# Patient Record
Sex: FEMALE | Race: OTHER | Employment: OTHER | URBAN - METROPOLITAN AREA
[De-identification: names, ages, dates, MRNs, and addresses within clinical notes are randomized per-mention and may not be internally consistent; named-entity substitution may affect disease eponyms.]

---

## 2017-01-05 ENCOUNTER — HOSPITAL ENCOUNTER (OUTPATIENT)
Dept: PHYSICAL THERAPY | Age: 57
Discharge: HOME | End: 2017-01-31
Attending: PHYSICIAN ASSISTANT | Admitting: PHYSICIAN ASSISTANT

## 2017-01-06 ASSESSMENT — PAIN SCALES - GENERAL: PAINLEVEL_OUTOF10: 3

## 2017-01-10 ENCOUNTER — HOSPITAL ENCOUNTER (OUTPATIENT)
Dept: PHYSICAL THERAPY | Age: 57
Discharge: HOME | End: 2017-01-10

## 2017-01-10 ENCOUNTER — OFFICE VISIT (OUTPATIENT)
Dept: ORTHOPEDIC SURGERY | Age: 57
End: 2017-01-10

## 2017-01-10 VITALS — HEIGHT: 57 IN | RESPIRATION RATE: 16 BRPM | BODY MASS INDEX: 31.5 KG/M2 | WEIGHT: 146 LBS

## 2017-01-10 DIAGNOSIS — R52 PAIN: Primary | ICD-10-CM

## 2017-01-10 DIAGNOSIS — S42.211A FRACTURE, HUMERUS, NECK, RIGHT, CLOSED, INITIAL ENCOUNTER: ICD-10-CM

## 2017-01-10 PROCEDURE — 99024 POSTOP FOLLOW-UP VISIT: CPT | Performed by: PHYSICIAN ASSISTANT

## 2017-01-10 PROCEDURE — 73030 X-RAY EXAM OF SHOULDER: CPT | Performed by: PHYSICIAN ASSISTANT

## 2017-01-16 ENCOUNTER — HOSPITAL ENCOUNTER (OUTPATIENT)
Dept: PHYSICAL THERAPY | Age: 57
Discharge: HOME | End: 2017-01-16

## 2017-01-19 ENCOUNTER — HOSPITAL ENCOUNTER (OUTPATIENT)
Dept: PHYSICAL THERAPY | Age: 57
Discharge: HOME | End: 2017-01-19

## 2017-01-23 ENCOUNTER — HOSPITAL ENCOUNTER (OUTPATIENT)
Dept: PHYSICAL THERAPY | Age: 57
Discharge: HOME | End: 2017-01-23

## 2017-02-01 ENCOUNTER — HOSPITAL ENCOUNTER (OUTPATIENT)
Dept: OTHER | Age: 57
Discharge: HOME | End: 2017-02-28
Attending: PHYSICIAN ASSISTANT | Admitting: PHYSICIAN ASSISTANT

## 2017-02-08 ENCOUNTER — HOSPITAL ENCOUNTER (OUTPATIENT)
Dept: PHYSICAL THERAPY | Age: 57
Discharge: HOME | End: 2017-02-08

## 2017-02-13 ENCOUNTER — HOSPITAL ENCOUNTER (OUTPATIENT)
Dept: PHYSICAL THERAPY | Age: 57
Discharge: HOME | End: 2017-02-13

## 2017-02-20 ENCOUNTER — TELEPHONE (OUTPATIENT)
Dept: ORTHOPEDIC SURGERY | Age: 57
End: 2017-02-20

## 2017-02-20 ENCOUNTER — HOSPITAL ENCOUNTER (OUTPATIENT)
Dept: PHYSICAL THERAPY | Age: 57
Discharge: HOME | End: 2017-02-20

## 2017-02-20 DIAGNOSIS — M25.511 ACUTE PAIN OF RIGHT SHOULDER: Primary | ICD-10-CM

## 2017-02-22 ENCOUNTER — HOSPITAL ENCOUNTER (OUTPATIENT)
Dept: PHYSICAL THERAPY | Age: 57
Discharge: HOME | End: 2017-02-22

## 2017-02-23 ENCOUNTER — TELEPHONE (OUTPATIENT)
Dept: FAMILY MEDICINE CLINIC | Age: 57
End: 2017-02-23

## 2017-02-24 ENCOUNTER — OFFICE VISIT (OUTPATIENT)
Dept: FAMILY MEDICINE CLINIC | Age: 57
End: 2017-02-24

## 2017-02-24 DIAGNOSIS — Z79.4 TYPE 2 DIABETES MELLITUS WITHOUT COMPLICATION, WITH LONG-TERM CURRENT USE OF INSULIN (HCC): ICD-10-CM

## 2017-02-24 DIAGNOSIS — E11.9 TYPE 2 DIABETES MELLITUS WITHOUT COMPLICATION, WITH LONG-TERM CURRENT USE OF INSULIN (HCC): ICD-10-CM

## 2017-02-24 DIAGNOSIS — K51.00 PANCOLITIS (HCC): Primary | ICD-10-CM

## 2017-02-24 DIAGNOSIS — K74.60 CIRRHOSIS OF LIVER WITHOUT ASCITES, UNSPECIFIED HEPATIC CIRRHOSIS TYPE (HCC): ICD-10-CM

## 2017-02-24 PROBLEM — K70.30 ALCOHOLIC CIRRHOSIS OF LIVER WITHOUT ASCITES (HCC): Status: ACTIVE | Noted: 2017-02-14

## 2017-02-24 PROBLEM — D69.6 THROMBOCYTOPENIA (HCC): Chronic | Status: ACTIVE | Noted: 2017-02-24

## 2017-02-24 PROBLEM — K92.0 HEMATEMESIS WITHOUT NAUSEA: Status: ACTIVE | Noted: 2017-02-14

## 2017-02-24 LAB — HBA1C MFR BLD: 5.8 %

## 2017-02-24 PROCEDURE — 83036 HEMOGLOBIN GLYCOSYLATED A1C: CPT | Performed by: FAMILY MEDICINE

## 2017-02-24 PROCEDURE — 99214 OFFICE O/P EST MOD 30 MIN: CPT | Performed by: FAMILY MEDICINE

## 2017-02-24 RX ORDER — RANOLAZINE 500 MG/1
500 TABLET, EXTENDED RELEASE ORAL 2 TIMES DAILY
COMMUNITY
End: 2017-05-09 | Stop reason: SDUPTHER

## 2017-02-27 ENCOUNTER — HOSPITAL ENCOUNTER (OUTPATIENT)
Dept: PHYSICAL THERAPY | Age: 57
Discharge: HOME | End: 2017-02-27

## 2017-02-28 ENCOUNTER — TELEPHONE (OUTPATIENT)
Dept: FAMILY MEDICINE CLINIC | Age: 57
End: 2017-02-28

## 2017-03-01 ENCOUNTER — HOSPITAL ENCOUNTER (OUTPATIENT)
Dept: OTHER | Age: 57
Discharge: HOME | End: 2017-03-31
Attending: PHYSICIAN ASSISTANT | Admitting: PHYSICIAN ASSISTANT

## 2017-03-01 ENCOUNTER — HOSPITAL ENCOUNTER (OUTPATIENT)
Dept: PHYSICAL THERAPY | Age: 57
Discharge: HOME | End: 2017-03-01

## 2017-03-09 ENCOUNTER — OFFICE VISIT (OUTPATIENT)
Dept: CARDIOLOGY CLINIC | Age: 57
End: 2017-03-09

## 2017-03-09 ENCOUNTER — HOSPITAL ENCOUNTER (OUTPATIENT)
Dept: PHYSICAL THERAPY | Age: 57
Discharge: HOME | End: 2017-03-09

## 2017-03-09 VITALS
WEIGHT: 166 LBS | HEIGHT: 58 IN | HEART RATE: 76 BPM | DIASTOLIC BLOOD PRESSURE: 80 MMHG | BODY MASS INDEX: 34.85 KG/M2 | SYSTOLIC BLOOD PRESSURE: 118 MMHG

## 2017-03-09 DIAGNOSIS — M79.602 LEFT ARM PAIN: ICD-10-CM

## 2017-03-09 DIAGNOSIS — E78.5 DYSLIPIDEMIA: ICD-10-CM

## 2017-03-09 DIAGNOSIS — R07.1 CHEST PAIN ON BREATHING: ICD-10-CM

## 2017-03-09 DIAGNOSIS — D69.6 THROMBOCYTOPENIA (HCC): Chronic | ICD-10-CM

## 2017-03-09 DIAGNOSIS — E11.9 TYPE 2 DIABETES MELLITUS WITHOUT COMPLICATION, WITH LONG-TERM CURRENT USE OF INSULIN (HCC): ICD-10-CM

## 2017-03-09 DIAGNOSIS — Z72.0 TOBACCO ABUSE: ICD-10-CM

## 2017-03-09 DIAGNOSIS — K70.30 ALCOHOLIC CIRRHOSIS OF LIVER WITHOUT ASCITES (HCC): ICD-10-CM

## 2017-03-09 DIAGNOSIS — R93.1 ABNORMAL NUCLEAR CARDIAC IMAGING TEST: Primary | Chronic | ICD-10-CM

## 2017-03-09 DIAGNOSIS — R91.1 LUNG NODULE: ICD-10-CM

## 2017-03-09 DIAGNOSIS — Z79.4 TYPE 2 DIABETES MELLITUS WITHOUT COMPLICATION, WITH LONG-TERM CURRENT USE OF INSULIN (HCC): ICD-10-CM

## 2017-03-09 PROCEDURE — 99213 OFFICE O/P EST LOW 20 MIN: CPT | Performed by: INTERNAL MEDICINE

## 2017-03-14 ENCOUNTER — OFFICE VISIT (OUTPATIENT)
Dept: GASTROENTEROLOGY | Age: 57
End: 2017-03-14

## 2017-03-14 VITALS
BODY MASS INDEX: 33.8 KG/M2 | DIASTOLIC BLOOD PRESSURE: 82 MMHG | WEIGHT: 161 LBS | HEART RATE: 100 BPM | HEIGHT: 58 IN | SYSTOLIC BLOOD PRESSURE: 130 MMHG | OXYGEN SATURATION: 98 %

## 2017-03-14 DIAGNOSIS — K21.9 GASTROESOPHAGEAL REFLUX DISEASE, ESOPHAGITIS PRESENCE NOT SPECIFIED: ICD-10-CM

## 2017-03-14 DIAGNOSIS — K70.30 ALCOHOLIC CIRRHOSIS OF LIVER WITHOUT ASCITES (HCC): ICD-10-CM

## 2017-03-14 DIAGNOSIS — R11.0 NAUSEA: ICD-10-CM

## 2017-03-14 DIAGNOSIS — Z86.19 HISTORY OF HEPATITIS C: ICD-10-CM

## 2017-03-14 DIAGNOSIS — R10.33 PERIUMBILICAL ABDOMINAL PAIN: Primary | ICD-10-CM

## 2017-03-14 DIAGNOSIS — Z86.19 HISTORY OF HEPATITIS B: ICD-10-CM

## 2017-03-14 PROCEDURE — 99999 PR OFFICE/OUTPT VISIT,PROCEDURE ONLY: CPT | Performed by: NURSE PRACTITIONER

## 2017-03-14 ASSESSMENT — ENCOUNTER SYMPTOMS
PHOTOPHOBIA: 0
ABDOMINAL PAIN: 1
HEMOPTYSIS: 0
BLURRED VISION: 0
COUGH: 1
VOMITING: 0
NAUSEA: 1
ORTHOPNEA: 0
SPUTUM PRODUCTION: 1
SHORTNESS OF BREATH: 1
DIARRHEA: 1
CONSTIPATION: 0
BLOOD IN STOOL: 0
BACK PAIN: 1
EYE PAIN: 0
DOUBLE VISION: 0
HEARTBURN: 1

## 2017-03-16 ENCOUNTER — HOSPITAL ENCOUNTER (OUTPATIENT)
Dept: SURGERY | Age: 57
Discharge: HOME | End: 2017-03-16
Attending: INTERNAL MEDICINE | Admitting: INTERNAL MEDICINE

## 2017-03-16 VITALS
BODY MASS INDEX: 32.79 KG/M2 | HEART RATE: 93 BPM | HEIGHT: 57 IN | TEMPERATURE: 98.5 F | WEIGHT: 152 LBS | SYSTOLIC BLOOD PRESSURE: 155 MMHG | OXYGEN SATURATION: 95 % | DIASTOLIC BLOOD PRESSURE: 91 MMHG | RESPIRATION RATE: 18 BRPM

## 2017-03-16 LAB — GLUCOSE BLD-MCNC: 140 MG/DL (ref 70–99)

## 2017-03-16 PROCEDURE — 43239 EGD BIOPSY SINGLE/MULTIPLE: CPT | Performed by: INTERNAL MEDICINE

## 2017-03-16 PROCEDURE — 45380 COLONOSCOPY AND BIOPSY: CPT | Performed by: INTERNAL MEDICINE

## 2017-03-16 RX ORDER — SODIUM CHLORIDE, SODIUM LACTATE, POTASSIUM CHLORIDE, CALCIUM CHLORIDE 600; 310; 30; 20 MG/100ML; MG/100ML; MG/100ML; MG/100ML
INJECTION, SOLUTION INTRAVENOUS CONTINUOUS
Status: DISCONTINUED | OUTPATIENT
Start: 2017-03-16 | End: 2017-03-17 | Stop reason: HOSPADM

## 2017-03-16 RX ADMIN — SODIUM CHLORIDE, SODIUM LACTATE, POTASSIUM CHLORIDE, CALCIUM CHLORIDE: 600; 310; 30; 20 INJECTION, SOLUTION INTRAVENOUS at 10:36

## 2017-03-16 ASSESSMENT — PAIN SCALES - GENERAL
PAINLEVEL_OUTOF10: 0
PAINLEVEL_OUTOF10: 10

## 2017-03-16 ASSESSMENT — PAIN DESCRIPTION - ONSET: ONSET: AWAKENED FROM SLEEP

## 2017-03-16 ASSESSMENT — PAIN DESCRIPTION - FREQUENCY: FREQUENCY: CONTINUOUS

## 2017-03-16 ASSESSMENT — PAIN DESCRIPTION - DESCRIPTORS: DESCRIPTORS: CRAMPING;SHARP

## 2017-03-16 ASSESSMENT — PAIN DESCRIPTION - LOCATION: LOCATION: ABDOMEN

## 2017-03-16 ASSESSMENT — PAIN - FUNCTIONAL ASSESSMENT: PAIN_FUNCTIONAL_ASSESSMENT: 0-10

## 2017-03-16 ASSESSMENT — PAIN DESCRIPTION - PAIN TYPE: TYPE: SURGICAL PAIN

## 2017-03-22 ENCOUNTER — HOSPITAL ENCOUNTER (OUTPATIENT)
Dept: PHYSICAL THERAPY | Age: 57
Discharge: HOME | End: 2017-03-22

## 2017-03-29 ENCOUNTER — HOSPITAL ENCOUNTER (OUTPATIENT)
Dept: PHYSICAL THERAPY | Age: 57
Discharge: HOME | End: 2017-03-29

## 2017-04-01 ENCOUNTER — HOSPITAL ENCOUNTER (OUTPATIENT)
Dept: OTHER | Age: 57
Discharge: HOME | End: 2017-04-05
Attending: PHYSICIAN ASSISTANT | Admitting: PHYSICIAN ASSISTANT

## 2017-05-08 RX ORDER — ASPIRIN 81 MG/1
81 TABLET ORAL DAILY
Qty: 90 TABLET | Refills: 3 | Status: CANCELLED | OUTPATIENT
Start: 2017-05-08

## 2017-05-08 RX ORDER — RANOLAZINE 500 MG/1
500 TABLET, EXTENDED RELEASE ORAL 2 TIMES DAILY
Qty: 60 TABLET | Refills: 6 | Status: CANCELLED | OUTPATIENT
Start: 2017-05-08

## 2017-05-08 RX ORDER — NITROGLYCERIN 0.4 MG/1
0.4 TABLET SUBLINGUAL EVERY 5 MIN PRN
Qty: 25 TABLET | Refills: 3 | Status: CANCELLED | OUTPATIENT
Start: 2017-05-08

## 2017-05-09 ENCOUNTER — TELEPHONE (OUTPATIENT)
Dept: FAMILY MEDICINE CLINIC | Age: 57
End: 2017-05-09

## 2017-05-09 ENCOUNTER — TELEPHONE (OUTPATIENT)
Dept: CARDIOLOGY CLINIC | Age: 57
End: 2017-05-09

## 2017-05-09 RX ORDER — RANOLAZINE 500 MG/1
500 TABLET, EXTENDED RELEASE ORAL 2 TIMES DAILY
Qty: 180 TABLET | Refills: 3 | Status: SHIPPED | OUTPATIENT
Start: 2017-05-09 | End: 2018-08-03 | Stop reason: SDUPTHER

## 2017-05-09 RX ORDER — NITROGLYCERIN 0.4 MG/1
0.4 TABLET SUBLINGUAL EVERY 5 MIN PRN
Qty: 25 TABLET | Refills: 3 | Status: SHIPPED | OUTPATIENT
Start: 2017-05-09 | End: 2019-03-18 | Stop reason: SDUPTHER

## 2017-06-14 ENCOUNTER — OFFICE VISIT (OUTPATIENT)
Dept: ORTHOPEDIC SURGERY | Age: 57
End: 2017-06-14

## 2017-06-14 VITALS — BODY MASS INDEX: 33.66 KG/M2 | HEIGHT: 57 IN | WEIGHT: 156 LBS | RESPIRATION RATE: 16 BRPM

## 2017-06-14 DIAGNOSIS — R52 PAIN: ICD-10-CM

## 2017-06-14 DIAGNOSIS — G56.02 LEFT CARPAL TUNNEL SYNDROME: ICD-10-CM

## 2017-06-14 DIAGNOSIS — G56.01 RIGHT CARPAL TUNNEL SYNDROME: Primary | ICD-10-CM

## 2017-06-14 PROCEDURE — 99243 OFF/OP CNSLTJ NEW/EST LOW 30: CPT | Performed by: ORTHOPAEDIC SURGERY

## 2017-06-14 RX ORDER — LANOLIN ALCOHOL/MO/W.PET/CERES
50 CREAM (GRAM) TOPICAL DAILY
Qty: 30 TABLET | Refills: 1 | Status: SHIPPED | OUTPATIENT
Start: 2017-06-14 | End: 2018-10-25

## 2017-06-14 ASSESSMENT — ENCOUNTER SYMPTOMS
BACK PAIN: 1
CONSTIPATION: 1
NAUSEA: 1
COUGH: 1
PHOTOPHOBIA: 1
DIARRHEA: 1
HEARTBURN: 1
ABDOMINAL PAIN: 1
SPUTUM PRODUCTION: 1
WHEEZING: 1
BLURRED VISION: 1

## 2017-07-10 ENCOUNTER — OFFICE VISIT (OUTPATIENT)
Dept: ORTHOPEDIC SURGERY | Age: 57
End: 2017-07-10

## 2017-07-10 VITALS — BODY MASS INDEX: 33.66 KG/M2 | HEIGHT: 57 IN | WEIGHT: 156 LBS | RESPIRATION RATE: 16 BRPM

## 2017-07-10 DIAGNOSIS — G56.02 LEFT CARPAL TUNNEL SYNDROME: ICD-10-CM

## 2017-07-10 DIAGNOSIS — G56.01 RIGHT CARPAL TUNNEL SYNDROME: Primary | ICD-10-CM

## 2017-07-10 PROCEDURE — 99214 OFFICE O/P EST MOD 30 MIN: CPT | Performed by: ORTHOPAEDIC SURGERY

## 2017-07-15 ENCOUNTER — OFFICE VISIT (OUTPATIENT)
Dept: FAMILY MEDICINE CLINIC | Age: 57
End: 2017-07-15

## 2017-07-15 ENCOUNTER — HOSPITAL ENCOUNTER (OUTPATIENT)
Dept: LAB | Age: 57
Discharge: HOME | End: 2017-07-15
Attending: NURSE PRACTITIONER | Admitting: NURSE PRACTITIONER

## 2017-07-15 VITALS
RESPIRATION RATE: 16 BRPM | SYSTOLIC BLOOD PRESSURE: 122 MMHG | WEIGHT: 155 LBS | HEART RATE: 88 BPM | DIASTOLIC BLOOD PRESSURE: 70 MMHG | HEIGHT: 57 IN | BODY MASS INDEX: 33.44 KG/M2 | OXYGEN SATURATION: 97 %

## 2017-07-15 DIAGNOSIS — R23.3 PETECHIAE: Primary | ICD-10-CM

## 2017-07-15 DIAGNOSIS — K70.30 ALCOHOLIC CIRRHOSIS OF LIVER WITHOUT ASCITES (HCC): ICD-10-CM

## 2017-07-15 DIAGNOSIS — R10.9 FLANK PAIN, ACUTE: ICD-10-CM

## 2017-07-15 LAB
ALBUMIN SERPL-MCNC: 3.7 GM/DL (ref 3.4–5)
ALP BLD-CCNC: 147 IU/L (ref 40–128)
ALT SERPL-CCNC: 35 U/L (ref 10–40)
ANION GAP SERPL CALCULATED.3IONS-SCNC: 9 MMOL/L (ref 4–16)
AST SERPL-CCNC: 35 IU/L (ref 15–37)
BASOPHILS ABSOLUTE: 0 K/CU MM
BASOPHILS RELATIVE PERCENT: 0.6 % (ref 0–1)
BILIRUB SERPL-MCNC: 0.7 MG/DL (ref 0–1)
BILIRUBIN, POC: NORMAL
BLOOD URINE, POC: NORMAL
BUN BLDV-MCNC: 16 MG/DL (ref 6–23)
CALCIUM SERPL-MCNC: 9 MG/DL (ref 8.3–10.6)
CHLORIDE BLD-SCNC: 100 MMOL/L (ref 99–110)
CLARITY, POC: CLEAR
CO2: 27 MMOL/L (ref 21–32)
COLOR, POC: YELLOW
CREAT SERPL-MCNC: 0.9 MG/DL (ref 0.6–1.1)
DIFFERENTIAL TYPE: ABNORMAL
EOSINOPHILS ABSOLUTE: 0.2 K/CU MM
EOSINOPHILS RELATIVE PERCENT: 2.8 % (ref 0–3)
GFR AFRICAN AMERICAN: >60 ML/MIN/1.73M2
GFR NON-AFRICAN AMERICAN: >60 ML/MIN/1.73M2
GLUCOSE FASTING: 143 MG/DL (ref 70–99)
GLUCOSE URINE, POC: NORMAL
HCT VFR BLD CALC: 47 % (ref 37–47)
HEMOGLOBIN: 16.1 GM/DL (ref 12.5–16)
IMMATURE NEUTROPHIL %: 0.4 % (ref 0–0.43)
KETONES, POC: NORMAL
LEUKOCYTE EST, POC: NORMAL
LYMPHOCYTES ABSOLUTE: 2.2 K/CU MM
LYMPHOCYTES RELATIVE PERCENT: 31.2 % (ref 24–44)
MCH RBC QN AUTO: 33.8 PG (ref 27–31)
MCHC RBC AUTO-ENTMCNC: 34.3 % (ref 32–36)
MCV RBC AUTO: 98.5 FL (ref 78–100)
MONOCYTES ABSOLUTE: 0.5 K/CU MM
MONOCYTES RELATIVE PERCENT: 6.7 % (ref 0–4)
NITRITE, POC: NORMAL
NUCLEATED RBC %: 0 %
PDW BLD-RTO: 13.1 % (ref 11.7–14.9)
PH, POC: 5
PLATELET # BLD: 76 K/CU MM (ref 140–440)
PMV BLD AUTO: 11.1 FL (ref 7.5–11.1)
POTASSIUM SERPL-SCNC: 4.6 MMOL/L (ref 3.5–5.1)
PROTEIN, POC: NORMAL
RBC # BLD: 4.77 M/CU MM (ref 4.2–5.4)
SEGMENTED NEUTROPHILS ABSOLUTE COUNT: 4.2 K/CU MM
SEGMENTED NEUTROPHILS RELATIVE PERCENT: 58.3 % (ref 36–66)
SODIUM BLD-SCNC: 136 MMOL/L (ref 135–145)
SPECIFIC GRAVITY, POC: 1.01
TOTAL IMMATURE NEUTOROPHIL: 0.03 K/CU MM
TOTAL NUCLEATED RBC: 0 K/CU MM
TOTAL PROTEIN: 7.6 GM/DL (ref 6.4–8.2)
UROBILINOGEN, POC: 0.2
WBC # BLD: 7.2 K/CU MM (ref 4–10.5)

## 2017-07-15 PROCEDURE — 99214 OFFICE O/P EST MOD 30 MIN: CPT | Performed by: NURSE PRACTITIONER

## 2017-07-15 PROCEDURE — 81002 URINALYSIS NONAUTO W/O SCOPE: CPT | Performed by: NURSE PRACTITIONER

## 2017-07-15 RX ORDER — PREDNISONE 20 MG/1
TABLET ORAL
Status: ON HOLD | COMMUNITY
Start: 2017-07-06 | End: 2017-07-30 | Stop reason: HOSPADM

## 2017-07-15 RX ORDER — BENZONATATE 200 MG/1
CAPSULE ORAL
COMMUNITY
Start: 2017-07-06 | End: 2017-12-04

## 2017-07-15 RX ORDER — LEVOFLOXACIN 500 MG/1
TABLET, FILM COATED ORAL
Status: ON HOLD | COMMUNITY
Start: 2017-07-06 | End: 2017-07-30 | Stop reason: HOSPADM

## 2017-07-15 ASSESSMENT — ENCOUNTER SYMPTOMS
DIARRHEA: 0
ABDOMINAL PAIN: 1
RECTAL PAIN: 0
VOMITING: 1
ABDOMINAL DISTENTION: 0
CONSTIPATION: 0
RESPIRATORY NEGATIVE: 1
BLOOD IN STOOL: 0
NAUSEA: 0

## 2017-07-18 ENCOUNTER — TELEPHONE (OUTPATIENT)
Dept: FAMILY MEDICINE CLINIC | Age: 57
End: 2017-07-18

## 2017-07-31 ENCOUNTER — OFFICE VISIT (OUTPATIENT)
Dept: FAMILY MEDICINE CLINIC | Age: 57
End: 2017-07-31

## 2017-07-31 VITALS
WEIGHT: 159 LBS | OXYGEN SATURATION: 93 % | SYSTOLIC BLOOD PRESSURE: 124 MMHG | DIASTOLIC BLOOD PRESSURE: 78 MMHG | HEIGHT: 57 IN | HEART RATE: 76 BPM | BODY MASS INDEX: 34.3 KG/M2

## 2017-07-31 DIAGNOSIS — Z72.0 TOBACCO ABUSE: ICD-10-CM

## 2017-07-31 DIAGNOSIS — Z23 NEED FOR PNEUMOCOCCAL VACCINATION: ICD-10-CM

## 2017-07-31 DIAGNOSIS — K70.30 ALCOHOLIC CIRRHOSIS OF LIVER WITHOUT ASCITES (HCC): ICD-10-CM

## 2017-07-31 DIAGNOSIS — K21.9 GASTROESOPHAGEAL REFLUX DISEASE WITHOUT ESOPHAGITIS: ICD-10-CM

## 2017-07-31 DIAGNOSIS — D73.89 SPLENIC LESION: ICD-10-CM

## 2017-07-31 DIAGNOSIS — R23.3 PETECHIAE: ICD-10-CM

## 2017-07-31 DIAGNOSIS — D69.6 THROMBOCYTOPENIA (HCC): Chronic | ICD-10-CM

## 2017-07-31 DIAGNOSIS — E11.8 TYPE 2 DIABETES MELLITUS WITH COMPLICATION, WITH LONG-TERM CURRENT USE OF INSULIN (HCC): Primary | ICD-10-CM

## 2017-07-31 DIAGNOSIS — I10 ESSENTIAL HYPERTENSION: ICD-10-CM

## 2017-07-31 DIAGNOSIS — E78.2 MIXED HYPERLIPIDEMIA: ICD-10-CM

## 2017-07-31 DIAGNOSIS — Z79.4 TYPE 2 DIABETES MELLITUS WITH COMPLICATION, WITH LONG-TERM CURRENT USE OF INSULIN (HCC): Primary | ICD-10-CM

## 2017-07-31 DIAGNOSIS — Z12.31 ENCOUNTER FOR SCREENING MAMMOGRAM FOR BREAST CANCER: ICD-10-CM

## 2017-07-31 DIAGNOSIS — J44.9 CHRONIC OBSTRUCTIVE PULMONARY DISEASE, UNSPECIFIED COPD TYPE (HCC): ICD-10-CM

## 2017-07-31 LAB — HBA1C MFR BLD: 5.8 %

## 2017-07-31 PROCEDURE — 83036 HEMOGLOBIN GLYCOSYLATED A1C: CPT | Performed by: FAMILY MEDICINE

## 2017-07-31 PROCEDURE — 90732 PPSV23 VACC 2 YRS+ SUBQ/IM: CPT | Performed by: FAMILY MEDICINE

## 2017-07-31 PROCEDURE — 90471 IMMUNIZATION ADMIN: CPT | Performed by: FAMILY MEDICINE

## 2017-07-31 PROCEDURE — 99214 OFFICE O/P EST MOD 30 MIN: CPT | Performed by: FAMILY MEDICINE

## 2017-07-31 ASSESSMENT — ENCOUNTER SYMPTOMS
TROUBLE SWALLOWING: 0
BLOOD IN STOOL: 0
CONSTIPATION: 0
NAUSEA: 0
EYE ITCHING: 0
SORE THROAT: 0
RHINORRHEA: 0
EYE REDNESS: 0
SINUS PRESSURE: 0
VOMITING: 0
COUGH: 0
STRIDOR: 0
APNEA: 0
SHORTNESS OF BREATH: 0
DIARRHEA: 0
WHEEZING: 0
ABDOMINAL PAIN: 1

## 2017-07-31 ASSESSMENT — PATIENT HEALTH QUESTIONNAIRE - PHQ9
2. FEELING DOWN, DEPRESSED OR HOPELESS: 0
1. LITTLE INTEREST OR PLEASURE IN DOING THINGS: 0
SUM OF ALL RESPONSES TO PHQ9 QUESTIONS 1 & 2: 0
SUM OF ALL RESPONSES TO PHQ QUESTIONS 1-9: 0

## 2017-08-01 ENCOUNTER — NURSE ONLY (OUTPATIENT)
Dept: FAMILY MEDICINE CLINIC | Age: 57
End: 2017-08-01

## 2017-08-01 DIAGNOSIS — E11.9 TYPE 2 DIABETES MELLITUS WITHOUT COMPLICATION, WITH LONG-TERM CURRENT USE OF INSULIN (HCC): ICD-10-CM

## 2017-08-01 DIAGNOSIS — Z79.4 TYPE 2 DIABETES MELLITUS WITH COMPLICATION, WITH LONG-TERM CURRENT USE OF INSULIN (HCC): ICD-10-CM

## 2017-08-01 DIAGNOSIS — R93.1 ABNORMAL NUCLEAR CARDIAC IMAGING TEST: Chronic | ICD-10-CM

## 2017-08-01 DIAGNOSIS — R91.1 LUNG NODULE: ICD-10-CM

## 2017-08-01 DIAGNOSIS — M79.602 LEFT ARM PAIN: ICD-10-CM

## 2017-08-01 DIAGNOSIS — R07.1 CHEST PAIN ON BREATHING: ICD-10-CM

## 2017-08-01 DIAGNOSIS — E78.5 DYSLIPIDEMIA: ICD-10-CM

## 2017-08-01 DIAGNOSIS — Z79.4 TYPE 2 DIABETES MELLITUS WITHOUT COMPLICATION, WITH LONG-TERM CURRENT USE OF INSULIN (HCC): ICD-10-CM

## 2017-08-01 DIAGNOSIS — D69.6 THROMBOCYTOPENIA (HCC): Chronic | ICD-10-CM

## 2017-08-01 DIAGNOSIS — E78.2 MIXED HYPERLIPIDEMIA: ICD-10-CM

## 2017-08-01 DIAGNOSIS — K70.30 ALCOHOLIC CIRRHOSIS OF LIVER WITHOUT ASCITES (HCC): ICD-10-CM

## 2017-08-01 DIAGNOSIS — E11.8 TYPE 2 DIABETES MELLITUS WITH COMPLICATION, WITH LONG-TERM CURRENT USE OF INSULIN (HCC): ICD-10-CM

## 2017-08-01 DIAGNOSIS — Z72.0 TOBACCO ABUSE: ICD-10-CM

## 2017-08-01 LAB
CHOLESTEROL, TOTAL: 120 MG/DL (ref 0–199)
HDLC SERPL-MCNC: 25 MG/DL (ref 40–60)
LDL CHOLESTEROL CALCULATED: 67 MG/DL
T4 FREE: 1 NG/DL (ref 0.9–1.8)
TRIGL SERPL-MCNC: 138 MG/DL (ref 0–150)
TSH SERPL DL<=0.05 MIU/L-ACNC: 1.73 UIU/ML (ref 0.27–4.2)
VITAMIN D 25-HYDROXY: 22.9 NG/ML
VLDLC SERPL CALC-MCNC: 28 MG/DL

## 2017-08-02 ENCOUNTER — HOSPITAL ENCOUNTER (OUTPATIENT)
Dept: WOMENS IMAGING | Age: 57
Discharge: HOME | End: 2017-08-02
Attending: FAMILY MEDICINE | Admitting: FAMILY MEDICINE

## 2017-08-02 DIAGNOSIS — Z12.31 ENCOUNTER FOR SCREENING MAMMOGRAM FOR BREAST CANCER: ICD-10-CM

## 2017-08-10 ENCOUNTER — OFFICE VISIT (OUTPATIENT)
Dept: PHYSICAL MEDICINE AND REHAB | Age: 57
End: 2017-08-10

## 2017-08-10 DIAGNOSIS — M79.601 PARESTHESIA AND PAIN OF BOTH UPPER EXTREMITIES: ICD-10-CM

## 2017-08-10 DIAGNOSIS — R20.2 PARESTHESIA AND PAIN OF BOTH UPPER EXTREMITIES: ICD-10-CM

## 2017-08-10 DIAGNOSIS — E11.42 DIABETIC SENSORIMOTOR POLYNEUROPATHY (HCC): ICD-10-CM

## 2017-08-10 DIAGNOSIS — M79.602 PARESTHESIA AND PAIN OF BOTH UPPER EXTREMITIES: ICD-10-CM

## 2017-08-10 DIAGNOSIS — G56.03 BILATERAL CARPAL TUNNEL SYNDROME: Primary | ICD-10-CM

## 2017-08-10 PROCEDURE — 95886 MUSC TEST DONE W/N TEST COMP: CPT | Performed by: PHYSICAL MEDICINE & REHABILITATION

## 2017-08-10 PROCEDURE — 95911 NRV CNDJ TEST 9-10 STUDIES: CPT | Performed by: PHYSICAL MEDICINE & REHABILITATION

## 2017-08-22 ENCOUNTER — OFFICE VISIT (OUTPATIENT)
Dept: FAMILY MEDICINE CLINIC | Age: 57
End: 2017-08-22

## 2017-08-22 VITALS
DIASTOLIC BLOOD PRESSURE: 68 MMHG | HEART RATE: 78 BPM | SYSTOLIC BLOOD PRESSURE: 110 MMHG | WEIGHT: 168 LBS | BODY MASS INDEX: 36.35 KG/M2 | OXYGEN SATURATION: 98 %

## 2017-08-22 DIAGNOSIS — Z72.0 TOBACCO ABUSE: ICD-10-CM

## 2017-08-22 DIAGNOSIS — E11.8 TYPE 2 DIABETES MELLITUS WITH COMPLICATION, WITH LONG-TERM CURRENT USE OF INSULIN (HCC): ICD-10-CM

## 2017-08-22 DIAGNOSIS — Z79.4 TYPE 2 DIABETES MELLITUS WITH COMPLICATION, WITH LONG-TERM CURRENT USE OF INSULIN (HCC): ICD-10-CM

## 2017-08-22 DIAGNOSIS — J44.9 CHRONIC OBSTRUCTIVE PULMONARY DISEASE, UNSPECIFIED COPD TYPE (HCC): ICD-10-CM

## 2017-08-22 DIAGNOSIS — E78.5 DYSLIPIDEMIA: ICD-10-CM

## 2017-08-22 DIAGNOSIS — Z23 NEEDS FLU SHOT: ICD-10-CM

## 2017-08-22 DIAGNOSIS — E55.9 VITAMIN D DEFICIENCY: Primary | ICD-10-CM

## 2017-08-22 LAB
CREATININE URINE POCT: 50
MICROALBUMIN/CREAT 24H UR: 10 MG/G{CREAT}
MICROALBUMIN/CREAT UR-RTO: ABNORMAL

## 2017-08-22 PROCEDURE — 99213 OFFICE O/P EST LOW 20 MIN: CPT | Performed by: FAMILY MEDICINE

## 2017-08-22 PROCEDURE — 90688 IIV4 VACCINE SPLT 0.5 ML IM: CPT | Performed by: FAMILY MEDICINE

## 2017-08-22 PROCEDURE — 82044 UR ALBUMIN SEMIQUANTITATIVE: CPT | Performed by: FAMILY MEDICINE

## 2017-08-22 PROCEDURE — 90471 IMMUNIZATION ADMIN: CPT | Performed by: FAMILY MEDICINE

## 2017-08-22 RX ORDER — ERGOCALCIFEROL (VITAMIN D2) 1250 MCG
50000 CAPSULE ORAL WEEKLY
Qty: 4 CAPSULE | Refills: 0 | Status: ON HOLD | OUTPATIENT
Start: 2017-08-22 | End: 2017-12-10 | Stop reason: HOSPADM

## 2017-08-22 RX ORDER — MIRTAZAPINE 15 MG/1
15 TABLET, FILM COATED ORAL NIGHTLY
COMMUNITY
End: 2018-04-04 | Stop reason: ALTCHOICE

## 2017-08-22 ASSESSMENT — ENCOUNTER SYMPTOMS
ABDOMINAL PAIN: 0
COUGH: 0
NAUSEA: 0
CONSTIPATION: 0
DIARRHEA: 0
SHORTNESS OF BREATH: 0
ABDOMINAL DISTENTION: 0

## 2017-08-30 ENCOUNTER — OFFICE VISIT (OUTPATIENT)
Dept: ORTHOPEDIC SURGERY | Age: 57
End: 2017-08-30

## 2017-08-30 VITALS — HEIGHT: 57 IN | BODY MASS INDEX: 36.24 KG/M2 | WEIGHT: 168 LBS | RESPIRATION RATE: 16 BRPM

## 2017-08-30 DIAGNOSIS — G56.02 LEFT CARPAL TUNNEL SYNDROME: Primary | ICD-10-CM

## 2017-08-30 DIAGNOSIS — G56.01 RIGHT CARPAL TUNNEL SYNDROME: ICD-10-CM

## 2017-08-30 PROCEDURE — 99214 OFFICE O/P EST MOD 30 MIN: CPT | Performed by: ORTHOPAEDIC SURGERY

## 2017-08-30 ASSESSMENT — ENCOUNTER SYMPTOMS
GASTROINTESTINAL NEGATIVE: 1
EYES NEGATIVE: 1
RESPIRATORY NEGATIVE: 1

## 2017-09-08 ENCOUNTER — OFFICE VISIT (OUTPATIENT)
Dept: CARDIOLOGY CLINIC | Age: 57
End: 2017-09-08

## 2017-09-08 VITALS
BODY MASS INDEX: 36.68 KG/M2 | DIASTOLIC BLOOD PRESSURE: 78 MMHG | HEART RATE: 70 BPM | SYSTOLIC BLOOD PRESSURE: 112 MMHG | HEIGHT: 57 IN | WEIGHT: 170 LBS

## 2017-09-08 DIAGNOSIS — K21.9 GASTROESOPHAGEAL REFLUX DISEASE WITHOUT ESOPHAGITIS: ICD-10-CM

## 2017-09-08 DIAGNOSIS — E78.5 DYSLIPIDEMIA: Primary | ICD-10-CM

## 2017-09-08 DIAGNOSIS — E11.8 TYPE 2 DIABETES MELLITUS WITH COMPLICATION, WITH LONG-TERM CURRENT USE OF INSULIN (HCC): ICD-10-CM

## 2017-09-08 DIAGNOSIS — Z72.0 TOBACCO ABUSE: ICD-10-CM

## 2017-09-08 DIAGNOSIS — K70.30 ALCOHOLIC CIRRHOSIS OF LIVER WITHOUT ASCITES (HCC): ICD-10-CM

## 2017-09-08 DIAGNOSIS — Z79.4 TYPE 2 DIABETES MELLITUS WITH COMPLICATION, WITH LONG-TERM CURRENT USE OF INSULIN (HCC): ICD-10-CM

## 2017-09-08 PROCEDURE — 99212 OFFICE O/P EST SF 10 MIN: CPT | Performed by: INTERNAL MEDICINE

## 2017-09-13 ENCOUNTER — TELEPHONE (OUTPATIENT)
Dept: ORTHOPEDIC SURGERY | Age: 57
End: 2017-09-13

## 2017-09-13 ENCOUNTER — HOSPITAL ENCOUNTER (OUTPATIENT)
Dept: PREADMISSION TESTING | Age: 57
Discharge: HOME | End: 2017-10-12
Attending: ORTHOPAEDIC SURGERY | Admitting: ORTHOPAEDIC SURGERY

## 2017-09-14 ENCOUNTER — HOSPITAL ENCOUNTER (OUTPATIENT)
Dept: SURGERY | Age: 57
Discharge: HOME | End: 2017-10-13
Attending: ORTHOPAEDIC SURGERY | Admitting: ORTHOPAEDIC SURGERY

## 2017-09-20 ENCOUNTER — OFFICE VISIT (OUTPATIENT)
Dept: FAMILY MEDICINE CLINIC | Age: 57
End: 2017-09-20

## 2017-09-20 VITALS
BODY MASS INDEX: 35.21 KG/M2 | WEIGHT: 165.6 LBS | OXYGEN SATURATION: 98 % | SYSTOLIC BLOOD PRESSURE: 116 MMHG | HEART RATE: 66 BPM | DIASTOLIC BLOOD PRESSURE: 72 MMHG

## 2017-09-20 DIAGNOSIS — K22.4 ESOPHAGEAL HYPERTENSION: ICD-10-CM

## 2017-09-20 DIAGNOSIS — Z72.0 TOBACCO ABUSE: ICD-10-CM

## 2017-09-20 DIAGNOSIS — R10.13 EPIGASTRIC PAIN: ICD-10-CM

## 2017-09-20 DIAGNOSIS — J44.9 CHRONIC OBSTRUCTIVE PULMONARY DISEASE, UNSPECIFIED COPD TYPE (HCC): ICD-10-CM

## 2017-09-20 DIAGNOSIS — E78.2 MIXED HYPERLIPIDEMIA: ICD-10-CM

## 2017-09-20 DIAGNOSIS — Z09 FOLLOW-UP EXAM: Primary | ICD-10-CM

## 2017-09-20 DIAGNOSIS — B37.81 CANDIDA ESOPHAGITIS (HCC): ICD-10-CM

## 2017-09-20 PROCEDURE — 99214 OFFICE O/P EST MOD 30 MIN: CPT | Performed by: FAMILY MEDICINE

## 2017-09-20 RX ORDER — FLUCONAZOLE 200 MG/1
200 TABLET ORAL DAILY
Qty: 14 TABLET | Refills: 0 | Status: SHIPPED | OUTPATIENT
Start: 2017-09-20 | End: 2017-10-04

## 2017-09-23 ASSESSMENT — ENCOUNTER SYMPTOMS
ABDOMINAL PAIN: 1
DIARRHEA: 0
VOMITING: 1
SHORTNESS OF BREATH: 0
CONSTIPATION: 0
NAUSEA: 1
BLOOD IN STOOL: 0
COUGH: 0

## 2017-11-17 ENCOUNTER — HOSPITAL ENCOUNTER (OUTPATIENT)
Dept: ULTRASOUND IMAGING | Age: 57
Discharge: HOME | End: 2017-11-17
Attending: NURSE PRACTITIONER | Admitting: NURSE PRACTITIONER

## 2017-11-17 DIAGNOSIS — K74.69 OTHER CIRRHOSIS OF LIVER (HCC): ICD-10-CM

## 2017-11-17 DIAGNOSIS — K74.60 CIRRHOSIS OF LIVER WITHOUT ASCITES, UNSPECIFIED HEPATIC CIRRHOSIS TYPE (HCC): ICD-10-CM

## 2017-11-29 ENCOUNTER — HOSPITAL ENCOUNTER (OUTPATIENT)
Dept: WOMENS IMAGING | Age: 57
Discharge: HOME | End: 2017-11-29
Attending: NURSE PRACTITIONER | Admitting: NURSE PRACTITIONER

## 2017-11-29 DIAGNOSIS — K21.00 ALKALINE REFLUX ESOPHAGITIS: ICD-10-CM

## 2017-11-29 DIAGNOSIS — B18.1 HEPATITIS B CARRIER (HCC): ICD-10-CM

## 2017-11-29 DIAGNOSIS — B18.1 CHRONIC VIRAL HEPATITIS B WITHOUT DELTA-AGENT (HCC): ICD-10-CM

## 2017-12-04 ENCOUNTER — OFFICE VISIT (OUTPATIENT)
Dept: FAMILY MEDICINE CLINIC | Age: 57
End: 2017-12-04

## 2017-12-04 VITALS
OXYGEN SATURATION: 98 % | HEART RATE: 68 BPM | BODY MASS INDEX: 34.87 KG/M2 | DIASTOLIC BLOOD PRESSURE: 69 MMHG | WEIGHT: 164 LBS | SYSTOLIC BLOOD PRESSURE: 108 MMHG

## 2017-12-04 DIAGNOSIS — J44.9 CHRONIC OBSTRUCTIVE PULMONARY DISEASE, UNSPECIFIED COPD TYPE (HCC): ICD-10-CM

## 2017-12-04 DIAGNOSIS — Z79.4 TYPE 2 DIABETES MELLITUS WITH COMPLICATION, WITH LONG-TERM CURRENT USE OF INSULIN (HCC): Primary | ICD-10-CM

## 2017-12-04 DIAGNOSIS — Z72.0 TOBACCO ABUSE: ICD-10-CM

## 2017-12-04 DIAGNOSIS — I10 ESSENTIAL HYPERTENSION: ICD-10-CM

## 2017-12-04 DIAGNOSIS — E78.2 MIXED HYPERLIPIDEMIA: ICD-10-CM

## 2017-12-04 DIAGNOSIS — E11.8 TYPE 2 DIABETES MELLITUS WITH COMPLICATION, WITH LONG-TERM CURRENT USE OF INSULIN (HCC): Primary | ICD-10-CM

## 2017-12-04 LAB — HBA1C MFR BLD: 5.7 %

## 2017-12-04 PROCEDURE — 3014F SCREEN MAMMO DOC REV: CPT | Performed by: FAMILY MEDICINE

## 2017-12-04 PROCEDURE — G8427 DOCREV CUR MEDS BY ELIG CLIN: HCPCS | Performed by: FAMILY MEDICINE

## 2017-12-04 PROCEDURE — 83036 HEMOGLOBIN GLYCOSYLATED A1C: CPT | Performed by: FAMILY MEDICINE

## 2017-12-04 PROCEDURE — G8484 FLU IMMUNIZE NO ADMIN: HCPCS | Performed by: FAMILY MEDICINE

## 2017-12-04 PROCEDURE — 3023F SPIROM DOC REV: CPT | Performed by: FAMILY MEDICINE

## 2017-12-04 PROCEDURE — G8417 CALC BMI ABV UP PARAM F/U: HCPCS | Performed by: FAMILY MEDICINE

## 2017-12-04 PROCEDURE — G8926 SPIRO NO PERF OR DOC: HCPCS | Performed by: FAMILY MEDICINE

## 2017-12-04 PROCEDURE — 3044F HG A1C LEVEL LT 7.0%: CPT | Performed by: FAMILY MEDICINE

## 2017-12-04 PROCEDURE — 4004F PT TOBACCO SCREEN RCVD TLK: CPT | Performed by: FAMILY MEDICINE

## 2017-12-04 PROCEDURE — 99213 OFFICE O/P EST LOW 20 MIN: CPT | Performed by: FAMILY MEDICINE

## 2017-12-04 PROCEDURE — 3017F COLORECTAL CA SCREEN DOC REV: CPT | Performed by: FAMILY MEDICINE

## 2017-12-04 PROCEDURE — G8598 ASA/ANTIPLAT THER USED: HCPCS | Performed by: FAMILY MEDICINE

## 2017-12-04 RX ORDER — TENOFOVIR DISOPROXIL FUMARATE 300 MG
300 TABLET ORAL DAILY
COMMUNITY
Start: 2017-11-20

## 2017-12-07 PROBLEM — K52.9 COLITIS: Status: ACTIVE | Noted: 2017-12-07

## 2017-12-10 PROBLEM — I10 ESSENTIAL HYPERTENSION: Status: ACTIVE | Noted: 2017-12-10

## 2017-12-10 ASSESSMENT — ENCOUNTER SYMPTOMS
CONSTIPATION: 0
NAUSEA: 0
COUGH: 0
ABDOMINAL DISTENTION: 0
ABDOMINAL PAIN: 0
SHORTNESS OF BREATH: 0
DIARRHEA: 0

## 2017-12-10 NOTE — PROGRESS NOTES
vomiting     Schizophrenia ([facility] [address]     per old chart    Seizures ([facility] [address]     \"last one was 9/2015- saw Dr Rolla Goltz at [facility]- she said not sure if acutal seizures- she thinks they are panic or anxiety attacks\"    Stress bladder incontinence, female      Past Surgical History:   Procedure Laterality Date    BREAST SURGERY  10/2015    left breast bx    CARDIAC CATHETERIZATION      per old chart pt had cath done in 3/2011 and 9/2013    CHOLECYSTECTOMY  per old chart done [facility]  3/11/13    diverticulosis, cecal polyp    COLONOSCOPY  03/16/2017    Internal hemorrhoids    ENDOSCOPY, COLON, DIAGNOSTIC  03/16/2017    EGD: Small esophageal varices, portal hypertensive gastropathy, reflux esophagitis, hiatal hernia    EYE SURGERY Bilateral ? when    cyst removal, cataracts    HYSTERECTOMY      per old chart pt had CHOLO/BSO 1986    TONSILLECTOMY  as a kid     Family History   Problem Relation Age of Onset    Cancer Mother      lung ca    Arthritis Mother     Migraines Mother     Cancer Father      colon ca    Diabetes Father     High Blood Pressure Father     Arthritis Father     High Cholesterol Father     Migraines Father     Migraines Sister     Heart Disease Brother      WPW     Social History     Social History    Marital status:      Spouse name: N/A    Number of children: N/A    Years of education: N/A     Occupational History    Not on file.      Social History Main Topics    Smoking status: Current Every Day Smoker     Packs/day: 0.25     Years: 40.00     Types: Cigarettes    Smokeless tobacco: Never Used      Comment: smokes 1 -2 cigarettes a day    Alcohol use No      Comment: very little/\"less than one time per month- use to drink alot - every day in the past\"    Drug use: Yes     Frequency: 3.0 times per week     Types: Marijuana    Sexual activity: Not Currently     Partners: Male     Other Topics Concern    Not on file     Social History Narrative    No narrative on file       Allergies   Allergen Reactions    Norco [Hydrocodone-Acetaminophen] Itching     Itching, rash, nausea and vomiting.  Tylenol [Acetaminophen] Itching, Nausea And Vomiting and Rash     Current Outpatient Prescriptions   Medication Sig Dispense Refill    VIREAD 300 MG tablet Take 1 tablet by mouth daily      mirtazapine (REMERON) 15 MG tablet Take 15 mg by mouth nightly      Pyridoxine HCl (VITAMIN B-6) 50 MG tablet Take 1 tablet by mouth daily 30 tablet 1    ranolazine (RANEXA) 500 MG extended release tablet Take 1 tablet by mouth 2 times daily 180 tablet 3    metoprolol tartrate (LOPRESSOR) 25 MG tablet Take 1 tablet by mouth 2 times daily 180 tablet 3    nitroGLYCERIN (NITROSTAT) 0.4 MG SL tablet Place 1 tablet under the tongue every 5 minutes as needed for Chest pain 25 tablet 3    dicyclomine (BENTYL) 10 MG capsule Take 1 capsule by mouth 4 times daily (before meals and nightly) 15 capsule 0    mometasone (ASMANEX 30 METERED DOSES) 220 MCG/INH inhaler Inhale 2 puffs into the lungs daily 1 Inhaler 5    phenytoin (DILANTIN) 200 MG ER capsule Take 1 capsule by mouth 2 times daily 60 capsule 3    pantoprazole (PROTONIX) 40 MG tablet Take 1 tablet by mouth daily 30 tablet 3    tiZANidine (ZANAFLEX) 4 MG tablet Take 4 mg by mouth 6 times daily      QUEtiapine (SEROQUEL) 200 MG tablet Take 200 mg by mouth nightly      albuterol sulfate HFA (PROAIR HFA) 108 (90 BASE) MCG/ACT inhaler Inhale 2 puffs into the lungs every 6 hours as needed for Wheezing 1 Inhaler 5    oxyCODONE (ROXICODONE) 5 MG immediate release tablet Take 5 mg by mouth 4 times daily  .       aspirin EC 81 MG EC tablet Take 1 tablet by mouth daily 90 tablet 3    FLUoxetine (PROZAC) 20 MG capsule   Take 20 mg by mouth daily       busPIRone (BUSPAR) 15 MG tablet Take 1 tablet by mouth 2 times daily 60 tablet 3    metroNIDAZOLE (FLAGYL) 500 MG tablet Take 1 tablet by mouth 3 times daily for 5 days 15 tablet 0    ciprofloxacin

## 2017-12-20 ENCOUNTER — HOSPITAL ENCOUNTER (OUTPATIENT)
Dept: NUCLEAR MEDICINE | Age: 57
Discharge: HOME | End: 2017-12-20
Attending: NURSE PRACTITIONER | Admitting: NURSE PRACTITIONER

## 2017-12-20 DIAGNOSIS — B18.1 CHRONIC VIRAL HEPATITIS B WITHOUT DELTA-AGENT (HCC): ICD-10-CM

## 2017-12-20 DIAGNOSIS — K21.00 GERD WITH ESOPHAGITIS: ICD-10-CM

## 2017-12-20 RX ADMIN — Medication 1 MILLICURIE: at 09:05

## 2017-12-21 PROBLEM — K92.2 GI BLEED: Status: ACTIVE | Noted: 2017-12-21

## 2018-01-25 ENCOUNTER — HOSPITAL ENCOUNTER (OUTPATIENT)
Dept: LAB | Age: 58
Discharge: HOME | End: 2018-01-25
Attending: NURSE PRACTITIONER | Admitting: NURSE PRACTITIONER

## 2018-01-25 LAB
ALBUMIN SERPL-MCNC: 3.8 GM/DL (ref 3.4–5)
ALP BLD-CCNC: 100 IU/L (ref 40–128)
ALT SERPL-CCNC: 17 U/L (ref 10–40)
ANION GAP SERPL CALCULATED.3IONS-SCNC: 11 MMOL/L (ref 4–16)
AST SERPL-CCNC: 24 IU/L (ref 15–37)
BASOPHILS ABSOLUTE: 0 K/CU MM
BASOPHILS RELATIVE PERCENT: 1.4 % (ref 0–1)
BILIRUB SERPL-MCNC: 0.7 MG/DL (ref 0–1)
BUN BLDV-MCNC: 17 MG/DL (ref 6–23)
CALCIUM SERPL-MCNC: 8.8 MG/DL (ref 8.3–10.6)
CHLORIDE BLD-SCNC: 100 MMOL/L (ref 99–110)
CO2: 29 MMOL/L (ref 21–32)
CREAT SERPL-MCNC: 1.2 MG/DL (ref 0.6–1.1)
DIFFERENTIAL TYPE: ABNORMAL
EOSINOPHILS ABSOLUTE: 0.1 K/CU MM
EOSINOPHILS RELATIVE PERCENT: 2.5 % (ref 0–3)
GFR AFRICAN AMERICAN: 56 ML/MIN/1.73M2
GFR NON-AFRICAN AMERICAN: 47 ML/MIN/1.73M2
GLUCOSE BLD-MCNC: 99 MG/DL (ref 70–99)
HCT VFR BLD CALC: 41.2 % (ref 37–47)
HEMOGLOBIN: 13.5 GM/DL (ref 12.5–16)
IMMATURE NEUTROPHIL %: 0.4 % (ref 0–0.43)
LYMPHOCYTES ABSOLUTE: 0.7 K/CU MM
LYMPHOCYTES RELATIVE PERCENT: 24.1 % (ref 24–44)
MCH RBC QN AUTO: 32.6 PG (ref 27–31)
MCHC RBC AUTO-ENTMCNC: 32.8 % (ref 32–36)
MCV RBC AUTO: 99.5 FL (ref 78–100)
MONOCYTES ABSOLUTE: 0.3 K/CU MM
MONOCYTES RELATIVE PERCENT: 11.5 % (ref 0–4)
NUCLEATED RBC %: 0 %
PDW BLD-RTO: 12.8 % (ref 11.7–14.9)
PLATELET # BLD: 64 K/CU MM (ref 140–440)
PMV BLD AUTO: 12.8 FL (ref 7.5–11.1)
POTASSIUM SERPL-SCNC: 4.7 MMOL/L (ref 3.5–5.1)
RBC # BLD: 4.14 M/CU MM (ref 4.2–5.4)
SEGMENTED NEUTROPHILS ABSOLUTE COUNT: 1.7 K/CU MM
SEGMENTED NEUTROPHILS RELATIVE PERCENT: 60.1 % (ref 36–66)
SODIUM BLD-SCNC: 140 MMOL/L (ref 135–145)
TOTAL IMMATURE NEUTOROPHIL: 0.01 K/CU MM
TOTAL NUCLEATED RBC: 0 K/CU MM
TOTAL PROTEIN: 7.1 GM/DL (ref 6.4–8.2)
WBC # BLD: 2.8 K/CU MM (ref 4–10.5)

## 2018-01-27 LAB — HEPATITIS BE ANTIBODY: NEGATIVE

## 2018-01-28 LAB
HEPATITIS BE ANTIGEN: POSITIVE
HEPATITIS DELTA ANTIBODY: NEGATIVE

## 2018-01-30 LAB
ALANINE AMINOTRANSFERASE, FIBROMETER: 19
ALPHA-2-MACROGLOBULIN, FIBROMETER: 413
ASPARTATE AMINOTRANSFERASE, FIBROMETER: 30
CIRRHOMETER PATIENT SCORE: 0.7
EER FIBROMETER REPORT: ABNORMAL
FIBROMETER INTERPRETATION: ABNORMAL
FIBROMETER PATIENT SCORE: 0.89
FIBROMETER PLATELET COUNT: 64
FIBROMETER PROTHROMBIN INDEX: 75
FIBROSIS METAVIR CLASSIFICATION: ABNORMAL
GAMMA GLUTAMYL TRANSFERASE, FIBROMETER: 25
INFLAMETER METAVIR CLASSIFICATION: ABNORMAL
INFLAMETER PATIENT SCORE: 0.78
UREA NITROGEN, FIBROMETER: 17

## 2018-03-09 ENCOUNTER — OFFICE VISIT (OUTPATIENT)
Dept: CARDIOLOGY CLINIC | Age: 58
End: 2018-03-09

## 2018-03-09 VITALS
HEART RATE: 60 BPM | WEIGHT: 158.6 LBS | DIASTOLIC BLOOD PRESSURE: 80 MMHG | SYSTOLIC BLOOD PRESSURE: 124 MMHG | HEIGHT: 58 IN | BODY MASS INDEX: 33.29 KG/M2

## 2018-03-09 DIAGNOSIS — K21.9 GASTROESOPHAGEAL REFLUX DISEASE WITHOUT ESOPHAGITIS: ICD-10-CM

## 2018-03-09 DIAGNOSIS — Z72.0 TOBACCO ABUSE: ICD-10-CM

## 2018-03-09 DIAGNOSIS — Q24.5 CORONARY-MYOCARDIAL BRIDGE: ICD-10-CM

## 2018-03-09 DIAGNOSIS — I10 ESSENTIAL HYPERTENSION: Primary | ICD-10-CM

## 2018-03-09 DIAGNOSIS — K70.30 ALCOHOLIC CIRRHOSIS OF LIVER WITHOUT ASCITES (HCC): ICD-10-CM

## 2018-03-09 DIAGNOSIS — E78.2 MIXED HYPERLIPIDEMIA: ICD-10-CM

## 2018-03-09 PROCEDURE — G8417 CALC BMI ABV UP PARAM F/U: HCPCS | Performed by: INTERNAL MEDICINE

## 2018-03-09 PROCEDURE — 4004F PT TOBACCO SCREEN RCVD TLK: CPT | Performed by: INTERNAL MEDICINE

## 2018-03-09 PROCEDURE — 3017F COLORECTAL CA SCREEN DOC REV: CPT | Performed by: INTERNAL MEDICINE

## 2018-03-09 PROCEDURE — G8599 NO ASA/ANTIPLAT THER USE RNG: HCPCS | Performed by: INTERNAL MEDICINE

## 2018-03-09 PROCEDURE — 93000 ELECTROCARDIOGRAM COMPLETE: CPT | Performed by: INTERNAL MEDICINE

## 2018-03-09 PROCEDURE — G8427 DOCREV CUR MEDS BY ELIG CLIN: HCPCS | Performed by: INTERNAL MEDICINE

## 2018-03-09 PROCEDURE — 99213 OFFICE O/P EST LOW 20 MIN: CPT | Performed by: INTERNAL MEDICINE

## 2018-03-09 PROCEDURE — 3014F SCREEN MAMMO DOC REV: CPT | Performed by: INTERNAL MEDICINE

## 2018-03-09 PROCEDURE — G8482 FLU IMMUNIZE ORDER/ADMIN: HCPCS | Performed by: INTERNAL MEDICINE

## 2018-03-09 NOTE — PROGRESS NOTES
Beverly Robbins is a 56 y.o. female who has    CHIEF COMPLAINT AS FOLLOWS:  CHEST PAIN: Patient denies any C/O chest pains at this time. Has back pain, muscular radiating to the front. SOB: No C/O SOB at this time. LEG EDEMA:  B/L Lower extremity edema is present bu no change over previous. PALPITATIONS: C/O brief episodes of palpitations, which are not frequent. DIZZINESS: No C/O Dizziness                          SYNCOPE: None   OTHER:                                     HPI: Patient is here for F/U on her HTN & Dyslipidemia problems. She does not have any new complaints at this time.     Jerrica Antonio has the following history recorded in care path:  Patient Active Problem List    Diagnosis Date Noted    GI bleed 12/21/2017    Essential hypertension 12/10/2017    Colitis 12/07/2017    Esophageal hypertension 09/20/2017    Candida esophagitis ([address] 09/20/2017    Left carpal tunnel syndrome 08/30/2017    Right carpal tunnel syndrome 08/30/2017    Vitamin D deficiency 08/22/2017    Mixed hyperlipidemia 53/06/2923    Periumbilical abdominal pain     History of colonic polyps     Abnormal findings on diagnostic imaging of abdomen     Nausea     Gastroesophageal reflux disease without esophagitis     Thrombocytopenia (HCC) 02/24/2017    Pancolitis ([address] 02/14/2017    Hematemesis without nausea 49/15/7609    Alcoholic cirrhosis of liver without ascites ([address] 02/14/2017    Dyslipidemia 11/23/2016    Chest pain 06/07/2016    Lung nodule 11/18/2013    Angina effort ([address] 09/06/2013    Abnormal nuclear cardiac imaging test 09/06/2013    Left arm pain 04/27/2013    Tobacco abuse 04/27/2013    Acid reflux     COPD (chronic obstructive pulmonary disease) (HCC)      Current Outpatient Prescriptions   Medication Sig Dispense Refill    FLUoxetine (PROZAC) 40 MG capsule Take 40 mg by mouth daily      depression see Dr Megan Preston    Diabetes mellitus [facility])     dx 10+ yrs ago-     Drug abuse     hx use of cocaine, heroin and marijuana- states last used 12/2014    Glaucoma     left eye    H/O Doppler ultrasound 7/22/15    CAROTID: WNL. Normal vertebral flows bilaterally.  Hepatitis C     for liver bx 12/3/2015\"Have Hepatitis B and C and saw Dr Javid Kyle for this 12/1/2015\"    Hiatal hernia     History of alcohol abuse     History of cardiac cath     History of cardiac monitoring 5/28/15    Event - NSR    History of kidney stones     2013\"able to pass on my own\"    HTN (hypertension)     \"for the past two yrs on medication\" follows with Dr Herbert Starr Hx of cardiac cath 9/7/2013    EF 40-50%. ([place]) Mild to Moderate CAD of the LAD in conjunction with a myocardial bridge.  Hx of cardiovascular stress test 7/22/2015    lexiscan-normal,EF70%    Hx of cardiovascular stress test 9/8/2014    EF 42%. ([place]) Moderate to large sized mild severity unspecified completly reversible defect consistent w/ ischemia in the territory typical of the mid and proximal LCX and RCA.  Hx of echocardiogram 06/20/2016    EF55-60%. Normal Echo.     Hyperlipemia     Irregular heart beat     per pt    Liver hematoma     Migraines     Nausea & vomiting     Schizophrenia (HCC)     per old chart    Seizures ([address]     \"last one was 9/2015- saw Dr Ed Lyn at [facility]- she said not sure if acutal seizures- she thinks they are panic or anxiety attacks\"    Stress bladder incontinence, female      Past Surgical History:   Procedure Laterality Date    BREAST SURGERY  10/2015    left breast bx    CARDIAC CATHETERIZATION      per old chart pt had cath done in 3/2011 and 9/2013    CHOLECYSTECTOMY  per old chart done 1985    COLONOSCOPY  3/11/13    diverticulosis, cecal polyp    COLONOSCOPY  03/16/2017    Internal hemorrhoids    ENDOSCOPY, COLON, DIAGNOSTIC  03/16/2017    EGD: Small esophageal varices, portal hypertensive oz (72.2 kg)     Body mass index is 33.73 kg/m². GENERAL - Alert, oriented, pleasant, in no apparent distress. EYES: No jaundice, no conjunctival pallor. SKIN: It is warm & dry. No rashes. No Echhymosis    HEENT  No clinically significant abnormalities seen. Neck - Supple. No jugular venous distention noted. No carotid bruits. Cardiovascular  Normal S1 and S2 without obvious murmur or gallop. Extremities - No cyanosis, clubbing, or significant edema. Pulmonary  No respiratory distress. No wheezes or rales. Abdomen  No masses, tenderness, or organomegaly. Musculoskeletal  No significant edema. No joint deformities. No muscle wasting. Neurologic  Cranial nerves II through XII are grossly intact. There were no gross focal neurologic abnormalities.     Lab Review   Lab Results   Component Value Date    CKTOTAL 26 09/16/2013    TROPONINT <0.010 12/07/2017     BNP:    Lab Results   Component Value Date    BNP 9 04/27/2013     PT/INR:    Lab Results   Component Value Date    INR 1.18 12/21/2017     Lab Results   Component Value Date    LABA1C 6.2 12/21/2017    LABA1C 5.7 12/04/2017     Lab Results   Component Value Date    WBC 2.8 (L) 01/25/2018    HCT 41.2 01/25/2018    MCV 99.5 01/25/2018    PLT 64 (L) 01/25/2018     Lab Results   Component Value Date    CHOL 120 08/01/2017    TRIG 138 08/01/2017    HDL 25 (L) 08/01/2017    LDLCALC 67 08/01/2017    LDLDIRECT 86 04/28/2013     Lab Results   Component Value Date    ALT 17 01/25/2018    AST 24 01/25/2018     BMP:    Lab Results   Component Value Date     01/25/2018    K 4.7 01/25/2018     01/25/2018    CO2 29 01/25/2018    BUN 17 01/25/2018    CREATININE 1.2 01/25/2018     CMP:   Lab Results   Component Value Date     01/25/2018    K 4.7 01/25/2018     01/25/2018    CO2 29 01/25/2018    BUN 17 01/25/2018    PROT 7.1 01/25/2018    PROT 7.4 09/26/2012     TSH:    Lab Results   Component Value Date    TSH 1.73 08/01/2017 [facility] 0.739 01/13/2015       QUALITY MEASURES REVIEWED:  1.CAD:Patient is taking anti platelet agent:Yes  2. DYSLIPIDEMIA: Patient is on cholesterol lowering medication:No  3. Beta-Blocker therapy for CAD, if prior Myocardial Infarction:Yes  4. Counselled regarding smoking cessation. 5. Atrial fibrillation & anticoagulation therapy No  6. Discussed weight management strategies. Impression:    No diagnosis found. Patient Active Problem List   Diagnosis Code    Left arm pain M79.602    Acid reflux K21.9    COPD (chronic obstructive pulmonary disease) (HCC) J44.9    Tobacco abuse Z72.0    Angina effort ([facility] [address] I20.8    Abnormal nuclear cardiac imaging test R93.1    Lung nodule R91.1    Chest pain R07.9    Dyslipidemia E78.5    Pancolitis ([facility] [address] K51.00    Hematemesis without nausea K13.6    Alcoholic cirrhosis of liver without ascites (HCC) K70.30    Thrombocytopenia (HCC) R24.3    Periumbilical abdominal pain R10.33    History of colonic polyps Z86.010    Abnormal findings on diagnostic imaging of abdomen R93.5    Nausea R11.0    Gastroesophageal reflux disease without esophagitis K21.9    Mixed hyperlipidemia E78.2    Vitamin D deficiency E55.9    Left carpal tunnel syndrome G56.02    Right carpal tunnel syndrome G56.01    Esophageal hypertension K22.0    Candida esophagitis (HCC) B37.81    Colitis K52.9    Essential hypertension I10    GI bleed K92.2       Assessment & Plan:    CAD:No, has known Myocardial bridge. Current symptoms may not be Cardiac. HTN:well controlled on current medical regimen, see list above.               - changes in  treatment:   no   CARDIOMYOPATHY: None known CONGESTIVE HEART FAILURE: NO KNOWN HISTORY.      VHD: No significant VHD noted  DYSLIPIDEMIA: Patient's profile is at / near Goal.yes,                                 HDL is low                                Tolerating current medical regimen wellyes,

## 2018-03-16 ENCOUNTER — TELEPHONE (OUTPATIENT)
Dept: CARDIOLOGY CLINIC | Age: 58
End: 2018-03-16

## 2018-03-23 ENCOUNTER — TELEPHONE (OUTPATIENT)
Dept: CARDIOLOGY CLINIC | Age: 58
End: 2018-03-23

## 2018-03-28 ENCOUNTER — TELEPHONE (OUTPATIENT)
Dept: CARDIOLOGY CLINIC | Age: 58
End: 2018-03-28

## 2018-03-28 DIAGNOSIS — I25.10 CORONARY ARTERY DISEASE INVOLVING NATIVE CORONARY ARTERY OF NATIVE HEART WITHOUT ANGINA PECTORIS: Primary | ICD-10-CM

## 2018-03-28 NOTE — TELEPHONE ENCOUNTER
Peer to peer done. NM not approved but Stress Echo was approved. Per Dr. Alis Montgmoery that is OK. Pre-cert specialist notified. Patient to be scheduled.

## 2018-03-29 ENCOUNTER — TELEPHONE (OUTPATIENT)
Dept: CARDIOLOGY CLINIC | Age: 58
End: 2018-03-29

## 2018-04-04 ENCOUNTER — OFFICE VISIT (OUTPATIENT)
Dept: FAMILY MEDICINE CLINIC | Age: 58
End: 2018-04-04

## 2018-04-04 ENCOUNTER — TELEPHONE (OUTPATIENT)
Dept: CARDIOLOGY CLINIC | Age: 58
End: 2018-04-04

## 2018-04-04 VITALS
DIASTOLIC BLOOD PRESSURE: 84 MMHG | SYSTOLIC BLOOD PRESSURE: 120 MMHG | TEMPERATURE: 98.5 F | BODY MASS INDEX: 32.96 KG/M2 | WEIGHT: 155 LBS | HEART RATE: 82 BPM | OXYGEN SATURATION: 99 %

## 2018-04-04 DIAGNOSIS — R68.89 FLU-LIKE SYMPTOMS: ICD-10-CM

## 2018-04-04 DIAGNOSIS — K52.9 ACUTE GASTROENTERITIS: Primary | ICD-10-CM

## 2018-04-04 LAB
A/G RATIO: 1.1 (ref 1.1–2.2)
ALBUMIN SERPL-MCNC: 3.8 G/DL (ref 3.4–5)
ALP BLD-CCNC: 92 U/L (ref 40–129)
ALT SERPL-CCNC: 15 U/L (ref 10–40)
ANION GAP SERPL CALCULATED.3IONS-SCNC: 14 MMOL/L (ref 3–16)
AST SERPL-CCNC: 19 U/L (ref 15–37)
BASOPHILS ABSOLUTE: 0 K/UL (ref 0–0.2)
BASOPHILS RELATIVE PERCENT: 0.5 %
BILIRUB SERPL-MCNC: 0.8 MG/DL (ref 0–1)
BUN BLDV-MCNC: 15 MG/DL (ref 7–20)
CALCIUM SERPL-MCNC: 9 MG/DL (ref 8.3–10.6)
CHLORIDE BLD-SCNC: 101 MMOL/L (ref 99–110)
CO2: 27 MMOL/L (ref 21–32)
CREAT SERPL-MCNC: 0.8 MG/DL (ref 0.6–1.1)
EOSINOPHILS ABSOLUTE: 0.1 K/UL (ref 0–0.6)
EOSINOPHILS RELATIVE PERCENT: 2.8 %
GFR AFRICAN AMERICAN: >60
GFR NON-AFRICAN AMERICAN: >60
GLOBULIN: 3.5 G/DL
GLUCOSE BLD-MCNC: 135 MG/DL (ref 70–99)
HCT VFR BLD CALC: 44.7 % (ref 36–48)
HEMOGLOBIN: 15 G/DL (ref 12–16)
INFLUENZA VIRUS A RNA: NEGATIVE
INFLUENZA VIRUS B RNA: NEGATIVE
LYMPHOCYTES ABSOLUTE: 1.4 K/UL (ref 1–5.1)
LYMPHOCYTES RELATIVE PERCENT: 28.9 %
MCH RBC QN AUTO: 33.4 PG (ref 26–34)
MCHC RBC AUTO-ENTMCNC: 33.6 G/DL (ref 31–36)
MCV RBC AUTO: 99.5 FL (ref 80–100)
MONOCYTES ABSOLUTE: 0.3 K/UL (ref 0–1.3)
MONOCYTES RELATIVE PERCENT: 5.6 %
NEUTROPHILS ABSOLUTE: 2.9 K/UL (ref 1.7–7.7)
NEUTROPHILS RELATIVE PERCENT: 62.2 %
PDW BLD-RTO: 14.3 % (ref 12.4–15.4)
PLATELET # BLD: 59 K/UL (ref 135–450)
PLATELET SLIDE REVIEW: ABNORMAL
PMV BLD AUTO: 11 FL (ref 5–10.5)
POTASSIUM SERPL-SCNC: 4.7 MMOL/L (ref 3.5–5.1)
RBC # BLD: 4.49 M/UL (ref 4–5.2)
SODIUM BLD-SCNC: 142 MMOL/L (ref 136–145)
TOTAL PROTEIN: 7.3 G/DL (ref 6.4–8.2)
WBC # BLD: 4.7 K/UL (ref 4–11)

## 2018-04-04 PROCEDURE — 87502 INFLUENZA DNA AMP PROBE: CPT | Performed by: NURSE PRACTITIONER

## 2018-04-04 PROCEDURE — 36415 COLL VENOUS BLD VENIPUNCTURE: CPT | Performed by: NURSE PRACTITIONER

## 2018-04-04 PROCEDURE — 99213 OFFICE O/P EST LOW 20 MIN: CPT | Performed by: NURSE PRACTITIONER

## 2018-04-04 RX ORDER — ONDANSETRON 4 MG/1
4 TABLET, ORALLY DISINTEGRATING ORAL EVERY 8 HOURS PRN
Qty: 20 TABLET | Refills: 0 | Status: SHIPPED | OUTPATIENT
Start: 2018-04-04 | End: 2019-05-08

## 2018-04-04 ASSESSMENT — ENCOUNTER SYMPTOMS
COUGH: 1
WHEEZING: 0
DIARRHEA: 1
SINUS PRESSURE: 0
SORE THROAT: 0
SHORTNESS OF BREATH: 1
SINUS PAIN: 0
VOMITING: 1
NAUSEA: 1
CHEST TIGHTNESS: 1
RHINORRHEA: 0

## 2018-04-13 ENCOUNTER — TELEPHONE (OUTPATIENT)
Dept: CARDIOLOGY CLINIC | Age: 58
End: 2018-04-13

## 2018-04-16 ENCOUNTER — HOSPITAL ENCOUNTER (OUTPATIENT)
Dept: ULTRASOUND IMAGING | Age: 58
Discharge: HOME | End: 2018-04-16
Attending: NURSE PRACTITIONER | Admitting: NURSE PRACTITIONER

## 2018-04-16 DIAGNOSIS — K74.60 CIRRHOSIS OF LIVER WITHOUT ASCITES, UNSPECIFIED HEPATIC CIRRHOSIS TYPE (HCC): ICD-10-CM

## 2018-04-16 DIAGNOSIS — K74.69 OTHER CIRRHOSIS OF LIVER (HCC): ICD-10-CM

## 2018-05-02 ENCOUNTER — HOSPITAL ENCOUNTER (OUTPATIENT)
Dept: LAB | Age: 58
Discharge: HOME | End: 2018-05-02
Attending: NURSE PRACTITIONER | Admitting: NURSE PRACTITIONER

## 2018-05-02 LAB
ALBUMIN SERPL-MCNC: 3.8 GM/DL (ref 3.4–5)
ALP BLD-CCNC: 127 IU/L (ref 40–129)
ALT SERPL-CCNC: 13 U/L (ref 10–40)
ANION GAP SERPL CALCULATED.3IONS-SCNC: 10 MMOL/L (ref 4–16)
AST SERPL-CCNC: 21 IU/L (ref 15–37)
BASOPHILS ABSOLUTE: 0 K/CU MM
BASOPHILS RELATIVE PERCENT: 0.6 % (ref 0–1)
BILIRUB SERPL-MCNC: 0.5 MG/DL (ref 0–1)
BUN BLDV-MCNC: 20 MG/DL (ref 6–23)
CALCIUM SERPL-MCNC: 9.4 MG/DL (ref 8.3–10.6)
CHLORIDE BLD-SCNC: 107 MMOL/L (ref 99–110)
CO2: 26 MMOL/L (ref 21–32)
CREAT SERPL-MCNC: 1 MG/DL (ref 0.6–1.1)
DIFFERENTIAL TYPE: ABNORMAL
EOSINOPHILS ABSOLUTE: 0.1 K/CU MM
EOSINOPHILS RELATIVE PERCENT: 3.2 % (ref 0–3)
GFR AFRICAN AMERICAN: >60 ML/MIN/1.73M2
GFR NON-AFRICAN AMERICAN: 58 ML/MIN/1.73M2
GLUCOSE BLD-MCNC: 122 MG/DL (ref 70–99)
HCT VFR BLD CALC: 40 % (ref 37–47)
HEMOGLOBIN: 13.2 GM/DL (ref 12.5–16)
IMMATURE NEUTROPHIL %: 0.3 % (ref 0–0.43)
INR BLD: 1.05 INDEX
LYMPHOCYTES ABSOLUTE: 1.1 K/CU MM
LYMPHOCYTES RELATIVE PERCENT: 32.2 % (ref 24–44)
MCH RBC QN AUTO: 33 PG (ref 27–31)
MCHC RBC AUTO-ENTMCNC: 33 % (ref 32–36)
MCV RBC AUTO: 100 FL (ref 78–100)
MONOCYTES ABSOLUTE: 0.2 K/CU MM
MONOCYTES RELATIVE PERCENT: 7 % (ref 0–4)
NUCLEATED RBC %: 0 %
PDW BLD-RTO: 13.3 % (ref 11.7–14.9)
PLATELET # BLD: 59 K/CU MM (ref 140–440)
PMV BLD AUTO: 12 FL (ref 7.5–11.1)
POTASSIUM SERPL-SCNC: 4.2 MMOL/L (ref 3.5–5.1)
PROTHROMBIN TIME: 12 SECONDS (ref 9.12–12.5)
RBC # BLD: 4 M/CU MM (ref 4.2–5.4)
SEGMENTED NEUTROPHILS ABSOLUTE COUNT: 1.9 K/CU MM
SEGMENTED NEUTROPHILS RELATIVE PERCENT: 56.7 % (ref 36–66)
SODIUM BLD-SCNC: 143 MMOL/L (ref 135–145)
TOTAL IMMATURE NEUTOROPHIL: 0.01 K/CU MM
TOTAL NUCLEATED RBC: 0 K/CU MM
TOTAL PROTEIN: 7.1 GM/DL (ref 6.4–8.2)
WBC # BLD: 3.4 K/CU MM (ref 4–10.5)

## 2018-05-03 LAB — MS ALPHA-FETOPROTEIN: 4

## 2018-05-04 LAB
HEPATITIS BE ANTIBODY: NEGATIVE
HEPATITIS BE ANTIGEN: NEGATIVE
HEPATITIS DELTA ANTIBODY: NEGATIVE

## 2018-05-05 LAB
HBV DNA ULTRA QNT INTERP REALT: NOT DETECTED
HBV DNA ULTRA QNT REALTIME PCR: <1.3
HBV DNA ULTRA QNT REALTIME PCR: <20

## 2018-06-04 ENCOUNTER — OFFICE VISIT (OUTPATIENT)
Dept: FAMILY MEDICINE CLINIC | Age: 58
End: 2018-06-04

## 2018-06-04 VITALS
BODY MASS INDEX: 34.05 KG/M2 | DIASTOLIC BLOOD PRESSURE: 82 MMHG | WEIGHT: 157.8 LBS | HEART RATE: 79 BPM | HEIGHT: 57 IN | SYSTOLIC BLOOD PRESSURE: 110 MMHG | OXYGEN SATURATION: 93 %

## 2018-06-04 DIAGNOSIS — Z79.4 TYPE 2 DIABETES MELLITUS WITH COMPLICATION, WITH LONG-TERM CURRENT USE OF INSULIN (HCC): Primary | ICD-10-CM

## 2018-06-04 DIAGNOSIS — K51.00 PANCOLITIS (HCC): ICD-10-CM

## 2018-06-04 DIAGNOSIS — E11.8 TYPE 2 DIABETES MELLITUS WITH COMPLICATION, WITH LONG-TERM CURRENT USE OF INSULIN (HCC): Primary | ICD-10-CM

## 2018-06-04 DIAGNOSIS — R05.9 COUGH: ICD-10-CM

## 2018-06-04 DIAGNOSIS — J44.9 CHRONIC OBSTRUCTIVE PULMONARY DISEASE, UNSPECIFIED COPD TYPE (HCC): ICD-10-CM

## 2018-06-04 DIAGNOSIS — E78.5 DYSLIPIDEMIA: ICD-10-CM

## 2018-06-04 DIAGNOSIS — Z72.0 TOBACCO ABUSE: ICD-10-CM

## 2018-06-04 DIAGNOSIS — I10 ESSENTIAL HYPERTENSION: ICD-10-CM

## 2018-06-04 LAB — HBA1C MFR BLD: 5.8 %

## 2018-06-04 PROCEDURE — 3017F COLORECTAL CA SCREEN DOC REV: CPT | Performed by: FAMILY MEDICINE

## 2018-06-04 PROCEDURE — 3046F HEMOGLOBIN A1C LEVEL >9.0%: CPT | Performed by: FAMILY MEDICINE

## 2018-06-04 PROCEDURE — 83036 HEMOGLOBIN GLYCOSYLATED A1C: CPT | Performed by: FAMILY MEDICINE

## 2018-06-04 PROCEDURE — G8599 NO ASA/ANTIPLAT THER USE RNG: HCPCS | Performed by: FAMILY MEDICINE

## 2018-06-04 PROCEDURE — 2022F DILAT RTA XM EVC RTNOPTHY: CPT | Performed by: FAMILY MEDICINE

## 2018-06-04 PROCEDURE — G8427 DOCREV CUR MEDS BY ELIG CLIN: HCPCS | Performed by: FAMILY MEDICINE

## 2018-06-04 PROCEDURE — G8417 CALC BMI ABV UP PARAM F/U: HCPCS | Performed by: FAMILY MEDICINE

## 2018-06-04 PROCEDURE — G8926 SPIRO NO PERF OR DOC: HCPCS | Performed by: FAMILY MEDICINE

## 2018-06-04 PROCEDURE — 3023F SPIROM DOC REV: CPT | Performed by: FAMILY MEDICINE

## 2018-06-04 PROCEDURE — 99214 OFFICE O/P EST MOD 30 MIN: CPT | Performed by: FAMILY MEDICINE

## 2018-06-04 PROCEDURE — 4004F PT TOBACCO SCREEN RCVD TLK: CPT | Performed by: FAMILY MEDICINE

## 2018-06-10 ASSESSMENT — ENCOUNTER SYMPTOMS
SORE THROAT: 0
DIARRHEA: 0
COUGH: 1
WHEEZING: 0
SHORTNESS OF BREATH: 0
NAUSEA: 0
CONSTIPATION: 0
HEARTBURN: 0
HEMOPTYSIS: 0
ABDOMINAL PAIN: 0
RHINORRHEA: 0
ABDOMINAL DISTENTION: 0

## 2018-08-03 RX ORDER — RANOLAZINE 500 MG/1
500 TABLET, EXTENDED RELEASE ORAL 2 TIMES DAILY
Qty: 180 TABLET | Refills: 3 | Status: SHIPPED | OUTPATIENT
Start: 2018-08-03 | End: 2019-10-07 | Stop reason: SDUPTHER

## 2018-09-26 ENCOUNTER — OFFICE VISIT (OUTPATIENT)
Dept: FAMILY MEDICINE CLINIC | Age: 58
End: 2018-09-26
Payer: COMMERCIAL

## 2018-09-26 VITALS
OXYGEN SATURATION: 96 % | DIASTOLIC BLOOD PRESSURE: 82 MMHG | BODY MASS INDEX: 35.27 KG/M2 | HEART RATE: 66 BPM | WEIGHT: 163 LBS | SYSTOLIC BLOOD PRESSURE: 118 MMHG

## 2018-09-26 DIAGNOSIS — Z72.0 TOBACCO ABUSE: ICD-10-CM

## 2018-09-26 DIAGNOSIS — I10 ESSENTIAL HYPERTENSION: Primary | ICD-10-CM

## 2018-09-26 DIAGNOSIS — J44.9 CHRONIC OBSTRUCTIVE PULMONARY DISEASE, UNSPECIFIED COPD TYPE (HCC): ICD-10-CM

## 2018-09-26 DIAGNOSIS — K21.9 GASTROESOPHAGEAL REFLUX DISEASE WITHOUT ESOPHAGITIS: ICD-10-CM

## 2018-09-26 PROCEDURE — G8599 NO ASA/ANTIPLAT THER USE RNG: HCPCS | Performed by: FAMILY MEDICINE

## 2018-09-26 PROCEDURE — 3023F SPIROM DOC REV: CPT | Performed by: FAMILY MEDICINE

## 2018-09-26 PROCEDURE — 99213 OFFICE O/P EST LOW 20 MIN: CPT | Performed by: FAMILY MEDICINE

## 2018-09-26 PROCEDURE — G8417 CALC BMI ABV UP PARAM F/U: HCPCS | Performed by: FAMILY MEDICINE

## 2018-09-26 PROCEDURE — 3017F COLORECTAL CA SCREEN DOC REV: CPT | Performed by: FAMILY MEDICINE

## 2018-09-26 PROCEDURE — G8427 DOCREV CUR MEDS BY ELIG CLIN: HCPCS | Performed by: FAMILY MEDICINE

## 2018-09-26 PROCEDURE — G8926 SPIRO NO PERF OR DOC: HCPCS | Performed by: FAMILY MEDICINE

## 2018-09-26 PROCEDURE — 4004F PT TOBACCO SCREEN RCVD TLK: CPT | Performed by: FAMILY MEDICINE

## 2018-09-26 ASSESSMENT — PATIENT HEALTH QUESTIONNAIRE - PHQ9
2. FEELING DOWN, DEPRESSED OR HOPELESS: 1
1. LITTLE INTEREST OR PLEASURE IN DOING THINGS: 1
SUM OF ALL RESPONSES TO PHQ QUESTIONS 1-9: 2
SUM OF ALL RESPONSES TO PHQ9 QUESTIONS 1 & 2: 2
SUM OF ALL RESPONSES TO PHQ QUESTIONS 1-9: 2

## 2018-10-03 ENCOUNTER — OFFICE VISIT (OUTPATIENT)
Dept: CARDIOLOGY CLINIC | Age: 58
End: 2018-10-03
Payer: COMMERCIAL

## 2018-10-03 VITALS
BODY MASS INDEX: 34.97 KG/M2 | HEIGHT: 58 IN | SYSTOLIC BLOOD PRESSURE: 118 MMHG | DIASTOLIC BLOOD PRESSURE: 84 MMHG | WEIGHT: 166.6 LBS | HEART RATE: 64 BPM

## 2018-10-03 DIAGNOSIS — Z72.0 TOBACCO ABUSE: ICD-10-CM

## 2018-10-03 DIAGNOSIS — J44.9 CHRONIC OBSTRUCTIVE PULMONARY DISEASE, UNSPECIFIED COPD TYPE (HCC): ICD-10-CM

## 2018-10-03 DIAGNOSIS — E11.8 TYPE 2 DIABETES MELLITUS WITH COMPLICATION, WITH LONG-TERM CURRENT USE OF INSULIN (HCC): ICD-10-CM

## 2018-10-03 DIAGNOSIS — E78.5 DYSLIPIDEMIA: ICD-10-CM

## 2018-10-03 DIAGNOSIS — Q24.5 CORONARY-MYOCARDIAL BRIDGE: ICD-10-CM

## 2018-10-03 DIAGNOSIS — I10 ESSENTIAL HYPERTENSION: Primary | ICD-10-CM

## 2018-10-03 DIAGNOSIS — K21.9 GASTROESOPHAGEAL REFLUX DISEASE WITHOUT ESOPHAGITIS: ICD-10-CM

## 2018-10-03 DIAGNOSIS — E78.2 MIXED HYPERLIPIDEMIA: ICD-10-CM

## 2018-10-03 DIAGNOSIS — K70.30 ALCOHOLIC CIRRHOSIS OF LIVER WITHOUT ASCITES (HCC): ICD-10-CM

## 2018-10-03 DIAGNOSIS — Z79.4 TYPE 2 DIABETES MELLITUS WITH COMPLICATION, WITH LONG-TERM CURRENT USE OF INSULIN (HCC): ICD-10-CM

## 2018-10-03 PROCEDURE — 2022F DILAT RTA XM EVC RTNOPTHY: CPT | Performed by: INTERNAL MEDICINE

## 2018-10-03 PROCEDURE — 3044F HG A1C LEVEL LT 7.0%: CPT | Performed by: INTERNAL MEDICINE

## 2018-10-03 PROCEDURE — 93000 ELECTROCARDIOGRAM COMPLETE: CPT | Performed by: INTERNAL MEDICINE

## 2018-10-03 PROCEDURE — 4004F PT TOBACCO SCREEN RCVD TLK: CPT | Performed by: INTERNAL MEDICINE

## 2018-10-03 PROCEDURE — 3023F SPIROM DOC REV: CPT | Performed by: INTERNAL MEDICINE

## 2018-10-03 PROCEDURE — G8484 FLU IMMUNIZE NO ADMIN: HCPCS | Performed by: INTERNAL MEDICINE

## 2018-10-03 PROCEDURE — 99213 OFFICE O/P EST LOW 20 MIN: CPT | Performed by: INTERNAL MEDICINE

## 2018-10-03 PROCEDURE — G8599 NO ASA/ANTIPLAT THER USE RNG: HCPCS | Performed by: INTERNAL MEDICINE

## 2018-10-03 PROCEDURE — G8417 CALC BMI ABV UP PARAM F/U: HCPCS | Performed by: INTERNAL MEDICINE

## 2018-10-03 PROCEDURE — G8427 DOCREV CUR MEDS BY ELIG CLIN: HCPCS | Performed by: INTERNAL MEDICINE

## 2018-10-03 PROCEDURE — G8926 SPIRO NO PERF OR DOC: HCPCS | Performed by: INTERNAL MEDICINE

## 2018-10-03 PROCEDURE — 3017F COLORECTAL CA SCREEN DOC REV: CPT | Performed by: INTERNAL MEDICINE

## 2018-10-03 NOTE — PATIENT INSTRUCTIONS
Please remember to bring all medication bottles or a medication list with you to your appointment. If you have any questions, please call our office at [phone]. CAD:No, has myocardial bridge. HTN:well controlled on current medical regimen, see list above. - changes in  treatment:   no   CARDIOMYOPATHY: None known   CONGESTIVE HEART FAILURE: NO KNOWN HISTORY.   VHD: No significant VHD noted  DYSLIPIDEMIA: Patient's profile is at / near Goal.no                                HDL is low                                See most recent Lab values in Labs section above. OTHER RELEVANT DIAGNOSIS:as noted in patient's active problem list:  TESTS ORDERED:  Anna Pro. All previously ordered tests reviewed. ARRHYTHMIAS: None known   MEDICATIONS: CPM   Office f/u in six months if test is OK. Primary/secondary prevention is the goal by aggressive risk modification, healthy and therapeutic life style changes for cardiovascular risk reduction. Various goals are discussed and multiple questions answered.

## 2018-10-03 NOTE — PROGRESS NOTES
portal hypertensive gastropathy, reflux esophagitis, hiatal hernia    EYE SURGERY Bilateral ? when    cyst removal, cataracts    HYSTERECTOMY      per old chart pt had CHOLO/BSO [address] 84  as a kid      As reviewed   Family History   Problem Relation Age of Onset    Cancer Mother         lung ca    Arthritis Mother     Migraines Mother     Cancer Father         colon ca    Diabetes Father     High Blood Pressure Father     Arthritis Father     High Cholesterol Father     Migraines Father     Migraines Sister     Heart Disease Brother         WPW     Social History   Substance Use Topics    Smoking status: Current Every Day Smoker     Packs/day: 0.50     Years: 40.00     Types: Cigarettes    Smokeless tobacco: Never Used    Alcohol use No      Comment: very little/\"less than one time per month- use to drink alot - every day in the past\"      Review of Systems:    Constitutional: Negative for diaphoresis and fatigue  Psychological:Negative for anxiety or depression  HENT: Negative for headaches, nasal congestion, sinus pain or vertigo  Eyes: Negative for visual disturbance.    Endocrine: Negative for polydipsia/polyuria  Respiratory: Negative for shortness of breath  Cardiovascular: Negative for chest pain, dyspnea on exertion, claudication, edema, irregular heartbeat, murmur, palpitations or shortness of breath  Gastrointestinal: Negative for abdominal pain or heartburn  Genito-Urinary: Negative for urinary frequency/urgency  Musculoskeletal: Negative for muscle pain, muscular weakness, negative for pain in arm and leg or swelling in foot and leg  Neurological: Negative for dizziness, headaches, memory loss, numbness/tingling, visual changes, syncope  Dermatological: Negative for rash    Objective:  /84   Pulse 64   Ht 4' 9.5\" (1.461 m)   Wt 166 lb 9.6 oz (75.6 kg)   BMI 35.43 kg/m²   Wt Readings from Last 3 Encounters:   10/03/18 166 lb 9.6 oz (75.6 kg)   09/26/18 163 lb (73.9 kg) 06/04/18 157 lb 12.8 oz (71.6 kg)     Body mass index is 35.43 kg/m². GENERAL - Alert, oriented, pleasant, in no apparent distress. EYES: No jaundice, no conjunctival pallor. SKIN: It is warm & dry. No rashes. No Echhymosis    HEENT - No clinically significant abnormalities seen. Neck - Supple. No jugular venous distention noted. No carotid bruits. Cardiovascular - Normal S1 and S2 without obvious murmur or gallop. Extremities - No cyanosis, clubbing, or significant edema. Pulmonary - No respiratory distress. No wheezes or rales. Abdomen - No masses, tenderness, or organomegaly. Musculoskeletal - No significant edema. No joint deformities. No muscle wasting. Neurologic - Cranial nerves II through XII are grossly intact. There were no gross focal neurologic abnormalities.     Lab Review   Lab Results   Component Value Date    CKTOTAL 26 09/16/2013    TROPONINT <0.010 12/07/2017     BNP:    Lab Results   Component Value Date    BNP 9 04/27/2013     PT/INR:    Lab Results   Component Value Date    INR 1.05 05/02/2018     Lab Results   Component Value Date    LABA1C 5.8 06/04/2018    LABA1C 6.2 12/21/2017     Lab Results   Component Value Date    WBC 3.4 (L) 05/02/2018    HCT 40.0 05/02/2018    .0 05/02/2018    PLT 59 (L) 05/02/2018     Lab Results   Component Value Date    CHOL 120 08/01/2017    TRIG 138 08/01/2017    HDL 25 (L) 08/01/2017    LDLCALC 67 08/01/2017    LDLDIRECT 86 04/28/2013     Lab Results   Component Value Date    ALT 13 05/02/2018    AST 21 05/02/2018     BMP:    Lab Results   Component Value Date     05/02/2018    K 4.2 05/02/2018     05/02/2018    CO2 26 05/02/2018    BUN 20 05/02/2018    CREATININE 1.0 05/02/2018     CMP:   Lab Results   Component Value Date     05/02/2018    K 4.2 05/02/2018     05/02/2018    CO2 26 05/02/2018    BUN 20 05/02/2018    PROT 7.1 05/02/2018    PROT 7.4 09/26/2012     TSH:    Lab Results   Component Value Date    TSH

## 2018-10-14 ASSESSMENT — ENCOUNTER SYMPTOMS
CONSTIPATION: 0
WHEEZING: 0
COUGH: 0
DIARRHEA: 0
SHORTNESS OF BREATH: 0
ABDOMINAL PAIN: 0
NAUSEA: 0
ABDOMINAL DISTENTION: 0

## 2018-10-16 ENCOUNTER — TELEPHONE (OUTPATIENT)
Dept: CARDIOLOGY CLINIC | Age: 58
End: 2018-10-16

## 2018-10-16 DIAGNOSIS — I10 ESSENTIAL HYPERTENSION: Primary | ICD-10-CM

## 2018-10-18 ENCOUNTER — PROCEDURE VISIT (OUTPATIENT)
Dept: CARDIOLOGY CLINIC | Age: 58
End: 2018-10-18
Payer: COMMERCIAL

## 2018-10-18 VITALS
DIASTOLIC BLOOD PRESSURE: 60 MMHG | BODY MASS INDEX: 34.85 KG/M2 | HEART RATE: 68 BPM | HEIGHT: 58 IN | SYSTOLIC BLOOD PRESSURE: 112 MMHG | WEIGHT: 166 LBS

## 2018-10-18 DIAGNOSIS — R07.1 CHEST PAIN ON BREATHING: ICD-10-CM

## 2018-10-18 DIAGNOSIS — R06.02 SOB (SHORTNESS OF BREATH): ICD-10-CM

## 2018-10-18 DIAGNOSIS — I10 ESSENTIAL HYPERTENSION: ICD-10-CM

## 2018-10-18 PROCEDURE — 93015 CV STRESS TEST SUPVJ I&R: CPT | Performed by: INTERNAL MEDICINE

## 2018-10-25 ENCOUNTER — HOSPITAL ENCOUNTER (INPATIENT)
Age: 58
LOS: 2 days | Discharge: HOME | DRG: 279 | End: 2018-10-27
Attending: EMERGENCY MEDICINE | Admitting: HOSPITALIST
Payer: COMMERCIAL

## 2018-10-25 ENCOUNTER — APPOINTMENT (OUTPATIENT)
Dept: CT IMAGING | Age: 58
DRG: 279 | End: 2018-10-25
Payer: COMMERCIAL

## 2018-10-25 DIAGNOSIS — I81 PORTAL VEIN THROMBOSIS: ICD-10-CM

## 2018-10-25 DIAGNOSIS — R11.2 INTRACTABLE VOMITING WITH NAUSEA, UNSPECIFIED VOMITING TYPE: Primary | ICD-10-CM

## 2018-10-25 DIAGNOSIS — R79.89 ELEVATED LACTIC ACID LEVEL: ICD-10-CM

## 2018-10-25 DIAGNOSIS — E86.0 DEHYDRATION: ICD-10-CM

## 2018-10-25 LAB
ALBUMIN SERPL-MCNC: 4 GM/DL (ref 3.4–5)
ALP BLD-CCNC: 132 IU/L (ref 40–129)
ALT SERPL-CCNC: 18 U/L (ref 10–40)
AMPHETAMINES: NEGATIVE
ANION GAP SERPL CALCULATED.3IONS-SCNC: 19 MMOL/L (ref 4–16)
APTT: 28.6 SECONDS (ref 21.2–33)
APTT: 36 SECONDS (ref 21.2–33)
AST SERPL-CCNC: 23 IU/L (ref 15–37)
BACTERIA: ABNORMAL /HPF
BARBITURATE SCREEN URINE: NEGATIVE
BASOPHILS ABSOLUTE: 0.1 K/CU MM
BASOPHILS RELATIVE PERCENT: 0.5 % (ref 0–1)
BENZODIAZEPINE SCREEN, URINE: NEGATIVE
BILIRUB SERPL-MCNC: 0.9 MG/DL (ref 0–1)
BILIRUBIN URINE: NEGATIVE MG/DL
BLOOD, URINE: ABNORMAL
BUN BLDV-MCNC: 14 MG/DL (ref 6–23)
CALCIUM SERPL-MCNC: 9.1 MG/DL (ref 8.3–10.6)
CANNABINOID SCREEN URINE: ABNORMAL
CHLORIDE BLD-SCNC: 102 MMOL/L (ref 99–110)
CLARITY: CLEAR
CO2: 21 MMOL/L (ref 21–32)
COCAINE METABOLITE: NEGATIVE
COLOR: YELLOW
CREAT SERPL-MCNC: 1.1 MG/DL (ref 0.6–1.1)
CREATININE URINE: 47.2 MG/DL (ref 28–217)
DIFFERENTIAL TYPE: ABNORMAL
EOSINOPHILS ABSOLUTE: 0 K/CU MM
EOSINOPHILS RELATIVE PERCENT: 0.2 % (ref 0–3)
GFR AFRICAN AMERICAN: >60 ML/MIN/1.73M2
GFR NON-AFRICAN AMERICAN: 52 ML/MIN/1.73M2
GLUCOSE BLD-MCNC: 139 MG/DL (ref 70–99)
GLUCOSE BLD-MCNC: 201 MG/DL (ref 70–99)
GLUCOSE, URINE: 50 MG/DL
HCT VFR BLD CALC: 46.9 % (ref 37–47)
HCT VFR BLD CALC: 49.9 % (ref 37–47)
HEMOGLOBIN: 15.6 GM/DL (ref 12.5–16)
HEMOGLOBIN: 16.8 GM/DL (ref 12.5–16)
IMMATURE NEUTROPHIL %: 0.5 % (ref 0–0.43)
INR BLD: 1.06 INDEX
KETONES, URINE: ABNORMAL MG/DL
LACTATE: 2.2 MMOL/L (ref 0.4–2)
LACTATE: 4.2 MMOL/L (ref 0.4–2)
LACTIC ACID, SEPSIS: 1.5 MMOL/L (ref 0.5–1.9)
LACTIC ACID, SEPSIS: 2 MMOL/L (ref 0.5–1.9)
LEUKOCYTE ESTERASE, URINE: NEGATIVE
LIPASE: 24 IU/L (ref 13–60)
LYMPHOCYTES ABSOLUTE: 1 K/CU MM
LYMPHOCYTES RELATIVE PERCENT: 10.7 % (ref 24–44)
MCH RBC QN AUTO: 32.3 PG (ref 27–31)
MCH RBC QN AUTO: 32.5 PG (ref 27–31)
MCHC RBC AUTO-ENTMCNC: 33.3 % (ref 32–36)
MCHC RBC AUTO-ENTMCNC: 33.7 % (ref 32–36)
MCV RBC AUTO: 96 FL (ref 78–100)
MCV RBC AUTO: 97.7 FL (ref 78–100)
MONOCYTES ABSOLUTE: 0.3 K/CU MM
MONOCYTES RELATIVE PERCENT: 3 % (ref 0–4)
MUCUS: ABNORMAL HPF
NITRITE URINE, QUANTITATIVE: NEGATIVE
NUCLEATED RBC %: 0 %
OPIATES, URINE: NEGATIVE
OXYCODONE: NEGATIVE
PDW BLD-RTO: 12.9 % (ref 11.7–14.9)
PDW BLD-RTO: 13 % (ref 11.7–14.9)
PH, URINE: 9 (ref 5–8)
PHENCYCLIDINE, URINE: NEGATIVE
PLATELET # BLD: 70 K/CU MM (ref 140–440)
PLATELET # BLD: 80 K/CU MM (ref 140–440)
PMV BLD AUTO: 11.7 FL (ref 7.5–11.1)
PMV BLD AUTO: 11.9 FL (ref 7.5–11.1)
POTASSIUM SERPL-SCNC: 3.6 MMOL/L (ref 3.5–5.1)
PROT/CREAT RATIO, UR: 0.5
PROTEIN UA: 30 MG/DL
PROTHROMBIN TIME: 12.1 SECONDS (ref 9.12–12.5)
RBC # BLD: 4.8 M/CU MM (ref 4.2–5.4)
RBC # BLD: 5.2 M/CU MM (ref 4.2–5.4)
RBC URINE: 3 /HPF (ref 0–6)
SEGMENTED NEUTROPHILS ABSOLUTE COUNT: 8.3 K/CU MM
SEGMENTED NEUTROPHILS RELATIVE PERCENT: 85.1 % (ref 36–66)
SODIUM BLD-SCNC: 142 MMOL/L (ref 135–145)
SPECIFIC GRAVITY UA: 1.03 (ref 1–1.03)
SQUAMOUS EPITHELIAL: <1 /HPF
TOTAL IMMATURE NEUTOROPHIL: 0.05 K/CU MM
TOTAL NUCLEATED RBC: 0 K/CU MM
TOTAL PROTEIN: 8.6 GM/DL (ref 6.4–8.2)
TRICHOMONAS: ABNORMAL /HPF
URINE TOTAL PROTEIN: 22.8 MG/DL
UROBILINOGEN, URINE: NORMAL MG/DL (ref 0.2–1)
WBC # BLD: 7.4 K/CU MM (ref 4–10.5)
WBC # BLD: 9.7 K/CU MM (ref 4–10.5)
WBC UA: <1 /HPF (ref 0–5)

## 2018-10-25 PROCEDURE — 6360000002 HC RX W HCPCS: Performed by: PHYSICIAN ASSISTANT

## 2018-10-25 PROCEDURE — 84156 ASSAY OF PROTEIN URINE: CPT

## 2018-10-25 PROCEDURE — 1200000000 HC SEMI PRIVATE

## 2018-10-25 PROCEDURE — 74177 CT ABD & PELVIS W/CONTRAST: CPT

## 2018-10-25 PROCEDURE — 99285 EMERGENCY DEPT VISIT HI MDM: CPT

## 2018-10-25 PROCEDURE — 6360000004 HC RX CONTRAST MEDICATION: Performed by: EMERGENCY MEDICINE

## 2018-10-25 PROCEDURE — 2580000003 HC RX 258: Performed by: PHYSICIAN ASSISTANT

## 2018-10-25 PROCEDURE — 80053 COMPREHEN METABOLIC PANEL: CPT

## 2018-10-25 PROCEDURE — 85610 PROTHROMBIN TIME: CPT

## 2018-10-25 PROCEDURE — 82570 ASSAY OF URINE CREATININE: CPT

## 2018-10-25 PROCEDURE — 6360000002 HC RX W HCPCS: Performed by: EMERGENCY MEDICINE

## 2018-10-25 PROCEDURE — 87040 BLOOD CULTURE FOR BACTERIA: CPT

## 2018-10-25 PROCEDURE — 85730 THROMBOPLASTIN TIME PARTIAL: CPT

## 2018-10-25 PROCEDURE — 80307 DRUG TEST PRSMV CHEM ANLYZR: CPT

## 2018-10-25 PROCEDURE — 81001 URINALYSIS AUTO W/SCOPE: CPT

## 2018-10-25 PROCEDURE — 6370000000 HC RX 637 (ALT 250 FOR IP): Performed by: PHYSICIAN ASSISTANT

## 2018-10-25 PROCEDURE — 87086 URINE CULTURE/COLONY COUNT: CPT

## 2018-10-25 PROCEDURE — 96374 THER/PROPH/DIAG INJ IV PUSH: CPT

## 2018-10-25 PROCEDURE — 85025 COMPLETE CBC W/AUTO DIFF WBC: CPT

## 2018-10-25 PROCEDURE — 82962 GLUCOSE BLOOD TEST: CPT

## 2018-10-25 PROCEDURE — 2580000003 HC RX 258: Performed by: EMERGENCY MEDICINE

## 2018-10-25 PROCEDURE — 85027 COMPLETE CBC AUTOMATED: CPT

## 2018-10-25 PROCEDURE — 83605 ASSAY OF LACTIC ACID: CPT

## 2018-10-25 PROCEDURE — 83690 ASSAY OF LIPASE: CPT

## 2018-10-25 PROCEDURE — 96372 THER/PROPH/DIAG INJ SC/IM: CPT

## 2018-10-25 PROCEDURE — 36415 COLL VENOUS BLD VENIPUNCTURE: CPT

## 2018-10-25 RX ORDER — SODIUM CHLORIDE 9 MG/ML
INJECTION, SOLUTION INTRAVENOUS CONTINUOUS
Status: DISCONTINUED | OUTPATIENT
Start: 2018-10-25 | End: 2018-10-25 | Stop reason: SDUPTHER

## 2018-10-25 RX ORDER — PROMETHAZINE HYDROCHLORIDE 25 MG/ML
6.25 INJECTION, SOLUTION INTRAMUSCULAR; INTRAVENOUS EVERY 6 HOURS PRN
Status: DISCONTINUED | OUTPATIENT
Start: 2018-10-25 | End: 2018-10-27 | Stop reason: HOSPADM

## 2018-10-25 RX ORDER — HEPARIN SODIUM 1000 [USP'U]/ML
6000 INJECTION, SOLUTION INTRAVENOUS; SUBCUTANEOUS PRN
Status: DISCONTINUED | OUTPATIENT
Start: 2018-10-25 | End: 2018-10-27 | Stop reason: HOSPADM

## 2018-10-25 RX ORDER — ONDANSETRON 2 MG/ML
4 INJECTION INTRAMUSCULAR; INTRAVENOUS EVERY 6 HOURS PRN
Status: DISCONTINUED | OUTPATIENT
Start: 2018-10-25 | End: 2018-10-27 | Stop reason: HOSPADM

## 2018-10-25 RX ORDER — HEPARIN SODIUM 1000 [USP'U]/ML
6000 INJECTION, SOLUTION INTRAVENOUS; SUBCUTANEOUS ONCE
Status: COMPLETED | OUTPATIENT
Start: 2018-10-25 | End: 2018-10-25

## 2018-10-25 RX ORDER — PROMETHAZINE HYDROCHLORIDE 25 MG/ML
12.5 INJECTION, SOLUTION INTRAMUSCULAR; INTRAVENOUS ONCE
Status: COMPLETED | OUTPATIENT
Start: 2018-10-25 | End: 2018-10-25

## 2018-10-25 RX ORDER — SODIUM CHLORIDE 0.9 % (FLUSH) 0.9 %
10 SYRINGE (ML) INJECTION PRN
Status: DISCONTINUED | OUTPATIENT
Start: 2018-10-25 | End: 2018-10-27 | Stop reason: HOSPADM

## 2018-10-25 RX ORDER — OXYCODONE HYDROCHLORIDE 5 MG/1
5 TABLET ORAL 4 TIMES DAILY
Status: DISCONTINUED | OUTPATIENT
Start: 2018-10-25 | End: 2018-10-27 | Stop reason: HOSPADM

## 2018-10-25 RX ORDER — HEPARIN SODIUM 1000 [USP'U]/ML
3000 INJECTION, SOLUTION INTRAVENOUS; SUBCUTANEOUS PRN
Status: DISCONTINUED | OUTPATIENT
Start: 2018-10-25 | End: 2018-10-27 | Stop reason: HOSPADM

## 2018-10-25 RX ORDER — HYDROXYZINE HYDROCHLORIDE 50 MG/ML
50 INJECTION, SOLUTION INTRAMUSCULAR EVERY 6 HOURS PRN
Status: DISCONTINUED | OUTPATIENT
Start: 2018-10-25 | End: 2018-10-27 | Stop reason: HOSPADM

## 2018-10-25 RX ORDER — MORPHINE SULFATE 2 MG/ML
2 INJECTION, SOLUTION INTRAMUSCULAR; INTRAVENOUS
Status: DISCONTINUED | OUTPATIENT
Start: 2018-10-25 | End: 2018-10-26

## 2018-10-25 RX ORDER — ONDANSETRON 2 MG/ML
4 INJECTION INTRAMUSCULAR; INTRAVENOUS ONCE
Status: COMPLETED | OUTPATIENT
Start: 2018-10-25 | End: 2018-10-25

## 2018-10-25 RX ORDER — MORPHINE SULFATE 2 MG/ML
1 INJECTION, SOLUTION INTRAMUSCULAR; INTRAVENOUS
Status: DISCONTINUED | OUTPATIENT
Start: 2018-10-25 | End: 2018-10-26

## 2018-10-25 RX ORDER — ONDANSETRON 2 MG/ML
4 INJECTION INTRAMUSCULAR; INTRAVENOUS EVERY 6 HOURS PRN
Status: DISCONTINUED | OUTPATIENT
Start: 2018-10-25 | End: 2018-10-25 | Stop reason: SDUPTHER

## 2018-10-25 RX ORDER — FLUTICASONE PROPIONATE 110 UG/1
2 AEROSOL, METERED RESPIRATORY (INHALATION) 2 TIMES DAILY
Status: DISCONTINUED | OUTPATIENT
Start: 2018-10-25 | End: 2018-10-25 | Stop reason: CLARIF

## 2018-10-25 RX ORDER — HYDROXYZINE 50 MG/1
50 TABLET, FILM COATED ORAL 3 TIMES DAILY PRN
COMMUNITY
End: 2019-05-08

## 2018-10-25 RX ORDER — ALBUTEROL SULFATE 90 UG/1
2 AEROSOL, METERED RESPIRATORY (INHALATION) EVERY 6 HOURS PRN
Status: DISCONTINUED | OUTPATIENT
Start: 2018-10-25 | End: 2018-10-27 | Stop reason: HOSPADM

## 2018-10-25 RX ORDER — DEXTROSE MONOHYDRATE 50 MG/ML
100 INJECTION, SOLUTION INTRAVENOUS PRN
Status: DISCONTINUED | OUTPATIENT
Start: 2018-10-25 | End: 2018-10-27 | Stop reason: HOSPADM

## 2018-10-25 RX ORDER — BUDESONIDE 0.5 MG/2ML
0.5 INHALANT ORAL 2 TIMES DAILY
Status: DISCONTINUED | OUTPATIENT
Start: 2018-10-25 | End: 2018-10-27 | Stop reason: HOSPADM

## 2018-10-25 RX ORDER — LACTULOSE 10 G/15ML
15 SOLUTION ORAL DAILY
Status: DISCONTINUED | OUTPATIENT
Start: 2018-10-26 | End: 2018-10-27 | Stop reason: HOSPADM

## 2018-10-25 RX ORDER — TENOFOVIR DISOPROXIL FUMARATE 300 MG/1
300 TABLET, FILM COATED ORAL NIGHTLY
Status: DISCONTINUED | OUTPATIENT
Start: 2018-10-25 | End: 2018-10-27 | Stop reason: HOSPADM

## 2018-10-25 RX ORDER — BUSPIRONE HYDROCHLORIDE 15 MG/1
15 TABLET ORAL 2 TIMES DAILY
Status: DISCONTINUED | OUTPATIENT
Start: 2018-10-25 | End: 2018-10-27 | Stop reason: HOSPADM

## 2018-10-25 RX ORDER — NICOTINE POLACRILEX 4 MG
15 LOZENGE BUCCAL PRN
Status: DISCONTINUED | OUTPATIENT
Start: 2018-10-25 | End: 2018-10-27 | Stop reason: HOSPADM

## 2018-10-25 RX ORDER — 0.9 % SODIUM CHLORIDE 0.9 %
10 VIAL (ML) INJECTION
Status: COMPLETED | OUTPATIENT
Start: 2018-10-25 | End: 2018-10-25

## 2018-10-25 RX ORDER — SODIUM CHLORIDE 0.9 % (FLUSH) 0.9 %
10 SYRINGE (ML) INJECTION EVERY 12 HOURS SCHEDULED
Status: DISCONTINUED | OUTPATIENT
Start: 2018-10-25 | End: 2018-10-27 | Stop reason: HOSPADM

## 2018-10-25 RX ORDER — RANOLAZINE 500 MG/1
500 TABLET, EXTENDED RELEASE ORAL 2 TIMES DAILY
Status: DISCONTINUED | OUTPATIENT
Start: 2018-10-25 | End: 2018-10-27 | Stop reason: HOSPADM

## 2018-10-25 RX ORDER — FLUOXETINE HYDROCHLORIDE 20 MG/1
40 CAPSULE ORAL DAILY
Status: DISCONTINUED | OUTPATIENT
Start: 2018-10-26 | End: 2018-10-27 | Stop reason: HOSPADM

## 2018-10-25 RX ORDER — MORPHINE SULFATE 4 MG/ML
4 INJECTION, SOLUTION INTRAMUSCULAR; INTRAVENOUS ONCE
Status: COMPLETED | OUTPATIENT
Start: 2018-10-25 | End: 2018-10-25

## 2018-10-25 RX ORDER — NITROGLYCERIN 0.4 MG/1
0.4 TABLET SUBLINGUAL EVERY 5 MIN PRN
Status: DISCONTINUED | OUTPATIENT
Start: 2018-10-25 | End: 2018-10-27 | Stop reason: HOSPADM

## 2018-10-25 RX ORDER — 0.9 % SODIUM CHLORIDE 0.9 %
1000 INTRAVENOUS SOLUTION INTRAVENOUS ONCE
Status: COMPLETED | OUTPATIENT
Start: 2018-10-25 | End: 2018-10-25

## 2018-10-25 RX ORDER — TIZANIDINE 4 MG/1
4 TABLET ORAL 3 TIMES DAILY
Status: DISCONTINUED | OUTPATIENT
Start: 2018-10-25 | End: 2018-10-27 | Stop reason: HOSPADM

## 2018-10-25 RX ORDER — DEXTROSE MONOHYDRATE 25 G/50ML
12.5 INJECTION, SOLUTION INTRAVENOUS PRN
Status: DISCONTINUED | OUTPATIENT
Start: 2018-10-25 | End: 2018-10-27 | Stop reason: HOSPADM

## 2018-10-25 RX ORDER — HYDROXYZINE HYDROCHLORIDE 25 MG/1
50 TABLET, FILM COATED ORAL 3 TIMES DAILY PRN
Status: DISCONTINUED | OUTPATIENT
Start: 2018-10-25 | End: 2018-10-27 | Stop reason: HOSPADM

## 2018-10-25 RX ORDER — SODIUM CHLORIDE 9 MG/ML
INJECTION, SOLUTION INTRAVENOUS CONTINUOUS
Status: DISCONTINUED | OUTPATIENT
Start: 2018-10-25 | End: 2018-10-27 | Stop reason: HOSPADM

## 2018-10-25 RX ORDER — HEPARIN SODIUM 10000 [USP'U]/100ML
18 INJECTION, SOLUTION INTRAVENOUS CONTINUOUS
Status: DISCONTINUED | OUTPATIENT
Start: 2018-10-25 | End: 2018-10-27 | Stop reason: HOSPADM

## 2018-10-25 RX ADMIN — SODIUM CHLORIDE, PRESERVATIVE FREE 10 ML: 5 INJECTION INTRAVENOUS at 15:04

## 2018-10-25 RX ADMIN — HEPARIN SODIUM AND DEXTROSE 18 UNITS/KG/HR: 10000; 5 INJECTION INTRAVENOUS at 21:24

## 2018-10-25 RX ADMIN — BUSPIRONE HYDROCHLORIDE 15 MG: 15 TABLET ORAL at 21:41

## 2018-10-25 RX ADMIN — SODIUM CHLORIDE: 900 INJECTION INTRAVENOUS at 20:38

## 2018-10-25 RX ADMIN — MORPHINE SULFATE 4 MG: 4 INJECTION INTRAVENOUS at 16:01

## 2018-10-25 RX ADMIN — TIZANIDINE 4 MG: 4 TABLET ORAL at 21:41

## 2018-10-25 RX ADMIN — PROMETHAZINE HYDROCHLORIDE 12.5 MG: 25 INJECTION INTRAMUSCULAR; INTRAVENOUS at 15:56

## 2018-10-25 RX ADMIN — QUETIAPINE 300 MG: 100 TABLET ORAL at 21:41

## 2018-10-25 RX ADMIN — METOPROLOL TARTRATE 25 MG: 25 TABLET ORAL at 21:42

## 2018-10-25 RX ADMIN — RANOLAZINE 500 MG: 500 TABLET, FILM COATED, EXTENDED RELEASE ORAL at 21:41

## 2018-10-25 RX ADMIN — OXYCODONE HYDROCHLORIDE 5 MG: 5 TABLET ORAL at 21:41

## 2018-10-25 RX ADMIN — SODIUM CHLORIDE: 900 INJECTION INTRAVENOUS at 16:01

## 2018-10-25 RX ADMIN — IOPAMIDOL 80 ML: 755 INJECTION, SOLUTION INTRAVENOUS at 15:04

## 2018-10-25 RX ADMIN — ONDANSETRON HYDROCHLORIDE 4 MG: 2 INJECTION, SOLUTION INTRAMUSCULAR; INTRAVENOUS at 20:38

## 2018-10-25 RX ADMIN — SODIUM CHLORIDE, PRESERVATIVE FREE 10 ML: 5 INJECTION INTRAVENOUS at 20:38

## 2018-10-25 RX ADMIN — HEPARIN SODIUM 6000 UNITS: 1000 INJECTION, SOLUTION INTRAVENOUS; SUBCUTANEOUS at 21:23

## 2018-10-25 RX ADMIN — SODIUM CHLORIDE 1000 ML: 900 INJECTION INTRAVENOUS at 14:07

## 2018-10-25 RX ADMIN — ONDANSETRON 4 MG: 2 INJECTION INTRAMUSCULAR; INTRAVENOUS at 14:07

## 2018-10-25 RX ADMIN — ENOXAPARIN SODIUM 70 MG: 80 INJECTION SUBCUTANEOUS at 17:57

## 2018-10-25 ASSESSMENT — PAIN DESCRIPTION - LOCATION
LOCATION: ABDOMEN;BACK;NECK
LOCATION: ABDOMEN

## 2018-10-25 ASSESSMENT — PAIN DESCRIPTION - PAIN TYPE
TYPE: ACUTE PAIN
TYPE: ACUTE PAIN

## 2018-10-25 ASSESSMENT — PAIN SCALES - GENERAL
PAINLEVEL_OUTOF10: 7
PAINLEVEL_OUTOF10: 7
PAINLEVEL_OUTOF10: 8

## 2018-10-25 NOTE — ED NOTES
[place],lactic acid and type & screen was drawn on patient      Christa JOHNSON  Lori  10/25/18 [id number]

## 2018-10-26 LAB
ANION GAP SERPL CALCULATED.3IONS-SCNC: 12 MMOL/L (ref 4–16)
APTT: 58.8 SECONDS (ref 21.2–33)
APTT: 68.4 SECONDS (ref 21.2–33)
APTT: 86.2 SECONDS (ref 21.2–33)
BUN BLDV-MCNC: 12 MG/DL (ref 6–23)
CALCIUM SERPL-MCNC: 7.8 MG/DL (ref 8.3–10.6)
CHLORIDE BLD-SCNC: 103 MMOL/L (ref 99–110)
CO2: 24 MMOL/L (ref 21–32)
CREAT SERPL-MCNC: 1 MG/DL (ref 0.6–1.1)
ESTIMATED AVERAGE GLUCOSE: 117 MG/DL
GFR AFRICAN AMERICAN: >60 ML/MIN/1.73M2
GFR NON-AFRICAN AMERICAN: 58 ML/MIN/1.73M2
GLUCOSE BLD-MCNC: 117 MG/DL (ref 70–99)
GLUCOSE BLD-MCNC: 119 MG/DL (ref 70–99)
GLUCOSE BLD-MCNC: 120 MG/DL (ref 70–99)
GLUCOSE BLD-MCNC: 126 MG/DL (ref 70–99)
GLUCOSE BLD-MCNC: 146 MG/DL (ref 70–99)
HBA1C MFR BLD: 5.7 % (ref 4.2–6.3)
HCT VFR BLD CALC: 41.6 % (ref 37–47)
HEMOGLOBIN: 13.9 GM/DL (ref 12.5–16)
MAGNESIUM: 2.3 MG/DL (ref 1.8–2.4)
MCH RBC QN AUTO: 32.5 PG (ref 27–31)
MCHC RBC AUTO-ENTMCNC: 33.4 % (ref 32–36)
MCV RBC AUTO: 97.2 FL (ref 78–100)
PDW BLD-RTO: 12.9 % (ref 11.7–14.9)
PLATELET # BLD: 70 K/CU MM (ref 140–440)
PMV BLD AUTO: 12 FL (ref 7.5–11.1)
POTASSIUM SERPL-SCNC: 3.4 MMOL/L (ref 3.5–5.1)
RBC # BLD: 4.28 M/CU MM (ref 4.2–5.4)
SODIUM BLD-SCNC: 139 MMOL/L (ref 135–145)
WBC # BLD: 7.7 K/CU MM (ref 4–10.5)

## 2018-10-26 PROCEDURE — 80048 BASIC METABOLIC PNL TOTAL CA: CPT

## 2018-10-26 PROCEDURE — 6370000000 HC RX 637 (ALT 250 FOR IP): Performed by: PHYSICIAN ASSISTANT

## 2018-10-26 PROCEDURE — 85730 THROMBOPLASTIN TIME PARTIAL: CPT

## 2018-10-26 PROCEDURE — 6360000002 HC RX W HCPCS: Performed by: PHYSICIAN ASSISTANT

## 2018-10-26 PROCEDURE — 2580000003 HC RX 258: Performed by: PHYSICIAN ASSISTANT

## 2018-10-26 PROCEDURE — 82962 GLUCOSE BLOOD TEST: CPT

## 2018-10-26 PROCEDURE — 36415 COLL VENOUS BLD VENIPUNCTURE: CPT

## 2018-10-26 PROCEDURE — 85027 COMPLETE CBC AUTOMATED: CPT

## 2018-10-26 PROCEDURE — 83036 HEMOGLOBIN GLYCOSYLATED A1C: CPT

## 2018-10-26 PROCEDURE — 1200000000 HC SEMI PRIVATE

## 2018-10-26 PROCEDURE — 83735 ASSAY OF MAGNESIUM: CPT

## 2018-10-26 RX ORDER — MORPHINE SULFATE 4 MG/ML
1 INJECTION, SOLUTION INTRAMUSCULAR; INTRAVENOUS
Status: DISCONTINUED | OUTPATIENT
Start: 2018-10-26 | End: 2018-10-27 | Stop reason: HOSPADM

## 2018-10-26 RX ORDER — MORPHINE SULFATE 4 MG/ML
2 INJECTION, SOLUTION INTRAMUSCULAR; INTRAVENOUS
Status: DISCONTINUED | OUTPATIENT
Start: 2018-10-26 | End: 2018-10-27 | Stop reason: HOSPADM

## 2018-10-26 RX ADMIN — OXYCODONE HYDROCHLORIDE 5 MG: 5 TABLET ORAL at 16:30

## 2018-10-26 RX ADMIN — MORPHINE SULFATE 1 MG: 4 INJECTION INTRAVENOUS at 14:00

## 2018-10-26 RX ADMIN — TIZANIDINE 4 MG: 4 TABLET ORAL at 20:26

## 2018-10-26 RX ADMIN — MORPHINE SULFATE 2 MG: 4 INJECTION INTRAVENOUS at 08:52

## 2018-10-26 RX ADMIN — SODIUM CHLORIDE: 900 INJECTION INTRAVENOUS at 20:26

## 2018-10-26 RX ADMIN — ONDANSETRON HYDROCHLORIDE 4 MG: 2 INJECTION, SOLUTION INTRAMUSCULAR; INTRAVENOUS at 20:26

## 2018-10-26 RX ADMIN — SODIUM CHLORIDE, PRESERVATIVE FREE 10 ML: 5 INJECTION INTRAVENOUS at 08:31

## 2018-10-26 RX ADMIN — ONDANSETRON HYDROCHLORIDE 4 MG: 2 INJECTION, SOLUTION INTRAMUSCULAR; INTRAVENOUS at 08:31

## 2018-10-26 RX ADMIN — SODIUM CHLORIDE, PRESERVATIVE FREE 10 ML: 5 INJECTION INTRAVENOUS at 20:26

## 2018-10-26 RX ADMIN — SODIUM CHLORIDE: 900 INJECTION INTRAVENOUS at 16:30

## 2018-10-26 RX ADMIN — ONDANSETRON HYDROCHLORIDE 4 MG: 2 INJECTION, SOLUTION INTRAMUSCULAR; INTRAVENOUS at 14:00

## 2018-10-26 RX ADMIN — QUETIAPINE 300 MG: 100 TABLET ORAL at 20:25

## 2018-10-26 RX ADMIN — OXYCODONE HYDROCHLORIDE 5 MG: 5 TABLET ORAL at 20:26

## 2018-10-26 RX ADMIN — RANOLAZINE 500 MG: 500 TABLET, FILM COATED, EXTENDED RELEASE ORAL at 20:26

## 2018-10-26 RX ADMIN — METOPROLOL TARTRATE 25 MG: 25 TABLET ORAL at 20:26

## 2018-10-26 RX ADMIN — SODIUM CHLORIDE, PRESERVATIVE FREE 10 ML: 5 INJECTION INTRAVENOUS at 08:52

## 2018-10-26 RX ADMIN — HEPARIN SODIUM AND DEXTROSE 14.01 UNITS/KG/HR: 10000; 5 INJECTION INTRAVENOUS at 16:29

## 2018-10-26 RX ADMIN — SODIUM CHLORIDE, PRESERVATIVE FREE 10 ML: 5 INJECTION INTRAVENOUS at 14:01

## 2018-10-26 RX ADMIN — BUSPIRONE HYDROCHLORIDE 15 MG: 15 TABLET ORAL at 20:26

## 2018-10-26 ASSESSMENT — PAIN DESCRIPTION - LOCATION
LOCATION: ABDOMEN
LOCATION: ABDOMEN

## 2018-10-26 ASSESSMENT — PAIN SCALES - GENERAL
PAINLEVEL_OUTOF10: 5
PAINLEVEL_OUTOF10: 5
PAINLEVEL_OUTOF10: 7
PAINLEVEL_OUTOF10: 5
PAINLEVEL_OUTOF10: 7

## 2018-10-26 ASSESSMENT — PAIN DESCRIPTION - PAIN TYPE
TYPE: ACUTE PAIN
TYPE: ACUTE PAIN

## 2018-10-26 NOTE — CONSULTS
 COLONOSCOPY  3/11/13    diverticulosis, cecal polyp    COLONOSCOPY  03/16/2017    Internal hemorrhoids    ENDOSCOPY, COLON, DIAGNOSTIC  03/16/2017    EGD: Small esophageal varices, portal hypertensive gastropathy, reflux esophagitis, hiatal hernia    EYE SURGERY Bilateral ? when    cyst removal, cataracts    HYSTERECTOMY      per old chart pt had CHOLO/BSO 1986    TONSILLECTOMY  as a kid     Current Medications:   Current Facility-Administered Medications: morphine (PF) injection 1 mg, 1 mg, Intravenous, Q2H PRN **OR** morphine (PF) injection 2 mg, 2 mg, Intravenous, Q2H PRN  heparin (porcine) injection 6,000 Units, 6,000 Units, Intravenous, PRN  heparin (porcine) injection 3,000 Units, 3,000 Units, Intravenous, PRN  heparin 25,000 units in dextrose 5% 250 mL infusion, 18 Units/kg/hr, Intravenous, Continuous  sodium chloride flush 0.9 % injection 10 mL, 10 mL, Intravenous, 2 times per day  sodium chloride flush 0.9 % injection 10 mL, 10 mL, Intravenous, PRN  magnesium hydroxide (MILK OF MAGNESIA) 400 MG/5ML suspension 30 mL, 30 mL, Oral, Daily PRN  ondansetron (ZOFRAN) injection 4 mg, 4 mg, Intravenous, Q6H PRN  glucose (GLUTOSE) 40 % oral gel 15 g, 15 g, Oral, PRN  dextrose 50 % solution 12.5 g, 12.5 g, Intravenous, PRN  glucagon (rDNA) injection 1 mg, 1 mg, Intramuscular, PRN  dextrose 5 % solution, 100 mL/hr, Intravenous, PRN  insulin lispro (HUMALOG) injection vial 0-12 Units, 0-12 Units, Subcutaneous, TID WC  insulin lispro (HUMALOG) injection vial 0-6 Units, 0-6 Units, Subcutaneous, Nightly  promethazine (PHENERGAN) injection 6.25 mg, 6.25 mg, Intramuscular, Q6H PRN  albuterol sulfate  (90 Base) MCG/ACT inhaler 2 puff, 2 puff, Inhalation, Q6H PRN  busPIRone (BUSPAR) tablet 15 mg, 15 mg, Oral, BID  FLUoxetine (PROZAC) capsule 40 mg, 40 mg, Oral, Daily  hydrOXYzine (ATARAX) tablet 50 mg, 50 mg, Oral, TID PRN  lactulose (CHRONULAC) 10 GM/15ML solution 10 g, 15 mL, Oral, Daily  metoprolol tartrate

## 2018-10-26 NOTE — CONSULTS
anxiety attacks\"    SOB (shortness of breath)     Stress bladder incontinence, female        SURGICAL HISTORY    Past Surgical History:   Procedure Laterality Date    BREAST SURGERY  10/2015    left breast bx    CARDIAC CATHETERIZATION      per old chart pt had cath done in 3/2011 and 9/2013    CHOLECYSTECTOMY  per old chart done [facility]  3/11/13    diverticulosis, cecal polyp    COLONOSCOPY  03/16/2017    Internal hemorrhoids    ENDOSCOPY, COLON, DIAGNOSTIC  03/16/2017    EGD: Small esophageal varices, portal hypertensive gastropathy, reflux esophagitis, hiatal hernia    EYE SURGERY Bilateral ? when    cyst removal, cataracts    HYSTERECTOMY      per old chart pt had CHOLO/BSO 1986    TONSILLECTOMY  as a kid       FAMILY HISTORY    Family History   Problem Relation Age of Onset    Cancer Mother         lung ca    Arthritis Mother     Migraines Mother     Cancer Father         colon ca    Diabetes Father     High Blood Pressure Father     Arthritis Father     High Cholesterol Father     Migraines Father     Migraines Sister     Heart Disease Brother         WPW       SOCIAL HISTORY    Social History     Social History    Marital status:      Spouse name: N/A    Number of children: N/A    Years of education: N/A     Social History Main Topics    Smoking status: Current Every Day Smoker     Packs/day: 0.50     Years: 40.00     Types: Cigarettes    Smokeless tobacco: Never Used    Alcohol use No      Comment: very little/\"less than one time per month- use to drink alot - every day in the past\"    Drug use: Yes     Frequency: 3.0 times per week     Types: Marijuana    Sexual activity: Not Currently     Partners: Male     Other Topics Concern    None     Social History Narrative    None       REVIEW OF SYSTEMS    Constitutional:  Denies fever, chills, loss of appetite, weight loss or weakness   HEENT:  Denies swelling of neck glands  Respiratory:  Denies cough, shortness thrombosis of the main portal vein near the   confluence with the SMV and splenic vein as well as in the distal main portal   vein extending into the proximal left portal vein. 3. No other acute findings within the abdomen or pelvis. 4. Previous cholecystectomy with mild stable prominence of the extrahepatic   biliary tree. ASSESSMENT  Acute portal vein thrombosis  Cirrhosis of liver  Hepatitis C  Thrombocytopenia  Portal hypertension and gastric varices    RECOMMENDATION  Since she is symptomatic and there is risk of progression of thrombus, I recommend anticoagulation therapy. She is currently on heparin and one should consider to change it to oral anticogulation agent upon release from hospital.    She has thrombocytopenia secondary to underlying hep C and cirrhosis of the liver. I recommend to monitor her hemoglobin closely since she is high risk for bleeding because of varices and blood thinner. We will follow the patient. Thank you for allowing me to participate in the care of your patient.

## 2018-10-27 VITALS
HEART RATE: 67 BPM | TEMPERATURE: 97.7 F | OXYGEN SATURATION: 95 % | WEIGHT: 172.62 LBS | DIASTOLIC BLOOD PRESSURE: 68 MMHG | RESPIRATION RATE: 12 BRPM | SYSTOLIC BLOOD PRESSURE: 86 MMHG | HEIGHT: 58 IN | BODY MASS INDEX: 36.23 KG/M2

## 2018-10-27 PROBLEM — R10.9 ABDOMINAL PAIN: Status: ACTIVE | Noted: 2018-10-27

## 2018-10-27 LAB
APTT: 78.3 SECONDS (ref 21.2–33)
APTT: 90.4 SECONDS (ref 21.2–33)
CULTURE: NORMAL
GLUCOSE BLD-MCNC: 104 MG/DL (ref 70–99)
GLUCOSE BLD-MCNC: 86 MG/DL (ref 70–99)
Lab: NORMAL
PLATELET # BLD: 54 K/CU MM (ref 140–440)
REPORT STATUS: NORMAL
SPECIMEN: NORMAL

## 2018-10-27 PROCEDURE — 82962 GLUCOSE BLOOD TEST: CPT

## 2018-10-27 PROCEDURE — 85730 THROMBOPLASTIN TIME PARTIAL: CPT

## 2018-10-27 PROCEDURE — 85049 AUTOMATED PLATELET COUNT: CPT

## 2018-10-27 PROCEDURE — 2580000003 HC RX 258: Performed by: PHYSICIAN ASSISTANT

## 2018-10-27 PROCEDURE — 6370000000 HC RX 637 (ALT 250 FOR IP): Performed by: PHYSICIAN ASSISTANT

## 2018-10-27 PROCEDURE — 36415 COLL VENOUS BLD VENIPUNCTURE: CPT

## 2018-10-27 PROCEDURE — 2580000003 HC RX 258: Performed by: INTERNAL MEDICINE

## 2018-10-27 RX ORDER — 0.9 % SODIUM CHLORIDE 0.9 %
500 INTRAVENOUS SOLUTION INTRAVENOUS ONCE
Status: COMPLETED | OUTPATIENT
Start: 2018-10-27 | End: 2018-10-27

## 2018-10-27 RX ADMIN — LACTULOSE 10 G: 20 SOLUTION ORAL at 08:29

## 2018-10-27 RX ADMIN — OXYCODONE HYDROCHLORIDE 5 MG: 5 TABLET ORAL at 11:23

## 2018-10-27 RX ADMIN — RANOLAZINE 500 MG: 500 TABLET, FILM COATED, EXTENDED RELEASE ORAL at 08:28

## 2018-10-27 RX ADMIN — SODIUM CHLORIDE, PRESERVATIVE FREE 10 ML: 5 INJECTION INTRAVENOUS at 08:32

## 2018-10-27 RX ADMIN — TIZANIDINE 4 MG: 4 TABLET ORAL at 08:28

## 2018-10-27 RX ADMIN — FLUOXETINE 40 MG: 20 CAPSULE ORAL at 08:28

## 2018-10-27 RX ADMIN — SODIUM CHLORIDE: 900 INJECTION INTRAVENOUS at 10:29

## 2018-10-27 RX ADMIN — SODIUM CHLORIDE 500 ML: 9 INJECTION, SOLUTION INTRAVENOUS at 03:30

## 2018-10-27 RX ADMIN — BUSPIRONE HYDROCHLORIDE 15 MG: 15 TABLET ORAL at 08:28

## 2018-10-27 RX ADMIN — OXYCODONE HYDROCHLORIDE 5 MG: 5 TABLET ORAL at 08:28

## 2018-10-27 ASSESSMENT — PAIN DESCRIPTION - DIRECTION
RADIATING_TOWARDS: BACK
RADIATING_TOWARDS: BACK

## 2018-10-27 ASSESSMENT — PAIN DESCRIPTION - DESCRIPTORS
DESCRIPTORS: DULL;ACHING
DESCRIPTORS: DULL;ACHING

## 2018-10-27 ASSESSMENT — PAIN DESCRIPTION - PROGRESSION
CLINICAL_PROGRESSION: GRADUALLY IMPROVING
CLINICAL_PROGRESSION: GRADUALLY IMPROVING

## 2018-10-27 ASSESSMENT — PAIN DESCRIPTION - LOCATION
LOCATION: ABDOMEN
LOCATION: ABDOMEN

## 2018-10-27 ASSESSMENT — PAIN DESCRIPTION - PAIN TYPE
TYPE: ACUTE PAIN
TYPE: ACUTE PAIN

## 2018-10-27 ASSESSMENT — PAIN DESCRIPTION - ORIENTATION
ORIENTATION: RIGHT
ORIENTATION: RIGHT

## 2018-10-27 ASSESSMENT — PAIN SCALES - GENERAL
PAINLEVEL_OUTOF10: 5
PAINLEVEL_OUTOF10: 5
PAINLEVEL_OUTOF10: 0
PAINLEVEL_OUTOF10: 4
PAINLEVEL_OUTOF10: 0

## 2018-10-27 ASSESSMENT — PAIN DESCRIPTION - FREQUENCY
FREQUENCY: CONTINUOUS
FREQUENCY: CONTINUOUS

## 2018-10-27 ASSESSMENT — PAIN DESCRIPTION - ONSET
ONSET: ON-GOING
ONSET: ON-GOING

## 2018-10-27 NOTE — DISCHARGE SUMMARY
review. Dose modulation, iterative reconstruction, and/or weight based adjustment of the mA/kV was utilized to reduce the radiation dose to as low as reasonably achievable. COMPARISON: 12/07/2017 HISTORY: ORDERING SYSTEM PROVIDED HISTORY: Abdominal pain TECHNOLOGIST PROVIDED HISTORY: Additional Contrast?->None Ordering Physician Provided Reason for Exam: Abdominal pain Acuity: Acute Type of Exam: Initial Additional signs and symptoms: NAUSEA, VOMITING Relevant Medical/Surgical History: HX HYSTER, GROVER / Jackie Ros [id number] FINDINGS: Lower Chest: No acute infiltrate at the lung bases. Distal esophageal wall thickening, possibly related to varices as noted in the stomach near the GE junction. Organs: The gallbladder is surgically absent. Mild prominence of the extrahepatic biliary tree, likely physiologic. Mild nodularity of the hepatic contour. No focal hepatic abnormality. The spleen is borderline in size. The pancreas and adrenal glands are unremarkable. No renal mass or significant hydronephrosis. GI/Bowel: No significant pericolonic inflammatory changes. Mild prominence of the colonic wall in the ascending and transverse colon, likely accentuated by incomplete distention. Mild distal colonic stool burden. The appendix is not visualized. No small bowel distension. Multiple gastric varices near the GE junction are noted. Pelvis: No pelvic mass or free pelvic fluid. The uterus is absent. Partial distention of the urinary bladder. Peritoneum/Retroperitoneum: The abdominal aorta is normal in caliber. No significant retroperitoneal adenopathy or upper abdominal ascites. There is partial thrombosis within the main portal vein near the confluence with the SMV as well as in the distal main portal vein with some narrowing of the proximal left portal vein. Bones/Soft Tissues: No acute osseous or soft tissue abnormality.      1. Redemonstration of cirrhosis with portal hypertension including gastric varices and

## 2018-10-29 ENCOUNTER — TELEPHONE (OUTPATIENT)
Dept: FAMILY MEDICINE CLINIC | Age: 58
End: 2018-10-29

## 2018-10-30 ENCOUNTER — HOSPITAL ENCOUNTER (OUTPATIENT)
Age: 58
Setting detail: SPECIMEN
Discharge: HOME | End: 2018-10-30
Payer: COMMERCIAL

## 2018-10-30 LAB
ALBUMIN SERPL-MCNC: 3.5 GM/DL (ref 3.4–5)
ALP BLD-CCNC: 87 IU/L (ref 40–129)
ALT SERPL-CCNC: 14 U/L (ref 10–40)
ANION GAP SERPL CALCULATED.3IONS-SCNC: 9 MMOL/L (ref 4–16)
AST SERPL-CCNC: 19 IU/L (ref 15–37)
BILIRUB SERPL-MCNC: 0.5 MG/DL (ref 0–1)
BUN BLDV-MCNC: 9 MG/DL (ref 6–23)
CALCIUM SERPL-MCNC: 8.3 MG/DL (ref 8.3–10.6)
CHLORIDE BLD-SCNC: 105 MMOL/L (ref 99–110)
CO2: 30 MMOL/L (ref 21–32)
CREAT SERPL-MCNC: 1 MG/DL (ref 0.6–1.1)
CULTURE: NORMAL
CULTURE: NORMAL
GFR AFRICAN AMERICAN: >60 ML/MIN/1.73M2
GFR NON-AFRICAN AMERICAN: 58 ML/MIN/1.73M2
GLUCOSE BLD-MCNC: 176 MG/DL (ref 70–99)
Lab: NORMAL
Lab: NORMAL
POTASSIUM SERPL-SCNC: 3.7 MMOL/L (ref 3.5–5.1)
REPORT STATUS: NORMAL
REPORT STATUS: NORMAL
SODIUM BLD-SCNC: 144 MMOL/L (ref 135–145)
SPECIMEN: NORMAL
SPECIMEN: NORMAL
TOTAL PROTEIN: 6.8 GM/DL (ref 6.4–8.2)

## 2018-10-30 PROCEDURE — 82105 ALPHA-FETOPROTEIN SERUM: CPT

## 2018-10-30 PROCEDURE — 80053 COMPREHEN METABOLIC PANEL: CPT

## 2018-10-31 ENCOUNTER — OFFICE VISIT (OUTPATIENT)
Dept: FAMILY MEDICINE CLINIC | Age: 58
End: 2018-10-31
Payer: COMMERCIAL

## 2018-10-31 VITALS — SYSTOLIC BLOOD PRESSURE: 121 MMHG | WEIGHT: 170.2 LBS | DIASTOLIC BLOOD PRESSURE: 80 MMHG | BODY MASS INDEX: 35.57 KG/M2

## 2018-10-31 DIAGNOSIS — Z09 HOSPITAL DISCHARGE FOLLOW-UP: Primary | ICD-10-CM

## 2018-10-31 DIAGNOSIS — E78.5 DYSLIPIDEMIA: ICD-10-CM

## 2018-10-31 DIAGNOSIS — I10 ESSENTIAL HYPERTENSION: ICD-10-CM

## 2018-10-31 DIAGNOSIS — R10.9 ABDOMINAL PAIN, UNSPECIFIED ABDOMINAL LOCATION: ICD-10-CM

## 2018-10-31 DIAGNOSIS — I81 PORTAL VEIN THROMBOSIS: ICD-10-CM

## 2018-10-31 DIAGNOSIS — D69.6 THROMBOCYTOPENIA (HCC): Chronic | ICD-10-CM

## 2018-10-31 PROCEDURE — 99214 OFFICE O/P EST MOD 30 MIN: CPT | Performed by: FAMILY MEDICINE

## 2018-10-31 PROCEDURE — G8598 ASA/ANTIPLAT THER USED: HCPCS | Performed by: FAMILY MEDICINE

## 2018-10-31 PROCEDURE — G8427 DOCREV CUR MEDS BY ELIG CLIN: HCPCS | Performed by: FAMILY MEDICINE

## 2018-10-31 PROCEDURE — 1111F DSCHRG MED/CURRENT MED MERGE: CPT | Performed by: FAMILY MEDICINE

## 2018-10-31 PROCEDURE — G8417 CALC BMI ABV UP PARAM F/U: HCPCS | Performed by: FAMILY MEDICINE

## 2018-10-31 PROCEDURE — 3017F COLORECTAL CA SCREEN DOC REV: CPT | Performed by: FAMILY MEDICINE

## 2018-10-31 PROCEDURE — G8484 FLU IMMUNIZE NO ADMIN: HCPCS | Performed by: FAMILY MEDICINE

## 2018-10-31 PROCEDURE — 4004F PT TOBACCO SCREEN RCVD TLK: CPT | Performed by: FAMILY MEDICINE

## 2018-11-01 LAB — MS ALPHA-FETOPROTEIN: 3

## 2018-11-04 ASSESSMENT — ENCOUNTER SYMPTOMS
NAUSEA: 1
ABDOMINAL PAIN: 1
CONSTIPATION: 0
DIARRHEA: 0
COUGH: 0
VOMITING: 0

## 2018-11-12 ENCOUNTER — APPOINTMENT (OUTPATIENT)
Dept: CT IMAGING | Age: 58
DRG: 253 | End: 2018-11-12
Payer: COMMERCIAL

## 2018-11-12 ENCOUNTER — HOSPITAL ENCOUNTER (INPATIENT)
Age: 58
LOS: 3 days | Discharge: HOME | DRG: 253 | End: 2018-11-15
Attending: EMERGENCY MEDICINE | Admitting: HOSPITALIST
Payer: COMMERCIAL

## 2018-11-12 DIAGNOSIS — R79.89 ELEVATED LACTIC ACID LEVEL: ICD-10-CM

## 2018-11-12 DIAGNOSIS — I86.4 GASTRIC VARICES: ICD-10-CM

## 2018-11-12 DIAGNOSIS — D69.6 THROMBOCYTOPENIA (HCC): ICD-10-CM

## 2018-11-12 DIAGNOSIS — K92.2 UPPER GI BLEED: Primary | ICD-10-CM

## 2018-11-12 LAB
ALBUMIN SERPL-MCNC: 4.2 GM/DL (ref 3.4–5)
ALCOHOL SCREEN SERUM: <0.01 %WT/VOL
ALP BLD-CCNC: 111 IU/L (ref 40–129)
ALT SERPL-CCNC: 18 U/L (ref 10–40)
AMMONIA: 43 UMOL/L (ref 11–51)
ANION GAP SERPL CALCULATED.3IONS-SCNC: 17 MMOL/L (ref 4–16)
AST SERPL-CCNC: 26 IU/L (ref 15–37)
BASOPHILS ABSOLUTE: 0.1 K/CU MM
BASOPHILS RELATIVE PERCENT: 0.8 % (ref 0–1)
BILIRUB SERPL-MCNC: 0.8 MG/DL (ref 0–1)
BUN BLDV-MCNC: 18 MG/DL (ref 6–23)
CALCIUM SERPL-MCNC: 10 MG/DL (ref 8.3–10.6)
CHLORIDE BLD-SCNC: 99 MMOL/L (ref 99–110)
CO2: 25 MMOL/L (ref 21–32)
CREAT SERPL-MCNC: 1.4 MG/DL (ref 0.6–1.1)
DIFFERENTIAL TYPE: ABNORMAL
EOSINOPHILS ABSOLUTE: 0.2 K/CU MM
EOSINOPHILS RELATIVE PERCENT: 2 % (ref 0–3)
FIBRINOGEN LEVEL: 328 MG/DL (ref 196.9–442.1)
GFR AFRICAN AMERICAN: 47 ML/MIN/1.73M2
GFR NON-AFRICAN AMERICAN: 39 ML/MIN/1.73M2
GLUCOSE BLD-MCNC: 195 MG/DL (ref 70–99)
HCT VFR BLD CALC: 48.4 % (ref 37–47)
HCT VFR BLD CALC: 50.1 % (ref 37–47)
HEMOGLOBIN: 15 GM/DL (ref 12.5–16)
HEMOGLOBIN: 16.4 GM/DL (ref 12.5–16)
HEMOGLOBIN: 17 GM/DL (ref 12.5–16)
IMMATURE NEUTROPHIL %: 0.7 % (ref 0–0.43)
INR BLD: 1.16 INDEX
LACTATE: 3 MMOL/L (ref 0.4–2)
LACTATE: 3.5 MMOL/L (ref 0.4–2)
LACTIC ACID, SEPSIS: 1.5 MMOL/L (ref 0.5–1.9)
LACTIC ACID, SEPSIS: 1.9 MMOL/L (ref 0.5–1.9)
LIPASE: 54 IU/L (ref 13–60)
LYMPHOCYTES ABSOLUTE: 2.7 K/CU MM
LYMPHOCYTES RELATIVE PERCENT: 35 % (ref 24–44)
MCH RBC QN AUTO: 32.6 PG (ref 27–31)
MCHC RBC AUTO-ENTMCNC: 33.9 % (ref 32–36)
MCV RBC AUTO: 96 FL (ref 78–100)
MONOCYTES ABSOLUTE: 0.4 K/CU MM
MONOCYTES RELATIVE PERCENT: 5.7 % (ref 0–4)
NUCLEATED RBC %: 0 %
PDW BLD-RTO: 12.8 % (ref 11.7–14.9)
PLATELET # BLD: 90 K/CU MM (ref 140–440)
PMV BLD AUTO: 11.9 FL (ref 7.5–11.1)
POTASSIUM SERPL-SCNC: 4 MMOL/L (ref 3.5–5.1)
PROTHROMBIN TIME: 13.2 SECONDS (ref 9.12–12.5)
RBC # BLD: 5.22 M/CU MM (ref 4.2–5.4)
SEGMENTED NEUTROPHILS ABSOLUTE COUNT: 4.3 K/CU MM
SEGMENTED NEUTROPHILS RELATIVE PERCENT: 55.8 % (ref 36–66)
SODIUM BLD-SCNC: 141 MMOL/L (ref 135–145)
TOTAL IMMATURE NEUTOROPHIL: 0.05 K/CU MM
TOTAL NUCLEATED RBC: 0 K/CU MM
TOTAL PROTEIN: 8.7 GM/DL (ref 6.4–8.2)
TOTAL RETICULOCYTE COUNT: 0.14 K/CU MM
TROPONIN T: <0.01 NG/ML
WBC # BLD: 7.7 K/CU MM (ref 4–10.5)

## 2018-11-12 PROCEDURE — 85014 HEMATOCRIT: CPT

## 2018-11-12 PROCEDURE — 96375 TX/PRO/DX INJ NEW DRUG ADDON: CPT

## 2018-11-12 PROCEDURE — 86901 BLOOD TYPING SEROLOGIC RH(D): CPT

## 2018-11-12 PROCEDURE — 85610 PROTHROMBIN TIME: CPT

## 2018-11-12 PROCEDURE — 2060000000 HC ICU INTERMEDIATE R&B

## 2018-11-12 PROCEDURE — 84484 ASSAY OF TROPONIN QUANT: CPT

## 2018-11-12 PROCEDURE — 83605 ASSAY OF LACTIC ACID: CPT

## 2018-11-12 PROCEDURE — 36415 COLL VENOUS BLD VENIPUNCTURE: CPT

## 2018-11-12 PROCEDURE — 6370000000 HC RX 637 (ALT 250 FOR IP): Performed by: PHYSICIAN ASSISTANT

## 2018-11-12 PROCEDURE — 74176 CT ABD & PELVIS W/O CONTRAST: CPT

## 2018-11-12 PROCEDURE — 2580000003 HC RX 258: Performed by: PHYSICIAN ASSISTANT

## 2018-11-12 PROCEDURE — 96374 THER/PROPH/DIAG INJ IV PUSH: CPT

## 2018-11-12 PROCEDURE — 99285 EMERGENCY DEPT VISIT HI MDM: CPT

## 2018-11-12 PROCEDURE — 93010 ELECTROCARDIOGRAM REPORT: CPT | Performed by: INTERNAL MEDICINE

## 2018-11-12 PROCEDURE — C9113 INJ PANTOPRAZOLE SODIUM, VIA: HCPCS | Performed by: PHYSICIAN ASSISTANT

## 2018-11-12 PROCEDURE — G0480 DRUG TEST DEF 1-7 CLASSES: HCPCS

## 2018-11-12 PROCEDURE — 6360000002 HC RX W HCPCS: Performed by: PHYSICIAN ASSISTANT

## 2018-11-12 PROCEDURE — 83690 ASSAY OF LIPASE: CPT

## 2018-11-12 PROCEDURE — 82140 ASSAY OF AMMONIA: CPT

## 2018-11-12 PROCEDURE — 87040 BLOOD CULTURE FOR BACTERIA: CPT

## 2018-11-12 PROCEDURE — 96372 THER/PROPH/DIAG INJ SC/IM: CPT

## 2018-11-12 PROCEDURE — 86850 RBC ANTIBODY SCREEN: CPT

## 2018-11-12 PROCEDURE — 85025 COMPLETE CBC W/AUTO DIFF WBC: CPT

## 2018-11-12 PROCEDURE — 93005 ELECTROCARDIOGRAM TRACING: CPT | Performed by: PHYSICIAN ASSISTANT

## 2018-11-12 PROCEDURE — 96361 HYDRATE IV INFUSION ADD-ON: CPT

## 2018-11-12 PROCEDURE — 85018 HEMOGLOBIN: CPT

## 2018-11-12 PROCEDURE — 80053 COMPREHEN METABOLIC PANEL: CPT

## 2018-11-12 PROCEDURE — 85384 FIBRINOGEN ACTIVITY: CPT

## 2018-11-12 PROCEDURE — 86900 BLOOD TYPING SEROLOGIC ABO: CPT

## 2018-11-12 PROCEDURE — 94761 N-INVAS EAR/PLS OXIMETRY MLT: CPT

## 2018-11-12 RX ORDER — SODIUM CHLORIDE 0.9 % (FLUSH) 0.9 %
10 SYRINGE (ML) INJECTION EVERY 12 HOURS SCHEDULED
Status: DISCONTINUED | OUTPATIENT
Start: 2018-11-12 | End: 2018-11-15 | Stop reason: HOSPADM

## 2018-11-12 RX ORDER — ALBUTEROL SULFATE 90 UG/1
2 AEROSOL, METERED RESPIRATORY (INHALATION) EVERY 6 HOURS PRN
Status: DISCONTINUED | OUTPATIENT
Start: 2018-11-12 | End: 2018-11-15 | Stop reason: HOSPADM

## 2018-11-12 RX ORDER — NICOTINE 21 MG/24HR
1 PATCH, TRANSDERMAL 24 HOURS TRANSDERMAL DAILY
Status: DISCONTINUED | OUTPATIENT
Start: 2018-11-12 | End: 2018-11-15 | Stop reason: HOSPADM

## 2018-11-12 RX ORDER — HYDROXYZINE HYDROCHLORIDE 50 MG/ML
50 INJECTION, SOLUTION INTRAMUSCULAR EVERY 6 HOURS PRN
Status: DISCONTINUED | OUTPATIENT
Start: 2018-11-12 | End: 2018-11-15 | Stop reason: HOSPADM

## 2018-11-12 RX ORDER — IPRATROPIUM BROMIDE AND ALBUTEROL SULFATE 2.5; .5 MG/3ML; MG/3ML
1 SOLUTION RESPIRATORY (INHALATION) EVERY 4 HOURS PRN
Status: DISCONTINUED | OUTPATIENT
Start: 2018-11-12 | End: 2018-11-15 | Stop reason: HOSPADM

## 2018-11-12 RX ORDER — METOCLOPRAMIDE HYDROCHLORIDE 5 MG/ML
10 INJECTION INTRAMUSCULAR; INTRAVENOUS ONCE
Status: COMPLETED | OUTPATIENT
Start: 2018-11-12 | End: 2018-11-12

## 2018-11-12 RX ORDER — TENOFOVIR DISOPROXIL FUMARATE 300 MG/1
300 TABLET, FILM COATED ORAL NIGHTLY
Status: DISCONTINUED | OUTPATIENT
Start: 2018-11-12 | End: 2018-11-15 | Stop reason: HOSPADM

## 2018-11-12 RX ORDER — 0.9 % SODIUM CHLORIDE 0.9 %
10 VIAL (ML) INJECTION PRN
Status: DISCONTINUED | OUTPATIENT
Start: 2018-11-12 | End: 2018-11-15 | Stop reason: HOSPADM

## 2018-11-12 RX ORDER — 0.9 % SODIUM CHLORIDE 0.9 %
1000 INTRAVENOUS SOLUTION INTRAVENOUS ONCE
Status: COMPLETED | OUTPATIENT
Start: 2018-11-12 | End: 2018-11-12

## 2018-11-12 RX ORDER — SODIUM CHLORIDE 9 MG/ML
INJECTION, SOLUTION INTRAVENOUS CONTINUOUS
Status: DISCONTINUED | OUTPATIENT
Start: 2018-11-12 | End: 2018-11-15 | Stop reason: HOSPADM

## 2018-11-12 RX ORDER — DIPHENHYDRAMINE HCL 25 MG
25 TABLET ORAL ONCE
Status: DISCONTINUED | OUTPATIENT
Start: 2018-11-12 | End: 2018-11-12

## 2018-11-12 RX ORDER — METOCLOPRAMIDE HYDROCHLORIDE 5 MG/ML
10 INJECTION INTRAMUSCULAR; INTRAVENOUS EVERY 6 HOURS PRN
Status: DISCONTINUED | OUTPATIENT
Start: 2018-11-12 | End: 2018-11-15 | Stop reason: HOSPADM

## 2018-11-12 RX ORDER — HYDROMORPHONE HCL 110MG/55ML
0.5 PATIENT CONTROLLED ANALGESIA SYRINGE INTRAVENOUS
Status: DISCONTINUED | OUTPATIENT
Start: 2018-11-12 | End: 2018-11-15 | Stop reason: HOSPADM

## 2018-11-12 RX ORDER — ONDANSETRON 2 MG/ML
4 INJECTION INTRAMUSCULAR; INTRAVENOUS ONCE
Status: COMPLETED | OUTPATIENT
Start: 2018-11-12 | End: 2018-11-12

## 2018-11-12 RX ORDER — HYDROMORPHONE HCL 110MG/55ML
0.25 PATIENT CONTROLLED ANALGESIA SYRINGE INTRAVENOUS
Status: DISCONTINUED | OUTPATIENT
Start: 2018-11-12 | End: 2018-11-15 | Stop reason: HOSPADM

## 2018-11-12 RX ORDER — SODIUM CHLORIDE 0.9 % (FLUSH) 0.9 %
10 SYRINGE (ML) INJECTION PRN
Status: DISCONTINUED | OUTPATIENT
Start: 2018-11-12 | End: 2018-11-15 | Stop reason: HOSPADM

## 2018-11-12 RX ORDER — PANTOPRAZOLE SODIUM 40 MG/10ML
40 INJECTION, POWDER, LYOPHILIZED, FOR SOLUTION INTRAVENOUS ONCE
Status: COMPLETED | OUTPATIENT
Start: 2018-11-12 | End: 2018-11-12

## 2018-11-12 RX ORDER — LACTULOSE 10 G/15ML
10 SOLUTION ORAL DAILY PRN
Status: DISCONTINUED | OUTPATIENT
Start: 2018-11-12 | End: 2018-11-15 | Stop reason: HOSPADM

## 2018-11-12 RX ADMIN — METOCLOPRAMIDE 10 MG: 5 INJECTION, SOLUTION INTRAMUSCULAR; INTRAVENOUS at 11:44

## 2018-11-12 RX ADMIN — SODIUM CHLORIDE, PRESERVATIVE FREE 10 ML: 5 INJECTION INTRAVENOUS at 21:42

## 2018-11-12 RX ADMIN — METOCLOPRAMIDE 10 MG: 5 INJECTION, SOLUTION INTRAMUSCULAR; INTRAVENOUS at 17:41

## 2018-11-12 RX ADMIN — HYDROMORPHONE HYDROCHLORIDE 0.25 MG: 2 INJECTION INTRAMUSCULAR; INTRAVENOUS; SUBCUTANEOUS at 21:41

## 2018-11-12 RX ADMIN — SODIUM CHLORIDE 8 MG/HR: 9 INJECTION, SOLUTION INTRAVENOUS at 17:41

## 2018-11-12 RX ADMIN — SODIUM CHLORIDE: 9 INJECTION, SOLUTION INTRAVENOUS at 17:40

## 2018-11-12 RX ADMIN — PANTOPRAZOLE SODIUM 40 MG: 40 INJECTION, POWDER, FOR SOLUTION INTRAVENOUS at 11:47

## 2018-11-12 RX ADMIN — SODIUM CHLORIDE 1000 ML: 9 INJECTION, SOLUTION INTRAVENOUS at 11:46

## 2018-11-12 RX ADMIN — ONDANSETRON 4 MG: 2 INJECTION, SOLUTION INTRAMUSCULAR; INTRAVENOUS at 09:32

## 2018-11-12 RX ADMIN — SODIUM CHLORIDE 1000 ML: 9 INJECTION, SOLUTION INTRAVENOUS at 09:32

## 2018-11-12 RX ADMIN — HYDROMORPHONE HYDROCHLORIDE 0.5 MG: 2 INJECTION INTRAMUSCULAR; INTRAVENOUS; SUBCUTANEOUS at 17:41

## 2018-11-12 ASSESSMENT — PAIN DESCRIPTION - LOCATION
LOCATION: ABDOMEN;BACK;HEAD
LOCATION: BACK;ABDOMEN;HEAD
LOCATION: ABDOMEN;BACK

## 2018-11-12 ASSESSMENT — PAIN DESCRIPTION - DESCRIPTORS
DESCRIPTORS: ACHING;CRAMPING
DESCRIPTORS: ACHING;CRAMPING
DESCRIPTORS: ACHING;DULL

## 2018-11-12 ASSESSMENT — PAIN SCALES - GENERAL
PAINLEVEL_OUTOF10: 2
PAINLEVEL_OUTOF10: 6
PAINLEVEL_OUTOF10: 8
PAINLEVEL_OUTOF10: 6

## 2018-11-12 ASSESSMENT — PAIN DESCRIPTION - ORIENTATION: ORIENTATION: RIGHT

## 2018-11-12 ASSESSMENT — PAIN DESCRIPTION - PAIN TYPE
TYPE: ACUTE PAIN

## 2018-11-12 ASSESSMENT — PAIN DESCRIPTION - FREQUENCY: FREQUENCY: CONTINUOUS

## 2018-11-12 ASSESSMENT — PAIN DESCRIPTION - ONSET: ONSET: ON-GOING

## 2018-11-12 ASSESSMENT — PAIN DESCRIPTION - PROGRESSION: CLINICAL_PROGRESSION: GRADUALLY IMPROVING

## 2018-11-12 NOTE — ED NOTES
1240 paged Dr Kendal Cueva  11/12/18 1241  1307 repaged Dr Kendal Cueva  11/12/18 1308  1330 Dr Aditi Hernandez returned call      Daphnie Romano  11/12/18 8155

## 2018-11-12 NOTE — CONSULTS
WNL. Normal vertebral flows bilaterally.  Hepatitis C     for liver bx 12/3/2015\"Have Hepatitis B and C and saw Dr Davida Echevarria for this 12/1/2015\"    Hiatal hernia     History of alcohol abuse     HTN (hypertension)     \"for the past two yrs on medication\" follows with Dr Gonzalo Topete Hyperlipemia     Irregular heart beat     per pt    Liver hematoma     Migraines     Nausea & vomiting     Schizophrenia ([address]     per old chart    Seizures ([address]     \"last one was 9/2015- saw Dr Anabela Sanders at [facility]- she said not sure if acutal seizures- she thinks they are panic or anxiety attacks\"    SOB (shortness of breath)     Stress bladder incontinence, female        Past Surgical History:        Procedure Laterality Date    BREAST SURGERY  10/2015    left breast bx    CARDIAC CATHETERIZATION      per old chart pt had cath done in 3/2011 and 9/2013    CHOLECYSTECTOMY  per old chart done [facility]  3/11/13    diverticulosis, cecal polyp    COLONOSCOPY  03/16/2017    Internal hemorrhoids    ENDOSCOPY, COLON, DIAGNOSTIC  03/16/2017    EGD: Small esophageal varices, portal hypertensive gastropathy, reflux esophagitis, hiatal hernia    EYE SURGERY Bilateral ? when    cyst removal, cataracts    HYSTERECTOMY      per old chart pt had CHOLO/BSO 1986    TONSILLECTOMY  as a kid         Current Medications:    Medications    Scheduled Medications:   PRN Medications: iopamidol, sodium chloride (PF)      Allergies:  Norco [hydrocodone-acetaminophen] and Tylenol [acetaminophen]    Social History:   TOBACCO:   reports that she has been smoking Cigarettes. She has a 20.00 pack-year smoking history. She has never used smokeless tobacco.  ETOH:   reports that she does not drink alcohol.     Family History:   Family History   Problem Relation Age of Onset    Cancer Mother         lung ca    Arthritis Mother     Migraines Mother     Cancer Father         colon ca    Diabetes Father     High Blood Pressure Father     S2 normal, no murmur   Abdomen:     Soft, R upper quadrant and epigastric tenderness, bowel sounds active all four quadrants,     no masses, no organomegaly, no ascites    Rectal:    Deferred   Extremities:   Extremities normal, atraumatic, no cyanosis or edema   Pulses:   2+ and symmetric all extremities   Skin:   Skin color, texture, turgor normal, no rashes or lesions   Lymph nodes:   No abnormality   Neurologic:   No focal deficits, moving all four extremities            DATA:    ABGs:   Lab Results   Component Value Date    PO2ART 50 04/27/2012     CBC:   Recent Labs      11/12/18   0908   WBC  7.7   HGB  17.0*   PLT  90*     BMP:    Recent Labs      11/12/18   0908   NA  141   K  4.0   CL  99   CO2  25   BUN  18   CREATININE  1.4*   GLUCOSE  195*     Magnesium:   Lab Results   Component Value Date    MG 2.3 10/26/2018     Hepatic:   Recent Labs      11/12/18   0908   AST  26   ALT  18   BILITOT  0.8   ALKPHOS  111     Recent Labs      11/12/18   0908   LIPASE  54     Recent Labs      11/12/18   0908   PROTIME  13.2*   INR  1.16     No results for input(s): PTT in the last 72 hours. Lipids: No results for input(s): CHOL, HDL in the last 72 hours.     Invalid input(s): LDLCALCU  INR:   Recent Labs      11/12/18   0908   INR  1.16     TSH:   Lab Results   Component Value Date    TSH 1.73 08/01/2017     No intake or output data in the 24 hours ending 11/12/18 1416      Imaging Studies: Reviewed      IMPRESSION:      Patient Active Problem List   Diagnosis Code    Left arm pain M79.602    Type 2 diabetes mellitus with complication, with long-term current use of insulin ([facility]) E11.8, Z79.4    Acid reflux K21.9    COPD (chronic obstructive pulmonary disease) ([facility]) J44.9    Tobacco abuse Z72.0    Angina effort ([facility]) I20.8    Abnormal nuclear cardiac imaging test R93.1    Lung nodule R91.1    Chest pain R07.9    Dyslipidemia E78.5    Pancolitis ([facility]) K51.00    Hematemesis without nausea G34.1    Alcoholic cirrhosis of liver without ascites (HCC) K70.30    Thrombocytopenia (HCC) O60.1    Periumbilical abdominal pain R10.33    History of colonic polyps Z86.010    Abnormal findings on diagnostic imaging of 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    Type 2 diabetes mellitus with complication, with long-term current use of insulin (HCC) E11.8, Z79.4    SOB (shortness of breath) R06.02    Portal vein thrombosis I81    Intractable nausea and vomiting R11.2    Abdominal pain R10.9    Acute GI bleeding K92.2     Assessment:  Hematemesis  hgb 17.0  May be Acute gastroenteritis, Latosha Owen tear in setting of anticoagulation , PUD, esophagitis, or  variceal bleed (less likely)  Likely has nocturnal GERD as precipitant    Chronic Hep B / Cirrhosis  Compensated   Meld score 11  Platelets 90  LFT's normal    Acute PVT  -do not believe this is cause of her chronic recurrent symptoms        RECOMMENDATIONS:  NPO after midnight  Possible EGD tomorrow if schedule allows  H&H q6 hrs  Vistaril as needed for nausea and vomiting  PPI infusion   Continue Lactulose 15 mls daily prn goal 2 BM's daily  Continue Tenofovir daily HS  Hold Eliquis for now    Discussed plan of care with patient and family     Patient clinical, biochemical, and radiological information discussed with Dr. Faraz Whitman. He agrees with the assessment and plan. Satish Partida CNP  11/12/2018  2:16 PM     I have seen and examined the patient myself. I have reviewed the hospital care given to date and reviewed all pertinent labs and imaging.  The plan was developed mutually at the time of visit

## 2018-11-12 NOTE — ED PROVIDER NOTES
heroin and marijuana- states last used 12/2014    Glaucoma     left eye    H/O Doppler ultrasound 7/22/15    CAROTID: WNL. Normal vertebral flows bilaterally.      Hepatitis C     for liver bx 12/3/2015\"Have Hepatitis B and C and saw Dr Linda Lewis for this 12/1/2015\"    Hiatal hernia     History of alcohol abuse     HTN (hypertension)     \"for the past two yrs on medication\" follows with Dr Cordell Overton Irregular heart beat     per pt    Liver hematoma     Migraines     Nausea & vomiting     Schizophrenia ([address]     per old chart    Seizures ([address]     \"last one was 9/2015- saw Dr Michelle Alexandra at [facility]- she said not sure if acutal seizures- she thinks they are panic or anxiety attacks\"    SOB (shortness of breath)     Stress bladder incontinence, female      Past Surgical History:   Procedure Laterality Date    BREAST SURGERY  10/2015    left breast bx    CARDIAC CATHETERIZATION      per old chart pt had cath done in 3/2011 and 9/2013    CHOLECYSTECTOMY  per old chart done 1985    COLONOSCOPY  3/11/13    diverticulosis, cecal polyp    COLONOSCOPY  03/16/2017    Internal hemorrhoids    ENDOSCOPY, COLON, DIAGNOSTIC  03/16/2017    EGD: Small esophageal varices, portal hypertensive gastropathy, reflux esophagitis, hiatal hernia    EYE SURGERY Bilateral ? when    cyst removal, cataracts    HYSTERECTOMY      per old chart pt had CHOLO/BSO 1986    TONSILLECTOMY  as a kid       CURRENT MEDICATIONS    Current Outpatient Rx   Medication Sig Dispense Refill    apixaban (ELIQUIS) 5 MG TABS tablet Take 5 mg by mouth 2 times daily      hydrOXYzine (ATARAX) 50 MG tablet Take 50 mg by mouth 3 times daily as needed for Itching      metoprolol tartrate (LOPRESSOR) 25 MG tablet Take 1 tablet by mouth 2 times daily 60 tablet 6    ranolazine (RANEXA) 500 MG extended release tablet Take 1 tablet by mouth 2 times daily 180 tablet 3    FLUoxetine (PROZAC) 40 MG capsule Take 40 mg by mouth daily      discussed with on call GI physician, Dr. Pili Cleaning. Patient case also discussed with hospitalist, Maddie COSTA who agrees to admit patient for further evaluation of above. Clinical  IMPRESSION    1. Upper GI bleed    2. Elevated lactic acid level    3. Thrombocytopenia ([address]    4. Gastric varices      Admission    Comment: Please note this report has been produced using speech recognition software and may contain errors related to that system including errors in grammar, punctuation, and spelling, as well as words and phrases that may be inappropriate. If there are any questions or concerns please feel free to contact the dictating provider for clarification.         NELY Donis  11/12/18 1013

## 2018-11-13 LAB
ALBUMIN SERPL-MCNC: 3.7 GM/DL (ref 3.4–5)
ALP BLD-CCNC: 78 IU/L (ref 40–129)
ALT SERPL-CCNC: 16 U/L (ref 10–40)
AMPHETAMINES: NEGATIVE
ANION GAP SERPL CALCULATED.3IONS-SCNC: 8 MMOL/L (ref 4–16)
AST SERPL-CCNC: 26 IU/L (ref 15–37)
BACTERIA: ABNORMAL /HPF
BARBITURATE SCREEN URINE: NEGATIVE
BASOPHILS ABSOLUTE: 0.1 K/CU MM
BASOPHILS RELATIVE PERCENT: 0.6 % (ref 0–1)
BENZODIAZEPINE SCREEN, URINE: NEGATIVE
BILIRUB SERPL-MCNC: 0.9 MG/DL (ref 0–1)
BILIRUBIN DIRECT: 0.3 MG/DL (ref 0–0.3)
BILIRUBIN URINE: NEGATIVE MG/DL
BILIRUBIN, INDIRECT: 0.6 MG/DL (ref 0–0.7)
BLOOD, URINE: ABNORMAL
BUN BLDV-MCNC: 13 MG/DL (ref 6–23)
CALCIUM SERPL-MCNC: 8.1 MG/DL (ref 8.3–10.6)
CANNABINOID SCREEN URINE: ABNORMAL
CHLORIDE BLD-SCNC: 108 MMOL/L (ref 99–110)
CLARITY: CLEAR
CO2: 25 MMOL/L (ref 21–32)
COCAINE METABOLITE: NEGATIVE
COLOR: YELLOW
CREAT SERPL-MCNC: 1.1 MG/DL (ref 0.6–1.1)
DIFFERENTIAL TYPE: ABNORMAL
EOSINOPHILS ABSOLUTE: 0.1 K/CU MM
EOSINOPHILS RELATIVE PERCENT: 1.1 % (ref 0–3)
GFR AFRICAN AMERICAN: >60 ML/MIN/1.73M2
GFR NON-AFRICAN AMERICAN: 52 ML/MIN/1.73M2
GLUCOSE BLD-MCNC: 121 MG/DL (ref 70–99)
GLUCOSE, URINE: NEGATIVE MG/DL
HCT VFR BLD CALC: 43.2 % (ref 37–47)
HEMOGLOBIN: 13.8 GM/DL (ref 12.5–16)
HEMOGLOBIN: 14.3 GM/DL (ref 12.5–16)
HYALINE CASTS: 0 /LPF
IMMATURE NEUTROPHIL %: 0.2 % (ref 0–0.43)
KETONES, URINE: ABNORMAL MG/DL
LACTATE: 0.7 MMOL/L (ref 0.4–2)
LEUKOCYTE ESTERASE, URINE: NEGATIVE
LYMPHOCYTES ABSOLUTE: 1.8 K/CU MM
LYMPHOCYTES RELATIVE PERCENT: 19.7 % (ref 24–44)
MCH RBC QN AUTO: 32.4 PG (ref 27–31)
MCHC RBC AUTO-ENTMCNC: 33.1 % (ref 32–36)
MCV RBC AUTO: 97.7 FL (ref 78–100)
MONOCYTES ABSOLUTE: 0.6 K/CU MM
MONOCYTES RELATIVE PERCENT: 6.8 % (ref 0–4)
MUCUS: ABNORMAL HPF
NITRITE URINE, QUANTITATIVE: NEGATIVE
NUCLEATED RBC %: 0 %
OPIATES, URINE: ABNORMAL
OXYCODONE: ABNORMAL
PDW BLD-RTO: 13 % (ref 11.7–14.9)
PH, URINE: 5 (ref 5–8)
PHENCYCLIDINE, URINE: NEGATIVE
PLATELET # BLD: 77 K/CU MM (ref 140–440)
PMV BLD AUTO: 11.9 FL (ref 7.5–11.1)
POTASSIUM SERPL-SCNC: 3.9 MMOL/L (ref 3.5–5.1)
PROTEIN UA: NEGATIVE MG/DL
RBC # BLD: 4.42 M/CU MM (ref 4.2–5.4)
RBC URINE: 1 /HPF (ref 0–6)
SEGMENTED NEUTROPHILS ABSOLUTE COUNT: 6.4 K/CU MM
SEGMENTED NEUTROPHILS RELATIVE PERCENT: 71.6 % (ref 36–66)
SODIUM BLD-SCNC: 141 MMOL/L (ref 135–145)
SPECIFIC GRAVITY UA: 1.02 (ref 1–1.03)
SQUAMOUS EPITHELIAL: <1 /HPF
TOTAL IMMATURE NEUTOROPHIL: 0.02 K/CU MM
TOTAL NUCLEATED RBC: 0 K/CU MM
TOTAL PROTEIN: 7 GM/DL (ref 6.4–8.2)
TRICHOMONAS: ABNORMAL /HPF
UROBILINOGEN, URINE: NORMAL MG/DL (ref 0.2–1)
WBC # BLD: 8.9 K/CU MM (ref 4–10.5)
WBC UA: 3 /HPF (ref 0–5)

## 2018-11-13 PROCEDURE — 2580000003 HC RX 258: Performed by: PHYSICIAN ASSISTANT

## 2018-11-13 PROCEDURE — 6370000000 HC RX 637 (ALT 250 FOR IP): Performed by: HOSPITALIST

## 2018-11-13 PROCEDURE — 6360000002 HC RX W HCPCS: Performed by: PHYSICIAN ASSISTANT

## 2018-11-13 PROCEDURE — C9113 INJ PANTOPRAZOLE SODIUM, VIA: HCPCS | Performed by: PHYSICIAN ASSISTANT

## 2018-11-13 PROCEDURE — 82248 BILIRUBIN DIRECT: CPT

## 2018-11-13 PROCEDURE — 2060000000 HC ICU INTERMEDIATE R&B

## 2018-11-13 PROCEDURE — C9113 INJ PANTOPRAZOLE SODIUM, VIA: HCPCS | Performed by: NURSE PRACTITIONER

## 2018-11-13 PROCEDURE — 83605 ASSAY OF LACTIC ACID: CPT

## 2018-11-13 PROCEDURE — 85018 HEMOGLOBIN: CPT

## 2018-11-13 PROCEDURE — 36415 COLL VENOUS BLD VENIPUNCTURE: CPT

## 2018-11-13 PROCEDURE — 85025 COMPLETE CBC W/AUTO DIFF WBC: CPT

## 2018-11-13 PROCEDURE — 80307 DRUG TEST PRSMV CHEM ANLYZR: CPT

## 2018-11-13 PROCEDURE — 6360000002 HC RX W HCPCS: Performed by: NURSE PRACTITIONER

## 2018-11-13 PROCEDURE — 80053 COMPREHEN METABOLIC PANEL: CPT

## 2018-11-13 PROCEDURE — 81001 URINALYSIS AUTO W/SCOPE: CPT

## 2018-11-13 RX ORDER — LACTULOSE 10 G/15ML
10 SOLUTION ORAL DAILY PRN
Qty: 1 BOTTLE | Refills: 0 | Status: SHIPPED | OUTPATIENT
Start: 2018-11-13 | End: 2018-11-15

## 2018-11-13 RX ORDER — PANTOPRAZOLE SODIUM 40 MG/10ML
40 INJECTION, POWDER, LYOPHILIZED, FOR SOLUTION INTRAVENOUS 2 TIMES DAILY
Status: DISCONTINUED | OUTPATIENT
Start: 2018-11-13 | End: 2018-11-15 | Stop reason: HOSPADM

## 2018-11-13 RX ORDER — BUSPIRONE HYDROCHLORIDE 15 MG/1
15 TABLET ORAL 2 TIMES DAILY
Status: DISCONTINUED | OUTPATIENT
Start: 2018-11-13 | End: 2018-11-15 | Stop reason: HOSPADM

## 2018-11-13 RX ORDER — FLUOXETINE HYDROCHLORIDE 20 MG/1
40 CAPSULE ORAL DAILY
Status: DISCONTINUED | OUTPATIENT
Start: 2018-11-13 | End: 2018-11-15 | Stop reason: HOSPADM

## 2018-11-13 RX ADMIN — HYDROMORPHONE HYDROCHLORIDE 0.5 MG: 2 INJECTION INTRAMUSCULAR; INTRAVENOUS; SUBCUTANEOUS at 19:36

## 2018-11-13 RX ADMIN — PANTOPRAZOLE SODIUM 40 MG: 40 INJECTION, POWDER, FOR SOLUTION INTRAVENOUS at 11:23

## 2018-11-13 RX ADMIN — BUSPIRONE HYDROCHLORIDE 15 MG: 15 TABLET ORAL at 22:11

## 2018-11-13 RX ADMIN — SODIUM CHLORIDE: 9 INJECTION, SOLUTION INTRAVENOUS at 01:19

## 2018-11-13 RX ADMIN — METOCLOPRAMIDE 10 MG: 5 INJECTION, SOLUTION INTRAMUSCULAR; INTRAVENOUS at 11:23

## 2018-11-13 RX ADMIN — HYDROMORPHONE HYDROCHLORIDE 0.5 MG: 2 INJECTION INTRAMUSCULAR; INTRAVENOUS; SUBCUTANEOUS at 11:24

## 2018-11-13 RX ADMIN — METOCLOPRAMIDE 10 MG: 5 INJECTION, SOLUTION INTRAMUSCULAR; INTRAVENOUS at 19:36

## 2018-11-13 RX ADMIN — PANTOPRAZOLE SODIUM 40 MG: 40 INJECTION, POWDER, FOR SOLUTION INTRAVENOUS at 22:11

## 2018-11-13 RX ADMIN — SODIUM CHLORIDE 8 MG/HR: 9 INJECTION, SOLUTION INTRAVENOUS at 01:20

## 2018-11-13 RX ADMIN — QUETIAPINE FUMARATE 300 MG: 200 TABLET ORAL at 22:11

## 2018-11-13 RX ADMIN — HYDROMORPHONE HYDROCHLORIDE 0.25 MG: 2 INJECTION INTRAMUSCULAR; INTRAVENOUS; SUBCUTANEOUS at 03:31

## 2018-11-13 RX ADMIN — SODIUM CHLORIDE: 9 INJECTION, SOLUTION INTRAVENOUS at 08:04

## 2018-11-13 RX ADMIN — SODIUM CHLORIDE, PRESERVATIVE FREE 10 ML: 5 INJECTION INTRAVENOUS at 08:03

## 2018-11-13 RX ADMIN — HYDROMORPHONE HYDROCHLORIDE 0.5 MG: 2 INJECTION INTRAMUSCULAR; INTRAVENOUS; SUBCUTANEOUS at 08:01

## 2018-11-13 ASSESSMENT — PAIN SCALES - GENERAL
PAINLEVEL_OUTOF10: 8
PAINLEVEL_OUTOF10: 8
PAINLEVEL_OUTOF10: 2
PAINLEVEL_OUTOF10: 2
PAINLEVEL_OUTOF10: 8
PAINLEVEL_OUTOF10: 8
PAINLEVEL_OUTOF10: 3
PAINLEVEL_OUTOF10: 6

## 2018-11-13 ASSESSMENT — PAIN DESCRIPTION - PROGRESSION

## 2018-11-13 ASSESSMENT — PAIN DESCRIPTION - LOCATION
LOCATION: HEAD
LOCATION: BACK;CHEST;GENERALIZED
LOCATION: HEAD
LOCATION: ABDOMEN;BACK;HEAD
LOCATION: HEAD

## 2018-11-13 ASSESSMENT — PAIN DESCRIPTION - PAIN TYPE
TYPE: ACUTE PAIN

## 2018-11-13 ASSESSMENT — PAIN DESCRIPTION - DESCRIPTORS
DESCRIPTORS: ACHING;CRAMPING;POUNDING
DESCRIPTORS: ACHING
DESCRIPTORS: ACHING

## 2018-11-13 ASSESSMENT — PAIN DESCRIPTION - FREQUENCY: FREQUENCY: CONTINUOUS

## 2018-11-13 NOTE — PROGRESS NOTES
HOSPITALIST PROGRESS NOTE  Date: 11/13/2018   Name: Duncan Brand   MRN: [id number]   YOB: 1960      Subjective/Interval Hx:   Patient stated she is doing okay at this time. States better than she felt when she came in.   He is awaiting to have EGD done for her blood loss    Objective:   Physical Exam:   /75   Pulse 96   Temp 97.7 °F (36.5 °C) (Oral)   Resp 18   Ht 4' 9.5\" (1.461 m)   Wt 169 lb 5 oz (76.8 kg)   SpO2 95%   BMI 36.00 kg/m²   General: no acute distress, well nourished and well hydrated  HEENT: NCAT  Heart: S1S2 RRR  Lungs: Clear to ascultation bilaterally, respiratory effort normal  Abdomen: soft, NT/ND, positive bowel sounds  Extremities: no pitting edema, nontender   Neuro: patient is awake, alert and orientated times 3, no gross deficits  Skin: no rashes or ecchymosis        Meds:   Meds:    pantoprazole  40 mg Intravenous BID    sodium chloride flush  10 mL Intravenous 2 times per day    nicotine  1 patch Transdermal Daily    tenofovir  300 mg Oral Nightly      Infusions:    sodium chloride 125 mL/hr at 11/13/18 0804     PRN Meds:   iopamidol 80 mL ONCE PRN   sodium chloride (PF) 10 mL PRN   sodium chloride flush 10 mL PRN   albuterol sulfate HFA 2 puff Q6H PRN   ipratropium-albuterol 1 ampule Q4H PRN   HYDROmorphone 0.25 mg Q3H PRN   Or     HYDROmorphone 0.5 mg Q3H PRN   metoclopramide 10 mg Q6H PRN   hydrOXYzine 50 mg Q6H PRN   lactulose 10 g Daily PRN       Data/Labs:     Recent Labs      11/12/18   0908  11/12/18   1622  11/12/18   2056  11/13/18   0520   WBC  7.7   --    --   8.9   HGB  17.0*  16.4*  15.0  14.3   HCT  50.1*  48.4*   --   43.2   PLT  90*   --    --   77*      Recent Labs      11/12/18   0908  11/13/18   0520   NA  141  141   K  4.0  3.9   CL  99  108   CO2  25  25   BUN  18  13   CREATININE  1.4*  1.1     Recent Labs      11/12/18   0908  11/13/18   0520   AST  26  26   ALT  18  16   BILIDIR   --   0.3   BILITOT  0.8  0.9   ALKPHOS  111

## 2018-11-13 NOTE — CONSULTS
Hepatitis C     for liver bx 12/3/2015\"Have Hepatitis B and C and saw Dr Cintia Jett for this 12/1/2015\"    Hiatal hernia     History of alcohol abuse     HTN (hypertension)     \"for the past two yrs on medication\" follows with Dr Leon Humphries Hyperlipemia     Irregular heart beat     per pt    Liver hematoma     Migraines     Nausea & vomiting     Schizophrenia ([address]     per old chart    Seizures ([address]     \"last one was 9/2015- saw Dr Markel Griffith at [facility]- she said not sure if acutal seizures- she thinks they are panic or anxiety attacks\"    SOB (shortness of breath)     Stress bladder incontinence, female        SURGICAL HISTORY    Past Surgical History:   Procedure Laterality Date    BREAST SURGERY  10/2015    left breast bx    CARDIAC CATHETERIZATION      per old chart pt had cath done in 3/2011 and 9/2013    CHOLECYSTECTOMY  per old chart done [facility]  3/11/13    diverticulosis, cecal polyp    COLONOSCOPY  03/16/2017    Internal hemorrhoids    ENDOSCOPY, COLON, DIAGNOSTIC  03/16/2017    EGD: Small esophageal varices, portal hypertensive gastropathy, reflux esophagitis, hiatal hernia    EYE SURGERY Bilateral ? when    cyst removal, cataracts    HYSTERECTOMY      per old chart pt had CHOLO/BSO 1986    TONSILLECTOMY  as a kid       FAMILY HISTORY    Family History   Problem Relation Age of Onset    Cancer Mother         lung ca    Arthritis Mother     Migraines Mother     Cancer Father         colon ca    Diabetes Father     High Blood Pressure Father     Arthritis Father     High Cholesterol Father     Migraines Father     Migraines Sister     Heart Disease Brother         WPW       SOCIAL HISTORY    Social History     Social History    Marital status:      Spouse name: N/A    Number of children: N/A    Years of education: N/A     Social History Main Topics    Smoking status: Current Every Day Smoker     Packs/day: 0.50     Years: 40.00     Types: Cigarettes   

## 2018-11-13 NOTE — PROGRESS NOTES
assessment, diagnosis, and treatment plan with as suggested by Renay Bucio CNP with changes made by me as above.     [address] [facility]

## 2018-11-14 ENCOUNTER — ANESTHESIA EVENT (OUTPATIENT)
Dept: ENDOSCOPY | Age: 58
DRG: 253 | End: 2018-11-14
Payer: COMMERCIAL

## 2018-11-14 ENCOUNTER — ANESTHESIA (OUTPATIENT)
Dept: ENDOSCOPY | Age: 58
DRG: 253 | End: 2018-11-14
Payer: COMMERCIAL

## 2018-11-14 VITALS — DIASTOLIC BLOOD PRESSURE: 84 MMHG | SYSTOLIC BLOOD PRESSURE: 147 MMHG | OXYGEN SATURATION: 95 %

## 2018-11-14 LAB
ALBUMIN SERPL-MCNC: 3.2 GM/DL (ref 3.4–5)
ALP BLD-CCNC: 68 IU/L (ref 40–128)
ALT SERPL-CCNC: 13 U/L (ref 10–40)
ANION GAP SERPL CALCULATED.3IONS-SCNC: 8 MMOL/L (ref 4–16)
APTT: 30.9 SECONDS (ref 21.2–33)
AST SERPL-CCNC: 23 IU/L (ref 15–37)
BILIRUB SERPL-MCNC: 0.8 MG/DL (ref 0–1)
BUN BLDV-MCNC: 9 MG/DL (ref 6–23)
CALCIUM SERPL-MCNC: 7.9 MG/DL (ref 8.3–10.6)
CHLORIDE BLD-SCNC: 109 MMOL/L (ref 99–110)
CO2: 24 MMOL/L (ref 21–32)
CREAT SERPL-MCNC: 0.9 MG/DL (ref 0.6–1.1)
GFR AFRICAN AMERICAN: >60 ML/MIN/1.73M2
GFR NON-AFRICAN AMERICAN: >60 ML/MIN/1.73M2
GLUCOSE BLD-MCNC: 122 MG/DL (ref 70–99)
HCT VFR BLD CALC: 38.1 % (ref 37–47)
HEMOGLOBIN: 12.8 GM/DL (ref 12.5–16)
INR BLD: 1.2 INDEX
MCH RBC QN AUTO: 32.7 PG (ref 27–31)
MCHC RBC AUTO-ENTMCNC: 33.6 % (ref 32–36)
MCV RBC AUTO: 97.4 FL (ref 78–100)
PDW BLD-RTO: 12.8 % (ref 11.7–14.9)
PLATELET # BLD: 66 K/CU MM (ref 140–440)
PMV BLD AUTO: 12 FL (ref 7.5–11.1)
POTASSIUM SERPL-SCNC: 3.7 MMOL/L (ref 3.5–5.1)
PROTHROMBIN TIME: 13.9 SECONDS (ref 9.12–12.5)
RBC # BLD: 3.91 M/CU MM (ref 4.2–5.4)
SODIUM BLD-SCNC: 141 MMOL/L (ref 135–145)
TOTAL PROTEIN: 6.2 GM/DL (ref 6.4–8.2)
WBC # BLD: 5.4 K/CU MM (ref 4–10.5)

## 2018-11-14 PROCEDURE — G8979 MOBILITY GOAL STATUS: HCPCS

## 2018-11-14 PROCEDURE — 2580000003 HC RX 258: Performed by: HOSPITALIST

## 2018-11-14 PROCEDURE — 80053 COMPREHEN METABOLIC PANEL: CPT

## 2018-11-14 PROCEDURE — 6360000002 HC RX W HCPCS: Performed by: HOSPITALIST

## 2018-11-14 PROCEDURE — 85730 THROMBOPLASTIN TIME PARTIAL: CPT

## 2018-11-14 PROCEDURE — 85027 COMPLETE CBC AUTOMATED: CPT

## 2018-11-14 PROCEDURE — G8978 MOBILITY CURRENT STATUS: HCPCS

## 2018-11-14 PROCEDURE — 3700000000 HC ANESTHESIA ATTENDED CARE: Performed by: INTERNAL MEDICINE

## 2018-11-14 PROCEDURE — 2709999900 HC NON-CHARGEABLE SUPPLY: Performed by: INTERNAL MEDICINE

## 2018-11-14 PROCEDURE — G8989 SELF CARE D/C STATUS: HCPCS

## 2018-11-14 PROCEDURE — 6360000002 HC RX W HCPCS: Performed by: PHYSICIAN ASSISTANT

## 2018-11-14 PROCEDURE — 2580000003 HC RX 258: Performed by: PHYSICIAN ASSISTANT

## 2018-11-14 PROCEDURE — 2500000003 HC RX 250 WO HCPCS

## 2018-11-14 PROCEDURE — 2580000003 HC RX 258: Performed by: NURSE ANESTHETIST, CERTIFIED REGISTERED

## 2018-11-14 PROCEDURE — 6370000000 HC RX 637 (ALT 250 FOR IP): Performed by: HOSPITALIST

## 2018-11-14 PROCEDURE — 2500000003 HC RX 250 WO HCPCS: Performed by: NURSE ANESTHETIST, CERTIFIED REGISTERED

## 2018-11-14 PROCEDURE — 6360000002 HC RX W HCPCS: Performed by: NURSE ANESTHETIST, CERTIFIED REGISTERED

## 2018-11-14 PROCEDURE — 97530 THERAPEUTIC ACTIVITIES: CPT

## 2018-11-14 PROCEDURE — C9113 INJ PANTOPRAZOLE SODIUM, VIA: HCPCS | Performed by: NURSE PRACTITIONER

## 2018-11-14 PROCEDURE — G8987 SELF CARE CURRENT STATUS: HCPCS

## 2018-11-14 PROCEDURE — 3700000001 HC ADD 15 MINUTES (ANESTHESIA): Performed by: INTERNAL MEDICINE

## 2018-11-14 PROCEDURE — 85610 PROTHROMBIN TIME: CPT

## 2018-11-14 PROCEDURE — G8980 MOBILITY D/C STATUS: HCPCS

## 2018-11-14 PROCEDURE — 6360000002 HC RX W HCPCS: Performed by: NURSE PRACTITIONER

## 2018-11-14 PROCEDURE — G8988 SELF CARE GOAL STATUS: HCPCS

## 2018-11-14 PROCEDURE — 97161 PT EVAL LOW COMPLEX 20 MIN: CPT

## 2018-11-14 PROCEDURE — 82105 ALPHA-FETOPROTEIN SERUM: CPT

## 2018-11-14 PROCEDURE — 36415 COLL VENOUS BLD VENIPUNCTURE: CPT

## 2018-11-14 PROCEDURE — 2060000000 HC ICU INTERMEDIATE R&B

## 2018-11-14 PROCEDURE — 97165 OT EVAL LOW COMPLEX 30 MIN: CPT

## 2018-11-14 PROCEDURE — 3609012800 HC EGD DIAGNOSTIC ONLY: Performed by: INTERNAL MEDICINE

## 2018-11-14 PROCEDURE — 0DJ08ZZ INSPECTION OF UPPER INTESTINAL TRACT, VIA NATURAL OR ARTIFICIAL OPENING ENDOSCOPIC: ICD-10-PCS | Performed by: INTERNAL MEDICINE

## 2018-11-14 RX ORDER — SODIUM CHLORIDE 9 MG/ML
INJECTION, SOLUTION INTRAVENOUS CONTINUOUS PRN
Status: DISCONTINUED | OUTPATIENT
Start: 2018-11-14 | End: 2018-11-14 | Stop reason: SDUPTHER

## 2018-11-14 RX ORDER — METOPROLOL TARTRATE 5 MG/5ML
INJECTION INTRAVENOUS PRN
Status: DISCONTINUED | OUTPATIENT
Start: 2018-11-14 | End: 2018-11-14 | Stop reason: SDUPTHER

## 2018-11-14 RX ORDER — PROPOFOL 10 MG/ML
INJECTION, EMULSION INTRAVENOUS PRN
Status: DISCONTINUED | OUTPATIENT
Start: 2018-11-14 | End: 2018-11-14 | Stop reason: SDUPTHER

## 2018-11-14 RX ORDER — LIDOCAINE HYDROCHLORIDE 20 MG/ML
INJECTION, SOLUTION EPIDURAL; INFILTRATION; INTRACAUDAL; PERINEURAL PRN
Status: DISCONTINUED | OUTPATIENT
Start: 2018-11-14 | End: 2018-11-14 | Stop reason: SDUPTHER

## 2018-11-14 RX ADMIN — LIDOCAINE HYDROCHLORIDE 100 MG: 20 INJECTION, SOLUTION EPIDURAL; INFILTRATION; INTRACAUDAL; PERINEURAL at 07:47

## 2018-11-14 RX ADMIN — SODIUM CHLORIDE: 9 INJECTION, SOLUTION INTRAVENOUS at 07:46

## 2018-11-14 RX ADMIN — METOCLOPRAMIDE 10 MG: 5 INJECTION, SOLUTION INTRAMUSCULAR; INTRAVENOUS at 12:02

## 2018-11-14 RX ADMIN — VANCOMYCIN HYDROCHLORIDE 1250 MG: 5 INJECTION, POWDER, LYOPHILIZED, FOR SOLUTION INTRAVENOUS at 10:20

## 2018-11-14 RX ADMIN — BUSPIRONE HYDROCHLORIDE 15 MG: 15 TABLET ORAL at 10:19

## 2018-11-14 RX ADMIN — PANTOPRAZOLE SODIUM 40 MG: 40 INJECTION, POWDER, FOR SOLUTION INTRAVENOUS at 10:19

## 2018-11-14 RX ADMIN — SODIUM CHLORIDE: 9 INJECTION, SOLUTION INTRAVENOUS at 20:46

## 2018-11-14 RX ADMIN — PANTOPRAZOLE SODIUM 40 MG: 40 INJECTION, POWDER, FOR SOLUTION INTRAVENOUS at 20:46

## 2018-11-14 RX ADMIN — SODIUM CHLORIDE, PRESERVATIVE FREE 10 ML: 5 INJECTION INTRAVENOUS at 10:20

## 2018-11-14 RX ADMIN — BUSPIRONE HYDROCHLORIDE 15 MG: 15 TABLET ORAL at 20:48

## 2018-11-14 RX ADMIN — APIXABAN 5 MG: 5 TABLET, FILM COATED ORAL at 20:47

## 2018-11-14 RX ADMIN — SODIUM CHLORIDE, PRESERVATIVE FREE 10 ML: 5 INJECTION INTRAVENOUS at 20:47

## 2018-11-14 RX ADMIN — METOPROLOL TARTRATE 3 MG: 1 INJECTION, SOLUTION INTRAVENOUS at 07:56

## 2018-11-14 RX ADMIN — HYDROMORPHONE HYDROCHLORIDE 0.25 MG: 2 INJECTION INTRAMUSCULAR; INTRAVENOUS; SUBCUTANEOUS at 20:47

## 2018-11-14 RX ADMIN — FLUOXETINE HYDROCHLORIDE 40 MG: 20 CAPSULE ORAL at 10:19

## 2018-11-14 RX ADMIN — QUETIAPINE FUMARATE 300 MG: 200 TABLET ORAL at 20:46

## 2018-11-14 RX ADMIN — HYDROMORPHONE HYDROCHLORIDE 0.5 MG: 2 INJECTION INTRAMUSCULAR; INTRAVENOUS; SUBCUTANEOUS at 12:02

## 2018-11-14 RX ADMIN — SODIUM CHLORIDE: 9 INJECTION, SOLUTION INTRAVENOUS at 09:27

## 2018-11-14 RX ADMIN — HYDROMORPHONE HYDROCHLORIDE 0.5 MG: 2 INJECTION INTRAMUSCULAR; INTRAVENOUS; SUBCUTANEOUS at 05:25

## 2018-11-14 RX ADMIN — PROPOFOL 100 MG: 10 INJECTION, EMULSION INTRAVENOUS at 07:47

## 2018-11-14 ASSESSMENT — PAIN DESCRIPTION - DESCRIPTORS
DESCRIPTORS: ACHING
DESCRIPTORS: ACHING

## 2018-11-14 ASSESSMENT — PAIN SCALES - GENERAL
PAINLEVEL_OUTOF10: 4
PAINLEVEL_OUTOF10: 2
PAINLEVEL_OUTOF10: 7
PAINLEVEL_OUTOF10: 7
PAINLEVEL_OUTOF10: 2
PAINLEVEL_OUTOF10: 6

## 2018-11-14 ASSESSMENT — PAIN DESCRIPTION - PROGRESSION
CLINICAL_PROGRESSION: NOT CHANGED
CLINICAL_PROGRESSION: GRADUALLY IMPROVING
CLINICAL_PROGRESSION: GRADUALLY IMPROVING

## 2018-11-14 ASSESSMENT — PAIN DESCRIPTION - FREQUENCY
FREQUENCY: CONTINUOUS
FREQUENCY: CONTINUOUS

## 2018-11-14 ASSESSMENT — PAIN DESCRIPTION - ONSET
ONSET: ON-GOING
ONSET: ON-GOING

## 2018-11-14 ASSESSMENT — PAIN DESCRIPTION - LOCATION
LOCATION: HEAD;BACK
LOCATION: HEAD
LOCATION: BACK;HEAD

## 2018-11-14 ASSESSMENT — PAIN DESCRIPTION - PAIN TYPE
TYPE: ACUTE PAIN

## 2018-11-14 ASSESSMENT — ENCOUNTER SYMPTOMS: SHORTNESS OF BREATH: 1

## 2018-11-14 ASSESSMENT — PAIN DESCRIPTION - ORIENTATION: ORIENTATION: MID

## 2018-11-14 ASSESSMENT — LIFESTYLE VARIABLES: SMOKING_STATUS: 1

## 2018-11-14 NOTE — PROGRESS NOTES
Occupational Therapy   Occupational Therapy Initial Assessment  Date: 2018   Patient Name: Doug Peñaloza  MRN: [id number]     : 1960    Date of Service: 2018    Discharge Recommendations:  Home with assist PRN       Patient Diagnosis(es): The primary encounter diagnosis was Upper GI bleed. Diagnoses of Elevated lactic acid level, Thrombocytopenia ([address] and Gastric varices were also pertinent to this visit. has a past medical history of Acid reflux; Arthritis; CAD (coronary artery disease); COPD (chronic obstructive pulmonary disease) ([address] Depression; Diabetes mellitus ([address] Drug abuse ([address] Glaucoma; H/O Doppler ultrasound; Hepatitis C; Hiatal hernia; History of alcohol abuse; HTN (hypertension); Hyperlipemia; Irregular heart beat; Liver hematoma; Migraines; Nausea & vomiting; Schizophrenia ([address] Seizures ([address] SOB (shortness of breath); and Stress bladder incontinence, female. has a past surgical history that includes Cholecystectomy (per old chart done ); Hysterectomy; Cardiac catheterization; Tonsillectomy (as a kid); Breast surgery (10/2015); eye surgery (Bilateral, ? when); Colonoscopy (3/11/13); Colonoscopy (2017); Endoscopy, colon, diagnostic (2017); and Upper gastrointestinal endoscopy (N/A, 2018).       Treatment Diagnosis: GI bleed, Anemia, Acute renal insufficiency        Restrictions  Restrictions/Precautions  Restrictions/Precautions: General Precautions  Position Activity Restriction  Other position/activity restrictions: Telemetry, Pulse Ox, BP cuff      Subjective   General  Chart Reviewed: Yes  Patient assessed for rehabilitation services?: Yes  Family / Caregiver Present: No  Subjective  Subjective: Pt received in supine upon OT arrival. Pt pleasant and agreeable to therapy eval.  Pain Assessment  Patient Currently in Pain: Denies      Social/Functional History  Social/Functional History  Lives With: Family (brother)  Type of Home: House  Home Layout: One level, Laundry in basement (12 steps with a handrail to access basement; patient does the laundry)  Home Access: Stairs to enter with rails  Entrance Stairs - Number of Steps: 2  Bathroom Shower/Tub: Tub/Shower unit  Bathroom Toilet: Standard  ADL Assistance: Independent  Homemaking Assistance: Independent  Homemaking Responsibilities: Yes  Ambulation Assistance: Independent  Transfer Assistance: Independent  Active : No       Objective   Vision: Impaired (Glaucoma and cataracts)  Hearing: Within functional limits    Orientation  Overall Orientation Status: Within Normal Limits  Observation/Palpation  Posture: Good       Balance  Sitting Balance: Independent  Standing Balance: Independent  Standing Balance  Sit to stand: Independent  Stand to sit: Independent  Functional Mobility  Functional - Mobility Device: No device  Assist Level: Independent  Functional Mobility Comments: 300 ft including 5 stairs; good gait quality, no LOB, normal vitals       ADL  Feeding: Independent  Grooming: Independent  UE Bathing: Independent  LE Bathing: Independent  UE Dressing: Independent  LE Dressing: Independent  Toileting: Independent       Tone RUE  RUE Tone: Normotonic  Tone LUE  LUE Tone: Normotonic  Coordination  Movements Are Fluid And Coordinated: Yes        Bed mobility  Supine to Sit: Independent  Sit to Supine: Independent  Transfers  Sit to stand: Independent  Stand to sit: Independent        Cognition  Overall Cognitive Status: WFL           Sensation  Overall Sensation Status: WFL  LUE AROM (degrees)  LUE AROM : WNL  RUE AROM (degrees)  RUE AROM : WNL  LUE Strength  Gross LUE Strength: WNL  L Hand Grasp: 5/5  LUE Strength Comment: 5/5 MMT all major muscle groups  RUE Strength  Gross RUE Strength:  WNL  R Hand Grasp: 5/5  RUE Strength Comment: 5/5 MMT all major muscle groups          AM-PAC 6 click short form for inpatient daily activity:   How much help from another person does the patient currently need. .. Unable  Dep A Lot  Max A A Lot   Mod A A Little  Min A A Little   CGA  SBA None   Mod I  Indep  Sup   1. Putting on and taking off regular lower body clothing? [] 1    [] 2   [] 2   [] 3   [] 3   [x] 4      2. Bathing (including washing, rinsing, drying)? [] 1   [] 2   [] 2 [] 3 [] 3 [x] 4   3. Toileting, which includes using toilet, bedpan, or urinal? [] 1    [] 2   [] 2   [] 3   [] 3   [x] 4     4. Putting on and taking off regular upper body clothing? [] 1   [] 2   [] 2   [] 3   [] 3    [x] 4      5. Taking care of personal grooming such as brushing teeth? [] 1   [] 2    [] 2 [] 3    [] 3   [x] 4      6. Eating meals? [] 1   [] 2   [] 2   [] 3   [] 3   [x] 4      Raw Score:  24   [24=0% impaired(CH), 23=1-19%(CI), 20-22=20-39%(CJ), 15-19=40-59%(CK), 10-14=60-79%(CL), 7-9=80-99%(CM), 6=100%(CN)]        Assessment   Assessment: Pt is a 56year old female with a past medical history of Acid reflux; Arthritis; CAD (coronary artery disease); COPD (chronic obstructive pulmonary disease) ([facility] Depression; Diabetes mellitus ([address] Drug abuse ([facility] Glaucoma; H/O Doppler ultrasound; Hepatitis C; Hiatal hernia; History of alcohol abuse; HTN (hypertension); Hyperlipemia; Irregular heart beat; Liver hematoma; Migraines; Nausea & vomiting; Schizophrenia ([address] Seizures ([address] SOB (shortness of breath); and Stress bladder incontinence, female. Pt admitted with hematemesis and diagnosed with a GI bleed, anemia, and acute renal insufficiency. Pt lives at home with her brother and at baseline is independent with ADLs, IADLs, and mobility. Pt currently presents with no needs requiring skilled acute care OT services. Recommend d/c to home with assistance PRN. Treatment Diagnosis: GI bleed, Anemia, Acute renal insufficiency  Prognosis: Good  Decision Making: Low Complexity  Barriers to Learning: None noted  No Skilled OT: Independent with functional mobility; Independent with ADL's;At baseline function; Safe

## 2018-11-14 NOTE — CONSULTS
[address] [facility]  consulted by Dr. Johnson Newby for monitoring and adjustment. Indication for treatment: Gram positive bacilli in blood cx  Goal trough: 15-20 mcg/mL     Pertinent Laboratory Values:   Temp Readings from Last 3 Encounters:   11/14/18 98.3 °F (36.8 °C) (Oral)   10/27/18 97.7 °F (36.5 °C) (Oral)   04/04/18 98.5 °F (36.9 °C)     Recent Labs      11/12/18   0908  11/12/18   1111  11/13/18   0520  11/14/18   0138   WBC  7.7   --   8.9  5.4   LACTATE  3.5*  3.0*  0.7   --      Recent Labs      11/12/18   0908  11/13/18   0520  11/14/18   0138   BUN  18  13  9   CREATININE  1.4*  1.1  0.9     CrCl cannot be calculated (Unknown ideal weight.). Intake/Output Summary (Last 24 hours) at 11/14/18 0911  Last data filed at 11/14/18 4122   Gross per 24 hour   Intake             2990 ml   Output              950 ml   Net             2040 ml       Pertinent Cultures:  Date    Source    Results  11/12   Blood    Gram positive bacilli    Vancomycin level:   TROUGH:  No results for input(s): VANCOTROUGH in the last 72 hours. RANDOM:  No results for input(s): VANCORANDOM in the last 72 hours. Assessment:  · WBC and temperature: WNL  · SCr, BUN, and urine output: Trending down  · Day(s) of therapy: 1   · Vancomycin level: N/A    Plan:  · Dosing comments: Will start Ms. Bender on vancomycin 1250mg U76F   · Pharmacy will continue to monitor patient and adjust therapy as indicated    [place] 11/16 @0574    Thank you for the consult.   Ady GlezD, [facility]  11/14/2018 9:34 AM

## 2018-11-15 ENCOUNTER — TELEPHONE (OUTPATIENT)
Dept: FAMILY MEDICINE CLINIC | Age: 58
End: 2018-11-15

## 2018-11-15 VITALS
TEMPERATURE: 98 F | DIASTOLIC BLOOD PRESSURE: 72 MMHG | SYSTOLIC BLOOD PRESSURE: 140 MMHG | HEART RATE: 69 BPM | RESPIRATION RATE: 17 BRPM | HEIGHT: 58 IN | BODY MASS INDEX: 35.26 KG/M2 | OXYGEN SATURATION: 90 % | WEIGHT: 167.99 LBS

## 2018-11-15 PROCEDURE — 2580000003 HC RX 258: Performed by: HOSPITALIST

## 2018-11-15 PROCEDURE — 6370000000 HC RX 637 (ALT 250 FOR IP): Performed by: HOSPITALIST

## 2018-11-15 PROCEDURE — 6360000002 HC RX W HCPCS: Performed by: NURSE PRACTITIONER

## 2018-11-15 PROCEDURE — C9113 INJ PANTOPRAZOLE SODIUM, VIA: HCPCS | Performed by: NURSE PRACTITIONER

## 2018-11-15 PROCEDURE — 6360000002 HC RX W HCPCS: Performed by: HOSPITALIST

## 2018-11-15 PROCEDURE — 6360000002 HC RX W HCPCS: Performed by: PHYSICIAN ASSISTANT

## 2018-11-15 PROCEDURE — 2580000003 HC RX 258: Performed by: PHYSICIAN ASSISTANT

## 2018-11-15 RX ORDER — LACTULOSE 10 G/15ML
10 SOLUTION ORAL DAILY
Qty: 1 BOTTLE | Refills: 0 | Status: ON HOLD | OUTPATIENT
Start: 2018-11-15 | End: 2019-05-02 | Stop reason: HOSPADM

## 2018-11-15 RX ADMIN — HYDROMORPHONE HYDROCHLORIDE 0.5 MG: 2 INJECTION INTRAMUSCULAR; INTRAVENOUS; SUBCUTANEOUS at 08:27

## 2018-11-15 RX ADMIN — BUSPIRONE HYDROCHLORIDE 15 MG: 15 TABLET ORAL at 08:25

## 2018-11-15 RX ADMIN — SODIUM CHLORIDE: 9 INJECTION, SOLUTION INTRAVENOUS at 06:37

## 2018-11-15 RX ADMIN — HYDROMORPHONE HYDROCHLORIDE 0.5 MG: 2 INJECTION INTRAMUSCULAR; INTRAVENOUS; SUBCUTANEOUS at 00:40

## 2018-11-15 RX ADMIN — FLUOXETINE HYDROCHLORIDE 40 MG: 20 CAPSULE ORAL at 08:24

## 2018-11-15 RX ADMIN — HYDROMORPHONE HYDROCHLORIDE 0.5 MG: 2 INJECTION INTRAMUSCULAR; INTRAVENOUS; SUBCUTANEOUS at 04:15

## 2018-11-15 RX ADMIN — VANCOMYCIN HYDROCHLORIDE 1250 MG: 5 INJECTION, POWDER, LYOPHILIZED, FOR SOLUTION INTRAVENOUS at 04:16

## 2018-11-15 RX ADMIN — PANTOPRAZOLE SODIUM 40 MG: 40 INJECTION, POWDER, FOR SOLUTION INTRAVENOUS at 08:26

## 2018-11-15 RX ADMIN — APIXABAN 5 MG: 5 TABLET, FILM COATED ORAL at 08:25

## 2018-11-15 ASSESSMENT — PAIN DESCRIPTION - FREQUENCY
FREQUENCY: CONTINUOUS

## 2018-11-15 ASSESSMENT — PAIN SCALES - GENERAL
PAINLEVEL_OUTOF10: 0
PAINLEVEL_OUTOF10: 5
PAINLEVEL_OUTOF10: 2
PAINLEVEL_OUTOF10: 9
PAINLEVEL_OUTOF10: 7
PAINLEVEL_OUTOF10: 7
PAINLEVEL_OUTOF10: 0

## 2018-11-15 ASSESSMENT — PAIN DESCRIPTION - LOCATION
LOCATION: HEAD
LOCATION: BACK;HEAD
LOCATION: HEAD

## 2018-11-15 ASSESSMENT — PAIN DESCRIPTION - PROGRESSION
CLINICAL_PROGRESSION: GRADUALLY IMPROVING
CLINICAL_PROGRESSION: NOT CHANGED
CLINICAL_PROGRESSION: RESOLVED
CLINICAL_PROGRESSION: NOT CHANGED
CLINICAL_PROGRESSION: NOT CHANGED

## 2018-11-15 ASSESSMENT — PAIN DESCRIPTION - ORIENTATION
ORIENTATION: RIGHT
ORIENTATION: MID
ORIENTATION: RIGHT
ORIENTATION: RIGHT
ORIENTATION: MID

## 2018-11-15 ASSESSMENT — PAIN DESCRIPTION - PAIN TYPE
TYPE: ACUTE PAIN

## 2018-11-15 ASSESSMENT — PAIN DESCRIPTION - ONSET
ONSET: ON-GOING

## 2018-11-15 ASSESSMENT — PAIN DESCRIPTION - DESCRIPTORS
DESCRIPTORS: ACHING

## 2018-11-15 ASSESSMENT — PAIN DESCRIPTION - DIRECTION
RADIATING_TOWARDS: NECK

## 2018-11-15 NOTE — TELEPHONE ENCOUNTER
Pt discharged from [facility] 11/15/18, scheduled for hospital follow up on 11/20/18 at 3:45

## 2018-11-15 NOTE — PLAN OF CARE
Problem: Pain:  Goal: Pain level will decrease  Pain level will decrease   Outcome: Ongoing    Goal: Control of acute pain  Control of acute pain   Outcome: Ongoing    Goal: Control of chronic pain  Control of chronic pain   Outcome: Ongoing      Problem: SAFETY  Goal: Free from accidental physical injury  Outcome: Ongoing    Goal: Free from intentional harm  Outcome: Ongoing      Problem: DAILY CARE  Goal: Daily care needs are met  Outcome: Ongoing      Problem: PAIN  Goal: Patient's pain/discomfort is manageable  Outcome: Ongoing      Problem: SKIN INTEGRITY  Goal: Skin integrity is maintained or improved  Outcome: Ongoing      Problem: KNOWLEDGE DEFICIT  Goal: Patient/S.O. demonstrates understanding of disease process, treatment plan, medications, and discharge instructions.   Outcome: Ongoing      Problem: DISCHARGE BARRIERS  Goal: Patient's continuum of care needs are met  Outcome: Ongoing      Problem: Falls - Risk of:  Goal: Will remain free from falls  Will remain free from falls   Outcome: Ongoing    Goal: Absence of physical injury  Absence of physical injury   Outcome: Ongoing      Comments: Electronically signed by German Sosa RN on 11/15/2018 at 12:01 AM

## 2018-11-15 NOTE — DISCHARGE SUMMARY
able to be placed back on her anticoagulation for her thrombosis. She also had blood cultures that were done which were negative, one bottle did show gram-positive bacilli which was a contamination per laboratory. She is to follow-up with her primary care provider within one week. She is denying any kind of abdominal pain nausea vomiting. She is able to eat and drink without any problems.     Physical Exam:   BP (!) 140/72   Pulse 69   Temp 98 °F (36.7 °C) (Oral)   Resp 17   Ht 4' 9.5\" (1.461 m)   Wt 167 lb 15.9 oz (76.2 kg)   SpO2 90%   BMI 35.72 kg/m²   Neck: no JVD  Lungs: equal BS, clear   CV: RRR, normal S1S2, no significant murmur  Abdomen: soft, nontender, normally active BS, no masses or tenderness  Extremities: no edema or cords  Neurologic: alert, oriented, no focal CN or motor deficit        Medications: see computerized discharge medication list     Medication List      START taking these medications    lactulose 10 GM/15ML solution  Commonly known as:  CHRONULAC  Take 15 mLs by mouth daily        CONTINUE taking these medications    albuterol sulfate  (90 Base) MCG/ACT inhaler  Commonly known as:  PROAIR HFA  Inhale 2 puffs into the lungs every 6 hours as needed for Wheezing     apixaban 5 MG Tabs tablet  Commonly known as:  ELIQUIS  Take 1 tablet by mouth 2 times daily     busPIRone 15 MG tablet  Commonly known as:  BUSPAR  Take 1 tablet by mouth 2 times daily     FLUoxetine 40 MG capsule  Commonly known as:  PROZAC     hydrOXYzine 50 MG tablet  Commonly known as:  ATARAX     metoprolol tartrate 25 MG tablet  Commonly known as:  LOPRESSOR  Take 1 tablet by mouth 2 times daily     nitroGLYCERIN 0.4 MG SL tablet  Commonly known as:  NITROSTAT  Place 1 tablet under the tongue every 5 minutes as needed for Chest pain     ondansetron 4 MG disintegrating tablet  Commonly known as:  ZOFRAN-ODT  Take 1 tablet by mouth every 8 hours as needed for Nausea or Vomiting     oxyCODONE 5 MG immediate release tablet  Commonly known as:  ROXICODONE     QUEtiapine 300 MG tablet  Commonly known as:  SEROQUEL     ranolazine 500 MG extended release tablet  Commonly known as:  RANEXA  Take 1 tablet by mouth 2 times daily     tiZANidine 4 MG tablet  Commonly known as:  Reagan Kluver 300 MG tablet  Generic drug:  tenofovir        STOP taking these medications    lactulose encephalopathy 10 GM/15ML Soln solution     mometasone 220 MCG/INH inhaler  Commonly known as:  ASMANEX 30 METERED DOSES           Where to Get Your Medications      You can get these medications from any pharmacy    Bring a paper prescription for each of these medications  · apixaban 5 MG Tabs tablet  · lactulose 10 GM/15ML solution       Patient Instructions: Resume home medications and any changes while in the hospital      Discharged Condition: stable  Disposition: home  Activity: activity as tolerated  Diet: regular diet  Wound Care: none needed    Follow-up: Dr. Jovanni Wheatley in 1 week        Electronically signed by Jose Manuel Stephenson MD on 11/15/2018 at 11:37 AM    Time spent on discharge 15 minutes

## 2018-11-16 LAB
CULTURE: NORMAL
EKG ATRIAL RATE: 95 BPM
EKG DIAGNOSIS: NORMAL
EKG P AXIS: 62 DEGREES
EKG P-R INTERVAL: 162 MS
EKG Q-T INTERVAL: 418 MS
EKG QRS DURATION: 82 MS
EKG QTC CALCULATION (BAZETT): 525 MS
EKG R AXIS: 25 DEGREES
EKG T AXIS: 60 DEGREES
EKG VENTRICULAR RATE: 95 BPM
Lab: NORMAL
MS ALPHA-FETOPROTEIN: 3
REPORT STATUS: NORMAL
SPECIMEN: NORMAL

## 2018-11-17 LAB
CULTURE: NORMAL
Lab: NORMAL
REPORT STATUS: NORMAL
SPECIMEN: NORMAL

## 2018-11-19 ENCOUNTER — CARE COORDINATION (OUTPATIENT)
Dept: CARE COORDINATION | Age: 58
End: 2018-11-19

## 2018-11-20 ENCOUNTER — OFFICE VISIT (OUTPATIENT)
Dept: FAMILY MEDICINE CLINIC | Age: 58
End: 2018-11-20
Payer: COMMERCIAL

## 2018-11-20 ENCOUNTER — CARE COORDINATION (OUTPATIENT)
Dept: FAMILY MEDICINE CLINIC | Age: 58
End: 2018-11-20

## 2018-11-20 VITALS
HEART RATE: 67 BPM | SYSTOLIC BLOOD PRESSURE: 144 MMHG | DIASTOLIC BLOOD PRESSURE: 90 MMHG | WEIGHT: 169 LBS | BODY MASS INDEX: 35.94 KG/M2 | OXYGEN SATURATION: 93 %

## 2018-11-20 DIAGNOSIS — B18.2 CHRONIC HEPATITIS C WITHOUT HEPATIC COMA (HCC): ICD-10-CM

## 2018-11-20 DIAGNOSIS — M79.602 LEFT ARM PAIN: Primary | ICD-10-CM

## 2018-11-20 DIAGNOSIS — R11.0 NAUSEA: ICD-10-CM

## 2018-11-20 DIAGNOSIS — K70.30 ALCOHOLIC CIRRHOSIS OF LIVER WITHOUT ASCITES (HCC): ICD-10-CM

## 2018-11-20 DIAGNOSIS — K21.9 GASTROESOPHAGEAL REFLUX DISEASE WITHOUT ESOPHAGITIS: ICD-10-CM

## 2018-11-20 DIAGNOSIS — K92.0 HEMATEMESIS, PRESENCE OF NAUSEA NOT SPECIFIED: ICD-10-CM

## 2018-11-20 DIAGNOSIS — Z09 HOSPITAL DISCHARGE FOLLOW-UP: Primary | ICD-10-CM

## 2018-11-20 DIAGNOSIS — I81 PORTAL VEIN THROMBOSIS: ICD-10-CM

## 2018-11-20 PROCEDURE — 99496 TRANSJ CARE MGMT HIGH F2F 7D: CPT | Performed by: FAMILY MEDICINE

## 2018-11-20 PROCEDURE — G8598 ASA/ANTIPLAT THER USED: HCPCS | Performed by: FAMILY MEDICINE

## 2018-11-20 PROCEDURE — G8427 DOCREV CUR MEDS BY ELIG CLIN: HCPCS | Performed by: FAMILY MEDICINE

## 2018-11-20 PROCEDURE — 4004F PT TOBACCO SCREEN RCVD TLK: CPT | Performed by: FAMILY MEDICINE

## 2018-11-20 PROCEDURE — G8417 CALC BMI ABV UP PARAM F/U: HCPCS | Performed by: FAMILY MEDICINE

## 2018-11-20 PROCEDURE — G8484 FLU IMMUNIZE NO ADMIN: HCPCS | Performed by: FAMILY MEDICINE

## 2018-11-20 PROCEDURE — 3017F COLORECTAL CA SCREEN DOC REV: CPT | Performed by: FAMILY MEDICINE

## 2018-11-20 RX ORDER — PROMETHAZINE HYDROCHLORIDE 25 MG/1
25 TABLET ORAL 3 TIMES DAILY PRN
Qty: 60 TABLET | Refills: 1 | Status: SHIPPED | OUTPATIENT
Start: 2018-11-20 | End: 2020-06-16

## 2018-11-20 RX ORDER — PANTOPRAZOLE SODIUM 20 MG/1
20 TABLET, DELAYED RELEASE ORAL
Qty: 60 TABLET | Refills: 3 | Status: SHIPPED | OUTPATIENT
Start: 2018-11-20 | End: 2019-04-12 | Stop reason: SDUPTHER

## 2018-11-20 NOTE — PROGRESS NOTES
doing fine, and no more bleeding they hold the eliquis few days then put it back, no dark stool, the investigation at the hospital are fine, the CT abdomin result:1. No acute finding to account for patient's abdominal pain.  Please note  that in the absence of venous contrast, portal vein is not well assessed. The portal vein thrombus seen on prior exam is not well seen on this exam.  2. Cirrhosis with portal venous hypertension and varices and splenomegaly. 3. Mild circumferential thickening of the ascending colon wall which may be  Patient also discharged on protonix but no Rx send to the pharmacy, so will send this RX.  secondary to elevated portal venous pressure in the setting of cirrhosis.                Inpatient course: Discharge summary reviewed- see chart. Interval history/Current status: stable    Review of Systems   Constitutional: Negative for activity change, appetite change, chills, fatigue and fever. Respiratory: Negative for cough. Cardiovascular: Negative for chest pain and leg swelling. Gastrointestinal: Negative for abdominal pain, blood in stool, constipation, diarrhea, nausea and vomiting. Genitourinary: Negative for dysuria, frequency and hematuria. Neurological: Negative for dizziness and headaches. Psychiatric/Behavioral: Negative for agitation. The patient is not nervous/anxious. Vitals:    11/20/18 1556   BP: (!) 144/90   Site: Left Upper Arm   Position: Sitting   Cuff Size: Medium Adult   Pulse: 67   SpO2: 93%   Weight: 169 lb (76.7 kg)     Body mass index is 35.94 kg/m². Wt Readings from Last 3 Encounters:   11/20/18 169 lb (76.7 kg)   11/15/18 167 lb 15.9 oz (76.2 kg)   10/31/18 170 lb 3.2 oz (77.2 kg)     BP Readings from Last 3 Encounters:   11/20/18 (!) 144/90   11/15/18 (!) 140/72   11/14/18 (!) 147/84       Physical Exam   Constitutional: She is oriented to person, place, and time. She appears well-developed and well-nourished. No distress.    HENT:

## 2018-11-25 ASSESSMENT — ENCOUNTER SYMPTOMS
VOMITING: 0
BLOOD IN STOOL: 0
COUGH: 0
NAUSEA: 0
DIARRHEA: 0
CONSTIPATION: 0
ABDOMINAL PAIN: 0

## 2018-12-07 RX ORDER — PEN NEEDLE, DIABETIC
NEEDLE, DISPOSABLE MISCELLANEOUS
Qty: 100 EACH | Refills: 5 | Status: SHIPPED | OUTPATIENT
Start: 2018-12-07 | End: 2019-06-04

## 2018-12-12 ENCOUNTER — TELEPHONE (OUTPATIENT)
Dept: ORTHOPEDIC SURGERY | Age: 58
End: 2018-12-12

## 2018-12-14 ENCOUNTER — HOSPITAL ENCOUNTER (OUTPATIENT)
Age: 58
Setting detail: SPECIMEN
Discharge: HOME | End: 2018-12-14
Payer: COMMERCIAL

## 2018-12-14 LAB
ALBUMIN SERPL-MCNC: 3.8 GM/DL (ref 3.4–5)
ALP BLD-CCNC: 97 IU/L (ref 40–128)
ALT SERPL-CCNC: 13 U/L (ref 10–40)
ANION GAP SERPL CALCULATED.3IONS-SCNC: 10 MMOL/L (ref 4–16)
AST SERPL-CCNC: 21 IU/L (ref 15–37)
BILIRUB SERPL-MCNC: 0.4 MG/DL (ref 0–1)
BUN BLDV-MCNC: 10 MG/DL (ref 6–23)
CALCIUM SERPL-MCNC: 8.9 MG/DL (ref 8.3–10.6)
CHLORIDE BLD-SCNC: 98 MMOL/L (ref 99–110)
CO2: 29 MMOL/L (ref 21–32)
CREAT SERPL-MCNC: 1.2 MG/DL (ref 0.6–1.1)
GFR AFRICAN AMERICAN: 56 ML/MIN/1.73M2
GFR NON-AFRICAN AMERICAN: 46 ML/MIN/1.73M2
GLUCOSE BLD-MCNC: 162 MG/DL (ref 70–99)
POTASSIUM SERPL-SCNC: 4.3 MMOL/L (ref 3.5–5.1)
SODIUM BLD-SCNC: 137 MMOL/L (ref 135–145)
TOTAL PROTEIN: 7.4 GM/DL (ref 6.4–8.2)

## 2018-12-14 PROCEDURE — 80053 COMPREHEN METABOLIC PANEL: CPT

## 2018-12-22 PROBLEM — K30 DELAYED GASTRIC EMPTYING: Status: ACTIVE | Noted: 2018-12-22

## 2018-12-26 ENCOUNTER — HOSPITAL ENCOUNTER (OUTPATIENT)
Dept: ULTRASOUND IMAGING | Age: 58
Discharge: HOME | End: 2018-12-26
Payer: COMMERCIAL

## 2018-12-26 DIAGNOSIS — D69.6 THROMBOCYTOPENIA, ACQUIRED (HCC): ICD-10-CM

## 2018-12-26 DIAGNOSIS — I81 PORTAL VEIN THROMBOSIS: ICD-10-CM

## 2018-12-26 PROCEDURE — 76700 US EXAM ABDOM COMPLETE: CPT

## 2019-01-03 ENCOUNTER — OFFICE VISIT (OUTPATIENT)
Dept: ORTHOPEDIC SURGERY | Age: 59
End: 2019-01-03
Payer: COMMERCIAL

## 2019-01-03 VITALS — RESPIRATION RATE: 14 BRPM | OXYGEN SATURATION: 95 % | HEART RATE: 70 BPM

## 2019-01-03 DIAGNOSIS — G56.01 CARPAL TUNNEL SYNDROME, RIGHT: Primary | ICD-10-CM

## 2019-01-03 DIAGNOSIS — M65.341 TRIGGER FINGER, RIGHT RING FINGER: ICD-10-CM

## 2019-01-03 PROCEDURE — 99214 OFFICE O/P EST MOD 30 MIN: CPT | Performed by: PHYSICIAN ASSISTANT

## 2019-01-03 PROCEDURE — 3017F COLORECTAL CA SCREEN DOC REV: CPT | Performed by: PHYSICIAN ASSISTANT

## 2019-01-03 PROCEDURE — G8599 NO ASA/ANTIPLAT THER USE RNG: HCPCS | Performed by: PHYSICIAN ASSISTANT

## 2019-01-03 PROCEDURE — G8484 FLU IMMUNIZE NO ADMIN: HCPCS | Performed by: PHYSICIAN ASSISTANT

## 2019-01-03 PROCEDURE — G8427 DOCREV CUR MEDS BY ELIG CLIN: HCPCS | Performed by: PHYSICIAN ASSISTANT

## 2019-01-03 PROCEDURE — G8417 CALC BMI ABV UP PARAM F/U: HCPCS | Performed by: PHYSICIAN ASSISTANT

## 2019-01-03 PROCEDURE — 4004F PT TOBACCO SCREEN RCVD TLK: CPT | Performed by: PHYSICIAN ASSISTANT

## 2019-01-17 ENCOUNTER — CARE COORDINATION (OUTPATIENT)
Dept: CARE COORDINATION | Age: 59
End: 2019-01-17

## 2019-01-17 ASSESSMENT — ENCOUNTER SYMPTOMS: DYSPNEA ASSOCIATED WITH: EXERTION

## 2019-01-21 ENCOUNTER — CARE COORDINATION (OUTPATIENT)
Dept: CARE COORDINATION | Age: 59
End: 2019-01-21

## 2019-01-22 ASSESSMENT — ENCOUNTER SYMPTOMS
EYES NEGATIVE: 1
RESPIRATORY NEGATIVE: 1
GASTROINTESTINAL NEGATIVE: 1

## 2019-02-01 ENCOUNTER — TELEPHONE (OUTPATIENT)
Dept: FAMILY MEDICINE CLINIC | Age: 59
End: 2019-02-01

## 2019-02-05 ENCOUNTER — OFFICE VISIT (OUTPATIENT)
Dept: PHYSICAL MEDICINE AND REHAB | Age: 59
End: 2019-02-05
Payer: COMMERCIAL

## 2019-02-05 DIAGNOSIS — M79.602 PARESTHESIA AND PAIN OF BOTH UPPER EXTREMITIES: ICD-10-CM

## 2019-02-05 DIAGNOSIS — R20.2 PARESTHESIA AND PAIN OF BOTH UPPER EXTREMITIES: ICD-10-CM

## 2019-02-05 DIAGNOSIS — G56.22 ULNAR NEUROPATHY AT WRIST, LEFT: ICD-10-CM

## 2019-02-05 DIAGNOSIS — G56.03 BILATERAL CARPAL TUNNEL SYNDROME: Primary | ICD-10-CM

## 2019-02-05 DIAGNOSIS — M79.601 PARESTHESIA AND PAIN OF BOTH UPPER EXTREMITIES: ICD-10-CM

## 2019-02-05 PROCEDURE — 95911 NRV CNDJ TEST 9-10 STUDIES: CPT | Performed by: PHYSICAL MEDICINE & REHABILITATION

## 2019-02-05 PROCEDURE — 95886 MUSC TEST DONE W/N TEST COMP: CPT | Performed by: PHYSICAL MEDICINE & REHABILITATION

## 2019-02-06 ENCOUNTER — TELEPHONE (OUTPATIENT)
Dept: ORTHOPEDIC SURGERY | Age: 59
End: 2019-02-06

## 2019-02-25 ENCOUNTER — OFFICE VISIT (OUTPATIENT)
Dept: ORTHOPEDIC SURGERY | Age: 59
End: 2019-02-25
Payer: COMMERCIAL

## 2019-02-25 ENCOUNTER — OFFICE VISIT (OUTPATIENT)
Dept: FAMILY MEDICINE CLINIC | Age: 59
End: 2019-02-25
Payer: COMMERCIAL

## 2019-02-25 VITALS — OXYGEN SATURATION: 97 % | HEIGHT: 57 IN | HEART RATE: 67 BPM | BODY MASS INDEX: 32.46 KG/M2

## 2019-02-25 VITALS
BODY MASS INDEX: 35.49 KG/M2 | HEART RATE: 66 BPM | OXYGEN SATURATION: 96 % | SYSTOLIC BLOOD PRESSURE: 112 MMHG | DIASTOLIC BLOOD PRESSURE: 70 MMHG | WEIGHT: 164 LBS

## 2019-02-25 DIAGNOSIS — G56.01 CARPAL TUNNEL SYNDROME, RIGHT: Primary | ICD-10-CM

## 2019-02-25 DIAGNOSIS — B18.2 CHRONIC HEPATITIS C WITHOUT HEPATIC COMA (HCC): ICD-10-CM

## 2019-02-25 DIAGNOSIS — Z79.4 TYPE 2 DIABETES MELLITUS WITH COMPLICATION, WITH LONG-TERM CURRENT USE OF INSULIN (HCC): ICD-10-CM

## 2019-02-25 DIAGNOSIS — J44.9 CHRONIC OBSTRUCTIVE PULMONARY DISEASE, UNSPECIFIED COPD TYPE (HCC): ICD-10-CM

## 2019-02-25 DIAGNOSIS — K70.30 ALCOHOLIC CIRRHOSIS OF LIVER WITHOUT ASCITES (HCC): ICD-10-CM

## 2019-02-25 DIAGNOSIS — M65.341 TRIGGER FINGER, RIGHT RING FINGER: ICD-10-CM

## 2019-02-25 DIAGNOSIS — F17.200 TOBACCO USE DISORDER: ICD-10-CM

## 2019-02-25 DIAGNOSIS — I10 ESSENTIAL HYPERTENSION: Primary | ICD-10-CM

## 2019-02-25 DIAGNOSIS — E11.8 TYPE 2 DIABETES MELLITUS WITH COMPLICATION, WITH LONG-TERM CURRENT USE OF INSULIN (HCC): ICD-10-CM

## 2019-02-25 DIAGNOSIS — K51.00 PANCOLITIS (HCC): ICD-10-CM

## 2019-02-25 DIAGNOSIS — Q24.5 CORONARY-MYOCARDIAL BRIDGE: ICD-10-CM

## 2019-02-25 DIAGNOSIS — K21.9 GASTROESOPHAGEAL REFLUX DISEASE WITHOUT ESOPHAGITIS: ICD-10-CM

## 2019-02-25 PROCEDURE — 99203 OFFICE O/P NEW LOW 30 MIN: CPT | Performed by: ORTHOPAEDIC SURGERY

## 2019-02-25 PROCEDURE — G8428 CUR MEDS NOT DOCUMENT: HCPCS | Performed by: ORTHOPAEDIC SURGERY

## 2019-02-25 PROCEDURE — 3017F COLORECTAL CA SCREEN DOC REV: CPT | Performed by: FAMILY MEDICINE

## 2019-02-25 PROCEDURE — G8417 CALC BMI ABV UP PARAM F/U: HCPCS | Performed by: FAMILY MEDICINE

## 2019-02-25 PROCEDURE — G8427 DOCREV CUR MEDS BY ELIG CLIN: HCPCS | Performed by: FAMILY MEDICINE

## 2019-02-25 PROCEDURE — 99214 OFFICE O/P EST MOD 30 MIN: CPT | Performed by: FAMILY MEDICINE

## 2019-02-25 PROCEDURE — G8599 NO ASA/ANTIPLAT THER USE RNG: HCPCS | Performed by: ORTHOPAEDIC SURGERY

## 2019-02-25 PROCEDURE — G8484 FLU IMMUNIZE NO ADMIN: HCPCS | Performed by: FAMILY MEDICINE

## 2019-02-25 PROCEDURE — 4004F PT TOBACCO SCREEN RCVD TLK: CPT | Performed by: FAMILY MEDICINE

## 2019-02-25 PROCEDURE — 3017F COLORECTAL CA SCREEN DOC REV: CPT | Performed by: ORTHOPAEDIC SURGERY

## 2019-02-25 PROCEDURE — G8484 FLU IMMUNIZE NO ADMIN: HCPCS | Performed by: ORTHOPAEDIC SURGERY

## 2019-02-25 PROCEDURE — 2022F DILAT RTA XM EVC RTNOPTHY: CPT | Performed by: FAMILY MEDICINE

## 2019-02-25 PROCEDURE — G8598 ASA/ANTIPLAT THER USED: HCPCS | Performed by: FAMILY MEDICINE

## 2019-02-25 PROCEDURE — G8926 SPIRO NO PERF OR DOC: HCPCS | Performed by: FAMILY MEDICINE

## 2019-02-25 PROCEDURE — 4004F PT TOBACCO SCREEN RCVD TLK: CPT | Performed by: ORTHOPAEDIC SURGERY

## 2019-02-25 PROCEDURE — 3046F HEMOGLOBIN A1C LEVEL >9.0%: CPT | Performed by: FAMILY MEDICINE

## 2019-02-25 PROCEDURE — 3023F SPIROM DOC REV: CPT | Performed by: FAMILY MEDICINE

## 2019-02-25 PROCEDURE — G8417 CALC BMI ABV UP PARAM F/U: HCPCS | Performed by: ORTHOPAEDIC SURGERY

## 2019-02-25 RX ORDER — NICOTINE 21 MG/24HR
1 PATCH, TRANSDERMAL 24 HOURS TRANSDERMAL EVERY 24 HOURS
Qty: 42 PATCH | Refills: 0 | Status: SHIPPED | OUTPATIENT
Start: 2019-02-25 | End: 2019-06-04

## 2019-02-25 ASSESSMENT — ENCOUNTER SYMPTOMS
COLOR CHANGE: 0
SHORTNESS OF BREATH: 0

## 2019-02-25 ASSESSMENT — PATIENT HEALTH QUESTIONNAIRE - PHQ9
SUM OF ALL RESPONSES TO PHQ9 QUESTIONS 1 & 2: 0
SUM OF ALL RESPONSES TO PHQ QUESTIONS 1-9: 0
1. LITTLE INTEREST OR PLEASURE IN DOING THINGS: 0
SUM OF ALL RESPONSES TO PHQ QUESTIONS 1-9: 0
2. FEELING DOWN, DEPRESSED OR HOPELESS: 0

## 2019-02-28 ENCOUNTER — CARE COORDINATION (OUTPATIENT)
Dept: CARE COORDINATION | Age: 59
End: 2019-02-28

## 2019-03-05 ENCOUNTER — TELEPHONE (OUTPATIENT)
Dept: CARDIOLOGY CLINIC | Age: 59
End: 2019-03-05

## 2019-03-05 ENCOUNTER — TELEPHONE (OUTPATIENT)
Dept: ORTHOPEDIC SURGERY | Age: 59
End: 2019-03-05

## 2019-03-05 ASSESSMENT — ENCOUNTER SYMPTOMS
ABDOMINAL PAIN: 0
ABDOMINAL DISTENTION: 0
WHEEZING: 0
SHORTNESS OF BREATH: 0
CONSTIPATION: 0
NAUSEA: 0
DIARRHEA: 0
COUGH: 0

## 2019-03-18 ENCOUNTER — OFFICE VISIT (OUTPATIENT)
Dept: CARDIOLOGY CLINIC | Age: 59
End: 2019-03-18
Payer: COMMERCIAL

## 2019-03-18 VITALS
HEART RATE: 54 BPM | HEIGHT: 57 IN | WEIGHT: 167.3 LBS | SYSTOLIC BLOOD PRESSURE: 100 MMHG | BODY MASS INDEX: 36.1 KG/M2 | DIASTOLIC BLOOD PRESSURE: 78 MMHG

## 2019-03-18 DIAGNOSIS — R06.02 SOB (SHORTNESS OF BREATH): Primary | ICD-10-CM

## 2019-03-18 DIAGNOSIS — G25.81 RESTLESS LEGS: ICD-10-CM

## 2019-03-18 DIAGNOSIS — M79.89 SWELLING OF BOTH LOWER EXTREMITIES: ICD-10-CM

## 2019-03-18 DIAGNOSIS — I10 ESSENTIAL HYPERTENSION: ICD-10-CM

## 2019-03-18 DIAGNOSIS — Z72.0 TOBACCO ABUSE: ICD-10-CM

## 2019-03-18 DIAGNOSIS — E78.2 MIXED HYPERLIPIDEMIA: ICD-10-CM

## 2019-03-18 DIAGNOSIS — I20.8 ANGINA EFFORT: ICD-10-CM

## 2019-03-18 PROCEDURE — 93000 ELECTROCARDIOGRAM COMPLETE: CPT | Performed by: NURSE PRACTITIONER

## 2019-03-18 PROCEDURE — 99213 OFFICE O/P EST LOW 20 MIN: CPT | Performed by: NURSE PRACTITIONER

## 2019-03-18 RX ORDER — ASPIRIN 81 MG/1
81 TABLET ORAL DAILY
Status: DISCONTINUED | OUTPATIENT
Start: 2019-03-18 | End: 2019-03-18

## 2019-03-18 RX ORDER — NITROGLYCERIN 0.4 MG/1
0.4 TABLET SUBLINGUAL EVERY 5 MIN PRN
Qty: 25 TABLET | Refills: 3 | Status: SHIPPED | OUTPATIENT
Start: 2019-03-18 | End: 2020-05-21 | Stop reason: SDUPTHER

## 2019-03-19 ENCOUNTER — TELEPHONE (OUTPATIENT)
Dept: CARDIOLOGY CLINIC | Age: 59
End: 2019-03-19

## 2019-03-26 ENCOUNTER — TELEPHONE (OUTPATIENT)
Dept: ORTHOPEDIC SURGERY | Age: 59
End: 2019-03-26

## 2019-03-28 ENCOUNTER — HOSPITAL ENCOUNTER (OUTPATIENT)
Dept: GENERAL RADIOLOGY | Age: 59
Discharge: HOME | End: 2019-03-28
Payer: COMMERCIAL

## 2019-03-28 ENCOUNTER — HOSPITAL ENCOUNTER (OUTPATIENT)
Age: 59
Discharge: HOME | End: 2019-03-28
Payer: COMMERCIAL

## 2019-03-28 ENCOUNTER — HOSPITAL ENCOUNTER (OUTPATIENT)
Dept: PULMONOLOGY | Age: 59
Discharge: HOME | End: 2019-03-28
Payer: COMMERCIAL

## 2019-03-28 DIAGNOSIS — J44.9 CHRONIC OBSTRUCTIVE PULMONARY DISEASE, UNSPECIFIED COPD TYPE (HCC): ICD-10-CM

## 2019-03-28 LAB
DLCO %PRED: 69 %
DLCO PRED: NORMAL ML/MIN/MMHG
DLCO/VA %PRED: NORMAL %
DLCO/VA PRED: NORMAL ML/MIN/MMHG
DLCO/VA: NORMAL ML/MIN/MMHG
DLCO: NORMAL ML/MIN/MMHG
EXPIRATORY TIME-POST: NORMAL SEC
EXPIRATORY TIME: NORMAL SEC
FEF 25-75% %CHNG: NORMAL
FEF 25-75% %PRED-POST: NORMAL %
FEF 25-75% %PRED-PRE: NORMAL L/SEC
FEF 25-75% PRED: NORMAL L/SEC
FEF 25-75%-POST: NORMAL L/SEC
FEF 25-75%-PRE: NORMAL L/SEC
FEV1 %PRED-POST: 86 %
FEV1 %PRED-PRE: 83 %
FEV1 PRED: NORMAL L
FEV1-POST: NORMAL L
FEV1-PRE: NORMAL L
FEV1/FVC %PRED-POST: NORMAL %
FEV1/FVC %PRED-PRE: NORMAL %
FEV1/FVC PRED: NORMAL %
FEV1/FVC-POST: 89 %
FEV1/FVC-PRE: 84 %
FVC %PRED-POST: NORMAL L
FVC %PRED-PRE: NORMAL %
FVC PRED: NORMAL L
FVC-POST: NORMAL L
FVC-PRE: NORMAL L
GAW %PRED: NORMAL %
GAW PRED: NORMAL L/S/CMH2O
GAW: NORMAL L/S/CMH2O
IC %PRED: NORMAL %
IC PRED: NORMAL L
IC: NORMAL L
MEP: NORMAL
MIP: NORMAL
MVV %PRED-PRE: NORMAL %
MVV PRED: NORMAL L/MIN
MVV-PRE: NORMAL L/MIN
PEF %PRED-POST: NORMAL %
PEF %PRED-PRE: NORMAL L/SEC
PEF PRED: NORMAL L/SEC
PEF%CHNG: NORMAL
PEF-POST: NORMAL L/SEC
PEF-PRE: NORMAL L/SEC
RAW %PRED: NORMAL %
RAW PRED: NORMAL CMH2O/L/S
RAW: NORMAL CMH2O/L/S
RV %PRED: NORMAL %
RV PRED: NORMAL L
RV: NORMAL L
SVC %PRED: NORMAL %
SVC PRED: NORMAL L
SVC: NORMAL L
TLC %PRED: 130 %
TLC PRED: NORMAL L
TLC: NORMAL L
VA %PRED: NORMAL %
VA PRED: NORMAL L
VA: NORMAL L
VTG %PRED: NORMAL %
VTG PRED: NORMAL L
VTG: NORMAL L

## 2019-03-28 PROCEDURE — 71046 X-RAY EXAM CHEST 2 VIEWS: CPT

## 2019-03-28 PROCEDURE — 94060 EVALUATION OF WHEEZING: CPT

## 2019-03-28 PROCEDURE — 94726 PLETHYSMOGRAPHY LUNG VOLUMES: CPT

## 2019-03-28 PROCEDURE — 94729 DIFFUSING CAPACITY: CPT

## 2019-03-28 ASSESSMENT — PULMONARY FUNCTION TESTS
FEV1_PERCENT_PREDICTED_PRE: 83
FEV1/FVC_PRE: 84
FEV1/FVC_POST: 89
FEV1_PERCENT_PREDICTED_POST: 86

## 2019-04-04 ENCOUNTER — PROCEDURE VISIT (OUTPATIENT)
Dept: CARDIOLOGY CLINIC | Age: 59
End: 2019-04-04
Payer: COMMERCIAL

## 2019-04-04 DIAGNOSIS — G25.81 RESTLESS LEGS: ICD-10-CM

## 2019-04-04 DIAGNOSIS — Z72.0 TOBACCO ABUSE: ICD-10-CM

## 2019-04-04 DIAGNOSIS — M79.89 SWELLING OF BOTH LOWER EXTREMITIES: ICD-10-CM

## 2019-04-04 PROCEDURE — 93970 EXTREMITY STUDY: CPT | Performed by: INTERNAL MEDICINE

## 2019-04-06 ENCOUNTER — APPOINTMENT (OUTPATIENT)
Dept: CT IMAGING | Age: 59
DRG: 720 | End: 2019-04-06
Payer: COMMERCIAL

## 2019-04-06 ENCOUNTER — HOSPITAL ENCOUNTER (INPATIENT)
Age: 59
LOS: 3 days | Discharge: HOME | DRG: 720 | End: 2019-04-09
Attending: EMERGENCY MEDICINE | Admitting: INTERNAL MEDICINE
Payer: COMMERCIAL

## 2019-04-06 DIAGNOSIS — R79.89 ELEVATED LACTIC ACID LEVEL: ICD-10-CM

## 2019-04-06 DIAGNOSIS — R11.2 NON-INTRACTABLE VOMITING WITH NAUSEA, UNSPECIFIED VOMITING TYPE: Primary | ICD-10-CM

## 2019-04-06 DIAGNOSIS — R10.12 LEFT UPPER QUADRANT PAIN: ICD-10-CM

## 2019-04-06 DIAGNOSIS — D72.829 LEUKOCYTOSIS, UNSPECIFIED TYPE: ICD-10-CM

## 2019-04-06 PROBLEM — K52.9 ACUTE COLITIS: Status: ACTIVE | Noted: 2019-04-06

## 2019-04-06 LAB
ALBUMIN SERPL-MCNC: 4.3 GM/DL (ref 3.4–5)
ALCOHOL SCREEN SERUM: NORMAL %WT/VOL
ALP BLD-CCNC: 115 IU/L (ref 40–129)
ALT SERPL-CCNC: 20 U/L (ref 10–40)
AMPHETAMINES: NEGATIVE
ANION GAP SERPL CALCULATED.3IONS-SCNC: 18 MMOL/L (ref 4–16)
AST SERPL-CCNC: 31 IU/L (ref 15–37)
BACTERIA: ABNORMAL /HPF
BARBITURATE SCREEN URINE: NEGATIVE
BASOPHILS ABSOLUTE: 0.1 K/CU MM
BASOPHILS RELATIVE PERCENT: 0.4 % (ref 0–1)
BENZODIAZEPINE SCREEN, URINE: NEGATIVE
BILIRUB SERPL-MCNC: 1 MG/DL (ref 0–1)
BILIRUBIN URINE: NEGATIVE MG/DL
BLOOD, URINE: NEGATIVE
BUN BLDV-MCNC: 15 MG/DL (ref 6–23)
CALCIUM SERPL-MCNC: 9.3 MG/DL (ref 8.3–10.6)
CANNABINOID SCREEN URINE: ABNORMAL
CHLORIDE BLD-SCNC: 100 MMOL/L (ref 99–110)
CLARITY: CLEAR
CO2: 24 MMOL/L (ref 21–32)
COCAINE METABOLITE: NEGATIVE
COLOR: YELLOW
CREAT SERPL-MCNC: 1 MG/DL (ref 0.6–1.1)
DIFFERENTIAL TYPE: ABNORMAL
EOSINOPHILS ABSOLUTE: 0 K/CU MM
EOSINOPHILS RELATIVE PERCENT: 0 % (ref 0–3)
GFR AFRICAN AMERICAN: >60 ML/MIN/1.73M2
GFR NON-AFRICAN AMERICAN: 57 ML/MIN/1.73M2
GLUCOSE BLD-MCNC: 128 MG/DL (ref 70–99)
GLUCOSE BLD-MCNC: 191 MG/DL (ref 70–99)
GLUCOSE, URINE: NEGATIVE MG/DL
HCT VFR BLD CALC: 48.3 % (ref 37–47)
HEMOGLOBIN: 16.2 GM/DL (ref 12.5–16)
HYALINE CASTS: 0 /LPF
IMMATURE NEUTROPHIL %: 0.4 % (ref 0–0.43)
INR BLD: 1.18 INDEX
KETONES, URINE: ABNORMAL MG/DL
LACTATE: 2.3 MMOL/L (ref 0.4–2)
LACTATE: ABNORMAL MMOL/L (ref 0.4–2)
LACTIC ACID, SEPSIS: 1.6 MMOL/L (ref 0.5–1.9)
LEUKOCYTE ESTERASE, URINE: NEGATIVE
LIPASE: 14 IU/L (ref 13–60)
LYMPHOCYTES ABSOLUTE: 1.6 K/CU MM
LYMPHOCYTES RELATIVE PERCENT: 9.7 % (ref 24–44)
MCH RBC QN AUTO: 31.4 PG (ref 27–31)
MCHC RBC AUTO-ENTMCNC: 33.5 % (ref 32–36)
MCV RBC AUTO: 93.6 FL (ref 78–100)
MONOCYTES ABSOLUTE: 0.6 K/CU MM
MONOCYTES RELATIVE PERCENT: 3.6 % (ref 0–4)
MUCUS: ABNORMAL HPF
NITRITE URINE, QUANTITATIVE: NEGATIVE
NUCLEATED RBC %: 0 %
OPIATES, URINE: NEGATIVE
OXYCODONE: ABNORMAL
PDW BLD-RTO: 13.6 % (ref 11.7–14.9)
PH, URINE: 7 (ref 5–8)
PHENCYCLIDINE, URINE: NEGATIVE
PLATELET # BLD: 105 K/CU MM (ref 140–440)
PMV BLD AUTO: 12.3 FL (ref 7.5–11.1)
POTASSIUM SERPL-SCNC: 3.7 MMOL/L (ref 3.5–5.1)
PROTEIN UA: >500 MG/DL
PROTHROMBIN TIME: 13.4 SECONDS (ref 9.12–12.5)
RBC # BLD: 5.16 M/CU MM (ref 4.2–5.4)
RBC URINE: 3 /HPF (ref 0–6)
SEGMENTED NEUTROPHILS ABSOLUTE COUNT: 13.9 K/CU MM
SEGMENTED NEUTROPHILS RELATIVE PERCENT: 85.9 % (ref 36–66)
SODIUM BLD-SCNC: 142 MMOL/L (ref 135–145)
SPECIFIC GRAVITY UA: 1.02 (ref 1–1.03)
SQUAMOUS EPITHELIAL: 2 /HPF
TOTAL IMMATURE NEUTOROPHIL: 0.07 K/CU MM
TOTAL NUCLEATED RBC: 0 K/CU MM
TOTAL PROTEIN: 9 GM/DL (ref 6.4–8.2)
TRICHOMONAS: ABNORMAL /HPF
UROBILINOGEN, URINE: NORMAL MG/DL (ref 0.2–1)
WBC # BLD: 16.1 K/CU MM (ref 4–10.5)
WBC UA: 1 /HPF (ref 0–5)

## 2019-04-06 PROCEDURE — 96361 HYDRATE IV INFUSION ADD-ON: CPT

## 2019-04-06 PROCEDURE — 36415 COLL VENOUS BLD VENIPUNCTURE: CPT

## 2019-04-06 PROCEDURE — 2580000003 HC RX 258: Performed by: EMERGENCY MEDICINE

## 2019-04-06 PROCEDURE — 81001 URINALYSIS AUTO W/SCOPE: CPT

## 2019-04-06 PROCEDURE — 1200000000 HC SEMI PRIVATE

## 2019-04-06 PROCEDURE — G0378 HOSPITAL OBSERVATION PER HR: HCPCS

## 2019-04-06 PROCEDURE — 2580000003 HC RX 258: Performed by: NURSE PRACTITIONER

## 2019-04-06 PROCEDURE — 96376 TX/PRO/DX INJ SAME DRUG ADON: CPT

## 2019-04-06 PROCEDURE — 96372 THER/PROPH/DIAG INJ SC/IM: CPT

## 2019-04-06 PROCEDURE — 82962 GLUCOSE BLOOD TEST: CPT

## 2019-04-06 PROCEDURE — 74177 CT ABD & PELVIS W/CONTRAST: CPT

## 2019-04-06 PROCEDURE — 85025 COMPLETE CBC W/AUTO DIFF WBC: CPT

## 2019-04-06 PROCEDURE — 94761 N-INVAS EAR/PLS OXIMETRY MLT: CPT

## 2019-04-06 PROCEDURE — 87040 BLOOD CULTURE FOR BACTERIA: CPT

## 2019-04-06 PROCEDURE — 99285 EMERGENCY DEPT VISIT HI MDM: CPT

## 2019-04-06 PROCEDURE — 6360000004 HC RX CONTRAST MEDICATION: Performed by: EMERGENCY MEDICINE

## 2019-04-06 PROCEDURE — 80307 DRUG TEST PRSMV CHEM ANLYZR: CPT

## 2019-04-06 PROCEDURE — 96375 TX/PRO/DX INJ NEW DRUG ADDON: CPT

## 2019-04-06 PROCEDURE — 2580000003 HC RX 258: Performed by: PHYSICIAN ASSISTANT

## 2019-04-06 PROCEDURE — 83690 ASSAY OF LIPASE: CPT

## 2019-04-06 PROCEDURE — 6360000002 HC RX W HCPCS: Performed by: NURSE PRACTITIONER

## 2019-04-06 PROCEDURE — 85610 PROTHROMBIN TIME: CPT

## 2019-04-06 PROCEDURE — 80053 COMPREHEN METABOLIC PANEL: CPT

## 2019-04-06 PROCEDURE — 96374 THER/PROPH/DIAG INJ IV PUSH: CPT

## 2019-04-06 PROCEDURE — G0480 DRUG TEST DEF 1-7 CLASSES: HCPCS

## 2019-04-06 PROCEDURE — 6360000002 HC RX W HCPCS: Performed by: PHYSICIAN ASSISTANT

## 2019-04-06 PROCEDURE — 83605 ASSAY OF LACTIC ACID: CPT

## 2019-04-06 PROCEDURE — 6370000000 HC RX 637 (ALT 250 FOR IP): Performed by: NURSE PRACTITIONER

## 2019-04-06 RX ORDER — SODIUM CHLORIDE 0.9 % (FLUSH) 0.9 %
10 SYRINGE (ML) INJECTION EVERY 12 HOURS SCHEDULED
Status: DISCONTINUED | OUTPATIENT
Start: 2019-04-06 | End: 2019-04-09 | Stop reason: HOSPADM

## 2019-04-06 RX ORDER — MORPHINE SULFATE 4 MG/ML
2 INJECTION, SOLUTION INTRAMUSCULAR; INTRAVENOUS EVERY 4 HOURS PRN
Status: DISPENSED | OUTPATIENT
Start: 2019-04-06 | End: 2019-04-07

## 2019-04-06 RX ORDER — PROMETHAZINE HYDROCHLORIDE 25 MG/1
25 TABLET ORAL 3 TIMES DAILY PRN
Status: DISCONTINUED | OUTPATIENT
Start: 2019-04-06 | End: 2019-04-06

## 2019-04-06 RX ORDER — SODIUM CHLORIDE 0.9 % (FLUSH) 0.9 %
10 SYRINGE (ML) INJECTION PRN
Status: DISCONTINUED | OUTPATIENT
Start: 2019-04-06 | End: 2019-04-09 | Stop reason: HOSPADM

## 2019-04-06 RX ORDER — 0.9 % SODIUM CHLORIDE 0.9 %
10 VIAL (ML) INJECTION
Status: COMPLETED | OUTPATIENT
Start: 2019-04-06 | End: 2019-04-06

## 2019-04-06 RX ORDER — LORAZEPAM 1 MG/1
3 TABLET ORAL
Status: DISCONTINUED | OUTPATIENT
Start: 2019-04-06 | End: 2019-04-09 | Stop reason: HOSPADM

## 2019-04-06 RX ORDER — SENNA PLUS 8.6 MG/1
1 TABLET ORAL DAILY PRN
Status: DISCONTINUED | OUTPATIENT
Start: 2019-04-06 | End: 2019-04-09 | Stop reason: HOSPADM

## 2019-04-06 RX ORDER — DEXTROSE MONOHYDRATE 25 G/50ML
12.5 INJECTION, SOLUTION INTRAVENOUS PRN
Status: DISCONTINUED | OUTPATIENT
Start: 2019-04-06 | End: 2019-04-09 | Stop reason: HOSPADM

## 2019-04-06 RX ORDER — ALBUTEROL SULFATE 90 UG/1
2 AEROSOL, METERED RESPIRATORY (INHALATION) EVERY 6 HOURS PRN
Status: DISCONTINUED | OUTPATIENT
Start: 2019-04-06 | End: 2019-04-09 | Stop reason: HOSPADM

## 2019-04-06 RX ORDER — MORPHINE SULFATE 4 MG/ML
1 INJECTION, SOLUTION INTRAMUSCULAR; INTRAVENOUS EVERY 4 HOURS PRN
Status: ACTIVE | OUTPATIENT
Start: 2019-04-06 | End: 2019-04-07

## 2019-04-06 RX ORDER — SODIUM CHLORIDE 9 MG/ML
INJECTION, SOLUTION INTRAVENOUS CONTINUOUS
Status: DISCONTINUED | OUTPATIENT
Start: 2019-04-06 | End: 2019-04-09 | Stop reason: HOSPADM

## 2019-04-06 RX ORDER — 0.9 % SODIUM CHLORIDE 0.9 %
1000 INTRAVENOUS SOLUTION INTRAVENOUS ONCE
Status: COMPLETED | OUTPATIENT
Start: 2019-04-06 | End: 2019-04-06

## 2019-04-06 RX ORDER — THIAMINE MONONITRATE (VIT B1) 100 MG
100 TABLET ORAL DAILY
Status: DISCONTINUED | OUTPATIENT
Start: 2019-04-06 | End: 2019-04-09 | Stop reason: HOSPADM

## 2019-04-06 RX ORDER — BUSPIRONE HYDROCHLORIDE 7.5 MG/1
15 TABLET ORAL 2 TIMES DAILY
Status: DISCONTINUED | OUTPATIENT
Start: 2019-04-06 | End: 2019-04-09 | Stop reason: HOSPADM

## 2019-04-06 RX ORDER — LACTULOSE 10 G/15ML
10 SOLUTION ORAL DAILY
Status: DISCONTINUED | OUTPATIENT
Start: 2019-04-06 | End: 2019-04-09 | Stop reason: HOSPADM

## 2019-04-06 RX ORDER — LORAZEPAM 1 MG/1
4 TABLET ORAL
Status: DISCONTINUED | OUTPATIENT
Start: 2019-04-06 | End: 2019-04-09 | Stop reason: HOSPADM

## 2019-04-06 RX ORDER — MORPHINE SULFATE 4 MG/ML
4 INJECTION, SOLUTION INTRAMUSCULAR; INTRAVENOUS ONCE
Status: COMPLETED | OUTPATIENT
Start: 2019-04-06 | End: 2019-04-06

## 2019-04-06 RX ORDER — LORAZEPAM 2 MG/ML
2 INJECTION INTRAMUSCULAR
Status: DISCONTINUED | OUTPATIENT
Start: 2019-04-06 | End: 2019-04-09 | Stop reason: HOSPADM

## 2019-04-06 RX ORDER — NICOTINE POLACRILEX 4 MG
15 LOZENGE BUCCAL PRN
Status: DISCONTINUED | OUTPATIENT
Start: 2019-04-06 | End: 2019-04-09 | Stop reason: HOSPADM

## 2019-04-06 RX ORDER — ONDANSETRON 4 MG/1
4 TABLET, ORALLY DISINTEGRATING ORAL EVERY 8 HOURS PRN
Status: DISCONTINUED | OUTPATIENT
Start: 2019-04-06 | End: 2019-04-09 | Stop reason: HOSPADM

## 2019-04-06 RX ORDER — FOLIC ACID 1 MG/1
1 TABLET ORAL DAILY
Status: DISCONTINUED | OUTPATIENT
Start: 2019-04-06 | End: 2019-04-09 | Stop reason: HOSPADM

## 2019-04-06 RX ORDER — PANTOPRAZOLE SODIUM 20 MG/1
20 TABLET, DELAYED RELEASE ORAL
Status: DISCONTINUED | OUTPATIENT
Start: 2019-04-07 | End: 2019-04-09 | Stop reason: HOSPADM

## 2019-04-06 RX ORDER — LORAZEPAM 1 MG/1
1 TABLET ORAL
Status: DISCONTINUED | OUTPATIENT
Start: 2019-04-06 | End: 2019-04-09 | Stop reason: HOSPADM

## 2019-04-06 RX ORDER — LORAZEPAM 2 MG/ML
3 INJECTION INTRAMUSCULAR
Status: DISCONTINUED | OUTPATIENT
Start: 2019-04-06 | End: 2019-04-09 | Stop reason: HOSPADM

## 2019-04-06 RX ORDER — DEXTROSE MONOHYDRATE 50 MG/ML
100 INJECTION, SOLUTION INTRAVENOUS PRN
Status: DISCONTINUED | OUTPATIENT
Start: 2019-04-06 | End: 2019-04-09 | Stop reason: HOSPADM

## 2019-04-06 RX ORDER — LORAZEPAM 1 MG/1
2 TABLET ORAL
Status: DISCONTINUED | OUTPATIENT
Start: 2019-04-06 | End: 2019-04-09 | Stop reason: HOSPADM

## 2019-04-06 RX ORDER — HYDROXYZINE HYDROCHLORIDE 25 MG/1
50 TABLET, FILM COATED ORAL 3 TIMES DAILY PRN
Status: DISCONTINUED | OUTPATIENT
Start: 2019-04-06 | End: 2019-04-09 | Stop reason: HOSPADM

## 2019-04-06 RX ORDER — FLUOXETINE HYDROCHLORIDE 20 MG/1
40 CAPSULE ORAL DAILY
Status: DISCONTINUED | OUTPATIENT
Start: 2019-04-06 | End: 2019-04-09 | Stop reason: HOSPADM

## 2019-04-06 RX ORDER — LORAZEPAM 2 MG/ML
1 INJECTION INTRAMUSCULAR
Status: DISCONTINUED | OUTPATIENT
Start: 2019-04-06 | End: 2019-04-09 | Stop reason: HOSPADM

## 2019-04-06 RX ORDER — MULTIVITAMIN WITH FOLIC ACID 400 MCG
1 TABLET ORAL DAILY
Status: DISCONTINUED | OUTPATIENT
Start: 2019-04-06 | End: 2019-04-09 | Stop reason: HOSPADM

## 2019-04-06 RX ORDER — ONDANSETRON 2 MG/ML
4 INJECTION INTRAMUSCULAR; INTRAVENOUS ONCE
Status: COMPLETED | OUTPATIENT
Start: 2019-04-06 | End: 2019-04-06

## 2019-04-06 RX ORDER — NICOTINE 21 MG/24HR
1 PATCH, TRANSDERMAL 24 HOURS TRANSDERMAL EVERY 24 HOURS
Status: DISCONTINUED | OUTPATIENT
Start: 2019-04-06 | End: 2019-04-09 | Stop reason: HOSPADM

## 2019-04-06 RX ORDER — ONDANSETRON 2 MG/ML
4 INJECTION INTRAMUSCULAR; INTRAVENOUS EVERY 6 HOURS PRN
Status: DISCONTINUED | OUTPATIENT
Start: 2019-04-06 | End: 2019-04-09 | Stop reason: HOSPADM

## 2019-04-06 RX ORDER — NITROGLYCERIN 0.4 MG/1
0.4 TABLET SUBLINGUAL EVERY 5 MIN PRN
Status: DISCONTINUED | OUTPATIENT
Start: 2019-04-06 | End: 2019-04-09 | Stop reason: HOSPADM

## 2019-04-06 RX ORDER — PROMETHAZINE HYDROCHLORIDE 25 MG/ML
25 INJECTION, SOLUTION INTRAMUSCULAR; INTRAVENOUS EVERY 4 HOURS PRN
Status: DISCONTINUED | OUTPATIENT
Start: 2019-04-06 | End: 2019-04-09 | Stop reason: HOSPADM

## 2019-04-06 RX ORDER — RANOLAZINE 500 MG/1
500 TABLET, EXTENDED RELEASE ORAL 2 TIMES DAILY
Status: DISCONTINUED | OUTPATIENT
Start: 2019-04-06 | End: 2019-04-09 | Stop reason: HOSPADM

## 2019-04-06 RX ORDER — LORAZEPAM 2 MG/ML
4 INJECTION INTRAMUSCULAR
Status: DISCONTINUED | OUTPATIENT
Start: 2019-04-06 | End: 2019-04-09 | Stop reason: HOSPADM

## 2019-04-06 RX ADMIN — IOPAMIDOL 75 ML: 755 INJECTION, SOLUTION INTRAVENOUS at 16:56

## 2019-04-06 RX ADMIN — SODIUM CHLORIDE 1000 ML: 9 INJECTION, SOLUTION INTRAVENOUS at 16:03

## 2019-04-06 RX ADMIN — ONDANSETRON 4 MG: 2 INJECTION INTRAMUSCULAR; INTRAVENOUS at 16:03

## 2019-04-06 RX ADMIN — PROMETHAZINE HYDROCHLORIDE 25 MG: 25 INJECTION INTRAMUSCULAR; INTRAVENOUS at 22:31

## 2019-04-06 RX ADMIN — SODIUM CHLORIDE 1000 ML: 9 INJECTION, SOLUTION INTRAVENOUS at 18:32

## 2019-04-06 RX ADMIN — MORPHINE SULFATE 4 MG: 4 INJECTION INTRAVENOUS at 18:31

## 2019-04-06 RX ADMIN — SODIUM CHLORIDE: 9 INJECTION, SOLUTION INTRAVENOUS at 22:10

## 2019-04-06 RX ADMIN — SODIUM CHLORIDE, PRESERVATIVE FREE 10 ML: 5 INJECTION INTRAVENOUS at 16:56

## 2019-04-06 RX ADMIN — ONDANSETRON 4 MG: 2 INJECTION INTRAMUSCULAR; INTRAVENOUS at 20:32

## 2019-04-06 RX ADMIN — MORPHINE SULFATE 2 MG: 4 INJECTION INTRAVENOUS at 20:32

## 2019-04-06 ASSESSMENT — PAIN DESCRIPTION - FREQUENCY: FREQUENCY: CONTINUOUS

## 2019-04-06 ASSESSMENT — PAIN SCALES - GENERAL
PAINLEVEL_OUTOF10: 10
PAINLEVEL_OUTOF10: 10
PAINLEVEL_OUTOF10: 8

## 2019-04-06 ASSESSMENT — PAIN DESCRIPTION - DESCRIPTORS: DESCRIPTORS: ACHING;CONSTANT;CRAMPING;DISCOMFORT

## 2019-04-06 ASSESSMENT — PAIN DESCRIPTION - LOCATION
LOCATION: ABDOMEN
LOCATION: ABDOMEN

## 2019-04-06 ASSESSMENT — PAIN DESCRIPTION - PAIN TYPE
TYPE: ACUTE PAIN
TYPE: ACUTE PAIN

## 2019-04-06 ASSESSMENT — PAIN DESCRIPTION - PROGRESSION: CLINICAL_PROGRESSION: NOT CHANGED

## 2019-04-06 ASSESSMENT — PAIN - FUNCTIONAL ASSESSMENT: PAIN_FUNCTIONAL_ASSESSMENT: PREVENTS OR INTERFERES SOME ACTIVE ACTIVITIES AND ADLS

## 2019-04-06 ASSESSMENT — PAIN DESCRIPTION - ORIENTATION: ORIENTATION: MID;LOWER

## 2019-04-06 ASSESSMENT — PAIN DESCRIPTION - ONSET: ONSET: ON-GOING

## 2019-04-06 NOTE — ED PROVIDER NOTES
I independently examined and evaluated Mary Jo Canales. In brief their history revealed a 66 y.o. female who presents with concerns for left-sided upper abdominal pain. Onset was this morning. Has been with nausea vomiting diarrhea. Left upper quadrant. Patient admits that she drink some tequila within the last few days. Denies any headache or visual symptoms no cough or cold symptoms. No lightheadedness or dizziness. Emesis has been nonbilious and nonbloody. Diarrhea has been nonbloody.     Unsure of any sick contacts. Per chart patient has a history of diabetes COPD, chronic hepatitis C, portal vein thrombosis, intractable nausea vomiting episodes, and alcoholic cirrhosis. Their focused exam revealed alert oriented female resting in bed in no distress normocephalic atraumatic sclera clear airway normal lungs clear heart regular rhythm, tachycardic 2+ pulses throughout abdomen soft obese tender palpation diffusely no rebound or guarding or rigidity. No hernia. No pulsatile mass. 5/5 strength throughout skin has no rash or swelling cranial nerves intact. ED course: Patient seen with PA please see her note. .  Patient without pain nausea vomiting recently drained. Workup performed she has colitis elevated white count tachycardic elevate lactic acid likely from vomiting. Likely viral colitis holding off antibiotics at this time we'll admit for hydration pain control colitis. Otherwise patient stable. All diagnostic, treatment, and disposition decisions were made by myself in conjunction with the Advanced Practice Provider. For all further details of the patient's emergency department visit, please see the Advanced Practice Provider's documentation.         Montrell Souza DO  04/06/19 [id number]

## 2019-04-06 NOTE — H&P
History and Physical      Name:  Madhu Raines /Age/Sex: 1960  (66 y.o. female)   MRN & CSN:  [id number] & [id number] Admission Date/Time: 2019  3:24 PM   Location:  ED[facility]17 PCP: Dorothea Soriano MD       Admitting Physicians : Dr. Elidia Lloyd is a 66 y.o.  female  who presents with Emesis and Nausea    Assessment and Plan:   1. Abdominal pain with intractable nausea and vomiting due to Acute colitis vs cannabinoid induced  # Elevated lactate, trending down  UDS positive for cannabinoid  -IVF  -Pain control  -Antiemetic  -Monitor electrolyte and replete  -Consult GI    2. Alcohol cirrhosis with extensive gastric varices  Last drink last night  -Denies bleed  -CIWA: Ativan  -Seizure precaution    3. Chronic  Right portal vein thrombus   -Monitor    4. DMII  -SSI    5. Chronic hepatitis C  -Contact precaution    6. Tobacco abuse  -Educated about cessation     7.  Thrombocytopenia  -INR 1.18  -Monitor for s/s bleed          DVT-PPX: SCD  Diet: NPO      Medications:   Medications:    sodium chloride  1,000 mL Intravenous Once      Infusions:   PRN Meds:      Current Facility-Administered Medications:     0.9 % sodium chloride bolus, 1,000 mL, Intravenous, Once, Sampson Kaur PA-C    Current Outpatient Medications:     nitroGLYCERIN (NITROSTAT) 0.4 MG SL tablet, Place 1 tablet under the tongue every 5 minutes as needed for Chest pain, Disp: 25 tablet, Rfl: 3    nicotine (NICODERM CQ) 21 MG/24HR, Place 1 patch onto the skin every 24 hours, Disp: 42 patch, Rfl: 0    UNIFINE PENTIPS 31G X 6 MM MISC, USE AS DIRECTED, Disp: 100 each, Rfl: 5    pantoprazole (PROTONIX) 20 MG tablet, Take 1 tablet by mouth 2 times daily (before meals), Disp: 60 tablet, Rfl: 3    promethazine (PHENERGAN) 25 MG tablet, Take 1 tablet by mouth 3 times daily as needed for Nausea, Disp: 60 tablet, Rfl: 1    lactulose (CHRONULAC) 10 GM/15ML solution, Take 15 mLs by mouth daily, Disp: 1 Bottle, Rfl: 0   hydrOXYzine (ATARAX) 50 MG tablet, Take 50 mg by mouth 3 times daily as needed for Itching, Disp: , Rfl:     metoprolol tartrate (LOPRESSOR) 25 MG tablet, Take 1 tablet by mouth 2 times daily, Disp: 60 tablet, Rfl: 6    ranolazine (RANEXA) 500 MG extended release tablet, Take 1 tablet by mouth 2 times daily, Disp: 180 tablet, Rfl: 3    ondansetron (ZOFRAN-ODT) 4 MG disintegrating tablet, Take 1 tablet by mouth every 8 hours as needed for Nausea or Vomiting, Disp: 20 tablet, Rfl: 0    FLUoxetine (PROZAC) 40 MG capsule, Take 40 mg by mouth daily, Disp: , Rfl:     VIREAD 300 MG tablet, Take 1 tablet by mouth nightly , Disp: , Rfl:     tiZANidine (ZANAFLEX) 4 MG tablet, Take 4 mg by mouth 3 times daily , Disp: , Rfl:     QUEtiapine (SEROQUEL) 300 MG tablet, Take 300 mg by mouth nightly , Disp: , Rfl:     albuterol sulfate HFA (PROAIR HFA) 108 (90 BASE) MCG/ACT inhaler, Inhale 2 puffs into the lungs every 6 hours as needed for Wheezing, Disp: 1 Inhaler, Rfl: 5    oxyCODONE (ROXICODONE) 5 MG immediate release tablet, Take 5 mg by mouth 4 times daily  . , Disp: , Rfl:     busPIRone (BUSPAR) 15 MG tablet, Take 1 tablet by mouth 2 times daily, Disp: 60 tablet, Rfl: 3    History of present illness     Chief Complaint: Emesis and Nausea      Nuris Mcconnell is a 66 y.o.  female  who presents with 10/10 left side abdominal pain that started this morning. Patient states she has been having nonbilious nonbloody emesis with nonbloody diarrhea. She states she has been drinking tequila for the past couple days. Denies hemoptysis, hematuria, dizziness or lightheaded, sick contact. Hx of alcohol cirrhosis, COPD, Hep C, Seizure, HTN, HLD, Glaucoma, tobacco abuse, illicit drug abuse, and Arthritis. Review of Systems   Ten point ROS reviewed and negative, unless as noted below. GENERAL:  Denies fever, chills, night sweats, or changes in weight. EYES:  Denies recent visual changes.   ENT:  Denies ear pain, hearing loss or (chronic obstructive pulmonary disease) (HCC)     follow with Dr Bry Waddell Depression     \"have manic - depression see Dr Brandan Lewis Diabetes mellitus [facility])     dx 10+ yrs ago-     Drug abuse ([address]     hx use of cocaine, heroin and marijuana- states last used 12/2014    Glaucoma     left eye    H/O Doppler lower venous ultrasound 04/04/2019    H/O Doppler ultrasound 7/22/15    CAROTID: WNL. Normal vertebral flows bilaterally.  Hepatitis C     for liver bx 12/3/2015\"Have Hepatitis B and C and saw Dr Rukhsana Geller for this 12/1/2015\"    Hiatal hernia     History of alcohol abuse     HTN (hypertension)     \"for the past two yrs on medication\" follows with Dr Anthony Tavarez Hyperlipemia     Irregular heart beat     per pt    Liver hematoma     Migraines     Nausea & vomiting     Schizophrenia ([address]     per old chart    Seizures ([facility] [address]     \"last one was 9/2015- [facility] Dr Mo Moss at [facility]- she said not sure if acutal seizures- she thinks they are panic or anxiety attacks\"    SOB (shortness of breath)     Stress bladder incontinence, female      PSHX:  has a past surgical history that includes Cholecystectomy (per old chart done 1985); Hysterectomy; Cardiac catheterization; Tonsillectomy (as a kid); Breast surgery (10/2015); eye surgery (Bilateral, ? when); Colonoscopy (3/11/13); Colonoscopy (03/16/2017); Endoscopy, colon, diagnostic (03/16/2017); and Upper gastrointestinal endoscopy (N/A, 11/14/2018). Allergies: Allergies   Allergen Reactions    Norco [Hydrocodone-Acetaminophen] Itching     Itching, rash, nausea and vomiting.  Tylenol [Acetaminophen] Itching, Nausea And Vomiting and Rash       FAM HX: family history includes Arthritis in her father and mother; Cancer in her father and mother; Diabetes in her father; Heart Disease in her brother; High Blood Pressure in her father; High Cholesterol in her father; Migraines in her father, mother, and sister.   Family history reviewed and is essentially

## 2019-04-06 NOTE — ED NOTES
Received patient to room 17 via EMS with C/O nausea and vomiting and abdominal pain. Patient  on cart and stating her abd is painful at this time and that it stared this morning. Patient states she had a few shots of tequila yesterday and now she is in pain. Patient voices pain level 10/10. Patient  placed in a position of comfort. Call bell within reach. Needs identified and completed. Will monitor.       Jasper , ABDIRIZAK  04/06/19 [id number]

## 2019-04-06 NOTE — ED NOTES
Narrative   EXAMINATION:   CT OF THE ABDOMEN AND PELVIS WITH CONTRAST 4/6/2019 4:45 pm       TECHNIQUE:   CT of the abdomen and pelvis was performed with the administration of   intravenous contrast. Multiplanar reformatted images are provided for review. Dose modulation, iterative reconstruction, and/or weight based adjustment of   the mA/kV was utilized to reduce the radiation dose to as low as reasonably   achievable.       COMPARISON:   11/12/2018, 10/25/2018, 03/17/2017       HISTORY:   ORDERING SYSTEM PROVIDED HISTORY: luq abd pain   TECHNOLOGIST PROVIDED HISTORY:   Ordering Physician Provided Reason for Exam: luq abd pain   Acuity: Acute   Type of Exam: Initial   Additional signs and symptoms: vomiting   Relevant Medical/Surgical History: Hx Cirrhosis, Ericka, Hyster / 75cc   [id number]       FINDINGS:   Lower Chest: Stable 9 mm nodule dating back to March 2017 now with some   internal calcification compatible with granuloma.  Lower lungs are otherwise   clear.       Organs: Somewhat nodular surface of the liver indicating cirrhotic changes.    Significant gastric varices are demonstrated. Genevieve Bares is also evidence of a   splenorenal shunt.  Filling defect within the right portal vein compatible   with portal vein thrombus.  Overall appearance is less extensive compared to   previous study.       Spleen, pancreas, adrenal glands, and kidneys are unremarkable.  Gallbladder   surgically absent.       GI/Bowel: No bowel obstruction.  Nonvisualized appendix.  Circumferential   wall thickening involving much of the colon may be due to incomplete   distention with infectious or inflammatory colitis not excluded.       Pelvis: Urinary bladder is unremarkable.  Uterus surgically absent.       Peritoneum/Retroperitoneum: No free intraperitoneal air, fluid, or   lymphadenopathy by size criteria.       Bones/Soft Tissues: Osseous structures appear unremarkable.           Impression   Circumferential wall thickening involving much of the colon may be due to   incomplete distention with infectious or inflammatory colitis not excluded.       Filling defect within the right portal vein compatible with thrombus. Overall, the appearance of thrombus is less extensive compared to 10/25/2018       Cirrhotic changes of the liver with extensive gastric varices with a   splenorenal shunt demonstrated.           Tommy Armas RN  04/06/19 [id number]

## 2019-04-06 NOTE — ED PROVIDER NOTES
eMERGENCY dEPARTMENT eNCOUnter      PCP: Monie Saucedo MD    CHIEF COMPLAINT    Chief Complaint   Patient presents with    Emesis    Nausea       HPI    Poppy Amato is a 66 y.o. female who presents with concerns for left-sided upper abdominal pain. Onset was this morning. Has been with nausea vomiting diarrhea. Left upper quadrant. Patient admits that she drink some tequila within the last few days. Denies any headache or visual symptoms no cough or cold symptoms. No lightheadedness or dizziness. Emesis has been nonbilious and nonbloody. Diarrhea has been nonbloody. Unsure of any sick contacts. Per chart patient has a history of diabetes COPD, chronic hepatitis C, portal vein thrombosis, intractable nausea vomiting episodes, and alcoholic cirrhosis. REVIEW OF SYSTEMS    Constitutional:  Denies fever, chills, weight loss or weakness   HENT:  Denies sore throat or ear pain   Cardiovascular:  Denies chest pain, palpitations or swelling   Respiratory:  Denies cough or shortness of breath   GI:  See HPI above  : No hematuria or dysuria. No vaginal symptoms. Denies pregnancy. No concern for STD  Musculoskeletal:  Denies back pain or groin pain or masses. No pain or swelling of extremities.   Skin:  Denies rash  Neurologic:  Denies headache, focal weakness or sensory changes   Endocrine:  Denies polyuria or polydypsia   Lymphatic:  Denies swollen glands     All other review of systems are negative  See HPI and nursing notes for additional information     PAST MEDICAL & SURGICAL HISTORY    Past Medical History:   Diagnosis Date    Acid reflux     Arthritis     left knee    CAD (coronary artery disease)     per [facility] 9/6/13    COPD (chronic obstructive pulmonary disease) ([facility] [address]     follow with Dr Sherrell Morataya Depression     \"have manic - depression see Dr Ysabel Miranda Diabetes mellitus [facility])     dx 10+ yrs ago-     Drug abuse ([facility] [address]     hx use of cocaine, heroin and marijuana- states last used 12/2014  Glaucoma     left eye    H/O Doppler lower venous ultrasound 04/04/2019    H/O Doppler ultrasound 7/22/15    CAROTID: WNL. Normal vertebral flows bilaterally.      Hepatitis C     for liver bx 12/3/2015\"Have Hepatitis B and C and saw Dr Matthias Shankar for this 12/1/2015\"    Hiatal hernia     History of alcohol abuse     HTN (hypertension)     \"for the past two yrs on medication\" follows with Dr Sofy Short Irregular heart beat     per pt    Liver hematoma     Migraines     Nausea & vomiting     Schizophrenia ([address]     per old chart    Seizures ([address]     \"last one was 9/2015- saw Dr Cecilio Meeks at [facility]- she said not sure if acutal seizures- she thinks they are panic or anxiety attacks\"    SOB (shortness of breath)     Stress bladder incontinence, female      Past Surgical History:   Procedure Laterality Date    BREAST SURGERY  10/2015    left breast bx    CARDIAC CATHETERIZATION      per old chart pt had cath done in 3/2011 and 9/2013    CHOLECYSTECTOMY  per old chart done [facility]  3/11/13    diverticulosis, cecal polyp    COLONOSCOPY  03/16/2017    Internal hemorrhoids    ENDOSCOPY, COLON, DIAGNOSTIC  03/16/2017    EGD: Small esophageal varices, portal hypertensive gastropathy, reflux esophagitis, hiatal hernia    EYE SURGERY Bilateral ? when    cyst removal, cataracts    HYSTERECTOMY      per old chart pt had CHOLO/BSO 1986    TONSILLECTOMY  as a kid    UPPER GASTROINTESTINAL ENDOSCOPY N/A 11/14/2018    EGD DIAGNOSTIC ONLY performed by Abi Moss MD at [address] [place] [address]    Current Outpatient Rx   Medication Sig Dispense Refill    nitroGLYCERIN (NITROSTAT) 0.4 MG SL tablet Place 1 tablet under the tongue every 5 minutes as needed for Chest pain 25 tablet 3    nicotine (NICODERM CQ) 21 MG/24HR Place 1 patch onto the skin every 24 hours 42 patch 0    UNIFINE PENTIPS 31G X 6 MM MISC USE AS DIRECTED 100 each 5    pantoprazole (PROTONIX) 20 MG tablet Take 1 tablet by mouth 2 times daily (before meals) 60 tablet 3    promethazine (PHENERGAN) 25 MG tablet Take 1 tablet by mouth 3 times daily as needed for Nausea 60 tablet 1    lactulose (CHRONULAC) 10 GM/15ML solution Take 15 mLs by mouth daily 1 Bottle 0    hydrOXYzine (ATARAX) 50 MG tablet Take 50 mg by mouth 3 times daily as needed for Itching      metoprolol tartrate (LOPRESSOR) 25 MG tablet Take 1 tablet by mouth 2 times daily 60 tablet 6    ranolazine (RANEXA) 500 MG extended release tablet Take 1 tablet by mouth 2 times daily 180 tablet 3    ondansetron (ZOFRAN-ODT) 4 MG disintegrating tablet Take 1 tablet by mouth every 8 hours as needed for Nausea or Vomiting 20 tablet 0    FLUoxetine (PROZAC) 40 MG capsule Take 40 mg by mouth daily      VIREAD 300 MG tablet Take 1 tablet by mouth nightly       tiZANidine (ZANAFLEX) 4 MG tablet Take 4 mg by mouth 3 times daily       QUEtiapine (SEROQUEL) 300 MG tablet Take 300 mg by mouth nightly       albuterol sulfate HFA (PROAIR HFA) 108 (90 BASE) MCG/ACT inhaler Inhale 2 puffs into the lungs every 6 hours as needed for Wheezing 1 Inhaler 5    oxyCODONE (ROXICODONE) 5 MG immediate release tablet Take 5 mg by mouth 4 times daily  .  busPIRone (BUSPAR) 15 MG tablet Take 1 tablet by mouth 2 times daily 60 tablet 3       ALLERGIES    Allergies   Allergen Reactions    Norco [Hydrocodone-Acetaminophen] Itching     Itching, rash, nausea and vomiting.      Tylenol [Acetaminophen] Itching, Nausea And Vomiting and Rash       SOCIAL AND FAMILY HISTORY    Social History     Socioeconomic History    Marital status:      Spouse name: None    Number of children: None    Years of education: None    Highest education level: None   Occupational History    None   Social Needs    Financial resource strain: None    Food insecurity:     Worry: None     Inability: None    Transportation needs:     Medical: None     Non-medical: None   Tobacco Use    Smoking status: Current Every Day Smoker     Packs/day: 0.50     Years: 40.00     Pack years: 20.00     Types: Cigarettes    Smokeless tobacco: Never Used   Substance and Sexual Activity    Alcohol use: Yes     Alcohol/week: 1.2 - 1.8 oz     Types: 2 - 3 Shots of liquor per week     Comment: 2-3 shots last night     Drug use: Yes     Frequency: 3.0 times per week     Types: Marijuana    Sexual activity: Not Currently     Partners: Male   Lifestyle    Physical activity:     Days per week: None     Minutes per session: None    Stress: None   Relationships    Social connections:     Talks on phone: None     Gets together: None     Attends Sikhism service: None     Active member of club or organization: None     Attends meetings of clubs or organizations: None     Relationship status: None    Intimate partner violence:     Fear of current or ex partner: None     Emotionally abused: None     Physically abused: None     Forced sexual activity: None   Other Topics Concern    None   Social History Narrative    None     Family History   Problem Relation Age of Onset    Cancer Mother         lung ca    Arthritis Mother     Migraines Mother     Cancer Father         colon ca    Diabetes Father     High Blood Pressure Father     Arthritis Father     High Cholesterol Father     Migraines Father     Migraines Sister     Heart Disease Brother         WPW       PHYSICAL EXAM    VITAL SIGNS: BP (!) 158/99   Pulse 114   Temp 98.1 °F (36.7 °C) (Oral)   Resp 19   Ht 4' 9.5\" (1.461 m)   Wt 157 lb (71.2 kg)   SpO2 95%   BMI 33.39 kg/m²   Constitutional:  Well developed, well nourished. No distress  Eyes:  Sclera nonicteric, conjunctiva moist  HENT:  Atraumatic. PERRL. EOMI.  moist mucus membranes.   Neck/Lymphatics: supple, no JVD, no swollen nodes  Respiratory:  No retractions, no accessory muscle use, normal breath sounds   Cardiovascular:  tachycardic rate, normal rhythm, no murmurs    GI:     No gross discoloration.       -no Scandia's sign (periumbilical ecchymosis)       -no Grey-Becerra's sign (flank ecchymosis)      Bowel sounds present, no audible bruits. Soft,  No distention, no guarding, no rigidity,   + moderate luq abdominal tenderness, no rebound, no palpable pulsatile masses,   No McBurney's point tenderness   Negative Rovsing sign    Negative Caban's sign. Back:   No CVA tenderness to percussion.   Musculoskeletal:  No edema, no deformity  Vascular: DP pulses 2+ equal bilaterally  Integument: No rash, dry skin  Neurologic:  Alert & oriented, normal speech  Psychiatric: Cooperative, pleasant affect       LABS:  Results for orders placed or performed during the hospital encounter of 04/06/19   CBC auto diff   Result Value Ref Range    WBC 16.1 (H) 4.0 - 10.5 K/CU MM    RBC 5.16 4.2 - 5.4 M/CU MM    Hemoglobin 16.2 (H) 12.5 - 16.0 GM/DL    Hematocrit 48.3 (H) 37 - 47 %    MCV 93.6 78 - 100 FL    MCH 31.4 (H) 27 - 31 PG    MCHC 33.5 32.0 - 36.0 %    RDW 13.6 11.7 - 14.9 %    Platelets 526 (L) 731 - 440 K/CU MM    MPV 12.3 (H) 7.5 - 11.1 FL    Differential Type AUTOMATED DIFFERENTIAL     Segs Relative 85.9 (H) 36 - 66 %    Lymphocytes % 9.7 (L) 24 - 44 %    Monocytes % 3.6 0 - 4 %    Eosinophils % 0.0 0 - 3 %    Basophils % 0.4 0 - 1 %    Segs Absolute 13.9 K/CU MM    Lymphocytes # 1.6 K/CU MM    Monocytes # 0.6 K/CU MM    Eosinophils # 0.0 K/CU MM    Basophils # 0.1 K/CU MM    Nucleated RBC % 0.0 %    Total Nucleated RBC 0.0 K/CU MM    Total Immature Neutrophil 0.07 K/CU MM    Immature Neutrophil % 0.4 0 - 0.43 %   Comprehensive Metabolic Panel   Result Value Ref Range    Sodium 142 135 - 145 MMOL/L    Potassium 3.7 3.5 - 5.1 MMOL/L    Chloride 100 99 - 110 mMol/L    CO2 24 21 - 32 MMOL/L    BUN 15 6 - 23 MG/DL    CREATININE 1.0 0.6 - 1.1 MG/DL    Glucose 191 (H) 70 - 99 MG/DL    Calcium 9.3 8.3 - 10.6 MG/DL    Alb 4.3 3.4 - 5.0 GM/DL    Total Protein 9.0 (H) 6.4 - 8.2 GM/DL    Total Bilirubin 1.0 0.0 - 1.0 MG/DL    ALT 20 10 - 40 U/L    AST 31 15 - 37 IU/L    Alkaline Phosphatase 115 40 - 129 IU/L    GFR Non- 57 (L) >60 mL/min/1.73m2    GFR African American >60 >60 mL/min/1.73m2    Anion Gap 18 (H) 4 - 16   Lipase   Result Value Ref Range    Lipase 14 13 - 60 IU/L   Urinalysis   Result Value Ref Range    Color, UA YELLOW YELLOW    Clarity, UA CLEAR CLEAR    Glucose, Urine NEGATIVE NEGATIVE MG/DL    Bilirubin Urine NEGATIVE NEGATIVE MG/DL    Ketones, Urine MODERATE (A) NEGATIVE MG/DL    Specific Gravity, UA 1.020 1.001 - 1.035    Blood, Urine NEGATIVE NEGATIVE    pH, Urine 7.0 5.0 - 8.0    Protein, UA >500 (HH) NEGATIVE MG/DL    Urobilinogen, Urine NORMAL 0.2 - 1.0 MG/DL    Nitrite Urine, Quantitative NEGATIVE NEGATIVE    Leukocyte Esterase, Urine NEGATIVE NEGATIVE    RBC, UA 3 0 - 6 /HPF    WBC, UA 1 0 - 5 /HPF    Bacteria, UA RARE (A) NEGATIVE /HPF    Squam Epithel, UA 2 /HPF    Mucus, UA RARE (A) NEGATIVE HPF    Trichomonas, UA NONE SEEN NONE SEEN /HPF    Hyaline Casts, UA 0 /LPF   Protime-INR   Result Value Ref Range    Protime 13.4 (H) 9.12 - 12.5 SECONDS    INR 1.18 INDEX   Lactic Acid, Plasma   Result Value Ref Range    Lactate (HH) 0.4 - 2.0 mMOL/L     5.0  LACT CALLED TO ED [facility] [place] [id number] BY PTPRATIK MT   RESULTS READ BACK     Urine Drug Screen   Result Value Ref Range    Cannabinoid Scrn, Ur UNCONFIRMED POSITIVE (A) NEGATIVE    Amphetamines NEGATIVE NEGATIVE    Cocaine Metabolite NEGATIVE NEGATIVE    Benzodiazepine Screen, Urine NEGATIVE NEGATIVE    Barbiturate Screen, Ur NEGATIVE NEGATIVE    Opiates, Urine NEGATIVE NEGATIVE    Phencyclidine, Urine NEGATIVE NEGATIVE    Oxycodone  NEGATIVE     NEGATIVE          THRESHOLD CONCENTRATIONS (mg/dL)  AMPHT               1000  SHORTY,OPIA             300  BZO,BAR              200  PCP                   25  THC                   50  OXY                  100          IF POSITIVE, SPECIMEN WILL BE  DISCARDED AFTER 6 MONTHS.   CALL LAB IF CONFIRMATION NEEDED. ALL NEGATIVE SPECIMENS WILL BE  DISCARDED AFTER ONE WEEK. * UNCONFIRMED POSITIVES MAY  NOT MEET FORENSIC REQUIREMENTS. Ethanol   Result Value Ref Range    Alcohol Scrn <0.01  THE VALUE IS BELOW OUR DETECTION LIMIT. <0.01 %WT/VOL   Lactic Acid, Plasma   Result Value Ref Range    Lactate 2.3 (HH) 0.4 - 2.0 mMOL/L           RADIOLOGY/PROCEDURES       CT ABDOMEN PELVIS W IV CONTRAST (Final result)   Result time 04/06/19 17:17:21   Final result by Lukas Martines MD (04/06/19 17:17:21)                Impression:    Circumferential wall thickening involving much of the colon may be due to  incomplete distention with infectious or inflammatory colitis not excluded. Filling defect within the right portal vein compatible with thrombus. Overall, the appearance of thrombus is less extensive compared to 10/25/2018    Cirrhotic changes of the liver with extensive gastric varices with a  splenorenal shunt demonstrated. Narrative:    EXAMINATION:  CT OF THE ABDOMEN AND PELVIS WITH CONTRAST 4/6/2019 4:45 pm    TECHNIQUE:  CT of the abdomen and pelvis was performed with the administration of  intravenous contrast. Multiplanar reformatted images are provided for review. Dose modulation, iterative reconstruction, and/or weight based adjustment of  the mA/kV was utilized to reduce the radiation dose to as low as reasonably  achievable. COMPARISON:  11/12/2018, 10/25/2018, 03/17/2017    HISTORY:  ORDERING SYSTEM PROVIDED HISTORY: luq abd pain  TECHNOLOGIST PROVIDED HISTORY:  Ordering Physician Provided Reason for Exam: luq abd pain  Acuity: Acute  Type of Exam: Initial  Additional signs and symptoms: vomiting  Relevant Medical/Surgical History: Hx Cirrhosis, Ericka, Hyster / Lorrine Due  [id number]    FINDINGS:  Lower Chest: Stable 9 mm nodule dating back to March 2017 now with some  internal calcification compatible with granuloma.  Lower lungs are otherwise  clear.     Organs: Somewhat nodular surface of the liver indicating cirrhotic changes. Significant gastric varices are demonstrated. Wilhelmena Rasher is also evidence of a  splenorenal shunt.  Filling defect within the right portal vein compatible  with portal vein thrombus.  Overall appearance is less extensive compared to  previous study. Spleen, pancreas, adrenal glands, and kidneys are unremarkable.  Gallbladder  surgically absent. GI/Bowel: No bowel obstruction.  Nonvisualized appendix.  Circumferential  wall thickening involving much of the colon may be due to incomplete  distention with infectious or inflammatory colitis not excluded. Pelvis: Urinary bladder is unremarkable.  Uterus surgically absent. Peritoneum/Retroperitoneum: No free intraperitoneal air, fluid, or  lymphadenopathy by size criteria. Bones/Soft Tissues: Osseous structures appear unremarkable. ED COURSE & MEDICAL DECISION MAKING       Vital signs and nursing notes reviewed during ED course. Patient care and presentation staffed with supervising MD.  Dr. Montez Aguero Patient seen by supervising MD today- see his/her note for details of the encounter. All pertinent Lab data and radiographic results reviewed with patient at bedside. The patient and/or the family were informed of the results of any tests/labs/imaging, the treatment plan, and time was allotted to answer questions. Pt presents as above. Vitals today show the pt is afebrile. 95% on room air. Tachycardic. Labs and imaging ordered. Patient given antiemetics and fluids. CBC with leukocytosis 16.1 hemoglobin 16.2 CMP with slightly elevated anion gap 18, glucose at 191. Lipase normal. Urin not appear to be infected the patient does appear dehydrated. Lactic acid 5. Urine drug screen positive for cannabinoids. Serum alcohol negative. CT of the abdomen and pelvis shows colitis with wall thickening. There is repeat evidence of the portal vein thrombosis.  An cirrhotic changes noted. At this time I do think would be best to admit patient for nausea vomiting dehydration in the setting of colitis. Discussed possibility of marijuana caused symptoms. We will admit to the hospitalist for further management. Patient comfortable with this workup and plan.          ----------------------------------------------------------                   Clinical  IMPRESSION    1. Non-intractable vomiting with nausea, unspecified vomiting type    2. Left upper quadrant pain    3. Elevated lactic acid level    4. Leukocytosis, unspecified type          Admission      Comment: Please note this report has been produced using speech recognition software and may contain errors related to that system including errors in grammar, punctuation, and spelling, as well as words and phrases that may be inappropriate. If there are any questions or concerns please feel free to contact the dictating provider for clarification.        Malissa Schultz PA-C  04/06/19 1825

## 2019-04-06 NOTE — ED NOTES
Patient yelling out for pain medications.  Patient was medicated as per order      Justino Bowen RN  04/06/19 0903

## 2019-04-07 LAB
ALBUMIN SERPL-MCNC: 4 GM/DL (ref 3.4–5)
ALP BLD-CCNC: 103 IU/L (ref 40–128)
ALT SERPL-CCNC: 17 U/L (ref 10–40)
ANION GAP SERPL CALCULATED.3IONS-SCNC: 12 MMOL/L (ref 4–16)
AST SERPL-CCNC: 33 IU/L (ref 15–37)
BILIRUB SERPL-MCNC: 0.9 MG/DL (ref 0–1)
BUN BLDV-MCNC: 12 MG/DL (ref 6–23)
CALCIUM SERPL-MCNC: 8.7 MG/DL (ref 8.3–10.6)
CHLORIDE BLD-SCNC: 104 MMOL/L (ref 99–110)
CO2: 26 MMOL/L (ref 21–32)
CREAT SERPL-MCNC: 0.9 MG/DL (ref 0.6–1.1)
ESTIMATED AVERAGE GLUCOSE: 128 MG/DL
GFR AFRICAN AMERICAN: >60 ML/MIN/1.73M2
GFR NON-AFRICAN AMERICAN: >60 ML/MIN/1.73M2
GLUCOSE BLD-MCNC: 112 MG/DL (ref 70–99)
GLUCOSE BLD-MCNC: 124 MG/DL (ref 70–99)
GLUCOSE BLD-MCNC: 141 MG/DL (ref 70–99)
GLUCOSE BLD-MCNC: 160 MG/DL (ref 70–99)
GLUCOSE BLD-MCNC: 170 MG/DL (ref 70–99)
HAV IGM SER IA-ACNC: NON REACTIVE
HBA1C MFR BLD: 6.1 % (ref 4.2–6.3)
HCT VFR BLD CALC: 46.7 % (ref 37–47)
HEMOGLOBIN: 15.4 GM/DL (ref 12.5–16)
HEPATITIS B CORE IGM ANTIBODY: NON REACTIVE
HEPATITIS B SURFACE ANTIGEN: ABNORMAL
HEPATITIS C ANTIBODY: ABNORMAL
LACTATE: 1.4 MMOL/L (ref 0.4–2)
MAGNESIUM: 1.9 MG/DL (ref 1.8–2.4)
MCH RBC QN AUTO: 31.6 PG (ref 27–31)
MCHC RBC AUTO-ENTMCNC: 33 % (ref 32–36)
MCV RBC AUTO: 95.7 FL (ref 78–100)
PDW BLD-RTO: 13.7 % (ref 11.7–14.9)
PLATELET # BLD: 91 K/CU MM (ref 140–440)
PMV BLD AUTO: 12.1 FL (ref 7.5–11.1)
POTASSIUM SERPL-SCNC: 3.4 MMOL/L (ref 3.5–5.1)
RBC # BLD: 4.88 M/CU MM (ref 4.2–5.4)
SODIUM BLD-SCNC: 142 MMOL/L (ref 135–145)
TOTAL PROTEIN: 7.6 GM/DL (ref 6.4–8.2)
WBC # BLD: 11.5 K/CU MM (ref 4–10.5)

## 2019-04-07 PROCEDURE — G0378 HOSPITAL OBSERVATION PER HR: HCPCS

## 2019-04-07 PROCEDURE — 96361 HYDRATE IV INFUSION ADD-ON: CPT

## 2019-04-07 PROCEDURE — 80053 COMPREHEN METABOLIC PANEL: CPT

## 2019-04-07 PROCEDURE — 82962 GLUCOSE BLOOD TEST: CPT

## 2019-04-07 PROCEDURE — 96376 TX/PRO/DX INJ SAME DRUG ADON: CPT

## 2019-04-07 PROCEDURE — 96365 THER/PROPH/DIAG IV INF INIT: CPT

## 2019-04-07 PROCEDURE — 80074 ACUTE HEPATITIS PANEL: CPT

## 2019-04-07 PROCEDURE — 82105 ALPHA-FETOPROTEIN SERUM: CPT

## 2019-04-07 PROCEDURE — 96366 THER/PROPH/DIAG IV INF ADDON: CPT

## 2019-04-07 PROCEDURE — 6360000002 HC RX W HCPCS: Performed by: NURSE PRACTITIONER

## 2019-04-07 PROCEDURE — 1200000000 HC SEMI PRIVATE

## 2019-04-07 PROCEDURE — 96372 THER/PROPH/DIAG INJ SC/IM: CPT

## 2019-04-07 PROCEDURE — 36415 COLL VENOUS BLD VENIPUNCTURE: CPT

## 2019-04-07 PROCEDURE — 83036 HEMOGLOBIN GLYCOSYLATED A1C: CPT

## 2019-04-07 PROCEDURE — 83605 ASSAY OF LACTIC ACID: CPT

## 2019-04-07 PROCEDURE — 85027 COMPLETE CBC AUTOMATED: CPT

## 2019-04-07 PROCEDURE — 2580000003 HC RX 258: Performed by: NURSE PRACTITIONER

## 2019-04-07 PROCEDURE — 87341 HEP B SURFACE AG NEUTRLZJ IA: CPT

## 2019-04-07 PROCEDURE — 2500000003 HC RX 250 WO HCPCS: Performed by: INTERNAL MEDICINE

## 2019-04-07 PROCEDURE — 83735 ASSAY OF MAGNESIUM: CPT

## 2019-04-07 RX ORDER — MORPHINE SULFATE 4 MG/ML
2 INJECTION, SOLUTION INTRAMUSCULAR; INTRAVENOUS ONCE
Status: COMPLETED | OUTPATIENT
Start: 2019-04-07 | End: 2019-04-07

## 2019-04-07 RX ORDER — OXYCODONE HYDROCHLORIDE 5 MG/1
5 TABLET ORAL 4 TIMES DAILY
Status: DISCONTINUED | OUTPATIENT
Start: 2019-04-07 | End: 2019-04-08

## 2019-04-07 RX ORDER — POTASSIUM CHLORIDE 20 MEQ/1
40 TABLET, EXTENDED RELEASE ORAL PRN
Status: DISCONTINUED | OUTPATIENT
Start: 2019-04-07 | End: 2019-04-09 | Stop reason: HOSPADM

## 2019-04-07 RX ORDER — POTASSIUM CHLORIDE 7.45 MG/ML
10 INJECTION INTRAVENOUS PRN
Status: DISCONTINUED | OUTPATIENT
Start: 2019-04-07 | End: 2019-04-09 | Stop reason: HOSPADM

## 2019-04-07 RX ORDER — POTASSIUM CHLORIDE 1.5 G/1.77G
40 POWDER, FOR SOLUTION ORAL PRN
Status: DISCONTINUED | OUTPATIENT
Start: 2019-04-07 | End: 2019-04-09 | Stop reason: HOSPADM

## 2019-04-07 RX ADMIN — ONDANSETRON 4 MG: 2 INJECTION INTRAMUSCULAR; INTRAVENOUS at 01:14

## 2019-04-07 RX ADMIN — TAZOBACTAM SODIUM AND PIPERACILLIN SODIUM 3.38 G: 375; 3 INJECTION, SOLUTION INTRAVENOUS at 16:59

## 2019-04-07 RX ADMIN — PROMETHAZINE HYDROCHLORIDE 25 MG: 25 INJECTION INTRAMUSCULAR; INTRAVENOUS at 21:38

## 2019-04-07 RX ADMIN — SODIUM CHLORIDE: 9 INJECTION, SOLUTION INTRAVENOUS at 16:59

## 2019-04-07 RX ADMIN — MORPHINE SULFATE 2 MG: 4 INJECTION INTRAVENOUS at 10:30

## 2019-04-07 RX ADMIN — MORPHINE SULFATE 2 MG: 4 INJECTION INTRAVENOUS at 01:14

## 2019-04-07 RX ADMIN — MORPHINE SULFATE 2 MG: 4 INJECTION INTRAVENOUS at 06:23

## 2019-04-07 RX ADMIN — MORPHINE SULFATE 2 MG: 4 INJECTION INTRAVENOUS at 21:33

## 2019-04-07 RX ADMIN — SODIUM CHLORIDE: 9 INJECTION, SOLUTION INTRAVENOUS at 08:06

## 2019-04-07 RX ADMIN — TAZOBACTAM SODIUM AND PIPERACILLIN SODIUM 3.38 G: 375; 3 INJECTION, SOLUTION INTRAVENOUS at 10:27

## 2019-04-07 RX ADMIN — MORPHINE SULFATE 2 MG: 4 INJECTION INTRAVENOUS at 16:22

## 2019-04-07 RX ADMIN — ONDANSETRON 4 MG: 2 INJECTION INTRAMUSCULAR; INTRAVENOUS at 17:03

## 2019-04-07 ASSESSMENT — PAIN SCALES - GENERAL
PAINLEVEL_OUTOF10: 7
PAINLEVEL_OUTOF10: 7
PAINLEVEL_OUTOF10: 4
PAINLEVEL_OUTOF10: 7
PAINLEVEL_OUTOF10: 5
PAINLEVEL_OUTOF10: 8
PAINLEVEL_OUTOF10: 8

## 2019-04-07 ASSESSMENT — PAIN - FUNCTIONAL ASSESSMENT
PAIN_FUNCTIONAL_ASSESSMENT: PREVENTS OR INTERFERES SOME ACTIVE ACTIVITIES AND ADLS
PAIN_FUNCTIONAL_ASSESSMENT: PREVENTS OR INTERFERES SOME ACTIVE ACTIVITIES AND ADLS

## 2019-04-07 ASSESSMENT — PAIN DESCRIPTION - ORIENTATION
ORIENTATION: MID;LOWER
ORIENTATION: MID;LOWER

## 2019-04-07 ASSESSMENT — PAIN DESCRIPTION - PROGRESSION
CLINICAL_PROGRESSION: NOT CHANGED
CLINICAL_PROGRESSION: NOT CHANGED

## 2019-04-07 ASSESSMENT — PAIN DESCRIPTION - ONSET
ONSET: ON-GOING
ONSET: ON-GOING

## 2019-04-07 ASSESSMENT — PAIN DESCRIPTION - FREQUENCY
FREQUENCY: CONTINUOUS
FREQUENCY: CONTINUOUS

## 2019-04-07 ASSESSMENT — PAIN DESCRIPTION - DESCRIPTORS
DESCRIPTORS: ACHING;CONSTANT;DISCOMFORT
DESCRIPTORS: ACHING;CONSTANT;DISCOMFORT

## 2019-04-07 ASSESSMENT — PAIN DESCRIPTION - LOCATION
LOCATION: ABDOMEN;BACK
LOCATION: ABDOMEN

## 2019-04-07 ASSESSMENT — PAIN DESCRIPTION - PAIN TYPE
TYPE: ACUTE PAIN
TYPE: ACUTE PAIN

## 2019-04-07 NOTE — PROGRESS NOTES
Hospitalist Progress Note      Name:  Rica Bentley /Age/Sex: 1960  (66 y.o. female)   MRN & CSN:  [id number] & [id number] Admission Date/Time: 2019  3:24 PM   Location:  [address] [place] [address] PCP: Giovanni Cockayne, MD         Hospital Day: 2    Assessment and Plan:   Rica Bentley is a 66 y.o.  female  who presents with nausea, vomiting and abdominal pain          Abdominal pain with intractable nausea and vomiting due to Acute colitis vs cannabinoid induced. Sepsis resolved # Elevated lactate, trending down  UDS positive for cannabinoid  -CT abdomen: Circumferential wall thickening involving much of the colon may be due to incomplete distention with infectious or inflammatory colitis not excluded. -Blood culture pending   -IVF  -Zosyn D1  -Pain control  -Antiemetic  -Monitor electrolyte and replete  -Consult GI     Alcohol cirrhosis with extensive gastric varices  Last drink one day before admission  CT abdomen: Cirrhotic changes of the liver with extensive gastric varices with a splenorenal shunt demonstrated. -Denies bleed  -CIWA: Ativan  -Seizure precaution  -GI consult    Mild hypokalemia  Replace     Chronic  Right portal vein thrombus   -Monitor     Type 2 DM: Last A1C. Lantus, Sliding scale. Hypoglycemia protocol. Accucheck. Hold oral hypoglycemic agents for now       Chronic hepatitis C  Follow up liver clinic      Tobacco abuse  -Educated about cessation      Thrombocytopenia  -INR 1.18  -Monitor for s/s bleed      Chronic treatment continued per home medications unless  contraindicated by above plan and assessment. The above assessment/plan has been explained to the patient, who indicated understanding.       Diet Diet NPO Effective Now Exceptions are: Ice Chips   DVT Prophylaxis SCDs   GI Prophylaxis PPI   Code Status Full Code             History of Present Illness:     Chief Complaint:     Rica Bentley is a 66 y.o.  female  who presents with 10/10 left side abdominal pain for several sodium chloride flush  10 mL Intravenous 2 times per day    multivitamin  1 tablet Oral Daily    folic acid  1 mg Oral Daily    thiamine  100 mg Oral Daily    sodium chloride flush  10 mL Intravenous 2 times per day      Infusions:    sodium chloride 100 mL/hr at 04/07/19 0806    dextrose       PRN Meds:   albuterol sulfate HFA 2 puff Q6H PRN   hydrOXYzine 50 mg TID PRN   nitroGLYCERIN 0.4 mg Q5 Min PRN   ondansetron 4 mg Q8H PRN   sodium chloride flush 10 mL PRN   ondansetron 4 mg Q6H PRN   senna 1 tablet Daily PRN   glucose 15 g PRN   dextrose 12.5 g PRN   glucagon (rDNA) 1 mg PRN   dextrose 100 mL/hr PRN   morphine 1 mg Q4H PRN   Or     morphine 2 mg Q4H PRN   sodium chloride flush 10 mL PRN   LORazepam 1 mg Q1H PRN   Or     LORazepam 1 mg Q1H PRN   Or     LORazepam 2 mg Q1H PRN   Or     LORazepam 2 mg Q1H PRN   Or     LORazepam 3 mg Q1H PRN   Or     LORazepam 3 mg Q1H PRN   Or     LORazepam 4 mg Q1H PRN   Or     LORazepam 4 mg Q1H PRN   sodium chloride flush 10 mL PRN   promethazine 25 mg Q4H PRN         Electronically signed by Janet Vizcaino MD on 4/7/2019 at 8:22 AM

## 2019-04-07 NOTE — PROGRESS NOTES
Pt's nurse assistant was taking pt to restroom when she stated that she was thirsty and wanted something to drink. Pt was informed that her diet was npo and she would not be able to do so. Thus, pt drank from the sink in the bathroom stating that if she wanted a drink, she would have one.

## 2019-04-07 NOTE — PLAN OF CARE
Problem: Falls - Risk of:  Goal: Will remain free from falls  Description  Will remain free from falls  Outcome: Met This Shift  Goal: Absence of physical injury  Description  Absence of physical injury  Outcome: Met This Shift     Problem: Pain:  Goal: Pain level will decrease  Description  Pain level will decrease  Outcome: Ongoing  Goal: Control of acute pain  Description  Control of acute pain  Outcome: Ongoing  Goal: Control of chronic pain  Description  Control of chronic pain  Outcome: Ongoing

## 2019-04-07 NOTE — PROGRESS NOTES
Sepsis criteria met will place under sepsis ellen chol per   Lactate 2.3,down from 5 , today .    Redrawn @19/24, pending results, blood c/s pending,

## 2019-04-07 NOTE — CONSULTS
[facility]  Gastroenterology Consultation    2019  4:36 PM    Patient:    King Rasta  : 1960   66 y.o. MRN: [id number]  Admitted: 2019  3:24 PM ATT: Ozzie Calix MD   [id number]  AdmitDx: Left upper quadrant pain [R10.12]  Acute colitis [K52.9]  Elevated lactic acid level [R79.89]  Leukocytosis, unspecified type [D72.829]  Non-intractable vomiting with nausea, unspecified vomiting type [R11.2]  PCP: Pilo Mark MD    Reason for Consult:  Colitis in setting of alcohol cirrhosis w/ extensive varices    Requesting Physician:  Ozzie Calix MD      History Obtained From:  Patient and review of all records    HISTORY OF PRESENT ILLNESS:                The patient is a 66 y.o. female with significant past medical history as below who presents with above mentioned causes in reason for consult. Presented to [facility] for n/v, abd pain. Hx cirrhosis, varix, and alcohol abuse. Started 2 days ago after drinking 4 mixed drinks. Went home and woke up with n/v. Vomiting green bile. Last vomited this am.  Nausea has improved. Has had diarrhea. Takes Lactulose as needed at home. Lost to fu in office. Sick contact with vomiting and diarrhea. Abdominal pain, diffuse, improved  N/V, GERD, dyspepsia, dysphagia   Last BM this am, brown/loose    History of EGD 18  Esophagitis, PHG, ? Esophageal varices  No gastric varices  Questionable esophageal varices      History of colonoscopy 3/2017 per Dr. Adalid Garcia.  Ascending biopsy - Fragments of colonic mucosa showing focal acute    Smoker daily  +marijuana daily  Alcohol intake 4 mixed drinks 2 days ago,prior 2018    Past Medical History:        Diagnosis Date    Acid reflux     Arthritis     left knee    CAD (coronary artery disease)     per [facility] 13    COPD (chronic obstructive pulmonary disease) (HCC)     follow with Dr Sharmin Schwartz Depression     \"have manic - depression see Dr Joleen Keys Diabetes mellitus ([facility] dx 10+ yrs ago-     Drug abuse ([address]     hx use of cocaine, heroin and marijuana- states last used 12/2014    Glaucoma     left eye    H/O Doppler lower venous ultrasound 04/04/2019    H/O Doppler ultrasound 7/22/15    CAROTID: WNL. Normal vertebral flows bilaterally.      Hepatitis C     for liver bx 12/3/2015\"Have Hepatitis B and C and saw Dr Dickson Samayoa for this 12/1/2015\"    Hiatal hernia     History of alcohol abuse     HTN (hypertension)     \"for the past two yrs on medication\" follows with Dr Ashwini Palomares Hyperlipemia     Irregular heart beat     per pt    Liver hematoma     Migraines     Nausea & vomiting     Schizophrenia ([facility]     per old chart    Seizures ([facility]     \"last one was 9/2015- saw Dr Latesha Wesley at [facility]- she said not sure if acutal seizures- she thinks they are panic or anxiety attacks\"    SOB (shortness of breath)     Stress bladder incontinence, female        Past Surgical History:        Procedure Laterality Date    BREAST SURGERY  10/2015    left breast bx    CARDIAC CATHETERIZATION      per old chart pt had cath done in 3/2011 and 9/2013    CHOLECYSTECTOMY  per old chart done [facility]  3/11/13    diverticulosis, cecal polyp    COLONOSCOPY  03/16/2017    Internal hemorrhoids    ENDOSCOPY, COLON, DIAGNOSTIC  03/16/2017    EGD: Small esophageal varices, portal hypertensive gastropathy, reflux esophagitis, hiatal hernia    EYE SURGERY Bilateral ? when    cyst removal, cataracts    HYSTERECTOMY      per old chart pt had CHOLO/BSO 1986    TONSILLECTOMY  as a kid    UPPER GASTROINTESTINAL ENDOSCOPY N/A 11/14/2018    EGD DIAGNOSTIC ONLY performed by Berry Chambers MD at [facility] ENDOSCOPY         Current Medications:    Medications    Scheduled Medications:    piperacillin-tazobactam  3.375 g Intravenous Q8H    busPIRone  15 mg Oral BID    FLUoxetine  40 mg Oral Daily    lactulose  10 g Oral Daily    metoprolol tartrate  25 mg Oral BID    nicotine  1 patch Transdermal Q24H    pantoprazole  20 mg Oral BID AC    QUEtiapine  300 mg Oral Nightly    ranolazine  500 mg Oral BID    sodium chloride flush  10 mL Intravenous 2 times per day    insulin lispro  0-6 Units Subcutaneous TID WC    insulin lispro  0-3 Units Subcutaneous Nightly    sodium chloride flush  10 mL Intravenous 2 times per day    multivitamin  1 tablet Oral Daily    folic acid  1 mg Oral Daily    thiamine  100 mg Oral Daily    sodium chloride flush  10 mL Intravenous 2 times per day     PRN Medications: potassium chloride **OR** potassium alternative oral replacement **OR** potassium chloride, albuterol sulfate HFA, hydrOXYzine, nitroGLYCERIN, ondansetron, sodium chloride flush, ondansetron, senna, glucose, dextrose, glucagon (rDNA), dextrose, morphine **OR** morphine, sodium chloride flush, LORazepam **OR** LORazepam **OR** LORazepam **OR** LORazepam **OR** LORazepam **OR** LORazepam **OR** LORazepam **OR** LORazepam, sodium chloride flush, promethazine      Allergies:  Norco [hydrocodone-acetaminophen] and Tylenol [acetaminophen]    Social History:   TOBACCO:   reports that she has been smoking cigarettes. She has a 20.00 pack-year smoking history. She has never used smokeless tobacco.  ETOH:   reports that she drinks about 1.2 - 1.8 oz of alcohol per week. Family History:       Problem Relation Age of Onset    Cancer Mother         lung ca    Arthritis Mother     Migraines Mother     Cancer Father         colon ca    Diabetes Father     High Blood Pressure Father     Arthritis Father     High Cholesterol Father     Migraines Father     Migraines Sister     Heart Disease Brother         WPW     No family history of colon cancer, Crohn's disease, or ulcerative colitis.     REVIEW OF SYSTEMS:    The positive ROS will be identified in bold, otherwise ROS are negative     CONSTITUTIONAL:  Neg   Recent weight changes, fatigue, fever, chills or night sweats  RESPIRATORY:   Neg SOB, wheeze, cough, sputum, hemoptysis or bronchitis  CARDIOVASCULAR:  Neg chest pain, palpitations, dyspnea on exertion, orthopnea, paroxysmal nocturnal dyspnea or edema  GASTROINTESTINAL:  SEE HPI  HEMATOLOGIC/LYMPHATIC:  Neg  Anemia, bleeding tendency    PHYSICAL EXAM:      Vitals:    /73   Pulse 100   Temp 98.5 °F (36.9 °C) (Oral)   Resp 16   Ht 4' 9.5\" (1.461 m)   Wt 157 lb (71.2 kg)   SpO2 97%   BMI 33.39 kg/m²     General Appearance:    Alert, cooperative, no distress, appears stated age   [de-identified]:    Normocephalic, atraumatic   Neck:   Supple, symmetrical, trachea midline   Lungs:     Diminshed bilaterally, respirations unlabored   Chest Wall:    No tenderness or deformity    Heart:    Regular rate and rhythm, S1 and S2 normal   Abdomen:     Soft, mild tenderness, bowel sounds active all four quadrants,     no masses, no organomegaly, no ascites    Rectal:    Deferred   Extremities:   Extremities normal, atraumatic, no cyanosis, 1+ pitting edema   Pulses:   2+ and symmetric all extremities   Skin:   Skin color, texture, turgor normal, no rashes or lesions   Lymph nodes:   No abnormality   Neurologic:   No focal deficits, moving all four extremities            DATA:    ABGs:   Lab Results   Component Value Date    PO2ART 50 04/27/2012     CBC:   Recent Labs     04/06/19  1600 04/07/19  0607   WBC 16.1* 11.5*   HGB 16.2* 15.4   * 91*     BMP:    Recent Labs     04/06/19  1600 04/07/19  0607    142   K 3.7 3.4*    104   CO2 24 26   BUN 15 12   CREATININE 1.0 0.9   GLUCOSE 191* 170*     Magnesium:   Lab Results   Component Value Date    MG 1.9 04/07/2019     Hepatic:   Recent Labs     04/06/19  1600 04/07/19  0607   AST 31 33   ALT 20 17   BILITOT 1.0 0.9   ALKPHOS 115 103     Recent Labs     04/06/19  1600   LIPASE 14     Recent Labs     04/06/19  1600   PROTIME 13.4*   INR 1.18     No results for input(s): PTT in the last 72 hours.   Lipids: No results for input(s): CHOL, HDL in the last 72 hours.     Invalid input(s): LDLCALCU  INR:   Recent Labs     04/06/19  1600   INR 1.18     TSH:   Lab Results   Component Value Date    TSH 1.73 08/01/2017       Intake/Output Summary (Last 24 hours) at 4/7/2019 1636  Last data filed at 4/7/2019 1027  Gross per 24 hour   Intake 1766.67 ml   Output 2975 ml   Net -1208.33 ml      sodium chloride 100 mL/hr at 04/07/19 0806    dextrose         Imaging Studies: Reviewed      IMPRESSION:      Patient Active Problem List   Diagnosis Code    Left arm pain M79.602    Type 2 diabetes mellitus with complication, with long-term current use of insulin (HCC) E11.8, Z79.4    Acid reflux K21.9    COPD, mild (HCC) J44.9    Tobacco abuse Z72.0    Angina effort (HCC) I20.8    Abnormal nuclear cardiac imaging test R93.1    Lung nodule R91.1    Chest pain R07.9    Dyslipidemia E78.5    Pancolitis (HCC) K51.00    Hematemesis without nausea B75.4    Alcoholic cirrhosis of liver without ascites (HCC) K70.30    Thrombocytopenia (HCC) G28.4    Periumbilical abdominal pain R10.33    History of colonic polyps Z86.010    Abnormal findings on diagnostic imaging of 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 Infectious vs colits  May be portal gastropathy  Afebrile  Leukocytosis, blood cultures pending  Abd pain may be related to cyclic N/V s/t +marijuana  CT 4/6/9  Circumferential wall thickening involving much of the colon may be due to   incomplete distention with infectious or inflammatory colitis not excluded.       Filling defect within the right portal vein compatible with thrombus. Overall, the appearance of thrombus is less extensive compared to 10/25/2018       Cirrhotic changes of the liver with extensive gastric varices with a   splenorenal shunt demonstrated. Compensated cirrhosis  S/T alcohol, hx Hep B & Hep C  LFT's, INR, albumin normal  Thrombocytopenia  Known PVT- decreased in size    RECOMMENDATIONS:  AFP  Treat symptomatically  Pain medication as needed  Antiemetics as needed  Lactulose 10g daily as needed, goal 2 soft BM's daily  Continue Protonix 40 mg BID  Continue multivitamin and B1   Continue IVF  NPO  Continue Zosyn     Discussed plan of care with patient      Patient clinical, biochemical, and radiological information discussed with Dr. Elizabeth Pratt. He agrees with the assessment and plan. Rossy Moore CNP  4/7/2019  4:36 PM     Pt seen 4/7/19. She is well known to us. This is typical presentation that she has few times a year. She has an episode of nocturnal reflux that leads to n/v. Advised to avoid alcohol and emphasized importance of taking her evening PPI. Has thickened colon likely s/t portal hypertensive colopathy. Will evaluate for vasculitis and less likely HAE or AIP as cause of her symptoms. ADAT, possibly d/c this afternoon if tolerating diet. I have seen and examined the patient myself. I have reviewed the hospital care given to date and reviewed all pertinent labs and imaging. The plan was developed mutually at the time of visit with the patient, Rossy Moore CNP and myself.  I have spoken with the patient, nursing staff and provided written and verbal instructions. The above note has been reviewed and I agree with the assessment, diagnosis, and treatment plan with as suggested by Jl Dorsey CNP with changes made by me as above.     [address] [facility]

## 2019-04-08 LAB
CORTISOL - AM: 28.9 UG/DL (ref 6–18.4)
ERYTHROCYTE SEDIMENTATION RATE: 22 MM/HR (ref 0–30)
GLUCOSE BLD-MCNC: 120 MG/DL (ref 70–99)
GLUCOSE BLD-MCNC: 126 MG/DL (ref 70–99)
GLUCOSE BLD-MCNC: 131 MG/DL (ref 70–99)
GLUCOSE BLD-MCNC: 147 MG/DL (ref 70–99)
HIGH SENSITIVE C-REACTIVE PROTEIN: 12.7 MG/L
REASON FOR REJECTION: NORMAL
REASON FOR REJECTION: NORMAL
REJECTED TEST: NORMAL
SOURCE: NORMAL

## 2019-04-08 PROCEDURE — 82533 TOTAL CORTISOL: CPT

## 2019-04-08 PROCEDURE — 6360000002 HC RX W HCPCS: Performed by: NURSE PRACTITIONER

## 2019-04-08 PROCEDURE — 85652 RBC SED RATE AUTOMATED: CPT

## 2019-04-08 PROCEDURE — 82962 GLUCOSE BLOOD TEST: CPT

## 2019-04-08 PROCEDURE — 86160 COMPLEMENT ANTIGEN: CPT

## 2019-04-08 PROCEDURE — 96376 TX/PRO/DX INJ SAME DRUG ADON: CPT

## 2019-04-08 PROCEDURE — 86038 ANTINUCLEAR ANTIBODIES: CPT

## 2019-04-08 PROCEDURE — 96361 HYDRATE IV INFUSION ADD-ON: CPT

## 2019-04-08 PROCEDURE — 84110 ASSAY OF PORPHOBILINOGEN: CPT

## 2019-04-08 PROCEDURE — G0378 HOSPITAL OBSERVATION PER HR: HCPCS

## 2019-04-08 PROCEDURE — 6360000002 HC RX W HCPCS: Performed by: INTERNAL MEDICINE

## 2019-04-08 PROCEDURE — 2580000003 HC RX 258: Performed by: INTERNAL MEDICINE

## 2019-04-08 PROCEDURE — 96366 THER/PROPH/DIAG IV INF ADDON: CPT

## 2019-04-08 PROCEDURE — 86141 C-REACTIVE PROTEIN HS: CPT

## 2019-04-08 PROCEDURE — 96375 TX/PRO/DX INJ NEW DRUG ADDON: CPT

## 2019-04-08 PROCEDURE — 1200000000 HC SEMI PRIVATE

## 2019-04-08 PROCEDURE — 2500000003 HC RX 250 WO HCPCS: Performed by: INTERNAL MEDICINE

## 2019-04-08 PROCEDURE — 6370000000 HC RX 637 (ALT 250 FOR IP): Performed by: NURSE PRACTITIONER

## 2019-04-08 PROCEDURE — 36415 COLL VENOUS BLD VENIPUNCTURE: CPT

## 2019-04-08 PROCEDURE — 86332 IMMUNE COMPLEX ASSAY: CPT

## 2019-04-08 PROCEDURE — 86255 FLUORESCENT ANTIBODY SCREEN: CPT

## 2019-04-08 PROCEDURE — 2580000003 HC RX 258: Performed by: NURSE PRACTITIONER

## 2019-04-08 PROCEDURE — 6360000002 HC RX W HCPCS: Performed by: HOSPITALIST

## 2019-04-08 RX ORDER — HYDROMORPHONE HCL 110MG/55ML
0.5 PATIENT CONTROLLED ANALGESIA SYRINGE INTRAVENOUS EVERY 6 HOURS PRN
Status: DISCONTINUED | OUTPATIENT
Start: 2019-04-08 | End: 2019-04-09 | Stop reason: HOSPADM

## 2019-04-08 RX ORDER — HYDROMORPHONE HCL 110MG/55ML
0.5 PATIENT CONTROLLED ANALGESIA SYRINGE INTRAVENOUS ONCE
Status: COMPLETED | OUTPATIENT
Start: 2019-04-08 | End: 2019-04-08

## 2019-04-08 RX ADMIN — ONDANSETRON 4 MG: 2 INJECTION INTRAMUSCULAR; INTRAVENOUS at 02:29

## 2019-04-08 RX ADMIN — METOPROLOL TARTRATE 25 MG: 25 TABLET ORAL at 22:08

## 2019-04-08 RX ADMIN — SODIUM CHLORIDE: 9 INJECTION, SOLUTION INTRAVENOUS at 22:07

## 2019-04-08 RX ADMIN — RANOLAZINE 500 MG: 500 TABLET, FILM COATED, EXTENDED RELEASE ORAL at 10:16

## 2019-04-08 RX ADMIN — HYDROMORPHONE HYDROCHLORIDE 0.5 MG: 2 INJECTION, SOLUTION INTRAMUSCULAR; INTRAVENOUS; SUBCUTANEOUS at 06:56

## 2019-04-08 RX ADMIN — BUSPIRONE HYDROCHLORIDE 15 MG: 7.5 TABLET ORAL at 10:09

## 2019-04-08 RX ADMIN — THERA TABS 1 TABLET: TAB at 10:25

## 2019-04-08 RX ADMIN — FOLIC ACID 1 MG: 1 TABLET ORAL at 10:13

## 2019-04-08 RX ADMIN — TAZOBACTAM SODIUM AND PIPERACILLIN SODIUM 3.38 G: 375; 3 INJECTION, SOLUTION INTRAVENOUS at 10:15

## 2019-04-08 RX ADMIN — HYDROMORPHONE HYDROCHLORIDE 0.5 MG: 2 INJECTION, SOLUTION INTRAMUSCULAR; INTRAVENOUS; SUBCUTANEOUS at 13:12

## 2019-04-08 RX ADMIN — Medication 100 MG: at 10:16

## 2019-04-08 RX ADMIN — QUETIAPINE FUMARATE 300 MG: 200 TABLET ORAL at 22:08

## 2019-04-08 RX ADMIN — SODIUM CHLORIDE: 9 INJECTION, SOLUTION INTRAVENOUS at 13:14

## 2019-04-08 RX ADMIN — LACTULOSE 10 G: 10 SOLUTION ORAL at 10:13

## 2019-04-08 RX ADMIN — BUSPIRONE HYDROCHLORIDE 15 MG: 7.5 TABLET ORAL at 22:08

## 2019-04-08 RX ADMIN — RANOLAZINE 500 MG: 500 TABLET, FILM COATED, EXTENDED RELEASE ORAL at 22:08

## 2019-04-08 RX ADMIN — TAZOBACTAM SODIUM AND PIPERACILLIN SODIUM 3.38 G: 375; 3 INJECTION, SOLUTION INTRAVENOUS at 17:24

## 2019-04-08 RX ADMIN — INSULIN LISPRO 1 UNITS: 100 INJECTION, SOLUTION INTRAVENOUS; SUBCUTANEOUS at 12:33

## 2019-04-08 RX ADMIN — TAZOBACTAM SODIUM AND PIPERACILLIN SODIUM 3.38 G: 375; 3 INJECTION, SOLUTION INTRAVENOUS at 02:08

## 2019-04-08 RX ADMIN — FLUOXETINE 40 MG: 20 CAPSULE ORAL at 10:09

## 2019-04-08 RX ADMIN — METOPROLOL TARTRATE 25 MG: 25 TABLET ORAL at 10:14

## 2019-04-08 RX ADMIN — PANTOPRAZOLE SODIUM 20 MG: 20 TABLET, DELAYED RELEASE ORAL at 17:23

## 2019-04-08 RX ADMIN — HYDROMORPHONE HYDROCHLORIDE 0.5 MG: 2 INJECTION, SOLUTION INTRAMUSCULAR; INTRAVENOUS; SUBCUTANEOUS at 19:28

## 2019-04-08 RX ADMIN — SODIUM CHLORIDE: 9 INJECTION, SOLUTION INTRAVENOUS at 02:13

## 2019-04-08 ASSESSMENT — PAIN SCALES - GENERAL
PAINLEVEL_OUTOF10: 8
PAINLEVEL_OUTOF10: 7
PAINLEVEL_OUTOF10: 3
PAINLEVEL_OUTOF10: 8
PAINLEVEL_OUTOF10: 0
PAINLEVEL_OUTOF10: 0

## 2019-04-08 ASSESSMENT — PAIN DESCRIPTION - DESCRIPTORS: DESCRIPTORS: ACHING

## 2019-04-08 ASSESSMENT — PAIN DESCRIPTION - PROGRESSION: CLINICAL_PROGRESSION: GRADUALLY WORSENING

## 2019-04-08 ASSESSMENT — PAIN DESCRIPTION - FREQUENCY: FREQUENCY: CONTINUOUS

## 2019-04-08 ASSESSMENT — PAIN DESCRIPTION - ORIENTATION: ORIENTATION: RIGHT

## 2019-04-08 ASSESSMENT — PAIN DESCRIPTION - PAIN TYPE: TYPE: ACUTE PAIN

## 2019-04-08 ASSESSMENT — PAIN DESCRIPTION - ONSET: ONSET: ON-GOING

## 2019-04-08 ASSESSMENT — PAIN DESCRIPTION - LOCATION: LOCATION: ABDOMEN

## 2019-04-08 ASSESSMENT — PAIN - FUNCTIONAL ASSESSMENT: PAIN_FUNCTIONAL_ASSESSMENT: ACTIVITIES ARE NOT PREVENTED

## 2019-04-08 NOTE — PROGRESS NOTES
[facility]        Progress Note       2019  1:18 PM    Patient:    Angelica Alfaro  : 1960   66 y.o. MRN: [id number]  Admitted: 2019  3:24 PM ATT: Bereket Dyer MD   [id number]/[id number]-A  AdmitDx: Left upper quadrant pain [R10.12]  Acute colitis [K52.9]  Elevated lactic acid level [R79.89]  Leukocytosis, unspecified type [D72.829]  Non-intractable vomiting with nausea, unspecified vomiting type [R11.2]  PCP: Olesya Hamlin MD    SUBJECTIVE:  Chart reviewed, events noted  Patient feeling well. No complaints. Had nausea when morphine given. No vomiting. No Bm, just took Lactulose. No abd pain.      ROS:  The positive ROS will be identified in bold     CONSTITUTIONAL:  Neg  Weight loss, fatigue, feverat  RESPIRATORY:   Neg SOB, wheeze, cough, hemoptysis or bronchitis  CARDIOVASCULAR:  Neg Chest pain, palpitations, dyspnea on exertion, edema  GASTROINTESTINAL:  SEE HPI  HEMATOLOGIC/LYMPHATIC:  Neg  Anemia, bleeding tendency      OBJECTIVE:      BP (!) 148/87   Pulse 87   Temp 98.4 °F (36.9 °C) (Oral)   Resp 18   Ht 4' 9.5\" (1.461 m)   Wt 157 lb (71.2 kg)   SpO2 99%   BMI 33.39 kg/m²     NAD, appears comfortable in bed  Lips and mucous membranes pink and moist  RRR, Nl s1s2  Lungs CTA bilaterally, respirations even and unlabored   Abdomen soft, ND, NT, bowel sounds normal  Skin pink, warm and dry  No edema bilateral lower extremities   AAOx3    CBC:   Recent Labs     19  1600 19  0607   WBC 16.1* 11.5*   HGB 16.2* 15.4   * 91*     BMP:    Recent Labs     19  1600 19  0607    142   K 3.7 3.4*    104   CO2 24 26   BUN 15 12   CREATININE 1.0 0.9   GLUCOSE 191* 170*     Magnesium:   Lab Results   Component Value Date    MG 1.9 2019     Hepatic:   Recent Labs     19  1600 19  0607   AST 31 33   ALT 20 17   BILITOT 1.0 0.9   ALKPHOS 115 103     INR:   Recent Labs     19  1600   INR 1.18 Intake/Output Summary (Last 24 hours) at 4/8/2019 1318  Last data filed at 4/8/2019 1215  Gross per 24 hour   Intake 2675 ml   Output 1750 ml   Net 925 ml         Medications    Scheduled Medications:    piperacillin-tazobactam  3.375 g Intravenous Q8H    busPIRone  15 mg Oral BID    FLUoxetine  40 mg Oral Daily    lactulose  10 g Oral Daily    metoprolol tartrate  25 mg Oral BID    nicotine  1 patch Transdermal Q24H    pantoprazole  20 mg Oral BID AC    QUEtiapine  300 mg Oral Nightly    ranolazine  500 mg Oral BID    sodium chloride flush  10 mL Intravenous 2 times per day    insulin lispro  0-6 Units Subcutaneous TID WC    insulin lispro  0-3 Units Subcutaneous Nightly    sodium chloride flush  10 mL Intravenous 2 times per day    multivitamin  1 tablet Oral Daily    folic acid  1 mg Oral Daily    thiamine  100 mg Oral Daily    sodium chloride flush  10 mL Intravenous 2 times per day     PRN Medications: HYDROmorphone, potassium chloride **OR** potassium alternative oral replacement **OR** potassium chloride, albuterol sulfate HFA, hydrOXYzine, nitroGLYCERIN, ondansetron, sodium chloride flush, ondansetron, senna, glucose, dextrose, glucagon (rDNA), dextrose, sodium chloride flush, LORazepam **OR** LORazepam **OR** LORazepam **OR** LORazepam **OR** LORazepam **OR** LORazepam **OR** LORazepam **OR** LORazepam, sodium chloride flush, promethazine  Infusions:    sodium chloride 75 mL/hr at 04/08/19 1314    dextrose             Patient Active Problem List   Diagnosis Code    Left arm pain M79.602    Type 2 diabetes mellitus with complication, with long-term current use of insulin (HCC) E11.8, Z79.4    Acid reflux K21.9    COPD, mild (HCC) J44.9    Tobacco abuse Z72.0    Angina effort (HCC) I20.8    Abnormal nuclear cardiac imaging test R93.1    Lung nodule R91.1    Chest pain R07.9    Dyslipidemia E78.5    Pancolitis (HCC) K51.00    Hematemesis without nausea L97.0    Alcoholic cirrhosis of liver without ascites (HCC) K70.30    Thrombocytopenia (HCC) T65.6    Periumbilical abdominal pain R10.33    History of colonic polyps Z86.010    Abnormal findings on diagnostic imaging of 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 Infectious vs colits  May be portal HTN colopathy  Evaluated for vasculitis; anti-neotrophilic cytoplasmic, SREEDHAR, C4, C1Q pending  Porphobilinogen needs collected  Esr, crp normal  Cortisol-elevated  Afebrile  Leukocytosis improving, blood cultures pending  Abd pain may be related to cyclic N/V s/t +marijuana  CT 4/6/9  Circumferential wall thickening involving much of the colon may be due to   incomplete distention with infectious or inflammatory colitis not excluded.       Filling defect within the right portal vein compatible with thrombus.    Overall, the appearance of thrombus is less extensive compared to 10/25/2018       Cirrhotic changes of the liver with extensive gastric varices with a   splenorenal shunt demonstrated.         Compensated cirrhosis  S/T alcohol, hx Hep B & Chronic Hep C  LFT's, INR, albumin normal  Thrombocytopenia  Known PVT- decreased in size  AFP pending     RECOMMENDATIONS:  Improving-Treat symptomatically  Pain medication as needed  Antiemetics as needed  Lactulose 10g daily as needed, goal 2 soft BM's daily  Continue Protonix 20 mg BID  Continue multivitamin and B1   Continue IVF  Advance diet as tolerated  Continue Zosyn    Discussed plan of care with patient     Patient clinical, biochemical, and radiological information discussed with Dr. Brea Melendrez  He agrees with the assessment and plan. Milagros Thompson CNP  4/8/2019  1:18 PM     Pt seen 4/8/19. I have seen and examined the patient myself. I have reviewed the hospital care given to date and reviewed all pertinent labs and imaging. The plan was developed mutually at the time of visit with the patient, Milagros Thompson CNP and myself. I have spoken with the patient, nursing staff and provided written and verbal instructions. The above note has been reviewed and I agree with the assessment, diagnosis, and treatment plan with as suggested by Milagros Thompson CNP with changes made by me as above.     [address] [facility]

## 2019-04-08 NOTE — PROGRESS NOTES
Hospitalist Progress Note      Name:  Milagros Villarreal /Age/Sex: 1960  (66 y.o. female)   MRN & CSN:  [id number] & [id number] Admission Date/Time: 2019  3:24 PM   Location:  [address] [place] [address] PCP: Liset Izaguirre MD         Hospital Day: 3    Assessment and Plan:   Milagros Villarreal is a 66 y.o.  female  who presents with nausea, vomiting and abdominal pain      Plan: Advance diet and DC tomorrow     Abdominal pain with intractable nausea and vomiting due to Acute colitis vs cannabinoid induced. Sepsis resolved # Elevated lactate, trending down  UDS positive for cannabinoid  -CT abdomen: Circumferential wall thickening involving much of the colon may be due to incomplete distention with infectious or inflammatory colitis not excluded. -Blood culture pending   -IVF -Zosyn D2  -Pain control -Antiemetic- PPI  -Monitor electrolyte and replete  -Advance diet per GI  - GI on board     Alcohol cirrhosis with extensive gastric varices  Last drink one day before admission  CT abdomen: Cirrhotic changes of the liver with extensive gastric varices with a splenorenal shunt demonstrated. -Denies bleed -CIWA: Ativan  -Seizure precaution  -GI consult    Mild hypokalemia  Replace     Chronic  Right portal vein thrombus   -Monitor     Type 2 DM: Last A1C: 6.1. Lantus, Sliding scale. Hypoglycemia protocol. Accucheck. Hold oral hypoglycemic agents for now       Chronic hepatitis C  Follow up liver clinic      Tobacco abuse  -Educated about cessation      Thrombocytopenia  -INR 1.18  -Monitor for s/s bleed      Chronic treatment continued per home medications unless  contraindicated by above plan and assessment. The above assessment/plan has been explained to the patient, who indicated understanding. Diet Diet NPO Effective Now Exceptions are: Ice Chips   DVT Prophylaxis SCDs   GI Prophylaxis PPI   Code Status Full Code             History of Present Illness:     Chief Complaint:     Milagros Villarreal is a 66 y.o. female  who presents with 10/10 left side abdominal pain for several hours. Patient states she has been having nonbilious nonbloody emesis with nonbloody diarrhea. She states she has been drinking tequila for the past couple days. Denies hemoptysis, hematuria, dizziness or lightheaded, sick contact. Hx of alcohol cirrhosis, COPD, Hep C, Seizure, HTN, HLD, Glaucoma, tobacco abuse, illicit drug abuse, and Arthritis. Patient was seen and examined at bedside today. Patient sitting up comfortably in bed in NAD. Patient denied CP, SOB, cough, Abd pain, diarrhea, constipation or dysuria. Ten point ROS reviewed negative, unless as noted above    Objective: Intake/Output Summary (Last 24 hours) at 4/8/2019 0902  Last data filed at 4/8/2019 0735  Gross per 24 hour   Intake 2675 ml   Output 1850 ml   Net 825 ml      Vitals:   Vitals:    04/08/19 0441   BP: (!) 155/82   Pulse: 105   Resp: 17   Temp: 98.8 °F (37.1 °C)   SpO2: 92%     Physical Exam:      GEN Awake female, sitting upright in bed in no apparent distress. Appears given age. EYES Pupils are equally round. No scleral erythema, discharge, or conjunctivitis. HENT Mucous membranes are moist.   RESP Clear to auscultation, no wheezes, rales or rhonchi. CARDIO/VASC S1/S2 auscultated. No murmurs  GI Abdomen is soft with mild  tenderness, no masses, or guarding. MSK No gross joint deformities. Spontaneous movement of all extremities  SKIN Normal coloration, warm, dry. NEURO Grossly normal  PSYCH Awake, alert, oriented x 4. Affect appropriate.     Medications:   Medications:    piperacillin-tazobactam  3.375 g Intravenous Q8H    oxyCODONE  5 mg Oral 4x daily    busPIRone  15 mg Oral BID    FLUoxetine  40 mg Oral Daily    lactulose  10 g Oral Daily    metoprolol tartrate  25 mg Oral BID    nicotine  1 patch Transdermal Q24H    pantoprazole  20 mg Oral BID AC    QUEtiapine  300 mg Oral Nightly    ranolazine  500 mg Oral BID    sodium chloride

## 2019-04-09 ENCOUNTER — TELEPHONE (OUTPATIENT)
Dept: FAMILY MEDICINE CLINIC | Age: 59
End: 2019-04-09

## 2019-04-09 VITALS
OXYGEN SATURATION: 100 % | HEIGHT: 58 IN | SYSTOLIC BLOOD PRESSURE: 126 MMHG | DIASTOLIC BLOOD PRESSURE: 65 MMHG | TEMPERATURE: 98.4 F | HEART RATE: 83 BPM | BODY MASS INDEX: 35.45 KG/M2 | WEIGHT: 168.9 LBS | RESPIRATION RATE: 16 BRPM

## 2019-04-09 LAB
ANION GAP SERPL CALCULATED.3IONS-SCNC: 8 MMOL/L (ref 4–16)
BUN BLDV-MCNC: 13 MG/DL (ref 6–23)
CALCIUM SERPL-MCNC: 7.8 MG/DL (ref 8.3–10.6)
CHLORIDE BLD-SCNC: 104 MMOL/L (ref 99–110)
CO2: 25 MMOL/L (ref 21–32)
CREAT SERPL-MCNC: 1 MG/DL (ref 0.6–1.1)
GFR AFRICAN AMERICAN: >60 ML/MIN/1.73M2
GFR NON-AFRICAN AMERICAN: 57 ML/MIN/1.73M2
GLUCOSE BLD-MCNC: 128 MG/DL (ref 70–99)
GLUCOSE BLD-MCNC: 180 MG/DL (ref 70–99)
HEPATITIS B SURFACE ANTIGEN CONFIRMATION: ABNORMAL
HEPATITIS B SURFACE ANTIGEN CONFIRMATION: POSITIVE
MAGNESIUM: 1.8 MG/DL (ref 1.8–2.4)
POTASSIUM SERPL-SCNC: 3.1 MMOL/L (ref 3.5–5.1)
SODIUM BLD-SCNC: 137 MMOL/L (ref 135–145)

## 2019-04-09 PROCEDURE — 96376 TX/PRO/DX INJ SAME DRUG ADON: CPT

## 2019-04-09 PROCEDURE — 82962 GLUCOSE BLOOD TEST: CPT

## 2019-04-09 PROCEDURE — 84110 ASSAY OF PORPHOBILINOGEN: CPT

## 2019-04-09 PROCEDURE — 2500000003 HC RX 250 WO HCPCS: Performed by: INTERNAL MEDICINE

## 2019-04-09 PROCEDURE — 96361 HYDRATE IV INFUSION ADD-ON: CPT

## 2019-04-09 PROCEDURE — 96366 THER/PROPH/DIAG IV INF ADDON: CPT

## 2019-04-09 PROCEDURE — 36415 COLL VENOUS BLD VENIPUNCTURE: CPT

## 2019-04-09 PROCEDURE — 6370000000 HC RX 637 (ALT 250 FOR IP): Performed by: NURSE PRACTITIONER

## 2019-04-09 PROCEDURE — 80048 BASIC METABOLIC PNL TOTAL CA: CPT

## 2019-04-09 PROCEDURE — 6360000002 HC RX W HCPCS: Performed by: INTERNAL MEDICINE

## 2019-04-09 PROCEDURE — 94761 N-INVAS EAR/PLS OXIMETRY MLT: CPT

## 2019-04-09 PROCEDURE — G0378 HOSPITAL OBSERVATION PER HR: HCPCS

## 2019-04-09 PROCEDURE — 83735 ASSAY OF MAGNESIUM: CPT

## 2019-04-09 RX ADMIN — HYDROMORPHONE HYDROCHLORIDE 0.5 MG: 2 INJECTION, SOLUTION INTRAMUSCULAR; INTRAVENOUS; SUBCUTANEOUS at 02:56

## 2019-04-09 RX ADMIN — METOPROLOL TARTRATE 25 MG: 25 TABLET ORAL at 09:54

## 2019-04-09 RX ADMIN — Medication 100 MG: at 09:54

## 2019-04-09 RX ADMIN — FOLIC ACID 1 MG: 1 TABLET ORAL at 09:54

## 2019-04-09 RX ADMIN — RANOLAZINE 500 MG: 500 TABLET, FILM COATED, EXTENDED RELEASE ORAL at 09:54

## 2019-04-09 RX ADMIN — FLUOXETINE 40 MG: 20 CAPSULE ORAL at 09:55

## 2019-04-09 RX ADMIN — TAZOBACTAM SODIUM AND PIPERACILLIN SODIUM 3.38 G: 375; 3 INJECTION, SOLUTION INTRAVENOUS at 01:03

## 2019-04-09 RX ADMIN — BUSPIRONE HYDROCHLORIDE 15 MG: 7.5 TABLET ORAL at 09:54

## 2019-04-09 RX ADMIN — PANTOPRAZOLE SODIUM 20 MG: 20 TABLET, DELAYED RELEASE ORAL at 05:47

## 2019-04-09 ASSESSMENT — PAIN SCALES - GENERAL
PAINLEVEL_OUTOF10: 8
PAINLEVEL_OUTOF10: 2

## 2019-04-09 NOTE — DISCHARGE SUMMARY
Discharge Summary    Name:  Jorden Martinez /Age/Sex: 1960  (66 y.o. female)   MRN & CSN:  [id number] & [id number] Admission Date/Time: 2019  3:24 PM   Attending:  Nuris Clarke MD Discharging Physician: Nuris Clarke MD     HPI:       Hospital Course:   Jorden Martinez is a 66 y.o.  female  who presents with nausea, vomiting and abdominal pain        Plan: Advance diet and DC tomorrow      Abdominal pain with intractable nausea and vomiting due to Acute colitis vs cannabinoid induced. Sepsis resolved   UDS positive for cannabinoid  -CT abdomen: Circumferential wall thickening involving much of the colon may be due to incomplete distention with infectious or inflammatory colitis not excluded. Patient greatly improved and wants to go home  Follow up with GI outpatient      Alcohol cirrhosis with extensive gastric varices- Stable  Last drink one day before admission  CT abdomen: Cirrhotic changes of the liver with extensive gastric varices with a splenorenal shunt demonstrated. -Follow up with GI outpatient         Chronic  Right portal vein thrombus   -Follow up with GI outpatient      Type 2 DM: Last A1C: 6. 1.continue home medications     Chronic hepatitis C  Follow up liver clinic      Tobacco abuse  -Educated about cessation      Thrombocytopenia  -INR 1.18  -Follow up with GI clinic             The patient expressed appropriate understanding of and agreement with the discharge recommendations, medications, and plan.      Consults this admission:  IP CONSULT TO HOSPITALIST  IP CONSULT TO GI  IP CONSULT TO Joe    Discharge Instruction:   Follow up appointments:   GI clinic  Primary care physician:  within 2 weeks    Diet:  diabetic diet   Activity: activity as tolerated  Disposition: Discharged to:   Home  Condition on discharge: Stable    Discharge Medications:      Patience Giraldo   Home Medication Instructions [id number]    Printed on:[phone]   Medication

## 2019-04-10 ENCOUNTER — TELEPHONE (OUTPATIENT)
Dept: CARDIOLOGY CLINIC | Age: 59
End: 2019-04-10

## 2019-04-10 ENCOUNTER — CARE COORDINATION (OUTPATIENT)
Dept: CARE COORDINATION | Age: 59
End: 2019-04-10

## 2019-04-10 LAB
ANTI-NUCLEAR ANTIBODY (ANA): NORMAL
ANTI-NUCLEAR ANTIBODY (ANA): NORMAL
COMPLEMENT C4: 14 MG/DL (ref 10–40)
COMPLEMENT C4: NORMAL MG/DL (ref 10–40)
MS ALPHA-FETOPROTEIN: 3 NG/ML (ref 0–9)
MS ALPHA-FETOPROTEIN: NORMAL NG/ML (ref 0–9)

## 2019-04-10 NOTE — CARE COORDINATION
[facility] attempted to reach pt for hospital follow up call. Left message with [facility] contact information & requested a call back. Rosita Diaz RN  Nurse Care Coordinator  [phone] [email or web address]

## 2019-04-10 NOTE — TELEPHONE ENCOUNTER
LM on VM for patient to call the office for doppler results. Patient needs appt scheduled with Dr. Shilpa Blum. Does patient already have stockings or does she need a prescription? Significant reflux noted of the Right GSV at the SFJ (9.2s) and knee (1.8s).      Significant reflux noted in the Left GSV at the knee (5.7s).         No evidence of DVT or SVT in the bilateral common femoral vein, femoral    vein, popliteal vein, greater saphenous vein or small saphenous vein.        Recommendations        Advice thigh high pressure stockings, 20 to 30 mm of Hg.    Keep feet elevated.    Increase walking.        OV in 6 weeks

## 2019-04-11 ENCOUNTER — TELEPHONE (OUTPATIENT)
Dept: CARDIOLOGY CLINIC | Age: 59
End: 2019-04-11

## 2019-04-11 DIAGNOSIS — Z01.810 PRE-OPERATIVE CARDIOVASCULAR EXAMINATION: Primary | ICD-10-CM

## 2019-04-11 LAB
CULTURE: NORMAL
Lab: NORMAL
SPECIMEN: NORMAL

## 2019-04-11 NOTE — TELEPHONE ENCOUNTER
Spoke with patient about coming in Friday afternoon to  prescription for compression stockings since it can't be electronically sent. Patient voiced understanding.

## 2019-04-12 DIAGNOSIS — R11.0 NAUSEA: ICD-10-CM

## 2019-04-12 DIAGNOSIS — K21.9 GASTROESOPHAGEAL REFLUX DISEASE WITHOUT ESOPHAGITIS: ICD-10-CM

## 2019-04-12 DIAGNOSIS — K92.0 HEMATEMESIS, PRESENCE OF NAUSEA NOT SPECIFIED: ICD-10-CM

## 2019-04-12 LAB
CREATININE URINE MG/DAY: NORMAL MG/D (ref 500–1400)
CREATININE URINE MG/DAY: NORMAL MG/D (ref 500–1400)
CREATININE URINE MG/DL: 25 MG/DL
HOURS COLLECTED: NORMAL
Lab: < 3 UMOL/L (ref 0–8.8)
NEUTROPHIL CYTOPLASMIC AB IGG: NORMAL
NEUTROPHIL CYTOPLASMIC AB IGG: NORMAL
PORPHOBILINOGEN 24 HOUR URINE: NORMAL UMOL/D (ref 0–11)
TOTAL VOLUME: NORMAL

## 2019-04-14 LAB
C1Q BINDING: 9.6 UG EQ/ML (ref 0–3.9)
C1Q BINDING: ABNORMAL UG EQ/ML (ref 0–3.9)

## 2019-04-15 ENCOUNTER — TELEPHONE (OUTPATIENT)
Dept: CARDIOLOGY CLINIC | Age: 59
End: 2019-04-15

## 2019-04-15 RX ORDER — PANTOPRAZOLE SODIUM 20 MG/1
TABLET, DELAYED RELEASE ORAL
Qty: 60 TABLET | Refills: 2 | Status: ON HOLD | OUTPATIENT
Start: 2019-04-15 | End: 2019-10-27 | Stop reason: HOSPADM

## 2019-04-15 NOTE — TELEPHONE ENCOUNTER
Called patient to schedule [employer], no answer. Left message for patient to call office to schedule.  [facility] NO AUTH NEEDED OK TO SCHEDULE

## 2019-04-16 ENCOUNTER — APPOINTMENT (OUTPATIENT)
Dept: GENERAL RADIOLOGY | Age: 59
DRG: 279 | End: 2019-04-16
Payer: COMMERCIAL

## 2019-04-16 ENCOUNTER — HOSPITAL ENCOUNTER (INPATIENT)
Age: 59
LOS: 2 days | Discharge: ANOTHER HOSPITAL | DRG: 279 | End: 2019-04-18
Attending: EMERGENCY MEDICINE | Admitting: HOSPITALIST
Payer: COMMERCIAL

## 2019-04-16 ENCOUNTER — APPOINTMENT (OUTPATIENT)
Dept: CT IMAGING | Age: 59
DRG: 279 | End: 2019-04-16
Payer: COMMERCIAL

## 2019-04-16 DIAGNOSIS — R11.2 INTRACTABLE VOMITING WITH NAUSEA, UNSPECIFIED VOMITING TYPE: Primary | ICD-10-CM

## 2019-04-16 DIAGNOSIS — R10.11 ABDOMINAL PAIN, RIGHT UPPER QUADRANT: ICD-10-CM

## 2019-04-16 DIAGNOSIS — I81 PORTAL VEIN THROMBOSIS: ICD-10-CM

## 2019-04-16 LAB
ALBUMIN SERPL-MCNC: 4.1 GM/DL (ref 3.4–5)
ALP BLD-CCNC: 114 IU/L (ref 40–129)
ALT SERPL-CCNC: 17 U/L (ref 10–40)
ANION GAP SERPL CALCULATED.3IONS-SCNC: 16 MMOL/L (ref 4–16)
APTT: 27.8 SECONDS (ref 21.2–33)
APTT: 29.2 SECONDS (ref 21.2–33)
AST SERPL-CCNC: 26 IU/L (ref 15–37)
BACTERIA: NEGATIVE /HPF
BASOPHILS ABSOLUTE: 0.1 K/CU MM
BASOPHILS RELATIVE PERCENT: 0.6 % (ref 0–1)
BILIRUB SERPL-MCNC: 0.8 MG/DL (ref 0–1)
BILIRUBIN URINE: NEGATIVE MG/DL
BLOOD, URINE: NEGATIVE
BUN BLDV-MCNC: 8 MG/DL (ref 6–23)
CALCIUM SERPL-MCNC: 8.8 MG/DL (ref 8.3–10.6)
CHLORIDE BLD-SCNC: 103 MMOL/L (ref 99–110)
CLARITY: CLEAR
CO2: 24 MMOL/L (ref 21–32)
COLOR: ABNORMAL
CREAT SERPL-MCNC: 0.9 MG/DL (ref 0.6–1.1)
DIFFERENTIAL TYPE: ABNORMAL
EOSINOPHILS ABSOLUTE: 0 K/CU MM
EOSINOPHILS RELATIVE PERCENT: 0.1 % (ref 0–3)
GFR AFRICAN AMERICAN: >60 ML/MIN/1.73M2
GFR NON-AFRICAN AMERICAN: >60 ML/MIN/1.73M2
GLUCOSE BLD-MCNC: 213 MG/DL (ref 70–99)
GLUCOSE, URINE: 150 MG/DL
HCT VFR BLD CALC: 46.1 % (ref 37–47)
HEMOGLOBIN: 15.2 GM/DL (ref 12.5–16)
IMMATURE NEUTROPHIL %: 1.2 % (ref 0–0.43)
INR BLD: 1.18 INDEX
KETONES, URINE: ABNORMAL MG/DL
LACTATE: 2.2 MMOL/L (ref 0.4–2)
LACTATE: ABNORMAL MMOL/L (ref 0.4–2)
LEUKOCYTE ESTERASE, URINE: NEGATIVE
LIPASE: 57 IU/L (ref 13–60)
LYMPHOCYTES ABSOLUTE: 1.1 K/CU MM
LYMPHOCYTES RELATIVE PERCENT: 13.4 % (ref 24–44)
MCH RBC QN AUTO: 31.3 PG (ref 27–31)
MCHC RBC AUTO-ENTMCNC: 33 % (ref 32–36)
MCV RBC AUTO: 94.9 FL (ref 78–100)
MONOCYTES ABSOLUTE: 0.4 K/CU MM
MONOCYTES RELATIVE PERCENT: 4.5 % (ref 0–4)
NITRITE URINE, QUANTITATIVE: NEGATIVE
NUCLEATED RBC %: 0 %
PDW BLD-RTO: 13.7 % (ref 11.7–14.9)
PH, URINE: 9 (ref 5–8)
PLATELET # BLD: 95 K/CU MM (ref 140–440)
PMV BLD AUTO: 12.7 FL (ref 7.5–11.1)
POTASSIUM SERPL-SCNC: 3.5 MMOL/L (ref 3.5–5.1)
PROTEIN UA: 30 MG/DL
PROTHROMBIN TIME: 13.4 SECONDS (ref 9.12–12.5)
RBC # BLD: 4.86 M/CU MM (ref 4.2–5.4)
RBC URINE: 1 /HPF (ref 0–6)
SEGMENTED NEUTROPHILS ABSOLUTE COUNT: 6.4 K/CU MM
SEGMENTED NEUTROPHILS RELATIVE PERCENT: 80.2 % (ref 36–66)
SODIUM BLD-SCNC: 143 MMOL/L (ref 135–145)
SPECIFIC GRAVITY UA: 1.01 (ref 1–1.03)
SQUAMOUS EPITHELIAL: 1 /HPF
TOTAL IMMATURE NEUTOROPHIL: 0.1 K/CU MM
TOTAL NUCLEATED RBC: 0 K/CU MM
TOTAL PROTEIN: 8.3 GM/DL (ref 6.4–8.2)
TRICHOMONAS: ABNORMAL /HPF
UROBILINOGEN, URINE: NORMAL MG/DL (ref 0.2–1)
WBC # BLD: 8 K/CU MM (ref 4–10.5)
WBC UA: <1 /HPF (ref 0–5)

## 2019-04-16 PROCEDURE — 85730 THROMBOPLASTIN TIME PARTIAL: CPT

## 2019-04-16 PROCEDURE — 6360000002 HC RX W HCPCS: Performed by: HOSPITALIST

## 2019-04-16 PROCEDURE — C9113 INJ PANTOPRAZOLE SODIUM, VIA: HCPCS | Performed by: HOSPITALIST

## 2019-04-16 PROCEDURE — 6360000002 HC RX W HCPCS: Performed by: PHYSICIAN ASSISTANT

## 2019-04-16 PROCEDURE — 83605 ASSAY OF LACTIC ACID: CPT

## 2019-04-16 PROCEDURE — 99254 IP/OBS CNSLTJ NEW/EST MOD 60: CPT | Performed by: SURGERY

## 2019-04-16 PROCEDURE — 85025 COMPLETE CBC W/AUTO DIFF WBC: CPT

## 2019-04-16 PROCEDURE — 81001 URINALYSIS AUTO W/SCOPE: CPT

## 2019-04-16 PROCEDURE — 6370000000 HC RX 637 (ALT 250 FOR IP): Performed by: HOSPITALIST

## 2019-04-16 PROCEDURE — 83690 ASSAY OF LIPASE: CPT

## 2019-04-16 PROCEDURE — 6360000002 HC RX W HCPCS: Performed by: EMERGENCY MEDICINE

## 2019-04-16 PROCEDURE — 96374 THER/PROPH/DIAG INJ IV PUSH: CPT

## 2019-04-16 PROCEDURE — 80053 COMPREHEN METABOLIC PANEL: CPT

## 2019-04-16 PROCEDURE — 36415 COLL VENOUS BLD VENIPUNCTURE: CPT

## 2019-04-16 PROCEDURE — 96372 THER/PROPH/DIAG INJ SC/IM: CPT

## 2019-04-16 PROCEDURE — 2580000003 HC RX 258: Performed by: HOSPITALIST

## 2019-04-16 PROCEDURE — 2580000003 HC RX 258: Performed by: PHYSICIAN ASSISTANT

## 2019-04-16 PROCEDURE — 85610 PROTHROMBIN TIME: CPT

## 2019-04-16 PROCEDURE — 96375 TX/PRO/DX INJ NEW DRUG ADDON: CPT

## 2019-04-16 PROCEDURE — 2060000000 HC ICU INTERMEDIATE R&B

## 2019-04-16 PROCEDURE — 2500000003 HC RX 250 WO HCPCS: Performed by: HOSPITALIST

## 2019-04-16 PROCEDURE — 74018 RADEX ABDOMEN 1 VIEW: CPT

## 2019-04-16 PROCEDURE — 74177 CT ABD & PELVIS W/CONTRAST: CPT

## 2019-04-16 PROCEDURE — 6360000004 HC RX CONTRAST MEDICATION: Performed by: PHYSICIAN ASSISTANT

## 2019-04-16 PROCEDURE — 99291 CRITICAL CARE FIRST HOUR: CPT

## 2019-04-16 PROCEDURE — 96376 TX/PRO/DX INJ SAME DRUG ADON: CPT

## 2019-04-16 RX ORDER — HEPARIN SODIUM 10000 [USP'U]/100ML
12 INJECTION, SOLUTION INTRAVENOUS CONTINUOUS
Status: DISCONTINUED | OUTPATIENT
Start: 2019-04-16 | End: 2019-04-18 | Stop reason: HOSPADM

## 2019-04-16 RX ORDER — PROMETHAZINE HYDROCHLORIDE 25 MG/ML
25 INJECTION, SOLUTION INTRAMUSCULAR; INTRAVENOUS ONCE
Status: COMPLETED | OUTPATIENT
Start: 2019-04-16 | End: 2019-04-16

## 2019-04-16 RX ORDER — ONDANSETRON 2 MG/ML
4 INJECTION INTRAMUSCULAR; INTRAVENOUS EVERY 6 HOURS PRN
Status: DISCONTINUED | OUTPATIENT
Start: 2019-04-16 | End: 2019-04-18 | Stop reason: HOSPADM

## 2019-04-16 RX ORDER — RANOLAZINE 500 MG/1
500 TABLET, EXTENDED RELEASE ORAL 2 TIMES DAILY
Status: DISCONTINUED | OUTPATIENT
Start: 2019-04-16 | End: 2019-04-18 | Stop reason: HOSPADM

## 2019-04-16 RX ORDER — SENNA PLUS 8.6 MG/1
1 TABLET ORAL DAILY PRN
Status: DISCONTINUED | OUTPATIENT
Start: 2019-04-16 | End: 2019-04-18 | Stop reason: HOSPADM

## 2019-04-16 RX ORDER — LEVOFLOXACIN 5 MG/ML
500 INJECTION, SOLUTION INTRAVENOUS DAILY
Status: DISCONTINUED | OUTPATIENT
Start: 2019-04-16 | End: 2019-04-18 | Stop reason: HOSPADM

## 2019-04-16 RX ORDER — HEPARIN SODIUM 1000 [USP'U]/ML
4000 INJECTION, SOLUTION INTRAVENOUS; SUBCUTANEOUS ONCE
Status: COMPLETED | OUTPATIENT
Start: 2019-04-16 | End: 2019-04-16

## 2019-04-16 RX ORDER — BUSPIRONE HYDROCHLORIDE 7.5 MG/1
15 TABLET ORAL 2 TIMES DAILY
Status: DISCONTINUED | OUTPATIENT
Start: 2019-04-16 | End: 2019-04-18 | Stop reason: HOSPADM

## 2019-04-16 RX ORDER — NICOTINE 21 MG/24HR
1 PATCH, TRANSDERMAL 24 HOURS TRANSDERMAL DAILY
Status: DISCONTINUED | OUTPATIENT
Start: 2019-04-16 | End: 2019-04-18 | Stop reason: HOSPADM

## 2019-04-16 RX ORDER — PANTOPRAZOLE SODIUM 40 MG/10ML
40 INJECTION, POWDER, LYOPHILIZED, FOR SOLUTION INTRAVENOUS 2 TIMES DAILY
Status: DISCONTINUED | OUTPATIENT
Start: 2019-04-16 | End: 2019-04-18 | Stop reason: HOSPADM

## 2019-04-16 RX ORDER — ONDANSETRON 2 MG/ML
4 INJECTION INTRAMUSCULAR; INTRAVENOUS EVERY 30 MIN PRN
Status: DISCONTINUED | OUTPATIENT
Start: 2019-04-16 | End: 2019-04-16 | Stop reason: SDUPTHER

## 2019-04-16 RX ORDER — SODIUM CHLORIDE 0.9 % (FLUSH) 0.9 %
10 SYRINGE (ML) INJECTION PRN
Status: DISCONTINUED | OUTPATIENT
Start: 2019-04-16 | End: 2019-04-18 | Stop reason: HOSPADM

## 2019-04-16 RX ORDER — MORPHINE SULFATE 4 MG/ML
4 INJECTION, SOLUTION INTRAMUSCULAR; INTRAVENOUS EVERY 30 MIN PRN
Status: DISCONTINUED | OUTPATIENT
Start: 2019-04-16 | End: 2019-04-18 | Stop reason: HOSPADM

## 2019-04-16 RX ORDER — HEPARIN SODIUM 1000 [USP'U]/ML
2000 INJECTION, SOLUTION INTRAVENOUS; SUBCUTANEOUS PRN
Status: DISCONTINUED | OUTPATIENT
Start: 2019-04-16 | End: 2019-04-18 | Stop reason: HOSPADM

## 2019-04-16 RX ORDER — LIDOCAINE HYDROCHLORIDE 20 MG/ML
JELLY TOPICAL ONCE
Status: DISCONTINUED | OUTPATIENT
Start: 2019-04-16 | End: 2019-04-18 | Stop reason: HOSPADM

## 2019-04-16 RX ORDER — OXYMETAZOLINE HYDROCHLORIDE 0.05 G/100ML
2 SPRAY NASAL ONCE
Status: DISCONTINUED | OUTPATIENT
Start: 2019-04-16 | End: 2019-04-18 | Stop reason: HOSPADM

## 2019-04-16 RX ORDER — SODIUM CHLORIDE 0.9 % (FLUSH) 0.9 %
10 SYRINGE (ML) INJECTION EVERY 12 HOURS SCHEDULED
Status: DISCONTINUED | OUTPATIENT
Start: 2019-04-16 | End: 2019-04-18 | Stop reason: HOSPADM

## 2019-04-16 RX ORDER — TENOFOVIR DISOPROXIL FUMARATE 300 MG/1
300 TABLET, FILM COATED ORAL NIGHTLY
Status: DISCONTINUED | OUTPATIENT
Start: 2019-04-16 | End: 2019-04-18 | Stop reason: HOSPADM

## 2019-04-16 RX ORDER — ALBUTEROL SULFATE 90 UG/1
2 AEROSOL, METERED RESPIRATORY (INHALATION) EVERY 6 HOURS PRN
Status: DISCONTINUED | OUTPATIENT
Start: 2019-04-16 | End: 2019-04-18 | Stop reason: HOSPADM

## 2019-04-16 RX ORDER — 0.9 % SODIUM CHLORIDE 0.9 %
1000 INTRAVENOUS SOLUTION INTRAVENOUS ONCE
Status: COMPLETED | OUTPATIENT
Start: 2019-04-16 | End: 2019-04-16

## 2019-04-16 RX ORDER — 0.9 % SODIUM CHLORIDE 0.9 %
10 VIAL (ML) INJECTION
Status: COMPLETED | OUTPATIENT
Start: 2019-04-16 | End: 2019-04-16

## 2019-04-16 RX ORDER — MORPHINE SULFATE 4 MG/ML
4 INJECTION, SOLUTION INTRAMUSCULAR; INTRAVENOUS
Status: DISCONTINUED | OUTPATIENT
Start: 2019-04-16 | End: 2019-04-18 | Stop reason: HOSPADM

## 2019-04-16 RX ORDER — TIZANIDINE 4 MG/1
4 TABLET ORAL EVERY 8 HOURS PRN
COMMUNITY
End: 2019-04-28 | Stop reason: SDUPTHER

## 2019-04-16 RX ORDER — LACTULOSE 10 G/15ML
10 SOLUTION ORAL DAILY
Status: DISCONTINUED | OUTPATIENT
Start: 2019-04-17 | End: 2019-04-18 | Stop reason: HOSPADM

## 2019-04-16 RX ORDER — QUETIAPINE 150 MG/1
300 TABLET, FILM COATED, EXTENDED RELEASE ORAL NIGHTLY
Status: DISCONTINUED | OUTPATIENT
Start: 2019-04-16 | End: 2019-04-18 | Stop reason: HOSPADM

## 2019-04-16 RX ORDER — HEPARIN SODIUM 1000 [USP'U]/ML
4000 INJECTION, SOLUTION INTRAVENOUS; SUBCUTANEOUS PRN
Status: DISCONTINUED | OUTPATIENT
Start: 2019-04-16 | End: 2019-04-18 | Stop reason: HOSPADM

## 2019-04-16 RX ORDER — FLUOXETINE HYDROCHLORIDE 20 MG/1
40 CAPSULE ORAL DAILY
Status: DISCONTINUED | OUTPATIENT
Start: 2019-04-16 | End: 2019-04-18 | Stop reason: HOSPADM

## 2019-04-16 RX ADMIN — METRONIDAZOLE 500 MG: 500 INJECTION, SOLUTION INTRAVENOUS at 18:42

## 2019-04-16 RX ADMIN — MORPHINE SULFATE 4 MG: 4 INJECTION INTRAVENOUS at 14:23

## 2019-04-16 RX ADMIN — IOPAMIDOL 75 ML: 755 INJECTION, SOLUTION INTRAVENOUS at 13:21

## 2019-04-16 RX ADMIN — LEVOFLOXACIN 500 MG: 5 INJECTION, SOLUTION INTRAVENOUS at 17:24

## 2019-04-16 RX ADMIN — SODIUM CHLORIDE, PRESERVATIVE FREE 10 ML: 5 INJECTION INTRAVENOUS at 20:32

## 2019-04-16 RX ADMIN — HEPARIN SODIUM 4000 UNITS: 1000 INJECTION, SOLUTION INTRAVENOUS; SUBCUTANEOUS at 20:33

## 2019-04-16 RX ADMIN — PANTOPRAZOLE SODIUM 40 MG: 40 INJECTION, POWDER, FOR SOLUTION INTRAVENOUS at 20:52

## 2019-04-16 RX ADMIN — SODIUM CHLORIDE 1000 ML: 9 INJECTION, SOLUTION INTRAVENOUS at 12:20

## 2019-04-16 RX ADMIN — ONDANSETRON 4 MG: 2 INJECTION INTRAMUSCULAR; INTRAVENOUS at 12:20

## 2019-04-16 RX ADMIN — MORPHINE SULFATE 4 MG: 4 INJECTION, SOLUTION INTRAMUSCULAR; INTRAVENOUS at 20:32

## 2019-04-16 RX ADMIN — SODIUM CHLORIDE 1000 ML: 9 INJECTION, SOLUTION INTRAVENOUS at 14:22

## 2019-04-16 RX ADMIN — HEPARIN SODIUM AND DEXTROSE 12 UNITS/KG/HR: 10000; 5 INJECTION INTRAVENOUS at 20:34

## 2019-04-16 RX ADMIN — ONDANSETRON 4 MG: 2 INJECTION INTRAMUSCULAR; INTRAVENOUS at 17:19

## 2019-04-16 RX ADMIN — SODIUM CHLORIDE, PRESERVATIVE FREE 10 ML: 5 INJECTION INTRAVENOUS at 13:21

## 2019-04-16 RX ADMIN — PROMETHAZINE HYDROCHLORIDE 25 MG: 25 INJECTION INTRAMUSCULAR; INTRAVENOUS at 14:20

## 2019-04-16 RX ADMIN — MORPHINE SULFATE 4 MG: 4 INJECTION, SOLUTION INTRAMUSCULAR; INTRAVENOUS at 17:22

## 2019-04-16 RX ADMIN — ONDANSETRON 4 MG: 2 INJECTION INTRAMUSCULAR; INTRAVENOUS at 14:06

## 2019-04-16 ASSESSMENT — ENCOUNTER SYMPTOMS
VOMITING: 1
EYE DISCHARGE: 0
BLOOD IN STOOL: 0
CHEST TIGHTNESS: 0
ABDOMINAL DISTENTION: 1
ABDOMINAL PAIN: 1
VOICE CHANGE: 0
DIARRHEA: 0
TROUBLE SWALLOWING: 0
SHORTNESS OF BREATH: 0
CONSTIPATION: 0
NAUSEA: 1
EYE REDNESS: 0
COLOR CHANGE: 0

## 2019-04-16 ASSESSMENT — PAIN DESCRIPTION - ONSET: ONSET: ON-GOING

## 2019-04-16 ASSESSMENT — PAIN DESCRIPTION - ORIENTATION: ORIENTATION: MID;LOWER

## 2019-04-16 ASSESSMENT — PAIN DESCRIPTION - LOCATION
LOCATION: ABDOMEN
LOCATION: ABDOMEN

## 2019-04-16 ASSESSMENT — PAIN SCALES - GENERAL
PAINLEVEL_OUTOF10: 10
PAINLEVEL_OUTOF10: 8
PAINLEVEL_OUTOF10: 9
PAINLEVEL_OUTOF10: 2
PAINLEVEL_OUTOF10: 0
PAINLEVEL_OUTOF10: 0
PAINLEVEL_OUTOF10: 9

## 2019-04-16 ASSESSMENT — PAIN DESCRIPTION - PROGRESSION: CLINICAL_PROGRESSION: NOT CHANGED

## 2019-04-16 ASSESSMENT — PAIN DESCRIPTION - PAIN TYPE
TYPE: ACUTE PAIN
TYPE: ACUTE PAIN

## 2019-04-16 ASSESSMENT — PAIN DESCRIPTION - FREQUENCY: FREQUENCY: CONTINUOUS

## 2019-04-16 ASSESSMENT — PAIN DESCRIPTION - DESCRIPTORS: DESCRIPTORS: SHARP;SHOOTING;DISCOMFORT

## 2019-04-16 NOTE — H&P
Department of Internal Medicine  Hospitalist  Attending History and Physical      CHIEF COMPLAINT:  abd pain    Reason for Admission:  As above    History Obtained From:  patient    HISTORY OF PRESENT ILLNESS:      The patient is a 66 y.o. female with significant past medical history of cirrhosis, PV thrombosis, thrombocytopenia and gastric varices discharged on 4/9 for colitis. Not on AC. She presents back with mid epigastric abd pain with vomiting and nausea. No blood in stools. No fevers. Has left leg pain but chroinc.     Past Medical History:        Diagnosis Date    Acid reflux     Arthritis     left knee    CAD (coronary artery disease)     per [address] [place] [address] 9/6/13    COPD (chronic obstructive pulmonary disease) (HCC)     follow with Dr Davis Hunt Depression     \"have manic - depression see Dr Wray Mention Diabetes mellitus [facility])     dx 10+ yrs ago-     Drug abuse ([address]     hx use of cocaine, heroin and marijuana- states last used 12/2014    Glaucoma     left eye    H/O Doppler lower venous ultrasound 04/04/2019    No DVT or SVT, Significant reflux of RGSV and LGSV.    H/O Doppler ultrasound 7/22/15    CAROTID: WNL. Normal vertebral flows bilaterally.      Hepatitis C     for liver bx 12/3/2015\"Have Hepatitis B and C and saw Dr Samantha Chi for this 12/1/2015\"    Hiatal hernia     History of alcohol abuse     HTN (hypertension)     \"for the past two yrs on medication\" follows with Dr Diane Cruz Irregular heart beat     per pt    Liver hematoma     Migraines     Nausea & vomiting     Schizophrenia ([address]     per old chart    Seizures ([address]     \"last one was 9/2015- [facility] Dr Brandan Sin at [facility]- she said not sure if acutal seizures- she thinks they are panic or anxiety attacks\"    SOB (shortness of breath)     Stress bladder incontinence, female      Past Surgical History:        Procedure Laterality Date    BREAST SURGERY  10/2015    left breast bx    CARDIAC CATHETERIZATION      per old chart pt had cath done in 3/2011 and 9/2013    CHOLECYSTECTOMY  per old chart done 1985    COLONOSCOPY  3/11/13    diverticulosis, cecal polyp    COLONOSCOPY  03/16/2017    Internal hemorrhoids    ENDOSCOPY, COLON, DIAGNOSTIC  03/16/2017    EGD: Small esophageal varices, portal hypertensive gastropathy, reflux esophagitis, hiatal hernia    EYE SURGERY Bilateral ? when    cyst removal, cataracts    HYSTERECTOMY      per old chart pt had CHOLO/BSO 1986    TONSILLECTOMY  as a kid    UPPER GASTROINTESTINAL ENDOSCOPY N/A 11/14/2018    EGD DIAGNOSTIC ONLY performed by Lali Smith MD at [facility]  Medications Prior to Admission:    No current facility-administered medications on file prior to encounter.       Current Outpatient Medications on File Prior to Encounter   Medication Sig Dispense Refill    pantoprazole (PROTONIX) 20 MG tablet TAKE 1 TABLET BY MOUTH TWO TIMES DAILY BEFORE MEALS 60 tablet 2    metoprolol tartrate (LOPRESSOR) 25 MG tablet TAKE ONE (1) TABLET BY MOUTH TWO TIMES DAILY 60 tablet 5    Compression Stockings MISC by Does not apply route 20 - 30 mmh wear daily and take off at night  Thigh High 2 each 0    nitroGLYCERIN (NITROSTAT) 0.4 MG SL tablet Place 1 tablet under the tongue every 5 minutes as needed for Chest pain 25 tablet 3    nicotine (NICODERM CQ) 21 MG/24HR Place 1 patch onto the skin every 24 hours 42 patch 0    UNIFINE PENTIPS 31G X 6 MM MISC USE AS DIRECTED 100 each 5    promethazine (PHENERGAN) 25 MG tablet Take 1 tablet by mouth 3 times daily as needed for Nausea 60 tablet 1    lactulose (CHRONULAC) 10 GM/15ML solution Take 15 mLs by mouth daily 1 Bottle 0    hydrOXYzine (ATARAX) 50 MG tablet Take 50 mg by mouth 3 times daily as needed for Itching      metoprolol tartrate (LOPRESSOR) 25 MG tablet Take 1 tablet by mouth 2 times daily 60 tablet 6    ranolazine (RANEXA) 500 MG extended release tablet Take 1 tablet by mouth 2 times daily 180 tablet 3    ondansetron (ZOFRAN-ODT) 4 MG disintegrating tablet Take 1 tablet by mouth every 8 hours as needed for Nausea or Vomiting 20 tablet 0    FLUoxetine (PROZAC) 40 MG capsule Take 40 mg by mouth daily      VIREAD 300 MG tablet Take 1 tablet by mouth nightly       QUEtiapine (SEROQUEL) 300 MG tablet Take 300 mg by mouth nightly       albuterol sulfate HFA (PROAIR HFA) 108 (90 BASE) MCG/ACT inhaler Inhale 2 puffs into the lungs every 6 hours as needed for Wheezing 1 Inhaler 5    oxyCODONE (ROXICODONE) 5 MG immediate release tablet Take 5 mg by mouth 4 times daily  .  busPIRone (BUSPAR) 15 MG tablet Take 1 tablet by mouth 2 times daily 60 tablet 3          Allergies:  Norco [hydrocodone-acetaminophen] and Tylenol [acetaminophen]    Social History:   Social History     Socioeconomic History    Marital status:      Spouse name: Not on file    Number of children: Not on file    Years of education: Not on file    Highest education level: Not on file   Occupational History    Not on file   Social Needs    Financial resource strain: Not on file    Food insecurity:     Worry: Not on file     Inability: Not on file    Transportation needs:     Medical: Not on file     Non-medical: Not on file   Tobacco Use    Smoking status: Current Every Day Smoker     Packs/day: 0.50     Years: 40.00     Pack years: 20.00     Types: Cigarettes    Smokeless tobacco: Never Used   Substance and Sexual Activity    Alcohol use:  Yes     Alcohol/week: 1.2 - 1.8 oz     Types: 2 - 3 Shots of liquor per week     Comment: 2-3 shots last night     Drug use: Yes     Frequency: 3.0 times per week     Types: Marijuana    Sexual activity: Not Currently     Partners: Male   Lifestyle    Physical activity:     Days per week: Not on file     Minutes per session: Not on file    Stress: Not on file   Relationships    Social connections:     Talks on phone: Not on file     Gets together: Not on file     Attends Yarsani service: Not on file     Active member of club or organization: Not on file     Attends meetings of clubs or organizations: Not on file     Relationship status: Not on file    Intimate partner violence:     Fear of current or ex partner: Not on file     Emotionally abused: Not on file     Physically abused: Not on file     Forced sexual activity: Not on file   Other Topics Concern    Not on file   Social History Narrative    Not on file         Family History:   Family History   Problem Relation Age of Onset    Cancer Mother         lung ca    Arthritis Mother     Migraines Mother     Cancer Father         colon ca   [facility] Diabetes Father     High Blood Pressure Father     Arthritis Father     High Cholesterol Father     Migraines Father     Migraines Sister     Heart Disease Brother         WPW       REVIEW OF SYSTEMS:  CONSTITUTIONAL:  negative  EYES:  negative  HEENT:  negative  RESPIRATORY:  negative  CARDIOVASCULAR:  negative  GASTROINTESTINAL:  positive for nausea, vomiting and abdominal pain  ALLERGIC/IMMUNOLOGIC:  negative  ENDOCRINE:  negative  MUSCULOSKELETAL:  negative  NEUROLOGICAL:  negative  BEHAVIOR/PSYCH:  negative  PHYSICAL EXAM:    Vitals:  BP (!) 186/124   Pulse 128   Temp 98 °F (36.7 °C) (Oral)   Resp 25   Ht 4' 9\" (1.448 m)   Wt 167 lb (75.8 kg)   SpO2 97%   BMI 36.14 kg/m²     CONSTITUTIONAL:  awake  EYES:  Lids and lashes normal, pupils equal, round and reactive to light, extra ocular muscles intact, sclera clear, conjunctiva normal  ENT:  Normocephalic, without obvious abnormality, atraumatic, sinuses nontender on palpation, external ears without lesions, oral pharynx with moist mucus membranes, tonsils without erythema or exudates, gums normal and good dentition.   LUNGS:  No increased work of breathing, good air exchange, clear to auscultation bilaterally, no crackles or wheezing  CARDIOVASCULAR:  tachycardia  ABDOMEN:  Soft, tender mid abd  MUSCULOSKELETAL:  full range of motion noted  NEUROLOGIC:  Awake, alert, oriented to name, place and time. Cranial nerves II-XII are grossly intact. Motor is 5 out of 5 bilaterally. Heidi Chars SKIN:  no bruising or bleeding    DATA:  CT abd  Redemonstration of cirrhotic morphology of the liver with splenomegaly and   prominent gastric varices. 2. Portal vein thrombus within the right portal vein redemonstrated similar   to prior exam. Gradie Bran has been interval development of thrombus at the main   portal vein which was not identified on prior exam from April 6, 2019.   3. Wall thickening of the colon predominantly involving the proximal and   transverse colon redemonstrated.  The descending and sigmoid colon appears   grossly spared.  Findings were noted on multiple previous exams and again may   be related to elevated portal venous pressure with colitis also a   consideration.        ASSESSMENT AND PLAN:    Acute abd pain with worsening portal vein thromobosis  -will need AC if GI and surg OK or can have worsening clot  -check doppler LE with left pain  -has thrombocytopenia and gastric varicies so monitor closely  -IR to check if they can do thrombectomy/thrombolysis  -IV levaquin and flagyl and IV morphrine  HTN urgency  -IV labetalol and if still uncontrolled then IV drip   Cirrrhosis  -lactulose, BP control, tenofovir  Colitis  Levaquin, flayl  HTN  -BB  DVT prophylaxis  -heparin drip

## 2019-04-16 NOTE — ED PROVIDER NOTES
I independently examined and evaluated Blaire Ta. In brief, 66 red female brought in for abdominal pain and vomiting. Started last night. No blood in the vomit. Has had hard stools. Pain in the right upper quadrant. She was diagnosed with colitis a few weeks ago, not on antibiotics. No fevers. .    Focused exam revealed patient actively retching, getting up brown emesis, nonbloody. She appears to be in distress, tachypnea and tachycardic. Abdomen soft, nondistended but very tender diffusely but mostly over the right upper quadrant. ED course: Review patient's chart and evaluation from earlier in the month, basic labs started, given nausea medications and pain medications and was improving but then started actively vomiting again, tachycardic and tachypneic. We'll send have portal venous thrombosis that is new and also looks like potentially colitis versus venous congestion. Her lactic acid is significantly elevated. We did start antibiotics, fluids, gave Phenergan in addition to the Zofran and she was improving. Consult to Gen. surgery given the elevation lactic acid and how severe her distresses, pressure if she's maybe having some ischemia with all of this venous congestion, he also asked that we get GI on board. She is seen multiple GI doctors in the past and I clarified with her and she would like to be seen by Dr. Dameon Reyes so I did consult him. He will also evaluate the patient in the hospital.  I spoken with hospitalist team for admission as well. Patient is agreeable to admission. She is starting to improve, we will continue fluids. Total critical care time today provided was 34 minutes. This excludes seperately billable procedure. Critical care time provided for abdominal pain, vomiting, lactic acidosis, SIRS that required close evaluation and/or intervention with concern for patient decompensation.       All diagnostic, treatment, and disposition decisions were made by myself in conjunction with the advanced practice provider. For all further details of the patient's emergency department visit, please see the advanced practice provider's documentation. Comment: Please note this report has been produced using speech recognition software and may contain errors related to that system including errors in grammar, punctuation, and spelling, as well as words and phrases that may be inappropriate. If there are any questions or concerns please feel free to contact the dictating provider for clarification.        Elmer Payan MD  04/16/19 [id number]

## 2019-04-16 NOTE — CONSULTS
12/1/2015\"    Hiatal hernia     History of alcohol abuse     HTN (hypertension)     \"for the past two yrs on medication\" follows with Dr Fred Boeck Irregular heart beat     per pt    Liver hematoma     Migraines     Nausea & vomiting     Schizophrenia ([address]     per old chart    Seizures ([facility] [address]     \"last one was 9/2015- saw Dr Izabella Pascual at [facility]- she said not sure if acutal seizures- she thinks they are panic or anxiety attacks\"    SOB (shortness of breath)     Stress bladder incontinence, female        Past Surgical History:    Past Surgical History:   Procedure Laterality Date    BREAST SURGERY  10/2015    left breast bx    CARDIAC CATHETERIZATION      per old chart pt had cath done in 3/2011 and 9/2013    CHOLECYSTECTOMY  per old chart done [facility]  3/11/13    diverticulosis, cecal polyp    COLONOSCOPY  03/16/2017    Internal hemorrhoids    ENDOSCOPY, COLON, DIAGNOSTIC  03/16/2017    EGD: Small esophageal varices, portal hypertensive gastropathy, reflux esophagitis, hiatal hernia    EYE SURGERY Bilateral ? when    cyst removal, cataracts    HYSTERECTOMY      per old chart pt had CHOLO/BSO 1986    TONSILLECTOMY  as a kid    UPPER GASTROINTESTINAL ENDOSCOPY N/A 11/14/2018    EGD DIAGNOSTIC ONLY performed by Estevan Dutton MD at [facility] ENDOSCOPY       Current Medications:   Current Facility-Administered Medications   Medication Dose Route Frequency Provider Last Rate Last Dose    morphine sulfate (PF) injection 4 mg  4 mg Intravenous Q30 Min PRN Monisha Montanez PA-C   4 mg at 04/16/19 1423    busPIRone (BUSPAR) tablet 15 mg  15 mg Oral BID Taj Simpson MD        FLUoxetine (PROZAC) capsule 40 mg  40 mg Oral Daily Taj Simpson MD        [START ON 4/17/2019] lactulose (CHRONULAC) 10 GM/15ML solution 10 g  10 g Oral Daily Taj Simpson MD        metoprolol tartrate (LOPRESSOR) tablet 25 mg  25 mg Oral BID Taj Simpson MD        nicotine (NICODERM CQ) 21 MG/24HR 1 patch 1 patch Transdermal Daily Kimmy Blair MD        pantoprazole (PROTONIX) injection 40 mg  40 mg Intravenous BID Kimmy Blair MD        ranolazine (RANEXA) extended release tablet 500 mg  500 mg Oral BID Kimmy Blair MD        tenofovir (VIREAD) tablet 300 mg  300 mg Oral Nightly Kimmy Blair MD        QUEtiapine (SEROQUEL XR) 150 MG extended release tablet 300 mg  300 mg Oral Nightly Kimmy Blair MD        albuterol sulfate  (90 Base) MCG/ACT inhaler 2 puff  2 puff Inhalation Q6H PRN Kimmy Blair MD        sodium chloride flush 0.9 % injection 10 mL  10 mL Intravenous 2 times per day Kimmy Blair MD        sodium chloride flush 0.9 % injection 10 mL  10 mL Intravenous PRN Kimmy Blair MD        ondansetron (ZOFRAN) injection 4 mg  4 mg Intravenous Q6H PRN Kimmy Blair MD        metronidazole (FLAGYL) 500 mg in NaCl 100 mL IVPB premix  500 mg Intravenous Q8H Kimmy Blair MD        levofloxacin (LEVAQUIN) 500 MG/100ML infusion 500 mg  500 mg Intravenous Daily Kimmy Blair MD        senna (SENOKOT) tablet 8.6 mg  1 tablet Oral Daily PRN Kimmy Blair MD        labetalol (NORMODYNE;TRANDATE) 10 mg in sodium chloride 0.9 % 50 mL IVPB  10 mg Intravenous Q4H PRN Kimmy Blair MD        morphine sulfate (PF) injection 4 mg  4 mg Intravenous Q3H PRN Kimmy Blair MD           Allergies:  Norco [hydrocodone-acetaminophen] and Tylenol [acetaminophen]    Social History:   Social History     Socioeconomic History    Marital status:      Spouse name: None    Number of children: None    Years of education: None    Highest education level: None   Occupational History    None   Social Needs    Financial resource strain: None    Food insecurity:     Worry: None     Inability: None    Transportation needs:     Medical: None     Non-medical: None   Tobacco Use    Smoking status: Current Every Day Smoker     Packs/day: 0.50     Years: 40.00     Pack years: 20.00     Types: Cigarettes    Smokeless and confusion. I have reviewed the patient's information pertinent to this visit, including medical history, family history, social history and review of systems. PHYSICAL EXAM:    Vitals:    04/16/19 1153 04/16/19 1402 04/16/19 1545   BP: (!) 186/114 (!) 186/124 (!) 181/102   Pulse: 112 128 111   Resp: 18 25 22   Temp: 98 °F (36.7 °C)  98.1 °F (36.7 °C)   TempSrc: Oral  Oral   SpO2: 97% 97%    Weight: 167 lb (75.8 kg)  168 lb 14 oz (76.6 kg)   Height: 4' 9\" (1.448 m)  4' 9\" (1.448 m)       Physical Exam   Constitutional: She is oriented to person, place, and time. She appears well-developed and well-nourished. No distress. Actively vomiting - brown grainy emesis, but no blood. HENT:   Head: Normocephalic and atraumatic. Eyes: Pupils are equal, round, and reactive to light. Right eye exhibits no discharge. Left eye exhibits no discharge. Neck: Neck supple. No tracheal deviation present. Cardiovascular: Regular rhythm. tachycardic   Pulmonary/Chest: Effort normal. No respiratory distress. She has no wheezes. Abdominal: Soft. There is tenderness. There is no rebound and no guarding. Well healed surgical scars   Musculoskeletal: She exhibits no tenderness or deformity. Neurological: She is alert and oriented to person, place, and time. Skin: Skin is warm and dry. No rash noted. She is not diaphoretic. Psychiatric: She has a normal mood and affect.  Her behavior is normal.       DATA:    Lab Results   Component Value Date    WBC 8.0 04/16/2019    HGB 15.2 04/16/2019    HCT 46.1 04/16/2019    PLT 95 (L) 04/16/2019     04/16/2019    K 3.5 04/16/2019     04/16/2019    CO2 24 04/16/2019    BUN 8 04/16/2019    CREATININE 0.9 04/16/2019    GLUCOSE 213 (H) 04/16/2019    CALCIUM 8.8 04/16/2019    PROT 8.3 (H) 04/16/2019    BILITOT 0.8 04/16/2019    AST 26 04/16/2019    ALT 17 04/16/2019    ALKPHOS 114 04/16/2019    AMYLASE 35 04/10/2012    LIPASE 57 04/16/2019    INR 1.18 04/06/2019 within the right portal vein similar to previous exam.  Thrombus also now identified within the main portal vein. This was not identified on previous exam.  The gallbladder is surgically absent. Biliary dilatation of the intrahepatic and extrahepatic bile ducts appears stable and is likely postsurgical.  The spleen is enlarged but otherwise stable. The pancreas and bilateral adrenal glands appear unremarkable. The kidneys enhance symmetrically. No evidence for hydronephrosis. GI/Bowel: There is circumferential wall thickening of the cecum, ascending colon, and transverse colon with apparent sparing of the descending colon and sigmoid colon. There is mild adjacent stranding predominately involving the cecum and ascending colon. Small bowel is normal in caliber. Pelvis: The bladder appears unremarkable. The uterus is surgically absent. Peritoneum/Retroperitoneum: The abdominal aorta is normal in caliber with mild to moderate atherosclerotic plaque throughout. No free fluid or free air identified within the abdomen. Note again made of prominent gastric varices. Bones/Soft Tissues: Soft tissues demonstrate mild anasarca. No acute osseous abnormality is evident. 1. Redemonstration of cirrhotic morphology of the liver with splenomegaly and prominent gastric varices. 2. Portal vein thrombus within the right portal vein redemonstrated similar to prior exam.  There has been interval development of thrombus at the main portal vein which was not identified on prior exam from April 6, 2019. 3. Wall thickening of the colon predominantly involving the proximal and transverse colon redemonstrated. The descending and sigmoid colon appears grossly spared. Findings were noted on multiple previous exams and again may be related to elevated portal venous pressure with colitis also a consideration.      Ct Abdomen Pelvis W Iv Contrast    Result Date: 4/6/2019  EXAMINATION: CT OF THE ABDOMEN AND PELVIS WITH CONTRAST 4/6/2019 4:45 pm TECHNIQUE: CT of the abdomen and pelvis was performed with the administration of intravenous contrast. Multiplanar reformatted images are provided for review. Dose modulation, iterative reconstruction, and/or weight based adjustment of the mA/kV was utilized to reduce the radiation dose to as low as reasonably achievable. COMPARISON: 11/12/2018, 10/25/2018, 03/17/2017 HISTORY: ORDERING SYSTEM PROVIDED HISTORY: luq abd pain TECHNOLOGIST PROVIDED HISTORY: Ordering Physician Provided Reason for Exam: luq abd pain Acuity: Acute Type of Exam: Initial Additional signs and symptoms: vomiting Relevant Medical/Surgical History: Hx Cirrhosis, Ericka, Hyster / Elveria Mutton [id number] FINDINGS: Lower Chest: Stable 9 mm nodule dating back to March 2017 now with some internal calcification compatible with granuloma. Lower lungs are otherwise clear. Organs: Somewhat nodular surface of the liver indicating cirrhotic changes. Significant gastric varices are demonstrated. There is also evidence of a splenorenal shunt. Filling defect within the right portal vein compatible with portal vein thrombus. Overall appearance is less extensive compared to previous study. Spleen, pancreas, adrenal glands, and kidneys are unremarkable. Gallbladder surgically absent. GI/Bowel: No bowel obstruction. Nonvisualized appendix. Circumferential wall thickening involving much of the colon may be due to incomplete distention with infectious or inflammatory colitis not excluded. Pelvis: Urinary bladder is unremarkable. Uterus surgically absent. Peritoneum/Retroperitoneum: No free intraperitoneal air, fluid, or lymphadenopathy by size criteria. Bones/Soft Tissues: Osseous structures appear unremarkable. Circumferential wall thickening involving much of the colon may be due to incomplete distention with infectious or inflammatory colitis not excluded. Filling defect within the right portal vein compatible with thrombus.  Overall, the appearance of thrombus is less extensive compared to 10/25/2018 Cirrhotic changes of the liver with extensive gastric varices with a splenorenal shunt demonstrated. Vl Dup Lower Extremity Venous Bilateral    Result Date: 4/8/2019  Vascular Lower Extremities Venous Insufficiency Procedure Demographics   Patient Name       Agustin Carvalho  Date of study        04/04/2019   Date of Birth      1960       Gender               Female   Age                66               Race                    Patient Number     [id number]         Room Number   Visit Number       [id number]        Height   Corporate ID       [id number]         Weight   Accession Number   [id number]   Ordering Physician                  Bar Hinkle RVT                                       Interpreting         Dave Rod MD                                      Physician  Procedure Type of Study:   Veins:Lower Extremities Venous Insufficiency, VL LOWER EXTREMITY BILATERAL  VENOUS. Indications for Study:Leg swelling. Conclusions   Summary   Significant reflux noted of the Right GSV at the SFJ (9.2s) and knee (1.8s). Significant reflux noted in the Left GSV at the knee (5.7s). No evidence of DVT or SVT in the bilateral common femoral vein, femoral  vein, popliteal vein, greater saphenous vein or small saphenous vein. Recommendations   Advice thigh high pressure stockings, 20 to 30 mm of Hg. Keep feet elevated. Increase walking.    OV in 6 weeks   Signature   ------------------------------------------------------------------  Electronically signed by Dave Rod MD (Interpreting  physician) on 04/08/2019 at 11:39 AM  ------------------------------------------------------------------  Impressions Right Impression No evidence of DVT or SVT in the right common femoral vein, femoral vein, popliteal vein, greater saphenous vein or small saphenous vein. Normal compressibility of veins visualized in the right lower extremity. Respirophasic venous flow of the veins visualized in the right leg. Significant reflux noted of the Right GSV at the SFJ (9.2s) and knee (1.8s). Diameters (cm): GSV at Junction: 0.69 GSV Mid Thigh: 0.33 GSV Knee: 0.38 GSV Mid Calf: 0.26 GSV Distal Calf: 0.31 SSV Prox: 0.13 SSV Mid: 0.16 SSV Distal: 0.17 Left Impression No evidence of DVT or SVT in the left common femoral vein, femoral vein, popliteal vein, greater saphenous vein or small saphenous vein. Respirophasic venous flow of the veins visualized in the left leg. Normal compressibility of veins visualized in the left lower extremity. Significant reflux noted in the Left GSV at the knee (5.7s). Diameters (cm): GSV at Junction: 0.88 GSV Mid Thigh: 0.36 GSV Knee: 0.39 GSV Mid Calf: 0.26 GSV Distal Calf: 0.20 SSV Prox: 0.27 SSV Mid: 0.16 SSV Distal: 0.16 Study Location:[facility]. Technical Quality:Adequate visualization. Velocities are measured in cm/s ; Diameters are measured in cm Right Doppler Measurements +--------------+-----------+------+------------+ ! Location      ! Signal     !Reflux! Reflux (sec)! +--------------+-----------+------+------------+ ! GSV Thigh     ! Phasic     ! Yes   !9.2         ! +--------------+-----------+------+------------+ ! Femoral       !Phasic     ! No    !            ! +--------------+-----------+------+------------+ ! Mid Femoral   !Phasic     ! No    !            ! +--------------+-----------+------+------------+ ! Popliteal     !Spontaneous! No    !            ! +--------------+-----------+------+------------+ ! GSV Below Knee! Spontaneous! No    !            ! +--------------+-----------+------+------------+ ! SSV           ! Spontaneous! No    !            ! +--------------+-----------+------+------------+ Left Doppler Measurements +--------------+-----------+------+------------+ ! Location      ! Signal     !Reflux! Reflux (sec)!  Thrombocytopenia ([facility] [address] 02/24/2017    Pancolitis ([facility] [address] 02/14/2017    Hematemesis without nausea 31/03/3999    Alcoholic cirrhosis of liver without ascites ([facility] [address] 02/14/2017    Dyslipidemia 11/23/2016    Chest pain 06/07/2016    Lung nodule 11/18/2013    Angina effort ([facility] [address] 09/06/2013    Abnormal nuclear cardiac imaging test 09/06/2013    Left arm pain 04/27/2013    Tobacco abuse 04/27/2013    Type 2 diabetes mellitus with complication, with long-term current use of insulin (HCC)     Acid reflux     COPD, mild ([facility] [address]        Visit Diagnoses:  1. Intractable vomiting with nausea, unspecified vomiting type    2. Abdominal pain, right upper quadrant    3. Portal vein thrombosis        PLAN:  · Discussed with the patient and her family that I suspect her colonic thickening is due to chronic colitis associated with her portal venous thrombosis. It appears to affect the right colon and has not extended since the last CT on 46/19, but the amount of thickening may be slightly more. I suspect her pain is related to the worsening of the portal venous thrombosis - agree with anticoagulation and possible measures to treat the clot such as tPA or thrombectomy. She may require transfer to a higher level of care if these measures are necessary. · No operative plans at this time, but will monitor serial abdominal exams. If worsening, she may require exploration to rule out ischemic colon.  Continue NPO  · Actively vomiting - NG tube ordered  · Will follow  · Thank you for the consultation and the opportunity to care for [facility]      Electronically signed by Liliana Cervantes MD, 4/16/2019, 5:15 PM

## 2019-04-16 NOTE — ED NOTES
Bed: H-06  Expected date:   Expected time:   Means of arrival:   Comments:  Olayinka Sloan, RN  04/16/19 7301

## 2019-04-16 NOTE — ED PROVIDER NOTES
Patient Identification  Gabriela Lau is a 66 y.o. female    Chief Complaint  Abdominal Pain and Emesis      HPI  (History provided by patient)  This is a 66 y.o. female who was brought in by EMS for chief complaint of abdominal pain, emesis. Onset was last night. Ate dinner and later that evening began vomiting. Denies any blood in vomit. Has noticed several hard stools and she began vomiting. Also reports pain in the right upper quadrant. States feels similar to when she was diagnosed with colitis a few weeks ago. She is not currently on antibiotics. Pain is 8 out of 10, sharp, constant since onset. REVIEW OF SYSTEMS    Constitutional:  Denies fever, chills  HENT:  Denies sore throat or ear pain   Eyes: Denies vision changes, eye pain  Cardiovascular:  Denies chest pain, syncope  Respiratory:  Denies shortness of breath, cough   GI:  + abdominal pain, nausea, vomiting  :  Denies dysuria, discharge  Musculoskeletal:  Denies back pain, joint pain  Skin:  Denies rash, pruritis  Neurologic:  Denies headache, focal weakness, or sensory changes     See HPI and nursing notes for additional information     I have reviewed the following nursing documentation:  Allergies: Allergies   Allergen Reactions    Norco [Hydrocodone-Acetaminophen] Itching     Itching, rash, nausea and vomiting.      Tylenol [Acetaminophen] Itching, Nausea And Vomiting and Rash       Past medical history:  has a past medical history of Acid reflux, Arthritis, CAD (coronary artery disease), COPD (chronic obstructive pulmonary disease) ([address] Depression, Diabetes mellitus ([address] Drug abuse ([address] Glaucoma, H/O Doppler lower venous ultrasound (04/04/2019), H/O Doppler ultrasound (7/22/15), Hepatitis C, Hiatal hernia, History of alcohol abuse, HTN (hypertension), Hyperlipemia, Irregular heart beat, Liver hematoma, Migraines, Nausea & vomiting, Schizophrenia ([address] Seizures (HCC), SOB (shortness of breath), and Stress bladder incontinence, female. Past surgical history:  has a past surgical history that includes Cholecystectomy (per old chart done 1985); Hysterectomy; Cardiac catheterization; Tonsillectomy (as a kid); Breast surgery (10/2015); eye surgery (Bilateral, ? when); Colonoscopy (3/11/13); Colonoscopy (03/16/2017); Endoscopy, colon, diagnostic (03/16/2017); and Upper gastrointestinal endoscopy (N/A, 11/14/2018). Home medications:   Prior to Admission medications    Medication Sig Start Date End Date Taking? Authorizing Provider   tiZANidine (ZANAFLEX) 4 MG tablet Take 4 mg by mouth every 8 hours as needed   Yes Historical Provider, MD   pantoprazole (PROTONIX) 20 MG tablet TAKE 1 TABLET BY MOUTH TWO TIMES DAILY BEFORE MEALS 4/15/19  Yes Az Duval MD   nitroGLYCERIN (NITROSTAT) 0.4 MG SL tablet Place 1 tablet under the tongue every 5 minutes as needed for Chest pain 3/18/19  Yes ERLINDA Peñaloza CNP   lactulose (CHRONULAC) 10 GM/15ML solution Take 15 mLs by mouth daily 11/15/18  Yes Shavonne Cheung MD   hydrOXYzine (ATARAX) 50 MG tablet Take 50 mg by mouth 3 times daily as needed for Itching   Yes Historical Provider, MD   metoprolol tartrate (LOPRESSOR) 25 MG tablet Take 1 tablet by mouth 2 times daily 8/3/18  Yes Caitlyn Tong MD   ranolazine (RANEXA) 500 MG extended release tablet Take 1 tablet by mouth 2 times daily 8/3/18  Yes Caitlyn Tong MD   FLUoxetine (PROZAC) 40 MG capsule Take 40 mg by mouth daily   Yes Historical Provider, MD   QUEtiapine (SEROQUEL) 300 MG tablet Take 300 mg by mouth nightly  11/15/16  Yes Historical Provider, MD   oxyCODONE (ROXICODONE) 5 MG immediate release tablet Take 5 mg by mouth 4 times daily  .    Yes Historical Provider, MD   busPIRone (BUSPAR) 15 MG tablet Take 1 tablet by mouth 2 times daily 5/28/15  Yes Caitlyn Tong MD   Compression Stockings MISC by Does not apply route 20 - 30 mmh wear daily and take off at night  Thigh High 4/12/19   ERLINDA Peñaloza - CNP nicotine (NICODERM CQ) 21 MG/24HR Place 1 patch onto the skin every 24 hours 2/25/19 2/25/20  Fabio Casey MD   UNIFINE PENTIPS 31G X 6 MM MISC USE AS DIRECTED 12/7/18   Fabio Casey MD   promethazine (PHENERGAN) 25 MG tablet Take 1 tablet by mouth 3 times daily as needed for Nausea 11/20/18   Fabio Casey MD   ondansetron (ZOFRAN-ODT) 4 MG disintegrating tablet Take 1 tablet by mouth every 8 hours as needed for Nausea or Vomiting 4/4/18   Alvaro Arevalo, APRN - CNP   VIREAD 300 MG tablet Take 300 mg by mouth nightly  11/20/17   Historical Provider, MD   albuterol sulfate HFA (PROAIR HFA) 108 (90 BASE) MCG/ACT inhaler Inhale 2 puffs into the lungs every 6 hours as needed for Wheezing 9/7/16   Elvis Hernandez MD       Social history:  reports that she has been smoking cigarettes. She has a 20.00 pack-year smoking history. She has never used smokeless tobacco. She reports that she drinks about 1.2 - 1.8 oz of alcohol per week. She reports that she has current or past drug history. Drug: Marijuana. Frequency: 3.00 times per week. Family history:    Family History   Problem Relation Age of Onset    Cancer Mother         lung ca    Arthritis Mother     Migraines Mother     Cancer Father         colon ca    Diabetes Father     High Blood Pressure Father     Arthritis Father     High Cholesterol Father     Migraines Father     Migraines Sister     Heart Disease Brother         WPW         Exam  /75   Pulse 90   Temp 98.2 °F (36.8 °C) (Oral)   Resp 18   Ht 4' 9\" (1.448 m)   Wt 164 lb 7.4 oz (74.6 kg)   SpO2 97%   BMI 35.59 kg/m²   Nursing note and vitals reviewed. Constitutional: Well developed, well nourished. No acute distress. HENT:      Head: Normocephalic and atraumatic. Ears: External ears normal.      Nose: Nose normal.     Mouth: Membrane mucosa moist and pink. No posterior oropharynx erythema or tonsillar edema  Eyes: Anicteric sclera.  No discharge, PERRL  Neck: Supple. Trachea midline. Cardiovascular: RRR, no murmurs, rubs, or gallops, radial pulses 2+ bilaterally. Pulmonary/Chest: Effort normal. No respiratory distress. CTAB. No stridor. No wheezes. No rales. Abdominal: Soft. TTP in RUQ. No distension. No guarding, rebound tenderness, or evidence of ascites. : No CVA tenderness. Musculoskeletal: Moves all extremities. No gross deformity. Neurological: Alert and oriented to person, place, and time. Normal muscle tone. Skin: Warm and dry. No rash. Psychiatric: Normal mood and affect. Behavior is normal.      Radiographs (if obtained):  [] The following radiograph was interpreted by myself in the absence of a radiologist:   [x] Radiologist's Report Reviewed:  XR ABDOMEN FOR NG/OG/NE TUBE PLACEMENT   Final Result   Enteric tube with the tip within the stomach. CT ABDOMEN PELVIS W IV CONTRAST   Final Result   1. Redemonstration of cirrhotic morphology of the liver with splenomegaly and   prominent gastric varices. 2. Portal vein thrombus within the right portal vein redemonstrated similar   to prior exam.  There has been interval development of thrombus at the main   portal vein which was not identified on prior exam from April 6, 2019.   3. Wall thickening of the colon predominantly involving the proximal and   transverse colon redemonstrated. The descending and sigmoid colon appears   grossly spared. Findings were noted on multiple previous exams and again may   be related to elevated portal venous pressure with colitis also a   consideration.                 Labs  Results for orders placed or performed during the hospital encounter of 04/16/19   CBC Auto Differential   Result Value Ref Range    WBC 8.0 4.0 - 10.5 K/CU MM    RBC 4.86 4.2 - 5.4 M/CU MM    Hemoglobin 15.2 12.5 - 16.0 GM/DL    Hematocrit 46.1 37 - 47 %    MCV 94.9 78 - 100 FL    MCH 31.3 (H) 27 - 31 PG    MCHC 33.0 32.0 - 36.0 %    RDW 13.7 11.7 - 14.9 %    Platelets 95 (L) 140 - 440 K/CU MM    MPV 12.7 (H) 7.5 - 11.1 FL    Differential Type AUTOMATED DIFFERENTIAL     Segs Relative 80.2 (H) 36 - 66 %    Lymphocytes % 13.4 (L) 24 - 44 %    Monocytes % 4.5 (H) 0 - 4 %    Eosinophils % 0.1 0 - 3 %    Basophils % 0.6 0 - 1 %    Segs Absolute 6.4 K/CU MM    Lymphocytes # 1.1 K/CU MM    Monocytes # 0.4 K/CU MM    Eosinophils # 0.0 K/CU MM    Basophils # 0.1 K/CU MM    Nucleated RBC % 0.0 %    Total Nucleated RBC 0.0 K/CU MM    Total Immature Neutrophil 0.10 K/CU MM    Immature Neutrophil % 1.2 (H) 0 - 0.43 %   CMP   Result Value Ref Range    Sodium 143 135 - 145 MMOL/L    Potassium 3.5 3.5 - 5.1 MMOL/L    Chloride 103 99 - 110 mMol/L    CO2 24 21 - 32 MMOL/L    BUN 8 6 - 23 MG/DL    CREATININE 0.9 0.6 - 1.1 MG/DL    Glucose 213 (H) 70 - 99 MG/DL    Calcium 8.8 8.3 - 10.6 MG/DL    Alb 4.1 3.4 - 5.0 GM/DL    Total Protein 8.3 (H) 6.4 - 8.2 GM/DL    Total Bilirubin 0.8 0.0 - 1.0 MG/DL    ALT 17 10 - 40 U/L    AST 26 15 - 37 IU/L    Alkaline Phosphatase 114 40 - 129 IU/L    GFR Non-African American >60 >60 mL/min/1.73m2    GFR African American >60 >60 mL/min/1.73m2    Anion Gap 16 4 - 16   Lipase   Result Value Ref Range    Lipase 57 13 - 60 IU/L   Urinalysis   Result Value Ref Range    Color, UA STRAW (A) YELLOW    Clarity, UA CLEAR CLEAR    Glucose, Urine 150 (A) NEGATIVE MG/DL    Bilirubin Urine NEGATIVE NEGATIVE MG/DL    Ketones, Urine SMALL (A) NEGATIVE MG/DL    Specific Gravity, UA 1.015 1.001 - 1.035    Blood, Urine NEGATIVE NEGATIVE    pH, Urine 9.0 (HH) 5.0 - 8.0    Protein, UA 30 (A) NEGATIVE MG/DL    Urobilinogen, Urine NORMAL 0.2 - 1.0 MG/DL    Nitrite Urine, Quantitative NEGATIVE NEGATIVE    Leukocyte Esterase, Urine NEGATIVE NEGATIVE    RBC, UA 1 0 - 6 /HPF    WBC, UA <1 0 - 5 /HPF    Bacteria, UA NEGATIVE NEGATIVE /HPF    Squam Epithel, UA 1 /HPF    Trichomonas, UA NONE SEEN NONE SEEN /HPF   Lactic Acid, Plasma   Result Value Ref Range    Lactate (HH) 0.4 - 2.0 mMOL/L Conner Moon.    Final Impression  1. Intractable vomiting with nausea, unspecified vomiting type    2. Abdominal pain, right upper quadrant    3. Portal vein thrombosis        Blood pressure 120/75, pulse 90, temperature 98.2 °F (36.8 °C), temperature source Oral, resp. rate 18, height 4' 9\" (1.448 m), weight 164 lb 7.4 oz (74.6 kg), SpO2 97 %, not currently breastfeeding. Disposition:  Admit to med/surg floor in stable condition. Patient was given scripts for the following medications. I counseled patient how to take these medications. Current Discharge Medication List          This chart was generated using the [employer] dictation system. I created this record but it may contain dictation errors given the limitations of this technology.        Sharmila Olsen PA-C  04/17/19 [id number]

## 2019-04-16 NOTE — ED NOTES
Report given to Cesario Babinski, nurse to assume care at this time.      Anita Salmon RN  04/16/19 [id number]

## 2019-04-16 NOTE — PROGRESS NOTES
Medication History  [facility]    Patient Name: Rica Bentley 1960     Medication history has been completed by: Len Lazcano CPhT    Source(s) of information: patient and insurance claims     Primary Care Physician: Giovanni Cockayne, MD     Pharmacy: [address] [place] [address] as of 04/16/2019 - Review Complete 04/16/2019   Allergen Reaction Noted    Norco [hydrocodone-acetaminophen] Itching 02/14/2017    Tylenol [acetaminophen] Itching, Nausea And Vomiting, and Rash 11/29/2011        Prior to Admission medications    Medication Sig Start Date End Date Taking? Authorizing Provider   tiZANidine (ZANAFLEX) 4 MG tablet Take 4 mg by mouth every 8 hours as needed   Yes Historical Provider, MD   pantoprazole (PROTONIX) 20 MG tablet TAKE 1 TABLET BY MOUTH TWO TIMES DAILY BEFORE MEALS 4/15/19  Yes Giovanni Cockayne, MD   nitroGLYCERIN (NITROSTAT) 0.4 MG SL tablet Place 1 tablet under the tongue every 5 minutes as needed for Chest pain 3/18/19  Yes ERLINDA Wilder CNP   lactulose (CHRONULAC) 10 GM/15ML solution Take 15 mLs by mouth daily 11/15/18  Yes Blane Murphy MD   hydrOXYzine (ATARAX) 50 MG tablet Take 50 mg by mouth 3 times daily as needed for Itching   Yes Historical Provider, MD   metoprolol tartrate (LOPRESSOR) 25 MG tablet Take 1 tablet by mouth 2 times daily 8/3/18  Yes Judye Seip, MD   ranolazine (RANEXA) 500 MG extended release tablet Take 1 tablet by mouth 2 times daily 8/3/18  Yes Judye Seip, MD   FLUoxetine (PROZAC) 40 MG capsule Take 40 mg by mouth daily   Yes Historical Provider, MD   QUEtiapine (SEROQUEL) 300 MG tablet Take 300 mg by mouth nightly  11/15/16  Yes Historical Provider, MD   oxyCODONE (ROXICODONE) 5 MG immediate release tablet Take 5 mg by mouth 4 times daily  .    Yes Historical Provider, MD   busPIRone (BUSPAR) 15 MG tablet Take 1 tablet by mouth 2 times daily 5/28/15  Yes Judye Seip, MD   metoprolol tartrate (LOPRESSOR) 25 MG tablet TAKE ONE (1) TABLET BY MOUTH TWO TIMES DAILY 4/15/19 4/16/19  Betty Preston MD   Compression Stockings MISC by Does not apply route 20 - 30 mmh wear daily and take off at night  Thigh High 4/12/19   ERLINDA Meléndez CNP   nicotine (NICODERM CQ) 21 MG/24HR Place 1 patch onto the skin every 24 hours 2/25/19 2/25/20  Jeffrey Verdin MD   UNIFINE PENTIPS 31G X 6 MM MISC USE AS DIRECTED 12/7/18   Jeffrey Verdin MD   promethazine (PHENERGAN) 25 MG tablet Take 1 tablet by mouth 3 times daily as needed for Nausea 11/20/18   Jeffrey Verdin MD   ondansetron (ZOFRAN-ODT) 4 MG disintegrating tablet Take 1 tablet by mouth every 8 hours as needed for Nausea or Vomiting 4/4/18   ERLINDA Aviles CNP   VIREAD 300 MG tablet Take 300 mg by mouth nightly  11/20/17   Historical Provider, MD   albuterol sulfate HFA (PROAIR HFA) 108 (90 BASE) MCG/ACT inhaler Inhale 2 puffs into the lungs every 6 hours as needed for Wheezing 9/7/16   Susana Stockton MD       Medications added or changed (ex. new medication, dosage change, interval change, formulation change):  Tizanidine 4 mg TID prn (added)    Medications requiring reconciliation with provider:    Tizanidine 4 mg TID prn (added)    Other Comments:  Medication list reviewed with patient and insurance claims verified\  Patient report she has taken no medications today. Patient states she takes Tenofovir 300 mg (Viread) at HS however last fill date 01/10/19 for 30 day supply.    Has recent claims for Eliquis but states was d/'cd last admission    To my knowledge the above medication history is accurate as of 4/16/2019 2:58 PM.   Noemi Greenwood CPhT   4/16/2019 2:58 PM

## 2019-04-16 NOTE — ED NOTES
[place],lactic acid and type & screen was drawn on patient     Christa JOHNSON  Lori  04/16/19 [id number]

## 2019-04-17 LAB
ALBUMIN SERPL-MCNC: 3.4 GM/DL (ref 3.4–5)
ALP BLD-CCNC: 86 IU/L (ref 40–129)
ALT SERPL-CCNC: 15 U/L (ref 10–40)
ANION GAP SERPL CALCULATED.3IONS-SCNC: 8 MMOL/L (ref 4–16)
APTT: 40 SECONDS (ref 21.2–33)
APTT: 66 SECONDS (ref 21.2–33)
APTT: 85 SECONDS (ref 21.2–33)
APTT: 95.1 SECONDS (ref 21.2–33)
AST SERPL-CCNC: 28 IU/L (ref 15–37)
BILIRUB SERPL-MCNC: 0.7 MG/DL (ref 0–1)
BUN BLDV-MCNC: 9 MG/DL (ref 6–23)
CALCIUM SERPL-MCNC: 8.2 MG/DL (ref 8.3–10.6)
CHLORIDE BLD-SCNC: 103 MMOL/L (ref 99–110)
CO2: 30 MMOL/L (ref 21–32)
CREAT SERPL-MCNC: 0.9 MG/DL (ref 0.6–1.1)
GFR AFRICAN AMERICAN: >60 ML/MIN/1.73M2
GFR NON-AFRICAN AMERICAN: >60 ML/MIN/1.73M2
GLUCOSE BLD-MCNC: 157 MG/DL (ref 70–99)
HCT VFR BLD CALC: 43.9 % (ref 37–47)
HEMOGLOBIN: 13.8 GM/DL (ref 12.5–16)
MAGNESIUM: 1.9 MG/DL (ref 1.8–2.4)
MCH RBC QN AUTO: 31.2 PG (ref 27–31)
MCHC RBC AUTO-ENTMCNC: 31.4 % (ref 32–36)
MCV RBC AUTO: 99.3 FL (ref 78–100)
PDW BLD-RTO: 14.1 % (ref 11.7–14.9)
PLATELET # BLD: 92 K/CU MM (ref 140–440)
PMV BLD AUTO: 12 FL (ref 7.5–11.1)
POTASSIUM SERPL-SCNC: 3.3 MMOL/L (ref 3.5–5.1)
RBC # BLD: 4.42 M/CU MM (ref 4.2–5.4)
SODIUM BLD-SCNC: 141 MMOL/L (ref 135–145)
TOTAL PROTEIN: 6.6 GM/DL (ref 6.4–8.2)
WBC # BLD: 8.4 K/CU MM (ref 4–10.5)

## 2019-04-17 PROCEDURE — C9113 INJ PANTOPRAZOLE SODIUM, VIA: HCPCS | Performed by: HOSPITALIST

## 2019-04-17 PROCEDURE — 85730 THROMBOPLASTIN TIME PARTIAL: CPT

## 2019-04-17 PROCEDURE — 2060000000 HC ICU INTERMEDIATE R&B

## 2019-04-17 PROCEDURE — 85027 COMPLETE CBC AUTOMATED: CPT

## 2019-04-17 PROCEDURE — 83735 ASSAY OF MAGNESIUM: CPT

## 2019-04-17 PROCEDURE — 80053 COMPREHEN METABOLIC PANEL: CPT

## 2019-04-17 PROCEDURE — 2500000003 HC RX 250 WO HCPCS: Performed by: HOSPITALIST

## 2019-04-17 PROCEDURE — 6360000002 HC RX W HCPCS: Performed by: HOSPITALIST

## 2019-04-17 PROCEDURE — 2580000003 HC RX 258: Performed by: SURGERY

## 2019-04-17 PROCEDURE — 99232 SBSQ HOSP IP/OBS MODERATE 35: CPT | Performed by: SURGERY

## 2019-04-17 PROCEDURE — 2580000003 HC RX 258: Performed by: HOSPITALIST

## 2019-04-17 PROCEDURE — 36415 COLL VENOUS BLD VENIPUNCTURE: CPT

## 2019-04-17 RX ORDER — POTASSIUM CHLORIDE 20 MEQ/1
40 TABLET, EXTENDED RELEASE ORAL PRN
Status: DISCONTINUED | OUTPATIENT
Start: 2019-04-17 | End: 2019-04-18 | Stop reason: HOSPADM

## 2019-04-17 RX ORDER — POTASSIUM CHLORIDE 7.45 MG/ML
10 INJECTION INTRAVENOUS PRN
Status: DISCONTINUED | OUTPATIENT
Start: 2019-04-17 | End: 2019-04-18 | Stop reason: HOSPADM

## 2019-04-17 RX ORDER — POTASSIUM CHLORIDE 1.5 G/1.77G
40 POWDER, FOR SOLUTION ORAL PRN
Status: DISCONTINUED | OUTPATIENT
Start: 2019-04-17 | End: 2019-04-18 | Stop reason: HOSPADM

## 2019-04-17 RX ORDER — SODIUM CHLORIDE 9 MG/ML
INJECTION, SOLUTION INTRAVENOUS
Status: DISPENSED
Start: 2019-04-17 | End: 2019-04-17

## 2019-04-17 RX ORDER — SODIUM CHLORIDE 9 MG/ML
INJECTION, SOLUTION INTRAVENOUS CONTINUOUS
Status: DISCONTINUED | OUTPATIENT
Start: 2019-04-17 | End: 2019-04-18 | Stop reason: HOSPADM

## 2019-04-17 RX ADMIN — SODIUM CHLORIDE, PRESERVATIVE FREE 10 ML: 5 INJECTION INTRAVENOUS at 08:41

## 2019-04-17 RX ADMIN — LEVOFLOXACIN 500 MG: 5 INJECTION, SOLUTION INTRAVENOUS at 08:50

## 2019-04-17 RX ADMIN — MORPHINE SULFATE 4 MG: 4 INJECTION, SOLUTION INTRAMUSCULAR; INTRAVENOUS at 11:35

## 2019-04-17 RX ADMIN — PANTOPRAZOLE SODIUM 40 MG: 40 INJECTION, POWDER, FOR SOLUTION INTRAVENOUS at 22:18

## 2019-04-17 RX ADMIN — MORPHINE SULFATE 4 MG: 4 INJECTION, SOLUTION INTRAMUSCULAR; INTRAVENOUS at 02:51

## 2019-04-17 RX ADMIN — MORPHINE SULFATE 4 MG: 4 INJECTION, SOLUTION INTRAMUSCULAR; INTRAVENOUS at 08:37

## 2019-04-17 RX ADMIN — HEPARIN SODIUM AND DEXTROSE 15 UNITS/KG/HR: 10000; 5 INJECTION INTRAVENOUS at 15:56

## 2019-04-17 RX ADMIN — SODIUM CHLORIDE, PRESERVATIVE FREE 10 ML: 5 INJECTION INTRAVENOUS at 22:18

## 2019-04-17 RX ADMIN — ONDANSETRON 4 MG: 2 INJECTION INTRAMUSCULAR; INTRAVENOUS at 08:37

## 2019-04-17 RX ADMIN — PANTOPRAZOLE SODIUM 40 MG: 40 INJECTION, POWDER, FOR SOLUTION INTRAVENOUS at 08:49

## 2019-04-17 RX ADMIN — METRONIDAZOLE 500 MG: 500 INJECTION, SOLUTION INTRAVENOUS at 08:50

## 2019-04-17 RX ADMIN — METRONIDAZOLE 500 MG: 500 INJECTION, SOLUTION INTRAVENOUS at 18:51

## 2019-04-17 RX ADMIN — MORPHINE SULFATE 4 MG: 4 INJECTION, SOLUTION INTRAMUSCULAR; INTRAVENOUS at 18:49

## 2019-04-17 RX ADMIN — MORPHINE SULFATE 4 MG: 4 INJECTION, SOLUTION INTRAMUSCULAR; INTRAVENOUS at 22:17

## 2019-04-17 RX ADMIN — HEPARIN SODIUM 2220 UNITS: 1000 INJECTION, SOLUTION INTRAVENOUS; SUBCUTANEOUS at 11:16

## 2019-04-17 RX ADMIN — SODIUM CHLORIDE: 9 INJECTION, SOLUTION INTRAVENOUS at 08:53

## 2019-04-17 RX ADMIN — ONDANSETRON 4 MG: 2 INJECTION INTRAMUSCULAR; INTRAVENOUS at 23:55

## 2019-04-17 RX ADMIN — METRONIDAZOLE 500 MG: 500 INJECTION, SOLUTION INTRAVENOUS at 02:09

## 2019-04-17 ASSESSMENT — PAIN DESCRIPTION - LOCATION
LOCATION: ABDOMEN

## 2019-04-17 ASSESSMENT — PAIN SCALES - GENERAL
PAINLEVEL_OUTOF10: 6
PAINLEVEL_OUTOF10: 0
PAINLEVEL_OUTOF10: 8
PAINLEVEL_OUTOF10: 2
PAINLEVEL_OUTOF10: 7

## 2019-04-17 ASSESSMENT — PAIN DESCRIPTION - PROGRESSION
CLINICAL_PROGRESSION: NOT CHANGED

## 2019-04-17 ASSESSMENT — PAIN DESCRIPTION - PAIN TYPE
TYPE: ACUTE PAIN

## 2019-04-17 ASSESSMENT — PAIN DESCRIPTION - ONSET
ONSET: ON-GOING

## 2019-04-17 ASSESSMENT — PAIN DESCRIPTION - ORIENTATION
ORIENTATION: MID;LOWER
ORIENTATION: MID;LOWER

## 2019-04-17 ASSESSMENT — PAIN DESCRIPTION - FREQUENCY
FREQUENCY: CONTINUOUS
FREQUENCY: CONTINUOUS

## 2019-04-17 ASSESSMENT — PAIN DESCRIPTION - DESCRIPTORS
DESCRIPTORS: ACHING;DISCOMFORT
DESCRIPTORS: SHARP;SHOOTING;DISCOMFORT

## 2019-04-17 NOTE — PROGRESS NOTES
GENERAL SURGERY INPATIENT PROGRESS NOTE  [facility]    PATIENT: Ladarius Hurtado, 66 y.o., female, MRN: [id number]    Hospital Day:  LOS: 1 day     Ladarius Hurtado is a 66 y.o. female with history including Hepatitis \"B and C\", cirrhosis, varices, and portal venous thrombosis who was recently admitted for abdominal pain and colonic thickening, now presenting with recurrent abdominal pain, nausea, and vomiting. Subjective:  Chief Complaint: abdominal pain  Pain: 4/10  BM: yesterday   Diet: Diet NPO Effective Now  Activity: as tolerated    She feels some better than yesterday, but her abdomen is still sore. No vomiting since the NG was placed, having dark brown output.      Objective:    Vitals: /75   Pulse 91   Temp 98.2 °F (36.8 °C) (Oral)   Resp 18   Ht 4' 9\" (1.448 m)   Wt 164 lb 7.4 oz (74.6 kg)   SpO2 97%   BMI 35.59 kg/m²   Vital Signs (Last 24 Hours)  Temp  Av.3 °F (36.8 °C)  Min: 98 °F (36.7 °C)  Max: 99 °F (37.2 °C)  Pulse  Av  Min: 90  Max: 128  BP  Min: 120/75  Max: 186/124  Resp  Av.6  Min: 15  Max: 25  SpO2  Av %  Min: 97 %  Max: 97 %  Wt Readings from Last 3 Encounters:   19 164 lb 7.4 oz (74.6 kg)   19 168 lb 14.4 oz (76.6 kg)   19 150 lb (68 kg)       I/O:  0701 -  07  In: -   Out: 300     IV Fluids: sodium chloride Last Rate: 75 mL/hr at 19 0853    sodium chloride    heparin (porcine) Last Rate: 12 Units/kg/hr (19)    Scheduled Meds: busPIRone, 15 mg, Oral, BID    FLUoxetine, 40 mg, Oral, Daily    lactulose, 10 g, Oral, Daily    metoprolol tartrate, 25 mg, Oral, BID    nicotine, 1 patch, Transdermal, Daily    pantoprazole, 40 mg, Intravenous, BID    ranolazine, 500 mg, Oral, BID    tenofovir, 300 mg, Oral, Nightly    QUEtiapine, 300 mg, Oral, Nightly    sodium chloride flush, 10 mL, Intravenous, 2 times per day    metroNIDAZOLE, 500 mg, Intravenous, Q8H    levofloxacin, 500 mg, K/CU MM    MPV 12.0 (H) 7.5 - 11.1 FL     Assessment:  Montrell Lewis is a 66 y.o. female with multiple comorbidities including Hepatitis, cirrhosis, portal venous thrombosis, and thickening of the right colon     Active Problems:    Abdominal pain  Resolved Problems:    * No resolved hospital problems. *    Plan:  · Discussed with the patient and her family that I suspect her colonic thickening is due to chronic colitis associated with her portal venous thrombosis. It appears to affect the right colon and has not extended since the last CT on 4/6/19, but the amount of thickening may be slightly more. I suspect her pain is related to the worsening of the portal venous thrombosis - agree with anticoagulation and possible measures to treat the clot such as tPA or thrombectomy. She may require transfer to a higher level of care if these measures are necessary. · No operative plans at this time, but will monitor serial abdominal exams. If worsening, she may require exploration to rule out ischemic colon.  Continue NPO  · Continue NG tube to low intermittent suction  · Will follow  · Thank you for the consultation and the opportunity to care for Montrell Lewis    Electronically signed: Len Keenan MD 4/17/2019 10:02 AM

## 2019-04-17 NOTE — PROGRESS NOTES
Mahnaz Brothers is a 66 y.o. female patient feels better with NG tube.      Current Facility-Administered Medications   Medication Dose Route Frequency Provider Last Rate Last Dose    morphine sulfate (PF) injection 4 mg  4 mg Intravenous Q30 Min PRN Haroon Montanez PA-C   4 mg at 04/16/19 1423    busPIRone (BUSPAR) tablet 15 mg  15 mg Oral BID Kimmy Blair MD        FLUoxetine (PROZAC) capsule 40 mg  40 mg Oral Daily Kimmy Blair MD        lactulose (CHRONULAC) 10 GM/15ML solution 10 g  10 g Oral Daily Kimmy Blair MD        metoprolol tartrate (LOPRESSOR) tablet 25 mg  25 mg Oral BID Kimmy Blair MD        nicotine (NICODERM CQ) 21 MG/24HR 1 patch  1 patch Transdermal Daily Kimmy Blair MD        pantoprazole (PROTONIX) injection 40 mg  40 mg Intravenous BID Kimmy Blair MD   40 mg at 04/16/19 2052    ranolazine (RANEXA) extended release tablet 500 mg  500 mg Oral BID Kimmy Blair MD       Ifeanyi Flax tenofovir (VIREAD) tablet 300 mg  300 mg Oral Nightly Kimmy Blair MD        QUEtiapine (SEROQUEL XR) 150 MG extended release tablet 300 mg  300 mg Oral Nightly Kimmy Blair MD        albuterol sulfate  (90 Base) MCG/ACT inhaler 2 puff  2 puff Inhalation Q6H PRN Kimmy Blair MD        sodium chloride flush 0.9 % injection 10 mL  10 mL Intravenous 2 times per day Kimmy Blair MD   10 mL at 04/16/19 2032    sodium chloride flush 0.9 % injection 10 mL  10 mL Intravenous PRN Kimmy Blair MD        ondansetron (ZOFRAN) injection 4 mg  4 mg Intravenous Q6H PRN Kimmy Blair MD   4 mg at 04/16/19 1719    metronidazole (FLAGYL) 500 mg in NaCl 100 mL IVPB premix  500 mg Intravenous Mu Macdonald MD   Stopped at 04/17/19 0309    levofloxacin (LEVAQUIN) 500 MG/100ML infusion 500 mg  500 mg Intravenous Daily Kimmy Blair MD   Stopped at 04/16/19 1824    senna (SENOKOT) tablet 8.6 mg  1 tablet Oral Daily PRN Kimmy Blair MD        labetalol (NORMODYNE;TRANDATE) 10 mg in sodium chloride 0.9 % 50 mL IVPB  10 mg Intravenous Q4H PRN Monie Ya MD        morphine sulfate (PF) injection 4 mg  4 mg Intravenous Q3H PRN Monie Ya MD   4 mg at 04/17/19 0251    oxymetazoline (AFRIN) 0.05 % nasal spray 2 spray  2 spray Each Nare Once Sara Serrano MD        lidocaine (XYLOCAINE) 2 % jelly   Topical Once Sara Serrano MD        heparin (porcine) injection 4,000 Units  4,000 Units Intravenous PRN Monie Ya MD        heparin (porcine) injection 2,000 Units  2,000 Units Intravenous PRN Monie Ya MD        heparin 25,000 units in dextrose 5% 250 mL infusion  12 Units/kg/hr Intravenous Continuous Monie Ya MD 9.2 mL/hr at 04/16/19 2034 12 Units/kg/hr at 04/16/19 2034     Allergies   Allergen Reactions    Norco [Hydrocodone-Acetaminophen] Itching     Itching, rash, nausea and vomiting.  Tylenol [Acetaminophen] Itching, Nausea And Vomiting and Rash     Active Problems:    Abdominal pain  Resolved Problems:    * No resolved hospital problems. *    Blood pressure 120/75, pulse 100, temperature 98.2 °F (36.8 °C), temperature source Oral, resp. rate 18, height 4' 9\" (1.448 m), weight 164 lb 7.4 oz (74.6 kg), SpO2 97 %, not currently breastfeeding. Subjective:  Symptoms:  Stable. Diet:  NPO. Pain:  She complains of pain that is mild. Objective:  General Appearance:  Comfortable. Vital signs: (most recent): Blood pressure 126/80, pulse 87, temperature 97.8 °F (36.6 °C), temperature source Axillary, resp. rate 16, height 4' 9\" (1.448 m), weight 164 lb 7.4 oz (74.6 kg), SpO2 97 %, not currently breastfeeding. Vital signs are normal.    HEENT: Normal HEENT exam.  (NG tube)    Heart: Normal rate. Abdomen: Abdomen is soft. Bowel sounds are normal.   There is epigastric tenderness. Extremities: Decreased range of motion. Neurological: Patient is alert. Pupils:  Pupils are equal, round, and reactive to light. Skin:  Warm.       Assessment & Plan  Acute abd pain with worsening portal vein thromobosis  -on heparin drip  -has thrombocytopenia and gastric varicies so monitor closely  -IR unable to do  Thrombectomy/thrombolysis due to high bleeding risk  -IV levaquin and flagyl and IV morphine for thickening of bowel  HTN urgency  -IV labetalol and if still uncontrolled then IV drip   Cirrrhosis  -lactulose, BP control, tenofovir  Colitis  Levaquin, flagyl  HTN  -BB  DVT prophylaxis  -heparin drip    Will transfer to [facility] and called transfer center so they can peform as thrombectomy/thrombolysis with worsening portal vein thrombosis  Kimmy Blair MD  4/17/2019

## 2019-04-17 NOTE — CONSULTS
[facility]  Gastroenterology Consultation    2019  8:59 AM    Patient:    Aj Pierre  : 1960   66 y.o. MRN: [id number]  Admitted: 2019 11:52 AM ATT: Hood Arevalo MD   [id number]  AdmitDx: Portal vein thrombosis [I81]  Abdominal pain, right upper quadrant [R10.11]  Abdominal pain [R10.9]  Intractable vomiting with nausea, unspecified vomiting type [R11.2]  PCP: Elo Dos Santos MD    Reason for Consult: pt in step-down     Requesting Physician:  Hood Arevalo MD      History Obtained From:  Patient and review of all records    HISTORY OF PRESENT ILLNESS:                The patient is a 66 y.o. female with significant past medical history as below who presents with above mentioned causes in reason for consult. Presented with N/V that started last night. Vomiting bile. NG in place 550 bilious output. Last BM yesterday. On Heparin drip. Hx of PVT, hep c & B, and cirrhosis s/t alcohol. Abdominal pain- diffuse  Denies GERD, dyspepsia, dysphagia     History of EGD   esophagitis, Portal HTN, ? Esophageal varices    History of colonoscopy 3/2017 per Dr. Dominique Medico. Ascending biopsy - Fragments of colonic mucosa showing focal acute     Smoker daily  +marijuana daily  Alcohol intake 4 mixed drinks earlier this month,prior to 2018    Past Medical History:        Diagnosis Date    Acid reflux     Arthritis     left knee    CAD (coronary artery disease)     per [facility] 13    COPD (chronic obstructive pulmonary disease) (HCC)     follow with Dr Aida [place] Depression     \"have manic - depression see Dr Godwin West Diabetes mellitus [facility])     dx 10+ yrs ago-     Drug abuse ([facility] [address]     hx use of cocaine, heroin and marijuana- states last used 2014    Glaucoma     left eye    H/O Doppler lower venous ultrasound 2019    No DVT or SVT, Significant reflux of RGSV and LGSV.    H/O Doppler ultrasound 7/22/15    CAROTID: WNL.  Normal vertebral flows bilaterally.      Hepatitis C     for liver bx 12/3/2015\"Have Hepatitis B and C and saw Dr Sy Chavez for this 12/1/2015\"    Hiatal hernia     History of alcohol abuse     HTN (hypertension)     \"for the past two yrs on medication\" follows with Dr Joslyn Hernández Irregular heart beat     per pt    Liver hematoma     Migraines     Nausea & vomiting     Schizophrenia ([address]     per old chart    Seizures ([address]     \"last one was 9/2015- saw Dr Syl Borges at [facility]- she said not sure if acutal seizures- she thinks they are panic or anxiety attacks\"    SOB (shortness of breath)     Stress bladder incontinence, female        Past Surgical History:        Procedure Laterality Date    BREAST SURGERY  10/2015    left breast bx    CARDIAC CATHETERIZATION      per old chart pt had cath done in 3/2011 and 9/2013    CHOLECYSTECTOMY  per old chart done [facility]  3/11/13    diverticulosis, cecal polyp    COLONOSCOPY  03/16/2017    Internal hemorrhoids    ENDOSCOPY, COLON, DIAGNOSTIC  03/16/2017    EGD: Small esophageal varices, portal hypertensive gastropathy, reflux esophagitis, hiatal hernia    EYE SURGERY Bilateral ? when    cyst removal, cataracts    HYSTERECTOMY      per old chart pt had CHOLO/BSO 1986    TONSILLECTOMY  as a kid    UPPER GASTROINTESTINAL ENDOSCOPY N/A 11/14/2018    EGD DIAGNOSTIC ONLY performed by Kassandra Coleman MD at [facility] ENDOSCOPY         Current Medications:    Medications    Scheduled Medications:    busPIRone  15 mg Oral BID    FLUoxetine  40 mg Oral Daily    lactulose  10 g Oral Daily    metoprolol tartrate  25 mg Oral BID    nicotine  1 patch Transdermal Daily    pantoprazole  40 mg Intravenous BID    ranolazine  500 mg Oral BID    tenofovir  300 mg Oral Nightly    QUEtiapine  300 mg Oral Nightly    sodium chloride flush  10 mL Intravenous 2 times per day    metroNIDAZOLE  500 mg Intravenous Q8H    levofloxacin  500 mg Intravenous Daily    oxymetazoline 2 spray Each Nare Once    lidocaine   Topical Once     PRN Medications: potassium chloride **OR** potassium alternative oral replacement **OR** potassium chloride, morphine, albuterol sulfate HFA, sodium chloride flush, ondansetron, senna, labetalol (TRANDATE) IVPB, morphine, heparin (porcine), heparin (porcine)      Allergies:  Norco [hydrocodone-acetaminophen] and Tylenol [acetaminophen]    Social History:   TOBACCO:   reports that she has been smoking cigarettes. She has a 20.00 pack-year smoking history. She has never used smokeless tobacco.  ETOH:   reports that she drinks about 1.2 - 1.8 oz of alcohol per week. Family History:       Problem Relation Age of Onset    Cancer Mother         lung ca    Arthritis Mother     Migraines Mother     Cancer Father         colon ca    Diabetes Father     High Blood Pressure Father     Arthritis Father     High Cholesterol Father     Migraines Father     Migraines Sister     Heart Disease Brother         WPW       No family history of colon cancer, Crohn's disease, or ulcerative colitis. REVIEW OF SYSTEMS:    The positive ROS will be identified in bold, otherwise ROS are negative     CONSTITUTIONAL:  Neg   Recent weight changes, fatigue, fever, chills or night sweats  MOUTH/THROAT:  Neg  bleeding gums, hoarseness or sore throat.   RESPIRATORY:   Neg SOB, wheeze, cough, sputum, hemoptysis or bronchitis  CARDIOVASCULAR:  Neg chest pain, palpitations, dyspnea on exertion, orthopnea, paroxysmal nocturnal dyspnea or edema  GASTROINTESTINAL:  SEE HPI  HEMATOLOGIC/LYMPHATIC:  Neg  Anemia, bleeding tendency    PHYSICAL EXAM:      Vitals:    /75   Pulse 90   Temp 98.2 °F (36.8 °C) (Oral)   Resp 18   Ht 4' 9\" (1.448 m)   Wt 164 lb 7.4 oz (74.6 kg)   SpO2 97%   BMI 35.59 kg/m²     General Appearance:    Alert, cooperative, no distress, appears stated age   HEENT:    Normocephalic, atraumatic, Conjunctiva clear, +NG   Neck:   Supple, symmetrical, trachea midline   Lungs:     Clear to auscultation bilaterally, respirations unlabored   Chest Wall:    No tenderness or deformity    Heart:    Regular rate and rhythm, S1 and S2 normal   Abdomen:     Soft, tender, bowel sounds active all four quadrants,     no masses, no organomegaly, no ascites    Rectal:    Deferred   Extremities:   Extremities normal, atraumatic, no cyanosis or edema   Pulses:   2+ and symmetric all extremities   Skin:   Skin color, texture, turgor normal, no rashes or lesions   Lymph nodes:   No abnormality   Neurologic:   No focal deficits, moving all four extremities            DATA:    ABGs:   Lab Results   Component Value Date    PO2ART 50 04/27/2012     CBC:   Recent Labs     04/16/19  1204   WBC 8.0   HGB 15.2   PLT 95*     BMP:    Recent Labs     04/16/19  1204      K 3.5      CO2 24   BUN 8   CREATININE 0.9   GLUCOSE 213*     Magnesium:   Lab Results   Component Value Date    MG 1.8 04/09/2019     Hepatic:   Recent Labs     04/16/19  1204   AST 26   ALT 17   BILITOT 0.8   ALKPHOS 114     Recent Labs     04/16/19  1204   LIPASE 57     Recent Labs     04/16/19 1925   PROTIME 13.4*   INR 1.18     No results for input(s): PTT in the last 72 hours. Lipids: No results for input(s): CHOL, HDL in the last 72 hours.     Invalid input(s): LDLCALCU  INR:   Recent Labs     04/16/19 1925   INR 1.18     TSH:   Lab Results   Component Value Date    TSH 1.73 08/01/2017       Intake/Output Summary (Last 24 hours) at 4/17/2019 0859  Last data filed at 4/17/2019 0458  Gross per 24 hour   Intake --   Output 300 ml   Net -300 ml      sodium chloride 75 mL/hr at 04/17/19 0832    sodium chloride      heparin (porcine) 12 Units/kg/hr (04/16/19 2034)       Imaging Studies: Reviewed      IMPRESSION:      Patient Active Problem List   Diagnosis Code    Left arm pain M79.602    Type 2 diabetes mellitus with complication, with long-term current use of insulin (HCC) E11.8, Z79.4    Acid reflux K21.9    COPD, mild (HCC) J44.9    Tobacco abuse Z72.0    Angina effort (HCC) I20.8    Abnormal nuclear cardiac imaging test R93.1    Lung nodule R91.1    Chest pain R07.9    Dyslipidemia E78.5    Pancolitis (HCC) K51.00    Hematemesis without nausea J02.1    Alcoholic cirrhosis of liver without ascites (HCC) K70.30    Thrombocytopenia (HCC) D60.3    Periumbilical abdominal pain R10.33    History of colonic polyps Z86.010    Abnormal findings on diagnostic imaging of 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    2. Portal vein thrombus within the right portal vein redemonstrated similar   to prior exam. Gradie Bran has been interval development of thrombus at the main   portal vein which was not identified on prior exam from April 6, 2019.   3. Wall thickening of the colon predominantly involving the proximal and   transverse colon redemonstrated.  The descending and sigmoid colon appears   grossly spared.  Findings were noted on multiple previous exams and again may   be related to elevated portal venous pressure with colitis also a   consideration. IR consulted for thrombectomy, high risk, consider transfer to [facility]  Thrombosis most likely contributing to abd pain    chronic colitis- may be portal HTN colopathy or associated with her portal venous thrombosis  No change in bowels     Compensated cirrhosis  MELD Score 8  S/T alcohol, hx Hep B & Hep C  LFT's, INR, albumin normal  Thrombocytopenia    RECOMMENDATIONS:  Surgery consulted for main portal vein thrombus-new, managing anticoagulation, USE CAUTIOUSLY IN SETTING OF GASTRIC VARICES  Continue Levaquin and Flagyl    Continue NG tube  Continue Antiemetics    NPO   Continue PPI BID  CBC, CMP, INR daily    Agree with transfer to [address] [place] [address] for consideration of thrombectomy. Discussed plan of care with patient and family     Patient clinical, biochemical, and radiological information discussed with Dr. Torsten Galvez. He agrees with the assessment and plan. Ashley Alfaro, [address]  4/17/2019  8:59 AM     New expanding PV thrombosis  Agree with transfer to [address] [place] [address]    I have seen and examined this patient personally, and independently of the nurse practitioner. The plan was developed mutually at the time of the visit with the patient. Ashley Alfaro and myself have spoken with patient, nursing staff and provided written and verbal instructions .     The above note has been reviewed and I agree with the Assessment,  Diagnosis, and Treatment plan as suggested by Ashley Alfaro [address] gastroenterology

## 2019-04-18 VITALS
RESPIRATION RATE: 19 BRPM | OXYGEN SATURATION: 97 % | HEART RATE: 92 BPM | TEMPERATURE: 98 F | DIASTOLIC BLOOD PRESSURE: 91 MMHG | WEIGHT: 163.14 LBS | BODY MASS INDEX: 35.2 KG/M2 | HEIGHT: 57 IN | SYSTOLIC BLOOD PRESSURE: 141 MMHG

## 2019-04-18 LAB
ALBUMIN SERPL-MCNC: 3.5 GM/DL (ref 3.4–5)
ALP BLD-CCNC: 83 IU/L (ref 40–128)
ALT SERPL-CCNC: 14 U/L (ref 10–40)
ANION GAP SERPL CALCULATED.3IONS-SCNC: 11 MMOL/L (ref 4–16)
APTT: 78.6 SECONDS (ref 21.2–33)
AST SERPL-CCNC: 28 IU/L (ref 15–37)
BILIRUB SERPL-MCNC: 0.5 MG/DL (ref 0–1)
BUN BLDV-MCNC: 11 MG/DL (ref 6–23)
CALCIUM SERPL-MCNC: 8.3 MG/DL (ref 8.3–10.6)
CHLORIDE BLD-SCNC: 106 MMOL/L (ref 99–110)
CO2: 27 MMOL/L (ref 21–32)
CREAT SERPL-MCNC: 0.9 MG/DL (ref 0.6–1.1)
GFR AFRICAN AMERICAN: >60 ML/MIN/1.73M2
GFR NON-AFRICAN AMERICAN: >60 ML/MIN/1.73M2
GLUCOSE BLD-MCNC: 157 MG/DL (ref 70–99)
PLATELET # BLD: 86 K/CU MM (ref 140–440)
POTASSIUM SERPL-SCNC: 3.7 MMOL/L (ref 3.5–5.1)
SODIUM BLD-SCNC: 144 MMOL/L (ref 135–145)
TOTAL PROTEIN: 6.4 GM/DL (ref 6.4–8.2)

## 2019-04-18 PROCEDURE — 6360000002 HC RX W HCPCS: Performed by: HOSPITALIST

## 2019-04-18 PROCEDURE — 36415 COLL VENOUS BLD VENIPUNCTURE: CPT

## 2019-04-18 PROCEDURE — 85049 AUTOMATED PLATELET COUNT: CPT

## 2019-04-18 PROCEDURE — 2580000003 HC RX 258: Performed by: HOSPITALIST

## 2019-04-18 PROCEDURE — 2500000003 HC RX 250 WO HCPCS: Performed by: HOSPITALIST

## 2019-04-18 PROCEDURE — 6370000000 HC RX 637 (ALT 250 FOR IP): Performed by: HOSPITALIST

## 2019-04-18 PROCEDURE — C9113 INJ PANTOPRAZOLE SODIUM, VIA: HCPCS | Performed by: HOSPITALIST

## 2019-04-18 PROCEDURE — 2580000003 HC RX 258: Performed by: SURGERY

## 2019-04-18 PROCEDURE — 85730 THROMBOPLASTIN TIME PARTIAL: CPT

## 2019-04-18 PROCEDURE — 80053 COMPREHEN METABOLIC PANEL: CPT

## 2019-04-18 RX ADMIN — PANTOPRAZOLE SODIUM 40 MG: 40 INJECTION, POWDER, FOR SOLUTION INTRAVENOUS at 08:12

## 2019-04-18 RX ADMIN — METRONIDAZOLE 500 MG: 500 INJECTION, SOLUTION INTRAVENOUS at 01:25

## 2019-04-18 RX ADMIN — MORPHINE SULFATE 4 MG: 4 INJECTION, SOLUTION INTRAMUSCULAR; INTRAVENOUS at 08:13

## 2019-04-18 RX ADMIN — RANOLAZINE 500 MG: 500 TABLET, FILM COATED, EXTENDED RELEASE ORAL at 08:13

## 2019-04-18 RX ADMIN — SODIUM CHLORIDE, PRESERVATIVE FREE 10 ML: 5 INJECTION INTRAVENOUS at 08:15

## 2019-04-18 RX ADMIN — LACTULOSE 10 G: 20 SOLUTION ORAL at 08:12

## 2019-04-18 RX ADMIN — BUSPIRONE HYDROCHLORIDE 15 MG: 7.5 TABLET ORAL at 08:13

## 2019-04-18 RX ADMIN — SODIUM CHLORIDE: 9 INJECTION, SOLUTION INTRAVENOUS at 06:04

## 2019-04-18 RX ADMIN — LEVOFLOXACIN 500 MG: 5 INJECTION, SOLUTION INTRAVENOUS at 08:13

## 2019-04-18 RX ADMIN — FLUOXETINE 40 MG: 20 CAPSULE ORAL at 08:12

## 2019-04-18 RX ADMIN — METOPROLOL TARTRATE 25 MG: 25 TABLET ORAL at 08:12

## 2019-04-18 RX ADMIN — HEPARIN SODIUM AND DEXTROSE 12 UNITS/KG/HR: 10000; 5 INJECTION INTRAVENOUS at 08:58

## 2019-04-18 ASSESSMENT — PAIN DESCRIPTION - ONSET: ONSET: ON-GOING

## 2019-04-18 ASSESSMENT — PAIN DESCRIPTION - FREQUENCY: FREQUENCY: CONTINUOUS

## 2019-04-18 ASSESSMENT — PAIN SCALES - GENERAL
PAINLEVEL_OUTOF10: 8
PAINLEVEL_OUTOF10: 9

## 2019-04-18 ASSESSMENT — PAIN DESCRIPTION - PROGRESSION
CLINICAL_PROGRESSION: NOT CHANGED

## 2019-04-18 ASSESSMENT — PAIN DESCRIPTION - DESCRIPTORS: DESCRIPTORS: ACHING;CONSTANT

## 2019-04-18 ASSESSMENT — PAIN DESCRIPTION - LOCATION: LOCATION: HEAD;BACK;NECK

## 2019-04-18 ASSESSMENT — PAIN DESCRIPTION - PAIN TYPE: TYPE: ACUTE PAIN

## 2019-04-18 NOTE — DISCHARGE SUMMARY
Physician Discharge Summary     Patient ID:  Bailee Harrison  [id number]  66 y.o.  1960    Admit date: 4/16/2019    Discharge date and time: 4/18/2019 10:00 AM     Admitting Physician: Kasie Connelly MD     Discharge Physician: Peggy Coppola    Admission Diagnoses: Portal vein thrombosis [I81]  Abdominal pain, right upper quadrant [R10.11]  Abdominal pain [R10.9]  Intractable vomiting with nausea, unspecified vomiting type [R11.2]    Discharge Diagnoses: acute abd pain 2/2 worsening portal vein thrombosis with congestion and colitis                                           HTN urgency                                           Cirrhosis with Hep B and thrombocytopenia and gastric varices                       Admission Condition: fair    Discharged Condition: good    Indication for Admission: abd pain    Hospital Course:   66 y.o. female with significant past medical history of cirrhosis, PV thrombosis, thrombocytopenia and gastric varices discharged on 4/9 for colitis. Not on AC. She presents back with mid epigastric abd pain with vomiting and nausea. No blood in stools. No fevers. Has left leg pain but chroinc.     She was admitted for abd pain and CT abd showed worsening portal vein thrombosis extending to right portal vein to main portal vein. She had congestion and colitis due to worsening colitis. She was placed on heparin drip but was monitor carefully as she was thrombocytopenic and had gastric varices. IR consulted and unable to perform thrombectomy or thrombolysis due to bleeding risk and recommended to transfer to [facility] where more support was needed for complicated case. She was discharged and transferred to [facility]. Consults: GI, general surgery and IR        Outstanding Order Results     Date and Time Order Name Status Description    3/28/2019 0000 Full PFT study with pre and post In process               Discharge Exam:  HEENT: Normal HEENT exam.  (NG tube)    Heart: Normal rate. Abdomen: Abdomen is soft.  VIREAD 300 MG tablet Take 300 mg by mouth nightly       albuterol sulfate HFA (PROAIR HFA) 108 (90 BASE) MCG/ACT inhaler Inhale 2 puffs into the lungs every 6 hours as needed for Wheezing 1 Inhaler 5     Activity: activity as tolerated  Diet: none as NPO  Wound Care: none needed    Follow-up with [facility]  Discharge time 30min  Signed:  Serena Denton  4/18/2019  6:40 PM

## 2019-04-18 NOTE — PROGRESS NOTES
Patient being transferred to [facility] at 0930. Patient denies abd pain. Had nausea with pain medication overnight. No vomiting. +bs. No BM overnight. Took lactulose this am.  Heparin drip running. No signs of bleeding. 1200 cc bilous output overnight. VS stable. Patient denies needs at this time. Discussed follow up OP once discharged from [facility].

## 2019-04-22 ENCOUNTER — TELEPHONE (OUTPATIENT)
Dept: CARDIOLOGY CLINIC | Age: 59
End: 2019-04-22

## 2019-04-22 NOTE — TELEPHONE ENCOUNTER
Patient called stating she is at [facility] and will be having surgery. She did not state what time of surgery she is having. It is unknown if she is having carpal tunnel surgery as was noted in the cardiac clearance requested received from Dr. Beto Aguilar

## 2019-04-28 RX ORDER — TIZANIDINE 4 MG/1
4 TABLET ORAL 2 TIMES DAILY PRN
Qty: 60 TABLET | Refills: 2 | Status: SHIPPED | OUTPATIENT
Start: 2019-04-28 | End: 2019-05-08

## 2019-04-30 ENCOUNTER — HOSPITAL ENCOUNTER (INPATIENT)
Age: 59
LOS: 1 days | Discharge: HOME | DRG: 279 | End: 2019-05-02
Attending: EMERGENCY MEDICINE | Admitting: HOSPITALIST
Payer: COMMERCIAL

## 2019-04-30 ENCOUNTER — APPOINTMENT (OUTPATIENT)
Dept: CT IMAGING | Age: 59
DRG: 279 | End: 2019-04-30
Payer: COMMERCIAL

## 2019-04-30 ENCOUNTER — APPOINTMENT (OUTPATIENT)
Dept: GENERAL RADIOLOGY | Age: 59
DRG: 279 | End: 2019-04-30
Payer: COMMERCIAL

## 2019-04-30 DIAGNOSIS — R42 DIZZINESS: ICD-10-CM

## 2019-04-30 DIAGNOSIS — K76.82 HEPATIC ENCEPHALOPATHY: Primary | ICD-10-CM

## 2019-04-30 LAB
ABO/RH: NORMAL
ALBUMIN SERPL-MCNC: 3.5 GM/DL (ref 3.4–5)
ALP BLD-CCNC: 122 IU/L (ref 40–129)
ALT SERPL-CCNC: 48 U/L (ref 10–40)
AMMONIA: 120 UMOL/L (ref 11–51)
ANION GAP SERPL CALCULATED.3IONS-SCNC: 10 MMOL/L (ref 4–16)
ANTIBODY SCREEN: NEGATIVE
AST SERPL-CCNC: 33 IU/L (ref 15–37)
BASOPHILS ABSOLUTE: 0 K/CU MM
BASOPHILS RELATIVE PERCENT: 0.8 % (ref 0–1)
BILIRUB SERPL-MCNC: 1.3 MG/DL (ref 0–1)
BUN BLDV-MCNC: 9 MG/DL (ref 6–23)
CALCIUM SERPL-MCNC: 9 MG/DL (ref 8.3–10.6)
CHLORIDE BLD-SCNC: 105 MMOL/L (ref 99–110)
CO2: 26 MMOL/L (ref 21–32)
CREAT SERPL-MCNC: 0.8 MG/DL (ref 0.6–1.1)
DIFFERENTIAL TYPE: ABNORMAL
EOSINOPHILS ABSOLUTE: 0.2 K/CU MM
EOSINOPHILS RELATIVE PERCENT: 3.7 % (ref 0–3)
GFR AFRICAN AMERICAN: >60 ML/MIN/1.73M2
GFR NON-AFRICAN AMERICAN: >60 ML/MIN/1.73M2
GLUCOSE BLD-MCNC: 187 MG/DL (ref 70–99)
HCT VFR BLD CALC: 39.3 % (ref 37–47)
HEMOGLOBIN: 13.1 GM/DL (ref 12.5–16)
IMMATURE NEUTROPHIL %: 0.4 % (ref 0–0.43)
INR BLD: 1.25 INDEX
LACTATE: 1.6 MMOL/L (ref 0.4–2)
LIPASE: 30 IU/L (ref 13–60)
LYMPHOCYTES ABSOLUTE: 1.4 K/CU MM
LYMPHOCYTES RELATIVE PERCENT: 28.8 % (ref 24–44)
MCH RBC QN AUTO: 31.6 PG (ref 27–31)
MCHC RBC AUTO-ENTMCNC: 33.3 % (ref 32–36)
MCV RBC AUTO: 94.9 FL (ref 78–100)
MONOCYTES ABSOLUTE: 0.5 K/CU MM
MONOCYTES RELATIVE PERCENT: 10.4 % (ref 0–4)
NUCLEATED RBC %: 0 %
PDW BLD-RTO: 14.3 % (ref 11.7–14.9)
PLATELET # BLD: 94 K/CU MM (ref 140–440)
PMV BLD AUTO: 11.5 FL (ref 7.5–11.1)
POTASSIUM SERPL-SCNC: 3.7 MMOL/L (ref 3.5–5.1)
PROTHROMBIN TIME: 14.5 SECONDS (ref 9.12–12.5)
RBC # BLD: 4.14 M/CU MM (ref 4.2–5.4)
SEGMENTED NEUTROPHILS ABSOLUTE COUNT: 2.7 K/CU MM
SEGMENTED NEUTROPHILS RELATIVE PERCENT: 55.9 % (ref 36–66)
SODIUM BLD-SCNC: 141 MMOL/L (ref 135–145)
TOTAL IMMATURE NEUTOROPHIL: 0.02 K/CU MM
TOTAL NUCLEATED RBC: 0 K/CU MM
TOTAL PROTEIN: 7.2 GM/DL (ref 6.4–8.2)
TROPONIN T: <0.01 NG/ML
WBC # BLD: 4.9 K/CU MM (ref 4–10.5)

## 2019-04-30 PROCEDURE — 36415 COLL VENOUS BLD VENIPUNCTURE: CPT

## 2019-04-30 PROCEDURE — 85025 COMPLETE CBC W/AUTO DIFF WBC: CPT

## 2019-04-30 PROCEDURE — 70450 CT HEAD/BRAIN W/O DYE: CPT

## 2019-04-30 PROCEDURE — 93005 ELECTROCARDIOGRAM TRACING: CPT | Performed by: EMERGENCY MEDICINE

## 2019-04-30 PROCEDURE — 86900 BLOOD TYPING SEROLOGIC ABO: CPT

## 2019-04-30 PROCEDURE — 86850 RBC ANTIBODY SCREEN: CPT

## 2019-04-30 PROCEDURE — 83690 ASSAY OF LIPASE: CPT

## 2019-04-30 PROCEDURE — 82140 ASSAY OF AMMONIA: CPT

## 2019-04-30 PROCEDURE — 71046 X-RAY EXAM CHEST 2 VIEWS: CPT

## 2019-04-30 PROCEDURE — 83605 ASSAY OF LACTIC ACID: CPT

## 2019-04-30 PROCEDURE — 84484 ASSAY OF TROPONIN QUANT: CPT

## 2019-04-30 PROCEDURE — 80053 COMPREHEN METABOLIC PANEL: CPT

## 2019-04-30 PROCEDURE — 85610 PROTHROMBIN TIME: CPT

## 2019-04-30 PROCEDURE — 87040 BLOOD CULTURE FOR BACTERIA: CPT

## 2019-04-30 PROCEDURE — 86901 BLOOD TYPING SEROLOGIC RH(D): CPT

## 2019-04-30 PROCEDURE — 99285 EMERGENCY DEPT VISIT HI MDM: CPT

## 2019-04-30 RX ORDER — SODIUM CHLORIDE 9 MG/ML
INJECTION, SOLUTION INTRAVENOUS CONTINUOUS
Status: DISCONTINUED | OUTPATIENT
Start: 2019-04-30 | End: 2019-05-01

## 2019-04-30 RX ORDER — LACTULOSE 10 G/15ML
30 SOLUTION ORAL ONCE
Status: COMPLETED | OUTPATIENT
Start: 2019-04-30 | End: 2019-05-01

## 2019-04-30 ASSESSMENT — PAIN SCALES - GENERAL: PAINLEVEL_OUTOF10: 0

## 2019-05-01 ENCOUNTER — APPOINTMENT (OUTPATIENT)
Dept: ULTRASOUND IMAGING | Age: 59
DRG: 279 | End: 2019-05-01
Payer: COMMERCIAL

## 2019-05-01 PROBLEM — G93.40 ENCEPHALOPATHY: Status: ACTIVE | Noted: 2019-05-01

## 2019-05-01 LAB
ALBUMIN SERPL-MCNC: 3.3 GM/DL (ref 3.4–5)
ALP BLD-CCNC: 100 IU/L (ref 40–129)
ALT SERPL-CCNC: 45 U/L (ref 10–40)
AMMONIA: 116 UMOL/L (ref 11–51)
AMORPHOUS: ABNORMAL /HPF
ANION GAP SERPL CALCULATED.3IONS-SCNC: 9 MMOL/L (ref 4–16)
AST SERPL-CCNC: 37 IU/L (ref 15–37)
BACTERIA: ABNORMAL /HPF
BILIRUB SERPL-MCNC: 1.2 MG/DL (ref 0–1)
BILIRUBIN URINE: NEGATIVE MG/DL
BLOOD, URINE: NEGATIVE
BUN BLDV-MCNC: 10 MG/DL (ref 6–23)
CALCIUM SERPL-MCNC: 8.9 MG/DL (ref 8.3–10.6)
CHLORIDE BLD-SCNC: 105 MMOL/L (ref 99–110)
CLARITY: ABNORMAL
CO2: 26 MMOL/L (ref 21–32)
COLOR: YELLOW
CREAT SERPL-MCNC: 0.9 MG/DL (ref 0.6–1.1)
ESTIMATED AVERAGE GLUCOSE: 128 MG/DL
GFR AFRICAN AMERICAN: >60 ML/MIN/1.73M2
GFR NON-AFRICAN AMERICAN: >60 ML/MIN/1.73M2
GLUCOSE BLD-MCNC: 158 MG/DL (ref 70–99)
GLUCOSE BLD-MCNC: 167 MG/DL (ref 70–99)
GLUCOSE BLD-MCNC: 198 MG/DL (ref 70–99)
GLUCOSE BLD-MCNC: 208 MG/DL (ref 70–99)
GLUCOSE, URINE: 150 MG/DL
HBA1C MFR BLD: 6.1 % (ref 4.2–6.3)
HCT VFR BLD CALC: 39 % (ref 37–47)
HEMOGLOBIN: 12.8 GM/DL (ref 12.5–16)
KETONES, URINE: NEGATIVE MG/DL
LEUKOCYTE ESTERASE, URINE: NEGATIVE
MCH RBC QN AUTO: 31.6 PG (ref 27–31)
MCHC RBC AUTO-ENTMCNC: 32.8 % (ref 32–36)
MCV RBC AUTO: 96.3 FL (ref 78–100)
MUCUS: ABNORMAL HPF
NITRITE URINE, QUANTITATIVE: NEGATIVE
PDW BLD-RTO: 14.5 % (ref 11.7–14.9)
PH, URINE: 7 (ref 5–8)
PLATELET # BLD: 88 K/CU MM (ref 140–440)
PMV BLD AUTO: 11.8 FL (ref 7.5–11.1)
POTASSIUM SERPL-SCNC: 4.1 MMOL/L (ref 3.5–5.1)
PROTEIN UA: NEGATIVE MG/DL
RBC # BLD: 4.05 M/CU MM (ref 4.2–5.4)
RBC URINE: ABNORMAL /HPF (ref 0–6)
SODIUM BLD-SCNC: 140 MMOL/L (ref 135–145)
SPECIFIC GRAVITY UA: 1.02 (ref 1–1.03)
SQUAMOUS EPITHELIAL: 1 /HPF
TOTAL PROTEIN: 6.6 GM/DL (ref 6.4–8.2)
TRICHOMONAS: ABNORMAL /HPF
UROBILINOGEN, URINE: 2 MG/DL (ref 0.2–1)
WBC # BLD: 5.8 K/CU MM (ref 4–10.5)
WBC UA: ABNORMAL /HPF (ref 0–5)

## 2019-05-01 PROCEDURE — 1200000000 HC SEMI PRIVATE

## 2019-05-01 PROCEDURE — 85027 COMPLETE CBC AUTOMATED: CPT

## 2019-05-01 PROCEDURE — 83036 HEMOGLOBIN GLYCOSYLATED A1C: CPT

## 2019-05-01 PROCEDURE — 2580000003 HC RX 258: Performed by: EMERGENCY MEDICINE

## 2019-05-01 PROCEDURE — 93010 ELECTROCARDIOGRAM REPORT: CPT | Performed by: INTERNAL MEDICINE

## 2019-05-01 PROCEDURE — 82962 GLUCOSE BLOOD TEST: CPT

## 2019-05-01 PROCEDURE — 81001 URINALYSIS AUTO W/SCOPE: CPT

## 2019-05-01 PROCEDURE — 6360000002 HC RX W HCPCS: Performed by: HOSPITALIST

## 2019-05-01 PROCEDURE — 94761 N-INVAS EAR/PLS OXIMETRY MLT: CPT

## 2019-05-01 PROCEDURE — 6370000000 HC RX 637 (ALT 250 FOR IP): Performed by: HOSPITALIST

## 2019-05-01 PROCEDURE — 80053 COMPREHEN METABOLIC PANEL: CPT

## 2019-05-01 PROCEDURE — 87086 URINE CULTURE/COLONY COUNT: CPT

## 2019-05-01 PROCEDURE — C9113 INJ PANTOPRAZOLE SODIUM, VIA: HCPCS | Performed by: HOSPITALIST

## 2019-05-01 PROCEDURE — 6370000000 HC RX 637 (ALT 250 FOR IP): Performed by: EMERGENCY MEDICINE

## 2019-05-01 PROCEDURE — 6370000000 HC RX 637 (ALT 250 FOR IP): Performed by: NURSE PRACTITIONER

## 2019-05-01 PROCEDURE — 94640 AIRWAY INHALATION TREATMENT: CPT

## 2019-05-01 PROCEDURE — 76705 ECHO EXAM OF ABDOMEN: CPT

## 2019-05-01 PROCEDURE — 82140 ASSAY OF AMMONIA: CPT

## 2019-05-01 PROCEDURE — 2580000003 HC RX 258: Performed by: HOSPITALIST

## 2019-05-01 RX ORDER — FLUOXETINE HYDROCHLORIDE 20 MG/1
40 CAPSULE ORAL DAILY
Status: DISCONTINUED | OUTPATIENT
Start: 2019-05-01 | End: 2019-05-02 | Stop reason: HOSPADM

## 2019-05-01 RX ORDER — DEXTROSE MONOHYDRATE 25 G/50ML
12.5 INJECTION, SOLUTION INTRAVENOUS PRN
Status: DISCONTINUED | OUTPATIENT
Start: 2019-05-01 | End: 2019-05-02 | Stop reason: HOSPADM

## 2019-05-01 RX ORDER — HYDROXYZINE PAMOATE 50 MG/1
50 CAPSULE ORAL 3 TIMES DAILY
Status: ON HOLD | COMMUNITY
End: 2020-05-07 | Stop reason: ALTCHOICE

## 2019-05-01 RX ORDER — BUSPIRONE HYDROCHLORIDE 7.5 MG/1
15 TABLET ORAL 2 TIMES DAILY
Status: DISCONTINUED | OUTPATIENT
Start: 2019-05-01 | End: 2019-05-02 | Stop reason: HOSPADM

## 2019-05-01 RX ORDER — DEXTROSE MONOHYDRATE 50 MG/ML
100 INJECTION, SOLUTION INTRAVENOUS PRN
Status: DISCONTINUED | OUTPATIENT
Start: 2019-05-01 | End: 2019-05-02 | Stop reason: HOSPADM

## 2019-05-01 RX ORDER — LACTULOSE 10 G/15ML
20 SOLUTION ORAL 4 TIMES DAILY
Status: DISCONTINUED | OUTPATIENT
Start: 2019-05-01 | End: 2019-05-02

## 2019-05-01 RX ORDER — KETOROLAC TROMETHAMINE 30 MG/ML
15 INJECTION, SOLUTION INTRAMUSCULAR; INTRAVENOUS ONCE
Status: COMPLETED | OUTPATIENT
Start: 2019-05-01 | End: 2019-05-02

## 2019-05-01 RX ORDER — TENOFOVIR DISOPROXIL FUMARATE 300 MG/1
300 TABLET, FILM COATED ORAL NIGHTLY
Status: DISCONTINUED | OUTPATIENT
Start: 2019-05-01 | End: 2019-05-02 | Stop reason: HOSPADM

## 2019-05-01 RX ORDER — SPIRONOLACTONE 50 MG/1
50 TABLET, FILM COATED ORAL
Status: DISCONTINUED | OUTPATIENT
Start: 2019-05-01 | End: 2019-05-02 | Stop reason: HOSPADM

## 2019-05-01 RX ORDER — FUROSEMIDE 20 MG/1
20 TABLET ORAL DAILY
Status: DISCONTINUED | OUTPATIENT
Start: 2019-05-01 | End: 2019-05-02 | Stop reason: HOSPADM

## 2019-05-01 RX ORDER — FUROSEMIDE 20 MG/1
20 TABLET ORAL DAILY
Status: ON HOLD | COMMUNITY
Start: 2019-04-27 | End: 2020-09-15 | Stop reason: HOSPADM

## 2019-05-01 RX ORDER — ALBUTEROL SULFATE 90 UG/1
2 AEROSOL, METERED RESPIRATORY (INHALATION) EVERY 6 HOURS PRN
Status: CANCELLED | OUTPATIENT
Start: 2019-05-01

## 2019-05-01 RX ORDER — LACTULOSE 10 G/15ML
30 SOLUTION ORAL; RECTAL 3 TIMES DAILY
Status: ON HOLD | COMMUNITY
Start: 2019-03-11 | End: 2020-04-02 | Stop reason: HOSPADM

## 2019-05-01 RX ORDER — IPRATROPIUM BROMIDE AND ALBUTEROL SULFATE 2.5; .5 MG/3ML; MG/3ML
1 SOLUTION RESPIRATORY (INHALATION)
Status: DISCONTINUED | OUTPATIENT
Start: 2019-05-01 | End: 2019-05-02 | Stop reason: HOSPADM

## 2019-05-01 RX ORDER — PANTOPRAZOLE SODIUM 40 MG/10ML
40 INJECTION, POWDER, LYOPHILIZED, FOR SOLUTION INTRAVENOUS DAILY
Status: DISCONTINUED | OUTPATIENT
Start: 2019-05-01 | End: 2019-05-02 | Stop reason: HOSPADM

## 2019-05-01 RX ORDER — SPIRONOLACTONE 50 MG/1
50 TABLET, FILM COATED ORAL DAILY
Status: ON HOLD | COMMUNITY
Start: 2019-04-27 | End: 2020-09-15 | Stop reason: HOSPADM

## 2019-05-01 RX ORDER — SODIUM CHLORIDE 9 MG/ML
INJECTION, SOLUTION INTRAVENOUS CONTINUOUS
Status: DISCONTINUED | OUTPATIENT
Start: 2019-05-01 | End: 2019-05-01

## 2019-05-01 RX ORDER — LACTULOSE 10 G/15ML
20 SOLUTION ORAL 3 TIMES DAILY
Status: DISCONTINUED | OUTPATIENT
Start: 2019-05-01 | End: 2019-05-01

## 2019-05-01 RX ORDER — APIXABAN 5 MG/1
TABLET, FILM COATED ORAL
Status: ON HOLD | COMMUNITY
Start: 2019-04-12 | End: 2019-05-02 | Stop reason: HOSPADM

## 2019-05-01 RX ORDER — NICOTINE POLACRILEX 4 MG
15 LOZENGE BUCCAL PRN
Status: DISCONTINUED | OUTPATIENT
Start: 2019-05-01 | End: 2019-05-02 | Stop reason: HOSPADM

## 2019-05-01 RX ORDER — RANOLAZINE 500 MG/1
500 TABLET, EXTENDED RELEASE ORAL 2 TIMES DAILY
Status: DISCONTINUED | OUTPATIENT
Start: 2019-05-01 | End: 2019-05-02 | Stop reason: HOSPADM

## 2019-05-01 RX ADMIN — METOPROLOL TARTRATE 25 MG: 25 TABLET ORAL at 10:09

## 2019-05-01 RX ADMIN — QUETIAPINE FUMARATE 300 MG: 200 TABLET ORAL at 20:05

## 2019-05-01 RX ADMIN — INSULIN LISPRO 1 UNITS: 100 INJECTION, SOLUTION INTRAVENOUS; SUBCUTANEOUS at 10:18

## 2019-05-01 RX ADMIN — PANTOPRAZOLE SODIUM 40 MG: 40 INJECTION, POWDER, FOR SOLUTION INTRAVENOUS at 10:09

## 2019-05-01 RX ADMIN — METOPROLOL TARTRATE 25 MG: 25 TABLET ORAL at 02:54

## 2019-05-01 RX ADMIN — METOPROLOL TARTRATE 25 MG: 25 TABLET ORAL at 20:07

## 2019-05-01 RX ADMIN — LACTULOSE 20 G: 10 SOLUTION ORAL at 14:27

## 2019-05-01 RX ADMIN — LACTULOSE 30 G: 10 SOLUTION ORAL at 00:10

## 2019-05-01 RX ADMIN — LACTULOSE 20 G: 10 SOLUTION ORAL at 10:09

## 2019-05-01 RX ADMIN — FUROSEMIDE 20 MG: 20 TABLET ORAL at 10:08

## 2019-05-01 RX ADMIN — IPRATROPIUM BROMIDE AND ALBUTEROL SULFATE 1 AMPULE: .5; 3 SOLUTION RESPIRATORY (INHALATION) at 11:42

## 2019-05-01 RX ADMIN — INSULIN LISPRO 1 UNITS: 100 INJECTION, SOLUTION INTRAVENOUS; SUBCUTANEOUS at 20:07

## 2019-05-01 RX ADMIN — RANOLAZINE 500 MG: 500 TABLET, FILM COATED, EXTENDED RELEASE ORAL at 02:54

## 2019-05-01 RX ADMIN — BUSPIRONE HYDROCHLORIDE 15 MG: 7.5 TABLET ORAL at 10:12

## 2019-05-01 RX ADMIN — RIFAXIMIN 550 MG: 550 TABLET ORAL at 10:09

## 2019-05-01 RX ADMIN — IPRATROPIUM BROMIDE AND ALBUTEROL SULFATE 1 AMPULE: .5; 3 SOLUTION RESPIRATORY (INHALATION) at 07:30

## 2019-05-01 RX ADMIN — FLUOXETINE 40 MG: 20 CAPSULE ORAL at 10:09

## 2019-05-01 RX ADMIN — SODIUM CHLORIDE: 9 INJECTION, SOLUTION INTRAVENOUS at 01:58

## 2019-05-01 RX ADMIN — INSULIN LISPRO 2 UNITS: 100 INJECTION, SOLUTION INTRAVENOUS; SUBCUTANEOUS at 17:21

## 2019-05-01 RX ADMIN — LACTULOSE 20 G: 10 SOLUTION ORAL at 17:21

## 2019-05-01 RX ADMIN — RIFAXIMIN 550 MG: 550 TABLET ORAL at 02:54

## 2019-05-01 RX ADMIN — LACTULOSE 20 G: 10 SOLUTION ORAL at 20:05

## 2019-05-01 RX ADMIN — RANOLAZINE 500 MG: 500 TABLET, FILM COATED, EXTENDED RELEASE ORAL at 20:05

## 2019-05-01 RX ADMIN — RIFAXIMIN 550 MG: 550 TABLET ORAL at 20:05

## 2019-05-01 RX ADMIN — BUSPIRONE HYDROCHLORIDE 15 MG: 7.5 TABLET ORAL at 20:05

## 2019-05-01 RX ADMIN — RANOLAZINE 500 MG: 500 TABLET, FILM COATED, EXTENDED RELEASE ORAL at 10:08

## 2019-05-01 RX ADMIN — SODIUM CHLORIDE: 9 INJECTION, SOLUTION INTRAVENOUS at 00:10

## 2019-05-01 RX ADMIN — BUSPIRONE HYDROCHLORIDE 15 MG: 7.5 TABLET ORAL at 02:53

## 2019-05-01 RX ADMIN — SPIRONOLACTONE 50 MG: 50 TABLET ORAL at 17:21

## 2019-05-01 ASSESSMENT — PAIN SCALES - GENERAL
PAINLEVEL_OUTOF10: 0

## 2019-05-01 NOTE — CONSULTS
[facility]  Gastroenterology Consultation    2019  8:51 AM    Patient:    Poppy Amato  : 1960   66 y.o. MRN: [id number]  Admitted: 2019 10:07 PM ATT: Luis Miguel Avila MD   [id number]  AdmitDx: Hepatic encephalopathy ([facility] [address] [K72.90]  Dizziness [R42]  Encephalopathy [G93.40]  PCP: Monie Saucedo MD    Reason for Consult: cirrhosis    Requesting Physician:  Luis Miguel Avila MD      History Obtained From:  Patient and review of all records    HISTORY OF PRESENT ILLNESS:                The patient is a 66 y.o. female with significant past medical history as below who presents with above mentioned causes in reason for consult. History limited d/t confusion. Patient presented for AMS and off balance. Discharged Friday from [address] [place] [address] for TIPS procedure, PVT seemed chronic, no intervention. Had nausea and vomiting at home. Had multiple BM's a home. \"Unsure how her ammonia was elevated\". Denies Abdominal pain  Denies GERD, dyspepsia, dysphagia   Last BM this   Denies ASA   Denies NSAIDs  Denies Anti-platelet therapy    History of EGD  2019 esophagitis, a small gastric varix with overlying ulcer, and PHG     History of colonoscopy 3/2017 per Dr. Williams Barrera. Ascending biopsy - Fragments of colonic mucosa showing focal acute     Smoker daily  +marijuana daily  Alcohol intake 4 mixed drinks earlier this month,prior to 2018        Past Medical History:        Diagnosis Date    Acid reflux     Arthritis     left knee    CAD (coronary artery disease)     per [facility] 13    COPD (chronic obstructive pulmonary disease) (HCC)     follow with Dr Sherrell Morataya Depression     \"have manic - depression see Dr Ysabel Miranda Diabetes mellitus [facility])     dx 10+ yrs ago-     Drug abuse ([facility] [address]     hx use of cocaine, heroin and marijuana- states last used 2014    Glaucoma     left eye    H/O Doppler lower venous ultrasound 2019    No DVT or SVT, Significant reflux of RGSV and LGSV.  insulin lispro  0-3 Units Subcutaneous Nightly    lactulose  20 g Oral 4x Daily     PRN Medications: glucose, dextrose, glucagon (rDNA), dextrose      Allergies:  Norco [hydrocodone-acetaminophen] and Tylenol [acetaminophen]    Social History:   TOBACCO:   reports that she has quit smoking. Her smoking use included cigarettes. She has a 20.00 pack-year smoking history. She has never used smokeless tobacco.  ETOH:   reports that she drinks about 1.2 - 1.8 oz of alcohol per week. Family History:       Problem Relation Age of Onset    Cancer Mother         lung ca    Arthritis Mother     Migraines Mother     Cancer Father         colon ca    Diabetes Father     High Blood Pressure Father     Arthritis Father     High Cholesterol Father     Migraines Father     Migraines Sister     Heart Disease Brother         WPW       No family history of colon cancer, Crohn's disease, or ulcerative colitis. REVIEW OF SYSTEMS:    The positive ROS will be identified in bold, otherwise ROS are negative     CONSTITUTIONAL:  Neg   Recent weight changes, fatigue, fever, chills or night sweats  RESPIRATORY:   Neg SOB, wheeze, cough, sputum, hemoptysis or bronchitis  CARDIOVASCULAR:  Neg chest pain, palpitations, dyspnea on exertion, orthopnea, paroxysmal nocturnal dyspnea or edema  GASTROINTESTINAL:  SEE HPI  HEMATOLOGIC/LYMPHATIC:  Neg  Anemia, bleeding tendency  MUSCULOSKELETAL:    New myalgias, bone pain, joint pain, swelling or stiffness and has had change in gait. NEUROLOGICAL:  Neg  Loss of Consciousness, memory loss, forgetfulness, periods of confusion, difficulty concentrating, seizures, decline in intellect, nervousness, insomina, aphasia or dysarthria.       PHYSICAL EXAM:      Vitals:    /82   Pulse (!) 0   Temp 98.3 °F (36.8 °C) (Oral)   Resp 21   Ht 4' 9\" (1.448 m)   Wt 158 lb (71.7 kg)   SpO2 96%   BMI 34.19 kg/m²     General Appearance:    Alert, cooperative, no distress, appears stated age HEENT:    Normocephalic, atraumatic, Conjunctiva clear   Neck:   Supple, symmetrical, trachea midline   Lungs:     Clear to auscultation bilaterally, respirations unlabored   Chest Wall:    No tenderness or deformity    Heart:    Regular rate and rhythm, S1 and S2 normal   Abdomen:     Soft, non-tender, bowel sounds active all four quadrants,     no masses, no organomegaly, no ascites    Rectal:    Deferred   Extremities:   Extremities normal, atraumatic, no cyanosis or edema   Pulses:   2+ and symmetric all extremities   Skin:   Skin color, texture, turgor normal, no rashes or lesions   Lymph nodes:   No abnormality   Neurologic:   No focal deficits, moving all four extremities            DATA:    ABGs:   Lab Results   Component Value Date    PO2ART 50 04/27/2012     CBC:   Recent Labs     04/30/19 2245 05/01/19 0339   WBC 4.9 5.8   HGB 13.1 12.8   PLT 94* 88*     BMP:    Recent Labs     04/30/19 2245 05/01/19 0339    140   K 3.7 4.1    105   CO2 26 26   BUN 9 10   CREATININE 0.8 0.9   GLUCOSE 187* 167*     Magnesium:   Lab Results   Component Value Date    MG 1.9 04/17/2019     Hepatic:   Recent Labs     04/30/19 2245 05/01/19 0339   AST 33 37   ALT 48* 45*   BILITOT 1.3* 1.2*   ALKPHOS 122 100     Recent Labs     04/30/19 2245   LIPASE 30     Recent Labs     04/30/19 2245   PROTIME 14.5*   INR 1.25     No results for input(s): PTT in the last 72 hours. Lipids: No results for input(s): CHOL, HDL in the last 72 hours.     Invalid input(s): LDLCALCU  INR:   Recent Labs     04/30/19 2245   INR 1.25     TSH:   Lab Results   Component Value Date    TSH 1.73 08/01/2017     No intake or output data in the 24 hours ending 05/01/19 0851   sodium chloride 75 mL/hr at 05/01/19 0158    dextrose         Imaging Studies: REVIEWED      IMPRESSION:      Patient Active Problem List   Diagnosis Code    Left arm pain M79.602    Type 2 diabetes mellitus with complication, with long-term current use of insulin (HCC) E11.8, Z79.4    Acid reflux K21.9    COPD, mild (HCC) J44.9    Tobacco abuse Z72.0    Angina effort (HCC) I20.8    Abnormal nuclear cardiac imaging test R93.1    Lung nodule R91.1    Chest pain R07.9    Dyslipidemia E78.5    Pancolitis (HCC) K51.00    Hematemesis without nausea A07.2    Alcoholic cirrhosis of liver without ascites (HCC) K70.30    Thrombocytopenia (HCC) U07.4    Periumbilical abdominal pain R10.33    History of colonic polyps Z86.010    Abnormal findings on diagnostic imaging of 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 A TIPS has been placed and is patent.  Portal vein appears patent. 2. Cirrhosis.  No liver lesion seen.       Meld Score 10  LFT's elevated   Hepatic encephalopathy improving  Ammonia 120 on admission, 116 today  Will r/o infectious etiology as well, blood cultures pending  Thrombocytopenia   4/7/19 AFP normal    RECOMMENDATIONS:  Restart Lasix and Aldactone, that was prescribed at [facility] on discharge  Increase Lactulose to 20 g to 4 times a day  Continue Xifaxan   Urine

## 2019-05-01 NOTE — H&P
Department of Internal Medicine  Hospitalist  Attending History and Physical      CHIEF COMPLAINT:  Forgetting things    Reason for Admission:  Hepatic encephalopathy    History Obtained From:  patient    HISTORY OF PRESENT ILLNESS:      The patient is a 66 y.o. female with significant past medical history of cirrhosis, COPD, Portal vein thrombosis presents as today she has been forgeting things and has been having shakes. No fevers. Has been vomiting greenish yellow vomit but no abd pain and denies any blood. No chest pain or shortness of breath. In the ER her labs showed hyperammonia. She was dishcarged from [facility] 4/26/19 for worsening PVT and her eliquis was stopped and had TIPS procedure. She was not restarted on AC due to thrombocytopenia and gastric varices. Past Medical History:        Diagnosis Date    Acid reflux     Arthritis     left knee    CAD (coronary artery disease)     per [facility] 9/6/13    COPD (chronic obstructive pulmonary disease) (HCC)     follow with Dr Behzad Velásquez Depression     \"have manic - depression see Dr Ml Stover Diabetes mellitus [facility])     dx 10+ yrs ago-     Drug abuse ([facility] [address]     hx use of cocaine, heroin and marijuana- states last used 12/2014    Glaucoma     left eye    H/O Doppler lower venous ultrasound 04/04/2019    No DVT or SVT, Significant reflux of RGSV and LGSV.    H/O Doppler ultrasound 7/22/15    CAROTID: WNL. Normal vertebral flows bilaterally.      Hepatitis C     for liver bx 12/3/2015\"Have Hepatitis B and C and saw Dr Lor Forrest for this 12/1/2015\"    Hiatal hernia     History of alcohol abuse     HTN (hypertension)     \"for the past two yrs on medication\" follows with Dr Palmer Morning Hyperlipemia     Irregular heart beat     per pt    Liver hematoma     Migraines     Nausea & vomiting     Schizophrenia ([address]     per old chart    Seizures ([address]     \"last one was 9/2015- saw Dr Jennifer Mathew at [facility]- she said not sure if acutal seizures- she thinks they are panic or anxiety attacks\"    SOB (shortness of breath)     Stress bladder incontinence, female      Past Surgical History:        Procedure Laterality Date    BREAST SURGERY  10/2015    left breast bx    CARDIAC CATHETERIZATION      per old chart pt had cath done in 3/2011 and 9/2013    CHOLECYSTECTOMY  per old chart done [facility]  3/11/13    diverticulosis, cecal polyp    COLONOSCOPY  03/16/2017    Internal hemorrhoids    ENDOSCOPY, COLON, DIAGNOSTIC  03/16/2017    EGD: Small esophageal varices, portal hypertensive gastropathy, reflux esophagitis, hiatal hernia    EYE SURGERY Bilateral ? when    cyst removal, cataracts    HYSTERECTOMY      per old chart pt had CHOLO/BSO 1986    TIPS PROCEDURE      TONSILLECTOMY  as a kid    UPPER GASTROINTESTINAL ENDOSCOPY N/A 11/14/2018    EGD DIAGNOSTIC ONLY performed by Garett Larry MD at [address] [facility] ENDOSCOPY       Medications Prior to Admission:    No current facility-administered medications on file prior to encounter.       Current Outpatient Medications on File Prior to Encounter   Medication Sig Dispense Refill    tiZANidine (ZANAFLEX) 4 MG tablet Take 1 tablet by mouth 2 times daily as needed (spasm) 60 tablet 2    pantoprazole (PROTONIX) 20 MG tablet TAKE 1 TABLET BY MOUTH TWO TIMES DAILY BEFORE MEALS 60 tablet 2    Compression Stockings MISC by Does not apply route 20 - 30 mmh wear daily and take off at night  Thigh High 2 each 0    nitroGLYCERIN (NITROSTAT) 0.4 MG SL tablet Place 1 tablet under the tongue every 5 minutes as needed for Chest pain 25 tablet 3    nicotine (NICODERM CQ) 21 MG/24HR Place 1 patch onto the skin every 24 hours 42 patch 0    UNIFINE PENTIPS 31G X 6 MM MISC USE AS DIRECTED 100 each 5    promethazine (PHENERGAN) 25 MG tablet Take 1 tablet by mouth 3 times daily as needed for Nausea 60 tablet 1    lactulose (CHRONULAC) 10 GM/15ML solution Take 15 mLs by mouth daily 1 Bottle 0    hydrOXYzine (ATARAX) 50 MG tablet Take 50 mg by mouth 3 times daily as needed for Itching      metoprolol tartrate (LOPRESSOR) 25 MG tablet Take 1 tablet by mouth 2 times daily 60 tablet 6    ranolazine (RANEXA) 500 MG extended release tablet Take 1 tablet by mouth 2 times daily 180 tablet 3    ondansetron (ZOFRAN-ODT) 4 MG disintegrating tablet Take 1 tablet by mouth every 8 hours as needed for Nausea or Vomiting 20 tablet 0    FLUoxetine (PROZAC) 40 MG capsule Take 40 mg by mouth daily      VIREAD 300 MG tablet Take 300 mg by mouth nightly       QUEtiapine (SEROQUEL) 300 MG tablet Take 300 mg by mouth nightly       albuterol sulfate HFA (PROAIR HFA) 108 (90 BASE) MCG/ACT inhaler Inhale 2 puffs into the lungs every 6 hours as needed for Wheezing 1 Inhaler 5    oxyCODONE (ROXICODONE) 5 MG immediate release tablet Take 5 mg by mouth 4 times daily  .  busPIRone (BUSPAR) 15 MG tablet Take 1 tablet by mouth 2 times daily 60 tablet 3         Allergies:  Norco [hydrocodone-acetaminophen] and Tylenol [acetaminophen]    Social History:   Social History     Socioeconomic History    Marital status:      Spouse name: Not on file    Number of children: Not on file    Years of education: Not on file    Highest education level: Not on file   Occupational History    Not on file   Social Needs    Financial resource strain: Not on file    Food insecurity:     Worry: Not on file     Inability: Not on file    Transportation needs:     Medical: Not on file     Non-medical: Not on file   Tobacco Use    Smoking status: Former Smoker     Packs/day: 0.50     Years: 40.00     Pack years: 20.00     Types: Cigarettes    Smokeless tobacco: Never Used   Substance and Sexual Activity    Alcohol use:  Yes     Alcohol/week: 1.2 - 1.8 oz     Types: 2 - 3 Shots of liquor per week     Comment: 2-3 shots last night     Drug use: Yes     Frequency: 3.0 times per week     Types: Marijuana    Sexual activity: Not Currently     Partners: Male   Lifestyle    Physical activity:     Days per week: Not on file     Minutes per session: Not on file    Stress: Not on file   Relationships    Social connections:     Talks on phone: Not on file     Gets together: Not on file     Attends Faith service: Not on file     Active member of club or organization: Not on file     Attends meetings of clubs or organizations: Not on file     Relationship status: Not on file    Intimate partner violence:     Fear of current or ex partner: Not on file     Emotionally abused: Not on file     Physically abused: Not on file     Forced sexual activity: Not on file   Other Topics Concern    Not on file   Social History Narrative    Not on file         Family History:   Family History   Problem Relation Age of Onset    Cancer Mother         lung [facility] Arthritis Mother     Migraines Mother     Cancer Father         colon [facility] Diabetes Father     High Blood Pressure Father     Arthritis Father     High Cholesterol Father     Migraines Father     Migraines Sister     Heart Disease Brother         WPW       REVIEW OF SYSTEMS:  CONSTITUTIONAL:  positive for  fatigue  EYES:  negative  HEENT:  negative  RESPIRATORY:  negative  CARDIOVASCULAR:  negative  GASTROINTESTINAL:  positive for vomiting  ALLERGIC/IMMUNOLOGIC:  negative  ENDOCRINE:  negative  MUSCULOSKELETAL:  negative  NEUROLOGICAL:  positive for memory problems and tremors(asterixis)  BEHAVIOR/PSYCH:  positive for poor concentration  PHYSICAL EXAM:    Vitals:  /76   Pulse 96   Temp 98.5 °F (36.9 °C) (Oral)   Resp 17   Ht 4' 9.5\" (1.461 m)   Wt 160 lb (72.6 kg)   SpO2 94%   BMI 34.02 kg/m²     CONSTITUTIONAL:  fatigued  EYES:  Lids and lashes normal, pupils equal, round and reactive to light, extra ocular muscles intact, sclera clear, conjunctiva normal  ENT:  Normocephalic, without obvious abnormality, atraumatic, sinuses nontender on palpation, external ears without lesions, oral pharynx with moist mucus membranes, tonsils without erythema or exudates, gums normal and good dentition. LUNGS:  No increased work of breathing, good air exchange, clear to auscultation bilaterally, no crackles or wheezing  CARDIOVASCULAR:  Normal apical impulse, regular rate and rhythm, normal S1 and S2, no S3 or S4, and no murmur noted  ABDOMEN:  No scars, normal bowel sounds, soft, non-distended, non-tender, no masses palpated, no hepatosplenomegally  MUSCULOSKELETAL:  full range of motion noted  With liver flap/asterixis  NEUROLOGIC:  Awake, alert, confused. Cranial nerves II-XII are grossly intact.   Motor is 5 out of 5 bilaterally , asterixis present  SKIN:  no bruising or bleeding    DATA:  CXR  NAD  CT head  NAD  EKG SR 89  ASSESSMENT AND PLAN:    Hepatic encephaloapthy  -hold oxycontin  -ammonia daily  -rifaximin and lactulose  Cirrhosis with hep B  -viread  -will need to be on lasix and aldactone if no further vomiting  -repeat LFT in am  COPD  -albuterol  HTN  -BB  Portal vein thrombosis  -s/p TIPS and not a candidate for AC   Thrombocytopenia  -chronic due to cirrhosis  DVT prophyalxis  -SCD

## 2019-05-01 NOTE — CARE COORDINATION
LSW spoke with pt about discharge plans. Pt lives with her brother in a 2 story home but she stays on the first floor. Pt has a cane at home that she uses PRN. Pt denies having any HC. Pt stated she is independent of her ADLs. Pt has a PCP and can afford her meds. Pt plans home and denies any needs.  Pt stated her brother can pick her up at discharge

## 2019-05-01 NOTE — ED NOTES
Report called to [facility] RN for bed [address] [facility], RN  05/01/19 [id number]

## 2019-05-01 NOTE — ED TRIAGE NOTES
Pt presents to ED via EMS for feeling 'not right'. Pt states she had a TIPS procedure done at [facility] last week and has been shaking and feeling weird sense. Pt is alert, oriented, and ambulatory. Rating pain 0/10.

## 2019-05-01 NOTE — ED NOTES
Bed: ED-27  Expected date:   Expected time:   Means of arrival:   Comments:  EMS     Adarsh Farooq RN  04/30/19 [id number]

## 2019-05-01 NOTE — ED NOTES
Narrative   EXAMINATION:   CT OF THE HEAD WITHOUT CONTRAST  4/30/2019 10:55 pm       TECHNIQUE:   CT of the head was performed without the administration of intravenous   contrast. Dose modulation, iterative reconstruction, and/or weight based   adjustment of the mA/kV was utilized to reduce the radiation dose to as low   as reasonably achievable.       COMPARISON:   05/14/2015       HISTORY:   ORDERING SYSTEM PROVIDED HISTORY: NEURO DEFICIT, ACUTE SINGLE, COMPLETE   RESOLUTION   TECHNOLOGIST PROVIDED HISTORY:   Has a \"code stroke\" or \"stroke alert\" been called? ->No   Ordering Physician Provided Reason for Exam: Neuro deficit, acute single,   complete resolution   Acuity: Acute   Type of Exam: Initial   Additional signs and symptoms: no   Relevant Medical/Surgical History: none       FINDINGS:   BRAIN/VENTRICLES: There is no acute intracranial hemorrhage, mass effect or   midline shift.  No abnormal extra-axial fluid collection.  The gray-white   differentiation is maintained without evidence of an acute infarct.  There is   no evidence of hydrocephalus.       ORBITS: The visualized portion of the orbits demonstrate no acute abnormality.       SINUSES: The visualized paranasal sinuses and mastoid air cells demonstrate   no acute abnormality.       SOFT TISSUES/SKULL:  No acute abnormality of the visualized skull or soft   tissues.           Impression   No acute intracranial abnormality.         Tara Odom RN  04/30/19 [id number]

## 2019-05-01 NOTE — PROGRESS NOTES
Nutrition Assessment (Low Risk)    Type and Reason for Visit: Initial, Positive Nutrition Screen     Nutrition Assessment: Pt fell out due to a positive screen for weight loss. Per the pt chart, she has lost 5.4% in 6 months. This is not clinically significant. Pt does not have any wounds but is on a clear liquid diet.  Will follow for diet advancement otherwise pt is at low risk    Malnutrition Assessment:  · Malnutrition Status: No malnutrition    Nutrition Risk Level   Risk Level: Low    Nutrition Diagnosis:   · Problem: No nutrition diagnosis at this time    Nutrition Intervention:  Food and/or Delivery: Continue current diet  Nutrition Education/Counseling/Coordination of Care:  Continued Inpatient Monitoring, Education Not Indicated, Coordination of Care      Electronically signed by Genevieve Haro RD, YASMIN on 1/5/96 at 10:48 AM    Contact Number: [phone]

## 2019-05-01 NOTE — ED PROVIDER NOTES
Emergency Department Encounter  Location: [address] [place] [address]    Patient: Paula Moore  MRN: [id number]  : 1960  Date of evaluation: 2019  ED Provider: Brisa Roman DO    Chief Complaint:    Other (Pt had TIPS procedure last week at [facility], states she has been shaking and feeling 'not right' sense)    [place]:  Paula Moore is a 66 y.o. female that presents to the emergency department With initial complaint of feeling \"not right\". Patient had a TIPS procedure @ [facility] and was discharged . Per records, she had a portal vein thrombosis with extension into the SMV; she was deemed high risk for anticoagulation and underwent a TIPS procedure in order to decrease portal hypertension. States yesterday she was feeling pretty well. Has felt poorly all day today and describes feeling \"dizzy\" and \"lightheaded\". No kalyani syncope or falls. Describes that she keeps dropping things and feels a little weaker on her left side. She has had a mild headache. Does report cough and mild shortness of breath but no complaints of chest, back or abdominal discomfort. No fever, chills or urinary symptoms. She indicates has been taking all of her medications as prescribed. Is noted to have stopped eliquis per [facility]'s instruction at the time of discharge. ROS:  At least 10 systems reviewed and otherwise acutely negative except as in the [address]     Past Medical History:   Diagnosis Date    Acid reflux     Arthritis     left knee    CAD (coronary artery disease)     per [facility] 13    COPD (chronic obstructive pulmonary disease) ([facility])     follow with Dr Bry Waddell Depression     \"have manic - depression see Dr Brandan Lewis Diabetes mellitus [facility])     dx 10+ yrs ago-     Drug abuse ([facility] [address]     hx use of cocaine, heroin and marijuana- states last used 2014    Glaucoma     left eye    H/O Doppler lower venous ultrasound 2019    No DVT or SVT, Significant reflux of RGSV and LGSV.    H/O Doppler ultrasound 7/22/15    CAROTID: WNL. Normal vertebral flows bilaterally.      Hepatitis C     for liver bx 12/3/2015\"Have Hepatitis B and C and saw Dr Sy Chavez for this 12/1/2015\"    Hiatal hernia     History of alcohol abuse     HTN (hypertension)     \"for the past two yrs on medication\" follows with Dr Dickson Niño Hyperlipemia     Irregular heart beat     per pt    Liver hematoma     Migraines     Nausea & vomiting     Schizophrenia ([address]     per old chart    Seizures ([address]     \"last one was 9/2015- saw Dr Syl Borges at [facility]- she said not sure if acutal seizures- she thinks they are panic or anxiety attacks\"    SOB (shortness of breath)     Stress bladder incontinence, female      Past Surgical History:   Procedure Laterality Date    BREAST SURGERY  10/2015    left breast bx    CARDIAC CATHETERIZATION      per old chart pt had cath done in 3/2011 and 9/2013    CHOLECYSTECTOMY  per old chart done [facility]  3/11/13    diverticulosis, cecal polyp    COLONOSCOPY  03/16/2017    Internal hemorrhoids    ENDOSCOPY, COLON, DIAGNOSTIC  03/16/2017    EGD: Small esophageal varices, portal hypertensive gastropathy, reflux esophagitis, hiatal hernia    EYE SURGERY Bilateral ? when    cyst removal, cataracts    HYSTERECTOMY      per old chart pt had CHOLO/BSO 1986    Savita Morrow 1945  as a kid    UPPER GASTROINTESTINAL ENDOSCOPY N/A 11/14/2018    EGD DIAGNOSTIC ONLY performed by Kassandra Coleman MD at [place] [address] History   Problem Relation Age of Onset    Cancer Mother         lung [facility] Arthritis Mother     Migraines Mother     Cancer Father         colon [facility] Diabetes Father     High Blood Pressure Father     Arthritis Father     High Cholesterol Father     Migraines Father     Migraines Sister     Heart Disease Brother         WPW     Social History     Socioeconomic History    Marital status:      Spouse name: Not on file    Number of children: Not on file    Years of 50 mg by mouth      tiZANidine (ZANAFLEX) 4 MG tablet Take 1 tablet by mouth 2 times daily as needed (spasm) 60 tablet 2    pantoprazole (PROTONIX) 20 MG tablet TAKE 1 TABLET BY MOUTH TWO TIMES DAILY BEFORE MEALS 60 tablet 2    Compression Stockings MISC by Does not apply route 20 - 30 mmh wear daily and take off at night  Thigh High 2 each 0    nitroGLYCERIN (NITROSTAT) 0.4 MG SL tablet Place 1 tablet under the tongue every 5 minutes as needed for Chest pain 25 tablet 3    promethazine (PHENERGAN) 25 MG tablet Take 1 tablet by mouth 3 times daily as needed for Nausea 60 tablet 1    hydrOXYzine (ATARAX) 50 MG tablet Take 50 mg by mouth 3 times daily as needed for Itching      metoprolol tartrate (LOPRESSOR) 25 MG tablet Take 1 tablet by mouth 2 times daily 60 tablet 6    ranolazine (RANEXA) 500 MG extended release tablet Take 1 tablet by mouth 2 times daily 180 tablet 3    ondansetron (ZOFRAN-ODT) 4 MG disintegrating tablet Take 1 tablet by mouth every 8 hours as needed for Nausea or Vomiting 20 tablet 0    FLUoxetine (PROZAC) 40 MG capsule Take 40 mg by mouth daily      VIREAD 300 MG tablet Take 300 mg by mouth nightly       QUEtiapine (SEROQUEL) 300 MG tablet Take 300 mg by mouth nightly       albuterol sulfate HFA (PROAIR HFA) 108 (90 BASE) MCG/ACT inhaler Inhale 2 puffs into the lungs every 6 hours as needed for Wheezing 1 Inhaler 5    oxyCODONE (ROXICODONE) 5 MG immediate release tablet Take 5 mg by mouth 4 times daily  .  busPIRone (BUSPAR) 15 MG tablet Take 1 tablet by mouth 2 times daily 60 tablet 3    nicotine (NICODERM CQ) 21 MG/24HR Place 1 patch onto the skin every 24 hours 42 patch 0    UNIFINE PENTIPS 31G X 6 MM MISC USE AS DIRECTED 100 each 5     Allergies   Allergen Reactions    Norco [Hydrocodone-Acetaminophen] Itching     Itching, rash, nausea and vomiting.      Tylenol [Acetaminophen] Itching, Nausea And Vomiting and Rash       Nursing Notes Reviewed    Physical Exam:  ED Calcium 9.0 8.3 - 10.6 MG/DL    Alb 3.5 3.4 - 5.0 GM/DL    Total Protein 7.2 6.4 - 8.2 GM/DL    Total Bilirubin 1.3 (H) 0.0 - 1.0 MG/DL    ALT 48 (H) 10 - 40 U/L    AST 33 15 - 37 IU/L    Alkaline Phosphatase 122 40 - 129 IU/L    GFR Non-African American >60 >60 mL/min/1.73m2    GFR African American >60 >60 mL/min/1.73m2    Anion Gap 10 4 - 16   CBC Auto Differential   Result Value Ref Range    WBC 4.9 4.0 - 10.5 K/CU MM    RBC 4.14 (L) 4.2 - 5.4 M/CU MM    Hemoglobin 13.1 12.5 - 16.0 GM/DL    Hematocrit 39.3 37 - 47 %    MCV 94.9 78 - 100 FL    MCH 31.6 (H) 27 - 31 PG    MCHC 33.3 32.0 - 36.0 %    RDW 14.3 11.7 - 14.9 %    Platelets 94 (L) 983 - 440 K/CU MM    MPV 11.5 (H) 7.5 - 11.1 FL    Differential Type AUTOMATED DIFFERENTIAL     Segs Relative 55.9 36 - 66 %    Lymphocytes % 28.8 24 - 44 %    Monocytes % 10.4 (H) 0 - 4 %    Eosinophils % 3.7 (H) 0 - 3 %    Basophils % 0.8 0 - 1 %    Segs Absolute 2.7 K/CU MM    Lymphocytes # 1.4 K/CU MM    Monocytes # 0.5 K/CU MM    Eosinophils # 0.2 K/CU MM    Basophils # 0.0 K/CU MM    Nucleated RBC % 0.0 %    Total Nucleated RBC 0.0 K/CU MM    Total Immature Neutrophil 0.02 K/CU MM    Immature Neutrophil % 0.4 0 - 0.43 %   Lipase   Result Value Ref Range    Lipase 30 13 - 60 IU/L   Urinalysis   Result Value Ref Range    Color, UA YELLOW YELLOW    Clarity, UA HAZY (A) CLEAR    Glucose, Urine 150 (A) NEGATIVE MG/DL    Bilirubin Urine NEGATIVE NEGATIVE MG/DL    Ketones, Urine NEGATIVE NEGATIVE MG/DL    Specific Gravity, UA 1.018 1.001 - 1.035    Blood, Urine NEGATIVE NEGATIVE    pH, Urine 7.0 5.0 - 8.0    Protein, UA NEGATIVE NEGATIVE MG/DL    Urobilinogen, Urine 2.0 (H) 0.2 - 1.0 MG/DL    Nitrite Urine, Quantitative NEGATIVE NEGATIVE    Leukocyte Esterase, Urine NEGATIVE NEGATIVE    RBC, UA NONE SEEN 0 - 6 /HPF    WBC, UA NONE SEEN 0 - 5 /HPF    Bacteria, UA RARE (A) NEGATIVE /HPF    Squam Epithel, UA 1 /HPF    Mucus, UA RARE (A) NEGATIVE HPF    Trichomonas, UA NONE SEEN NONE SEEN /HPF    Amorphous, UA RARE /HPF   Lactic Acid, Plasma   Result Value Ref Range    Lactate 1.6 0.4 - 2.0 mMOL/L   Ammonia   Result Value Ref Range    Ammonia 120 (H) 11 - 51 UMOL/L   Protime-INR   Result Value Ref Range    Protime 14.5 (H) 9.12 - 12.5 SECONDS    INR 1.25 INDEX   Troponin   Result Value Ref Range    Troponin T <0.010 <0.01 NG/ML   Ammonia   Result Value Ref Range    Ammonia 116 (H) 11 - 51 UMOL/L   Comprehensive Metabolic Panel w/ Reflex to MG   Result Value Ref Range    Sodium 140 135 - 145 MMOL/L    Potassium 4.1 3.5 - 5.1 MMOL/L    Chloride 105 99 - 110 mMol/L    CO2 26 21 - 32 MMOL/L    BUN 10 6 - 23 MG/DL    CREATININE 0.9 0.6 - 1.1 MG/DL    Glucose 167 (H) 70 - 99 MG/DL    Calcium 8.9 8.3 - 10.6 MG/DL    Alb 3.3 (L) 3.4 - 5.0 GM/DL    Total Protein 6.6 6.4 - 8.2 GM/DL    Total Bilirubin 1.2 (H) 0.0 - 1.0 MG/DL    ALT 45 (H) 10 - 40 U/L    AST 37 15 - 37 IU/L    Alkaline Phosphatase 100 40 - 129 IU/L    GFR Non-African American >60 >60 mL/min/1.73m2    GFR African American >60 >60 mL/min/1.73m2    Anion Gap 9 4 - 16   CBC   Result Value Ref Range    WBC 5.8 4.0 - 10.5 K/CU MM    RBC 4.05 (L) 4.2 - 5.4 M/CU MM    Hemoglobin 12.8 12.5 - 16.0 GM/DL    Hematocrit 39.0 37 - 47 %    MCV 96.3 78 - 100 FL    MCH 31.6 (H) 27 - 31 PG    MCHC 32.8 32.0 - 36.0 %    RDW 14.5 11.7 - 14.9 %    Platelets 88 (L) 159 - 440 K/CU MM    MPV 11.8 (H) 7.5 - 11.1 FL   Hemoglobin A1c   Result Value Ref Range    Hemoglobin A1C 6.1 4.2 - 6.3 %    eAG 128 mg/dL   Comprehensive Metabolic Panel w/ Reflex to MG   Result Value Ref Range    Sodium 140 135 - 145 MMOL/L    Potassium 4.4 3.5 - 5.1 MMOL/L    Chloride 108 99 - 110 mMol/L    CO2 23 21 - 32 MMOL/L    BUN 13 6 - 23 MG/DL    CREATININE 1.1 0.6 - 1.1 MG/DL    Glucose 137 (H) 70 - 99 MG/DL    Calcium 8.8 8.3 - 10.6 MG/DL    Alb 3.2 (L) 3.4 - 5.0 GM/DL    Total Protein 6.3 (L) 6.4 - 8.2 GM/DL    Total Bilirubin 1.5 (H) 0.0 - 1.0 MG/DL    ALT 38 10 - 40 U/L    AST 34 15 - 37 IU/L    Alkaline Phosphatase 87 40 - 129 IU/L    GFR Non- 51 (L) >60 mL/min/1.73m2    GFR African American >60 >60 mL/min/1.73m2    Anion Gap 9 4 - 16   CBC   Result Value Ref Range    WBC 4.7 4.0 - 10.5 K/CU MM    RBC 3.95 (L) 4.2 - 5.4 M/CU MM    Hemoglobin 12.3 (L) 12.5 - 16.0 GM/DL    Hematocrit 37.9 37 - 47 %    MCV 95.9 78 - 100 FL    MCH 31.1 (H) 27 - 31 PG    MCHC 32.5 32.0 - 36.0 %    RDW 14.6 11.7 - 14.9 %    Platelets 90 (L) 654 - 440 K/CU MM    MPV 11.6 (H) 7.5 - 11.1 FL   Ammonia   Result Value Ref Range    Ammonia 73 (H) 11 - 51 UMOL/L   Protime-INR   Result Value Ref Range    Protime 15.1 (H) 9.12 - 12.5 SECONDS    INR 1.31 INDEX   CBC   Result Value Ref Range    WBC 6.8 4.0 - 10.5 K/CU MM    RBC 4.31 4.2 - 5.4 M/CU MM    Hemoglobin 13.7 12.5 - 16.0 GM/DL    Hematocrit 41.7 37 - 47 %    MCV 96.8 78 - 100 FL    MCH 31.8 (H) 27 - 31 PG    MCHC 32.9 32.0 - 36.0 %    RDW 14.6 11.7 - 14.9 %    Platelets 126 (L) 403 - 440 K/CU MM    MPV 11.7 (H) 7.5 - 11.1 FL   POCT Glucose   Result Value Ref Range    POC Glucose 158 (H) 70 - 99 MG/DL   POCT Glucose   Result Value Ref Range    POC Glucose 208 (H) 70 - 99 MG/DL   POCT Glucose   Result Value Ref Range    POC Glucose 198 (H) 70 - 99 MG/DL   POCT Glucose   Result Value Ref Range    POC Glucose 129 (H) 70 - 99 MG/DL   POCT Glucose   Result Value Ref Range    POC Glucose 227 (H) 70 - 99 MG/DL   EKG 12 Lead   Result Value Ref Range    Ventricular Rate 89 BPM    Atrial Rate 89 BPM    P-R Interval 132 ms    QRS Duration 76 ms    Q-T Interval 394 ms    QTc Calculation (Bazett) 479 ms    P Axis 65 degrees    R Axis 29 degrees    T Axis 45 degrees    Diagnosis       Normal sinus rhythm  Normal ECG  When compared with ECG of 12-NOV-2018 09:49,  Borderline criteria for Inferior infarct are no longer present  QT has shortened  Confirmed by KERRI Sherwood ([id number]) on 5/1/2019 8:23:34 PM     TYPE AND SCREEN   Result Value Ref Range    ABO/Rh O POSITIVE     Antibody Screen NEGATIVE        EKG (if obtained): (All EKG's are interpreted by myself in the absence of a cardiologist)  Sinus rhythm at 89. Normal axis with good R wave progression. No ST elevation or depression. No ectopy. No acute change from prior tracing. Radiographs (if obtained):  [] The following radiograph was interpreted by myself in the absence of a radiologist:  [x] Radiologist's Report reviewed at time of ED visit:  Xr Chest Standard (2 Vw)    Result Date: 4/30/2019  EXAMINATION: TWO VIEWS OF THE CHEST 4/30/2019 10:58 pm COMPARISON: 03/28/2019 HISTORY: ORDERING SYSTEM PROVIDED HISTORY: Chest pain TECHNOLOGIST PROVIDED HISTORY: Reason for exam:->Chest pain Ordering Physician Provided Reason for Exam: Chest pain Acuity: Acute Type of Exam: Initial FINDINGS: The lungs are without acute focal process. There is no effusion or pneumothorax. The cardiomediastinal silhouette is stable. The osseous structures are stable. No acute process. Ct Head Wo Contrast    Result Date: 4/30/2019  EXAMINATION: CT OF THE HEAD WITHOUT CONTRAST  4/30/2019 10:55 pm TECHNIQUE: CT of the head was performed without the administration of intravenous contrast. Dose modulation, iterative reconstruction, and/or weight based adjustment of the mA/kV was utilized to reduce the radiation dose to as low as reasonably achievable. COMPARISON: 05/14/2015 HISTORY: ORDERING SYSTEM PROVIDED HISTORY: NEURO DEFICIT, ACUTE SINGLE, COMPLETE RESOLUTION TECHNOLOGIST PROVIDED HISTORY: Has a \"code stroke\" or \"stroke alert\" been called? ->No Ordering Physician Provided Reason for Exam: Neuro deficit, acute single, complete resolution Acuity: Acute Type of Exam: Initial Additional signs and symptoms: no Relevant Medical/Surgical History: none FINDINGS: BRAIN/VENTRICLES: There is no acute intracranial hemorrhage, mass effect or midline shift. No abnormal extra-axial fluid collection.   The gray-white differentiation is maintained without evidence of an acute infarct. There is no evidence of hydrocephalus. ORBITS: The visualized portion of the orbits demonstrate no acute abnormality. SINUSES: The visualized paranasal sinuses and mastoid air cells demonstrate no acute abnormality. SOFT TISSUES/SKULL:  No acute abnormality of the visualized skull or soft tissues. No acute intracranial abnormality. Ct Abdomen Pelvis W Iv Contrast    Result Date: 4/16/2019  EXAMINATION: CT OF THE ABDOMEN AND PELVIS WITH CONTRAST 4/16/2019 1:05 pm TECHNIQUE: CT of the abdomen and pelvis was performed with the administration of intravenous contrast. Multiplanar reformatted images are provided for review. Dose modulation, iterative reconstruction, and/or weight based adjustment of the mA/kV was utilized to reduce the radiation dose to as low as reasonably achievable. COMPARISON: CT abdomen pelvis April 6, 2019; CT abdomen pelvis November 12, 2018; CT abdomen pelvis October 25, 2018; CT abdomen pelvis February 14, 2017 HISTORY: ORDERING SYSTEM PROVIDED HISTORY: ABDOMINAL PAIN TECHNOLOGIST PROVIDED HISTORY: IV contrast only. Thank you. Ordering Physician Provided Reason for Exam: Abdominal pain Acuity: Acute Type of Exam: Initial Additional signs and symptoms: vomiting Relevant Medical/Surgical History: Hx [place], Hyster / 75cc [id number] FINDINGS: Lower Chest: Stable nodule within the medial aspect of the right lower lobe which demonstrates at least 2 year stability when compared with prior exam from February 14, 2017 suggesting benign etiology. Remainder of the lung bases are clear. Organs: The liver again demonstrates nodular contour compatible with cirrhosis. Thrombus identified within the right portal vein similar to previous exam.  Thrombus also now identified within the main portal vein. This was not identified on previous exam.  The gallbladder is surgically absent.   Biliary dilatation of the intrahepatic and extrahepatic bile ducts appears stable and is likely postsurgical.  The spleen is enlarged but otherwise stable. The pancreas and bilateral adrenal glands appear unremarkable. The kidneys enhance symmetrically. No evidence for hydronephrosis. GI/Bowel: There is circumferential wall thickening of the cecum, ascending colon, and transverse colon with apparent sparing of the descending colon and sigmoid colon. There is mild adjacent stranding predominately involving the cecum and ascending colon. Small bowel is normal in caliber. Pelvis: The bladder appears unremarkable. The uterus is surgically absent. Peritoneum/Retroperitoneum: The abdominal aorta is normal in caliber with mild to moderate atherosclerotic plaque throughout. No free fluid or free air identified within the abdomen. Note again made of prominent gastric varices. Bones/Soft Tissues: Soft tissues demonstrate mild anasarca. No acute osseous abnormality is evident. 1. Redemonstration of cirrhotic morphology of the liver with splenomegaly and prominent gastric varices. 2. Portal vein thrombus within the right portal vein redemonstrated similar to prior exam.  There has been interval development of thrombus at the main portal vein which was not identified on prior exam from April 6, 2019. 3. Wall thickening of the colon predominantly involving the proximal and transverse colon redemonstrated. The descending and sigmoid colon appears grossly spared. Findings were noted on multiple previous exams and again may be related to elevated portal venous pressure with colitis also a consideration. Ct Abdomen Pelvis W Iv Contrast    Result Date: 4/6/2019  EXAMINATION: CT OF THE ABDOMEN AND PELVIS WITH CONTRAST 4/6/2019 4:45 pm TECHNIQUE: CT of the abdomen and pelvis was performed with the administration of intravenous contrast. Multiplanar reformatted images are provided for review.  Dose modulation, iterative reconstruction, and/or weight based adjustment of the mA/kV was utilized to reduce the radiation dose to as low as reasonably achievable. COMPARISON: 11/12/2018, 10/25/2018, 03/17/2017 HISTORY: ORDERING SYSTEM PROVIDED HISTORY: luq abd pain TECHNOLOGIST PROVIDED HISTORY: Ordering Physician Provided Reason for Exam: luq abd pain Acuity: Acute Type of Exam: Initial Additional signs and symptoms: vomiting Relevant Medical/Surgical History: Hx Cirrhosis, [place], Hyster / [place] [id number] FINDINGS: Lower Chest: Stable 9 mm nodule dating back to March 2017 now with some internal calcification compatible with granuloma. Lower lungs are otherwise clear. Organs: Somewhat nodular surface of the liver indicating cirrhotic changes. Significant gastric varices are demonstrated. There is also evidence of a splenorenal shunt. Filling defect within the right portal vein compatible with portal vein thrombus. Overall appearance is less extensive compared to previous study. Spleen, pancreas, adrenal glands, and kidneys are unremarkable. Gallbladder surgically absent. GI/Bowel: No bowel obstruction. Nonvisualized appendix. Circumferential wall thickening involving much of the colon may be due to incomplete distention with infectious or inflammatory colitis not excluded. Pelvis: Urinary bladder is unremarkable. Uterus surgically absent. Peritoneum/Retroperitoneum: No free intraperitoneal air, fluid, or lymphadenopathy by size criteria. Bones/Soft Tissues: Osseous structures appear unremarkable. Circumferential wall thickening involving much of the colon may be due to incomplete distention with infectious or inflammatory colitis not excluded. Filling defect within the right portal vein compatible with thrombus. Overall, the appearance of thrombus is less extensive compared to 10/25/2018 Cirrhotic changes of the liver with extensive gastric varices with a splenorenal shunt demonstrated.      Vl Dup Lower Extremity Venous Bilateral    Result Date: 4/8/2019  Vascular Lower Extremities Venous Insufficiency Procedure Demographics   Patient Name       Marylee Lease  Date of study        04/04/2019   Date of Birth      1960       Gender               Female   Age                66               Race                    Patient Number     [id number]         Room Number   Visit Number       [id number]        Height   Corporate ID       [id number]         Weight   Accession Number   [id number]   Ordering Physician                  Chela Doe RVT                                       Interpreting         Murray Teran MD                                      Physician  Procedure Type of Study:   Veins:Lower Extremities Venous Insufficiency, VL LOWER EXTREMITY BILATERAL  VENOUS. Indications for Study:Leg swelling. Conclusions   Summary   Significant reflux noted of the Right GSV at the SFJ (9.2s) and knee (1.8s). Significant reflux noted in the Left GSV at the knee (5.7s). No evidence of DVT or SVT in the bilateral common femoral vein, femoral  vein, popliteal vein, greater saphenous vein or small saphenous vein. Recommendations   Advice thigh high pressure stockings, 20 to 30 mm of Hg. Keep feet elevated. Increase walking. OV in 6 weeks   Signature   ------------------------------------------------------------------  Electronically signed by Murray Teran MD (Interpreting  physician) on 04/08/2019 at 11:39 AM  ------------------------------------------------------------------  Impressions Right Impression No evidence of DVT or SVT in the right common femoral vein, femoral vein, popliteal vein, greater saphenous vein or small saphenous vein. Normal compressibility of veins visualized in the right lower extremity. Respirophasic venous flow of the veins visualized in the right leg. Significant reflux noted of the Right GSV at the SFJ (9.2s) and knee (1.8s).  Diameters (cm): GSV at Junction: 0.69 GSV Mid Thigh: 0.33 GSV Knee: 0.38 GSV Mid Calf: 0.26 GSV Distal Calf: 0.31 SSV Prox: 0.13 SSV Mid: 0.16 SSV Distal: 0.17 Left Impression No evidence of DVT or SVT in the left common femoral vein, femoral vein, popliteal vein, greater saphenous vein or small saphenous vein. Respirophasic venous flow of the veins visualized in the left leg. Normal compressibility of veins visualized in the left lower extremity. Significant reflux noted in the Left GSV at the knee (5.7s). Diameters (cm): GSV at Junction: 0.88 GSV Mid Thigh: 0.36 GSV Knee: 0.39 GSV Mid Calf: 0.26 GSV Distal Calf: 0.20 SSV Prox: 0.27 SSV Mid: 0.16 SSV Distal: 0.16 Study Location:[facility]. Technical Quality:Adequate visualization. Velocities are measured in cm/s ; Diameters are measured in cm Right Doppler Measurements +--------------+-----------+------+------------+ ! Location      ! Signal     !Reflux! Reflux (sec)! +--------------+-----------+------+------------+ ! GSV Thigh     ! Phasic     ! Yes   !9.2         ! +--------------+-----------+------+------------+ ! Femoral       !Phasic     ! No    !            ! +--------------+-----------+------+------------+ ! Mid Femoral   !Phasic     ! No    !            ! +--------------+-----------+------+------------+ ! Popliteal     !Spontaneous! No    !            ! +--------------+-----------+------+------------+ ! GSV Below Knee! Spontaneous! No    !            ! +--------------+-----------+------+------------+ ! SSV           ! Spontaneous! No    !            ! +--------------+-----------+------+------------+ Left Doppler Measurements +--------------+-----------+------+------------+ ! Location      ! Signal     !Reflux! Reflux (sec)! +--------------+-----------+------+------------+ ! GSV Thigh     ! Phasic     ! Yes   ! 5.7         ! +--------------+-----------+------+------------+ ! Femoral       !Phasic     ! No    !            ! +--------------+-----------+------+------------+ ! Mid Femoral   !Phasic     ! No    ! ! +--------------+-----------+------+------------+ ! Popliteal     !Spontaneous! No    !            ! +--------------+-----------+------+------------+ ! GSV Below Knee! Spontaneous! No    !            ! +--------------+-----------+------+------------+ ! SSV           ! Spontaneous! No    !            ! +--------------+-----------+------+------------+    Xr Abdomen For Ng/og/ne Tube Placement    Result Date: 4/16/2019  EXAMINATION: SINGLE SUPINE XRAY VIEW(S) OF THE ABDOMEN 4/16/2019 6:57 pm COMPARISON: None. HISTORY: ORDERING SYSTEM PROVIDED HISTORY: NG placement TECHNOLOGIST PROVIDED HISTORY: Reason for exam:->NG placement Portable? ->Yes Ordering Physician Provided Reason for Exam: ng placement Acuity: Acute Type of Exam: Initial Additional signs and symptoms: na Relevant Medical/Surgical History: cad, diabetes, copd FINDINGS: Enteric tube placement with the tip within the stomach. No focal consolidation. No abnormally dilated loops of small bowel within the upper abdomen. Enteric tube with the tip within the stomach. ED Course and MDM:  Patient's comfortable hemodynamically stable. Possible decreased strength on the left? She does seem to have a globally decreased mental status. Labs and imaging reviewed. Ammonia is fairly elevated concerning for hepatic encephalopathy. Renal functions and electrolytes are stable. No focus of infection identified by history or on exam.    Given change from baseline mental status and known severe comorbidities, will admit for further care and monitoring. Patient aware and agreeable to proceed. Final Impression:  1. Hepatic encephalopathy ([address]    2.  Dizziness      DISPOSITION Admitted 05/01/2019 01:12:43 AM      Patient referred to:  Astrid Bojorquez MD  [address] [facility] [phone]    On 5/7/2019  at 11:15am for hospital follow up    MD Savita Matthew 894, [address] [facility] [phone]    On 5/21/2019  at 3:40pm for hospital follow up    Discharge medications:  Discharge Medication List as of 5/2/2019 12:46 PM      START taking these medications    Details   rifaximin (XIFAXAN) 550 MG tablet Take 1 tablet by mouth 2 times daily, Disp-42 tablet, R-0Normal           (Please note that portions of this note may have been completed with a voice recognition program. Efforts were made to edit the dictations but occasionally words are mis-transcribed.)    David Aguirre,   05/08/19 1957

## 2019-05-02 VITALS
HEIGHT: 57 IN | OXYGEN SATURATION: 97 % | TEMPERATURE: 97.6 F | SYSTOLIC BLOOD PRESSURE: 92 MMHG | DIASTOLIC BLOOD PRESSURE: 59 MMHG | WEIGHT: 168 LBS | BODY MASS INDEX: 36.24 KG/M2 | HEART RATE: 73 BPM | RESPIRATION RATE: 12 BRPM

## 2019-05-02 LAB
ALBUMIN SERPL-MCNC: 3.2 GM/DL (ref 3.4–5)
ALP BLD-CCNC: 87 IU/L (ref 40–129)
ALT SERPL-CCNC: 38 U/L (ref 10–40)
AMMONIA: 73 UMOL/L (ref 11–51)
ANION GAP SERPL CALCULATED.3IONS-SCNC: 9 MMOL/L (ref 4–16)
AST SERPL-CCNC: 34 IU/L (ref 15–37)
BILIRUB SERPL-MCNC: 1.5 MG/DL (ref 0–1)
BUN BLDV-MCNC: 13 MG/DL (ref 6–23)
CALCIUM SERPL-MCNC: 8.8 MG/DL (ref 8.3–10.6)
CHLORIDE BLD-SCNC: 108 MMOL/L (ref 99–110)
CO2: 23 MMOL/L (ref 21–32)
CREAT SERPL-MCNC: 1.1 MG/DL (ref 0.6–1.1)
CULTURE: ABNORMAL
GFR AFRICAN AMERICAN: >60 ML/MIN/1.73M2
GFR NON-AFRICAN AMERICAN: 51 ML/MIN/1.73M2
GLUCOSE BLD-MCNC: 129 MG/DL (ref 70–99)
GLUCOSE BLD-MCNC: 137 MG/DL (ref 70–99)
GLUCOSE BLD-MCNC: 227 MG/DL (ref 70–99)
HCT VFR BLD CALC: 37.9 % (ref 37–47)
HCT VFR BLD CALC: 41.7 % (ref 37–47)
HEMOGLOBIN: 12.3 GM/DL (ref 12.5–16)
HEMOGLOBIN: 13.7 GM/DL (ref 12.5–16)
INR BLD: 1.31 INDEX
Lab: ABNORMAL
MCH RBC QN AUTO: 31.1 PG (ref 27–31)
MCH RBC QN AUTO: 31.8 PG (ref 27–31)
MCHC RBC AUTO-ENTMCNC: 32.5 % (ref 32–36)
MCHC RBC AUTO-ENTMCNC: 32.9 % (ref 32–36)
MCV RBC AUTO: 95.9 FL (ref 78–100)
MCV RBC AUTO: 96.8 FL (ref 78–100)
PDW BLD-RTO: 14.6 % (ref 11.7–14.9)
PDW BLD-RTO: 14.6 % (ref 11.7–14.9)
PLATELET # BLD: 103 K/CU MM (ref 140–440)
PLATELET # BLD: 90 K/CU MM (ref 140–440)
PMV BLD AUTO: 11.6 FL (ref 7.5–11.1)
PMV BLD AUTO: 11.7 FL (ref 7.5–11.1)
POTASSIUM SERPL-SCNC: 4.4 MMOL/L (ref 3.5–5.1)
PROTHROMBIN TIME: 15.1 SECONDS (ref 9.12–12.5)
RBC # BLD: 3.95 M/CU MM (ref 4.2–5.4)
RBC # BLD: 4.31 M/CU MM (ref 4.2–5.4)
SODIUM BLD-SCNC: 140 MMOL/L (ref 135–145)
SPECIMEN: ABNORMAL
TOTAL COLONY COUNT: ABNORMAL
TOTAL PROTEIN: 6.3 GM/DL (ref 6.4–8.2)
WBC # BLD: 4.7 K/CU MM (ref 4–10.5)
WBC # BLD: 6.8 K/CU MM (ref 4–10.5)

## 2019-05-02 PROCEDURE — 85027 COMPLETE CBC AUTOMATED: CPT

## 2019-05-02 PROCEDURE — 6360000002 HC RX W HCPCS: Performed by: NURSE PRACTITIONER

## 2019-05-02 PROCEDURE — 6370000000 HC RX 637 (ALT 250 FOR IP): Performed by: HOSPITALIST

## 2019-05-02 PROCEDURE — 6360000002 HC RX W HCPCS: Performed by: HOSPITALIST

## 2019-05-02 PROCEDURE — 36415 COLL VENOUS BLD VENIPUNCTURE: CPT

## 2019-05-02 PROCEDURE — 82962 GLUCOSE BLOOD TEST: CPT

## 2019-05-02 PROCEDURE — C9113 INJ PANTOPRAZOLE SODIUM, VIA: HCPCS | Performed by: HOSPITALIST

## 2019-05-02 PROCEDURE — 80053 COMPREHEN METABOLIC PANEL: CPT

## 2019-05-02 PROCEDURE — 6370000000 HC RX 637 (ALT 250 FOR IP): Performed by: NURSE PRACTITIONER

## 2019-05-02 PROCEDURE — 82140 ASSAY OF AMMONIA: CPT

## 2019-05-02 PROCEDURE — 85610 PROTHROMBIN TIME: CPT

## 2019-05-02 PROCEDURE — 94640 AIRWAY INHALATION TREATMENT: CPT

## 2019-05-02 RX ORDER — LACTULOSE 10 G/15ML
20 SOLUTION ORAL 3 TIMES DAILY
Status: DISCONTINUED | OUTPATIENT
Start: 2019-05-02 | End: 2019-05-02 | Stop reason: HOSPADM

## 2019-05-02 RX ADMIN — BUSPIRONE HYDROCHLORIDE 15 MG: 7.5 TABLET ORAL at 10:20

## 2019-05-02 RX ADMIN — PANTOPRAZOLE SODIUM 40 MG: 40 INJECTION, POWDER, FOR SOLUTION INTRAVENOUS at 10:21

## 2019-05-02 RX ADMIN — RANOLAZINE 500 MG: 500 TABLET, FILM COATED, EXTENDED RELEASE ORAL at 10:21

## 2019-05-02 RX ADMIN — FUROSEMIDE 20 MG: 20 TABLET ORAL at 10:20

## 2019-05-02 RX ADMIN — KETOROLAC TROMETHAMINE 15 MG: 30 INJECTION, SOLUTION INTRAMUSCULAR at 00:07

## 2019-05-02 RX ADMIN — INSULIN LISPRO 2 UNITS: 100 INJECTION, SOLUTION INTRAVENOUS; SUBCUTANEOUS at 13:16

## 2019-05-02 RX ADMIN — RIFAXIMIN 550 MG: 550 TABLET ORAL at 10:21

## 2019-05-02 RX ADMIN — FLUOXETINE 40 MG: 20 CAPSULE ORAL at 10:21

## 2019-05-02 RX ADMIN — IPRATROPIUM BROMIDE AND ALBUTEROL SULFATE 1 AMPULE: .5; 3 SOLUTION RESPIRATORY (INHALATION) at 11:23

## 2019-05-02 ASSESSMENT — PAIN SCALES - GENERAL
PAINLEVEL_OUTOF10: 0
PAINLEVEL_OUTOF10: 6

## 2019-05-02 NOTE — CARE COORDINATION
Prior auth obtained from [employer] for [employer]. Approved for 1 year.  PA # [id number]

## 2019-05-02 NOTE — PROGRESS NOTES
[facility]        Progress Note       2019  8:58 AM    Patient:    Jeuss Delgado  : 1960   66 y.o. MRN: [id number]  Admitted: 2019 10:07 PM ATT: Laureano Diamond MD   [id number]  AdmitDx: Hepatic encephalopathy ([address] [K72.90]  Dizziness [R42]  Encephalopathy [G93.40]  PCP: Jeanne Bee MD    SUBJECTIVE:  Chart reviewed, events noted  Patient feeling well. No complaints. Had multiple loose brown BM's overnight and incontinent episodes. Denies N/V or abd pain.      ROS:  The positive ROS will be identified in bold     CONSTITUTIONAL:  Neg  Weight loss, fatigue, fever  RESPIRATORY:   Neg SOB, wheeze, cough, hemoptysis or bronchitis  CARDIOVASCULAR:  Neg Chest pain, palpitations, dyspnea on exertion, edema  GASTROINTESTINAL:  SEE HPI  HEMATOLOGIC/LYMPHATIC:  Neg  Anemia, bleeding tendency    OBJECTIVE:      BP (!) 83/57   Pulse 65   Temp 98.5 °F (36.9 °C) (Oral)   Resp 12   Ht 4' 9\" (1.448 m)   Wt 168 lb (76.2 kg)   SpO2 97%   BMI 36.35 kg/m²     NAD, appears comfortable in bed  Lips and mucous membranes pink and moist  RRR, Nl s1s2  Lungs CTA bilaterally, respirations even and unlabored   Abdomen soft, ND, NT, bowel sounds normal  Skin pink, warm and dry  No edema bilateral lower extremities   AAOx3, No asterixis      CBC:   Recent Labs     19  035   WBC 4.9 5.8 4.7   HGB 13.1 12.8 12.3*   PLT 94* 88* 90*     BMP:    Recent Labs     19  035    140 140   K 3.7 4.1 4.4    105 108   CO2 26 26 23   BUN 9 10 13   CREATININE 0.8 0.9 1.1   GLUCOSE 187* 167* 137*     Magnesium:   Lab Results   Component Value Date    MG 1.9 2019     Hepatic:   Recent Labs     19  035   AST 33 37 34   ALT 48* 45* 38   BILITOT 1.3* 1.2* 1.5*   ALKPHOS 122 100 87     INR:   Recent Labs     19  2245 19  0352   INR 1.25 1.31 Intake/Output Summary (Last 24 hours) at 5/2/2019 0858  Last data filed at 5/1/2019 1121  Gross per 24 hour   Intake 703.75 ml   Output --   Net 703.75 ml         Medications    Scheduled Medications:    busPIRone  15 mg Oral BID    metoprolol tartrate  25 mg Oral BID    tenofovir  300 mg Oral Nightly    FLUoxetine  40 mg Oral Daily    QUEtiapine  300 mg Oral Nightly    ranolazine  500 mg Oral BID    pantoprazole  40 mg Intravenous Daily    rifaximin  550 mg Oral BID    ipratropium-albuterol  1 ampule Inhalation Q4H WA    insulin lispro  0-6 Units Subcutaneous TID WC    insulin lispro  0-3 Units Subcutaneous Nightly    lactulose  20 g Oral 4x Daily    furosemide  20 mg Oral Daily    spironolactone  50 mg Oral Dinner     PRN Medications: glucose, dextrose, glucagon (rDNA), dextrose  Infusions:    dextrose             Patient Active Problem List   Diagnosis Code    Left arm pain M79.602    Type 2 diabetes mellitus with complication, with long-term current use of insulin (HCC) E11.8, Z79.4    Acid reflux K21.9    COPD, mild (HCC) J44.9    Tobacco abuse Z72.0    Angina effort (HCC) I20.8    Abnormal nuclear cardiac imaging test R93.1    Lung nodule R91.1    Chest pain R07.9    Dyslipidemia E78.5    Pancolitis (HCC) K51.00    Hematemesis without nausea H97.2    Alcoholic cirrhosis of liver without ascites (HCC) K70.30    Thrombocytopenia (HCC) P13.3    Periumbilical abdominal pain R10.33    History of colonic polyps Z86.010    Abnormal findings on diagnostic imaging of 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 A TIPS has been placed and is patent.  Portal vein appears patent. 2. Cirrhosis.  No liver lesion seen. Meld Score 10 on admission   LFT's improving  Hepatic encephalopathy resolved  Ammonia 120 on admission, 73 today  Will r/o infectious etiology as well, blood cultures negative, urine cultures pending  Thrombocytopenia   4/7/19 AFP normal     RECOMMENDATIONS:  Continue Lasix and Aldactone, that was prescribed at [facility] on discharge  Lactulose to 20 g to 3 times a day  Continue Xifaxan   Advance diet as tolerated   CBC, CMP, INR daily   EGD in 3 months per [facility]    Discussed plan of care with patient     Patient clinical, biochemical, and radiological information discussed with Dr. Erica Padgett. He agrees with the assessment and plan. Geena Baxter  5/2/2019  8:58 AM     I have seen and examined this patient personally, and independently of the nurse practitioner. The plan was developed mutually at the time of the visit with the patient. Imer Charles and myself have spoken with patient, nursing staff and provided written and verbal instructions .     The above note has been reviewed and I agree with the Assessment,  Diagnosis, and Treatment plan as suggested by Imer Charles [address] gastroenterology

## 2019-05-02 NOTE — DISCHARGE SUMMARY
Discharge Summary    Name:  Owen Mcdowell /Age/Sex: 1960  (66 y.o. female)   MRN & CSN:  [id number] & [id number] Admission Date/Time: 2019 10:07 PM   Attending:  Benjamin Bahena MD Discharging Physician: Lisa España MD     HPI and Hospital Course:   Owen Mcdowell is a 66 y.o.  female  who presents with Other (Pt had TIPS procedure last week at [facility], states she has been shaking and feeling 'not right' sense)    HPI- as per H and [address]    1-Acute encephalopathy- likely due to hepatic encephalopathy given elevated ammonia level at 120- admitted to sub-dosing or not taking lactulose as prescribed. Ammonia level has trended down, and appropriate in her conversation. Also held oxycodone on this admission, discussed to continue holding for now unless absolutely necessary and has an appointment with pain management coming week. 2-Liver cirrhosis with hep B- with portal vein thrombosis s/p TIPS- no longer on AC- to outpatient diuretics and viread. Hemodynamically stable for discharge, discussed to keep her outpatient appointments. The patient expressed appropriate understanding of and agreement with the discharge recommendations, medications, and plan.      Consults this admission:  IP CONSULT TO HOSPITALIST  IP CONSULT TO GI    Discharge Instruction:   Follow up appointments:   Primary care physician:  within 2 weeks    Diet:  General/cardiac/ADA/as tolerated  Activity: {discharge activity: as tolerated  Disposition: Discharged to:   [x]Home, [][facility], []SNF, []Acute Rehab, []Hospice   Condition on discharge: Stable    Discharge Medications:      Armand Simons   Home Medication Instructions CHILANGO:[id number]    Printed on:19 0939   Medication Information                      albuterol sulfate HFA (PROAIR HFA) 108 (90 BASE) MCG/ACT inhaler  Inhale 2 puffs into the lungs every 6 hours as needed for Wheezing             busPIRone (BUSPAR) 15 MG tablet  Take 1 tablet by mouth 2 times daily             Compression Stockings MISC  by Does not apply route 20 - 30 mmh wear daily and take off at night  Thigh High             FLUoxetine (PROZAC) 40 MG capsule  Take 40 mg by mouth daily             furosemide (LASIX) 20 MG tablet  Take 20 mg by mouth             hydrOXYzine (ATARAX) 50 MG tablet  Take 50 mg by mouth 3 times daily as needed for Itching             hydrOXYzine (VISTARIL) 50 MG capsule  Take 50 mg by mouth three times daily              lactulose encephalopathy 10 GM/15ML SOLN solution  30 g 3 times daily              metoprolol tartrate (LOPRESSOR) 25 MG tablet  Take 1 tablet by mouth 2 times daily             nicotine (NICODERM CQ) 21 MG/24HR  Place 1 patch onto the skin every 24 hours             nitroGLYCERIN (NITROSTAT) 0.4 MG SL tablet  Place 1 tablet under the tongue every 5 minutes as needed for Chest pain             ondansetron (ZOFRAN-ODT) 4 MG disintegrating tablet  Take 1 tablet by mouth every 8 hours as needed for Nausea or Vomiting             oxyCODONE (ROXICODONE) 5 MG immediate release tablet  Take 5 mg by mouth 4 times daily  .              pantoprazole (PROTONIX) 20 MG tablet  TAKE 1 TABLET BY MOUTH TWO TIMES DAILY BEFORE MEALS             promethazine (PHENERGAN) 25 MG tablet  Take 1 tablet by mouth 3 times daily as needed for Nausea             QUEtiapine (SEROQUEL) 300 MG tablet  Take 300 mg by mouth nightly              ranolazine (RANEXA) 500 MG extended release tablet  Take 1 tablet by mouth 2 times daily             rifaximin (XIFAXAN) 550 MG tablet  Take 1 tablet by mouth 2 times daily             spironolactone (ALDACTONE) 50 MG tablet  Take 50 mg by mouth             tiZANidine (ZANAFLEX) 4 MG tablet  Take 1 tablet by mouth 2 times daily as needed (spasm)             UNIFINE PENTIPS 31G X 6 MM MISC  USE AS DIRECTED             VIREAD 300 MG tablet  Take 300 mg by mouth nightly                  Objective Findings at Discharge:   BP (!) 83/57 Pulse 65   Temp 98.5 °F (36.9 °C) (Oral)   Resp 12   Ht 4' 9\" (1.448 m)   Wt 168 lb (76.2 kg)   SpO2 97%   BMI 36.35 kg/m²            PHYSICAL EXAM   GEN Awake female, laying in bed in no apparent distress. Appears given age. EYES Pupils are equally round. No scleral discharge  HENT Atraumatic and symmetric head  NECK No apparent thyromegaly  RESP Symmetric chest movement while on room air. CARDIO/VASC Peripheral pulses equal bilaterally and palpable. GI Abdomen is not distended. Rectal exam deferred.  Alexander catheter is not present. HEME/LYMPH No petechiae or ecchymoses. MSK Spontaneous movement of BL upper extremities  SKIN Normal coloration, warm, dry. NEURO Cranial nerves appear grossly intact  PSYCH Awake, alert.     BMP/CBC  Recent Labs     04/30/19  2245 05/01/19  0339 05/02/19  0352    140 140   K 3.7 4.1 4.4    105 108   CO2 26 26 23   BUN 9 10 13   CREATININE 0.8 0.9 1.1   WBC 4.9 5.8 4.7   HCT 39.3 39.0 37.9   PLT 94* 88* 90*     SIGNIFICANT IMAGING AND LABS:      Discharge Time of 31  minutes    Electronically signed by Sasha Solis MD on 5/2/2019 at 9:39 AM

## 2019-05-03 ENCOUNTER — CARE COORDINATION (OUTPATIENT)
Dept: CASE MANAGEMENT | Age: 59
End: 2019-05-03

## 2019-05-03 LAB
EKG ATRIAL RATE: 89 BPM
EKG DIAGNOSIS: NORMAL
EKG P AXIS: 65 DEGREES
EKG P-R INTERVAL: 132 MS
EKG Q-T INTERVAL: 394 MS
EKG QRS DURATION: 76 MS
EKG QTC CALCULATION (BAZETT): 479 MS
EKG R AXIS: 29 DEGREES
EKG T AXIS: 45 DEGREES
EKG VENTRICULAR RATE: 89 BPM

## 2019-05-03 NOTE — CARE COORDINATION
[address] Transitions Initial Follow Up Call    Call within 2 business days of discharge: Yes    Patient: Ngozi De La Paz Patient : 1960   MRN: [id number]  Reason for Admission: Liver Cirrhosis; Hepatic Encephalopathy  Discharge Date: 19 RARS: Readmission Risk Score: 23      Last Discharge [address]       Complaint Diagnosis Description Type Department Provider    19 Other Hepatic encephalopathy ([facility] . .. ED to Hosp-Admission (Discharged) (ADMITTED) Dick Real MD; JASON Spencer. Facility: [address] [place] [address] Rd Transitions 24 Hour Call    Do you have all of your prescriptions and are they filled?:  Yes  Are you an active caregiver in your home?:  No  Care Transitions Interventions         Follow Up: Does not qualify for CT however attempted courtesy call as being followed by Laurel Gomez RN, [facility] and risk scores of10/23. Attempt unsuccessful, left message requesting callback.      Future Appointments   Date Time Provider Cornell Hightower   2019 11:15 AM Jeffrey Verdin MD [facility]   2019  3:30 PM Jeffrey Verdin MD [facility]   2019  1:30 PM Susana Stockton MD [facility]       Vannessa Caceres RN

## 2019-05-05 LAB
CULTURE: NORMAL
Lab: NORMAL
SPECIMEN: NORMAL

## 2019-05-06 LAB
CULTURE: NORMAL
Lab: NORMAL
SPECIMEN: NORMAL

## 2019-05-07 ENCOUNTER — TELEPHONE (OUTPATIENT)
Dept: FAMILY MEDICINE CLINIC | Age: 59
End: 2019-05-07

## 2019-05-07 ENCOUNTER — CARE COORDINATION (OUTPATIENT)
Dept: CARE COORDINATION | Age: 59
End: 2019-05-07

## 2019-05-07 NOTE — TELEPHONE ENCOUNTER
5-7-2019 Patient was no call no show for todays appt. I called patient at home/cell no answer LM on  requesting patient call the office to reschedule this appt.  (EV) Second no show for 2019 (letter mailed)

## 2019-05-07 NOTE — CARE COORDINATION
[facility] attempted to meet with pt at scheduled PCP OV, but pt did not show up for appt. Will attempt outreach at a later time. Brent Duval RN  Nurse Care Coordinator  [phone] office/cell  [email or web address]

## 2019-05-08 ENCOUNTER — OFFICE VISIT (OUTPATIENT)
Dept: FAMILY MEDICINE CLINIC | Age: 59
End: 2019-05-08
Payer: COMMERCIAL

## 2019-05-08 ENCOUNTER — CARE COORDINATION (OUTPATIENT)
Dept: CARE COORDINATION | Age: 59
End: 2019-05-08

## 2019-05-08 VITALS
DIASTOLIC BLOOD PRESSURE: 70 MMHG | SYSTOLIC BLOOD PRESSURE: 110 MMHG | HEART RATE: 99 BPM | BODY MASS INDEX: 35.79 KG/M2 | WEIGHT: 165.4 LBS | OXYGEN SATURATION: 93 %

## 2019-05-08 DIAGNOSIS — Z95.828 S/P TIPS (TRANSJUGULAR INTRAHEPATIC PORTOSYSTEMIC SHUNT): ICD-10-CM

## 2019-05-08 DIAGNOSIS — G93.40 ACUTE ENCEPHALOPATHY: ICD-10-CM

## 2019-05-08 DIAGNOSIS — Z09 HOSPITAL DISCHARGE FOLLOW-UP: Primary | ICD-10-CM

## 2019-05-08 DIAGNOSIS — K74.69 OTHER CIRRHOSIS OF LIVER (HCC): ICD-10-CM

## 2019-05-08 PROCEDURE — 99215 OFFICE O/P EST HI 40 MIN: CPT | Performed by: FAMILY MEDICINE

## 2019-05-08 PROCEDURE — 1111F DSCHRG MED/CURRENT MED MERGE: CPT | Performed by: FAMILY MEDICINE

## 2019-05-08 ASSESSMENT — ENCOUNTER SYMPTOMS
CONSTIPATION: 0
COUGH: 0
ABDOMINAL PAIN: 0
SHORTNESS OF BREATH: 0
VOMITING: 1
DIARRHEA: 0
NAUSEA: 0
WHEEZING: 0
ABDOMINAL DISTENTION: 0

## 2019-05-08 NOTE — PROGRESS NOTES
Post-Discharge Transitional Care Management Services or Hospital Follow Up      Jorden Martinez   YOB: 1960    Date of Office Visit:  5/8/2019  Date of Hospital Admission: 4/30/19  Date of Hospital Discharge: 5/2/19  Readmission Risk Score(high >=14%.  Medium >=10%):Readmission Risk Score: 23      Care management risk score Rising risk (score 2-5) and Complex Care (Scores >=6): 10     Non face to face  following discharge, date last encounter closed (first attempt may have been earlier): 5/3/2019 10:10 AM 5/3/2019 10:10 AM    Call initiated 2 business days of discharge: Yes     Patient Active Problem List   Diagnosis    Left arm pain    Type 2 diabetes mellitus with complication, with long-term current use of insulin (HCC)    Acid reflux    COPD, mild ([address]    Tobacco abuse    Angina effort ([address]    Abnormal nuclear cardiac imaging test    Lung nodule    Chest pain    Dyslipidemia    Pancolitis ([address]    Hematemesis without nausea    Alcoholic cirrhosis of liver without ascites (HCC)    Thrombocytopenia ([address]    Periumbilical abdominal pain    History of colonic polyps    Abnormal findings on diagnostic imaging of abdomen    Nausea    Gastroesophageal reflux disease without esophagitis    Mixed hyperlipidemia    Vitamin D deficiency    Left carpal tunnel syndrome    Right carpal tunnel syndrome    Esophageal hypertension    Candida esophagitis (HCC)    Colitis    Essential hypertension    GI bleed    Coronary-myocardial bridge    Type 2 diabetes mellitus with complication, with long-term current use of insulin (HCC)    SOB (shortness of breath)    Portal vein thrombosis    Intractable nausea and vomiting    Abdominal pain    Acute GI bleeding    Chronic hepatitis C without hepatic coma (HCC)    Delayed gastric emptying    Restless legs    Acute colitis    Acute encephalopathy    S/P TIPS (transjugular intrahepatic portosystemic shunt)    Other cirrhosis of liver (HCC)       Allergies   Allergen Reactions    Norco [Hydrocodone-Acetaminophen] Itching     Itching, rash, nausea and vomiting.  Tylenol [Acetaminophen] Itching, Nausea And Vomiting and Rash       Medications listed as ordered at the time of discharge from hospital   [place], [address] Medication Instructions CHILANGO:    Printed on:05/08/19 4622   Medication Information                      albuterol sulfate HFA (PROAIR HFA) 108 (90 BASE) MCG/ACT inhaler  Inhale 2 puffs into the lungs every 6 hours as needed for Wheezing             busPIRone (BUSPAR) 15 MG tablet  Take 1 tablet by mouth 2 times daily             Compression Stockings MISC  by Does not apply route 20 - 30 mmh wear daily and take off at night  Thigh High             FLUoxetine (PROZAC) 40 MG capsule  Take 40 mg by mouth daily             furosemide (LASIX) 20 MG tablet  Take 20 mg by mouth             hydrOXYzine (VISTARIL) 50 MG capsule  Take 50 mg by mouth three times daily              lactulose encephalopathy 10 GM/15ML SOLN solution  30 g 3 times daily              metoprolol tartrate (LOPRESSOR) 25 MG tablet  Take 1 tablet by mouth 2 times daily             nicotine (NICODERM CQ) 21 MG/24HR  Place 1 patch onto the skin every 24 hours             nitroGLYCERIN (NITROSTAT) 0.4 MG SL tablet  Place 1 tablet under the tongue every 5 minutes as needed for Chest pain             oxyCODONE (ROXICODONE) 5 MG immediate release tablet  Take 5 mg by mouth 4 times daily  .              pantoprazole (PROTONIX) 20 MG tablet  TAKE 1 TABLET BY MOUTH TWO TIMES DAILY BEFORE MEALS             promethazine (PHENERGAN) 25 MG tablet  Take 1 tablet by mouth 3 times daily as needed for Nausea             QUEtiapine (SEROQUEL) 300 MG tablet  Take 300 mg by mouth nightly              ranolazine (RANEXA) 500 MG extended release tablet  Take 1 tablet by mouth 2 times daily             rifaximin (XIFAXAN) 550 MG tablet  Take 1 tablet by mouth 2 times daily spironolactone (ALDACTONE) 50 MG tablet  Take 50 mg by mouth             UNIFINE PENTIPS 31G X 6 MM MISC  USE AS DIRECTED             VIREAD 300 MG tablet  Take 300 mg by mouth nightly                    Medications marked \"taking\" at this time  Outpatient Medications Marked as Taking for the 5/8/19 encounter (Office Visit) with Giuliana Mcmahan MD   Medication Sig Dispense Refill    rifaximin (XIFAXAN) 550 MG tablet Take 1 tablet by mouth 2 times daily 42 tablet 0    furosemide (LASIX) 20 MG tablet Take 20 mg by mouth      hydrOXYzine (VISTARIL) 50 MG capsule Take 50 mg by mouth three times daily       lactulose encephalopathy 10 GM/15ML SOLN solution 30 g 3 times daily       spironolactone (ALDACTONE) 50 MG tablet Take 50 mg by mouth      pantoprazole (PROTONIX) 20 MG tablet TAKE 1 TABLET BY MOUTH TWO TIMES DAILY BEFORE MEALS 60 tablet 2    Compression Stockings MISC by Does not apply route 20 - 30 mmh wear daily and take off at night  Thigh High 2 each 0    nitroGLYCERIN (NITROSTAT) 0.4 MG SL tablet Place 1 tablet under the tongue every 5 minutes as needed for Chest pain 25 tablet 3    nicotine (NICODERM CQ) 21 MG/24HR Place 1 patch onto the skin every 24 hours 42 patch 0    UNIFINE PENTIPS 31G X 6 MM MISC USE AS DIRECTED 100 each 5    promethazine (PHENERGAN) 25 MG tablet Take 1 tablet by mouth 3 times daily as needed for Nausea 60 tablet 1    metoprolol tartrate (LOPRESSOR) 25 MG tablet Take 1 tablet by mouth 2 times daily 60 tablet 6    ranolazine (RANEXA) 500 MG extended release tablet Take 1 tablet by mouth 2 times daily 180 tablet 3    FLUoxetine (PROZAC) 40 MG capsule Take 40 mg by mouth daily      VIREAD 300 MG tablet Take 300 mg by mouth nightly       QUEtiapine (SEROQUEL) 300 MG tablet Take 300 mg by mouth nightly       albuterol sulfate HFA (PROAIR HFA) 108 (90 BASE) MCG/ACT inhaler Inhale 2 puffs into the lungs every 6 hours as needed for Wheezing 1 Inhaler 5    oxyCODONE (ROXICODONE) 5 MG immediate release tablet Take 5 mg by mouth 4 times daily  .  busPIRone (BUSPAR) 15 MG tablet Take 1 tablet by mouth 2 times daily 60 tablet 3        Medications patient taking as of now reconciled against medications ordered at time of hospital discharge: Yes    Chief Complaint   Patient presents with   [address] [place] [facility]     4/30/19 hepatic encephalopathy (primary dx)       Patient here for f/u from hospitalization, for acute encephalopathy, liver cirrhosis, s/p TIPS, patient discharge on xifaxan and aldactone, patient doing fine and, no more vomiting, no abdominal pain. Inpatient course: Discharge summary reviewed- see chart. Interval history/Current status: stable    Review of Systems   Constitutional: Negative for activity change, chills and fever. Respiratory: Negative for cough, shortness of breath and wheezing. Cardiovascular: Negative for chest pain and leg swelling. Gastrointestinal: Positive for vomiting (getting better). Negative for abdominal distention, abdominal pain, constipation, diarrhea and nausea. Neurological: Negative for dizziness and headaches. Psychiatric/Behavioral: Negative for agitation and sleep disturbance. The patient is not nervous/anxious. Vitals:    05/08/19 1110   BP: 110/70   Site: Left Upper Arm   Position: Sitting   Cuff Size: Medium Adult   Pulse: 99   SpO2: 93%   Weight: 165 lb 6.4 oz (75 kg)     Body mass index is 35.79 kg/m². Wt Readings from Last 3 Encounters:   05/08/19 165 lb 6.4 oz (75 kg)   05/02/19 168 lb (76.2 kg)   04/18/19 163 lb 2.3 oz (74 kg)     BP Readings from Last 3 Encounters:   05/08/19 110/70   05/02/19 (!) 92/59   04/18/19 (!) 141/91       Physical Exam   Constitutional: She is oriented to person, place, and time. She appears well-developed and well-nourished. HENT:   Head: Normocephalic and atraumatic. Eyes: Pupils are equal, round, and reactive to light.  Conjunctivae and EOM are normal. No

## 2019-05-13 ENCOUNTER — APPOINTMENT (OUTPATIENT)
Dept: CT IMAGING | Age: 59
End: 2019-05-13
Payer: COMMERCIAL

## 2019-05-13 ENCOUNTER — APPOINTMENT (OUTPATIENT)
Dept: GENERAL RADIOLOGY | Age: 59
End: 2019-05-13
Payer: COMMERCIAL

## 2019-05-13 ENCOUNTER — HOSPITAL ENCOUNTER (EMERGENCY)
Age: 59
Discharge: HOME | End: 2019-05-13
Attending: EMERGENCY MEDICINE
Payer: COMMERCIAL

## 2019-05-13 VITALS
DIASTOLIC BLOOD PRESSURE: 82 MMHG | TEMPERATURE: 98.4 F | OXYGEN SATURATION: 97 % | RESPIRATION RATE: 14 BRPM | SYSTOLIC BLOOD PRESSURE: 135 MMHG | HEIGHT: 57 IN | HEART RATE: 103 BPM | WEIGHT: 167 LBS | BODY MASS INDEX: 36.03 KG/M2

## 2019-05-13 DIAGNOSIS — R79.89 INCREASED AMMONIA LEVEL: ICD-10-CM

## 2019-05-13 DIAGNOSIS — K74.60 HEPATIC CIRRHOSIS, UNSPECIFIED HEPATIC CIRRHOSIS TYPE, UNSPECIFIED WHETHER ASCITES PRESENT (HCC): ICD-10-CM

## 2019-05-13 DIAGNOSIS — R41.0 CONFUSION: Primary | ICD-10-CM

## 2019-05-13 LAB
ALBUMIN SERPL-MCNC: 3.6 GM/DL (ref 3.4–5)
ALP BLD-CCNC: 137 IU/L (ref 40–129)
ALT SERPL-CCNC: 16 U/L (ref 10–40)
AMMONIA: 81 UMOL/L (ref 11–51)
ANION GAP SERPL CALCULATED.3IONS-SCNC: 11 MMOL/L (ref 4–16)
AST SERPL-CCNC: 20 IU/L (ref 15–37)
BACTERIA: ABNORMAL /HPF
BASOPHILS ABSOLUTE: 0 K/CU MM
BASOPHILS RELATIVE PERCENT: 0.7 % (ref 0–1)
BILIRUB SERPL-MCNC: 1 MG/DL (ref 0–1)
BILIRUBIN URINE: NEGATIVE MG/DL
BLOOD, URINE: NEGATIVE
BUN BLDV-MCNC: 12 MG/DL (ref 6–23)
CALCIUM SERPL-MCNC: 9.2 MG/DL (ref 8.3–10.6)
CHLORIDE BLD-SCNC: 99 MMOL/L (ref 99–110)
CHP ED QC CHECK: YES
CLARITY: CLEAR
CO2: 25 MMOL/L (ref 21–32)
COLOR: ABNORMAL
CREAT SERPL-MCNC: 1 MG/DL (ref 0.6–1.1)
DIFFERENTIAL TYPE: ABNORMAL
EOSINOPHILS ABSOLUTE: 0.1 K/CU MM
EOSINOPHILS RELATIVE PERCENT: 2.2 % (ref 0–3)
GFR AFRICAN AMERICAN: >60 ML/MIN/1.73M2
GFR NON-AFRICAN AMERICAN: 57 ML/MIN/1.73M2
GLUCOSE BLD-MCNC: 300 MG/DL
GLUCOSE BLD-MCNC: 300 MG/DL (ref 70–99)
GLUCOSE BLD-MCNC: 336 MG/DL (ref 70–99)
GLUCOSE, URINE: >500 MG/DL
HCT VFR BLD CALC: 39.3 % (ref 37–47)
HEMOGLOBIN: 13.3 GM/DL (ref 12.5–16)
IMMATURE NEUTROPHIL %: 0.3 % (ref 0–0.43)
INR BLD: 1.17 INDEX
KETONES, URINE: NEGATIVE MG/DL
LEUKOCYTE ESTERASE, URINE: NEGATIVE
LYMPHOCYTES ABSOLUTE: 1.3 K/CU MM
LYMPHOCYTES RELATIVE PERCENT: 22.3 % (ref 24–44)
MCH RBC QN AUTO: 32.1 PG (ref 27–31)
MCHC RBC AUTO-ENTMCNC: 33.8 % (ref 32–36)
MCV RBC AUTO: 94.9 FL (ref 78–100)
MONOCYTES ABSOLUTE: 0.5 K/CU MM
MONOCYTES RELATIVE PERCENT: 8 % (ref 0–4)
NITRITE URINE, QUANTITATIVE: NEGATIVE
NUCLEATED RBC %: 0 %
PDW BLD-RTO: 14.2 % (ref 11.7–14.9)
PH, URINE: 8 (ref 5–8)
PLATELET # BLD: 101 K/CU MM (ref 140–440)
PMV BLD AUTO: 12.1 FL (ref 7.5–11.1)
POTASSIUM SERPL-SCNC: 3.8 MMOL/L (ref 3.5–5.1)
PROTEIN UA: NEGATIVE MG/DL
PROTHROMBIN TIME: 13.6 SECONDS (ref 9.12–12.5)
RBC # BLD: 4.14 M/CU MM (ref 4.2–5.4)
RBC URINE: ABNORMAL /HPF (ref 0–6)
SEGMENTED NEUTROPHILS ABSOLUTE COUNT: 3.9 K/CU MM
SEGMENTED NEUTROPHILS RELATIVE PERCENT: 66.5 % (ref 36–66)
SODIUM BLD-SCNC: 135 MMOL/L (ref 135–145)
SPECIFIC GRAVITY UA: 1 (ref 1–1.03)
SQUAMOUS EPITHELIAL: 1 /HPF
TOTAL IMMATURE NEUTOROPHIL: 0.02 K/CU MM
TOTAL NUCLEATED RBC: 0 K/CU MM
TOTAL PROTEIN: 7.6 GM/DL (ref 6.4–8.2)
TRICHOMONAS: ABNORMAL /HPF
TROPONIN T: <0.01 NG/ML
UROBILINOGEN, URINE: NORMAL MG/DL (ref 0.2–1)
WBC # BLD: 5.9 K/CU MM (ref 4–10.5)
WBC UA: ABNORMAL /HPF (ref 0–5)

## 2019-05-13 PROCEDURE — 85025 COMPLETE CBC W/AUTO DIFF WBC: CPT

## 2019-05-13 PROCEDURE — 71045 X-RAY EXAM CHEST 1 VIEW: CPT

## 2019-05-13 PROCEDURE — 82140 ASSAY OF AMMONIA: CPT

## 2019-05-13 PROCEDURE — 99285 EMERGENCY DEPT VISIT HI MDM: CPT

## 2019-05-13 PROCEDURE — 36415 COLL VENOUS BLD VENIPUNCTURE: CPT

## 2019-05-13 PROCEDURE — 93005 ELECTROCARDIOGRAM TRACING: CPT | Performed by: PHYSICIAN ASSISTANT

## 2019-05-13 PROCEDURE — 84484 ASSAY OF TROPONIN QUANT: CPT

## 2019-05-13 PROCEDURE — 81001 URINALYSIS AUTO W/SCOPE: CPT

## 2019-05-13 PROCEDURE — 6370000000 HC RX 637 (ALT 250 FOR IP): Performed by: PHYSICIAN ASSISTANT

## 2019-05-13 PROCEDURE — 82962 GLUCOSE BLOOD TEST: CPT

## 2019-05-13 PROCEDURE — 70450 CT HEAD/BRAIN W/O DYE: CPT

## 2019-05-13 PROCEDURE — 85610 PROTHROMBIN TIME: CPT

## 2019-05-13 PROCEDURE — 80053 COMPREHEN METABOLIC PANEL: CPT

## 2019-05-13 RX ORDER — LACTULOSE 10 G/15ML
20 SOLUTION ORAL ONCE
Status: COMPLETED | OUTPATIENT
Start: 2019-05-13 | End: 2019-05-13

## 2019-05-13 RX ADMIN — LACTULOSE 20 G: 10 SOLUTION ORAL at 17:39

## 2019-05-13 NOTE — ED NOTES
Patient brought in by brother for concerns for altered mental status. Patient resting in bed, answering questions appropriately. No complaints at this time.       Jose Valdez RN  05/13/19 0586

## 2019-05-13 NOTE — ED NOTES
Patient ambulating back and forth from bathroom. Family at bedside.       Elma Arriaga RN  05/13/19 0882

## 2019-05-13 NOTE — ED PROVIDER NOTES
follow with Dr Julieta Saenz Depression     \"have manic - depression see Dr Ainsley Bridges    Diabetes mellitus [facility])     dx 10+ yrs ago-     Drug abuse ([address]     hx use of cocaine, heroin and marijuana- states last used 12/2014    Glaucoma     left eye    H/O Doppler lower venous ultrasound 04/04/2019    No DVT or SVT, Significant reflux of RGSV and LGSV.    H/O Doppler ultrasound 7/22/15    CAROTID: WNL. Normal vertebral flows bilaterally.      Hepatitis C     for liver bx 12/3/2015\"Have Hepatitis B and C and saw Dr Mirlande Pierson for this 12/1/2015\"    Hiatal hernia     History of alcohol abuse     HTN (hypertension)     \"for the past two yrs on medication\" follows with Dr Mike Downs Hyperlipemia     Irregular heart beat     per pt    Liver hematoma     Migraines     Nausea & vomiting     Schizophrenia ([address]     per old chart    Seizures ([facility]     \"last one was 9/2015- saw Dr Natalia Tuttle at [facility]- she said not sure if acutal seizures- she thinks they are panic or anxiety attacks\"    SOB (shortness of breath)     Stress bladder incontinence, female      Past Surgical History:   Procedure Laterality Date    BREAST SURGERY  10/2015    left breast bx    CARDIAC CATHETERIZATION      per old chart pt had cath done in 3/2011 and 9/2013    CHOLECYSTECTOMY  per old chart done [facility]  3/11/13    diverticulosis, cecal polyp    COLONOSCOPY  03/16/2017    Internal hemorrhoids    ENDOSCOPY, COLON, DIAGNOSTIC  03/16/2017    EGD: Small esophageal varices, portal hypertensive gastropathy, reflux esophagitis, hiatal hernia    EYE SURGERY Bilateral ? when    cyst removal, cataracts    HYSTERECTOMY      per old chart pt had CHOLO/BSO 1986    TIPS PROCEDURE      TONSILLECTOMY  as a kid    UPPER GASTROINTESTINAL ENDOSCOPY N/A 11/14/2018    EGD DIAGNOSTIC ONLY performed by Zana Gonzales MD at [address] [place] [address]    Current Outpatient Rx   Medication Sig Dispense Refill    rifaximin Dorrine Murali) 550 MG tablet Take 1 tablet by mouth 2 times daily 42 tablet 0    furosemide (LASIX) 20 MG tablet Take 20 mg by mouth      hydrOXYzine (VISTARIL) 50 MG capsule Take 50 mg by mouth three times daily       lactulose encephalopathy 10 GM/15ML SOLN solution 30 g 3 times daily       spironolactone (ALDACTONE) 50 MG tablet Take 50 mg by mouth      pantoprazole (PROTONIX) 20 MG tablet TAKE 1 TABLET BY MOUTH TWO TIMES DAILY BEFORE MEALS 60 tablet 2    Compression Stockings MISC by Does not apply route 20 - 30 mmh wear daily and take off at night  Thigh High 2 each 0    nitroGLYCERIN (NITROSTAT) 0.4 MG SL tablet Place 1 tablet under the tongue every 5 minutes as needed for Chest pain 25 tablet 3    nicotine (NICODERM CQ) 21 MG/24HR Place 1 patch onto the skin every 24 hours 42 patch 0    UNIFINE PENTIPS 31G X 6 MM MISC USE AS DIRECTED 100 each 5    promethazine (PHENERGAN) 25 MG tablet Take 1 tablet by mouth 3 times daily as needed for Nausea 60 tablet 1    metoprolol tartrate (LOPRESSOR) 25 MG tablet Take 1 tablet by mouth 2 times daily 60 tablet 6    ranolazine (RANEXA) 500 MG extended release tablet Take 1 tablet by mouth 2 times daily 180 tablet 3    FLUoxetine (PROZAC) 40 MG capsule Take 40 mg by mouth daily      VIREAD 300 MG tablet Take 300 mg by mouth nightly       QUEtiapine (SEROQUEL) 300 MG tablet Take 300 mg by mouth nightly       albuterol sulfate HFA (PROAIR HFA) 108 (90 BASE) MCG/ACT inhaler Inhale 2 puffs into the lungs every 6 hours as needed for Wheezing 1 Inhaler 5    oxyCODONE (ROXICODONE) 5 MG immediate release tablet Take 5 mg by mouth 4 times daily  .  busPIRone (BUSPAR) 15 MG tablet Take 1 tablet by mouth 2 times daily 60 tablet 3       ALLERGIES    Allergies   Allergen Reactions    Norco [Hydrocodone-Acetaminophen] Itching     Itching, rash, nausea and vomiting.      Tylenol [Acetaminophen] Itching, Nausea And Vomiting and Rash       SOCIAL & FAMILY HISTORY    Social History Socioeconomic History    Marital status:      Spouse name: None    Number of children: None    Years of education: None    Highest education level: None   Occupational History    None   Social Needs    Financial resource strain: None    Food insecurity:     Worry: None     Inability: None    Transportation needs:     Medical: None     Non-medical: None   Tobacco Use    Smoking status: Former Smoker     Packs/day: 0.50     Years: 40.00     Pack years: 20.00     Types: Cigarettes    Smokeless tobacco: Never Used   Substance and Sexual Activity    Alcohol use:  Yes     Alcohol/week: 1.2 - 1.8 oz     Types: 2 - 3 Shots of liquor per week     Comment: 2-3 shots last night     Drug use: Yes     Frequency: 3.0 times per week     Types: Marijuana    Sexual activity: Not Currently     Partners: Male   Lifestyle    Physical activity:     Days per week: None     Minutes per session: None    Stress: None   Relationships    Social connections:     Talks on phone: None     Gets together: None     Attends [employer] service: None     Active member of club or organization: None     Attends meetings of clubs or organizations: None     Relationship status: None    Intimate partner violence:     Fear of current or ex partner: None     Emotionally abused: None     Physically abused: None     Forced sexual activity: None   Other Topics Concern    None   Social History Narrative    None     Family History   Problem Relation Age of Onset    Cancer Mother         lung ca    Arthritis Mother     Migraines Mother     Cancer Father         colon ca   Eppie [place] Diabetes Father     High Blood Pressure Father     Arthritis Father     High Cholesterol Father     Migraines Father     Migraines Sister     Heart Disease Brother         WPW       PHYSICAL EXAM    VITAL SIGNS: /82   Pulse 103   Temp 98.4 °F (36.9 °C) (Oral)   Resp 14   Ht 4' 9\" (1.448 m)   Wt 167 lb (75.8 kg)   SpO2 97%   BMI 36.14 kg/m² Constitutional:  Well developed, well nourished, no acute distress   HENT:  Atraumatic. Eyes: Conjunctiva clear. No tearing . Pupils equally round and react to light, extraocular movement are intact. Funduscopic exam reveals red reflex intact without obvious retinal hemorrhages or defects. No obvious papilledema  Neck: supple, no JVD. Cardiovascular:  Reg rate & rhythm, no murmurs/rubs/gallops. Respiratory:  Lungs Clear, no retractions   GI:  Soft, nontender, normal bowel sounds  Musculoskeletal:  No edema, no deformities  Integument:  Well hydrated, no petechiae   Neurologic:    - Alert & oriented person, place, and situation, no speech difficulties or slurring.   - No obvious gross motor deficits  - Cranial nerves 2-12 grossly intact  - Sensation intact to light touch  - Strength 5/5 in upper and lower extremities bilaterally  - Negative Brudzinski's and Kernig's signs.  - Normal finger to nose test bilaterally  - Rapid alternating movements intact  - Normal heel-shin bilaterally  - No pronator drift. - Light touch sensation intact throughout. - Upper and lower extremity DTRs 2+ bilaterally.   - Gait steady and without difficulty      Psych: Pleasant affect, no hallucinations      LABS:   Results for orders placed or performed during the hospital encounter of 05/13/19   CBC auto diff   Result Value Ref Range    WBC 5.9 4.0 - 10.5 K/CU MM    RBC 4.14 (L) 4.2 - 5.4 M/CU MM    Hemoglobin 13.3 12.5 - 16.0 GM/DL    Hematocrit 39.3 37 - 47 %    MCV 94.9 78 - 100 FL    MCH 32.1 (H) 27 - 31 PG    MCHC 33.8 32.0 - 36.0 %    RDW 14.2 11.7 - 14.9 %    Platelets 083 (L) 843 - 440 K/CU MM    MPV 12.1 (H) 7.5 - 11.1 FL    Differential Type AUTOMATED DIFFERENTIAL     Segs Relative 66.5 (H) 36 - 66 %    Lymphocytes % 22.3 (L) 24 - 44 %    Monocytes % 8.0 (H) 0 - 4 %    Eosinophils % 2.2 0 - 3 %    Basophils % 0.7 0 - 1 %    Segs Absolute 3.9 K/CU MM    Lymphocytes # 1.3 K/CU MM    Monocytes # 0.5 K/CU MM Eosinophils # 0.1 K/CU MM    Basophils # 0.0 K/CU MM    Nucleated RBC % 0.0 %    Total Nucleated RBC 0.0 K/CU MM    Total Immature Neutrophil 0.02 K/CU MM    Immature Neutrophil % 0.3 0 - 0.43 %   CMP   Result Value Ref Range    Sodium 135 135 - 145 MMOL/L    Potassium 3.8 3.5 - 5.1 MMOL/L    Chloride 99 99 - 110 mMol/L    CO2 25 21 - 32 MMOL/L    BUN 12 6 - 23 MG/DL    CREATININE 1.0 0.6 - 1.1 MG/DL    Glucose 336 (H) 70 - 99 MG/DL    Calcium 9.2 8.3 - 10.6 MG/DL    Alb 3.6 3.4 - 5.0 GM/DL    Total Protein 7.6 6.4 - 8.2 GM/DL    Total Bilirubin 1.0 0.0 - 1.0 MG/DL    ALT 16 10 - 40 U/L    AST 20 15 - 37 IU/L    Alkaline Phosphatase 137 (H) 40 - 129 IU/L    GFR Non- 57 (L) >60 mL/min/1.73m2    GFR African American >60 >60 mL/min/1.73m2    Anion Gap 11 4 - 16   Ammonia Level   Result Value Ref Range    Ammonia 81 (H) 11 - 51 UMOL/L   Troponin   Result Value Ref Range    Troponin T <0.010 <0.01 NG/ML   PT - INR   Result Value Ref Range    Protime 13.6 (H) 9.12 - 12.5 SECONDS    INR 1.17 INDEX   Urinalysis   Result Value Ref Range    Color, UA STRAW (A) YELLOW    Clarity, UA CLEAR CLEAR    Glucose, Urine >500 (A) NEGATIVE MG/DL    Bilirubin Urine NEGATIVE NEGATIVE MG/DL    Ketones, Urine NEGATIVE NEGATIVE MG/DL    Specific Gravity, UA 1.003 1.001 - 1.035    Blood, Urine NEGATIVE NEGATIVE    pH, Urine 8.0 5.0 - 8.0    Protein, UA NEGATIVE NEGATIVE MG/DL    Urobilinogen, Urine NORMAL 0.2 - 1.0 MG/DL    Nitrite Urine, Quantitative NEGATIVE NEGATIVE    Leukocyte Esterase, Urine NEGATIVE NEGATIVE    RBC, UA NONE SEEN 0 - 6 /HPF    WBC, UA NONE SEEN 0 - 5 /HPF    Bacteria, UA RARE (A) NEGATIVE /HPF    Squam Epithel, UA 1 /HPF    Trichomonas, UA NONE SEEN NONE SEEN /HPF   POCT Glucose   Result Value Ref Range    Glucose 300 mg/dL    QC OK?  yes    POCT Glucose   Result Value Ref Range    POC Glucose 300 (H) 70 - 99 MG/DL   EKG 12 Lead   Result Value Ref Range    Ventricular Rate 112 BPM    Atrial Rate 112 BPM P-R Interval 144 ms    QRS Duration 82 ms    Q-T Interval 362 ms    QTc Calculation (Bazett) 494 ms    P Axis 62 degrees    R Axis 17 degrees    T Axis 40 degrees    Diagnosis       Sinus tachycardia  Otherwise normal ECG  When compared with ECG of 30-APR-2019 23:53,  No significant change was found             IMAGING:   CT head:  XR CHEST PORTABLE   Final Result   No acute findings. CT Head WO Contrast   Final Result   Stable CT brain with no acute intracranial abnormality. EKG Interpretation  Please see ED physician's note for EKG interpretation       ED COURSE & MEDICAL DECISION MAKING       Vital signs and nursing notes reviewed during ED course. Patient care and presentation staffed with supervising MD.  Patient seen by supervising MD today- see his/her note for details of the encounter. Patient presents above with concern for her confusion. On arrival, patient is mildly tachycardic with otherwise normal vital signs, afebrile. She is alert, oriented. She is answering questions appropriately and following commands without difficulty. Her neurological exam is intact. She is overall well-appearing, nontoxic. She ambulates without ataxia. Point-of-care glucose on arrival is 300. Labwork with elevated ammonia of 81. Troponin is negative, INR normal, remaining lab work without acute abnormality. Urinalysis without evidence of infection. Chest x-ray without acute abnormality. Head CT without acute intracranial abnormality. EKG interpreted by supervising physician. Following observation in the ED, patient remains neurologically intact, alert, oriented, answering questions appropriately. This time, no exam findings concerning for acute alteration. Patient is given dose of lactulose in the ED as her ammonia is elevated, however do not believe that this is causing an acute hepatic encephalopathy.  Case management does evaluate patient and arranges for close outpatient follow-up with patient's

## 2019-05-13 NOTE — ED NOTES
Patient medicated per order. Provided with ice water.  Patient resting in bed with brother at bedside,     Bettye Carter, [facility], [facility]  05/13/19 [id number]

## 2019-05-14 PROCEDURE — 93010 ELECTROCARDIOGRAM REPORT: CPT | Performed by: INTERNAL MEDICINE

## 2019-06-03 ENCOUNTER — HOSPITAL ENCOUNTER (OUTPATIENT)
Age: 59
Discharge: HOME | End: 2019-06-03
Payer: COMMERCIAL

## 2019-06-03 LAB
ALBUMIN SERPL-MCNC: 3.5 GM/DL (ref 3.4–5)
ALP BLD-CCNC: 268 IU/L (ref 40–129)
ALT SERPL-CCNC: 14 U/L (ref 10–40)
ANION GAP SERPL CALCULATED.3IONS-SCNC: 9 MMOL/L (ref 4–16)
AST SERPL-CCNC: 17 IU/L (ref 15–37)
BASOPHILS ABSOLUTE: 0.1 K/CU MM
BASOPHILS RELATIVE PERCENT: 1 % (ref 0–1)
BILIRUB SERPL-MCNC: 0.9 MG/DL (ref 0–1)
BUN BLDV-MCNC: 13 MG/DL (ref 6–23)
CALCIUM SERPL-MCNC: 9 MG/DL (ref 8.3–10.6)
CHLORIDE BLD-SCNC: 96 MMOL/L (ref 99–110)
CO2: 26 MMOL/L (ref 21–32)
CREAT SERPL-MCNC: 0.9 MG/DL (ref 0.6–1.1)
DIFFERENTIAL TYPE: ABNORMAL
EOSINOPHILS ABSOLUTE: 0.1 K/CU MM
EOSINOPHILS RELATIVE PERCENT: 2.1 % (ref 0–3)
GFR AFRICAN AMERICAN: >60 ML/MIN/1.73M2
GFR NON-AFRICAN AMERICAN: >60 ML/MIN/1.73M2
GLUCOSE BLD-MCNC: 520 MG/DL (ref 70–99)
HCT VFR BLD CALC: 38 % (ref 37–47)
HEMOGLOBIN: 12.6 GM/DL (ref 12.5–16)
IMMATURE NEUTROPHIL %: 0.2 % (ref 0–0.43)
INR BLD: 1.06 INDEX
LYMPHOCYTES ABSOLUTE: 1.4 K/CU MM
LYMPHOCYTES RELATIVE PERCENT: 24.4 % (ref 24–44)
MCH RBC QN AUTO: 31.5 PG (ref 27–31)
MCHC RBC AUTO-ENTMCNC: 33.2 % (ref 32–36)
MCV RBC AUTO: 95 FL (ref 78–100)
MONOCYTES ABSOLUTE: 0.4 K/CU MM
MONOCYTES RELATIVE PERCENT: 7.5 % (ref 0–4)
NUCLEATED RBC %: 0 %
PDW BLD-RTO: 13 % (ref 11.7–14.9)
PLATELET # BLD: 91 K/CU MM (ref 140–440)
PMV BLD AUTO: 12.8 FL (ref 7.5–11.1)
POTASSIUM SERPL-SCNC: 4.3 MMOL/L (ref 3.5–5.1)
PROTHROMBIN TIME: 12.1 SECONDS (ref 9.12–12.5)
RBC # BLD: 4 M/CU MM (ref 4.2–5.4)
SEGMENTED NEUTROPHILS ABSOLUTE COUNT: 3.8 K/CU MM
SEGMENTED NEUTROPHILS RELATIVE PERCENT: 64.8 % (ref 36–66)
SODIUM BLD-SCNC: 131 MMOL/L (ref 135–145)
TOTAL IMMATURE NEUTOROPHIL: 0.01 K/CU MM
TOTAL NUCLEATED RBC: 0 K/CU MM
TOTAL PROTEIN: 7.2 GM/DL (ref 6.4–8.2)
WBC # BLD: 5.8 K/CU MM (ref 4–10.5)

## 2019-06-03 PROCEDURE — 85025 COMPLETE CBC W/AUTO DIFF WBC: CPT

## 2019-06-03 PROCEDURE — 80053 COMPREHEN METABOLIC PANEL: CPT

## 2019-06-03 PROCEDURE — 36415 COLL VENOUS BLD VENIPUNCTURE: CPT

## 2019-06-03 PROCEDURE — 85610 PROTHROMBIN TIME: CPT

## 2019-06-04 ENCOUNTER — ANESTHESIA EVENT (OUTPATIENT)
Dept: ENDOSCOPY | Age: 59
End: 2019-06-04
Payer: COMMERCIAL

## 2019-06-04 ASSESSMENT — ENCOUNTER SYMPTOMS: SHORTNESS OF BREATH: 1

## 2019-06-04 NOTE — ANESTHESIA PRE PROCEDURE
Department of Anesthesiology  Preprocedure Note       Name:  Ainsley Walker   Age:  66 y.o.  :  1960                                          MRN:  [id number]         Date:  2019      Surgeon: Deniz Diaz):  Mario Patterson MD    Procedure: EGD ESOPHAGOGASTRODUODENOSCOPY (N/A )    Medications prior to admission:   Prior to Admission medications    Medication Sig Start Date End Date Taking?  Authorizing Provider   rifaximin (XIFAXAN) 550 MG tablet Take 1 tablet by mouth 2 times daily 19   Saralee Severe, MD   furosemide (LASIX) 20 MG tablet Take 20 mg by mouth 19   Historical Provider, MD   hydrOXYzine (VISTARIL) 50 MG capsule Take 50 mg by mouth three times daily     Historical Provider, MD   lactulose encephalopathy 10 GM/15ML SOLN solution 30 g 3 times daily  3/11/19   Historical Provider, MD   spironolactone (ALDACTONE) 50 MG tablet Take 50 mg by mouth 19   Historical Provider, MD   pantoprazole (PROTONIX) 20 MG tablet TAKE 1 TABLET BY MOUTH TWO TIMES DAILY BEFORE MEALS 4/15/19   Astrid Bojorquez MD   Compression Stockings MISC by Does not apply route 20 - 30 mmh wear daily and take off at night  Thigh High 19   Pama Phy, APRN - CNP   nitroGLYCERIN (NITROSTAT) 0.4 MG SL tablet Place 1 tablet under the tongue every 5 minutes as needed for Chest pain 3/18/19   Pamcaleb Durandy, APRN - CNP   nicotine (NICODERM CQ) 21 MG/24HR Place 1 patch onto the skin every 24 hours 19  Astrid Bojorquez MD   UNIFINE PENTIPS 31G X 6 MM MISC USE AS DIRECTED 18   Astrid Bojorquez MD   promethazine (PHENERGAN) 25 MG tablet Take 1 tablet by mouth 3 times daily as needed for Nausea 18   Astrid Bojorquez MD   metoprolol tartrate (LOPRESSOR) 25 MG tablet Take 1 tablet by mouth 2 times daily 8/3/18   Franca Heart MD   ranolazine (RANEXA) 500 MG extended release tablet Take 1 tablet by mouth 2 times daily 8/3/18   Franca Heart MD   FLUoxetine (PROZAC) 40 MG capsule Take 40 mg by mouth daily    Historical Provider, MD   VIREAD 300 MG tablet Take 300 mg by mouth nightly  11/20/17   Historical Provider, MD   QUEtiapine (SEROQUEL) 300 MG tablet Take 300 mg by mouth nightly  11/15/16   Historical Provider, MD   albuterol sulfate HFA (PROAIR HFA) 108 (90 BASE) MCG/ACT inhaler Inhale 2 puffs into the lungs every 6 hours as needed for Wheezing 9/7/16   Joseline Early MD   oxyCODONE (ROXICODONE) 5 MG immediate release tablet Take 5 mg by mouth 4 times daily  . Historical Provider, MD   busPIRone (BUSPAR) 15 MG tablet Take 1 tablet by mouth 2 times daily 5/28/15   Reshma Agrawal MD       Current medications:    No current facility-administered medications for this encounter.       Current Outpatient Medications   Medication Sig Dispense Refill    rifaximin (XIFAXAN) 550 MG tablet Take 1 tablet by mouth 2 times daily 42 tablet 0    furosemide (LASIX) 20 MG tablet Take 20 mg by mouth      hydrOXYzine (VISTARIL) 50 MG capsule Take 50 mg by mouth three times daily       lactulose encephalopathy 10 GM/15ML SOLN solution 30 g 3 times daily       spironolactone (ALDACTONE) 50 MG tablet Take 50 mg by mouth      pantoprazole (PROTONIX) 20 MG tablet TAKE 1 TABLET BY MOUTH TWO TIMES DAILY BEFORE MEALS 60 tablet 2    Compression Stockings MISC by Does not apply route 20 - 30 mmh wear daily and take off at night  Thigh High 2 each 0    nitroGLYCERIN (NITROSTAT) 0.4 MG SL tablet Place 1 tablet under the tongue every 5 minutes as needed for Chest pain 25 tablet 3    nicotine (NICODERM CQ) 21 MG/24HR Place 1 patch onto the skin every 24 hours 42 patch 0    UNIFINE PENTIPS 31G X 6 MM MISC USE AS DIRECTED 100 each 5    promethazine (PHENERGAN) 25 MG tablet Take 1 tablet by mouth 3 times daily as needed for Nausea 60 tablet 1    metoprolol tartrate (LOPRESSOR) 25 MG tablet Take 1 tablet by mouth 2 times daily 60 tablet 6    ranolazine (RANEXA) 500 MG extended release tablet Take 1 tablet by mouth 2 times daily 180 tablet 3    FLUoxetine (PROZAC) 40 MG capsule Take 40 mg by mouth daily      VIREAD 300 MG tablet Take 300 mg by mouth nightly       QUEtiapine (SEROQUEL) 300 MG tablet Take 300 mg by mouth nightly       albuterol sulfate HFA (PROAIR HFA) 108 (90 BASE) MCG/ACT inhaler Inhale 2 puffs into the lungs every 6 hours as needed for Wheezing 1 Inhaler 5    oxyCODONE (ROXICODONE) 5 MG immediate release tablet Take 5 mg by mouth 4 times daily  .  busPIRone (BUSPAR) 15 MG tablet Take 1 tablet by mouth 2 times daily 60 tablet 3       Allergies: Allergies   Allergen Reactions    Norco [Hydrocodone-Acetaminophen] Itching     Itching, rash, nausea and vomiting.      Tylenol [Acetaminophen] Itching, Nausea And Vomiting and Rash       Problem List:    Patient Active Problem List   Diagnosis Code    Left arm pain M79.602    Type 2 diabetes mellitus with complication, with long-term current use of insulin (HCC) E11.8, Z79.4    Acid reflux K21.9    COPD, mild ([facility]) J44.9    Tobacco abuse Z72.0    Angina effort ([facility] [address] I20.8    Abnormal nuclear cardiac imaging test R93.1    Lung nodule R91.1    Chest pain R07.9    Dyslipidemia E78.5    Pancolitis ([address] K51.00    Hematemesis without nausea J01.5    Alcoholic cirrhosis of liver without ascites (HCC) K70.30    Thrombocytopenia (HCC) O93.7    Periumbilical abdominal pain R10.33    History of colonic polyps Z86.010    Abnormal findings on diagnostic imaging of 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    Type 2 diabetes mellitus with complication, with long-term current use of insulin ([facility] [address] E11.8, Z79.4    SOB (shortness of breath) R06.02    Portal vein thrombosis I81    Intractable nausea and vomiting R11.2    Abdominal pain R10.9    Acute GI bleeding K92.2    Chronic hepatitis C without hepatic coma (HCC) B18.2    Delayed gastric emptying K30    Restless legs G25.81    Acute colitis K52.9    Acute encephalopathy G93.40    S/P TIPS (transjugular intrahepatic portosystemic shunt) Z95.828    Other cirrhosis of liver ([facility] [address] K74.69       Past Medical History:        Diagnosis Date    Acid reflux     Arthritis     left knee    CAD (coronary artery disease)     per [facility] 9/6/13    COPD (chronic obstructive pulmonary disease) (HCC)     follow with Dr Spencer Santizo Depression     \"have manic - depression see Dr Meridee Mortimer    Diabetes mellitus [facility])     dx 10+ yrs ago-     Drug abuse ([facility] [address]     hx use of cocaine, heroin and marijuana- states last used 12/2014    Glaucoma     left eye    H/O Doppler lower venous ultrasound 04/04/2019    No DVT or SVT, Significant reflux of RGSV and LGSV.    H/O Doppler ultrasound 7/22/15    CAROTID: WNL. Normal vertebral flows bilaterally.      Hepatitis C     for liver bx 12/3/2015\"Have Hepatitis B and C and saw Dr Adriana Bunch for this 12/1/2015\"    Hiatal hernia     History of alcohol abuse     HTN (hypertension)     \"for the past two yrs on medication\" follows with Dr Siri Garcia Irregular heart beat     per pt    Liver hematoma     Migraines     Nausea & vomiting     Schizophrenia ([facility] [address]     per old chart    Seizures ([facility] [address]     \"last one was 9/2015- saw Dr Camila Olmedo at [facility]- she said not sure if acutal seizures- she thinks they are panic or anxiety attacks\"    SOB (shortness of breath)     Stress bladder incontinence, female        Past Surgical History:        Procedure Laterality Date    BREAST SURGERY  10/2015    left breast bx    CARDIAC CATHETERIZATION      per old chart pt had cath done in 3/2011 and 9/2013    CHOLECYSTECTOMY per old chart done [address] [place]  3/11/13    diverticulosis, cecal polyp    COLONOSCOPY  03/16/2017    Internal hemorrhoids    ENDOSCOPY, COLON, DIAGNOSTIC  03/16/2017    EGD: Small esophageal varices, portal hypertensive gastropathy, reflux esophagitis, hiatal hernia    EYE SURGERY Bilateral ? when    cyst removal, cataracts    HYSTERECTOMY      per old chart pt had CHOLO/BSO 1986    TIPS PROCEDURE      TONSILLECTOMY  as a kid    UPPER GASTROINTESTINAL ENDOSCOPY N/A 11/14/2018    EGD DIAGNOSTIC ONLY performed by Lori C[facility]rena MD at [facility] ENDOSCOPY       Social History:    Social History     Tobacco Use    Smoking status: Former Smoker     Packs/day: 0.50     Years: 40.00     Pack years: 20.00     Types: Cigarettes    Smokeless tobacco: Never Used   Substance Use Topics    Alcohol use: Yes     Alcohol/week: 1.2 - 1.8 oz     Types: 2 - 3 Shots of liquor per week     Comment: 2-3 shots last night                                 Counseling given: Not Answered      Vital Signs (Current): There were no vitals filed for this visit. BP Readings from Last 3 Encounters:   05/13/19 135/82   05/08/19 110/70   05/02/19 (!) 92/59       NPO Status:                                       12 hrs.                                           BMI:   Wt Readings from Last 3 Encounters:   05/13/19 167 lb (75.8 kg)   05/08/19 165 lb 6.4 oz (75 kg)   05/02/19 168 lb (76.2 kg)     There is no height or weight on file to calculate BMI.    CBC:   Lab Results   Component Value Date    WBC 5.8 06/03/2019    RBC 4.00 06/03/2019    HGB 12.6 06/03/2019    HCT 38.0 06/03/2019    MCV 95.0 06/03/2019    RDW 13.0 06/03/2019    PLT 91 06/03/2019       CMP:   Lab Results   Component Value Date     06/03/2019    K 4.3 06/03/2019    CL 96 06/03/2019    CO2 26 06/03/2019    BUN 13 06/03/2019    CREATININE 0.9 06/03/2019    GFRAA >60 06/03/2019    AGRATIO 1.1 04/04/2018    LABGLOM >60 06/03/2019 GLUCOSE 520 06/03/2019    PROT 7.2 06/03/2019    PROT 7.4 09/26/2012    CALCIUM 9.0 06/03/2019    BILITOT 0.9 06/03/2019    ALKPHOS 268 06/03/2019    AST 17 06/03/2019    ALT 14 06/03/2019       POC Tests: No results for input(s): POCGLU, POCNA, POCK, POCCL, POCBUN, POCHEMO, POCHCT in the last 72 hours. Coags:   Lab Results   Component Value Date    PROTIME 12.1 06/03/2019    PROTIME 11.6 03/19/2011    INR 1.06 06/03/2019    APTT 78.6 04/18/2019       HCG (If Applicable):   Lab Results   Component Value Date    PREGTESTUR NEGATIVE 10/28/2013        ABGs:   Lab Results   Component Value Date    PO2ART 50 04/27/2012    BEART 4.9 04/27/2012        Type & Screen (If Applicable):  No results found for: LABABO, [address]    Anesthesia Evaluation  Patient summary reviewed and Nursing notes reviewed  Airway: Mallampati: II  TM distance: >3 FB   Neck ROM: full  Mouth opening: > = 3 FB Dental:    (+) edentulous      Pulmonary:normal exam    (+) COPD:  shortness of breath:                            ROS comment: Smoker - 20 pack years   Cardiovascular:  Exercise tolerance: poor (<4 METS),   (+) hypertension:, angina:, CAD:, dysrhythmias:, ALEGRE:, hyperlipidemia      ECG reviewed               Beta Blocker:  Order written      ROS comment: Hr 112  Sinus tachycardia   Otherwise normal ECG   When compared with ECG of 30-APR-2019 23:53,   No significant change was found   Confirmed by [facility] Angelica ALMANZAR ([id number]) on 5/14/2019 12:38:38 PM      Neuro/Psych:   (+) seizures:, headaches: migraine headaches, psychiatric history:depression/anxiety              ROS comment: Schizophrenia GI/Hepatic/Renal:   (+) hiatal hernia, GERD:, PUD, hepatitis: C, liver disease ( cirrhosis, esophagela htn ):, morbid obesity          Endo/Other:    (+) DiabetesType II DM, , .                 Abdominal:           Vascular: negative vascular ROS.                                    Anesthesia Plan      MAC     ASA 3     ( )  Induction: intravenous. Anesthetic plan and risks discussed with patient. ERLINDA Vasques - CRNA   6/4/2019      Pre Anesthesia Evaluation complete. Anesthesia plan, risks, benefits, alternatives, and personnel discussed with patient and/or legal guardian. Patient and/or legal guardian verbalized an understanding and agreed to proceed. Anesthesia plan discussed with care team members and agreed upon.   ERLINDA Valentin - CRNA  6/5/2019

## 2019-06-04 NOTE — PROGRESS NOTES
Surgery:  [email or web address]                        Arrival time: 0945  Nothing to eat or drink after midnight unless instructed to take certain medications by the doctor or the nurse the am of surgery  Arrive at the front information desk -1st floor /[facility] is on [address] [facility]  Please leave money and all other valuables at home. Wear comfortable clothing. If you wear contacts please bring a case. No make up. You may be asked to remove rings or body piercing. Please bring insurance cards and picture ID am of procedure. Please bring any consent or paper work from your doctor. If you become ill,such as a cold, sore throat or fever contact your doctor. Please bathe or shower am of procedure.   Medications to take AM of procedure:     Any questions call [facility]  [phone]

## 2019-06-05 ENCOUNTER — ANESTHESIA (OUTPATIENT)
Dept: ENDOSCOPY | Age: 59
End: 2019-06-05
Payer: COMMERCIAL

## 2019-06-05 ENCOUNTER — HOSPITAL ENCOUNTER (OUTPATIENT)
Age: 59
Setting detail: OUTPATIENT SURGERY
Discharge: HOME | End: 2019-06-05
Attending: INTERNAL MEDICINE | Admitting: INTERNAL MEDICINE
Payer: COMMERCIAL

## 2019-06-05 VITALS
OXYGEN SATURATION: 98 % | HEIGHT: 58 IN | BODY MASS INDEX: 32.95 KG/M2 | TEMPERATURE: 97.3 F | RESPIRATION RATE: 16 BRPM | DIASTOLIC BLOOD PRESSURE: 64 MMHG | HEART RATE: 82 BPM | WEIGHT: 157 LBS | SYSTOLIC BLOOD PRESSURE: 105 MMHG

## 2019-06-05 VITALS — SYSTOLIC BLOOD PRESSURE: 89 MMHG | DIASTOLIC BLOOD PRESSURE: 51 MMHG | OXYGEN SATURATION: 98 %

## 2019-06-05 PROCEDURE — 6360000002 HC RX W HCPCS: Performed by: NURSE ANESTHETIST, CERTIFIED REGISTERED

## 2019-06-05 PROCEDURE — 2580000003 HC RX 258: Performed by: ANESTHESIOLOGY

## 2019-06-05 PROCEDURE — 7100000011 HC PHASE II RECOVERY - ADDTL 15 MIN: Performed by: INTERNAL MEDICINE

## 2019-06-05 PROCEDURE — 3700000001 HC ADD 15 MINUTES (ANESTHESIA): Performed by: INTERNAL MEDICINE

## 2019-06-05 PROCEDURE — 3609012800 HC EGD DIAGNOSTIC ONLY: Performed by: INTERNAL MEDICINE

## 2019-06-05 PROCEDURE — 2500000003 HC RX 250 WO HCPCS: Performed by: NURSE ANESTHETIST, CERTIFIED REGISTERED

## 2019-06-05 PROCEDURE — 2709999900 HC NON-CHARGEABLE SUPPLY: Performed by: INTERNAL MEDICINE

## 2019-06-05 PROCEDURE — 7100000010 HC PHASE II RECOVERY - FIRST 15 MIN: Performed by: INTERNAL MEDICINE

## 2019-06-05 PROCEDURE — 3700000000 HC ANESTHESIA ATTENDED CARE: Performed by: INTERNAL MEDICINE

## 2019-06-05 RX ORDER — PROPOFOL 10 MG/ML
INJECTION, EMULSION INTRAVENOUS PRN
Status: DISCONTINUED | OUTPATIENT
Start: 2019-06-05 | End: 2019-06-05 | Stop reason: SDUPTHER

## 2019-06-05 RX ORDER — LIDOCAINE HYDROCHLORIDE 20 MG/ML
INJECTION, SOLUTION INFILTRATION; PERINEURAL PRN
Status: DISCONTINUED | OUTPATIENT
Start: 2019-06-05 | End: 2019-06-05 | Stop reason: SDUPTHER

## 2019-06-05 RX ORDER — SODIUM CHLORIDE, SODIUM LACTATE, POTASSIUM CHLORIDE, CALCIUM CHLORIDE 600; 310; 30; 20 MG/100ML; MG/100ML; MG/100ML; MG/100ML
INJECTION, SOLUTION INTRAVENOUS CONTINUOUS
Status: DISCONTINUED | OUTPATIENT
Start: 2019-06-05 | End: 2019-06-05 | Stop reason: HOSPADM

## 2019-06-05 RX ADMIN — PROPOFOL 30 MG: 10 INJECTION, EMULSION INTRAVENOUS at 11:11

## 2019-06-05 RX ADMIN — SODIUM CHLORIDE, POTASSIUM CHLORIDE, SODIUM LACTATE AND CALCIUM CHLORIDE: 600; 310; 30; 20 INJECTION, SOLUTION INTRAVENOUS at 10:38

## 2019-06-05 RX ADMIN — PHENYLEPHRINE HYDROCHLORIDE 25 MCG: 10 INJECTION INTRAVENOUS at 11:17

## 2019-06-05 RX ADMIN — PHENYLEPHRINE HYDROCHLORIDE 25 MCG: 10 INJECTION INTRAVENOUS at 11:14

## 2019-06-05 RX ADMIN — PHENYLEPHRINE HYDROCHLORIDE 25 MCG: 10 INJECTION INTRAVENOUS at 11:15

## 2019-06-05 RX ADMIN — PROPOFOL 30 MG: 10 INJECTION, EMULSION INTRAVENOUS at 11:10

## 2019-06-05 RX ADMIN — PROPOFOL 50 MG: 10 INJECTION, EMULSION INTRAVENOUS at 11:08

## 2019-06-05 RX ADMIN — PHENYLEPHRINE HYDROCHLORIDE 25 MCG: 10 INJECTION INTRAVENOUS at 11:13

## 2019-06-05 RX ADMIN — SODIUM CHLORIDE, POTASSIUM CHLORIDE, SODIUM LACTATE AND CALCIUM CHLORIDE: 600; 310; 30; 20 INJECTION, SOLUTION INTRAVENOUS at 11:15

## 2019-06-05 RX ADMIN — PROPOFOL 30 MG: 10 INJECTION, EMULSION INTRAVENOUS at 11:09

## 2019-06-05 RX ADMIN — LIDOCAINE HYDROCHLORIDE 100 MG: 20 INJECTION, SOLUTION INFILTRATION; PERINEURAL at 11:08

## 2019-06-05 ASSESSMENT — PAIN SCALES - GENERAL
PAINLEVEL_OUTOF10: 0
PAINLEVEL_OUTOF10: 0

## 2019-06-05 ASSESSMENT — PAIN - FUNCTIONAL ASSESSMENT: PAIN_FUNCTIONAL_ASSESSMENT: 0-10

## 2019-06-05 NOTE — OP NOTE
[facility]    2019  11:13 AM    Patient:    Jorden Martinez  : 1960   66 y.o. MRN: [id number]  Admitted: 2019  9:48 AM ATT: Genevieve Zhang MD   ENDO/NONE  AdmitDx: PORTAL VEIN THROMBOSIS  PCP: Ana Lutz MD      Procedure: Esophagogastroduodenoscopy     Date:  2019     Surgeon:  Genevieve Zhang MD    Referring Physician:  ATT: Genevieve Zhang MD  PCP: Ana Lutz MD    Preoperative Diagnosis:  Esophageal varices, Reflux esophagitis    Postoperative Diagnosis:  Portal hypertensive gastropathy, retained food in stomach    Anesthesia: MAC Sedation (Deep Anesthesia)    Indications: This is a 66y.o. year old female who presents today with follow-up of esophageal varices and reflux esophagitis. Description of Procedure:  Informed consent was obtained from the patient after explanation of indications, benefits and possible risks and complications of the procedure. The patient was then taken to the endoscopy suite, placed in the left lateral decubitus position and the above IV sedation was administrered. The Olympus videoendoscope was placed in the patient's mouth and under direct visualization passed into the esophagus. Visualization of the esophagus demonstrated normal mucosa, no varices seen. The scope was then advanced into the stomach. Visualization of the gastric body and antrum demonstrated nonbleeding moderate portal hypertensive gastropathy and retained food in stomach. A retroflexed exam of the gastric cardia and fundus demonstrated no gastric varices. The pylorus was patent and the scope was advanced into the duodenum. Visualization of the duodenal bulb and second portion of the duodenum was normal.  The scope was then withdrawn back into the stomach, it was decompressed, and the scope was completely withdrawn.     The patient tolerated the procedure well and was taken to the post anesthesia care unit in good condition.     Complications: None  Estimated Blood Loss: <5cc  Specimens: biopsies were not obtained    Impression:  Moderate nonbleeding PHG, no varices gastric or esophageal--indicating functioning TIPS, healed reflux esophagitis      Recommendations:   RTC 3 months      Aneta Alvarez MD  [facility]  6/5/2019  11:13 AM

## 2019-06-05 NOTE — ANESTHESIA POSTPROCEDURE EVALUATION
Department of Anesthesiology  Postprocedure Note    Patient: Cecilia Stroud  MRN: [id number]  YOB: 1960  Date of evaluation: 6/5/2019  Time:  11:29 AM     Procedure Summary     Date:  06/05/19 Room / Location:  [address] [place] [address] 01 / [facility] ENDOSCOPY    Anesthesia Start:  1101 Anesthesia Stop:  2872    Procedure:  EGD DIAGNOSTIC ONLY (N/A ) Diagnosis:  (PORTAL VEIN THROMBOSIS)    Surgeon:  Shukri Winters MD Responsible Provider:  Tesfaye Barraza MD    Anesthesia Type:  MAC ASA Status:  3          Anesthesia Type: MAC    Melany Phase I:  10    Melany Phase II:  10    Last vitals: Reviewed and per EMR flowsheets.        Anesthesia Post Evaluation    Patient location during evaluation: bedside  Patient participation: complete - patient participated  Level of consciousness: awake and alert  Pain score: 0  Airway patency: patent  Nausea & Vomiting: no nausea and no vomiting  Complications: no  Cardiovascular status: hemodynamically stable  Respiratory status: acceptable, nonlabored ventilation, room air and spontaneous ventilation  Hydration status: euvolemic

## 2019-06-05 NOTE — H&P
[facility] Pre-operative History and Physical    Patient: Ngozi De La Paz  : 1960  Acct#:       HISTORY OF PRESENT ILLNESS:    The patient is a 66 y.o. female with significant past medical history as below who presents for EGD     Indication gastric varices follow-up    History Obtained From:  Patient and review of all records    Past Medical History:        Diagnosis Date    Acid reflux     Arthritis     left knee    CAD (coronary artery disease)     per [facility] 13    COPD (chronic obstructive pulmonary disease) ([facility] [address]     follow with Dr Venus Taylor Depression     \"have manic - depression see Dr Ferris Letters Diabetes mellitus [facility])     dx 10+ yrs ago-     Drug abuse ([facility] [address]     hx use of cocaine, heroin and marijuana- states last used 2014    Glaucoma     left eye    H/O Doppler lower venous ultrasound 2019    No DVT or SVT, Significant reflux of RGSV and LGSV.    H/O Doppler ultrasound 7/22/15    CAROTID: WNL. Normal vertebral flows bilaterally.      Hepatitis C     for liver bx 12/3/2015\"Have Hepatitis B and C and saw Dr Geraldo Olson for this 2015\"    Hiatal hernia     History of alcohol abuse     HTN (hypertension)     \"for the past two yrs on medication\" follows with Dr Tita Levy Hx of blood clots     Hyperlipemia     Irregular heart beat     per pt    Liver hematoma     Migraines     Nausea & vomiting     Schizophrenia ([address]     per old chart    Seizures ([facility] [address]     \"last one was 2015- saw Dr Avani Kiran at [facility]- she said not sure if acutal seizures- she thinks they are panic or anxiety attacks\"    SOB (shortness of breath)     Stress bladder incontinence, female        Past Surgical History:        Procedure Laterality Date    BREAST SURGERY  10/2015    left breast bx    CARDIAC CATHETERIZATION      per old chart pt had cath done in 3/2011 and 2013    CHOLECYSTECTOMY  per old chart done 1985    COLONOSCOPY  3/11/13    diverticulosis, cecal polyp    COLONOSCOPY  03/16/2017    Internal hemorrhoids    ENDOSCOPY, COLON, DIAGNOSTIC  03/16/2017    EGD: Small esophageal varices, portal hypertensive gastropathy, reflux esophagitis, hiatal hernia    EYE SURGERY Bilateral ? when    cyst removal, cataracts    HYSTERECTOMY      per old chart pt had CHOLO/BSO 1986    TIPS PROCEDURE      TONSILLECTOMY  as a kid    UPPER GASTROINTESTINAL ENDOSCOPY N/A 11/14/2018    EGD DIAGNOSTIC ONLY performed by Gonzalez Chapa MD at [facility] ENDOSCOPY         Current Medications:    Medications    Scheduled Medications:   PRN Medications:   No current facility-administered medications on file prior to encounter.       Current Outpatient Medications on File Prior to Encounter   Medication Sig Dispense Refill    lactulose encephalopathy 10 GM/15ML SOLN solution 30 g 3 times daily       promethazine (PHENERGAN) 25 MG tablet Take 1 tablet by mouth 3 times daily as needed for Nausea 60 tablet 1    ranolazine (RANEXA) 500 MG extended release tablet Take 1 tablet by mouth 2 times daily 180 tablet 3    rifaximin (XIFAXAN) 550 MG tablet Take 1 tablet by mouth 2 times daily 42 tablet 0    furosemide (LASIX) 20 MG tablet Take 20 mg by mouth daily       hydrOXYzine (VISTARIL) 50 MG capsule Take 50 mg by mouth three times daily       spironolactone (ALDACTONE) 50 MG tablet Take 50 mg by mouth daily       pantoprazole (PROTONIX) 20 MG tablet TAKE 1 TABLET BY MOUTH TWO TIMES DAILY BEFORE MEALS 60 tablet 2    Compression Stockings MISC by Does not apply route 20 - 30 mmh wear daily and take off at night  Thigh High 2 each 0    nitroGLYCERIN (NITROSTAT) 0.4 MG SL tablet Place 1 tablet under the tongue every 5 minutes as needed for Chest pain 25 tablet 3    metoprolol tartrate (LOPRESSOR) 25 MG tablet Take 1 tablet by mouth 2 times daily 60 tablet 6    FLUoxetine (PROZAC) 40 MG capsule Take 40 mg by mouth daily      VIREAD 300 MG tablet Take 300 mg by mouth nightly       QUEtiapine (SEROQUEL)

## 2019-06-06 NOTE — CARE COORDINATION
Ambulatory Care Coordination Note  5/8/2019  CM Risk Score: 10  Ketty Mortality Risk Score:      ACC: Tae Burks RN    Summary Note: Pt feels like she is doing well at home. She was not able to get compression stockings from [facility] due to insurance. She has f/u appts with all medical providers scheduled. Pt reports she quit smoking as of 4/16/19. Reviewed medications, pt reports she has all meds at home & is taking everything as prescribed. Will continue to follow. Ambulatory Care Coordination Assessment    Care Coordination Protocol  Program Enrollment:  Complex Care  Referral from Primary Care Provider:  No  Week 1 - Initial Assessment     Do you have all of your prescriptions and are they filled?:  Yes  Barriers to medication adherence:  None  Are you able to afford your medications?:  Yes  How often do you have trouble taking your medications the way you have been told to take them?:  I always take them as prescribed. Do you have Home O2 Therapy?:  No      Ability to seek help/take action for Emergent Urgent situations i.e. fire, crime, inclement weather or health crisis. :  Independent  Ability to ambulate to restroom:  Independent  Ability handle personal hygeine needs (bathing/dressing/grooming): Independent  Ability to manage Medications: Independent  Ability to prepare Food Preparation:  Independent  Ability to maintain home (clean home, laundry): Independent  Ability to drive and/or has transportation:  Dependent  Ability to do shopping:  Needs Assistance  Ability to manage finances: Independent  Is patient able to live independently?:  Yes     Current Housing:  Private Residence        Per the Fall Risk Screening, did the patient have 2 or more falls or 1 fall with injury in the past year?:  Yes  How often do you think you are about to fall and you do NOT fall?  For example, you grab something to stabilize yourself or hold onto a wall/furniture?:  Occasionally  Use of a Mobility Aid:  Yes (Comment: pt not using it regularly)  Difficulty walking/impaired gait:  No  Issues with feet or shoes like numbness, edema, shoes not fitting:  Yes (Comment: neuropathy)  Changes in vision, poor vision or poor lighting in environment:  No  Dizziness:  No  Other Fall Risk:  No     Frequent urination at night?:  Yes  Do you use rails/bars?:  No  Do you have a non-slip tub mat?:  No     Are you experiencing loss of meaning?:  No  Are you experiencing loss of hope and peace?:  No     Thinking about your patient's physical health needs, are there any symptoms or problems (risk indicators) you are unsure about that require further investigation?:  Moderate to severe symptoms or problems that impact on daily life   Are the patients physical health problems impacting on their mental well-being?:  No identified areas of concern   Are there any problems with your patients lifestyle behaviors (alcohol, drugs, diet, exercise) that are impacting on physical or mental well-being?:  Some mild concern of potential negative impact on well-being   Do you have any other concerns about your patients mental well-being?  How would you rate their severity and impact on the patient?:  No identified areas of concern   How would you rate their home environment in terms of safety and stability (including domestic violence, insecure housing, neighbor harassment)?:  Safe, stable, but with some inconsistency   How do daily activities impact on the patient's well-being? (include current or anticipated unemployment, work, caregiving, access to transportation or other):  No identified problems or perceived positive benefits   How would you rate their social network (family, work, friends)?:  Adequate participation with social networks   How would you rate their financial resources (including ability to afford all required medical care)?:  [address] insecure, some resource challenges   How wells does the patient now understand their health and well-being (symptoms, signs or risk factors) and what they need to do to manage their health?:  Reasonable to good understanding but do not feel able to engage with advice at this time   How well do you think your patient can engage in healthcare discussions? (Barriers include language, deafness, aphasia, alcohol or drug problems, learning difficulties, concentration): Adequate communication, with or without minor barriers   Do other services need to be involved to help this patient?:  Other care/services in place and adequate   Are current services involved with this patient well-coordinated? (Include coordination with other services you are now recommendation):  Required care/services in place and adequately coordinated   Suggested Interventions and Community Resources  Fall Risk Prevention:  Completed Zone Management Tools:  Completed                  Prior to Admission medications    Medication Sig Start Date End Date Taking?  Authorizing Provider   rifaximin (XIFAXAN) 550 MG tablet Take 1 tablet by mouth 2 times daily 5/2/19   Mino Ortiz MD   furosemide (LASIX) 20 MG tablet Take 20 mg by mouth daily  4/27/19   Historical Provider, MD   hydrOXYzine (VISTARIL) 50 MG capsule Take 50 mg by mouth three times daily     Historical Provider, MD   lactulose encephalopathy 10 GM/15ML SOLN solution 30 g 3 times daily  3/11/19   Historical Provider, MD   spironolactone (ALDACTONE) 50 MG tablet Take 50 mg by mouth daily  4/27/19   Historical Provider, MD   pantoprazole (PROTONIX) 20 MG tablet TAKE 1 TABLET BY MOUTH TWO TIMES DAILY BEFORE MEALS 4/15/19   Toan Vieyra MD   Compression Stockings MISC by Does not apply route 20 - 30 mmh wear daily and take off at night  Thigh High 4/12/19   ERLINDA Cruz CNP   nitroGLYCERIN (NITROSTAT) 0.4 MG SL tablet Place 1 tablet under the tongue every 5 minutes as needed for Chest pain 3/18/19   ERLINDA Cruz CNP   promethazine (PHENERGAN) 25 MG tablet Take 1 tablet by mouth 3 times daily as needed for Nausea 11/20/18   Giovanni Cockayne, MD   metoprolol tartrate (LOPRESSOR) 25 MG tablet Take 1 tablet by mouth 2 times daily 8/3/18   Judye Seip, MD   ranolazine (RANEXA) 500 MG extended release tablet Take 1 tablet by mouth 2 times daily 8/3/18   Judye Seip, MD   FLUoxetine (PROZAC) 40 MG capsule Take 40 mg by mouth daily    Historical Provider, MD   VIREAD 300 MG tablet Take 300 mg by mouth nightly  11/20/17   Historical Provider, MD   QUEtiapine (SEROQUEL) 300 MG tablet Take 300 mg by mouth nightly  11/15/16   Historical Provider, MD   oxyCODONE (ROXICODONE) 5 MG immediate release tablet Take 5 mg by mouth 4 times daily  . Historical Provider, MD   busPIRone (BUSPAR) 15 MG tablet Take 1 tablet by mouth 2 times daily 5/28/15   Judye Seip, MD       Future Appointments   Date Time Provider [facility] Hightower   8/14/2019  1:30 PM Nicho Antonio MD [facility]   8/15/2019  3:15 PM Giovanni Cockayne, MD [facility]     Jerry Bravo RN  Nurse Care Coordinator  [phone] [email or web address]

## 2019-07-11 ENCOUNTER — CARE COORDINATION (OUTPATIENT)
Dept: CARE COORDINATION | Age: 59
End: 2019-07-11

## 2019-08-15 ENCOUNTER — TELEPHONE (OUTPATIENT)
Dept: FAMILY MEDICINE CLINIC | Age: 59
End: 2019-08-15

## 2019-08-15 ENCOUNTER — CARE COORDINATION (OUTPATIENT)
Dept: CARE COORDINATION | Age: 59
End: 2019-08-15

## 2019-09-27 ENCOUNTER — HOSPITAL ENCOUNTER (OUTPATIENT)
Age: 59
Setting detail: SPECIMEN
Discharge: HOME | End: 2019-09-27
Payer: COMMERCIAL

## 2019-09-27 LAB
ALBUMIN SERPL-MCNC: 3.9 GM/DL (ref 3.4–5)
ALP BLD-CCNC: 135 IU/L (ref 40–128)
ALT SERPL-CCNC: 16 U/L (ref 10–40)
ANION GAP SERPL CALCULATED.3IONS-SCNC: 12 MMOL/L (ref 4–16)
AST SERPL-CCNC: 19 IU/L (ref 15–37)
BILIRUB SERPL-MCNC: 1.6 MG/DL (ref 0–1)
BUN BLDV-MCNC: 12 MG/DL (ref 6–23)
CALCIUM SERPL-MCNC: 8.9 MG/DL (ref 8.3–10.6)
CHLORIDE BLD-SCNC: 97 MMOL/L (ref 99–110)
CO2: 23 MMOL/L (ref 21–32)
CREAT SERPL-MCNC: 0.9 MG/DL (ref 0.6–1.1)
GFR AFRICAN AMERICAN: >60 ML/MIN/1.73M2
GFR NON-AFRICAN AMERICAN: >60 ML/MIN/1.73M2
GLUCOSE BLD-MCNC: 469 MG/DL (ref 70–99)
POTASSIUM SERPL-SCNC: 4.5 MMOL/L (ref 3.5–5.1)
SODIUM BLD-SCNC: 132 MMOL/L (ref 135–145)
TOTAL PROTEIN: 7.4 GM/DL (ref 6.4–8.2)

## 2019-09-27 PROCEDURE — 82105 ALPHA-FETOPROTEIN SERUM: CPT

## 2019-09-27 PROCEDURE — 80053 COMPREHEN METABOLIC PANEL: CPT

## 2019-09-30 LAB
MS ALPHA-FETOPROTEIN: 3 NG/ML (ref 0–9)
MS ALPHA-FETOPROTEIN: NORMAL NG/ML (ref 0–9)

## 2019-10-08 RX ORDER — RANOLAZINE 500 MG/1
500 TABLET, EXTENDED RELEASE ORAL 2 TIMES DAILY
Qty: 60 TABLET | Refills: 5 | Status: SHIPPED | OUTPATIENT
Start: 2019-10-08 | End: 2020-05-21 | Stop reason: SDUPTHER

## 2019-10-10 ENCOUNTER — CARE COORDINATION (OUTPATIENT)
Dept: CARE COORDINATION | Age: 59
End: 2019-10-10

## 2019-10-25 ENCOUNTER — APPOINTMENT (OUTPATIENT)
Dept: CT IMAGING | Age: 59
DRG: 241 | End: 2019-10-25
Payer: COMMERCIAL

## 2019-10-25 ENCOUNTER — HOSPITAL ENCOUNTER (INPATIENT)
Age: 59
LOS: 2 days | Discharge: HOME | DRG: 241 | End: 2019-10-27
Attending: EMERGENCY MEDICINE | Admitting: INTERNAL MEDICINE
Payer: COMMERCIAL

## 2019-10-25 ENCOUNTER — APPOINTMENT (OUTPATIENT)
Dept: GENERAL RADIOLOGY | Age: 59
DRG: 241 | End: 2019-10-25
Payer: COMMERCIAL

## 2019-10-25 DIAGNOSIS — K92.0 HEMATEMESIS, PRESENCE OF NAUSEA NOT SPECIFIED: ICD-10-CM

## 2019-10-25 DIAGNOSIS — K21.9 GASTROESOPHAGEAL REFLUX DISEASE WITHOUT ESOPHAGITIS: ICD-10-CM

## 2019-10-25 DIAGNOSIS — R11.2 NAUSEA AND VOMITING, INTRACTABILITY OF VOMITING NOT SPECIFIED, UNSPECIFIED VOMITING TYPE: ICD-10-CM

## 2019-10-25 DIAGNOSIS — K92.0 COFFEE GROUND EMESIS: Primary | ICD-10-CM

## 2019-10-25 DIAGNOSIS — R11.0 NAUSEA: ICD-10-CM

## 2019-10-25 PROBLEM — K92.2 ACUTE UPPER GI BLEED: Status: ACTIVE | Noted: 2019-10-25

## 2019-10-25 LAB
ABO/RH: NORMAL
ALBUMIN SERPL-MCNC: 4 GM/DL (ref 3.4–5)
ALP BLD-CCNC: 143 IU/L (ref 40–129)
ALT SERPL-CCNC: 12 U/L (ref 10–40)
ANION GAP SERPL CALCULATED.3IONS-SCNC: 17 MMOL/L (ref 4–16)
ANTIBODY SCREEN: NEGATIVE
AST SERPL-CCNC: 23 IU/L (ref 15–37)
BASOPHILS ABSOLUTE: 0 K/CU MM
BASOPHILS RELATIVE PERCENT: 0.3 % (ref 0–1)
BILIRUB SERPL-MCNC: 1.6 MG/DL (ref 0–1)
BILIRUBIN DIRECT: 0.6 MG/DL (ref 0–0.3)
BILIRUBIN, INDIRECT: 1 MG/DL (ref 0–0.7)
BUN BLDV-MCNC: 16 MG/DL (ref 6–23)
CALCIUM SERPL-MCNC: 9.5 MG/DL (ref 8.3–10.6)
CHLORIDE BLD-SCNC: 96 MMOL/L (ref 99–110)
CO2: 22 MMOL/L (ref 21–32)
CREAT SERPL-MCNC: 0.8 MG/DL (ref 0.6–1.1)
DIFFERENTIAL TYPE: ABNORMAL
EOSINOPHILS ABSOLUTE: 0 K/CU MM
EOSINOPHILS RELATIVE PERCENT: 0 % (ref 0–3)
GFR AFRICAN AMERICAN: >60 ML/MIN/1.73M2
GFR NON-AFRICAN AMERICAN: >60 ML/MIN/1.73M2
GLUCOSE BLD-MCNC: 357 MG/DL (ref 70–99)
HCT VFR BLD CALC: 43.1 % (ref 37–47)
HEMOGLOBIN: 14.9 GM/DL (ref 12.5–16)
IMMATURE NEUTROPHIL %: 0.4 % (ref 0–0.43)
LACTATE: 2.5 MMOL/L (ref 0.4–2)
LACTATE: ABNORMAL MMOL/L (ref 0.4–2)
LIPASE: 28 IU/L (ref 13–60)
LYMPHOCYTES ABSOLUTE: 0.8 K/CU MM
LYMPHOCYTES RELATIVE PERCENT: 6.4 % (ref 24–44)
MAGNESIUM: 1.8 MG/DL (ref 1.8–2.4)
MCH RBC QN AUTO: 31.2 PG (ref 27–31)
MCHC RBC AUTO-ENTMCNC: 34.6 % (ref 32–36)
MCV RBC AUTO: 90.4 FL (ref 78–100)
MONOCYTES ABSOLUTE: 0.5 K/CU MM
MONOCYTES RELATIVE PERCENT: 3.8 % (ref 0–4)
NUCLEATED RBC %: 0 %
PDW BLD-RTO: 13.5 % (ref 11.7–14.9)
PLATELET # BLD: 111 K/CU MM (ref 140–440)
PMV BLD AUTO: 11.9 FL (ref 7.5–11.1)
POTASSIUM SERPL-SCNC: 3.4 MMOL/L (ref 3.5–5.1)
RBC # BLD: 4.77 M/CU MM (ref 4.2–5.4)
SEGMENTED NEUTROPHILS ABSOLUTE COUNT: 11 K/CU MM
SEGMENTED NEUTROPHILS RELATIVE PERCENT: 89.1 % (ref 36–66)
SODIUM BLD-SCNC: 135 MMOL/L (ref 135–145)
TOTAL IMMATURE NEUTOROPHIL: 0.05 K/CU MM
TOTAL NUCLEATED RBC: 0 K/CU MM
TOTAL PROTEIN: 8.1 GM/DL (ref 6.4–8.2)
TROPONIN T: <0.01 NG/ML
WBC # BLD: 12.3 K/CU MM (ref 4–10.5)

## 2019-10-25 PROCEDURE — 85025 COMPLETE CBC W/AUTO DIFF WBC: CPT

## 2019-10-25 PROCEDURE — 86900 BLOOD TYPING SEROLOGIC ABO: CPT

## 2019-10-25 PROCEDURE — 84484 ASSAY OF TROPONIN QUANT: CPT

## 2019-10-25 PROCEDURE — 74177 CT ABD & PELVIS W/CONTRAST: CPT

## 2019-10-25 PROCEDURE — 99285 EMERGENCY DEPT VISIT HI MDM: CPT

## 2019-10-25 PROCEDURE — 2580000003 HC RX 258: Performed by: EMERGENCY MEDICINE

## 2019-10-25 PROCEDURE — 1200000000 HC SEMI PRIVATE

## 2019-10-25 PROCEDURE — 80053 COMPREHEN METABOLIC PANEL: CPT

## 2019-10-25 PROCEDURE — 82248 BILIRUBIN DIRECT: CPT

## 2019-10-25 PROCEDURE — 36415 COLL VENOUS BLD VENIPUNCTURE: CPT

## 2019-10-25 PROCEDURE — 6360000004 HC RX CONTRAST MEDICATION: Performed by: EMERGENCY MEDICINE

## 2019-10-25 PROCEDURE — 83690 ASSAY OF LIPASE: CPT

## 2019-10-25 PROCEDURE — 86850 RBC ANTIBODY SCREEN: CPT

## 2019-10-25 PROCEDURE — 93005 ELECTROCARDIOGRAM TRACING: CPT | Performed by: EMERGENCY MEDICINE

## 2019-10-25 PROCEDURE — 96375 TX/PRO/DX INJ NEW DRUG ADDON: CPT

## 2019-10-25 PROCEDURE — C9113 INJ PANTOPRAZOLE SODIUM, VIA: HCPCS | Performed by: EMERGENCY MEDICINE

## 2019-10-25 PROCEDURE — 83735 ASSAY OF MAGNESIUM: CPT

## 2019-10-25 PROCEDURE — 86901 BLOOD TYPING SEROLOGIC RH(D): CPT

## 2019-10-25 PROCEDURE — 96365 THER/PROPH/DIAG IV INF INIT: CPT

## 2019-10-25 PROCEDURE — 6360000002 HC RX W HCPCS: Performed by: EMERGENCY MEDICINE

## 2019-10-25 PROCEDURE — 71045 X-RAY EXAM CHEST 1 VIEW: CPT

## 2019-10-25 PROCEDURE — 83605 ASSAY OF LACTIC ACID: CPT

## 2019-10-25 RX ORDER — OCTREOTIDE ACETATE 100 UG/ML
50 INJECTION, SOLUTION INTRAVENOUS; SUBCUTANEOUS ONCE
Status: COMPLETED | OUTPATIENT
Start: 2019-10-25 | End: 2019-10-25

## 2019-10-25 RX ORDER — FENTANYL CITRATE 50 UG/ML
50 INJECTION, SOLUTION INTRAMUSCULAR; INTRAVENOUS ONCE
Status: COMPLETED | OUTPATIENT
Start: 2019-10-25 | End: 2019-10-25

## 2019-10-25 RX ORDER — OXYCODONE HYDROCHLORIDE 5 MG/1
5 TABLET ORAL EVERY 6 HOURS
Status: DISCONTINUED | OUTPATIENT
Start: 2019-10-26 | End: 2019-10-27 | Stop reason: HOSPADM

## 2019-10-25 RX ORDER — TENOFOVIR DISOPROXIL FUMARATE 300 MG/1
300 TABLET, FILM COATED ORAL NIGHTLY
Status: DISCONTINUED | OUTPATIENT
Start: 2019-10-26 | End: 2019-10-27 | Stop reason: HOSPADM

## 2019-10-25 RX ORDER — SODIUM CHLORIDE 0.9 % (FLUSH) 0.9 %
10 SYRINGE (ML) INJECTION
Status: COMPLETED | OUTPATIENT
Start: 2019-10-25 | End: 2019-10-25

## 2019-10-25 RX ORDER — MORPHINE SULFATE 4 MG/ML
2 INJECTION, SOLUTION INTRAMUSCULAR; INTRAVENOUS EVERY 4 HOURS PRN
Status: DISCONTINUED | OUTPATIENT
Start: 2019-10-25 | End: 2019-10-27 | Stop reason: HOSPADM

## 2019-10-25 RX ORDER — DIPHENHYDRAMINE HYDROCHLORIDE 50 MG/ML
25 INJECTION INTRAMUSCULAR; INTRAVENOUS ONCE
Status: DISCONTINUED | OUTPATIENT
Start: 2019-10-25 | End: 2019-10-27 | Stop reason: HOSPADM

## 2019-10-25 RX ORDER — HYDROXYZINE PAMOATE 25 MG/1
50 CAPSULE ORAL 3 TIMES DAILY PRN
Status: DISCONTINUED | OUTPATIENT
Start: 2019-10-25 | End: 2019-10-27 | Stop reason: HOSPADM

## 2019-10-25 RX ORDER — MORPHINE SULFATE 4 MG/ML
4 INJECTION, SOLUTION INTRAMUSCULAR; INTRAVENOUS ONCE
Status: COMPLETED | OUTPATIENT
Start: 2019-10-25 | End: 2019-10-25

## 2019-10-25 RX ORDER — LACTULOSE 10 G/15ML
30 SOLUTION ORAL 3 TIMES DAILY
Status: DISCONTINUED | OUTPATIENT
Start: 2019-10-26 | End: 2019-10-27 | Stop reason: HOSPADM

## 2019-10-25 RX ORDER — FUROSEMIDE 20 MG/1
20 TABLET ORAL DAILY
Status: DISCONTINUED | OUTPATIENT
Start: 2019-10-26 | End: 2019-10-27 | Stop reason: HOSPADM

## 2019-10-25 RX ORDER — SPIRONOLACTONE 50 MG/1
50 TABLET, FILM COATED ORAL DAILY
Status: DISCONTINUED | OUTPATIENT
Start: 2019-10-26 | End: 2019-10-27 | Stop reason: HOSPADM

## 2019-10-25 RX ORDER — SODIUM CHLORIDE 9 MG/ML
INJECTION, SOLUTION INTRAVENOUS CONTINUOUS
Status: DISCONTINUED | OUTPATIENT
Start: 2019-10-25 | End: 2019-10-27 | Stop reason: HOSPADM

## 2019-10-25 RX ORDER — SODIUM CHLORIDE 0.9 % (FLUSH) 0.9 %
10 SYRINGE (ML) INJECTION EVERY 12 HOURS SCHEDULED
Status: DISCONTINUED | OUTPATIENT
Start: 2019-10-25 | End: 2019-10-27 | Stop reason: HOSPADM

## 2019-10-25 RX ORDER — DEXTROSE MONOHYDRATE 50 MG/ML
100 INJECTION, SOLUTION INTRAVENOUS PRN
Status: DISCONTINUED | OUTPATIENT
Start: 2019-10-25 | End: 2019-10-27 | Stop reason: HOSPADM

## 2019-10-25 RX ORDER — 0.9 % SODIUM CHLORIDE 0.9 %
1000 INTRAVENOUS SOLUTION INTRAVENOUS ONCE
Status: COMPLETED | OUTPATIENT
Start: 2019-10-25 | End: 2019-10-25

## 2019-10-25 RX ORDER — ONDANSETRON 2 MG/ML
4 INJECTION INTRAMUSCULAR; INTRAVENOUS EVERY 6 HOURS PRN
Status: DISCONTINUED | OUTPATIENT
Start: 2019-10-25 | End: 2019-10-27 | Stop reason: HOSPADM

## 2019-10-25 RX ORDER — RANOLAZINE 500 MG/1
500 TABLET, EXTENDED RELEASE ORAL 2 TIMES DAILY
Status: DISCONTINUED | OUTPATIENT
Start: 2019-10-26 | End: 2019-10-27 | Stop reason: HOSPADM

## 2019-10-25 RX ORDER — PANTOPRAZOLE SODIUM 40 MG/10ML
40 INJECTION, POWDER, LYOPHILIZED, FOR SOLUTION INTRAVENOUS EVERY 12 HOURS
Status: DISCONTINUED | OUTPATIENT
Start: 2019-10-26 | End: 2019-10-27 | Stop reason: HOSPADM

## 2019-10-25 RX ORDER — BUSPIRONE HYDROCHLORIDE 15 MG/1
15 TABLET ORAL 2 TIMES DAILY
Status: DISCONTINUED | OUTPATIENT
Start: 2019-10-26 | End: 2019-10-27 | Stop reason: HOSPADM

## 2019-10-25 RX ORDER — FLUOXETINE HYDROCHLORIDE 20 MG/1
40 CAPSULE ORAL DAILY
Status: DISCONTINUED | OUTPATIENT
Start: 2019-10-26 | End: 2019-10-27 | Stop reason: HOSPADM

## 2019-10-25 RX ORDER — PANTOPRAZOLE SODIUM 40 MG/10ML
80 INJECTION, POWDER, LYOPHILIZED, FOR SOLUTION INTRAVENOUS ONCE
Status: COMPLETED | OUTPATIENT
Start: 2019-10-25 | End: 2019-10-25

## 2019-10-25 RX ORDER — DEXTROSE MONOHYDRATE 25 G/50ML
12.5 INJECTION, SOLUTION INTRAVENOUS PRN
Status: DISCONTINUED | OUTPATIENT
Start: 2019-10-25 | End: 2019-10-27 | Stop reason: HOSPADM

## 2019-10-25 RX ORDER — SODIUM CHLORIDE 9 MG/ML
10 INJECTION INTRAVENOUS EVERY 12 HOURS
Status: DISCONTINUED | OUTPATIENT
Start: 2019-10-26 | End: 2019-10-27 | Stop reason: HOSPADM

## 2019-10-25 RX ORDER — SODIUM CHLORIDE 0.9 % (FLUSH) 0.9 %
10 SYRINGE (ML) INJECTION PRN
Status: DISCONTINUED | OUTPATIENT
Start: 2019-10-25 | End: 2019-10-27 | Stop reason: HOSPADM

## 2019-10-25 RX ORDER — NICOTINE POLACRILEX 4 MG
15 LOZENGE BUCCAL PRN
Status: DISCONTINUED | OUTPATIENT
Start: 2019-10-25 | End: 2019-10-27 | Stop reason: HOSPADM

## 2019-10-25 RX ORDER — ONDANSETRON 2 MG/ML
4 INJECTION INTRAMUSCULAR; INTRAVENOUS ONCE
Status: COMPLETED | OUTPATIENT
Start: 2019-10-25 | End: 2019-10-25

## 2019-10-25 RX ADMIN — MORPHINE SULFATE 4 MG: 4 INJECTION, SOLUTION INTRAMUSCULAR; INTRAVENOUS at 21:15

## 2019-10-25 RX ADMIN — OCTREOTIDE ACETATE 50 MCG: 100 INJECTION, SOLUTION INTRAVENOUS; SUBCUTANEOUS at 22:50

## 2019-10-25 RX ADMIN — CEFTRIAXONE SODIUM 1 G: 1 INJECTION, POWDER, FOR SOLUTION INTRAMUSCULAR; INTRAVENOUS at 22:18

## 2019-10-25 RX ADMIN — SODIUM CHLORIDE 1000 ML: 9 INJECTION, SOLUTION INTRAVENOUS at 19:16

## 2019-10-25 RX ADMIN — Medication 10 ML: at 21:53

## 2019-10-25 RX ADMIN — SODIUM CHLORIDE 1000 ML: 9 INJECTION, SOLUTION INTRAVENOUS at 20:08

## 2019-10-25 RX ADMIN — IOPAMIDOL 75 ML: 755 INJECTION, SOLUTION INTRAVENOUS at 21:52

## 2019-10-25 RX ADMIN — PANTOPRAZOLE SODIUM 80 MG: 40 INJECTION, POWDER, FOR SOLUTION INTRAVENOUS at 19:16

## 2019-10-25 RX ADMIN — ONDANSETRON 4 MG: 2 INJECTION INTRAMUSCULAR; INTRAVENOUS at 19:16

## 2019-10-25 RX ADMIN — FENTANYL CITRATE 50 MCG: 50 INJECTION INTRAMUSCULAR; INTRAVENOUS at 19:16

## 2019-10-25 ASSESSMENT — PAIN DESCRIPTION - PAIN TYPE
TYPE: ACUTE PAIN
TYPE: ACUTE PAIN

## 2019-10-25 ASSESSMENT — PAIN SCALES - GENERAL
PAINLEVEL_OUTOF10: 9
PAINLEVEL_OUTOF10: 8
PAINLEVEL_OUTOF10: 9
PAINLEVEL_OUTOF10: 9

## 2019-10-25 ASSESSMENT — PAIN DESCRIPTION - LOCATION
LOCATION: ABDOMEN
LOCATION: ABDOMEN;BACK

## 2019-10-25 ASSESSMENT — ENCOUNTER SYMPTOMS
VOMITING: 1
NAUSEA: 1
BACK PAIN: 0
SHORTNESS OF BREATH: 0
ABDOMINAL PAIN: 1

## 2019-10-25 ASSESSMENT — PAIN DESCRIPTION - FREQUENCY: FREQUENCY: CONTINUOUS

## 2019-10-26 ENCOUNTER — APPOINTMENT (OUTPATIENT)
Dept: ULTRASOUND IMAGING | Age: 59
DRG: 241 | End: 2019-10-26
Payer: COMMERCIAL

## 2019-10-26 LAB
ALBUMIN SERPL-MCNC: 3.2 GM/DL (ref 3.4–5)
ALP BLD-CCNC: 98 IU/L (ref 40–129)
ALT SERPL-CCNC: 9 U/L (ref 10–40)
ANION GAP SERPL CALCULATED.3IONS-SCNC: 10 MMOL/L (ref 4–16)
AST SERPL-CCNC: 17 IU/L (ref 15–37)
BASOPHILS ABSOLUTE: 0.1 K/CU MM
BASOPHILS RELATIVE PERCENT: 0.5 % (ref 0–1)
BILIRUB SERPL-MCNC: 1.2 MG/DL (ref 0–1)
BUN BLDV-MCNC: 15 MG/DL (ref 6–23)
CALCIUM SERPL-MCNC: 8.4 MG/DL (ref 8.3–10.6)
CHLORIDE BLD-SCNC: 105 MMOL/L (ref 99–110)
CO2: 24 MMOL/L (ref 21–32)
CREAT SERPL-MCNC: 0.8 MG/DL (ref 0.6–1.1)
DIFFERENTIAL TYPE: ABNORMAL
EOSINOPHILS ABSOLUTE: 0.1 K/CU MM
EOSINOPHILS RELATIVE PERCENT: 0.9 % (ref 0–3)
ESTIMATED AVERAGE GLUCOSE: 263 MG/DL
GFR AFRICAN AMERICAN: >60 ML/MIN/1.73M2
GFR NON-AFRICAN AMERICAN: >60 ML/MIN/1.73M2
GLUCOSE BLD-MCNC: 193 MG/DL (ref 70–99)
GLUCOSE BLD-MCNC: 222 MG/DL (ref 70–99)
GLUCOSE BLD-MCNC: 240 MG/DL (ref 70–99)
GLUCOSE BLD-MCNC: 272 MG/DL (ref 70–99)
GLUCOSE BLD-MCNC: 300 MG/DL (ref 70–99)
HBA1C MFR BLD: 10.8 % (ref 4.2–6.3)
HCT VFR BLD CALC: 37.8 % (ref 37–47)
HCT VFR BLD CALC: 39.3 % (ref 37–47)
HEMOGLOBIN: 12.8 GM/DL (ref 12.5–16)
HEMOGLOBIN: 13.3 GM/DL (ref 12.5–16)
IMMATURE NEUTROPHIL %: 0.5 % (ref 0–0.43)
LACTATE: 2.9 MMOL/L (ref 0.4–2)
LYMPHOCYTES ABSOLUTE: 1.7 K/CU MM
LYMPHOCYTES RELATIVE PERCENT: 14.9 % (ref 24–44)
MCH RBC QN AUTO: 31.1 PG (ref 27–31)
MCHC RBC AUTO-ENTMCNC: 33.9 % (ref 32–36)
MCV RBC AUTO: 91.7 FL (ref 78–100)
MONOCYTES ABSOLUTE: 1 K/CU MM
MONOCYTES RELATIVE PERCENT: 8.4 % (ref 0–4)
NUCLEATED RBC %: 0 %
PDW BLD-RTO: 14.1 % (ref 11.7–14.9)
PLATELET # BLD: 103 K/CU MM (ref 140–440)
PMV BLD AUTO: 11.3 FL (ref 7.5–11.1)
POTASSIUM SERPL-SCNC: 3.8 MMOL/L (ref 3.5–5.1)
RBC # BLD: 4.12 M/CU MM (ref 4.2–5.4)
SEGMENTED NEUTROPHILS ABSOLUTE COUNT: 8.7 K/CU MM
SEGMENTED NEUTROPHILS RELATIVE PERCENT: 74.8 % (ref 36–66)
SODIUM BLD-SCNC: 139 MMOL/L (ref 135–145)
TOTAL IMMATURE NEUTOROPHIL: 0.06 K/CU MM
TOTAL NUCLEATED RBC: 0 K/CU MM
TOTAL PROTEIN: 6.2 GM/DL (ref 6.4–8.2)
WBC # BLD: 11.7 K/CU MM (ref 4–10.5)

## 2019-10-26 PROCEDURE — 6370000000 HC RX 637 (ALT 250 FOR IP): Performed by: INTERNAL MEDICINE

## 2019-10-26 PROCEDURE — 83605 ASSAY OF LACTIC ACID: CPT

## 2019-10-26 PROCEDURE — 6360000002 HC RX W HCPCS: Performed by: INTERNAL MEDICINE

## 2019-10-26 PROCEDURE — 76705 ECHO EXAM OF ABDOMEN: CPT

## 2019-10-26 PROCEDURE — 85025 COMPLETE CBC W/AUTO DIFF WBC: CPT

## 2019-10-26 PROCEDURE — C9113 INJ PANTOPRAZOLE SODIUM, VIA: HCPCS | Performed by: INTERNAL MEDICINE

## 2019-10-26 PROCEDURE — 6370000000 HC RX 637 (ALT 250 FOR IP): Performed by: HOSPITALIST

## 2019-10-26 PROCEDURE — 85014 HEMATOCRIT: CPT

## 2019-10-26 PROCEDURE — 1200000000 HC SEMI PRIVATE

## 2019-10-26 PROCEDURE — 82962 GLUCOSE BLOOD TEST: CPT

## 2019-10-26 PROCEDURE — 94761 N-INVAS EAR/PLS OXIMETRY MLT: CPT

## 2019-10-26 PROCEDURE — 36415 COLL VENOUS BLD VENIPUNCTURE: CPT

## 2019-10-26 PROCEDURE — 93975 VASCULAR STUDY: CPT

## 2019-10-26 PROCEDURE — 93010 ELECTROCARDIOGRAM REPORT: CPT | Performed by: INTERNAL MEDICINE

## 2019-10-26 PROCEDURE — 80053 COMPREHEN METABOLIC PANEL: CPT

## 2019-10-26 PROCEDURE — 6360000002 HC RX W HCPCS: Performed by: NURSE PRACTITIONER

## 2019-10-26 PROCEDURE — 85018 HEMOGLOBIN: CPT

## 2019-10-26 PROCEDURE — 2580000003 HC RX 258: Performed by: INTERNAL MEDICINE

## 2019-10-26 PROCEDURE — 83036 HEMOGLOBIN GLYCOSYLATED A1C: CPT

## 2019-10-26 RX ORDER — SUCRALFATE 1 G/1
1 TABLET ORAL
Status: DISCONTINUED | OUTPATIENT
Start: 2019-10-26 | End: 2019-10-27

## 2019-10-26 RX ORDER — LACTULOSE 10 G/15ML
20 SOLUTION ORAL ONCE
Status: COMPLETED | OUTPATIENT
Start: 2019-10-26 | End: 2019-10-26

## 2019-10-26 RX ORDER — ONDANSETRON 2 MG/ML
4 INJECTION INTRAMUSCULAR; INTRAVENOUS EVERY 6 HOURS
Status: DISCONTINUED | OUTPATIENT
Start: 2019-10-26 | End: 2019-10-27 | Stop reason: HOSPADM

## 2019-10-26 RX ADMIN — PANTOPRAZOLE SODIUM 40 MG: 40 INJECTION, POWDER, FOR SOLUTION INTRAVENOUS at 10:15

## 2019-10-26 RX ADMIN — METOPROLOL TARTRATE 25 MG: 25 TABLET ORAL at 21:37

## 2019-10-26 RX ADMIN — OXYCODONE HYDROCHLORIDE 5 MG: 5 TABLET ORAL at 17:29

## 2019-10-26 RX ADMIN — LACTULOSE 20 G: 10 SOLUTION ORAL at 15:31

## 2019-10-26 RX ADMIN — OXYCODONE HYDROCHLORIDE 5 MG: 5 TABLET ORAL at 11:28

## 2019-10-26 RX ADMIN — FUROSEMIDE 20 MG: 20 TABLET ORAL at 07:58

## 2019-10-26 RX ADMIN — MORPHINE SULFATE 2 MG: 4 INJECTION, SOLUTION INTRAMUSCULAR; INTRAVENOUS at 04:11

## 2019-10-26 RX ADMIN — ONDANSETRON 4 MG: 2 INJECTION INTRAMUSCULAR; INTRAVENOUS at 16:15

## 2019-10-26 RX ADMIN — LACTULOSE 30 G: 10 SOLUTION ORAL at 13:10

## 2019-10-26 RX ADMIN — Medication 10 ML: at 10:15

## 2019-10-26 RX ADMIN — MORPHINE SULFATE 2 MG: 4 INJECTION, SOLUTION INTRAMUSCULAR; INTRAVENOUS at 10:14

## 2019-10-26 RX ADMIN — SPIRONOLACTONE 50 MG: 50 TABLET ORAL at 07:59

## 2019-10-26 RX ADMIN — Medication 10 ML: at 00:03

## 2019-10-26 RX ADMIN — RIFAXIMIN 550 MG: 550 TABLET ORAL at 00:03

## 2019-10-26 RX ADMIN — INSULIN LISPRO 8 UNITS: 100 INJECTION, SOLUTION INTRAVENOUS; SUBCUTANEOUS at 17:53

## 2019-10-26 RX ADMIN — MORPHINE SULFATE 2 MG: 4 INJECTION, SOLUTION INTRAMUSCULAR; INTRAVENOUS at 00:02

## 2019-10-26 RX ADMIN — BUSPIRONE HYDROCHLORIDE 15 MG: 15 TABLET ORAL at 21:37

## 2019-10-26 RX ADMIN — ONDANSETRON 4 MG: 2 INJECTION INTRAMUSCULAR; INTRAVENOUS at 04:11

## 2019-10-26 RX ADMIN — TENOFOVIR DISOPROXIL FUMARATE 300 MG: 300 TABLET, COATED ORAL at 21:35

## 2019-10-26 RX ADMIN — RANOLAZINE 500 MG: 500 TABLET, FILM COATED, EXTENDED RELEASE ORAL at 07:58

## 2019-10-26 RX ADMIN — Medication 10 ML: at 21:39

## 2019-10-26 RX ADMIN — METOPROLOL TARTRATE 25 MG: 25 TABLET ORAL at 07:59

## 2019-10-26 RX ADMIN — RIFAXIMIN 550 MG: 550 TABLET ORAL at 21:36

## 2019-10-26 RX ADMIN — SODIUM CHLORIDE: 9 INJECTION, SOLUTION INTRAVENOUS at 00:03

## 2019-10-26 RX ADMIN — SODIUM CHLORIDE: 9 INJECTION, SOLUTION INTRAVENOUS at 10:52

## 2019-10-26 RX ADMIN — INSULIN LISPRO 2 UNITS: 100 INJECTION, SOLUTION INTRAVENOUS; SUBCUTANEOUS at 21:30

## 2019-10-26 RX ADMIN — LACTULOSE 30 G: 10 SOLUTION ORAL at 07:59

## 2019-10-26 RX ADMIN — QUETIAPINE FUMARATE 150 MG: 100 TABLET ORAL at 21:36

## 2019-10-26 RX ADMIN — RANOLAZINE 500 MG: 500 TABLET, FILM COATED, EXTENDED RELEASE ORAL at 21:36

## 2019-10-26 RX ADMIN — SODIUM CHLORIDE: 9 INJECTION, SOLUTION INTRAVENOUS at 21:25

## 2019-10-26 RX ADMIN — Medication 10 ML: at 08:00

## 2019-10-26 RX ADMIN — INSULIN LISPRO 4 UNITS: 100 INJECTION, SOLUTION INTRAVENOUS; SUBCUTANEOUS at 13:09

## 2019-10-26 RX ADMIN — QUETIAPINE FUMARATE 150 MG: 100 TABLET ORAL at 00:03

## 2019-10-26 RX ADMIN — PANTOPRAZOLE SODIUM 40 MG: 40 INJECTION, POWDER, FOR SOLUTION INTRAVENOUS at 21:38

## 2019-10-26 RX ADMIN — FLUOXETINE 40 MG: 20 CAPSULE ORAL at 07:58

## 2019-10-26 RX ADMIN — OCTREOTIDE ACETATE 25 MCG/HR: 500 INJECTION, SOLUTION INTRAVENOUS; SUBCUTANEOUS at 03:01

## 2019-10-26 RX ADMIN — BUSPIRONE HYDROCHLORIDE 15 MG: 15 TABLET ORAL at 07:59

## 2019-10-26 RX ADMIN — SUCRALFATE 1 G: 1 TABLET ORAL at 17:28

## 2019-10-26 RX ADMIN — CEFTRIAXONE 1 G: 1 INJECTION, POWDER, FOR SOLUTION INTRAMUSCULAR; INTRAVENOUS at 21:25

## 2019-10-26 RX ADMIN — ONDANSETRON 4 MG: 2 INJECTION INTRAMUSCULAR; INTRAVENOUS at 11:31

## 2019-10-26 ASSESSMENT — PAIN SCALES - GENERAL
PAINLEVEL_OUTOF10: 6
PAINLEVEL_OUTOF10: 7
PAINLEVEL_OUTOF10: 6
PAINLEVEL_OUTOF10: 8
PAINLEVEL_OUTOF10: 4
PAINLEVEL_OUTOF10: 5
PAINLEVEL_OUTOF10: 8
PAINLEVEL_OUTOF10: 4
PAINLEVEL_OUTOF10: 8
PAINLEVEL_OUTOF10: 4
PAINLEVEL_OUTOF10: 3

## 2019-10-26 ASSESSMENT — PAIN DESCRIPTION - PAIN TYPE
TYPE: ACUTE PAIN

## 2019-10-26 ASSESSMENT — PAIN DESCRIPTION - LOCATION
LOCATION: BACK
LOCATION: BACK
LOCATION: BACK;NECK
LOCATION: BACK
LOCATION: ABDOMEN
LOCATION: ABDOMEN

## 2019-10-26 ASSESSMENT — PAIN DESCRIPTION - DESCRIPTORS
DESCRIPTORS: ACHING;DISCOMFORT
DESCRIPTORS: ACHING;DISCOMFORT
DESCRIPTORS: CRAMPING;DISCOMFORT
DESCRIPTORS: DISCOMFORT

## 2019-10-26 ASSESSMENT — PAIN DESCRIPTION - FREQUENCY
FREQUENCY: CONTINUOUS

## 2019-10-26 ASSESSMENT — PAIN DESCRIPTION - ONSET
ONSET: ON-GOING
ONSET: ON-GOING

## 2019-10-26 ASSESSMENT — PAIN DESCRIPTION - PROGRESSION
CLINICAL_PROGRESSION: GRADUALLY IMPROVING
CLINICAL_PROGRESSION: NOT CHANGED

## 2019-10-27 VITALS
RESPIRATION RATE: 16 BRPM | WEIGHT: 164 LBS | OXYGEN SATURATION: 92 % | BODY MASS INDEX: 34.43 KG/M2 | HEIGHT: 58 IN | HEART RATE: 82 BPM | DIASTOLIC BLOOD PRESSURE: 63 MMHG | SYSTOLIC BLOOD PRESSURE: 96 MMHG | TEMPERATURE: 98.6 F

## 2019-10-27 LAB
ANION GAP SERPL CALCULATED.3IONS-SCNC: 8 MMOL/L (ref 4–16)
BUN BLDV-MCNC: 13 MG/DL (ref 6–23)
CALCIUM SERPL-MCNC: 8.1 MG/DL (ref 8.3–10.6)
CHLORIDE BLD-SCNC: 108 MMOL/L (ref 99–110)
CO2: 25 MMOL/L (ref 21–32)
CREAT SERPL-MCNC: 0.8 MG/DL (ref 0.6–1.1)
GFR AFRICAN AMERICAN: >60 ML/MIN/1.73M2
GFR NON-AFRICAN AMERICAN: >60 ML/MIN/1.73M2
GLUCOSE BLD-MCNC: 253 MG/DL (ref 70–99)
GLUCOSE BLD-MCNC: 260 MG/DL (ref 70–99)
HCT VFR BLD CALC: 36.3 % (ref 37–47)
HEMOGLOBIN: 12.5 GM/DL (ref 12.5–16)
INR BLD: 1.3 INDEX
MCH RBC QN AUTO: 31.7 PG (ref 27–31)
MCHC RBC AUTO-ENTMCNC: 34.4 % (ref 32–36)
MCV RBC AUTO: 92.1 FL (ref 78–100)
PDW BLD-RTO: 14.1 % (ref 11.7–14.9)
PLATELET # BLD: 90 K/CU MM (ref 140–440)
PMV BLD AUTO: 11.3 FL (ref 7.5–11.1)
POTASSIUM SERPL-SCNC: 3.6 MMOL/L (ref 3.5–5.1)
PROTHROMBIN TIME: 14.9 SECONDS (ref 11.7–14.5)
RBC # BLD: 3.94 M/CU MM (ref 4.2–5.4)
SODIUM BLD-SCNC: 141 MMOL/L (ref 135–145)
WBC # BLD: 7.4 K/CU MM (ref 4–10.5)

## 2019-10-27 PROCEDURE — 85027 COMPLETE CBC AUTOMATED: CPT

## 2019-10-27 PROCEDURE — 6370000000 HC RX 637 (ALT 250 FOR IP): Performed by: INTERNAL MEDICINE

## 2019-10-27 PROCEDURE — 80048 BASIC METABOLIC PNL TOTAL CA: CPT

## 2019-10-27 PROCEDURE — 82962 GLUCOSE BLOOD TEST: CPT

## 2019-10-27 PROCEDURE — 85018 HEMOGLOBIN: CPT

## 2019-10-27 PROCEDURE — 85014 HEMATOCRIT: CPT

## 2019-10-27 PROCEDURE — C9113 INJ PANTOPRAZOLE SODIUM, VIA: HCPCS | Performed by: INTERNAL MEDICINE

## 2019-10-27 PROCEDURE — 6360000002 HC RX W HCPCS: Performed by: INTERNAL MEDICINE

## 2019-10-27 PROCEDURE — 85610 PROTHROMBIN TIME: CPT

## 2019-10-27 PROCEDURE — 36415 COLL VENOUS BLD VENIPUNCTURE: CPT

## 2019-10-27 PROCEDURE — 2580000003 HC RX 258: Performed by: INTERNAL MEDICINE

## 2019-10-27 RX ORDER — LEVOFLOXACIN 500 MG/1
500 TABLET, FILM COATED ORAL DAILY
Qty: 5 TABLET | Refills: 0 | Status: SHIPPED | OUTPATIENT
Start: 2019-10-27 | End: 2019-11-01

## 2019-10-27 RX ORDER — PANTOPRAZOLE SODIUM 40 MG/1
40 TABLET, DELAYED RELEASE ORAL
Qty: 60 TABLET | Refills: 1 | Status: SHIPPED | OUTPATIENT
Start: 2019-10-27

## 2019-10-27 RX ORDER — SUCRALFATE ORAL 1 G/10ML
1 SUSPENSION ORAL
Status: DISCONTINUED | OUTPATIENT
Start: 2019-10-27 | End: 2019-10-27 | Stop reason: CLARIF

## 2019-10-27 RX ORDER — SUCRALFATE 1 G/1
1 TABLET ORAL
Status: DISCONTINUED | OUTPATIENT
Start: 2019-10-27 | End: 2019-10-27 | Stop reason: HOSPADM

## 2019-10-27 RX ORDER — SUCRALFATE 1 G/1
1 TABLET ORAL
Qty: 120 TABLET | Refills: 3 | Status: ON HOLD | OUTPATIENT
Start: 2019-10-27 | End: 2020-04-02 | Stop reason: HOSPADM

## 2019-10-27 RX ADMIN — SUCRALFATE 1 G: 1 TABLET ORAL at 06:03

## 2019-10-27 RX ADMIN — FUROSEMIDE 20 MG: 20 TABLET ORAL at 08:33

## 2019-10-27 RX ADMIN — SPIRONOLACTONE 50 MG: 50 TABLET ORAL at 08:34

## 2019-10-27 RX ADMIN — RIFAXIMIN 550 MG: 550 TABLET ORAL at 08:34

## 2019-10-27 RX ADMIN — Medication 10 ML: at 08:33

## 2019-10-27 RX ADMIN — ONDANSETRON 4 MG: 2 INJECTION INTRAMUSCULAR; INTRAVENOUS at 10:36

## 2019-10-27 RX ADMIN — PANTOPRAZOLE SODIUM 40 MG: 40 INJECTION, POWDER, FOR SOLUTION INTRAVENOUS at 08:33

## 2019-10-27 RX ADMIN — METOPROLOL TARTRATE 25 MG: 25 TABLET ORAL at 08:34

## 2019-10-27 RX ADMIN — INSULIN LISPRO 6 UNITS: 100 INJECTION, SOLUTION INTRAVENOUS; SUBCUTANEOUS at 08:41

## 2019-10-27 RX ADMIN — ONDANSETRON 4 MG: 2 INJECTION INTRAMUSCULAR; INTRAVENOUS at 06:04

## 2019-10-27 RX ADMIN — FLUOXETINE 40 MG: 20 CAPSULE ORAL at 08:33

## 2019-10-27 RX ADMIN — OXYCODONE HYDROCHLORIDE 5 MG: 5 TABLET ORAL at 00:21

## 2019-10-27 RX ADMIN — RANOLAZINE 500 MG: 500 TABLET, FILM COATED, EXTENDED RELEASE ORAL at 08:34

## 2019-10-27 RX ADMIN — BUSPIRONE HYDROCHLORIDE 15 MG: 15 TABLET ORAL at 08:34

## 2019-10-27 RX ADMIN — ONDANSETRON 4 MG: 2 INJECTION INTRAMUSCULAR; INTRAVENOUS at 00:21

## 2019-10-27 RX ADMIN — OXYCODONE HYDROCHLORIDE 5 MG: 5 TABLET ORAL at 06:03

## 2019-10-27 ASSESSMENT — PAIN SCALES - GENERAL
PAINLEVEL_OUTOF10: 7
PAINLEVEL_OUTOF10: 8

## 2019-10-29 ENCOUNTER — CARE COORDINATION (OUTPATIENT)
Dept: CARE COORDINATION | Age: 59
End: 2019-10-29

## 2019-10-29 LAB
EKG ATRIAL RATE: 118 BPM
EKG DIAGNOSIS: NORMAL
EKG P AXIS: 72 DEGREES
EKG P-R INTERVAL: 144 MS
EKG Q-T INTERVAL: 350 MS
EKG QRS DURATION: 76 MS
EKG QTC CALCULATION (BAZETT): 490 MS
EKG R AXIS: 46 DEGREES
EKG T AXIS: 65 DEGREES
EKG VENTRICULAR RATE: 118 BPM

## 2019-10-31 LAB
EKG ATRIAL RATE: 112 BPM
EKG DIAGNOSIS: NORMAL
EKG P AXIS: 62 DEGREES
EKG P-R INTERVAL: 144 MS
EKG Q-T INTERVAL: 362 MS
EKG QRS DURATION: 82 MS
EKG QTC CALCULATION (BAZETT): 494 MS
EKG R AXIS: 17 DEGREES
EKG T AXIS: 40 DEGREES
EKG VENTRICULAR RATE: 112 BPM

## 2019-11-04 ENCOUNTER — OFFICE VISIT (OUTPATIENT)
Dept: ORTHOPEDIC SURGERY | Age: 59
End: 2019-11-04
Payer: COMMERCIAL

## 2019-11-04 ENCOUNTER — TELEPHONE (OUTPATIENT)
Dept: FAMILY MEDICINE CLINIC | Age: 59
End: 2019-11-04

## 2019-11-04 VITALS
OXYGEN SATURATION: 98 % | HEART RATE: 72 BPM | HEIGHT: 58 IN | BODY MASS INDEX: 34.43 KG/M2 | RESPIRATION RATE: 14 BRPM | WEIGHT: 164 LBS

## 2019-11-04 DIAGNOSIS — G56.02 LEFT CARPAL TUNNEL SYNDROME: Primary | ICD-10-CM

## 2019-11-04 PROCEDURE — G9899 SCRN MAM PERF RSLTS DOC: HCPCS | Performed by: ORTHOPAEDIC SURGERY

## 2019-11-04 PROCEDURE — 1111F DSCHRG MED/CURRENT MED MERGE: CPT | Performed by: ORTHOPAEDIC SURGERY

## 2019-11-04 PROCEDURE — 1036F TOBACCO NON-USER: CPT | Performed by: ORTHOPAEDIC SURGERY

## 2019-11-04 PROCEDURE — G8598 ASA/ANTIPLAT THER USED: HCPCS | Performed by: ORTHOPAEDIC SURGERY

## 2019-11-04 PROCEDURE — G8484 FLU IMMUNIZE NO ADMIN: HCPCS | Performed by: ORTHOPAEDIC SURGERY

## 2019-11-04 PROCEDURE — G8427 DOCREV CUR MEDS BY ELIG CLIN: HCPCS | Performed by: ORTHOPAEDIC SURGERY

## 2019-11-04 PROCEDURE — 99213 OFFICE O/P EST LOW 20 MIN: CPT | Performed by: ORTHOPAEDIC SURGERY

## 2019-11-04 PROCEDURE — 3017F COLORECTAL CA SCREEN DOC REV: CPT | Performed by: ORTHOPAEDIC SURGERY

## 2019-11-04 PROCEDURE — G8417 CALC BMI ABV UP PARAM F/U: HCPCS | Performed by: ORTHOPAEDIC SURGERY

## 2019-11-04 RX ORDER — HYDROXYZINE 50 MG/1
TABLET, FILM COATED ORAL
COMMUNITY
Start: 2019-10-28 | End: 2020-01-03

## 2019-11-05 ASSESSMENT — ENCOUNTER SYMPTOMS
COLOR CHANGE: 0
SHORTNESS OF BREATH: 0

## 2019-11-06 DIAGNOSIS — G56.02 LEFT CARPAL TUNNEL SYNDROME: Primary | ICD-10-CM

## 2019-11-07 ENCOUNTER — TELEPHONE (OUTPATIENT)
Dept: ORTHOPEDIC SURGERY | Age: 59
End: 2019-11-07

## 2019-11-07 ENCOUNTER — TELEPHONE (OUTPATIENT)
Dept: CARDIOLOGY CLINIC | Age: 59
End: 2019-11-07

## 2019-11-18 ENCOUNTER — OFFICE VISIT (OUTPATIENT)
Dept: CARDIOLOGY CLINIC | Age: 59
End: 2019-11-18
Payer: COMMERCIAL

## 2019-11-18 VITALS
SYSTOLIC BLOOD PRESSURE: 110 MMHG | DIASTOLIC BLOOD PRESSURE: 64 MMHG | BODY MASS INDEX: 35.14 KG/M2 | WEIGHT: 167.4 LBS | HEIGHT: 58 IN | HEART RATE: 70 BPM

## 2019-11-18 DIAGNOSIS — E11.8 TYPE 2 DIABETES MELLITUS WITH COMPLICATION, WITH LONG-TERM CURRENT USE OF INSULIN (HCC): ICD-10-CM

## 2019-11-18 DIAGNOSIS — Q24.5 CORONARY-MYOCARDIAL BRIDGE: ICD-10-CM

## 2019-11-18 DIAGNOSIS — I10 ESSENTIAL HYPERTENSION: ICD-10-CM

## 2019-11-18 DIAGNOSIS — E78.5 DYSLIPIDEMIA: ICD-10-CM

## 2019-11-18 DIAGNOSIS — Z01.818 PREOPERATIVE CLEARANCE: Primary | ICD-10-CM

## 2019-11-18 DIAGNOSIS — Z79.4 TYPE 2 DIABETES MELLITUS WITH COMPLICATION, WITH LONG-TERM CURRENT USE OF INSULIN (HCC): ICD-10-CM

## 2019-11-18 PROCEDURE — 99214 OFFICE O/P EST MOD 30 MIN: CPT | Performed by: NURSE PRACTITIONER

## 2019-11-18 PROCEDURE — 93000 ELECTROCARDIOGRAM COMPLETE: CPT | Performed by: NURSE PRACTITIONER

## 2019-11-27 ENCOUNTER — HOSPITAL ENCOUNTER (OUTPATIENT)
Age: 59
Discharge: HOME | End: 2019-11-27
Payer: COMMERCIAL

## 2019-11-27 LAB
CHOLESTEROL: 98 MG/DL
HDLC SERPL-MCNC: 38 MG/DL
LDL CHOLESTEROL CALCULATED: 41 MG/DL
TRIGL SERPL-MCNC: 96 MG/DL

## 2019-11-27 PROCEDURE — 36415 COLL VENOUS BLD VENIPUNCTURE: CPT

## 2019-11-27 PROCEDURE — 80061 LIPID PANEL: CPT

## 2019-12-02 ENCOUNTER — CARE COORDINATION (OUTPATIENT)
Dept: CARE COORDINATION | Age: 59
End: 2019-12-02

## 2019-12-09 ENCOUNTER — ANESTHESIA EVENT (OUTPATIENT)
Dept: OPERATING ROOM | Age: 59
End: 2019-12-09
Payer: MEDICARE

## 2019-12-10 ASSESSMENT — ENCOUNTER SYMPTOMS: SHORTNESS OF BREATH: 1

## 2019-12-10 ASSESSMENT — COPD QUESTIONNAIRES: CAT_SEVERITY: MILD

## 2019-12-10 NOTE — ANESTHESIA PRE PROCEDURE
Department of Anesthesiology  Preprocedure Note       Name:  Mir Sharma   Age:  66 y.o.  :  1960                                          MRN:  [id number]         Date:  12/10/2019      Surgeon: Gonzales Kyle):  Abbie Pride DO    Procedure: CARPAL TUNNEL RELEASE LEFT (Left )    Medications prior to admission:   Prior to Admission medications    Medication Sig Start Date End Date Taking? Authorizing Provider   hydrOXYzine (ATARAX) 50 MG tablet  10/28/19   Historical Provider, MD   pantoprazole (PROTONIX) 40 MG tablet Take 1 tablet by mouth 2 times daily (before meals) 10/27/19   Tiffany Wall MD   sucralfate (CARAFATE) 1 GM tablet Take 1 tablet by mouth 4 times daily (before meals and nightly) 10/27/19   Tiffany Wall MD   ranolazine (RANEXA) 500 MG extended release tablet Take 1 tablet by mouth 2 times daily 10/8/19   ERLINDA Cruz - CNP   rifaximin (XIFAXAN) 550 MG tablet Take 1 tablet by mouth 2 times daily 19   Emily Keith MD   furosemide (LASIX) 20 MG tablet Take 20 mg by mouth daily  19   Historical Provider, MD   hydrOXYzine (VISTARIL) 50 MG capsule Take 50 mg by mouth three times daily     Historical Provider, MD   lactulose encephalopathy 10 GM/15ML SOLN solution 30 g 3 times daily  3/11/19   Historical Provider, MD   spironolactone (ALDACTONE) 50 MG tablet Take 50 mg by mouth daily  19   Historical Provider, MD   Compression Stockings MISC by Does not apply route 20 - 30 mmh wear daily and take off at night  Thigh High 19   ERLINDA Villarreal CNP   nitroGLYCERIN (NITROSTAT) 0.4 MG SL tablet Place 1 tablet under the tongue every 5 minutes as needed for Chest pain 3/18/19   ERLINDA Villarreal CNP   promethazine (PHENERGAN) 25 MG tablet Take 1 tablet by mouth 3 times daily as needed for Nausea 18   Humera Willis MD   metoprolol tartrate (LOPRESSOR) 25 MG tablet Take 1 tablet by mouth 2 times daily 8/3/18   Kenya Robert MD FLUoxetine (PROZAC) 40 MG capsule Take 40 mg by mouth daily    Historical Provider, MD   VIREAD 300 MG tablet Take 300 mg by mouth nightly  11/20/17   Historical Provider, MD   QUEtiapine (SEROQUEL) 300 MG tablet Take 300 mg by mouth nightly  11/15/16   Historical Provider, MD   oxyCODONE (ROXICODONE) 5 MG immediate release tablet Take 5 mg by mouth 4 times daily  .     Historical Provider, MD   busPIRone (BUSPAR) 15 MG tablet Take 1 tablet by mouth 2 times daily 5/28/15   Jolene Renteria MD       Current medications:    Current Outpatient Medications   Medication Sig Dispense Refill    hydrOXYzine (ATARAX) 50 MG tablet       pantoprazole (PROTONIX) 40 MG tablet Take 1 tablet by mouth 2 times daily (before meals) 60 tablet 1    sucralfate (CARAFATE) 1 GM tablet Take 1 tablet by mouth 4 times daily (before meals and nightly) 120 tablet 3    ranolazine (RANEXA) 500 MG extended release tablet Take 1 tablet by mouth 2 times daily 60 tablet 5    rifaximin (XIFAXAN) 550 MG tablet Take 1 tablet by mouth 2 times daily 42 tablet 0    furosemide (LASIX) 20 MG tablet Take 20 mg by mouth daily       hydrOXYzine (VISTARIL) 50 MG capsule Take 50 mg by mouth three times daily       lactulose encephalopathy 10 GM/15ML SOLN solution 30 g 3 times daily       spironolactone (ALDACTONE) 50 MG tablet Take 50 mg by mouth daily       Compression Stockings MISC by Does not apply route 20 - 30 mmh wear daily and take off at night  Thigh High 2 each 0    nitroGLYCERIN (NITROSTAT) 0.4 MG SL tablet Place 1 tablet under the tongue every 5 minutes as needed for Chest pain 25 tablet 3    promethazine (PHENERGAN) 25 MG tablet Take 1 tablet by mouth 3 times daily as needed for Nausea 60 tablet 1    metoprolol tartrate (LOPRESSOR) 25 MG tablet Take 1 tablet by mouth 2 times daily 60 tablet 6    FLUoxetine (PROZAC) 40 MG capsule Take 40 mg by mouth daily      VIREAD 300 MG tablet Take 300 mg by mouth nightly       QUEtiapine Small esophageal varices, portal hypertensive gastropathy, reflux esophagitis, hiatal hernia    EYE SURGERY Bilateral ? when    cyst removal, cataracts    HYSTERECTOMY      per old chart pt had CHOLO/BSO 1986    TIPS PROCEDURE      TONSILLECTOMY  as a kid    UPPER GASTROINTESTINAL ENDOSCOPY N/A 2018    EGD DIAGNOSTIC ONLY performed by Domingo Crouch MD at [facility] N/A 2019    EGD DIAGNOSTIC ONLY performed by Domingo Crouch MD at [address] [place] [address] ENDOSCOPY       Social History:    Social History     Tobacco Use    Smoking status: Former Smoker     Packs/day: 3.00     Years: 45.00     Pack years: 135.00     Types: Cigarettes     Start date:      Last attempt to quit: 2019     Years since quittin.0    Smokeless tobacco: Never Used   Substance Use Topics    Alcohol use: Not Currently     Alcohol/week: 2.0 - 3.0 standard drinks     Types: 2 - 3 Shots of liquor per week     Comment: 2-3 shots last 2019                                Counseling given: Not Answered      Vital Signs (Current): There were no vitals filed for this visit. BP Readings from Last 3 Encounters:   19 110/64   10/27/19 96/63   19 (!) 89/51       NPO Status:                                                                                 BMI:   Wt Readings from Last 3 Encounters:   19 167 lb 6.4 oz (75.9 kg)   19 164 lb (74.4 kg)   10/25/19 164 lb (74.4 kg)     There is no height or weight on file to calculate BMI.       CBC  Lab Results   Component Value Date/Time    WBC 5.3 2020 07:45 AM    HGB 12.6 2020 07:45 AM    HCT 37.8 2020 07:45 AM    PLT 93 (L) 2020 07:45 AM     RENAL  Lab Results   Component Value Date/Time     2020 07:45 AM    K 3.9 2020 07:45 AM     2020 07:45 AM    CO2 27 2020 07:45 AM    BUN 17 2020 07:45 AM    CREATININE 1.0 2020 07:45 AM GLUCOSE 236 (H) 2020 07:45 AM     COAGS  Lab Results   Component Value Date/Time    PROTIME 13.5 2020 07:45 AM    PROTIME 11.6 2011 03:25 AM    INR 1.11 2020 07:45 AM    APTT 78.6 (H) 2019 01:21 AM       Anesthesia Evaluation  Patient summary reviewed and Nursing notes reviewed  Airway: Mallampati: I  TM distance: >3 FB   Neck ROM: full  Mouth opening: > = 3 FB Dental:    (+) edentulous      Pulmonary: breath sounds clear to auscultation  (+) COPD (no inhalers, followed by Dr. Migdalia Rich): mild,  shortness of breath:                            ROS comment: Former smoker, hx 2-3 ppd x 45 years, quit 2019  Counseled on smoking cessation. PAT Airway Exam:  Head/Neck movement: full  Thyromental Distance: >3 FB  History of difficult intubation:  None  Mallampati Classification:  Class II  Teeth: all teeth extracted   Able to lie flat     LUNGS:  No increased work of breathing, good air exchange, clear to auscultation bilaterally, no crackles or wheezing   Cardiovascular:    (+) hypertension (on beta blocker):, angina:, CAD:, dysrhythmias (PAF? ):, hyperlipidemia      ECG reviewed  Rhythm: regular    Echocardiogram reviewed  Stress test reviewed  Cleared by cardiology           ROS comment: Cardiac risk assessment per Dr. Sierra Carrel. Considered a LOW risk candidate for any shirlene-operative cardiac complications. GXT stress 2020   Overall Impression:   Normal exercise performance without angina and ischemic EKG changes. Functional Capacity: Fair Exercise Tolerance. No chest pain noted during  testing. EK2019  Sinus  Rhythm   WITHIN NORMAL LIMITS    Echo 2016  1. This 2D echocardiogram is normal.    2. The Left ventricular ejection fraction is normal    3. Significant valvular heart disease is absent       CARDIOVASCULAR:  Normal apical impulse, regular rate and rhythm, normal S1 and S2, no S3 or S4, and no murmur noted    CP or SOB: NO  Exercise: NO     Neuro/Psych:   (+)

## 2019-12-19 ENCOUNTER — CARE COORDINATION (OUTPATIENT)
Dept: CARE COORDINATION | Age: 59
End: 2019-12-19

## 2019-12-19 ENCOUNTER — OFFICE VISIT (OUTPATIENT)
Dept: FAMILY MEDICINE CLINIC | Age: 59
End: 2019-12-19
Payer: COMMERCIAL

## 2019-12-19 VITALS
HEART RATE: 93 BPM | SYSTOLIC BLOOD PRESSURE: 118 MMHG | WEIGHT: 166.2 LBS | OXYGEN SATURATION: 95 % | DIASTOLIC BLOOD PRESSURE: 82 MMHG | BODY MASS INDEX: 35.34 KG/M2

## 2019-12-19 DIAGNOSIS — F41.9 ANXIETY: ICD-10-CM

## 2019-12-19 DIAGNOSIS — J44.9 CHRONIC OBSTRUCTIVE PULMONARY DISEASE, UNSPECIFIED COPD TYPE (HCC): ICD-10-CM

## 2019-12-19 DIAGNOSIS — K21.9 GASTROESOPHAGEAL REFLUX DISEASE WITHOUT ESOPHAGITIS: ICD-10-CM

## 2019-12-19 DIAGNOSIS — Z23 NEED FOR IMMUNIZATION AGAINST INFLUENZA: ICD-10-CM

## 2019-12-19 DIAGNOSIS — F17.200 TOBACCO USE DISORDER: ICD-10-CM

## 2019-12-19 DIAGNOSIS — E11.8 TYPE 2 DIABETES MELLITUS WITH COMPLICATION, WITH LONG-TERM CURRENT USE OF INSULIN (HCC): Primary | ICD-10-CM

## 2019-12-19 DIAGNOSIS — I10 ESSENTIAL HYPERTENSION: ICD-10-CM

## 2019-12-19 DIAGNOSIS — Z79.4 TYPE 2 DIABETES MELLITUS WITH COMPLICATION, WITH LONG-TERM CURRENT USE OF INSULIN (HCC): Primary | ICD-10-CM

## 2019-12-19 LAB — HBA1C MFR BLD: 8.9 %

## 2019-12-19 PROCEDURE — 3023F SPIROM DOC REV: CPT | Performed by: FAMILY MEDICINE

## 2019-12-19 PROCEDURE — 90686 IIV4 VACC NO PRSV 0.5 ML IM: CPT | Performed by: FAMILY MEDICINE

## 2019-12-19 PROCEDURE — 2022F DILAT RTA XM EVC RTNOPTHY: CPT | Performed by: FAMILY MEDICINE

## 2019-12-19 PROCEDURE — G8926 SPIRO NO PERF OR DOC: HCPCS | Performed by: FAMILY MEDICINE

## 2019-12-19 PROCEDURE — G8427 DOCREV CUR MEDS BY ELIG CLIN: HCPCS | Performed by: FAMILY MEDICINE

## 2019-12-19 PROCEDURE — 3045F PR MOST RECENT HEMOGLOBIN A1C LEVEL 7.0-9.0%: CPT | Performed by: FAMILY MEDICINE

## 2019-12-19 PROCEDURE — 1036F TOBACCO NON-USER: CPT | Performed by: FAMILY MEDICINE

## 2019-12-19 PROCEDURE — G8598 ASA/ANTIPLAT THER USED: HCPCS | Performed by: FAMILY MEDICINE

## 2019-12-19 PROCEDURE — G8417 CALC BMI ABV UP PARAM F/U: HCPCS | Performed by: FAMILY MEDICINE

## 2019-12-19 PROCEDURE — 99214 OFFICE O/P EST MOD 30 MIN: CPT | Performed by: FAMILY MEDICINE

## 2019-12-19 PROCEDURE — 3017F COLORECTAL CA SCREEN DOC REV: CPT | Performed by: FAMILY MEDICINE

## 2019-12-19 PROCEDURE — 83036 HEMOGLOBIN GLYCOSYLATED A1C: CPT | Performed by: FAMILY MEDICINE

## 2019-12-19 PROCEDURE — G9899 SCRN MAM PERF RSLTS DOC: HCPCS | Performed by: FAMILY MEDICINE

## 2019-12-19 PROCEDURE — G8482 FLU IMMUNIZE ORDER/ADMIN: HCPCS | Performed by: FAMILY MEDICINE

## 2019-12-19 PROCEDURE — 90471 IMMUNIZATION ADMIN: CPT | Performed by: FAMILY MEDICINE

## 2019-12-19 RX ORDER — METFORMIN HYDROCHLORIDE 500 MG/1
500 TABLET, EXTENDED RELEASE ORAL 2 TIMES DAILY
Qty: 180 TABLET | Refills: 3 | Status: SHIPPED | OUTPATIENT
Start: 2019-12-19 | End: 2020-06-16

## 2019-12-23 ENCOUNTER — TELEPHONE (OUTPATIENT)
Dept: CARDIOLOGY CLINIC | Age: 59
End: 2019-12-23

## 2019-12-26 ENCOUNTER — TELEPHONE (OUTPATIENT)
Dept: ORTHOPEDIC SURGERY | Age: 59
End: 2019-12-26

## 2019-12-30 ASSESSMENT — ENCOUNTER SYMPTOMS
COUGH: 0
DIARRHEA: 0
WHEEZING: 0
ABDOMINAL PAIN: 0
NAUSEA: 0
SHORTNESS OF BREATH: 0
CONSTIPATION: 0
ABDOMINAL DISTENTION: 0

## 2020-01-02 ENCOUNTER — PROCEDURE VISIT (OUTPATIENT)
Dept: CARDIOLOGY CLINIC | Age: 60
End: 2020-01-02
Payer: COMMERCIAL

## 2020-01-02 ENCOUNTER — TELEPHONE (OUTPATIENT)
Dept: CARDIOLOGY CLINIC | Age: 60
End: 2020-01-02

## 2020-01-02 VITALS
WEIGHT: 166 LBS | SYSTOLIC BLOOD PRESSURE: 118 MMHG | HEIGHT: 58 IN | DIASTOLIC BLOOD PRESSURE: 60 MMHG | HEART RATE: 75 BPM | BODY MASS INDEX: 34.85 KG/M2

## 2020-01-02 PROCEDURE — 93015 CV STRESS TEST SUPVJ I&R: CPT | Performed by: INTERNAL MEDICINE

## 2020-01-02 NOTE — TELEPHONE ENCOUNTER
Cardiologist: Dr. Brendan Longo  Surgeon: Dr. Nena Solomon   Surgery: Left Carpal Tunnel Release   Anesthesia: MAC/Local  Date: 1/9/2020  FAX# [phone]    Ph# [phone]    After reviewing GXT, per  this pt is considered a LOW risk candidate for any perioperative cardiac issues. Informed pt of this as well & she voiced understanding. Faxed signed, completed cardiac risk assessment letter to above fax#.  ___________________________________  *Pt had GXT today. *Last OV was on 11/18/19 w/NP Lawrence. *Last Cardiac Cath done in 2013.

## 2020-01-03 ENCOUNTER — HOSPITAL ENCOUNTER (OUTPATIENT)
Dept: PREADMISSION TESTING | Age: 60
Discharge: HOME | End: 2020-01-07
Payer: MEDICARE

## 2020-01-03 VITALS
BODY MASS INDEX: 36.24 KG/M2 | DIASTOLIC BLOOD PRESSURE: 69 MMHG | RESPIRATION RATE: 16 BRPM | HEIGHT: 57 IN | WEIGHT: 168 LBS | HEART RATE: 73 BPM | SYSTOLIC BLOOD PRESSURE: 120 MMHG | TEMPERATURE: 97.7 F | OXYGEN SATURATION: 97 %

## 2020-01-03 LAB
AMPHETAMINES: NEGATIVE
ANION GAP SERPL CALCULATED.3IONS-SCNC: 7 MMOL/L (ref 4–16)
BARBITURATE SCREEN URINE: NEGATIVE
BENZODIAZEPINE SCREEN, URINE: NEGATIVE
BUN BLDV-MCNC: 17 MG/DL (ref 6–23)
CALCIUM SERPL-MCNC: 9.3 MG/DL (ref 8.3–10.6)
CANNABINOID SCREEN URINE: ABNORMAL
CHLORIDE BLD-SCNC: 106 MMOL/L (ref 99–110)
CO2: 27 MMOL/L (ref 21–32)
COCAINE METABOLITE: NEGATIVE
CREAT SERPL-MCNC: 1 MG/DL (ref 0.6–1.1)
GFR AFRICAN AMERICAN: >60 ML/MIN/1.73M2
GFR NON-AFRICAN AMERICAN: 57 ML/MIN/1.73M2
GLUCOSE BLD-MCNC: 236 MG/DL (ref 70–99)
HCT VFR BLD CALC: 37.8 % (ref 37–47)
HEMOGLOBIN: 12.6 GM/DL (ref 12.5–16)
INR BLD: 1.11 INDEX
MCH RBC QN AUTO: 31.4 PG (ref 27–31)
MCHC RBC AUTO-ENTMCNC: 33.3 % (ref 32–36)
MCV RBC AUTO: 94.3 FL (ref 78–100)
OPIATES, URINE: NEGATIVE
OXYCODONE: ABNORMAL
PDW BLD-RTO: 14.2 % (ref 11.7–14.9)
PHENCYCLIDINE, URINE: NEGATIVE
PLATELET # BLD: 93 K/CU MM (ref 140–440)
PMV BLD AUTO: 11.8 FL (ref 7.5–11.1)
POTASSIUM SERPL-SCNC: 3.9 MMOL/L (ref 3.5–5.1)
PROTHROMBIN TIME: 13.5 SECONDS (ref 11.7–14.5)
RBC # BLD: 4.01 M/CU MM (ref 4.2–5.4)
SODIUM BLD-SCNC: 140 MMOL/L (ref 135–145)
WBC # BLD: 5.3 K/CU MM (ref 4–10.5)

## 2020-01-03 PROCEDURE — 80048 BASIC METABOLIC PNL TOTAL CA: CPT

## 2020-01-03 PROCEDURE — 36415 COLL VENOUS BLD VENIPUNCTURE: CPT

## 2020-01-03 PROCEDURE — 85027 COMPLETE CBC AUTOMATED: CPT

## 2020-01-03 PROCEDURE — 85610 PROTHROMBIN TIME: CPT

## 2020-01-03 PROCEDURE — 80307 DRUG TEST PRSMV CHEM ANLYZR: CPT

## 2020-01-06 RX ORDER — OXYCODONE HYDROCHLORIDE AND ACETAMINOPHEN 5; 325 MG/1; MG/1
1 TABLET ORAL EVERY 6 HOURS PRN
Qty: 5 TABLET | Refills: 0 | Status: SHIPPED | OUTPATIENT
Start: 2020-01-06 | End: 2020-01-09

## 2020-01-06 RX ORDER — CEPHALEXIN 250 MG/1
250 CAPSULE ORAL 4 TIMES DAILY
Qty: 4 CAPSULE | Refills: 0 | Status: SHIPPED | OUTPATIENT
Start: 2020-01-06 | End: 2020-01-07

## 2020-01-09 ENCOUNTER — ANESTHESIA (OUTPATIENT)
Dept: OPERATING ROOM | Age: 60
End: 2020-01-09
Payer: MEDICARE

## 2020-01-09 ENCOUNTER — HOSPITAL ENCOUNTER (OUTPATIENT)
Age: 60
Setting detail: OUTPATIENT SURGERY
Discharge: HOME | End: 2020-01-09
Attending: ORTHOPAEDIC SURGERY | Admitting: ORTHOPAEDIC SURGERY
Payer: MEDICARE

## 2020-01-09 VITALS
WEIGHT: 168 LBS | HEART RATE: 63 BPM | BODY MASS INDEX: 36.24 KG/M2 | SYSTOLIC BLOOD PRESSURE: 90 MMHG | HEIGHT: 57 IN | RESPIRATION RATE: 16 BRPM | OXYGEN SATURATION: 96 % | TEMPERATURE: 98.2 F | DIASTOLIC BLOOD PRESSURE: 61 MMHG

## 2020-01-09 VITALS — SYSTOLIC BLOOD PRESSURE: 102 MMHG | OXYGEN SATURATION: 94 % | DIASTOLIC BLOOD PRESSURE: 62 MMHG

## 2020-01-09 LAB — GLUCOSE BLD-MCNC: 184 MG/DL (ref 70–99)

## 2020-01-09 PROCEDURE — 7100000010 HC PHASE II RECOVERY - FIRST 15 MIN: Performed by: ORTHOPAEDIC SURGERY

## 2020-01-09 PROCEDURE — 3700000000 HC ANESTHESIA ATTENDED CARE: Performed by: ORTHOPAEDIC SURGERY

## 2020-01-09 PROCEDURE — 2580000003 HC RX 258: Performed by: ORTHOPAEDIC SURGERY

## 2020-01-09 PROCEDURE — 64721 CARPAL TUNNEL SURGERY: CPT | Performed by: ORTHOPAEDIC SURGERY

## 2020-01-09 PROCEDURE — 6360000002 HC RX W HCPCS: Performed by: NURSE ANESTHETIST, CERTIFIED REGISTERED

## 2020-01-09 PROCEDURE — 3600000002 HC SURGERY LEVEL 2 BASE: Performed by: ORTHOPAEDIC SURGERY

## 2020-01-09 PROCEDURE — 7100000011 HC PHASE II RECOVERY - ADDTL 15 MIN: Performed by: ORTHOPAEDIC SURGERY

## 2020-01-09 PROCEDURE — 2500000003 HC RX 250 WO HCPCS: Performed by: NURSE ANESTHETIST, CERTIFIED REGISTERED

## 2020-01-09 PROCEDURE — 3700000001 HC ADD 15 MINUTES (ANESTHESIA): Performed by: ORTHOPAEDIC SURGERY

## 2020-01-09 PROCEDURE — 82962 GLUCOSE BLOOD TEST: CPT

## 2020-01-09 PROCEDURE — 2709999900 HC NON-CHARGEABLE SUPPLY: Performed by: ORTHOPAEDIC SURGERY

## 2020-01-09 PROCEDURE — 3600000012 HC SURGERY LEVEL 2 ADDTL 15MIN: Performed by: ORTHOPAEDIC SURGERY

## 2020-01-09 PROCEDURE — 2500000003 HC RX 250 WO HCPCS: Performed by: ORTHOPAEDIC SURGERY

## 2020-01-09 PROCEDURE — 6360000002 HC RX W HCPCS: Performed by: ORTHOPAEDIC SURGERY

## 2020-01-09 RX ORDER — SODIUM CHLORIDE, SODIUM LACTATE, POTASSIUM CHLORIDE, CALCIUM CHLORIDE 600; 310; 30; 20 MG/100ML; MG/100ML; MG/100ML; MG/100ML
INJECTION, SOLUTION INTRAVENOUS CONTINUOUS
Status: DISCONTINUED | OUTPATIENT
Start: 2020-01-09 | End: 2020-01-09 | Stop reason: HOSPADM

## 2020-01-09 RX ORDER — LIDOCAINE HYDROCHLORIDE 10 MG/ML
INJECTION, SOLUTION INFILTRATION; PERINEURAL
Status: COMPLETED | OUTPATIENT
Start: 2020-01-09 | End: 2020-01-09

## 2020-01-09 RX ORDER — CEFAZOLIN SODIUM 2 G/100ML
2 INJECTION, SOLUTION INTRAVENOUS
Status: COMPLETED | OUTPATIENT
Start: 2020-01-09 | End: 2020-01-09

## 2020-01-09 RX ORDER — SODIUM CHLORIDE 0.9 % (FLUSH) 0.9 %
10 SYRINGE (ML) INJECTION PRN
Status: DISCONTINUED | OUTPATIENT
Start: 2020-01-09 | End: 2020-01-09 | Stop reason: HOSPADM

## 2020-01-09 RX ORDER — SODIUM CHLORIDE, SODIUM LACTATE, POTASSIUM CHLORIDE, CALCIUM CHLORIDE 600; 310; 30; 20 MG/100ML; MG/100ML; MG/100ML; MG/100ML
INJECTION, SOLUTION INTRAVENOUS
Status: COMPLETED
Start: 2020-01-09 | End: 2020-01-09

## 2020-01-09 RX ORDER — SODIUM CHLORIDE 0.9 % (FLUSH) 0.9 %
10 SYRINGE (ML) INJECTION EVERY 12 HOURS SCHEDULED
Status: DISCONTINUED | OUTPATIENT
Start: 2020-01-09 | End: 2020-01-09 | Stop reason: HOSPADM

## 2020-01-09 RX ORDER — ONDANSETRON 2 MG/ML
4 INJECTION INTRAMUSCULAR; INTRAVENOUS EVERY 6 HOURS PRN
Status: DISCONTINUED | OUTPATIENT
Start: 2020-01-09 | End: 2020-01-09 | Stop reason: HOSPADM

## 2020-01-09 RX ORDER — PROPOFOL 10 MG/ML
INJECTION, EMULSION INTRAVENOUS PRN
Status: DISCONTINUED | OUTPATIENT
Start: 2020-01-09 | End: 2020-01-09 | Stop reason: SDUPTHER

## 2020-01-09 RX ORDER — DOCUSATE SODIUM 100 MG/1
100 CAPSULE, LIQUID FILLED ORAL 2 TIMES DAILY
Status: DISCONTINUED | OUTPATIENT
Start: 2020-01-09 | End: 2020-01-09 | Stop reason: HOSPADM

## 2020-01-09 RX ORDER — LIDOCAINE HYDROCHLORIDE 20 MG/ML
INJECTION, SOLUTION EPIDURAL; INFILTRATION; INTRACAUDAL; PERINEURAL PRN
Status: DISCONTINUED | OUTPATIENT
Start: 2020-01-09 | End: 2020-01-09 | Stop reason: SDUPTHER

## 2020-01-09 RX ADMIN — PROPOFOL 30 MG: 10 INJECTION, EMULSION INTRAVENOUS at 07:34

## 2020-01-09 RX ADMIN — PROPOFOL 30 MG: 10 INJECTION, EMULSION INTRAVENOUS at 07:44

## 2020-01-09 RX ADMIN — LIDOCAINE HYDROCHLORIDE 2 ML: 20 INJECTION, SOLUTION EPIDURAL; INFILTRATION; INTRACAUDAL; PERINEURAL at 07:34

## 2020-01-09 RX ADMIN — PROPOFOL 30 MG: 10 INJECTION, EMULSION INTRAVENOUS at 07:39

## 2020-01-09 RX ADMIN — CEFAZOLIN SODIUM 2 G: 2 INJECTION, SOLUTION INTRAVENOUS at 07:30

## 2020-01-09 RX ADMIN — SODIUM CHLORIDE, POTASSIUM CHLORIDE, SODIUM LACTATE AND CALCIUM CHLORIDE: 600; 310; 30; 20 INJECTION, SOLUTION INTRAVENOUS at 07:30

## 2020-01-09 RX ADMIN — PROPOFOL 30 MG: 10 INJECTION, EMULSION INTRAVENOUS at 07:37

## 2020-01-09 RX ADMIN — PROPOFOL 30 MG: 10 INJECTION, EMULSION INTRAVENOUS at 07:42

## 2020-01-09 RX ADMIN — SODIUM CHLORIDE, POTASSIUM CHLORIDE, SODIUM LACTATE AND CALCIUM CHLORIDE: 600; 310; 30; 20 INJECTION, SOLUTION INTRAVENOUS at 06:40

## 2020-01-09 ASSESSMENT — PULMONARY FUNCTION TESTS
PIF_VALUE: 1
PIF_VALUE: 0
PIF_VALUE: 1
PIF_VALUE: 0
PIF_VALUE: 1

## 2020-01-09 ASSESSMENT — PAIN SCALES - GENERAL
PAINLEVEL_OUTOF10: 0

## 2020-01-09 ASSESSMENT — PAIN - FUNCTIONAL ASSESSMENT
PAIN_FUNCTIONAL_ASSESSMENT: ACTIVITIES ARE NOT PREVENTED
PAIN_FUNCTIONAL_ASSESSMENT: 0-10

## 2020-01-09 ASSESSMENT — PAIN DESCRIPTION - DESCRIPTORS: DESCRIPTORS: ACHING;DISCOMFORT

## 2020-01-09 NOTE — PROGRESS NOTES
Patient discharge instructions and prescriptions reviewed and verified. RN reviewed d/c instructions with patient and family member. All questions answered. Prescriptions given to patients brother , he is taking to outpatient pharmacy downstairs. Discharge paperwork signed by RN and patients brother. Armband removed.

## 2020-01-09 NOTE — H&P
Subjective:      Patient ID: Justino Brown is a 66 y.o. female.     Patient has return today to discuss surgery for her left carpal tunnel syndrome. She was last seen 2/25/19 and surgery was discussed bu was unable to to being sick. Pain level 5/10 but worsens at night and is dropping objects more frequently. Pain is now radiating proximally to shoulder. EMG was completed 8/10/17     He comes in today for a recheck of her bilateral hand numbness and tingling as well as recheck of her locking and catching in her right ring finger as well as increasing weakness and numbness in her right hand. She states that now she is beginning to drop things as of the weakness in her right hand. She states that when her ring finger locks she has to use her other hand in order to straighten out. She denies any recent injury to the right hand and denies any fever or chills.     She states that since I saw her last her left hand numbness and tingling is actually worse than her right hand numbness and tingling in her catching and locking. She would like to begin with surgical treatment for her left hand first.        Review of Systems   Constitutional: Negative for activity change, chills and fever. Respiratory: Negative for shortness of breath. Cardiovascular: Negative for chest pain. Musculoskeletal: Positive for arthralgias and myalgias. Negative for gait problem and joint swelling. Skin: Negative for color change, pallor, rash and wound.    Neurological: Positive for weakness and numbness.      Past Medical History   Past Medical History:   Diagnosis Date    Acid reflux      Arthritis       left knee    CAD (coronary artery disease)       per [facility] 9/6/13    COPD (chronic obstructive pulmonary disease) ([facility]       follow with Dr Hinojosa Prost Depression       \"have manic - depression see Dr Carlos Fuchs    Diabetes mellitus [facility])       dx 10+ yrs ago-     Drug abuse ([facility] [address]       hx use of cocaine, heroin and marijuana- by mouth daily      VIREAD 300 MG tablet Take 300 mg by mouth nightly       QUEtiapine (SEROQUEL) 300 MG tablet Take 150 mg by mouth nightly       busPIRone (BUSPAR) 15 MG tablet Take 1 tablet by mouth 2 times daily 60 tablet 3    Compression Stockings MISC by Does not apply route 20 - 30 mmh wear daily and take off at night  Thigh High 2 each 0    nitroGLYCERIN (NITROSTAT) 0.4 MG SL tablet Place 1 tablet under the tongue every 5 minutes as needed for Chest pain 25 tablet 3    promethazine (PHENERGAN) 25 MG tablet Take 1 tablet by mouth 3 times daily as needed for Nausea 60 tablet 1    oxyCODONE (ROXICODONE) 5 MG immediate release tablet Take 5 mg by mouth 4 times daily  . Allergies   Allergen Reactions    Norco [Hydrocodone-Acetaminophen] Itching     Itching, rash, nausea and vomiting.      Tylenol [Acetaminophen] Itching, Nausea And Vomiting and Rash     Past Surgical History:   Procedure Laterality Date    BREAST SURGERY  10/2015    left breast bx    CARDIAC CATHETERIZATION      per old chart pt had cath done in 3/2011 and 9/2013    CHOLECYSTECTOMY  per old chart done [address] [place] [address]  3/11/13    diverticulosis, cecal polyp    COLONOSCOPY  03/16/2017    Internal hemorrhoids- Dr. Zi Robison, COLON, DIAGNOSTIC  03/16/2017    EGD: Small esophageal varices, portal hypertensive gastropathy, reflux esophagitis, hiatal hernia    EYE SURGERY Bilateral ? when    cyst removal, cataracts w/lens replacement    HYSTERECTOMY      per old chart pt had CHOLO/BSO 1986    TIPS PROCEDURE  04/23/2019    TONSILLECTOMY  as a kid    UPPER GASTROINTESTINAL ENDOSCOPY N/A 11/14/2018    EGD DIAGNOSTIC ONLY performed by Domingo Crouch MD at [address] [place] [address],[address] N/A 6/5/2019    EGD DIAGNOSTIC ONLY performed by Domingo Crouch MD at [facility] ENDOSCOPY     Social History     Tobacco Use    Smoking status: Former Smoker     Packs/day: 3.00     Years: 45.00     Pack years: 135.00 soonest convenience. I explained postoperative rehabilitation protocol and expectations with the patient today. Patient will follow up with their primary care physician prior to surgical treatment for preoperative clearance. Continue weight-bearing as tolerated. Continue range of motion exercises as instructed. Ice and elevate as needed. Tylenol or Motrin for pain.   Follow up in 2 weeks postop and when she is doing better we will proceed with surgical treatment for her right carpal tunnel and right trigger fingers.     Pt seen and examined, No change in H+P.                   KAIT PANCHAL,

## 2020-01-09 NOTE — OP NOTE
DATE OF PROCEDURE:  1/9/2020    PREOPERATIVE DIAGNOSES:  1. Left carpal tunnel syndrome. POSTOPERATIVE DIAGNOSES:  1. Left carpal tunnel syndrome. PROCEDURES:  1. Left carpal tunnel release. ATTENDING SURGEON:  Emmy Phoenix, DO    ANESTHESIA:  MAC with local.    ESTIMATED BLOOD LOSS:  1 mL. TOTAL TOURNIQUET TIME:  5 minutes. FLUIDS:  300 mL of crystalloids. INDICATIONS FOR PROCEDURE:  The patient is a 65-year-old female with a  long-standing history of worsening, painful numbness and tingling in her  left hand. For that, she underwent conservative treatment with no relief of  symptoms. EMG was subsequently obtained, which showed evidence of  carpal tunnel syndrome. Given her persistent symptoms despite conservative treatment and with her EMG findings, I   recommended surgical treatment. I explained the risks, benefits and possible complications of the procedure  to the patient and after answering all of her questions, she consented to  undergo the above procedure. DESCRIPTION OF PROCEDURE:  The patient was seen and evaluated in the  preoperative holding area where the left upper extremity was signed in her  presence. At this point, care of the patient was turned over to anesthesia  team, was transported back to the operative suite. She was placed supine  on the operating table with the left upper extremity on an arm board. MAC  anesthesia was applied and once adequate anesthesia was obtained, the left  upper extremity was prepped and draped in usual sterile fashion. Preoperative antibiotics were administered. At this point, a time-out was  performed and all in attendance were in agreement. I marked out the planned surgical incision overlying the volar aspect of  the left wrist along the radial border of the ring finger proximal to  [facility] cardinal line. I then injected approximately 2 mL of 1% plain lidocaine around the carpal tunnel incision.   I then exsanguinated the

## 2020-01-09 NOTE — PROGRESS NOTES
Patient returned to room from [address] [place] [address], report received from Heidi Pan and Sami Zimmerman CRNA. A+Ox4,  VSS (see doc flow), assessment completed as per doc flow. Patient has no c/o pain. Dressing to right wrist is clean, dry and intact. Beverage offered. Call light in reach, bed in low position. RN to continue to monitor. Will call to waiting room for family.

## 2020-01-09 NOTE — ANESTHESIA POSTPROCEDURE EVALUATION
Department of Anesthesiology  Postprocedure Note    Patient: Cait Salazar  MRN: [id number]  YOB: 1960  Date of evaluation: 1/9/2020  Time:  7:54 AM     Procedure Summary     Date:  01/09/20 Room / Location:  [address] [place] [address]    Anesthesia Start:  0730 Anesthesia Stop:  8490    Procedure:  CARPAL TUNNEL RELEASE LEFT (Left ) Diagnosis:  (left carpal tunnel syndrome)    Surgeon:  Noemi Vora DO Responsible Provider:  Marylen Bustle, MD    Anesthesia Type:  MAC, TIVA ASA Status:  4          Anesthesia Type: MAC, TIVA    Melany Phase I: Melany Score: 10    Melany Phase II:      Last vitals: Reviewed and per EMR flowsheets.        Anesthesia Post Evaluation    Patient location during evaluation: bedside  Patient participation: complete - patient participated  Level of consciousness: awake and alert  Pain score: 0  Airway patency: patent  Nausea & Vomiting: no nausea and no vomiting  Complications: no  Cardiovascular status: blood pressure returned to baseline  Respiratory status: acceptable  Hydration status: euvolemic

## 2020-01-14 ENCOUNTER — APPOINTMENT (OUTPATIENT)
Dept: GENERAL RADIOLOGY | Age: 60
End: 2020-01-14
Payer: MEDICARE

## 2020-01-14 PROCEDURE — 71046 X-RAY EXAM CHEST 2 VIEWS: CPT

## 2020-01-14 PROCEDURE — 93005 ELECTROCARDIOGRAM TRACING: CPT | Performed by: EMERGENCY MEDICINE

## 2020-01-15 ENCOUNTER — HOSPITAL ENCOUNTER (OUTPATIENT)
Age: 60
Setting detail: OBSERVATION
Discharge: HOME | End: 2020-01-17
Attending: EMERGENCY MEDICINE | Admitting: INTERNAL MEDICINE
Payer: MEDICARE

## 2020-01-15 ENCOUNTER — APPOINTMENT (OUTPATIENT)
Dept: CT IMAGING | Age: 60
End: 2020-01-15
Payer: MEDICARE

## 2020-01-15 PROBLEM — K76.82 ACUTE HEPATIC ENCEPHALOPATHY: Status: ACTIVE | Noted: 2020-01-15

## 2020-01-15 LAB
ALBUMIN SERPL-MCNC: 3.6 GM/DL (ref 3.4–5)
ALCOHOL SCREEN SERUM: NORMAL %WT/VOL
ALP BLD-CCNC: 183 IU/L (ref 40–128)
ALT SERPL-CCNC: 15 U/L (ref 10–40)
AMMONIA: 112 UMOL/L (ref 11–51)
ANION GAP SERPL CALCULATED.3IONS-SCNC: 11 MMOL/L (ref 4–16)
AST SERPL-CCNC: 20 IU/L (ref 15–37)
BACTERIA: NEGATIVE /HPF
BASOPHILS ABSOLUTE: 0.1 K/CU MM
BASOPHILS RELATIVE PERCENT: 0.9 % (ref 0–1)
BILIRUB SERPL-MCNC: 1 MG/DL (ref 0–1)
BILIRUBIN URINE: NEGATIVE MG/DL
BLOOD, URINE: NEGATIVE
BUN BLDV-MCNC: 10 MG/DL (ref 6–23)
CALCIUM SERPL-MCNC: 9.4 MG/DL (ref 8.3–10.6)
CHLORIDE BLD-SCNC: 105 MMOL/L (ref 99–110)
CLARITY: CLEAR
CO2: 24 MMOL/L (ref 21–32)
COLOR: YELLOW
CREAT SERPL-MCNC: 0.9 MG/DL (ref 0.6–1.1)
DIFFERENTIAL TYPE: ABNORMAL
EOSINOPHILS ABSOLUTE: 0.2 K/CU MM
EOSINOPHILS RELATIVE PERCENT: 3.4 % (ref 0–3)
GFR AFRICAN AMERICAN: >60 ML/MIN/1.73M2
GFR NON-AFRICAN AMERICAN: >60 ML/MIN/1.73M2
GLUCOSE BLD-MCNC: 213 MG/DL (ref 70–99)
GLUCOSE BLD-MCNC: 218 MG/DL (ref 70–99)
GLUCOSE BLD-MCNC: 238 MG/DL (ref 70–99)
GLUCOSE BLD-MCNC: 389 MG/DL (ref 70–99)
GLUCOSE, URINE: >500 MG/DL
HCT VFR BLD CALC: 40.1 % (ref 37–47)
HEMOGLOBIN: 13.5 GM/DL (ref 12.5–16)
IMMATURE NEUTROPHIL %: 0.2 % (ref 0–0.43)
INR BLD: 1.03 INDEX
KETONES, URINE: NEGATIVE MG/DL
LEUKOCYTE ESTERASE, URINE: NEGATIVE
LYMPHOCYTES ABSOLUTE: 1.8 K/CU MM
LYMPHOCYTES RELATIVE PERCENT: 31 % (ref 24–44)
MCH RBC QN AUTO: 31.7 PG (ref 27–31)
MCHC RBC AUTO-ENTMCNC: 33.7 % (ref 32–36)
MCV RBC AUTO: 94.1 FL (ref 78–100)
MONOCYTES ABSOLUTE: 0.4 K/CU MM
MONOCYTES RELATIVE PERCENT: 7.6 % (ref 0–4)
MUCUS: ABNORMAL HPF
NITRITE URINE, QUANTITATIVE: NEGATIVE
NUCLEATED RBC %: 0 %
PDW BLD-RTO: 14.2 % (ref 11.7–14.9)
PH, URINE: 6 (ref 5–8)
PLATELET # BLD: 109 K/CU MM (ref 140–440)
PMV BLD AUTO: 12 FL (ref 7.5–11.1)
POTASSIUM SERPL-SCNC: 3.8 MMOL/L (ref 3.5–5.1)
PROTEIN UA: NEGATIVE MG/DL
PROTHROMBIN TIME: 12.5 SECONDS (ref 11.7–14.5)
RBC # BLD: 4.26 M/CU MM (ref 4.2–5.4)
RBC URINE: <1 /HPF (ref 0–6)
SEGMENTED NEUTROPHILS ABSOLUTE COUNT: 3.3 K/CU MM
SEGMENTED NEUTROPHILS RELATIVE PERCENT: 56.9 % (ref 36–66)
SODIUM BLD-SCNC: 140 MMOL/L (ref 135–145)
SPECIFIC GRAVITY UA: 1.02 (ref 1–1.03)
SQUAMOUS EPITHELIAL: 3 /HPF
TOTAL IMMATURE NEUTOROPHIL: 0.01 K/CU MM
TOTAL NUCLEATED RBC: 0 K/CU MM
TOTAL PROTEIN: 7.7 GM/DL (ref 6.4–8.2)
TRANSITIONAL EPITHELIAL: <1 /HPF
TRICHOMONAS: ABNORMAL /HPF
TROPONIN T: <0.01 NG/ML
UROBILINOGEN, URINE: 2 MG/DL (ref 0.2–1)
WBC # BLD: 5.8 K/CU MM (ref 4–10.5)
WBC UA: <1 /HPF (ref 0–5)

## 2020-01-15 PROCEDURE — 6360000002 HC RX W HCPCS: Performed by: INTERNAL MEDICINE

## 2020-01-15 PROCEDURE — 70450 CT HEAD/BRAIN W/O DYE: CPT

## 2020-01-15 PROCEDURE — G0378 HOSPITAL OBSERVATION PER HR: HCPCS

## 2020-01-15 PROCEDURE — 80053 COMPREHEN METABOLIC PANEL: CPT

## 2020-01-15 PROCEDURE — 6370000000 HC RX 637 (ALT 250 FOR IP): Performed by: PHYSICIAN ASSISTANT

## 2020-01-15 PROCEDURE — 82140 ASSAY OF AMMONIA: CPT

## 2020-01-15 PROCEDURE — 36415 COLL VENOUS BLD VENIPUNCTURE: CPT

## 2020-01-15 PROCEDURE — 84484 ASSAY OF TROPONIN QUANT: CPT

## 2020-01-15 PROCEDURE — 96372 THER/PROPH/DIAG INJ SC/IM: CPT

## 2020-01-15 PROCEDURE — 85025 COMPLETE CBC W/AUTO DIFF WBC: CPT

## 2020-01-15 PROCEDURE — 6370000000 HC RX 637 (ALT 250 FOR IP): Performed by: INTERNAL MEDICINE

## 2020-01-15 PROCEDURE — 94761 N-INVAS EAR/PLS OXIMETRY MLT: CPT

## 2020-01-15 PROCEDURE — 2580000003 HC RX 258: Performed by: INTERNAL MEDICINE

## 2020-01-15 PROCEDURE — 85610 PROTHROMBIN TIME: CPT

## 2020-01-15 PROCEDURE — G0480 DRUG TEST DEF 1-7 CLASSES: HCPCS

## 2020-01-15 PROCEDURE — 6370000000 HC RX 637 (ALT 250 FOR IP): Performed by: STUDENT IN AN ORGANIZED HEALTH CARE EDUCATION/TRAINING PROGRAM

## 2020-01-15 PROCEDURE — 81001 URINALYSIS AUTO W/SCOPE: CPT

## 2020-01-15 PROCEDURE — 99285 EMERGENCY DEPT VISIT HI MDM: CPT

## 2020-01-15 PROCEDURE — 82962 GLUCOSE BLOOD TEST: CPT

## 2020-01-15 RX ORDER — FUROSEMIDE 20 MG/1
20 TABLET ORAL DAILY
Status: DISCONTINUED | OUTPATIENT
Start: 2020-01-15 | End: 2020-01-17 | Stop reason: HOSPADM

## 2020-01-15 RX ORDER — DEXTROSE MONOHYDRATE 25 G/50ML
12.5 INJECTION, SOLUTION INTRAVENOUS PRN
Status: DISCONTINUED | OUTPATIENT
Start: 2020-01-15 | End: 2020-01-17 | Stop reason: HOSPADM

## 2020-01-15 RX ORDER — RANOLAZINE 500 MG/1
500 TABLET, EXTENDED RELEASE ORAL 2 TIMES DAILY
Status: DISCONTINUED | OUTPATIENT
Start: 2020-01-15 | End: 2020-01-17 | Stop reason: HOSPADM

## 2020-01-15 RX ORDER — LACTULOSE 10 G/15ML
30 SOLUTION ORAL 3 TIMES DAILY
Status: DISCONTINUED | OUTPATIENT
Start: 2020-01-15 | End: 2020-01-17 | Stop reason: HOSPADM

## 2020-01-15 RX ORDER — NICOTINE POLACRILEX 4 MG
15 LOZENGE BUCCAL PRN
Status: DISCONTINUED | OUTPATIENT
Start: 2020-01-15 | End: 2020-01-17 | Stop reason: HOSPADM

## 2020-01-15 RX ORDER — SPIRONOLACTONE 50 MG/1
50 TABLET, FILM COATED ORAL DAILY
Status: DISCONTINUED | OUTPATIENT
Start: 2020-01-15 | End: 2020-01-17 | Stop reason: HOSPADM

## 2020-01-15 RX ORDER — ONDANSETRON 2 MG/ML
4 INJECTION INTRAMUSCULAR; INTRAVENOUS EVERY 6 HOURS PRN
Status: DISCONTINUED | OUTPATIENT
Start: 2020-01-15 | End: 2020-01-17 | Stop reason: HOSPADM

## 2020-01-15 RX ORDER — POLYETHYLENE GLYCOL 3350 17 G/17G
17 POWDER, FOR SOLUTION ORAL DAILY PRN
Status: DISCONTINUED | OUTPATIENT
Start: 2020-01-15 | End: 2020-01-17 | Stop reason: HOSPADM

## 2020-01-15 RX ORDER — DEXTROSE MONOHYDRATE 50 MG/ML
100 INJECTION, SOLUTION INTRAVENOUS PRN
Status: DISCONTINUED | OUTPATIENT
Start: 2020-01-15 | End: 2020-01-17 | Stop reason: HOSPADM

## 2020-01-15 RX ORDER — SODIUM CHLORIDE 0.9 % (FLUSH) 0.9 %
10 SYRINGE (ML) INJECTION PRN
Status: DISCONTINUED | OUTPATIENT
Start: 2020-01-15 | End: 2020-01-17 | Stop reason: HOSPADM

## 2020-01-15 RX ORDER — SODIUM CHLORIDE 0.9 % (FLUSH) 0.9 %
10 SYRINGE (ML) INJECTION EVERY 12 HOURS SCHEDULED
Status: DISCONTINUED | OUTPATIENT
Start: 2020-01-15 | End: 2020-01-17 | Stop reason: HOSPADM

## 2020-01-15 RX ORDER — PANTOPRAZOLE SODIUM 40 MG/1
40 TABLET, DELAYED RELEASE ORAL
Status: DISCONTINUED | OUTPATIENT
Start: 2020-01-15 | End: 2020-01-17 | Stop reason: HOSPADM

## 2020-01-15 RX ORDER — OXYCODONE HYDROCHLORIDE 5 MG/1
5 TABLET ORAL EVERY 6 HOURS PRN
Status: DISCONTINUED | OUTPATIENT
Start: 2020-01-15 | End: 2020-01-17 | Stop reason: HOSPADM

## 2020-01-15 RX ORDER — TENOFOVIR DISOPROXIL FUMARATE 300 MG/1
300 TABLET, FILM COATED ORAL NIGHTLY
Status: DISCONTINUED | OUTPATIENT
Start: 2020-01-15 | End: 2020-01-17 | Stop reason: HOSPADM

## 2020-01-15 RX ORDER — SUCRALFATE 1 G/1
1 TABLET ORAL
Status: DISCONTINUED | OUTPATIENT
Start: 2020-01-15 | End: 2020-01-17 | Stop reason: HOSPADM

## 2020-01-15 RX ORDER — LACTULOSE 10 G/15ML
20 SOLUTION ORAL ONCE
Status: COMPLETED | OUTPATIENT
Start: 2020-01-15 | End: 2020-01-15

## 2020-01-15 RX ORDER — BUSPIRONE HYDROCHLORIDE 7.5 MG/1
15 TABLET ORAL 2 TIMES DAILY
Status: DISCONTINUED | OUTPATIENT
Start: 2020-01-15 | End: 2020-01-17 | Stop reason: HOSPADM

## 2020-01-15 RX ORDER — DOCUSATE SODIUM 100 MG/1
100 CAPSULE, LIQUID FILLED ORAL 2 TIMES DAILY PRN
Status: DISCONTINUED | OUTPATIENT
Start: 2020-01-15 | End: 2020-01-17 | Stop reason: HOSPADM

## 2020-01-15 RX ORDER — FLUOXETINE HYDROCHLORIDE 20 MG/1
40 CAPSULE ORAL DAILY
Status: DISCONTINUED | OUTPATIENT
Start: 2020-01-15 | End: 2020-01-17 | Stop reason: HOSPADM

## 2020-01-15 RX ORDER — HYDROXYZINE PAMOATE 25 MG/1
50 CAPSULE ORAL 3 TIMES DAILY PRN
Status: DISCONTINUED | OUTPATIENT
Start: 2020-01-15 | End: 2020-01-17 | Stop reason: HOSPADM

## 2020-01-15 RX ADMIN — SUCRALFATE 1 G: 1 TABLET ORAL at 21:08

## 2020-01-15 RX ADMIN — LACTULOSE 20 G: 10 SOLUTION ORAL at 05:14

## 2020-01-15 RX ADMIN — SPIRONOLACTONE 50 MG: 50 TABLET ORAL at 09:30

## 2020-01-15 RX ADMIN — RANOLAZINE 500 MG: 500 TABLET, FILM COATED, EXTENDED RELEASE ORAL at 21:08

## 2020-01-15 RX ADMIN — TENOFOVIR DISOPROXIL FUMARATE 300 MG: 300 TABLET, COATED ORAL at 21:32

## 2020-01-15 RX ADMIN — LACTULOSE 30 G: 10 SOLUTION ORAL at 09:30

## 2020-01-15 RX ADMIN — LACTULOSE 30 G: 10 SOLUTION ORAL at 21:08

## 2020-01-15 RX ADMIN — OXYCODONE HYDROCHLORIDE 5 MG: 5 TABLET ORAL at 11:25

## 2020-01-15 RX ADMIN — PANTOPRAZOLE SODIUM 40 MG: 40 TABLET, DELAYED RELEASE ORAL at 06:03

## 2020-01-15 RX ADMIN — SUCRALFATE 1 G: 1 TABLET ORAL at 06:03

## 2020-01-15 RX ADMIN — ENOXAPARIN SODIUM 40 MG: 40 INJECTION SUBCUTANEOUS at 09:30

## 2020-01-15 RX ADMIN — SUCRALFATE 1 G: 1 TABLET ORAL at 17:16

## 2020-01-15 RX ADMIN — SODIUM CHLORIDE, PRESERVATIVE FREE 10 ML: 5 INJECTION INTRAVENOUS at 21:10

## 2020-01-15 RX ADMIN — RIFAXIMIN 550 MG: 550 TABLET ORAL at 21:08

## 2020-01-15 RX ADMIN — BUSPIRONE HYDROCHLORIDE 15 MG: 7.5 TABLET ORAL at 09:30

## 2020-01-15 RX ADMIN — LACTULOSE 30 G: 10 SOLUTION ORAL at 15:36

## 2020-01-15 RX ADMIN — FLUOXETINE 40 MG: 20 CAPSULE ORAL at 09:30

## 2020-01-15 RX ADMIN — SUCRALFATE 1 G: 1 TABLET ORAL at 10:54

## 2020-01-15 RX ADMIN — METOPROLOL TARTRATE 25 MG: 25 TABLET ORAL at 21:08

## 2020-01-15 RX ADMIN — OXYCODONE HYDROCHLORIDE 5 MG: 5 TABLET ORAL at 17:21

## 2020-01-15 RX ADMIN — SODIUM CHLORIDE, PRESERVATIVE FREE 10 ML: 5 INJECTION INTRAVENOUS at 10:46

## 2020-01-15 RX ADMIN — BUSPIRONE HYDROCHLORIDE 15 MG: 7.5 TABLET ORAL at 21:08

## 2020-01-15 RX ADMIN — QUETIAPINE FUMARATE 150 MG: 100 TABLET ORAL at 21:07

## 2020-01-15 RX ADMIN — INSULIN LISPRO 2 UNITS: 100 INJECTION, SOLUTION INTRAVENOUS; SUBCUTANEOUS at 21:09

## 2020-01-15 RX ADMIN — RANOLAZINE 500 MG: 500 TABLET, FILM COATED, EXTENDED RELEASE ORAL at 09:30

## 2020-01-15 RX ADMIN — RIFAXIMIN 550 MG: 550 TABLET ORAL at 09:30

## 2020-01-15 RX ADMIN — METOPROLOL TARTRATE 25 MG: 25 TABLET ORAL at 09:30

## 2020-01-15 RX ADMIN — FUROSEMIDE 20 MG: 20 TABLET ORAL at 09:30

## 2020-01-15 ASSESSMENT — PAIN DESCRIPTION - FREQUENCY
FREQUENCY: INTERMITTENT
FREQUENCY: INTERMITTENT

## 2020-01-15 ASSESSMENT — PAIN SCALES - GENERAL
PAINLEVEL_OUTOF10: 9
PAINLEVEL_OUTOF10: 5
PAINLEVEL_OUTOF10: 0
PAINLEVEL_OUTOF10: 5
PAINLEVEL_OUTOF10: 8
PAINLEVEL_OUTOF10: 0

## 2020-01-15 ASSESSMENT — PAIN - FUNCTIONAL ASSESSMENT: PAIN_FUNCTIONAL_ASSESSMENT: ACTIVITIES ARE NOT PREVENTED

## 2020-01-15 ASSESSMENT — PAIN DESCRIPTION - LOCATION
LOCATION: HAND
LOCATION: HAND

## 2020-01-15 ASSESSMENT — PAIN DESCRIPTION - ORIENTATION
ORIENTATION: LEFT
ORIENTATION: LEFT

## 2020-01-15 ASSESSMENT — PAIN DESCRIPTION - PAIN TYPE
TYPE: ACUTE PAIN
TYPE: ACUTE PAIN

## 2020-01-15 ASSESSMENT — PAIN DESCRIPTION - DESCRIPTORS
DESCRIPTORS: ACHING
DESCRIPTORS: ACHING

## 2020-01-15 NOTE — ED PROVIDER NOTES
I independently evaluated and obtained a history and physical on [address]. All diagnostic, treatment, and disposition assistants were made to myself in conjunction the advanced practice provider. For further details of this patient's emergency department encounter, please see the advanced practice provider's documentation. History: Pt with hx hyponatremia presents with confusion. Physician Exam: No focal neurological deficit. Speaks slowly, no asterixis, no jaundice. MDM: Discussed medical decision making with [facility] and agree with plan. Please see their note for further detail.          Serina Langston MD  01/15/20 2739

## 2020-01-15 NOTE — PLAN OF CARE
Problem: Falls - Risk of:  Goal: Will remain free from falls  Description  Will remain free from falls  Outcome: Ongoing  Goal: Absence of physical injury  Description  Absence of physical injury  Outcome: Ongoing     Problem: Daily Care:  Goal: Daily care needs are met  Description  Daily care needs are met  Outcome: Ongoing     Problem: Discharge Planning:  Goal: Patients continuum of care needs are met  Description  Patients continuum of care needs are met  Outcome: Ongoing

## 2020-01-15 NOTE — ED TRIAGE NOTES
Patient to the ED with complaints of \"feeling confused\". Patient states that she \"thinks her sodium is low\". Patient states that this has happened before and this is how she felt last time. Patient A&O x2, disoriented to day during triage. Patient denies further complaints when asked.

## 2020-01-15 NOTE — ED NOTES
at desk asking where patient is, patient states she never came to desk to be registered. Patient reports low sodium and AMS.       Bella Claire, RN  01/14/20 2036

## 2020-01-15 NOTE — H&P
HISTORY AND PHYSICAL  (Hospitalist, Internal Medicine)  IDENTIFYING INFORMATION   PATIENT:  Maciej Bain  MRN:  [id number]  ADMIT DATE: 1/15/2020      CHIEF COMPLAINT   I am confused    HISTORY OF PRESENT ILLNESS   Maciej Bain is a 64 y.o. 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 She initially thought her sodium was low and hence decided to come to the hospital. She lives with her brother. She reports being compliant with her medications. Denies any fever, chills, cough, chest pain, denied any urinary complaints, denies any constipation or diarrhea. Patient reports being compliant with lactulose. At presentation patient was noted to have /92, pulse 92, RR 18, temperature 97.7, saturating 97% on room air. CMP with normal sodium level, glucose 218, troponin less than 0.010, CBC within normal limits, platelets 746, urine analysis negative for infection. head CT, chest x-ray did not reveal any acute process. Patient received lactulose in the ER. Past medical history:  COPD, chronic hepatitis C, cirrhosis of liver due to alcohol and hepatitis C, portal vein thrombosis, s/p TIPS, CAD, depression, diabetes mellitus, hyperlipidemia, paroxysmal atrial fibrillation, not on anticoagulation, schizophrenia.     Surgical history:  Hysterectomy, cholecystectomy, TIPS, tonsillectomy     Family history:  Positive for hypertension, diabetes mellitus, lung cancer in brother     Social history:  Quit smoking when she got TIPS procedure, denied any alcohol or illicit drug abuse.   Remote history of IV drug abuse and alcohol dependence.     Medications:  Ranolazine 500 mg twice daily, rifaximin 550 mg twice daily, Lasix 20 mg daily, hydroxyzine 50 mg 3 times tablet by mouth 2 times daily 42 tablet 0    furosemide (LASIX) 20 MG tablet Take 20 mg by mouth daily       hydrOXYzine (VISTARIL) 50 MG capsule Take 50 mg by mouth three times daily       lactulose encephalopathy 10 GM/15ML SOLN solution 30 g 3 times daily       spironolactone (ALDACTONE) 50 MG tablet Take 50 mg by mouth daily       Compression Stockings MISC by Does not apply route 20 - 30 mmh wear daily and take off at night  Thigh High 2 each 0    nitroGLYCERIN (NITROSTAT) 0.4 MG SL tablet Place 1 tablet under the tongue every 5 minutes as needed for Chest pain 25 tablet 3    promethazine (PHENERGAN) 25 MG tablet Take 1 tablet by mouth 3 times daily as needed for Nausea 60 tablet 1    metoprolol tartrate (LOPRESSOR) 25 MG tablet Take 1 tablet by mouth 2 times daily 60 tablet 6    FLUoxetine (PROZAC) 40 MG capsule Take 40 mg by mouth daily      VIREAD 300 MG tablet Take 300 mg by mouth nightly       QUEtiapine (SEROQUEL) 300 MG tablet Take 150 mg by mouth nightly       oxyCODONE (ROXICODONE) 5 MG immediate release tablet Take 5 mg by mouth 4 times daily  .  busPIRone (BUSPAR) 15 MG tablet Take 1 tablet by mouth 2 times daily 60 tablet 3         Allergies  Allergies   Allergen Reactions    Norco [Hydrocodone-Acetaminophen] Itching     Itching, rash, nausea and vomiting.  Tylenol [Acetaminophen] Itching, Nausea And Vomiting and Rash       REVIEW OF SYSTEMS   Within above limitations. 14 point review of systems reviewed. Pertinent positive or negative as per HPI or otherwise negative per 14 point systems review. PHYSICAL EXAM     Wt Readings from Last 3 Encounters:   01/14/20 167 lb (75.8 kg)   01/09/20 168 lb (76.2 kg)   01/03/20 168 lb (76.2 kg)       Blood pressure 119/79, pulse 82, temperature 97.7 °F (36.5 °C), temperature source Oral, resp. rate 16, height 4' 9\" (1.448 m), weight 167 lb (75.8 kg), SpO2 96 %, not currently breastfeeding. GEN  -Awake, alert, NAD.   EYES   -PERRL. Latest Ref Range: 78 - 100 FL 94.1    MCH Latest Ref Range: 27 - 31 PG 31.7 (H)    MCHC Latest Ref Range: 32.0 - 36.0 % 33.7    MPV Latest Ref Range: 7.5 - 11.1 FL 12.0 (H)    RDW Latest Ref Range: 11.7 - 14.9 % 14.2    Platelet Count Latest Ref Range: 140 - 440 K/CU  (L)    Lymphocyte % Latest Ref Range: 24 - 44 % 31.0    Monocytes % Latest Ref Range: 0 - 4 % 7.6 (H)    Eosinophils % Latest Ref Range: 0 - 3 % 3.4 (H)    Basophils % Latest Ref Range: 0 - 1 % 0.9    Lymphocytes Absolute Latest Units: K/CU MM 1.8    Monocytes Absolute Latest Units: K/CU MM 0.4    Eosinophils Absolute Latest Units: K/CU MM 0.2    Basophils Absolute Latest Units: K/CU MM 0.1    Differential Type Unknown AUTOMATED DIFFERENTIAL    Segs Relative Latest Ref Range: 36 - 66 % 56.9    Segs Absolute Latest Units: K/CU MM 3.3    Nucleated RBC % Latest Units: % 0.0    Immature Neutrophil % Latest Ref Range: 0 - 0.43 % 0.2    Total Immature Neutrophil Latest Units: K/CU MM 0.01    Total Nucleated RBC Latest Units: K/CU MM 0.0    Prothrombin Time Latest Ref Range: 11.7 - 14.5 SECONDS 12.5    INR Latest Units: INDEX 1.03    Color, UA Latest Ref Range: YELLOW   YELLOW   Clarity, UA Latest Ref Range: CLEAR   CLEAR   Bilirubin, Urine Latest Ref Range: NEGATIVE MG/DL  NEGATIVE   Ketones, Urine Latest Ref Range: NEGATIVE MG/DL  NEGATIVE   Specific Gravity, UA Latest Ref Range: 1.001 - 1.035   1.017   Blood, Urine Latest Ref Range: NEGATIVE   NEGATIVE   Protein, UA Latest Ref Range: NEGATIVE MG/DL  NEGATIVE   Urobilinogen, Urine Latest Ref Range: 0.2 - 1.0 MG/DL  2.0 (H)   Leukocyte Esterase, Urine Latest Ref Range: NEGATIVE   NEGATIVE   Glucose, Urine Latest Ref Range: NEGATIVE MG/DL  >500 (A)   Nitrite Urine, Quantitative Latest Ref Range: NEGATIVE   NEGATIVE   pH, Urine Latest Ref Range: 5.0 - 8.0   6.0   Mucus, UA Latest Ref Range: NEGATIVE HPF  RARE (A)   WBC, UA Latest Ref Range: 0 - 5 /HPF  <1   RBC, UA Latest Ref Range: 0 - 6 /HPF  <1 intracranial abnormality.     XR CHEST STANDARD (2 VW) [[id number]] Collected: 01/14/20 2229     Order Status: Completed Specimen: Chest Updated: 01/14/20 2234     Narrative:       EXAMINATION:  TWO XRAY VIEWS OF THE CHEST    1/14/2020 10:04 pm    COMPARISON:  10/25/2019    HISTORY:  ORDERING SYSTEM PROVIDED HISTORY: Altered mental status  TECHNOLOGIST PROVIDED HISTORY:  Reason for exam:->Altered mental status  Reason for Exam: altered mental status  Acuity: Unknown  Type of Exam: Initial    FINDINGS:  The lungs are clear.  The cardiac and mediastinal contours are normal.  There  is no pleural effusion or pneumothorax. No acute osseous abnormality is  identified. Cholecystectomy clips are present. Arva Blunt are surgical clips in  the anterior chest wall consistent with prior breast biopsies.  Right upper  quadrant vascular stent is consistent with prior TIPS.   Impression:       No acute cardiopulmonary abnormality       Relevant labs and imaging reviewed    ASSESSMENT AND PLAN     1. Acute hepatic encephalopathy:  -Ammonia 112.    -Patient admits to be compliant with lactulose at home. Usually has 3 bowel movements per day. -Continue 30 g of lactulose 3 times a day. -Continue with home medications  -Monitor improvement in mental status. -Repeat ammonia level tomorrow. 2. COPD  3. chronic hepatitis C, cirrhosis of liver due to alcohol and hepatitis C, portal vein thrombosis, s/p TIPS     4. CAD  -Continue ranolazine, metoprolol tartrate. 6. Depression  -Continue fluoxetine     7. diabetes mellitus type II, with hyperglycemia:  -Continue insulin sliding scale with hypoglycemia protocol     8. Hyperlipidemia  9. paroxysmal atrial fibrillation, not on anticoagulation  -Continue metoprolol tartrate twice daily.      10. Schizophrenia-continue Seroquel     11. h/o Hepatic encephalopathy:  -Continue rifaximin, lactulose, Lasix, spironolactone     12.   Hepatitis C: On viread        DVT Prophylaxis:lovenox  GI

## 2020-01-15 NOTE — ED NOTES
Narrative   EXAMINATION:   TWO XRAY VIEWS OF THE CHEST       1/14/2020 10:04 pm       COMPARISON:   10/25/2019       HISTORY:   ORDERING SYSTEM PROVIDED HISTORY: Altered mental status   TECHNOLOGIST PROVIDED HISTORY:   Reason for exam:->Altered mental status   Reason for Exam: altered mental status   Acuity: Unknown   Type of Exam: Initial       FINDINGS:   The lungs are clear.  The cardiac and mediastinal contours are normal.  There   is no pleural effusion or pneumothorax. No acute osseous abnormality is   identified. Cholecystectomy clips are present. Arva Blunt are surgical clips in   the anterior chest wall consistent with prior breast biopsies.  Right upper   quadrant vascular stent is consistent with prior TIPS.         Impression   No acute cardiopulmonary abnormality.         Marylen Boyden, RN  01/15/20 3095

## 2020-01-15 NOTE — ED PROVIDER NOTES
Triage Chief Complaint:   Altered Mental Status    [place]:  Today in the ED I had the pleasure of caring for Shawna Cruz who is a 64 y.o. female that presents today to the ED. She staes she feels like her sodium is low and is feeling  A bit confused. She states she has been having the shakes and feeling genraly confused but cannot provide concrete examples. Pt has hx of alcoholism and cirrhosis. GERD, DM t2, hepatitis  C, encephalopathy, pt endorses missing her lactulose doses at home. No cp or sob. No headache, no palpitations, paresthesias or focal weakness. ROS:  REVIEW OF SYSTEMS    At least 10 systems reviewed      All other review of systems are negative  See HPI and nursing notes for additional information       Past Medical History:   Diagnosis Date    Acid reflux     Arthritis     left knee    CAD (coronary artery disease)     per [facility] 9/6/13 - Dr. Manny Gentile COPD (chronic obstructive pulmonary disease) ([facility]     follow with Dr Praveena Rivera Depression     \"have manic - depression see Dr Zabrina Garcia Diabetes mellitus [facility])     dx 10+ yrs ago- follows with PCP    Drug abuse ([facility]     hx use of cocaine, heroin and marijuana- states last used 12/2014    Glaucoma     bilateral    H/O Doppler lower venous ultrasound 04/04/2019    No DVT or SVT, Significant reflux of RGSV and LGSV.  Hepatic encephalopathy ([facility] 05/2019    Hepatitis C     for liver bx 12/3/2015\"Have Hepatitis B and C and saw Dr Perla Wei for this 12/1/2015\"    Hiatal hernia     History of alcohol abuse     History of exercise stress test 01/02/2020    Treadmill, Normal exercise performance without angina and ischemic EKG changes.     HTN (hypertension)     \"for the past two yrs on medication\" follows with Dr Manny Gentile Hx of blood clots 2019    Portal vein thrombsis - TIPS procedure 4/23/19    Hyperlipemia     Irregular heart beat     per pt    Liver hematoma     Migraines     Last migraine:  2018    Nausea & vomiting     Schizophrenia ([address]     per old chart    Seizures ([address]     \"last one was 9/2015- saw Dr Edyta Bales at [facility]- she said not sure if acutal seizures- she thinks they are panic or anxiety attacks\"    SOB (shortness of breath)     with any exertion     Past Surgical History:   Procedure Laterality Date    BREAST SURGERY  10/2015    left breast bx    CARDIAC CATHETERIZATION      per old chart pt had cath done in 3/2011 and 9/2013    CARPAL TUNNEL RELEASE Left 1/9/2020    CARPAL TUNNEL RELEASE LEFT performed by Calin Jason DO at [facility]  per old chart done [facility]  3/11/13    diverticulosis, cecal polyp    COLONOSCOPY  03/16/2017    Internal hemorrhoids- Dr. Alvin Navarrete, COLON, DIAGNOSTIC  03/16/2017    EGD: Small esophageal varices, portal hypertensive gastropathy, reflux esophagitis, hiatal hernia    EYE SURGERY Bilateral ? when    cyst removal, cataracts w/lens replacement    HYSTERECTOMY      per old chart pt had CHOLO/BSO 1986    TIPS PROCEDURE  04/23/2019    TONSILLECTOMY  as a kid    UPPER GASTROINTESTINAL ENDOSCOPY N/A 11/14/2018    EGD DIAGNOSTIC ONLY performed by Chelo Gandhi MD at [address] [place] [address] N/A 6/5/2019    EGD DIAGNOSTIC ONLY performed by Chelo Gandhi MD at [place] [address] History   Problem Relation Age of Onset    Cancer Mother         lung [facility] Arthritis Mother     Migraines Mother     Cancer Father         colon [facility] Diabetes Father     High Blood Pressure Father     Arthritis Father     High Cholesterol Father     Migraines Father     Migraines Sister     Heart Disease Brother         WPW     Social History     Socioeconomic History    Marital status:      Spouse name: Not on file    Number of children: Not on file    Years of education: Not on file    Highest education level: Not on file   Occupational History    Not on file   Social Needs    Financial resource strain: Not on file    Food insecurity:     Worry: Not on file     Inability: Not on file    Transportation needs:     Medical: Not on file     Non-medical: Not on file   Tobacco Use    Smoking status: Former Smoker     Packs/day: 3.00     Years: 45.00     Pack years: 135.00     Types: Cigarettes     Start date:      Last attempt to quit: 2019     Years since quittin.0    Smokeless tobacco: Never Used   Substance and Sexual Activity    Alcohol use: Not Currently     Alcohol/week: 2.0 - 3.0 standard drinks     Types: 2 - 3 Shots of liquor per week     Comment: 2-3 shots last 2019    Drug use: Not Currently     Frequency: 3.0 times per week     Types: Marijuana     Comment: Last smoked; 22, last used cocaine: 2018 per pt    Sexual activity: Not Currently     Partners: Male   Lifestyle    Physical activity:     Days per week: Not on file     Minutes per session: Not on file    Stress: Not on file   Relationships    Social connections:     Talks on phone: Not on file     Gets together: Not on file     Attends Judaism service: Not on file     Active member of club or organization: Not on file     Attends meetings of clubs or organizations: Not on file     Relationship status: Not on file    Intimate partner violence:     Fear of current or ex partner: Not on file     Emotionally abused: Not on file     Physically abused: Not on file     Forced sexual activity: Not on file   Other Topics Concern    Not on file   Social History Narrative    Not on file     Current Facility-Administered Medications   Medication Dose Route Frequency Provider Last Rate Last Dose    lactulose ([address]) 10 GM/15ML solution 20 g  20 g Oral Once Margarita Holden PA-C         Current Outpatient Medications   Medication Sig Dispense Refill    metFORMIN (GLUCOPHAGE-XR) 500 MG extended release tablet Take 1 tablet by mouth 2 times daily 180 tablet 3    pantoprazole (PROTONIX) 40 MG tablet Take 1 tablet by mouth 2 times daily (before meals) 60 tablet 1    sucralfate (CARAFATE) 1 GM tablet Take 1 tablet by mouth 4 times daily (before meals and nightly) 120 tablet 3    ranolazine (RANEXA) 500 MG extended release tablet Take 1 tablet by mouth 2 times daily 60 tablet 5    rifaximin (XIFAXAN) 550 MG tablet Take 1 tablet by mouth 2 times daily 42 tablet 0    furosemide (LASIX) 20 MG tablet Take 20 mg by mouth daily       hydrOXYzine (VISTARIL) 50 MG capsule Take 50 mg by mouth three times daily       lactulose encephalopathy 10 GM/15ML SOLN solution 30 g 3 times daily       spironolactone (ALDACTONE) 50 MG tablet Take 50 mg by mouth daily       Compression Stockings MISC by Does not apply route 20 - 30 mmh wear daily and take off at night  Thigh High 2 each 0    nitroGLYCERIN (NITROSTAT) 0.4 MG SL tablet Place 1 tablet under the tongue every 5 minutes as needed for Chest pain 25 tablet 3    promethazine (PHENERGAN) 25 MG tablet Take 1 tablet by mouth 3 times daily as needed for Nausea 60 tablet 1    metoprolol tartrate (LOPRESSOR) 25 MG tablet Take 1 tablet by mouth 2 times daily 60 tablet 6    FLUoxetine (PROZAC) 40 MG capsule Take 40 mg by mouth daily      VIREAD 300 MG tablet Take 300 mg by mouth nightly       QUEtiapine (SEROQUEL) 300 MG tablet Take 150 mg by mouth nightly       oxyCODONE (ROXICODONE) 5 MG immediate release tablet Take 5 mg by mouth 4 times daily  .  busPIRone (BUSPAR) 15 MG tablet Take 1 tablet by mouth 2 times daily 60 tablet 3     Allergies   Allergen Reactions    Norco [Hydrocodone-Acetaminophen] Itching     Itching, rash, nausea and vomiting.      Tylenol [Acetaminophen] Itching, Nausea And Vomiting and Rash       Nursing Notes Reviewed    Physical Exam:  ED Triage Vitals [01/14/20 2112]   Enc Vitals Group      BP (!) 143/92      Pulse 92      Resp 18      Temp 97.7 °F (36.5 °C)      Temp Source Oral      SpO2 97 %      Weight 167 lb (75.8 kg)      Height 4' 9\" (1.448 m)      Head Circumference       Peak Flow       Pain Score       Pain Loc       Pain Edu? Excl. in [address]? General :Patient is awake alert oriented person place and time no acute distress nontoxic appearing  HEENT: Pupils are equally round and reactive to light extraocular motors are intact conjunctivae clear sclerae white there is no injection no icterus. Nose without any rhinorrhea or epistaxis. Oral mucosa is moist no exudate buccal mucosa shows no ulcerations. Uvula is midline    Neck: Neck is supple full range of motion trachea midline thyroid nonpalpable  Cardiac: Heart regular rate rhythm no murmurs rubs clicks or gallops  Lungs: Lungs are clear to auscultation there is no wheezing rhonchi or rales. There is no use of accessory muscles no nasal flaring identified. Chest wall: There is no tenderness to palpation over the chest wall or over ribs  Abdomen: Abdomen is soft nontender nondistended. There is no firm or pulsatile masses no rebound rigidity or guarding negative Caban's negative McBurney, no peritoneal signs  Suprapubic:  there is no tenderness to palpation over the external bladder   Musculoskeletal: 5 out of 5 strength in all 4 extremities full flexion extension abduction and adduction supination pronation of all extremities and all digits. No obvious muscle atrophy is noted. No focal muscle deficits are appreciated  Dermatology: Skin is warm and dry there is no obvious abscesses lacerations or lesions noted  Psych: Mentation is grossly normal cognition is grossly normal. Affect is appropriate  Neuro: A&Ox4. GCS 15. Cranial nerves 2-12 grossly intact, PEERLA, EOMs intact, Short and long term memory intact, Pt shows good judgement, follows command well, carries on logical conversation. No ataxia with finger to nose.  strength full bilateral.  UE, LE, Facial sensation full and equal bilateral, no cerebellar dysfunction, negative motor drift.   Bicep, Triceps,  strength full and symmetrical. Altered mental status TECHNOLOGIST PROVIDED HISTORY: Reason for exam:->Altered mental status Reason for Exam: altered mental status Acuity: Unknown Type of Exam: Initial FINDINGS: The lungs are clear. The cardiac and mediastinal contours are normal.  There is no pleural effusion or pneumothorax. No acute osseous abnormality is identified. Cholecystectomy clips are present. There are surgical clips in the anterior chest wall consistent with prior breast biopsies. Right upper quadrant vascular stent is consistent with prior TIPS. No acute cardiopulmonary abnormality. MDM:   Patient presents today to the emergency department feeling like she is confused. There is no focal neurological deficits on examination. And she is neurologically intact. She does states that she is not as sharp as usual.  Physical exam is essentially unremarkable. Her labs do reveal hyperammonemia. And this is consistent with patient not taking her lactulose recently as well as having a history of alcoholic cirrhosis, hepatitis as well as hepatic encephalopathy in the past.  Urine CBC metabolic panel troponin all within normal limits. Serum alcohol is negative here. PT/INR reveals no abnormalities CT scan of the head is also negative. I extremely low clinical suspicion of CVA, TIA, MI, acute infectious delirium, other  On-call physician Was consulted regarding patient. After thorough discussion regarding patient's history, physical exam.  laboratory values, radiographic evidence (if applicable  theymyself as well as my attending physician agreed Given the patient's presenting concerns, medical history and clinical findings, the patient will be admitted at this time to undergo further evaluation and disposition. . During patient's entire stay in the ED patient remained stable and comfortable. Analgesia is well-controlled.   Patient will be admitted for all information regarding ongoing management and care of patient please see note of Admitting physician. I saw this patient ine conjunction with Attending Physician Dr. Camila Marcos    All EKG interpretations are performed by Attending physician            /79   Pulse 82   Temp 97.7 °F (36.5 °C) (Oral)   Resp 16   Ht 4' 9\" (1.448 m)   Wt 167 lb (75.8 kg)   SpO2 96%   BMI 36.14 kg/m²       Clinical Impression:  1. Altered mental status, unspecified altered mental status type    2. Hyperammonemia ([facility]        Disposition referral (if applicable):  MD Keegan Glynn  81.          Gael Chaudhry MD  [address] [place] [address] Drive [address] [place] [address]  [phone]          Disposition medications (if applicable):  New Prescriptions    No medications on file         Comment: Please note this report has been produced using speech recognition software and may contain errors related to that system including errors in grammar, punctuation, and spelling, as well as words and phrases that may be inappropriate. If there are any questions or concerns please feel free to contact the dictating provider for clarification.       IGOR Coates, [place]  01/31/20 8018

## 2020-01-16 LAB
AMMONIA: 110 UMOL/L (ref 11–51)
ANION GAP SERPL CALCULATED.3IONS-SCNC: 11 MMOL/L (ref 4–16)
BUN BLDV-MCNC: 11 MG/DL (ref 6–23)
CALCIUM SERPL-MCNC: 9 MG/DL (ref 8.3–10.6)
CHLORIDE BLD-SCNC: 101 MMOL/L (ref 99–110)
CO2: 23 MMOL/L (ref 21–32)
CREAT SERPL-MCNC: 1 MG/DL (ref 0.6–1.1)
GFR AFRICAN AMERICAN: >60 ML/MIN/1.73M2
GFR NON-AFRICAN AMERICAN: 57 ML/MIN/1.73M2
GLUCOSE BLD-MCNC: 136 MG/DL (ref 70–99)
GLUCOSE BLD-MCNC: 230 MG/DL (ref 70–99)
GLUCOSE BLD-MCNC: 257 MG/DL (ref 70–99)
GLUCOSE BLD-MCNC: 257 MG/DL (ref 70–99)
GLUCOSE BLD-MCNC: 276 MG/DL (ref 70–99)
GLUCOSE BLD-MCNC: 289 MG/DL (ref 70–99)
MAGNESIUM: 1.9 MG/DL (ref 1.8–2.4)
POTASSIUM SERPL-SCNC: 3.8 MMOL/L (ref 3.5–5.1)
REASON FOR REJECTION: NORMAL
REASON FOR REJECTION: NORMAL
REJECTED TEST: NORMAL
SODIUM BLD-SCNC: 135 MMOL/L (ref 135–145)

## 2020-01-16 PROCEDURE — 94761 N-INVAS EAR/PLS OXIMETRY MLT: CPT

## 2020-01-16 PROCEDURE — 82962 GLUCOSE BLOOD TEST: CPT

## 2020-01-16 PROCEDURE — 6370000000 HC RX 637 (ALT 250 FOR IP): Performed by: STUDENT IN AN ORGANIZED HEALTH CARE EDUCATION/TRAINING PROGRAM

## 2020-01-16 PROCEDURE — 2580000003 HC RX 258: Performed by: STUDENT IN AN ORGANIZED HEALTH CARE EDUCATION/TRAINING PROGRAM

## 2020-01-16 PROCEDURE — 80048 BASIC METABOLIC PNL TOTAL CA: CPT

## 2020-01-16 PROCEDURE — 83735 ASSAY OF MAGNESIUM: CPT

## 2020-01-16 PROCEDURE — 82140 ASSAY OF AMMONIA: CPT

## 2020-01-16 PROCEDURE — 6360000002 HC RX W HCPCS: Performed by: INTERNAL MEDICINE

## 2020-01-16 PROCEDURE — G0378 HOSPITAL OBSERVATION PER HR: HCPCS

## 2020-01-16 PROCEDURE — 36415 COLL VENOUS BLD VENIPUNCTURE: CPT

## 2020-01-16 PROCEDURE — 6370000000 HC RX 637 (ALT 250 FOR IP): Performed by: INTERNAL MEDICINE

## 2020-01-16 PROCEDURE — 96372 THER/PROPH/DIAG INJ SC/IM: CPT

## 2020-01-16 PROCEDURE — 2580000003 HC RX 258: Performed by: INTERNAL MEDICINE

## 2020-01-16 RX ORDER — SODIUM CHLORIDE 9 MG/ML
INJECTION, SOLUTION INTRAVENOUS CONTINUOUS
Status: DISCONTINUED | OUTPATIENT
Start: 2020-01-16 | End: 2020-01-17 | Stop reason: HOSPADM

## 2020-01-16 RX ORDER — 0.9 % SODIUM CHLORIDE 0.9 %
1000 INTRAVENOUS SOLUTION INTRAVENOUS ONCE
Status: COMPLETED | OUTPATIENT
Start: 2020-01-16 | End: 2020-01-16

## 2020-01-16 RX ADMIN — SUCRALFATE 1 G: 1 TABLET ORAL at 16:25

## 2020-01-16 RX ADMIN — ENOXAPARIN SODIUM 40 MG: 40 INJECTION SUBCUTANEOUS at 09:58

## 2020-01-16 RX ADMIN — FLUOXETINE 40 MG: 20 CAPSULE ORAL at 09:58

## 2020-01-16 RX ADMIN — SUCRALFATE 1 G: 1 TABLET ORAL at 20:54

## 2020-01-16 RX ADMIN — LACTULOSE 30 G: 10 SOLUTION ORAL at 09:57

## 2020-01-16 RX ADMIN — BUSPIRONE HYDROCHLORIDE 15 MG: 7.5 TABLET ORAL at 09:57

## 2020-01-16 RX ADMIN — PANTOPRAZOLE SODIUM 40 MG: 40 TABLET, DELAYED RELEASE ORAL at 06:49

## 2020-01-16 RX ADMIN — TENOFOVIR DISOPROXIL FUMARATE 300 MG: 300 TABLET, COATED ORAL at 21:09

## 2020-01-16 RX ADMIN — BUSPIRONE HYDROCHLORIDE 15 MG: 7.5 TABLET ORAL at 20:54

## 2020-01-16 RX ADMIN — INSULIN LISPRO 3 UNITS: 100 INJECTION, SOLUTION INTRAVENOUS; SUBCUTANEOUS at 20:55

## 2020-01-16 RX ADMIN — METOPROLOL TARTRATE 25 MG: 25 TABLET ORAL at 20:53

## 2020-01-16 RX ADMIN — OXYCODONE HYDROCHLORIDE 5 MG: 5 TABLET ORAL at 14:01

## 2020-01-16 RX ADMIN — LACTULOSE 30 G: 10 SOLUTION ORAL at 15:06

## 2020-01-16 RX ADMIN — RANOLAZINE 500 MG: 500 TABLET, FILM COATED, EXTENDED RELEASE ORAL at 20:54

## 2020-01-16 RX ADMIN — SUCRALFATE 1 G: 1 TABLET ORAL at 06:49

## 2020-01-16 RX ADMIN — SODIUM CHLORIDE 1000 ML: 9 INJECTION, SOLUTION INTRAVENOUS at 13:11

## 2020-01-16 RX ADMIN — LACTULOSE 30 G: 10 SOLUTION ORAL at 20:53

## 2020-01-16 RX ADMIN — SUCRALFATE 1 G: 1 TABLET ORAL at 09:58

## 2020-01-16 RX ADMIN — SODIUM CHLORIDE, PRESERVATIVE FREE 10 ML: 5 INJECTION INTRAVENOUS at 13:11

## 2020-01-16 RX ADMIN — RIFAXIMIN 550 MG: 550 TABLET ORAL at 09:57

## 2020-01-16 RX ADMIN — SODIUM CHLORIDE: 9 INJECTION, SOLUTION INTRAVENOUS at 18:35

## 2020-01-16 RX ADMIN — OXYCODONE HYDROCHLORIDE 5 MG: 5 TABLET ORAL at 20:53

## 2020-01-16 RX ADMIN — RIFAXIMIN 550 MG: 550 TABLET ORAL at 20:54

## 2020-01-16 RX ADMIN — RANOLAZINE 500 MG: 500 TABLET, FILM COATED, EXTENDED RELEASE ORAL at 09:58

## 2020-01-16 RX ADMIN — QUETIAPINE FUMARATE 150 MG: 100 TABLET ORAL at 20:54

## 2020-01-16 ASSESSMENT — PAIN DESCRIPTION - PAIN TYPE
TYPE: CHRONIC PAIN

## 2020-01-16 ASSESSMENT — PAIN SCALES - GENERAL
PAINLEVEL_OUTOF10: 2
PAINLEVEL_OUTOF10: 4
PAINLEVEL_OUTOF10: 7
PAINLEVEL_OUTOF10: 0
PAINLEVEL_OUTOF10: 6
PAINLEVEL_OUTOF10: 6

## 2020-01-16 ASSESSMENT — PAIN DESCRIPTION - FREQUENCY
FREQUENCY: CONTINUOUS

## 2020-01-16 ASSESSMENT — PAIN DESCRIPTION - ORIENTATION
ORIENTATION: LOWER

## 2020-01-16 ASSESSMENT — PAIN DESCRIPTION - DESCRIPTORS
DESCRIPTORS: ACHING;SORE

## 2020-01-16 ASSESSMENT — PAIN DESCRIPTION - LOCATION
LOCATION: BACK

## 2020-01-16 ASSESSMENT — PAIN DESCRIPTION - ONSET
ONSET: ON-GOING

## 2020-01-16 ASSESSMENT — PAIN DESCRIPTION - PROGRESSION: CLINICAL_PROGRESSION: GRADUALLY IMPROVING

## 2020-01-16 NOTE — PROGRESS NOTES
GEN Awake female, laying in bed in no apparent distress. Appears given age. EYES Pupils are equally round. No scleral discharge  HENT Atraumatic and symmetric head  NECK No apparent thyromegaly  RESP Symmetric chest movement while on room air. CARDIO/VASC Peripheral pulses equal bilaterally and palpable. No peripheral edema. GI Abdomen is not distended. Rectal exam deferred.  Alexander catheter is not present. HEME/LYMPH No petechiae or ecchymoses. MSK Spontaneous movement of BL upper extremities  SKIN Normal coloration, warm, dry. NEURO Cranial nerves appear grossly intact  PSYCH Awake, alert.  oriented x4    Medications:   Medications:    sodium chloride flush  10 mL Intravenous 2 times per day    enoxaparin  40 mg Subcutaneous Daily    busPIRone  15 mg Oral BID    FLUoxetine  40 mg Oral Daily    furosemide  20 mg Oral Daily    lactulose  30 g Oral TID    metoprolol tartrate  25 mg Oral BID    pantoprazole  40 mg Oral QAM AC    QUEtiapine  150 mg Oral Nightly    ranolazine  500 mg Oral BID    rifaximin  550 mg Oral BID    spironolactone  50 mg Oral Daily    sucralfate  1 g Oral 4x Daily AC & HS    tenofovir  300 mg Oral Nightly    insulin lispro  0-12 Units Subcutaneous TID WC    insulin lispro  0-6 Units Subcutaneous Nightly      Infusions:    dextrose       PRN Meds: sodium chloride flush, 10 mL, PRN  ondansetron, 4 mg, Q6H PRN  polyethylene glycol, 17 g, Daily PRN  hydrOXYzine, 50 mg, TID PRN  glucose, 15 g, PRN  dextrose, 12.5 g, PRN  glucagon (rDNA), 1 mg, PRN  dextrose, 100 mL/hr, PRN  oxyCODONE, 5 mg, Q6H PRN  docusate sodium, 100 mg, BID PRN          Electronically signed by Jason Bedolla DO on 1/16/2020 at 5:48 PM

## 2020-01-16 NOTE — CONSULTS
Consult for IV access. #20 1 3/4\" angio inserted into left forearm on first attempt with ultrasound guidance. Current painful IV removed without difficulty. Patient tolerated well.  Ruth Diaz

## 2020-01-17 VITALS
HEIGHT: 58 IN | HEART RATE: 85 BPM | BODY MASS INDEX: 37.97 KG/M2 | SYSTOLIC BLOOD PRESSURE: 97 MMHG | RESPIRATION RATE: 26 BRPM | WEIGHT: 180.9 LBS | DIASTOLIC BLOOD PRESSURE: 55 MMHG | OXYGEN SATURATION: 94 % | TEMPERATURE: 97.8 F

## 2020-01-17 LAB
EKG ATRIAL RATE: 84 BPM
EKG DIAGNOSIS: NORMAL
EKG P AXIS: 30 DEGREES
EKG P-R INTERVAL: 152 MS
EKG Q-T INTERVAL: 398 MS
EKG QRS DURATION: 68 MS
EKG QTC CALCULATION (BAZETT): 470 MS
EKG R AXIS: 27 DEGREES
EKG T AXIS: 44 DEGREES
EKG VENTRICULAR RATE: 84 BPM
GLUCOSE BLD-MCNC: 139 MG/DL (ref 70–99)
GLUCOSE BLD-MCNC: 293 MG/DL (ref 70–99)
GLUCOSE BLD-MCNC: 353 MG/DL (ref 70–99)

## 2020-01-17 PROCEDURE — 6370000000 HC RX 637 (ALT 250 FOR IP): Performed by: STUDENT IN AN ORGANIZED HEALTH CARE EDUCATION/TRAINING PROGRAM

## 2020-01-17 PROCEDURE — 82962 GLUCOSE BLOOD TEST: CPT

## 2020-01-17 PROCEDURE — 6370000000 HC RX 637 (ALT 250 FOR IP): Performed by: INTERNAL MEDICINE

## 2020-01-17 PROCEDURE — G0378 HOSPITAL OBSERVATION PER HR: HCPCS

## 2020-01-17 RX ADMIN — SUCRALFATE 1 G: 1 TABLET ORAL at 11:31

## 2020-01-17 RX ADMIN — PANTOPRAZOLE SODIUM 40 MG: 40 TABLET, DELAYED RELEASE ORAL at 06:10

## 2020-01-17 RX ADMIN — OXYCODONE HYDROCHLORIDE 5 MG: 5 TABLET ORAL at 11:34

## 2020-01-17 RX ADMIN — LACTULOSE 30 G: 10 SOLUTION ORAL at 09:09

## 2020-01-17 RX ADMIN — FLUOXETINE 40 MG: 20 CAPSULE ORAL at 09:09

## 2020-01-17 RX ADMIN — SUCRALFATE 1 G: 1 TABLET ORAL at 06:10

## 2020-01-17 RX ADMIN — BUSPIRONE HYDROCHLORIDE 15 MG: 7.5 TABLET ORAL at 09:09

## 2020-01-17 RX ADMIN — FUROSEMIDE 20 MG: 20 TABLET ORAL at 10:21

## 2020-01-17 RX ADMIN — RIFAXIMIN 550 MG: 550 TABLET ORAL at 09:09

## 2020-01-17 RX ADMIN — RANOLAZINE 500 MG: 500 TABLET, FILM COATED, EXTENDED RELEASE ORAL at 09:09

## 2020-01-17 RX ADMIN — SPIRONOLACTONE 50 MG: 50 TABLET ORAL at 10:21

## 2020-01-17 RX ADMIN — METOPROLOL TARTRATE 25 MG: 25 TABLET ORAL at 10:20

## 2020-01-17 ASSESSMENT — PAIN DESCRIPTION - PROGRESSION
CLINICAL_PROGRESSION: GRADUALLY IMPROVING
CLINICAL_PROGRESSION: GRADUALLY IMPROVING

## 2020-01-17 ASSESSMENT — PAIN SCALES - GENERAL
PAINLEVEL_OUTOF10: 5
PAINLEVEL_OUTOF10: 5

## 2020-01-17 ASSESSMENT — PAIN DESCRIPTION - PAIN TYPE: TYPE: CHRONIC PAIN

## 2020-01-17 ASSESSMENT — PAIN DESCRIPTION - LOCATION: LOCATION: BACK

## 2020-01-17 ASSESSMENT — PAIN DESCRIPTION - ORIENTATION: ORIENTATION: LOWER

## 2020-01-17 NOTE — DISCHARGE SUMMARY
ecchymoses. MSK Spontaneous movement of BL upper extremities  SKIN Normal coloration, warm, dry. NEURO Cranial nerves appear grossly intact  PSYCH Awake, alert. BMP/CBC  Recent Labs     01/15/20  0032 01/16/20  0744    135   K 3.8 3.8    101   CO2 24 23   BUN 10 11   CREATININE 0.9 1.0   WBC 5.8  --    HCT 40.1  --    *  --      SIGNIFICANT IMAGING AND LABS:  Impression:  ct head wo      No acute intracranial abnormality.      Discharge Time of 33 minutes    Electronically signed by Kendra Kelley DO on 1/17/2020 at 12:44 PM

## 2020-01-21 RX ORDER — GLUCOSAMINE HCL/CHONDROITIN SU 500-400 MG
CAPSULE ORAL
Qty: 50 STRIP | Refills: 3 | Status: SHIPPED | OUTPATIENT
Start: 2020-01-21 | End: 2022-01-17 | Stop reason: SDUPTHER

## 2020-01-24 ENCOUNTER — HOSPITAL ENCOUNTER (OUTPATIENT)
Dept: ULTRASOUND IMAGING | Age: 60
Discharge: HOME | End: 2020-01-24
Payer: MEDICARE

## 2020-01-24 ENCOUNTER — HOSPITAL ENCOUNTER (OUTPATIENT)
Age: 60
Discharge: HOME | End: 2020-01-24
Payer: MEDICARE

## 2020-01-24 LAB
ALBUMIN SERPL-MCNC: 3.5 GM/DL (ref 3.4–5)
ALP BLD-CCNC: 140 IU/L (ref 40–129)
ALT SERPL-CCNC: 13 U/L (ref 10–40)
ANION GAP SERPL CALCULATED.3IONS-SCNC: 11 MMOL/L (ref 4–16)
AST SERPL-CCNC: 18 IU/L (ref 15–37)
BANDED NEUTROPHILS ABSOLUTE COUNT: 0.07 K/CU MM
BANDED NEUTROPHILS RELATIVE PERCENT: 1 % (ref 5–11)
BILIRUB SERPL-MCNC: 1 MG/DL (ref 0–1)
BUN BLDV-MCNC: 15 MG/DL (ref 6–23)
CALCIUM SERPL-MCNC: 9.5 MG/DL (ref 8.3–10.6)
CHLORIDE BLD-SCNC: 98 MMOL/L (ref 99–110)
CO2: 26 MMOL/L (ref 21–32)
CREAT SERPL-MCNC: 1.2 MG/DL (ref 0.6–1.1)
DIFFERENTIAL TYPE: ABNORMAL
EOSINOPHILS ABSOLUTE: 0.3 K/CU MM
EOSINOPHILS RELATIVE PERCENT: 5 % (ref 0–3)
GFR AFRICAN AMERICAN: 56 ML/MIN/1.73M2
GFR NON-AFRICAN AMERICAN: 46 ML/MIN/1.73M2
GLUCOSE BLD-MCNC: 291 MG/DL (ref 70–99)
HCT VFR BLD CALC: 41 % (ref 37–47)
HEMOGLOBIN: 13.7 GM/DL (ref 12.5–16)
LACTATE DEHYDROGENASE: 228 IU/L (ref 120–246)
LYMPHOCYTES ABSOLUTE: 1.5 K/CU MM
LYMPHOCYTES RELATIVE PERCENT: 22 % (ref 24–44)
MCH RBC QN AUTO: 31.5 PG (ref 27–31)
MCHC RBC AUTO-ENTMCNC: 33.4 % (ref 32–36)
MCV RBC AUTO: 94.3 FL (ref 78–100)
MONOCYTES ABSOLUTE: 0.5 K/CU MM
MONOCYTES RELATIVE PERCENT: 7 % (ref 0–4)
PDW BLD-RTO: 14 % (ref 11.7–14.9)
PLATELET # BLD: 116 K/CU MM (ref 140–440)
PMV BLD AUTO: 11.8 FL (ref 7.5–11.1)
POTASSIUM SERPL-SCNC: 4.1 MMOL/L (ref 3.5–5.1)
RBC # BLD: 4.35 M/CU MM (ref 4.2–5.4)
RBC # BLD: ABNORMAL 10*6/UL
SEGMENTED NEUTROPHILS ABSOLUTE COUNT: 4.5 K/CU MM
SEGMENTED NEUTROPHILS RELATIVE PERCENT: 65 % (ref 36–66)
SODIUM BLD-SCNC: 135 MMOL/L (ref 135–145)
TOTAL PROTEIN: 7.2 GM/DL (ref 6.4–8.2)
WBC # BLD: 6.9 K/CU MM (ref 4–10.5)

## 2020-01-24 PROCEDURE — 36415 COLL VENOUS BLD VENIPUNCTURE: CPT

## 2020-01-24 PROCEDURE — 85027 COMPLETE CBC AUTOMATED: CPT

## 2020-01-24 PROCEDURE — 80053 COMPREHEN METABOLIC PANEL: CPT

## 2020-01-24 PROCEDURE — 76705 ECHO EXAM OF ABDOMEN: CPT

## 2020-01-24 PROCEDURE — 85007 BL SMEAR W/DIFF WBC COUNT: CPT

## 2020-01-24 PROCEDURE — 83615 LACTATE (LD) (LDH) ENZYME: CPT

## 2020-01-24 PROCEDURE — 82105 ALPHA-FETOPROTEIN SERUM: CPT

## 2020-01-26 LAB
MS ALPHA-FETOPROTEIN: 2 NG/ML (ref 0–9)
MS ALPHA-FETOPROTEIN: NORMAL NG/ML (ref 0–9)

## 2020-01-27 ENCOUNTER — OFFICE VISIT (OUTPATIENT)
Dept: ORTHOPEDIC SURGERY | Age: 60
End: 2020-01-27

## 2020-01-27 VITALS — HEIGHT: 58 IN | HEART RATE: 87 BPM | OXYGEN SATURATION: 98 % | BODY MASS INDEX: 38.47 KG/M2 | RESPIRATION RATE: 18 BRPM

## 2020-01-27 PROCEDURE — 99024 POSTOP FOLLOW-UP VISIT: CPT | Performed by: PHYSICIAN ASSISTANT

## 2020-01-27 NOTE — PATIENT INSTRUCTIONS
Avoid heavy lifting, pushing, or pulling with left hand  Follow up in 4 weeks with Dr. Maria Mi post operatively and to discuss right carpal tunnel release

## 2020-01-29 ENCOUNTER — TELEPHONE (OUTPATIENT)
Dept: FAMILY MEDICINE CLINIC | Age: 60
End: 2020-01-29

## 2020-01-30 ENCOUNTER — OFFICE VISIT (OUTPATIENT)
Dept: FAMILY MEDICINE CLINIC | Age: 60
End: 2020-01-30
Payer: COMMERCIAL

## 2020-01-30 VITALS
OXYGEN SATURATION: 97 % | HEART RATE: 78 BPM | WEIGHT: 172.8 LBS | BODY MASS INDEX: 36.27 KG/M2 | DIASTOLIC BLOOD PRESSURE: 74 MMHG | SYSTOLIC BLOOD PRESSURE: 128 MMHG | HEIGHT: 58 IN

## 2020-01-30 LAB
CREATININE URINE POCT: ABNORMAL
MICROALBUMIN/CREAT 24H UR: ABNORMAL MG/G{CREAT}
MICROALBUMIN/CREAT UR-RTO: ABNORMAL

## 2020-01-30 PROCEDURE — 99214 OFFICE O/P EST MOD 30 MIN: CPT | Performed by: FAMILY MEDICINE

## 2020-01-30 PROCEDURE — 82044 UR ALBUMIN SEMIQUANTITATIVE: CPT | Performed by: FAMILY MEDICINE

## 2020-01-30 PROCEDURE — 1111F DSCHRG MED/CURRENT MED MERGE: CPT | Performed by: FAMILY MEDICINE

## 2020-01-30 RX ORDER — BLOOD-GLUCOSE METER
1 KIT MISCELLANEOUS DAILY
Qty: 1 KIT | Refills: 0 | Status: SHIPPED | OUTPATIENT
Start: 2020-01-30

## 2020-01-30 RX ORDER — GLUCOSAMINE HCL/CHONDROITIN SU 500-400 MG
CAPSULE ORAL
Qty: 100 STRIP | Refills: 5 | Status: SHIPPED | OUTPATIENT
Start: 2020-01-30 | End: 2020-04-13 | Stop reason: SDUPTHER

## 2020-01-30 ASSESSMENT — ENCOUNTER SYMPTOMS
NAUSEA: 0
DIARRHEA: 0
ABDOMINAL DISTENTION: 0
WHEEZING: 0
CONSTIPATION: 0
ABDOMINAL PAIN: 0
COUGH: 0
SHORTNESS OF BREATH: 0

## 2020-01-30 ASSESSMENT — PATIENT HEALTH QUESTIONNAIRE - PHQ9
SUM OF ALL RESPONSES TO PHQ QUESTIONS 1-9: 0
2. FEELING DOWN, DEPRESSED OR HOPELESS: 0
1. LITTLE INTEREST OR PLEASURE IN DOING THINGS: 0
SUM OF ALL RESPONSES TO PHQ QUESTIONS 1-9: 0
SUM OF ALL RESPONSES TO PHQ9 QUESTIONS 1 & 2: 0

## 2020-01-30 NOTE — PROGRESS NOTES
Post-Discharge Transitional Care Management Services or Hospital Follow Up      Lissa Kumar   YOB: 1960    Date of Office Visit:  1/30/2020  Date of Hospital Admission: 1/15/20  Date of Hospital Discharge: 1/17/20  Readmission Risk Score(high >=14%.  Medium >=10%):Readmission Risk Score: 22      Care management risk score Rising risk (score 2-5) and Complex Care (Scores >=6): 6     Non face to face  following discharge, date last encounter closed (first attempt may have been earlier): *No documented post hospital discharge outreach found in the last 14 days *No documented post hospital discharge outreach found in the last 14 days    Call initiated 2 business days of discharge: *No response recorded in the last 14 days     Patient Active Problem List   Diagnosis    Left arm pain    Type 2 diabetes mellitus with complication, with long-term current use of insulin (HCC)    Acid reflux    COPD, mild ([address]    Tobacco abuse    Angina effort    Abnormal nuclear cardiac imaging test    Lung nodule    Chest pain    Dyslipidemia    Pancolitis ([address]    Hematemesis without nausea    Alcoholic cirrhosis of liver without ascites ([address]    Thrombocytopenia ([address]    Periumbilical abdominal pain    History of colonic polyps    Abnormal findings on diagnostic imaging of abdomen    Nausea    Gastroesophageal reflux disease without esophagitis    Mixed hyperlipidemia    Vitamin D deficiency    Left carpal tunnel syndrome    Right carpal tunnel syndrome    Esophageal hypertension    Candida esophagitis ([address]    Colitis    Essential hypertension    GI bleed    Coronary-myocardial bridge    Type 2 diabetes mellitus with complication, with long-term current use of insulin (HCC)    SOB (shortness of breath)    Portal vein thrombosis    Intractable nausea and vomiting    Abdominal pain    Acute GI bleeding    Chronic hepatitis C without hepatic coma ([address]    Delayed gastric emptying  Restless legs    Acute colitis    Acute encephalopathy    S/P TIPS (transjugular intrahepatic portosystemic shunt)    Other cirrhosis of liver (HCC)    Acute upper GI bleed    Acute hepatic encephalopathy       Allergies   Allergen Reactions    Norco [Hydrocodone-Acetaminophen] Itching     Itching, rash, nausea and vomiting.  Tylenol [Acetaminophen] Itching, Nausea And Vomiting and Rash       Medications listed as ordered at the time of discharge from hospital   Melodie Garrido   Home Medication Instructions CHILANGO:    Printed on:01/30/20 2632   Medication Information                      blood glucose monitor strips  Test 3 times a day & as needed for symptoms of irregular blood glucose. Please fill with what is covered my patients insurance. blood glucose monitor strips  Test 3-4 times a day & as needed for symptoms of irregular blood glucose. Blood Glucose Monitoring Suppl (ACURA BLOOD GLUCOSE METER) w/Device KIT  Please check blood sugar 3 times daily.  Please fill with what is covered by the insurance             busPIRone (BUSPAR) 15 MG tablet  Take 1 tablet by mouth 2 times daily             Compression Stockings MISC  by Does not apply route 20 - 30 mmh wear daily and take off at night  Thigh High             FLUoxetine (PROZAC) 40 MG capsule  Take 40 mg by mouth daily             furosemide (LASIX) 20 MG tablet  Take 20 mg by mouth daily              glucose monitoring kit (FREESTYLE) monitoring kit  1 kit by Does not apply route daily             Glucosource Lancets MISC  Use one 3-4 times to check blood sugar             hydrOXYzine (VISTARIL) 50 MG capsule  Take 50 mg by mouth three times daily              lactulose encephalopathy 10 GM/15ML SOLN solution  30 g 3 times daily              metFORMIN (GLUCOPHAGE-XR) 500 MG extended release tablet  Take 1 tablet by mouth 2 times daily             metoprolol tartrate (LOPRESSOR) 25 MG tablet  Take 1 tablet by mouth 2 times daily             nitroGLYCERIN (NITROSTAT) 0.4 MG SL tablet  Place 1 tablet under the tongue every 5 minutes as needed for Chest pain             oxyCODONE (ROXICODONE) 5 MG immediate release tablet  Take 5 mg by mouth 4 times daily  . pantoprazole (PROTONIX) 40 MG tablet  Take 1 tablet by mouth 2 times daily (before meals)             promethazine (PHENERGAN) 25 MG tablet  Take 1 tablet by mouth 3 times daily as needed for Nausea             QUEtiapine (SEROQUEL) 300 MG tablet  Take 150 mg by mouth nightly              ranolazine (RANEXA) 500 MG extended release tablet  Take 1 tablet by mouth 2 times daily             rifaximin (XIFAXAN) 550 MG tablet  Take 1 tablet by mouth 2 times daily             SOFT TOUCH LANCETS MISC  Please check blood sugar 3 times daily. Please fill with what is covered by insurance             spironolactone (ALDACTONE) 50 MG tablet  Take 50 mg by mouth daily              sucralfate (CARAFATE) 1 GM tablet  Take 1 tablet by mouth 4 times daily (before meals and nightly)             VIREAD 300 MG tablet  Take 300 mg by mouth nightly                    Medications marked \"taking\" at this time  Outpatient Medications Marked as Taking for the 1/30/20 encounter (Office Visit) with Rachel Moore MD   Medication Sig Dispense Refill    Blood Glucose Monitoring Suppl (ACURA BLOOD GLUCOSE METER) w/Device KIT Please check blood sugar 3 times daily. Please fill with what is covered by the insurance 1 kit 0    blood glucose monitor strips Test 3 times a day & as needed for symptoms of irregular blood glucose. Please fill with what is covered my patients insurance. 50 strip 3    SOFT TOUCH LANCETS MISC Please check blood sugar 3 times daily.  Please fill with what is covered by insurance 100 each 3    metFORMIN (GLUCOPHAGE-XR) 500 MG extended release tablet Take 1 tablet by mouth 2 times daily 180 tablet 3    pantoprazole (PROTONIX) 40 MG tablet Take 1 tablet by mouth 2 times daily (before meals) 60 tablet 1    sucralfate (CARAFATE) 1 GM tablet Take 1 tablet by mouth 4 times daily (before meals and nightly) 120 tablet 3    ranolazine (RANEXA) 500 MG extended release tablet Take 1 tablet by mouth 2 times daily 60 tablet 5    rifaximin (XIFAXAN) 550 MG tablet Take 1 tablet by mouth 2 times daily 42 tablet 0    furosemide (LASIX) 20 MG tablet Take 20 mg by mouth daily       hydrOXYzine (VISTARIL) 50 MG capsule Take 50 mg by mouth three times daily       lactulose encephalopathy 10 GM/15ML SOLN solution 30 g 3 times daily       spironolactone (ALDACTONE) 50 MG tablet Take 50 mg by mouth daily       Compression Stockings MISC by Does not apply route 20 - 30 mmh wear daily and take off at night  Thigh High 2 each 0    nitroGLYCERIN (NITROSTAT) 0.4 MG SL tablet Place 1 tablet under the tongue every 5 minutes as needed for Chest pain 25 tablet 3    promethazine (PHENERGAN) 25 MG tablet Take 1 tablet by mouth 3 times daily as needed for Nausea 60 tablet 1    metoprolol tartrate (LOPRESSOR) 25 MG tablet Take 1 tablet by mouth 2 times daily 60 tablet 6    FLUoxetine (PROZAC) 40 MG capsule Take 40 mg by mouth daily      VIREAD 300 MG tablet Take 300 mg by mouth nightly       QUEtiapine (SEROQUEL) 300 MG tablet Take 150 mg by mouth nightly       oxyCODONE (ROXICODONE) 5 MG immediate release tablet Take 5 mg by mouth 4 times daily  .       busPIRone (BUSPAR) 15 MG tablet Take 1 tablet by mouth 2 times daily 60 tablet 3        Medications patient taking as of now reconciled against medications ordered at time of hospital discharge: Yes and No    Chief Complaint   Patient presents with   [address] from [facility]       Patient here for f/u hospitalization for acute hepatic encephalopathy, she was c/o confusion, and found her ammonia was high, was admitted for 3 days, patient discharged on no new medications, now doing much better, denies headache or dizziness, no confusion, no chest pain or

## 2020-02-24 ENCOUNTER — OFFICE VISIT (OUTPATIENT)
Dept: ORTHOPEDIC SURGERY | Age: 60
End: 2020-02-24

## 2020-02-24 VITALS — WEIGHT: 167 LBS | BODY MASS INDEX: 35.05 KG/M2 | RESPIRATION RATE: 16 BRPM | HEIGHT: 58 IN

## 2020-02-24 PROCEDURE — 99024 POSTOP FOLLOW-UP VISIT: CPT | Performed by: ORTHOPAEDIC SURGERY

## 2020-02-24 RX ORDER — LACTULOSE 10 G/15ML
SOLUTION ORAL
Status: ON HOLD | COMMUNITY
Start: 2020-01-21 | End: 2020-04-02 | Stop reason: SDUPTHER

## 2020-02-24 ASSESSMENT — ENCOUNTER SYMPTOMS
SHORTNESS OF BREATH: 0
COLOR CHANGE: 0

## 2020-02-24 NOTE — PATIENT INSTRUCTIONS
Right carpal tunnel release and right ring trigger finger release discussed   Will proceed with surgery as above  No meds list provided   Consent to be signed day of surgery   Obtain any clearances as needed, as discussed   Follow up for any pre-op testing if needed/surgery as discussed     Call office with any questions ([facility] surgery scheduler)

## 2020-02-24 NOTE — PROGRESS NOTES
Subjective:      Patient ID: Faviola De Guzman is a 64 y.o. female. Patient here for a 6 week postop on her left carpal tunnel release, 1/9/2020 and is doing very well with no symptoms. She is also here to discus her right carpal tunnel syndrome and right ring trigger finger. She rates her pain a 4/10 today and is wanting to discuss surgery. She states that overall in regards to her left hand she is doing great and is no longer having any numbness or tingling in her left hand. She also is having no more pain or weakness in her left hand. At this point her primary complaint is continued numbness and tingling in her right hand as well is worsening catching and locking in her right ring finger. She would like to discuss proceeding with surgical treatment for her right hand. Review of Systems   Constitutional: Negative for activity change, chills and fever. Respiratory: Negative for shortness of breath. Cardiovascular: Negative for chest pain. Musculoskeletal: Positive for arthralgias and myalgias. Negative for gait problem and joint swelling. Skin: Negative for color change, pallor, rash and wound. Neurological: Positive for weakness and numbness. Past Medical History:   Diagnosis Date    Acid reflux     Arthritis     left knee    CAD (coronary artery disease)     per [facility] 9/6/13 - Dr. Ina Lu COPD (chronic obstructive pulmonary disease) [facility])     follow with Dr Ahsan Dempsey Depression     \"have manic - depression see Dr Malinda Pedroza Diabetes mellitus [facility])     dx 10+ yrs ago- follows with PCP    Drug abuse ([facility]     hx use of cocaine, heroin and marijuana- states last used 12/2014    Glaucoma     bilateral    H/O Doppler lower venous ultrasound 04/04/2019    No DVT or SVT, Significant reflux of RGSV and LGSV.     Hepatic encephalopathy ([address] 05/2019    Hepatitis C     for liver bx 12/3/2015\"Have Hepatitis B and C and saw Dr Anna Peraza for this 12/1/2015\"    Hiatal hernia     History of alcohol abuse     History of exercise stress test 01/02/2020    Treadmill, Normal exercise performance without angina and ischemic EKG changes.  HTN (hypertension)     \"for the past two yrs on medication\" follows with Dr Girish Rose Hx of blood clots 2019    Portal vein thrombsis - TIPS procedure 4/23/19    Hyperlipemia     Irregular heart beat     per pt    Liver hematoma     Migraines     Last migraine:  2018    Nausea & vomiting     Schizophrenia ([address]     per old chart    Seizures ([address]     \"last one was 9/2015- saw Dr Buford Cheadle at [facility]- she said not sure if acutal seizures- she thinks they are panic or anxiety attacks\"    SOB (shortness of breath)     with any exertion         Objective:   Physical Exam  Constitutional:       Appearance: She is well-developed. HENT:      Head: Normocephalic and atraumatic. Eyes:      Pupils: Pupils are equal, round, and reactive to light. Neck:      Musculoskeletal: Normal range of motion. Musculoskeletal:         General: Tenderness present. No deformity. Right wrist: Normal.      Left wrist: Normal.      Right hand: She exhibits decreased range of motion, tenderness, bony tenderness and disruption of two-point discrimination. She exhibits normal capillary refill, no deformity, no laceration and no swelling. Decreased sensation noted. Decreased sensation is present in the medial distribution. Decreased sensation is not present in the ulnar distribution and is not present in the radial distribution. Decreased strength noted. Left hand: She exhibits normal range of motion, no tenderness, no bony tenderness, normal two-point discrimination, normal capillary refill, no deformity, no laceration and no swelling. Normal sensation noted. Decreased sensation is not present in the medial redistribution. Normal strength noted. Skin:     General: Skin is warm and dry. Coloration: Skin is not pale. Findings: No erythema or rash. Neurological:      Mental Status: She is alert and oriented to person, place, and time. Sensory: No sensory deficit. Right-hand-Skin intact with no erythema, ecchymosis or lacerations present. Catching and locking present in the right ring finger with flexion, moderate tenderness to palpation over the A1 pulley    Left hand-Incision clean, dry, intact, with no erythema, no drainage, and no signs of infection.  strength 5/5    XRAY  X-ray 3 view of the bilateral wrists reviewed by me today in the office demonstrates age-appropriate bone density throughout, normal joint spacing and normal overall alignment, no subluxation or dislocation noted, no acute osseous abnormalities, no bony prominences, no loose bodies. EMG report of the bilateral upper extremities from February 5, 2019 reviewed by me today in the office demonstrates moderate bilateral carpal tunnel syndrome . Assessment:      Right ring finger trigger finger  Right carpal tunnel syndrome, moderate  Left carpal tunnel release, 6 weeks      Plan:      I discussed with her today that in regards to her left hand she is progressing extremely well. At this point she can resume all activities with no restrictions. In regards to her right hand given her worsening symptoms I recommend surgical treatment. I discussed with her today performing right carpal tunnel release and right ring finger trigger finger release. I explained risks, benefits, possible complications of the procedure and answered all questions for the patient. I explained postoperative rehabilitation protocol and expectations with the patient today. The patient understands and consents to the procedure. Patient will follow up with their primary care physician prior to surgical treatment for preoperative clearance. We will schedule surgery at soonest convenience. Continue weight-bearing as tolerated. Continue range of motion exercises as instructed.   Ice and elevate as needed. Tylenol or Motrin for pain. Follow up in 2 weeks postop. She would like to have her surgery at [facility] again.             Janak Boucher, DO

## 2020-02-25 ENCOUNTER — TELEPHONE (OUTPATIENT)
Dept: ORTHOPEDIC SURGERY | Age: 60
End: 2020-02-25

## 2020-02-25 NOTE — TELEPHONE ENCOUNTER
Called to see if patient would need another Pulmonary clearance. Her last clearance was November 6 , 2019. Per Dr. Charito Layton that clearance is still good for the up coming surgery.

## 2020-03-02 ENCOUNTER — TELEPHONE (OUTPATIENT)
Dept: ORTHOPEDIC SURGERY | Age: 60
End: 2020-03-02

## 2020-03-02 NOTE — TELEPHONE ENCOUNTER
Left message  Pre testing is March 26, 2020 at 9 am at [facility]  If she can not keep that date call pre testing at [phone]  Surgery date is April 2, 2020 at [facility]

## 2020-03-23 ENCOUNTER — TELEPHONE (OUTPATIENT)
Dept: ORTHOPEDIC SURGERY | Age: 60
End: 2020-03-23

## 2020-03-23 ENCOUNTER — TELEPHONE (OUTPATIENT)
Dept: FAMILY MEDICINE CLINIC | Age: 60
End: 2020-03-23

## 2020-03-23 NOTE — TELEPHONE ENCOUNTER
Left message for patient to call office about appt on 3/24/. Does patient want to be seen? If so can she come in earlier?

## 2020-03-25 PROBLEM — K21.9 ACID REFLUX: Status: RESOLVED | Noted: 2020-03-25 | Resolved: 2020-03-24

## 2020-03-30 ENCOUNTER — APPOINTMENT (OUTPATIENT)
Dept: CT IMAGING | Age: 60
DRG: 442 | End: 2020-03-30
Payer: MEDICARE

## 2020-03-30 ENCOUNTER — APPOINTMENT (OUTPATIENT)
Dept: GENERAL RADIOLOGY | Age: 60
DRG: 442 | End: 2020-03-30
Payer: MEDICARE

## 2020-03-30 ENCOUNTER — HOSPITAL ENCOUNTER (INPATIENT)
Age: 60
LOS: 4 days | Discharge: HOME | DRG: 442 | End: 2020-04-03
Attending: EMERGENCY MEDICINE | Admitting: HOSPITALIST
Payer: MEDICARE

## 2020-03-30 PROBLEM — K76.82 HEPATIC ENCEPHALOPATHY (HCC): Status: ACTIVE | Noted: 2020-03-30

## 2020-03-30 LAB
ALBUMIN SERPL-MCNC: 3.5 GM/DL (ref 3.4–5)
ALCOHOL SCREEN SERUM: NORMAL %WT/VOL
ALP BLD-CCNC: 139 IU/L (ref 40–129)
ALT SERPL-CCNC: 14 U/L (ref 10–40)
AMMONIA: 134 UMOL/L (ref 11–51)
AMPHETAMINES: NEGATIVE
ANION GAP SERPL CALCULATED.3IONS-SCNC: 14 MMOL/L (ref 4–16)
AST SERPL-CCNC: 21 IU/L (ref 15–37)
BACTERIA: NEGATIVE /HPF
BARBITURATE SCREEN URINE: NEGATIVE
BASOPHILS ABSOLUTE: 0 K/CU MM
BASOPHILS RELATIVE PERCENT: 0.9 % (ref 0–1)
BENZODIAZEPINE SCREEN, URINE: NEGATIVE
BILIRUB SERPL-MCNC: 1 MG/DL (ref 0–1)
BILIRUBIN URINE: NEGATIVE MG/DL
BLOOD, URINE: NEGATIVE
BUN BLDV-MCNC: 17 MG/DL (ref 6–23)
CALCIUM SERPL-MCNC: 9.2 MG/DL (ref 8.3–10.6)
CANNABINOID SCREEN URINE: ABNORMAL
CHLORIDE BLD-SCNC: 101 MMOL/L (ref 99–110)
CLARITY: CLEAR
CO2: 21 MMOL/L (ref 21–32)
COCAINE METABOLITE: NEGATIVE
COLOR: YELLOW
CREAT SERPL-MCNC: 1 MG/DL (ref 0.6–1.1)
DIFFERENTIAL TYPE: ABNORMAL
EOSINOPHILS ABSOLUTE: 0.1 K/CU MM
EOSINOPHILS RELATIVE PERCENT: 2.4 % (ref 0–3)
GFR AFRICAN AMERICAN: >60 ML/MIN/1.73M2
GFR NON-AFRICAN AMERICAN: 57 ML/MIN/1.73M2
GLUCOSE BLD-MCNC: 203 MG/DL (ref 70–99)
GLUCOSE BLD-MCNC: 238 MG/DL (ref 70–99)
GLUCOSE BLD-MCNC: 271 MG/DL (ref 70–99)
GLUCOSE, URINE: 150 MG/DL
HCT VFR BLD CALC: 41.1 % (ref 37–47)
HEMOGLOBIN: 14.3 GM/DL (ref 12.5–16)
IMMATURE NEUTROPHIL %: 0.4 % (ref 0–0.43)
KETONES, URINE: ABNORMAL MG/DL
LACTATE: 2.8 MMOL/L (ref 0.4–2)
LACTATE: ABNORMAL MMOL/L (ref 0.4–2)
LEUKOCYTE ESTERASE, URINE: NEGATIVE
LIPASE: 88 IU/L (ref 13–60)
LYMPHOCYTES ABSOLUTE: 1 K/CU MM
LYMPHOCYTES RELATIVE PERCENT: 21.9 % (ref 24–44)
MCH RBC QN AUTO: 31.6 PG (ref 27–31)
MCHC RBC AUTO-ENTMCNC: 34.8 % (ref 32–36)
MCV RBC AUTO: 90.7 FL (ref 78–100)
MONOCYTES ABSOLUTE: 0.4 K/CU MM
MONOCYTES RELATIVE PERCENT: 8.3 % (ref 0–4)
NITRITE URINE, QUANTITATIVE: NEGATIVE
NUCLEATED RBC %: 0 %
OPIATES, URINE: NEGATIVE
OXYCODONE: ABNORMAL
PDW BLD-RTO: 13.3 % (ref 11.7–14.9)
PH, URINE: 6 (ref 5–8)
PHENCYCLIDINE, URINE: NEGATIVE
PLATELET # BLD: 86 K/CU MM (ref 140–440)
PMV BLD AUTO: 11.8 FL (ref 7.5–11.1)
POTASSIUM SERPL-SCNC: 3.7 MMOL/L (ref 3.5–5.1)
PROTEIN UA: NEGATIVE MG/DL
RBC # BLD: 4.53 M/CU MM (ref 4.2–5.4)
RBC URINE: ABNORMAL /HPF (ref 0–6)
SEGMENTED NEUTROPHILS ABSOLUTE COUNT: 3 K/CU MM
SEGMENTED NEUTROPHILS RELATIVE PERCENT: 66.1 % (ref 36–66)
SODIUM BLD-SCNC: 136 MMOL/L (ref 135–145)
SPECIFIC GRAVITY UA: 1.02 (ref 1–1.03)
SQUAMOUS EPITHELIAL: <1 /HPF
TOTAL IMMATURE NEUTOROPHIL: 0.02 K/CU MM
TOTAL NUCLEATED RBC: 0 K/CU MM
TOTAL PROTEIN: 7.6 GM/DL (ref 6.4–8.2)
TRANSITIONAL EPITHELIAL: <1 /HPF
TRICHOMONAS: ABNORMAL /HPF
TROPONIN T: <0.01 NG/ML
UROBILINOGEN, URINE: NORMAL MG/DL (ref 0.2–1)
WBC # BLD: 4.6 K/CU MM (ref 4–10.5)
WBC UA: <1 /HPF (ref 0–5)

## 2020-03-30 PROCEDURE — 6360000002 HC RX W HCPCS: Performed by: HOSPITALIST

## 2020-03-30 PROCEDURE — 87040 BLOOD CULTURE FOR BACTERIA: CPT

## 2020-03-30 PROCEDURE — 6360000002 HC RX W HCPCS: Performed by: EMERGENCY MEDICINE

## 2020-03-30 PROCEDURE — 6360000004 HC RX CONTRAST MEDICATION: Performed by: EMERGENCY MEDICINE

## 2020-03-30 PROCEDURE — 85025 COMPLETE CBC W/AUTO DIFF WBC: CPT

## 2020-03-30 PROCEDURE — 82140 ASSAY OF AMMONIA: CPT

## 2020-03-30 PROCEDURE — 82962 GLUCOSE BLOOD TEST: CPT

## 2020-03-30 PROCEDURE — 84484 ASSAY OF TROPONIN QUANT: CPT

## 2020-03-30 PROCEDURE — 6370000000 HC RX 637 (ALT 250 FOR IP): Performed by: HOSPITALIST

## 2020-03-30 PROCEDURE — 81001 URINALYSIS AUTO W/SCOPE: CPT

## 2020-03-30 PROCEDURE — 83690 ASSAY OF LIPASE: CPT

## 2020-03-30 PROCEDURE — 93010 ELECTROCARDIOGRAM REPORT: CPT | Performed by: INTERNAL MEDICINE

## 2020-03-30 PROCEDURE — 74177 CT ABD & PELVIS W/CONTRAST: CPT

## 2020-03-30 PROCEDURE — 6370000000 HC RX 637 (ALT 250 FOR IP): Performed by: EMERGENCY MEDICINE

## 2020-03-30 PROCEDURE — 71045 X-RAY EXAM CHEST 1 VIEW: CPT

## 2020-03-30 PROCEDURE — 70450 CT HEAD/BRAIN W/O DYE: CPT

## 2020-03-30 PROCEDURE — G0480 DRUG TEST DEF 1-7 CLASSES: HCPCS

## 2020-03-30 PROCEDURE — 94640 AIRWAY INHALATION TREATMENT: CPT

## 2020-03-30 PROCEDURE — 99285 EMERGENCY DEPT VISIT HI MDM: CPT

## 2020-03-30 PROCEDURE — 80053 COMPREHEN METABOLIC PANEL: CPT

## 2020-03-30 PROCEDURE — 1200000000 HC SEMI PRIVATE

## 2020-03-30 PROCEDURE — 80307 DRUG TEST PRSMV CHEM ANLYZR: CPT

## 2020-03-30 PROCEDURE — 93005 ELECTROCARDIOGRAM TRACING: CPT | Performed by: EMERGENCY MEDICINE

## 2020-03-30 PROCEDURE — 96374 THER/PROPH/DIAG INJ IV PUSH: CPT

## 2020-03-30 PROCEDURE — 83605 ASSAY OF LACTIC ACID: CPT

## 2020-03-30 PROCEDURE — 2580000003 HC RX 258: Performed by: EMERGENCY MEDICINE

## 2020-03-30 RX ORDER — ALBUTEROL SULFATE 90 UG/1
2 AEROSOL, METERED RESPIRATORY (INHALATION) 4 TIMES DAILY
Status: DISCONTINUED | OUTPATIENT
Start: 2020-03-30 | End: 2020-03-30 | Stop reason: CLARIF

## 2020-03-30 RX ORDER — SODIUM CHLORIDE 9 MG/ML
INJECTION, SOLUTION INTRAVENOUS CONTINUOUS
Status: DISCONTINUED | OUTPATIENT
Start: 2020-03-30 | End: 2020-04-03 | Stop reason: HOSPADM

## 2020-03-30 RX ORDER — INSULIN GLARGINE 100 [IU]/ML
10 INJECTION, SOLUTION SUBCUTANEOUS NIGHTLY
Status: DISCONTINUED | OUTPATIENT
Start: 2020-03-30 | End: 2020-03-31

## 2020-03-30 RX ORDER — LACTULOSE 10 G/15ML
20 SOLUTION ORAL ONCE
Status: COMPLETED | OUTPATIENT
Start: 2020-03-30 | End: 2020-03-30

## 2020-03-30 RX ORDER — ONDANSETRON 2 MG/ML
4 INJECTION INTRAMUSCULAR; INTRAVENOUS EVERY 6 HOURS PRN
Status: DISCONTINUED | OUTPATIENT
Start: 2020-03-30 | End: 2020-04-03 | Stop reason: HOSPADM

## 2020-03-30 RX ORDER — SPIRONOLACTONE 25 MG/1
50 TABLET ORAL DAILY
Status: DISCONTINUED | OUTPATIENT
Start: 2020-03-30 | End: 2020-04-01

## 2020-03-30 RX ORDER — PREDNISONE 20 MG/1
40 TABLET ORAL DAILY
Status: DISCONTINUED | OUTPATIENT
Start: 2020-03-30 | End: 2020-04-01

## 2020-03-30 RX ORDER — LACTULOSE 10 G/15ML
20 SOLUTION ORAL 3 TIMES DAILY
Status: DISCONTINUED | OUTPATIENT
Start: 2020-03-30 | End: 2020-04-03 | Stop reason: HOSPADM

## 2020-03-30 RX ORDER — HYDROXYZINE PAMOATE 25 MG/1
50 CAPSULE ORAL 3 TIMES DAILY PRN
Status: DISCONTINUED | OUTPATIENT
Start: 2020-03-30 | End: 2020-04-03 | Stop reason: HOSPADM

## 2020-03-30 RX ORDER — TENOFOVIR DISOPROXIL FUMARATE 300 MG/1
300 TABLET, FILM COATED ORAL NIGHTLY
Status: DISCONTINUED | OUTPATIENT
Start: 2020-03-30 | End: 2020-03-30

## 2020-03-30 RX ORDER — BUSPIRONE HYDROCHLORIDE 15 MG/1
15 TABLET ORAL 2 TIMES DAILY
Status: DISCONTINUED | OUTPATIENT
Start: 2020-03-30 | End: 2020-04-03 | Stop reason: HOSPADM

## 2020-03-30 RX ORDER — SODIUM CHLORIDE 0.9 % (FLUSH) 0.9 %
10 SYRINGE (ML) INJECTION PRN
Status: DISCONTINUED | OUTPATIENT
Start: 2020-03-30 | End: 2020-04-03 | Stop reason: HOSPADM

## 2020-03-30 RX ORDER — ALBUTEROL SULFATE 2.5 MG/3ML
2.5 SOLUTION RESPIRATORY (INHALATION) 4 TIMES DAILY
Status: DISCONTINUED | OUTPATIENT
Start: 2020-03-30 | End: 2020-04-03 | Stop reason: HOSPADM

## 2020-03-30 RX ORDER — DEXTROSE MONOHYDRATE 50 MG/ML
100 INJECTION, SOLUTION INTRAVENOUS PRN
Status: DISCONTINUED | OUTPATIENT
Start: 2020-03-30 | End: 2020-04-03 | Stop reason: HOSPADM

## 2020-03-30 RX ORDER — PROMETHAZINE HYDROCHLORIDE 25 MG/1
12.5 TABLET ORAL EVERY 6 HOURS PRN
Status: DISCONTINUED | OUTPATIENT
Start: 2020-03-30 | End: 2020-04-03 | Stop reason: HOSPADM

## 2020-03-30 RX ORDER — OXYCODONE HYDROCHLORIDE 5 MG/1
5 TABLET ORAL 4 TIMES DAILY
Status: DISCONTINUED | OUTPATIENT
Start: 2020-03-30 | End: 2020-04-03 | Stop reason: HOSPADM

## 2020-03-30 RX ORDER — FUROSEMIDE 20 MG/1
20 TABLET ORAL DAILY
Status: DISCONTINUED | OUTPATIENT
Start: 2020-03-30 | End: 2020-04-01

## 2020-03-30 RX ORDER — SODIUM CHLORIDE 9 MG/ML
INJECTION, SOLUTION INTRAVENOUS CONTINUOUS
Status: DISCONTINUED | OUTPATIENT
Start: 2020-03-30 | End: 2020-03-30

## 2020-03-30 RX ORDER — NICOTINE POLACRILEX 4 MG
15 LOZENGE BUCCAL PRN
Status: DISCONTINUED | OUTPATIENT
Start: 2020-03-30 | End: 2020-04-03 | Stop reason: HOSPADM

## 2020-03-30 RX ORDER — AZITHROMYCIN 250 MG/1
500 TABLET, FILM COATED ORAL DAILY
Status: DISCONTINUED | OUTPATIENT
Start: 2020-03-30 | End: 2020-04-03 | Stop reason: HOSPADM

## 2020-03-30 RX ORDER — PANTOPRAZOLE SODIUM 40 MG/1
40 TABLET, DELAYED RELEASE ORAL
Status: DISCONTINUED | OUTPATIENT
Start: 2020-03-30 | End: 2020-04-03 | Stop reason: HOSPADM

## 2020-03-30 RX ORDER — SODIUM CHLORIDE 0.9 % (FLUSH) 0.9 %
10 SYRINGE (ML) INJECTION EVERY 12 HOURS SCHEDULED
Status: DISCONTINUED | OUTPATIENT
Start: 2020-03-30 | End: 2020-04-03 | Stop reason: HOSPADM

## 2020-03-30 RX ORDER — ONDANSETRON 2 MG/ML
4 INJECTION INTRAMUSCULAR; INTRAVENOUS EVERY 30 MIN PRN
Status: DISCONTINUED | OUTPATIENT
Start: 2020-03-30 | End: 2020-03-30

## 2020-03-30 RX ORDER — TENOFOVIR DISOPROXIL FUMARATE 300 MG/1
300 TABLET, FILM COATED ORAL NIGHTLY
Status: DISCONTINUED | OUTPATIENT
Start: 2020-03-30 | End: 2020-04-03 | Stop reason: HOSPADM

## 2020-03-30 RX ORDER — RANOLAZINE 500 MG/1
500 TABLET, EXTENDED RELEASE ORAL 2 TIMES DAILY
Status: DISCONTINUED | OUTPATIENT
Start: 2020-03-30 | End: 2020-04-03 | Stop reason: HOSPADM

## 2020-03-30 RX ORDER — FLUOXETINE HYDROCHLORIDE 20 MG/1
40 CAPSULE ORAL DAILY
Status: DISCONTINUED | OUTPATIENT
Start: 2020-03-30 | End: 2020-04-03 | Stop reason: HOSPADM

## 2020-03-30 RX ORDER — DEXTROSE MONOHYDRATE 25 G/50ML
12.5 INJECTION, SOLUTION INTRAVENOUS PRN
Status: DISCONTINUED | OUTPATIENT
Start: 2020-03-30 | End: 2020-04-03 | Stop reason: HOSPADM

## 2020-03-30 RX ADMIN — INSULIN LISPRO 2 UNITS: 100 INJECTION, SOLUTION INTRAVENOUS; SUBCUTANEOUS at 21:07

## 2020-03-30 RX ADMIN — SODIUM CHLORIDE: 9 INJECTION, SOLUTION INTRAVENOUS at 12:56

## 2020-03-30 RX ADMIN — ENOXAPARIN SODIUM 40 MG: 40 INJECTION SUBCUTANEOUS at 17:23

## 2020-03-30 RX ADMIN — RANOLAZINE 500 MG: 500 TABLET, FILM COATED, EXTENDED RELEASE ORAL at 20:53

## 2020-03-30 RX ADMIN — LACTULOSE 20 G: 10 SOLUTION ORAL at 17:23

## 2020-03-30 RX ADMIN — BUSPIRONE HYDROCHLORIDE 15 MG: 15 TABLET ORAL at 20:53

## 2020-03-30 RX ADMIN — QUETIAPINE FUMARATE 150 MG: 100 TABLET ORAL at 20:52

## 2020-03-30 RX ADMIN — OXYCODONE HYDROCHLORIDE 5 MG: 5 TABLET ORAL at 20:53

## 2020-03-30 RX ADMIN — AZITHROMYCIN 500 MG: 250 TABLET, FILM COATED ORAL at 17:22

## 2020-03-30 RX ADMIN — LACTULOSE 20 G: 10 SOLUTION ORAL at 20:52

## 2020-03-30 RX ADMIN — PANTOPRAZOLE SODIUM 40 MG: 40 TABLET, DELAYED RELEASE ORAL at 17:22

## 2020-03-30 RX ADMIN — LACTULOSE 20 G: 10 SOLUTION ORAL at 14:46

## 2020-03-30 RX ADMIN — PREDNISONE 40 MG: 20 TABLET ORAL at 17:22

## 2020-03-30 RX ADMIN — FLUOXETINE 40 MG: 20 CAPSULE ORAL at 17:21

## 2020-03-30 RX ADMIN — IOPAMIDOL 80 ML: 755 INJECTION, SOLUTION INTRAVENOUS at 13:43

## 2020-03-30 RX ADMIN — ONDANSETRON 4 MG: 2 INJECTION INTRAMUSCULAR; INTRAVENOUS at 12:56

## 2020-03-30 RX ADMIN — FUROSEMIDE 20 MG: 20 TABLET ORAL at 17:22

## 2020-03-30 RX ADMIN — INSULIN GLARGINE 10 UNITS: 100 INJECTION, SOLUTION SUBCUTANEOUS at 21:06

## 2020-03-30 RX ADMIN — RIFAXIMIN 550 MG: 550 TABLET ORAL at 20:53

## 2020-03-30 RX ADMIN — SPIRONOLACTONE 50 MG: 25 TABLET ORAL at 17:22

## 2020-03-30 RX ADMIN — ALBUTEROL SULFATE 2.5 MG: 2.5 SOLUTION RESPIRATORY (INHALATION) at 22:09

## 2020-03-30 ASSESSMENT — PAIN DESCRIPTION - LOCATION
LOCATION: BACK
LOCATION: ABDOMEN

## 2020-03-30 ASSESSMENT — PAIN DESCRIPTION - DESCRIPTORS: DESCRIPTORS: ACHING

## 2020-03-30 ASSESSMENT — PAIN DESCRIPTION - PAIN TYPE
TYPE: CHRONIC PAIN
TYPE: ACUTE PAIN

## 2020-03-30 ASSESSMENT — PAIN DESCRIPTION - ORIENTATION
ORIENTATION: RIGHT;LEFT
ORIENTATION: RIGHT

## 2020-03-30 ASSESSMENT — PAIN SCALES - GENERAL
PAINLEVEL_OUTOF10: 8
PAINLEVEL_OUTOF10: 0
PAINLEVEL_OUTOF10: 9
PAINLEVEL_OUTOF10: 0
PAINLEVEL_OUTOF10: 8

## 2020-03-30 ASSESSMENT — PAIN DESCRIPTION - FREQUENCY: FREQUENCY: CONTINUOUS

## 2020-03-30 NOTE — ED PROVIDER NOTES
Triage Chief Complaint:   Emesis and Altered Mental Status      [place]:  Evelina Marroquin is a 64 y.o. female that presents to the emergency department with nausea, vomiting for the last 2 days. Has a history of elevated ammonia in the past and has been unable to keep her meds down. States she feels slightly confused like she does when her ammonia gets elevated. Denies any fevers, chills, diarrhea, constipation, abdominal pain, chest pain, leg edema. Denies any known sick contacts. Denies any known exposure to coronavirus. Patient does not have any headaches, vision changes. Does have a history of reflux, arthritis, CAD, COPD, history of drug abuse in the past, hepatitis C, hepatic encephalopathy. Past Medical History:   Diagnosis Date    Acid reflux     Arthritis     left knee    CAD (coronary artery disease)     per [facility] 9/6/13 - Dr. Arnoldo Dave COPD (chronic obstructive pulmonary disease) [facility])     follow with Dr Deidra Duff Depression     \"have manic - depression see Dr Karel Ahumada Diabetes mellitus [facility])     dx 10+ yrs ago- follows with PCP    Drug abuse ([facility] [address]     hx use of cocaine, heroin and marijuana- states last used 12/2014    Glaucoma     bilateral    H/O Doppler lower venous ultrasound 04/04/2019    No DVT or SVT, Significant reflux of RGSV and LGSV.  Hepatic encephalopathy ([facility] [address] 05/2019    Hepatitis C     for liver bx 12/3/2015\"Have Hepatitis B and C and saw Dr Fransisco Morales for this 12/1/2015\"    Hiatal hernia     History of alcohol abuse     History of exercise stress test 01/02/2020    Treadmill, Normal exercise performance without angina and ischemic EKG changes.     HTN (hypertension)     \"for the past two yrs on medication\" follows with Dr Arnoldo Dave Hx of blood clots 2019    Portal vein thrombsis - TIPS procedure 4/23/19    Hyperlipemia     Irregular heart beat     per pt    Liver hematoma     Migraines     Last migraine:  2018    Nausea & vomiting     Schizophrenia Range    Alcohol Scrn <0.01  THE VALUE IS BELOW OUR DETECTION LIMIT. <0.01 %WT/VOL   EKG 12 Lead   Result Value Ref Range    Ventricular Rate 104 BPM    Atrial Rate 104 BPM    P-R Interval 176 ms    QRS Duration 78 ms    Q-T Interval 374 ms    QTc Calculation (Bazett) 491 ms    P Axis 55 degrees    R Axis 19 degrees    T Axis 51 degrees    Diagnosis       Sinus tachycardia  Possible Left atrial enlargement  Borderline ECG  When compared with ECG of 14-JAN-2020 21:44,  No significant change was found          Radiographs:  [] Radiologist's Wet Read Report Reviewed:      CT ABDOMEN PELVIS W IV CONTRAST (Final result)   Result time 03/30/20 13:58:51   Final result by Jen Avila MD (03/30/20 13:58:51)                Impression:    1. No acute intra-process identified. 2. Patent portal systemic shunt.  Portal vein is also patent.  Similar  appearance of gastric varices. Narrative:    EXAMINATION:  CT OF THE ABDOMEN AND PELVIS WITH CONTRAST 3/30/2020 1:43 pm    TECHNIQUE:  CT of the abdomen and pelvis was performed with the administration of  intravenous contrast. Multiplanar reformatted images are provided for review. Dose modulation, iterative reconstruction, and/or weight based adjustment of  the mA/kV was utilized to reduce the radiation dose to as low as reasonably  achievable. COMPARISON:  Abdominal ultrasound January 24, 2020; CT abdomen pelvis October 25, 2019    HISTORY:  ORDERING SYSTEM PROVIDED HISTORY: abdominal pain  TECHNOLOGIST PROVIDED HISTORY:  IV contrast only. Thank you. Reason for exam:->abdominal pain  Reason for exam:->IV contrast only. Thank you. Reason for Exam: abdominal pain  Acuity: Acute  Type of Exam: Initial  Relevant Medical/Surgical History: 80ML [id number]    FINDINGS:  Lower Chest: Minimal scarring versus atelectasis at the imaged lung bases  which otherwise appear clear.     Organs: Liver demonstrates no acute abnormality.  Redemonstration of patent  portal systemic 03/30/20 13:23:32   Final result by Martin Wallace MD (03/30/20 13:23:32)                Impression:    No acute intracranial abnormality. Narrative:    EXAMINATION:  CT OF THE HEAD WITHOUT CONTRAST  3/30/2020 1:10 pm    TECHNIQUE:  CT of the head was performed without the administration of intravenous  contrast. Dose modulation, iterative reconstruction, and/or weight based  adjustment of the mA/kV was utilized to reduce the radiation dose to as low  as reasonably achievable. COMPARISON:  01/15/2020, 05/13/2019, 01/28/2007    HISTORY:  ORDERING SYSTEM PROVIDED HISTORY: altered mental status  TECHNOLOGIST PROVIDED HISTORY:  Reason for exam:->altered mental status  Has a \"code stroke\" or \"stroke alert\" been called? ->No  Reason for Exam: altered mental status  Acuity: Acute  Type of Exam: Initial    FINDINGS:  BRAIN/VENTRICLES: There is no acute intracranial hemorrhage, mass effect or  midline shift.  No abnormal extra-axial fluid collection.  The gray-white  differentiation is maintained without evidence of an acute infarct.  There is  no evidence of hydrocephalus. No focus of acute abnormal brain attenuation is  identified. ORBITS: The visualized portion of the orbits demonstrate no acute abnormality. SINUSES: The visualized paranasal sinuses and mastoid air cells demonstrate  no acute abnormality. SOFT TISSUES/SKULL:  No acute abnormality of the visualized skull or soft  tissues. [] Discussed with Radiologist:     [] The following radiograph was interpreted by myself in the absence of a radiologist:     EKG: (All EKG's are interpreted by myself in the absence of a cardiologist)  The Ekg interpreted by me shows  sinus tachycardia, cvwa=204 with a rate of 104  Axis is   Normal  QTc is  normal  Intervals and Durations are unremarkable.       ST Segments: no acute change  No significant change from prior EKG dated 1-      MDM:  Vitals show normotensive, mildly tachycardic at 108, afebrile, sats normal.. Started on zofran IV and IV fluids. Patient CBC shows a normal white count of 4.6. Hemoglobin stable at 14.3. Mild left shift. Electrolytes show an elevated glucose of 238 but a normal sodium, potassium, kidney function. Normal CO2 of 21. Anion gap is normal at 14. Lipase is mildly elevated at 88. Lactic acid is elevated at 3.4. ammonia level elevated at 134. Did order lactulose to be given once she is not having any further vomiting. Troponin normal.  Alcohol level negative. Awaiting urinalysis and urine drug screen. CT abdomen shows no acute findings. T head does not show any acute findings. X-ray shows no acute findings. Patient's vitals are stable. Patient will need to be admitted for hepatic encephalopathy. She does voiced understanding and agree to plan. Clinical Impression:  1. Non-intractable vomiting with nausea, unspecified vomiting type    2. Hyperammonemia (HCC)    3. Elevated lactic acid level    4. Hepatic encephalopathy (HCC)        Disposition Vitals:  [unfilled], [unfilled], [unfilled], [unfilled]    Disposition referral (if applicable):  No follow-up provider specified.     Disposition medications (if applicable):  New Prescriptions    No medications on file         (Please note that portions of this note may have been completed with a voice recognition program. Efforts were made to edit the dictations but occasionally words are mis-transcribed.)    MD Jt Chavira MD  03/30/20 [id number]

## 2020-03-30 NOTE — H&P
History and Physical      Name:  Romain Ash /Age/Sex: 1960  (64 y.o. female)   MRN & CSN:  [id number] & [id number] Admission Date/Time: 3/30/2020 12:20 PM   Location:  ED25/ED-25 PCP: Johanny Ryder MD       Hospital Day: 1    Assessment and Plan:   Romain Ash is a 64 y.o.  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     > Depression  -Continue fluoxetine     > diabetes mellitus type II, with hyperglycemia:  -Continue insulin sliding scale with hypoglycemia protocol     >. Hyperlipidemia  > paroxysmal atrial fibrillation, not on anticoagulation  -Continue metoprolol tartrate twice daily.      > Schizophrenia-continue Seroquel     > h/o Hepatic encephalopathy:  -Continue rifaximin, lactulose, Lasix, spironolactone     >  Hepatitis C: On viread    Diet No diet orders on file   DVT Prophylaxis [] Lovenox,    Code Status Prior     History of Present Illness:     Chief Complaint:  Nausea, vomiting, and altered mental status     Romain Ash is a 64 y.o.  female  who presents with  Nausea, vomiting, and altered mental status     Pt presented with few days h/o worsening nausea, vomiting, could not keep her meds down, then became confused, found to have elevated ammonia, was given lactulose in ED, kept it down, mental status seem to have improved at time of my exam, no diarrhea, no constipation, reports abd pain from vomiting, no F/C, admit stable dyspnea with her h/o of COPD, reports cough, no fever.         10-14 point ROS reviewed negative, unless as noted above     Objective: follows with Dr Hardik Kovacs Hx of blood clots 2019    Portal vein thrombsis - TIPS procedure 4/23/19    Hyperlipemia     Irregular heart beat     per pt    Liver hematoma     Migraines     Last migraine:  2018    Nausea & vomiting     Schizophrenia ([address]     per old chart    Seizures ([facility] [address]     \"last one was 9/2015- saw Dr Chris Toro at [facility]- she said not sure if acutal seizures- she thinks they are panic or anxiety attacks\"    SOB (shortness of breath)     with any exertion     PSHX:  has a past surgical history that includes Cholecystectomy (per old chart done 1985); Tonsillectomy (as a kid); Breast surgery (10/2015); Endoscopy, colon, diagnostic (03/16/2017); Upper gastrointestinal endoscopy (N/A, 11/14/2018); TIPS procedure (04/23/2019); Upper gastrointestinal endoscopy (N/A, 6/5/2019); Colonoscopy (3/11/13); Colonoscopy (03/16/2017); eye surgery (Bilateral, ? when); Cardiac catheterization; Hysterectomy; and Carpal tunnel release (Left, 1/9/2020). Allergies: Allergies   Allergen Reactions    Norco [Hydrocodone-Acetaminophen] Itching     Itching, rash, nausea and vomiting.  Tylenol [Acetaminophen] Itching, Nausea And Vomiting and Rash       FAM HX: family history includes Arthritis in her father and mother; Cancer in her father and mother; Diabetes in her father; Heart Disease in her brother; High Blood Pressure in her father; High Cholesterol in her father; Migraines in her father, mother, and sister.   Soc HX:   Social History     Socioeconomic History    Marital status:      Spouse name: None    Number of children: None    Years of education: None    Highest education level: None   Occupational History    None   Social Needs    Financial resource strain: None    Food insecurity     Worry: None     Inability: None    Transportation needs     Medical: None     Non-medical: None   Tobacco Use    Smoking status: Former Smoker     Packs/day: 3.00     Years: 45.00     Pack years: 135.00     Types: Cigarettes     Start date: 67     Last attempt to quit: 2019     Years since quittin.2    Smokeless tobacco: Never Used   Substance and Sexual Activity    Alcohol use: Not Currently     Alcohol/week: 2.0 - 3.0 standard drinks     Types: 2 - 3 Shots of liquor per week     Comment: 2-3 shots last 2019    Drug use: Not Currently     Frequency: 3.0 times per week     Types: Marijuana     Comment: Last smoked; 20, last used cocaine: 2018 per pt    Sexual activity: Not Currently     Partners: Male   Lifestyle    Physical activity     Days per week: None     Minutes per session: None    Stress: None   Relationships    Social connections     Talks on phone: None     Gets together: None     Attends Confucianism service: None     Active member of club or organization: None     Attends meetings of clubs or organizations: None     Relationship status: None    Intimate partner violence     Fear of current or ex partner: None     Emotionally abused: None     Physically abused: None     Forced sexual activity: None   Other Topics Concern    None   Social History Narrative    None       Medications:   Medications:   Prior to Admission medications    Medication Sig Start Date End Date Taking? Authorizing Provider   metoprolol tartrate (LOPRESSOR) 25 MG tablet Take 1 tablet by mouth 2 times daily 3/25/20   Pohebe Stephens APRN - CNP   lactulose [facility]) 10 GM/15ML solution  20   Historical Provider, MD   blood glucose monitor strips Test 3-4 times a day & as needed for symptoms of irregular blood glucose. 20   Carole Sam MD   Glucosource Lancets MISC Use one 3-4 times to check blood sugar 20   Carole Sam MD   glucose monitoring kit (FREESTYLE) monitoring kit 1 kit by Does not apply route daily 20   Carole Sam MD   Blood Glucose Monitoring Suppl [facility] BLOOD GLUCOSE METER) w/Device KIT Please check blood sugar 3 times daily.  Please fill with what is covered by mg by mouth nightly  11/20/17   Historical Provider, MD   QUEtiapine (SEROQUEL) 300 MG tablet Take 150 mg by mouth nightly  11/15/16   Historical Provider, MD   oxyCODONE (ROXICODONE) 5 MG immediate release tablet Take 5 mg by mouth 4 times daily.  Per pain management dr Isaac Grace office    Historical Provider, MD   busPIRone (BUSPAR) 15 MG tablet Take 1 tablet by mouth 2 times daily 5/28/15   Tomás Asencio MD      Infusions:    sodium chloride 100 mL/hr at 03/30/20 1256     PRN Meds: ondansetron, 4 mg, Q30 Min PRN          Electronically signed by Diogo Valentine MD on 3/30/2020 at 2:29 PM

## 2020-03-30 NOTE — ED NOTES
[phone] paged hospitalist     Juliana Jaeger  03/30/20 5590 [phone] hospitalist returned call      Maynewton Jaeger  03/30/20 [phone]

## 2020-03-31 LAB
AMMONIA: 103 UMOL/L (ref 11–51)
ANION GAP SERPL CALCULATED.3IONS-SCNC: 11 MMOL/L (ref 4–16)
BASOPHILS ABSOLUTE: 0 K/CU MM
BASOPHILS RELATIVE PERCENT: 0.5 % (ref 0–1)
BUN BLDV-MCNC: 13 MG/DL (ref 6–23)
CALCIUM SERPL-MCNC: 9.3 MG/DL (ref 8.3–10.6)
CHLORIDE BLD-SCNC: 106 MMOL/L (ref 99–110)
CO2: 20 MMOL/L (ref 21–32)
CREAT SERPL-MCNC: 1 MG/DL (ref 0.6–1.1)
DIFFERENTIAL TYPE: ABNORMAL
EOSINOPHILS ABSOLUTE: 0 K/CU MM
EOSINOPHILS RELATIVE PERCENT: 0 % (ref 0–3)
ESTIMATED AVERAGE GLUCOSE: 194 MG/DL
GFR AFRICAN AMERICAN: >60 ML/MIN/1.73M2
GFR NON-AFRICAN AMERICAN: 57 ML/MIN/1.73M2
GLUCOSE BLD-MCNC: 265 MG/DL (ref 70–99)
GLUCOSE BLD-MCNC: 285 MG/DL (ref 70–99)
GLUCOSE BLD-MCNC: 328 MG/DL (ref 70–99)
GLUCOSE BLD-MCNC: 334 MG/DL (ref 70–99)
GLUCOSE BLD-MCNC: 345 MG/DL (ref 70–99)
HBA1C MFR BLD: 8.4 % (ref 4.2–6.3)
HCT VFR BLD CALC: 40.4 % (ref 37–47)
HEMOGLOBIN: 13.6 GM/DL (ref 12.5–16)
IMMATURE NEUTROPHIL %: 0.3 % (ref 0–0.43)
LACTATE: 2.5 MMOL/L (ref 0.4–2)
LYMPHOCYTES ABSOLUTE: 0.8 K/CU MM
LYMPHOCYTES RELATIVE PERCENT: 20.6 % (ref 24–44)
MCH RBC QN AUTO: 31.7 PG (ref 27–31)
MCHC RBC AUTO-ENTMCNC: 33.7 % (ref 32–36)
MCV RBC AUTO: 94.2 FL (ref 78–100)
MONOCYTES ABSOLUTE: 0.1 K/CU MM
MONOCYTES RELATIVE PERCENT: 3.1 % (ref 0–4)
NUCLEATED RBC %: 0 %
PDW BLD-RTO: 13.3 % (ref 11.7–14.9)
PLATELET # BLD: 88 K/CU MM (ref 140–440)
PMV BLD AUTO: 11.9 FL (ref 7.5–11.1)
POTASSIUM SERPL-SCNC: 4.5 MMOL/L (ref 3.5–5.1)
RBC # BLD: 4.29 M/CU MM (ref 4.2–5.4)
SEGMENTED NEUTROPHILS ABSOLUTE COUNT: 3 K/CU MM
SEGMENTED NEUTROPHILS RELATIVE PERCENT: 75.5 % (ref 36–66)
SODIUM BLD-SCNC: 137 MMOL/L (ref 135–145)
TOTAL IMMATURE NEUTOROPHIL: 0.01 K/CU MM
TOTAL NUCLEATED RBC: 0 K/CU MM
WBC # BLD: 3.9 K/CU MM (ref 4–10.5)

## 2020-03-31 PROCEDURE — 6370000000 HC RX 637 (ALT 250 FOR IP): Performed by: HOSPITALIST

## 2020-03-31 PROCEDURE — 83036 HEMOGLOBIN GLYCOSYLATED A1C: CPT

## 2020-03-31 PROCEDURE — 94761 N-INVAS EAR/PLS OXIMETRY MLT: CPT

## 2020-03-31 PROCEDURE — 6360000002 HC RX W HCPCS: Performed by: HOSPITALIST

## 2020-03-31 PROCEDURE — 85025 COMPLETE CBC W/AUTO DIFF WBC: CPT

## 2020-03-31 PROCEDURE — 1200000000 HC SEMI PRIVATE

## 2020-03-31 PROCEDURE — 2580000003 HC RX 258: Performed by: HOSPITALIST

## 2020-03-31 PROCEDURE — 94640 AIRWAY INHALATION TREATMENT: CPT

## 2020-03-31 PROCEDURE — 80048 BASIC METABOLIC PNL TOTAL CA: CPT

## 2020-03-31 PROCEDURE — 82140 ASSAY OF AMMONIA: CPT

## 2020-03-31 PROCEDURE — 83605 ASSAY OF LACTIC ACID: CPT

## 2020-03-31 PROCEDURE — 82962 GLUCOSE BLOOD TEST: CPT

## 2020-03-31 RX ORDER — INSULIN GLARGINE 100 [IU]/ML
20 INJECTION, SOLUTION SUBCUTANEOUS NIGHTLY
Status: DISCONTINUED | OUTPATIENT
Start: 2020-03-31 | End: 2020-04-01

## 2020-03-31 RX ADMIN — ALBUTEROL SULFATE 2.5 MG: 2.5 SOLUTION RESPIRATORY (INHALATION) at 19:34

## 2020-03-31 RX ADMIN — AZITHROMYCIN 500 MG: 250 TABLET, FILM COATED ORAL at 10:28

## 2020-03-31 RX ADMIN — INSULIN LISPRO 2 UNITS: 100 INJECTION, SOLUTION INTRAVENOUS; SUBCUTANEOUS at 22:10

## 2020-03-31 RX ADMIN — PANTOPRAZOLE SODIUM 40 MG: 40 TABLET, DELAYED RELEASE ORAL at 17:03

## 2020-03-31 RX ADMIN — ENOXAPARIN SODIUM 40 MG: 40 INJECTION SUBCUTANEOUS at 10:28

## 2020-03-31 RX ADMIN — RANOLAZINE 500 MG: 500 TABLET, FILM COATED, EXTENDED RELEASE ORAL at 10:27

## 2020-03-31 RX ADMIN — PREDNISONE 40 MG: 20 TABLET ORAL at 10:28

## 2020-03-31 RX ADMIN — SPIRONOLACTONE 50 MG: 25 TABLET ORAL at 10:27

## 2020-03-31 RX ADMIN — ALBUTEROL SULFATE 2.5 MG: 2.5 SOLUTION RESPIRATORY (INHALATION) at 11:58

## 2020-03-31 RX ADMIN — RIFAXIMIN 550 MG: 550 TABLET ORAL at 22:05

## 2020-03-31 RX ADMIN — OXYCODONE HYDROCHLORIDE 5 MG: 5 TABLET ORAL at 17:03

## 2020-03-31 RX ADMIN — PANTOPRAZOLE SODIUM 40 MG: 40 TABLET, DELAYED RELEASE ORAL at 06:35

## 2020-03-31 RX ADMIN — LACTULOSE 20 G: 10 SOLUTION ORAL at 22:05

## 2020-03-31 RX ADMIN — ALBUTEROL SULFATE 2.5 MG: 2.5 SOLUTION RESPIRATORY (INHALATION) at 15:34

## 2020-03-31 RX ADMIN — OXYCODONE HYDROCHLORIDE 5 MG: 5 TABLET ORAL at 10:28

## 2020-03-31 RX ADMIN — OXYCODONE HYDROCHLORIDE 5 MG: 5 TABLET ORAL at 22:05

## 2020-03-31 RX ADMIN — INSULIN HUMAN 10 UNITS: 100 INJECTION, SUSPENSION SUBCUTANEOUS at 10:27

## 2020-03-31 RX ADMIN — FLUOXETINE 40 MG: 20 CAPSULE ORAL at 10:28

## 2020-03-31 RX ADMIN — RANOLAZINE 500 MG: 500 TABLET, FILM COATED, EXTENDED RELEASE ORAL at 22:05

## 2020-03-31 RX ADMIN — INSULIN GLARGINE 20 UNITS: 100 INJECTION, SOLUTION SUBCUTANEOUS at 22:06

## 2020-03-31 RX ADMIN — FUROSEMIDE 20 MG: 20 TABLET ORAL at 10:41

## 2020-03-31 RX ADMIN — RIFAXIMIN 550 MG: 550 TABLET ORAL at 10:27

## 2020-03-31 RX ADMIN — OXYCODONE HYDROCHLORIDE 5 MG: 5 TABLET ORAL at 12:56

## 2020-03-31 RX ADMIN — BUSPIRONE HYDROCHLORIDE 15 MG: 15 TABLET ORAL at 22:06

## 2020-03-31 RX ADMIN — LACTULOSE 20 G: 10 SOLUTION ORAL at 10:28

## 2020-03-31 RX ADMIN — QUETIAPINE FUMARATE 150 MG: 100 TABLET ORAL at 22:05

## 2020-03-31 RX ADMIN — LACTULOSE 20 G: 10 SOLUTION ORAL at 12:56

## 2020-03-31 RX ADMIN — ALBUTEROL SULFATE 2.5 MG: 2.5 SOLUTION RESPIRATORY (INHALATION) at 08:11

## 2020-03-31 RX ADMIN — SODIUM CHLORIDE: 9 INJECTION, SOLUTION INTRAVENOUS at 09:13

## 2020-03-31 RX ADMIN — BUSPIRONE HYDROCHLORIDE 15 MG: 15 TABLET ORAL at 10:28

## 2020-03-31 RX ADMIN — SODIUM CHLORIDE, PRESERVATIVE FREE 10 ML: 5 INJECTION INTRAVENOUS at 10:29

## 2020-03-31 ASSESSMENT — PAIN SCALES - GENERAL
PAINLEVEL_OUTOF10: 0
PAINLEVEL_OUTOF10: 0
PAINLEVEL_OUTOF10: 5
PAINLEVEL_OUTOF10: 0
PAINLEVEL_OUTOF10: 2
PAINLEVEL_OUTOF10: 2

## 2020-03-31 ASSESSMENT — PAIN DESCRIPTION - PAIN TYPE: TYPE: CHRONIC PAIN

## 2020-04-01 LAB
AMMONIA: 61 UMOL/L (ref 11–51)
BASOPHILS ABSOLUTE: 0 K/CU MM
BASOPHILS RELATIVE PERCENT: 0.3 % (ref 0–1)
DIFFERENTIAL TYPE: ABNORMAL
EOSINOPHILS ABSOLUTE: 0.1 K/CU MM
EOSINOPHILS RELATIVE PERCENT: 0.8 % (ref 0–3)
GLUCOSE BLD-MCNC: 260 MG/DL (ref 70–99)
GLUCOSE BLD-MCNC: 291 MG/DL (ref 70–99)
GLUCOSE BLD-MCNC: 373 MG/DL (ref 70–99)
GLUCOSE BLD-MCNC: 388 MG/DL (ref 70–99)
HCT VFR BLD CALC: 34.7 % (ref 37–47)
HEMOGLOBIN: 11.7 GM/DL (ref 12.5–16)
IMMATURE NEUTROPHIL %: 0.3 % (ref 0–0.43)
LACTATE: 2.9 MMOL/L (ref 0.4–2)
LACTATE: 2.9 MMOL/L (ref 0.4–2)
LYMPHOCYTES ABSOLUTE: 1.5 K/CU MM
LYMPHOCYTES RELATIVE PERCENT: 20.4 % (ref 24–44)
MCH RBC QN AUTO: 31.4 PG (ref 27–31)
MCHC RBC AUTO-ENTMCNC: 33.7 % (ref 32–36)
MCV RBC AUTO: 93 FL (ref 78–100)
MONOCYTES ABSOLUTE: 0.4 K/CU MM
MONOCYTES RELATIVE PERCENT: 5.9 % (ref 0–4)
NUCLEATED RBC %: 0 %
PDW BLD-RTO: 13.2 % (ref 11.7–14.9)
PLATELET # BLD: ABNORMAL K/CU MM (ref 140–440)
PMV BLD AUTO: 11.9 FL (ref 7.5–11.1)
RBC # BLD: 3.73 M/CU MM (ref 4.2–5.4)
SEGMENTED NEUTROPHILS ABSOLUTE COUNT: 5.4 K/CU MM
SEGMENTED NEUTROPHILS RELATIVE PERCENT: 72.3 % (ref 36–66)
TOTAL IMMATURE NEUTOROPHIL: 0.02 K/CU MM
TOTAL NUCLEATED RBC: 0 K/CU MM
WBC # BLD: 7.4 K/CU MM (ref 4–10.5)

## 2020-04-01 PROCEDURE — 94761 N-INVAS EAR/PLS OXIMETRY MLT: CPT

## 2020-04-01 PROCEDURE — 82140 ASSAY OF AMMONIA: CPT

## 2020-04-01 PROCEDURE — 6360000002 HC RX W HCPCS: Performed by: HOSPITALIST

## 2020-04-01 PROCEDURE — 82962 GLUCOSE BLOOD TEST: CPT

## 2020-04-01 PROCEDURE — 83605 ASSAY OF LACTIC ACID: CPT

## 2020-04-01 PROCEDURE — 85025 COMPLETE CBC W/AUTO DIFF WBC: CPT

## 2020-04-01 PROCEDURE — 2580000003 HC RX 258: Performed by: HOSPITALIST

## 2020-04-01 PROCEDURE — 94640 AIRWAY INHALATION TREATMENT: CPT

## 2020-04-01 PROCEDURE — 6370000000 HC RX 637 (ALT 250 FOR IP): Performed by: HOSPITALIST

## 2020-04-01 PROCEDURE — 1200000000 HC SEMI PRIVATE

## 2020-04-01 RX ORDER — PREDNISONE 20 MG/1
20 TABLET ORAL DAILY
Status: DISCONTINUED | OUTPATIENT
Start: 2020-04-02 | End: 2020-04-03 | Stop reason: HOSPADM

## 2020-04-01 RX ORDER — INSULIN GLARGINE 100 [IU]/ML
30 INJECTION, SOLUTION SUBCUTANEOUS NIGHTLY
Status: DISCONTINUED | OUTPATIENT
Start: 2020-04-01 | End: 2020-04-02

## 2020-04-01 RX ADMIN — INSULIN LISPRO 3 UNITS: 100 INJECTION, SOLUTION INTRAVENOUS; SUBCUTANEOUS at 20:12

## 2020-04-01 RX ADMIN — BUSPIRONE HYDROCHLORIDE 15 MG: 15 TABLET ORAL at 20:13

## 2020-04-01 RX ADMIN — HYDROXYZINE PAMOATE 50 MG: 25 CAPSULE ORAL at 20:13

## 2020-04-01 RX ADMIN — BUSPIRONE HYDROCHLORIDE 15 MG: 15 TABLET ORAL at 08:44

## 2020-04-01 RX ADMIN — ENOXAPARIN SODIUM 40 MG: 40 INJECTION SUBCUTANEOUS at 08:42

## 2020-04-01 RX ADMIN — OXYCODONE HYDROCHLORIDE 5 MG: 5 TABLET ORAL at 12:56

## 2020-04-01 RX ADMIN — OXYCODONE HYDROCHLORIDE 5 MG: 5 TABLET ORAL at 08:43

## 2020-04-01 RX ADMIN — SODIUM CHLORIDE, PRESERVATIVE FREE 10 ML: 5 INJECTION INTRAVENOUS at 20:16

## 2020-04-01 RX ADMIN — INSULIN GLARGINE 30 UNITS: 100 INJECTION, SOLUTION SUBCUTANEOUS at 20:13

## 2020-04-01 RX ADMIN — RIFAXIMIN 550 MG: 550 TABLET ORAL at 08:43

## 2020-04-01 RX ADMIN — LACTULOSE 20 G: 10 SOLUTION ORAL at 20:16

## 2020-04-01 RX ADMIN — RIFAXIMIN 550 MG: 550 TABLET ORAL at 20:13

## 2020-04-01 RX ADMIN — LACTULOSE 20 G: 10 SOLUTION ORAL at 08:42

## 2020-04-01 RX ADMIN — AZITHROMYCIN 500 MG: 250 TABLET, FILM COATED ORAL at 08:44

## 2020-04-01 RX ADMIN — LACTULOSE 20 G: 10 SOLUTION ORAL at 12:55

## 2020-04-01 RX ADMIN — SODIUM CHLORIDE: 9 INJECTION, SOLUTION INTRAVENOUS at 01:53

## 2020-04-01 RX ADMIN — PANTOPRAZOLE SODIUM 40 MG: 40 TABLET, DELAYED RELEASE ORAL at 06:18

## 2020-04-01 RX ADMIN — SODIUM CHLORIDE: 9 INJECTION, SOLUTION INTRAVENOUS at 16:49

## 2020-04-01 RX ADMIN — PROMETHAZINE HYDROCHLORIDE 12.5 MG: 25 TABLET ORAL at 20:17

## 2020-04-01 RX ADMIN — ALBUTEROL SULFATE 2.5 MG: 2.5 SOLUTION RESPIRATORY (INHALATION) at 11:47

## 2020-04-01 RX ADMIN — QUETIAPINE FUMARATE 150 MG: 100 TABLET ORAL at 20:13

## 2020-04-01 RX ADMIN — OXYCODONE HYDROCHLORIDE 5 MG: 5 TABLET ORAL at 16:47

## 2020-04-01 RX ADMIN — ALBUTEROL SULFATE 2.5 MG: 2.5 SOLUTION RESPIRATORY (INHALATION) at 15:46

## 2020-04-01 RX ADMIN — PREDNISONE 40 MG: 20 TABLET ORAL at 08:43

## 2020-04-01 RX ADMIN — SODIUM CHLORIDE, PRESERVATIVE FREE 10 ML: 5 INJECTION INTRAVENOUS at 08:42

## 2020-04-01 RX ADMIN — INSULIN HUMAN 10 UNITS: 100 INJECTION, SUSPENSION SUBCUTANEOUS at 14:09

## 2020-04-01 RX ADMIN — PANTOPRAZOLE SODIUM 40 MG: 40 TABLET, DELAYED RELEASE ORAL at 16:47

## 2020-04-01 RX ADMIN — RANOLAZINE 500 MG: 500 TABLET, FILM COATED, EXTENDED RELEASE ORAL at 08:43

## 2020-04-01 RX ADMIN — RANOLAZINE 500 MG: 500 TABLET, FILM COATED, EXTENDED RELEASE ORAL at 20:13

## 2020-04-01 RX ADMIN — ALBUTEROL SULFATE 2.5 MG: 2.5 SOLUTION RESPIRATORY (INHALATION) at 07:39

## 2020-04-01 RX ADMIN — FLUOXETINE 40 MG: 20 CAPSULE ORAL at 08:43

## 2020-04-01 RX ADMIN — OXYCODONE HYDROCHLORIDE 5 MG: 5 TABLET ORAL at 20:13

## 2020-04-01 ASSESSMENT — PAIN SCALES - GENERAL
PAINLEVEL_OUTOF10: 3
PAINLEVEL_OUTOF10: 7
PAINLEVEL_OUTOF10: 3
PAINLEVEL_OUTOF10: 4
PAINLEVEL_OUTOF10: 3
PAINLEVEL_OUTOF10: 1

## 2020-04-01 ASSESSMENT — PAIN DESCRIPTION - ORIENTATION: ORIENTATION: RIGHT;LEFT

## 2020-04-01 ASSESSMENT — PAIN DESCRIPTION - LOCATION: LOCATION: BACK

## 2020-04-01 ASSESSMENT — PAIN DESCRIPTION - PAIN TYPE: TYPE: CHRONIC PAIN

## 2020-04-02 LAB
AMMONIA: 89 UMOL/L (ref 11–51)
AMMONIA: 96 UMOL/L (ref 11–51)
GLUCOSE BLD-MCNC: 215 MG/DL (ref 70–99)
GLUCOSE BLD-MCNC: 257 MG/DL (ref 70–99)
GLUCOSE BLD-MCNC: 299 MG/DL (ref 70–99)
GLUCOSE BLD-MCNC: 406 MG/DL (ref 70–99)
LACTATE: 2.1 MMOL/L (ref 0.4–2)

## 2020-04-02 PROCEDURE — 83605 ASSAY OF LACTIC ACID: CPT

## 2020-04-02 PROCEDURE — 6370000000 HC RX 637 (ALT 250 FOR IP): Performed by: PHYSICIAN ASSISTANT

## 2020-04-02 PROCEDURE — 1200000000 HC SEMI PRIVATE

## 2020-04-02 PROCEDURE — 6360000002 HC RX W HCPCS: Performed by: HOSPITALIST

## 2020-04-02 PROCEDURE — 82140 ASSAY OF AMMONIA: CPT

## 2020-04-02 PROCEDURE — 82962 GLUCOSE BLOOD TEST: CPT

## 2020-04-02 PROCEDURE — 6370000000 HC RX 637 (ALT 250 FOR IP): Performed by: HOSPITALIST

## 2020-04-02 PROCEDURE — 94761 N-INVAS EAR/PLS OXIMETRY MLT: CPT

## 2020-04-02 PROCEDURE — 2580000003 HC RX 258: Performed by: HOSPITALIST

## 2020-04-02 PROCEDURE — 94640 AIRWAY INHALATION TREATMENT: CPT

## 2020-04-02 PROCEDURE — 36415 COLL VENOUS BLD VENIPUNCTURE: CPT

## 2020-04-02 PROCEDURE — 92610 EVALUATE SWALLOWING FUNCTION: CPT

## 2020-04-02 RX ORDER — PREDNISONE 10 MG/1
10 TABLET ORAL DAILY
Qty: 3 TABLET | Refills: 0 | Status: SHIPPED | OUTPATIENT
Start: 2020-04-03 | End: 2020-04-06 | Stop reason: ALTCHOICE

## 2020-04-02 RX ORDER — INSULIN GLARGINE 100 [IU]/ML
40 INJECTION, SOLUTION SUBCUTANEOUS NIGHTLY
Status: DISCONTINUED | OUTPATIENT
Start: 2020-04-02 | End: 2020-04-03 | Stop reason: HOSPADM

## 2020-04-02 RX ORDER — LACTULOSE 10 G/15ML
20 SOLUTION ORAL 3 TIMES DAILY
Qty: 1892 ML | Refills: 2 | Status: ON HOLD | OUTPATIENT
Start: 2020-04-02 | End: 2020-09-15 | Stop reason: SDUPTHER

## 2020-04-02 RX ORDER — INSULIN ASPART 100 [IU]/ML
INJECTION, SOLUTION INTRAVENOUS; SUBCUTANEOUS
Qty: 5 PEN | Refills: 3 | Status: SHIPPED | OUTPATIENT
Start: 2020-04-02 | End: 2020-04-20 | Stop reason: SDUPTHER

## 2020-04-02 RX ORDER — CYCLOBENZAPRINE HCL 10 MG
10 TABLET ORAL ONCE
Status: COMPLETED | OUTPATIENT
Start: 2020-04-02 | End: 2020-04-02

## 2020-04-02 RX ORDER — INSULIN GLARGINE 100 [IU]/ML
30 INJECTION, SOLUTION SUBCUTANEOUS NIGHTLY
Qty: 5 PEN | Refills: 3 | Status: SHIPPED | OUTPATIENT
Start: 2020-04-02 | End: 2020-04-03 | Stop reason: SDUPTHER

## 2020-04-02 RX ADMIN — CYCLOBENZAPRINE HYDROCHLORIDE 10 MG: 10 TABLET, FILM COATED ORAL at 01:08

## 2020-04-02 RX ADMIN — OXYCODONE HYDROCHLORIDE 5 MG: 5 TABLET ORAL at 20:43

## 2020-04-02 RX ADMIN — OXYCODONE HYDROCHLORIDE 5 MG: 5 TABLET ORAL at 17:19

## 2020-04-02 RX ADMIN — OXYCODONE HYDROCHLORIDE 5 MG: 5 TABLET ORAL at 12:35

## 2020-04-02 RX ADMIN — RIFAXIMIN 550 MG: 550 TABLET ORAL at 08:41

## 2020-04-02 RX ADMIN — AZITHROMYCIN 500 MG: 250 TABLET, FILM COATED ORAL at 08:40

## 2020-04-02 RX ADMIN — ALBUTEROL SULFATE 2.5 MG: 2.5 SOLUTION RESPIRATORY (INHALATION) at 12:08

## 2020-04-02 RX ADMIN — FLUOXETINE 40 MG: 20 CAPSULE ORAL at 08:41

## 2020-04-02 RX ADMIN — INSULIN HUMAN 15 UNITS: 100 INJECTION, SUSPENSION SUBCUTANEOUS at 17:50

## 2020-04-02 RX ADMIN — LACTULOSE 20 G: 10 SOLUTION ORAL at 08:40

## 2020-04-02 RX ADMIN — ALBUTEROL SULFATE 2.5 MG: 2.5 SOLUTION RESPIRATORY (INHALATION) at 20:43

## 2020-04-02 RX ADMIN — LACTULOSE 20 G: 10 SOLUTION ORAL at 14:38

## 2020-04-02 RX ADMIN — ENOXAPARIN SODIUM 40 MG: 40 INJECTION SUBCUTANEOUS at 08:41

## 2020-04-02 RX ADMIN — RANOLAZINE 500 MG: 500 TABLET, FILM COATED, EXTENDED RELEASE ORAL at 20:43

## 2020-04-02 RX ADMIN — INSULIN GLARGINE 40 UNITS: 100 INJECTION, SOLUTION SUBCUTANEOUS at 20:43

## 2020-04-02 RX ADMIN — BUSPIRONE HYDROCHLORIDE 15 MG: 15 TABLET ORAL at 08:40

## 2020-04-02 RX ADMIN — RANOLAZINE 500 MG: 500 TABLET, FILM COATED, EXTENDED RELEASE ORAL at 08:41

## 2020-04-02 RX ADMIN — PANTOPRAZOLE SODIUM 40 MG: 40 TABLET, DELAYED RELEASE ORAL at 06:10

## 2020-04-02 RX ADMIN — INSULIN LISPRO 1 UNITS: 100 INJECTION, SOLUTION INTRAVENOUS; SUBCUTANEOUS at 20:43

## 2020-04-02 RX ADMIN — PANTOPRAZOLE SODIUM 40 MG: 40 TABLET, DELAYED RELEASE ORAL at 17:20

## 2020-04-02 RX ADMIN — OXYCODONE HYDROCHLORIDE 5 MG: 5 TABLET ORAL at 08:40

## 2020-04-02 RX ADMIN — SODIUM CHLORIDE, PRESERVATIVE FREE 10 ML: 5 INJECTION INTRAVENOUS at 08:41

## 2020-04-02 RX ADMIN — BUSPIRONE HYDROCHLORIDE 15 MG: 15 TABLET ORAL at 20:43

## 2020-04-02 RX ADMIN — LACTULOSE 20 G: 10 SOLUTION ORAL at 20:43

## 2020-04-02 RX ADMIN — PREDNISONE 20 MG: 20 TABLET ORAL at 08:41

## 2020-04-02 RX ADMIN — SODIUM CHLORIDE: 9 INJECTION, SOLUTION INTRAVENOUS at 04:51

## 2020-04-02 RX ADMIN — SODIUM CHLORIDE: 9 INJECTION, SOLUTION INTRAVENOUS at 20:44

## 2020-04-02 RX ADMIN — QUETIAPINE FUMARATE 150 MG: 100 TABLET ORAL at 20:43

## 2020-04-02 RX ADMIN — RIFAXIMIN 550 MG: 550 TABLET ORAL at 20:43

## 2020-04-02 ASSESSMENT — PAIN SCALES - GENERAL
PAINLEVEL_OUTOF10: 0
PAINLEVEL_OUTOF10: 7
PAINLEVEL_OUTOF10: 0
PAINLEVEL_OUTOF10: 4

## 2020-04-02 ASSESSMENT — PAIN DESCRIPTION - LOCATION: LOCATION: BACK

## 2020-04-02 ASSESSMENT — PAIN DESCRIPTION - DESCRIPTORS: DESCRIPTORS: ACHING

## 2020-04-02 ASSESSMENT — PAIN DESCRIPTION - PAIN TYPE: TYPE: CHRONIC PAIN

## 2020-04-02 ASSESSMENT — PAIN DESCRIPTION - FREQUENCY: FREQUENCY: CONTINUOUS

## 2020-04-02 NOTE — PLAN OF CARE
Problem: Falls - Risk of:  Goal: Will remain free from falls  Description: Will remain free from falls  Outcome: Ongoing  Goal: Absence of physical injury  Description: Absence of physical injury  Outcome: Ongoing     Problem: Pain:  Description: Pain management should include both nonpharmacologic and pharmacologic interventions.   Goal: Pain level will decrease  Description: Pain level will decrease  Outcome: Ongoing  Goal: Control of acute pain  Description: Control of acute pain  Outcome: Ongoing  Goal: Control of chronic pain  Description: Control of chronic pain  Outcome: Ongoing

## 2020-04-03 VITALS
WEIGHT: 174 LBS | RESPIRATION RATE: 18 BRPM | DIASTOLIC BLOOD PRESSURE: 57 MMHG | HEART RATE: 76 BPM | SYSTOLIC BLOOD PRESSURE: 105 MMHG | OXYGEN SATURATION: 97 % | HEIGHT: 57 IN | TEMPERATURE: 97.5 F | BODY MASS INDEX: 37.54 KG/M2

## 2020-04-03 LAB
AMMONIA: 83 UMOL/L (ref 11–51)
GLUCOSE BLD-MCNC: 104 MG/DL (ref 70–99)
GLUCOSE BLD-MCNC: 110 MG/DL (ref 70–99)

## 2020-04-03 PROCEDURE — 6370000000 HC RX 637 (ALT 250 FOR IP): Performed by: HOSPITALIST

## 2020-04-03 PROCEDURE — 82962 GLUCOSE BLOOD TEST: CPT

## 2020-04-03 PROCEDURE — 94640 AIRWAY INHALATION TREATMENT: CPT

## 2020-04-03 PROCEDURE — 94761 N-INVAS EAR/PLS OXIMETRY MLT: CPT

## 2020-04-03 PROCEDURE — 6360000002 HC RX W HCPCS: Performed by: HOSPITALIST

## 2020-04-03 PROCEDURE — 2580000003 HC RX 258: Performed by: HOSPITALIST

## 2020-04-03 PROCEDURE — 36415 COLL VENOUS BLD VENIPUNCTURE: CPT

## 2020-04-03 PROCEDURE — 82140 ASSAY OF AMMONIA: CPT

## 2020-04-03 RX ORDER — INSULIN GLARGINE 100 [IU]/ML
20 INJECTION, SOLUTION SUBCUTANEOUS NIGHTLY
Qty: 5 PEN | Refills: 3 | Status: SHIPPED | OUTPATIENT
Start: 2020-04-03 | End: 2020-04-20 | Stop reason: SDUPTHER

## 2020-04-03 RX ADMIN — ALBUTEROL SULFATE 2.5 MG: 2.5 SOLUTION RESPIRATORY (INHALATION) at 08:46

## 2020-04-03 RX ADMIN — BUSPIRONE HYDROCHLORIDE 15 MG: 15 TABLET ORAL at 11:27

## 2020-04-03 RX ADMIN — PREDNISONE 20 MG: 20 TABLET ORAL at 11:27

## 2020-04-03 RX ADMIN — AZITHROMYCIN 500 MG: 250 TABLET, FILM COATED ORAL at 11:26

## 2020-04-03 RX ADMIN — ENOXAPARIN SODIUM 40 MG: 40 INJECTION SUBCUTANEOUS at 11:27

## 2020-04-03 RX ADMIN — FLUOXETINE 40 MG: 20 CAPSULE ORAL at 11:26

## 2020-04-03 RX ADMIN — LACTULOSE 20 G: 10 SOLUTION ORAL at 14:49

## 2020-04-03 RX ADMIN — RIFAXIMIN 550 MG: 550 TABLET ORAL at 11:26

## 2020-04-03 RX ADMIN — SODIUM CHLORIDE: 9 INJECTION, SOLUTION INTRAVENOUS at 11:53

## 2020-04-03 RX ADMIN — RANOLAZINE 500 MG: 500 TABLET, FILM COATED, EXTENDED RELEASE ORAL at 11:26

## 2020-04-03 RX ADMIN — OXYCODONE HYDROCHLORIDE 5 MG: 5 TABLET ORAL at 11:27

## 2020-04-03 ASSESSMENT — PAIN SCALES - GENERAL: PAINLEVEL_OUTOF10: 3

## 2020-04-03 ASSESSMENT — PAIN DESCRIPTION - LOCATION: LOCATION: BACK

## 2020-04-03 ASSESSMENT — PAIN DESCRIPTION - ORIENTATION: ORIENTATION: LOWER

## 2020-04-03 ASSESSMENT — PAIN DESCRIPTION - PAIN TYPE: TYPE: CHRONIC PAIN

## 2020-04-03 NOTE — CONSULTS
alcohol abuse     History of exercise stress test 01/02/2020    Treadmill, Normal exercise performance without angina and ischemic EKG changes.  HTN (hypertension)     \"for the past two yrs on medication\" follows with Dr Nat Bose Hx of blood clots 2019    Portal vein thrombsis - TIPS procedure 4/23/19    Hyperlipemia     Irregular heart beat     per pt    Liver hematoma     Migraines     Last migraine:  2018    Nausea & vomiting     Schizophrenia ([address]     per old chart    Seizures ([address]     \"last one was 9/2015- saw Dr Negar Bridges at [facility]- she said not sure if acutal seizures- she thinks they are panic or anxiety attacks\"    SOB (shortness of breath)     with any exertion       Past Surgical History:        Procedure Laterality Date    BREAST SURGERY  10/2015    left breast bx    CARDIAC CATHETERIZATION      per old chart pt had cath done in 3/2011 and 9/2013    CARPAL TUNNEL RELEASE Left 1/9/2020    CARPAL TUNNEL RELEASE LEFT performed by Luana Arizmendi DO at [facility]  per old chart done [facility]  3/11/13    diverticulosis, cecal polyp    COLONOSCOPY  03/16/2017    Internal hemorrhoids- Dr. Rosaura Turpin, COLON, DIAGNOSTIC  03/16/2017    EGD: Small esophageal varices, portal hypertensive gastropathy, reflux esophagitis, hiatal hernia    EYE SURGERY Bilateral ? when    cyst removal, cataracts w/lens replacement    HYSTERECTOMY      per old chart pt had CHOLO/BSO 1986    TIPS PROCEDURE  04/23/2019    TONSILLECTOMY  as a kid    UPPER GASTROINTESTINAL ENDOSCOPY N/A 11/14/2018    EGD DIAGNOSTIC ONLY performed by Noemi Cosby MD at [address] N/A 6/5/2019    EGD DIAGNOSTIC ONLY performed by Noemi Cosby MD at [facility] ENDOSCOPY         Current Medications:    Medications    Prior to Admission medications    Medication Sig Start Date End Date Taking?  Authorizing Provider   lactulose (CHRONULAC) 10 GM/15ML solution Take 30 mLs by mouth 3 times daily 4/2/20  Yes Cecy Munoz MD   insulin glargine (LANTUS SOLOSTAR) 100 UNIT/ML injection pen Inject 30 Units into the skin nightly 4/2/20  Yes Cecy Munoz MD   insulin aspart (NOVOLOG FLEXPEN) 100 UNIT/ML injection pen **Low Dose Correction Algorithm**Insulin lispro (HUMALOG)  3 TIMES DAILY WITH MEALSGlucose: Dose: No [id number] 1 [id number] 2 [id number] 3 [id number] 4 [id number] 5 [id number] and above 6 Units 4/2/20  Yes Cecy Munoz MD   predniSONE (DELTASONE) 10 MG tablet Take 1 tablet by mouth daily for 3 days 4/3/20 4/6/20 Yes Cecy Munoz MD   blood glucose monitor strips Test 3-4 times a day & as needed for symptoms of irregular blood glucose. 1/30/20   Faraz Saba MD   Glucosource Lancets MISC Use one 3-4 times to check blood sugar 1/30/20   Faraz Saba MD   glucose monitoring kit (FREESTYLE) monitoring kit 1 kit by Does not apply route daily 1/30/20   Faraz Saba MD   Blood Glucose Monitoring Suppl [facility] BLOOD GLUCOSE METER) w/Device KIT Please check blood sugar 3 times daily. Please fill with what is covered by the insurance 1/21/20   Faraz Saba MD   blood glucose monitor strips Test 3 times a day & as needed for symptoms of irregular blood glucose. Please fill with what is covered my patients insurance. 1/21/20   Faraz Saba MD   SOFT TOUCH LANCETS MISC Please check blood sugar 3 times daily. Please fill with what is covered by insurance 1/21/20   Faraz Saba MD   metFORMIN (GLUCOPHAGE-XR) 500 MG extended release tablet Take 1 tablet by mouth 2 times daily 12/19/19   Faraz Saba MD   pantoprazole (PROTONIX) 40 MG tablet Take 1 tablet by mouth 2 times daily (before meals) 10/27/19   Alba Lora MD   ranolazine (RANEXA) 500 MG extended release tablet Take 1 tablet by mouth 2 times daily 10/8/19   Lawrence Ferrera, APRN - CNP   rifaximin (XIFAXAN) 550 MG tablet Take 1 tablet by mouth 2 times daily 5/2/19   Doug Turner Cervantes MD   furosemide (LASIX) 20 MG tablet Take 20 mg by mouth daily  4/27/19   Historical Provider, MD   hydrOXYzine (VISTARIL) 50 MG capsule Take 50 mg by mouth three times daily     Historical Provider, MD   spironolactone (ALDACTONE) 50 MG tablet Take 50 mg by mouth daily  4/27/19   Historical Provider, MD   Compression Stockings MISC by Does not apply route 20 - 30 mmh wear daily and take off at night  Thigh High 4/12/19   ERLINDA Ceuvas CNP   nitroGLYCERIN (NITROSTAT) 0.4 MG SL tablet Place 1 tablet under the tongue every 5 minutes as needed for Chest pain 3/18/19   ERLINDA Cuevas CNP   promethazine (PHENERGAN) 25 MG tablet Take 1 tablet by mouth 3 times daily as needed for Nausea 11/20/18   Rowdy Pacheco MD   FLUoxetine (PROZAC) 40 MG capsule Take 40 mg by mouth daily    Historical Provider, MD   VIREAD 300 MG tablet Take 300 mg by mouth nightly  11/20/17   Historical Provider, MD   QUEtiapine (SEROQUEL) 300 MG tablet Take 150 mg by mouth nightly  11/15/16   Historical Provider, MD   oxyCODONE (ROXICODONE) 5 MG immediate release tablet Take 5 mg by mouth 4 times daily.  Per pain management dr Kolton Ramirez office    Historical Provider, MD   busPIRone (BUSPAR) 15 MG tablet Take 1 tablet by mouth 2 times daily 5/28/15   Dee Nails MD      Scheduled Medications:    insulin glargine  40 Units Subcutaneous Nightly    predniSONE  20 mg Oral Daily    sodium chloride flush  10 mL Intravenous 2 times per day    enoxaparin  40 mg Subcutaneous Daily    insulin lispro  0-6 Units Subcutaneous TID WC    insulin lispro  0-3 Units Subcutaneous Nightly    lactulose  20 g Oral TID    busPIRone  15 mg Oral BID    FLUoxetine  40 mg Oral Daily    rifaximin  550 mg Oral BID    ranolazine  500 mg Oral BID    pantoprazole  40 mg Oral BID AC    oxyCODONE  5 mg Oral 4x daily    QUEtiapine  150 mg Oral Nightly    azithromycin  500 mg Oral Daily    albuterol  2.5 mg Nebulization 4x daily    tenofovir 300 mg Oral Nightly     Infusions:    dextrose      sodium chloride 75 mL/hr at 04/02/20 2044     PRN Medications: sodium chloride flush, glucose, dextrose, glucagon (rDNA), dextrose, promethazine **OR** ondansetron, hydrOXYzine  Allergies: Allergies   Allergen Reactions    Norco [Hydrocodone-Acetaminophen] Itching     Itching, rash, nausea and vomiting.  Tylenol [Acetaminophen] Itching, Nausea And Vomiting and Rash         Allergies:  Norco [hydrocodone-acetaminophen] and Tylenol [acetaminophen]    Social History:   TOBACCO:   reports that she quit smoking about 15 months ago. Her smoking use included cigarettes. She started smoking about 46 years ago. She has a 135.00 pack-year smoking history. She has never used smokeless tobacco.  ETOH:   reports previous alcohol use of about 2.0 - 3.0 standard drinks of alcohol per week. Family History:       Problem Relation Age of Onset    Cancer Mother         lung ca    Arthritis Mother     Migraines Mother     Cancer Father         colon ca    Diabetes Father     High Blood Pressure Father     Arthritis Father     High Cholesterol Father     Migraines Father     Migraines Sister     Heart Disease Brother         WPW     No family history of colon cancer, Crohn's disease, or ulcerative colitis.     REVIEW OF SYSTEMS:      Neg apart from review of systemsALL/IMM:    PHYSICAL EXAM:      Vitals:    BP 93/63   Pulse 77   Temp 97.8 °F (36.6 °C) (Oral)   Resp 18   Ht 4' 9\" (1.448 m)   Wt 174 lb (78.9 kg)   SpO2 97%   BMI 37.65 kg/m²     General Appearance:    Alert, cooperative, no distress, appears stated age   HEENT:    Normocephalic, atraumatic, PERRL, Conjunctiva clear, Ears Normal, Normal nares,  gums normal   Neck:   Supple, no JVD, No lymph nodes   Lungs:     Clear to auscultation bilaterally,    Chest Wall:    No tenderness or deformity    Heart:     S1 and S2 normal,   Abdomen:     Soft, non-tender, bowel sounds active all four quadrants,

## 2020-04-03 NOTE — DISCHARGE SUMMARY
Discharge Summary    Name:  Jennifer Bach /Age/Sex: 1960  (64 y.o. female)   MRN & CSN:  [id number] & [id number] Admission Date/Time: 3/30/2020 12:20 PM   Attending:  Shreya Culver MD Discharging Physician: Harlan Soto MD     HPI:     Pt presented with few days h/o worsening nausea, vomiting, could not keep her meds down, then became confused, found to have elevated ammonia, was given lactulose in ED, kept it down, mental status seem to have improved at time of my exam, no diarrhea, no constipation, reports abd pain from vomiting, no F/C, admit stable dyspnea with her h/o of COPD, reports cough, no fever. Hospital Course:   Jennifer Bach is a 64 y.o.  female  who presents with Acute hepatic encephalopathy         > Acute hepatic encephalopathy:  -  Lactulose,xifaxan  -  improved ammonia,   - sx and clinically improved and was discharged home in a stable condition. > Dysphagia , chocking episode  - cleared by SLP, instructions provided to pt to limit chocking,  consulted GI.   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     > Depression  -Continue fluoxetine     > diabetes mellitus type II, with hyperglycemia:  -Continue lantus, insulin sliding scale with hypoglycemia protocol     >.  Hyperlipidemia  > paroxysmal atrial fibrillation, not on anticoagulation  -Continue metoprolol tartrate twice daily.      > Schizophrenia-continue Seroquel     > h/o Hepatic encephalopathy:  -Continue rifaximin, lactulose, Lasix, spironolactone     >  Hepatitis C: On viread    The patient expressed appropriate understanding of and agreement with the discharge recommendations, medications, and plan. Consults this admission:  IP CONSULT TO HOSPITALIST  IP CONSULT TO IV TEAM  IP CONSULT TO GI  IP CONSULT TO GI    Discharge Instruction:   Follow up appointments:    See AVS    Disposition: Discharged to:   ? Home  Condition on discharge: Stable    Discharge Medications:      Jorge Net   Home Medication Instructions IDW:[id number]    Printed on:04/03/20 3764   Medication Information                      blood glucose monitor strips  Test 3 times a day & as needed for symptoms of irregular blood glucose. Please fill with what is covered my patients insurance. blood glucose monitor strips  Test 3-4 times a day & as needed for symptoms of irregular blood glucose. Blood Glucose Monitoring Suppl (ACURA BLOOD GLUCOSE METER) w/Device KIT  Please check blood sugar 3 times daily.  Please fill with what is covered by the insurance             busPIRone (BUSPAR) 15 MG tablet  Take 1 tablet by mouth 2 times daily             Compression Stockings MISC  by Does not apply route 20 - 30 mmh wear daily and take off at night  Thigh High             FLUoxetine (PROZAC) 40 MG capsule  Take 40 mg by mouth daily             furosemide (LASIX) 20 MG tablet  Take 20 mg by mouth daily              glucose monitoring kit (FREESTYLE) monitoring kit  1 kit by Does not apply route daily             Glucosource Lancets MISC  Use one 3-4 times to check blood sugar             hydrOXYzine (VISTARIL) 50 MG capsule  Take 50 mg by mouth three times daily              insulin aspart (NOVOLOG FLEXPEN) 100 UNIT/ML injection pen  **Low Dose Correction Algorithm**Insulin lispro (HUMALOG)  3 TIMES DAILY WITH MEALSGlucose: Dose: No [id number] 1 [id number] 2 [id number] 3 [id number] 4 [id number] 5 [id number] and above 6 Units             insulin glargine (LANTUS SOLOSTAR) 100 UNIT/ML injection pen  Inject 20 Units into the skin dose to as low as reasonably achievable. COMPARISON: Abdominal ultrasound January 24, 2020; CT abdomen pelvis October 25, 2019 HISTORY: ORDERING SYSTEM PROVIDED HISTORY: abdominal pain TECHNOLOGIST PROVIDED HISTORY: IV contrast only. Thank you. Reason for exam:->abdominal pain Reason for exam:->IV contrast only. Thank you. Reason for Exam: abdominal pain Acuity: Acute Type of Exam: Initial Relevant Medical/Surgical History: 80ML [id number] FINDINGS: Lower Chest: Minimal scarring versus atelectasis at the imaged lung bases which otherwise appear clear. Organs: Liver demonstrates no acute abnormality. Redemonstration of patent portal systemic shunt. Portal vein is patent. Gallbladder is surgically absent. Extrahepatic biliary dilatation redemonstrated, likely postoperative. The spleen, pancreas, and bilateral adrenal glands demonstrate no acute abnormality. The kidneys enhance symmetrically. Note is made of bilateral extrarenal pelvis. No hydronephrosis. GI/Bowel: No bowel obstruction identified. No abnormal bowel wall thickening evident. Small bowel is normal in caliber. Pelvis: Bladder appears unremarkable. The uterus is surgically absent. Peritoneum/Retroperitoneum: The abdominal aorta normal in caliber with mild-to-moderate scattered atherosclerotic plaque throughout. No retroperitoneal adenopathy. Redemonstration of gastric varices. No free fluid or free air identified within the abdomen or pelvis. Bones/Soft Tissues: No acute osseous or soft tissue abnormality is identified. 1. No acute intra-process identified. 2. Patent portal systemic shunt. Portal vein is also patent. Similar appearance of gastric varices.      Xr Chest Portable    Result Date: 3/30/2020  EXAMINATION: ONE XRAY VIEW OF THE CHEST 3/30/2020 12:43 pm COMPARISON: 01/14/2020, 10/25/2019, 12/25/2008 HISTORY: ORDERING SYSTEM PROVIDED HISTORY: chest pain TECHNOLOGIST PROVIDED HISTORY: Reason for exam:->chest pain Reason for Exam:

## 2020-04-03 NOTE — ADT AUTH CERT
Utilization Reviews         Liver Disease Complications - Care Day 5 (4/3/2020) by Jesus Carter RN         Review Status Review Entered   Completed 4/3/2020 09:54       Criteria Review      Care Day: 5 Care Date: 4/3/2020 Level of Care:    Guideline Day 3    Clinical Status    (X) * Hemodynamic stability    (X) * Tachypnea absent    (X) * Afebrile    (X) * No bleeding    (X) * No peritonitis, or treatment adequate    (X) * Ascites absent or adequately controlled    (X) * Volume status adequately controlled    (X) * Renal function at baseline or acceptable for next level of care    (X) * Electrolyte levels adequate for outpatient management    (X) * Mental status at baseline    ( ) * Diet tolerated    ( ) * Discharge plans and education understood    Activity    (X) * Ambulatory [I]    Routes    ( ) * Oral hydration, medications, and diet    * Milestone   Additional Notes   Gastroenterology Consultation       4/3/2020  8:22 AM    Scheduled Medications:    Scheduled Medications   · insulin glargine 40 Units Subcutaneous Nightly   · predniSONE 20 mg Oral Daily   · sodium chloride flush 10 mL Intravenous 2 times per day   · enoxaparin 40 mg Subcutaneous Daily   · insulin lispro 0-6 Units Subcutaneous TID WC   · insulin lispro 0-3 Units Subcutaneous Nightly   · lactulose 20 g Oral TID   · busPIRone 15 mg Oral BID   · FLUoxetine 40 mg Oral Daily   · rifaximin 550 mg Oral BID   · ranolazine 500 mg Oral BID   · pantoprazole 40 mg Oral BID AC   · oxyCODONE 5 mg Oral 4x daily   · QUEtiapine 150 mg Oral Nightly   · azithromycin 500 mg Oral Daily   · albuterol 2.5 mg Nebulization 4x daily   · tenofovir 300 mg Oral Nightly          Infusions:    Infusions Meds   · dextrose    · sodium chloride 75 mL/hr at 04/02/20 2044          PRN Medications:    PRN Medications      PHYSICAL EXAM:         Vitals:     BP 93/63   Pulse 77   Temp 97.8 °F (36.6 °C) (Oral)   Resp 18   Ht 4' 9\" (1.448 m)   Wt 174 lb (78.9 kg)   SpO2 97%

## 2020-04-03 NOTE — PROGRESS NOTES
CLINICAL PHARMACY NOTE: MEDS TO [address] Select Patient?: No  Total # of Prescriptions Filled: 5   The following medications were delivered to the patient:  · Novolog flexpen  · Lantus solostar pen  · Pen needles [id number]  · Lactulose 10gm/15ml  · Prednisone 10mg  Total # of Interventions Completed: 1  Time Spent (min): 45    Additional Documentation:  Radha Fernández served Dr Ann Ruelas to get a prescription for the pen needles which he approved.
CLINICAL PHARMACY NOTE: MEDS TO [address] Select Patient?: Yes  Total # of Prescriptions Filled: 2   The following medications were delivered to the patient:  · Lantus 5 pens  · Novolog pens 5  Total # of Interventions Completed: 2  Time Spent (min): 15    Additional Documentation:    These meds were delivered to pt's room. Apparently, RN did not put them away in refrig. Pt did not get discharged. Our staff Santhosh Hare had delivered it and provided instructions for storing in refrig if pt was not going to go home . These 2 meds were found near nurses stn. at room temp. Therefore, these 2 items will be stable for only 28 days at room temp . Remaining syringes will have to be discarded. Michael tang.    Sandrita Gomez
Patient known to Dr Manuela Restrepo   Will ask him to see
Unable to schedule follow up as most offices are doing virtual visits. Patient educated on importance of scheduling follow up.
apparent distress. RESP Decreased air sounds. Symmetric chest movement . CARDIO/VASC S1/S2 auscultated. Regular rate. GI Abdomen is soft without significant tenderness, Bowel sounds are normoactive. MSK No gross joint deformities. Spontaneous movement of all extremities  SKIN Normal coloration, warm, dry. NEURO normal speech, no lateralizing weakness. PSYCH Awake, alert, oriented x 4. Affect appropriate.     Medications:   Medications:    [START ON 4/2/2020] predniSONE  20 mg Oral Daily    insulin glargine  30 Units Subcutaneous Nightly    sodium chloride flush  10 mL Intravenous 2 times per day    enoxaparin  40 mg Subcutaneous Daily    insulin lispro  0-6 Units Subcutaneous TID WC    insulin lispro  0-3 Units Subcutaneous Nightly    lactulose  20 g Oral TID    busPIRone  15 mg Oral BID    FLUoxetine  40 mg Oral Daily    rifaximin  550 mg Oral BID    ranolazine  500 mg Oral BID    pantoprazole  40 mg Oral BID AC    oxyCODONE  5 mg Oral 4x daily    QUEtiapine  150 mg Oral Nightly    azithromycin  500 mg Oral Daily    albuterol  2.5 mg Nebulization 4x daily    tenofovir  300 mg Oral Nightly      Infusions:    dextrose      sodium chloride 75 mL/hr at 04/01/20 0849     PRN Meds: sodium chloride flush, 10 mL, PRN  glucose, 15 g, PRN  dextrose, 12.5 g, PRN  glucagon (rDNA), 1 mg, PRN  dextrose, 100 mL/hr, PRN  promethazine, 12.5 mg, Q6H PRN    Or  ondansetron, 4 mg, Q6H PRN  hydrOXYzine, 50 mg, TID PRN          Electronically signed by Artie Moran MD on 4/1/2020 at 4:27 PM

## 2020-04-04 ENCOUNTER — CARE COORDINATION (OUTPATIENT)
Dept: CASE MANAGEMENT | Age: 60
End: 2020-04-04

## 2020-04-04 LAB
CULTURE: NORMAL
CULTURE: NORMAL
Lab: NORMAL
Lab: NORMAL
SPECIMEN: NORMAL
SPECIMEN: NORMAL

## 2020-04-06 LAB
EKG ATRIAL RATE: 104 BPM
EKG DIAGNOSIS: NORMAL
EKG P AXIS: 55 DEGREES
EKG P-R INTERVAL: 176 MS
EKG Q-T INTERVAL: 374 MS
EKG QRS DURATION: 78 MS
EKG QTC CALCULATION (BAZETT): 491 MS
EKG R AXIS: 19 DEGREES
EKG T AXIS: 51 DEGREES
EKG VENTRICULAR RATE: 104 BPM

## 2020-04-07 ENCOUNTER — TELEPHONE (OUTPATIENT)
Dept: FAMILY MEDICINE CLINIC | Age: 60
End: 2020-04-07

## 2020-04-07 NOTE — TELEPHONE ENCOUNTER
Apollo Mai  called asking for depends order to be faxed [facility] at [phone].

## 2020-04-08 PROBLEM — D69.59 OTHER SECONDARY THROMBOCYTOPENIA: Status: ACTIVE | Noted: 2020-04-08

## 2020-04-08 PROBLEM — D89.1: Status: ACTIVE | Noted: 2020-04-08

## 2020-04-14 RX ORDER — GLUCOSAMINE HCL/CHONDROITIN SU 500-400 MG
CAPSULE ORAL
Qty: 100 STRIP | Refills: 5 | Status: SHIPPED | OUTPATIENT
Start: 2020-04-14 | End: 2022-01-13 | Stop reason: SDUPTHER

## 2020-04-16 ENCOUNTER — CARE COORDINATION (OUTPATIENT)
Dept: CASE MANAGEMENT | Age: 60
End: 2020-04-16

## 2020-04-20 ENCOUNTER — HOSPITAL ENCOUNTER (OUTPATIENT)
Dept: GENERAL RADIOLOGY | Age: 60
Discharge: HOME | End: 2020-04-20
Payer: MEDICARE

## 2020-04-20 ENCOUNTER — HOSPITAL ENCOUNTER (OUTPATIENT)
Age: 60
Discharge: HOME | End: 2020-04-20
Payer: MEDICARE

## 2020-04-20 PROCEDURE — 72100 X-RAY EXAM L-S SPINE 2/3 VWS: CPT

## 2020-04-20 RX ORDER — INSULIN ASPART 100 [IU]/ML
INJECTION, SOLUTION INTRAVENOUS; SUBCUTANEOUS
Qty: 5 PEN | Refills: 3 | Status: SHIPPED | OUTPATIENT
Start: 2020-04-20 | End: 2020-04-28 | Stop reason: SDUPTHER

## 2020-04-20 RX ORDER — INSULIN GLARGINE 100 [IU]/ML
20 INJECTION, SOLUTION SUBCUTANEOUS NIGHTLY
Qty: 5 PEN | Refills: 3 | Status: SHIPPED | OUTPATIENT
Start: 2020-04-20 | End: 2020-04-28 | Stop reason: SDUPTHER

## 2020-04-28 RX ORDER — INSULIN ASPART 100 [IU]/ML
INJECTION, SOLUTION INTRAVENOUS; SUBCUTANEOUS
Qty: 5 PEN | Refills: 3 | Status: SHIPPED | OUTPATIENT
Start: 2020-04-28 | End: 2021-02-10 | Stop reason: SDUPTHER

## 2020-04-28 RX ORDER — INSULIN GLARGINE 100 [IU]/ML
20 INJECTION, SOLUTION SUBCUTANEOUS NIGHTLY
Qty: 5 PEN | Refills: 3 | Status: ON HOLD | OUTPATIENT
Start: 2020-04-28 | End: 2020-08-06 | Stop reason: SDUPTHER

## 2020-05-06 ENCOUNTER — APPOINTMENT (OUTPATIENT)
Dept: GENERAL RADIOLOGY | Age: 60
DRG: 432 | End: 2020-05-06
Payer: MEDICARE

## 2020-05-06 ENCOUNTER — HOSPITAL ENCOUNTER (INPATIENT)
Age: 60
LOS: 2 days | Discharge: HOME | DRG: 432 | End: 2020-05-08
Attending: EMERGENCY MEDICINE | Admitting: INTERNAL MEDICINE
Payer: MEDICARE

## 2020-05-06 LAB
ALBUMIN SERPL-MCNC: 3.4 GM/DL (ref 3.4–5)
ALP BLD-CCNC: 141 IU/L (ref 40–129)
ALT SERPL-CCNC: 17 U/L (ref 10–40)
AMMONIA: 187 UMOL/L (ref 11–51)
ANION GAP SERPL CALCULATED.3IONS-SCNC: 11 MMOL/L (ref 4–16)
AST SERPL-CCNC: 22 IU/L (ref 15–37)
BASOPHILS ABSOLUTE: 0.1 K/CU MM
BASOPHILS RELATIVE PERCENT: 0.9 % (ref 0–1)
BILIRUB SERPL-MCNC: 1.1 MG/DL (ref 0–1)
BUN BLDV-MCNC: 15 MG/DL (ref 6–23)
CALCIUM SERPL-MCNC: 8.8 MG/DL (ref 8.3–10.6)
CHLORIDE BLD-SCNC: 104 MMOL/L (ref 99–110)
CHP ED QC CHECK: YES
CO2: 26 MMOL/L (ref 21–32)
CREAT SERPL-MCNC: 1.2 MG/DL (ref 0.6–1.1)
DIFFERENTIAL TYPE: ABNORMAL
EOSINOPHILS ABSOLUTE: 0.2 K/CU MM
EOSINOPHILS RELATIVE PERCENT: 3.5 % (ref 0–3)
GFR AFRICAN AMERICAN: 56 ML/MIN/1.73M2
GFR NON-AFRICAN AMERICAN: 46 ML/MIN/1.73M2
GLUCOSE BLD-MCNC: 216 MG/DL (ref 70–99)
GLUCOSE BLD-MCNC: 224 MG/DL (ref 70–99)
GLUCOSE BLD-MCNC: 234 MG/DL
HCT VFR BLD CALC: 38.4 % (ref 37–47)
HEMOGLOBIN: 13 GM/DL (ref 12.5–16)
IMMATURE NEUTROPHIL %: 0.2 % (ref 0–0.43)
LIPASE: 54 IU/L (ref 13–60)
LYMPHOCYTES ABSOLUTE: 2.3 K/CU MM
LYMPHOCYTES RELATIVE PERCENT: 40.2 % (ref 24–44)
MCH RBC QN AUTO: 31.7 PG (ref 27–31)
MCHC RBC AUTO-ENTMCNC: 33.9 % (ref 32–36)
MCV RBC AUTO: 93.7 FL (ref 78–100)
MONOCYTES ABSOLUTE: 0.6 K/CU MM
MONOCYTES RELATIVE PERCENT: 9.7 % (ref 0–4)
NUCLEATED RBC %: 0 %
PDW BLD-RTO: 14.2 % (ref 11.7–14.9)
PLATELET # BLD: 112 K/CU MM (ref 140–440)
PMV BLD AUTO: 10.7 FL (ref 7.5–11.1)
POTASSIUM SERPL-SCNC: 3.9 MMOL/L (ref 3.5–5.1)
RBC # BLD: 4.1 M/CU MM (ref 4.2–5.4)
SEGMENTED NEUTROPHILS ABSOLUTE COUNT: 2.6 K/CU MM
SEGMENTED NEUTROPHILS RELATIVE PERCENT: 45.5 % (ref 36–66)
SODIUM BLD-SCNC: 141 MMOL/L (ref 135–145)
TOTAL IMMATURE NEUTOROPHIL: 0.01 K/CU MM
TOTAL NUCLEATED RBC: 0 K/CU MM
TOTAL PROTEIN: 6.8 GM/DL (ref 6.4–8.2)
TROPONIN T: <0.01 NG/ML
WBC # BLD: 5.8 K/CU MM (ref 4–10.5)

## 2020-05-06 PROCEDURE — 99285 EMERGENCY DEPT VISIT HI MDM: CPT

## 2020-05-06 PROCEDURE — 82962 GLUCOSE BLOOD TEST: CPT

## 2020-05-06 PROCEDURE — 82140 ASSAY OF AMMONIA: CPT

## 2020-05-06 PROCEDURE — 96374 THER/PROPH/DIAG INJ IV PUSH: CPT

## 2020-05-06 PROCEDURE — 85025 COMPLETE CBC W/AUTO DIFF WBC: CPT

## 2020-05-06 PROCEDURE — 80053 COMPREHEN METABOLIC PANEL: CPT

## 2020-05-06 PROCEDURE — 1200000000 HC SEMI PRIVATE

## 2020-05-06 PROCEDURE — 6360000002 HC RX W HCPCS: Performed by: EMERGENCY MEDICINE

## 2020-05-06 PROCEDURE — 84484 ASSAY OF TROPONIN QUANT: CPT

## 2020-05-06 PROCEDURE — 93005 ELECTROCARDIOGRAM TRACING: CPT | Performed by: EMERGENCY MEDICINE

## 2020-05-06 PROCEDURE — 6370000000 HC RX 637 (ALT 250 FOR IP): Performed by: EMERGENCY MEDICINE

## 2020-05-06 PROCEDURE — 2580000003 HC RX 258: Performed by: EMERGENCY MEDICINE

## 2020-05-06 PROCEDURE — 71045 X-RAY EXAM CHEST 1 VIEW: CPT

## 2020-05-06 PROCEDURE — 83690 ASSAY OF LIPASE: CPT

## 2020-05-06 RX ORDER — LACTULOSE 10 G/15ML
20 SOLUTION ORAL ONCE
Status: COMPLETED | OUTPATIENT
Start: 2020-05-06 | End: 2020-05-06

## 2020-05-06 RX ORDER — 0.9 % SODIUM CHLORIDE 0.9 %
1000 INTRAVENOUS SOLUTION INTRAVENOUS ONCE
Status: COMPLETED | OUTPATIENT
Start: 2020-05-06 | End: 2020-05-07

## 2020-05-06 RX ORDER — ONDANSETRON 2 MG/ML
4 INJECTION INTRAMUSCULAR; INTRAVENOUS EVERY 30 MIN PRN
Status: DISCONTINUED | OUTPATIENT
Start: 2020-05-06 | End: 2020-05-07 | Stop reason: SDUPTHER

## 2020-05-06 RX ADMIN — LACTULOSE 20 G: 10 SOLUTION ORAL at 23:10

## 2020-05-06 RX ADMIN — ONDANSETRON HYDROCHLORIDE 4 MG: 2 SOLUTION INTRAMUSCULAR; INTRAVENOUS at 22:44

## 2020-05-06 RX ADMIN — SODIUM CHLORIDE 1000 ML: 9 INJECTION, SOLUTION INTRAVENOUS at 22:44

## 2020-05-07 LAB
AMMONIA: 190 UMOL/L (ref 11–51)
ANION GAP SERPL CALCULATED.3IONS-SCNC: 9 MMOL/L (ref 4–16)
BACTERIA: NEGATIVE /HPF
BASOPHILS ABSOLUTE: 0.1 K/CU MM
BASOPHILS RELATIVE PERCENT: 1.3 % (ref 0–1)
BILIRUBIN URINE: NEGATIVE MG/DL
BLOOD, URINE: NEGATIVE
BUN BLDV-MCNC: 14 MG/DL (ref 6–23)
CALCIUM SERPL-MCNC: 8.6 MG/DL (ref 8.3–10.6)
CHLORIDE BLD-SCNC: 107 MMOL/L (ref 99–110)
CLARITY: CLEAR
CO2: 27 MMOL/L (ref 21–32)
COLOR: YELLOW
CREAT SERPL-MCNC: 1.2 MG/DL (ref 0.6–1.1)
DIFFERENTIAL TYPE: ABNORMAL
EOSINOPHILS ABSOLUTE: 0.1 K/CU MM
EOSINOPHILS RELATIVE PERCENT: 3.1 % (ref 0–3)
GFR AFRICAN AMERICAN: 56 ML/MIN/1.73M2
GFR NON-AFRICAN AMERICAN: 46 ML/MIN/1.73M2
GLUCOSE BLD-MCNC: 116 MG/DL (ref 70–99)
GLUCOSE BLD-MCNC: 124 MG/DL (ref 70–99)
GLUCOSE BLD-MCNC: 131 MG/DL (ref 70–99)
GLUCOSE BLD-MCNC: 166 MG/DL (ref 70–99)
GLUCOSE BLD-MCNC: 183 MG/DL (ref 70–99)
GLUCOSE BLD-MCNC: 224 MG/DL (ref 70–99)
GLUCOSE, URINE: 50 MG/DL
HCT VFR BLD CALC: 37.5 % (ref 37–47)
HEMOGLOBIN: 12.4 GM/DL (ref 12.5–16)
IMMATURE NEUTROPHIL %: 0 % (ref 0–0.43)
INR BLD: 1.27 INDEX
KETONES, URINE: NEGATIVE MG/DL
LEUKOCYTE ESTERASE, URINE: NEGATIVE
LYMPHOCYTES ABSOLUTE: 2.1 K/CU MM
LYMPHOCYTES RELATIVE PERCENT: 45.7 % (ref 24–44)
MCH RBC QN AUTO: 31.9 PG (ref 27–31)
MCHC RBC AUTO-ENTMCNC: 33.1 % (ref 32–36)
MCV RBC AUTO: 96.4 FL (ref 78–100)
MONOCYTES ABSOLUTE: 0.5 K/CU MM
MONOCYTES RELATIVE PERCENT: 11.9 % (ref 0–4)
MUCUS: ABNORMAL HPF
NITRITE URINE, QUANTITATIVE: NEGATIVE
NUCLEATED RBC %: 0 %
PDW BLD-RTO: 14.3 % (ref 11.7–14.9)
PH, URINE: 5 (ref 5–8)
PLATELET # BLD: 106 K/CU MM (ref 140–440)
PMV BLD AUTO: 11.4 FL (ref 7.5–11.1)
POTASSIUM SERPL-SCNC: 4.1 MMOL/L (ref 3.5–5.1)
PROTEIN UA: NEGATIVE MG/DL
PROTHROMBIN TIME: 15.4 SECONDS (ref 11.7–14.5)
RBC # BLD: 3.89 M/CU MM (ref 4.2–5.4)
RBC URINE: ABNORMAL /HPF (ref 0–6)
SEGMENTED NEUTROPHILS ABSOLUTE COUNT: 1.7 K/CU MM
SEGMENTED NEUTROPHILS RELATIVE PERCENT: 38 % (ref 36–66)
SODIUM BLD-SCNC: 143 MMOL/L (ref 135–145)
SPECIFIC GRAVITY UA: 1.02 (ref 1–1.03)
SQUAMOUS EPITHELIAL: 1 /HPF
TOTAL IMMATURE NEUTOROPHIL: 0 K/CU MM
TOTAL NUCLEATED RBC: 0 K/CU MM
TRICHOMONAS: ABNORMAL /HPF
UROBILINOGEN, URINE: 2 MG/DL (ref 0.2–1)
WBC # BLD: 4.6 K/CU MM (ref 4–10.5)
WBC UA: 1 /HPF (ref 0–5)

## 2020-05-07 PROCEDURE — 6370000000 HC RX 637 (ALT 250 FOR IP): Performed by: NURSE PRACTITIONER

## 2020-05-07 PROCEDURE — 85025 COMPLETE CBC W/AUTO DIFF WBC: CPT

## 2020-05-07 PROCEDURE — 85610 PROTHROMBIN TIME: CPT

## 2020-05-07 PROCEDURE — 1200000000 HC SEMI PRIVATE

## 2020-05-07 PROCEDURE — 80048 BASIC METABOLIC PNL TOTAL CA: CPT

## 2020-05-07 PROCEDURE — 82140 ASSAY OF AMMONIA: CPT

## 2020-05-07 PROCEDURE — 94761 N-INVAS EAR/PLS OXIMETRY MLT: CPT

## 2020-05-07 PROCEDURE — 81001 URINALYSIS AUTO W/SCOPE: CPT

## 2020-05-07 PROCEDURE — 2580000003 HC RX 258: Performed by: INTERNAL MEDICINE

## 2020-05-07 PROCEDURE — 6370000000 HC RX 637 (ALT 250 FOR IP): Performed by: INTERNAL MEDICINE

## 2020-05-07 PROCEDURE — 36415 COLL VENOUS BLD VENIPUNCTURE: CPT

## 2020-05-07 PROCEDURE — 82962 GLUCOSE BLOOD TEST: CPT

## 2020-05-07 PROCEDURE — 6360000002 HC RX W HCPCS: Performed by: INTERNAL MEDICINE

## 2020-05-07 RX ORDER — SODIUM CHLORIDE 0.9 % (FLUSH) 0.9 %
10 SYRINGE (ML) INJECTION PRN
Status: DISCONTINUED | OUTPATIENT
Start: 2020-05-07 | End: 2020-05-08 | Stop reason: HOSPADM

## 2020-05-07 RX ORDER — SODIUM CHLORIDE, SODIUM LACTATE, POTASSIUM CHLORIDE, CALCIUM CHLORIDE 600; 310; 30; 20 MG/100ML; MG/100ML; MG/100ML; MG/100ML
INJECTION, SOLUTION INTRAVENOUS CONTINUOUS
Status: ACTIVE | OUTPATIENT
Start: 2020-05-07 | End: 2020-05-08

## 2020-05-07 RX ORDER — BUSPIRONE HYDROCHLORIDE 15 MG/1
15 TABLET ORAL 2 TIMES DAILY
Status: DISCONTINUED | OUTPATIENT
Start: 2020-05-07 | End: 2020-05-08 | Stop reason: HOSPADM

## 2020-05-07 RX ORDER — ALBUTEROL SULFATE 2.5 MG/3ML
2.5 SOLUTION RESPIRATORY (INHALATION) EVERY 4 HOURS PRN
Status: DISCONTINUED | OUTPATIENT
Start: 2020-05-07 | End: 2020-05-08 | Stop reason: HOSPADM

## 2020-05-07 RX ORDER — SODIUM CHLORIDE 0.9 % (FLUSH) 0.9 %
10 SYRINGE (ML) INJECTION EVERY 12 HOURS SCHEDULED
Status: DISCONTINUED | OUTPATIENT
Start: 2020-05-07 | End: 2020-05-08 | Stop reason: HOSPADM

## 2020-05-07 RX ORDER — SPIRONOLACTONE 25 MG/1
50 TABLET ORAL DAILY
Status: DISCONTINUED | OUTPATIENT
Start: 2020-05-07 | End: 2020-05-08 | Stop reason: HOSPADM

## 2020-05-07 RX ORDER — INSULIN GLARGINE 100 [IU]/ML
15 INJECTION, SOLUTION SUBCUTANEOUS NIGHTLY
Status: DISCONTINUED | OUTPATIENT
Start: 2020-05-07 | End: 2020-05-08 | Stop reason: HOSPADM

## 2020-05-07 RX ORDER — LACTULOSE 10 G/15ML
30 SOLUTION ORAL 3 TIMES DAILY
Status: DISCONTINUED | OUTPATIENT
Start: 2020-05-07 | End: 2020-05-08 | Stop reason: HOSPADM

## 2020-05-07 RX ORDER — PANTOPRAZOLE SODIUM 40 MG/1
40 TABLET, DELAYED RELEASE ORAL
Status: DISCONTINUED | OUTPATIENT
Start: 2020-05-07 | End: 2020-05-07

## 2020-05-07 RX ORDER — PANTOPRAZOLE SODIUM 40 MG/1
40 TABLET, DELAYED RELEASE ORAL
Status: DISCONTINUED | OUTPATIENT
Start: 2020-05-07 | End: 2020-05-08 | Stop reason: HOSPADM

## 2020-05-07 RX ORDER — OXYCODONE HYDROCHLORIDE 5 MG/1
5 TABLET ORAL 4 TIMES DAILY
Status: DISCONTINUED | OUTPATIENT
Start: 2020-05-07 | End: 2020-05-08 | Stop reason: HOSPADM

## 2020-05-07 RX ORDER — RANOLAZINE 500 MG/1
500 TABLET, EXTENDED RELEASE ORAL 2 TIMES DAILY
Status: DISCONTINUED | OUTPATIENT
Start: 2020-05-07 | End: 2020-05-08 | Stop reason: HOSPADM

## 2020-05-07 RX ORDER — POLYETHYLENE GLYCOL 3350 17 G/17G
17 POWDER, FOR SOLUTION ORAL DAILY PRN
Status: DISCONTINUED | OUTPATIENT
Start: 2020-05-07 | End: 2020-05-08 | Stop reason: HOSPADM

## 2020-05-07 RX ORDER — ONDANSETRON 2 MG/ML
4 INJECTION INTRAMUSCULAR; INTRAVENOUS EVERY 6 HOURS PRN
Status: DISCONTINUED | OUTPATIENT
Start: 2020-05-07 | End: 2020-05-08 | Stop reason: HOSPADM

## 2020-05-07 RX ORDER — LACTULOSE 10 G/15ML
30 SOLUTION ORAL ONCE
Status: COMPLETED | OUTPATIENT
Start: 2020-05-07 | End: 2020-05-07

## 2020-05-07 RX ORDER — FUROSEMIDE 20 MG/1
20 TABLET ORAL DAILY
Status: DISCONTINUED | OUTPATIENT
Start: 2020-05-07 | End: 2020-05-08 | Stop reason: HOSPADM

## 2020-05-07 RX ORDER — PROMETHAZINE HYDROCHLORIDE 25 MG/1
12.5 TABLET ORAL EVERY 6 HOURS PRN
Status: DISCONTINUED | OUTPATIENT
Start: 2020-05-07 | End: 2020-05-08 | Stop reason: HOSPADM

## 2020-05-07 RX ORDER — FLUOXETINE HYDROCHLORIDE 20 MG/1
40 CAPSULE ORAL DAILY
Status: DISCONTINUED | OUTPATIENT
Start: 2020-05-07 | End: 2020-05-08 | Stop reason: HOSPADM

## 2020-05-07 RX ADMIN — SPIRONOLACTONE 50 MG: 25 TABLET ORAL at 10:06

## 2020-05-07 RX ADMIN — SODIUM CHLORIDE, POTASSIUM CHLORIDE, SODIUM LACTATE AND CALCIUM CHLORIDE: 600; 310; 30; 20 INJECTION, SOLUTION INTRAVENOUS at 23:05

## 2020-05-07 RX ADMIN — INSULIN GLARGINE 15 UNITS: 100 INJECTION, SOLUTION SUBCUTANEOUS at 21:37

## 2020-05-07 RX ADMIN — OXYCODONE HYDROCHLORIDE 5 MG: 5 TABLET ORAL at 21:37

## 2020-05-07 RX ADMIN — RANOLAZINE 500 MG: 500 TABLET, FILM COATED, EXTENDED RELEASE ORAL at 21:37

## 2020-05-07 RX ADMIN — PANTOPRAZOLE SODIUM 40 MG: 40 TABLET, DELAYED RELEASE ORAL at 05:33

## 2020-05-07 RX ADMIN — SODIUM CHLORIDE, PRESERVATIVE FREE 10 ML: 5 INJECTION INTRAVENOUS at 12:57

## 2020-05-07 RX ADMIN — FLUOXETINE 40 MG: 20 CAPSULE ORAL at 10:06

## 2020-05-07 RX ADMIN — OXYCODONE HYDROCHLORIDE 5 MG: 5 TABLET ORAL at 18:52

## 2020-05-07 RX ADMIN — LACTULOSE 30 G: 10 SOLUTION ORAL at 21:36

## 2020-05-07 RX ADMIN — FUROSEMIDE 20 MG: 20 TABLET ORAL at 10:06

## 2020-05-07 RX ADMIN — LACTULOSE 30 G: 10 SOLUTION ORAL at 12:54

## 2020-05-07 RX ADMIN — QUETIAPINE FUMARATE 150 MG: 100 TABLET ORAL at 21:37

## 2020-05-07 RX ADMIN — RIFAXIMIN 550 MG: 550 TABLET ORAL at 00:55

## 2020-05-07 RX ADMIN — RIFAXIMIN 550 MG: 550 TABLET ORAL at 10:10

## 2020-05-07 RX ADMIN — RANOLAZINE 500 MG: 500 TABLET, FILM COATED, EXTENDED RELEASE ORAL at 00:55

## 2020-05-07 RX ADMIN — BUSPIRONE HYDROCHLORIDE 15 MG: 15 TABLET ORAL at 10:06

## 2020-05-07 RX ADMIN — RANOLAZINE 500 MG: 500 TABLET, FILM COATED, EXTENDED RELEASE ORAL at 10:11

## 2020-05-07 RX ADMIN — BUSPIRONE HYDROCHLORIDE 15 MG: 15 TABLET ORAL at 21:37

## 2020-05-07 RX ADMIN — OXYCODONE HYDROCHLORIDE 5 MG: 5 TABLET ORAL at 10:06

## 2020-05-07 RX ADMIN — SODIUM CHLORIDE, POTASSIUM CHLORIDE, SODIUM LACTATE AND CALCIUM CHLORIDE: 600; 310; 30; 20 INJECTION, SOLUTION INTRAVENOUS at 11:00

## 2020-05-07 RX ADMIN — INSULIN GLARGINE 15 UNITS: 100 INJECTION, SOLUTION SUBCUTANEOUS at 00:38

## 2020-05-07 RX ADMIN — ENOXAPARIN SODIUM 30 MG: 30 INJECTION SUBCUTANEOUS at 10:07

## 2020-05-07 RX ADMIN — LACTULOSE 30 G: 10 SOLUTION ORAL at 10:07

## 2020-05-07 RX ADMIN — LACTULOSE 30 G: 10 SOLUTION ORAL at 12:50

## 2020-05-07 RX ADMIN — OXYCODONE HYDROCHLORIDE 5 MG: 5 TABLET ORAL at 15:50

## 2020-05-07 RX ADMIN — RIFAXIMIN 550 MG: 550 TABLET ORAL at 21:37

## 2020-05-07 RX ADMIN — PANTOPRAZOLE SODIUM 40 MG: 40 TABLET, DELAYED RELEASE ORAL at 15:50

## 2020-05-07 RX ADMIN — INSULIN LISPRO 2 UNITS: 100 INJECTION, SOLUTION INTRAVENOUS; SUBCUTANEOUS at 00:38

## 2020-05-07 ASSESSMENT — PAIN SCALES - GENERAL
PAINLEVEL_OUTOF10: 7
PAINLEVEL_OUTOF10: 3
PAINLEVEL_OUTOF10: 5
PAINLEVEL_OUTOF10: 6
PAINLEVEL_OUTOF10: 5
PAINLEVEL_OUTOF10: 6
PAINLEVEL_OUTOF10: 6

## 2020-05-07 ASSESSMENT — PAIN DESCRIPTION - FREQUENCY
FREQUENCY: CONTINUOUS
FREQUENCY: CONTINUOUS

## 2020-05-07 ASSESSMENT — PAIN DESCRIPTION - DESCRIPTORS
DESCRIPTORS: ACHING
DESCRIPTORS: ACHING

## 2020-05-07 ASSESSMENT — PAIN DESCRIPTION - PROGRESSION
CLINICAL_PROGRESSION: NOT CHANGED
CLINICAL_PROGRESSION: NOT CHANGED

## 2020-05-07 ASSESSMENT — PAIN DESCRIPTION - PAIN TYPE
TYPE: CHRONIC PAIN

## 2020-05-07 ASSESSMENT — PAIN DESCRIPTION - ONSET
ONSET: ON-GOING
ONSET: ON-GOING

## 2020-05-07 ASSESSMENT — PAIN DESCRIPTION - ORIENTATION
ORIENTATION: LOWER

## 2020-05-07 ASSESSMENT — PAIN DESCRIPTION - LOCATION
LOCATION: BACK

## 2020-05-07 NOTE — ED PROVIDER NOTES
without angina and ischemic EKG changes.     HTN (hypertension)     \"for the past two yrs on medication\" follows with Dr Neelima Chen Hx of blood clots 2019    Portal vein thrombsis - TIPS procedure 4/23/19    Hyperlipemia     Irregular heart beat     per pt    Liver hematoma     Migraines     Last migraine:  2018    Nausea & vomiting     Schizophrenia ([address]     per old chart    Seizures ([address]     \"last one was 9/2015- saw Dr Lou Torres at [facility]- she said not sure if acutal seizures- she thinks they are panic or anxiety attacks\"    SOB (shortness of breath)     with any exertion     Past Surgical History:   Procedure Laterality Date    BREAST SURGERY  10/2015    left breast bx    CARDIAC CATHETERIZATION      per old chart pt had cath done in 3/2011 and 9/2013    CARPAL TUNNEL RELEASE Left 1/9/2020    CARPAL TUNNEL RELEASE LEFT performed by Michael Forbes DO at [address]  per old chart done [facility]  3/11/13    diverticulosis, cecal polyp    COLONOSCOPY  03/16/2017    Internal hemorrhoids- Dr. Marsh, COLON, DIAGNOSTIC  03/16/2017    EGD: Small esophageal varices, portal hypertensive gastropathy, reflux esophagitis, hiatal hernia    EYE SURGERY Bilateral ? when    cyst removal, cataracts w/lens replacement    HYSTERECTOMY      per old chart pt had CHOLO/BSO 1986    TIPS PROCEDURE  04/23/2019    TONSILLECTOMY  as a kid    UPPER GASTROINTESTINAL ENDOSCOPY N/A 11/14/2018    EGD DIAGNOSTIC ONLY performed by John Paul Bobo MD at [facility] N/A 6/5/2019    EGD DIAGNOSTIC ONLY performed by John Paul Bobo MD at [facility] History   Problem Relation Age of Onset    Cancer Mother         lung ca   Betty Olives Arthritis Mother     Migraines Mother     Cancer Father         colon ca   Betty [place]ives Diabetes Father     High Blood Pressure Father     Arthritis Father     High Cholesterol Father     Migraines Father     Migraines and that this is how she has presented in the past with hepatic encephalopathy. Kidney function appears to be stable, electrolytes appear to be stable but ammonia level is 187 which would explain her symptoms. She is feeling better after nausea medications. She is tolerating oral intake and so we will give a dose of lactulose, plan for admission for hepatic encephalopathy, nausea vomiting. She is agreeable. Discussed with hospitalist team for admission. She is stable    Clinical Impression:  1. Hepatic encephalopathy ([address]    2. Non-intractable vomiting with nausea, unspecified vomiting type      Disposition referral (if applicable):  No follow-up provider specified. Disposition medications (if applicable):  New Prescriptions    No medications on file     ED Provider Disposition Time  DISPOSITION Decision To Admit 05/06/2020 10:57:23 PM      Comment: Please note this report has been produced using speech recognition software and may contain errors related to that system including errors in grammar, punctuation, and spelling, as well as words and phrases that may be inappropriate. Efforts were made to edit the dictations.         Mic Bentley MD  05/06/20 7120

## 2020-05-07 NOTE — PROGRESS NOTES
Skin assessment complete with Diamond. No opens areas noted. Slight areas of bruising where blood had been attempted to be drawn.

## 2020-05-07 NOTE — CONSULTS
[facility] Gastroenterology  Gastroenterology Consultation    2020  9:06 AM    Patient:    Janes Salvador  : 1960   64 y.o. MRN: [id number]  Admitted: 2020  9:38 PM ATT: Rajni Dick MD   [id number]  AdmitDx: Hepatic encephalopathy ([facility] [K72.90]  Non-intractable vomiting with nausea, unspecified vomiting type [R11.2]  PCP: Carole Sam MD    Reason for Consult:  Hepatic encephalopathy    Requesting Physician:  Rajni Dick MD      History Obtained From:  Patient and review of all records    HISTORY OF PRESENT ILLNESS:                The patient is a 64 y.o. female with significant past medical history as below who presents with above mentioned causes in reason for consult. Presented to [facility] yesterday for confusion and n/v. Confusion started 2 days ago. Able to anwser all questions this am although ammonia elevated. Had one BM this am. Takign Lactulose at home. Eating clear liquids this am without n/v. Denies abd pain, jaudince. Lower leg edema nonpitting. History of EGD 19 Esophageal varices, Reflux esophagitis  Unsure History of colonoscopy     Denies NSAIDs  Denies Anti-platelet therapy    Past Medical History:        Diagnosis Date    Acid reflux     Arthritis     left knee    CAD (coronary artery disease)     per [facility] - Dr. Boateng Class COPD (chronic obstructive pulmonary disease) ([facility]     follow with Dr Dong Montanez Depression     \"have manic - depression see Dr Ashlyn Barreto Diabetes mellitus [facility])     dx 10+ yrs ago- follows with PCP    Drug abuse ([facility]     hx use of cocaine, heroin and marijuana- states last used 2014    Glaucoma     bilateral    H/O Doppler lower venous ultrasound 2019    No DVT or SVT, Significant reflux of RGSV and LGSV.     Hepatic encephalopathy ([facility] 2019    Hepatitis C     for liver bx 12/3/2015\"Have Hepatitis B and C and saw Dr Gaston Goyal for this 2015\"    Hiatal hernia     History of alcohol abuse     History of exercise stress test 01/02/2020    Treadmill, Normal exercise performance without angina and ischemic EKG changes.     HTN (hypertension)     \"for the past two yrs on medication\" follows with Dr Ladarius Arriaga Hx of blood clots 2019    Portal vein thrombsis - TIPS procedure 4/23/19    Hyperlipemia     Irregular heart beat     per pt    Liver hematoma     Migraines     Last migraine:  2018    Nausea & vomiting     Schizophrenia ([address]     per old chart    Seizures ([address]     \"last one was 9/2015- saw Dr Bobetta Galeazzi at [facility]- she said not sure if acutal seizures- she thinks they are panic or anxiety attacks\"    SOB (shortness of breath)     with any exertion       Past Surgical History:        Procedure Laterality Date    BREAST SURGERY  10/2015    left breast bx    CARDIAC CATHETERIZATION      per old chart pt had cath done in 3/2011 and 9/2013    CARPAL TUNNEL RELEASE Left 1/9/2020    CARPAL TUNNEL RELEASE LEFT performed by Luzmaria Bedolla DO at [id number]  per old chart done [facility]  3/11/13    diverticulosis, cecal polyp    COLONOSCOPY  03/16/2017    Internal hemorrhoids- Dr. Zak Salmon, COLON, DIAGNOSTIC  03/16/2017    EGD: Small esophageal varices, portal hypertensive gastropathy, reflux esophagitis, hiatal hernia    EYE SURGERY Bilateral ? when    cyst removal, cataracts w/lens replacement    HYSTERECTOMY      per old chart pt had CHOLO/BSO 1986    TIPS PROCEDURE  04/23/2019    TONSILLECTOMY  as a kid    UPPER GASTROINTESTINAL ENDOSCOPY N/A 11/14/2018    EGD DIAGNOSTIC ONLY performed by Judith Bustamante MD at [place] [id number] N/A 6/5/2019    EGD DIAGNOSTIC ONLY performed by Judith Bustamante MD at [facility] ENDOSCOPY         Current Medications:    Medications    Scheduled Medications:    sodium chloride flush  10 mL Intravenous 2 times per day    enoxaparin  30 mg Subcutaneous Daily Lab Results   Component Value Date    MG 1.9 01/16/2020     Hepatic:   Recent Labs     05/06/20  2213   AST 22   ALT 17   BILITOT 1.1*   ALKPHOS 141*     Recent Labs     05/06/20  2213   LIPASE 54     No results for input(s): PROTIME, INR in the last 72 hours. No results for input(s): PTT in the last 72 hours. Lipids: No results for input(s): CHOL, HDL in the last 72 hours. Invalid input(s): LDLCALCU  INR: No results for input(s): INR in the last 72 hours.   TSH:   Lab Results   Component Value Date    TSH 1.73 08/01/2017       Intake/Output Summary (Last 24 hours) at 5/7/2020 9594  Last data filed at 5/7/2020 0021  Gross per 24 hour   Intake 240 ml   Output --   Net 240 ml      lactated ringers         Imaging Studies: Reviewed    Patient Active Problem List   Diagnosis Code    Left arm pain M79.602    Type 2 diabetes mellitus with complication, with long-term current use of insulin (HCC) E11.8, Z79.4    COPD, mild (HCC) J44.9    Tobacco abuse Z72.0    Angina effort I20.8    Abnormal nuclear cardiac imaging test R93.1    Lung nodule R91.1    Chest pain R07.9    Dyslipidemia E78.5    Pancolitis (HCC) K51.00    Hematemesis without nausea M38.6    Alcoholic cirrhosis of liver without ascites (HCC) K70.30    Thrombocytopenia (HCC) S27.5    Periumbilical abdominal pain R10.33    History of colonic polyps Z86.010    Abnormal findings on diagnostic imaging of 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    Type 2 diabetes mellitus with complication, with long-term current use of insulin (HCC) E11.8, Z79.4    SOB (shortness of breath) R06.02    Portal vein thrombosis I81    Intractable nausea and vomiting R11.2    Abdominal pain R10.9    Acute GI bleeding K92.2    Chronic hepatitis C without hepatic coma (HCC) B18.2    Delayed gastric emptying K30    Restless legs G25.81    Acute colitis K52.9    Acute encephalopathy G93.40    S/P TIPS (transjugular intrahepatic portosystemic shunt) Z95.828    Other cirrhosis of liver (HCC) K74.69    Acute upper GI bleed K92.2    Acute hepatic encephalopathy K72.00    Hepatic encephalopathy (HCC) K72.90    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    thrombocytopenia D69.59     ASSESSMENT:  Hepatic Encephalopathy  S/t decompensated 1/2 Alcoholic 2/2 hep B Cirrhosis  Ammonia 187 on admission, now 190  2/25/2020 AFP normal, hep B PCR not detected on Viread  MELD Pending    N/V   Improved, tolerating clears    RECOMMENDATIONS:  Continue Lactulose 30 mg TID, one extra dose now  Continue Xifaxan 550 mg BID, Aldactone 50 mg daily, Lasix 20 mg daily, & Viread 300 mg at HS (NEEDS TO BRING FROM HOME, PHARMACY DOES NOT HAVE)  PPI BID, hx esophagitis   Advance diet as tolerated, Low NA diet  LFT's  & INR daily     Discussed plan of care with patient    Patient clinical, biochemical, and radiological information discussed with Dr. Charmayne Arab. He agrees with the assessment and plan. Marilee Grover, [place]  5/7/2020  9:06 AM     Well known patient  H/O of alcoholic cirrhosis  Treat encephalopathy symptomatically  Keep 40 grams protein diet      I have seen and examined this patient personally, and independently of the nurse practitioner. The plan was developed mutually at the time of the visit with the patient. Marilee Grover and myself have spoken with patient, nursing staff and provided written and verbal instructions .     The above note has been reviewed and I agree with the Assessment,  Diagnosis, and Treatment plan as suggested by Suzette Jacob MD  [place]

## 2020-05-07 NOTE — PLAN OF CARE
Problem: Pain:  Goal: Pain level will decrease  Description: Pain level will decrease  Outcome: Ongoing  Goal: Control of acute pain  Description: Control of acute pain  Outcome: Ongoing  Goal: Control of chronic pain  Description: Control of chronic pain  Outcome: Ongoing  Goal: Patient's pain/discomfort is manageable  Description: Patient's pain/discomfort is manageable  Outcome: Ongoing     Problem: Safety:  Goal: Free from accidental physical injury  Description: Free from accidental physical injury  Outcome: Ongoing  Goal: Free from intentional harm  Description: Free from intentional harm  Outcome: Ongoing     Problem: Daily Care:  Goal: Daily care needs are met  Description: Daily care needs are met  Outcome: Ongoing     Problem: Skin Integrity:  Goal: Skin integrity will stabilize  Description: Skin integrity will stabilize  Outcome: Ongoing

## 2020-05-07 NOTE — H&P
HISTORY AND PHYSICAL  (Hospitalist, Internal Medicine)  IDENTIFYING INFORMATION   PATIENT:  Alyssia Kennedy  MRN:  [id number]  ADMIT DATE: 5/6/2020      CHIEF COMPLAINT   Nausea, vomiting, confusion. HISTORY OF PRESENT ILLNESS   Alyssia Kennedy is a 64 y.o. female with COPD, chronic hepatitis C, cirrhosis of liver due to alcohol and hepatitis C, portal vein thrombosis, s/p TIPS, CAD, depression, diabetes mellitus, hyperlipidemia, paroxysmal atrial fibrillation, not on anticoagulation, schizophrenia, presented to the ER with complaints of nausea, vomiting, being confused since the last two days. Patient denied any fever, chills, chest pain, shortness of breath, denied any abdominal pain, denied any constipation. Patient reports being compliant with her lactulose. Patient reports having 2-3 bowel movements per day. At presentation patient was noted to have /71, HR 88, RR 20, temperature 98, saturating 97% on room air. Labs significant for creatinine 1.2, random glucose 24, ammonia 187. Urine analysis not suggestive of infection. Chest x-ray with no acute cardiopulmonary process. Patient received lactulose in the ER. PAST MEDICAL HISTORY PAST SURGICAL HISTORY   COPD, chronic hepatitis C, cirrhosis of liver due to alcohol and hepatitis C, portal vein thrombosis, s/p TIPS, CAD, depression, diabetes mellitus, hyperlipidemia, paroxysmal atrial fibrillation, not on anticoagulation, schizophrenia. Hysterectomy, cholecystectomy, TIPS, tonsillectomy   FAMILY HISTORY SOCIAL HISTORY    Positive for hypertension, diabetes mellitus, lung cancer in brother  Quit smoking when she got TIPS procedure, denied any alcohol or illicit drug abuse. Remote history of IV drug abuse and alcohol dependence.    MEDICATIONS ALLERGIES    Ranolazine 500 mg twice daily, rifaximin 550 mg twice daily, Lasix 20 mg daily, hydroxyzine 50 mg 3 times daily, lactulose 30 g 3 times daily, spironolactone 50 mg daily, Protonix 20 mg daily, Phenergan 3 times daily as needed, fluoxetine 40 mg daily, Viread 300 mg nightly, quetiapine 150 mg nightly, oxycodone 5 mg every 6 hours as needed, buspirone 15 mg twice daily, Lantus 20 units nightly, insulin aspart sliding scale, metformin 5 mg twice daily. Tylenol-causes itching, Vicodin. PAST MEDICAL, SURGICAL, FAMILY, and SOCIAL HISTORY         Past Medical History:   Diagnosis Date    Acid reflux     Arthritis     left knee    CAD (coronary artery disease)     per [facility] 9/6/[facility] - Dr. Hardik Kovacs COPD (chronic obstructive pulmonary disease) ([facility]     follow with Dr Davian Humphries Depression     \"have manic - depression see Dr Lissa Morrison Diabetes mellitus [facility])     dx 10+ yrs ago- follows with PCP    Drug abuse ([facility]     hx use of cocaine, heroin and marijuana- states last used 12/2014    Glaucoma     bilateral    H/O Doppler lower venous ultrasound 04/04/2019    No DVT or SVT, Significant reflux of RGSV and LGSV.  Hepatic encephalopathy ([facility] 05/2019    Hepatitis C     for liver bx 12/3/2015\"Have Hepatitis B and C and saw Dr Lily Hammond for this 12/1/2015\"    Hiatal hernia     History of alcohol abuse     History of exercise stress test 01/02/2020    Treadmill, Normal exercise performance without angina and ischemic EKG changes.     HTN (hypertension)     \"for the past two yrs on medication\" follows with Dr Hardik Kovacs Hx of blood clots 2019    Portal vein thrombsis - TIPS procedure 4/23/19    Hyperlipemia     Irregular heart beat     per pt    Liver hematoma     Migraines     Last migraine:  2018    Nausea & vomiting     Schizophrenia ([facility]     per old chart    Seizures ([facility]     \"last one was 9/2015- saw Dr Chris Toro at [facility]- she said not sure if acutal seizures- she thinks they are panic or anxiety attacks\"    SOB (shortness of breath)     with any exertion     Past Surgical History:   Procedure Laterality Date    BREAST SURGERY  10/2015    left breast bx    CARDIAC CATHETERIZATION for Chest pain 25 tablet 3    promethazine (PHENERGAN) 25 MG tablet Take 1 tablet by mouth 3 times daily as needed for Nausea 60 tablet 1    FLUoxetine (PROZAC) 40 MG capsule Take 40 mg by mouth daily      VIREAD 300 MG tablet Take 300 mg by mouth nightly       QUEtiapine (SEROQUEL) 300 MG tablet Take 150 mg by mouth nightly       oxyCODONE (ROXICODONE) 5 MG immediate release tablet Take 5 mg by mouth 4 times daily. Per pain management dr Fermin Ellis office      busPIRone (BUSPAR) 15 MG tablet Take 1 tablet by mouth 2 times daily 60 tablet 3         Allergies  Allergies   Allergen Reactions    Norco [Hydrocodone-Acetaminophen] Itching     Itching, rash, nausea and vomiting.  Tylenol [Acetaminophen] Itching, Nausea And Vomiting and Rash       REVIEW OF SYSTEMS   Within above limitations. 14 point review of systems reviewed. Pertinent positive or negative as per HPI or otherwise negative per 14 point systems review. PHYSICAL EXAM     Wt Readings from Last 3 Encounters:   05/06/20 169 lb (76.7 kg)   04/06/20 162 lb (73.5 kg)   04/02/20 174 lb (78.9 kg)       GEN  -Awake, alert, NAD.   EYES   -PERRL. HENT  -MM are dry  RESP  -LS CTA equal bilat, no wheezes, rales or rhonchi. Symmetric chest movement. No respiratory distress noted. C/V  -S1/S2 auscultated. RRR without appreciable M/R/G. No JVD or carotid bruits. Peripheral pulses equal bilaterally and palpable. No peripheral edema. No reproducible chest wall tenderness. GI  -Abdomen is soft non-distended, no significant tenderness. No masses or guarding. + BS in all quadrants. Rectal exam deferred.   -No CVA tenderness. Alexander catheter is not present. SKIN  -Normal coloration, warm, dry. NEURO  -CN 2-12 appear grossly intact, normal speech, no lateralizing weakness. PSYC  -Awake, alert, oriented x 4. Appropriate affect. LABS AND IMAGING   Results for Zan Lindo (MRN [id number]) as of 5/7/2020 03:33   Ref.  Range 5/6/2020 22:13 airspace  disease.  Normal pulmonary vasculature.  No pleural effusion or pneumothorax. Normal cardiomediastinal silhouette and great vessels.  Stable regional  skeleton.  Right upper quadrant abdominal surgical clips and stent.     Impression:       No acute cardiopulmonary process. Relevant labs and imaging reviewed    ASSESSMENT AND PLAN     1. Hepatic encephalopathy:  -Ammonia:187.  -Patient is able to answer questions appropriately. Patient notes that she is in the hospital, knows the year 2020, month-May, date -6th, but appears to be intermittently confused.  -Patient received lactulose in the ER.  -We will continue with lactulose 3 times daily. -Repeat ammonia in a.m.  -Continue rifaximin. 2.  Nausea, vomiting:  -Clear liquid diet, advance diet as tolerated. 3. COPD: albuterol PRN. 4. chronic hepatitis C, cirrhosis of liver due to alcohol and hepatitis C, portal vein thrombosis, s/p TIPS     5. CAD  -Continue ranolazine.     6. Depression/anxiety  -Continue fluoxetine, buspirone.     7. diabetes mellitus type II, with hyperglycemia:  -Continue lantus, insulin sliding scale with hypoglycemia protocol     8. Hyperlipidemia  9. paroxysmal atrial fibrillation, not on anticoagulation     10. Schizophrenia-continue Seroquel     11. h/o Hepatic encephalopathy:  -Continue rifaximin, lactulose, Lasix, spironolactone     12.  Hepatitis C: On viread        DVT Prophylaxis:lovenox  GI Prophylaxis: protonix.   Code Status: FULL.      Case d/w ED physician    Jon Davidson MD  Hospitalist, Internal Medicine  5/6/2020 at 11:25 PM

## 2020-05-07 NOTE — PLAN OF CARE
Problem: Pain:  Goal: Pain level will decrease  Description: Pain level will decrease  Outcome: Ongoing  Goal: Control of acute pain  Description: Control of acute pain  Outcome: Ongoing  Goal: Control of chronic pain  Description: Control of chronic pain  Outcome: Ongoing  Goal: Patient's pain/discomfort is manageable  Description: Patient's pain/discomfort is manageable  Outcome: Ongoing

## 2020-05-07 NOTE — PROGRESS NOTES
Hospitalist Progress Note      Name:  Shireen Wu /Age/Sex: 1960  (64 y.o. female)   MRN & CSN:  [id number] & [id number] Admission Date/Time: 2020  9:38 PM   Location:  [address] [place] [address] PCP: Caridad Kruger MD         Hospital Day: 2    Assessment and Plan:   Shireen Wu is a 64 y.o.  female  who presents with encephalopathy. --- Hepatic encephalopathy - improving. No evidence of infection. No concern for GI bleeding. Continue Lactulose and Rifaximin. GI on board. --- Liver cirrhosis due to alcohol and HCV - low MELD score. --- already treated for HCV  --- S/p TIPS for gastric varices and portal vein thrombosis  2019.  --- LE edema - on lasix and aldactone. Chronic diagnoses  --- COPD - PRN albuterol. --- CAD -Continue ranolazine. --- Depression/anxiety -Continue fluoxetine, buspirone. --- diabetes mellitus type II, with hyperglycemia - continue lantus, insulin sliding scale with hypoglycemia protocol. --- Hyperlipidemia - paroxysmal atrial fibrillation, not on anticoagulation. --- Schizophrenia -continue seroquel     Diet DIET CLEAR LIQUID; Low Sodium (2 GM); Cardiac   DVT Prophylaxis [x] Lovenox, []  Heparin, [] SCDs, [] Ambulation   GI Prophylaxis [] PPI,  [] H2 Blocker,  [] Carafate,  [] Diet/Tube Feeds   Code Status Full Code   Disposition Home by tomorrow. MDM [] Low, [x] Moderate,[]  High     History of Present Illness:     Chief Complaint:  Confusion. Interval Hx: No longer confused. Ammonia is still elevated. Ten point ROS reviewed negative, unless as noted above. Objective: Intake/Output Summary (Last 24 hours) at 2020 1227  Last data filed at 2020 0021  Gross per 24 hour   Intake 240 ml   Output --   Net 240 ml      Vitals:   Vitals:    20 1003   BP: (!) 110/55   Pulse: 91   Resp: 16   Temp: 98.1 °F (36.7 °C)   SpO2:      Physical Exam:   GEN  female, sitting upright in bed.   RESP Breathing comfortably on room air.  CARDIO/VASC S1/S2 auscultated. Regular rate without appreciable murmurs. No peripheral edema. GI Abdomen is soft without significant tenderness, masses, or guarding. Bowel sounds are normoactive. MSK No gross joint deformities. SKIN Normal coloration, warm, dry. NEURO normal speech, no lateralizing weakness. No asterixis. PSYCH Awake, alert, oriented x 3. Attentive (able to count backwards from 20 to 1).      Medications:   Medications:    sodium chloride flush  10 mL Intravenous 2 times per day    enoxaparin  30 mg Subcutaneous Daily    busPIRone  15 mg Oral BID    FLUoxetine  40 mg Oral Daily    furosemide  20 mg Oral Daily    lactulose  30 g Oral TID    oxyCODONE  5 mg Oral 4x daily    QUEtiapine  150 mg Oral Nightly    ranolazine  500 mg Oral BID    rifaximin  550 mg Oral BID    spironolactone  50 mg Oral Daily    insulin lispro  0-12 Units Subcutaneous TID WC    insulin lispro  0-6 Units Subcutaneous Nightly    insulin glargine  15 Units Subcutaneous Nightly    pantoprazole  40 mg Oral BID AC    lactulose  30 g Oral Once      Infusions:    lactated ringers       PRN Meds: sodium chloride flush, 10 mL, PRN  polyethylene glycol, 17 g, Daily PRN  promethazine, 12.5 mg, Q6H PRN    Or  ondansetron, 4 mg, Q6H PRN  albuterol, 2.5 mg, Q4H PRN        CBC   Recent Labs     05/06/20  2213 05/07/20  0429   WBC 5.8 4.6   HGB 13.0 12.4*   HCT 38.4 37.5   * 106*      BMP   Recent Labs     05/06/20 2213 05/07/20  0429    143   K 3.9 4.1    107   CO2 26 27   BUN 15 14   CREATININE 1.2* 1.2*       Radiology report reviewed     Electronically signed by Madelene Moritz, MD on 5/7/2020 at 12:27 PM

## 2020-05-07 NOTE — ED NOTES
Patient admitted to room 1121. Report called to Nan Laurent RN.       Delaney Salinas RN  05/06/20 7919

## 2020-05-08 VITALS
OXYGEN SATURATION: 94 % | WEIGHT: 177 LBS | BODY MASS INDEX: 38.19 KG/M2 | HEIGHT: 57 IN | HEART RATE: 85 BPM | SYSTOLIC BLOOD PRESSURE: 106 MMHG | DIASTOLIC BLOOD PRESSURE: 58 MMHG | TEMPERATURE: 97.6 F | RESPIRATION RATE: 14 BRPM

## 2020-05-08 PROBLEM — K76.82 HEPATIC ENCEPHALOPATHY (HCC): Status: RESOLVED | Noted: 2020-03-30 | Resolved: 2020-05-08

## 2020-05-08 LAB
ALBUMIN SERPL-MCNC: 3.1 GM/DL (ref 3.4–5)
ALP BLD-CCNC: 113 IU/L (ref 40–129)
ALT SERPL-CCNC: 16 U/L (ref 10–40)
AMMONIA: 79 UMOL/L (ref 11–51)
AST SERPL-CCNC: 19 IU/L (ref 15–37)
BILIRUB SERPL-MCNC: 1.2 MG/DL (ref 0–1)
BILIRUBIN DIRECT: 0.4 MG/DL (ref 0–0.3)
BILIRUBIN, INDIRECT: 0.8 MG/DL (ref 0–0.7)
GLUCOSE BLD-MCNC: 165 MG/DL (ref 70–99)
GLUCOSE BLD-MCNC: 79 MG/DL (ref 70–99)
INR BLD: 1.28 INDEX
PROTHROMBIN TIME: 15.5 SECONDS (ref 11.7–14.5)
TOTAL PROTEIN: 5.8 GM/DL (ref 6.4–8.2)

## 2020-05-08 PROCEDURE — 82140 ASSAY OF AMMONIA: CPT

## 2020-05-08 PROCEDURE — 94761 N-INVAS EAR/PLS OXIMETRY MLT: CPT

## 2020-05-08 PROCEDURE — 82962 GLUCOSE BLOOD TEST: CPT

## 2020-05-08 PROCEDURE — 6370000000 HC RX 637 (ALT 250 FOR IP): Performed by: NURSE PRACTITIONER

## 2020-05-08 PROCEDURE — 80076 HEPATIC FUNCTION PANEL: CPT

## 2020-05-08 PROCEDURE — 85610 PROTHROMBIN TIME: CPT

## 2020-05-08 PROCEDURE — 6360000002 HC RX W HCPCS: Performed by: INTERNAL MEDICINE

## 2020-05-08 PROCEDURE — 6370000000 HC RX 637 (ALT 250 FOR IP): Performed by: INTERNAL MEDICINE

## 2020-05-08 RX ADMIN — OXYCODONE HYDROCHLORIDE 5 MG: 5 TABLET ORAL at 08:45

## 2020-05-08 RX ADMIN — LACTULOSE 30 G: 10 SOLUTION ORAL at 08:44

## 2020-05-08 RX ADMIN — FUROSEMIDE 20 MG: 20 TABLET ORAL at 08:45

## 2020-05-08 RX ADMIN — BUSPIRONE HYDROCHLORIDE 15 MG: 15 TABLET ORAL at 08:46

## 2020-05-08 RX ADMIN — SPIRONOLACTONE 50 MG: 25 TABLET ORAL at 08:45

## 2020-05-08 RX ADMIN — RANOLAZINE 500 MG: 500 TABLET, FILM COATED, EXTENDED RELEASE ORAL at 08:46

## 2020-05-08 RX ADMIN — PANTOPRAZOLE SODIUM 40 MG: 40 TABLET, DELAYED RELEASE ORAL at 06:27

## 2020-05-08 RX ADMIN — RIFAXIMIN 550 MG: 550 TABLET ORAL at 08:45

## 2020-05-08 RX ADMIN — FLUOXETINE 40 MG: 20 CAPSULE ORAL at 08:45

## 2020-05-08 RX ADMIN — ENOXAPARIN SODIUM 30 MG: 30 INJECTION SUBCUTANEOUS at 08:45

## 2020-05-08 ASSESSMENT — PAIN SCALES - GENERAL
PAINLEVEL_OUTOF10: 5
PAINLEVEL_OUTOF10: 0

## 2020-05-08 ASSESSMENT — PAIN DESCRIPTION - LOCATION: LOCATION: BACK

## 2020-05-08 ASSESSMENT — PAIN DESCRIPTION - PAIN TYPE: TYPE: CHRONIC PAIN

## 2020-05-08 ASSESSMENT — PAIN DESCRIPTION - ONSET: ONSET: ON-GOING

## 2020-05-08 ASSESSMENT — PAIN DESCRIPTION - DESCRIPTORS: DESCRIPTORS: ACHING

## 2020-05-08 ASSESSMENT — PAIN DESCRIPTION - PROGRESSION: CLINICAL_PROGRESSION: NOT CHANGED

## 2020-05-08 ASSESSMENT — PAIN DESCRIPTION - ORIENTATION: ORIENTATION: LOWER

## 2020-05-08 ASSESSMENT — PAIN DESCRIPTION - FREQUENCY: FREQUENCY: CONTINUOUS

## 2020-05-08 NOTE — DISCHARGE SUMMARY
Discharge Summary    Name:  Carly Valerio /Age/Sex: 1960  (64 y.o. female)   MRN & CSN:  [id number] & [id number] Admission Date/Time: 2020  9:38 PM   Attending:  Mikala Henson MD Discharging Physician: Mikala Henson MD     Hospital Course:   Carly Valerio is a 64 y.o.  female  who presents with encephalopathy.      --- Hepatic encephalopathy - resolved. To continue Lactulose and Rifaximin. No evidence of infection and there was no concern for GI bleeding. GI was on board.      --- Liver cirrhosis due to alcohol and HCV - low MELD score. --- already treated for HCV  --- S/p TIPS for gastric varices and portal vein thrombosis  2019.  --- LE edema - on lasix and aldactone.       Chronic diagnoses  --- COPD - PRN albuterol. --- CAD -  ranolazine. --- Depression/anxiety -Continue fluoxetine, buspirone. --- diabetes mellitus type II, with hyperglycemia - continue insulin at home doses. --- Schizophrenia -continue seroquel    The patient expressed appropriate understanding of and agreement with the discharge recommendations, medications, and plan. Consults this admission:  IP CONSULT TO HOSPITALIST  IP CONSULT TO GI    Discharge Instruction:   Follow up appointments:   Primary care physician   GI    Diet:  low salt diet   Activity: activity as tolerated  Disposition: Discharged to:   [x]Home, [][facility], []SNF, []Acute Rehab, []Hospice   Condition on discharge: Stable    Discharge Medications:      Birdie Leyden   Home Medication Instructions [facility]:[id number]    Printed on:20 9870   Medication Information                      albuterol (PROVENTIL) (2.5 MG/3ML) 0.083% nebulizer solution  2.5 mg             albuterol (PROVENTIL) (2.5 MG/3ML) 0.083% nebulizer solution  Take 3 mLs by nebulization every 6 hours             blood glucose monitor strips  Test 3 times a day & as needed for symptoms of irregular blood glucose.  Please fill with what is covered my patients

## 2020-05-08 NOTE — PROGRESS NOTES
pain 3/18/19  Yes ERLINDA Camilo CNP   promethazine (PHENERGAN) 25 MG tablet Take 1 tablet by mouth 3 times daily as needed for Nausea 11/20/18  Yes Carole Sam MD   FLUoxetine (PROZAC) 40 MG capsule Take 40 mg by mouth daily   Yes Historical Provider, MD   VIREAD 300 MG tablet Take 300 mg by mouth nightly  11/20/17  Yes Historical Provider, MD   QUEtiapine (SEROQUEL) 300 MG tablet Take 150 mg by mouth nightly  11/15/16  Yes Historical Provider, MD   oxyCODONE (ROXICODONE) 5 MG immediate release tablet Take 5 mg by mouth 4 times daily. Per pain management dr Kade Lerner office   Yes Historical Provider, MD   busPIRone (BUSPAR) 15 MG tablet Take 1 tablet by mouth 2 times daily 5/28/15  Yes Netta West MD   UNABLE TO FIND Rx for depends   Use 3-4  times daily 4/10/20   Carole Sam MD   Glucosource Lancets MISC Use one 3-4 times to check blood sugar 1/30/20   Carole Sam MD   glucose monitoring kit (FREESTYLE) monitoring kit 1 kit by Does not apply route daily 1/30/20   Carole Sam MD   Blood Glucose Monitoring Suppl (ACURA BLOOD GLUCOSE METER) w/Device KIT Please check blood sugar 3 times daily. Please fill with what is covered by the insurance 1/21/20   Carole Sma MD   SOFT TOUCH LANCETS MISC Please check blood sugar 3 times daily.  Please fill with what is covered by insurance 1/21/20   Carole Sam MD   Compression Stockings MISC by Does not apply route 20 - 30 mmh wear daily and take off at night  Thigh High 4/12/19   ERLINDA Camilo CNP      Scheduled Medications:    sodium chloride flush  10 mL Intravenous 2 times per day    enoxaparin  30 mg Subcutaneous Daily    busPIRone  15 mg Oral BID    FLUoxetine  40 mg Oral Daily    furosemide  20 mg Oral Daily    lactulose  30 g Oral TID    oxyCODONE  5 mg Oral 4x daily    QUEtiapine  150 mg Oral Nightly    ranolazine  500 mg Oral BID    rifaximin  550 mg Oral BID    spironolactone  50 mg Oral Daily    insulin lispro 0-12 Units Subcutaneous TID WC    insulin lispro  0-6 Units Subcutaneous Nightly    insulin glargine  15 Units Subcutaneous Nightly    pantoprazole  40 mg Oral BID AC     Infusions:    lactated ringers 75 mL/hr at 05/07/20 2305     PRN Medications: sodium chloride flush, polyethylene glycol, promethazine **OR** ondansetron, albuterol  Allergies: Allergies   Allergen Reactions    Norco [Hydrocodone-Acetaminophen] Itching     Itching, rash, nausea and vomiting.      Tylenol [Acetaminophen] Itching, Nausea And Vomiting and Rash

## 2020-05-09 ENCOUNTER — CARE COORDINATION (OUTPATIENT)
Dept: CASE MANAGEMENT | Age: 60
End: 2020-05-09

## 2020-05-11 ENCOUNTER — CARE COORDINATION (OUTPATIENT)
Dept: CASE MANAGEMENT | Age: 60
End: 2020-05-11

## 2020-05-11 LAB
EKG ATRIAL RATE: 85 BPM
EKG DIAGNOSIS: NORMAL
EKG P AXIS: 65 DEGREES
EKG P-R INTERVAL: 156 MS
EKG Q-T INTERVAL: 420 MS
EKG QRS DURATION: 64 MS
EKG QTC CALCULATION (BAZETT): 499 MS
EKG R AXIS: 25 DEGREES
EKG T AXIS: 45 DEGREES
EKG VENTRICULAR RATE: 85 BPM

## 2020-05-11 PROCEDURE — 93010 ELECTROCARDIOGRAM REPORT: CPT | Performed by: INTERNAL MEDICINE

## 2020-05-19 ENCOUNTER — CARE COORDINATION (OUTPATIENT)
Dept: CASE MANAGEMENT | Age: 60
End: 2020-05-19

## 2020-05-19 NOTE — CARE COORDINATION
[facility] Transitions Follow Up Call    2020    Patient: Yo Brown  Patient : 1960   MRN: [id number]  Reason for Admission: Liver Cirrhosis d/t Alc and HCV, Hepatic Encephalopathy   Discharge Date: 20 RARS: Readmission Risk Score: 24    Care Transitions Subsequent and Final Call    Schedule Follow Up Appointment with PCP:  Declined  Subsequent and Final Calls  Do you have any ongoing symptoms?:  No  Have your medications changed?:  No  Do you have any questions related to your medications?:  No  Do you currently have any active services?:  No  Do you have any needs or concerns that I can assist you with?:  No  Identified Barriers: Other, Stress, Lack of Support  Care Transitions Interventions   Home Care Waiver:  Declined        DME Assistance:  Declined   Other Interventions:          COVID-19 RISK MONITORING  PATIENT RISK FACTORS: COPD, Hep C, SM    Patient contacted regarding recent discharge and COVID-19 risk. Care Transition Nurse spoke w/ Patient by telephone to perform post discharge assessment. Verified name and  as identifiers. Patient reports doing well; reports emesis, nausea and abd pain resolved. Denies any Covid19 related sx. Denies rx or resource needs including hhc, dme. Education provided regarding infection prevention, and signs and symptoms of COVID-19 and when to seek medical attention with Patient who verbalized understanding. Discussed exposure protocols and quarantine from [address] you at higher risk for severe illness  and given an opportunity for questions and concerns. Patient agrees to contact PCP for questions related to their healthcare. From CDC: Are you at higher risk for severe illness?  Wash your hands often.  Avoid close contact (6 feet, which is about two arm lengths) with people who are sick.  Put distance between yourself and other people if COVID-19 is spreading in your community.    Clean and disinfect

## 2020-05-20 ENCOUNTER — ANESTHESIA EVENT (OUTPATIENT)
Dept: OPERATING ROOM | Age: 60
DRG: 982 | End: 2020-05-20
Payer: MEDICARE

## 2020-05-20 RX ORDER — TRAMADOL HYDROCHLORIDE 50 MG/1
50 TABLET ORAL EVERY 6 HOURS PRN
Qty: 5 TABLET | Refills: 0 | Status: SHIPPED | OUTPATIENT
Start: 2020-05-20 | End: 2020-05-21

## 2020-05-20 RX ORDER — CEPHALEXIN 250 MG/1
250 CAPSULE ORAL 4 TIMES DAILY
Qty: 4 CAPSULE | Refills: 0 | Status: SHIPPED | OUTPATIENT
Start: 2020-05-20 | End: 2020-05-21

## 2020-05-21 ENCOUNTER — OFFICE VISIT (OUTPATIENT)
Dept: CARDIOLOGY CLINIC | Age: 60
End: 2020-05-21
Payer: COMMERCIAL

## 2020-05-21 ENCOUNTER — HOSPITAL ENCOUNTER (OUTPATIENT)
Dept: INFUSION THERAPY | Age: 60
Discharge: HOME | End: 2020-05-21
Payer: MEDICARE

## 2020-05-21 VITALS
SYSTOLIC BLOOD PRESSURE: 120 MMHG | HEIGHT: 58 IN | HEART RATE: 60 BPM | WEIGHT: 174.6 LBS | BODY MASS INDEX: 36.65 KG/M2 | DIASTOLIC BLOOD PRESSURE: 78 MMHG

## 2020-05-21 LAB
ALBUMIN SERPL-MCNC: 3.5 GM/DL (ref 3.4–5)
ALP BLD-CCNC: 125 IU/L (ref 40–129)
ALT SERPL-CCNC: 16 U/L (ref 10–40)
ANION GAP SERPL CALCULATED.3IONS-SCNC: 8 MMOL/L (ref 4–16)
AST SERPL-CCNC: 22 IU/L (ref 15–37)
BASOPHILS ABSOLUTE: 0 K/CU MM
BASOPHILS RELATIVE PERCENT: 0.6 % (ref 0–1)
BILIRUB SERPL-MCNC: 1.2 MG/DL (ref 0–1)
BUN BLDV-MCNC: 15 MG/DL (ref 6–23)
CALCIUM SERPL-MCNC: 9.1 MG/DL (ref 8.3–10.6)
CHLORIDE BLD-SCNC: 99 MMOL/L (ref 99–110)
CO2: 27 MMOL/L (ref 21–32)
CREAT SERPL-MCNC: 1.2 MG/DL (ref 0.6–1.1)
DIFFERENTIAL TYPE: ABNORMAL
EOSINOPHILS ABSOLUTE: 0.2 K/CU MM
EOSINOPHILS RELATIVE PERCENT: 2.9 % (ref 0–3)
GFR AFRICAN AMERICAN: 56 ML/MIN/1.73M2
GFR NON-AFRICAN AMERICAN: 46 ML/MIN/1.73M2
GLUCOSE BLD-MCNC: 152 MG/DL (ref 70–99)
HCT VFR BLD CALC: 37.4 % (ref 37–47)
HEMOGLOBIN: 13 GM/DL (ref 12.5–16)
LACTATE DEHYDROGENASE: 197 IU/L (ref 120–246)
LYMPHOCYTES ABSOLUTE: 2.1 K/CU MM
LYMPHOCYTES RELATIVE PERCENT: 39.9 % (ref 24–44)
MCH RBC QN AUTO: 31.9 PG (ref 27–31)
MCHC RBC AUTO-ENTMCNC: 34.8 % (ref 32–36)
MCV RBC AUTO: 91.9 FL (ref 78–100)
MONOCYTES ABSOLUTE: 0.5 K/CU MM
MONOCYTES RELATIVE PERCENT: 9.4 % (ref 0–4)
PDW BLD-RTO: 13.7 % (ref 11.7–14.9)
PLATELET # BLD: 91 K/CU MM (ref 140–440)
PMV BLD AUTO: 11.1 FL (ref 7.5–11.1)
POTASSIUM SERPL-SCNC: 4.1 MMOL/L (ref 3.5–5.1)
RBC # BLD: 4.07 M/CU MM (ref 4.2–5.4)
SEGMENTED NEUTROPHILS ABSOLUTE COUNT: 2.5 K/CU MM
SEGMENTED NEUTROPHILS RELATIVE PERCENT: 47.2 % (ref 36–66)
SODIUM BLD-SCNC: 134 MMOL/L (ref 135–145)
TOTAL PROTEIN: 6.9 GM/DL (ref 6.4–8.2)
WBC # BLD: 5.2 K/CU MM (ref 4–10.5)

## 2020-05-21 PROCEDURE — G8417 CALC BMI ABV UP PARAM F/U: HCPCS | Performed by: NURSE PRACTITIONER

## 2020-05-21 PROCEDURE — 93000 ELECTROCARDIOGRAM COMPLETE: CPT | Performed by: INTERNAL MEDICINE

## 2020-05-21 PROCEDURE — 99212 OFFICE O/P EST SF 10 MIN: CPT | Performed by: NURSE PRACTITIONER

## 2020-05-21 PROCEDURE — 83615 LACTATE (LD) (LDH) ENZYME: CPT

## 2020-05-21 PROCEDURE — G9899 SCRN MAM PERF RSLTS DOC: HCPCS | Performed by: NURSE PRACTITIONER

## 2020-05-21 PROCEDURE — G8427 DOCREV CUR MEDS BY ELIG CLIN: HCPCS | Performed by: NURSE PRACTITIONER

## 2020-05-21 PROCEDURE — 82105 ALPHA-FETOPROTEIN SERUM: CPT

## 2020-05-21 PROCEDURE — 80053 COMPREHEN METABOLIC PANEL: CPT

## 2020-05-21 PROCEDURE — 1036F TOBACCO NON-USER: CPT | Performed by: NURSE PRACTITIONER

## 2020-05-21 PROCEDURE — 1111F DSCHRG MED/CURRENT MED MERGE: CPT | Performed by: NURSE PRACTITIONER

## 2020-05-21 PROCEDURE — 3017F COLORECTAL CA SCREEN DOC REV: CPT | Performed by: NURSE PRACTITIONER

## 2020-05-21 PROCEDURE — 85025 COMPLETE CBC W/AUTO DIFF WBC: CPT

## 2020-05-21 PROCEDURE — 36415 COLL VENOUS BLD VENIPUNCTURE: CPT

## 2020-05-21 RX ORDER — RANOLAZINE 500 MG/1
500 TABLET, EXTENDED RELEASE ORAL 2 TIMES DAILY
Qty: 60 TABLET | Refills: 5 | Status: SHIPPED | OUTPATIENT
Start: 2020-05-21 | End: 2022-07-20 | Stop reason: SDUPTHER

## 2020-05-21 RX ORDER — NITROGLYCERIN 0.4 MG/1
0.4 TABLET SUBLINGUAL EVERY 5 MIN PRN
Qty: 25 TABLET | Refills: 3 | Status: SHIPPED | OUTPATIENT
Start: 2020-05-21 | End: 2022-05-18 | Stop reason: SDUPTHER

## 2020-05-21 RX ORDER — METOPROLOL SUCCINATE 50 MG/1
50 TABLET, EXTENDED RELEASE ORAL DAILY
Qty: 30 TABLET | Refills: 3 | Status: SHIPPED | OUTPATIENT
Start: 2020-05-21 | End: 2020-09-01

## 2020-05-21 ASSESSMENT — ENCOUNTER SYMPTOMS
NAUSEA: 0
BACK PAIN: 1
COUGH: 0
SHORTNESS OF BREATH: 0
BLOOD IN STOOL: 0
PHOTOPHOBIA: 0
SHORTNESS OF BREATH: 1
SINUS PAIN: 0
ABDOMINAL PAIN: 0
VOMITING: 0
CONSTIPATION: 0
DIARRHEA: 0

## 2020-05-21 ASSESSMENT — COPD QUESTIONNAIRES: CAT_SEVERITY: MILD

## 2020-05-21 NOTE — ANESTHESIA PRE PROCEDURE
05/2019    Hepatitis C     for liver bx 12/3/2015\"Have Hepatitis B and C and saw Dr Rodney Perez for this 12/1/2015\"    Hiatal hernia     History of alcohol abuse     History of exercise stress test 01/02/2020    Treadmill, Normal exercise performance without angina and ischemic EKG changes.     HTN (hypertension)     \"for the past two yrs on medication\" follows with Dr Catalina Villanueva Hx of blood clots 2019    Portal vein thrombsis - TIPS procedure 4/23/19    Hyperlipemia     Irregular heart beat     per pt    Liver hematoma     Migraines     Last migraine:  2018    Nausea & vomiting     Schizophrenia ([address]     per old chart    Seizures ([address]     \"last one was 9/2015- saw Dr Bailey Conrad at [facility]- she said not sure if acutal seizures- she thinks they are panic or anxiety attacks\"    SOB (shortness of breath)     with any exertion       Past Surgical History:        Procedure Laterality Date    BREAST SURGERY  10/2015    left breast bx    CARDIAC CATHETERIZATION      per old chart pt had cath done in 3/2011 and 9/2013    CARPAL TUNNEL RELEASE Left 1/9/2020    CARPAL TUNNEL RELEASE LEFT performed by Priya Faulkner DO at [address] [place] [address]  per old chart done [facility]  3/11/13    diverticulosis, cecal polyp    COLONOSCOPY  03/16/2017    Internal hemorrhoids- Dr. Elif Manzano, COLON, DIAGNOSTIC  03/16/2017    EGD: Small esophageal varices, portal hypertensive gastropathy, reflux esophagitis, hiatal hernia    EYE SURGERY Bilateral ? when    cyst removal, cataracts w/lens replacement    HYSTERECTOMY      per old chart pt had CHOLO/BSO 1986    TIPS PROCEDURE  04/23/2019    TONSILLECTOMY  as a kid    UPPER GASTROINTESTINAL ENDOSCOPY N/A 11/14/2018    EGD DIAGNOSTIC ONLY performed by Lorena Shah MD at [address] [facility] 6/5/2019    EGD DIAGNOSTIC ONLY performed by Lorena Shah MD at [address] History:    Social History Tobacco Use    Smoking status: Former Smoker     Packs/day: 3.00     Years: 45.00     Pack years: 135.00     Types: Cigarettes     Start date:      Last attempt to quit: 2019     Years since quittin.3    Smokeless tobacco: Never Used   Substance Use Topics    Alcohol use: Not Currently     Alcohol/week: 2.0 - 3.0 standard drinks     Types: 2 - 3 Shots of liquor per week     Comment: 2-3 shots last 2019                                Counseling given: Not Answered      Vital Signs (Current): There were no vitals filed for this visit. BP Readings from Last 3 Encounters:   20 120/78   20 (!) 106/58   20 (!) 105/57       NPO Status:                                                                                 BMI:   Wt Readings from Last 3 Encounters:   20 174 lb 9.6 oz (79.2 kg)   20 177 lb (80.3 kg)   20 162 lb (73.5 kg)     There is no height or weight on file to calculate BMI.       CBC  Lab Results   Component Value Date/Time    WBC 5.2 2020 02:05 PM    HGB 13.0 2020 02:05 PM    HCT 37.4 2020 02:05 PM    PLT 91 (L) 2020 02:05 PM     RENAL  Lab Results   Component Value Date/Time     2020 04:29 AM    K 4.1 2020 04:29 AM     2020 04:29 AM    CO2 27 2020 04:29 AM    BUN 14 2020 04:29 AM    CREATININE 1.2 (H) 2020 04:29 AM    GLUCOSE 183 (H) 2020 04:29 AM     COAGS  Lab Results   Component Value Date/Time    PROTIME 15.5 (H) 2020 03:16 AM    PROTIME 11.6 2011 03:25 AM    INR 1.28 2020 03:16 AM    APTT 78.6 (H) 2019 01:21 AM       Anesthesia Evaluation  Patient summary reviewed and Nursing notes reviewed no history of anesthetic complications:   Airway: Mallampati: I  TM distance: >3 FB   Neck ROM: full  Mouth opening: > = 3 FB Dental:    (+) edentulous      Pulmonary: breath sounds clear to auscultation  (+) COPD (no Syeda Sherwood, ERLINDA - VJ   5/21/2020

## 2020-05-21 NOTE — PROGRESS NOTES
colitis 04/06/2019    Restless legs 03/18/2019    Delayed gastric emptying 12/22/2018    Chronic hepatitis C without hepatic coma ([address] 11/20/2018    Acute GI bleeding 11/12/2018    Abdominal pain 10/27/2018    Portal vein thrombosis 10/25/2018    Intractable nausea and vomiting 10/25/2018    SOB (shortness of breath)     Type 2 diabetes mellitus with complication, with long-term current use of insulin ([address] 06/04/2018    Coronary-myocardial bridge 03/09/2018    GI bleed 12/21/2017    Essential hypertension 12/10/2017    Colitis 12/07/2017    Esophageal hypertension 09/20/2017    Candida esophagitis ([address] 09/20/2017    Left carpal tunnel syndrome 08/30/2017    Right carpal tunnel syndrome 08/30/2017    Vitamin D deficiency 08/22/2017    Mixed hyperlipidemia 21/29/8407    Periumbilical abdominal pain     History of colonic polyps     Abnormal findings on diagnostic imaging of abdomen     Nausea     Gastroesophageal reflux disease without esophagitis     Thrombocytopenia (HCC) 02/24/2017    Pancolitis ([address] 02/14/2017    Hematemesis without nausea 06/75/5249    Alcoholic cirrhosis of liver without ascites ([address] 02/14/2017    Dyslipidemia 11/23/2016    Chest pain 06/07/2016    Lung nodule 11/18/2013    Angina effort 09/06/2013    Abnormal nuclear cardiac imaging test 09/06/2013    Left arm pain 04/27/2013    Tobacco abuse 04/27/2013    Type 2 diabetes mellitus with complication, with long-term current use of insulin (HCC)     COPD, mild (HCC)        Current Outpatient Medications   Medication Sig Dispense Refill    nitroGLYCERIN (NITROSTAT) 0.4 MG SL tablet Place 1 tablet under the tongue every 5 minutes as needed for Chest pain 25 tablet 3    ranolazine (RANEXA) 500 MG extended release tablet Take 1 tablet by mouth 2 times daily 60 tablet 5    Compression Stockings MISC by Does not apply route 20 - 30 mmh wear daily and take off at night  Thigh High 2 each 2    cephALEXin (KEFLEX) 250 MG capsule Take 1 capsule by mouth 4 times daily for 1 day 4 capsule 0    insulin glargine (LANTUS SOLOSTAR) 100 UNIT/ML injection pen Inject 20 Units into the skin nightly 5 pen 3    insulin aspart (NOVOLOG FLEXPEN) 100 UNIT/ML injection pen **Low Dose Correction Algorithm**Insulin lispro (HUMALOG)  3 TIMES DAILY WITH MEALSGlucose: Dose: No [id number] 1 [id number] 2 [id number] 3 [id number] 4 [id number] 5 Nyuxo019 and above 6 Units 5 pen 3    blood glucose monitor strips Test  Bid times a day & as needed for symptoms of irregular blood glucose. 100 strip 5    albuterol (PROVENTIL) (2.5 MG/3ML) 0.083% nebulizer solution Take 3 mLs by nebulization every 6 hours 120 vial 5    lactulose (CHRONULAC) 10 GM/15ML solution Take 30 mLs by mouth 3 times daily 1892 mL 2    Glucosource Lancets MISC Use one 3-4 times to check blood sugar 100 each 5    glucose monitoring kit (FREESTYLE) monitoring kit 1 kit by Does not apply route daily 1 kit 0    Blood Glucose Monitoring Suppl (ACURA BLOOD GLUCOSE METER) w/Device KIT Please check blood sugar 3 times daily. Please fill with what is covered by the insurance 1 kit 0    blood glucose monitor strips Test 3 times a day & as needed for symptoms of irregular blood glucose. Please fill with what is covered my patients insurance. 50 strip 3    SOFT TOUCH LANCETS MISC Please check blood sugar 3 times daily.  Please fill with what is covered by insurance 100 each 3    metFORMIN (GLUCOPHAGE-XR) 500 MG extended release tablet Take 1 tablet by mouth 2 times daily 180 tablet 3    pantoprazole (PROTONIX) 40 MG tablet Take 1 tablet by mouth 2 times daily (before meals) 60 tablet 1    rifaximin (XIFAXAN) 550 MG tablet Take 1 tablet by mouth 2 times daily 42 tablet 0    furosemide (LASIX) 20 MG tablet Take 20 mg by mouth daily       spironolactone (ALDACTONE) 50 MG tablet Take 50 mg by mouth daily       promethazine (PHENERGAN) 25 MG tablet Take 1 tablet by mouth 3 times daily as needed for Nausea 60 tablet 1    FLUoxetine (PROZAC) 40 MG capsule Take 40 mg by mouth daily      VIREAD 300 MG tablet Take 300 mg by mouth daily       QUEtiapine (SEROQUEL) 300 MG tablet Take 150 mg by mouth nightly       oxyCODONE (ROXICODONE) 5 MG immediate release tablet Take 5 mg by mouth 4 times daily. Per pain management dr Kade Lerner office      busPIRone (BUSPAR) 15 MG tablet Take 1 tablet by mouth 2 times daily 60 tablet 3    UNABLE TO FIND Rx for depends   Use 3-4  times daily 1 box 5     No current facility-administered medications for this visit. Allergies   Allergen Reactions    Norco [Hydrocodone-Acetaminophen] Itching     Itching, rash, nausea and vomiting.  Tylenol [Acetaminophen] Itching, Nausea And Vomiting and Rash       Past Medical History:   Diagnosis Date    Acid reflux     Arthritis     left knee    CAD (coronary artery disease)     per [facility] 9/6/[facility] - Dr. Boateng Class COPD (chronic obstructive pulmonary disease) ([facility]     follow with Dr Dong Montanez Depression     \"have manic - depression see Dr Ashlyn Barreto Diabetes mellitus [facility])     dx 10+ yrs ago- follows with PCP    Drug abuse ([facility]     hx use of cocaine, heroin and marijuana- states last used 12/2014    Glaucoma     bilateral    H/O Doppler lower venous ultrasound 04/04/2019    No DVT or SVT, Significant reflux of RGSV and LGSV.  Hepatic encephalopathy ([facility] 05/2019    Hepatitis C     for liver bx 12/3/2015\"Have Hepatitis B and C and saw Dr Gaston Goyal for this 12/1/2015\"    Hiatal hernia     History of alcohol abuse     History of exercise stress test 01/02/2020    Treadmill, Normal exercise performance without angina and ischemic EKG changes.     HTN (hypertension)     \"for the past two yrs on medication\" follows with Dr Boateng Class Hx of blood clots 2019    Portal vein thrombsis - TIPS procedure 4/23/19    Hyperlipemia     Irregular heart beat     per pt    Liver hematoma    

## 2020-05-22 ENCOUNTER — HOSPITAL ENCOUNTER (OUTPATIENT)
Age: 60
Setting detail: SPECIMEN
Discharge: HOME | End: 2020-05-22
Payer: MEDICARE

## 2020-05-22 PROCEDURE — U0002 COVID-19 LAB TEST NON-CDC: HCPCS

## 2020-05-23 LAB — MS ALPHA-FETOPROTEIN: 3 NG/ML (ref 0–9)

## 2020-05-24 LAB
SARS-COV-2: NOT DETECTED
SOURCE: NORMAL

## 2020-05-26 ENCOUNTER — ANESTHESIA (OUTPATIENT)
Dept: OPERATING ROOM | Age: 60
DRG: 982 | End: 2020-05-26
Payer: MEDICARE

## 2020-05-26 ENCOUNTER — HOSPITAL ENCOUNTER (OUTPATIENT)
Age: 60
Setting detail: OUTPATIENT SURGERY
Discharge: HOME | DRG: 982 | End: 2020-05-26
Attending: ORTHOPAEDIC SURGERY | Admitting: ORTHOPAEDIC SURGERY
Payer: MEDICARE

## 2020-05-26 VITALS
RESPIRATION RATE: 18 BRPM | OXYGEN SATURATION: 95 % | SYSTOLIC BLOOD PRESSURE: 88 MMHG | DIASTOLIC BLOOD PRESSURE: 57 MMHG

## 2020-05-26 VITALS
RESPIRATION RATE: 16 BRPM | OXYGEN SATURATION: 97 % | SYSTOLIC BLOOD PRESSURE: 93 MMHG | BODY MASS INDEX: 38.19 KG/M2 | HEART RATE: 63 BPM | TEMPERATURE: 97 F | DIASTOLIC BLOOD PRESSURE: 63 MMHG | WEIGHT: 177 LBS | HEIGHT: 57 IN

## 2020-05-26 LAB
AMPHETAMINES: NEGATIVE
BARBITURATE SCREEN URINE: NEGATIVE
BENZODIAZEPINE SCREEN, URINE: NEGATIVE
CANNABINOID SCREEN URINE: ABNORMAL
COCAINE METABOLITE: NEGATIVE
GLUCOSE BLD-MCNC: 118 MG/DL (ref 70–99)
GLUCOSE BLD-MCNC: 71 MG/DL (ref 70–99)
OPIATES, URINE: NEGATIVE
OXYCODONE: ABNORMAL
PHENCYCLIDINE, URINE: NEGATIVE

## 2020-05-26 PROCEDURE — 82962 GLUCOSE BLOOD TEST: CPT

## 2020-05-26 PROCEDURE — 0LN70ZZ RELEASE RIGHT HAND TENDON, OPEN APPROACH: ICD-10-PCS | Performed by: ORTHOPAEDIC SURGERY

## 2020-05-26 PROCEDURE — 3700000001 HC ADD 15 MINUTES (ANESTHESIA): Performed by: ORTHOPAEDIC SURGERY

## 2020-05-26 PROCEDURE — 3700000000 HC ANESTHESIA ATTENDED CARE: Performed by: ORTHOPAEDIC SURGERY

## 2020-05-26 PROCEDURE — 7100000010 HC PHASE II RECOVERY - FIRST 15 MIN: Performed by: ORTHOPAEDIC SURGERY

## 2020-05-26 PROCEDURE — 3600000002 HC SURGERY LEVEL 2 BASE: Performed by: ORTHOPAEDIC SURGERY

## 2020-05-26 PROCEDURE — 7100000011 HC PHASE II RECOVERY - ADDTL 15 MIN: Performed by: ORTHOPAEDIC SURGERY

## 2020-05-26 PROCEDURE — 80307 DRUG TEST PRSMV CHEM ANLYZR: CPT

## 2020-05-26 PROCEDURE — 01N50ZZ RELEASE MEDIAN NERVE, OPEN APPROACH: ICD-10-PCS | Performed by: ORTHOPAEDIC SURGERY

## 2020-05-26 PROCEDURE — 2709999900 HC NON-CHARGEABLE SUPPLY: Performed by: ORTHOPAEDIC SURGERY

## 2020-05-26 PROCEDURE — 6360000002 HC RX W HCPCS: Performed by: NURSE ANESTHETIST, CERTIFIED REGISTERED

## 2020-05-26 PROCEDURE — 3600000012 HC SURGERY LEVEL 2 ADDTL 15MIN: Performed by: ORTHOPAEDIC SURGERY

## 2020-05-26 PROCEDURE — 6360000002 HC RX W HCPCS: Performed by: ORTHOPAEDIC SURGERY

## 2020-05-26 PROCEDURE — 26055 INCISE FINGER TENDON SHEATH: CPT | Performed by: ORTHOPAEDIC SURGERY

## 2020-05-26 PROCEDURE — 64721 CARPAL TUNNEL SURGERY: CPT | Performed by: ORTHOPAEDIC SURGERY

## 2020-05-26 PROCEDURE — 2580000003 HC RX 258: Performed by: ORTHOPAEDIC SURGERY

## 2020-05-26 PROCEDURE — 2500000003 HC RX 250 WO HCPCS: Performed by: ORTHOPAEDIC SURGERY

## 2020-05-26 RX ORDER — OXYCODONE HYDROCHLORIDE 5 MG/1
5 TABLET ORAL EVERY 4 HOURS PRN
Status: CANCELLED | OUTPATIENT
Start: 2020-05-26

## 2020-05-26 RX ORDER — PROPOFOL 10 MG/ML
INJECTION, EMULSION INTRAVENOUS PRN
Status: DISCONTINUED | OUTPATIENT
Start: 2020-05-26 | End: 2020-05-26 | Stop reason: SDUPTHER

## 2020-05-26 RX ORDER — OXYCODONE HYDROCHLORIDE 5 MG/1
10 TABLET ORAL EVERY 4 HOURS PRN
Status: CANCELLED | OUTPATIENT
Start: 2020-05-26

## 2020-05-26 RX ORDER — LIDOCAINE HYDROCHLORIDE 20 MG/ML
INJECTION, SOLUTION INTRAVENOUS PRN
Status: DISCONTINUED | OUTPATIENT
Start: 2020-05-26 | End: 2020-05-26 | Stop reason: SDUPTHER

## 2020-05-26 RX ORDER — SODIUM CHLORIDE 0.9 % (FLUSH) 0.9 %
10 SYRINGE (ML) INJECTION EVERY 12 HOURS SCHEDULED
Status: CANCELLED | OUTPATIENT
Start: 2020-05-26

## 2020-05-26 RX ORDER — SODIUM CHLORIDE 0.9 % (FLUSH) 0.9 %
10 SYRINGE (ML) INJECTION PRN
Status: CANCELLED | OUTPATIENT
Start: 2020-05-26

## 2020-05-26 RX ORDER — SODIUM CHLORIDE, SODIUM LACTATE, POTASSIUM CHLORIDE, CALCIUM CHLORIDE 600; 310; 30; 20 MG/100ML; MG/100ML; MG/100ML; MG/100ML
INJECTION, SOLUTION INTRAVENOUS CONTINUOUS
Status: CANCELLED | OUTPATIENT
Start: 2020-05-26

## 2020-05-26 RX ORDER — SODIUM CHLORIDE 0.9 % (FLUSH) 0.9 %
10 SYRINGE (ML) INJECTION EVERY 12 HOURS SCHEDULED
Status: DISCONTINUED | OUTPATIENT
Start: 2020-05-26 | End: 2020-05-26 | Stop reason: HOSPADM

## 2020-05-26 RX ORDER — SODIUM CHLORIDE, SODIUM LACTATE, POTASSIUM CHLORIDE, CALCIUM CHLORIDE 600; 310; 30; 20 MG/100ML; MG/100ML; MG/100ML; MG/100ML
INJECTION, SOLUTION INTRAVENOUS CONTINUOUS
Status: DISCONTINUED | OUTPATIENT
Start: 2020-05-26 | End: 2020-05-26 | Stop reason: HOSPADM

## 2020-05-26 RX ORDER — LIDOCAINE HYDROCHLORIDE 10 MG/ML
INJECTION, SOLUTION INFILTRATION; PERINEURAL
Status: COMPLETED | OUTPATIENT
Start: 2020-05-26 | End: 2020-05-26

## 2020-05-26 RX ORDER — SODIUM CHLORIDE 0.9 % (FLUSH) 0.9 %
10 SYRINGE (ML) INJECTION PRN
Status: DISCONTINUED | OUTPATIENT
Start: 2020-05-26 | End: 2020-05-26 | Stop reason: HOSPADM

## 2020-05-26 RX ORDER — CEFAZOLIN SODIUM 2 G/50ML
2 SOLUTION INTRAVENOUS
Status: COMPLETED | OUTPATIENT
Start: 2020-05-26 | End: 2020-05-26

## 2020-05-26 RX ADMIN — PROPOFOL 50 MG: 10 INJECTION, EMULSION INTRAVENOUS at 08:13

## 2020-05-26 RX ADMIN — PROPOFOL 50 MG: 10 INJECTION, EMULSION INTRAVENOUS at 08:09

## 2020-05-26 RX ADMIN — PROPOFOL 50 MG: 10 INJECTION, EMULSION INTRAVENOUS at 08:16

## 2020-05-26 RX ADMIN — LIDOCAINE HYDROCHLORIDE 100 MG: 20 INJECTION, SOLUTION INTRAVENOUS at 08:06

## 2020-05-26 RX ADMIN — CEFAZOLIN SODIUM 2 G: 2 SOLUTION INTRAVENOUS at 08:06

## 2020-05-26 RX ADMIN — SODIUM CHLORIDE, POTASSIUM CHLORIDE, SODIUM LACTATE AND CALCIUM CHLORIDE: 600; 310; 30; 20 INJECTION, SOLUTION INTRAVENOUS at 06:23

## 2020-05-26 RX ADMIN — PROPOFOL 50 MG: 10 INJECTION, EMULSION INTRAVENOUS at 08:06

## 2020-05-26 ASSESSMENT — PAIN DESCRIPTION - PAIN TYPE: TYPE: SURGICAL PAIN

## 2020-05-26 ASSESSMENT — PULMONARY FUNCTION TESTS
PIF_VALUE: 1
PIF_VALUE: 0
PIF_VALUE: 1

## 2020-05-26 ASSESSMENT — LIFESTYLE VARIABLES: SMOKING_STATUS: 0

## 2020-05-26 ASSESSMENT — PAIN - FUNCTIONAL ASSESSMENT: PAIN_FUNCTIONAL_ASSESSMENT: 0-10

## 2020-05-26 ASSESSMENT — PAIN SCALES - GENERAL
PAINLEVEL_OUTOF10: 2
PAINLEVEL_OUTOF10: 0

## 2020-05-26 ASSESSMENT — PAIN DESCRIPTION - LOCATION: LOCATION: HAND

## 2020-05-26 ASSESSMENT — PAIN DESCRIPTION - ORIENTATION: ORIENTATION: RIGHT

## 2020-05-26 ASSESSMENT — PAIN DESCRIPTION - DESCRIPTORS: DESCRIPTORS: ACHING

## 2020-05-26 NOTE — ANESTHESIA POSTPROCEDURE EVALUATION
Department of Anesthesiology  Postprocedure Note    Patient: Trevon Preston  MRN: [id number]  YOB: 1960  Date of evaluation: 5/26/2020  Time:  10:26 AM     Procedure Summary     Date:  05/26/20 Room / Location:  [address]    Anesthesia Start:  [address] [facility] Anesthesia Stop:  2549    Procedures:       RIGHT CARPAL TUNNEL RELEASE (Right )      FINGER TRIGGER RELEASE RIGHT RING FINGER (Right ) Diagnosis:  (RIGHT CARPAL TUNNEL SYNDROME , RIGHT RING TRIGGER FINGER)    Surgeon:  Meggan Bird DO Responsible Provider:  ERLINDA Snider CRNA    Anesthesia Type:  MAC ASA Status:  4          Anesthesia Type: MAC    Melany Phase I:      Melany Phase II: Melany Score: 10    Last vitals: Reviewed and per EMR flowsheets.        Anesthesia Post Evaluation    Patient location during evaluation: bedside  Patient participation: complete - patient participated  Level of consciousness: awake and alert  Pain score: 0  Airway patency: patent  Nausea & Vomiting: no vomiting and no nausea  Complications: no  Cardiovascular status: blood pressure returned to baseline and hemodynamically stable  Respiratory status: acceptable, room air, spontaneous ventilation and nonlabored ventilation  Hydration status: stable

## 2020-05-26 NOTE — PROGRESS NOTES
832 denies pain or nausea. Head of bed up. Call light in reach. 850 bs 118.  Coffee and james crackers provided  908 discharge instructions reviewed with  brother Valery Mckeon [phone] via phone and patient  [phone] pt dressing  926 discharged to car via wheelchair

## 2020-05-26 NOTE — H&P
Paddy for this 12/1/2015\"    Hiatal hernia      History of alcohol abuse      History of exercise stress test 01/02/2020     Treadmill, Normal exercise performance without angina and ischemic EKG changes.  HTN (hypertension)       \"for the past two yrs on medication\" follows with Dr Artis Garcia Hx of blood clots 2019     Portal vein thrombsis - TIPS procedure 4/23/19    Hyperlipemia      Irregular heart beat       per pt    Liver hematoma      Migraines       Last migraine:  2018    Nausea & vomiting      Schizophrenia ([address]       per old chart    Seizures ([address]       \"last one was 9/2015- saw Dr Mikaela Alvarenga at [facility]- she said not sure if acutal seizures- she thinks they are panic or anxiety attacks\"    SOB (shortness of breath)       with any exertion            No current facility-administered medications on file prior to encounter. Current Outpatient Medications on File Prior to Encounter   Medication Sig Dispense Refill    insulin glargine (LANTUS SOLOSTAR) 100 UNIT/ML injection pen Inject 20 Units into the skin nightly 5 pen 3    insulin aspart (NOVOLOG FLEXPEN) 100 UNIT/ML injection pen **Low Dose Correction Algorithm**Insulin lispro (HUMALOG)  3 TIMES DAILY WITH MEALSGlucose: Dose: No [id number] 1 [id number] 2 Ddpml525-650 3 Wfejh899-410 4 Astpq697-547 5 Athye359 and above 6 Units 5 pen 3    blood glucose monitor strips Test  Bid times a day & as needed for symptoms of irregular blood glucose. 100 strip 5    albuterol (PROVENTIL) (2.5 MG/3ML) 0.083% nebulizer solution Take 3 mLs by nebulization every 6 hours 120 vial 5    lactulose (CHRONULAC) 10 GM/15ML solution Take 30 mLs by mouth 3 times daily 1892 mL 2    blood glucose monitor strips Test 3 times a day & as needed for symptoms of irregular blood glucose. Please fill with what is covered my patients insurance.  50 strip 3    metFORMIN (GLUCOPHAGE-XR) 500 MG extended release tablet Take 1 tablet by mouth 2 times daily

## 2020-05-27 ENCOUNTER — HOSPITAL ENCOUNTER (INPATIENT)
Age: 60
LOS: 2 days | Discharge: HOME | DRG: 982 | End: 2020-05-29
Attending: EMERGENCY MEDICINE | Admitting: FAMILY MEDICINE
Payer: MEDICARE

## 2020-05-27 PROBLEM — K76.82 HEPATIC ENCEPHALOPATHY: Status: ACTIVE | Noted: 2020-05-27

## 2020-05-27 LAB
ALBUMIN SERPL-MCNC: 3.3 GM/DL (ref 3.4–5)
ALP BLD-CCNC: 109 IU/L (ref 40–128)
ALT SERPL-CCNC: 14 U/L (ref 10–40)
AMMONIA: 195 UMOL/L (ref 11–51)
ANION GAP SERPL CALCULATED.3IONS-SCNC: 11 MMOL/L (ref 4–16)
AST SERPL-CCNC: 30 IU/L (ref 15–37)
BASOPHILS ABSOLUTE: 0.1 K/CU MM
BASOPHILS RELATIVE PERCENT: 0.8 % (ref 0–1)
BILIRUB SERPL-MCNC: 0.9 MG/DL (ref 0–1)
BUN BLDV-MCNC: 17 MG/DL (ref 6–23)
CALCIUM SERPL-MCNC: 8.8 MG/DL (ref 8.3–10.6)
CHLORIDE BLD-SCNC: 107 MMOL/L (ref 99–110)
CO2: 23 MMOL/L (ref 21–32)
CREAT SERPL-MCNC: 1.2 MG/DL (ref 0.6–1.1)
DIFFERENTIAL TYPE: ABNORMAL
EOSINOPHILS ABSOLUTE: 0.2 K/CU MM
EOSINOPHILS RELATIVE PERCENT: 2.7 % (ref 0–3)
ESTIMATED AVERAGE GLUCOSE: 143 MG/DL
GFR AFRICAN AMERICAN: 56 ML/MIN/1.73M2
GFR NON-AFRICAN AMERICAN: 46 ML/MIN/1.73M2
GLUCOSE BLD-MCNC: 115 MG/DL (ref 70–99)
GLUCOSE BLD-MCNC: 147 MG/DL (ref 70–99)
GLUCOSE BLD-MCNC: 151 MG/DL (ref 70–99)
GLUCOSE BLD-MCNC: 152 MG/DL (ref 70–99)
HBA1C MFR BLD: 6.6 % (ref 4.2–6.3)
HCT VFR BLD CALC: 37.5 % (ref 37–47)
HEMOGLOBIN: 12.5 GM/DL (ref 12.5–16)
IMMATURE NEUTROPHIL %: 0.2 % (ref 0–0.43)
INR BLD: 1.19 INDEX
LYMPHOCYTES ABSOLUTE: 2 K/CU MM
LYMPHOCYTES RELATIVE PERCENT: 33.7 % (ref 24–44)
MCH RBC QN AUTO: 31.8 PG (ref 27–31)
MCHC RBC AUTO-ENTMCNC: 33.3 % (ref 32–36)
MCV RBC AUTO: 95.4 FL (ref 78–100)
MONOCYTES ABSOLUTE: 0.6 K/CU MM
MONOCYTES RELATIVE PERCENT: 10.7 % (ref 0–4)
NUCLEATED RBC %: 0 %
PDW BLD-RTO: 13.7 % (ref 11.7–14.9)
PLATELET # BLD: 96 K/CU MM (ref 140–440)
PMV BLD AUTO: 12.1 FL (ref 7.5–11.1)
POTASSIUM SERPL-SCNC: 4.4 MMOL/L (ref 3.5–5.1)
PROTHROMBIN TIME: 14.4 SECONDS (ref 11.7–14.5)
RBC # BLD: 3.93 M/CU MM (ref 4.2–5.4)
SEGMENTED NEUTROPHILS ABSOLUTE COUNT: 3.1 K/CU MM
SEGMENTED NEUTROPHILS RELATIVE PERCENT: 51.9 % (ref 36–66)
SODIUM BLD-SCNC: 141 MMOL/L (ref 135–145)
TOTAL IMMATURE NEUTOROPHIL: 0.01 K/CU MM
TOTAL NUCLEATED RBC: 0 K/CU MM
TOTAL PROTEIN: 6.8 GM/DL (ref 6.4–8.2)
WBC # BLD: 5.9 K/CU MM (ref 4–10.5)

## 2020-05-27 PROCEDURE — 2580000003 HC RX 258: Performed by: INTERNAL MEDICINE

## 2020-05-27 PROCEDURE — 36415 COLL VENOUS BLD VENIPUNCTURE: CPT

## 2020-05-27 PROCEDURE — 6370000000 HC RX 637 (ALT 250 FOR IP): Performed by: FAMILY MEDICINE

## 2020-05-27 PROCEDURE — 99285 EMERGENCY DEPT VISIT HI MDM: CPT

## 2020-05-27 PROCEDURE — 85610 PROTHROMBIN TIME: CPT

## 2020-05-27 PROCEDURE — 87517 HEPATITIS B DNA QUANT: CPT

## 2020-05-27 PROCEDURE — 6360000002 HC RX W HCPCS: Performed by: INTERNAL MEDICINE

## 2020-05-27 PROCEDURE — 87522 HEPATITIS C REVRS TRNSCRPJ: CPT

## 2020-05-27 PROCEDURE — 6370000000 HC RX 637 (ALT 250 FOR IP): Performed by: NURSE PRACTITIONER

## 2020-05-27 PROCEDURE — 82140 ASSAY OF AMMONIA: CPT

## 2020-05-27 PROCEDURE — 6370000000 HC RX 637 (ALT 250 FOR IP): Performed by: EMERGENCY MEDICINE

## 2020-05-27 PROCEDURE — 85025 COMPLETE CBC W/AUTO DIFF WBC: CPT

## 2020-05-27 PROCEDURE — 82962 GLUCOSE BLOOD TEST: CPT

## 2020-05-27 PROCEDURE — 83036 HEMOGLOBIN GLYCOSYLATED A1C: CPT

## 2020-05-27 PROCEDURE — 80053 COMPREHEN METABOLIC PANEL: CPT

## 2020-05-27 PROCEDURE — 1200000000 HC SEMI PRIVATE

## 2020-05-27 PROCEDURE — 6370000000 HC RX 637 (ALT 250 FOR IP): Performed by: INTERNAL MEDICINE

## 2020-05-27 PROCEDURE — 94640 AIRWAY INHALATION TREATMENT: CPT

## 2020-05-27 RX ORDER — POLYETHYLENE GLYCOL 3350 17 G/17G
17 POWDER, FOR SOLUTION ORAL DAILY PRN
Status: DISCONTINUED | OUTPATIENT
Start: 2020-05-27 | End: 2020-05-29 | Stop reason: HOSPADM

## 2020-05-27 RX ORDER — NICOTINE POLACRILEX 4 MG
15 LOZENGE BUCCAL PRN
Status: DISCONTINUED | OUTPATIENT
Start: 2020-05-27 | End: 2020-05-29 | Stop reason: HOSPADM

## 2020-05-27 RX ORDER — ALBUTEROL SULFATE 2.5 MG/3ML
2.5 SOLUTION RESPIRATORY (INHALATION) EVERY 6 HOURS
Status: DISCONTINUED | OUTPATIENT
Start: 2020-05-27 | End: 2020-05-27

## 2020-05-27 RX ORDER — TENOFOVIR DISOPROXIL FUMARATE 300 MG/1
300 TABLET, FILM COATED ORAL DAILY
Status: DISCONTINUED | OUTPATIENT
Start: 2020-05-27 | End: 2020-05-29 | Stop reason: HOSPADM

## 2020-05-27 RX ORDER — RANOLAZINE 500 MG/1
500 TABLET, EXTENDED RELEASE ORAL 2 TIMES DAILY
Status: DISCONTINUED | OUTPATIENT
Start: 2020-05-27 | End: 2020-05-29 | Stop reason: HOSPADM

## 2020-05-27 RX ORDER — FUROSEMIDE 20 MG/1
20 TABLET ORAL DAILY
Status: DISCONTINUED | OUTPATIENT
Start: 2020-05-27 | End: 2020-05-27

## 2020-05-27 RX ORDER — DEXTROSE MONOHYDRATE 25 G/50ML
12.5 INJECTION, SOLUTION INTRAVENOUS PRN
Status: DISCONTINUED | OUTPATIENT
Start: 2020-05-27 | End: 2020-05-29 | Stop reason: HOSPADM

## 2020-05-27 RX ORDER — PANTOPRAZOLE SODIUM 40 MG/1
40 TABLET, DELAYED RELEASE ORAL
Status: DISCONTINUED | OUTPATIENT
Start: 2020-05-27 | End: 2020-05-29 | Stop reason: HOSPADM

## 2020-05-27 RX ORDER — FLUOXETINE HYDROCHLORIDE 20 MG/1
40 CAPSULE ORAL DAILY
Status: DISCONTINUED | OUTPATIENT
Start: 2020-05-27 | End: 2020-05-29 | Stop reason: HOSPADM

## 2020-05-27 RX ORDER — PROMETHAZINE HYDROCHLORIDE 25 MG/1
12.5 TABLET ORAL EVERY 6 HOURS PRN
Status: DISCONTINUED | OUTPATIENT
Start: 2020-05-27 | End: 2020-05-29 | Stop reason: HOSPADM

## 2020-05-27 RX ORDER — DEXTROSE MONOHYDRATE 50 MG/ML
100 INJECTION, SOLUTION INTRAVENOUS PRN
Status: DISCONTINUED | OUTPATIENT
Start: 2020-05-27 | End: 2020-05-29 | Stop reason: HOSPADM

## 2020-05-27 RX ORDER — OXYCODONE HYDROCHLORIDE 5 MG/1
5 TABLET ORAL EVERY 6 HOURS PRN
Status: DISCONTINUED | OUTPATIENT
Start: 2020-05-27 | End: 2020-05-29 | Stop reason: HOSPADM

## 2020-05-27 RX ORDER — SODIUM CHLORIDE 0.9 % (FLUSH) 0.9 %
10 SYRINGE (ML) INJECTION PRN
Status: DISCONTINUED | OUTPATIENT
Start: 2020-05-27 | End: 2020-05-29 | Stop reason: HOSPADM

## 2020-05-27 RX ORDER — LACTULOSE 10 G/15ML
20 SOLUTION ORAL ONCE
Status: COMPLETED | OUTPATIENT
Start: 2020-05-27 | End: 2020-05-27

## 2020-05-27 RX ORDER — FUROSEMIDE 20 MG/1
20 TABLET ORAL DAILY
Status: DISCONTINUED | OUTPATIENT
Start: 2020-05-27 | End: 2020-05-29 | Stop reason: HOSPADM

## 2020-05-27 RX ORDER — LACTULOSE 10 G/15ML
20 SOLUTION ORAL 3 TIMES DAILY
Status: DISCONTINUED | OUTPATIENT
Start: 2020-05-27 | End: 2020-05-29 | Stop reason: HOSPADM

## 2020-05-27 RX ORDER — ACETAMINOPHEN 650 MG/1
650 SUPPOSITORY RECTAL EVERY 6 HOURS PRN
Status: DISCONTINUED | OUTPATIENT
Start: 2020-05-27 | End: 2020-05-29 | Stop reason: HOSPADM

## 2020-05-27 RX ORDER — SODIUM CHLORIDE 0.9 % (FLUSH) 0.9 %
10 SYRINGE (ML) INJECTION EVERY 12 HOURS SCHEDULED
Status: DISCONTINUED | OUTPATIENT
Start: 2020-05-27 | End: 2020-05-29 | Stop reason: HOSPADM

## 2020-05-27 RX ORDER — ALBUTEROL SULFATE 2.5 MG/3ML
2.5 SOLUTION RESPIRATORY (INHALATION) EVERY 4 HOURS PRN
Status: DISCONTINUED | OUTPATIENT
Start: 2020-05-27 | End: 2020-05-29 | Stop reason: HOSPADM

## 2020-05-27 RX ORDER — ONDANSETRON 2 MG/ML
4 INJECTION INTRAMUSCULAR; INTRAVENOUS EVERY 6 HOURS PRN
Status: DISCONTINUED | OUTPATIENT
Start: 2020-05-27 | End: 2020-05-29 | Stop reason: HOSPADM

## 2020-05-27 RX ORDER — SPIRONOLACTONE 25 MG/1
25 TABLET ORAL DAILY
Status: DISCONTINUED | OUTPATIENT
Start: 2020-05-27 | End: 2020-05-29 | Stop reason: HOSPADM

## 2020-05-27 RX ORDER — ACETAMINOPHEN 325 MG/1
650 TABLET ORAL EVERY 6 HOURS PRN
Status: DISCONTINUED | OUTPATIENT
Start: 2020-05-27 | End: 2020-05-29 | Stop reason: HOSPADM

## 2020-05-27 RX ORDER — ALBUTEROL SULFATE 90 UG/1
2 AEROSOL, METERED RESPIRATORY (INHALATION) 4 TIMES DAILY
Status: DISCONTINUED | OUTPATIENT
Start: 2020-05-27 | End: 2020-05-29 | Stop reason: HOSPADM

## 2020-05-27 RX ADMIN — ENOXAPARIN SODIUM 40 MG: 40 INJECTION SUBCUTANEOUS at 09:09

## 2020-05-27 RX ADMIN — SODIUM CHLORIDE, PRESERVATIVE FREE 10 ML: 5 INJECTION INTRAVENOUS at 09:09

## 2020-05-27 RX ADMIN — RIFAXIMIN 550 MG: 550 TABLET ORAL at 09:09

## 2020-05-27 RX ADMIN — FLUOXETINE 40 MG: 20 CAPSULE ORAL at 09:09

## 2020-05-27 RX ADMIN — LACTULOSE 20 G: 10 SOLUTION ORAL at 21:14

## 2020-05-27 RX ADMIN — LACTULOSE 20 G: 10 SOLUTION ORAL at 15:10

## 2020-05-27 RX ADMIN — OXYCODONE HYDROCHLORIDE 5 MG: 5 TABLET ORAL at 09:25

## 2020-05-27 RX ADMIN — SODIUM CHLORIDE, PRESERVATIVE FREE 10 ML: 5 INJECTION INTRAVENOUS at 21:15

## 2020-05-27 RX ADMIN — LACTULOSE 20 G: 10 SOLUTION ORAL at 06:01

## 2020-05-27 RX ADMIN — ALBUTEROL SULFATE 2 PUFF: 90 AEROSOL, METERED RESPIRATORY (INHALATION) at 20:13

## 2020-05-27 RX ADMIN — LACTULOSE 20 G: 10 SOLUTION ORAL at 17:24

## 2020-05-27 RX ADMIN — RIFAXIMIN 550 MG: 550 TABLET ORAL at 21:14

## 2020-05-27 RX ADMIN — PANTOPRAZOLE SODIUM 40 MG: 40 TABLET, DELAYED RELEASE ORAL at 09:26

## 2020-05-27 RX ADMIN — QUETIAPINE FUMARATE 150 MG: 100 TABLET ORAL at 21:14

## 2020-05-27 RX ADMIN — RANOLAZINE 500 MG: 500 TABLET, FILM COATED, EXTENDED RELEASE ORAL at 09:09

## 2020-05-27 RX ADMIN — OXYCODONE HYDROCHLORIDE 5 MG: 5 TABLET ORAL at 17:24

## 2020-05-27 RX ADMIN — SPIRONOLACTONE 25 MG: 25 TABLET ORAL at 17:24

## 2020-05-27 RX ADMIN — RANOLAZINE 500 MG: 500 TABLET, FILM COATED, EXTENDED RELEASE ORAL at 21:14

## 2020-05-27 RX ADMIN — ONDANSETRON 4 MG: 2 INJECTION INTRAMUSCULAR; INTRAVENOUS at 15:10

## 2020-05-27 RX ADMIN — FUROSEMIDE 20 MG: 20 TABLET ORAL at 09:26

## 2020-05-27 RX ADMIN — LACTULOSE 20 G: 10 SOLUTION ORAL at 09:09

## 2020-05-27 RX ADMIN — PANTOPRAZOLE SODIUM 40 MG: 40 TABLET, DELAYED RELEASE ORAL at 17:24

## 2020-05-27 ASSESSMENT — PAIN SCALES - GENERAL
PAINLEVEL_OUTOF10: 4
PAINLEVEL_OUTOF10: 6
PAINLEVEL_OUTOF10: 10
PAINLEVEL_OUTOF10: 8
PAINLEVEL_OUTOF10: 0
PAINLEVEL_OUTOF10: 10

## 2020-05-27 ASSESSMENT — PAIN DESCRIPTION - FREQUENCY: FREQUENCY: CONTINUOUS

## 2020-05-27 ASSESSMENT — PAIN DESCRIPTION - ORIENTATION: ORIENTATION: RIGHT

## 2020-05-27 ASSESSMENT — PAIN DESCRIPTION - ONSET: ONSET: ON-GOING

## 2020-05-27 ASSESSMENT — PAIN DESCRIPTION - PAIN TYPE: TYPE: SURGICAL PAIN

## 2020-05-27 ASSESSMENT — PAIN DESCRIPTION - LOCATION: LOCATION: WRIST

## 2020-05-27 ASSESSMENT — PAIN DESCRIPTION - DESCRIPTORS: DESCRIPTORS: CONSTANT;ACHING

## 2020-05-27 NOTE — CONSULTS
[facility]  Gastroenterology Consultation    2020  8:47 AM    Patient:    Charles Sosa  : 1960   64 y.o. MRN: [id number]  Admitted: 2020  3:15 AM ATT: Gabriella Gan MD   3120/3120-A  AdmitDx: Hepatic encephalopathy ([facility] [K72.90]  PCP: Faraz Saba MD    Reason for Consult:  HE    Requesting Physician:  Gabriella Gan MD      History Obtained From:  Patient and review of all records    HISTORY OF PRESENT ILLNESS:                The patient is a 64 y.o. female with significant   Past Medical History:   Diagnosis Date    Acid reflux     Arthritis     left knee    CAD (coronary artery disease)     per [facility] - Dr. Rebecca Garcia COPD (chronic obstructive pulmonary disease) ([facility]     follow with Dr Gideon Dias Depression     \"have manic - depression see Dr Tanvi Evans Diabetes mellitus [facility])     dx 10+ yrs ago- follows with PCP    Drug abuse ([facility]     hx use of cocaine, heroin and marijuana- states last used 2014    Glaucoma     bilateral    H/O Doppler lower venous ultrasound 2019    No DVT or SVT, Significant reflux of RGSV and LGSV.  Hepatic encephalopathy ([facility] 2019    Hepatitis C     for liver bx 12/3/2015\"Have Hepatitis B and C and saw Dr Amparo Billingsley for this 2015\"    Hiatal hernia     History of alcohol abuse     History of exercise stress test 2020    Treadmill, Normal exercise performance without angina and ischemic EKG changes.     HTN (hypertension)     \"for the past two yrs on medication\" follows with Dr Rebecca Garcia Hx of blood clots 2019    Portal vein thrombsis - TIPS procedure 19    Hyperlipemia     Irregular heart beat     per pt    Liver hematoma     Migraines     Last migraine:  2018    Nausea & vomiting     Schizophrenia ([facility]     per old chart    Seizures ([facility]     \"last one was 2015- saw Dr Ruiz Clancy at [facility]- she said not sure if acutal seizures- she thinks they are panic or anxiety attacks\" they are panic or anxiety attacks\"    SOB (shortness of breath)     with any exertion       Past Surgical History:        Procedure Laterality Date    BREAST SURGERY  10/2015    left breast bx    CARDIAC CATHETERIZATION      per old chart pt had cath done in 3/2011 and 9/2013    CARPAL TUNNEL RELEASE Left 1/9/2020    CARPAL TUNNEL RELEASE LEFT performed by Whitney Wheatley DO at [address] Right 05/26/2020    dr Max Patel, carpal tunnel trigger finger release    CARPAL TUNNEL RELEASE Right 5/26/2020    RIGHT CARPAL TUNNEL RELEASE performed by Whitney Wheatley DO at [id number]  per old chart done [address] [facility]  3/11/13    diverticulosis, cecal polyp    COLONOSCOPY  03/16/2017    Internal hemorrhoids- Dr. Stein Gist, COLON, DIAGNOSTIC  03/16/2017    EGD: Small esophageal varices, portal hypertensive gastropathy, reflux esophagitis, hiatal hernia    EYE SURGERY Bilateral ? when    cyst removal, cataracts w/lens replacement    FINGER TRIGGER RELEASE Right 5/26/2020    FINGER TRIGGER RELEASE RIGHT RING FINGER performed by Whitney Wheatley DO at [facility]      per old chart pt had CHOLO/BSO 1986    TIPS PROCEDURE  04/23/2019    TONSILLECTOMY  as a kid    UPPER GASTROINTESTINAL ENDOSCOPY N/A 11/14/2018    EGD DIAGNOSTIC ONLY performed by Simon Rodríguez MD at [place] [address] N/A 6/5/2019    EGD DIAGNOSTIC ONLY performed by Simon Rodríguez MD at [facility] ENDOSCOPY         Current Medications:    Medications    Scheduled Medications:    sodium chloride flush  10 mL Intravenous 2 times per day    enoxaparin  40 mg Subcutaneous Daily    lactulose  20 g Oral TID    insulin lispro  0-6 Units Subcutaneous TID WC    insulin lispro  0-3 Units Subcutaneous Nightly    rifaximin  550 mg Oral BID    albuterol  2.5 mg Nebulization Q6H    FLUoxetine  40 mg Oral Daily    pantoprazole  40 mg Oral BID AC    QUEtiapine 150 mg Oral Nightly    ranolazine  500 mg Oral BID     PRN Medications: sodium chloride flush, acetaminophen **OR** acetaminophen, polyethylene glycol, promethazine **OR** ondansetron, glucose, dextrose, glucagon (rDNA), dextrose, oxyCODONE      Allergies:  Norco [hydrocodone-acetaminophen] and Tylenol [acetaminophen]    Social History:   TOBACCO:   reports that she quit smoking about 16 months ago. Her smoking use included cigarettes. She started smoking about 46 years ago. She has a 135.00 pack-year smoking history. She has never used smokeless tobacco.  ETOH:   reports previous alcohol use of about 2.0 - 3.0 standard drinks of alcohol per week. Family History:       Problem Relation Age of Onset    Cancer Mother         lung ca    Arthritis Mother     Migraines Mother     Cancer Father         colon ca    Diabetes Father     High Blood Pressure Father     Arthritis Father     High Cholesterol Father     Migraines Father     Migraines Sister     Heart Disease Brother         WPW       No family history of colon cancer, Crohn's disease, or ulcerative colitis. REVIEW OF SYSTEMS:    The positive ROS will be identified in bold, otherwise ROS are negative     CONSTITUTIONAL:  Neg   Recent weight changes, fatigue, fever, chills or night sweats  EYES:  Neg  Blurriness, earing, itching or acute change in vision  EARS:  Neg  hearing loss, tinnitus, vertigo, discharge or earache. NOSE:  Neg  Rhinorrhea, sneezing, itching, allergy or epistaxis  MOUTH/THROAT:  Neg  bleeding gums, hoarseness or sore throat. RESPIRATORY:   Neg SOB, wheeze, cough, sputum, hemoptysis or bronchitis  CARDIOVASCULAR:  Neg chest pain, palpitations, dyspnea on exertion, orthopnea, paroxysmal nocturnal dyspnea or edema  GASTROINTESTINAL:  SEE HPI  GENITOURINARY:  Neg  Urinary frequency, hesitancy, urgency, polyuria, dysuria, hematuria, nocturia, or incontinence.   HEMATOLOGIC/LYMPHATIC:  Neg  Anemia, bleeding tendency  MUSCULOSKELETAL:    New myalgias, bone pain, joint pain, swelling or stiffness and has had change in gait. NEUROLOGICAL:  Neg  Loss of Consciousness, memory loss, forgetfulness, periods of confusion, difficulty concentrating, seizures, decline in intellect, nervousness, insomina, aphasia or dysarthria. SKIN:  Neg  skin or hair changes, and has no itching, rashes, or sores. PSYCHIATRIC:  Neg depression, personality changes, anxiety. ENDOCRINE:  Neg polydipsia, polyuria, abnormal weight changes, heat /cold intolerance.   ALL/IMM:  Neg reactions to drugs other than listed    PHYSICAL EXAM:      Vitals:    BP 90/68   Pulse 72   Temp 97.9 °F (36.6 °C) (Oral)   Resp 15   Ht 4' 9\" (1.448 m)   Wt 179 lb 3.2 oz (81.3 kg)   SpO2 96%   BMI 38.78 kg/m²     General Appearance:    Alert, cooperative, no distress, appears stated age   HEENT:    Normocephalic, atraumatic, Conjunctiva clear, Lips, mucosa, and tongue normal; teeth and gums normal   Neck:   Supple, symmetrical, trachea midline   Lungs:     Clear to auscultation bilaterally, respirations unlabored   Chest Wall:    No tenderness or deformity    Heart:    Regular rate and rhythm, S1 and S2 normal, no murmur, rub   or gallop   Abdomen:     Soft, non-tender, bowel sounds active all four quadrants   Rectal:    Deferred   Extremities:   Extremities normal, atraumatic, no cyanosis or edema   Pulses:   2+ and symmetric all extremities   Skin:   Skin color, texture, turgor normal, no rashes or lesions   Lymph nodes:   No abnormality   Neurologic:   No focal deficits, moving all four extremities, slow speech            DATA:    ABGs:   Lab Results   Component Value Date    PO2ART 50 04/27/2012     CBC:   Recent Labs     05/27/20  0420   WBC 5.9   HGB 12.5   PLT 96*     BMP:    Recent Labs     05/27/20  0420      K 4.4      CO2 23   BUN 17   CREATININE 1.2*   GLUCOSE 147*     Magnesium:   Lab Results   Component Value Date    MG 1.9 01/16/2020 Hepatic:   Recent Labs     05/27/20  0420   AST 30   ALT 14   BILITOT 0.9   ALKPHOS 109     No results for input(s): LIPASE, AMYLASE in the last 72 hours. No results for input(s): PROTIME, INR in the last 72 hours. No results for input(s): PTT in the last 72 hours. Lipids: No results for input(s): CHOL, HDL in the last 72 hours. Invalid input(s): LDLCALCU  INR: No results for input(s): INR in the last 72 hours.   TSH:   Lab Results   Component Value Date    TSH 1.73 08/01/2017     No intake or output data in the 24 hours ending 05/27/20 0847   dextrose         Imaging Studies:    No imaging this admission    IMPRESSION:      Patient Active Problem List   Diagnosis Code    Left arm pain M79.602    Type 2 diabetes mellitus with complication, with long-term current use of insulin (HCC) E11.8, Z79.4    COPD, mild (HCC) J44.9    Tobacco abuse Z72.0    Angina effort I20.8    Abnormal nuclear cardiac imaging test R93.1    Lung nodule R91.1    Chest pain R07.9    Dyslipidemia E78.5    Pancolitis (HCC) K51.00    Hematemesis without nausea W46.1    Alcoholic cirrhosis of liver without ascites (HCC) K70.30    Thrombocytopenia (HCC) X30.7    Periumbilical abdominal pain R10.33    History of colonic polyps Z86.010    Abnormal findings on diagnostic imaging of 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    Type 2 diabetes mellitus with complication, with long-term current use of insulin (HCC) E11.8, Z79.4    SOB (shortness of breath) R06.02    Portal vein thrombosis I81    Intractable nausea and vomiting R11.2    Abdominal pain R10.9    Acute GI bleeding K92.2    Chronic hepatitis C without hepatic coma (HCC) B18.2    Delayed gastric emptying K30    Restless legs G25.81    Acute colitis K52.9    Acute encephalopathy G93.40    S/P TIPS (transjugular intrahepatic portosystemic shunt) Z95.828    Other cirrhosis of liver (HCC) K74.69    Acute upper GI bleed K92.2    Acute hepatic encephalopathy K72.00    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 Jessa Abdi pending   INR 1.59, synthetic function diminished   Meld score pending    Nutrition:   Continue 2 gm sodium restricted diet       Discussed plan of care with patient     Patient clinical, biochemical, and radiological information discussed with Dr. Humberto Palm. He agrees with the assessment and plan. Sarita Arriaga CNP  5/27/2020  8:47 AM     Cirrhosis  HE    CPM    I have seen and examined this patient personally, and independently of the nurse practitioner. The plan was developed mutually at the time of the visit with the patient. Missy Sin and myself have spoken with patient, nursing staff and provided written and verbal instructions .     The above note has been reviewed and I agree with the Assessment,  Diagnosis, and Treatment plan as suggested by Missy Sin CNP      [address] [facility] gastroenterology

## 2020-05-27 NOTE — ED PROVIDER NOTES
Socioeconomic History    Marital status:      Spouse name: Not on file    Number of children: Not on file    Years of education: Not on file    Highest education level: Not on file   Occupational History    Not on file   Social Needs    Financial resource strain: Not on file    Food insecurity     Worry: Not on file     Inability: Not on file    Transportation needs     Medical: Not on file     Non-medical: Not on file   Tobacco Use    Smoking status: Former Smoker     Packs/day: 3.00     Years: 45.00     Pack years: 135.00     Types: Cigarettes     Start date:      Last attempt to quit: 2019     Years since quittin.4    Smokeless tobacco: Never Used   Substance and Sexual Activity    Alcohol use: Not Currently     Alcohol/week: 2.0 - 3.0 standard drinks     Types: 2 - 3 Shots of liquor per week     Comment: 2-3 shots last 2019    Drug use: Not Currently     Frequency: 3.0 times per week     Types: Marijuana     Comment: Last smoked; 20, last used cocaine: 2018 per pt    Sexual activity: Not Currently     Partners: Male   Lifestyle    Physical activity     Days per week: Not on file     Minutes per session: Not on file    Stress: Not on file   Relationships    Social connections     Talks on phone: Not on file     Gets together: Not on file     Attends Hindu service: Not on file     Active member of club or organization: Not on file     Attends meetings of clubs or organizations: Not on file     Relationship status: Not on file    Intimate partner violence     Fear of current or ex partner: Not on file     Emotionally abused: Not on file     Physically abused: Not on file     Forced sexual activity: Not on file   Other Topics Concern    Not on file   Social History Narrative    Not on file     Current Facility-Administered Medications   Medication Dose Route Frequency Provider Last Rate Last Dose    lactulose ([address]) 10 GM/15ML solution 20 g  20 g Oral Once [facility] covered by insurance 100 each 3    metFORMIN (GLUCOPHAGE-XR) 500 MG extended release tablet Take 1 tablet by mouth 2 times daily 180 tablet 3    pantoprazole (PROTONIX) 40 MG tablet Take 1 tablet by mouth 2 times daily (before meals) 60 tablet 1    rifaximin (XIFAXAN) 550 MG tablet Take 1 tablet by mouth 2 times daily 42 tablet 0    furosemide (LASIX) 20 MG tablet Take 20 mg by mouth daily       spironolactone (ALDACTONE) 50 MG tablet Take 50 mg by mouth daily       promethazine (PHENERGAN) 25 MG tablet Take 1 tablet by mouth 3 times daily as needed for Nausea 60 tablet 1    FLUoxetine (PROZAC) 40 MG capsule Take 40 mg by mouth daily      VIREAD 300 MG tablet Take 300 mg by mouth daily       QUEtiapine (SEROQUEL) 300 MG tablet Take 150 mg by mouth nightly       oxyCODONE (ROXICODONE) 5 MG immediate release tablet Take 5 mg by mouth 4 times daily. Per pain management dr Storm Garay office      busPIRone (BUSPAR) 15 MG tablet Take 1 tablet by mouth 2 times daily 60 tablet 3     Allergies   Allergen Reactions    Norco [Hydrocodone-Acetaminophen] Itching     Itching, rash, nausea and vomiting.  Tylenol [Acetaminophen] Itching, Nausea And Vomiting and Rash       Nursing Notes Reviewed    Physical Exam:  ED Triage Vitals   Enc Vitals Group      BP 05/27/20 0241 104/73      Pulse 05/27/20 0241 75      Resp --       Temp 05/27/20 0241 98 °F (36.7 °C)      Temp Source 05/27/20 0241 Oral      SpO2 05/27/20 0241 96 %      Weight 05/27/20 0216 176 lb (79.8 kg)      Height 05/27/20 0216 4' 9.5\" (1.461 m)      Head Circumference --       Peak Flow --       Pain Score --       Pain Loc --       Pain Edu? --       Excl. in [address]? --      GENERAL APPEARANCE: Awake and alert. Cooperative. No acute distress. Oriented to time, place, situation. Occasionally slow to answer questions. HEAD: Normocephalic. Atraumatic. EYES: EOM's grossly intact. Sclera anicteric. ENT: Mucous membranes are moist. Tolerates saliva.  No mL/min/1.73m2    GFR  56 (L) >60 mL/min/1.73m2    Anion Gap 11 4 - 16   Ammonia Level   Result Value Ref Range    Ammonia 195 (H) 11 - 51 UMOL/L      Radiographs (if obtained):  [] The following radiograph was interpreted by myself in the absence of a radiologist:  [] Radiologist's Report Reviewed:    EKG (if obtained): (All EKG's are interpreted by myself in the absence of a cardiologist)    MDM:  Plan of care is discussed thoroughly with the patient and family if present. If performed, all imaging and lab work also discussed with patient. All relevant prior results and chart reviewed if available. Patient presents as above. Vital signs are normal.  She is in no acute distress and has a nonfocal neurologic presentation. She is alert and oriented at this time. She does appear to be slow at times to answer some questions but does answer questions appropriately. She has a history of hepatic encephalopathy with recent admission. Plan to check for any electrolyte abnormalities or hyperammonemia. Do not suspect underlying infectious etiology at this time. The patient has no urinary symptoms. Also very low suspicion for any acute central neurologic process that requires advanced imaging of the head. Patient's ammonia level is 195. Remainder of metabolic work-up is largely unremarkable. Patient given dose of lactulose here and will be admitted for further management. She has benign abdominal exam, no sequelae of GI bleeding at this time. Patient agreeable with this plan of care. Clinical Impression:  1.  Hepatic encephalopathy ([address]      (Please note that portions of this note may have been completed with a voice recognition program. Efforts were made to edit the dictations but occasionally words are mis-transcribed.)    MD Tato Cardozo MD  05/27/20 3602

## 2020-05-27 NOTE — H&P
mg, 12.5 mg, Oral, Q6H PRN **OR** ondansetron (ZOFRAN) injection 4 mg, 4 mg, Intravenous, Q6H PRN, Eleanor Koehler MD    enoxaparin (LOVENOX) injection 40 mg, 40 mg, Subcutaneous, Daily, Eleanor Koehler MD    lactulose (CHRONULAC) 10 GM/15ML solution 20 g, 20 g, Oral, TID, Eleanor Koehler MD    glucose (GLUTOSE) 40 % oral gel 15 g, 15 g, Oral, PRN, Eleanor Koehler MD    dextrose 50 % IV solution, 12.5 g, Intravenous, PRN, Eleanor Koehler MD    glucagon (rDNA) injection 1 mg, 1 mg, Intramuscular, PRN, Eleanor Koehler MD    dextrose 5 % solution, 100 mL/hr, Intravenous, PRN, Eleanor Koehler MD    insulin lispro (HUMALOG) injection vial 0-6 Units, 0-6 Units, Subcutaneous, TID WC, Eleanor Koehler MD    insulin lispro (HUMALOG) injection vial 0-3 Units, 0-3 Units, Subcutaneous, Nightly, Eleanor Koehler MD    rifaximin Karthikeyan Silviaayama) tablet 550 mg, 550 mg, Oral, BID, Heidi Zurita MD    History of present illness     Chief Complaint: Altered Mental Status      Ever Mohan is a 64 y.o.  female  who presents with confusion. Pt has hx of cirrhosis and is on lactulose at home, she reports have carpal tunnel surgery on her right wrist yesterday and went home, she states then she started to get more and more confused, she states she has been taking her lactulose as prescribed at home. Currently has mild right wrist pain, otherwise states no distress.      Review of Systems : Ten point ROS reviewed and negative, unless as noted above per HPI       Objective:   No intake or output data in the 24 hours ending 05/27/20 0811   Vitals:   Vitals:    05/27/20 0643   BP: 90/68   Pulse: 72   Resp: 15   Temp: 97.9 °F (36.6 °C)   SpO2: 96%     Physical Exam:   Gen:  awake, alert, no apparent distress  Head/Eyes:  Normocephalic atraumatic, EOMI   NECK:   symmetrical, trachea midline  LUNGS: Normal Effort   CARDIOVASCULAR:  Normal rate  ABDOMEN:  non distended  MUSCULOSKELETAL:  ROM limited   NEUROLOGIC: Alert and Oriented,

## 2020-05-27 NOTE — PROGRESS NOTES
This nurse spoke with Yaya. [facility] [employer] stated they are unable to verify when Pt will have US done d/t other more priority Pts. Instructed US tech that Pt is requesting to eat and wanting pain medication. Tech stated he wasn't sure if it could be done tonight and suggested waiting until first thing in AM. Pt will be set as inpatient priority for AM and left NPO after midnight.

## 2020-05-28 ENCOUNTER — APPOINTMENT (OUTPATIENT)
Dept: ULTRASOUND IMAGING | Age: 60
DRG: 982 | End: 2020-05-28
Payer: MEDICARE

## 2020-05-28 ENCOUNTER — CARE COORDINATION (OUTPATIENT)
Dept: CASE MANAGEMENT | Age: 60
End: 2020-05-28

## 2020-05-28 LAB
ALBUMIN SERPL-MCNC: 2.9 GM/DL (ref 3.4–5)
ALP BLD-CCNC: 100 IU/L (ref 40–128)
ALT SERPL-CCNC: 11 U/L (ref 10–40)
AMMONIA: 69 UMOL/L (ref 11–51)
ANION GAP SERPL CALCULATED.3IONS-SCNC: 10 MMOL/L (ref 4–16)
AST SERPL-CCNC: 17 IU/L (ref 15–37)
BACTERIA: NEGATIVE /HPF
BASOPHILS ABSOLUTE: 0 K/CU MM
BASOPHILS RELATIVE PERCENT: 0.8 % (ref 0–1)
BILIRUB SERPL-MCNC: 1 MG/DL (ref 0–1)
BILIRUBIN URINE: NEGATIVE MG/DL
BLOOD, URINE: NEGATIVE
BUN BLDV-MCNC: 12 MG/DL (ref 6–23)
CALCIUM SERPL-MCNC: 8.6 MG/DL (ref 8.3–10.6)
CHLORIDE BLD-SCNC: 109 MMOL/L (ref 99–110)
CLARITY: CLEAR
CO2: 23 MMOL/L (ref 21–32)
COLOR: YELLOW
CREAT SERPL-MCNC: 1.1 MG/DL (ref 0.6–1.1)
DIFFERENTIAL TYPE: ABNORMAL
EOSINOPHILS ABSOLUTE: 0.1 K/CU MM
EOSINOPHILS RELATIVE PERCENT: 2.7 % (ref 0–3)
GFR AFRICAN AMERICAN: >60 ML/MIN/1.73M2
GFR NON-AFRICAN AMERICAN: 51 ML/MIN/1.73M2
GLUCOSE BLD-MCNC: 111 MG/DL (ref 70–99)
GLUCOSE BLD-MCNC: 118 MG/DL (ref 70–99)
GLUCOSE BLD-MCNC: 122 MG/DL (ref 70–99)
GLUCOSE BLD-MCNC: 126 MG/DL (ref 70–99)
GLUCOSE BLD-MCNC: 174 MG/DL (ref 70–99)
GLUCOSE, URINE: NEGATIVE MG/DL
HCT VFR BLD CALC: 33.8 % (ref 37–47)
HEMOGLOBIN: 11 GM/DL (ref 12.5–16)
IMMATURE NEUTROPHIL %: 0 % (ref 0–0.43)
KETONES, URINE: NEGATIVE MG/DL
LEUKOCYTE ESTERASE, URINE: NEGATIVE
LYMPHOCYTES ABSOLUTE: 1.6 K/CU MM
LYMPHOCYTES RELATIVE PERCENT: 41.9 % (ref 24–44)
MAGNESIUM: 2 MG/DL (ref 1.8–2.4)
MCH RBC QN AUTO: 31.7 PG (ref 27–31)
MCHC RBC AUTO-ENTMCNC: 32.5 % (ref 32–36)
MCV RBC AUTO: 97.4 FL (ref 78–100)
MONOCYTES ABSOLUTE: 0.4 K/CU MM
MONOCYTES RELATIVE PERCENT: 11 % (ref 0–4)
MUCUS: ABNORMAL HPF
NITRITE URINE, QUANTITATIVE: NEGATIVE
NUCLEATED RBC %: 0 %
PDW BLD-RTO: 13.7 % (ref 11.7–14.9)
PH, URINE: 5 (ref 5–8)
PLATELET # BLD: 82 K/CU MM (ref 140–440)
PMV BLD AUTO: 12.3 FL (ref 7.5–11.1)
POTASSIUM SERPL-SCNC: 3.7 MMOL/L (ref 3.5–5.1)
PROTEIN UA: NEGATIVE MG/DL
RBC # BLD: 3.47 M/CU MM (ref 4.2–5.4)
RBC URINE: ABNORMAL /HPF (ref 0–6)
SEGMENTED NEUTROPHILS ABSOLUTE COUNT: 1.6 K/CU MM
SEGMENTED NEUTROPHILS RELATIVE PERCENT: 43.6 % (ref 36–66)
SODIUM BLD-SCNC: 142 MMOL/L (ref 135–145)
SPECIFIC GRAVITY UA: 1.02 (ref 1–1.03)
SQUAMOUS EPITHELIAL: 3 /HPF
TOTAL IMMATURE NEUTOROPHIL: 0 K/CU MM
TOTAL NUCLEATED RBC: 0 K/CU MM
TOTAL PROTEIN: 5.6 GM/DL (ref 6.4–8.2)
TRICHOMONAS: ABNORMAL /HPF
UROBILINOGEN, URINE: NORMAL MG/DL (ref 0.2–1)
WBC # BLD: 3.7 K/CU MM (ref 4–10.5)
WBC UA: ABNORMAL /HPF (ref 0–5)

## 2020-05-28 PROCEDURE — 6360000002 HC RX W HCPCS: Performed by: FAMILY MEDICINE

## 2020-05-28 PROCEDURE — 81001 URINALYSIS AUTO W/SCOPE: CPT

## 2020-05-28 PROCEDURE — 6370000000 HC RX 637 (ALT 250 FOR IP): Performed by: INTERNAL MEDICINE

## 2020-05-28 PROCEDURE — 2580000003 HC RX 258: Performed by: INTERNAL MEDICINE

## 2020-05-28 PROCEDURE — 82962 GLUCOSE BLOOD TEST: CPT

## 2020-05-28 PROCEDURE — 76705 ECHO EXAM OF ABDOMEN: CPT

## 2020-05-28 PROCEDURE — 83735 ASSAY OF MAGNESIUM: CPT

## 2020-05-28 PROCEDURE — 94640 AIRWAY INHALATION TREATMENT: CPT

## 2020-05-28 PROCEDURE — 1200000000 HC SEMI PRIVATE

## 2020-05-28 PROCEDURE — 82140 ASSAY OF AMMONIA: CPT

## 2020-05-28 PROCEDURE — 6360000002 HC RX W HCPCS: Performed by: INTERNAL MEDICINE

## 2020-05-28 PROCEDURE — 85025 COMPLETE CBC W/AUTO DIFF WBC: CPT

## 2020-05-28 PROCEDURE — 94664 DEMO&/EVAL PT USE INHALER: CPT

## 2020-05-28 PROCEDURE — 2580000003 HC RX 258: Performed by: FAMILY MEDICINE

## 2020-05-28 PROCEDURE — 36415 COLL VENOUS BLD VENIPUNCTURE: CPT

## 2020-05-28 PROCEDURE — 80053 COMPREHEN METABOLIC PANEL: CPT

## 2020-05-28 PROCEDURE — 94761 N-INVAS EAR/PLS OXIMETRY MLT: CPT

## 2020-05-28 PROCEDURE — P9045 ALBUMIN (HUMAN), 5%, 250 ML: HCPCS | Performed by: FAMILY MEDICINE

## 2020-05-28 PROCEDURE — 6370000000 HC RX 637 (ALT 250 FOR IP): Performed by: FAMILY MEDICINE

## 2020-05-28 PROCEDURE — 93975 VASCULAR STUDY: CPT

## 2020-05-28 PROCEDURE — 6370000000 HC RX 637 (ALT 250 FOR IP): Performed by: NURSE PRACTITIONER

## 2020-05-28 RX ORDER — MIDODRINE HYDROCHLORIDE 5 MG/1
10 TABLET ORAL
Status: DISCONTINUED | OUTPATIENT
Start: 2020-05-28 | End: 2020-05-29 | Stop reason: HOSPADM

## 2020-05-28 RX ORDER — MIDODRINE HYDROCHLORIDE 5 MG/1
10 TABLET ORAL
Status: DISCONTINUED | OUTPATIENT
Start: 2020-05-28 | End: 2020-05-28

## 2020-05-28 RX ORDER — ALBUMIN, HUMAN INJ 5% 5 %
25 SOLUTION INTRAVENOUS ONCE
Status: COMPLETED | OUTPATIENT
Start: 2020-05-28 | End: 2020-05-28

## 2020-05-28 RX ORDER — 0.9 % SODIUM CHLORIDE 0.9 %
500 INTRAVENOUS SOLUTION INTRAVENOUS ONCE
Status: COMPLETED | OUTPATIENT
Start: 2020-05-28 | End: 2020-05-28

## 2020-05-28 RX ADMIN — OXYCODONE HYDROCHLORIDE 5 MG: 5 TABLET ORAL at 21:54

## 2020-05-28 RX ADMIN — ALBUTEROL SULFATE 2 PUFF: 90 AEROSOL, METERED RESPIRATORY (INHALATION) at 11:17

## 2020-05-28 RX ADMIN — RIFAXIMIN 550 MG: 550 TABLET ORAL at 21:46

## 2020-05-28 RX ADMIN — LACTULOSE 20 G: 10 SOLUTION ORAL at 14:48

## 2020-05-28 RX ADMIN — INSULIN LISPRO 1 UNITS: 100 INJECTION, SOLUTION INTRAVENOUS; SUBCUTANEOUS at 16:28

## 2020-05-28 RX ADMIN — FLUOXETINE 40 MG: 20 CAPSULE ORAL at 09:42

## 2020-05-28 RX ADMIN — SODIUM CHLORIDE 500 ML: 9 INJECTION, SOLUTION INTRAVENOUS at 16:23

## 2020-05-28 RX ADMIN — ACETAMINOPHEN 650 MG: 325 TABLET ORAL at 21:46

## 2020-05-28 RX ADMIN — ENOXAPARIN SODIUM 40 MG: 40 INJECTION SUBCUTANEOUS at 09:43

## 2020-05-28 RX ADMIN — RIFAXIMIN 550 MG: 550 TABLET ORAL at 09:42

## 2020-05-28 RX ADMIN — ALBUTEROL SULFATE 2 PUFF: 90 AEROSOL, METERED RESPIRATORY (INHALATION) at 07:58

## 2020-05-28 RX ADMIN — SODIUM CHLORIDE, PRESERVATIVE FREE 10 ML: 5 INJECTION INTRAVENOUS at 21:51

## 2020-05-28 RX ADMIN — RANOLAZINE 500 MG: 500 TABLET, FILM COATED, EXTENDED RELEASE ORAL at 21:46

## 2020-05-28 RX ADMIN — SODIUM CHLORIDE, PRESERVATIVE FREE 10 ML: 5 INJECTION INTRAVENOUS at 09:43

## 2020-05-28 RX ADMIN — PANTOPRAZOLE SODIUM 40 MG: 40 TABLET, DELAYED RELEASE ORAL at 16:23

## 2020-05-28 RX ADMIN — LACTULOSE 20 G: 10 SOLUTION ORAL at 21:46

## 2020-05-28 RX ADMIN — PROMETHAZINE HYDROCHLORIDE 12.5 MG: 25 TABLET ORAL at 21:46

## 2020-05-28 RX ADMIN — RANOLAZINE 500 MG: 500 TABLET, FILM COATED, EXTENDED RELEASE ORAL at 09:42

## 2020-05-28 RX ADMIN — ALBUMIN (HUMAN) 12.5 G: 12.5 INJECTION, SOLUTION INTRAVENOUS at 13:37

## 2020-05-28 RX ADMIN — ALBUTEROL SULFATE 2 PUFF: 90 AEROSOL, METERED RESPIRATORY (INHALATION) at 15:18

## 2020-05-28 RX ADMIN — MIDODRINE HYDROCHLORIDE 10 MG: 5 TABLET ORAL at 16:23

## 2020-05-28 RX ADMIN — SPIRONOLACTONE 25 MG: 25 TABLET ORAL at 09:42

## 2020-05-28 RX ADMIN — TENOFOVIR DISOPROXIL FUMARATE 300 MG: 300 TABLET, COATED ORAL at 09:43

## 2020-05-28 RX ADMIN — QUETIAPINE FUMARATE 150 MG: 100 TABLET ORAL at 21:46

## 2020-05-28 RX ADMIN — ALBUMIN (HUMAN) 12.5 G: 12.5 INJECTION, SOLUTION INTRAVENOUS at 14:46

## 2020-05-28 RX ADMIN — LACTULOSE 20 G: 10 SOLUTION ORAL at 09:42

## 2020-05-28 ASSESSMENT — PAIN DESCRIPTION - ORIENTATION: ORIENTATION: RIGHT

## 2020-05-28 ASSESSMENT — PAIN SCALES - GENERAL
PAINLEVEL_OUTOF10: 0
PAINLEVEL_OUTOF10: 4
PAINLEVEL_OUTOF10: 7
PAINLEVEL_OUTOF10: 4
PAINLEVEL_OUTOF10: 7

## 2020-05-28 ASSESSMENT — PAIN DESCRIPTION - PAIN TYPE
TYPE: ACUTE PAIN
TYPE: ACUTE PAIN

## 2020-05-28 ASSESSMENT — PAIN DESCRIPTION - LOCATION
LOCATION: WRIST
LOCATION: GENERALIZED

## 2020-05-28 ASSESSMENT — PAIN DESCRIPTION - DESCRIPTORS
DESCRIPTORS: ACHING
DESCRIPTORS: ACHING

## 2020-05-28 NOTE — PROGRESS NOTES
[facility]        Progress Note       2020  8:15 AM    Patient:    Claudio Chavira  : 1960   64 y.o. MRN: [id number]  Admitted: 2020  3:15 AM ATT: Hannah Perrin MD   [id number]  AdmitDx: Hepatic encephalopathy ([facility] [address] [K72.90]  PCP: Julian Saeed MD    SUBJECTIVE:  Chart reviewed, events noted  Patient feeling better, mentation has improved. Speech improving as well. BM yesterday. Tolerating po diet. Denies N/V. Denies abdominal pain.     ROS:  The positive ROS will be identified in bold     CONSTITUTIONAL:  Neg  Weight loss, fatigue, fever  MOUTH/THROAT:  Neg  Bleeding gums, hoarseness or sore throat  RESPIRATORY:   Neg SOB, wheeze, cough, hemoptysis or bronchitis  CARDIOVASCULAR:  Neg Chest pain, palpitations, dyspnea on exertion, edema  GASTROINTESTINAL:  SEE HPI  HEMATOLOGIC/LYMPHATIC:  Neg  Anemia, bleeding tendency  MUSCULOSKELETAL: Neg  New myalgias, joint pain, swelling or stiffness  NEUROLOGICAL:  Neg  Loss of Consciousness, memory loss, forgetfulness, periods of confusion, difficulty concentrating, seizures, insomnia, aphasia    SKIN:  Neg No itching, rashes, or sores  PSYCHIATRIC:  Neg Depression, personality changes, anxiety    OBJECTIVE:      BP (!) 85/54   Pulse 76   Temp 98.1 °F (36.7 °C) (Oral)   Resp 16   Ht 4' 9\" (1.448 m)   Wt 179 lb (81.2 kg)   SpO2 94%   BMI 38.74 kg/m²     Sleeping upon entering room   NAD, appears comfortable, lying in bed  Lips and mucous membranes pink and moist  RRR, Nl s1s2   Lungs CTA bilaterally, respirations even and unlabored   Abdomen soft, ND, NT, bowel sounds normal  Skin pink, warm, dry and CR brisk   No edema bilateral lower extremities   AAOx3     CBC:   Recent Labs     20  0420 20  0543   WBC 5.9 3.7*   HGB 12.5 11.0*   PLT 96* 82*     BMP:    Recent Labs     20  0420 20  0543    142   K 4.4 3.7    109   CO2 23 23   BUN 17 12   CREATININE 1.2* 1.1   GLUCOSE 147*  Vitamin D deficiency E55.9    Left carpal tunnel syndrome G56.02    Right carpal tunnel syndrome G56.01    Esophageal hypertension K22.0    Candida esophagitis (HCC) B37.81    Colitis K52.9    Essential hypertension I10    GI bleed K92.2    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    Type 2 diabetes mellitus with complication, with long-term current use of insulin (HCC) E11.8, Z79.4    SOB (shortness of breath) R06.02    Portal vein thrombosis I81    Intractable nausea and vomiting R11.2    Abdominal pain R10.9    Acute GI bleeding K92.2    Chronic hepatitis C without hepatic coma (HCC) B18.2    Delayed gastric emptying K30    Restless legs G25.81    Acute colitis K52.9    Acute encephalopathy G93.40    S/P TIPS (transjugular intrahepatic portosystemic shunt) Z95.828    Other cirrhosis of liver (HCC) K74.69    Acute upper GI bleed K92.2    Acute hepatic encephalopathy K72.00    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 Advised to f/u outpatient        Discussed plan of care with patient      Patient clinical, biochemical, and radiological information discussed with Dr. Charmayne Arab. He agrees with the assessment and plan. Naima Arriaga CNP  5/28/2020  8:15 AM     Doing well  Less encephalopathy  Keep lactulose  Keep Xifaxan    I have seen and examined this patient personally, and independently of the nurse practitioner.     The plan was developed mutually at the time of the visit

## 2020-05-28 NOTE — PLAN OF CARE
Problem: Safety:  Goal: Free from accidental physical injury  Description: Free from accidental physical injury  5/28/2020 1158 by Behzad Dobbins RN  Outcome: Met This Shift  5/28/2020 0043 by Raven Velazquez RN  Outcome: Ongoing  Goal: Free from intentional harm  Description: Free from intentional harm  5/28/2020 1158 by Behzad Dobbins RN  Outcome: Met This Shift  5/28/2020 0043 by Raven Velazquez RN  Outcome: Ongoing     Problem: Daily Care:  Goal: Daily care needs are met  Description: Daily care needs are met  5/28/2020 1158 by Behzad Dobbins RN  Outcome: Met This Shift  5/28/2020 0043 by Raven Velazquez RN  Outcome: Ongoing     Problem: Pain:  Goal: Control of chronic pain  Description: Control of chronic pain  5/28/2020 1158 by Behzad Dobbins RN  Outcome: Met This Shift  5/28/2020 0043 by Raven Velazquez RN  Outcome: Ongoing     Problem: Skin Integrity:  Goal: Skin integrity will stabilize  Description: Skin integrity will stabilize  5/28/2020 1158 by Behzad Dobbins RN  Outcome: Met This Shift  5/28/2020 0043 by Raven Velazquez RN  Outcome: Ongoing     Problem: Falls - Risk of:  Goal: Will remain free from falls  Description: Will remain free from falls  5/28/2020 1158 by Behzad Dobbins RN  Outcome: Met This Shift  5/28/2020 0043 by Raven Velazquez RN  Outcome: Ongoing  Goal: Absence of physical injury  Description: Absence of physical injury  5/28/2020 1158 by Behzad Dobbins RN  Outcome: Met This Shift  5/28/2020 0043 by Raven Velazquez RN  Outcome: Ongoing

## 2020-05-28 NOTE — PLAN OF CARE
Problem: Discharge Planning:  Goal: Discharged to appropriate level of care  Description: Discharged to appropriate level of care  5/28/2020 0043 by Maximo Espinal RN  Outcome: Ongoing  5/27/2020 2048 by Maximo Espinal RN  Outcome: Ongoing     Problem: Infection:  Goal: Will remain free from infection  Description: Will remain free from infection  5/28/2020 0043 by Maximo Espinal RN  Outcome: Ongoing  5/27/2020 2048 by Maximo Espinal RN  Outcome: Ongoing     Problem: Safety:  Goal: Free from accidental physical injury  Description: Free from accidental physical injury  5/28/2020 0043 by Maximo Espinal RN  Outcome: Ongoing  5/27/2020 2048 by Maximo Espinal RN  Outcome: Ongoing  Goal: Free from intentional harm  Description: Free from intentional harm  5/28/2020 0043 by Maximo Espinal RN  Outcome: Ongoing  5/27/2020 2048 by Maximo Espinal RN  Outcome: Ongoing     Problem: Daily Care:  Goal: Daily care needs are met  Description: Daily care needs are met  5/28/2020 0043 by Maximo Espinal RN  Outcome: Ongoing  5/27/2020 2048 by Maximo Espinal RN  Outcome: Ongoing     Problem: Pain:  Description: Pain management should include both nonpharmacologic and pharmacologic interventions.   Goal: Patient's pain/discomfort is manageable  Description: Patient's pain/discomfort is manageable  5/28/2020 0043 by Maximo Espinal RN  Outcome: Ongoing  5/27/2020 2048 by Maximo Espinal RN  Outcome: Ongoing  Goal: Pain level will decrease  Description: Pain level will decrease  Outcome: Ongoing  Goal: Control of acute pain  Description: Control of acute pain  Outcome: Ongoing  Goal: Control of chronic pain  Description: Control of chronic pain  Outcome: Ongoing     Problem: Skin Integrity:  Goal: Skin integrity will stabilize  Description: Skin integrity will stabilize  5/28/2020 0043 by Maximo Espinal RN  Outcome: Ongoing  5/27/2020 2048 by Maximo Espinal RN  Outcome: Ongoing     Problem: Discharge Planning:  Goal: Patients continuum of care needs are met  Description: Patients continuum of care needs are met  5/28/2020 0043 by KB Home	Axtell, RN  Outcome: Ongoing  5/27/2020 2048 by [employer]	Axtell, RN  Outcome: Ongoing     Problem: Falls - Risk of:  Goal: Will remain free from falls  Description: Will remain free from falls  5/28/2020 0043 by [employer]	Axtell, RN  Outcome: Ongoing  5/27/2020 2048 by [employer]	Axtell, RN  Outcome: Ongoing  Goal: Absence of physical injury  Description: Absence of physical injury  5/28/2020 0043 by [employer]	Axtell, RN  Outcome: Ongoing  5/27/2020 2048 by [employer]	Axtell, RN  Outcome: Ongoing

## 2020-05-29 VITALS
WEIGHT: 181.66 LBS | HEIGHT: 57 IN | DIASTOLIC BLOOD PRESSURE: 69 MMHG | TEMPERATURE: 98.3 F | HEART RATE: 80 BPM | BODY MASS INDEX: 39.19 KG/M2 | SYSTOLIC BLOOD PRESSURE: 107 MMHG | RESPIRATION RATE: 18 BRPM | OXYGEN SATURATION: 96 %

## 2020-05-29 LAB
AMMONIA: 69 UMOL/L (ref 11–51)
GLUCOSE BLD-MCNC: 115 MG/DL (ref 70–99)
MAGNESIUM: 1.8 MG/DL (ref 1.8–2.4)

## 2020-05-29 PROCEDURE — 36415 COLL VENOUS BLD VENIPUNCTURE: CPT

## 2020-05-29 PROCEDURE — 82140 ASSAY OF AMMONIA: CPT

## 2020-05-29 PROCEDURE — 6370000000 HC RX 637 (ALT 250 FOR IP): Performed by: FAMILY MEDICINE

## 2020-05-29 PROCEDURE — 6370000000 HC RX 637 (ALT 250 FOR IP): Performed by: INTERNAL MEDICINE

## 2020-05-29 PROCEDURE — 6360000002 HC RX W HCPCS: Performed by: INTERNAL MEDICINE

## 2020-05-29 PROCEDURE — 82962 GLUCOSE BLOOD TEST: CPT

## 2020-05-29 PROCEDURE — 83735 ASSAY OF MAGNESIUM: CPT

## 2020-05-29 PROCEDURE — 94640 AIRWAY INHALATION TREATMENT: CPT

## 2020-05-29 RX ORDER — MIDODRINE HYDROCHLORIDE 10 MG/1
10 TABLET ORAL
Qty: 90 TABLET | Refills: 3 | Status: SHIPPED | OUTPATIENT
Start: 2020-05-29 | End: 2020-12-15 | Stop reason: SDUPTHER

## 2020-05-29 RX ADMIN — FUROSEMIDE 20 MG: 20 TABLET ORAL at 08:50

## 2020-05-29 RX ADMIN — OXYCODONE HYDROCHLORIDE 5 MG: 5 TABLET ORAL at 06:02

## 2020-05-29 RX ADMIN — RANOLAZINE 500 MG: 500 TABLET, FILM COATED, EXTENDED RELEASE ORAL at 08:50

## 2020-05-29 RX ADMIN — MIDODRINE HYDROCHLORIDE 10 MG: 5 TABLET ORAL at 08:50

## 2020-05-29 RX ADMIN — SPIRONOLACTONE 25 MG: 25 TABLET ORAL at 08:50

## 2020-05-29 RX ADMIN — FLUOXETINE 40 MG: 20 CAPSULE ORAL at 08:50

## 2020-05-29 RX ADMIN — RIFAXIMIN 550 MG: 550 TABLET ORAL at 08:50

## 2020-05-29 RX ADMIN — ENOXAPARIN SODIUM 40 MG: 40 INJECTION SUBCUTANEOUS at 08:50

## 2020-05-29 RX ADMIN — LACTULOSE 20 G: 10 SOLUTION ORAL at 08:49

## 2020-05-29 RX ADMIN — PANTOPRAZOLE SODIUM 40 MG: 40 TABLET, DELAYED RELEASE ORAL at 06:02

## 2020-05-29 RX ADMIN — ALBUTEROL SULFATE 2 PUFF: 90 AEROSOL, METERED RESPIRATORY (INHALATION) at 08:06

## 2020-05-29 ASSESSMENT — PAIN DESCRIPTION - PAIN TYPE: TYPE: CHRONIC PAIN

## 2020-05-29 ASSESSMENT — PAIN SCALES - GENERAL
PAINLEVEL_OUTOF10: 4
PAINLEVEL_OUTOF10: 0
PAINLEVEL_OUTOF10: 5

## 2020-05-29 ASSESSMENT — PAIN DESCRIPTION - DESCRIPTORS: DESCRIPTORS: ACHING

## 2020-05-29 ASSESSMENT — PAIN DESCRIPTION - LOCATION: LOCATION: GENERALIZED;BACK

## 2020-05-29 NOTE — DISCHARGE SUMMARY
cardiopulmonary process. Us Abdomen Limited    Result Date: 5/28/2020  EXAMINATION: RIGHT UPPER QUADRANT ULTRASOUND 5/28/2020 5:09 am COMPARISON: 01/24/2020 HISTORY: ORDERING SYSTEM PROVIDED HISTORY: cirrrhosis with HE TECHNOLOGIST PROVIDED HISTORY: With doppler. Please evaluate shunt Reason for exam:->cirrrhosis with HE Reason for Exam: cirrrhosis with HE Type of Exam: Initial; ORDERING SYSTEM PROVIDED HISTORY: cirrhosis TECHNOLOGIST PROVIDED HISTORY: With doppler. Please evaluate shunt Reason for exam:->cirrhosis Reason for Exam: cirrrhosis with HE Type of Exam: Initial FINDINGS: LIVER:  Coarsened appearing liver with increased echotexture related to underlying cirrhosis. A TIPS stent is seen in place. Portal venous flow is seen towards the liver. Splenic venous flow is also seen towards the liver in appropriate direction. Hepatic venous flow is also in appropriate direction. Hepatic arterial flow appears normal with a normal waveform. The proximal stent velocity measures 100 centimeters/second, with the mid velocity measuring 75 centimeters/second, and the distal velocity measuring 100 centimeters/second. The inflow at the level of the tips within the main portal vein measures 50 centimeters/second. BILIARY SYSTEM:  Cholecystectomy. Common bile duct is within normal limits measuring 8.7 mm. RIGHT KIDNEY: The right kidney is grossly unremarkable without evidence of hydronephrosis. PANCREAS:  Not well seen secondary to overlying bowel gas. OTHER: No evidence of right upper quadrant ascites. Cirrhotic appearing liver, with an indwelling transjugular intrahepatic portosystemic shunt, which is patent with normal inflow velocity at the main portal vein. No stenosis.      Us Dup Abd Pel Retro Scrot Complete    Result Date: 5/28/2020  EXAMINATION: RIGHT UPPER QUADRANT ULTRASOUND 5/28/2020 5:09 am COMPARISON: 01/24/2020 HISTORY: ORDERING SYSTEM PROVIDED HISTORY: cirrrhosis with HE TECHNOLOGIST PROVIDED HISTORY: With doppler. Please evaluate shunt Reason for exam:->cirrrhosis with HE Reason for Exam: cirrrhosis with HE Type of Exam: Initial; ORDERING SYSTEM PROVIDED HISTORY: cirrhosis TECHNOLOGIST PROVIDED HISTORY: With doppler. Please evaluate shunt Reason for exam:->cirrhosis Reason for Exam: cirrrhosis with HE Type of Exam: Initial FINDINGS: LIVER:  Coarsened appearing liver with increased echotexture related to underlying cirrhosis. A TIPS stent is seen in place. Portal venous flow is seen towards the liver. Splenic venous flow is also seen towards the liver in appropriate direction. Hepatic venous flow is also in appropriate direction. Hepatic arterial flow appears normal with a normal waveform. The proximal stent velocity measures 100 centimeters/second, with the mid velocity measuring 75 centimeters/second, and the distal velocity measuring 100 centimeters/second. The inflow at the level of the tips within the main portal vein measures 50 centimeters/second. BILIARY SYSTEM:  Cholecystectomy. Common bile duct is within normal limits measuring 8.7 mm. RIGHT KIDNEY: The right kidney is grossly unremarkable without evidence of hydronephrosis. PANCREAS:  Not well seen secondary to overlying bowel gas. OTHER: No evidence of right upper quadrant ascites. Cirrhotic appearing liver, with an indwelling transjugular intrahepatic portosystemic shunt, which is patent with normal inflow velocity at the main portal vein. No stenosis.        Significant Diagnostic Studies at discharge:   CBC:   Lab Results   Component Value Date    WBC 3.7 05/28/2020    RBC 3.47 05/28/2020    HGB 11.0 05/28/2020    HCT 33.8 05/28/2020    MCV 97.4 05/28/2020    MCH 31.7 05/28/2020    MCHC 32.5 05/28/2020    RDW 13.7 05/28/2020    PLT 82 05/28/2020    MPV 12.3 05/28/2020       Patient Instructions:     Medication List      START taking these medications    midodrine 10 MG tablet  Commonly known as:  PROAMATINE  Take 1 tablet by

## 2020-06-01 LAB
HCV QUANTITATIVE: NOT DETECTED IU/ML
HEP CRNA PCR QNT INTERP: NOT DETECTED
HEPATITIS C RNA PCR QUANT: NOT DETECTED LOG IU/ML

## 2020-06-02 LAB
HBV QNT LOG, IU/ML: <1 LOG IU/ML
HBV QNT, IU/ML: ABNORMAL IU/ML
INTERPRETATION: DETECTED

## 2020-06-05 ENCOUNTER — HOSPITAL ENCOUNTER (INPATIENT)
Age: 60
LOS: 3 days | Discharge: HOME | DRG: 441 | End: 2020-06-08
Attending: HOSPITALIST | Admitting: HOSPITALIST
Payer: MEDICARE

## 2020-06-05 LAB
ALBUMIN SERPL-MCNC: 3.3 GM/DL (ref 3.4–5)
ALP BLD-CCNC: 98 IU/L (ref 40–129)
ALT SERPL-CCNC: 12 U/L (ref 10–40)
AMMONIA: 153 UMOL/L (ref 11–51)
AMMONIA: 161 UMOL/L (ref 11–51)
ANION GAP SERPL CALCULATED.3IONS-SCNC: 11 MMOL/L (ref 4–16)
ANION GAP SERPL CALCULATED.3IONS-SCNC: 8 MMOL/L (ref 4–16)
AST SERPL-CCNC: 15 IU/L (ref 15–37)
BACTERIA: NEGATIVE /HPF
BASOPHILS ABSOLUTE: 0 K/CU MM
BASOPHILS ABSOLUTE: 0 K/CU MM
BASOPHILS RELATIVE PERCENT: 0.8 % (ref 0–1)
BASOPHILS RELATIVE PERCENT: 0.9 % (ref 0–1)
BILIRUB SERPL-MCNC: 1.1 MG/DL (ref 0–1)
BILIRUBIN URINE: NEGATIVE MG/DL
BLOOD, URINE: NEGATIVE
BUN BLDV-MCNC: 18 MG/DL (ref 6–23)
BUN BLDV-MCNC: 21 MG/DL (ref 6–23)
CALCIUM SERPL-MCNC: 8.3 MG/DL (ref 8.3–10.6)
CALCIUM SERPL-MCNC: 8.5 MG/DL (ref 8.3–10.6)
CHLORIDE BLD-SCNC: 109 MMOL/L (ref 99–110)
CHLORIDE BLD-SCNC: 110 MMOL/L (ref 99–110)
CLARITY: CLEAR
CO2: 22 MMOL/L (ref 21–32)
CO2: 26 MMOL/L (ref 21–32)
COLOR: YELLOW
CREAT SERPL-MCNC: 1.2 MG/DL (ref 0.6–1.1)
CREAT SERPL-MCNC: 1.3 MG/DL (ref 0.6–1.1)
DIFFERENTIAL TYPE: ABNORMAL
DIFFERENTIAL TYPE: ABNORMAL
EOSINOPHILS ABSOLUTE: 0.2 K/CU MM
EOSINOPHILS ABSOLUTE: 0.2 K/CU MM
EOSINOPHILS RELATIVE PERCENT: 3.2 % (ref 0–3)
EOSINOPHILS RELATIVE PERCENT: 3.7 % (ref 0–3)
GFR AFRICAN AMERICAN: 51 ML/MIN/1.73M2
GFR AFRICAN AMERICAN: 56 ML/MIN/1.73M2
GFR NON-AFRICAN AMERICAN: 42 ML/MIN/1.73M2
GFR NON-AFRICAN AMERICAN: 46 ML/MIN/1.73M2
GLUCOSE BLD-MCNC: 113 MG/DL (ref 70–99)
GLUCOSE BLD-MCNC: 138 MG/DL (ref 70–99)
GLUCOSE BLD-MCNC: 176 MG/DL (ref 70–99)
GLUCOSE BLD-MCNC: 201 MG/DL (ref 70–99)
GLUCOSE, URINE: NEGATIVE MG/DL
HCT VFR BLD CALC: 34.8 % (ref 37–47)
HCT VFR BLD CALC: 36.7 % (ref 37–47)
HEMOGLOBIN: 11.7 GM/DL (ref 12.5–16)
HEMOGLOBIN: 11.8 GM/DL (ref 12.5–16)
IMMATURE NEUTROPHIL %: 0 % (ref 0–0.43)
IMMATURE NEUTROPHIL %: 0.2 % (ref 0–0.43)
INR BLD: 1.2 INDEX
KETONES, URINE: NEGATIVE MG/DL
LACTATE: 1.2 MMOL/L (ref 0.4–2)
LEUKOCYTE ESTERASE, URINE: NEGATIVE
LIPASE: 74 IU/L (ref 13–60)
LYMPHOCYTES ABSOLUTE: 1.7 K/CU MM
LYMPHOCYTES ABSOLUTE: 1.8 K/CU MM
LYMPHOCYTES RELATIVE PERCENT: 35.8 % (ref 24–44)
LYMPHOCYTES RELATIVE PERCENT: 40.8 % (ref 24–44)
MCH RBC QN AUTO: 31.8 PG (ref 27–31)
MCH RBC QN AUTO: 32.1 PG (ref 27–31)
MCHC RBC AUTO-ENTMCNC: 32.2 % (ref 32–36)
MCHC RBC AUTO-ENTMCNC: 33.6 % (ref 32–36)
MCV RBC AUTO: 95.3 FL (ref 78–100)
MCV RBC AUTO: 98.9 FL (ref 78–100)
MONOCYTES ABSOLUTE: 0.4 K/CU MM
MONOCYTES ABSOLUTE: 0.4 K/CU MM
MONOCYTES RELATIVE PERCENT: 7.8 % (ref 0–4)
MONOCYTES RELATIVE PERCENT: 8.2 % (ref 0–4)
NITRITE URINE, QUANTITATIVE: NEGATIVE
NUCLEATED RBC %: 0 %
NUCLEATED RBC %: 0 %
PDW BLD-RTO: 13.8 % (ref 11.7–14.9)
PDW BLD-RTO: 13.9 % (ref 11.7–14.9)
PH, URINE: 8 (ref 5–8)
PLATELET # BLD: 81 K/CU MM (ref 140–440)
PLATELET # BLD: 85 K/CU MM (ref 140–440)
PMV BLD AUTO: 12.4 FL (ref 7.5–11.1)
PMV BLD AUTO: 12.5 FL (ref 7.5–11.1)
POTASSIUM SERPL-SCNC: 4.2 MMOL/L (ref 3.5–5.1)
POTASSIUM SERPL-SCNC: 4.3 MMOL/L (ref 3.5–5.1)
PROTEIN UA: NEGATIVE MG/DL
PROTHROMBIN TIME: 14.6 SECONDS (ref 11.7–14.5)
RBC # BLD: 3.65 M/CU MM (ref 4.2–5.4)
RBC # BLD: 3.71 M/CU MM (ref 4.2–5.4)
RBC URINE: NORMAL /HPF (ref 0–6)
SEGMENTED NEUTROPHILS ABSOLUTE COUNT: 2 K/CU MM
SEGMENTED NEUTROPHILS ABSOLUTE COUNT: 2.5 K/CU MM
SEGMENTED NEUTROPHILS RELATIVE PERCENT: 46.4 % (ref 36–66)
SEGMENTED NEUTROPHILS RELATIVE PERCENT: 52.2 % (ref 36–66)
SODIUM BLD-SCNC: 142 MMOL/L (ref 135–145)
SODIUM BLD-SCNC: 144 MMOL/L (ref 135–145)
SPECIFIC GRAVITY UA: 1.01 (ref 1–1.03)
SQUAMOUS EPITHELIAL: <1 /HPF
TOTAL IMMATURE NEUTOROPHIL: 0 K/CU MM
TOTAL IMMATURE NEUTOROPHIL: 0.01 K/CU MM
TOTAL NUCLEATED RBC: 0 K/CU MM
TOTAL NUCLEATED RBC: 0 K/CU MM
TOTAL PROTEIN: 6.4 GM/DL (ref 6.4–8.2)
TRICHOMONAS: NORMAL /HPF
UROBILINOGEN, URINE: 1 MG/DL (ref 0.2–1)
WBC # BLD: 4.3 K/CU MM (ref 4–10.5)
WBC # BLD: 4.8 K/CU MM (ref 4–10.5)
WBC UA: <1 /HPF (ref 0–5)

## 2020-06-05 PROCEDURE — 83690 ASSAY OF LIPASE: CPT

## 2020-06-05 PROCEDURE — 82140 ASSAY OF AMMONIA: CPT

## 2020-06-05 PROCEDURE — 85610 PROTHROMBIN TIME: CPT

## 2020-06-05 PROCEDURE — 94761 N-INVAS EAR/PLS OXIMETRY MLT: CPT

## 2020-06-05 PROCEDURE — 85025 COMPLETE CBC W/AUTO DIFF WBC: CPT

## 2020-06-05 PROCEDURE — G0378 HOSPITAL OBSERVATION PER HR: HCPCS

## 2020-06-05 PROCEDURE — 2580000003 HC RX 258: Performed by: PHYSICIAN ASSISTANT

## 2020-06-05 PROCEDURE — 83605 ASSAY OF LACTIC ACID: CPT

## 2020-06-05 PROCEDURE — 82962 GLUCOSE BLOOD TEST: CPT

## 2020-06-05 PROCEDURE — 2580000003 HC RX 258: Performed by: NURSE PRACTITIONER

## 2020-06-05 PROCEDURE — 80048 BASIC METABOLIC PNL TOTAL CA: CPT

## 2020-06-05 PROCEDURE — 99285 EMERGENCY DEPT VISIT HI MDM: CPT

## 2020-06-05 PROCEDURE — 6370000000 HC RX 637 (ALT 250 FOR IP): Performed by: NURSE PRACTITIONER

## 2020-06-05 PROCEDURE — 1200000000 HC SEMI PRIVATE

## 2020-06-05 PROCEDURE — 81001 URINALYSIS AUTO W/SCOPE: CPT

## 2020-06-05 PROCEDURE — 6370000000 HC RX 637 (ALT 250 FOR IP): Performed by: PHYSICIAN ASSISTANT

## 2020-06-05 PROCEDURE — 80053 COMPREHEN METABOLIC PANEL: CPT

## 2020-06-05 RX ORDER — OXYCODONE HYDROCHLORIDE 5 MG/1
5 TABLET ORAL 4 TIMES DAILY
Status: DISCONTINUED | OUTPATIENT
Start: 2020-06-05 | End: 2020-06-06

## 2020-06-05 RX ORDER — MIDODRINE HYDROCHLORIDE 5 MG/1
10 TABLET ORAL
Status: DISCONTINUED | OUTPATIENT
Start: 2020-06-05 | End: 2020-06-08 | Stop reason: HOSPADM

## 2020-06-05 RX ORDER — DEXTROSE MONOHYDRATE 50 MG/ML
100 INJECTION, SOLUTION INTRAVENOUS PRN
Status: DISCONTINUED | OUTPATIENT
Start: 2020-06-05 | End: 2020-06-08 | Stop reason: HOSPADM

## 2020-06-05 RX ORDER — NICOTINE POLACRILEX 4 MG
15 LOZENGE BUCCAL PRN
Status: DISCONTINUED | OUTPATIENT
Start: 2020-06-05 | End: 2020-06-08 | Stop reason: HOSPADM

## 2020-06-05 RX ORDER — FLUOXETINE HYDROCHLORIDE 20 MG/1
40 CAPSULE ORAL DAILY
Status: DISCONTINUED | OUTPATIENT
Start: 2020-06-05 | End: 2020-06-08 | Stop reason: HOSPADM

## 2020-06-05 RX ORDER — PROMETHAZINE HYDROCHLORIDE 25 MG/1
12.5 TABLET ORAL EVERY 6 HOURS PRN
Status: DISCONTINUED | OUTPATIENT
Start: 2020-06-05 | End: 2020-06-08 | Stop reason: HOSPADM

## 2020-06-05 RX ORDER — INSULIN GLARGINE 100 [IU]/ML
20 INJECTION, SOLUTION SUBCUTANEOUS NIGHTLY
Status: DISCONTINUED | OUTPATIENT
Start: 2020-06-05 | End: 2020-06-08 | Stop reason: HOSPADM

## 2020-06-05 RX ORDER — LACTULOSE 10 G/15ML
20 SOLUTION ORAL 3 TIMES DAILY
Status: DISCONTINUED | OUTPATIENT
Start: 2020-06-05 | End: 2020-06-07

## 2020-06-05 RX ORDER — SODIUM CHLORIDE 0.9 % (FLUSH) 0.9 %
10 SYRINGE (ML) INJECTION PRN
Status: DISCONTINUED | OUTPATIENT
Start: 2020-06-05 | End: 2020-06-08 | Stop reason: HOSPADM

## 2020-06-05 RX ORDER — 0.9 % SODIUM CHLORIDE 0.9 %
1000 INTRAVENOUS SOLUTION INTRAVENOUS ONCE
Status: COMPLETED | OUTPATIENT
Start: 2020-06-05 | End: 2020-06-05

## 2020-06-05 RX ORDER — DEXTROSE MONOHYDRATE 25 G/50ML
12.5 INJECTION, SOLUTION INTRAVENOUS PRN
Status: DISCONTINUED | OUTPATIENT
Start: 2020-06-05 | End: 2020-06-08 | Stop reason: HOSPADM

## 2020-06-05 RX ORDER — RANOLAZINE 500 MG/1
500 TABLET, EXTENDED RELEASE ORAL 2 TIMES DAILY
Status: DISCONTINUED | OUTPATIENT
Start: 2020-06-05 | End: 2020-06-08 | Stop reason: HOSPADM

## 2020-06-05 RX ORDER — PANTOPRAZOLE SODIUM 40 MG/1
40 TABLET, DELAYED RELEASE ORAL
Status: DISCONTINUED | OUTPATIENT
Start: 2020-06-06 | End: 2020-06-08 | Stop reason: HOSPADM

## 2020-06-05 RX ORDER — POLYETHYLENE GLYCOL 3350 17 G/17G
17 POWDER, FOR SOLUTION ORAL DAILY PRN
Status: DISCONTINUED | OUTPATIENT
Start: 2020-06-05 | End: 2020-06-08 | Stop reason: HOSPADM

## 2020-06-05 RX ORDER — METOPROLOL SUCCINATE 50 MG/1
50 TABLET, EXTENDED RELEASE ORAL DAILY
Status: DISCONTINUED | OUTPATIENT
Start: 2020-06-05 | End: 2020-06-08 | Stop reason: HOSPADM

## 2020-06-05 RX ORDER — ONDANSETRON 2 MG/ML
4 INJECTION INTRAMUSCULAR; INTRAVENOUS EVERY 6 HOURS PRN
Status: DISCONTINUED | OUTPATIENT
Start: 2020-06-05 | End: 2020-06-08 | Stop reason: HOSPADM

## 2020-06-05 RX ORDER — SODIUM CHLORIDE 0.9 % (FLUSH) 0.9 %
10 SYRINGE (ML) INJECTION EVERY 12 HOURS SCHEDULED
Status: DISCONTINUED | OUTPATIENT
Start: 2020-06-05 | End: 2020-06-08 | Stop reason: HOSPADM

## 2020-06-05 RX ORDER — LACTULOSE 10 G/15ML
20 SOLUTION ORAL ONCE
Status: COMPLETED | OUTPATIENT
Start: 2020-06-05 | End: 2020-06-05

## 2020-06-05 RX ORDER — TENOFOVIR DISOPROXIL FUMARATE 300 MG/1
300 TABLET, FILM COATED ORAL DAILY
Status: DISCONTINUED | OUTPATIENT
Start: 2020-06-05 | End: 2020-06-08 | Stop reason: HOSPADM

## 2020-06-05 RX ADMIN — RIFAXIMIN 550 MG: 550 TABLET ORAL at 21:34

## 2020-06-05 RX ADMIN — SODIUM CHLORIDE 1000 ML: 9 INJECTION, SOLUTION INTRAVENOUS at 12:25

## 2020-06-05 RX ADMIN — FLUOXETINE 40 MG: 20 CAPSULE ORAL at 16:48

## 2020-06-05 RX ADMIN — RANOLAZINE 500 MG: 500 TABLET, FILM COATED, EXTENDED RELEASE ORAL at 21:34

## 2020-06-05 RX ADMIN — OXYCODONE HYDROCHLORIDE 5 MG: 5 TABLET ORAL at 21:41

## 2020-06-05 RX ADMIN — QUETIAPINE FUMARATE 150 MG: 100 TABLET ORAL at 21:34

## 2020-06-05 RX ADMIN — MIDODRINE HYDROCHLORIDE 10 MG: 5 TABLET ORAL at 16:48

## 2020-06-05 RX ADMIN — LACTULOSE 20 G: 10 SOLUTION ORAL at 21:35

## 2020-06-05 RX ADMIN — INSULIN GLARGINE 20 UNITS: 100 INJECTION, SOLUTION SUBCUTANEOUS at 21:36

## 2020-06-05 RX ADMIN — METOPROLOL SUCCINATE 50 MG: 50 TABLET, EXTENDED RELEASE ORAL at 16:47

## 2020-06-05 RX ADMIN — SODIUM CHLORIDE, PRESERVATIVE FREE 10 ML: 5 INJECTION INTRAVENOUS at 21:35

## 2020-06-05 RX ADMIN — LACTULOSE 20 G: 10 SOLUTION ORAL at 16:49

## 2020-06-05 RX ADMIN — TENOFOVIR DISOPROXIL FUMARATE 300 MG: 300 TABLET, COATED ORAL at 16:47

## 2020-06-05 RX ADMIN — OXYCODONE HYDROCHLORIDE 5 MG: 5 TABLET ORAL at 16:48

## 2020-06-05 RX ADMIN — LACTULOSE 20 G: 10 SOLUTION ORAL at 13:15

## 2020-06-05 ASSESSMENT — PAIN DESCRIPTION - LOCATION: LOCATION: BACK;HAND

## 2020-06-05 ASSESSMENT — PAIN SCALES - GENERAL
PAINLEVEL_OUTOF10: 4
PAINLEVEL_OUTOF10: 4

## 2020-06-05 ASSESSMENT — PAIN DESCRIPTION - PAIN TYPE: TYPE: CHRONIC PAIN

## 2020-06-05 NOTE — ED PROVIDER NOTES
eMERGENCY dEPARTMENT eNCOUnter      PCP: Humberto Gold MD    CHIEF COMPLAINT    Chief Complaint   Patient presents with    Altered Mental Status     ongoing1- days. per , usually due to elevated ammonia levels       HPI    Peter [place] is a 64 y.o. female with past medical history of diabetes mellitus, hepatitis C, hypertension, hyperlipidemia who presents with confusion. Patient states that symptoms have been ongoing for approximately the past day. She denies associated pain, trauma or injury. No focal decrease in sensation or weakness. Patient states that this seems to occur every 1 to 2 weeks due to elevated ammonia. She states that she has been compliant with her lactulose at home and has been having typically 2-3 bowel movements a day. Patient recently discharged from the hospital about 1 week ago after being treated for hepatic encephalopathy with similar presentation. REVIEW OF SYSTEMS   Constitutional:  Denies fever, chills, weight loss or weakness   Neurologic:  See HPI.  +confusion, no memory loss. Denies light-headedness, dizziness, or LOC. Denies stiff neck. Denies weakness or sensory changes   Eyes:   Denies discharge, diplopia, blurred vision, or loss visual field  HENT:  Denies sore throat or ear pain   GI  Denies abdominal pain.  + nausea, no vomiting, or diarrhea. :  Denies Dysuria or Hematuria. Musculoskeletal:  Denies back pain.      Skin:  Denies rash   Endocrine:  Denies polyuria or polydypsia   Lymphatic:  Denies swollen glands   Psychiatric:   Denies depression, suicidal ideation or homicidal ideation     All other review of systems negative at this time  See HPI and nursing notes for additional information      PAST MEDICAL & SURGICAL HISTORY    Past Medical History:   Diagnosis Date    Acid reflux     Arthritis     left knee    CAD (coronary artery disease)     per [facility] - Dr. Catalina Villanueva COPD (chronic obstructive pulmonary disease) ([facility] [address]

## 2020-06-05 NOTE — PROGRESS NOTES
Patient changed mind about dinner already ordered. This nurse approved reorder and was present for return of first tray ordered.

## 2020-06-05 NOTE — ED TRIAGE NOTES
Patient to ER with  for c/o AMS ongoing 1-2 days.  Per , patient comes \"every 2 weeks almost for increased ammonia levels\"

## 2020-06-05 NOTE — H&P
catheterization; Hysterectomy; Carpal tunnel release (Left, 2020); Carpal tunnel release (Right, 2020); Carpal tunnel release (Right, 2020); and Finger trigger release (Right, 2020). Allergies: Allergies   Allergen Reactions    Norco [Hydrocodone-Acetaminophen] Itching     Itching, rash, nausea and vomiting.  Tylenol [Acetaminophen] Itching, Nausea And Vomiting and Rash       FAM HX: family history includes Arthritis in her father and mother; Cancer in her father and mother; Diabetes in her father; Heart Disease in her brother; High Blood Pressure in her father; High Cholesterol in her father; Migraines in her father, mother, and sister.     Soc HX:   Social History     Socioeconomic History    Marital status:      Spouse name: None    Number of children: None    Years of education: None    Highest education level: None   Occupational History    None   Social Needs    Financial resource strain: None    Food insecurity     Worry: None     Inability: None    Transportation needs     Medical: None     Non-medical: None   Tobacco Use    Smoking status: Former Smoker     Packs/day: 3.00     Years: 45.00     Pack years: 135.00     Types: Cigarettes     Start date:      Last attempt to quit: 2019     Years since quittin.4    Smokeless tobacco: Never Used   Substance and Sexual Activity    Alcohol use: Not Currently     Alcohol/week: 2.0 - 3.0 standard drinks     Types: 2 - 3 Shots of liquor per week     Comment: 2-3 shots last 2019    Drug use: Not Currently     Frequency: 3.0 times per week     Types: Marijuana     Comment: Last smoked; 20, last used cocaine: 2018 per pt    Sexual activity: Not Currently     Partners: Male   Lifestyle    Physical activity     Days per week: None     Minutes per session: None    Stress: None   Relationships    Social connections     Talks on phone: None     Gets together: None     Attends Buddhist service: None     Active

## 2020-06-05 NOTE — CARE COORDINATION
CM review of pt chart for readmission risk, last admission 5/6-8/20 Hepatic encephalopathy, liver cirrhosis due to ETOH. 5/26/20 Surgery with Dr Lady Wall for carpal tunnel. 5/27-29/20 Admit for Hepatic encephalopathy, discharged home with Brother. All emergency contacts are brothers and sisters. Pt has [facility], PCP Dr Raven Dial. Pt returns to ER today for same complaint of AMS, hx of of diabetes mellitus, hepatitis C, hypertension, hyperlipidemia who presents with confusion. Hx of elevated ammonia level. Yovani Fernandes PA-C ER provider, No alternative to admission at this time pt ammonia level is 161.  LENA,RN/CM

## 2020-06-06 ENCOUNTER — APPOINTMENT (OUTPATIENT)
Dept: ULTRASOUND IMAGING | Age: 60
DRG: 441 | End: 2020-06-06
Payer: MEDICARE

## 2020-06-06 LAB
ALBUMIN SERPL-MCNC: 3.7 GM/DL (ref 3.4–5)
ALP BLD-CCNC: 105 IU/L (ref 40–129)
ALT SERPL-CCNC: 14 U/L (ref 10–40)
AMMONIA: 134 UMOL/L (ref 11–51)
ANION GAP SERPL CALCULATED.3IONS-SCNC: 10 MMOL/L (ref 4–16)
AST SERPL-CCNC: 21 IU/L (ref 15–37)
BILIRUB SERPL-MCNC: 1.1 MG/DL (ref 0–1)
BUN BLDV-MCNC: 16 MG/DL (ref 6–23)
CALCIUM SERPL-MCNC: 8.8 MG/DL (ref 8.3–10.6)
CHLORIDE BLD-SCNC: 105 MMOL/L (ref 99–110)
CO2: 23 MMOL/L (ref 21–32)
CREAT SERPL-MCNC: 1.3 MG/DL (ref 0.6–1.1)
GFR AFRICAN AMERICAN: 51 ML/MIN/1.73M2
GFR NON-AFRICAN AMERICAN: 42 ML/MIN/1.73M2
GLUCOSE BLD-MCNC: 123 MG/DL (ref 70–99)
GLUCOSE BLD-MCNC: 125 MG/DL (ref 70–99)
GLUCOSE BLD-MCNC: 148 MG/DL (ref 70–99)
GLUCOSE BLD-MCNC: 156 MG/DL (ref 70–99)
GLUCOSE BLD-MCNC: 185 MG/DL (ref 70–99)
INR BLD: 1.14 INDEX
POTASSIUM SERPL-SCNC: 4.4 MMOL/L (ref 3.5–5.1)
PROTHROMBIN TIME: 13.8 SECONDS (ref 11.7–14.5)
REASON FOR REJECTION: NORMAL
REJECTED TEST: NORMAL
SODIUM BLD-SCNC: 138 MMOL/L (ref 135–145)
TOTAL PROTEIN: 6.8 GM/DL (ref 6.4–8.2)

## 2020-06-06 PROCEDURE — 94761 N-INVAS EAR/PLS OXIMETRY MLT: CPT

## 2020-06-06 PROCEDURE — 80053 COMPREHEN METABOLIC PANEL: CPT

## 2020-06-06 PROCEDURE — 93975 VASCULAR STUDY: CPT

## 2020-06-06 PROCEDURE — G0378 HOSPITAL OBSERVATION PER HR: HCPCS

## 2020-06-06 PROCEDURE — 82140 ASSAY OF AMMONIA: CPT

## 2020-06-06 PROCEDURE — 76705 ECHO EXAM OF ABDOMEN: CPT

## 2020-06-06 PROCEDURE — 82962 GLUCOSE BLOOD TEST: CPT

## 2020-06-06 PROCEDURE — 36415 COLL VENOUS BLD VENIPUNCTURE: CPT

## 2020-06-06 PROCEDURE — 85610 PROTHROMBIN TIME: CPT

## 2020-06-06 PROCEDURE — 1200000000 HC SEMI PRIVATE

## 2020-06-06 PROCEDURE — 2580000003 HC RX 258: Performed by: NURSE PRACTITIONER

## 2020-06-06 PROCEDURE — 6370000000 HC RX 637 (ALT 250 FOR IP): Performed by: NURSE PRACTITIONER

## 2020-06-06 RX ORDER — OXYCODONE HYDROCHLORIDE 5 MG/1
5 TABLET ORAL ONCE
Status: COMPLETED | OUTPATIENT
Start: 2020-06-06 | End: 2020-06-06

## 2020-06-06 RX ADMIN — LACTULOSE 20 G: 10 SOLUTION ORAL at 13:56

## 2020-06-06 RX ADMIN — FLUOXETINE 40 MG: 20 CAPSULE ORAL at 09:11

## 2020-06-06 RX ADMIN — INSULIN LISPRO 1 UNITS: 100 INJECTION, SOLUTION INTRAVENOUS; SUBCUTANEOUS at 13:19

## 2020-06-06 RX ADMIN — SODIUM CHLORIDE, PRESERVATIVE FREE 10 ML: 5 INJECTION INTRAVENOUS at 09:12

## 2020-06-06 RX ADMIN — RIFAXIMIN 550 MG: 550 TABLET ORAL at 21:33

## 2020-06-06 RX ADMIN — RANOLAZINE 500 MG: 500 TABLET, FILM COATED, EXTENDED RELEASE ORAL at 09:11

## 2020-06-06 RX ADMIN — QUETIAPINE FUMARATE 150 MG: 100 TABLET ORAL at 21:33

## 2020-06-06 RX ADMIN — RANOLAZINE 500 MG: 500 TABLET, FILM COATED, EXTENDED RELEASE ORAL at 21:33

## 2020-06-06 RX ADMIN — MIDODRINE HYDROCHLORIDE 10 MG: 5 TABLET ORAL at 11:38

## 2020-06-06 RX ADMIN — TENOFOVIR DISOPROXIL FUMARATE 300 MG: 300 TABLET, COATED ORAL at 09:12

## 2020-06-06 RX ADMIN — RIFAXIMIN 550 MG: 550 TABLET ORAL at 09:11

## 2020-06-06 RX ADMIN — LACTULOSE 20 G: 10 SOLUTION ORAL at 09:11

## 2020-06-06 RX ADMIN — MIDODRINE HYDROCHLORIDE 10 MG: 5 TABLET ORAL at 16:40

## 2020-06-06 RX ADMIN — SODIUM CHLORIDE, PRESERVATIVE FREE 10 ML: 5 INJECTION INTRAVENOUS at 21:34

## 2020-06-06 RX ADMIN — OXYCODONE HYDROCHLORIDE 5 MG: 5 TABLET ORAL at 09:11

## 2020-06-06 RX ADMIN — OXYCODONE HYDROCHLORIDE 5 MG: 5 TABLET ORAL at 22:49

## 2020-06-06 RX ADMIN — MIDODRINE HYDROCHLORIDE 10 MG: 5 TABLET ORAL at 09:11

## 2020-06-06 RX ADMIN — LACTULOSE 20 G: 10 SOLUTION ORAL at 21:34

## 2020-06-06 RX ADMIN — INSULIN GLARGINE 20 UNITS: 100 INJECTION, SOLUTION SUBCUTANEOUS at 21:39

## 2020-06-06 ASSESSMENT — PAIN SCALES - GENERAL
PAINLEVEL_OUTOF10: 4

## 2020-06-06 ASSESSMENT — PAIN DESCRIPTION - LOCATION: LOCATION: BACK

## 2020-06-06 ASSESSMENT — PAIN DESCRIPTION - ORIENTATION: ORIENTATION: LOWER

## 2020-06-06 ASSESSMENT — PAIN DESCRIPTION - PAIN TYPE: TYPE: CHRONIC PAIN

## 2020-06-06 NOTE — CONSULTS
without angina and ischemic EKG changes.     HTN (hypertension)     \"for the past two yrs on medication\" follows with Dr Shabana Villatoro Hx of blood clots 2019    Portal vein thrombsis - TIPS procedure 4/23/19    Hyperlipemia     Irregular heart beat     per pt    Liver hematoma     Migraines     Last migraine:  2018    Nausea & vomiting     Schizophrenia ([address]     per old chart    Seizures ([address]     \"last one was 9/2015- saw Dr Matthew Choi at [facility]- she said not sure if acutal seizures- she thinks they are panic or anxiety attacks\"    SOB (shortness of breath)     with any exertion       Past Surgical History:        Procedure Laterality Date    BREAST SURGERY  10/2015    left breast bx    CARDIAC CATHETERIZATION      per old chart pt had cath done in 3/2011 and 9/2013    CARPAL TUNNEL RELEASE Left 1/9/2020    CARPAL TUNNEL RELEASE LEFT performed by Aj Fair DO at [address] Right 05/26/2020    dr Saranya Quigley, carpal tunnel trigger finger release    CARPAL TUNNEL RELEASE Right 5/26/2020    RIGHT CARPAL TUNNEL RELEASE performed by Aj Fair DO at [id number]  per old chart done [facility]  3/11/13    diverticulosis, cecal polyp    COLONOSCOPY  03/16/2017    Internal hemorrhoids- Dr. Everett Do, COLON, DIAGNOSTIC  03/16/2017    EGD: Small esophageal varices, portal hypertensive gastropathy, reflux esophagitis, hiatal hernia    EYE SURGERY Bilateral ? when    cyst removal, cataracts w/lens replacement    FINGER TRIGGER RELEASE Right 5/26/2020    FINGER TRIGGER RELEASE RIGHT RING FINGER performed by Aj Fair DO at [id number]      per old chart pt had CHOLO/BSO 1986    TIPS PROCEDURE  04/23/2019    TONSILLECTOMY  as a kid    UPPER GASTROINTESTINAL ENDOSCOPY N/A 11/14/2018    EGD DIAGNOSTIC ONLY performed by Mara Hanley MD at [address] 6/5/2019    EGD DIAGNOSTIC ONLY Nightly    FLUoxetine  40 mg Oral Daily    insulin glargine  20 Units Subcutaneous Nightly    metoprolol succinate  50 mg Oral Daily    QUEtiapine  150 mg Oral Nightly    ranolazine  500 mg Oral BID    rifaximin  550 mg Oral BID    tenofovir disoproxil fumarate  300 mg Oral Daily    oxyCODONE  5 mg Oral 4x daily    midodrine  10 mg Oral TID WC     Infusions:    dextrose       PRN Medications: sodium chloride flush, polyethylene glycol, promethazine **OR** ondansetron, glucose, dextrose, glucagon (rDNA), dextrose  Allergies: Allergies   Allergen Reactions    Norco [Hydrocodone-Acetaminophen] Itching     Itching, rash, nausea and vomiting.  Tylenol [Acetaminophen] Itching, Nausea And Vomiting and Rash         Allergies:  Norco [hydrocodone-acetaminophen] and Tylenol [acetaminophen]    Social History:   TOBACCO:   reports that she quit smoking about 17 months ago. Her smoking use included cigarettes. She started smoking about 46 years ago. She has a 135.00 pack-year smoking history. She has never used smokeless tobacco.  ETOH:   reports previous alcohol use of about 2.0 - 3.0 standard drinks of alcohol per week. Family History:       Problem Relation Age of Onset    Cancer Mother         lung ca    Arthritis Mother     Migraines Mother     Cancer Father         colon ca    Diabetes Father     High Blood Pressure Father     Arthritis Father     High Cholesterol Father     Migraines Father     Migraines Sister     Heart Disease Brother         WPW     No family history of colon cancer, Crohn's disease, or ulcerative colitis.     REVIEW OF SYSTEMS:      Neg apart from symptoms of HPI    PHYSICAL EXAM:      Vitals:    BP 92/61   Pulse 61   Temp 98 °F (36.7 °C) (Oral)   Resp 14   Ht 4' 9\" (1.448 m)   Wt 194 lb 10.7 oz (88.3 kg)   SpO2 96%   BMI 42.13 kg/m²     General Appearance:    Alert, cooperative, no distress, appears stated age   HEENT:    Normocephalic, atraumatic, PERRL, Conjunctiva clear, Ears Normal, Normal nares,  gums normal   Neck:   Supple, no JVD, No lymph nodes   Lungs:     Clear to auscultation bilaterally,    Chest Wall:    No tenderness or deformity    Heart:     S1 and S2 normal,   Abdomen:     Soft, non-tender, bowel sounds active all four quadrants,     no masses, no organomegaly, no ascitis   Rectal:    Patient refused   Extremities:  , no cyanosis or edema       Skin:   Skin , no major lesions   Lymph nodes:   No abnormality   Neurologic:   No focal deficits, moving all four extremities            DATA:    ABGs:   Lab Results   Component Value Date    PO2ART 50 04/27/2012     CBC:   Recent Labs     06/05/20  1145 06/05/20 1944   WBC 4.8 4.3   HGB 11.7* 11.8*   PLT 81* 85*     BMP:    Recent Labs     06/05/20  1145 06/05/20 1944    142   K 4.2 4.3    109   CO2 26 22   BUN 21 18   CREATININE 1.3* 1.2*   GLUCOSE 138* 201*     Magnesium:   Lab Results   Component Value Date    MG 1.8 05/29/2020     Hepatic:   Recent Labs     06/05/20  1145   AST 15   ALT 12   BILITOT 1.1*   ALKPHOS 98     Recent Labs     06/05/20  1145   LIPASE 74*     Recent Labs     06/05/20 1944   PROTIME 14.6*   INR 1.20     No results for input(s): PTT in the last 72 hours. Lipids: No results for input(s): CHOL, HDL in the last 72 hours.     Invalid input(s): LDLCALCU  INR:   Recent Labs     06/05/20 1944   INR 1.20     TSH:   Lab Results   Component Value Date    TSH 1.73 08/01/2017     No intake or output data in the 24 hours ending 06/06/20 0727   dextrose         Imaging Studies    reviewed        Erik Vail  6/6/2020  7:27 AM

## 2020-06-06 NOTE — PROGRESS NOTES
<Mable Chavez - Last Filed: 03/23/17 15:34>





Subjective





- Date & Time of Evaluation


Date of Evaluation: 03/23/17


Time of Evaluation: 07:00





- Subjective


Subjective: 


SURGERY PROGRESS NOTE FOR DR. KNAPP





Patient is seen and examined at bedside.  Patient states that his abd pain is 

not adequately controlled with the current pain regimen.  Otherwise, no N/V/F/

C. Tolerating diet.    





Objective





- Vital Signs/Intake and Output


Vital Signs (last 24 hours): 


 











Temp Pulse Resp BP Pulse Ox


 


 99.2 F   88   20   115/66   100 


 


 03/22/17 08:33  03/22/17 08:33  03/22/17 08:33  03/22/17 08:33  03/22/17 08:33








Intake and Output: 


 











 03/23/17 03/23/17





 06:59 18:59


 


Intake Total 600 


 


Balance 600 














- Medications


Medications: 


 Current Medications





Famotidine (Pepcid)  20 mg IVP Q12 [facility]


   Last Admin: 03/22/17 21:17 Dose:  20 mg


Hydromorphone HCl (Dilaudid)  0.5 mg IVP Q3H PRN


   PRN Reason: Pain, moderate (4-7)


   Last Admin: 03/23/17 06:03 Dose:  0.5 mg


Ceftriaxone Sodium (Rocephin 1 Gram Ivpb)  100 mls @ 100 mls/hr IVPB DAILY [facility]


   PRN Reason: Protocol


   Last Admin: 03/22/17 09:38 Dose:  100 mls/hr


Metronidazole (Flagyl)  100 mls @ 100 mls/hr IVPB Q8 [facility]


   PRN Reason: Protocol


   Last Admin: 03/23/17 05:28 Dose:  100 mls/hr


Sodium Chloride (Sodium Chloride 0.9%)  1,000 mls @ 100 mls/hr IV .Q10H [facility]


   Last Admin: 03/22/17 23:30 Dose:  100 mls/hr


Multivitamins/Minerals (Therapeutic-M Tab)  1 tab PO DAILY [facility]


   Last Admin: 03/22/17 12:06 Dose:  1 tab


Ondansetron HCl (Zofran Inj)  4 mg IVP Q4H PRN


   PRN Reason: Nausea/Vomiting











- Labs


Labs: 


 





 03/22/17 07:00 





 03/22/17 07:00 





 











PT  12.2 Seconds (9.9-11.8)  H  03/21/17  14:55    


 


INR  1.13  (0.93-1.08)  H  03/21/17  14:55    


 


APTT  33.1 Seconds (23.7-30.8)  H  03/21/17  14:55    














- Constitutional


Appears: Non-toxic, No Acute Distress





- Head Exam


Head Exam: ATRAUMATIC, NORMAL INSPECTION





- Respiratory Exam


Respiratory Exam: absent: Accessory Muscle Use, Prolonged Expiratory Phase





- GI/Abdominal Exam


GI & Abdominal Exam: Soft, Tenderness.  absent: Distended, Firm, Guarding


Additional comments: 


purulent drainage from umbilical and right lower back region 





- Neurological Exam


Neurological Exam: Alert, Awake, Oriented x3





- Psychiatric Exam


Psychiatric exam: Normal Affect, Normal Mood





- Skin


Skin Exam: Dry, Intact, Normal Color, Warm





Assessment and Plan





- Assessment and Plan (Free Text)


Assessment: 


25M w. Crohn's and intra-abdominal abscesses.  CT of abd/pelvis with PO & IV 

contrast was performed yesterday which showed extensive right geremias abd/geremias 

pelvic phlegmonous Clement tarry process with coalescing small abscesses. 

Please see full report for details.    





-IR is consulted. No drainage plan     


-GI is following pt.   


-IVF


-Continue current pain management regimen


-abx


-tolerating liquid diet 


-GI/DVT prophylaxis





Will d/w Dr. Knapp for further recs


Mable Chavez PGY1





<Reji Knapp B - Last Filed: 03/24/17 15:29>





Objective





- Vital Signs/Intake and Output


Vital Signs (last 24 hours): 


 











Temp Pulse Resp BP Pulse Ox


 


 98.7 F   88   20   131/73   98 


 


 03/23/17 09:00  03/23/17 09:00  03/23/17 09:00  03/23/17 09:00  03/23/17 09:00











- Labs


Labs: 


 





 03/22/17 07:00 





 03/22/17 07:00 





 











PT  12.2 Seconds (9.9-11.8)  H  03/21/17  14:55    


 


INR  1.13  (0.93-1.08)  H  03/21/17  14:55    


 


APTT  33.1 Seconds (23.7-30.8)  H  03/21/17  14:55    














Attending/Attestation





- Attestation


I have personally seen and examined this patient.: Yes


I have fully participated in the care of the patient.: Yes


I have reviewed all pertinent clinical information, including history, physical 

exam and plan: Yes


Notes (Text): 





03/24/17 15:28


Pt was seen and examined at bedside on 03/23/17


Agree with above note and assessment


F/U as out pt


f/u with [facility] PROGRESS NOTE  Date: 6/6/2020   Name: Mayiln Bui   MRN: [id number]   YOB: 1960  Chief Complaint   Patient presents with    Altered Mental Status     ongoing1- days. per , usually due to elevated ammonia levels        Subjective/Interval Hx:     -patient states that she is feeling better today, she had some confusion yesterday    ROS: negative unless mentioned above  Objective:   Physical Exam:   /70   Pulse 66   Temp 98.3 °F (36.8 °C) (Oral)   Resp 15   Ht 4' 9\" (1.448 m)   Wt 194 lb 10.7 oz (88.3 kg)   SpO2 95%   BMI 42.13 kg/m²   CONSTITUTIONAL:  No acute distress  HENT:  NCAT  LUNGS:  cta b/l bs  CARDIOVASCULAR:  s1s2 rrr  ABDOMEN:  Soft nt nd,  MUSCULOSKELETAL/Extremities:  nontender  NEUROLOGIC:  No gross deficits, aao to person, place  SKIN:  Has bruising on lower abdomen    Assessment/Plan:     1. Hepatic encephalopathy  Ammonia:161  -Mental status appears improved. On lactulose and rifaximin. Recheck ammonia. Abdominal ultrasound: TIPS is patent; hepatic cirrhosis. Urinalysis unremarkable. GI recommends stopping all oxycodone as it may precipitate encephalopathy. GI recommends acetaminophen up to 2 g a day for pain. 2. CKD3 3  Creat 1.3 (baseline 1.1)  Received IVF in ED  Holding lasix and spironolactone         Chronic  - COPD- albuterol PRN. - chronic HCV-  cirrhosis of liver due to alcohol & HCVhepatitis C, s/p TIPS for PVT  - CAD- Continue ranolazine.  - Depression/anxiety- continue fluoxetine, buspirone.   - DM- Continue lantus, insulin sliding scale with hypoglycemia protocol  - PAF- Cont BB with parameters (not on anticoagulation)  - Schizophrenia- continue Seroquel  - h/o Hepatic encephalopathy- continue rifaximin, lactulose, Lasix, spironolactone  - HCV- Cont On viread  - CHF- not in exacerbation; no recent echo; takes lasix and spironolactone  - hypotension- cont midodrine for SBP < 110       DVT Prophylaxis: Hold Lovenox for possible

## 2020-06-07 LAB
ALBUMIN SERPL-MCNC: 3.3 GM/DL (ref 3.4–5)
ALP BLD-CCNC: 92 IU/L (ref 40–128)
ALT SERPL-CCNC: 12 U/L (ref 10–40)
AMMONIA: 94 UMOL/L (ref 11–51)
ANION GAP SERPL CALCULATED.3IONS-SCNC: 8 MMOL/L (ref 4–16)
AST SERPL-CCNC: 15 IU/L (ref 15–37)
BILIRUB SERPL-MCNC: 1.1 MG/DL (ref 0–1)
BUN BLDV-MCNC: 16 MG/DL (ref 6–23)
CALCIUM SERPL-MCNC: 9 MG/DL (ref 8.3–10.6)
CHLORIDE BLD-SCNC: 108 MMOL/L (ref 99–110)
CO2: 25 MMOL/L (ref 21–32)
CREAT SERPL-MCNC: 1.3 MG/DL (ref 0.6–1.1)
GFR AFRICAN AMERICAN: 51 ML/MIN/1.73M2
GFR NON-AFRICAN AMERICAN: 42 ML/MIN/1.73M2
GLUCOSE BLD-MCNC: 117 MG/DL (ref 70–99)
GLUCOSE BLD-MCNC: 119 MG/DL (ref 70–99)
GLUCOSE BLD-MCNC: 134 MG/DL (ref 70–99)
GLUCOSE BLD-MCNC: 145 MG/DL (ref 70–99)
GLUCOSE BLD-MCNC: 164 MG/DL (ref 70–99)
INR BLD: 1.28 INDEX
POTASSIUM SERPL-SCNC: 4.2 MMOL/L (ref 3.5–5.1)
PROTHROMBIN TIME: 15.5 SECONDS (ref 11.7–14.5)
SODIUM BLD-SCNC: 141 MMOL/L (ref 135–145)
TOTAL PROTEIN: 6.2 GM/DL (ref 6.4–8.2)

## 2020-06-07 PROCEDURE — 6370000000 HC RX 637 (ALT 250 FOR IP): Performed by: NURSE PRACTITIONER

## 2020-06-07 PROCEDURE — G0378 HOSPITAL OBSERVATION PER HR: HCPCS

## 2020-06-07 PROCEDURE — 82140 ASSAY OF AMMONIA: CPT

## 2020-06-07 PROCEDURE — 1200000000 HC SEMI PRIVATE

## 2020-06-07 PROCEDURE — 82962 GLUCOSE BLOOD TEST: CPT

## 2020-06-07 PROCEDURE — 6370000000 HC RX 637 (ALT 250 FOR IP): Performed by: HOSPITALIST

## 2020-06-07 PROCEDURE — 80053 COMPREHEN METABOLIC PANEL: CPT

## 2020-06-07 PROCEDURE — 94761 N-INVAS EAR/PLS OXIMETRY MLT: CPT

## 2020-06-07 PROCEDURE — 36415 COLL VENOUS BLD VENIPUNCTURE: CPT

## 2020-06-07 PROCEDURE — 85610 PROTHROMBIN TIME: CPT

## 2020-06-07 PROCEDURE — 2580000003 HC RX 258: Performed by: NURSE PRACTITIONER

## 2020-06-07 RX ORDER — FUROSEMIDE 20 MG/1
20 TABLET ORAL DAILY
Status: DISCONTINUED | OUTPATIENT
Start: 2020-06-07 | End: 2020-06-08 | Stop reason: HOSPADM

## 2020-06-07 RX ORDER — SPIRONOLACTONE 25 MG/1
25 TABLET ORAL
Status: DISCONTINUED | OUTPATIENT
Start: 2020-06-07 | End: 2020-06-08

## 2020-06-07 RX ORDER — SPIRONOLACTONE 50 MG/1
50 TABLET, FILM COATED ORAL
Status: DISCONTINUED | OUTPATIENT
Start: 2020-06-07 | End: 2020-06-07

## 2020-06-07 RX ORDER — FUROSEMIDE 20 MG/1
20 TABLET ORAL DAILY
Status: DISCONTINUED | OUTPATIENT
Start: 2020-06-07 | End: 2020-06-07

## 2020-06-07 RX ORDER — LACTULOSE 10 G/15ML
30 SOLUTION ORAL
Status: DISCONTINUED | OUTPATIENT
Start: 2020-06-07 | End: 2020-06-08 | Stop reason: HOSPADM

## 2020-06-07 RX ADMIN — MIDODRINE HYDROCHLORIDE 10 MG: 5 TABLET ORAL at 18:49

## 2020-06-07 RX ADMIN — TENOFOVIR DISOPROXIL FUMARATE 300 MG: 300 TABLET, COATED ORAL at 09:18

## 2020-06-07 RX ADMIN — RIFAXIMIN 550 MG: 550 TABLET ORAL at 09:19

## 2020-06-07 RX ADMIN — INSULIN LISPRO 1 UNITS: 100 INJECTION, SOLUTION INTRAVENOUS; SUBCUTANEOUS at 12:21

## 2020-06-07 RX ADMIN — QUETIAPINE FUMARATE 150 MG: 100 TABLET ORAL at 20:47

## 2020-06-07 RX ADMIN — LACTULOSE 30 G: 10 SOLUTION ORAL at 09:18

## 2020-06-07 RX ADMIN — FLUOXETINE 40 MG: 20 CAPSULE ORAL at 09:18

## 2020-06-07 RX ADMIN — SPIRONOLACTONE 25 MG: 25 TABLET ORAL at 18:50

## 2020-06-07 RX ADMIN — PANTOPRAZOLE SODIUM 40 MG: 40 TABLET, DELAYED RELEASE ORAL at 06:29

## 2020-06-07 RX ADMIN — METOPROLOL SUCCINATE 50 MG: 50 TABLET, EXTENDED RELEASE ORAL at 09:19

## 2020-06-07 RX ADMIN — RIFAXIMIN 550 MG: 550 TABLET ORAL at 20:47

## 2020-06-07 RX ADMIN — LACTULOSE 30 G: 10 SOLUTION ORAL at 12:28

## 2020-06-07 RX ADMIN — RANOLAZINE 500 MG: 500 TABLET, FILM COATED, EXTENDED RELEASE ORAL at 09:18

## 2020-06-07 RX ADMIN — LACTULOSE 30 G: 10 SOLUTION ORAL at 20:47

## 2020-06-07 RX ADMIN — INSULIN GLARGINE 20 UNITS: 100 INJECTION, SOLUTION SUBCUTANEOUS at 20:48

## 2020-06-07 RX ADMIN — SODIUM CHLORIDE, PRESERVATIVE FREE 10 ML: 5 INJECTION INTRAVENOUS at 09:20

## 2020-06-07 RX ADMIN — RANOLAZINE 500 MG: 500 TABLET, FILM COATED, EXTENDED RELEASE ORAL at 20:47

## 2020-06-07 RX ADMIN — MIDODRINE HYDROCHLORIDE 10 MG: 5 TABLET ORAL at 09:18

## 2020-06-07 RX ADMIN — SODIUM CHLORIDE, PRESERVATIVE FREE 10 ML: 5 INJECTION INTRAVENOUS at 20:48

## 2020-06-07 RX ADMIN — LACTULOSE 30 G: 10 SOLUTION ORAL at 18:50

## 2020-06-07 RX ADMIN — FUROSEMIDE 20 MG: 20 TABLET ORAL at 14:55

## 2020-06-07 ASSESSMENT — PAIN SCALES - GENERAL
PAINLEVEL_OUTOF10: 5
PAINLEVEL_OUTOF10: 2
PAINLEVEL_OUTOF10: 2

## 2020-06-07 NOTE — PLAN OF CARE
Problem: Pain:  Goal: Pain level will decrease  Description: Pain level will decrease  Outcome: Ongoing  Goal: Control of acute pain  Description: Control of acute pain  Outcome: Ongoing  Goal: Control of chronic pain  Description: Control of chronic pain  Outcome: Ongoing     Problem: Falls - Risk of:  Goal: Will remain free from falls  Description: Will remain free from falls  Outcome: Ongoing  Goal: Absence of physical injury  Description: Absence of physical injury  Outcome: Ongoing     Problem: KNOWLEDGE DEFICIT  Goal: Patient/S.O. demonstrates understanding of disease process, treatment plan, medications, and discharge instructions.   Outcome: Ongoing     Problem: DISCHARGE BARRIERS  Goal: Patient's continuum of care needs are met  Outcome: Ongoing     Problem: Mental Status - Impaired:  Goal: Mental status will improve  Description: Mental status will improve  Outcome: Ongoing

## 2020-06-07 NOTE — PROGRESS NOTES
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    Type 2 diabetes mellitus with complication, with long-term current use of insulin (HCC) E11.8, Z79.4    SOB (shortness of breath) R06.02    Portal vein thrombosis I81    Intractable nausea and vomiting R11.2    Abdominal pain R10.9    Acute GI bleeding K92.2    Chronic hepatitis C without hepatic coma (HCC) B18.2    Delayed gastric emptying K30    Restless legs G25.81    Acute colitis K52.9    Acute encephalopathy G93.40    S/P TIPS (transjugular intrahepatic portosystemic shunt) Z95.828    Other cirrhosis of liver (HCC) K74.69    Acute upper GI bleed K92.2    Acute hepatic encephalopathy K72.00    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 Hepatic cirrhosis     Ammonia 94 today, was 161 on admission    Continue Viread  Meld Score 11  AFP 5/23/2020 Normal    Recommendations:  Low NA diet  Continue Xifaxan  Increase Lactulose goal 3-4 BM's a day  NO narcotic, most likely exacerbating HE   Low NA diet   Can take Tylenol, up to 2000 mg a day for pain  Restart Aldactone 25 mg, usually takes 50 mg daily & Lasix 20 mg for bilateral leg edema. Was held for Cr    Discussed plan of care with patient     Patient clinical, biochemical, and radiological information discussed with Dr. Reyes Payan. He agrees with the assessment and plan.      Levell [facility], [address] [facility]  6/7/2020

## 2020-06-07 NOTE — PROGRESS NOTES
[facility] PROGRESS NOTE  Date: 6/7/2020   Name: Shireen Wu   MRN: [id number]   YOB: 1960  Chief Complaint   Patient presents with    Altered Mental Status     ongoing1- days. per , usually due to elevated ammonia levels        Subjective/Interval Hx:     -She feels she is doing better, but has not had a BM, and she feels her abdomen is getting hicks    ROS: negative unless mentioned above  Objective:   Physical Exam:   /64   Pulse 72   Temp 98.1 °F (36.7 °C) (Oral)   Resp 18   Ht 4' 9\" (1.448 m)   Wt 194 lb 10.7 oz (88.3 kg)   SpO2 92%   BMI 42.13 kg/m²   CONSTITUTIONAL:  No acute distress  HENT:  NCAT  LUNGS:  cta b/l bs  CARDIOVASCULAR:  s1s2 rrr  ABDOMEN:  Soft nontender, slight distension  MUSCULOSKELETAL/Extremities:  nontender  NEUROLOGIC:  No gross deficits, aao to person, place  SKIN:  Has bruising on lower abdomen    Assessment/Plan:     1. Hepatic encephalopathy  Ammonia:161  Mental status appears improved. On lactulose and rifaximin. Recheck ammonia. Abdominal ultrasound: TIPS is patent; hepatic cirrhosis. Urinalysis unremarkable. GI recommends stopping all oxycodone as it may precipitate encephalopathy. GI recommends acetaminophen up to 2 g a day for pain.  -Ammonia better. Still has not had a BM. Restart Aldactone and Lasix. 2. CKD3 3  Creat 1.3 (baseline 1.1)  Received IVF in ED  -restarted Lasix and Aldactone. Aldactone at lower dose. Monitor creatinine.        Chronic  - COPD- albuterol PRN. - chronic HCV-  cirrhosis of liver due to alcohol & HCVhepatitis C, s/p TIPS for PVT  - CAD- Continue ranolazine.  - Depression/anxiety- continue fluoxetine, buspirone.   - DM- Continue lantus, insulin sliding scale with hypoglycemia protocol  - PAF- Cont BB with parameters (not on anticoagulation)  - Schizophrenia- continue Seroquel  - h/o Hepatic encephalopathy- continue rifaximin, lactulose, Lasix, spironolactone  - HCV- Cont On viread  - CHF- not in exacerbation; no recent echo; takes lasix and spironolactone  - hypotension- cont midodrine for SBP < 110       DVT Prophylaxis: Hold Lovenox for possible procedure. Diet: DIET CARB CONTROL; Carb Control: 4 carb choices (60 gms)/meal; Low Sodium (2 GM)  Code Status: Full Code      Dispo:  -Possible DC tomorrow if ammonia continues to improve. Zeina Lui MD  6/7/2020    Meds:   Meds:    lactulose  30 g Oral 6 times per day    spironolactone  25 mg Oral Dinner    sodium chloride flush  10 mL Intravenous 2 times per day    pantoprazole  40 mg Oral QAM AC    insulin lispro  0-6 Units Subcutaneous TID WC    insulin lispro  0-3 Units Subcutaneous Nightly    FLUoxetine  40 mg Oral Daily    insulin glargine  20 Units Subcutaneous Nightly    metoprolol succinate  50 mg Oral Daily    QUEtiapine  150 mg Oral Nightly    ranolazine  500 mg Oral BID    rifaximin  550 mg Oral BID    tenofovir disoproxil fumarate  300 mg Oral Daily    midodrine  10 mg Oral TID WC      Infusions:    dextrose       PRN Meds: sodium chloride flush, 10 mL, PRN  polyethylene glycol, 17 g, Daily PRN  promethazine, 12.5 mg, Q6H PRN    Or  ondansetron, 4 mg, Q6H PRN  glucose, 15 g, PRN  dextrose, 12.5 g, PRN  glucagon (rDNA), 1 mg, PRN  dextrose, 100 mL/hr, PRN        Data/Labs:     Recent Labs     06/05/20 1145 06/05/20 1944   WBC 4.8 4.3   HGB 11.7* 11.8*   HCT 34.8* 36.7*   PLT 81* 85*      Recent Labs     06/05/20 1944 06/06/20 1617 06/07/20  0409    138 141   K 4.3 4.4 4.2    105 108   CO2 22 23 25   BUN 18 16 16   CREATININE 1.2* 1.3* 1.3*     Recent Labs     06/05/20 1145 06/06/20 1617 06/07/20  0409   AST 15 21 15   ALT 12 14 12   BILITOT 1.1* 1.1* 1.1*   ALKPHOS 98 105 92     Recent Labs     06/05/20 1944 06/06/20 1617 06/07/20  0409   INR 1.20 1.14 1.28     No results for input(s): CKTOTAL, CKMB, CKMBINDEX, TROPONINT in the last 72 hours.   HgBA1c:   Lab Results   Component Value Date    LABA1C 6.6 05/27/2020     CALCIUM:  9.0/25 (06/07 0409)    I/O last 3 completed shifts:   In: 1160 [P.O.:1160]  Out: 0     Intake/Output Summary (Last 24 hours) at 6/7/2020 1313  Last data filed at 6/6/2020 1914  Gross per 24 hour   Intake 1160 ml   Output 0 ml   Net 1160 ml

## 2020-06-08 VITALS
DIASTOLIC BLOOD PRESSURE: 73 MMHG | RESPIRATION RATE: 17 BRPM | BODY MASS INDEX: 41.38 KG/M2 | SYSTOLIC BLOOD PRESSURE: 108 MMHG | OXYGEN SATURATION: 96 % | WEIGHT: 191.8 LBS | TEMPERATURE: 98.5 F | HEIGHT: 57 IN | HEART RATE: 72 BPM

## 2020-06-08 LAB
ALBUMIN SERPL-MCNC: 3 GM/DL (ref 3.4–5)
ALP BLD-CCNC: 101 IU/L (ref 40–128)
ALT SERPL-CCNC: 12 U/L (ref 10–40)
AMMONIA: 76 UMOL/L (ref 11–51)
ANION GAP SERPL CALCULATED.3IONS-SCNC: 7 MMOL/L (ref 4–16)
AST SERPL-CCNC: 16 IU/L (ref 15–37)
BILIRUB SERPL-MCNC: 0.9 MG/DL (ref 0–1)
BUN BLDV-MCNC: 13 MG/DL (ref 6–23)
CALCIUM SERPL-MCNC: 9.3 MG/DL (ref 8.3–10.6)
CHLORIDE BLD-SCNC: 106 MMOL/L (ref 99–110)
CO2: 22 MMOL/L (ref 21–32)
CREAT SERPL-MCNC: 1.2 MG/DL (ref 0.6–1.1)
GFR AFRICAN AMERICAN: 56 ML/MIN/1.73M2
GFR NON-AFRICAN AMERICAN: 46 ML/MIN/1.73M2
GLUCOSE BLD-MCNC: 127 MG/DL (ref 70–99)
GLUCOSE BLD-MCNC: 128 MG/DL (ref 70–99)
GLUCOSE BLD-MCNC: 136 MG/DL (ref 70–99)
GLUCOSE BLD-MCNC: 182 MG/DL (ref 70–99)
POTASSIUM SERPL-SCNC: 4.2 MMOL/L (ref 3.5–5.1)
SODIUM BLD-SCNC: 135 MMOL/L (ref 135–145)
TOTAL PROTEIN: 6 GM/DL (ref 6.4–8.2)

## 2020-06-08 PROCEDURE — 6370000000 HC RX 637 (ALT 250 FOR IP): Performed by: NURSE PRACTITIONER

## 2020-06-08 PROCEDURE — 2580000003 HC RX 258: Performed by: NURSE PRACTITIONER

## 2020-06-08 PROCEDURE — 82140 ASSAY OF AMMONIA: CPT

## 2020-06-08 PROCEDURE — 6370000000 HC RX 637 (ALT 250 FOR IP): Performed by: HOSPITALIST

## 2020-06-08 PROCEDURE — 80053 COMPREHEN METABOLIC PANEL: CPT

## 2020-06-08 PROCEDURE — G0378 HOSPITAL OBSERVATION PER HR: HCPCS

## 2020-06-08 PROCEDURE — 82962 GLUCOSE BLOOD TEST: CPT

## 2020-06-08 PROCEDURE — 36415 COLL VENOUS BLD VENIPUNCTURE: CPT

## 2020-06-08 RX ORDER — SPIRONOLACTONE 50 MG/1
50 TABLET, FILM COATED ORAL
Status: DISCONTINUED | OUTPATIENT
Start: 2020-06-08 | End: 2020-06-08 | Stop reason: HOSPADM

## 2020-06-08 RX ADMIN — FUROSEMIDE 20 MG: 20 TABLET ORAL at 08:48

## 2020-06-08 RX ADMIN — FLUOXETINE 40 MG: 20 CAPSULE ORAL at 08:48

## 2020-06-08 RX ADMIN — MIDODRINE HYDROCHLORIDE 10 MG: 5 TABLET ORAL at 11:55

## 2020-06-08 RX ADMIN — LACTULOSE 30 G: 10 SOLUTION ORAL at 08:48

## 2020-06-08 RX ADMIN — LACTULOSE 30 G: 10 SOLUTION ORAL at 11:55

## 2020-06-08 RX ADMIN — SODIUM CHLORIDE, PRESERVATIVE FREE 10 ML: 5 INJECTION INTRAVENOUS at 08:52

## 2020-06-08 RX ADMIN — LACTULOSE 30 G: 10 SOLUTION ORAL at 00:42

## 2020-06-08 RX ADMIN — RIFAXIMIN 550 MG: 550 TABLET ORAL at 08:48

## 2020-06-08 RX ADMIN — TENOFOVIR DISOPROXIL FUMARATE 300 MG: 300 TABLET, COATED ORAL at 08:50

## 2020-06-08 RX ADMIN — RANOLAZINE 500 MG: 500 TABLET, FILM COATED, EXTENDED RELEASE ORAL at 08:47

## 2020-06-08 RX ADMIN — PANTOPRAZOLE SODIUM 40 MG: 40 TABLET, DELAYED RELEASE ORAL at 06:22

## 2020-06-08 RX ADMIN — LACTULOSE 30 G: 10 SOLUTION ORAL at 04:30

## 2020-06-08 RX ADMIN — MIDODRINE HYDROCHLORIDE 10 MG: 5 TABLET ORAL at 08:48

## 2020-06-08 ASSESSMENT — PAIN DESCRIPTION - PROGRESSION: CLINICAL_PROGRESSION: NOT CHANGED

## 2020-06-08 ASSESSMENT — PAIN DESCRIPTION - LOCATION: LOCATION: BACK

## 2020-06-08 ASSESSMENT — PAIN SCALES - GENERAL
PAINLEVEL_OUTOF10: 5
PAINLEVEL_OUTOF10: 0

## 2020-06-08 ASSESSMENT — PAIN DESCRIPTION - FREQUENCY: FREQUENCY: CONTINUOUS

## 2020-06-08 ASSESSMENT — PAIN DESCRIPTION - PAIN TYPE: TYPE: CHRONIC PAIN

## 2020-06-08 ASSESSMENT — PAIN DESCRIPTION - ONSET: ONSET: ON-GOING

## 2020-06-08 ASSESSMENT — PAIN DESCRIPTION - ORIENTATION: ORIENTATION: LOWER

## 2020-06-08 ASSESSMENT — PAIN DESCRIPTION - DESCRIPTORS: DESCRIPTORS: DULL;ACHING

## 2020-06-08 NOTE — PROGRESS NOTES
[facility] Gastroenterology        Progress Note       2020  11:40 AM    Patient:    Lindsey Torres  : 1960   64 y.o. MRN: [id number]  Admitted: 2020 11:14 AM ATT: Flower Cristobal MD   [id number]  AdmitDx: Hepatic encephalopathy ([address] [K72.90]  Confusion [R41.0]  Increased ammonia level [R79.89]  PCP: Boom Fernandes MD    SUBJECTIVE:  Chart reviewed, events noted  Patient feeling well. Patient reports 6 BM's overnight. Denies abdominal pain. Tolerating diet well. Ambulated in hallway yesterday.    ROS:  The positive ROS will be identified in bold     CONSTITUTIONAL:  Neg  Weight loss, fatigue, fever  MOUTH/THROAT:  Neg  Bleeding gums, hoarseness or sore throat  RESPIRATORY:   Neg SOB, wheeze, cough, hemoptysis or bronchitis  CARDIOVASCULAR:  Neg Chest pain, palpitations, dyspnea on exertion, edema  GASTROINTESTINAL:  SEE HPI  HEMATOLOGIC/LYMPHATIC:  Neg  Anemia, bleeding tendency  MUSCULOSKELETAL: Neg  New myalgias, joint pain, swelling or stiffness  NEUROLOGICAL:  Neg  Loss of Consciousness, memory loss, forgetfulness, periods of confusion, difficulty concentrating, seizures, insomnia, aphasia    SKIN:  Neg No itching, rashes, or sores  PSYCHIATRIC:  Neg Depression, personality changes, anxiety    OBJECTIVE:      /73   Pulse 72   Temp 98.5 °F (36.9 °C) (Oral)   Resp 17   Ht 4' 9\" (1.448 m)   Wt 191 lb 12.8 oz (87 kg)   SpO2 96%   BMI 41.51 kg/m²     NAD, appears comfortable  Lips and mucous membranes pink and moist  RRR, Nl s1s2  Lungs CTA bilaterally, respirations even and unlabored   Abdomen soft, ND, NT, bowel sounds normal  Skin pink, warm and dry  +1non-pitting edema bilateral lower extremities   AAOx3     CBC:   Recent Labs     20  1145 20  1944   WBC 4.8 4.3   HGB 11.7* 11.8*   PLT 81* 85*     BMP:    Recent Labs     20  1617 20  0409 20  0928    141 135   K 4.4 4.2 4.2   CL information discussed with Dr. Wendy Rock. He agrees with the assessment and plan. Aurelio Carmona CNP  6/8/2020  11:40 AM     Much better  Less encephalopathic    I have seen and examined this patient personally, and independently of the nurse practitioner. The plan was developed mutually at the time of the visit with the patient. Aurelio Carmona and myself have spoken with patient, nursing staff and provided written and verbal instructions .     The above note has been reviewed and I agree with the Assessment,  Diagnosis, and Treatment plan as suggested by Aurelio Carmona CNP      [address] [place] [address] gastroenterology

## 2020-06-16 ENCOUNTER — OFFICE VISIT (OUTPATIENT)
Dept: FAMILY MEDICINE CLINIC | Age: 60
End: 2020-06-16
Payer: COMMERCIAL

## 2020-06-16 VITALS
OXYGEN SATURATION: 97 % | SYSTOLIC BLOOD PRESSURE: 116 MMHG | BODY MASS INDEX: 39.82 KG/M2 | WEIGHT: 184 LBS | DIASTOLIC BLOOD PRESSURE: 80 MMHG | HEART RATE: 66 BPM

## 2020-06-16 PROCEDURE — 99215 OFFICE O/P EST HI 40 MIN: CPT | Performed by: FAMILY MEDICINE

## 2020-06-16 PROCEDURE — 1111F DSCHRG MED/CURRENT MED MERGE: CPT | Performed by: FAMILY MEDICINE

## 2020-06-16 ASSESSMENT — ENCOUNTER SYMPTOMS
WHEEZING: 0
SHORTNESS OF BREATH: 0
ABDOMINAL PAIN: 0
NAUSEA: 0
DIARRHEA: 0
CONSTIPATION: 0
COUGH: 0
ABDOMINAL DISTENTION: 0

## 2020-06-16 NOTE — PROGRESS NOTES
Post-Discharge Transitional Care Management Services or Hospital Follow Up      Claudio Chavira   YOB: 1960    Date of Office Visit:  6/16/2020  Date of Hospital Admission: 6/5/20  Date of Hospital Discharge: 6/8/20  Readmission Risk Score(high >=14%.  Medium >=10%):Readmission Risk Score: 32      Care management risk score Rising risk (score 2-5) and Complex Care (Scores >=6): 10     Non face to face  following discharge, date last encounter closed (first attempt may have been earlier): *No documented post hospital discharge outreach found in the last 14 days *No documented post hospital discharge outreach found in the last 14 days    Call initiated 2 business days of discharge: *No response recorded in the last 14 days     Patient Active Problem List   Diagnosis    Left arm pain    Type 2 diabetes mellitus with complication, with long-term current use of insulin ([address]    COPD, mild ([address]    Tobacco abuse    Angina effort    Abnormal nuclear cardiac imaging test    Lung nodule    Chest pain    Dyslipidemia    Pancolitis ([address]    Hematemesis without nausea    Alcoholic cirrhosis of liver without ascites ([address]    Thrombocytopenia ([address]    Periumbilical abdominal pain    History of colonic polyps    Abnormal findings on diagnostic imaging of abdomen    Nausea    Gastroesophageal reflux disease without esophagitis    Mixed hyperlipidemia    Vitamin D deficiency    Left carpal tunnel syndrome    Right carpal tunnel syndrome    Esophageal hypertension    Candida esophagitis ([address]    Colitis    Essential hypertension    GI bleed    Coronary-myocardial bridge    Type 2 diabetes mellitus with complication, with long-term current use of insulin (HCC)    SOB (shortness of breath)    Portal vein thrombosis    Intractable nausea and vomiting    Abdominal pain    Acute GI bleeding    Chronic hepatitis C without hepatic coma (HCC)    Delayed gastric emptying    Restless legs  Acute colitis    Acute encephalopathy    S/P TIPS (transjugular intrahepatic portosystemic shunt)    Other cirrhosis of liver (HCC)    Acute upper GI bleed    Acute hepatic encephalopathy    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  Tylenol [Acetaminophen] Itching, Nausea And Vomiting and Rash       Medications listed as ordered at the time of discharge from hospital   Norm Frost Medication Instructions CHILANGO:    Printed on:06/16/20 7417   Medication Information                      albuterol (PROVENTIL) (2.5 MG/3ML) 0.083% nebulizer solution  Take 3 mLs by nebulization every 6 hours             blood glucose monitor strips  Test 3 times a day & as needed for symptoms of irregular blood glucose. Please fill with what is covered my patients insurance. blood glucose monitor strips  Test  Bid times a day & as needed for symptoms of irregular blood glucose. Blood Glucose Monitoring Suppl (ACURA BLOOD GLUCOSE METER) w/Device KIT  Please check blood sugar 3 times daily.  Please fill with what is covered by the insurance             Compression Stockings MISC  by Does not apply route 20 - 30 mmh wear daily and take off at night  Thigh High             FLUoxetine (PROZAC) 40 MG capsule  Take 40 mg by mouth daily             furosemide (LASIX) 20 MG tablet  Take 20 mg by mouth daily              glucose monitoring kit (FREESTYLE) monitoring kit  1 kit by Does not apply route daily             Glucosource Lancets MISC  Use one 3-4 times to check blood sugar             insulin aspart (NOVOLOG FLEXPEN) 100 UNIT/ML injection pen  **Low Dose Correction Algorithm**Insulin lispro (HUMALOG)  3 TIMES DAILY WITH MEALSGlucose: Dose: No [id number] 2 [id number] 3 [id number] 4 [id number] 5 [id number] and above 6 Units             insulin glargine (LANTUS SOLOSTAR) 100 UNIT/ML injection pen  Inject 20 Units into the skin nightly             lactulose (CHRONULAC) 10 GM/15ML solution  Take 30 mLs by mouth 3 times daily             metoprolol succinate (TOPROL XL) 50 MG extended release tablet  Take 1 tablet by mouth daily             midodrine (PROAMATINE) 10 MG tablet  Take 1 tablet by mouth 3 times daily (with meals)             nitroGLYCERIN (NITROSTAT) 0.4 MG SL tablet  Place 1 tablet under the tongue every 5 minutes as needed for Chest pain             pantoprazole (PROTONIX) 40 MG tablet  Take 1 tablet by mouth 2 times daily (before meals)             QUEtiapine (SEROQUEL) 300 MG tablet  Take 150 mg by mouth nightly              ranolazine (RANEXA) 500 MG extended release tablet  Take 1 tablet by mouth 2 times daily             rifaximin (XIFAXAN) 550 MG tablet  Take 1 tablet by mouth 2 times daily             SOFT TOUCH LANCETS MISC  Please check blood sugar 3 times daily.  Please fill with what is covered by insurance             spironolactone (ALDACTONE) 50 MG tablet  Take 50 mg by mouth daily              UNABLE TO FIND  Rx for depends   Use 3-4  times daily             VIREAD 300 MG tablet  Take 300 mg by mouth daily                    Medications marked \"taking\" at this time  Outpatient Medications Marked as Taking for the 6/16/20 encounter (Office Visit) with Aleah Agee MD   Medication Sig Dispense Refill    midodrine (PROAMATINE) 10 MG tablet Take 1 tablet by mouth 3 times daily (with meals) 90 tablet 3    nitroGLYCERIN (NITROSTAT) 0.4 MG SL tablet Place 1 tablet under the tongue every 5 minutes as needed for Chest pain 25 tablet 3    ranolazine (RANEXA) 500 MG extended release tablet Take 1 tablet by mouth 2 times daily 60 tablet 5    Compression Stockings MISC by Does not apply route 20 - 30 mmh wear daily and take off at night  Thigh High 2 each 2    metoprolol

## 2020-06-29 NOTE — PATIENT INSTRUCTIONS
IV/LUCAS INSERTION



IV ACCESS RE-INSERTED ON L FOREARM G20. INTACT AND PATENT AND FLUSHING WELL. LUCAS CATH 
RE-INSERTED. INTACT AND PATENT AND DRAINING WITH CLEAR YELLOW URINE. OLD IV ACCESS ON R 
FOREARM WAS REMOVED. PROVIDED INFILTRATION CARE. prolong abstinence from smoking. But whatever you and your doctor decide on these matters, it will still be you who decides when an how to quit. Here are some techniques:   Switch Brands   Switch to a brand you find distasteful. Change to a brand that is low in tar and nicotine a couple of weeks before your target quit date. This will help change your smoking behavior. However, do not smoke more cigarettes, inhale them more often or more deeply, or place your fingertips over the holes in the filters. All of these actions will increase your nicotine intake, and the idea is to get your body used to functioning without nicotine. Cut Down the Number of Cigarettes You Smoke   Smoke only half of each cigarette. Each day, postpone the lighting of your first cigarette by one hour. Decide you'll only smoke during odd or even hours of the day. Decide beforehand how many cigarettes you'll smoke during the day. For each additional cigarette, give a dollar to your favorite mansoor. Change your eating habits to help you cut down. For example, drink milk, which many people consider incompatible with smoking. End meals or snacks with something that won't lead to a cigarette. Reach for a glass of juice instead of a cigarette for a \"pick-me-up. \"   Remember: Cutting down can help you quit, but it's not a substitute for quitting. If you're down to about seven cigarettes a day, it's time to set your target quit date, and get ready to stick to it. Don't Smoke \"Automatically\"   Smoke only those cigarettes you really want. Catch yourself before you light up a cigarette out of pure habit. Don't empty your ashtrays. This will remind you of how many cigarettes you've smoked each day, and the sight and the smell of stale cigarettes butts will be very unpleasant. Make yourself aware of each cigarette by using the opposite hand or putting cigarettes in an unfamiliar location or a different pocket to break the automatic reach. programs online, like the [employer]'s [place] from Smoking . Immediately After Quitting   Develop a clean, fresh, nonsmoking environment around yourselfat work and at home. Buy yourself flowersyou may be surprised how much you can enjoy their scent now. The first few days after you quit, spend as much free time as possible in places where smoking isn't allowed, such as [address] [place] [address], museums, theaters, department stores, and churches. Drink large quantities of water and fruit juice (but avoid sodas that contain caffeine). Try to avoid alcohol, coffee, and other beverages that you associate with cigarette smoking. Strike up conversation instead of a match for a cigarette. If you miss the sensation of having a cigarette in your hand, play with something elsea pencil, a paper clip, a marble. If you miss having something in your mouth, try toothpicks or a fake cigarette.

## 2020-08-02 ENCOUNTER — HOSPITAL ENCOUNTER (INPATIENT)
Age: 60
LOS: 4 days | Discharge: HOME | DRG: 433 | End: 2020-08-06
Attending: EMERGENCY MEDICINE | Admitting: INTERNAL MEDICINE
Payer: MEDICARE

## 2020-08-02 ENCOUNTER — APPOINTMENT (OUTPATIENT)
Dept: CT IMAGING | Age: 60
DRG: 433 | End: 2020-08-02
Payer: MEDICARE

## 2020-08-02 ENCOUNTER — APPOINTMENT (OUTPATIENT)
Dept: GENERAL RADIOLOGY | Age: 60
DRG: 433 | End: 2020-08-02
Payer: MEDICARE

## 2020-08-02 LAB
ALBUMIN SERPL-MCNC: 3.9 GM/DL (ref 3.4–5)
ALP BLD-CCNC: 108 IU/L (ref 40–129)
ALT SERPL-CCNC: 13 U/L (ref 10–40)
AMMONIA: 155 UMOL/L (ref 11–51)
ANION GAP SERPL CALCULATED.3IONS-SCNC: 11 MMOL/L (ref 4–16)
AST SERPL-CCNC: 19 IU/L (ref 15–37)
BACTERIA: NEGATIVE /HPF
BASOPHILS ABSOLUTE: 0.1 K/CU MM
BASOPHILS RELATIVE PERCENT: 0.9 % (ref 0–1)
BILIRUB SERPL-MCNC: 1.4 MG/DL (ref 0–1)
BILIRUBIN URINE: NEGATIVE MG/DL
BLOOD, URINE: NEGATIVE
BUN BLDV-MCNC: 16 MG/DL (ref 6–23)
CALCIUM SERPL-MCNC: 9.3 MG/DL (ref 8.3–10.6)
CHLORIDE BLD-SCNC: 102 MMOL/L (ref 99–110)
CLARITY: CLEAR
CO2: 22 MMOL/L (ref 21–32)
COLOR: YELLOW
CREAT SERPL-MCNC: 1.2 MG/DL (ref 0.6–1.1)
DIFFERENTIAL TYPE: ABNORMAL
EOSINOPHILS ABSOLUTE: 0.1 K/CU MM
EOSINOPHILS RELATIVE PERCENT: 1.9 % (ref 0–3)
GFR AFRICAN AMERICAN: 56 ML/MIN/1.73M2
GFR NON-AFRICAN AMERICAN: 46 ML/MIN/1.73M2
GLUCOSE BLD-MCNC: 147 MG/DL (ref 70–99)
GLUCOSE BLD-MCNC: 202 MG/DL (ref 70–99)
GLUCOSE, URINE: NEGATIVE MG/DL
HCT VFR BLD CALC: 39.3 % (ref 37–47)
HEMOGLOBIN: 13.6 GM/DL (ref 12.5–16)
IMMATURE NEUTROPHIL %: 0.3 % (ref 0–0.43)
INR BLD: 1.11 INDEX
KETONES, URINE: NEGATIVE MG/DL
LACTATE: 1.4 MMOL/L (ref 0.4–2)
LEUKOCYTE ESTERASE, URINE: NEGATIVE
LYMPHOCYTES ABSOLUTE: 1.8 K/CU MM
LYMPHOCYTES RELATIVE PERCENT: 26.3 % (ref 24–44)
MAGNESIUM: 2.1 MG/DL (ref 1.8–2.4)
MCH RBC QN AUTO: 31.6 PG (ref 27–31)
MCHC RBC AUTO-ENTMCNC: 34.6 % (ref 32–36)
MCV RBC AUTO: 91.2 FL (ref 78–100)
MONOCYTES ABSOLUTE: 0.5 K/CU MM
MONOCYTES RELATIVE PERCENT: 6.9 % (ref 0–4)
NITRITE URINE, QUANTITATIVE: NEGATIVE
NUCLEATED RBC %: 0 %
PDW BLD-RTO: 14.2 % (ref 11.7–14.9)
PH, URINE: 6 (ref 5–8)
PLATELET # BLD: 100 K/CU MM (ref 140–440)
PMV BLD AUTO: 11.9 FL (ref 7.5–11.1)
POTASSIUM SERPL-SCNC: 4.1 MMOL/L (ref 3.5–5.1)
PROTEIN UA: NEGATIVE MG/DL
PROTHROMBIN TIME: 13.4 SECONDS (ref 11.7–14.5)
RBC # BLD: 4.31 M/CU MM (ref 4.2–5.4)
RBC URINE: ABNORMAL /HPF (ref 0–6)
SEGMENTED NEUTROPHILS ABSOLUTE COUNT: 4.3 K/CU MM
SEGMENTED NEUTROPHILS RELATIVE PERCENT: 63.7 % (ref 36–66)
SODIUM BLD-SCNC: 135 MMOL/L (ref 135–145)
SPECIFIC GRAVITY UA: 1.02 (ref 1–1.03)
SQUAMOUS EPITHELIAL: 4 /HPF
TOTAL IMMATURE NEUTOROPHIL: 0.02 K/CU MM
TOTAL NUCLEATED RBC: 0 K/CU MM
TOTAL PROTEIN: 8 GM/DL (ref 6.4–8.2)
TRICHOMONAS: ABNORMAL /HPF
TROPONIN T: <0.01 NG/ML
UROBILINOGEN, URINE: 2 MG/DL (ref 0.2–1)
WBC # BLD: 6.8 K/CU MM (ref 4–10.5)
WBC UA: ABNORMAL /HPF (ref 0–5)

## 2020-08-02 PROCEDURE — 81001 URINALYSIS AUTO W/SCOPE: CPT

## 2020-08-02 PROCEDURE — 82962 GLUCOSE BLOOD TEST: CPT

## 2020-08-02 PROCEDURE — 85610 PROTHROMBIN TIME: CPT

## 2020-08-02 PROCEDURE — 70450 CT HEAD/BRAIN W/O DYE: CPT

## 2020-08-02 PROCEDURE — 80053 COMPREHEN METABOLIC PANEL: CPT

## 2020-08-02 PROCEDURE — 1200000000 HC SEMI PRIVATE

## 2020-08-02 PROCEDURE — 99285 EMERGENCY DEPT VISIT HI MDM: CPT

## 2020-08-02 PROCEDURE — 2580000003 HC RX 258: Performed by: EMERGENCY MEDICINE

## 2020-08-02 PROCEDURE — 82140 ASSAY OF AMMONIA: CPT

## 2020-08-02 PROCEDURE — 6370000000 HC RX 637 (ALT 250 FOR IP): Performed by: EMERGENCY MEDICINE

## 2020-08-02 PROCEDURE — 83735 ASSAY OF MAGNESIUM: CPT

## 2020-08-02 PROCEDURE — 84484 ASSAY OF TROPONIN QUANT: CPT

## 2020-08-02 PROCEDURE — 83605 ASSAY OF LACTIC ACID: CPT

## 2020-08-02 PROCEDURE — 36415 COLL VENOUS BLD VENIPUNCTURE: CPT

## 2020-08-02 PROCEDURE — 6370000000 HC RX 637 (ALT 250 FOR IP): Performed by: INTERNAL MEDICINE

## 2020-08-02 PROCEDURE — 93005 ELECTROCARDIOGRAM TRACING: CPT | Performed by: EMERGENCY MEDICINE

## 2020-08-02 PROCEDURE — 71045 X-RAY EXAM CHEST 1 VIEW: CPT

## 2020-08-02 PROCEDURE — 85025 COMPLETE CBC W/AUTO DIFF WBC: CPT

## 2020-08-02 RX ORDER — MIDODRINE HYDROCHLORIDE 5 MG/1
10 TABLET ORAL
Status: DISCONTINUED | OUTPATIENT
Start: 2020-08-03 | End: 2020-08-06 | Stop reason: HOSPADM

## 2020-08-02 RX ORDER — TENOFOVIR DISOPROXIL FUMARATE 300 MG/1
300 TABLET, FILM COATED ORAL DAILY
Status: DISCONTINUED | OUTPATIENT
Start: 2020-08-03 | End: 2020-08-06 | Stop reason: HOSPADM

## 2020-08-02 RX ORDER — FLUOXETINE HYDROCHLORIDE 20 MG/1
40 CAPSULE ORAL DAILY
Status: CANCELLED | OUTPATIENT
Start: 2020-08-02

## 2020-08-02 RX ORDER — INSULIN GLARGINE 100 [IU]/ML
15 INJECTION, SOLUTION SUBCUTANEOUS NIGHTLY
Status: DISCONTINUED | OUTPATIENT
Start: 2020-08-02 | End: 2020-08-06 | Stop reason: HOSPADM

## 2020-08-02 RX ORDER — RANOLAZINE 500 MG/1
500 TABLET, EXTENDED RELEASE ORAL 2 TIMES DAILY
Status: DISCONTINUED | OUTPATIENT
Start: 2020-08-02 | End: 2020-08-06 | Stop reason: HOSPADM

## 2020-08-02 RX ORDER — PROMETHAZINE HYDROCHLORIDE 25 MG/1
12.5 TABLET ORAL EVERY 6 HOURS PRN
Status: CANCELLED | OUTPATIENT
Start: 2020-08-02

## 2020-08-02 RX ORDER — SODIUM CHLORIDE 0.9 % (FLUSH) 0.9 %
10 SYRINGE (ML) INJECTION EVERY 12 HOURS SCHEDULED
Status: DISCONTINUED | OUTPATIENT
Start: 2020-08-02 | End: 2020-08-06 | Stop reason: HOSPADM

## 2020-08-02 RX ORDER — MAGNESIUM SULFATE IN WATER 40 MG/ML
2 INJECTION, SOLUTION INTRAVENOUS PRN
Status: DISCONTINUED | OUTPATIENT
Start: 2020-08-02 | End: 2020-08-06 | Stop reason: HOSPADM

## 2020-08-02 RX ORDER — PANTOPRAZOLE SODIUM 40 MG/1
40 TABLET, DELAYED RELEASE ORAL
Status: DISCONTINUED | OUTPATIENT
Start: 2020-08-03 | End: 2020-08-06 | Stop reason: HOSPADM

## 2020-08-02 RX ORDER — ALBUTEROL SULFATE 2.5 MG/3ML
2.5 SOLUTION RESPIRATORY (INHALATION) EVERY 6 HOURS
Status: DISCONTINUED | OUTPATIENT
Start: 2020-08-03 | End: 2020-08-06 | Stop reason: HOSPADM

## 2020-08-02 RX ORDER — SPIRONOLACTONE 50 MG/1
50 TABLET, FILM COATED ORAL DAILY
Status: DISCONTINUED | OUTPATIENT
Start: 2020-08-03 | End: 2020-08-06 | Stop reason: HOSPADM

## 2020-08-02 RX ORDER — LACTULOSE 10 G/15ML
20 SOLUTION ORAL 3 TIMES DAILY
Status: DISCONTINUED | OUTPATIENT
Start: 2020-08-03 | End: 2020-08-04

## 2020-08-02 RX ORDER — 0.9 % SODIUM CHLORIDE 0.9 %
1000 INTRAVENOUS SOLUTION INTRAVENOUS ONCE
Status: COMPLETED | OUTPATIENT
Start: 2020-08-02 | End: 2020-08-03

## 2020-08-02 RX ORDER — SODIUM CHLORIDE 0.9 % (FLUSH) 0.9 %
10 SYRINGE (ML) INJECTION PRN
Status: DISCONTINUED | OUTPATIENT
Start: 2020-08-02 | End: 2020-08-06 | Stop reason: HOSPADM

## 2020-08-02 RX ORDER — METOPROLOL SUCCINATE 50 MG/1
50 TABLET, EXTENDED RELEASE ORAL DAILY
Status: DISCONTINUED | OUTPATIENT
Start: 2020-08-03 | End: 2020-08-06 | Stop reason: HOSPADM

## 2020-08-02 RX ORDER — LACTULOSE 10 G/15ML
20 SOLUTION ORAL
Status: DISCONTINUED | OUTPATIENT
Start: 2020-08-02 | End: 2020-08-02

## 2020-08-02 RX ORDER — FUROSEMIDE 20 MG/1
20 TABLET ORAL DAILY
Status: DISCONTINUED | OUTPATIENT
Start: 2020-08-03 | End: 2020-08-06 | Stop reason: HOSPADM

## 2020-08-02 RX ORDER — ONDANSETRON 2 MG/ML
4 INJECTION INTRAMUSCULAR; INTRAVENOUS EVERY 6 HOURS PRN
Status: CANCELLED | OUTPATIENT
Start: 2020-08-02

## 2020-08-02 RX ADMIN — INSULIN GLARGINE 15 UNITS: 100 INJECTION, SOLUTION SUBCUTANEOUS at 23:41

## 2020-08-02 RX ADMIN — RANOLAZINE 500 MG: 500 TABLET, FILM COATED, EXTENDED RELEASE ORAL at 23:41

## 2020-08-02 RX ADMIN — SODIUM CHLORIDE 1000 ML: 9 INJECTION, SOLUTION INTRAVENOUS at 18:00

## 2020-08-02 RX ADMIN — LACTULOSE 20 G: 10 SOLUTION ORAL at 19:17

## 2020-08-02 RX ADMIN — RIFAXIMIN 550 MG: 550 TABLET ORAL at 23:41

## 2020-08-02 ASSESSMENT — PAIN DESCRIPTION - LOCATION: LOCATION: HEAD

## 2020-08-02 ASSESSMENT — PAIN DESCRIPTION - PAIN TYPE: TYPE: ACUTE PAIN

## 2020-08-02 ASSESSMENT — PAIN SCALES - GENERAL: PAINLEVEL_OUTOF10: 6

## 2020-08-02 ASSESSMENT — PAIN - FUNCTIONAL ASSESSMENT: PAIN_FUNCTIONAL_ASSESSMENT: ACTIVITIES ARE NOT PREVENTED

## 2020-08-02 ASSESSMENT — PAIN DESCRIPTION - ORIENTATION: ORIENTATION: OUTER;ANTERIOR

## 2020-08-02 ASSESSMENT — PAIN DESCRIPTION - PROGRESSION: CLINICAL_PROGRESSION: GRADUALLY WORSENING

## 2020-08-02 ASSESSMENT — PAIN DESCRIPTION - DESCRIPTORS: DESCRIPTORS: HEADACHE

## 2020-08-02 ASSESSMENT — PAIN DESCRIPTION - FREQUENCY: FREQUENCY: INTERMITTENT

## 2020-08-02 ASSESSMENT — PAIN DESCRIPTION - ONSET: ONSET: GRADUAL

## 2020-08-02 NOTE — ED PROVIDER NOTES
Emergency Department Encounter    Patient: Maciej Saul  MRN: [id number]  : 1960  Date of Evaluation: 2020  ED Provider:  Emeli Lincolnison      Triage Chief Complaint:   Altered Mental Status and Blood Work (Ammonia level)      [place]:  Maciej Saul is a 64 y.o. female that presents to the emergency department for evaluation of confusion. She is accompanied by family who is at bedside. Patient notes that she has cirrhosis of the liver secondary to chronic alcoholism. She no longer drinks. She has had multiple TIPS procedures done at the [facility]. Over the past couple days, patient has noticed confusion. She states that she is having a hard time remembering things. She states that she knows what she wants to say, however, sometimes it sounds like it does not come out correctly. She has had the similar symptoms in the past when her ammonia is high. She is concerned that her ammonia level may be high once again. She denies facial droop, slurred speech, aphasia, dysphasia. Denies syncope, dizziness, lightheadedness, numbness, tingling, weakness, paresthesias, focal deficits. Denies double vision, blurry vision, change in vision. Denies any tinnitus, buzzing, ringing in her ears. Denies falls, head trauma, neck trauma. Denies any neck pain or stiffness. Denies chest pain, shortness of breath, pleuritic pain. Denies abdominal pain, nausea, vomiting, diarrhea, constipation, hematochezia, melena, dysuria, hematuria. Denies any changes in medications or new medical diagnoses.   Denies additional precipitating, alleviating factors    ROS:  General:  No fevers, no chills, no weakness  Eyes:  No recent vison changes, no discharge  ENT:  No sore throat, no nasal congestion, no hearing changes  Cardiovascular:  No chest pain, no palpitations  Respiratory:  No shortness of breath, no cough, no wheezing  Gastrointestinal:  No pain, no nausea, no vomiting, no diarrhea  Musculoskeletal:  CARPAL TUNNEL RELEASE Left 1/9/2020    CARPAL TUNNEL RELEASE LEFT performed by Jerry Haney DO at [place] Right 05/26/2020    dr Gaston Davies, carpal tunnel trigger finger release    CARPAL TUNNEL RELEASE Right 5/26/2020    RIGHT CARPAL TUNNEL RELEASE performed by Jerry Haney DO at [address]  per old chart done [address]  3/11/13    diverticulosis, cecal polyp    COLONOSCOPY  03/16/2017    Internal hemorrhoids- Dr. Jesús Ojeda, COLON, DIAGNOSTIC  03/16/2017    EGD: Small esophageal varices, portal hypertensive gastropathy, reflux esophagitis, hiatal hernia    EYE SURGERY Bilateral ? when    cyst removal, cataracts w/lens replacement    FINGER TRIGGER RELEASE Right 5/26/2020    FINGER TRIGGER RELEASE RIGHT RING FINGER performed by Jerry Haney DO at [facility]      per old chart pt had CHOLO/BSO 1986    TIPS PROCEDURE  04/23/2019    TONSILLECTOMY  as a kid    UPPER GASTROINTESTINAL ENDOSCOPY N/A 11/14/2018    EGD DIAGNOSTIC ONLY performed by Nehemias Valente MD at [address] N/A 6/5/2019    EGD DIAGNOSTIC ONLY performed by Nehemias Valente MD at [facility] History   Problem Relation Age of Onset    Cancer Mother         lung [facility] Arthritis Mother     Migraines Mother     Cancer Father         colon [facility] Diabetes Father     High Blood Pressure Father     Arthritis Father     High Cholesterol Father     Migraines Father     Migraines Sister     Heart Disease Brother         WPW     Social History     Socioeconomic History    Marital status:      Spouse name: Not on file    Number of children: Not on file    Years of education: Not on file    Highest education level: Not on file   Occupational History    Not on file   Social Needs    Financial resource strain: Not on file    Food insecurity     Worry: Not on file     Inability: Not on file   [facility] Stockings MISC by Does not apply route 20 - 30 mmh wear daily and take off at night  Thigh High 2 each 2    metoprolol succinate (TOPROL XL) 50 MG extended release tablet Take 1 tablet by mouth daily 30 tablet 3    insulin glargine (LANTUS SOLOSTAR) 100 UNIT/ML injection pen Inject 20 Units into the skin nightly 5 pen 3    insulin aspart (NOVOLOG FLEXPEN) 100 UNIT/ML injection pen **Low Dose Correction Algorithm**Insulin lispro (HUMALOG)  3 TIMES DAILY WITH MEALSGlucose: Dose: No [id number] 1 [id number] 2 [id number] 3 [id number] 4 [id number] 5 [id number] and above 6 Units 5 pen 3    blood glucose monitor strips Test  Bid times a day & as needed for symptoms of irregular blood glucose. 100 strip 5    UNABLE TO FIND Rx for depends   Use 3-4  times daily 1 box 5    albuterol (PROVENTIL) (2.5 MG/3ML) 0.083% nebulizer solution Take 3 mLs by nebulization every 6 hours 120 vial 5    lactulose (CHRONULAC) 10 GM/15ML solution Take 30 mLs by mouth 3 times daily 1892 mL 2    Glucosource Lancets MISC Use one 3-4 times to check blood sugar 100 each 5    glucose monitoring kit (FREESTYLE) monitoring kit 1 kit by Does not apply route daily 1 kit 0    Blood Glucose Monitoring Suppl (ACURA BLOOD GLUCOSE METER) w/Device KIT Please check blood sugar 3 times daily. Please fill with what is covered by the insurance 1 kit 0    blood glucose monitor strips Test 3 times a day & as needed for symptoms of irregular blood glucose. Please fill with what is covered my patients insurance. 50 strip 3    SOFT TOUCH LANCETS MISC Please check blood sugar 3 times daily.  Please fill with what is covered by insurance 100 each 3    pantoprazole (PROTONIX) 40 MG tablet Take 1 tablet by mouth 2 times daily (before meals) 60 tablet 1    rifaximin (XIFAXAN) 550 MG tablet Take 1 tablet by mouth 2 times daily 42 tablet 0    furosemide (LASIX) 20 MG tablet Take 20 mg by mouth daily       spironolactone (ALDACTONE) 50 MG tablet Take 50 mg by mouth daily       FLUoxetine (PROZAC) 40 MG capsule Take 40 mg by mouth daily      VIREAD 300 MG tablet Take 300 mg by mouth daily       QUEtiapine (SEROQUEL) 300 MG tablet Take 150 mg by mouth nightly        Allergies   Allergen Reactions    Norco [Hydrocodone-Acetaminophen] Itching     Itching, rash, nausea and vomiting.  Tylenol [Acetaminophen] Itching, Nausea And Vomiting and Rash       Nursing Notes Reviewed    Physical Exam:  Triage VS:    ED Triage Vitals [08/02/20 1512]   Enc Vitals Group      /80      Pulse 89      Resp 18      Temp 98.9 °F (37.2 °C)      Temp Source Oral      SpO2 96 %      Weight 160 lb (72.6 kg)      Height 4' 9.5\" (1.461 m)      Head Circumference       Peak Flow       Pain Score       Pain Loc       Pain Edu? Excl. in [address]? My pulse ox interpretation is - normal    General appearance: Patient is awake and alert. She is following commands and answering questions. GCS is 15. She is nontoxic in appearance. Skin:  Warm. Dry. Intact. No rash. Eye: Pupils are equal, round, reactive. Extraocular movements intact. No nystagmus. No gaze deviation. No scleral icterus. No conjunctival pallor. Head, ears, nose, mouth and throat: Head is normocephalic and atraumatic. No external masses or lesions. No nasal drainage. Pharynx is clear. Oral mucosa moist   Neck: Supple. No nuchal rigidity. No signs of meningismus. Trachea midline. No masses or thyromegaly. No JVD. No carotid thrills or bruits. Extremity:  No swelling. Normal ROM in all 4 extremities. Heart:  Regular rate and rhythm, normal S1 & S2, no extra heart sounds. Perfusion: Symmetric peripheral pulses  Respiratory:  Lungs clear to auscultation bilaterally. Respirations nonlabored. Abdominal:  Normal bowel sounds. Soft. Nontender. Non distended. Neurological: Patient is awake and alert. Sensation is intact to light touch and two-point discrimination.   Cranial nerve exam reveals face is symmetric, tongue is midline, speech is clear. No facial droop, slurred speech, aphasia. No dysmetria. No dysdiadochokinesis. No pronator drift. No cerebellar signs. NIH stroke scale score is 0.     I have reviewed and interpreted all of the currently available lab results from this visit (if applicable):  Results for orders placed or performed during the hospital encounter of 08/02/20   CBC auto diff   Result Value Ref Range    WBC 6.8 4.0 - 10.5 K/CU MM    RBC 4.31 4.2 - 5.4 M/CU MM    Hemoglobin 13.6 12.5 - 16.0 GM/DL    Hematocrit 39.3 37 - 47 %    MCV 91.2 78 - 100 FL    MCH 31.6 (H) 27 - 31 PG    MCHC 34.6 32.0 - 36.0 %    RDW 14.2 11.7 - 14.9 %    Platelets 285 (L) 166 - 440 K/CU MM    MPV 11.9 (H) 7.5 - 11.1 FL    Differential Type AUTOMATED DIFFERENTIAL     Segs Relative 63.7 36 - 66 %    Lymphocytes % 26.3 24 - 44 %    Monocytes % 6.9 (H) 0 - 4 %    Eosinophils % 1.9 0 - 3 %    Basophils % 0.9 0 - 1 %    Segs Absolute 4.3 K/CU MM    Lymphocytes Absolute 1.8 K/CU MM    Monocytes Absolute 0.5 K/CU MM    Eosinophils Absolute 0.1 K/CU MM    Basophils Absolute 0.1 K/CU MM    Nucleated RBC % 0.0 %    Total Nucleated RBC 0.0 K/CU MM    Total Immature Neutrophil 0.02 K/CU MM    Immature Neutrophil % 0.3 0 - 0.43 %   CMP   Result Value Ref Range    Sodium 135 135 - 145 MMOL/L    Potassium 4.1 3.5 - 5.1 MMOL/L    Chloride 102 99 - 110 mMol/L    CO2 22 21 - 32 MMOL/L    BUN 16 6 - 23 MG/DL    CREATININE 1.2 (H) 0.6 - 1.1 MG/DL    Glucose 147 (H) 70 - 99 MG/DL    Calcium 9.3 8.3 - 10.6 MG/DL    Alb 3.9 3.4 - 5.0 GM/DL    Total Protein 8.0 6.4 - 8.2 GM/DL    Total Bilirubin 1.4 (H) 0.0 - 1.0 MG/DL    ALT 13 10 - 40 U/L    AST 19 15 - 37 IU/L    Alkaline Phosphatase 108 40 - 129 IU/L    GFR Non- 46 (L) >60 mL/min/1.73m2    GFR  56 (L) >60 mL/min/1.73m2    Anion Gap 11 4 - 16   PT - INR   Result Value Ref Range    Protime 13.4 11.7 - 14.5 SECONDS    INR 1.11 INDEX   Lactic Acid, Plasma   Result Value Ref Range    Lactate 1.4 0.4 - 2.0 mMOL/L   Ammonia Level   Result Value Ref Range    Ammonia 155 (H) 11 - 51 UMOL/L   Troponin   Result Value Ref Range    Troponin T <0.010 <0.01 NG/ML   Urinalysis   Result Value Ref Range    Color, UA YELLOW YELLOW    Clarity, UA CLEAR CLEAR    Glucose, Urine NEGATIVE NEGATIVE MG/DL    Bilirubin Urine NEGATIVE NEGATIVE MG/DL    Ketones, Urine NEGATIVE NEGATIVE MG/DL    Specific Gravity, UA 1.019 1.001 - 1.035    Blood, Urine NEGATIVE NEGATIVE    pH, Urine 6.0 5.0 - 8.0    Protein, UA NEGATIVE NEGATIVE MG/DL    Urobilinogen, Urine 2.0 (H) 0.2 - 1.0 MG/DL    Nitrite Urine, Quantitative NEGATIVE NEGATIVE    Leukocyte Esterase, Urine NEGATIVE NEGATIVE    RBC, UA NONE SEEN 0 - 6 /HPF    WBC, UA NONE SEEN 0 - 5 /HPF    Bacteria, UA NEGATIVE NEGATIVE /HPF    Squam Epithel, UA 4 /HPF    Trichomonas, UA NONE SEEN NONE SEEN /HPF   EKG 12 Lead   Result Value Ref Range    Ventricular Rate 83 BPM    Atrial Rate 83 BPM    P-R Interval 138 ms    QRS Duration 78 ms    Q-T Interval 448 ms    QTc Calculation (Bazett) 526 ms    P Axis 53 degrees    R Axis 15 degrees    T Axis 36 degrees    Diagnosis       Normal sinus rhythm  Prolonged QT  Abnormal ECG  When compared with ECG of 06-MAY-2020 22:05,  No significant change was found        Radiographs (if obtained):  Radiologist's Report Reviewed:  Ct Head Wo Contrast    Result Date: 8/2/2020  EXAMINATION: CT OF THE HEAD WITHOUT CONTRAST  8/2/2020 6:43 pm TECHNIQUE: CT of the head was performed without the administration of intravenous contrast. Dose modulation, iterative reconstruction, and/or weight based adjustment of the mA/kV was utilized to reduce the radiation dose to as low as reasonably achievable. COMPARISON: CT head March 30, 2020 HISTORY: ORDERING SYSTEM PROVIDED HISTORY: confusion TECHNOLOGIST PROVIDED HISTORY: Reason for exam:->confusion Has a \"code stroke\" or \"stroke alert\" been called? ->No Reason for Exam: confusion FINDINGS: BRAIN/VENTRICLES: There is no acute intracranial hemorrhage, mass effect or midline shift. No abnormal extra-axial fluid collection. The gray-white differentiation is maintained without evidence of an acute infarct. There is no evidence of hydrocephalus. ORBITS: The visualized portion of the orbits demonstrate no acute abnormality. SINUSES: The visualized paranasal sinuses and mastoid air cells demonstrate no acute abnormality. SOFT TISSUES/SKULL:  No acute abnormality of the visualized skull or soft tissues. No acute intracranial abnormality. Xr Chest Portable    Result Date: 8/2/2020  EXAMINATION: ONE XRAY VIEW OF THE CHEST 8/2/2020 4:40 pm COMPARISON: Chest x-ray May 6, 2020; chest x-ray March 30, 2020 HISTORY: ORDERING SYSTEM PROVIDED HISTORY: Chest pain TECHNOLOGIST PROVIDED HISTORY: Reason for exam:->Chest pain FINDINGS: Cardiac silhouette appears stable. Right middle lobe opacities along the medial right heart border favored to reflect atelectasis. Mild chronic interstitial changes of the lungs appears stable. No pleural effusion or pneumothorax. Right upper quadrant surgical clips compatible with cholecystectomy. Osseous structures appear stable. 1. No acute cardiopulmonary process identified. EKG (if obtained): (All EKG's are interpreted by myself in the absence of a cardiologist). EKG demonstrates ventricular rate of 83 bpm sinus rhythm. No ST elevations or depressions. QTC is 526. QRS is within normal limits at 78. No signs of acute ischemia, infarct, high degree heart block. MDM:  Patient was seen and evaluated in the emergency department by myself. A thorough history and physical exam were performed, prior medical records reviewed. Upon arrival to the emergency department patient's vital signs were noted and were stable. Patient was connected to continuous cardiopulmonary monitoring.   Rhythm strip was interpreted by myself demonstrating sinus rhythm. EKG is obtained, interpreted by myself, as detailed above. Differential diagnoses and treatment plan were discussed. IV access was service and the patient was initiated normal saline. Pertinent labs were drawn and radiographic studies were performed. Once results were available, they were reviewed by myself. Labs demonstrate no leukocytosis, anemia. Patient is thrombocytopenic with a platelet count of 131. Electrolytes are all within normals. Creatinine is 1.2. AST and ALT are unremarkable. PT/INR is within normal Inspra lactate is 1.4. Ammonia is 155. Urine analysis is negative for urinary tract infection. Chest x-ray demonstrates no acute cardiopulmonary process. CT brain demonstrates no acute intracranial abnormality. Repeat evaluations, patient remains hemodynamically stable. Repeat neurologic exam is unremarkable. No focal or lateralizing neurologic deficits. Results of laboratory and radiographic data along with differential diagnosis and treatment plan were discussed with the patient. Patient presents with confusion. Symptoms concerning for metabolic encephalopathy likely from hyperammonemia. IV fluid is continued. Lactulose is given. We recommend admission. Patient is agreeable. Case is discussed with admitting hospitalist who is agreeable to treatment plan accepts admission. Patient is updated bedside. All questions were answered to satisfaction. Patient is admitted in stable condition      Clinical Impression:  1. Acute metabolic encephalopathy    2. Hyperammonemia (HCC)    3. QT prolongation        ED Provider Disposition Time  DISPOSITION Admitted 08/02/2020 07:55:00 PM      Comment: Please note this report has been produced using speech recognition software and may contain errors related to that system including errors in grammar, punctuation, and spelling, as well as words and phrases that may be inappropriate.   Efforts were made to edit the

## 2020-08-02 NOTE — ED NOTES
ED doc at the bedside. The sister of the patient is also a the bedside and sts the patient has been steadily getting more confused the past couple of days.       Cris Hurtado RN  08/02/20 5856

## 2020-08-02 NOTE — ED TRIAGE NOTES
Patient states that she has liver disease, Cirrhosis, and has been taking her medication, Lactulose. And she feels like she is becoming more and more confused. Patient is able to have a conversation.

## 2020-08-02 NOTE — ED NOTES
Hospitalist consult   [address] [place] [address] 7547.    Dr Kimber Salazar called back at [address]  08/02/20 [address]

## 2020-08-03 LAB
ALBUMIN SERPL-MCNC: 3.4 GM/DL (ref 3.4–5)
ALP BLD-CCNC: 81 IU/L (ref 40–128)
ALT SERPL-CCNC: 11 U/L (ref 10–40)
ANION GAP SERPL CALCULATED.3IONS-SCNC: 9 MMOL/L (ref 4–16)
AST SERPL-CCNC: 16 IU/L (ref 15–37)
BASOPHILS ABSOLUTE: 0.1 K/CU MM
BASOPHILS RELATIVE PERCENT: 1.4 % (ref 0–1)
BILIRUB SERPL-MCNC: 1.3 MG/DL (ref 0–1)
BUN BLDV-MCNC: 17 MG/DL (ref 6–23)
CALCIUM SERPL-MCNC: 8.9 MG/DL (ref 8.3–10.6)
CHLORIDE BLD-SCNC: 107 MMOL/L (ref 99–110)
CO2: 24 MMOL/L (ref 21–32)
CREAT SERPL-MCNC: 1.3 MG/DL (ref 0.6–1.1)
DIFFERENTIAL TYPE: ABNORMAL
EKG ATRIAL RATE: 83 BPM
EKG DIAGNOSIS: NORMAL
EKG P AXIS: 53 DEGREES
EKG P-R INTERVAL: 138 MS
EKG Q-T INTERVAL: 448 MS
EKG QRS DURATION: 78 MS
EKG QTC CALCULATION (BAZETT): 526 MS
EKG R AXIS: 15 DEGREES
EKG T AXIS: 36 DEGREES
EKG VENTRICULAR RATE: 83 BPM
EOSINOPHILS ABSOLUTE: 0.2 K/CU MM
EOSINOPHILS RELATIVE PERCENT: 4.4 % (ref 0–3)
GFR AFRICAN AMERICAN: 51 ML/MIN/1.73M2
GFR NON-AFRICAN AMERICAN: 42 ML/MIN/1.73M2
GLUCOSE BLD-MCNC: 105 MG/DL (ref 70–99)
GLUCOSE BLD-MCNC: 127 MG/DL (ref 70–99)
GLUCOSE BLD-MCNC: 135 MG/DL (ref 70–99)
GLUCOSE BLD-MCNC: 137 MG/DL (ref 70–99)
GLUCOSE BLD-MCNC: 142 MG/DL (ref 70–99)
HCT VFR BLD CALC: 36.4 % (ref 37–47)
HEMOGLOBIN: 12.4 GM/DL (ref 12.5–16)
IMMATURE NEUTROPHIL %: 0.2 % (ref 0–0.43)
LYMPHOCYTES ABSOLUTE: 1.8 K/CU MM
LYMPHOCYTES RELATIVE PERCENT: 36.8 % (ref 24–44)
MCH RBC QN AUTO: 31.5 PG (ref 27–31)
MCHC RBC AUTO-ENTMCNC: 34.1 % (ref 32–36)
MCV RBC AUTO: 92.4 FL (ref 78–100)
MONOCYTES ABSOLUTE: 0.5 K/CU MM
MONOCYTES RELATIVE PERCENT: 10.8 % (ref 0–4)
NUCLEATED RBC %: 0 %
PDW BLD-RTO: 14.1 % (ref 11.7–14.9)
PLATELET # BLD: 88 K/CU MM (ref 140–440)
PMV BLD AUTO: 11.9 FL (ref 7.5–11.1)
POTASSIUM SERPL-SCNC: 3.8 MMOL/L (ref 3.5–5.1)
RBC # BLD: 3.94 M/CU MM (ref 4.2–5.4)
SEGMENTED NEUTROPHILS ABSOLUTE COUNT: 2.3 K/CU MM
SEGMENTED NEUTROPHILS RELATIVE PERCENT: 46.4 % (ref 36–66)
SODIUM BLD-SCNC: 140 MMOL/L (ref 135–145)
TOTAL IMMATURE NEUTOROPHIL: 0.01 K/CU MM
TOTAL NUCLEATED RBC: 0 K/CU MM
TOTAL PROTEIN: 6.3 GM/DL (ref 6.4–8.2)
WBC # BLD: 5 K/CU MM (ref 4–10.5)

## 2020-08-03 PROCEDURE — 1200000000 HC SEMI PRIVATE

## 2020-08-03 PROCEDURE — 85025 COMPLETE CBC W/AUTO DIFF WBC: CPT

## 2020-08-03 PROCEDURE — 6370000000 HC RX 637 (ALT 250 FOR IP): Performed by: NURSE PRACTITIONER

## 2020-08-03 PROCEDURE — 82962 GLUCOSE BLOOD TEST: CPT

## 2020-08-03 PROCEDURE — 6370000000 HC RX 637 (ALT 250 FOR IP): Performed by: INTERNAL MEDICINE

## 2020-08-03 PROCEDURE — 80053 COMPREHEN METABOLIC PANEL: CPT

## 2020-08-03 PROCEDURE — 94640 AIRWAY INHALATION TREATMENT: CPT

## 2020-08-03 PROCEDURE — 94761 N-INVAS EAR/PLS OXIMETRY MLT: CPT

## 2020-08-03 PROCEDURE — 2580000003 HC RX 258: Performed by: INTERNAL MEDICINE

## 2020-08-03 PROCEDURE — 6360000002 HC RX W HCPCS: Performed by: INTERNAL MEDICINE

## 2020-08-03 PROCEDURE — 93010 ELECTROCARDIOGRAM REPORT: CPT | Performed by: INTERNAL MEDICINE

## 2020-08-03 RX ORDER — TRAMADOL HYDROCHLORIDE 50 MG/1
50 TABLET ORAL EVERY 6 HOURS PRN
Status: DISCONTINUED | OUTPATIENT
Start: 2020-08-03 | End: 2020-08-06 | Stop reason: HOSPADM

## 2020-08-03 RX ADMIN — ALBUTEROL SULFATE 2.5 MG: 2.5 SOLUTION RESPIRATORY (INHALATION) at 08:58

## 2020-08-03 RX ADMIN — PANTOPRAZOLE SODIUM 40 MG: 40 TABLET, DELAYED RELEASE ORAL at 16:27

## 2020-08-03 RX ADMIN — LACTULOSE 20 G: 10 SOLUTION ORAL at 21:33

## 2020-08-03 RX ADMIN — RIFAXIMIN 550 MG: 550 TABLET ORAL at 10:22

## 2020-08-03 RX ADMIN — FUROSEMIDE 20 MG: 20 TABLET ORAL at 10:22

## 2020-08-03 RX ADMIN — LACTULOSE 20 G: 10 SOLUTION ORAL at 06:07

## 2020-08-03 RX ADMIN — SODIUM CHLORIDE, PRESERVATIVE FREE 10 ML: 5 INJECTION INTRAVENOUS at 10:26

## 2020-08-03 RX ADMIN — RANOLAZINE 500 MG: 500 TABLET, FILM COATED, EXTENDED RELEASE ORAL at 21:33

## 2020-08-03 RX ADMIN — MIDODRINE HYDROCHLORIDE 10 MG: 5 TABLET ORAL at 12:18

## 2020-08-03 RX ADMIN — METOPROLOL SUCCINATE 50 MG: 50 TABLET, EXTENDED RELEASE ORAL at 10:22

## 2020-08-03 RX ADMIN — TRAMADOL HYDROCHLORIDE 50 MG: 50 TABLET, FILM COATED ORAL at 00:45

## 2020-08-03 RX ADMIN — RIFAXIMIN 550 MG: 550 TABLET ORAL at 21:33

## 2020-08-03 RX ADMIN — SPIRONOLACTONE 50 MG: 50 TABLET ORAL at 10:22

## 2020-08-03 RX ADMIN — MIDODRINE HYDROCHLORIDE 10 MG: 5 TABLET ORAL at 10:22

## 2020-08-03 RX ADMIN — LACTULOSE 20 G: 10 SOLUTION ORAL at 13:51

## 2020-08-03 RX ADMIN — MIDODRINE HYDROCHLORIDE 10 MG: 5 TABLET ORAL at 16:27

## 2020-08-03 RX ADMIN — ALBUTEROL SULFATE 2.5 MG: 2.5 SOLUTION RESPIRATORY (INHALATION) at 19:53

## 2020-08-03 RX ADMIN — TENOFOVIR DISOPROXIL FUMARATE 300 MG: 300 TABLET, COATED ORAL at 10:25

## 2020-08-03 RX ADMIN — PANTOPRAZOLE SODIUM 40 MG: 40 TABLET, DELAYED RELEASE ORAL at 06:07

## 2020-08-03 RX ADMIN — RANOLAZINE 500 MG: 500 TABLET, FILM COATED, EXTENDED RELEASE ORAL at 10:22

## 2020-08-03 RX ADMIN — SODIUM CHLORIDE, PRESERVATIVE FREE 10 ML: 5 INJECTION INTRAVENOUS at 21:33

## 2020-08-03 ASSESSMENT — PAIN DESCRIPTION - PROGRESSION: CLINICAL_PROGRESSION: GRADUALLY WORSENING

## 2020-08-03 ASSESSMENT — PAIN SCALES - GENERAL
PAINLEVEL_OUTOF10: 6
PAINLEVEL_OUTOF10: 0

## 2020-08-03 NOTE — ED NOTES
Pt changed into a gown, skin warm and dry, denies further needs at this time .      Mirlande Mendoza RN  08/02/20 2030

## 2020-08-03 NOTE — CARE COORDINATION
Reviewed chart and spoke with pt about discharge needs/plans. Pt lives with her Brother Varsha Al, PTA she was independent with ADL's and family provides transportation. Pt uses a cane as needed for mobility. Pt has a PCP and insurance that covers medications. Discussed HC but denies any needs at this time. CM will continue to follow for needs. Patients white board updated and  card provided to pt/family.

## 2020-08-04 ENCOUNTER — APPOINTMENT (OUTPATIENT)
Dept: ULTRASOUND IMAGING | Age: 60
DRG: 433 | End: 2020-08-04
Payer: MEDICARE

## 2020-08-04 LAB
ALBUMIN SERPL-MCNC: 3.4 GM/DL (ref 3.4–5)
ALP BLD-CCNC: 117 IU/L (ref 40–128)
ALT SERPL-CCNC: 12 U/L (ref 10–40)
AMMONIA: 191 UMOL/L (ref 11–51)
ANION GAP SERPL CALCULATED.3IONS-SCNC: 8 MMOL/L (ref 4–16)
AST SERPL-CCNC: 18 IU/L (ref 15–37)
BILIRUB SERPL-MCNC: 1.1 MG/DL (ref 0–1)
BUN BLDV-MCNC: 16 MG/DL (ref 6–23)
CALCIUM SERPL-MCNC: 9.3 MG/DL (ref 8.3–10.6)
CHLORIDE BLD-SCNC: 103 MMOL/L (ref 99–110)
CO2: 26 MMOL/L (ref 21–32)
CREAT SERPL-MCNC: 1.3 MG/DL (ref 0.6–1.1)
GFR AFRICAN AMERICAN: 51 ML/MIN/1.73M2
GFR NON-AFRICAN AMERICAN: 42 ML/MIN/1.73M2
GLUCOSE BLD-MCNC: 118 MG/DL (ref 70–99)
GLUCOSE BLD-MCNC: 121 MG/DL (ref 70–99)
GLUCOSE BLD-MCNC: 145 MG/DL (ref 70–99)
GLUCOSE BLD-MCNC: 158 MG/DL (ref 70–99)
HCT VFR BLD CALC: 37.9 % (ref 37–47)
HEMOGLOBIN: 12.7 GM/DL (ref 12.5–16)
INR BLD: 1.12 INDEX
MCH RBC QN AUTO: 31.1 PG (ref 27–31)
MCHC RBC AUTO-ENTMCNC: 33.5 % (ref 32–36)
MCV RBC AUTO: 92.7 FL (ref 78–100)
PDW BLD-RTO: 14 % (ref 11.7–14.9)
PLATELET # BLD: 99 K/CU MM (ref 140–440)
PMV BLD AUTO: 12.3 FL (ref 7.5–11.1)
POTASSIUM SERPL-SCNC: 4.4 MMOL/L (ref 3.5–5.1)
PROTHROMBIN TIME: 13.6 SECONDS (ref 11.7–14.5)
RBC # BLD: 4.09 M/CU MM (ref 4.2–5.4)
SODIUM BLD-SCNC: 137 MMOL/L (ref 135–145)
TOTAL PROTEIN: 6.6 GM/DL (ref 6.4–8.2)
WBC # BLD: 5.8 K/CU MM (ref 4–10.5)

## 2020-08-04 PROCEDURE — 76705 ECHO EXAM OF ABDOMEN: CPT

## 2020-08-04 PROCEDURE — 82962 GLUCOSE BLOOD TEST: CPT

## 2020-08-04 PROCEDURE — 85610 PROTHROMBIN TIME: CPT

## 2020-08-04 PROCEDURE — 80053 COMPREHEN METABOLIC PANEL: CPT

## 2020-08-04 PROCEDURE — 6370000000 HC RX 637 (ALT 250 FOR IP): Performed by: NURSE PRACTITIONER

## 2020-08-04 PROCEDURE — 6370000000 HC RX 637 (ALT 250 FOR IP): Performed by: INTERNAL MEDICINE

## 2020-08-04 PROCEDURE — 36415 COLL VENOUS BLD VENIPUNCTURE: CPT

## 2020-08-04 PROCEDURE — 85027 COMPLETE CBC AUTOMATED: CPT

## 2020-08-04 PROCEDURE — 1200000000 HC SEMI PRIVATE

## 2020-08-04 PROCEDURE — 82140 ASSAY OF AMMONIA: CPT

## 2020-08-04 PROCEDURE — 2580000003 HC RX 258: Performed by: INTERNAL MEDICINE

## 2020-08-04 RX ORDER — LACTULOSE 10 G/15ML
10 SOLUTION ORAL ONCE
Status: COMPLETED | OUTPATIENT
Start: 2020-08-04 | End: 2020-08-04

## 2020-08-04 RX ORDER — LACTULOSE 10 G/15ML
30 SOLUTION ORAL 3 TIMES DAILY
Status: DISCONTINUED | OUTPATIENT
Start: 2020-08-04 | End: 2020-08-04

## 2020-08-04 RX ORDER — LACTULOSE 10 G/15ML
45 SOLUTION ORAL 3 TIMES DAILY
Status: DISCONTINUED | OUTPATIENT
Start: 2020-08-04 | End: 2020-08-05

## 2020-08-04 RX ADMIN — METOPROLOL SUCCINATE 50 MG: 50 TABLET, EXTENDED RELEASE ORAL at 11:12

## 2020-08-04 RX ADMIN — RANOLAZINE 500 MG: 500 TABLET, FILM COATED, EXTENDED RELEASE ORAL at 11:12

## 2020-08-04 RX ADMIN — MIDODRINE HYDROCHLORIDE 10 MG: 5 TABLET ORAL at 11:10

## 2020-08-04 RX ADMIN — TRAMADOL HYDROCHLORIDE 50 MG: 50 TABLET, FILM COATED ORAL at 23:44

## 2020-08-04 RX ADMIN — FUROSEMIDE 20 MG: 20 TABLET ORAL at 11:13

## 2020-08-04 RX ADMIN — SPIRONOLACTONE 50 MG: 50 TABLET ORAL at 11:12

## 2020-08-04 RX ADMIN — LACTULOSE 45 G: 10 SOLUTION ORAL at 15:06

## 2020-08-04 RX ADMIN — TRAMADOL HYDROCHLORIDE 50 MG: 50 TABLET, FILM COATED ORAL at 11:12

## 2020-08-04 RX ADMIN — RANOLAZINE 500 MG: 500 TABLET, FILM COATED, EXTENDED RELEASE ORAL at 21:01

## 2020-08-04 RX ADMIN — LACTULOSE 10 G: 10 SOLUTION ORAL at 11:08

## 2020-08-04 RX ADMIN — RIFAXIMIN 550 MG: 550 TABLET ORAL at 11:12

## 2020-08-04 RX ADMIN — MIDODRINE HYDROCHLORIDE 10 MG: 5 TABLET ORAL at 14:48

## 2020-08-04 RX ADMIN — TENOFOVIR DISOPROXIL FUMARATE 300 MG: 300 TABLET, COATED ORAL at 11:10

## 2020-08-04 RX ADMIN — SODIUM CHLORIDE, PRESERVATIVE FREE 10 ML: 5 INJECTION INTRAVENOUS at 21:08

## 2020-08-04 RX ADMIN — SODIUM CHLORIDE, PRESERVATIVE FREE 10 ML: 5 INJECTION INTRAVENOUS at 11:13

## 2020-08-04 RX ADMIN — RIFAXIMIN 550 MG: 550 TABLET ORAL at 21:01

## 2020-08-04 RX ADMIN — PANTOPRAZOLE SODIUM 40 MG: 40 TABLET, DELAYED RELEASE ORAL at 05:52

## 2020-08-04 RX ADMIN — LACTULOSE 45 G: 10 SOLUTION ORAL at 21:01

## 2020-08-04 ASSESSMENT — PAIN DESCRIPTION - PROGRESSION
CLINICAL_PROGRESSION: GRADUALLY IMPROVING
CLINICAL_PROGRESSION: NOT CHANGED

## 2020-08-04 ASSESSMENT — PAIN DESCRIPTION - PAIN TYPE
TYPE: ACUTE PAIN
TYPE: CHRONIC PAIN
TYPE: CHRONIC PAIN

## 2020-08-04 ASSESSMENT — PAIN DESCRIPTION - FREQUENCY
FREQUENCY: INTERMITTENT
FREQUENCY: INTERMITTENT

## 2020-08-04 ASSESSMENT — PAIN - FUNCTIONAL ASSESSMENT: PAIN_FUNCTIONAL_ASSESSMENT: ACTIVITIES ARE NOT PREVENTED

## 2020-08-04 ASSESSMENT — PAIN DESCRIPTION - LOCATION
LOCATION: BACK
LOCATION: HEAD
LOCATION: BACK

## 2020-08-04 ASSESSMENT — PAIN SCALES - GENERAL
PAINLEVEL_OUTOF10: 0
PAINLEVEL_OUTOF10: 2
PAINLEVEL_OUTOF10: 2
PAINLEVEL_OUTOF10: 6

## 2020-08-04 ASSESSMENT — PAIN DESCRIPTION - DESCRIPTORS
DESCRIPTORS: ACHING
DESCRIPTORS: ACHING

## 2020-08-04 ASSESSMENT — PAIN DESCRIPTION - ORIENTATION: ORIENTATION: OTHER (COMMENT)

## 2020-08-04 ASSESSMENT — PAIN DESCRIPTION - ONSET: ONSET: ON-GOING

## 2020-08-04 NOTE — PROGRESS NOTES
Hospitalist Progress Note      Name:  Roma Stevens /Age/Sex: 1960  (64 y.o. female)   MRN & CSN:  [id number] & [id number] Admission Date/Time: 2020  4:36 PM   Location:  [address] [place] [address] PCP: Ronaldo Cox MD         Hospital Day: 3    Assessment and Plan:   Roma Stevens is a 64 y.o.  female  who presents with:      History of Present Illness:     Summary for August 3 -patient with cirrhosis, reports that she takes lactulose at home which causes constant diarrhea and makes it difficult to leave the house. She came in with hepatic encephalopathy which is improving. She is not ready for discharge today, however she may be ready for discharge tomorrow if her mental status continues to improve. Hepatic encephalopathy  -Continue lactulose and rifaximin  -Encephalopathy may have been precipitated by missing some lactulose doses    QTc prolongation  -Avoid QT prolonging drugs    Chronic kidney disease  -Creatinine was 1.2 upon arrival  -Received fluids in the ED  -Continue Lasix and Spironolactone    Portal hypertension with history of portal vein thrombosis -status TIPS procedure    Hepatitis B-continue Viread   (tenofovir disoproxil fumarate) per GI     History of alcohol use in the past    History of CHF per chart-not in exacerbation    Hypotension-continue Midodrin, give with SBP less than 110    COPD-not in exacerbation-continue albuterol as needed    Coronary artery disease-continue ranolazine    Paroxysmal atrial fibrillation-continue beta-blocker with parameters.   Not on anticoagulation    Depression/anxiety-held fluoxetine and buspirone    Schizophrenia per chart -quetiapine 150 mg at bedtime held because of the prolonged QT interval on admission    Diabetes mellitus type 2-continue Lantus plus correction scale    DVT prophylaxis- would add Lovenox subcu if she is here much longer as she does have a history of portal vein thrombosis and her platelet count is not back low    Objective:   No intake or output data in the 24 hours ending 08/04/20 0636   Vitals:   Vitals:    08/04/20 0459   BP: (!) 103/57   Pulse: 68   Resp: 16   Temp: 98 °F (36.7 °C)   SpO2: 94%     Physical Exam:      Lungs-respiratory effort nonlabored at rest    Neurologic-speech clear    Skin-no cyanosis    She stated she felt much better today. However, she stated that she is not back to baseline, and that she does not feel ready to go home.     Medications:   Medications:    lactulose  30 g Oral TID    albuterol  2.5 mg Nebulization Q6H    insulin glargine  15 Units Subcutaneous Nightly    metoprolol succinate  50 mg Oral Daily    midodrine  10 mg Oral TID WC    pantoprazole  40 mg Oral BID AC    ranolazine  500 mg Oral BID    rifaximin  550 mg Oral BID    spironolactone  50 mg Oral Daily    tenofovir disoproxil fumarate  300 mg Oral Daily    sodium chloride flush  10 mL Intravenous 2 times per day    furosemide  20 mg Oral Daily      Infusions:   PRN Meds: traMADol, 50 mg, Q6H PRN  sodium chloride flush, 10 mL, PRN  magnesium sulfate, 2 g, PRN          Electronically signed by Irais Jaimes MD on 8/4/2020 at 6:36 AM

## 2020-08-04 NOTE — PLAN OF CARE
Problem: Falls - Risk of:  Goal: Will remain free from falls  Description: Will remain free from falls  8/3/2020 1119 by Hadley Pierson RN  Outcome: Ongoing  Goal: Absence of physical injury  Description: Absence of physical injury  8/3/2020 1119 by Hadley Pierson RN  Outcome: Ongoing

## 2020-08-04 NOTE — CONSULTS
[facility]  Gastroenterology Consultation    2020  12:28 AM    Patient:    Maria Luisa Krause  : 1960   64 y.o. MRN: [id number]  Admitted: 2020  4:36 PM ATT: Adriana Andujar MD   [id number]  AdmitDx: Hepatic encephalopathy ([address] [K72.90]  Hyperammonemia ([address] [E72.20]  QT prolongation [F37.50]  Acute metabolic encephalopathy [A58.41]  PCP: Gregory Nichols MD    Reason for Consult:  Hepatic encephalopathy    Requesting Physician:  Adriana Andujar MD      History Obtained From:  Patient and review of all records    HISTORY OF PRESENT ILLNESS:                The patient is a 64 y.o. female with significant   Past Medical History:   Diagnosis Date    Acid reflux     Arthritis     left knee    CAD (coronary artery disease)     per [facility] 13 - Dr. Radha Lima COPD (chronic obstructive pulmonary disease) ([facility] [address]     follow with Dr Ramos Pulse Depression     \"have manic - depression see Dr Jhonny Rivera Diabetes mellitus [facility])     dx 10+ yrs ago- follows with PCP    Drug abuse ([address]     hx use of cocaine, heroin and marijuana- states last used 2014    Glaucoma     bilateral    H/O Doppler lower venous ultrasound 2019    No DVT or SVT, Significant reflux of RGSV and LGSV.  Hepatic encephalopathy ([address] 2019    Hepatitis C     for liver bx 12/3/2015\"Have Hepatitis B and C and saw Dr Johnathon Newby for this 2015\"    Hiatal hernia     History of alcohol abuse     History of exercise stress test 2020    Treadmill, Normal exercise performance without angina and ischemic EKG changes.     HTN (hypertension)     \"for the past two yrs on medication\" follows with Dr Radha Lima Hx of blood clots 2019    Portal vein thrombsis - TIPS procedure 19    Hyperlipemia     Irregular heart beat     per pt    Liver hematoma     Migraines     Last migraine:  2018    Nausea & vomiting     Schizophrenia ([address]     per old chart    Seizures ([address]     \"last one was 9/2015- [facility] Dr John Lam [facility]- she said not sure if acutal seizures- she thinks they are panic or anxiety attacks\"    SOB (shortness of breath)     with any exertion   who presented to [facility] ED 8/2/2020 with c/o confusion. She said for the past few days, she has been having difficulty remembering things. She states that she was  having a hard time remembering things. She knew what she wanted to say, but words wouldn't  come out correctly. She has had similar symptoms in the past when her ammonia is high. She states that she has not been taking her Lactulose as prescribed, because she was having diarrhea. TIPS at [facility] 4/23/2019    Denies abdominal pain  Denies N/V, GERD, dyspepsia, dysphagia   Last BM this morning non bloody     History of EGD 6/5/2019 revealed moderate nonbleeding PHG, no varices gastric or esophageal--indicating functioning TIPS, healed reflux esophagitis    History of colonoscopy pt unsure of date     No ASA   Denies NSAIDs  No Anti-platelet therapy    Past Medical History:        Diagnosis Date    Acid reflux     Arthritis     left knee    CAD (coronary artery disease)     per [facility] 9/6/[facility] - Dr. Mayuri Harvey COPD (chronic obstructive pulmonary disease) ([address]     follow with Dr Junior Solis Depression     \"have manic - depression see Dr Cassidy Willis Diabetes mellitus [facility])     dx 10+ yrs ago- follows with PCP    Drug abuse ([address]     hx use of cocaine, heroin and marijuana- states last used 12/2014    Glaucoma     bilateral    H/O Doppler lower venous ultrasound 04/04/2019    No DVT or SVT, Significant reflux of RGSV and LGSV.  Hepatic encephalopathy ([address] 05/2019    Hepatitis C     for liver bx 12/3/2015\"Have Hepatitis B and C and saw Dr Evi Wilks for this 12/1/2015\"    Hiatal hernia     History of alcohol abuse     History of exercise stress test 01/02/2020    Treadmill, Normal exercise performance without angina and ischemic EKG changes.     HTN (hypertension)     \"for the pain, palpitations, dyspnea on exertion, orthopnea, paroxysmal nocturnal dyspnea or edema  GASTROINTESTINAL:  SEE HPI  GENITOURINARY:  Neg  Urinary frequency, hesitancy, urgency, polyuria, dysuria, hematuria, nocturia, or incontinence. HEMATOLOGIC/LYMPHATIC:  Neg  bleeding tendency, +Anemia  MUSCULOSKELETAL:    New myalgias, bone pain, joint pain, swelling or stiffness and has had change in gait. NEUROLOGICAL:  Neg  Loss of Consciousness, difficulty concentrating, seizures, decline in intellect, nervousness, insomina, aphasia or dysarthria.memory loss, +forgetfulness, periods of confusion  SKIN:  Neg  skin or hair changes, and has no itching, rashes, or sores. PSYCHIATRIC:  Neg depression, personality changes, anxiety. ENDOCRINE:  Neg polydipsia, polyuria, abnormal weight changes, heat /cold intolerance.   ALL/IMM:  Neg reactions to drugs other than listed    PHYSICAL EXAM:      Vitals:    /69   Pulse 70   Temp 98.4 °F (36.9 °C) (Oral)   Resp 16   Ht 4' 9.5\" (1.461 m)   Wt 160 lb (72.6 kg)   SpO2 97%   BMI 34.02 kg/m²     General Appearance:    Alert, cooperative, no distress, appears stated age   HEENT:    Normocephalic, atraumatic, Conjunctiva clear, Lips, mucosa, and tongue normal; teeth and gums normal   Neck:   Supple, symmetrical, trachea midline   Lungs:     Clear to auscultation bilaterally, respirations unlabored   Chest Wall:    No tenderness or deformity    Heart:    Regular rate and rhythm, S1 and S2 normal, no murmur, rub   or gallop   Abdomen:     Soft, non-tender, bowel sounds active all four quadrants,     no masses, no organomegaly, no ascites    Rectal:    Deferred   Extremities:   Extremities normal, atraumatic, no cyanosis, 2+ pitting  BLE edema   Pulses:   2+ and symmetric all extremities   Skin:   Skin color, texture, turgor normal, no rashes or lesions   Lymph nodes:   No abnormality   Neurologic:   No focal deficits, moving all four extremities      DATA:    ABGs:   Lab  Candida esophagitis (HCC) B37.81    Colitis K52.9    Essential hypertension I10    GI bleed K92.2    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    Type 2 diabetes mellitus with complication, with long-term current use of insulin (HCC) E11.8, Z79.4    SOB (shortness of breath) R06.02    Portal vein thrombosis I81    Intractable nausea and vomiting R11.2    Abdominal pain R10.9    Acute GI bleeding K92.2    Chronic hepatitis C without hepatic coma (HCC) B18.2    Delayed gastric emptying K30    Restless legs G25.81    Acute colitis K52.9    Acute encephalopathy G93.40    S/P TIPS (transjugular intrahepatic portosystemic shunt) Z95.828    Other cirrhosis of liver (HCC) K74.69    Acute upper GI bleed K92.2    Acute hepatic encephalopathy K72.00    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    thrombocytopenia D69.59    Hepatic encephalopathy (HCC) K72.90       ASSESSMENT/RECOMMENDATIONS:    Decompensated Cirrhosis:  s/t hep B and ETOH   Continue Viread   MELD Na score 20  Recent AFP within normal limits 5/21/2020    Hepatic encephalopathy:  Improved    Most likely s/t medication noncompliance   Ammonia level 175 on 8/2/2020  Recommend increasing Lactulose to 30 gm TID  Continue Xifaxan 550 mg BID    Recommend daily ammonia level     Nutrition: Recommend Low Na diet      Discussed plan of care with patient     Patient clinical, biochemical, and radiological information discussed with Dr. Samy Park. He agrees with the assessment and plan. Mary Mcbride CNP  8/4/2020  12:28 AM     HE improving  Optimize lactulose  Optimize Rifaxamin    Advance diet  I have seen and examined this patient personally, and independently of the nurse practitioner. The plan was developed mutually at the time of the visit with the patient. [facility] and myself have spoken with patient, nursing staff and provided written and verbal instructions .     The above note has been reviewed and I agree with the Assessment,  Diagnosis, and Treatment plan as suggested by Chanda Meza CNP      [address] [facility] gastroenterology

## 2020-08-04 NOTE — PLAN OF CARE
Problem: Falls - Risk of:  Goal: Will remain free from falls  Description: Will remain free from falls  8/3/2020 1119 by Huang Esteban RN  Outcome: Ongoing  Goal: Absence of physical injury  Description: Absence of physical injury  8/3/2020 1119 by Huang Esteban RN  Outcome: Ongoing

## 2020-08-04 NOTE — PROGRESS NOTES
[facility]        Progress Note       2020  10:47 AM    Patient:    Apollo Hernandez  : 1960   64 y.o. MRN: [id number]  Admitted: 2020  4:36 PM ATT: Estuardo Reeves MD   [id number]  AdmitDx: Hepatic encephalopathy ([facility] [address] [K72.90]  Hyperammonemia ([address] [E72.20]  QT prolongation [C87.01]  Acute metabolic encephalopathy [F25.25]  PCP: Kilo Wilkinson MD    SUBJECTIVE:  Chart reviewed, events noted  Slow speech. No BMs. Tolerating po diet. Manoj N/V. No abdominal pain. Not taking Lactulose at home as directed. States decreased amount d/t diarrhea. Taking once daily. Having one BM daily, sometimes twice. Reports taking Xifaxan and tenovofir as directed.      ROS:  The positive ROS will be identified in bold     CONSTITUTIONAL:  Neg  Weight loss, fever, + fatigue  MOUTH/THROAT:  Neg  Bleeding gums, hoarseness or sore throat  RESPIRATORY:   Neg SOB, wheeze, cough, hemoptysis or bronchitis  CARDIOVASCULAR:  Neg Chest pain, palpitations, dyspnea on exertion, edema  GASTROINTESTINAL:  SEE HPI  HEMATOLOGIC/LYMPHATIC:  Neg  Anemia, bleeding tendency  MUSCULOSKELETAL: Neg  New myalgias, joint pain, swelling or stiffness  NEUROLOGICAL:  Neg  Loss of Consciousness, memory loss, forgetfulness, periods of confusion, difficulty concentrating, seizures, insomnia, aphasia    SKIN:  Neg No itching, rashes, or sores  PSYCHIATRIC:  Neg Depression, personality changes, anxiety    OBJECTIVE:      /70   Pulse 67   Temp 98.5 °F (36.9 °C) (Oral)   Resp 14   Ht 4' 9.5\" (1.461 m)   Wt 187 lb 14.4 oz (85.2 kg)   SpO2 98%   BMI 39.96 kg/m²     Speech slow   NAD, appears comfortable, sitting up in bed   Lips and mucous membranes pink and moist  RRR, Nl s1s2   Lungs CTA bilaterally, respirations even and unlabored   Abdomen soft, ND, NT, bowel sounds normal  Skin pink, warm, dry and CR brisk   No edema bilateral lower extremities   AAOx3, No asterixis      CBC:   Recent Labs 08/02/20  1626 08/03/20  0642 08/04/20  0758   WBC 6.8 5.0 5.8   HGB 13.6 12.4* 12.7   * 88* 99*     BMP:    Recent Labs     08/02/20  1626 08/03/20  0642 08/04/20  0758    140 137   K 4.1 3.8 4.4    107 103   CO2 22 24 26   BUN 16 17 16   CREATININE 1.2* 1.3* 1.3*   GLUCOSE 147* 127* 158*     Magnesium:   Lab Results   Component Value Date    MG 2.1 08/02/2020     Hepatic:   Recent Labs     08/02/20  1626 08/03/20  0642 08/04/20  0758   AST 19 16 18   ALT 13 11 12   BILITOT 1.4* 1.3* 1.1*   ALKPHOS 108 81 117     INR:   Recent Labs     08/02/20  1626 08/04/20  0758   INR 1.11 1.12       No intake or output data in the 24 hours ending 08/04/20 1047      Medications    Scheduled Medications:    lactulose  45 g Oral TID    lactulose  10 g Oral Once    albuterol  2.5 mg Nebulization Q6H    insulin glargine  15 Units Subcutaneous Nightly    metoprolol succinate  50 mg Oral Daily    midodrine  10 mg Oral TID WC    pantoprazole  40 mg Oral BID AC    ranolazine  500 mg Oral BID    rifaximin  550 mg Oral BID    spironolactone  50 mg Oral Daily    tenofovir disoproxil fumarate  300 mg Oral Daily    sodium chloride flush  10 mL Intravenous 2 times per day    furosemide  20 mg Oral Daily     PRN Medications: traMADol, sodium chloride flush, magnesium sulfate  Infusions:       Patient Active Problem List   Diagnosis Code    Left arm pain M79.602    Type 2 diabetes mellitus with complication, with long-term current use of insulin (HCC) E11.8, Z79.4    COPD, mild (HCC) J44.9    Tobacco abuse Z72.0    Angina effort I20.8    Abnormal nuclear cardiac imaging test R93.1    Lung nodule R91.1    Chest pain R07.9    Dyslipidemia E78.5    Pancolitis (HCC) K51.00    Hematemesis without nausea Z34.4    Alcoholic cirrhosis of liver without ascites (HCC) K70.30    Thrombocytopenia (HCC) H19.5    Periumbilical abdominal pain R10.33    History of colonic polyps Z86.010    Abnormal findings on diagnostic imaging of 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    Type 2 diabetes mellitus with complication, with long-term current use of insulin (HCC) E11.8, Z79.4    SOB (shortness of breath) R06.02    Portal vein thrombosis I81    Intractable nausea and vomiting R11.2    Abdominal pain R10.9    Acute GI bleeding K92.2    Chronic hepatitis C without hepatic coma (HCC) B18.2    Delayed gastric emptying K30    Restless legs G25.81    Acute colitis K52.9    Acute encephalopathy G93.40    S/P TIPS (transjugular intrahepatic portosystemic shunt) Z95.828    Other cirrhosis of liver (HCC) K74.69    Acute upper GI bleed K92.2    Acute hepatic encephalopathy K72.00    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    thrombocytopenia D69.59    Hepatic encephalopathy (HCC) K72.90       ASSESSMENT AND RECOMMENDATIONS:    HE:  Not adherent to Lactulose regimen at home, advised need to have 2 BMs per day  TIPS patient  Mentation improving today, ammonia rising  Recommend Lactulose 45 gm TID today  Continue Xifaxan 550 mg BID     Cirrhosis:  Secondary to HBV  Continue tenovofir   MELD NA score 20  Continue Lasix 20 mg and Aldactone 50 mg daily     Nutrition:  Continue 2 gm NA restricted diet    Discussed plan of care with patient      Patient clinical, biochemical, and radiological information discussed with Dr. Cecil Mills. He agrees with the assessment and plan.      Marisel Arriaga, CNP  8/4/2020  10:47 AM     Much better  Continue lactulose  Continue Xifaxan    I have seen and examined this patient personally, and independently of the

## 2020-08-05 LAB
AMMONIA: 200 UMOL/L (ref 11–51)
EKG ATRIAL RATE: 70 BPM
EKG DIAGNOSIS: NORMAL
EKG P AXIS: 61 DEGREES
EKG P-R INTERVAL: 150 MS
EKG Q-T INTERVAL: 480 MS
EKG QRS DURATION: 86 MS
EKG QTC CALCULATION (BAZETT): 518 MS
EKG R AXIS: 20 DEGREES
EKG T AXIS: 45 DEGREES
EKG VENTRICULAR RATE: 70 BPM
GLUCOSE BLD-MCNC: 126 MG/DL (ref 70–99)
GLUCOSE BLD-MCNC: 174 MG/DL (ref 70–99)
GLUCOSE BLD-MCNC: 196 MG/DL (ref 70–99)
GLUCOSE BLD-MCNC: 241 MG/DL (ref 70–99)
GLUCOSE BLD-MCNC: 357 MG/DL (ref 70–99)

## 2020-08-05 PROCEDURE — 93005 ELECTROCARDIOGRAM TRACING: CPT | Performed by: INTERNAL MEDICINE

## 2020-08-05 PROCEDURE — 36415 COLL VENOUS BLD VENIPUNCTURE: CPT

## 2020-08-05 PROCEDURE — 93010 ELECTROCARDIOGRAM REPORT: CPT | Performed by: INTERNAL MEDICINE

## 2020-08-05 PROCEDURE — 1200000000 HC SEMI PRIVATE

## 2020-08-05 PROCEDURE — 94761 N-INVAS EAR/PLS OXIMETRY MLT: CPT

## 2020-08-05 PROCEDURE — 94640 AIRWAY INHALATION TREATMENT: CPT

## 2020-08-05 PROCEDURE — 82140 ASSAY OF AMMONIA: CPT

## 2020-08-05 PROCEDURE — 6370000000 HC RX 637 (ALT 250 FOR IP): Performed by: NURSE PRACTITIONER

## 2020-08-05 PROCEDURE — 6370000000 HC RX 637 (ALT 250 FOR IP): Performed by: INTERNAL MEDICINE

## 2020-08-05 PROCEDURE — 6360000002 HC RX W HCPCS: Performed by: INTERNAL MEDICINE

## 2020-08-05 PROCEDURE — 2580000003 HC RX 258: Performed by: INTERNAL MEDICINE

## 2020-08-05 RX ORDER — DEXTROSE MONOHYDRATE 50 MG/ML
100 INJECTION, SOLUTION INTRAVENOUS PRN
Status: DISCONTINUED | OUTPATIENT
Start: 2020-08-05 | End: 2020-08-06 | Stop reason: HOSPADM

## 2020-08-05 RX ORDER — LACTULOSE 10 G/15ML
45 SOLUTION ORAL 4 TIMES DAILY
Status: DISCONTINUED | OUTPATIENT
Start: 2020-08-05 | End: 2020-08-06 | Stop reason: HOSPADM

## 2020-08-05 RX ORDER — NICOTINE POLACRILEX 4 MG
15 LOZENGE BUCCAL PRN
Status: DISCONTINUED | OUTPATIENT
Start: 2020-08-05 | End: 2020-08-06 | Stop reason: HOSPADM

## 2020-08-05 RX ORDER — DEXTROSE MONOHYDRATE 25 G/50ML
12.5 INJECTION, SOLUTION INTRAVENOUS PRN
Status: DISCONTINUED | OUTPATIENT
Start: 2020-08-05 | End: 2020-08-06 | Stop reason: HOSPADM

## 2020-08-05 RX ADMIN — LACTULOSE 45 G: 10 SOLUTION ORAL at 20:44

## 2020-08-05 RX ADMIN — MIDODRINE HYDROCHLORIDE 10 MG: 5 TABLET ORAL at 09:47

## 2020-08-05 RX ADMIN — RIFAXIMIN 550 MG: 550 TABLET ORAL at 09:47

## 2020-08-05 RX ADMIN — TENOFOVIR DISOPROXIL FUMARATE 300 MG: 300 TABLET, COATED ORAL at 09:47

## 2020-08-05 RX ADMIN — TRAMADOL HYDROCHLORIDE 50 MG: 50 TABLET, FILM COATED ORAL at 05:45

## 2020-08-05 RX ADMIN — FUROSEMIDE 20 MG: 20 TABLET ORAL at 09:48

## 2020-08-05 RX ADMIN — MIDODRINE HYDROCHLORIDE 10 MG: 5 TABLET ORAL at 11:47

## 2020-08-05 RX ADMIN — SPIRONOLACTONE 50 MG: 50 TABLET ORAL at 09:47

## 2020-08-05 RX ADMIN — ALBUTEROL SULFATE 2.5 MG: 2.5 SOLUTION RESPIRATORY (INHALATION) at 21:53

## 2020-08-05 RX ADMIN — PANTOPRAZOLE SODIUM 40 MG: 40 TABLET, DELAYED RELEASE ORAL at 17:47

## 2020-08-05 RX ADMIN — SODIUM CHLORIDE, PRESERVATIVE FREE 10 ML: 5 INJECTION INTRAVENOUS at 20:44

## 2020-08-05 RX ADMIN — ALBUTEROL SULFATE 2.5 MG: 2.5 SOLUTION RESPIRATORY (INHALATION) at 08:23

## 2020-08-05 RX ADMIN — RIFAXIMIN 550 MG: 550 TABLET ORAL at 20:44

## 2020-08-05 RX ADMIN — SODIUM CHLORIDE, PRESERVATIVE FREE 10 ML: 5 INJECTION INTRAVENOUS at 09:48

## 2020-08-05 RX ADMIN — METOPROLOL SUCCINATE 50 MG: 50 TABLET, EXTENDED RELEASE ORAL at 09:47

## 2020-08-05 RX ADMIN — MIDODRINE HYDROCHLORIDE 10 MG: 5 TABLET ORAL at 17:47

## 2020-08-05 RX ADMIN — INSULIN GLARGINE 15 UNITS: 100 INJECTION, SOLUTION SUBCUTANEOUS at 20:46

## 2020-08-05 RX ADMIN — ALBUTEROL SULFATE 2.5 MG: 2.5 SOLUTION RESPIRATORY (INHALATION) at 15:10

## 2020-08-05 RX ADMIN — RANOLAZINE 500 MG: 500 TABLET, FILM COATED, EXTENDED RELEASE ORAL at 09:47

## 2020-08-05 RX ADMIN — RANOLAZINE 500 MG: 500 TABLET, FILM COATED, EXTENDED RELEASE ORAL at 20:44

## 2020-08-05 RX ADMIN — LACTULOSE 45 G: 10 SOLUTION ORAL at 17:47

## 2020-08-05 RX ADMIN — TRAMADOL HYDROCHLORIDE 50 MG: 50 TABLET, FILM COATED ORAL at 17:55

## 2020-08-05 RX ADMIN — PANTOPRAZOLE SODIUM 40 MG: 40 TABLET, DELAYED RELEASE ORAL at 05:45

## 2020-08-05 ASSESSMENT — PAIN SCALES - GENERAL
PAINLEVEL_OUTOF10: 6
PAINLEVEL_OUTOF10: 2
PAINLEVEL_OUTOF10: 7

## 2020-08-05 ASSESSMENT — PAIN DESCRIPTION - FREQUENCY: FREQUENCY: CONTINUOUS

## 2020-08-05 ASSESSMENT — PAIN DESCRIPTION - DESCRIPTORS: DESCRIPTORS: ACHING

## 2020-08-05 ASSESSMENT — PAIN DESCRIPTION - PROGRESSION: CLINICAL_PROGRESSION: NOT CHANGED

## 2020-08-05 ASSESSMENT — PAIN DESCRIPTION - LOCATION: LOCATION: NECK

## 2020-08-05 ASSESSMENT — PAIN DESCRIPTION - PAIN TYPE: TYPE: ACUTE PAIN

## 2020-08-05 ASSESSMENT — PAIN DESCRIPTION - ONSET: ONSET: ON-GOING

## 2020-08-05 NOTE — PROGRESS NOTES
08/04/20 2101    rifaximin (XIFAXAN) tablet 550 mg  550 mg Oral BID Denise Lay MD   550 mg at 08/04/20 2101    spironolactone (ALDACTONE) tablet 50 mg  50 mg Oral Daily Connor Kwaku Giron MD   50 mg at 08/04/20 1112    tenofovir disoproxil fumarate (VIREAD) tablet 300 mg  300 mg Oral Daily Eric WONG MD   300 mg at 08/04/20 1110    sodium chloride flush 0.9 % injection 10 mL  10 mL Intravenous 2 times per day Denise Lay MD   10 mL at 08/04/20 2108    sodium chloride flush 0.9 % injection 10 mL  10 mL Intravenous PRN Connor Giron MD        magnesium sulfate 2 g in 50 mL IVPB premix  2 g Intravenous PRN Connor Giron MD        furosemide (LASIX) tablet 20 mg  20 mg Oral Daily Connor Kwaku Giron MD   20 mg at 08/04/20 1113       Subjective:     Patient denied any new complaints. No acute overnight events  N.p.o. status. Objective:   No intake or output data in the 24 hours ending 08/05/20 0738   Vitals:   Vitals:    08/05/20 0545   BP: 120/61   Pulse: 67   Resp: 16   Temp: 97.8 °F (36.6 °C)   SpO2: 93%     Physical Exam:  General Appearance:    Alert, cooperative, no distress  Head:      Normocephalic, without obvious abnormality, atraumatic  Eyes:       Conjunctiva/corneas clear, EOM's intact  Lungs:    Clear to auscultation bilaterally, respirations unlabored  Heart:                Regular rate and rhythm, S1 and S2 normal, no murmur,   rub or gallop  Abdomen:     Soft, mildly distended and BS +  Extremities:   Extremities normal, atraumatic, no cyanosis or edema  Neurological:   Grossly Intact.     Significant Diagnostic Studies:   DATA:    CBC   Recent Labs     08/02/20  1626 08/03/20  0642 08/04/20  0758   WBC 6.8 5.0 5.8   HGB 13.6 12.4* 12.7   HCT 39.3 36.4* 37.9   * 88* 99*      BMP   Recent Labs     08/02/20  1626 08/03/20  0642 08/04/20  0758    140 137   K 4.1 3.8 4.4    107 103   CO2 22 24 26   BUN 16 17 16   CREATININE 1.2* 1.3* 1.3*     LFT'S   Recent Labs 08/02/20  1626 08/03/20  0642 08/04/20  0758   AST 19 16 18   ALT 13 11 12   BILITOT 1.4* 1.3* 1.1*   ALKPHOS 108 81 117     COAG   Recent Labs     08/02/20  1626 08/04/20  0758   INR 1.11 1.12     POC:   Lab Results   Component Value Date    POCGLU 241 08/05/2020    POCGLU 121 08/04/2020    POCGLU 145 08/04/2020    POCGLU 118 08/04/2020     [id number]:  Lab Results   Component Value Date    LABA1C 6.6 05/27/2020     CARDIAC ENZYMES  No results for input(s): CKTOTAL, CKMB, CKMBINDEX, TROPONINI in the last 72 hours. Troponin:   Recent Labs     08/02/20  1626   TROPONINT <0.010     BNP: No results for input(s): PROBNP in the last 72 hours. U/A:    Lab Results   Component Value Date    NITRITE neg 07/15/2017    COLORU YELLOW 08/02/2020    WBCUA NONE SEEN 08/02/2020    RBCUA NONE SEEN 08/02/2020    MUCUS RARE 05/28/2020    BACTERIA NEGATIVE 08/02/2020    CLARITYU CLEAR 08/02/2020    SPECGRAV 1.019 08/02/2020    LEUKOCYTESUR NEGATIVE 08/02/2020    BLOODU NEGATIVE 08/02/2020    GLUCOSEU neg 07/15/2017    AMORPHOUS RARE 05/01/2019       Ct Head Wo Contrast    Result Date: 8/2/2020  EXAMINATION: CT OF THE HEAD WITHOUT CONTRAST  8/2/2020 6:43 pm TECHNIQUE: CT of the head was performed without the administration of intravenous contrast. Dose modulation, iterative reconstruction, and/or weight based adjustment of the mA/kV was utilized to reduce the radiation dose to as low as reasonably achievable. COMPARISON: CT head March 30, 2020 HISTORY: ORDERING SYSTEM PROVIDED HISTORY: confusion TECHNOLOGIST PROVIDED HISTORY: Reason for exam:->confusion Has a \"code stroke\" or \"stroke alert\" been called? ->No Reason for Exam: confusion FINDINGS: BRAIN/VENTRICLES: There is no acute intracranial hemorrhage, mass effect or midline shift. No abnormal extra-axial fluid collection. The gray-white differentiation is maintained without evidence of an acute infarct. There is no evidence of hydrocephalus.  ORBITS: The visualized portion of the orbits demonstrate no acute abnormality. SINUSES: The visualized paranasal sinuses and mastoid air cells demonstrate no acute abnormality. SOFT TISSUES/SKULL:  No acute abnormality of the visualized skull or soft tissues. No acute intracranial abnormality. Xr Chest Portable    Result Date: 8/2/2020  EXAMINATION: ONE XRAY VIEW OF THE CHEST 8/2/2020 4:40 pm COMPARISON: Chest x-ray May 6, 2020; chest x-ray March 30, 2020 HISTORY: ORDERING SYSTEM PROVIDED HISTORY: Chest pain TECHNOLOGIST PROVIDED HISTORY: Reason for exam:->Chest pain FINDINGS: Cardiac silhouette appears stable. Right middle lobe opacities along the medial right heart border favored to reflect atelectasis. Mild chronic interstitial changes of the lungs appears stable. No pleural effusion or pneumothorax. Right upper quadrant surgical clips compatible with cholecystectomy. Osseous structures appear stable. 1. No acute cardiopulmonary process identified. Us Abdomen Limited    Result Date: 8/4/2020  EXAMINATION: RIGHT UPPER QUADRANT ULTRASOUND 8/4/2020 8:28 am COMPARISON: June 6, 2020 HISTORY: ORDERING SYSTEM PROVIDED HISTORY: evaluate tips patency, pt has he TECHNOLOGIST PROVIDED HISTORY: Reason for exam:->evaluate tips patency, pt has he Reason for Exam: abnormal labs, hepatic encephalopathy, cirrhosis, TIPS pt Acuity: Unknown Type of Exam: Initial FINDINGS: LIVER:  The liver is coarse in echotexture. Mild nodularity of the liver contour is noted. There is no intrahepatic biliary ductal dilatation. BILIARY SYSTEM:  S/p cholecystectomy. Common bile duct is within normal limits measuring 0.6 cm. RIGHT KIDNEY: The right kidney is grossly unremarkable without evidence of hydronephrosis. PANCREAS:  Visualized portions of the pancreas are unremarkable. OTHER: No evidence of right upper quadrant ascites. The TIPS is patent with appropriate directional flow. 1.  Findings consistent with cirrhosis. 2.  Status post cholecystectomy.  3.

## 2020-08-05 NOTE — PLAN OF CARE
Problem: Falls - Risk of:  Goal: Will remain free from falls  Description: Will remain free from falls  Outcome: Ongoing  Goal: Absence of physical injury  Description: Absence of physical injury  Outcome: Ongoing     Problem: Coping:  Goal: Ability to remain calm will improve  Description: Ability to remain calm will improve  Outcome: Ongoing     Problem: Safety:  Goal: Ability to remain free from injury will improve  Description: Ability to remain free from injury will improve  Outcome: Ongoing     Problem: Self-Care:  Goal: Ability to participate in self-care as condition permits will improve  Description: Ability to participate in self-care as condition permits will improve  Outcome: Ongoing

## 2020-08-05 NOTE — PROGRESS NOTES
[facility]        Progress Note       2020  11:05 AM    Patient:    Roma Stevens  : 1960   64 y.o. MRN: [id number]  Admitted: 2020  4:36 PM ATT: Dandre Gomez MD   [id number]  AdmitDx: Hepatic encephalopathy ([facility] [address] [K72.90]  Hyperammonemia ([facility] [address] [E72.20]  QT prolongation [R37.98]  Acute metabolic encephalopathy [G08.42]  PCP: Ronaldo Cox MD    SUBJECTIVE:  Chart reviewed, events noted  Patient feeling better. Speech is improving. 2 BMs yesterday. Tolerating po diet. No N/V. Denies abdominal pain.     ROS:  The positive ROS will be identified in bold     CONSTITUTIONAL:  Neg  Weight loss, fever, + fatigue   MOUTH/THROAT:  Neg  Bleeding gums, hoarseness or sore throat  RESPIRATORY:   Neg SOB, wheeze, cough, hemoptysis or bronchitis  CARDIOVASCULAR:  Neg Chest pain, palpitations, dyspnea on exertion, edema  GASTROINTESTINAL:  SEE HPI  HEMATOLOGIC/LYMPHATIC:  Neg  Anemia, bleeding tendency  MUSCULOSKELETAL: Neg  New myalgias, joint pain, swelling or stiffness  NEUROLOGICAL:  Neg  Loss of Consciousness, memory loss, forgetfulness, periods of confusion, difficulty concentrating, seizures, insomnia, aphasia    SKIN:  Neg No itching, rashes, or sores  PSYCHIATRIC:  Neg Depression, personality changes, anxiety    OBJECTIVE:      /61   Pulse 67   Temp 97.8 °F (36.6 °C) (Oral)   Resp 16   Ht 4' 9.5\" (1.461 m)   Wt 187 lb 14.4 oz (85.2 kg)   SpO2 93%   BMI 39.96 kg/m²     NAD, appears comfortable, sitting up in bed  Lips and mucous membranes pink and moist  RRR, Nl s1s2   Lungs CTA bilaterally, respirations even and unlabored   Abdomen soft, ND, NT, bowel sounds normal  Skin pink, warm, dry and CR brisk   No edema bilateral lower extremities   AAOx3, No asterixis      CBC:   Recent Labs     20  1626 20  0642 20  0758   WBC 6.8 5.0 5.8   HGB 13.6 12.4* 12.7   * 88* 99*     BMP:    Recent Labs     20  1626 20  0719 08/04/20  0758    140 137   K 4.1 3.8 4.4    107 103   CO2 22 24 26   BUN 16 17 16   CREATININE 1.2* 1.3* 1.3*   GLUCOSE 147* 127* 158*     Magnesium:   Lab Results   Component Value Date    MG 2.1 08/02/2020     Hepatic:   Recent Labs     08/02/20  1626 08/03/20  0642 08/04/20  0758   AST 19 16 18   ALT 13 11 12   BILITOT 1.4* 1.3* 1.1*   ALKPHOS 108 81 117     INR:   Recent Labs     08/02/20  1626 08/04/20  0758   INR 1.11 1.12         Intake/Output Summary (Last 24 hours) at 8/5/2020 1105  Last data filed at 8/5/2020 0947  Gross per 24 hour   Intake 10 ml   Output --   Net 10 ml         Medications    Scheduled Medications:    insulin lispro  0-6 Units Subcutaneous TID WC    insulin lispro  0-3 Units Subcutaneous Nightly    lactulose  45 g Oral TID    albuterol  2.5 mg Nebulization Q6H    insulin glargine  15 Units Subcutaneous Nightly    metoprolol succinate  50 mg Oral Daily    midodrine  10 mg Oral TID WC    pantoprazole  40 mg Oral BID AC    ranolazine  500 mg Oral BID    rifaximin  550 mg Oral BID    spironolactone  50 mg Oral Daily    tenofovir disoproxil fumarate  300 mg Oral Daily    sodium chloride flush  10 mL Intravenous 2 times per day    furosemide  20 mg Oral Daily     PRN Medications: glucose, dextrose, glucagon (rDNA), dextrose, traMADol, sodium chloride flush, magnesium sulfate  Infusions:    dextrose           Patient Active Problem List   Diagnosis Code    Left arm pain M79.602    Type 2 diabetes mellitus with complication, with long-term current use of insulin (HCC) E11.8, Z79.4    COPD, mild (HCC) J44.9    Tobacco abuse Z72.0    Angina effort I20.8    Abnormal nuclear cardiac imaging test R93.1    Lung nodule R91.1    Chest pain R07.9    Dyslipidemia E78.5    Pancolitis (HCC) K51.00    Hematemesis without nausea K36.9    Alcoholic cirrhosis of liver without ascites (HCC) K70.30    Thrombocytopenia (HCC) A76.0    Periumbilical abdominal pain R10.33    History of colonic polyps Z86.010    Abnormal findings on diagnostic imaging of 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    Type 2 diabetes mellitus with complication, with long-term current use of insulin (HCC) E11.8, Z79.4    SOB (shortness of breath) R06.02    Portal vein thrombosis I81    Intractable nausea and vomiting R11.2    Abdominal pain R10.9    Acute GI bleeding K92.2    Chronic hepatitis C without hepatic coma (HCC) B18.2    Delayed gastric emptying K30    Restless legs G25.81    Acute colitis K52.9    Acute encephalopathy G93.40    S/P TIPS (transjugular intrahepatic portosystemic shunt) Z95.828    Other cirrhosis of liver (HCC) K74.69    Acute upper GI bleed K92.2    Acute hepatic encephalopathy K72.00    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    thrombocytopenia D69.59    Hepatic encephalopathy (HCC) K72.90       ASSESSMENT AND RECOMMENDATIONS:    HE:  Not adherent to Lactulose regimen at home, advised need to have 2 BMs per day  TIPS patient  Mentation/speech improving today, ammonia rising today   Recommend Lactulose 45 gm QID today  Continue Xifaxan 550 mg BID      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 Alicia Mccann. He agrees with the assessment and plan.      Gricel Arriaga, CNP  8/5/2020  11:05 AM   No Bm here  Agree with increasing lactulose  Hopefully

## 2020-08-05 NOTE — PROGRESS NOTES
Hospitalist Progress Note         Admit Date: 8/2/2020    PCP: Saranya Mckinnon MD     Chief Complaint   Patient presents with    Altered Mental Status    Blood Work     Ammonia level        Assessment and Plan:     -Acute hepatic encephalopathy continue increased dose of lactulose and rifaximin. Ammonia level 190s. GI evaluating. Continue monitor. -QT prolongation repeat EKG as needed. Avoid QT prolonging meds.  -Cirrhosis and portal hypertension with history of portal vein thrombosis  S/p TIPS procedure in the past.  Continue viread per GI for hep B. On Lasix and Aldactone. Monitor volume status and ultrasound pending  -Paroxysmal A. fib on metoprolol. No AC with thrombocytopenia.  -Schizophrenia held Seroquel due to QT prolongation. -DM 2 continue Lantus and add SSI. Follow Accu-Cheks  -Possible CKD stage III due to underlying cirrhosis.   Monitor BMP    VTE prophylaxis SCDs only due to thrombocytopenia    Current Facility-Administered Medications   Medication Dose Route Frequency Provider Last Rate Last Dose    insulin lispro (HUMALOG) injection vial 0-6 Units  0-6 Units Subcutaneous TID WC Roberta Garcia MD   1 Units at 08/05/20 1752    insulin lispro (HUMALOG) injection vial 0-3 Units  0-3 Units Subcutaneous Nightly Roberta Garcia MD        glucose (GLUTOSE) 40 % oral gel 15 g  15 g Oral PRN Roberta Garcia MD        dextrose 50 % IV solution  12.5 g Intravenous PRN Roberta Garcia MD        glucagon (rDNA) injection 1 mg  1 mg Intramuscular PRN Roberta Garcia MD        dextrose 5 % solution  100 mL/hr Intravenous PRN Roberta Garcia MD        lactulose (CHRONULAC) 10 GM/15ML solution 45 g  45 g Oral 4x Daily June N Holfinger, APRN - CNP   45 g at 08/05/20 1747    traMADol (ULTRAM) tablet 50 mg  50 mg Oral Q6H PRN ERLINDA Miller CNP   50 mg at 08/05/20 1755    albuterol (PROVENTIL) nebulizer solution 2.5 mg  2.5 mg Nebulization Q6H Mayank Wright Sever, MD   2.5 mg at 08/05/20 1510    insulin glargine (LANTUS) injection vial 15 Units  15 Units Subcutaneous Nightly Connor Chowdhury MD   15 Units at 08/02/20 2341    metoprolol succinate (TOPROL XL) extended release tablet 50 mg  50 mg Oral Daily Connor Chowdhury MD   50 mg at 08/05/20 0947    midodrine (PROAMATINE) tablet 10 mg  10 mg Oral TID WC Connro Chowdhury MD   10 mg at 08/05/20 1747    pantoprazole (PROTONIX) tablet 40 mg  40 mg Oral BID AC Connor Chowdhury MD   40 mg at 08/05/20 1747    ranolazine (RANEXA) extended release tablet 500 mg  500 mg Oral BID Hazel Arnold MD   500 mg at 08/05/20 0947    rifaximin (XIFAXAN) tablet 550 mg  550 mg Oral BID Hazel Arnold MD   550 mg at 08/05/20 0947    spironolactone (ALDACTONE) tablet 50 mg  50 mg Oral Daily Stephan WONG MD   50 mg at 08/05/20 0947    tenofovir disoproxil fumarate (VIREAD) tablet 300 mg  300 mg Oral Daily Stephan WONG MD   300 mg at 08/05/20 0947    sodium chloride flush 0.9 % injection 10 mL  10 mL Intravenous 2 times per day Hazel Arnold MD   10 mL at 08/05/20 0948    sodium chloride flush 0.9 % injection 10 mL  10 mL Intravenous PRN Connor Chowdhury MD        magnesium sulfate 2 g in 50 mL IVPB premix  2 g Intravenous PRN Connor Chowdhury MD        furosemide (LASIX) tablet 20 mg  20 mg Oral Daily Connor Chowdhury MD   20 mg at 08/05/20 5633       Subjective:     Patient denied any new complaints. No acute overnight events  Tolerating diet  Had one bowel movement today    Objective:        Intake/Output Summary (Last 24 hours) at 8/5/2020 1921  Last data filed at 8/5/2020 0947  Gross per 24 hour   Intake 10 ml   Output --   Net 10 ml      Vitals:   Vitals:    08/05/20 1538   BP: (!) 91/52   Pulse: 72   Resp: 16   Temp: 98.1 °F (36.7 °C)   SpO2: 95%     Physical Exam:  General Appearance:    Alert, cooperative, no distress  Head:      Normocephalic, without obvious abnormality, atraumatic  Eyes:       Conjunctiva/corneas clear, EOM's intact  Lungs: Clear to auscultation bilaterally, respirations unlabored  Heart:                Regular rate and rhythm, S1 and S2 normal, no murmur,   rub or gallop  Abdomen:     Soft, mildly distended and BS +  Extremities:   Extremities normal, atraumatic, no cyanosis or edema  Neurological:   Grossly Intact. Significant Diagnostic Studies:   DATA:    CBC   Recent Labs     08/03/20  0642 08/04/20  0758   WBC 5.0 5.8   HGB 12.4* 12.7   HCT 36.4* 37.9   PLT 88* 99*      BMP   Recent Labs     08/03/20  0642 08/04/20  0758    137   K 3.8 4.4    103   CO2 24 26   BUN 17 16   CREATININE 1.3* 1.3*     LFT'S   Recent Labs     08/03/20  0642 08/04/20  0758   AST 16 18   ALT 11 12   BILITOT 1.3* 1.1*   ALKPHOS 81 117     COAG   Recent Labs     08/04/20  0758   INR 1.12     POC:   Lab Results   Component Value Date    POCGLU 196 08/05/2020    POCGLU 357 08/05/2020    POCGLU 126 08/05/2020    POCGLU 241 08/05/2020     [id number]:  Lab Results   Component Value Date    LABA1C 6.6 05/27/2020     CARDIAC ENZYMES  No results for input(s): CKTOTAL, CKMB, CKMBINDEX, TROPONINI in the last 72 hours. Troponin:   No results for input(s): TROPONINT in the last 72 hours. BNP: No results for input(s): PROBNP in the last 72 hours.   U/A:    Lab Results   Component Value Date    NITRITE neg 07/15/2017    COLORU YELLOW 08/02/2020    WBCUA NONE SEEN 08/02/2020    RBCUA NONE SEEN 08/02/2020    MUCUS RARE 05/28/2020    BACTERIA NEGATIVE 08/02/2020    CLARITYU CLEAR 08/02/2020    SPECGRAV 1.019 08/02/2020    LEUKOCYTESUR NEGATIVE 08/02/2020    BLOODU NEGATIVE 08/02/2020    GLUCOSEU neg 07/15/2017    AMORPHOUS RARE 05/01/2019       Ct Head Wo Contrast    Result Date: 8/2/2020  EXAMINATION: CT OF THE HEAD WITHOUT CONTRAST  8/2/2020 6:43 pm TECHNIQUE: CT of the head was performed without the administration of intravenous contrast. Dose modulation, iterative reconstruction, and/or weight based adjustment of the mA/kV was utilized to reduce the radiation dose to as low as reasonably achievable. COMPARISON: CT head March 30, 2020 HISTORY: ORDERING SYSTEM PROVIDED HISTORY: confusion TECHNOLOGIST PROVIDED HISTORY: Reason for exam:->confusion Has a \"code stroke\" or \"stroke alert\" been called? ->No Reason for Exam: confusion FINDINGS: BRAIN/VENTRICLES: There is no acute intracranial hemorrhage, mass effect or midline shift. No abnormal extra-axial fluid collection. The gray-white differentiation is maintained without evidence of an acute infarct. There is no evidence of hydrocephalus. ORBITS: The visualized portion of the orbits demonstrate no acute abnormality. SINUSES: The visualized paranasal sinuses and mastoid air cells demonstrate no acute abnormality. SOFT TISSUES/SKULL:  No acute abnormality of the visualized skull or soft tissues. No acute intracranial abnormality. Xr Chest Portable    Result Date: 8/2/2020  EXAMINATION: ONE XRAY VIEW OF THE CHEST 8/2/2020 4:40 pm COMPARISON: Chest x-ray May 6, 2020; chest x-ray March 30, 2020 HISTORY: ORDERING SYSTEM PROVIDED HISTORY: Chest pain TECHNOLOGIST PROVIDED HISTORY: Reason for exam:->Chest pain FINDINGS: Cardiac silhouette appears stable. Right middle lobe opacities along the medial right heart border favored to reflect atelectasis. Mild chronic interstitial changes of the lungs appears stable. No pleural effusion or pneumothorax. Right upper quadrant surgical clips compatible with cholecystectomy. Osseous structures appear stable. 1. No acute cardiopulmonary process identified.      Us Abdomen Limited    Result Date: 8/4/2020  EXAMINATION: RIGHT UPPER QUADRANT ULTRASOUND 8/4/2020 8:28 am COMPARISON: June 6, 2020 HISTORY: ORDERING SYSTEM PROVIDED HISTORY: evaluate tips patency, pt has he TECHNOLOGIST PROVIDED HISTORY: Reason for exam:->evaluate tips patency, pt has he Reason for Exam: abnormal labs, hepatic encephalopathy, cirrhosis, TIPS pt Acuity: Unknown Type of Exam: Initial FINDINGS: LIVER:  The liver is coarse in echotexture. Mild nodularity of the liver contour is noted. There is no intrahepatic biliary ductal dilatation. BILIARY SYSTEM:  S/p cholecystectomy. Common bile duct is within normal limits measuring 0.6 cm. RIGHT KIDNEY: The right kidney is grossly unremarkable without evidence of hydronephrosis. PANCREAS:  Visualized portions of the pancreas are unremarkable. OTHER: No evidence of right upper quadrant ascites. The TIPS is patent with appropriate directional flow. 1.  Findings consistent with cirrhosis. 2.  Status post cholecystectomy. 3.  Tips is patent.            Bayley Seton Hospitalist

## 2020-08-06 VITALS
HEIGHT: 58 IN | OXYGEN SATURATION: 94 % | TEMPERATURE: 98.2 F | HEART RATE: 76 BPM | DIASTOLIC BLOOD PRESSURE: 78 MMHG | WEIGHT: 187.9 LBS | BODY MASS INDEX: 39.44 KG/M2 | SYSTOLIC BLOOD PRESSURE: 112 MMHG | RESPIRATION RATE: 14 BRPM

## 2020-08-06 LAB
AMMONIA: 107 UMOL/L (ref 11–51)
GLUCOSE BLD-MCNC: 144 MG/DL (ref 70–99)
GLUCOSE BLD-MCNC: 205 MG/DL (ref 70–99)
GLUCOSE BLD-MCNC: 249 MG/DL (ref 70–99)

## 2020-08-06 PROCEDURE — 82962 GLUCOSE BLOOD TEST: CPT

## 2020-08-06 PROCEDURE — 82140 ASSAY OF AMMONIA: CPT

## 2020-08-06 PROCEDURE — 2580000003 HC RX 258: Performed by: INTERNAL MEDICINE

## 2020-08-06 PROCEDURE — 6370000000 HC RX 637 (ALT 250 FOR IP): Performed by: INTERNAL MEDICINE

## 2020-08-06 PROCEDURE — 6370000000 HC RX 637 (ALT 250 FOR IP): Performed by: NURSE PRACTITIONER

## 2020-08-06 PROCEDURE — 36415 COLL VENOUS BLD VENIPUNCTURE: CPT

## 2020-08-06 RX ORDER — INSULIN GLARGINE 100 [IU]/ML
15 INJECTION, SOLUTION SUBCUTANEOUS NIGHTLY
Qty: 5 PEN | Refills: 3 | Status: SHIPPED | OUTPATIENT
Start: 2020-08-06 | End: 2021-10-11 | Stop reason: SDUPTHER

## 2020-08-06 RX ADMIN — RIFAXIMIN 550 MG: 550 TABLET ORAL at 09:55

## 2020-08-06 RX ADMIN — MIDODRINE HYDROCHLORIDE 10 MG: 5 TABLET ORAL at 09:55

## 2020-08-06 RX ADMIN — SODIUM CHLORIDE, PRESERVATIVE FREE 10 ML: 5 INJECTION INTRAVENOUS at 09:56

## 2020-08-06 RX ADMIN — METOPROLOL SUCCINATE 50 MG: 50 TABLET, EXTENDED RELEASE ORAL at 09:55

## 2020-08-06 RX ADMIN — LACTULOSE 45 G: 10 SOLUTION ORAL at 09:55

## 2020-08-06 RX ADMIN — SPIRONOLACTONE 50 MG: 50 TABLET ORAL at 09:55

## 2020-08-06 RX ADMIN — FUROSEMIDE 20 MG: 20 TABLET ORAL at 09:55

## 2020-08-06 RX ADMIN — RANOLAZINE 500 MG: 500 TABLET, FILM COATED, EXTENDED RELEASE ORAL at 09:55

## 2020-08-06 RX ADMIN — TENOFOVIR DISOPROXIL FUMARATE 300 MG: 300 TABLET, COATED ORAL at 09:54

## 2020-08-06 RX ADMIN — PANTOPRAZOLE SODIUM 40 MG: 40 TABLET, DELAYED RELEASE ORAL at 05:35

## 2020-08-06 ASSESSMENT — PAIN SCALES - GENERAL: PAINLEVEL_OUTOF10: 0

## 2020-08-06 NOTE — PROGRESS NOTES
[facility]        Progress Note       2020  9:11 AM    Patient:    Mendel Ronde  : 1960   64 y.o. MRN: [id number]  Admitted: 2020  4:36 PM ATT: Juan Duval MD   [id number]  AdmitDx: Hepatic encephalopathy ([facility] [address] [K72.90]  Hyperammonemia ([facility] [address] [E72.20]  QT prolongation [O61.97]  Acute metabolic encephalopathy [D70.33]  PCP: Onur Serna MD    SUBJECTIVE:  Chart reviewed, events noted  Patient feeling great. Ambulating in room. 2 BMs yesterday. Tolerating 2 gm Na diet. No N/V. Denies abdominal pain.     ROS:  The positive ROS will be identified in bold     CONSTITUTIONAL:  Neg  Weight loss, fatigue, fever  MOUTH/THROAT:  Neg  Bleeding gums, hoarseness or sore throat  RESPIRATORY:   Neg SOB, wheeze, cough, hemoptysis or bronchitis  CARDIOVASCULAR:  Neg Chest pain, palpitations, dyspnea on exertion, edema  GASTROINTESTINAL:  SEE HPI  HEMATOLOGIC/LYMPHATIC:  Neg  Anemia, bleeding tendency  MUSCULOSKELETAL: Neg  New myalgias, joint pain, swelling or stiffness  NEUROLOGICAL:  Neg  Loss of Consciousness, memory loss, forgetfulness, periods of confusion, difficulty concentrating, seizures, insomnia, aphasia    SKIN:  Neg No itching, rashes, or sores  PSYCHIATRIC:  Neg Depression, personality changes, anxiety    OBJECTIVE:      /78   Pulse 76   Temp 98.2 °F (36.8 °C) (Oral)   Resp 14   Ht 4' 9.5\" (1.461 m)   Wt 187 lb 14.4 oz (85.2 kg)   SpO2 94%   BMI 39.96 kg/m²     NAD, appears comfortable, standing in room   Lips and mucous membranes pink and moist  RRR, Nl s1s2, no murmurs, no JVD  Lungs CTA bilaterally, respirations even and unlabored   Abdomen soft, mild disteention, NT, no HSM, bowel sounds normal  Skin pink, warm, dry and CR brisk   No edema bilateral lower extremities   AAOx3, No asterixis      CBC:   Recent Labs     20  0758   WBC 5.8   HGB 12.7   PLT 99*     BMP:    Recent Labs     20  0758      K 4.4      CO2 26 Mixed hyperlipidemia E78.2    Vitamin D deficiency E55.9    Left carpal tunnel syndrome G56.02    Right carpal tunnel syndrome G56.01    Esophageal hypertension K22.0    Candida esophagitis (HCC) B37.81    Colitis K52.9    Essential hypertension I10    GI bleed K92.2    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    Type 2 diabetes mellitus with complication, with long-term current use of insulin (HCC) E11.8, Z79.4    SOB (shortness of breath) R06.02    Portal vein thrombosis I81    Intractable nausea and vomiting R11.2    Abdominal pain R10.9    Acute GI bleeding K92.2    Chronic hepatitis C without hepatic coma (HCC) B18.2    Delayed gastric emptying K30    Restless legs G25.81    Acute colitis K52.9    Acute encephalopathy G93.40    S/P TIPS (transjugular intrahepatic portosystemic shunt) Z95.828    Other cirrhosis of liver (HCC) K74.69    Acute upper GI bleed K92.2    Acute hepatic encephalopathy K72.00    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    thrombocytopenia D69.59    Hepatic encephalopathy (HCC) K72.90        ASSESSMENT AND RECOMMENDATIONS:    HE:  Not adherent to Lactulose regimen at home, advised need to have 2 BMs per day  TIPS patient  Mentation/speech improved   Continue Lactulose 45 gm QID- goal 3-4 BM's per day d/t high risk for HE d/t TIPS- advised patient the only time Lactulose dose should be held is if she has greater than 4 BM's per day   Continue Xifaxan 550 mg BID      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  Thank you

## 2020-08-06 NOTE — PLAN OF CARE
Problem: Falls - Risk of:  Goal: Will remain free from falls  Description: Will remain free from falls  8/6/2020 1049 by Dario De Leon RN  Outcome: Completed  8/6/2020 0316 by Lila Kawasaki, RN  Outcome: Ongoing  Goal: Absence of physical injury  Description: Absence of physical injury  8/6/2020 1049 by Dario De Leon RN  Outcome: Completed  8/6/2020 0316 by Lila Kawasaki, RN  Outcome: Ongoing     Problem: Coping:  Goal: Ability to remain calm will improve  Description: Ability to remain calm will improve  8/6/2020 1049 by Dario De Leon RN  Outcome: Completed  8/6/2020 0316 by Lila Kawasaki, RN  Outcome: Ongoing     Problem: Safety:  Goal: Ability to remain free from injury will improve  Description: Ability to remain free from injury will improve  8/6/2020 1049 by Dario De Leon RN  Outcome: Completed  8/6/2020 0316 by Lila Kawasaki, RN  Outcome: Ongoing     Problem: Self-Care:  Goal: Ability to participate in self-care as condition permits will improve  Description: Ability to participate in self-care as condition permits will improve  8/6/2020 1049 by Dario De Leon RN  Outcome: Completed  8/6/2020 0316 by Lila Kawasaki, RN  Outcome: Ongoing

## 2020-08-06 NOTE — DISCHARGE SUMMARY
Lalito Powers 1960 [id number]  PCP:  Shirley Mays MD    Admit date: 8/2/2020  Admitting Physician: Zoe Lorenzo MD    Discharge date: 8/6/2020 Discharge Physician: Albert Patel MD         Discharge Diagnoses. As per below    Hospital Course:  History of present illness at admission: As per H&P  Subsequent Hospital Course:        -Acute hepatic encephalopathy continue increased dose of lactulose and rifaximin. Ammonia level levels improved. GI evaluated. -QT prolongation repeat EKG as needed. No significant QT increase. Continue home meds without any changes.  -Cirrhosis and portal hypertension with history of portal vein thrombosis  S/p TIPS procedure in the past.  Continue viread per GI for hep B. On Lasix and Aldactone. Euvolemic and ultrasound did not show significant changes.  -Paroxysmal A. fib on metoprolol. No AC with thrombocytopenia.  -Schizophrenia held Seroquel due to QT prolongation. Restarted upon DC.  -DM 2 continue Lantus and SSI. Stable Accu-Cheks  -Possible CKD stage III due to underlying cirrhosis. Stable creatinine     VTE prophylaxis SCDs only due to thrombocytopenia    On the day of discharge, pt felt better. No new complaints.     Pertinent Exam Findings on Day of Discharge:  General Appearance:    Alert, cooperative, no distress, appears stated age  Head:    Normocephalic, without obvious abnormality, atraumatic  Eyes:    PERRL, conjunctiva/corneas clear, EOM's intact  Lungs:    Clear to auscultation bilaterally, respirations unlabored   Heart:    Regular rate and rhythm, S1 and S2 normal, no murmur,   rub or gallop  Abdomen:     Soft, non-tender, bowel sounds active, no masses, no organomegaly  Extremities:   Extremities normal, atraumatic, no cyanosis or edema    Consults:  IP CONSULT TO HOSPITALIST  IP CONSULT TO GI    Patient Instructions:   Viridiana Ledezma   Home Medication Instructions CGO:[id number]    Printed on:08/06/20 1004   Medication Information albuterol (PROVENTIL) (2.5 MG/3ML) 0.083% nebulizer solution  Take 3 mLs by nebulization every 6 hours             blood glucose monitor strips  Test 3 times a day & as needed for symptoms of irregular blood glucose. Please fill with what is covered my patients insurance. blood glucose monitor strips  Test  Bid times a day & as needed for symptoms of irregular blood glucose. Blood Glucose Monitoring Suppl (ACURA BLOOD GLUCOSE METER) w/Device KIT  Please check blood sugar 3 times daily.  Please fill with what is covered by the insurance             Compression Stockings MISC  by Does not apply route 20 - 30 mmh wear daily and take off at night  Thigh High             FLUoxetine (PROZAC) 40 MG capsule  Take 40 mg by mouth daily             furosemide (LASIX) 20 MG tablet  Take 20 mg by mouth daily              glucose monitoring kit (FREESTYLE) monitoring kit  1 kit by Does not apply route daily             Glucosource Lancets MISC  Use one 3-4 times to check blood sugar             insulin aspart (NOVOLOG FLEXPEN) 100 UNIT/ML injection pen  **Low Dose Correction Algorithm**Insulin lispro (HUMALOG)  3 TIMES DAILY WITH MEALSGlucose: Dose: No [id number] 1 [id number] 2 [id number] 3 [id number] 4 Ysoma677-774 5 Akqaw802 and above 6 Units             insulin glargine (LANTUS SOLOSTAR) 100 UNIT/ML injection pen  Inject 15 Units into the skin nightly             lactulose (CHRONULAC) 10 GM/15ML solution  Take 30 mLs by mouth 3 times daily             metoprolol succinate (TOPROL XL) 50 MG extended release tablet  Take 1 tablet by mouth daily             midodrine (PROAMATINE) 10 MG tablet  Take 1 tablet by mouth 3 times daily (with meals)             nitroGLYCERIN (NITROSTAT) 0.4 MG SL tablet  Place 1 tablet under the tongue every 5 minutes as needed for Chest pain             pantoprazole (PROTONIX) 40 MG tablet  Take 1 tablet by mouth 2 times daily (before meals) QUEtiapine (SEROQUEL) 300 MG tablet  Take 150 mg by mouth nightly              ranolazine (RANEXA) 500 MG extended release tablet  Take 1 tablet by mouth 2 times daily             rifaximin (XIFAXAN) 550 MG tablet  Take 1 tablet by mouth 2 times daily             SOFT TOUCH LANCETS MISC  Please check blood sugar 3 times daily.  Please fill with what is covered by insurance             spironolactone (ALDACTONE) 50 MG tablet  Take 50 mg by mouth daily              UNABLE TO FIND  Rx for depends   Use 3-4  times daily             VIREAD 300 MG tablet  Take 300 mg by mouth daily                    Diet:  DIET GENERAL; Low Sodium (2 GM)    Activity:   activity as tolerated     Discharge Condition:   good    Disposition:   home    Follow-up      Vasyl Allen MD  Schedule an appointment as soon as possible for a visit  Medical Management  [address]  [address] [facility] [address]   Susie Ledbetter MD  Go to  Medical Management of Cirrhosis  [address] [id number] [phone]        Discharge Physician Signed: Kiel Green

## 2020-09-01 ENCOUNTER — OFFICE VISIT (OUTPATIENT)
Dept: CARDIOLOGY CLINIC | Age: 60
End: 2020-09-01
Payer: MEDICARE

## 2020-09-01 VITALS
WEIGHT: 200 LBS | DIASTOLIC BLOOD PRESSURE: 68 MMHG | HEART RATE: 64 BPM | SYSTOLIC BLOOD PRESSURE: 104 MMHG | BODY MASS INDEX: 41.98 KG/M2 | HEIGHT: 58 IN

## 2020-09-01 PROBLEM — E66.01 MORBIDLY OBESE (HCC): Status: ACTIVE | Noted: 2020-09-01

## 2020-09-01 PROCEDURE — G8427 DOCREV CUR MEDS BY ELIG CLIN: HCPCS | Performed by: NURSE PRACTITIONER

## 2020-09-01 PROCEDURE — 93000 ELECTROCARDIOGRAM COMPLETE: CPT | Performed by: NURSE PRACTITIONER

## 2020-09-01 PROCEDURE — 3017F COLORECTAL CA SCREEN DOC REV: CPT | Performed by: NURSE PRACTITIONER

## 2020-09-01 PROCEDURE — G8417 CALC BMI ABV UP PARAM F/U: HCPCS | Performed by: NURSE PRACTITIONER

## 2020-09-01 PROCEDURE — 1111F DSCHRG MED/CURRENT MED MERGE: CPT | Performed by: NURSE PRACTITIONER

## 2020-09-01 PROCEDURE — 4004F PT TOBACCO SCREEN RCVD TLK: CPT | Performed by: NURSE PRACTITIONER

## 2020-09-01 PROCEDURE — G9899 SCRN MAM PERF RSLTS DOC: HCPCS | Performed by: NURSE PRACTITIONER

## 2020-09-01 PROCEDURE — 99214 OFFICE O/P EST MOD 30 MIN: CPT | Performed by: NURSE PRACTITIONER

## 2020-09-01 RX ORDER — PALIPERIDONE 3 MG/1
TABLET, EXTENDED RELEASE ORAL
COMMUNITY
Start: 2020-08-13

## 2020-09-01 RX ORDER — METOPROLOL SUCCINATE 50 MG/1
75 TABLET, EXTENDED RELEASE ORAL DAILY
Qty: 30 TABLET | Refills: 3 | Status: ON HOLD
Start: 2020-09-01 | End: 2020-09-15 | Stop reason: HOSPADM

## 2020-09-01 RX ORDER — BUSPIRONE HYDROCHLORIDE 15 MG/1
TABLET ORAL
COMMUNITY
Start: 2020-07-15 | End: 2020-09-01 | Stop reason: ALTCHOICE

## 2020-09-01 RX ORDER — FLUOXETINE HYDROCHLORIDE 20 MG/1
CAPSULE ORAL
COMMUNITY
Start: 2020-07-15 | End: 2020-09-01 | Stop reason: CLARIF

## 2020-09-01 RX ORDER — TRAMADOL HYDROCHLORIDE 50 MG/1
TABLET ORAL
COMMUNITY
Start: 2020-05-26 | End: 2022-02-16

## 2020-09-01 RX ORDER — CEPHALEXIN 250 MG/1
CAPSULE ORAL
COMMUNITY
Start: 2020-05-26 | End: 2020-09-13

## 2020-09-01 RX ORDER — METFORMIN HYDROCHLORIDE 500 MG/1
TABLET, EXTENDED RELEASE ORAL
COMMUNITY
Start: 2020-07-15 | End: 2020-09-01 | Stop reason: ALTCHOICE

## 2020-09-01 ASSESSMENT — ENCOUNTER SYMPTOMS
PHOTOPHOBIA: 0
ABDOMINAL PAIN: 0
CONSTIPATION: 0
COLOR CHANGE: 0
NAUSEA: 0
COUGH: 1
VOMITING: 0
SHORTNESS OF BREATH: 1
DIARRHEA: 0
SINUS PAIN: 0
ABDOMINAL DISTENTION: 1
BLOOD IN STOOL: 0

## 2020-09-01 NOTE — PROGRESS NOTES
[facility] ([facility] PHYSICAL REHABILITATION [place]  [address] [facility],Third [address]  Phone: [phone]    Fax [phone]                  Samuel Healy MD, George Whitman MD, Raeann Lee MD, MD Laury Viera MD Juel Maduro, MD [place], APRN Quintin Sever, ERLINDA Vazquez, ERLINDA Barron, ERLINDA    CARDIOLOGY  NOTE      9/1/2020    RE: Roma Stevens  (1960)                               TO:  Dr. Ronaldo Cox MD  The primary cardiologist is Dr. Julissa Rivera    CC:   1. Chest pain on breathing    2. SOB (shortness of breath)    3. Tobacco abuse    4. Mixed hyperlipidemia    5. Essential hypertension    6. Coronary-myocardial bridge      Patient denies all of the following:  Palpitations  Edema  Dizziness  Syncope      HPI: Thank you for involving me in taking care of your patient Roma Stevens, who is a  64y.o. year old female with a history as listed above. Patient is  active and  does not exercises regularly. Patient is not compliant with her medications. SHe states that she has difficulty taking all of her medications and leaving the house because of the frequency of voiding and bowel movements from the medications. Patient states that she has restarted smoking and has started having more chest pain again. She states that the pain is intermittent and only lasts less than minute. She states that her shortness of breath has worsened since restarting smoking again additionally.      Vitals:    09/01/20 1523   BP: 104/68   Pulse:        Current Outpatient Medications   Medication Sig Dispense Refill    traMADol (ULTRAM) 50 MG tablet       paliperidone (INVEGA) 3 MG extended release tablet       cephALEXin (KEFLEX) 250 MG capsule       metoprolol succinate (TOPROL XL) 50 MG extended release tablet Take 1.5 tablets by mouth daily 30 tablet 3    insulin glargine (LANTUS SOLOSTAR) 100 UNIT/ML injection pen Inject 15 Units into the skin nightly 5 pen 3    midodrine (PROAMATINE) 10 MG tablet Take 1 tablet by mouth 3 times daily (with meals) 90 tablet 3    nitroGLYCERIN (NITROSTAT) 0.4 MG SL tablet Place 1 tablet under the tongue every 5 minutes as needed for Chest pain 25 tablet 3    ranolazine (RANEXA) 500 MG extended release tablet Take 1 tablet by mouth 2 times daily 60 tablet 5    Compression Stockings MISC by Does not apply route 20 - 30 mmh wear daily and take off at night  Thigh High 2 each 2    insulin aspart (NOVOLOG FLEXPEN) 100 UNIT/ML injection pen **Low Dose Correction Algorithm**Insulin lispro (HUMALOG)  3 TIMES DAILY WITH MEALSGlucose: Dose: No [id number] 1 [id number] 2 [id number] 3 Mvxrs636-492 4 Ijldr595-643 5 Jtqbh687 and above 6 Units 5 pen 3    blood glucose monitor strips Test  Bid times a day & as needed for symptoms of irregular blood glucose. 100 strip 5    UNABLE TO FIND Rx for depends   Use 3-4  times daily 1 box 5    albuterol (PROVENTIL) (2.5 MG/3ML) 0.083% nebulizer solution Take 3 mLs by nebulization every 6 hours 120 vial 5    lactulose (CHRONULAC) 10 GM/15ML solution Take 30 mLs by mouth 3 times daily 1892 mL 2    Glucosource Lancets MISC Use one 3-4 times to check blood sugar 100 each 5    glucose monitoring kit (FREESTYLE) monitoring kit 1 kit by Does not apply route daily 1 kit 0    Blood Glucose Monitoring Suppl (ACURA BLOOD GLUCOSE METER) w/Device KIT Please check blood sugar 3 times daily. Please fill with what is covered by the insurance 1 kit 0    blood glucose monitor strips Test 3 times a day & as needed for symptoms of irregular blood glucose. Please fill with what is covered my patients insurance. 50 strip 3    SOFT TOUCH LANCETS MISC Please check blood sugar 3 times daily.  Please fill with what is covered by insurance 100 each 3    pantoprazole (PROTONIX) 40 MG tablet Take 1 tablet by mouth 2 times daily (before meals) 60 tablet 1    rifaximin (XIFAXAN) 550 MG tablet Take 1 tablet by mouth 2 times daily 42 tablet 0    furosemide (LASIX) 20 MG tablet Take 20 mg by mouth daily       spironolactone (ALDACTONE) 50 MG tablet Take 50 mg by mouth daily       FLUoxetine (PROZAC) 40 MG capsule Take 40 mg by mouth daily      VIREAD 300 MG tablet Take 300 mg by mouth daily       QUEtiapine (SEROQUEL) 300 MG tablet Take 150 mg by mouth nightly        No current facility-administered medications for this visit. Allergies: Norco [hydrocodone-acetaminophen] and Tylenol [acetaminophen]  Past Medical History:   Diagnosis Date    Acid reflux     Arthritis     left knee    CAD (coronary artery disease)     per [facility] 9/6/13 - Dr. Gabriela Mantilla COPD (chronic obstructive pulmonary disease) ([facility] [address]     follow with Dr Idania Waldron Depression     \"have manic - depression see Dr Oscar Khan Diabetes mellitus [facility])     dx 10+ yrs ago- follows with PCP    Drug abuse ([facility] [address]     hx use of cocaine, heroin and marijuana- states last used 12/2014    Glaucoma     bilateral    H/O Doppler lower venous ultrasound 04/04/2019    No DVT or SVT, Significant reflux of RGSV and LGSV.  Hepatic encephalopathy ([facility] [address] 05/2019    Hepatitis C     for liver bx 12/3/2015\"Have Hepatitis B and C and saw Dr Opal Garcia for this 12/1/2015\"    Hiatal hernia     History of alcohol abuse     History of exercise stress test 01/02/2020    Treadmill, Normal exercise performance without angina and ischemic EKG changes.     HTN (hypertension)     \"for the past two yrs on medication\" follows with Dr Gabriela Mantilla Hx of blood clots 2019    Portal vein thrombsis - TIPS procedure 4/23/19    Hyperlipemia     Irregular heart beat     per pt    Liver hematoma     Migraines     Last migraine:  2018    Nausea & vomiting     Schizophrenia ([facility] [address]     per old chart    Seizures ([facility] [address]     \"last one was 9/2015- [facility] Dr Fam Johnson [facility]- she said not sure if acutal seizures- she thinks they are panic or anxiety attacks\"    SOB (shortness of breath)     with any exertion     Past Surgical History:   Procedure Laterality Date    BREAST SURGERY  10/2015    left breast bx    CARDIAC CATHETERIZATION      per old chart pt had cath done in 3/2011 and 9/2013    CARPAL TUNNEL RELEASE Left 1/9/2020    CARPAL TUNNEL RELEASE LEFT performed by Connor Abdullahi DO at [address] Right 05/26/2020    dr Becca Miles, carpal tunnel trigger finger release    CARPAL TUNNEL RELEASE Right 5/26/2020    RIGHT CARPAL TUNNEL RELEASE performed by Connor Abdullahi DO at [id number] [facility]  per old chart done [facility]  3/11/13    diverticulosis, cecal polyp    COLONOSCOPY  03/16/2017    Internal hemorrhoids- Dr. Niki Avalos, COLON, DIAGNOSTIC  03/16/2017    EGD: Small esophageal varices, portal hypertensive gastropathy, reflux esophagitis, hiatal hernia    EYE SURGERY Bilateral ? when    cyst removal, cataracts w/lens replacement    FINGER TRIGGER RELEASE Right 5/26/2020    FINGER TRIGGER RELEASE RIGHT RING FINGER performed by Connor Abdullahi DO at [facility]      per old chart pt had CHOLO/BSO 1986    TIPS PROCEDURE  04/23/2019    TONSILLECTOMY  as a kid    UPPER GASTROINTESTINAL ENDOSCOPY N/A 11/14/2018    EGD DIAGNOSTIC ONLY performed by Nathalie Suh MD at [address] [place] [address] N/A 6/5/2019    EGD DIAGNOSTIC ONLY performed by Nathalie Suh MD at [place] [address] History   Problem Relation Age of Onset    Cancer Mother         lung ca   Vannie Pulling Arthritis Mother     Migraines Mother     Cancer Father         colon ca   Vannie Pulling Diabetes Father     High Blood Pressure Father     Arthritis Father     High Cholesterol Father     Migraines Father     Migraines Sister     Heart Disease Brother         WPW     Social History     Tobacco Use    Smoking status: Current Every Day Smoker     Packs/day: 0.50     Years: 45.00     Pack years: 22.50     Types: Cigarettes     Start date: 67    Smokeless tobacco: Never Used   Substance Use Topics    Alcohol use: Not Currently     Alcohol/week: 2.0 - 3.0 standard drinks     Types: 2 - 3 Shots of liquor per week     Comment: 2-3 shots last 4/2019        Review of Systems   Constitutional: Negative for fatigue and fever. HENT: Negative for ear pain and sinus pain. Eyes: Negative for photophobia and visual disturbance. Respiratory: Positive for cough and shortness of breath. Cardiovascular: Positive for chest pain and leg swelling. Negative for palpitations. Gastrointestinal: Positive for abdominal distention. Negative for abdominal pain, blood in stool, constipation, diarrhea, nausea and vomiting. Endocrine: Negative for polyphagia and polyuria. Genitourinary: Negative for decreased urine volume and difficulty urinating. Musculoskeletal: Negative for arthralgias and gait problem. Skin: Negative for color change and wound. Neurological: Negative for dizziness, syncope, weakness, light-headedness and headaches. Psychiatric/Behavioral: Negative for agitation. The patient is not hyperactive. Objective:      Physical Exam:  /68 (Site: Left Upper Arm, Position: Sitting, Cuff Size: Medium Adult)   Pulse 64   Ht 4' 9.5\" (1.461 m)   Wt 200 lb (90.7 kg)   BMI 42.53 kg/m²   Wt Readings from Last 3 Encounters:   09/01/20 200 lb (90.7 kg)   08/04/20 187 lb 14.4 oz (85.2 kg)   06/16/20 184 lb (83.5 kg)     Body mass index is 42.53 kg/m². Physical Exam  Vitals signs reviewed. Constitutional:       General: She is not in acute distress. Appearance: Normal appearance. She is obese. She is not ill-appearing. HENT:      Head: Normocephalic and atraumatic. Eyes:      Conjunctiva/sclera: Conjunctivae normal.      Pupils: Pupils are equal, round, and reactive to light. Neck:      Musculoskeletal: Neck supple. No muscular tenderness. Vascular: No carotid bruit.    Cardiovascular:      Rate and Rhythm: Normal rate and regular rhythm. Pulses: Normal pulses. Heart sounds: Normal heart sounds. No murmur. Pulmonary:      Effort: Pulmonary effort is normal. No respiratory distress. Breath sounds: Decreased breath sounds present. Musculoskeletal:         General: Swelling (1+ non pitting generalized edema) present. No deformity. Skin:     General: Skin is warm and dry. Capillary Refill: Capillary refill takes less than 2 seconds. Neurological:      Mental Status: She is alert and oriented to person, place, and time. Psychiatric:         Mood and Affect: Mood normal.         Behavior: Behavior normal.         Thought Content: Thought content normal.         Judgment: Judgment normal.         DATA:  Lab Results   Component Value Date    CKTOTAL 26 09/16/2013    TROPONINI <0.006 10/28/2013    TROPONINI <0.006 03/18/2011     BNP:    Lab Results   Component Value Date    BNP 9 04/27/2013     PT/INR:  No results found for: [employer]  Lab Results   Component Value Date    LABA1C 6.6 (H) 05/27/2020    LABA1C 8.4 (H) 03/31/2020     Lab Results   Component Value Date    CHOL 98 11/27/2019    TRIG 96 11/27/2019    HDL 38 (L) 11/27/2019    LDLCALC 41 11/27/2019    LDLDIRECT 86 04/28/2013     Lab Results   Component Value Date    ALT 12 08/04/2020    AST 18 08/04/2020     TSH:    Lab Results   Component Value Date    TSH 1.73 08/01/2017         Assessment/ Plan:     Chest pain  EKG today sinus rhythm without acute changes from previous EKG      Coronary-myocardial bridge  Will increase BB to 75mg daily. ? Chest pain due to vasoconstriction from nicotine  Will have patient return for 2 week BP check       Essential hypertension   Controlled   To continue Beta blocker, Calcium channel blocker, ACE, ARB, Vasodilator, Diuretic   advised low salt diet   Last echo 2016 IMPRESSION:     1. This 2D echocardiogram is normal.      2. The Left ventricular ejection fraction is normal      3.  Significant valvular heart disease is absent    Mixed hyperlipidemia   Controlled   To continue Beta blocker, Diuretic   advised low salt diet     Morbidly obese (HCC)  Encouraged patient to exercise as she is able and try to lose weight. SOB (shortness of breath)  Likely the shortness of breath is related to restarting smoking tobacco again. Encouraged patient to discuss with pulmonologist.    Tobacco abuse   Patient is using tobacco at this time   Encouraged to stop  [employer], medicinal, and social support   Patient is not ready to quit at this time. She is going through stressful period and feels like they help. Encouraged patient to discuss with PCP stress and tobacco abuse. Patient seen, interviewed and examined. Testing was reviewed. Patient is encouraged to exercise even a brisk walk for 30 minutes at least 3 to 4 times a week. Lifestyle and risk factor modificatons discussed. Various goals are discussed and questions answered. Continue current medications. Appropriate prescriptions are addressed. Questions answered and patient verbalizes understanding. Call for any problems, questions, or concerns.     Pt is to follow up in 2 weeks for Cardiac management    I have spent 25 minutes of face to face time with the patient with more than 50% spent counseling and coordinating care for HTN, Chest pain, SOB, Tobacco usse, and myocardial bridging    Electronically signed by ERLINDA Ren CNP on 9/1/2020 at 3:49 PM

## 2020-09-02 NOTE — ASSESSMENT & PLAN NOTE
 Patient is using tobacco at this time   Encouraged to stop  [employer], medicinal, and social support   Patient is not ready to quit at this time. She is going through stressful period and feels like they help. Encouraged patient to discuss with PCP stress and tobacco abuse.

## 2020-09-02 NOTE — ASSESSMENT & PLAN NOTE
Will increase BB to 75mg daily. ?  Chest pain due to vasoconstriction from nicotine  Will have patient return for 2 week BP check

## 2020-09-02 NOTE — ASSESSMENT & PLAN NOTE
Likely the shortness of breath is related to restarting smoking tobacco again.    Encouraged patient to discuss with pulmonologist.

## 2020-09-13 ENCOUNTER — HOSPITAL ENCOUNTER (INPATIENT)
Age: 60
LOS: 2 days | Discharge: HOME HEALTH | DRG: 442 | End: 2020-09-15
Attending: EMERGENCY MEDICINE | Admitting: INTERNAL MEDICINE
Payer: MEDICARE

## 2020-09-13 ENCOUNTER — APPOINTMENT (OUTPATIENT)
Dept: GENERAL RADIOLOGY | Age: 60
DRG: 442 | End: 2020-09-13
Payer: MEDICARE

## 2020-09-13 PROBLEM — E72.20 HYPERAMMONEMIA (HCC): Status: ACTIVE | Noted: 2020-09-13

## 2020-09-13 PROBLEM — R11.2 NON-INTRACTABLE VOMITING WITH NAUSEA: Status: ACTIVE | Noted: 2020-09-13

## 2020-09-13 LAB
ALBUMIN SERPL-MCNC: 3.3 GM/DL (ref 3.4–5)
ALP BLD-CCNC: 138 IU/L (ref 40–129)
ALT SERPL-CCNC: 12 U/L (ref 10–40)
AMMONIA: 120 UMOL/L (ref 11–51)
ANION GAP SERPL CALCULATED.3IONS-SCNC: 8 MMOL/L (ref 4–16)
AST SERPL-CCNC: 17 IU/L (ref 15–37)
BACTERIA: NEGATIVE /HPF
BASOPHILS ABSOLUTE: 0.1 K/CU MM
BASOPHILS RELATIVE PERCENT: 1.2 % (ref 0–1)
BILIRUB SERPL-MCNC: 0.8 MG/DL (ref 0–1)
BILIRUB SERPL-MCNC: 0.8 MG/DL (ref 0–1)
BILIRUBIN DIRECT: 0.3 MG/DL (ref 0–0.3)
BILIRUBIN URINE: NEGATIVE MG/DL
BILIRUBIN, INDIRECT: 0.5 MG/DL (ref 0–0.7)
BLOOD, URINE: NEGATIVE
BUN BLDV-MCNC: 16 MG/DL (ref 6–23)
CALCIUM SERPL-MCNC: 9.4 MG/DL (ref 8.3–10.6)
CHLORIDE BLD-SCNC: 104 MMOL/L (ref 99–110)
CLARITY: CLEAR
CO2: 26 MMOL/L (ref 21–32)
COLOR: YELLOW
CREAT SERPL-MCNC: 1.1 MG/DL (ref 0.6–1.1)
DIFFERENTIAL TYPE: ABNORMAL
EOSINOPHILS ABSOLUTE: 0.2 K/CU MM
EOSINOPHILS RELATIVE PERCENT: 3.9 % (ref 0–3)
ESTIMATED AVERAGE GLUCOSE: 146 MG/DL
GFR AFRICAN AMERICAN: >60 ML/MIN/1.73M2
GFR NON-AFRICAN AMERICAN: 51 ML/MIN/1.73M2
GLUCOSE BLD-MCNC: 179 MG/DL (ref 70–99)
GLUCOSE, URINE: NEGATIVE MG/DL
HBA1C MFR BLD: 6.7 % (ref 4.2–6.3)
HCT VFR BLD CALC: 35.1 % (ref 37–47)
HEMOGLOBIN: 11.8 GM/DL (ref 12.5–16)
IMMATURE NEUTROPHIL %: 0.2 % (ref 0–0.43)
INR BLD: 1.16 INDEX
KETONES, URINE: NEGATIVE MG/DL
LACTATE DEHYDROGENASE: 228 IU/L (ref 120–246)
LEUKOCYTE ESTERASE, URINE: NEGATIVE
LIPASE: 73 IU/L (ref 13–60)
LYMPHOCYTES ABSOLUTE: 1.3 K/CU MM
LYMPHOCYTES RELATIVE PERCENT: 26.7 % (ref 24–44)
MCH RBC QN AUTO: 31.6 PG (ref 27–31)
MCHC RBC AUTO-ENTMCNC: 33.6 % (ref 32–36)
MCV RBC AUTO: 94.1 FL (ref 78–100)
MONOCYTES ABSOLUTE: 0.5 K/CU MM
MONOCYTES RELATIVE PERCENT: 9.3 % (ref 0–4)
MUCUS: ABNORMAL HPF
NITRITE URINE, QUANTITATIVE: NEGATIVE
NUCLEATED RBC %: 0 %
PDW BLD-RTO: 14.1 % (ref 11.7–14.9)
PH, URINE: 6 (ref 5–8)
PLATELET # BLD: 85 K/CU MM (ref 140–440)
PMV BLD AUTO: 12.9 FL (ref 7.5–11.1)
POTASSIUM SERPL-SCNC: 4.3 MMOL/L (ref 3.5–5.1)
PRO-BNP: 132.6 PG/ML
PROTEIN UA: NEGATIVE MG/DL
PROTHROMBIN TIME: 14.1 SECONDS (ref 11.7–14.5)
RBC # BLD: 3.73 M/CU MM (ref 4.2–5.4)
RBC URINE: <1 /HPF (ref 0–6)
SEGMENTED NEUTROPHILS ABSOLUTE COUNT: 2.8 K/CU MM
SEGMENTED NEUTROPHILS RELATIVE PERCENT: 58.7 % (ref 36–66)
SODIUM BLD-SCNC: 138 MMOL/L (ref 135–145)
SPECIFIC GRAVITY UA: 1.01 (ref 1–1.03)
SQUAMOUS EPITHELIAL: <1 /HPF
TOTAL IMMATURE NEUTOROPHIL: 0.01 K/CU MM
TOTAL NUCLEATED RBC: 0 K/CU MM
TOTAL PROTEIN: 6.5 GM/DL (ref 6.4–8.2)
TOTAL RETICULOCYTE COUNT: 0.08 K/CU MM
TRICHOMONAS: ABNORMAL /HPF
TROPONIN T: <0.01 NG/ML
UROBILINOGEN, URINE: NORMAL MG/DL (ref 0.2–1)
WBC # BLD: 4.8 K/CU MM (ref 4–10.5)
WBC UA: <1 /HPF (ref 0–5)

## 2020-09-13 PROCEDURE — 80053 COMPREHEN METABOLIC PANEL: CPT

## 2020-09-13 PROCEDURE — 6370000000 HC RX 637 (ALT 250 FOR IP): Performed by: EMERGENCY MEDICINE

## 2020-09-13 PROCEDURE — 83880 ASSAY OF NATRIURETIC PEPTIDE: CPT

## 2020-09-13 PROCEDURE — 82248 BILIRUBIN DIRECT: CPT

## 2020-09-13 PROCEDURE — 81001 URINALYSIS AUTO W/SCOPE: CPT

## 2020-09-13 PROCEDURE — 1200000000 HC SEMI PRIVATE

## 2020-09-13 PROCEDURE — 6360000002 HC RX W HCPCS: Performed by: EMERGENCY MEDICINE

## 2020-09-13 PROCEDURE — 82140 ASSAY OF AMMONIA: CPT

## 2020-09-13 PROCEDURE — 85025 COMPLETE CBC W/AUTO DIFF WBC: CPT

## 2020-09-13 PROCEDURE — 84484 ASSAY OF TROPONIN QUANT: CPT

## 2020-09-13 PROCEDURE — 83036 HEMOGLOBIN GLYCOSYLATED A1C: CPT

## 2020-09-13 PROCEDURE — 36415 COLL VENOUS BLD VENIPUNCTURE: CPT

## 2020-09-13 PROCEDURE — 93005 ELECTROCARDIOGRAM TRACING: CPT | Performed by: PHYSICIAN ASSISTANT

## 2020-09-13 PROCEDURE — 96374 THER/PROPH/DIAG INJ IV PUSH: CPT

## 2020-09-13 PROCEDURE — 2580000003 HC RX 258: Performed by: EMERGENCY MEDICINE

## 2020-09-13 PROCEDURE — 85610 PROTHROMBIN TIME: CPT

## 2020-09-13 PROCEDURE — 83615 LACTATE (LD) (LDH) ENZYME: CPT

## 2020-09-13 PROCEDURE — 71045 X-RAY EXAM CHEST 1 VIEW: CPT

## 2020-09-13 PROCEDURE — 99285 EMERGENCY DEPT VISIT HI MDM: CPT

## 2020-09-13 PROCEDURE — 83690 ASSAY OF LIPASE: CPT

## 2020-09-13 RX ORDER — FLUOXETINE HYDROCHLORIDE 20 MG/1
20 CAPSULE ORAL DAILY
COMMUNITY
Start: 2020-09-11

## 2020-09-13 RX ORDER — 0.9 % SODIUM CHLORIDE 0.9 %
1000 INTRAVENOUS SOLUTION INTRAVENOUS ONCE
Status: COMPLETED | OUTPATIENT
Start: 2020-09-13 | End: 2020-09-13

## 2020-09-13 RX ORDER — NEOMYCIN SULFATE 500 MG/1
500 TABLET ORAL EVERY 6 HOURS SCHEDULED
Status: DISCONTINUED | OUTPATIENT
Start: 2020-09-14 | End: 2020-09-15 | Stop reason: HOSPADM

## 2020-09-13 RX ORDER — FLUOXETINE HYDROCHLORIDE 20 MG/1
20 CAPSULE ORAL DAILY
Status: DISCONTINUED | OUTPATIENT
Start: 2020-09-14 | End: 2020-09-15 | Stop reason: HOSPADM

## 2020-09-13 RX ORDER — NICOTINE POLACRILEX 4 MG
15 LOZENGE BUCCAL PRN
Status: DISCONTINUED | OUTPATIENT
Start: 2020-09-13 | End: 2020-09-15 | Stop reason: HOSPADM

## 2020-09-13 RX ORDER — PROMETHAZINE HYDROCHLORIDE 25 MG/ML
25 INJECTION, SOLUTION INTRAMUSCULAR; INTRAVENOUS ONCE
Status: COMPLETED | OUTPATIENT
Start: 2020-09-13 | End: 2020-09-13

## 2020-09-13 RX ORDER — LACTULOSE 10 G/15ML
30 SOLUTION ORAL 3 TIMES DAILY
Status: DISCONTINUED | OUTPATIENT
Start: 2020-09-13 | End: 2020-09-13

## 2020-09-13 RX ORDER — SODIUM CHLORIDE 9 MG/ML
INJECTION, SOLUTION INTRAVENOUS CONTINUOUS
Status: ACTIVE | OUTPATIENT
Start: 2020-09-13 | End: 2020-09-14

## 2020-09-13 RX ORDER — POLYETHYLENE GLYCOL 3350 17 G/17G
17 POWDER, FOR SOLUTION ORAL DAILY PRN
Status: DISCONTINUED | OUTPATIENT
Start: 2020-09-13 | End: 2020-09-15 | Stop reason: HOSPADM

## 2020-09-13 RX ORDER — PANTOPRAZOLE SODIUM 40 MG/10ML
40 INJECTION, POWDER, LYOPHILIZED, FOR SOLUTION INTRAVENOUS DAILY
Status: CANCELLED | OUTPATIENT
Start: 2020-09-13

## 2020-09-13 RX ORDER — NITROGLYCERIN 0.4 MG/1
0.4 TABLET SUBLINGUAL EVERY 5 MIN PRN
Status: DISCONTINUED | OUTPATIENT
Start: 2020-09-13 | End: 2020-09-15 | Stop reason: HOSPADM

## 2020-09-13 RX ORDER — DEXTROSE MONOHYDRATE 25 G/50ML
12.5 INJECTION, SOLUTION INTRAVENOUS PRN
Status: DISCONTINUED | OUTPATIENT
Start: 2020-09-13 | End: 2020-09-15 | Stop reason: HOSPADM

## 2020-09-13 RX ORDER — LACTULOSE 10 G/15ML
20 SOLUTION ORAL 3 TIMES DAILY
Status: DISCONTINUED | OUTPATIENT
Start: 2020-09-13 | End: 2020-09-15 | Stop reason: HOSPADM

## 2020-09-13 RX ORDER — NICOTINE 21 MG/24HR
1 PATCH, TRANSDERMAL 24 HOURS TRANSDERMAL DAILY
Status: DISCONTINUED | OUTPATIENT
Start: 2020-09-14 | End: 2020-09-15 | Stop reason: HOSPADM

## 2020-09-13 RX ORDER — ALBUTEROL SULFATE 2.5 MG/3ML
2.5 SOLUTION RESPIRATORY (INHALATION) EVERY 6 HOURS
Status: DISCONTINUED | OUTPATIENT
Start: 2020-09-14 | End: 2020-09-14

## 2020-09-13 RX ORDER — SODIUM CHLORIDE 0.9 % (FLUSH) 0.9 %
10 SYRINGE (ML) INJECTION EVERY 12 HOURS SCHEDULED
Status: DISCONTINUED | OUTPATIENT
Start: 2020-09-13 | End: 2020-09-15 | Stop reason: HOSPADM

## 2020-09-13 RX ORDER — INSULIN GLARGINE 100 [IU]/ML
15 INJECTION, SOLUTION SUBCUTANEOUS NIGHTLY
Status: DISCONTINUED | OUTPATIENT
Start: 2020-09-13 | End: 2020-09-15 | Stop reason: HOSPADM

## 2020-09-13 RX ORDER — PALIPERIDONE 1.5 MG/1
3 TABLET, EXTENDED RELEASE ORAL DAILY
Status: DISCONTINUED | OUTPATIENT
Start: 2020-09-14 | End: 2020-09-15 | Stop reason: HOSPADM

## 2020-09-13 RX ORDER — NEOMYCIN SULFATE 500 MG/1
TABLET ORAL
COMMUNITY
Start: 2020-09-11 | End: 2022-01-13

## 2020-09-13 RX ORDER — PANTOPRAZOLE SODIUM 40 MG/1
40 TABLET, DELAYED RELEASE ORAL
Status: DISCONTINUED | OUTPATIENT
Start: 2020-09-14 | End: 2020-09-15 | Stop reason: HOSPADM

## 2020-09-13 RX ORDER — RANOLAZINE 500 MG/1
500 TABLET, EXTENDED RELEASE ORAL 2 TIMES DAILY
Status: DISCONTINUED | OUTPATIENT
Start: 2020-09-13 | End: 2020-09-15 | Stop reason: HOSPADM

## 2020-09-13 RX ORDER — PROMETHAZINE HYDROCHLORIDE 25 MG/1
12.5 TABLET ORAL EVERY 6 HOURS PRN
Status: DISCONTINUED | OUTPATIENT
Start: 2020-09-13 | End: 2020-09-15 | Stop reason: HOSPADM

## 2020-09-13 RX ORDER — SODIUM CHLORIDE 0.9 % (FLUSH) 0.9 %
10 SYRINGE (ML) INJECTION PRN
Status: DISCONTINUED | OUTPATIENT
Start: 2020-09-13 | End: 2020-09-15 | Stop reason: HOSPADM

## 2020-09-13 RX ORDER — MIDODRINE HYDROCHLORIDE 5 MG/1
10 TABLET ORAL
Status: DISCONTINUED | OUTPATIENT
Start: 2020-09-14 | End: 2020-09-15 | Stop reason: HOSPADM

## 2020-09-13 RX ORDER — ONDANSETRON 2 MG/ML
4 INJECTION INTRAMUSCULAR; INTRAVENOUS EVERY 6 HOURS PRN
Status: DISCONTINUED | OUTPATIENT
Start: 2020-09-13 | End: 2020-09-15 | Stop reason: HOSPADM

## 2020-09-13 RX ORDER — TENOFOVIR DISOPROXIL FUMARATE 300 MG/1
300 TABLET, FILM COATED ORAL DAILY
Status: DISCONTINUED | OUTPATIENT
Start: 2020-09-14 | End: 2020-09-15 | Stop reason: HOSPADM

## 2020-09-13 RX ORDER — DEXTROSE MONOHYDRATE 50 MG/ML
100 INJECTION, SOLUTION INTRAVENOUS PRN
Status: DISCONTINUED | OUTPATIENT
Start: 2020-09-13 | End: 2020-09-15 | Stop reason: HOSPADM

## 2020-09-13 RX ORDER — FUROSEMIDE 20 MG/1
20 TABLET ORAL DAILY
Status: CANCELLED | OUTPATIENT
Start: 2020-09-13

## 2020-09-13 RX ADMIN — SODIUM CHLORIDE 1000 ML: 9 INJECTION, SOLUTION INTRAVENOUS at 16:58

## 2020-09-13 RX ADMIN — PROMETHAZINE HYDROCHLORIDE 25 MG: 25 INJECTION INTRAMUSCULAR; INTRAVENOUS at 18:34

## 2020-09-13 RX ADMIN — LACTULOSE 30 G: 10 SOLUTION ORAL at 20:04

## 2020-09-13 ASSESSMENT — PAIN DESCRIPTION - LOCATION: LOCATION: ABDOMEN

## 2020-09-13 ASSESSMENT — PAIN DESCRIPTION - PAIN TYPE: TYPE: ACUTE PAIN;CHRONIC PAIN

## 2020-09-13 ASSESSMENT — PAIN SCALES - GENERAL: PAINLEVEL_OUTOF10: 3

## 2020-09-13 NOTE — ED PROVIDER NOTES
I independently examined and evaluated Emily Buckner. HPI:  In brief, patient is a 82-year-old female who presents to the emergency department for evaluation. She presents with her sister who is at bedside and provides additional history. Patient notes that over the past 24 hours she has become increasingly confused. Patient does have a history of liver cirrhosis and feels that her ammonia is high. She does have similar symptoms anytime she develops hyperammonemia. Patient does admit to history of TIPS procedure and recently started E. E.S treatment. Patient does admit to abdominal distention as well. She does have nausea and a couple episodes of nonbloody nonbilious emesis over the past 24 hours. Denies diarrhea, constipation, hematochezia, melena. Denies fevers, chills, diaphoresis. Denies any recent weight loss or changes in diet. Denies syncope, dizziness, lightheadedness, numbness, tingling, weakness, paresthesias. Denies chest pain, shortness of breath, pleuritic pain. Denies additional precipitating, modifying, alleviating factors. No new medications or medical diagnoses. Physical Exam:  Triage VS:    ED Triage Vitals [09/13/20 1615]   Enc Vitals Group      BP (!) 109/56      Pulse 77      Resp 16      Temp 98.5 °F (36.9 °C)      Temp Source Oral      SpO2 95 %      Weight 195 lb (88.5 kg)      Height 4' 9.5\" (1.461 m)     My pulse ox interpretation is - normal    General appearance:  Patient is awake, alert. Following commands and answering questions. GCS is 15. Skin:  Warm. Dry. Intact. No rashes, petechiae, purpura. No jaundice. Eye:  Pupils are equal, round, reactive. Extraocular movements intact. No nystagmus. No gaze deviation. No scleral icterus. Head, ears, nose, mouth and throat:  Head is normocephalic, atraumatic. No external masses or lesions. No nasal drainage. Pharynx is clear, non-erythematous. Airway is patent. Mucous membranes are moist.   Neck:  Supple.  No nuchal rigidity. Trachea midline. No masses, thyromegaly or lymphadenopathy. No JVD. No carotid thrills or bruits. Extremity:  No clubbing, cyanosis, or edema. No joint swelling. Normal muscle tone. Full range of motion in extremities. Heart:  Regular rate and regular rhythm. Audible S1 & S2. No audible murmurs, rubs, or gallops. Perfusion:  Symmetric peripheral pulses. Brisk capillary refill. Respiratory:  Lungs clear to auscultation bilaterally. No rales, rhonchi or wheezes. Respirations nonlabored. Abdominal:  Soft. Distention throughout her abdomen. No focal tenderness. No guarding, rigidity, rebound tenderness. No signs of peritonitis. Normal bowel sounds. No midline pulsatile abdominal masses, thrills or bruits. No periumbilical or flank ecchymosis. Back:  No CVA tenderness to palpation. No midline tenderness to palpation. Neurological:  Alert and oriented. No focal or lateralizing neurological deficits. No dysmetria. No dysdiadochokinesis. No pronator drift. No asterixis. NIH stroke scale score is 0. Psychiatric:  Appropriate      MDM:  Patient was seen and evaluated in the emergency department by myself and SYED Mary Rowe. A thorough history and physical exam were performed and prior medical records were reviewed. Upon arrival, patient's vital signs were noted. No tachycardia, tachypnea, hypoxia. Blood pressure is 109/56. Patient is afebrile. Patient was connected to continuous cardiopulmonary monitoring. Rhythm strip interpreted by myself demonstrating sinus rhythm. EKG was interpreted by myself, demonstrating ventricular rate of 74 bpm in sinus rhythm. No signs of acute ischemia, infarct, high degree heart block. QT interval is prolonged at 495 ms. Differential diagnoses and treatment plan were discussed. IV access was established and patient was initiated on 500 cc bolus 0.9% normal for pertinent labs were drawn and radiographic studies were performed.  Once results were available, they were reviewed by myself. Labs demonstrate no leukocytosis. Patient does have normocytic anemia with a hemoglobin of 11.8. Patient is thrombocytopenic with a platelet count of 85. Electrolytes are all within normal limits. Glucose elevated at 179. Creatinine is 1.1. Albumin is 3.3. AST and ALT are unremarkable. Alk phos elevated at 138. Troponin is less than 0.010. proBNP is 132.6. Ammonia is 120. LDH is 228. INR is 1. 16. Total bilirubin 0.8. Direct bilirubin 0.3. Indirect bilirubin 0.5. Chest x-ray radiology report reads bilateral pulmonary vascular congestion. Ultrasound of the abdomen is pending at this time. On repeat evaluations, patient remains hemodynamically stable. Results of laboratory and radiographic data along with differential diagnoses and treatment plan were discussed with the patient. Patient presents with complaints of confusion. She has no focal or lateralizing neurologic deficits. And show scale score is 0. She has had similar symptoms multiple times in the past with hyperammonemia. Symptoms consistent with metabolic encephalopathy likely secondary to hyperammonemia and advanced liver cirrhosis. Case is discussed with Dr. Jeffrey Bermeo is on-call for patient's gastroenterologist Dr. Gretel Henderson. Dr. Jeffrey Bermeo recommends no acute intervention. Recommends admission to our hospital for additional monitoring and treatment as necessary. Is agreeable with lactulose. Patient is agreeable with admission. Case is discussed with hospitalist was agreeable with treatment plan accepts admission. Patient is updated bedside. All questions were answered. Lactulose is ordered. IV fluids are continued. Patient is maintained on telemetry monitoring. Patient is a currently in the emergency room, in stable condition, awaiting bed placement. Clinical Impressions:  1. Acute metabolic encephalopathy    2. Hyperammonemia ([address]    3. Alcoholic cirrhosis of liver with ascites ([address]    4.  S/P TIPS

## 2020-09-13 NOTE — ED NOTES
Pt to ED with c/o AMS/ feeling confused. Pt states she has cirrhosis of the liver and feels like her ammonia is high. Pt also c/o nausea without emesis. Denies pain. Pt a&ox4 speaking clear complete sentences in triage.      Kamilah Johnson RN  09/13/20 [id number]

## 2020-09-13 NOTE — ED PROVIDER NOTES
EMERGENCY DEPARTMENT ENCOUNTER      PCP: Gregory Nichols MD    CHIEF COMPLAINT    Chief Complaint   Patient presents with    Altered Mental Status     pt states she is having nausea and confusion. pt states she has cirrhosis and feels like her ammonia is high       Of note, this patient was also evaluated by the attending physician, Dr. Autumn Mcbride    HPI    Maria Luisa Krause is a 64 y.o. female who presents to the emergency department today stating that she has had some intermittent confusion, abdominal swelling. Patient states that she has history of cirrhosis of the liver and has had elevated ammonia which has required hospitalization. She is been on lactulose and rifaximin, she follows with Dr. Park Mcgrath. She is on several diuretics as well. Has noticed she has been intermittently confused, has had some generalized swelling. Has felt mildly short of breath. Denies any active chest pain. States that she is having urination, bowel movements. REVIEW OF SYSTEMS    Constitutional:  Denies fever, chills, weight loss or weakness   HENT:  Denies sore throat or ear pain   Cardiovascular:  Denies chest pain, palpitations   Respiratory:  SEE HPI. GI: See HPI. :  Denies any urinary symptoms or vaginal symptoms.    Musculoskeletal:  Denies back pain  Skin:  Denies rash  Neurologic:  Denies headache, focal weakness or sensory changes   Endocrine:  Denies polyuria or polydypsia   Lymphatic:  Denies swollen glands   All other review of systems are negative  See HPI and nursing notes for additional information     PAST MEDICAL AND SURGICAL HISTORY    Past Medical History:   Diagnosis Date    Acid reflux     Arthritis     left knee    CAD (coronary artery disease)     per [facility] 9/6/13 - Dr. Radha Lima COPD (chronic obstructive pulmonary disease) [facility]     follow with Dr Ramos Pulse Depression     \"have manic - depression see Dr Barry Neighbours Diabetes mellitus [facility])     dx 10+ yrs ago- follows with PCP    Drug abuse ([facility] hx use of cocaine, heroin and marijuana- states last used 12/2014    Glaucoma     bilateral    H/O Doppler lower venous ultrasound 04/04/2019    No DVT or SVT, Significant reflux of RGSV and LGSV.  Hepatic encephalopathy ([address] 05/2019    Hepatitis C     for liver bx 12/3/2015\"Have Hepatitis B and C and saw Dr Joseph Running for this 12/1/2015\"    Hiatal hernia     History of alcohol abuse     History of exercise stress test 01/02/2020    Treadmill, Normal exercise performance without angina and ischemic EKG changes.     HTN (hypertension)     \"for the past two yrs on medication\" follows with Dr Gabriela Mantilla Hx of blood clots 2019    Portal vein thrombsis - TIPS procedure 4/23/19    Hyperlipemia     Irregular heart beat     per pt    Liver hematoma     Migraines     Last migraine:  2018    Nausea & vomiting     Schizophrenia ([address]     per old chart    Seizures ([facility] [address]     \"last one was 9/2015- [facility] Dr Fam Johnson at [facility]- she said not sure if acutal seizures- she thinks they are panic or anxiety attacks\"    SOB (shortness of breath)     with any exertion     Past Surgical History:   Procedure Laterality Date    BREAST SURGERY  10/2015    left breast bx    CARDIAC CATHETERIZATION      per old chart pt had cath done in 3/2011 and 9/2013    CARPAL TUNNEL RELEASE Left 1/9/2020    CARPAL TUNNEL RELEASE LEFT performed by Renae Vasquez, DO at [address] Right 05/26/2020    dr Delgado Esteban, carpal tunnel trigger finger release    CARPAL TUNNEL RELEASE Right 5/26/2020    RIGHT CARPAL TUNNEL RELEASE performed by Renae Vasquez, DO at [address]  per old chart done [facility]  3/11/13    diverticulosis, cecal polyp    COLONOSCOPY  03/16/2017    Internal hemorrhoids- Dr. Cyndie Decker, COLON, DIAGNOSTIC  03/16/2017    EGD: Small esophageal varices, portal hypertensive gastropathy, reflux esophagitis, hiatal hernia    EYE SURGERY Bilateral ? when    cyst removal, cataracts w/lens replacement    FINGER TRIGGER RELEASE Right 5/26/2020    FINGER TRIGGER RELEASE RIGHT RING FINGER performed by Magdalena Peoples DO at [facility]      per old chart pt had CHOLO/BSO 1986    TIPS PROCEDURE  04/23/2019    TONSILLECTOMY  as a kid    UPPER GASTROINTESTINAL ENDOSCOPY N/A 11/14/2018    EGD DIAGNOSTIC ONLY performed by Tariq Foss MD at [place] [address] 6/5/2019    EGD DIAGNOSTIC ONLY performed by Tariq Foss MD at [address] [place] [address]    Current Outpatient Rx   Medication Sig Dispense Refill    traMADol (ULTRAM) 50 MG tablet       paliperidone (INVEGA) 3 MG extended release tablet       cephALEXin (KEFLEX) 250 MG capsule       metoprolol succinate (TOPROL XL) 50 MG extended release tablet Take 1.5 tablets by mouth daily 30 tablet 3    insulin glargine (LANTUS SOLOSTAR) 100 UNIT/ML injection pen Inject 15 Units into the skin nightly 5 pen 3    midodrine (PROAMATINE) 10 MG tablet Take 1 tablet by mouth 3 times daily (with meals) 90 tablet 3    nitroGLYCERIN (NITROSTAT) 0.4 MG SL tablet Place 1 tablet under the tongue every 5 minutes as needed for Chest pain 25 tablet 3    ranolazine (RANEXA) 500 MG extended release tablet Take 1 tablet by mouth 2 times daily 60 tablet 5    Compression Stockings MISC by Does not apply route 20 - 30 mmh wear daily and take off at night  Thigh High 2 each 2    insulin aspart (NOVOLOG FLEXPEN) 100 UNIT/ML injection pen **Low Dose Correction Algorithm**Insulin lispro (HUMALOG)  3 TIMES DAILY WITH MEALSGlucose: Dose: No [id number] 1 [id number] 2 [id number] 3 [id number] 4 [id number] 5 [id number] and above 6 Units 5 pen 3    blood glucose monitor strips Test  Bid times a day & as needed for symptoms of irregular blood glucose.  100 strip 5    UNABLE TO FIND Rx for depends   Use 3-4  times daily 1 box 5    albuterol (PROVENTIL) (2.5 MG/3ML) 0.083% nebulizer solution Take 3 mLs by nebulization every 6 hours 120 vial 5    lactulose (CHRONULAC) 10 GM/15ML solution Take 30 mLs by mouth 3 times daily 1892 mL 2    Glucosource Lancets MISC Use one 3-4 times to check blood sugar 100 each 5    glucose monitoring kit (FREESTYLE) monitoring kit 1 kit by Does not apply route daily 1 kit 0    Blood Glucose Monitoring Suppl (ACURA BLOOD GLUCOSE METER) w/Device KIT Please check blood sugar 3 times daily. Please fill with what is covered by the insurance 1 kit 0    blood glucose monitor strips Test 3 times a day & as needed for symptoms of irregular blood glucose. Please fill with what is covered my patients insurance. 50 strip 3    SOFT TOUCH LANCETS MISC Please check blood sugar 3 times daily. Please fill with what is covered by insurance 100 each 3    pantoprazole (PROTONIX) 40 MG tablet Take 1 tablet by mouth 2 times daily (before meals) 60 tablet 1    rifaximin (XIFAXAN) 550 MG tablet Take 1 tablet by mouth 2 times daily 42 tablet 0    furosemide (LASIX) 20 MG tablet Take 20 mg by mouth daily       spironolactone (ALDACTONE) 50 MG tablet Take 50 mg by mouth daily       FLUoxetine (PROZAC) 40 MG capsule Take 40 mg by mouth daily      VIREAD 300 MG tablet Take 300 mg by mouth daily       QUEtiapine (SEROQUEL) 300 MG tablet Take 150 mg by mouth nightly          ALLERGIES    Allergies   Allergen Reactions    Norco [Hydrocodone-Acetaminophen] Itching     Itching, rash, nausea and vomiting.      Tylenol [Acetaminophen] Itching, Nausea And Vomiting and Rash       SOCIAL AND FAMILY HISTORY    Social History     Socioeconomic History    Marital status:      Spouse name: None    Number of children: None    Years of education: None    Highest education level: None   Occupational History    None   Social Needs    Financial resource strain: None    Food insecurity     Worry: None     Inability: None    Transportation needs     Medical: None Non-medical: None   Tobacco Use    Smoking status: Current Every Day Smoker     Packs/day: 0.50     Years: 45.00     Pack years: 22.50     Types: Cigarettes     Start date: 1974    Smokeless tobacco: Never Used   Substance and Sexual Activity    Alcohol use: Not Currently     Alcohol/week: 2.0 - 3.0 standard drinks     Types: 2 - 3 Shots of liquor per week     Comment: 2-3 shots last 4/2019    Drug use: Yes     Frequency: 3.0 times per week     Types: Marijuana     Comment: daily    Sexual activity: Not Currently     Partners: Male   Lifestyle    Physical activity     Days per week: None     Minutes per session: None    Stress: None   Relationships    Social connections     Talks on phone: None     Gets together: None     Attends Anabaptist service: None     Active member of club or organization: None     Attends meetings of clubs or organizations: None     Relationship status: None    Intimate partner violence     Fear of current or ex partner: None     Emotionally abused: None     Physically abused: None     Forced sexual activity: None   Other Topics Concern    None   Social History Narrative    None     Family History   Problem Relation Age of Onset    Cancer Mother         lung ca    Arthritis Mother     Migraines Mother     Cancer Father         colon ca    Diabetes Father     High Blood Pressure Father     Arthritis Father     High Cholesterol Father     Migraines Father     Migraines Sister     Heart Disease Brother         WPW         PHYSICAL EXAM    VITAL SIGNS: BP (!) 109/56   Pulse 72   Temp 98.5 °F (36.9 °C) (Oral)   Resp 16   Ht 4' 9.5\" (1.461 m)   Wt 195 lb (88.5 kg)   SpO2 95%   BMI 41.47 kg/m²    Constitutional:  Well developed, Well nourished. No distress  HENT:  Normocephalic, Atraumatic, PERRL. EOMI. Sclera clear. Conjunctiva normal, No discharge. Neck/Lymphatics: supple, no JVD, no swollen nodes  Cardiovascular:   RRR,  no murmurs/rubs/gallops.   No JVD  No carotid 4.2 - 6.3 %    eAG 146 mg/dL   Basic Metabolic Panel w/ Reflex to MG   Result Value Ref Range    Sodium 141 135 - 145 MMOL/L    Potassium 3.5 3.5 - 5.1 MMOL/L    Chloride 108 99 - 110 mMol/L    CO2 26 21 - 32 MMOL/L    Anion Gap 7 4 - 16    BUN 14 6 - 23 MG/DL    CREATININE 1.1 0.6 - 1.1 MG/DL    Glucose 149 (H) 70 - 99 MG/DL    Calcium 9.0 8.3 - 10.6 MG/DL    GFR Non- 51 (L) >60 mL/min/1.73m2    GFR African American >60 >60 mL/min/1.73m2   CBC auto differential   Result Value Ref Range    WBC 4.4 4.0 - 10.5 K/CU MM    RBC 3.50 (L) 4.2 - 5.4 M/CU MM    Hemoglobin 11.0 (L) 12.5 - 16.0 GM/DL    Hematocrit 33.3 (L) 37 - 47 %    MCV 95.1 78 - 100 FL    MCH 31.4 (H) 27 - 31 PG    MCHC 33.0 32.0 - 36.0 %    RDW 14.1 11.7 - 14.9 %    Platelets 78 (L) 908 - 440 K/CU MM    MPV 12.1 (H) 7.5 - 11.1 FL    Differential Type AUTOMATED DIFFERENTIAL     Segs Relative 50.0 36 - 66 %    Lymphocytes % 34.5 24 - 44 %    Monocytes % 10.0 (H) 0 - 4 %    Eosinophils % 3.9 (H) 0 - 3 %    Basophils % 1.4 (H) 0 - 1 %    Segs Absolute 2.2 K/CU MM    Lymphocytes Absolute 1.5 K/CU MM    Monocytes Absolute 0.4 K/CU MM    Eosinophils Absolute 0.2 K/CU MM    Basophils Absolute 0.1 K/CU MM    Nucleated RBC % 0.0 %    Total Nucleated RBC 0.0 K/CU MM    TRC 0.0760 K/CU MM    Total Immature Neutrophil 0.01 K/CU MM    Immature Neutrophil % 0.2 0 - 0.43 %   Magnesium   Result Value Ref Range    Magnesium 1.8 1.8 - 2.4 mg/dl   Hepatitis B e antibody   Result Value Ref Range    Hep B E Ab POSITIVE (A) Negative   Hepatitis B e antigen   Result Value Ref Range    Hep B E Ag Negative Negative   Hepatitis B surface antigen   Result Value Ref Range    Hepatitis B Surface Ag REPEATEDLY REACTIVE (A) NON REACTIVE   Hepatitis B surface antibody   Result Value Ref Range    Hep B S Ab 8.34    Hepatitis B core antibody, total   Result Value Ref Range    Hep B Core Total Ab POSITIVE (A) Negative   Ammonia   Result Value Ref Range    Ammonia 94 (H) 11 - 51 UMOL/L   Ammonia   Result Value Ref Range    Ammonia 104 (H) 11 - 51 UMOL/L   Ammonia   Result Value Ref Range    Ammonia 103 (H) 11 - 51 UMOL/L   CBC   Result Value Ref Range    WBC 4.7 4.0 - 10.5 K/CU MM    RBC 3.77 (L) 4.2 - 5.4 M/CU MM    Hemoglobin 11.6 (L) 12.5 - 16.0 GM/DL    Hematocrit 35.7 (L) 37 - 47 %    MCV 94.7 78 - 100 FL    MCH 30.8 27 - 31 PG    MCHC 32.5 32.0 - 36.0 %    RDW 14.0 11.7 - 14.9 %    Platelets 81 (L) 321 - 440 K/CU MM    MPV 12.1 (H) 7.5 - 11.1 FL   Renal function panel   Result Value Ref Range    Sodium 138 135 - 145 MMOL/L    Potassium 3.8 3.5 - 5.1 MMOL/L    Chloride 105 99 - 110 mMol/L    CO2 27 21 - 32 MMOL/L    Anion Gap 6 4 - 16    BUN 10 6 - 23 MG/DL    CREATININE 1.1 0.6 - 1.1 MG/DL    Glucose 134 (H) 70 - 99 MG/DL    Calcium 9.0 8.3 - 10.6 MG/DL    GFR Non- 51 (L) >60 mL/min/1.73m2    GFR African American >60 >60 mL/min/1.73m2    Alb 3.2 (L) 3.4 - 5.0 GM/DL    Phosphorus 2.6 2.5 - 4.9 MG/DL   POCT Glucose   Result Value Ref Range    POC Glucose 181 (H) 70 - 99 MG/DL   POCT Glucose   Result Value Ref Range    POC Glucose 143 (H) 70 - 99 MG/DL   POCT Glucose   Result Value Ref Range    POC Glucose 119 (H) 70 - 99 MG/DL   POCT Glucose   Result Value Ref Range    POC Glucose 129 (H) 70 - 99 MG/DL   POCT Glucose   Result Value Ref Range    POC Glucose 68 (L) 70 - 99 MG/DL   POCT Glucose   Result Value Ref Range    POC Glucose 74 70 - 99 MG/DL   EKG 12 Lead   Result Value Ref Range    Ventricular Rate 74 BPM    Atrial Rate 74 BPM    P-R Interval 148 ms    QRS Duration 80 ms    Q-T Interval 446 ms    QTc Calculation (Bazett) 495 ms    P Axis 46 degrees    R Axis 21 degrees    T Axis 50 degrees    Diagnosis       Normal sinus rhythm  Prolonged QT  Abnormal ECG  When compared with ECG of 05-AUG-2020 08:43,  No significant change was found     EKG 12 Lead   Result Value Ref Range    Ventricular Rate 74 BPM    Atrial Rate 74 BPM    P-R Interval 148 ms    QRS Duration 80 ms    Q-T Interval 446 ms    QTc Calculation (Bazett) 495 ms    P Axis 46 degrees    R Axis 21 degrees    T Axis 50 degrees    Diagnosis       Normal sinus rhythm  Prolonged QT  Abnormal ECG  When compared with ECG of 05-AUG-2020 08:43,  No significant change was found  Confirmed by KERRI Murillo ([id number]) on 9/14/2020 5:18:36 PM       EKG    EKG 12 Lead   Result Value Ref Range    Ventricular Rate 74 BPM    Atrial Rate 74 BPM    P-R Interval 148 ms    QRS Duration 80 ms    Q-T Interval 446 ms    QTc Calculation (Bazett) 495 ms    P Axis 46 degrees    R Axis 21 degrees    T Axis 50 degrees    Diagnosis       Normal sinus rhythm  Prolonged QT  Abnormal ECG  When compared with ECG of 05-AUG-2020 08:43,  No significant change was found  Confirmed by KERRI Murillo ([id number]) on 9/14/2020 5:18:36 PM       RADIOLOGY    Xr Chest Portable    Result Date: 9/13/2020  EXAMINATION: ONE XRAY VIEW OF THE CHEST 9/13/2020 4:46 pm COMPARISON: 08/02/2020. HISTORY: ORDERING SYSTEM PROVIDED HISTORY: SOB TECHNOLOGIST PROVIDED HISTORY: Reason for exam:->SOB Reason for Exam: AMS FINDINGS: Bilateral pulmonary vascular congestion. There is no effusion or pneumothorax. The cardiomediastinal silhouette is without acute process. The osseous structures are without acute process. Bilateral pulmonary vascular congestion. ED COURSE & MEDICAL DECISION MAKING     Patient presents as above. Emergent etiologies considered. In brief, patient is having generalized swelling, shortness of breath, intermittent confusion. Patient states feels like her ammonia level is elevated secondary to cirrhosis of the liver. Has noticed more swelling, been confused short of breath, has been on lactulose rifaximin. Is on Aldactone, Lasix. No history of significant ascites or needing paracentesis. Denies drinking alcohol. Patient's lab work did come back, showing an elevated ammonia level.   This does coincide with patient's history, she is on several medications, she is followed closely by Dr. Portia Soto  ____________________________________________________________________     I have signed out [facility] Emergency Department care to Dr. Roc Lee. Bedside hand-off performed. We discussed the history, physical exam, completed/pending test results (if obtained) and current treatment plan. At time of patient signout consultationt with GI pending.   - If  negative for abnormality or obvious etiology, patient will be discharged with close outpatient follow-up and return to emergency department warning signs.   - If   reveals any abnormalities, these will be addressed by supervising M.D. In this case, see his/her note for further details, remaining Emergency Department course, final disposition and diagnosis. ________________________________________________________________________    Comment: Please note this report has been produced using speech recognition software and may contain errors related to that system including errors in grammar, punctuation, and spelling, as well as words and phrases that may be inappropriate. If there are any questions or concerns please feel free to contact the dictating provider for clarification.         Savita Rodriguez 411, PA  09/17/20 [id number]

## 2020-09-14 LAB
AMMONIA: 104 UMOL/L (ref 11–51)
AMMONIA: 94 UMOL/L (ref 11–51)
ANION GAP SERPL CALCULATED.3IONS-SCNC: 7 MMOL/L (ref 4–16)
BASOPHILS ABSOLUTE: 0.1 K/CU MM
BASOPHILS RELATIVE PERCENT: 1.4 % (ref 0–1)
BUN BLDV-MCNC: 14 MG/DL (ref 6–23)
CALCIUM SERPL-MCNC: 9 MG/DL (ref 8.3–10.6)
CHLORIDE BLD-SCNC: 108 MMOL/L (ref 99–110)
CO2: 26 MMOL/L (ref 21–32)
CREAT SERPL-MCNC: 1.1 MG/DL (ref 0.6–1.1)
DIFFERENTIAL TYPE: ABNORMAL
EKG ATRIAL RATE: 74 BPM
EKG ATRIAL RATE: 74 BPM
EKG DIAGNOSIS: NORMAL
EKG DIAGNOSIS: NORMAL
EKG P AXIS: 46 DEGREES
EKG P AXIS: 46 DEGREES
EKG P-R INTERVAL: 148 MS
EKG P-R INTERVAL: 148 MS
EKG Q-T INTERVAL: 446 MS
EKG Q-T INTERVAL: 446 MS
EKG QRS DURATION: 80 MS
EKG QRS DURATION: 80 MS
EKG QTC CALCULATION (BAZETT): 495 MS
EKG QTC CALCULATION (BAZETT): 495 MS
EKG R AXIS: 21 DEGREES
EKG R AXIS: 21 DEGREES
EKG T AXIS: 50 DEGREES
EKG T AXIS: 50 DEGREES
EKG VENTRICULAR RATE: 74 BPM
EKG VENTRICULAR RATE: 74 BPM
EOSINOPHILS ABSOLUTE: 0.2 K/CU MM
EOSINOPHILS RELATIVE PERCENT: 3.9 % (ref 0–3)
GFR AFRICAN AMERICAN: >60 ML/MIN/1.73M2
GFR NON-AFRICAN AMERICAN: 51 ML/MIN/1.73M2
GLUCOSE BLD-MCNC: 119 MG/DL (ref 70–99)
GLUCOSE BLD-MCNC: 129 MG/DL (ref 70–99)
GLUCOSE BLD-MCNC: 143 MG/DL (ref 70–99)
GLUCOSE BLD-MCNC: 149 MG/DL (ref 70–99)
GLUCOSE BLD-MCNC: 181 MG/DL (ref 70–99)
HBV SURFACE AB TITR SER: 8.34 {TITER}
HCT VFR BLD CALC: 33.3 % (ref 37–47)
HEMOGLOBIN: 11 GM/DL (ref 12.5–16)
HEPATITIS B SURFACE ANTIGEN: ABNORMAL
IMMATURE NEUTROPHIL %: 0.2 % (ref 0–0.43)
LYMPHOCYTES ABSOLUTE: 1.5 K/CU MM
LYMPHOCYTES RELATIVE PERCENT: 34.5 % (ref 24–44)
MAGNESIUM: 1.8 MG/DL (ref 1.8–2.4)
MCH RBC QN AUTO: 31.4 PG (ref 27–31)
MCHC RBC AUTO-ENTMCNC: 33 % (ref 32–36)
MCV RBC AUTO: 95.1 FL (ref 78–100)
MONOCYTES ABSOLUTE: 0.4 K/CU MM
MONOCYTES RELATIVE PERCENT: 10 % (ref 0–4)
NUCLEATED RBC %: 0 %
PDW BLD-RTO: 14.1 % (ref 11.7–14.9)
PLATELET # BLD: 78 K/CU MM (ref 140–440)
PMV BLD AUTO: 12.1 FL (ref 7.5–11.1)
POTASSIUM SERPL-SCNC: 3.5 MMOL/L (ref 3.5–5.1)
RBC # BLD: 3.5 M/CU MM (ref 4.2–5.4)
SEGMENTED NEUTROPHILS ABSOLUTE COUNT: 2.2 K/CU MM
SEGMENTED NEUTROPHILS RELATIVE PERCENT: 50 % (ref 36–66)
SODIUM BLD-SCNC: 141 MMOL/L (ref 135–145)
TOTAL IMMATURE NEUTOROPHIL: 0.01 K/CU MM
TOTAL NUCLEATED RBC: 0 K/CU MM
TOTAL RETICULOCYTE COUNT: 0.08 K/CU MM
WBC # BLD: 4.4 K/CU MM (ref 4–10.5)

## 2020-09-14 PROCEDURE — 82140 ASSAY OF AMMONIA: CPT

## 2020-09-14 PROCEDURE — 36415 COLL VENOUS BLD VENIPUNCTURE: CPT

## 2020-09-14 PROCEDURE — 6370000000 HC RX 637 (ALT 250 FOR IP): Performed by: NURSE PRACTITIONER

## 2020-09-14 PROCEDURE — 87341 HEP B SURFACE AG NEUTRLZJ IA: CPT

## 2020-09-14 PROCEDURE — 86707 HEPATITIS BE ANTIBODY: CPT

## 2020-09-14 PROCEDURE — 86704 HEP B CORE ANTIBODY TOTAL: CPT

## 2020-09-14 PROCEDURE — 86706 HEP B SURFACE ANTIBODY: CPT

## 2020-09-14 PROCEDURE — 87517 HEPATITIS B DNA QUANT: CPT

## 2020-09-14 PROCEDURE — 87350 HEPATITIS BE AG IA: CPT

## 2020-09-14 PROCEDURE — 87340 HEPATITIS B SURFACE AG IA: CPT

## 2020-09-14 PROCEDURE — 83735 ASSAY OF MAGNESIUM: CPT

## 2020-09-14 PROCEDURE — 93010 ELECTROCARDIOGRAM REPORT: CPT | Performed by: INTERNAL MEDICINE

## 2020-09-14 PROCEDURE — 2580000003 HC RX 258: Performed by: NURSE PRACTITIONER

## 2020-09-14 PROCEDURE — 85025 COMPLETE CBC W/AUTO DIFF WBC: CPT

## 2020-09-14 PROCEDURE — 1200000000 HC SEMI PRIVATE

## 2020-09-14 PROCEDURE — 82962 GLUCOSE BLOOD TEST: CPT

## 2020-09-14 PROCEDURE — 80048 BASIC METABOLIC PNL TOTAL CA: CPT

## 2020-09-14 PROCEDURE — 6360000002 HC RX W HCPCS: Performed by: NURSE PRACTITIONER

## 2020-09-14 RX ORDER — ALBUTEROL SULFATE 2.5 MG/3ML
2.5 SOLUTION RESPIRATORY (INHALATION) EVERY 6 HOURS PRN
Status: DISCONTINUED | OUTPATIENT
Start: 2020-09-14 | End: 2020-09-15 | Stop reason: HOSPADM

## 2020-09-14 RX ADMIN — NEOMYCIN SULFATE 500 MG: 500 TABLET ORAL at 17:13

## 2020-09-14 RX ADMIN — QUETIAPINE FUMARATE 150 MG: 100 TABLET ORAL at 00:59

## 2020-09-14 RX ADMIN — RIFAXIMIN 550 MG: 550 TABLET ORAL at 08:40

## 2020-09-14 RX ADMIN — PALIPERIDONE 3 MG: 1.5 TABLET, EXTENDED RELEASE ORAL at 08:40

## 2020-09-14 RX ADMIN — FLUOXETINE 20 MG: 20 CAPSULE ORAL at 08:40

## 2020-09-14 RX ADMIN — SODIUM CHLORIDE: 9 INJECTION, SOLUTION INTRAVENOUS at 01:00

## 2020-09-14 RX ADMIN — LACTULOSE 20 G: 10 SOLUTION ORAL at 00:58

## 2020-09-14 RX ADMIN — RIFAXIMIN 550 MG: 550 TABLET ORAL at 22:26

## 2020-09-14 RX ADMIN — NEOMYCIN SULFATE 500 MG: 500 TABLET ORAL at 06:59

## 2020-09-14 RX ADMIN — QUETIAPINE FUMARATE 150 MG: 100 TABLET ORAL at 22:26

## 2020-09-14 RX ADMIN — RANOLAZINE 500 MG: 500 TABLET, FILM COATED, EXTENDED RELEASE ORAL at 22:26

## 2020-09-14 RX ADMIN — MIDODRINE HYDROCHLORIDE 10 MG: 5 TABLET ORAL at 13:42

## 2020-09-14 RX ADMIN — LACTULOSE 20 G: 10 SOLUTION ORAL at 22:26

## 2020-09-14 RX ADMIN — NEOMYCIN SULFATE 500 MG: 500 TABLET ORAL at 22:27

## 2020-09-14 RX ADMIN — SODIUM CHLORIDE, PRESERVATIVE FREE 10 ML: 5 INJECTION INTRAVENOUS at 00:59

## 2020-09-14 RX ADMIN — SODIUM CHLORIDE, PRESERVATIVE FREE 10 ML: 5 INJECTION INTRAVENOUS at 08:41

## 2020-09-14 RX ADMIN — PANTOPRAZOLE SODIUM 40 MG: 40 TABLET, DELAYED RELEASE ORAL at 06:59

## 2020-09-14 RX ADMIN — LACTULOSE 20 G: 10 SOLUTION ORAL at 13:42

## 2020-09-14 RX ADMIN — RANOLAZINE 500 MG: 500 TABLET, FILM COATED, EXTENDED RELEASE ORAL at 01:09

## 2020-09-14 RX ADMIN — PANTOPRAZOLE SODIUM 40 MG: 40 TABLET, DELAYED RELEASE ORAL at 17:13

## 2020-09-14 RX ADMIN — RANOLAZINE 500 MG: 500 TABLET, FILM COATED, EXTENDED RELEASE ORAL at 08:40

## 2020-09-14 RX ADMIN — NEOMYCIN SULFATE 500 MG: 500 TABLET ORAL at 13:42

## 2020-09-14 RX ADMIN — SODIUM CHLORIDE: 9 INJECTION, SOLUTION INTRAVENOUS at 13:42

## 2020-09-14 RX ADMIN — LACTULOSE 20 G: 10 SOLUTION ORAL at 08:40

## 2020-09-14 RX ADMIN — RIFAXIMIN 550 MG: 550 TABLET ORAL at 00:59

## 2020-09-14 RX ADMIN — MIDODRINE HYDROCHLORIDE 10 MG: 5 TABLET ORAL at 08:40

## 2020-09-14 RX ADMIN — TENOFOVIR DISOPROXIL FUMARATE 300 MG: 300 TABLET, COATED ORAL at 08:41

## 2020-09-14 RX ADMIN — INSULIN GLARGINE 15 UNITS: 100 INJECTION, SOLUTION SUBCUTANEOUS at 22:26

## 2020-09-14 RX ADMIN — INSULIN GLARGINE 15 UNITS: 100 INJECTION, SOLUTION SUBCUTANEOUS at 01:03

## 2020-09-14 RX ADMIN — MIDODRINE HYDROCHLORIDE 10 MG: 5 TABLET ORAL at 17:13

## 2020-09-14 RX ADMIN — SODIUM CHLORIDE, PRESERVATIVE FREE 10 ML: 5 INJECTION INTRAVENOUS at 22:29

## 2020-09-14 RX ADMIN — NEOMYCIN SULFATE 500 MG: 500 TABLET ORAL at 01:03

## 2020-09-14 RX ADMIN — ENOXAPARIN SODIUM 40 MG: 40 INJECTION SUBCUTANEOUS at 08:40

## 2020-09-14 ASSESSMENT — PAIN SCALES - GENERAL
PAINLEVEL_OUTOF10: 0
PAINLEVEL_OUTOF10: 3
PAINLEVEL_OUTOF10: 0

## 2020-09-14 ASSESSMENT — PAIN DESCRIPTION - LOCATION: LOCATION: GENERALIZED;BACK

## 2020-09-14 ASSESSMENT — PAIN DESCRIPTION - PAIN TYPE: TYPE: ACUTE PAIN;CHRONIC PAIN

## 2020-09-14 NOTE — H&P
- Schizophrenia - cont Seroquel       Current diagnosis and plan of management discussed with the patient at the time of admission in lay language who agree to the above plan and disposition of admission for further care. All concerns and questions addressed. Patient assessment and plan in conjunction with supervising physician - Dr. Luis Escobedo liquid, Crab conttrol    DVT Prophylaxis [x] Lovenox, []  Heparin, [] SCDs, [] Ambulation  [] Long term AC   GI Prophylaxis [x] PPI,  [] H2 Blocker,  [] Carafate,  [] Diet,  [] No GI prophylaxis, N/A: patient is not under significant medical stress, non-ICU or is receiving a diet/tube feeds   Code Status  Full CODE   Disposition Patient requires continued admission due to Hepatic encephalopathy ([facility] Discharge Plan: Patient plans to return home upon discharge. MDM [] Low, [x] Moderate,[]  High  Patient's risk as above due to:      [x] One or more chronic illnesses with mild exacerbation progression      [] Two or more stable chronic illnesses      [] Undiagnosed new problem with uncertain prognosis      [] Elective major surgery      []Prescription drug management     History of Present Illness:     Principal Problem: Hepatic encephalopathy (HCC)  Kassandra Carranza is a 64 y.o. female with past medical history of cirrhosis and is on lactulose at home, hep C, CAD, COPD, DM type 2, GERD, hyperlipidemia, hypertension, and depression presented to the ED with complaints of increased confusion and nausea with vomiting. On examination, confusion appears resolved in ED. Patient is presently awake, alert, oriented to person, place, time, and situation. Patient able to answer all questions appropriately and provide adequate history of present illness. Patient stated that as she has similar symptoms every time her ammonia level is high, and feels like her increased confusion today with nausea and nonbilious, nonbloody emesis is due to high ammonia level. Stated that she has been taking her lactulose as prescribed at home. Patient denies chest pain, palpitation, fever, chills or diaphoresis, cough, and SOB or difficulty breathing. Denies any other symptoms including headache, abdominal pain, changes in bowels, dysuria, hematuria, frequency or urgency, and B/L weakness. Upon interview, the patient provided the history as above. ED Course: Discussed case with ED physician prior to admission. ROS: Ten point ROS reviewed and negative, unless as noted above per HPI. Objective:   No intake or output data in the 24 hours ending 09/13/20 2040     Vitals: Temp/Oral 98.5  Vitals:    09/13/20 1838 09/13/20 1902 09/13/20 1919 09/13/20 1955   BP: 105/64 108/66  105/68   Pulse: 71 74 72 73   Resp: 20 16 15 25   Temp:       TempSrc:       SpO2: 95% 96% 95% 96%   Weight:       Height:         Physical Exam: 09/13/20    GEN  -Awake, alert, appearing female, sitting upright in bed, cooperative, able to give adequate history. Appears given age. EYES -Pupils are equally round. No vision changes. No scleral erythema, discharge, or conjunctivitis. HENT -MM are moist. Oral pharynx without exudates, no evidence of thrush. NECK -Supple, no apparent thyromegaly or masses. RESP -LS CTA equal bilat, no wheezes, rales or rhonchi. Symmetric chest movement. No respiratory distress noted. C/V  -S1/S2 auscultated. RRR without appreciable M/R/G. No JVD or carotid bruits. Peripheral pulses equal bilaterally and palpable. Peripheral pulses equal bilaterally and palpable. No peripheral edema. GI  -Abdomen is soft, round, non-distended, no significant tenderness. No masses or guarding. + BS in all quadrants. Rectal exam deferred.   -Alexander catheter is not present. LYMPH  -No palpable cervical lymphadenopathy and no hepatosplenomegaly. No petechiae or ecchymoses. MS  -B/L extremities strong muscles strength. Full movements. No gross joint deformities.  No swelling, intact sensation symmetrical.   SKIN  -Normal coloration, warm, dry. No open wounds or ulcers. NEURO  -CN 2-12 appear grossly intact, normal speech, no lateralizing weakness. PSYC  -Awake, alert, oriented x 4. Appropriate affect. Past Medical History:      Past Medical History:   Diagnosis Date    Acid reflux     Arthritis     left knee    CAD (coronary artery disease)     per [facility] 9/6/[facility] - Dr. Marina Vega COPD (chronic obstructive pulmonary disease) ([facility] [address]     follow with Dr Patel Contreras Depression     \"have manic - depression see Dr Jason Horn Diabetes mellitus [facility])     dx 10+ yrs ago- follows with PCP    Drug abuse ([facility] [address]     hx use of cocaine, heroin and marijuana- states last used 12/2014    Glaucoma     bilateral    H/O Doppler lower venous ultrasound 04/04/2019    No DVT or SVT, Significant reflux of RGSV and LGSV.  Hepatic encephalopathy ([facility] [address] 05/2019    Hepatitis C     for liver bx 12/3/2015\"Have Hepatitis B and C and saw Dr Aga Wilkins for this 12/1/2015\"    Hiatal hernia     History of alcohol abuse     History of exercise stress test 01/02/2020    Treadmill, Normal exercise performance without angina and ischemic EKG changes.  HTN (hypertension)     \"for the past two yrs on medication\" follows with Dr Marina Vega Hx of blood clots 2019    Portal vein thrombsis - TIPS procedure 4/23/19    Hyperlipemia     Irregular heart beat     per pt    Liver hematoma     Migraines     Last migraine:  2018    Nausea & vomiting     Schizophrenia ([facility] [address]     per old chart    Seizures ([facility] [address]     \"last one was 9/2015- saw Dr Paulino Vo at [facility]- she said not sure if acutal seizures- she thinks they are panic or anxiety attacks\"    SOB (shortness of breath)     with any exertion     Past Surgery History:  Patient  has a past surgical history that includes Cholecystectomy (per old chart done 1985); Tonsillectomy (as a kid); Breast surgery (10/2015); Endoscopy, colon, diagnostic (03/16/2017);  Upper organizations: None     Relationship status: None    Intimate partner violence     Fear of current or ex partner: None     Emotionally abused: None     Physically abused: None     Forced sexual activity: None   Other Topics Concern    None   Social History Narrative    None     TOBACCO:   reports that she has been smoking cigarettes. She started smoking about 46 years ago. She has a 22.50 pack-year smoking history. She has never used smokeless tobacco.  ETOH:   reports previous alcohol use of about 2.0 - 3.0 standard drinks of alcohol per week. Drugs:  reports current drug use. Frequency: 3.00 times per week. Drug: Marijuana. Allergies: Allergies   Allergen Reactions    Norco [Hydrocodone-Acetaminophen] Itching     Itching, rash, nausea and vomiting.  Tylenol [Acetaminophen] Itching, Nausea And Vomiting and Rash     Medications:   Medications:    lactulose  30 g Oral TID      Infusions:   PRN Meds:    Prior to Admission Meds:  Prior to Admission medications    Medication Sig Start Date End Date Taking?  Authorizing Provider   FLUoxetine (PROZAC) 20 MG capsule Take 20 mg by mouth daily 9/11/20  Yes Historical Provider, MD   paliperidone (INVEGA) 3 MG extended release tablet  8/13/20  Yes Historical Provider, MD   metoprolol succinate (TOPROL XL) 50 MG extended release tablet Take 1.5 tablets by mouth daily 9/1/20  Yes  Beards, APRN - CNP   insulin glargine (LANTUS SOLOSTAR) 100 UNIT/ML injection pen Inject 15 Units into the skin nightly 8/6/20  Yes Melodie Reyes MD   midodrine (PROAMATINE) 10 MG tablet Take 1 tablet by mouth 3 times daily (with meals) 5/29/20  Yes Dorina Fuchs MD   ranolazine (RANEXA) 500 MG extended release tablet Take 1 tablet by mouth 2 times daily 5/21/20  Yes  Beards, APRN - CNP   Compression Stockings MISC by Does not apply route 20 - 30 mmh wear daily and take off at night  Thigh High 5/21/20  Yes  Beards, APRN - CNP   insulin aspart (Elsa Ee FLEXPEN) 100 UNIT/ML injection pen **Low Dose Correction Algorithm**Insulin lispro (HUMALOG)  3 TIMES DAILY WITH MEALSGlucose: Dose: No [id number] 1 [id number] 2 [id number] 3 [id number] 4 [id number] 5 Hwqtm551 and above 6 Units 4/28/20  Yes Ronaldo Cox MD   albuterol (PROVENTIL) (2.5 MG/3ML) 0.083% nebulizer solution Take 3 mLs by nebulization every 6 hours 4/6/20  Yes Eddy Barron MD   lactulose [facility]) 10 GM/15ML solution Take 30 mLs by mouth 3 times daily 4/2/20  Yes Sherita Salgado MD   pantoprazole (PROTONIX) 40 MG tablet Take 1 tablet by mouth 2 times daily (before meals) 10/27/19  Yes Nahomy Cruz MD   rifaximin (XIFAXAN) 550 MG tablet Take 1 tablet by mouth 2 times daily 5/2/19  Yes Zuleima Walker MD   furosemide (LASIX) 20 MG tablet Take 20 mg by mouth daily  4/27/19  Yes Historical Provider, MD   spironolactone (ALDACTONE) 50 MG tablet Take 50 mg by mouth daily  4/27/19  Yes Historical Provider, MD   FLUoxetine (PROZAC) 40 MG capsule Take 40 mg by mouth daily   Yes Historical Provider, MD   VIREAD 300 MG tablet Take 300 mg by mouth daily  11/20/17  Yes Historical Provider, MD   QUEtiapine (SEROQUEL) 300 MG tablet Take 150 mg by mouth nightly  11/15/16  Yes Historical Provider, MD   neomycin (MYCIFRADIN) 500 MG tablet  9/11/20   Historical Provider, MD   traMADol Gilbert Frances) 50 MG tablet  5/26/20   Historical Provider, MD   nitroGLYCERIN (NITROSTAT) 0.4 MG SL tablet Place 1 tablet under the tongue every 5 minutes as needed for Chest pain 5/21/20   ERLINDA Delgado CNP   blood glucose monitor strips Test  Bid times a day & as needed for symptoms of irregular blood glucose.  4/14/20   Ronaldo Cox MD   UNABLE TO FIND Rx for depends   Use 3-4  times daily 4/10/20   Ronaldo Cox MD   Glucosource Lancets MISC Use one 3-4 times to check blood sugar 1/30/20   Ronaldo Cox MD   glucose monitoring kit (FREESTYLE) monitoring kit 1 kit by Does not apply route daily 1/30/20 Physicians  9/13/2020 8:40 PM      Electronically signed by ERLINDA Rodrigues CNP on 9/13/2020 at 8:40 PM

## 2020-09-14 NOTE — PLAN OF CARE
Problem: Discharge Planning:  Goal: Discharged to appropriate level of care  Description: Discharged to appropriate level of care  4/76/1320 [id number] by Sachi Coburn RN  Outcome: Ongoing  1/84/8915 [id number] by Sachi Coburn, RN  Outcome: Ongoing

## 2020-09-14 NOTE — CARE COORDINATION
Patient screened for discharge needs with no needs identified at this time. Pt is from home with her brother/Anshu, has PCP, insurance with RX coverage and was independent prior to admission. However, CM available if needs arise.

## 2020-09-14 NOTE — CONSULTS
[facility]  Gastroenterology Consultation    2020  6:08 AM    Patient:    Gama Escamilla  : 1960   64 y.o. MRN: [id number]  Admitted: 2020  4:12 PM ATT: Serge Snider MD   [id number]  AdmitDx: Hepatic encephalopathy ([facility] [K72.90]  Hyperammonemia (HCC) [E72.20]  Hypoalbuminemia [E88.09]  S/P TIPS (transjugular intrahepatic portosystemic shunt) [G52.736]  Alcoholic cirrhosis of liver with ascites ([facility] [address] [M10.71]  Acute metabolic encephalopathy [Y18.82]  PCP: Blanca Waters MD    Reason for Consult:  Hepatic encephalopathy     Requesting Physician:  Serge Snider MD      History Obtained From:  Patient and review of all records    HISTORY OF PRESENT ILLNESS:                The patient is a 64 y.o. female with significant   Past Medical History:   Diagnosis Date    Acid reflux     Arthritis     left knee    CAD (coronary artery disease)     per [facility] - Dr. Marina Vega COPD (chronic obstructive pulmonary disease) ([facility]     follow with Dr Patel Contreras Depression     \"have manic - depression see Dr Jason Horn Diabetes mellitus [facility])     dx 10+ yrs ago- follows with PCP    Drug abuse ([facility]     hx use of cocaine, heroin and marijuana- states last used 2014    Glaucoma     bilateral    H/O Doppler lower venous ultrasound 2019    No DVT or SVT, Significant reflux of RGSV and LGSV.  Hepatic encephalopathy ([facility] 2019    Hepatitis C     for liver bx 12/3/2015\"Have Hepatitis B and C and saw Dr Aga Wilkins for this 2015\"    Hiatal hernia     History of alcohol abuse     History of exercise stress test 2020    Treadmill, Normal exercise performance without angina and ischemic EKG changes.     HTN (hypertension)     \"for the past two yrs on medication\" follows with Dr Marina Vega Hx of blood clots 2019    Portal vein thrombsis - TIPS procedure 19    Hyperlipemia     Irregular heart beat     per pt    Liver hematoma     Migraines Last migraine:  2018    Nausea & vomiting     Schizophrenia ([address]     per old chart    Seizures ([facility] [address]     \"last one was 9/2015- saw Dr Francesca Marcos at [facility]- she said not sure if acutal seizures- she thinks they are panic or anxiety attacks\"    SOB (shortness of breath)     with any exertion    who presented to [facility] ED [facility]/13 due to increasing confusion over the past 24 hours. Reports nausea with a few episodes of non-bloody emesis. Last emesis prior to admission. Reports taking Xifaxan and Lactulose as directed. Starting on Neomycin Friday outpatient as well. History of cannabis use daily  TIPS at [facility] 4/23/2019, recent [address] revealed patency     Denies abdominal pain  No N/V since admitted. Reports smoking marijuana daily    Multiple BMs yesterday, incontinent of stool today     EGD 6/5/2019 revealed moderate nonbleeding PHG, no varices gastric or esophageal--indicating functioning TIPS, healed reflux esophagitis  History of colonoscopy pt unsure of date     Smoker  Denies alcohol intake    Past Medical History:        Diagnosis Date    Acid reflux     Arthritis     left knee    CAD (coronary artery disease)     per [facility] 9/6/13 - Dr. Maria L Levine COPD (chronic obstructive pulmonary disease) ([facility] [address]     follow with Dr Ana Lilia Robison Depression     \"have manic - depression see Dr Lachelle Claire Diabetes mellitus [facility])     dx 10+ yrs ago- follows with PCP    Drug abuse ([facility] [address]     hx use of cocaine, heroin and marijuana- states last used 12/2014    Glaucoma     bilateral    H/O Doppler lower venous ultrasound 04/04/2019    No DVT or SVT, Significant reflux of RGSV and LGSV.  Hepatic encephalopathy ([address] 05/2019    Hepatitis C     for liver bx 12/3/2015\"Have Hepatitis B and C and saw Dr Cyndi Franco for this 12/1/2015\"    Hiatal hernia     History of alcohol abuse     History of exercise stress test 01/02/2020    Treadmill, Normal exercise performance without angina and ischemic EKG changes.     HTN (hypertension)     \"for Medications    Scheduled Medications:    albuterol  2.5 mg Nebulization Q6H    insulin glargine  15 Units Subcutaneous Nightly    lactulose  20 g Oral TID    midodrine  10 mg Oral TID WC    pantoprazole  40 mg Oral BID AC    QUEtiapine  150 mg Oral Nightly    ranolazine  500 mg Oral BID    rifaximin  550 mg Oral BID    insulin lispro  0-12 Units Subcutaneous TID WC    insulin lispro  0-6 Units Subcutaneous Nightly    FLUoxetine  20 mg Oral Daily    neomycin  500 mg Oral 4 times per day    paliperidone  3 mg Oral Daily    tenofovir disoproxil fumarate  300 mg Oral Daily    sodium chloride flush  10 mL Intravenous 2 times per day    enoxaparin  40 mg Subcutaneous Daily    nicotine  1 patch Transdermal Daily     PRN Medications: nitroGLYCERIN, glucose, dextrose, glucagon (rDNA), dextrose, sodium chloride flush, polyethylene glycol, promethazine **OR** ondansetron      Allergies:  Norco [hydrocodone-acetaminophen] and Tylenol [acetaminophen]    Social History:   TOBACCO:   reports that she has been smoking cigarettes. She started smoking about 46 years ago. She has a 22.50 pack-year smoking history. She has never used smokeless tobacco.  ETOH:   reports previous alcohol use of about 2.0 - 3.0 standard drinks of alcohol per week. Family History:       Problem Relation Age of Onset    Cancer Mother         lung ca    Arthritis Mother     Migraines Mother     Cancer Father         colon ca    Diabetes Father     High Blood Pressure Father     Arthritis Father     High Cholesterol Father     Migraines Father     Migraines Sister     Heart Disease Brother         WPW       REVIEW OF SYSTEMS:    The positive ROS will be identified in bold, otherwise ROS are negative     CONSTITUTIONAL:  Neg   Recent weight changes, fatigue, fever, chills or night sweats  EYES:  Neg  Blurriness, earing, itching or acute change in vision  EARS:  Neg  hearing loss, tinnitus, vertigo, discharge or earache.   NOSE: Neg  Rhinorrhea, sneezing, itching, allergy or epistaxis  MOUTH/THROAT:  Neg  bleeding gums, hoarseness or sore throat. RESPIRATORY:   Neg SOB, wheeze, cough, sputum, hemoptysis or bronchitis  CARDIOVASCULAR:  Neg chest pain, palpitations, dyspnea on exertion, orthopnea, paroxysmal nocturnal dyspnea or edema  GASTROINTESTINAL:  SEE HPI  GENITOURINARY:  Neg  Urinary frequency, hesitancy, urgency, polyuria, dysuria, hematuria, nocturia, or incontinence. HEMATOLOGIC/LYMPHATIC:  Neg  Anemia, bleeding tendency  MUSCULOSKELETAL:    New myalgias, bone pain, joint pain, swelling or stiffness and has had change in gait. NEUROLOGICAL:  Neg  Loss of Consciousness, memory loss, forgetfulness, periods of confusion, difficulty concentrating, seizures, decline in intellect, nervousness, insomina, aphasia or dysarthria. SKIN:  Neg  skin or hair changes, and has no itching, rashes, or sores. PSYCHIATRIC:  Neg depression, personality changes, anxiety. ENDOCRINE:  Neg polydipsia, polyuria, abnormal weight changes, heat /cold intolerance.   ALL/IMM:  Neg reactions to drugs other than listed    PHYSICAL EXAM:      Vitals:    BP (!) 104/51   Pulse 69   Temp 97.5 °F (36.4 °C) (Oral)   Resp 24   Ht 4' 9.5\" (1.461 m)   Wt 208 lb 12.8 oz (94.7 kg)   SpO2 95%   BMI 44.40 kg/m²     General Appearance:    Alert, cooperative, no distress, appears stated age   HEENT:    Normocephalic, atraumatic, Conjunctiva clear, Lips, mucosa, and tongue normal; teeth and gums normal, speech slow   Neck:   Supple, symmetrical, trachea midline   Lungs:     Clear to auscultation bilaterally, respirations unlabored   Chest Wall:    No tenderness or deformity    Heart:    Regular rate and rhythm, S1 and S2 normal, no murmur   Abdomen:     Soft, non-tender, bowel sounds active all four quadrants   Rectal:    Deferred   Extremities:   Extremities normal, atraumatic, no cyanosis or edema   Pulses:   2+ and symmetric all extremities   Skin:   Skin color, texture, turgor normal, no rashes or lesions   Lymph nodes:   No abnormality   Neurologic:   No focal deficits, moving all four extremities            DATA:    ABGs:   Lab Results   Component Value Date    PO2ART 50 04/27/2012     CBC:   Recent Labs     09/13/20  1650 09/14/20  0226   WBC 4.8 4.4   HGB 11.8* 11.0*   PLT 85* 78*     BMP:    Recent Labs     09/13/20  1650 09/14/20  0226    141   K 4.3 3.5    108   CO2 26 26   BUN 16 14   CREATININE 1.1 1.1   GLUCOSE 179* 149*     Magnesium:   Lab Results   Component Value Date    MG 1.8 09/14/2020     Hepatic:   Recent Labs     09/13/20  1650   AST 17   ALT 12   BILITOT 0.8  0.8   ALKPHOS 138*     Recent Labs     09/13/20  1650   LIPASE 73*     Recent Labs     09/13/20 1650   PROTIME 14.1   INR 1.16     No results for input(s): PTT in the last 72 hours. Lipids: No results for input(s): CHOL, HDL in the last 72 hours.     Invalid input(s): LDLCALCU  INR:   Recent Labs     09/13/20 1650   INR 1.16     TSH:   Lab Results   Component Value Date    TSH 1.73 08/01/2017     No intake or output data in the 24 hours ending 09/14/20 0608   dextrose      sodium chloride 75 mL/hr at 09/14/20 0100       Imaging Studies:    Reviewed    IMPRESSION:      Patient Active Problem List   Diagnosis Code    Left arm pain M79.602    Type 2 diabetes mellitus with complication, with long-term current use of insulin (HCC) E11.8, Z79.4    COPD, mild (HCC) J44.9    Tobacco abuse Z72.0    Angina effort I20.8    Abnormal nuclear cardiac imaging test R93.1    Lung nodule R91.1    Chest pain R07.9    Dyslipidemia E78.5    Pancolitis (HCC) K51.00    Hematemesis without nausea D09.8    Alcoholic cirrhosis of liver without ascites (HCC) K70.30    Thrombocytopenia (HCC) K97.7    Periumbilical abdominal pain R10.33    History of colonic polyps Z86.010    Abnormal findings on diagnostic imaging of 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    Type 2 diabetes mellitus with complication, with long-term current use of insulin (HCC) E11.8, Z79.4    SOB (shortness of breath) R06.02    Portal vein thrombosis I81    Intractable nausea and vomiting R11.2    Abdominal pain R10.9    Acute GI bleeding K92.2    Chronic hepatitis C without hepatic coma (HCC) B18.2    Delayed gastric emptying K30    Restless legs G25.81    Acute colitis K52.9    Acute encephalopathy G93.40    S/P TIPS (transjugular intrahepatic portosystemic shunt) Z95.828    Other cirrhosis of liver (HCC) K74.69    Acute upper GI bleed K92.2    Acute hepatic encephalopathy K72.00    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 May be refractory as patient states she is taking medications. Recent urine and CXR negative. May need to reduce TIPS lumen in future? Continue Xifaxan 550 mg BID  Receiving Lactulose 20 gm TID  Recommend ammonia level  Spoke with RN regarding potential home health care to assure medication taken as directed d/t multiple admissions for HE. CHERI will discuss with      Nausea:   Improving   Few episodes of emesis  May be secondary to chronic cannabis use, advised to reduce use   Receiving PPI daily  Zofran and Phenergan prn    Cirrhosis:  Secondary to Hepatitis B  AVINA may contribute too   MELD NA score 9  Recommend good glycemic control and weight loss  Continue tenofovir 300 mg daily  Recommend HVB serology     Nutrition:  Recommend low sodium diet     Discussed plan of care with patient      Patient clinical, biochemical, and radiological information discussed with Dr. Farshad Farrar. He agrees with the assessment and plan. Megan Arriaga CNP  9/14/2020  6:08 AM     HE sec to cirrhosis with TIPS  Need to treat HE aggressively  Limit to 40 gram protein diet  Low salt diet  Avoid sedatives and narcotics    I have seen and examined this patient personally, and independently of the nurse practitioner. The plan was developed mutually at the time of the visit with the patient. Chepe Dudley and myself have spoken with patient, nursing staff and provided written and verbal instructions .     The above note has been reviewed and I agree with the Assessment,  Diagnosis, and Treatment plan as suggested by Chepe Dudley CNP      [facility] gastroenterology

## 2020-09-14 NOTE — PROGRESS NOTES
exacerbation.       - continue home breathing treatment  6. DM type 2         - cont home dose Lantus + Sliding scale         - monitor accucheck         - diabetic diet  -A1c 6.7.  7. Tobacco abuse  on Nicotine patch, tobacco cessation education.      Chronic Illnesses:        - CAD - cont Ranolazine       - HLD       - Depression/Anxiety - on fluoxetine       - GERD       - Hepatitis C - on viread       - PAF - not on anticoagulation? ?       - Schizophrenia - cont Seroquel    DVT Prophylaxis: lovenox  Diet: DIET CLEAR LIQUID; Carb Control: 4 carb choices (60 gms)/meal; Low Sodium (2 GM)  Code Status: Full Code      Dispo:  -Need confusion to continue to improve and patient to tolerate diet.     Lucinda Olivera MD  9/14/2020    Meds:   Meds:    insulin glargine  15 Units Subcutaneous Nightly    lactulose  20 g Oral TID    midodrine  10 mg Oral TID WC    pantoprazole  40 mg Oral BID AC    QUEtiapine  150 mg Oral Nightly    ranolazine  500 mg Oral BID    rifaximin  550 mg Oral BID    insulin lispro  0-12 Units Subcutaneous TID WC    insulin lispro  0-6 Units Subcutaneous Nightly    FLUoxetine  20 mg Oral Daily    neomycin  500 mg Oral 4 times per day    paliperidone  3 mg Oral Daily    tenofovir disoproxil fumarate  300 mg Oral Daily    sodium chloride flush  10 mL Intravenous 2 times per day    enoxaparin  40 mg Subcutaneous Daily    nicotine  1 patch Transdermal Daily      Infusions:    dextrose      sodium chloride 75 mL/hr at 09/14/20 0100     PRN Meds: albuterol, 2.5 mg, Q6H PRN  nitroGLYCERIN, 0.4 mg, Q5 Min PRN  glucose, 15 g, PRN  dextrose, 12.5 g, PRN  glucagon (rDNA), 1 mg, PRN  dextrose, 100 mL/hr, PRN  sodium chloride flush, 10 mL, PRN  polyethylene glycol, 17 g, Daily PRN  promethazine, 12.5 mg, Q6H PRN    Or  ondansetron, 4 mg, Q6H PRN        Data/Labs:     Recent Labs     09/13/20  1650 09/14/20  0226   WBC 4.8 4.4   HGB 11.8* 11.0*   HCT 35.1* 33.3*   PLT 85* 78*      Recent Labs 09/13/20  1650 09/14/20 0226    141   K 4.3 3.5    108   CO2 26 26   BUN 16 14   CREATININE 1.1 1.1     Recent Labs     09/13/20 1650   AST 17   ALT 12   BILIDIR 0.3   BILITOT 0.8  0.8   ALKPHOS 138*     Recent Labs     09/13/20 1650   INR 1.16     Recent Labs     09/13/20 1650   TROPONINT <0.010     HgBA1c:   Lab Results   Component Value Date    LABA1C 6.7 09/13/2020     CALCIUM:  9.0/26 (09/14 0226)    No intake/output data recorded.     Intake/Output Summary (Last 24 hours) at 9/14/2020 1045  Last data filed at 9/14/2020 0840  Gross per 24 hour   Intake 10 ml   Output --   Net 10 ml

## 2020-09-14 NOTE — PROGRESS NOTES
Ms Alessandro Martin requested her breathing treatment be changed to PRN. She only takes treatments at home as needed.

## 2020-09-15 ENCOUNTER — TELEPHONE (OUTPATIENT)
Dept: FAMILY MEDICINE CLINIC | Age: 60
End: 2020-09-15

## 2020-09-15 VITALS
OXYGEN SATURATION: 93 % | DIASTOLIC BLOOD PRESSURE: 46 MMHG | BODY MASS INDEX: 41.54 KG/M2 | HEIGHT: 58 IN | RESPIRATION RATE: 18 BRPM | SYSTOLIC BLOOD PRESSURE: 95 MMHG | TEMPERATURE: 97.9 F | WEIGHT: 197.9 LBS | HEART RATE: 75 BPM

## 2020-09-15 LAB
ALBUMIN SERPL-MCNC: 3.2 GM/DL (ref 3.4–5)
AMMONIA: 103 UMOL/L (ref 11–51)
ANION GAP SERPL CALCULATED.3IONS-SCNC: 6 MMOL/L (ref 4–16)
BUN BLDV-MCNC: 10 MG/DL (ref 6–23)
CALCIUM SERPL-MCNC: 9 MG/DL (ref 8.3–10.6)
CHLORIDE BLD-SCNC: 105 MMOL/L (ref 99–110)
CO2: 27 MMOL/L (ref 21–32)
CREAT SERPL-MCNC: 1.1 MG/DL (ref 0.6–1.1)
GFR AFRICAN AMERICAN: >60 ML/MIN/1.73M2
GFR NON-AFRICAN AMERICAN: 51 ML/MIN/1.73M2
GLUCOSE BLD-MCNC: 134 MG/DL (ref 70–99)
GLUCOSE BLD-MCNC: 68 MG/DL (ref 70–99)
GLUCOSE BLD-MCNC: 74 MG/DL (ref 70–99)
HCT VFR BLD CALC: 35.7 % (ref 37–47)
HEMOGLOBIN: 11.6 GM/DL (ref 12.5–16)
MCH RBC QN AUTO: 30.8 PG (ref 27–31)
MCHC RBC AUTO-ENTMCNC: 32.5 % (ref 32–36)
MCV RBC AUTO: 94.7 FL (ref 78–100)
PDW BLD-RTO: 14 % (ref 11.7–14.9)
PHOSPHORUS: 2.6 MG/DL (ref 2.5–4.9)
PLATELET # BLD: 81 K/CU MM (ref 140–440)
PMV BLD AUTO: 12.1 FL (ref 7.5–11.1)
POTASSIUM SERPL-SCNC: 3.8 MMOL/L (ref 3.5–5.1)
RBC # BLD: 3.77 M/CU MM (ref 4.2–5.4)
SODIUM BLD-SCNC: 138 MMOL/L (ref 135–145)
WBC # BLD: 4.7 K/CU MM (ref 4–10.5)

## 2020-09-15 PROCEDURE — 82962 GLUCOSE BLOOD TEST: CPT

## 2020-09-15 PROCEDURE — 2580000003 HC RX 258: Performed by: NURSE PRACTITIONER

## 2020-09-15 PROCEDURE — 82140 ASSAY OF AMMONIA: CPT

## 2020-09-15 PROCEDURE — 6360000002 HC RX W HCPCS: Performed by: NURSE PRACTITIONER

## 2020-09-15 PROCEDURE — 80069 RENAL FUNCTION PANEL: CPT

## 2020-09-15 PROCEDURE — 85027 COMPLETE CBC AUTOMATED: CPT

## 2020-09-15 PROCEDURE — 36415 COLL VENOUS BLD VENIPUNCTURE: CPT

## 2020-09-15 PROCEDURE — 94761 N-INVAS EAR/PLS OXIMETRY MLT: CPT

## 2020-09-15 PROCEDURE — 6370000000 HC RX 637 (ALT 250 FOR IP): Performed by: NURSE PRACTITIONER

## 2020-09-15 RX ORDER — LACTULOSE 10 G/15ML
20 SOLUTION ORAL 3 TIMES DAILY
Qty: 1892 ML | Refills: 2 | Status: SHIPPED | OUTPATIENT
Start: 2020-09-15

## 2020-09-15 RX ORDER — QUETIAPINE FUMARATE 50 MG/1
150 TABLET, FILM COATED ORAL NIGHTLY
Qty: 60 TABLET | Refills: 3 | Status: SHIPPED | OUTPATIENT
Start: 2020-09-15

## 2020-09-15 RX ADMIN — LACTULOSE 20 G: 10 SOLUTION ORAL at 12:30

## 2020-09-15 RX ADMIN — ENOXAPARIN SODIUM 40 MG: 40 INJECTION SUBCUTANEOUS at 09:28

## 2020-09-15 RX ADMIN — DEXTROSE 15 G: 15 GEL ORAL at 06:30

## 2020-09-15 RX ADMIN — PALIPERIDONE 3 MG: 1.5 TABLET, EXTENDED RELEASE ORAL at 09:24

## 2020-09-15 RX ADMIN — RANOLAZINE 500 MG: 500 TABLET, FILM COATED, EXTENDED RELEASE ORAL at 09:23

## 2020-09-15 RX ADMIN — NEOMYCIN SULFATE 500 MG: 500 TABLET ORAL at 06:30

## 2020-09-15 RX ADMIN — NEOMYCIN SULFATE 500 MG: 500 TABLET ORAL at 12:30

## 2020-09-15 RX ADMIN — RIFAXIMIN 550 MG: 550 TABLET ORAL at 09:24

## 2020-09-15 RX ADMIN — TENOFOVIR DISOPROXIL FUMARATE 300 MG: 300 TABLET, COATED ORAL at 09:24

## 2020-09-15 RX ADMIN — MIDODRINE HYDROCHLORIDE 10 MG: 5 TABLET ORAL at 12:30

## 2020-09-15 RX ADMIN — PANTOPRAZOLE SODIUM 40 MG: 40 TABLET, DELAYED RELEASE ORAL at 06:30

## 2020-09-15 RX ADMIN — SODIUM CHLORIDE, PRESERVATIVE FREE 10 ML: 5 INJECTION INTRAVENOUS at 09:24

## 2020-09-15 RX ADMIN — MIDODRINE HYDROCHLORIDE 10 MG: 5 TABLET ORAL at 09:23

## 2020-09-15 RX ADMIN — LACTULOSE 20 G: 10 SOLUTION ORAL at 09:23

## 2020-09-15 RX ADMIN — FLUOXETINE 20 MG: 20 CAPSULE ORAL at 09:23

## 2020-09-15 ASSESSMENT — PAIN SCALES - GENERAL: PAINLEVEL_OUTOF10: 0

## 2020-09-15 NOTE — CARE COORDINATION
Chart reviewed, CM called and spoke with patient on the hospital phone for dc planning. Introduced self and reason for call. Pt was admitted for AMS. States she lives at home with her brother and is independent with all needs. Explained she follows with PCP, has insurance with affordable RX co-pays and reliable transportation per her family. Discussed HHC and pt agreed to [facility]. CM remains available if needs arise.         2:04 PM  PS to [facility] with [facility] with new referral.

## 2020-09-15 NOTE — TELEPHONE ENCOUNTER
Sheri from [facility] called for verbal to start Laisha 78.  Verbal given For [facility]

## 2020-09-15 NOTE — DISCHARGE SUMMARY
Discharge Summary    Name:  Stevie Monterroso /Age/Sex: 1960  (64 y.o. female)   MRN & CSN:  [id number] & [id number] Admission Date/Time: 2020  4:12 PM   Attending:  Bong Saez MD Discharging Physician: Thedora Galeazzi, MD     HPI and Hospital Course:   Stevie Monterroso is a 64 y.o.  female  who presents with Altered Mental Status (pt states she is having nausea and confusion. pt states she has cirrhosis and feels like her ammonia is high)    HPI- as per H and [address]  1-Acute hepatic encephalopathy- with underlying liver cirrhosis related hepatitis B- despite highly elevated ammonia levels- improved on lactulose and rifaximin. No signs of confusion and ambulated on the hallway with no issues. High concerns of non-compliance, lives with her brother, counseled, requested [address] ordered at discharge.  -to follow up with GI to continue treatment of hepatitis B.  2-Had nausea/vomiting which has resolved. 3-Hypotension- to continue with midodrine. Other chronic issues  -COPD  -DM  -schizophrenia  -CAD  -depression  -?PAF    The patient expressed appropriate understanding of and agreement with the discharge recommendations, medications, and plan.      Consults this admission:  IP CONSULT TO GI  IP CONSULT TO HOSPITALIST  IP CONSULT TO GI    Discharge Instruction:   Follow up appointments:   Primary care physician:  within 2 weeks    Diet:  General/cardiac/ADA/as tolerated  Activity: {discharge activity: as tolerated  Disposition: Discharged to:   [x]Home, [x]HHC, []SNF, []Acute Rehab, []Hospice   Condition on discharge: Stable    Discharge Medications:      Lundy Romberg   Home Medication Instructions [facility]    Printed on:09/15/20 2901   Medication Information                      albuterol (PROVENTIL) (2.5 MG/3ML) 0.083% nebulizer solution  Take 3 mLs by nebulization every 6 hours             blood glucose monitor strips  Test 3 times a day & as needed for symptoms of irregular blood glucose. Please fill with what is covered my patients insurance. blood glucose monitor strips  Test  Bid times a day & as needed for symptoms of irregular blood glucose. Blood Glucose Monitoring Suppl (ACURA BLOOD GLUCOSE METER) w/Device KIT  Please check blood sugar 3 times daily.  Please fill with what is covered by the insurance             Compression Stockings MISC  by Does not apply route 20 - 30 mmh wear daily and take off at night  Thigh High             FLUoxetine (PROZAC) 20 MG capsule  Take 20 mg by mouth daily             glucose monitoring kit (FREESTYLE) monitoring kit  1 kit by Does not apply route daily             Glucosource Lancets MISC  Use one 3-4 times to check blood sugar             insulin aspart (NOVOLOG FLEXPEN) 100 UNIT/ML injection pen  **Low Dose Correction Algorithm**Insulin lispro (HUMALOG)  3 TIMES DAILY WITH MEALSGlucose: Dose: No [id number] 1 Lzwz202-156 2 Tlire434-324 3 Vhtcl000-533 4 Iqvxd954-867 5 Sgviu486 and above 6 Units             insulin glargine (LANTUS SOLOSTAR) 100 UNIT/ML injection pen  Inject 15 Units into the skin nightly             lactulose (CHRONULAC) 10 GM/15ML solution  Take 30 mLs by mouth 3 times daily             midodrine (PROAMATINE) 10 MG tablet  Take 1 tablet by mouth 3 times daily (with meals)             neomycin (MYCIFRADIN) 500 MG tablet               nitroGLYCERIN (NITROSTAT) 0.4 MG SL tablet  Place 1 tablet under the tongue every 5 minutes as needed for Chest pain             paliperidone (INVEGA) 3 MG extended release tablet               pantoprazole (PROTONIX) 40 MG tablet  Take 1 tablet by mouth 2 times daily (before meals)             QUEtiapine (SEROQUEL) 50 MG tablet  Take 3 tablets by mouth nightly             ranolazine (RANEXA) 500 MG extended release tablet  Take 1 tablet by mouth 2 times daily             rifaximin (XIFAXAN) 550 MG tablet  Take 1 tablet by mouth 2 times daily SOFT TOUCH LANCETS MISC  Please check blood sugar 3 times daily. Please fill with what is covered by insurance             traMADol (ULTRAM) 50 MG tablet               UNABLE TO FIND  Rx for depends   Use 3-4  times daily             VIREAD 300 MG tablet  Take 300 mg by mouth daily                  Objective Findings at Discharge:   BP (!) 95/46   Pulse 75   Temp 97.9 °F (36.6 °C) (Oral)   Resp 18   Ht 4' 9.5\" (1.461 m)   Wt 197 lb 14.4 oz (89.8 kg)   SpO2 93%   BMI 42.08 kg/m²            PHYSICAL EXAM   GEN Awake female, laying in bed in no apparent distress. EYES Pupils are equally round. No scleral discharge  HENT Atraumatic and symmetric head  NECK No apparent thyromegaly  RESP Symmetric chest movement while on room air. CARDIO/VASC Peripheral pulses equal bilaterally and palpable  GI Abdomen is not distended. Rectal exam deferred.  Alexander catheter is not present. HEME/LYMPH No petechiae or ecchymoses. MSK Spontaneous movement of BL upper extremities  SKIN Normal coloration, warm, dry. NEURO Cranial nerves appear grossly intact  PSYCH Awake, alert.     BMP/CBC  Recent Labs     09/13/20  1650 09/14/20  0226 09/15/20  1013    141 138   K 4.3 3.5 3.8    108 105   CO2 26 26 27   BUN 16 14 10   CREATININE 1.1 1.1 1.1   WBC 4.8 4.4 4.7   HCT 35.1* 33.3* 35.7*   PLT 85* 78* 81*     SIGNIFICANT IMAGING AND LABS:      Discharge Time of 32 minutes    Electronically signed by Jay Cummings MD on 9/15/2020 at 1:39 PM

## 2020-09-15 NOTE — PROGRESS NOTES
[facility]        Progress Note       9/15/2020  5:41 AM    Patient:    Maciej Saul  : 1960   64 y.o. MRN: [id number]  Admitted: 2020  4:12 PM ATT: Martinez Basurto MD   [id number]/[id number]-A  AdmitDx: Hepatic encephalopathy ([address] [K72.90]  Hyperammonemia ([address] [E72.20]  Hypoalbuminemia [E88.09]  S/P TIPS (transjugular intrahepatic portosystemic shunt) [N46.903]  Alcoholic cirrhosis of liver with ascites ([address] [U01.25]  Acute metabolic encephalopathy [K52.88]  PCP: Nita Martinez MD    SUBJECTIVE:  Chart reviewed, events noted  Patient feeling better. Speech has improved. 3 BMs yesterday. Tolerating po diet. No N/V. Denies abdominal pain.     ROS:  The positive ROS will be identified in bold     CONSTITUTIONAL:  Neg  Weight loss, fatigue, fever  MOUTH/THROAT:  Neg  Bleeding gums, hoarseness or sore throat  RESPIRATORY:   Neg SOB, wheeze, cough, hemoptysis or bronchitis  CARDIOVASCULAR:  Neg Chest pain, palpitations, dyspnea on exertion, edema  GASTROINTESTINAL:  SEE HPI  HEMATOLOGIC/LYMPHATIC:  Neg bleeding tendency, + anemia  MUSCULOSKELETAL: Neg  New myalgias, joint pain, swelling or stiffness  NEUROLOGICAL:  Neg  Loss of Consciousness, memory loss, forgetfulness, periods of confusion, difficulty concentrating, seizures, insomnia, aphasia    SKIN:  Neg No itching, rashes, or sores  PSYCHIATRIC:  Neg Depression, personality changes, anxiety    OBJECTIVE:      /66   Pulse 71   Temp 98 °F (36.7 °C) (Oral)   Resp 15   Ht 4' 9.5\" (1.461 m)   Wt 197 lb 14.4 oz (89.8 kg)   SpO2 96%   BMI 42.08 kg/m²     NAD, appears comfortable, sitting up in bed, speech has improved  Lips and mucous membranes pink and moist  RRR, Nl s1s2   Lungs CTA bilaterally, respirations even and unlabored   Abdomen soft, ND, NT, bowel sounds normal  Skin pink, warm, dry and CR brisk   No edema bilateral lower extremities   AAOx3, No asterixis      CBC:   Recent Labs     20  1650 09/14/20  0226   WBC 4.8 4.4   HGB 11.8* 11.0*   PLT 85* 78*     BMP:    Recent Labs     09/13/20  1650 09/14/20 0226    141   K 4.3 3.5    108   CO2 26 26   BUN 16 14   CREATININE 1.1 1.1   GLUCOSE 179* 149*     Magnesium:   Lab Results   Component Value Date    MG 1.8 09/14/2020     Hepatic:   Recent Labs     09/13/20 1650   AST 17   ALT 12   BILITOT 0.8  0.8   ALKPHOS 138*     INR:   Recent Labs     09/13/20 1650   INR 1.16         Intake/Output Summary (Last 24 hours) at 9/15/2020 0541  Last data filed at 9/14/2020 2229  Gross per 24 hour   Intake 20 ml   Output --   Net 20 ml         Medications    Scheduled Medications:    insulin glargine  15 Units Subcutaneous Nightly    lactulose  20 g Oral TID    midodrine  10 mg Oral TID WC    pantoprazole  40 mg Oral BID AC    QUEtiapine  150 mg Oral Nightly    ranolazine  500 mg Oral BID    rifaximin  550 mg Oral BID    insulin lispro  0-12 Units Subcutaneous TID WC    insulin lispro  0-6 Units Subcutaneous Nightly    FLUoxetine  20 mg Oral Daily    neomycin  500 mg Oral 4 times per day    paliperidone  3 mg Oral Daily    tenofovir disoproxil fumarate  300 mg Oral Daily    sodium chloride flush  10 mL Intravenous 2 times per day    enoxaparin  40 mg Subcutaneous Daily    nicotine  1 patch Transdermal Daily     PRN Medications: albuterol, nitroGLYCERIN, glucose, dextrose, glucagon (rDNA), dextrose, sodium chloride flush, polyethylene glycol, promethazine **OR** ondansetron  Infusions:    dextrose           Patient Active Problem List   Diagnosis Code    Left arm pain M79.602    Type 2 diabetes mellitus with complication, with long-term current use of insulin (HCC) E11.8, Z79.4    COPD, mild (HCC) J44.9    Tobacco abuse Z72.0    Angina effort I20.8    Abnormal nuclear cardiac imaging test R93.1    Lung nodule R91.1    Chest pain R07.9    Dyslipidemia E78.5    Pancolitis (HCC) K51.00    Hematemesis without nausea K92.0    Alcoholic cirrhosis of liver without ascites (HCC) K70.30    Thrombocytopenia (HCC) O85.7    Periumbilical abdominal pain R10.33    History of colonic polyps Z86.010    Abnormal findings on diagnostic imaging of 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    Type 2 diabetes mellitus with complication, with long-term current use of insulin (HCC) E11.8, Z79.4    SOB (shortness of breath) R06.02    Portal vein thrombosis I81    Intractable nausea and vomiting R11.2    Abdominal pain R10.9    Acute GI bleeding K92.2    Chronic hepatitis C without hepatic coma (HCC) B18.2    Delayed gastric emptying K30    Restless legs G25.81    Acute colitis K52.9    Acute encephalopathy G93.40    S/P TIPS (transjugular intrahepatic portosystemic shunt) Z95.828    Other cirrhosis of liver (HCC) K74.69    Acute upper GI bleed K92.2    Acute hepatic encephalopathy K72.00    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    Continue Xifaxan 550 mg BID  Continue Lactulose 20 gm TID  Agree with Neomycin, short term  Recommend home health care to ensure proper medication administration upon discharge     Nausea:  Resolved   Few episodes of emesis  May be secondary to chronic cannabis use, advised to reduce use

## 2020-09-16 ENCOUNTER — CARE COORDINATION (OUTPATIENT)
Dept: CASE MANAGEMENT | Age: 60
End: 2020-09-16

## 2020-09-16 LAB
HEPATITIS B CORE TOTAL ANTIBODY: POSITIVE
HEPATITIS BE ANTIBODY: POSITIVE
HEPATITIS BE ANTIGEN: NEGATIVE

## 2020-09-16 PROCEDURE — 1111F DSCHRG MED/CURRENT MED MERGE: CPT | Performed by: FAMILY MEDICINE

## 2020-09-16 NOTE — CARE COORDINATION
[facility] Transitions Initial Follow Up Call    Call within 2 business days of discharge: Yes    Patient: Avani Lyle Patient : 1960   MRN: [id number]  Reason for Admission:   Acute Metabolic Encephalopathy; underlying liver cirrhosis r/t Hep. B  Discharge Date: 9/15/20 RARS: Readmission Risk Score: 40      Last Discharge [facility]       Complaint Diagnosis Description Type Department Provider       Facility:   [facility]        Follow Up:  COVID-19 Risk Monitoring:    COVID-19:  Not tested    Attempted patient contact. No answer to phone. Message left with CTN contact information and request for return call. Patient has no HIPPA approved contacts. Per chart; patient has follow up with Cardiology w/in 7 days;  Transitional visit has been scheduled. Spoke with [facility]. Confirmed services and plan for [place] [address] follow up.    10:37:  Re-attempted patient contact. No answer to phone. Message left with CTN contact information and request for call back. Spoke with patient's brother with patient in the background on return call. Patient reports that her phone is not charged at times . Confirms that her brother lives with her and is an approved contact. Challenges to be reviewed by the provider   Additional needs identified to be addressed with provider :  Yes  Requested refill on Ultram/ Updated in r/t patient not picking up Xifaxan d/t cost.  Informed of CTN plan to assess for availability of support. Method of communication with provider : Phone. Left message with Medical Assist with CTN contact information. Advance Care Planning:   Does patient have an Advance Directive:  Education provided. Confirmed patient's brother:  Idris Posey as Healthcare decision maker    Was this a readmission?  No  Patient stated reason for admission:   Nausea/ confusion  Patients top risk factors for readmission: COPD/ Cirrhosis/ DM    Care Transition Nurse (CTN) contacted the patient by telephone to perform post hospital discharge assessment. Verified name and  with patient as identifiers. Provided introduction to self, and explanation of the CTN role. Patient's brother reports that patient is feeling much better. Reports that patient is weak and very tired. Patient reports improved mental clarity. Denies nausea, increased fatigue, s/s bleeding, increased bruising, weight loss or swelling to feet or ankles. Encouraged low sodium diet. Instructed on worsening s/s cirrhosis to report to [facility]. Patient denies fever, chills, body aches, vomiting, diarrhea, HA , increased SOB, cough or COVID related symptoms. Discussed COVID risk with patient and her brother. Reviewed prevention measures as below. Instructed on s/s to report to [facility]. Patient confirms that she has been contacted by [address] [place] [address] Rd with plan for follow up visit today. Reminded patient of the  availability of a [place] [address] nurse on call for any change in patient condition or worsening symptoms. Patient denies additional support needs. CTN reviewed discharge instructions, medical action plan and red flags with patient/ patient's brother who verbalized understanding. Patient was given an opportunity to ask questions and does not have any further questions or concerns at this time. Were discharge instructions available to patient? Yes. Reviewed appropriate site of care based on symptoms and resources available to patient including:   Reviewed COPD zone management tool and s/s of exacerbation to report to [facility]. The patient agrees to contact the PCP office for questions related to their healthcare. Medication reconciliation was performed with patient / patient's brother who verbalizes understanding of administration of home medications. Advised obtaining a 90-day supply of all daily and as-needed medications. Patient reports that she does not have any Ultram left and that she did not fill her Xifaxan Rx.  As she was not able to afford the co-pays. Instructed on the Med Assist program and offered to make referral.  Patient verbalized consent to CTN follow up. Covid Risk Education    Patient has following risk factors of: COPD. Education provided regarding infection prevention, and signs and symptoms of COVID-19 and when to seek medical attention with patient who verbalized understanding. Discussed exposure protocols and quarantine From [facility]: Are you at higher risk for severe illness?   and given an opportunity for questions and concerns. The patient agrees to contact the COVID-19 hotline [phone] or PCP office for questions related to COVID-19. Reviewed with patient:    From [facility]: Are you at higher risk for severe illness?            Wash your hands often.            Avoid close contact (6 feet, which is about two arm lengths) with people who are sick.            Put distance between yourself and other people if COVID-19 is spreading in your community.            Clean and disinfect frequently touched surfaces.            Avoid all cruise travel and non-essential air travel.            Call your healthcare professional if you have concerns about COVID-19 and your underlying condition or if you are sick. Wear mask covering nose and mouth when unable to social distance as per current [place] unless otherwise contraindicated. For more information on steps you can take to protect yourself, see CDC's How to Protect Yourself     Patient/family/caregiver given information for Brook Villatoro and agrees to enroll :  Patient's preferred e-mail:  [email or web address]  Patient's preferred phone number: [phone]  LOOP scheduled per CTN. Discussed follow-up appointments. If no appointment was previously scheduled, appointment scheduling offered: Yes. Is follow up appointment scheduled within 7 days of discharge? Yes Cardiology in 2 days. Agreeable to CTN contact with PCP.   [facility] appointment(s):     Plan for monitoring per COVID LOOP team as needed based on severity of symptoms and risk factors. CTN provided contact information for future needs. Referral made to [address] with request for follow up. Message left with MA/ PCP office to update in r/t patient recent discharge/ need for refill Ultram/ inability to afford Xifaxan/ need for follow up appointment w/in 7 days. CTN contact information given with request for return call. Updated referral to MA. Spoke with Outpatient Pharmacy. Confirmed that no coupons were available for this medication. Spoke with [facility]? Estef[place]. Patient insurance information was updated. Current out of pocket expense is $3.    Spoke with patient. Updated in r/t above. Patient verbalized her plan to fill this prescription. Patient confirms that she is feeling well. Confirms [facility] follow up for initial visit today. Reminded patient of the 24/7 availability of a Laisha Salvador RN on call if needs arise. Patient denies further needs. Agreeable to continued Care Transition. CTN contact information provided should needs arise. Note routed to Provider for awareness.             Future Appointments   Date Time Provider Cornell Hightower   9/17/2020  1:30 PM Rayleen Gaucher, APRN - CNP [facility]   9/24/2020 11:15 AM Rafael Campa MD [facility]   10/5/2020  1:00 PM [facility]udy Lomas [facility]   11/25/2020  1:45 PM Ami Bland MD [facility]       Jojo Ruggiero RN

## 2020-09-17 ENCOUNTER — NURSE ONLY (OUTPATIENT)
Dept: CARDIOLOGY CLINIC | Age: 60
End: 2020-09-17

## 2020-09-17 VITALS — HEART RATE: 72 BPM | SYSTOLIC BLOOD PRESSURE: 117 MMHG | DIASTOLIC BLOOD PRESSURE: 70 MMHG

## 2020-09-17 LAB
HBV QNT LOG, IU/ML: <1 LOG IU/ML
HBV QNT, IU/ML: ABNORMAL IU/ML
INTERPRETATION: DETECTED

## 2020-09-17 PROCEDURE — 99999 PR OFFICE/OUTPT VISIT,PROCEDURE ONLY: CPT | Performed by: NURSE PRACTITIONER

## 2020-09-17 RX ORDER — METOPROLOL SUCCINATE 50 MG/1
50 TABLET, EXTENDED RELEASE ORAL DAILY
COMMUNITY
End: 2020-12-15 | Stop reason: SDUPTHER

## 2020-09-17 NOTE — PROGRESS NOTES
[facility]  Jamie Henry  Phone: [phone]    Fax [phone]                  Brenda Bhatia MD, Mary Cooper MD, [address], MD, MD Julissa Rios MD Vonda Fellers, MD Almond Pai, APRN Fernande Duck, APRN Pearlene Piles, APRN Raeann Saber, APRN    BP Check Visit    Pt is here for a 2 week BP check. At the last office visit patient was complaining of having increased occurrences of chest pain. At that time the patient was instructed to increase metoprolol to 75 mg daily, due to myocardial bridging. Patient was then in the emergency room on 9/13/2020 and admitted to the hospital for overnight visit due to high ammonia levels. Patient blood pressure was 105/64 on admission and the metoprolol was discontinued by hospitalist at discharge due to borderline hypotension. Patient reports not having any chest pain since being in the hospital.  Patient states that she is unsure of her medications. She did not know that there was a change in her medications after being discharged from the hospital and she is continuing to take the metoprolol 50 mg daily. I am concerned that patient memory and ammonia level could cause her additional issues with medication adherence and potential overdose. The notes within epic patient was to be going home with home health care assistance. Vitals:    09/17/20 1343   BP: 117/70   Pulse: 72       Plan for pt is to continue current medications and follow-up with Dr. Park Reed in November as previous scheduled.      Pt is to report any dizziness or syncope to the office    Electronically signed by ERLINDA Chatman CNP on 9/17/2020 at 1:50 PM

## 2020-09-21 LAB — HEPATITIS B SURFACE ANTIGEN CONFIRMATION: POSITIVE

## 2020-09-21 NOTE — PROGRESS NOTES
Physician Progress Note      PATIENT:               Lizett Padilla  CSN #:                  [id number]  :                       1960  ADMIT DATE:       2020 4:12 PM  [address] DATE:        9/15/2020 3:10 PM  RESPONDING  PROVIDER #:        Demetri ORELLANA MD          QUERY TEXT:    Dear Kelsey Torres,    Pt admitted with hepatic encephalopathy d/t cirrhosis. Noted documentation of   ETOH abuse, hepatitis B & AVINA cirrhosis. If possible, please document in   progress notes and discharge summary the relationship, if any, between the   hepatic encephalopathy, hepatitis B and ETOH abuse. 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     Query created by: Lelan Fothergill on 2020 8:54 AM      Electronically signed by:  Maria A Regalado MD 2020 10:51 AM

## 2020-09-22 ENCOUNTER — TELEPHONE (OUTPATIENT)
Dept: CARDIOLOGY CLINIC | Age: 60
End: 2020-09-22

## 2020-09-22 ENCOUNTER — TELEPHONE (OUTPATIENT)
Dept: FAMILY MEDICINE CLINIC | Age: 60
End: 2020-09-22

## 2020-09-22 RX ORDER — FUROSEMIDE 20 MG/1
20 TABLET ORAL DAILY
Qty: 30 TABLET | Refills: 1 | Status: SHIPPED | OUTPATIENT
Start: 2020-09-22 | End: 2022-05-18 | Stop reason: SDUPTHER

## 2020-09-22 NOTE — TELEPHONE ENCOUNTER
Spoke with Maynor Bradshaw, she has verified meds with mental health, pulm and pcp. Wanted to confirm that Lasix and Spironolactone discontinued. Patient has ankle swelling at this time. Per Yana Henderson NP, may restart Lasix 20mg daily if sys >100. Ask nurse to strongly enforce pt taking lactulose.

## 2020-09-22 NOTE — TELEPHONE ENCOUNTER
Elizabeth Tavarez with [facility]   Called to discuss her current medications

## 2020-09-22 NOTE — TELEPHONE ENCOUNTER
[facility] called and stated that pt had a fall on Sunday with no complaints,.

## 2020-11-22 ENCOUNTER — HOSPITAL ENCOUNTER (EMERGENCY)
Age: 60
Discharge: HOME | End: 2020-11-23
Attending: EMERGENCY MEDICINE
Payer: MEDICARE

## 2020-11-22 LAB — GLUCOSE BLD-MCNC: 265 MG/DL (ref 70–99)

## 2020-11-22 PROCEDURE — 82962 GLUCOSE BLOOD TEST: CPT

## 2020-11-22 PROCEDURE — 96365 THER/PROPH/DIAG IV INF INIT: CPT

## 2020-11-22 PROCEDURE — 99284 EMERGENCY DEPT VISIT MOD MDM: CPT

## 2020-11-22 PROCEDURE — 96375 TX/PRO/DX INJ NEW DRUG ADDON: CPT

## 2020-11-23 ENCOUNTER — APPOINTMENT (OUTPATIENT)
Dept: CT IMAGING | Age: 60
End: 2020-11-23
Payer: MEDICARE

## 2020-11-23 ENCOUNTER — APPOINTMENT (OUTPATIENT)
Dept: GENERAL RADIOLOGY | Age: 60
End: 2020-11-23
Payer: MEDICARE

## 2020-11-23 VITALS
RESPIRATION RATE: 20 BRPM | SYSTOLIC BLOOD PRESSURE: 141 MMHG | HEART RATE: 92 BPM | WEIGHT: 200 LBS | HEIGHT: 58 IN | TEMPERATURE: 98.4 F | OXYGEN SATURATION: 97 % | DIASTOLIC BLOOD PRESSURE: 87 MMHG | BODY MASS INDEX: 41.98 KG/M2

## 2020-11-23 LAB
ACETAMINOPHEN LEVEL: <5 UG/ML (ref 15–30)
ALBUMIN SERPL-MCNC: 3.5 GM/DL (ref 3.4–5)
ALCOHOL SCREEN SERUM: <0.01 %WT/VOL
ALP BLD-CCNC: 184 IU/L (ref 40–129)
ALT SERPL-CCNC: 13 U/L (ref 10–40)
AMMONIA: 191 UMOL/L (ref 11–51)
AMPHETAMINES: NEGATIVE
ANION GAP SERPL CALCULATED.3IONS-SCNC: 7 MMOL/L (ref 4–16)
AST SERPL-CCNC: 23 IU/L (ref 15–37)
BACTERIA: NEGATIVE /HPF
BARBITURATE SCREEN URINE: NEGATIVE
BASE EXCESS MIXED: 1.2 (ref 0–2.3)
BASOPHILS ABSOLUTE: 0.1 K/CU MM
BASOPHILS RELATIVE PERCENT: 1.4 % (ref 0–1)
BENZODIAZEPINE SCREEN, URINE: NEGATIVE
BILIRUB SERPL-MCNC: 0.9 MG/DL (ref 0–1)
BILIRUBIN URINE: NEGATIVE MG/DL
BLOOD, URINE: NEGATIVE
BUN BLDV-MCNC: 11 MG/DL (ref 6–23)
CALCIUM OXALATE CRYSTALS: ABNORMAL /HPF
CALCIUM SERPL-MCNC: 8.7 MG/DL (ref 8.3–10.6)
CANNABINOID SCREEN URINE: ABNORMAL
CHLORIDE BLD-SCNC: 102 MMOL/L (ref 99–110)
CLARITY: ABNORMAL
CO2: 27 MMOL/L (ref 21–32)
COCAINE METABOLITE: NEGATIVE
COLOR: ABNORMAL
COMMENT: ABNORMAL
CREAT SERPL-MCNC: 0.9 MG/DL (ref 0.6–1.1)
DIFFERENTIAL TYPE: ABNORMAL
DOSE AMOUNT: ABNORMAL
DOSE AMOUNT: ABNORMAL
DOSE TIME: ABNORMAL
DOSE TIME: ABNORMAL
EOSINOPHILS ABSOLUTE: 0.2 K/CU MM
EOSINOPHILS RELATIVE PERCENT: 3.3 % (ref 0–3)
GFR AFRICAN AMERICAN: >60 ML/MIN/1.73M2
GFR NON-AFRICAN AMERICAN: >60 ML/MIN/1.73M2
GLUCOSE BLD-MCNC: 283 MG/DL (ref 70–99)
GLUCOSE, URINE: 150 MG/DL
HCO3 VENOUS: 26 MMOL/L (ref 19–25)
HCT VFR BLD CALC: 40.3 % (ref 37–47)
HEMOGLOBIN: 14.1 GM/DL (ref 12.5–16)
IMMATURE NEUTROPHIL %: 0.2 % (ref 0–0.43)
KETONES, URINE: NEGATIVE MG/DL
LACTATE: 1.7 MMOL/L (ref 0.4–2)
LEUKOCYTE ESTERASE, URINE: NEGATIVE
LIPASE: 56 IU/L (ref 13–60)
LYMPHOCYTES ABSOLUTE: 1.5 K/CU MM
LYMPHOCYTES RELATIVE PERCENT: 29 % (ref 24–44)
MCH RBC QN AUTO: 32.2 PG (ref 27–31)
MCHC RBC AUTO-ENTMCNC: 35 % (ref 32–36)
MCV RBC AUTO: 92 FL (ref 78–100)
MONOCYTES ABSOLUTE: 0.5 K/CU MM
MONOCYTES RELATIVE PERCENT: 9.6 % (ref 0–4)
MUCUS: ABNORMAL HPF
NITRITE URINE, QUANTITATIVE: NEGATIVE
NUCLEATED RBC %: 0 %
O2 SAT, VEN: 95.1 % (ref 50–70)
OPIATES, URINE: NEGATIVE
OXYCODONE: NEGATIVE
PCO2, VEN: 41 MMHG (ref 38–52)
PDW BLD-RTO: 13.3 % (ref 11.7–14.9)
PH VENOUS: 7.41 (ref 7.32–7.42)
PH, URINE: 5 (ref 5–8)
PHENCYCLIDINE, URINE: NEGATIVE
PLATELET # BLD: 118 K/CU MM (ref 140–440)
PMV BLD AUTO: 12.9 FL (ref 7.5–11.1)
PO2, VEN: 231 MMHG (ref 28–48)
POTASSIUM SERPL-SCNC: 3.7 MMOL/L (ref 3.5–5.1)
PRO-BNP: 36.09 PG/ML
PROTEIN UA: NEGATIVE MG/DL
RBC # BLD: 4.38 M/CU MM (ref 4.2–5.4)
RBC URINE: ABNORMAL /HPF (ref 0–6)
SALICYLATE LEVEL: <0.3 MG/DL (ref 15–30)
SARS-COV-2, NAAT: NOT DETECTED
SEGMENTED NEUTROPHILS ABSOLUTE COUNT: 2.9 K/CU MM
SEGMENTED NEUTROPHILS RELATIVE PERCENT: 56.5 % (ref 36–66)
SODIUM BLD-SCNC: 136 MMOL/L (ref 135–145)
SOURCE: NORMAL
SPECIFIC GRAVITY UA: 1.03 (ref 1–1.03)
SQUAMOUS EPITHELIAL: 6 /HPF
TOTAL IMMATURE NEUTOROPHIL: 0.01 K/CU MM
TOTAL NUCLEATED RBC: 0 K/CU MM
TOTAL PROTEIN: 7.1 GM/DL (ref 6.4–8.2)
TRANSITIONAL EPITHELIAL: <1 /HPF
TRICHOMONAS: ABNORMAL /HPF
TROPONIN T: <0.01 NG/ML
TSH HIGH SENSITIVITY: 2.54 UIU/ML (ref 0.27–4.2)
UROBILINOGEN, URINE: 2 MG/DL (ref 0.2–1)
WBC # BLD: 5.1 K/CU MM (ref 4–10.5)
WBC UA: <1 /HPF (ref 0–5)

## 2020-11-23 PROCEDURE — 81001 URINALYSIS AUTO W/SCOPE: CPT

## 2020-11-23 PROCEDURE — 80053 COMPREHEN METABOLIC PANEL: CPT

## 2020-11-23 PROCEDURE — 94640 AIRWAY INHALATION TREATMENT: CPT

## 2020-11-23 PROCEDURE — 83690 ASSAY OF LIPASE: CPT

## 2020-11-23 PROCEDURE — 6360000002 HC RX W HCPCS: Performed by: EMERGENCY MEDICINE

## 2020-11-23 PROCEDURE — 85025 COMPLETE CBC W/AUTO DIFF WBC: CPT

## 2020-11-23 PROCEDURE — 71045 X-RAY EXAM CHEST 1 VIEW: CPT

## 2020-11-23 PROCEDURE — 82140 ASSAY OF AMMONIA: CPT

## 2020-11-23 PROCEDURE — 70450 CT HEAD/BRAIN W/O DYE: CPT

## 2020-11-23 PROCEDURE — G0480 DRUG TEST DEF 1-7 CLASSES: HCPCS

## 2020-11-23 PROCEDURE — 83880 ASSAY OF NATRIURETIC PEPTIDE: CPT

## 2020-11-23 PROCEDURE — 83605 ASSAY OF LACTIC ACID: CPT

## 2020-11-23 PROCEDURE — 6370000000 HC RX 637 (ALT 250 FOR IP): Performed by: EMERGENCY MEDICINE

## 2020-11-23 PROCEDURE — 93005 ELECTROCARDIOGRAM TRACING: CPT | Performed by: EMERGENCY MEDICINE

## 2020-11-23 PROCEDURE — 2580000003 HC RX 258: Performed by: EMERGENCY MEDICINE

## 2020-11-23 PROCEDURE — 80307 DRUG TEST PRSMV CHEM ANLYZR: CPT

## 2020-11-23 PROCEDURE — U0002 COVID-19 LAB TEST NON-CDC: HCPCS

## 2020-11-23 PROCEDURE — 84443 ASSAY THYROID STIM HORMONE: CPT

## 2020-11-23 PROCEDURE — 82805 BLOOD GASES W/O2 SATURATION: CPT

## 2020-11-23 PROCEDURE — 87086 URINE CULTURE/COLONY COUNT: CPT

## 2020-11-23 PROCEDURE — 93010 ELECTROCARDIOGRAM REPORT: CPT | Performed by: INTERNAL MEDICINE

## 2020-11-23 PROCEDURE — 84484 ASSAY OF TROPONIN QUANT: CPT

## 2020-11-23 RX ORDER — ALBUTEROL SULFATE 90 UG/1
2 AEROSOL, METERED RESPIRATORY (INHALATION) ONCE
Status: DISCONTINUED | OUTPATIENT
Start: 2020-11-23 | End: 2020-11-23 | Stop reason: HOSPADM

## 2020-11-23 RX ORDER — IPRATROPIUM BROMIDE AND ALBUTEROL SULFATE 2.5; .5 MG/3ML; MG/3ML
1 SOLUTION RESPIRATORY (INHALATION)
Status: DISCONTINUED | OUTPATIENT
Start: 2020-11-23 | End: 2020-11-23

## 2020-11-23 RX ORDER — METHYLPREDNISOLONE SODIUM SUCCINATE 125 MG/2ML
125 INJECTION, POWDER, LYOPHILIZED, FOR SOLUTION INTRAMUSCULAR; INTRAVENOUS ONCE
Status: COMPLETED | OUTPATIENT
Start: 2020-11-23 | End: 2020-11-23

## 2020-11-23 RX ORDER — LEVOFLOXACIN 5 MG/ML
500 INJECTION, SOLUTION INTRAVENOUS EVERY 24 HOURS
Status: DISCONTINUED | OUTPATIENT
Start: 2020-11-23 | End: 2020-11-23 | Stop reason: HOSPADM

## 2020-11-23 RX ORDER — 0.9 % SODIUM CHLORIDE 0.9 %
1000 INTRAVENOUS SOLUTION INTRAVENOUS ONCE
Status: COMPLETED | OUTPATIENT
Start: 2020-11-23 | End: 2020-11-23

## 2020-11-23 RX ORDER — LEVOFLOXACIN 500 MG/1
500 TABLET, FILM COATED ORAL DAILY
Qty: 10 TABLET | Refills: 0 | Status: SHIPPED | OUTPATIENT
Start: 2020-11-23 | End: 2020-12-03

## 2020-11-23 RX ORDER — IPRATROPIUM BROMIDE AND ALBUTEROL SULFATE 2.5; .5 MG/3ML; MG/3ML
1 SOLUTION RESPIRATORY (INHALATION) EVERY 4 HOURS
Qty: 360 ML | Refills: 0 | Status: SHIPPED | OUTPATIENT
Start: 2020-11-23

## 2020-11-23 RX ORDER — GUAIFENESIN/DEXTROMETHORPHAN 100-10MG/5
5 SYRUP ORAL 4 TIMES DAILY PRN
Qty: 120 ML | Refills: 0 | Status: SHIPPED | OUTPATIENT
Start: 2020-11-23 | End: 2020-12-03

## 2020-11-23 RX ORDER — IPRATROPIUM BROMIDE AND ALBUTEROL SULFATE 2.5; .5 MG/3ML; MG/3ML
1 SOLUTION RESPIRATORY (INHALATION)
Status: DISCONTINUED | OUTPATIENT
Start: 2020-11-23 | End: 2020-11-23 | Stop reason: HOSPADM

## 2020-11-23 RX ADMIN — SODIUM CHLORIDE 1000 ML: 9 INJECTION, SOLUTION INTRAVENOUS at 00:22

## 2020-11-23 RX ADMIN — METHYLPREDNISOLONE SODIUM SUCCINATE 125 MG: 125 INJECTION, POWDER, FOR SOLUTION INTRAMUSCULAR; INTRAVENOUS at 01:34

## 2020-11-23 RX ADMIN — IPRATROPIUM BROMIDE AND ALBUTEROL SULFATE 1 AMPULE: .5; 3 SOLUTION RESPIRATORY (INHALATION) at 04:04

## 2020-11-23 RX ADMIN — LEVOFLOXACIN 500 MG: 5 INJECTION, SOLUTION INTRAVENOUS at 03:32

## 2020-11-23 RX ADMIN — IPRATROPIUM BROMIDE AND ALBUTEROL SULFATE 1 AMPULE: .5; 3 SOLUTION RESPIRATORY (INHALATION) at 03:45

## 2020-11-23 NOTE — ED NOTES
Brought in with family. Pt states has been confused for the last 3 hours and stuttering when speaking. Stuttering not noted. Pt able to tell this nurse social for registration and answer questions appropriately. When asking family examples of confusion states she was confused while making tea. Reports her being here recently with same complaint and a lab was low.       Zaira Carney RN  11/23/20 [id number]

## 2020-11-24 LAB
CULTURE: NORMAL
Lab: NORMAL
SPECIMEN: NORMAL

## 2020-11-24 ASSESSMENT — ENCOUNTER SYMPTOMS
EYES NEGATIVE: 1
NAUSEA: 0
SINUS PAIN: 0
SORE THROAT: 0
ABDOMINAL PAIN: 0
DIFFICULTY BREATHING: 0
RESPIRATORY NEGATIVE: 1
VOMITING: 0
SHORTNESS OF BREATH: 0
EYE PAIN: 0
EYE REDNESS: 0
DIARRHEA: 0
SINUS PRESSURE: 0
BACK PAIN: 0
WHEEZING: 0
EYE DISCHARGE: 0
CHEST TIGHTNESS: 0
FACIAL SWELLING: 0
COUGH: 0
CONSTIPATION: 0
RHINORRHEA: 0
GASTROINTESTINAL NEGATIVE: 1

## 2020-11-24 NOTE — ED PROVIDER NOTES
[address] ENCOUNTER      Pt Name: Anish Gage  MRN: [id number]  [place] 1960  Date of evaluation: 11/22/2020  Provider: Toshia Garrison DO    CHIEF COMPLAINT       Chief Complaint   Patient presents with    Altered Mental Status     states being confused and change in speech          HISTORY OF PRESENT ILLNESS      Anish Gage is a 64 y.o. female who presents to the emergency department  for   Chief Complaint   Patient presents with    Altered Mental Status     states being confused and change in speech        73-year-old female presents emergency department for reported confusion that was brief.  also reports mild slurring of speech. In the emergency department, patient is somnolent but has no focal neurologic deficits. Altered mental status work-up was initiated. Patient does have mild elevation in ammonia level. Patient is on lactulose 4 times a day.  reports that patient has exhibited similar symptoms when the patient was hyponatremic or with urinary tract infections.  also reports symptoms due to previous medications. The history is provided by the patient, the spouse and medical records. No  was used. Altered Mental Status   Presenting symptoms: confusion and disorientation    Severity:  Mild  Most recent episode:   Today  Episode history:  Single  Duration:  1 hour  Timing:  Intermittent  Progression:  Resolved  Chronicity:  Recurrent  Context: not alcohol use, not dementia, not drug use, not head injury, taking medications as prescribed, not nursing home resident, not recent change in medication, not recent illness and not recent infection    Associated symptoms: light-headedness    Associated symptoms: no abdominal pain, normal movement, no agitation, no decreased appetite, no difficulty breathing, no fever, no hallucinations, no headaches, no nausea, no heroin and marijuana- states last used 12/2014    Glaucoma     bilateral    H/O Doppler lower venous ultrasound 04/04/2019    No DVT or SVT, Significant reflux of RGSV and LGSV.  Hepatic encephalopathy ([facility] 05/2019    Hepatitis C     for liver bx 12/3/2015\"Have Hepatitis B and C and saw Dr Alcon Allen for this 12/1/2015\"    Hiatal hernia     History of alcohol abuse     History of exercise stress test 01/02/2020    Treadmill, Normal exercise performance without angina and ischemic EKG changes.  HTN (hypertension)     \"for the past two yrs on medication\" follows with Dr Dakotah Lomas Hx of blood clots 2019    Portal vein thrombsis - TIPS procedure 4/23/19    Hyperlipemia     Irregular heart beat     per pt    Liver hematoma     Migraines     Last migraine:  2018    Nausea & vomiting     Schizophrenia ([facility] [address]     per old chart    Seizures ([facility] [address]     \"last one was 9/2015- saw Dr Cezar Mackay at [facility]- she said not sure if acutal seizures- she thinks they are panic or anxiety attacks\"    SOB (shortness of breath)     with any exertion       Prior to Admission medications    Medication Sig Start Date End Date Taking?  Authorizing Provider   levoFLOXacin (LEVAQUIN) 500 MG tablet Take 1 tablet by mouth daily for 10 days 11/23/20 12/3/20 Yes Vito Salinas, DO   guaiFENesin-dextromethorphan [facility] DM) 100-10 MG/5ML syrup Take 5 mLs by mouth 4 times daily as needed for Cough 11/23/20 12/3/20 Yes Vito Salinas DO   ipratropium-albuterol (DUONEB) 0.5-2.5 (3) MG/3ML SOLN nebulizer solution Inhale 3 mLs into the lungs every 4 hours 11/23/20  Yes Vito Salinas DO   furosemide (LASIX) 20 MG tablet Take 1 tablet by mouth daily Hold if systolic <676 7/77/06   ERLINDA Azul - CNP   predniSONE (DELTASONE) 10 MG tablet Take 1 tablet by mouth daily 40mg daily for 2 days, 20 mg daily for 2 days, 10 mg daily for 2 days, 5 mg daily for 2 days 9/22/20   Gato Kumar MD   metoprolol succinate (TOPROL XL) 50 MG extended release tablet Take 50 mg by mouth daily    Historical Provider, MD   lactulose (CHRONULAC) 10 GM/15ML solution Take 30 mLs by mouth 3 times daily 9/15/20   Brandan Tapia MD   QUEtiapine (SEROQUEL) 50 MG tablet Take 3 tablets by mouth nightly 9/15/20   Brandan Tapia MD   rifaximin (XIFAXAN) 550 MG tablet Take 1 tablet by mouth 2 times daily 9/15/20   Brandan Tapia MD   FLUoxetine (PROZAC) 20 MG capsule Take 20 mg by mouth daily 9/11/20   Historical Provider, MD   neomycin (MYCIFRADIN) 500 MG tablet  9/11/20   Historical Provider, MD   traMADol Belen Reynolds) 50 MG tablet  5/26/20   Historical Provider, MD   paliperidone (INVEGA) 3 MG extended release tablet  8/13/20   Historical Provider, MD   insulin glargine (LANTUS SOLOSTAR) 100 UNIT/ML injection pen Inject 15 Units into the skin nightly 8/6/20   Sundeep Maradiaga MD   midodrine (PROAMATINE) 10 MG tablet Take 1 tablet by mouth 3 times daily (with meals) 5/29/20   Emili Hernandez MD   nitroGLYCERIN (NITROSTAT) 0.4 MG SL tablet Place 1 tablet under the tongue every 5 minutes as needed for Chest pain 5/21/20   ERLINDA Mathis CNP   ranolazine (RANEXA) 500 MG extended release tablet Take 1 tablet by mouth 2 times daily 5/21/20   ERLINDA Mathis CNP   Compression Stockings MISC by Does not apply route 20 - 30 mmh wear daily and take off at night  Thigh High 5/21/20   ERLINDA Mathis CNP   insulin aspart (NOVOLOG FLEXPEN) 100 UNIT/ML injection pen **Low Dose Correction Algorithm**Insulin lispro (HUMALOG)  3 TIMES DAILY WITH MEALSGlucose: Dose: No [id number] 1 [id number] 2 [id number] 3 [id number] 4 Lliwv406-259 5 [id number] and above 6 Units 4/28/20   Tenisha Nicholas MD   blood glucose monitor strips Test  Bid times a day & as needed for symptoms of irregular blood glucose.  4/14/20   Tenisha Nicholas MD   UNABLE TO FIND Rx for depends   Use 3-4  times daily 4/10/20   Tenisha Nicholas MD   albuterol (PROVENTIL) (2.5 MG/3ML) 0.083% nebulizer solution Take 3 mLs by nebulization every 6 hours 4/6/20   Aracely Lynn MD   Glucosource Lancets MISC Use one 3-4 times to check blood sugar 1/30/20   Arianna Henriquez MD   glucose monitoring kit (FREESTYLE) monitoring kit 1 kit by Does not apply route daily 1/30/20   Arianna Henriquez MD   Blood Glucose Monitoring Suppl [facility] BLOOD GLUCOSE METER) w/Device KIT Please check blood sugar 3 times daily. Please fill with what is covered by the insurance 1/21/20   Arianna Henriquez MD   blood glucose monitor strips Test 3 times a day & as needed for symptoms of irregular blood glucose. Please fill with what is covered my patients insurance. 1/21/20   Arianna Henriquez MD   SOFT TOUCH LANCETS MISC Please check blood sugar 3 times daily.  Please fill with what is covered by insurance 1/21/20   Arianna Henriquez MD   pantoprazole (PROTONIX) 40 MG tablet Take 1 tablet by mouth 2 times daily (before meals) 10/27/19   Leo Slaughter MD   VIREAD 300 MG tablet Take 300 mg by mouth daily  11/20/17   Historical Provider, MD        Patient Active Problem List   Diagnosis    Left arm pain    Type 2 diabetes mellitus with complication, with long-term current use of insulin ([address]    COPD, mild (HCC)    Tobacco abuse    Angina effort    Abnormal nuclear cardiac imaging test    Lung nodule    Chest pain    Dyslipidemia    Pancolitis ([address]    Hematemesis without nausea    Alcoholic cirrhosis of liver without ascites ([address]    Thrombocytopenia ([address]    Periumbilical abdominal pain    History of colonic polyps    Abnormal findings on diagnostic imaging of abdomen    Nausea    Gastroesophageal reflux disease without esophagitis    Mixed hyperlipidemia    Vitamin D deficiency    Left carpal tunnel syndrome    Right carpal tunnel syndrome    Esophageal hypertension    Candida esophagitis (HCC)    Colitis    Essential hypertension    GI bleed    Coronary-myocardial bridge    Type 2 diabetes mellitus with complication, with long-term current use of insulin (HCC)    SOB (shortness of breath)    Portal vein thrombosis    Intractable nausea and vomiting    Abdominal pain    Acute GI bleeding    Chronic hepatitis C without hepatic coma ([address]    Delayed gastric emptying    Restless legs    Acute colitis    Acute encephalopathy    S/P TIPS (transjugular intrahepatic portosystemic shunt)    Other cirrhosis of liver (HCC)    Acute upper GI bleed    Acute hepatic encephalopathy    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    thrombocytopenia    Hepatic encephalopathy ([address]    Morbidly obese ([address]    Non-intractable vomiting with nausea    Hyperammonemia ([address]         SURGICAL HISTORY       Past Surgical History:   Procedure Laterality Date    BREAST SURGERY  10/2015    left breast bx    CARDIAC CATHETERIZATION      per old chart pt had cath done in 3/2011 and 9/2013    CARPAL TUNNEL RELEASE Left 1/9/2020    CARPAL TUNNEL RELEASE LEFT performed by Jeffrey Hernandez DO at [place] [address] Right 05/26/2020    dr Cami Calixto, carpal tunnel trigger finger release    CARPAL TUNNEL RELEASE Right 5/26/2020    RIGHT CARPAL TUNNEL RELEASE performed by Jeffrey Hernandez DO at [id number]  per old chart done [facility]  3/11/13    diverticulosis, cecal polyp    COLONOSCOPY  03/16/2017    Internal hemorrhoids- Dr. Moise Caban, COLON, DIAGNOSTIC  03/16/2017    EGD: Small esophageal varices, portal hypertensive gastropathy, reflux esophagitis, hiatal hernia    EYE SURGERY Bilateral ? when    cyst removal, cataracts w/lens replacement    FINGER TRIGGER RELEASE Right 5/26/2020    FINGER TRIGGER RELEASE RIGHT RING FINGER performed by Jeffrey Hernandez DO at [facility]      per old chart pt had CHOLO/BSO 1986    TIPS PROCEDURE  04/23/2019    TONSILLECTOMY  as a kid    UPPER GASTROINTESTINAL ENDOSCOPY N/A 11/14/2018    EGD DIAGNOSTIC ONLY performed by Tiffany Coker MD at [address] N/A 6/5/2019    EGD DIAGNOSTIC ONLY performed by Tiffany Coker MD at [facility]       Discharge Medication List as of 11/23/2020  4:57 AM      CONTINUE these medications which have NOT CHANGED    Details   furosemide (LASIX) 20 MG tablet Take 1 tablet by mouth daily Hold if systolic <047, [id number]      predniSONE (DELTASONE) 10 MG tablet Take 1 tablet by mouth daily 40mg daily for 2 days, 20 mg daily for 2 days, 10 mg daily for 2 days, 5 mg daily for 2 days, Disp-15 tablet,R-0Normal      metoprolol succinate (TOPROL XL) 50 MG extended release tablet Take 50 mg by mouth dailyHistorical Med      lactulose (CHRONULAC) 10 GM/15ML solution Take 30 mLs by mouth 3 times daily, Disp-1892 mL,R-2Normal      QUEtiapine (SEROQUEL) 50 MG tablet Take 3 tablets by mouth nightly, Disp-60 tablet,R-3Normal      rifaximin (XIFAXAN) 550 MG tablet Take 1 tablet by mouth 2 times daily, Disp-42 tablet,R-0Normal      FLUoxetine (PROZAC) 20 MG capsule Take 20 mg by mouth dailyHistorical Med      neomycin (MYCIFRADIN) 500 MG tablet Historical Med      traMADol (ULTRAM) 50 MG tablet Historical Med      paliperidone (INVEGA) 3 MG extended release tablet Historical Med      insulin glargine (LANTUS SOLOSTAR) 100 UNIT/ML injection pen Inject 15 Units into the skin nightly, Disp-5 pen,R-3Normal      midodrine (PROAMATINE) 10 MG tablet Take 1 tablet by mouth 3 times daily (with meals), Disp-90 tablet, R-3Normal      nitroGLYCERIN (NITROSTAT) 0.4 MG SL tablet Place 1 tablet under the tongue every 5 minutes as needed for Chest pain, Disp-25 tablet, R-3Normal      ranolazine (RANEXA) 500 MG extended release tablet Take 1 tablet by mouth 2 times daily, Disp-60 tablet, R-5Normal      Compression Stockings MISC Starting Thu 5/21/2020, Disp-2 each, R-2, [id number] - 30 mmh wear daily and take off at night Thigh High insulin aspart (NOVOLOG FLEXPEN) 100 UNIT/ML injection pen **Low Dose Correction Algorithm**Insulin lispro (HUMALOG)  3 TIMES DAILY WITH MEALSGlucose: Dose: No [id number] 1 [id number] 2 [id number] 3 [id number] 4 Bwxjk091-778 5 Qcbxe552 and above 6 Units, Disp-5 pen, R-3Normal      !! blood glucose monitor strips Test  Bid times a day & as needed for symptoms of irregular blood glucose., Disp-100 strip, R-5, Normal      UNABLE TO FIND Rx for depends   Use 3-4  times daily, Disp-1 box, R-5Normal      albuterol (PROVENTIL) (2.5 MG/3ML) 0.083% nebulizer solution Take 3 mLs by nebulization every 6 hours, Disp-120 vial, R-5Normal      !! Glucosource Lancets MISC Disp-100 each, R-5, NormalUse one 3-4 times to check blood sugar      glucose monitoring kit (FREESTYLE) monitoring kit DAILY Starting Thu 1/30/2020, Disp-1 kit, R-0, Normal      Blood Glucose Monitoring Suppl (ACURA BLOOD GLUCOSE METER) w/Device KIT Disp-1 kit, R-0, NormalPlease check blood sugar 3 times daily. Please fill with what is covered by the insurance      !! blood glucose monitor strips Test 3 times a day & as needed for symptoms of irregular blood glucose. Please fill with what is covered my patients insurance., Disp-50 strip, R-3, Normal      !! SOFT TOUCH LANCETS MISC Disp-100 each, R-3, NormalPlease check blood sugar 3 times daily. Please fill with what is covered by insurance      pantoprazole (PROTONIX) 40 MG tablet Take 1 tablet by mouth 2 times daily (before meals), Disp-60 tablet, R-1Normal      VIREAD 300 MG tablet Take 300 mg by mouth daily , DAWHistorical Med       !! - Potential duplicate medications found. Please discuss with provider.           ALLERGIES     Norco [hydrocodone-acetaminophen] and Tylenol [acetaminophen]    FAMILY HISTORY       Family History   Problem Relation Age of Onset    Cancer Mother         lung ca    Arthritis Mother     Migraines Mother     Cancer Father         colon ca    Diabetes Father Vitals   BP Temp Temp Source Pulse Resp SpO2 Height Weight   11/22/20 2358 11/23/20 0011 11/23/20 0011 11/22/20 2358 11/22/20 2358 11/22/20 2358 11/22/20 2350 11/22/20 2350   (!) 138/94 98.4 °F (36.9 °C) Oral 94 24 98 % 4' 9.5\" (1.461 m) 200 lb (90.7 kg)       Physical Exam  Vitals signs and nursing note reviewed. Constitutional:       General: She is not in acute distress. Appearance: She is well-developed. She is not diaphoretic. HENT:      Head: Normocephalic and atraumatic. Right Ear: External ear normal.      Left Ear: External ear normal.      Nose: Nose normal.      Mouth/Throat:      Pharynx: No oropharyngeal exudate. Eyes:      General: No scleral icterus. Right eye: No discharge. Left eye: No discharge. Pupils: Pupils are equal, round, and reactive to light. Neck:      Musculoskeletal: Normal range of motion. Thyroid: No thyromegaly. Vascular: No JVD. Trachea: No tracheal deviation. Cardiovascular:      Rate and Rhythm: Normal rate and regular rhythm. Heart sounds: Normal heart sounds. No murmur. No friction rub. No gallop. Pulmonary:      Effort: Pulmonary effort is normal. No respiratory distress. Breath sounds: Normal breath sounds. No stridor. No wheezing or rales. Chest:      Chest wall: No tenderness. Abdominal:      General: Bowel sounds are normal. There is no distension. Palpations: Abdomen is soft. Tenderness: There is no abdominal tenderness. There is no guarding or rebound. Musculoskeletal: Normal range of motion. General: No tenderness or deformity. Lymphadenopathy:      Cervical: No cervical adenopathy. Skin:     General: Skin is warm. Capillary Refill: Capillary refill takes less than 2 seconds. Coloration: Skin is not pale. Findings: No erythema or rash. Neurological:      General: No focal deficit present. Mental Status: She is alert and oriented to person, place, and time. Mental status is at baseline. Cranial Nerves: No cranial nerve deficit. Sensory: No sensory deficit. Motor: No weakness or abnormal muscle tone. Coordination: Coordination normal.      Gait: Gait normal.      Deep Tendon Reflexes: Reflexes normal.   Psychiatric:         Mood and Affect: Mood normal.         Behavior: Behavior normal.         Thought Content:  Thought content normal.         Judgment: Judgment normal.         DIAGNOSTIC RESULTS     Labs Reviewed   CBC WITH AUTO DIFFERENTIAL - Abnormal; Notable for the following components:       Result Value    MCH 32.2 (*)     Platelets 934 (*)     MPV 12.9 (*)     Monocytes % 9.6 (*)     Eosinophils % 3.3 (*)     Basophils % 1.4 (*)     All other components within normal limits   COMPREHENSIVE METABOLIC PANEL W/ REFLEX TO MG FOR LOW K - Abnormal; Notable for the following components:    Glucose 283 (*)     Alkaline Phosphatase 184 (*)     All other components within normal limits   URINALYSIS - Abnormal; Notable for the following components:    Color, UA RUIZ (*)     Clarity, UA HAZY (*)     Glucose, Urine 150 (*)     Urobilinogen, Urine 2.0 (*)     Mucus, UA RARE (*)     All other components within normal limits   URINE DRUG SCREEN - Abnormal; Notable for the following components:    Cannabinoid Scrn, Ur UNCONFIRMED POSITIVE (*)     All other components within normal limits   ACETAMINOPHEN LEVEL - Abnormal; Notable for the following components:    Acetaminophen Level <5.0 (*)     All other components within normal limits   SALICYLATE LEVEL - Abnormal; Notable for the following components:    Salicylate Lvl <4.6 (*)     All other components within normal limits   AMMONIA - Abnormal; Notable for the following components:    Ammonia 191 (*)     All other components within normal limits   BLOOD GAS, VENOUS - Abnormal; Notable for the following components:    pO2, [place] 231 (*)     HCO3, Venous 26.0 (*)     O2 Sat, [place] 95.1 (*)     All other Glucose, Urine 150 (*)     Urobilinogen, Urine 2.0 (*)     Mucus, UA RARE (*)     All other components within normal limits   URINE DRUG SCREEN - Abnormal; Notable for the following components:    Cannabinoid Scrn, Ur UNCONFIRMED POSITIVE (*)     All other components within normal limits   ACETAMINOPHEN LEVEL - Abnormal; Notable for the following components:    Acetaminophen Level <5.0 (*)     All other components within normal limits   SALICYLATE LEVEL - Abnormal; Notable for the following components:    Salicylate Lvl <8.5 (*)     All other components within normal limits   AMMONIA - Abnormal; Notable for the following components:    Ammonia 191 (*)     All other components within normal limits   BLOOD GAS, VENOUS - Abnormal; Notable for the following components:    pO2, M[place] 231 (*)     HCO3, Venous 26.0 (*)     O2 Sat, M[place] 95.1 (*)     All other components within normal limits   POCT GLUCOSE - Abnormal; Notable for the following components:    POC Glucose 265 (*)     All other components within normal limits   CULTURE, URINE   LIPASE   TROPONIN   BRAIN NATRIURETIC PEPTIDE   TSH WITHOUT REFLEX   LACTIC ACID, PLASMA   ETHANOL   COVID-19   BLOOD GAS, VENOUS       All other labs were within normal range or not returned as of this dictation. EMERGENCY DEPARTMENT COURSE and DIFFERENTIAL DIAGNOSIS/MDM:   Vitals:    Vitals:    11/23/20 0301 11/23/20 0338 11/23/20 0420 11/23/20 0421   BP: 121/86  (!) 141/87    Pulse: 78 85 92 92   Resp:   24 20   Temp:       TempSrc:       SpO2: 95%  97% 97%   Weight:       Height:               MDM  Number of Diagnoses or Management Options  Chronic obstructive pulmonary disease with acute exacerbation ([facility]):   Pneumonia due to organism:   Transient alteration of awareness:   Diagnosis management comments: 46-year-old female presents emergency department with chief complaint of altered mental status that has fully resolved at this time.   Patient was mildly somnolent when she presented. Electrolytes, urinalysis are negative. Ammonia level is mildly elevated but patient is on chronic lactulose. Patient has no focal neurologic deficits. CT of the head is negative. Chest x-ray shows signs of pneumonia. Patient has not been admitted to the hospital recently. COVID-19 was obtained and was negative. Patient did have wheezing on examination. Patient received duo nebs and Solu-Medrol with significant improvement of symptoms. Patient did not show any TIA or strokelike symptoms. Patient has had similar symptoms in the past.  Discussed with the patient the current clinical situation, the unlikelihood of CVA or TIA. The otherwise negative work-up. Also discussed the risks of admission due to COVID-19. Patient is agreeable to discharge with close outpatient follow-up, return precautions. Amount and/or Complexity of Data Reviewed  Clinical lab tests: ordered and reviewed  Tests in the radiology section of CPT®: ordered and reviewed  Tests in the medicine section of CPT®: ordered and reviewed            -  Patient seen and evaluated in the emergency department. -  Triage and nursing notes reviewed and incorporated. -  Old chart records reviewed and incorporated. -  Work-up included:  See above  -  Results discussed with patient. REASSESSMENT          CRITICAL CARE TIME     This excludes seperately billable procedures and family discussion time. Critical care time provided for obtaining history, conducting a physical exam, performing and monitoring interventions, ordering, collecting and interpreting tests, and establishing medical decision-making. There was a potential for life/limb threatening pathology requiring close evaluation and intervention with concern for patient decompensation. CONSULTS:  None    PROCEDURES:  None performed unless otherwise noted below     Procedures        FINAL IMPRESSION      1. Transient alteration of awareness    2.  Pneumonia due to organism    3. Chronic obstructive pulmonary disease with acute exacerbation [facility])          DISPOSITION/PLAN   DISPOSITION Decision To Discharge 11/23/2020 03:26:03 AM      PATIENT REFERRED TO:  Vaishnavi Chen MD  [address]   [phone]    In 2 days        DISCHARGE MEDICATIONS:  Discharge Medication List as of 11/23/2020  4:57 AM      START taking these medications    Details   levoFLOXacin (LEVAQUIN) 500 MG tablet Take 1 tablet by mouth daily for 10 days, Disp-10 tablet,R-0Print      guaiFENesin-dextromethorphan (ROBITUSSIN DM) 100-10 MG/5ML syrup Take 5 mLs by mouth 4 times daily as needed for Cough, Disp-120 mL,R-0Print      ipratropium-albuterol (DUONEB) 0.5-2.5 (3) MG/3ML SOLN nebulizer solution Inhale 3 mLs into the lungs every 4 hours, Disp-360 mL,R-0Print             ED Provider Disposition Time  DISPOSITION Decision To Discharge 11/23/2020 03:26:03 AM      Appropriate personal protective equipment was worn during the patient's evaluation. These included surgical, eye protection, surgical mask or in 95 respirator and gloves. The patient was also placed in a surgical mask for the prevention of possible spread of respiratory viral illnesses. The Patient was instructed to read the package inserts with any medication that was prescribed. Major potential reactions and medication interactions were discussed. The Patient understands that there are numerous possible adverse reactions not covered. The patient was also instructed to arrange follow-up with his or her primary care provider for review of any pending labwork or incidental findings on any radiology results that were obtained. All efforts were made to discuss any incidental findings that require further monitoring. Controlled Substances Monitoring:     No flowsheet data found.     (Please note that portions of this note were completed with a voice recognition program.  Efforts were made to edit the dictations but occasionally words are mis-transcribed.)    Alma Denis DO (electronically signed)  Attending Emergency Physician            Alma Denis DO  11/24/20 0013

## 2020-11-25 LAB
EKG ATRIAL RATE: 86 BPM
EKG DIAGNOSIS: NORMAL
EKG P AXIS: 66 DEGREES
EKG P-R INTERVAL: 146 MS
EKG Q-T INTERVAL: 416 MS
EKG QRS DURATION: 76 MS
EKG QTC CALCULATION (BAZETT): 497 MS
EKG R AXIS: 28 DEGREES
EKG T AXIS: 44 DEGREES
EKG VENTRICULAR RATE: 86 BPM

## 2020-12-02 ENCOUNTER — TELEPHONE (OUTPATIENT)
Dept: CARDIOLOGY CLINIC | Age: 60
End: 2020-12-02

## 2020-12-02 NOTE — TELEPHONE ENCOUNTER
What Is Lumbar Epidural Injection?  A lumbar epidural injection, which contains a numbing medicine and a steroid, won’t stop all low back and leg pain. But it can reduce pain and break the pain cycle. This cycle may begin when back pain makes it hard to move. Lack of movement can then slow down the healing process. By getting you back on your feet, the injection can help speed your recovery. Some people may feel more relief from an injection than others. And some people may need more than one injection to get relief. Usually, no more than 3 injections are done in a 12 month period. If the first injection did not help, it is less likely that another one will help.  A tool for diagnosis  An injection can help locate the source of pain. Also called a selective nerve block or a selective epidural, it numbs the roots of specific nerves. The effect lasts only briefly, but if you feel relief, it may indicate the source of the pain. If you feel no relief, it may mean that the pain’s source is at another level in your spine. Or it may mean that something other than inflammation is causing the pain. Injection results also may be used to help plan back surgery, if needed.  Possible risks and complications  Here are some risks of lumbar epidural:  · Spinal headache  · Bleeding (rare)  · Infection (rare)   © 4548-0897 [employer]. [address] [place] [address], [place] [address]. All rights reserved. This information is not intended as a substitute for professional medical care. Always follow your healthcare professional's instructions.         I tried to call the patient to have them come in sooner and left a voicemail

## 2020-12-08 ENCOUNTER — APPOINTMENT (OUTPATIENT)
Dept: GENERAL RADIOLOGY | Age: 60
End: 2020-12-08
Payer: MEDICARE

## 2020-12-08 ENCOUNTER — APPOINTMENT (OUTPATIENT)
Dept: CT IMAGING | Age: 60
End: 2020-12-08
Payer: MEDICARE

## 2020-12-08 ENCOUNTER — HOSPITAL ENCOUNTER (EMERGENCY)
Age: 60
Discharge: HOME | End: 2020-12-08
Attending: EMERGENCY MEDICINE
Payer: MEDICARE

## 2020-12-08 VITALS
SYSTOLIC BLOOD PRESSURE: 158 MMHG | OXYGEN SATURATION: 96 % | RESPIRATION RATE: 18 BRPM | DIASTOLIC BLOOD PRESSURE: 79 MMHG | HEART RATE: 94 BPM | WEIGHT: 190 LBS | HEIGHT: 58 IN | BODY MASS INDEX: 39.88 KG/M2 | TEMPERATURE: 97.8 F

## 2020-12-08 LAB
ALBUMIN SERPL-MCNC: 3.4 GM/DL (ref 3.4–5)
ALP BLD-CCNC: 103 IU/L (ref 40–129)
ALT SERPL-CCNC: 7 U/L (ref 10–40)
ANION GAP SERPL CALCULATED.3IONS-SCNC: 16 MMOL/L (ref 4–16)
AST SERPL-CCNC: 25 IU/L (ref 15–37)
BACTERIA: NEGATIVE /HPF
BASOPHILS ABSOLUTE: 0.1 K/CU MM
BASOPHILS RELATIVE PERCENT: 0.7 % (ref 0–1)
BILIRUB SERPL-MCNC: 1.8 MG/DL (ref 0–1)
BILIRUBIN URINE: NEGATIVE MG/DL
BLOOD, URINE: NEGATIVE
BUN BLDV-MCNC: 9 MG/DL (ref 6–23)
CALCIUM SERPL-MCNC: 9.1 MG/DL (ref 8.3–10.6)
CHLORIDE BLD-SCNC: 96 MMOL/L (ref 99–110)
CLARITY: CLEAR
CO2: 24 MMOL/L (ref 21–32)
COLOR: YELLOW
CREAT SERPL-MCNC: 1 MG/DL (ref 0.6–1.1)
DIFFERENTIAL TYPE: ABNORMAL
EOSINOPHILS ABSOLUTE: 0 K/CU MM
EOSINOPHILS RELATIVE PERCENT: 0.1 % (ref 0–3)
GFR AFRICAN AMERICAN: >60 ML/MIN/1.73M2
GFR NON-AFRICAN AMERICAN: 57 ML/MIN/1.73M2
GLUCOSE BLD-MCNC: 344 MG/DL (ref 70–99)
GLUCOSE, URINE: >500 MG/DL
HCT VFR BLD CALC: 45.7 % (ref 37–47)
HEMOGLOBIN: 15 GM/DL (ref 12.5–16)
IMMATURE NEUTROPHIL %: 0.3 % (ref 0–0.43)
KETONES, URINE: ABNORMAL MG/DL
LACTATE: 2.7 MMOL/L (ref 0.4–2)
LACTATE: 4.1 MMOL/L (ref 0.4–2)
LEUKOCYTE ESTERASE, URINE: NEGATIVE
LIPASE: 66 IU/L (ref 13–60)
LYMPHOCYTES ABSOLUTE: 0.8 K/CU MM
LYMPHOCYTES RELATIVE PERCENT: 7.9 % (ref 24–44)
MAGNESIUM: 1.8 MG/DL (ref 1.8–2.4)
MCH RBC QN AUTO: 30.3 PG (ref 27–31)
MCHC RBC AUTO-ENTMCNC: 32.8 % (ref 32–36)
MCV RBC AUTO: 92.3 FL (ref 78–100)
MONOCYTES ABSOLUTE: 0.3 K/CU MM
MONOCYTES RELATIVE PERCENT: 3.5 % (ref 0–4)
NITRITE URINE, QUANTITATIVE: NEGATIVE
NUCLEATED RBC %: 0 %
PDW BLD-RTO: 13.5 % (ref 11.7–14.9)
PH, URINE: 8 (ref 5–8)
PLATELET # BLD: 98 K/CU MM (ref 140–440)
PMV BLD AUTO: 12.5 FL (ref 7.5–11.1)
POTASSIUM SERPL-SCNC: 3.5 MMOL/L (ref 3.5–5.1)
PROTEIN UA: 30 MG/DL
RBC # BLD: 4.95 M/CU MM (ref 4.2–5.4)
RBC URINE: 1 /HPF (ref 0–6)
SEGMENTED NEUTROPHILS ABSOLUTE COUNT: 8.6 K/CU MM
SEGMENTED NEUTROPHILS RELATIVE PERCENT: 87.5 % (ref 36–66)
SODIUM BLD-SCNC: 136 MMOL/L (ref 135–145)
SPECIFIC GRAVITY UA: 1.02 (ref 1–1.03)
SQUAMOUS EPITHELIAL: 2 /HPF
TOTAL IMMATURE NEUTOROPHIL: 0.03 K/CU MM
TOTAL NUCLEATED RBC: 0 K/CU MM
TOTAL PROTEIN: 7.9 GM/DL (ref 6.4–8.2)
TRICHOMONAS: ABNORMAL /HPF
TROPONIN T: <0.01 NG/ML
UROBILINOGEN, URINE: NORMAL MG/DL (ref 0.2–1)
WBC # BLD: 9.8 K/CU MM (ref 4–10.5)
WBC UA: <1 /HPF (ref 0–5)

## 2020-12-08 PROCEDURE — 83605 ASSAY OF LACTIC ACID: CPT

## 2020-12-08 PROCEDURE — 81001 URINALYSIS AUTO W/SCOPE: CPT

## 2020-12-08 PROCEDURE — 80053 COMPREHEN METABOLIC PANEL: CPT

## 2020-12-08 PROCEDURE — 96376 TX/PRO/DX INJ SAME DRUG ADON: CPT

## 2020-12-08 PROCEDURE — 85025 COMPLETE CBC W/AUTO DIFF WBC: CPT

## 2020-12-08 PROCEDURE — 2580000003 HC RX 258: Performed by: EMERGENCY MEDICINE

## 2020-12-08 PROCEDURE — 6360000002 HC RX W HCPCS: Performed by: EMERGENCY MEDICINE

## 2020-12-08 PROCEDURE — 74176 CT ABD & PELVIS W/O CONTRAST: CPT

## 2020-12-08 PROCEDURE — 87040 BLOOD CULTURE FOR BACTERIA: CPT

## 2020-12-08 PROCEDURE — 99285 EMERGENCY DEPT VISIT HI MDM: CPT

## 2020-12-08 PROCEDURE — 93005 ELECTROCARDIOGRAM TRACING: CPT | Performed by: PHYSICIAN ASSISTANT

## 2020-12-08 PROCEDURE — 83735 ASSAY OF MAGNESIUM: CPT

## 2020-12-08 PROCEDURE — 93010 ELECTROCARDIOGRAM REPORT: CPT | Performed by: INTERNAL MEDICINE

## 2020-12-08 PROCEDURE — 36415 COLL VENOUS BLD VENIPUNCTURE: CPT

## 2020-12-08 PROCEDURE — 94761 N-INVAS EAR/PLS OXIMETRY MLT: CPT

## 2020-12-08 PROCEDURE — 83690 ASSAY OF LIPASE: CPT

## 2020-12-08 PROCEDURE — 70450 CT HEAD/BRAIN W/O DYE: CPT

## 2020-12-08 PROCEDURE — 96374 THER/PROPH/DIAG INJ IV PUSH: CPT

## 2020-12-08 PROCEDURE — 96375 TX/PRO/DX INJ NEW DRUG ADDON: CPT

## 2020-12-08 PROCEDURE — 96361 HYDRATE IV INFUSION ADD-ON: CPT

## 2020-12-08 PROCEDURE — 6370000000 HC RX 637 (ALT 250 FOR IP): Performed by: EMERGENCY MEDICINE

## 2020-12-08 PROCEDURE — 84484 ASSAY OF TROPONIN QUANT: CPT

## 2020-12-08 PROCEDURE — 71045 X-RAY EXAM CHEST 1 VIEW: CPT

## 2020-12-08 RX ORDER — SODIUM CHLORIDE 9 MG/ML
1000 INJECTION, SOLUTION INTRAVENOUS CONTINUOUS
Status: DISCONTINUED | OUTPATIENT
Start: 2020-12-08 | End: 2020-12-08 | Stop reason: HOSPADM

## 2020-12-08 RX ORDER — PROMETHAZINE HYDROCHLORIDE 25 MG/1
25 TABLET ORAL EVERY 6 HOURS PRN
Qty: 28 TABLET | Refills: 0 | Status: SHIPPED | OUTPATIENT
Start: 2020-12-08 | End: 2021-10-11 | Stop reason: SDUPTHER

## 2020-12-08 RX ORDER — FENTANYL CITRATE 50 UG/ML
50 INJECTION, SOLUTION INTRAMUSCULAR; INTRAVENOUS ONCE
Status: COMPLETED | OUTPATIENT
Start: 2020-12-08 | End: 2020-12-08

## 2020-12-08 RX ORDER — ONDANSETRON 2 MG/ML
4 INJECTION INTRAMUSCULAR; INTRAVENOUS EVERY 6 HOURS PRN
Status: DISCONTINUED | OUTPATIENT
Start: 2020-12-08 | End: 2020-12-08 | Stop reason: HOSPADM

## 2020-12-08 RX ORDER — PROMETHAZINE HYDROCHLORIDE 25 MG/1
25 TABLET ORAL ONCE
Status: COMPLETED | OUTPATIENT
Start: 2020-12-08 | End: 2020-12-08

## 2020-12-08 RX ADMIN — SODIUM CHLORIDE 1000 ML: 9 INJECTION, SOLUTION INTRAVENOUS at 07:46

## 2020-12-08 RX ADMIN — FENTANYL CITRATE 50 MCG: 50 INJECTION, SOLUTION INTRAMUSCULAR; INTRAVENOUS at 11:10

## 2020-12-08 RX ADMIN — ONDANSETRON 4 MG: 2 INJECTION INTRAMUSCULAR; INTRAVENOUS at 07:53

## 2020-12-08 RX ADMIN — PROMETHAZINE HYDROCHLORIDE 25 MG: 25 TABLET ORAL at 11:09

## 2020-12-08 RX ADMIN — FENTANYL CITRATE 50 MCG: 50 INJECTION, SOLUTION INTRAMUSCULAR; INTRAVENOUS at 07:53

## 2020-12-08 ASSESSMENT — PAIN SCALES - GENERAL
PAINLEVEL_OUTOF10: 8
PAINLEVEL_OUTOF10: 8
PAINLEVEL_OUTOF10: 10
PAINLEVEL_OUTOF10: 3

## 2020-12-08 ASSESSMENT — ENCOUNTER SYMPTOMS
EYE PAIN: 0
ABDOMINAL PAIN: 1
NAUSEA: 1
RHINORRHEA: 0
SORE THROAT: 0
EYE DISCHARGE: 0
BACK PAIN: 0
SHORTNESS OF BREATH: 0
COUGH: 0
DIARRHEA: 0
VOMITING: 1

## 2020-12-08 ASSESSMENT — PAIN DESCRIPTION - PAIN TYPE: TYPE: ACUTE PAIN

## 2020-12-08 ASSESSMENT — PAIN DESCRIPTION - LOCATION: LOCATION: ABDOMEN

## 2020-12-08 NOTE — ED PROVIDER NOTES
[address] ENCOUNTER      Pt Name: Arianna Hernandez  MRN: [id number]  [place] 1960  Date of evaluation: 12/8/2020  Provider: Dariana Larkin MD    CHIEF COMPLAINT       Chief Complaint   Patient presents with    Emesis    Hypertension     medics report 190/120         HISTORY OF PRESENT ILLNESS      Arianna Hernandez is a 64 y.o. female who presents to the emergency department  for   Chief Complaint   Patient presents with    Emesis    Hypertension     medics report 190/120       73-year-old female presents for evaluation of upper abdominal pain as well as nausea and vomiting. She is had several days of symptoms. She does have a history of cirrhosis. She reports that she had similar symptoms previously when she had pneumonia. Denies any respiratory symptoms. No significant cough or shortness of breath. Denies any fever or chills. No injury to her abdomen. She is having bowel movements. Denies any diarrhea. No dysuria. She is not tried any measures at home for her nausea. Emergency department, she did have episodes of vomiting. Emesis was nonbloody nonbilious. She denies any chest pain. She is alert and oriented in the emergency department. GCS of 15. She is moving all extremities spontaneously. She does not identify any exacerbating or alleviating factors. Nursing Notes, Triage Notes & Vital Signs were reviewed. REVIEW OF SYSTEMS    (2-9 systems for level 4, 10 or more for level 5)     Review of Systems   Constitutional: Negative for chills and fever. HENT: Negative for congestion, rhinorrhea and sore throat. Eyes: Negative for pain and discharge. Respiratory: Negative for cough and shortness of breath. Cardiovascular: Negative for chest pain and palpitations. Gastrointestinal: Positive for abdominal pain, nausea and vomiting. Negative for diarrhea.    Endocrine: Negative for polydipsia and polyuria. Genitourinary: Negative for dysuria and flank pain. Musculoskeletal: Negative for back pain and neck pain. Skin: Negative for pallor and wound. Neurological: Negative for dizziness, facial asymmetry, light-headedness, numbness and headaches. Psychiatric/Behavioral: Negative for confusion. Except as noted above the remainder of the review of systems was reviewed and negative. PAST MEDICAL HISTORY     Past Medical History:   Diagnosis Date    Acid reflux     Arthritis     left knee    CAD (coronary artery disease)     per [facility] 9/6/[facility] - Dr. Sandor Zapata COPD (chronic obstructive pulmonary disease) ([facility]     follow with Dr Sergey Cobb Depression     \"have manic - depression see Dr Reggie Weldon Diabetes mellitus [facility])     dx 10+ yrs ago- follows with PCP    Drug abuse ([facility]     hx use of cocaine, heroin and marijuana- states last used 12/2014    Glaucoma     bilateral    H/O Doppler lower venous ultrasound 04/04/2019    No DVT or SVT, Significant reflux of RGSV and LGSV.  Hepatic encephalopathy ([facility] 05/2019    Hepatitis C     for liver bx 12/3/2015\"Have Hepatitis B and C and saw Dr Mariluz Ryan for this 12/1/2015\"    Hiatal hernia     History of alcohol abuse     History of exercise stress test 01/02/2020    Treadmill, Normal exercise performance without angina and ischemic EKG changes.     HTN (hypertension)     \"for the past two yrs on medication\" follows with Dr Sandor Zapata Hx of blood clots 2019    Portal vein thrombsis - TIPS procedure 4/23/19    Hyperlipemia     Irregular heart beat     per pt    Liver hematoma     Migraines     Last migraine:  2018    Nausea & vomiting     Schizophrenia ([facility]     per old chart    Seizures ([facility]     \"last one was 9/2015- [facility] Dr Jennifer Marcos at [facility]- she said not sure if acutal seizures- she thinks they are panic or anxiety attacks\"    SOB (shortness of breath)     with any exertion       Prior to Admission medications Medication Sig Start Date End Date Taking?  Authorizing Provider   promethazine (PHENERGAN) 25 MG tablet Take 1 tablet by mouth every 6 hours as needed for Nausea 12/8/20 12/15/20 Yes Paras Cobb MD   ipratropium-albuterol (DUONEB) 0.5-2.5 (3) MG/3ML SOLN nebulizer solution Inhale 3 mLs into the lungs every 4 hours 11/23/20   Ventura Sarkar DO   furosemide (LASIX) 20 MG tablet Take 1 tablet by mouth daily Hold if systolic <963 8/23/16   Kishan Florence, APRN - CNP   predniSONE (DELTASONE) 10 MG tablet Take 1 tablet by mouth daily 40mg daily for 2 days, 20 mg daily for 2 days, 10 mg daily for 2 days, 5 mg daily for 2 days 9/22/20   Lenny Mueller MD   metoprolol succinate (TOPROL XL) 50 MG extended release tablet Take 50 mg by mouth daily    Historical Provider, MD   lactulose (CHRONULAC) 10 GM/15ML solution Take 30 mLs by mouth 3 times daily 9/15/20   Nerissa Blue MD   QUEtiapine (SEROQUEL) 50 MG tablet Take 3 tablets by mouth nightly 9/15/20   Nerissa Blue MD   rifaximin (XIFAXAN) 550 MG tablet Take 1 tablet by mouth 2 times daily 9/15/20   Nerissa Blue MD   FLUoxetine (PROZAC) 20 MG capsule Take 20 mg by mouth daily 9/11/20   Historical Provider, MD   neomycin (MYCIFRADIN) 500 MG tablet  9/11/20   Historical Provider, MD   traMADol Birder Mo) 50 MG tablet  5/26/20   Historical Provider, MD   paliperidone (INVEGA) 3 MG extended release tablet  8/13/20   Historical Provider, MD   insulin glargine (LANTUS SOLOSTAR) 100 UNIT/ML injection pen Inject 15 Units into the skin nightly 8/6/20   Jaylen Peralta MD   midodrine (PROAMATINE) 10 MG tablet Take 1 tablet by mouth 3 times daily (with meals) 5/29/20   Javier Wilson MD   nitroGLYCERIN (NITROSTAT) 0.4 MG SL tablet Place 1 tablet under the tongue every 5 minutes as needed for Chest pain 5/21/20   ERLINDA Crook - CNP   ranolazine (RANEXA) 500 MG extended release tablet Take 1 tablet by mouth 2 times daily 5/21/20   ERLINDA Crook - CNP   Compression Stockings MISC by Does not apply route 20 - 30 mmh wear daily and take off at night  Thigh High 5/21/20   ERLINDA Hoffman - CNP   insulin aspart (NOVOLOG FLEXPEN) 100 UNIT/ML injection pen **Low Dose Correction Algorithm**Insulin lispro (HUMALOG)  3 TIMES DAILY WITH MEALSGlucose: Dose: No [id number] 1 [id number] 2 [id number] 3 [id number] 4 [id number] 5 Lgkht360 and above 6 Units 4/28/20   Amarilis Alejandro MD   blood glucose monitor strips Test  Bid times a day & as needed for symptoms of irregular blood glucose. 4/14/20   Amarilis Alejandro MD   UNABLE TO FIND Rx for depends   Use 3-4  times daily 4/10/20   Amarilis Alejandro MD   albuterol (PROVENTIL) (2.5 MG/3ML) 0.083% nebulizer solution Take 3 mLs by nebulization every 6 hours 4/6/20   Meghana Rivera MD   Glucosource Lancets MISC Use one 3-4 times to check blood sugar 1/30/20   Amarilis Alejandro MD   glucose monitoring kit (FREESTYLE) monitoring kit 1 kit by Does not apply route daily 1/30/20   Amarilis Alejandro MD   Blood Glucose Monitoring Suppl (ACURA BLOOD GLUCOSE METER) w/Device KIT Please check blood sugar 3 times daily. Please fill with what is covered by the insurance 1/21/20   Amarilis Alejandro MD   blood glucose monitor strips Test 3 times a day & as needed for symptoms of irregular blood glucose. Please fill with what is covered my patients insurance. 1/21/20   Amarilis Alejandro MD   SOFT TOUCH LANCETS MISC Please check blood sugar 3 times daily.  Please fill with what is covered by insurance 1/21/20   Amarilis Alejandro MD   pantoprazole (PROTONIX) 40 MG tablet Take 1 tablet by mouth 2 times daily (before meals) 10/27/19   Manuela Anthony MD   VIREAD 300 MG tablet Take 300 mg by mouth daily  11/20/17   Historical Provider, MD        Patient Active Problem List   Diagnosis    Left arm pain    Type 2 diabetes mellitus with complication, with long-term current use of insulin ([address]    COPD, mild ([address]    Tobacco abuse    Angina effort    Abnormal nuclear cardiac imaging test    Lung nodule    Chest pain    Dyslipidemia    Pancolitis (HCC)    Hematemesis without nausea    Alcoholic cirrhosis of liver without ascites (HCC)    Thrombocytopenia (HCC)    Periumbilical abdominal pain    History of colonic polyps    Abnormal findings on diagnostic imaging of abdomen    Nausea    Gastroesophageal reflux disease without esophagitis    Mixed hyperlipidemia    Vitamin D deficiency    Left carpal tunnel syndrome    Right carpal tunnel syndrome    Esophageal hypertension    Candida esophagitis (HCC)    Colitis    Essential hypertension    GI bleed    Coronary-myocardial bridge    Type 2 diabetes mellitus with complication, with long-term current use of insulin (HCC)    SOB (shortness of breath)    Portal vein thrombosis    Intractable nausea and vomiting    Abdominal pain    Acute GI bleeding    Chronic hepatitis C without hepatic coma (HCC)    Delayed gastric emptying    Restless legs    Acute colitis    Acute encephalopathy    S/P TIPS (transjugular intrahepatic portosystemic shunt)    Other cirrhosis of liver (HCC)    Acute upper GI bleed    Acute hepatic encephalopathy    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    thrombocytopenia    Hepatic encephalopathy ([address]    Morbidly obese (HCC)    Non-intractable vomiting with nausea    Hyperammonemia ([address]         SURGICAL HISTORY       Past Surgical History:   Procedure Laterality Date    BREAST SURGERY  10/2015    left breast bx    CARDIAC CATHETERIZATION      per old chart pt had cath done in 3/2011 and 9/2013    CARPAL TUNNEL RELEASE Left 1/9/2020    CARPAL TUNNEL RELEASE LEFT performed by Ludwin Agee DO at  [address] Right 05/26/2020    dr Sloane Barillas, carpal tunnel trigger finger release    CARPAL TUNNEL RELEASE Right 5/26/2020    RIGHT CARPAL TUNNEL RELEASE performed by Ludwin Agee DO at [place] [address] CHOLECYSTECTOMY  per old chart done [facility]  3/11/13    diverticulosis, cecal polyp    COLONOSCOPY  03/16/2017    Internal hemorrhoids- Dr. Jeremiah Hager, COLON, DIAGNOSTIC  03/16/2017    EGD: Small esophageal varices, portal hypertensive gastropathy, reflux esophagitis, hiatal hernia    EYE SURGERY Bilateral ? when    cyst removal, cataracts w/lens replacement    FINGER TRIGGER RELEASE Right 5/26/2020    FINGER TRIGGER RELEASE RIGHT RING FINGER performed by Camden Vickers DO at [facility]      per old chart pt had CHOLO/BSO 1986    TIPS PROCEDURE  04/23/2019    TONSILLECTOMY  as a kid    UPPER GASTROINTESTINAL ENDOSCOPY N/A 11/14/2018    EGD DIAGNOSTIC ONLY performed by Alison Alberto MD at [facility] 6/5/2019    EGD DIAGNOSTIC ONLY performed by Alison Alberto MD at [facility]       Previous Medications    ALBUTEROL (PROVENTIL) (2.5 MG/3ML) 0.083% NEBULIZER SOLUTION    Take 3 mLs by nebulization every 6 hours    BLOOD GLUCOSE MONITOR STRIPS    Test 3 times a day & as needed for symptoms of irregular blood glucose. Please fill with what is covered my patients insurance. BLOOD GLUCOSE MONITOR STRIPS    Test  Bid times a day & as needed for symptoms of irregular blood glucose. BLOOD GLUCOSE MONITORING SUPPL (ACURA BLOOD GLUCOSE METER) W/DEVICE KIT    Please check blood sugar 3 times daily.  Please fill with what is covered by the insurance    COMPRESSION STOCKINGS MISC    by Does not apply route 20 - 30 mmh wear daily and take off at night  Thigh High    FLUOXETINE (PROZAC) 20 MG CAPSULE    Take 20 mg by mouth daily    FUROSEMIDE (LASIX) 20 MG TABLET    Take 1 tablet by mouth daily Hold if systolic <270    GLUCOSE MONITORING KIT (FREESTYLE) MONITORING KIT    1 kit by Does not apply route daily    GLUCOSOURCE LANCETS MISC    Use one 3-4 times to check blood sugar    INSULIN ASPART (Brittany Kanika FLEXPEN) 100 UNIT/ML INJECTION PEN    **Low Dose Correction Algorithm**Insulin lispro (HUMALOG)  3 TIMES DAILY WITH MEALSGlucose: Dose: No [id number] 1 [id number] 2 [id number] 3 [id number] 4 Ekdxm963-704 5 Eodmv988 and above 6 Units    INSULIN GLARGINE (LANTUS SOLOSTAR) 100 UNIT/ML INJECTION PEN    Inject 15 Units into the skin nightly    IPRATROPIUM-ALBUTEROL (DUONEB) 0.5-2.5 (3) MG/3ML SOLN NEBULIZER SOLUTION    Inhale 3 mLs into the lungs every 4 hours    LACTULOSE (CHRONULAC) 10 GM/15ML SOLUTION    Take 30 mLs by mouth 3 times daily    METOPROLOL SUCCINATE (TOPROL XL) 50 MG EXTENDED RELEASE TABLET    Take 50 mg by mouth daily    MIDODRINE (PROAMATINE) 10 MG TABLET    Take 1 tablet by mouth 3 times daily (with meals)    NEOMYCIN (MYCIFRADIN) 500 MG TABLET        NITROGLYCERIN (NITROSTAT) 0.4 MG SL TABLET    Place 1 tablet under the tongue every 5 minutes as needed for Chest pain    PALIPERIDONE (INVEGA) 3 MG EXTENDED RELEASE TABLET        PANTOPRAZOLE (PROTONIX) 40 MG TABLET    Take 1 tablet by mouth 2 times daily (before meals)    PREDNISONE (DELTASONE) 10 MG TABLET    Take 1 tablet by mouth daily 40mg daily for 2 days, 20 mg daily for 2 days, 10 mg daily for 2 days, 5 mg daily for 2 days    QUETIAPINE (SEROQUEL) 50 MG TABLET    Take 3 tablets by mouth nightly    RANOLAZINE (RANEXA) 500 MG EXTENDED RELEASE TABLET    Take 1 tablet by mouth 2 times daily    RIFAXIMIN (XIFAXAN) 550 MG TABLET    Take 1 tablet by mouth 2 times daily    SOFT TOUCH LANCETS MISC    Please check blood sugar 3 times daily.  Please fill with what is covered by insurance    TRAMADOL (ULTRAM) 50 MG TABLET        UNABLE TO FIND    Rx for depends   Use 3-4  times daily    VIREAD 300 MG TABLET    Take 300 mg by mouth daily        ALLERGIES     Norco [hydrocodone-acetaminophen] and Tylenol [acetaminophen]    FAMILY HISTORY       Family History   Problem Relation Age of Onset    Cancer Mother         lung ca    Arthritis Mother  Migraines Mother     Cancer Father         colon ca    Diabetes Father     High Blood Pressure Father     Arthritis Father     High Cholesterol Father     Migraines Father     Migraines Sister     Heart Disease Brother         WPW          SOCIAL HISTORY       Social History     Socioeconomic History    Marital status:      Spouse name: None    Number of children: None    Years of education: None    Highest education level: None   Occupational History    None   Social Needs    Financial resource strain: None    Food insecurity     Worry: None     Inability: None    Transportation needs     Medical: None     Non-medical: None   Tobacco Use    Smoking status: Current Some Day Smoker     Packs/day: 0.50     Years: 45.00     Pack years: 22.50     Types: Cigarettes     Start date: 1974    Smokeless tobacco: Never Used   Substance and Sexual Activity    Alcohol use: Not Currently     Alcohol/week: 2.0 - 3.0 standard drinks     Types: 2 - 3 Shots of liquor per week     Comment: 2-3 shots last 4/2019    Drug use: Yes     Frequency: 3.0 times per week     Types: Marijuana     Comment: daily    Sexual activity: Not Currently     Partners: Male   Lifestyle    Physical activity     Days per week: None     Minutes per session: None    Stress: None   Relationships    Social connections     Talks on phone: None     Gets together: None     Attends [employer] service: None     Active member of club or organization: None     Attends meetings of clubs or organizations: None     Relationship status: None    Intimate partner violence     Fear of current or ex partner: None     Emotionally abused: None     Physically abused: None     Forced sexual activity: None   Other Topics Concern    None   Social History Narrative    None       SCREENINGS    Beatriz Coma Scale  Eye Opening: Spontaneous  Best Verbal Response: Oriented  Best Motor Response: Obeys commands  Beatriz Coma Scale Score: 15 PHYSICAL EXAM    (up to 7 for level 4, 8 or more for level 5)     ED Triage Vitals [12/08/20 0532]   BP Temp Temp src Pulse Resp SpO2 Height Weight   (!) 189/112 97.8 °F (36.6 °C) -- 129 22 94 % -- --       Physical Exam  Vitals signs reviewed. Constitutional:       Appearance: She is not ill-appearing or toxic-appearing. HENT:      Head: Normocephalic and atraumatic. Nose: No congestion or rhinorrhea. Mouth/Throat:      Mouth: Mucous membranes are dry. Pharynx: No posterior oropharyngeal erythema. Eyes:      Extraocular Movements: Extraocular movements intact. Pupils: Pupils are equal, round, and reactive to light. Neck:      Musculoskeletal: Normal range of motion and neck supple. No muscular tenderness. Cardiovascular:      Rate and Rhythm: Tachycardia present. Heart sounds: No friction rub. No gallop. Pulmonary:      Effort: Pulmonary effort is normal.      Breath sounds: No stridor. Comments: Respirations unlabored  No retractions, no increased work of breathing  Chest:      Chest wall: No tenderness. Abdominal:      Palpations: Abdomen is soft. Tenderness: There is abdominal tenderness. There is no guarding. Comments: Abdomen overall soft, non-peritoneal  Some upper abdominal tenderness to palpatio   Musculoskeletal: Normal range of motion. General: No tenderness or deformity. Comments: Extremities atraumatic   Skin:     General: Skin is warm. Capillary Refill: Capillary refill takes less than 2 seconds. Findings: No rash. Neurological:      General: No focal deficit present. Mental Status: She is alert and oriented to person, place, and time.       Comments: AOx3; GCS 15         DIAGNOSTIC RESULTS     Labs Reviewed   CBC WITH AUTO DIFFERENTIAL - Abnormal; Notable for the following components:       Result Value    Platelets 98 (*)     MPV 12.5 (*)     Segs Relative 87.5 (*)     Lymphocytes % 7.9 (*)     All other components within normal limits   COMPREHENSIVE METABOLIC PANEL W/ REFLEX TO MG FOR LOW K - Abnormal; Notable for the following components:    Chloride 96 (*)     Glucose 344 (*)     Total Bilirubin 1.8 (*)     ALT 7 (*)     GFR Non- 57 (*)     All other components within normal limits   LIPASE - Abnormal; Notable for the following components:    Lipase 66 (*)     All other components within normal limits   URINALYSIS - Abnormal; Notable for the following components:    Glucose, Urine >500 (*)     Ketones, Urine MODERATE (*)     Protein, UA 30 (*)     All other components within normal limits   LACTIC ACID, PLASMA - Abnormal; Notable for the following components:    Lactate 4.1 (*)     All other components within normal limits   LACTIC ACID, PLASMA - Abnormal; Notable for the following components:    Lactate 2.7 (*)     All other components within normal limits   CULTURE, BLOOD 1   CULTURE, BLOOD 2   TROPONIN   MAGNESIUM          EKG: All EKG's are interpreted by the Emergency Department Physician who either signs or Co-signs this chart in the absence of a cardiologist.       EKG Interpretation    Interpreted by emergency department physician    EKG is interpreted by me. EKG shows a rhythm at 120 bpm, normal axis, no remarkable ST segment elevations, there do appear to be some mildly depressed ST segments in precordial leads, no reciprocal changes, T waves are unremarkable, CA interval 136, QRS duration of 72, QTc of 4/2/1997. Final impression, sinus tachycardia    Doug Chamberlain     RADIOLOGY:     Non-plain film images such as CT, Ultrasound and MRI are read by the radiologist. Plain radiographic images are visualized and preliminarily interpreted by the emergency physician.        Interpretation per the Radiologist below, if available at the time of this note:    CT ABDOMEN PELVIS WO CONTRAST Additional Contrast? None   Final Result   No acute findings in the abdomen or pelvis         XR CHEST 1 VIEW   Final Result   Left basilar atelectasis versus airspace disease. CT Head WO Contrast   Final Result   No acute intracranial abnormality. ED BEDSIDE ULTRASOUND:   Performed by ED Physician Donis Butler MD       LABS:  Labs Reviewed   CBC WITH AUTO DIFFERENTIAL - Abnormal; Notable for the following components:       Result Value    Platelets 98 (*)     MPV 12.5 (*)     Segs Relative 87.5 (*)     Lymphocytes % 7.9 (*)     All other components within normal limits   COMPREHENSIVE METABOLIC PANEL W/ REFLEX TO MG FOR LOW K - Abnormal; Notable for the following components:    Chloride 96 (*)     Glucose 344 (*)     Total Bilirubin 1.8 (*)     ALT 7 (*)     GFR Non- 57 (*)     All other components within normal limits   LIPASE - Abnormal; Notable for the following components:    Lipase 66 (*)     All other components within normal limits   URINALYSIS - Abnormal; Notable for the following components:    Glucose, Urine >500 (*)     Ketones, Urine MODERATE (*)     Protein, UA 30 (*)     All other components within normal limits   LACTIC ACID, PLASMA - Abnormal; Notable for the following components:    Lactate 4.1 (*)     All other components within normal limits   LACTIC ACID, PLASMA - Abnormal; Notable for the following components:    Lactate 2.7 (*)     All other components within normal limits   CULTURE, BLOOD 1   CULTURE, BLOOD 2   TROPONIN   MAGNESIUM       All other labs were within normal range or not returned as of this dictation.     EMERGENCY DEPARTMENT COURSE and DIFFERENTIAL DIAGNOSIS/MDM:   Vitals:    Vitals:    12/08/20 0532 12/08/20 0700 12/08/20 0753 12/08/20 1111   BP: (!) 189/112  (!) 181/110 (!) 171/83   Pulse: 129  114 122   Resp: 22  20 20   Temp: 97.8 °F (36.6 °C)      SpO2: 94% 92% 93% 96%   Weight:    190 lb (86.2 kg)   Height:    4' 10\" (1.473 m)           MDM  Number of Diagnoses or Management Options  Nausea and vomiting, intractability of vomiting not specified, unspecified vomiting type:   Diagnosis management comments: 73-year-old female with a history of cirrhosis presents complaining of nausea and vomiting. She has had several days of symptoms. She reports poor oral intake due to the nausea and vomiting. She does endorse some mild upper abdominal pain. Denies any shortness of breath. No fevers or chills. Denies any typical exposures or recent travel. In the emergency department, blood pressure was elevated. Heart rate was elevated. Vitals otherwise unremarkable. On exam she is some mild epigastric abdominal tenderness palpation. Labs and imaging are obtained. Imaging is overall nonacute. Abdominal CT scan is nonacute. She has no leukocytosis. Troponin undetectable. Lipase is mildly elevated but it is within its baseline range. LFTs are unremarkable. Urinalysis does show ketones which seems to be consistent with possible dehydration. Her glucose is elevated. CO2 on her BMP is unremarkable. Labs not consistent with DKA. Initial lactate was elevated around 4. After fluid hydration, her lactate is clearing. The repeat value was 2.7. She is treated with fluids, pain medications and antiemetics. On reassessment her symptoms had improved. She did tolerate an oral challenge in the emergency department. We did discuss disposition options. She was offered inpatient mission for further hydration. At this time she prefers outpatient management. Given her symptomatic improvement, this does seem to be reasonable. She will follow-up outpatient. Return precautions for worsening concerning symptoms discussed. She is discharged home in stable condition.        Amount and/or Complexity of Data Reviewed  Clinical lab tests: reviewed  Tests in the radiology section of CPT®: reviewed  Tests in the medicine section of CPT®: reviewed  Decide to obtain previous medical records or to obtain history from someone other than the patient: yes            -  Patient seen and evaluated in the emergency department. -  Triage and nursing notes reviewed and incorporated. -  Old chart records reviewed and incorporated. -  Work-up included:  See above  -  Results discussed with patient. CONSULTS:  None    PROCEDURES:  None performed unless otherwise noted below     Procedures        FINAL IMPRESSION      1. Nausea and vomiting, intractability of vomiting not specified, unspecified vomiting type          DISPOSITION/PLAN   DISPOSITION Discharge - Pending Orders Complete 12/08/2020 12:26:00 PM      PATIENT REFERRED TO:  Radha Lovett MD  [address]    Schedule an appointment as soon as possible for a visit         DISCHARGE MEDICATIONS:  New Prescriptions    PROMETHAZINE (PHENERGAN) 25 MG TABLET    Take 1 tablet by mouth every 6 hours as needed for Nausea       ED Provider Disposition Time  DISPOSITION Discharge - Pending Orders Complete 12/08/2020 12:26:00 PM      Appropriate personal protective equipment was worn during the patient's evaluation. These included surgical, eye protection, surgical mask or in 95 respirator and gloves. The patient was also placed in a surgical mask for the prevention of possible spread of respiratory viral illnesses. The Patient was instructed to read the package inserts with any medication that was prescribed. Major potential reactions and medication interactions were discussed. The Patient understands that there are numerous possible adverse reactions not covered. The patient was also instructed to arrange follow-up with his or her primary care provider for review of any pending labwork or incidental findings on any radiology results that were obtained. All efforts were made to discuss any incidental findings that require further monitoring. Controlled Substances Monitoring:     No flowsheet data found.     (Please note that portions of this note were completed with a voice recognition program.  Efforts were made to edit the dictations but occasionally words are mis-transcribed.)    Juan Jsoe Carrera MD (electronically signed)  Attending Emergency Physician           Juan Jose Carrera MD  12/08/20 1230       Juan Jose Carrera MD  12/08/20 1233

## 2020-12-08 NOTE — ED NOTES
Pt denies wanting to eat at this time. Pt states her belly is on fire.      Tran Claire RN  12/08/20 1345

## 2020-12-08 NOTE — ED NOTES
.Discharge instructions / prescriptions given and reviewed. Signature obtained. Patient instructed to return if symptoms get worse or any new problems or discomforts arise. No further questions at this time.      Tra Gomez RN  12/08/20 [id number]

## 2020-12-13 LAB
CULTURE: NORMAL
CULTURE: NORMAL
Lab: NORMAL
Lab: NORMAL
SPECIMEN: NORMAL
SPECIMEN: NORMAL

## 2020-12-15 ENCOUNTER — OFFICE VISIT (OUTPATIENT)
Dept: CARDIOLOGY CLINIC | Age: 60
End: 2020-12-15
Payer: MEDICARE

## 2020-12-15 VITALS
DIASTOLIC BLOOD PRESSURE: 82 MMHG | HEIGHT: 57 IN | WEIGHT: 190.8 LBS | HEART RATE: 94 BPM | BODY MASS INDEX: 41.17 KG/M2 | SYSTOLIC BLOOD PRESSURE: 128 MMHG

## 2020-12-15 LAB
EKG ATRIAL RATE: 120 BPM
EKG DIAGNOSIS: NORMAL
EKG P AXIS: 67 DEGREES
EKG P-R INTERVAL: 136 MS
EKG Q-T INTERVAL: 352 MS
EKG QRS DURATION: 72 MS
EKG QTC CALCULATION (BAZETT): 497 MS
EKG R AXIS: 33 DEGREES
EKG T AXIS: 44 DEGREES
EKG VENTRICULAR RATE: 120 BPM

## 2020-12-15 PROCEDURE — 3044F HG A1C LEVEL LT 7.0%: CPT | Performed by: INTERNAL MEDICINE

## 2020-12-15 PROCEDURE — 3017F COLORECTAL CA SCREEN DOC REV: CPT | Performed by: INTERNAL MEDICINE

## 2020-12-15 PROCEDURE — G8926 SPIRO NO PERF OR DOC: HCPCS | Performed by: INTERNAL MEDICINE

## 2020-12-15 PROCEDURE — G8427 DOCREV CUR MEDS BY ELIG CLIN: HCPCS | Performed by: INTERNAL MEDICINE

## 2020-12-15 PROCEDURE — 99214 OFFICE O/P EST MOD 30 MIN: CPT | Performed by: INTERNAL MEDICINE

## 2020-12-15 PROCEDURE — 4004F PT TOBACCO SCREEN RCVD TLK: CPT | Performed by: INTERNAL MEDICINE

## 2020-12-15 PROCEDURE — 3023F SPIROM DOC REV: CPT | Performed by: INTERNAL MEDICINE

## 2020-12-15 PROCEDURE — 2022F DILAT RTA XM EVC RTNOPTHY: CPT | Performed by: INTERNAL MEDICINE

## 2020-12-15 PROCEDURE — G8484 FLU IMMUNIZE NO ADMIN: HCPCS | Performed by: INTERNAL MEDICINE

## 2020-12-15 PROCEDURE — G8417 CALC BMI ABV UP PARAM F/U: HCPCS | Performed by: INTERNAL MEDICINE

## 2020-12-15 PROCEDURE — G9899 SCRN MAM PERF RSLTS DOC: HCPCS | Performed by: INTERNAL MEDICINE

## 2020-12-15 RX ORDER — MIDODRINE HYDROCHLORIDE 10 MG/1
10 TABLET ORAL
Qty: 90 TABLET | Refills: 3 | Status: SHIPPED | OUTPATIENT
Start: 2020-12-15 | End: 2022-05-18 | Stop reason: SDUPTHER

## 2020-12-15 RX ORDER — METOPROLOL SUCCINATE 50 MG/1
50 TABLET, EXTENDED RELEASE ORAL DAILY
Qty: 30 TABLET | Refills: 2 | Status: SHIPPED | OUTPATIENT
Start: 2020-12-15 | End: 2022-05-18 | Stop reason: SDUPTHER

## 2020-12-15 NOTE — LETTER
[address] [facility]  [address] [address]  Phone: [phone]  Fax: [phone]    Jeri Decker MD        December 15, 2020     Frankey Canon, MD  [facility]    Patient: Hermelindo Santos  MR Number: [id number]  YOB: 1960  Date of Visit: 12/15/2020    Dear Dr. Frankey Canon: Thank you for the request for consultation for Elliot Uribe to me for the evaluation of HTn. Below are the relevant portions of my assessment and plan of care. If you have questions, please do not hesitate to call me. I look forward to following Yuli Mojica along with you.     Sincerely,        Jeri Decker MD

## 2020-12-15 NOTE — PROGRESS NOTES
Yolanda Alba is a 64 y.o. female who has    CHIEF COMPLAINT AS FOLLOWS:  CHEST PAIN: Patient denies any C/O chest pains at this time. SOB: No C/O SOB at this time. LEG EDEMA: C/O ankle edema   PALPITATIONS: Denies any C/O Palpitations                                   DIZZINESS: No C/O Dizziness                           SYNCOPE: None   OTHER:                                     HPI: Patient is here for F/U on her  HTN & Dyslipidemia problems. She does not have any complaints at this time.     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    thrombocytopenia 04/08/2020    Acute hepatic encephalopathy 01/15/2020    Acute upper GI bleed 10/25/2019    S/P TIPS (transjugular intrahepatic portosystemic shunt) 05/08/2019    Other cirrhosis of liver ([address] 05/08/2019    Acute encephalopathy 05/01/2019    Acute colitis 04/06/2019    Restless legs 03/18/2019    Delayed gastric emptying 12/22/2018    Chronic hepatitis C without hepatic coma ([address] 11/20/2018    Acute GI bleeding 11/12/2018    Abdominal pain 10/27/2018    Portal vein thrombosis 10/25/2018    Intractable nausea and vomiting 10/25/2018    SOB (shortness of breath)     Type 2 diabetes mellitus with complication, with long-term current use of insulin ([address] 06/04/2018    Coronary-myocardial bridge 03/09/2018    GI bleed 12/21/2017    Essential hypertension 12/10/2017    Colitis 12/07/2017    Esophageal hypertension 09/20/2017    Candida esophagitis ([address] 09/20/2017    Left carpal tunnel syndrome 08/30/2017    Right carpal tunnel syndrome 08/30/2017    Vitamin D deficiency 08/22/2017    Mixed hyperlipidemia 14/33/9101    Periumbilical abdominal pain     History of colonic polyps     Abnormal findings on diagnostic imaging of abdomen     Nausea     Gastroesophageal reflux disease without esophagitis     Thrombocytopenia (HCC) 02/24/2017    Pancolitis ([facility] 02/14/2017    Hematemesis without nausea 42/50/9432    Alcoholic cirrhosis [facility] liver without ascites [facility] 02/14/2017    Dyslipidemia 11/23/2016    Chest pain 06/07/2016    Lung nodule 11/18/2013    Angina effort 09/06/2013    Abnormal nuclear cardiac imaging test 09/06/2013    Left arm pain 04/27/2013    Tobacco abuse 04/27/2013    Type 2 diabetes mellitus with complication, with long-term current use of insulin (HCC)     COPD, mild (HCC)      Current Outpatient Medications   Medication Sig Dispense Refill    promethazine (PHENERGAN) 25 MG tablet Take 1 tablet by mouth every 6 hours as needed for Nausea 28 tablet 0    ipratropium-albuterol (DUONEB) 0.5-2.5 (3) MG/3ML SOLN nebulizer solution Inhale 3 mLs into the lungs every 4 hours 360 mL 0    furosemide (LASIX) 20 MG tablet Take 1 tablet by mouth daily Hold if systolic <938 30 tablet 1    predniSONE (DELTASONE) 10 MG tablet Take 1 tablet by mouth daily 40mg daily for 2 days, 20 mg daily for 2 days, 10 mg daily for 2 days, 5 mg daily for 2 days 15 tablet 0    metoprolol succinate (TOPROL XL) 50 MG extended release tablet Take 50 mg by mouth daily      lactulose (CHRONULAC) 10 GM/15ML solution Take 30 mLs by mouth 3 times daily 1892 mL 2    QUEtiapine (SEROQUEL) 50 MG tablet Take 3 tablets by mouth nightly 60 tablet 3    rifaximin (XIFAXAN) 550 MG tablet Take 1 tablet by mouth 2 times daily 42 tablet 0    FLUoxetine (PROZAC) 20 MG capsule Take 20 mg by mouth daily      neomycin (MYCIFRADIN) 500 MG tablet       paliperidone (INVEGA) 3 MG extended release tablet       insulin glargine (LANTUS SOLOSTAR) 100 UNIT/ML injection pen Inject 15 Units into the skin nightly 5 pen 3    midodrine (PROAMATINE) 10 MG tablet Take 1 tablet by mouth 3 times daily (with meals) 90 tablet 3    nitroGLYCERIN (NITROSTAT) 0.4 MG SL tablet Place 1 tablet under the tongue every 5 minutes as needed for Chest pain 25 tablet 3    ranolazine (RANEXA) 500 MG extended release tablet Take 1 tablet by mouth 2 times daily 60 tablet 5    Compression Stockings MISC by Does not apply route 20 - 30 mmh wear daily and take off at night  Thigh High 2 each 2    insulin aspart (NOVOLOG FLEXPEN) 100 UNIT/ML injection pen **Low Dose Correction Algorithm**Insulin lispro (HUMALOG)  3 TIMES DAILY WITH MEALSGlucose: Dose: No [id number] 1 [id number] 2 [id number] 3 [id number] 4 [id number] 5 [id number] and above 6 Units 5 pen 3    blood glucose monitor strips Test  Bid times a day & as needed for symptoms of irregular blood glucose. 100 strip 5    UNABLE TO FIND Rx for depends   Use 3-4  times daily 1 box 5    albuterol (PROVENTIL) (2.5 MG/3ML) 0.083% nebulizer solution Take 3 mLs by nebulization every 6 hours 120 vial 5    Glucosource Lancets MISC Use one 3-4 times to check blood sugar 100 each 5    glucose monitoring kit (FREESTYLE) monitoring kit 1 kit by Does not apply route daily 1 kit 0    Blood Glucose Monitoring Suppl (ACURA BLOOD GLUCOSE METER) w/Device KIT Please check blood sugar 3 times daily. Please fill with what is covered by the insurance 1 kit 0    blood glucose monitor strips Test 3 times a day & as needed for symptoms of irregular blood glucose. Please fill with what is covered my patients insurance. 50 strip 3    SOFT TOUCH LANCETS MISC Please check blood sugar 3 times daily.  Please fill with what is covered by insurance 100 each 3    pantoprazole (PROTONIX) 40 MG tablet Take 1 tablet by mouth 2 times daily (before meals) 60 tablet 1    VIREAD 300 MG tablet Take 300 mg by mouth daily       traMADol (ULTRAM) 50 MG tablet No current facility-administered medications for this visit. Allergies: Norco [hydrocodone-acetaminophen] and Tylenol [acetaminophen]  Past Medical History:   Diagnosis Date    Acid reflux     Arthritis     left knee    CAD (coronary artery disease)     per [facility] 9/6/13 - Dr. Arthur Chiang COPD (chronic obstructive pulmonary disease) ([facility]     follow with Dr Micheal Cerna Depression     \"have manic - depression see Dr Tiera Joseph Diabetes mellitus [facility])     dx 10+ yrs ago- follows with PCP    Drug abuse ([facility]     hx use of cocaine, heroin and marijuana- states last used 12/2014    Glaucoma     bilateral    H/O Doppler lower venous ultrasound 04/04/2019    No DVT or SVT, Significant reflux of RGSV and LGSV.  Hepatic encephalopathy ([facility] 05/2019    Hepatitis C     for liver bx 12/3/2015\"Have Hepatitis B and C and saw Dr Mayra Pierce for this 12/1/2015\"    Hiatal hernia     History of alcohol abuse     History of exercise stress test 01/02/2020    Treadmill, Normal exercise performance without angina and ischemic EKG changes.     HTN (hypertension)     \"for the past two yrs on medication\" follows with Dr Arthur Chiang Hx of blood clots 2019    Portal vein thrombsis - TIPS procedure 4/23/19    Hyperlipemia     Irregular heart beat     per pt    Liver hematoma     Migraines     Last migraine:  2018    Nausea & vomiting     Schizophrenia ([facility]     per old chart    [facility]     \"last one was 9/2015- [facility] Dr Germaine Crocker [facility]- she said not sure if acutal seizures- she thinks they are panic or anxiety attacks\"    SOB (shortness of breath)     with any exertion     Past Surgical History:   Procedure Laterality Date    BREAST SURGERY  10/2015    left breast bx    CARDIAC CATHETERIZATION      per old chart pt had cath done in 3/2011 and 9/2013    CARPAL TUNNEL RELEASE Left 1/9/2020    CARPAL TUNNEL RELEASE LEFT performed by Taniya Yepez DO at [place] [address] Right 05/26/2020     CKTOTAL 23 04/27/2013    TROPONINT <0.010 12/08/2020    TROPONINT <0.010 11/23/2020     BNP:    Lab Results   Component Value Date    BNP 9 04/27/2013    BNP 7 03/17/2011    PROBNP 36.09 11/23/2020    PROBNP 132.6 09/13/2020     PT/INR:    Lab Results   Component Value Date    INR 1.16 09/13/2020    INR 1.12 08/04/2020     Lab Results   Component Value Date    LABA1C 6.7 (H) 09/13/2020    LABA1C 6.6 (H) 05/27/2020     Lab Results   Component Value Date    WBC 9.8 12/08/2020    WBC 5.1 11/23/2020    HCT 45.7 12/08/2020    HCT 40.3 11/23/2020    MCV 92.3 12/08/2020    MCV 92.0 11/23/2020    PLT 98 (L) 12/08/2020     (L) 11/23/2020     Lab Results   Component Value Date    CHOL 98 11/27/2019    CHOL 120 08/01/2017    TRIG 96 11/27/2019    TRIG 138 08/01/2017    HDL 38 (L) 11/27/2019    HDL 25 (L) 08/01/2017    LDLCALC 41 11/27/2019    LDLCALC 67 08/01/2017    LDLDIRECT 86 04/28/2013     Lab Results   Component Value Date    ALT 7 (L) 12/08/2020    ALT 13 11/23/2020    AST 25 12/08/2020    AST 23 11/23/2020     BMP:    Lab Results   Component Value Date     12/08/2020     11/23/2020    K 3.5 12/08/2020    K 3.7 11/23/2020    CL 96 12/08/2020     11/23/2020    CO2 24 12/08/2020    CO2 27 11/23/2020    BUN 9 12/08/2020    BUN 11 11/23/2020    CREATININE 1.0 12/08/2020    CREATININE 0.9 11/23/2020     CMP:   Lab Results   Component Value Date     12/08/2020     11/23/2020    K 3.5 12/08/2020    K 3.7 11/23/2020    CL 96 12/08/2020     11/23/2020    CO2 24 12/08/2020    CO2 27 11/23/2020    BUN 9 12/08/2020    BUN 11 11/23/2020    CREATININE 1.0 12/08/2020    CREATININE 0.9 11/23/2020    PROT 7.9 12/08/2020    PROT 7.1 11/23/2020    PROT 7.4 09/26/2012    PROT 6.6 07/27/2012     TSH:    Lab Results   Component Value Date    TSH 1.73 08/01/2017    TSHHS 2.540 11/23/2020    TSHHS 0.739 01/13/2015       QUALITY MEASURES REVIEWED:  1.CAD:Patient is taking anti platelet agent:No  2. DYSLIPIDEMIA: Patient is on cholesterol lowering medication:No  3. Beta-Blocker therapy for CAD, if prior Myocardial Infarction:Yes  4. Atrial fibrillation & anticoagulation therapy No  5. Discussed weight management strategies. Impression:    No diagnosis found.    Patient Active Problem List   Diagnosis Code    Left arm pain M79.602    Type 2 diabetes mellitus with complication, with long-term current use of insulin (HCC) E11.8, Z79.4    COPD, mild (HCC) J44.9    Tobacco abuse Z72.0    Angina effort I20.8    Abnormal nuclear cardiac imaging test R93.1    Lung nodule R91.1    Chest pain R07.9    Dyslipidemia E78.5    Pancolitis (HCC) K51.00    Hematemesis without nausea R21.0    Alcoholic cirrhosis of liver without ascites (HCC) K70.30    Thrombocytopenia (HCC) X21.9    Periumbilical abdominal pain R10.33    History of colonic polyps Z86.010    Abnormal findings on diagnostic imaging of 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    Type 2 diabetes mellitus with complication, with long-term current use of insulin (HCC) E11.8, Z79.4    SOB (shortness of breath) R06.02    Portal vein thrombosis I81    Intractable nausea and vomiting R11.2    Abdominal pain R10.9    Acute GI bleeding K92.2    Chronic hepatitis C without hepatic coma (HCC) B18.2    Delayed gastric emptying K30    Restless legs G25.81    Acute colitis K52.9    Acute encephalopathy G93.40    S/P TIPS (transjugular intrahepatic portosystemic shunt) Z95.828    Other cirrhosis of liver (HCC) K74.69    Acute upper GI bleed K92.2    Acute hepatic encephalopathy K72.00    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 HTN:well controlled on current medical regimen, see list above. - changes in  treatment:   no   CARDIOMYOPATHY: None known   CONGESTIVE HEART FAILURE: NO KNOWN HISTORY.   VHD: No significant VHD noted  DYSLIPIDEMIA: Patient's profile is at / near [email or web address]                                HDL is low                                See most recent Lab values in Labs section above. OTHER RELEVANT DIAGNOSIS:as noted in patient's active problem list:  TESTS ORDERED:  Echo ( Leg edema after Pneumonia )                                      All previously ordered tests reviewed. ARRHYTHMIAS: None known   MEDICATIONS: CPM   Office f/u in six months if test is OK. Primary/secondary prevention is the goal by aggressive risk modification, healthy and therapeutic life style changes for cardiovascular risk reduction. Various goals are discussed and multiple questions answered.

## 2020-12-15 NOTE — PATIENT INSTRUCTIONS
CAD:No, has myocardial bridge. HTN:well controlled on current medical regimen, see list above. - changes in  treatment:   no   CARDIOMYOPATHY: None known   CONGESTIVE HEART FAILURE: NO KNOWN HISTORY.   VHD: No significant VHD noted  DYSLIPIDEMIA: Patient's profile is at / near [email or web address]                                HDL is low                                See most recent Lab values in Labs section above. OTHER RELEVANT DIAGNOSIS:as noted in patient's active problem list:  TESTS ORDERED:  Echo ( Leg edema after Pneumonia )                                      All previously ordered tests reviewed. ARRHYTHMIAS: None known   MEDICATIONS: CPM   Office f/u in six months if test is OK. Primary/secondary prevention is the goal by aggressive risk modification, healthy and therapeutic life style changes for cardiovascular risk reduction. Various goals are discussed and multiple questions answered.

## 2020-12-18 ENCOUNTER — PROCEDURE VISIT (OUTPATIENT)
Dept: CARDIOLOGY CLINIC | Age: 60
End: 2020-12-18
Payer: MEDICARE

## 2020-12-18 LAB
LV EF: 58 %
LVEF MODALITY: NORMAL

## 2020-12-18 PROCEDURE — 93306 TTE W/DOPPLER COMPLETE: CPT | Performed by: INTERNAL MEDICINE

## 2020-12-22 ENCOUNTER — TELEPHONE (OUTPATIENT)
Dept: CARDIOLOGY CLINIC | Age: 60
End: 2020-12-22

## 2020-12-22 NOTE — TELEPHONE ENCOUNTER
Notified pt of results. Summary   Technically limited study due to COPD. Left ventricular function is normal, EF is estimated at 55-60%. Moderate left ventricular hypertrophy. No evidence of diastolic dysfunction. No regional wall motion abnormalities were detected. RVSP is 22 mmHg. No significant valvular abnormalities. No evidence of pericardial effusion.

## 2021-02-10 RX ORDER — INSULIN ASPART 100 [IU]/ML
INJECTION, SOLUTION INTRAVENOUS; SUBCUTANEOUS
Qty: 5 PEN | Refills: 3 | Status: SHIPPED | OUTPATIENT
Start: 2021-02-10 | End: 2021-10-11 | Stop reason: SDUPTHER

## 2021-06-10 ENCOUNTER — TELEPHONE (OUTPATIENT)
Dept: FAMILY MEDICINE CLINIC | Age: 61
End: 2021-06-10

## 2021-06-15 ENCOUNTER — OFFICE VISIT (OUTPATIENT)
Dept: CARDIOLOGY CLINIC | Age: 61
End: 2021-06-15
Payer: COMMERCIAL

## 2021-06-15 VITALS
DIASTOLIC BLOOD PRESSURE: 78 MMHG | BODY MASS INDEX: 42.11 KG/M2 | HEIGHT: 57 IN | HEART RATE: 76 BPM | WEIGHT: 195.2 LBS | SYSTOLIC BLOOD PRESSURE: 122 MMHG

## 2021-06-15 DIAGNOSIS — E78.5 DYSLIPIDEMIA: ICD-10-CM

## 2021-06-15 DIAGNOSIS — E66.01 MORBIDLY OBESE (HCC): ICD-10-CM

## 2021-06-15 DIAGNOSIS — E11.8 TYPE 2 DIABETES MELLITUS WITH COMPLICATION, WITH LONG-TERM CURRENT USE OF INSULIN (HCC): ICD-10-CM

## 2021-06-15 DIAGNOSIS — Z72.0 TOBACCO ABUSE: ICD-10-CM

## 2021-06-15 DIAGNOSIS — Z79.4 TYPE 2 DIABETES MELLITUS WITH COMPLICATION, WITH LONG-TERM CURRENT USE OF INSULIN (HCC): ICD-10-CM

## 2021-06-15 DIAGNOSIS — I10 ESSENTIAL HYPERTENSION: Primary | ICD-10-CM

## 2021-06-15 DIAGNOSIS — K21.9 GASTROESOPHAGEAL REFLUX DISEASE WITHOUT ESOPHAGITIS: ICD-10-CM

## 2021-06-15 PROCEDURE — G8417 CALC BMI ABV UP PARAM F/U: HCPCS | Performed by: INTERNAL MEDICINE

## 2021-06-15 PROCEDURE — 3017F COLORECTAL CA SCREEN DOC REV: CPT | Performed by: INTERNAL MEDICINE

## 2021-06-15 PROCEDURE — 4004F PT TOBACCO SCREEN RCVD TLK: CPT | Performed by: INTERNAL MEDICINE

## 2021-06-15 PROCEDURE — G8427 DOCREV CUR MEDS BY ELIG CLIN: HCPCS | Performed by: INTERNAL MEDICINE

## 2021-06-15 PROCEDURE — 99213 OFFICE O/P EST LOW 20 MIN: CPT | Performed by: INTERNAL MEDICINE

## 2021-06-15 PROCEDURE — 2022F DILAT RTA XM EVC RTNOPTHY: CPT | Performed by: INTERNAL MEDICINE

## 2021-06-15 PROCEDURE — 3046F HEMOGLOBIN A1C LEVEL >9.0%: CPT | Performed by: INTERNAL MEDICINE

## 2021-06-15 NOTE — PROGRESS NOTES
Daniel Aceves is a 64 y.o. female who has    CHIEF COMPLAINT AS FOLLOWS:  CHEST PAIN: Patient denies any C/O chest pains at this time. CHEST PAIN: Patient denies any C/O chest pains at this time.      SOB: No C/O SOB at this time.                 LEG EDEMA: C/O ankle edema   PALPITATIONS: Denies any C/O Palpitations                                   DIZZINESS: No C/O Dizziness                           SYNCOPE: None   OTHER: C/O VARICOSE VEINS with burning sensation & itching, specially of Left. HPI: Patient is here for F/U on her CAD, VHD, CMP, CHF,  Arrhythmia, HTN & Dyslipidemia problems. HTN: Patient has known Hx of essential HTN. Has been treated with guideline recommended medical / physical/ diet therapy as stated below. Dyslipidemia: Patient has known Hx of mixed dyslipidemia. Has been treated with guideline recommended medical / physical/ diet therapy as stated below. She does not have any new complaints at this time.     Current Outpatient Medications   Medication Sig Dispense Refill    insulin aspart (NOVOLOG FLEXPEN) 100 UNIT/ML injection pen **Low Dose Correction Algorithm**Insulin lispro (HUMALOG)  3 TIMES DAILY WITH MEALSGlucose: Dose: No [id number] 1 Ghnv059-352 2 Oyvwj199-410 3 Jykmv335-467 4 Qjkby926-604 5 Umokd090 and above 6 Units 5 pen 3    Glucosource Lancets MISC Use one 3-4 times to check blood sugar 100 each 5    midodrine (PROAMATINE) 10 MG tablet Take 1 tablet by mouth 3 times daily (with meals) 90 tablet 3    metoprolol succinate (TOPROL XL) 50 MG extended release tablet Take 1 tablet by mouth daily 30 tablet 2    ipratropium-albuterol (DUONEB) 0.5-2.5 (3) MG/3ML SOLN nebulizer solution Inhale 3 mLs into the lungs every 4 hours 360 mL 0    furosemide (LASIX) 20 MG tablet Take 1 tablet by mouth daily Hold if systolic <889 30 tablet 1    lactulose (CHRONULAC) 10 GM/15ML solution Take 30 mLs by mouth 3 times daily 1892 mL 2    QUEtiapine (SEROQUEL) 50 MG tablet Take 3 tablets by mouth nightly 60 tablet 3    rifaximin (XIFAXAN) 550 MG tablet Take 1 tablet by mouth 2 times daily 42 tablet 0    FLUoxetine (PROZAC) 20 MG capsule Take 20 mg by mouth daily      neomycin (MYCIFRADIN) 500 MG tablet       traMADol (ULTRAM) 50 MG tablet       paliperidone (INVEGA) 3 MG extended release tablet       insulin glargine (LANTUS SOLOSTAR) 100 UNIT/ML injection pen Inject 15 Units into the skin nightly 5 pen 3    nitroGLYCERIN (NITROSTAT) 0.4 MG SL tablet Place 1 tablet under the tongue every 5 minutes as needed for Chest pain 25 tablet 3    ranolazine (RANEXA) 500 MG extended release tablet Take 1 tablet by mouth 2 times daily 60 tablet 5    Compression Stockings MISC by Does not apply route 20 - 30 mmh wear daily and take off at night  Thigh High 2 each 2    blood glucose monitor strips Test  Bid times a day & as needed for symptoms of irregular blood glucose. 100 strip 5    UNABLE TO FIND Rx for depends   Use 3-4  times daily 1 box 5    albuterol (PROVENTIL) (2.5 MG/3ML) 0.083% nebulizer solution Take 3 mLs by nebulization every 6 hours 120 vial 5    glucose monitoring kit (FREESTYLE) monitoring kit 1 kit by Does not apply route daily 1 kit 0    Blood Glucose Monitoring Suppl (ACURA BLOOD GLUCOSE METER) w/Device KIT Please check blood sugar 3 times daily. Please fill with what is covered by the insurance 1 kit 0    blood glucose monitor strips Test 3 times a day & as needed for symptoms of irregular blood glucose. Please fill with what is covered my patients insurance. 50 strip 3    SOFT TOUCH LANCETS MISC Please check blood sugar 3 times daily.  Please fill with what is covered by insurance 100 each 3    pantoprazole (PROTONIX) 40 MG tablet Take 1 tablet by mouth 2 times daily (before meals) 60 tablet 1    VIREAD 300 MG tablet Take 300 mg by mouth daily       predniSONE (DELTASONE) 10 MG tablet Take 1 tablet by mouth daily 40mg daily for 2 days, 20 mg daily for 2 days, 10 mg daily for 2 days, 5 mg daily for 2 days 15 tablet 0     No current facility-administered medications for this visit. Allergies: Norco [hydrocodone-acetaminophen] and Tylenol [acetaminophen]  Review of Systems:    Constitutional: Negative for diaphoresis and fatigue  Respiratory: Negative for shortness of breath  Cardiovascular: Negative for chest pain, dyspnea on exertion, claudication, edema, irregular heartbeat, murmur, palpitations or shortness of breath  Musculoskeletal: Negative for muscle pain, muscular weakness, negative for pain in arm and leg or swelling in foot and leg    Objective:  /78   Pulse 76   Ht 4' 9\" (1.448 m)   Wt 195 lb 3.2 oz (88.5 kg)   BMI 42.24 kg/m²   Wt Readings from Last 3 Encounters:   06/15/21 195 lb 3.2 oz (88.5 kg)   12/15/20 190 lb 12.8 oz (86.5 kg)   12/08/20 190 lb (86.2 kg)     Body mass index is 42.24 kg/m². GENERAL - Alert, oriented, pleasant, in no apparent distress. EYES: No jaundice, no conjunctival pallor. Neck - Supple. No jugular venous distention noted. No carotid bruits. Cardiovascular - Normal S1 and S2 without obvious murmur or gallop. Extremities - No cyanosis, clubbing, or significant edema. Pulmonary - No respiratory distress. No wheezes or rales.       Lab Review   Lab Results   Component Value Date    TROPONINT <0.010 12/08/2020    TROPONINT <0.010 11/23/2020     Lab Results   Component Value Date    BNP 9 04/27/2013    BNP 7 03/17/2011    PROBNP 36.09 11/23/2020    PROBNP 132.6 09/13/2020     Lab Results   Component Value Date    INR 1.16 09/13/2020    INR 1.12 08/04/2020     Lab Results   Component Value Date    LABA1C 6.7 (H) 09/13/2020    LABA1C 6.6 (H) 05/27/2020     Lab Results   Component Value Date    WBC 9.8 12/08/2020    WBC 5.1 11/23/2020    HCT 45.7 12/08/2020    HCT 40.3 11/23/2020    MCV 92.3 12/08/2020    MCV 92.0 11/23/2020    PLT 98 (L) lowering medication:No   Patient does not tolerate, secondary to side-effects. 3.Beta-Blocker therapy for CAD, if prior Myocardial Infarction:Yes   Due to side-effect(s) / Bradycardia  4. Counselled regarding smoking cessation. Yes   5. Anticoagulation therapy (for A.Fib) No   Does Not have A.Fib.  6.Discussed weight management strategies. Assessment & Plan:    Primary / Secondary prevention is the goal by aggressive risk modification, healthy and therapeutic life style changes for cardiovascular risk reduction. Various goals are discussed and multiple questions answered. CAD:No, has myocardial bridge. HTN:well controlled on current medical regimen, see list above.             - changes in  treatment:   no   CARDIOMYOPATHY: None known   CONGESTIVE HEART FAILURE: NO KNOWN HISTORY.   VHD: No significant VHD noted  DYSLIPIDEMIA: Patient's profile is at / near [facility] is low                                See most recent Lab values in Labs section above. OTHER RELEVANT DIAGNOSIS:as noted in patient's active problem list:  Morbid obesity: Diet & Exercise. ? CVI: check US. Patient has been using compression stockings. TESTS ORDERED:  Venous US                                      All previously ordered tests reviewed.   ARRHYTHMIAS: None known   MEDICATIONS: CPM   Office f/u in six months if testing is OK.

## 2021-06-15 NOTE — LETTER
Patient Name: Neo Monroy  : 1960  MRN# [id number]    REASON FOR VISIT: 6 month    Hyperlipemia  CAD (coronary artery disease)  HTN (hypertension)       Current Outpatient Medications   Medication Sig Dispense Refill    insulin aspart (NOVOLOG FLEXPEN) 100 UNIT/ML injection pen **Low Dose Correction Algorithm**Insulin lispro (HUMALOG)  3 TIMES DAILY WITH MEALSGlucose: Dose: No [id number] 1 Wror420-128 2 Okjuc603-243 3 Umkoj141-716 4 Vgmcw059-021 5 Urjhx478 and above 6 Units 5 pen 3    Glucosource Lancets MISC Use one 3-4 times to check blood sugar 100 each 5    midodrine (PROAMATINE) 10 MG tablet Take 1 tablet by mouth 3 times daily (with meals) 90 tablet 3    metoprolol succinate (TOPROL XL) 50 MG extended release tablet Take 1 tablet by mouth daily 30 tablet 2    ipratropium-albuterol (DUONEB) 0.5-2.5 (3) MG/3ML SOLN nebulizer solution Inhale 3 mLs into the lungs every 4 hours 360 mL 0    furosemide (LASIX) 20 MG tablet Take 1 tablet by mouth daily Hold if systolic <260 30 tablet 1    predniSONE (DELTASONE) 10 MG tablet Take 1 tablet by mouth daily 40mg daily for 2 days, 20 mg daily for 2 days, 10 mg daily for 2 days, 5 mg daily for 2 days 15 tablet 0    lactulose (CHRONULAC) 10 GM/15ML solution Take 30 mLs by mouth 3 times daily 1892 mL 2    QUEtiapine (SEROQUEL) 50 MG tablet Take 3 tablets by mouth nightly 60 tablet 3    rifaximin (XIFAXAN) 550 MG tablet Take 1 tablet by mouth 2 times daily 42 tablet 0    FLUoxetine (PROZAC) 20 MG capsule Take 20 mg by mouth daily      neomycin (MYCIFRADIN) 500 MG tablet       traMADol (ULTRAM) 50 MG tablet       paliperidone (INVEGA) 3 MG extended release tablet       insulin glargine (LANTUS SOLOSTAR) 100 UNIT/ML injection pen Inject 15 Units into the skin nightly 5 pen 3    nitroGLYCERIN (NITROSTAT) 0.4 MG SL tablet Place 1 tablet under the tongue every 5 minutes as needed for Chest pain 25 tablet 3    ranolazine (RANEXA) 500 MG extended release tablet Take 1 tablet by mouth 2 times daily 60 tablet 5    Compression Stockings MISC by Does not apply route 20 - 30 mmh wear daily and take off at night  Thigh High 2 each 2    blood glucose monitor strips Test  Bid times a day & as needed for symptoms of irregular blood glucose. 100 strip 5    UNABLE TO FIND Rx for depends   Use 3-4  times daily 1 box 5    albuterol (PROVENTIL) (2.5 MG/3ML) 0.083% nebulizer solution Take 3 mLs by nebulization every 6 hours 120 vial 5    glucose monitoring kit (FREESTYLE) monitoring kit 1 kit by Does not apply route daily 1 kit 0    Blood Glucose Monitoring Suppl (ACURA BLOOD GLUCOSE METER) w/Device KIT Please check blood sugar 3 times daily. Please fill with what is covered by the insurance 1 kit 0    blood glucose monitor strips Test 3 times a day & as needed for symptoms of irregular blood glucose. Please fill with what is covered my patients insurance. 50 strip 3    SOFT TOUCH LANCETS MISC Please check blood sugar 3 times daily. Please fill with what is covered by insurance 100 each 3    pantoprazole (PROTONIX) 40 MG tablet Take 1 tablet by mouth 2 times daily (before meals) 60 tablet 1    VIREAD 300 MG tablet Take 300 mg by mouth daily        No current facility-administered medications for this visit. Smoke: What:                           How much:         Alcohol: How Much:          Caffeine: Pop:         Tea:            Coffee:                Chocolate:         Exercise:         Labs: Lipids:             CBC:       BMP/CMP:          TSH:              A1C:         Last Visit:12/15/2020  Complaints:C/O ankle edema  Changes:TESTS ORDERED:  Echo ( Leg edema after Pneumonia )  Office f/u in six months if test is OK. Last EK2020      STRESS TEST:  Ordered never done    ECHO: 2020  Technically limited study due to COPD. Left ventricular function is normal, EF is estimated at 55-60%.    Moderate left ventricular hypertrophy. No evidence of diastolic dysfunction. No regional wall motion abnormalities were detected. RVSP is 22 mmHg. No significant valvular abnormalities. No evidence of pericardial effusion.     CAROTID: 7/22/2015    MUGA: NONE    LAST PACER CHECK: NONE    CARDIAC CATH: 9/10/2013    Amio Protocol:    CHADS:

## 2021-06-15 NOTE — PATIENT INSTRUCTIONS
Please be informed that if you contact our office outside of normal business hours the physician on call cannot help with any scheduling or rescheduling issues, procedure instruction questions or any type of medication issue. We advise you for any urgent/emergency that you go to the nearest emergency room! PLEASE CALL OUR OFFICE DURING NORMAL BUSINESS HOURS    Monday - Friday   8 am to 5 pm    [facility]: [phone]    [place]:  [phone]    CAD:No, has myocardial bridge. HTN:well controlled on current medical regimen, see list above.             - changes in  treatment:   no   CARDIOMYOPATHY: None known   CONGESTIVE HEART FAILURE: NO KNOWN HISTORY.   VHD: No significant VHD noted  DYSLIPIDEMIA: Patient's profile is at / near [facility] is low                                See most recent Lab values in Labs section above. OTHER RELEVANT DIAGNOSIS:as noted in patient's active problem list:  Morbid obesity: Diet & Exercise. ? CVI: check US. Patient has been using compression stockings. TESTS ORDERED:  Venous US                                      All previously ordered tests reviewed.   ARRHYTHMIAS: None known   MEDICATIONS: CPM   Office f/u in six months if testing is OK.

## 2021-07-26 ENCOUNTER — PROCEDURE VISIT (OUTPATIENT)
Dept: CARDIOLOGY CLINIC | Age: 61
End: 2021-07-26
Payer: COMMERCIAL

## 2021-07-26 DIAGNOSIS — E11.8 TYPE 2 DIABETES MELLITUS WITH COMPLICATION, WITH LONG-TERM CURRENT USE OF INSULIN (HCC): ICD-10-CM

## 2021-07-26 DIAGNOSIS — Z79.4 TYPE 2 DIABETES MELLITUS WITH COMPLICATION, WITH LONG-TERM CURRENT USE OF INSULIN (HCC): ICD-10-CM

## 2021-07-26 DIAGNOSIS — K21.9 GASTROESOPHAGEAL REFLUX DISEASE WITHOUT ESOPHAGITIS: ICD-10-CM

## 2021-07-26 DIAGNOSIS — I10 ESSENTIAL HYPERTENSION: ICD-10-CM

## 2021-07-26 DIAGNOSIS — Z72.0 TOBACCO ABUSE: ICD-10-CM

## 2021-07-26 DIAGNOSIS — I83.892 VARICOSE VEINS OF LEFT LOWER EXTREMITY WITH OTHER COMPLICATIONS: Primary | ICD-10-CM

## 2021-07-26 DIAGNOSIS — E66.01 MORBIDLY OBESE (HCC): ICD-10-CM

## 2021-07-26 DIAGNOSIS — E78.5 DYSLIPIDEMIA: ICD-10-CM

## 2021-07-26 PROCEDURE — 93970 EXTREMITY STUDY: CPT | Performed by: INTERNAL MEDICINE

## 2021-07-27 ENCOUNTER — TELEPHONE (OUTPATIENT)
Dept: CARDIOLOGY CLINIC | Age: 61
End: 2021-07-27

## 2021-07-27 NOTE — TELEPHONE ENCOUNTER
No evidence of DVT or SVT in the bilateral common femoral vein, femoral vein, popliteal vein, greater saphenous vein or small saphenous vein. Significant reflux noted of the Right GSV at mid thigh (2.9s). Significant reflux noted in the Left CFV. Recommendations   Advice thigh high pressure stockings, 20 to 30 mm of Hg. Keep feet elevated. Increase walking. Significant changes or pathology noted. Schedule for OV for follow up.

## 2021-07-30 ENCOUNTER — TELEPHONE (OUTPATIENT)
Dept: CARDIOLOGY CLINIC | Age: 61
End: 2021-07-30

## 2021-08-04 ENCOUNTER — OFFICE VISIT (OUTPATIENT)
Dept: CARDIOLOGY CLINIC | Age: 61
End: 2021-08-04
Payer: COMMERCIAL

## 2021-08-04 VITALS
DIASTOLIC BLOOD PRESSURE: 80 MMHG | HEART RATE: 80 BPM | WEIGHT: 196.2 LBS | BODY MASS INDEX: 41.18 KG/M2 | HEIGHT: 58 IN | SYSTOLIC BLOOD PRESSURE: 128 MMHG

## 2021-08-04 DIAGNOSIS — I83.893 VARICOSE VEINS OF BOTH LEGS WITH EDEMA: Primary | ICD-10-CM

## 2021-08-04 DIAGNOSIS — Z72.0 TOBACCO ABUSE: ICD-10-CM

## 2021-08-04 DIAGNOSIS — Z79.4 TYPE 2 DIABETES MELLITUS WITH COMPLICATION, WITH LONG-TERM CURRENT USE OF INSULIN (HCC): ICD-10-CM

## 2021-08-04 DIAGNOSIS — E11.8 TYPE 2 DIABETES MELLITUS WITH COMPLICATION, WITH LONG-TERM CURRENT USE OF INSULIN (HCC): ICD-10-CM

## 2021-08-04 DIAGNOSIS — E78.2 MIXED HYPERLIPIDEMIA: ICD-10-CM

## 2021-08-04 DIAGNOSIS — E66.01 MORBIDLY OBESE (HCC): ICD-10-CM

## 2021-08-04 DIAGNOSIS — Q24.5 CORONARY-MYOCARDIAL BRIDGE: ICD-10-CM

## 2021-08-04 DIAGNOSIS — E78.5 DYSLIPIDEMIA: ICD-10-CM

## 2021-08-04 DIAGNOSIS — G25.81 RESTLESS LEGS: ICD-10-CM

## 2021-08-04 PROCEDURE — 3046F HEMOGLOBIN A1C LEVEL >9.0%: CPT | Performed by: INTERNAL MEDICINE

## 2021-08-04 PROCEDURE — G8417 CALC BMI ABV UP PARAM F/U: HCPCS | Performed by: INTERNAL MEDICINE

## 2021-08-04 PROCEDURE — 2022F DILAT RTA XM EVC RTNOPTHY: CPT | Performed by: INTERNAL MEDICINE

## 2021-08-04 PROCEDURE — G8427 DOCREV CUR MEDS BY ELIG CLIN: HCPCS | Performed by: INTERNAL MEDICINE

## 2021-08-04 PROCEDURE — 3017F COLORECTAL CA SCREEN DOC REV: CPT | Performed by: INTERNAL MEDICINE

## 2021-08-04 PROCEDURE — 99213 OFFICE O/P EST LOW 20 MIN: CPT | Performed by: INTERNAL MEDICINE

## 2021-08-04 PROCEDURE — 4004F PT TOBACCO SCREEN RCVD TLK: CPT | Performed by: INTERNAL MEDICINE

## 2021-08-04 NOTE — LETTER
[facility]  [address] [address]  Phone: [phone]  Fax: [phone]    Angela Tom MD    August 4, 2021     Clara Donald MD  [address] [place] [address]    Patient: Orville Lynn   MR Number: [id number]   YOB: 1960   Date of Visit: 8/4/2021       Dear Clara Donald: Thank you for referring Emmanuelle Aguiar to me for evaluation/treatment. Below are the relevant portions of my assessment and plan of care. If you have questions, please do not hesitate to call me. I look forward to following Shanna Asif along with you.     Sincerely,        Angela Tom MD

## 2021-08-04 NOTE — LETTER
Patient Name: Tonya Varner  : 1960  MRN# [id number]    REASON FOR VISIT:   Cardiology Problems  Angina effort  Chest pain  Mixed hyperlipidemia  Essential hypertension  Coronary-myocardial bridge  Portal vein thrombosis     Other  Abnormal nuclear cardiac imaging test  Thrombocytopenia (HCC)  Left arm pain  Type 2 diabetes mellitus with complication, with long-term current use of insulin (HCC)  COPD, mild (HCC)  Tobacco abuse  Lung nodule  Dyslipidemia  Pancolitis (HCC)  Hematemesis without nausea  Alcoholic cirrhosis of liver without ascites (HCC)  Periumbilical abdominal pain  History of colonic polyps  Abnormal findings on diagnostic imaging of abdomen    Current Outpatient Medications   Medication Sig Dispense Refill    insulin aspart (NOVOLOG FLEXPEN) 100 UNIT/ML injection pen **Low Dose Correction Algorithm**Insulin lispro (HUMALOG)  3 TIMES DAILY WITH MEALSGlucose: Dose: No Tqysoie239-177 1 Nvdc969-663 2 Excgm315-873 3 Buyoc143-059 4 Wtzoa000-513 5 Pyjir630 and above 6 Units 5 pen 3    Glucosource Lancets MISC Use one 3-4 times to check blood sugar 100 each 5    midodrine (PROAMATINE) 10 MG tablet Take 1 tablet by mouth 3 times daily (with meals) 90 tablet 3    metoprolol succinate (TOPROL XL) 50 MG extended release tablet Take 1 tablet by mouth daily 30 tablet 2    ipratropium-albuterol (DUONEB) 0.5-2.5 (3) MG/3ML SOLN nebulizer solution Inhale 3 mLs into the lungs every 4 hours 360 mL 0    furosemide (LASIX) 20 MG tablet Take 1 tablet by mouth daily Hold if systolic <760 30 tablet 1    predniSONE (DELTASONE) 10 MG tablet Take 1 tablet by mouth daily 40mg daily for 2 days, 20 mg daily for 2 days, 10 mg daily for 2 days, 5 mg daily for 2 days 15 tablet 0    lactulose (CHRONULAC) 10 GM/15ML solution Take 30 mLs by mouth 3 times daily 1892 mL 2    QUEtiapine (SEROQUEL) 50 MG tablet Take 3 tablets by mouth nightly 60 tablet 3    rifaximin (XIFAXAN) 550 MG tablet Take 1 tablet by mouth 2 times daily 42 tablet 0    FLUoxetine (PROZAC) 20 MG capsule Take 20 mg by mouth daily      neomycin (MYCIFRADIN) 500 MG tablet       traMADol (ULTRAM) 50 MG tablet       paliperidone (INVEGA) 3 MG extended release tablet       insulin glargine (LANTUS SOLOSTAR) 100 UNIT/ML injection pen Inject 15 Units into the skin nightly 5 pen 3    nitroGLYCERIN (NITROSTAT) 0.4 MG SL tablet Place 1 tablet under the tongue every 5 minutes as needed for Chest pain 25 tablet 3    ranolazine (RANEXA) 500 MG extended release tablet Take 1 tablet by mouth 2 times daily 60 tablet 5    Compression Stockings MISC by Does not apply route 20 - 30 mmh wear daily and take off at night  Thigh High 2 each 2    blood glucose monitor strips Test  Bid times a day & as needed for symptoms of irregular blood glucose. 100 strip 5    UNABLE TO FIND Rx for depends   Use 3-4  times daily 1 box 5    albuterol (PROVENTIL) (2.5 MG/3ML) 0.083% nebulizer solution Take 3 mLs by nebulization every 6 hours 120 vial 5    glucose monitoring kit (FREESTYLE) monitoring kit 1 kit by Does not apply route daily 1 kit 0    Blood Glucose Monitoring Suppl (ACURA BLOOD GLUCOSE METER) w/Device KIT Please check blood sugar 3 times daily. Please fill with what is covered by the insurance 1 kit 0    blood glucose monitor strips Test 3 times a day & as needed for symptoms of irregular blood glucose. Please fill with what is covered my patients insurance. 50 strip 3    SOFT TOUCH LANCETS MISC Please check blood sugar 3 times daily. Please fill with what is covered by insurance 100 each 3    pantoprazole (PROTONIX) 40 MG tablet Take 1 tablet by mouth 2 times daily (before meals) 60 tablet 1    VIREAD 300 MG tablet Take 300 mg by mouth daily        No current facility-administered medications for this visit. Smoke: What:                           How much:    Alcohol:                                      How Much:     Caffeine: Pop:         Tea: Coffee:                Chocolate:    Exercise:    Labs: Lipids:             CBC:       BMP/CMP:          TSH:              A1C:    Last Visit:  Complaints:  Changes:    Last EKG:      VL DUP LOWER EXTREMITY VENOUS BILATERAL: 7/2021  No evidence of DVT or SVT in the bilateral common femoral vein, femoral    vein, popliteal vein, greater saphenous vein or small saphenous vein.    Significant reflux noted of the Right GSV at mid thigh (2.9s).  Significant reflux noted in the Left CFV. STRESS TEST:  1/2020  Overall Impression:   Normal exercise performance without angina and ischemic EKG changes. ECHO: 12/2020  Technically limited study due to COPD. Left ventricular function is normal, EF is estimated at 55-60%. Moderate left ventricular hypertrophy. No evidence of diastolic dysfunction. No regional wall motion abnormalities were detected. RVSP is 22 mmHg. No significant valvular abnormalities. No evidence of pericardial effusion. CAROTID: 2015    MUGA: NONE    LAST PACER CHECK: NONE    CARDIAC CATH: NONE       Amio Protocol:    CHADS: [id number] Score for Atrial Fibrillation Stroke Risk   Risk   Factors  Component Value   C CHF No 0   H HTN Yes 1   A2 Age >= 76 No,  (61 y.o.) 0   D DM Yes 1   S2 Prior Stroke/TIA No 0   V Vascular Disease No 0   A Age 74-69 No,  (61 y.o.) 0   Sc Sex female 1    [id number]  Score  3   Score last updated 8/4/21 3:87 AM EDT    Click here for a link to the UpToDate guideline \"Atrial Fibrillation: Anticoagulation therapy to prevent embolization    Disclaimer: Risk Score calculation is dependent on accuracy of patient problem list and past encounter diagnosis.

## 2021-08-04 NOTE — LETTER
Diamond Alvarez  1960  [id number]    Have you had any Chest Pain that is not new? - No    Have you had any Shortness of Breath - No    Have you had any dizziness - No    Have you had any palpitations that are not new? - No    Is the patient on any of the following medications -   If Yes DO EKG - Needs done every 6 months    Do you have any edema - swelling in ankles and feet      When did you have your last labs drawn   Where did you have them done   What doctor ordered     If we do not have these labs you are retrieve these labs for these providers!     Do you have a surgery or procedure scheduled in the near future - No     Ask patient if they want to sign up for [employer] if they are not already signed up     Check to see if we have an E-MAIL on file for the patient     Check medication list thoroughly!!! AND RECONCILE OUTSIDE MEDICATIONS  If dose has changed change the entire order not just the MG  BE SURE TO ASK PATIENT IF THEY NEED MEDICATION REFILLS     At check out add to every patient's \"wrap up\" the following dot phrase AFTERHOURSEDUCATION and ensure we explain this to our patients

## 2021-08-04 NOTE — PROGRESS NOTES
Tim Cavanaugh is a 64 y.o. female who has    CHIEF COMPLAINT AS FOLLOWS:  CHEST PAIN: Patient denies any C/O chest pains at this time. CHEST PAIN: Patient denies any C/O chest pains at this time.      SOB: No C/O SOB at this time.                 LEG EDEMA: C/O ankle edema C/O VARICOSE VEINS with burning sensation & itching, specially of Left.    PALPITATIONS: Denies any C/O Palpitations                                   DIZZINESS: No C/O Dizziness                           SYNCOPE: None   OTHER:                                    HPI: Patient is here for F/U on her CVI, HTN & Dyslipidemia problems. CVI: Has abnormal venous study. HTN: Patient has known Hx of essential HTN. Has been treated with guideline recommended medical / physical/ diet therapy as stated below. Dyslipidemia: Patient has known Hx of mixed dyslipidemia. Has been treated with guideline recommended medical / physical/ diet therapy as stated below. She does not have any new complaints at this time.     Current Outpatient Medications   Medication Sig Dispense Refill    insulin aspart (NOVOLOG FLEXPEN) 100 UNIT/ML injection pen **Low Dose Correction Algorithm**Insulin lispro (HUMALOG)  3 TIMES DAILY WITH MEALSGlucose: Dose: No [id number] 1 [id number] 2 [id number] 3 [id number] 4 [id number] 5 Nbdgk838 and above 6 Units 5 pen 3    Glucosource Lancets MISC Use one 3-4 times to check blood sugar 100 each 5    midodrine (PROAMATINE) 10 MG tablet Take 1 tablet by mouth 3 times daily (with meals) 90 tablet 3    metoprolol succinate (TOPROL XL) 50 MG extended release tablet Take 1 tablet by mouth daily 30 tablet 2    ipratropium-albuterol (DUONEB) 0.5-2.5 (3) MG/3ML SOLN nebulizer solution Inhale 3 mLs into the lungs every 4 hours 360 mL 0    furosemide (LASIX) 20 MG tablet Take 1 tablet by mouth daily Hold if systolic <467 30 tablet 1    predniSONE (DELTASONE) 10 MG tablet Take 1 tablet by 40 MG tablet Take 1 tablet by mouth 2 times daily (before meals) 60 tablet 1    VIREAD 300 MG tablet Take 300 mg by mouth daily        No current facility-administered medications for this visit. Allergies: Norco [hydrocodone-acetaminophen] and Tylenol [acetaminophen]  Review of Systems:    Constitutional: Negative for diaphoresis and fatigue  Respiratory: Negative for shortness of breath  Cardiovascular: Negative for chest pain, dyspnea on exertion, claudication, edema, irregular heartbeat, murmur, palpitations or shortness of breath  Musculoskeletal: Negative for muscle pain, muscular weakness, negative for pain in arm and leg or swelling in foot and leg    Objective:  /80   Pulse 80   Ht 4' 9.5\" (1.461 m)   Wt 196 lb 3.2 oz (89 kg)   BMI 41.72 kg/m²   Wt Readings from Last 3 Encounters:   08/04/21 196 lb 3.2 oz (89 kg)   06/15/21 195 lb 3.2 oz (88.5 kg)   12/15/20 190 lb 12.8 oz (86.5 kg)     Body mass index is 41.72 kg/m². GENERAL - Alert, oriented, pleasant, in no apparent distress. EYES: No jaundice, no conjunctival pallor. Neck - Supple. No jugular venous distention noted. No carotid bruits. Cardiovascular - Normal S1 and S2 without obvious murmur or gallop. Extremities - No cyanosis, clubbing, or significant edema. Pulmonary - No respiratory distress. No wheezes or rales.       Lab Review   Lab Results   Component Value Date    TROPONINT <0.010 12/08/2020    TROPONINT <0.010 11/23/2020     Lab Results   Component Value Date    BNP 9 04/27/2013    BNP 7 03/17/2011    PROBNP 36.09 11/23/2020    PROBNP 132.6 09/13/2020     Lab Results   Component Value Date    INR 1.16 09/13/2020    INR 1.12 08/04/2020     Lab Results   Component Value Date    LABA1C 6.7 (H) 09/13/2020    LABA1C 6.6 (H) 05/27/2020     Lab Results   Component Value Date    WBC 9.8 12/08/2020    WBC 5.1 11/23/2020    HCT 45.7 12/08/2020    HCT 40.3 11/23/2020    MCV 92.3 12/08/2020    MCV 92.0 11/23/2020    PLT 98 (L) 12/08/2020     (L) 11/23/2020     Lab Results   Component Value Date    CHOL 98 11/27/2019    CHOL 120 08/01/2017    TRIG 96 11/27/2019    TRIG 138 08/01/2017    HDL 38 (L) 11/27/2019    HDL 25 (L) 08/01/2017    LDLCALC 41 11/27/2019    LDLCALC 67 08/01/2017    LDLDIRECT 86 04/28/2013     Lab Results   Component Value Date    ALT 7 (L) 12/08/2020    ALT 13 11/23/2020    AST 25 12/08/2020    AST 23 11/23/2020     BMP:    Lab Results   Component Value Date     12/08/2020     11/23/2020    K 3.5 12/08/2020    K 3.7 11/23/2020    CL 96 12/08/2020     11/23/2020    CO2 24 12/08/2020    CO2 27 11/23/2020    BUN 9 12/08/2020    BUN 11 11/23/2020    CREATININE 1.0 12/08/2020    CREATININE 0.9 11/23/2020     CMP:   Lab Results   Component Value Date     12/08/2020     11/23/2020    K 3.5 12/08/2020    K 3.7 11/23/2020    CL 96 12/08/2020     11/23/2020    CO2 24 12/08/2020    CO2 27 11/23/2020    BUN 9 12/08/2020    BUN 11 11/23/2020    CREATININE 1.0 12/08/2020    CREATININE 0.9 11/23/2020    PROT 7.9 12/08/2020    PROT 7.1 11/23/2020    PROT 7.4 09/26/2012    PROT 6.6 07/27/2012     Lab Results   Component Value Date    TSH 1.73 08/01/2017    TSHHS 2.540 11/23/2020    TSHHS 0.739 01/13/2015     VENOUS  US 7/2021    No evidence of DVT or SVT in the bilateral common femoral vein, femoral    vein, popliteal vein, greater saphenous vein or small saphenous vein.    Significant reflux noted of the Right GSV at mid thigh (2.9s).  Significant reflux noted in the Left CFV.    ECHO 12/2020   Technically limited study due to COPD.   Left ventricular function is normal, EF is estimated at 55-60%.   Moderate left ventricular hypertrophy.   No evidence of diastolic dysfunction.   No regional wall motion abnormalities were detected.    RVSP is 22 mmHg.   No significant valvular abnormalities.   No evidence of pericardial effusion.     ETT 1/2020   Normal exercise performance without angina and ischemic EKG changes.    Functional Capacity: Fair Exercise Tolerance. No chest pain noted during   testing. QUALITY MEASURES REVIEWED:  1.CAD:Patient is taking anti platelet agent:No  Patient does not have Hx of documented CAD  2. DYSLIPIDEMIA: Patient is on cholesterol lowering medication:No   3. Beta-Blocker therapy for CAD, if prior Myocardial Infarction:Yes   4. Counselled regarding smoking cessation. Yes   Patient does not Smoke. 5.Anticoagulation therapy (for A.Fib) No   Does Not have A.Fib.  6.Discussed weight management strategies. Assessment & Plan:    Primary / Secondary prevention is the goal by aggressive risk modification, healthy and therapeutic life style changes for cardiovascular risk reduction. Various goals are discussed and multiple questions answered. CAD:No, has myocardial bridge. HTN:well controlled on current medical regimen, see list above.             - changes in  treatment:   no   CARDIOMYOPATHY: None known   CONGESTIVE HEART FAILURE: NO KNOWN HISTORY.   VHD: No significant VHD noted  DYSLIPIDEMIA: Patient's profile is at / near [facility] is low                                See most recent Lab values in Labs section above. OTHER RELEVANT DIAGNOSIS:as noted in patient's active problem list:  Morbid obesity: Diet & Exercise. ? CVI: check US. Patient has been using compression stockings. Has C4 venous disease with abnormal vein study. TESTS ORDERED:  Venous Ablation.                                      All previously ordered tests reviewed.   ARRHYTHMIAS: None known   MEDICATIONS: CPM   Office f/u in a month after ablations.

## 2021-08-21 ENCOUNTER — HOSPITAL ENCOUNTER (EMERGENCY)
Age: 61
Discharge: HOME | End: 2021-08-22
Attending: EMERGENCY MEDICINE
Payer: COMMERCIAL

## 2021-08-21 ENCOUNTER — APPOINTMENT (OUTPATIENT)
Dept: CT IMAGING | Age: 61
End: 2021-08-21
Payer: COMMERCIAL

## 2021-08-21 DIAGNOSIS — Z95.828 S/P TIPS (TRANSJUGULAR INTRAHEPATIC PORTOSYSTEMIC SHUNT): ICD-10-CM

## 2021-08-21 DIAGNOSIS — R10.11 ABDOMINAL PAIN, RIGHT UPPER QUADRANT: Primary | ICD-10-CM

## 2021-08-21 LAB
ALBUMIN SERPL-MCNC: 3.6 GM/DL (ref 3.4–5)
ALP BLD-CCNC: 195 IU/L (ref 40–129)
ALT SERPL-CCNC: 13 U/L (ref 10–40)
ANION GAP SERPL CALCULATED.3IONS-SCNC: 10 MMOL/L (ref 4–16)
AST SERPL-CCNC: 18 IU/L (ref 15–37)
BACTERIA: ABNORMAL /HPF
BASOPHILS ABSOLUTE: 0.1 K/CU MM
BASOPHILS RELATIVE PERCENT: 0.9 % (ref 0–1)
BILIRUB SERPL-MCNC: 1.3 MG/DL (ref 0–1)
BILIRUBIN DIRECT: 0.4 MG/DL (ref 0–0.3)
BILIRUBIN URINE: NEGATIVE MG/DL
BILIRUBIN, INDIRECT: 0.9 MG/DL (ref 0–0.7)
BLOOD, URINE: NEGATIVE
BUN BLDV-MCNC: 11 MG/DL (ref 6–23)
CALCIUM SERPL-MCNC: 9.4 MG/DL (ref 8.3–10.6)
CHLORIDE BLD-SCNC: 102 MMOL/L (ref 99–110)
CLARITY: CLEAR
CO2: 24 MMOL/L (ref 21–32)
COLOR: YELLOW
CREAT SERPL-MCNC: 0.7 MG/DL (ref 0.6–1.1)
DIFFERENTIAL TYPE: ABNORMAL
EOSINOPHILS ABSOLUTE: 0.1 K/CU MM
EOSINOPHILS RELATIVE PERCENT: 0.7 % (ref 0–3)
GFR AFRICAN AMERICAN: >60 ML/MIN/1.73M2
GFR NON-AFRICAN AMERICAN: >60 ML/MIN/1.73M2
GLUCOSE BLD-MCNC: 282 MG/DL (ref 70–99)
GLUCOSE BLD-MCNC: 300 MG/DL (ref 70–99)
GLUCOSE, URINE: >500 MG/DL
HCT VFR BLD CALC: 45.7 % (ref 37–47)
HEMOGLOBIN: 15.6 GM/DL (ref 12.5–16)
IMMATURE NEUTROPHIL %: 0.3 % (ref 0–0.43)
INR BLD: 1.06 INDEX
KETONES, URINE: NEGATIVE MG/DL
LEUKOCYTE ESTERASE, URINE: NEGATIVE
LIPASE: 46 IU/L (ref 13–60)
LYMPHOCYTES ABSOLUTE: 1.4 K/CU MM
LYMPHOCYTES RELATIVE PERCENT: 14.5 % (ref 24–44)
MCH RBC QN AUTO: 32 PG (ref 27–31)
MCHC RBC AUTO-ENTMCNC: 34.1 % (ref 32–36)
MCV RBC AUTO: 93.6 FL (ref 78–100)
MONOCYTES ABSOLUTE: 0.6 K/CU MM
MONOCYTES RELATIVE PERCENT: 6.1 % (ref 0–4)
MUCUS: ABNORMAL HPF
NITRITE URINE, QUANTITATIVE: NEGATIVE
NUCLEATED RBC %: 0 %
PDW BLD-RTO: 13.6 % (ref 11.7–14.9)
PH, URINE: 5 (ref 5–8)
PLATELET # BLD: 97 K/CU MM (ref 140–440)
PMV BLD AUTO: 12 FL (ref 7.5–11.1)
POTASSIUM SERPL-SCNC: 4.7 MMOL/L (ref 3.5–5.1)
PROTEIN UA: 30 MG/DL
PROTHROMBIN TIME: 13.7 SECONDS (ref 11.7–14.5)
RBC # BLD: 4.88 M/CU MM (ref 4.2–5.4)
RBC URINE: 2 /HPF (ref 0–6)
SEGMENTED NEUTROPHILS ABSOLUTE COUNT: 7.2 K/CU MM
SEGMENTED NEUTROPHILS RELATIVE PERCENT: 77.5 % (ref 36–66)
SODIUM BLD-SCNC: 136 MMOL/L (ref 135–145)
SPECIFIC GRAVITY UA: 1.02 (ref 1–1.03)
SQUAMOUS EPITHELIAL: 2 /HPF
TOTAL IMMATURE NEUTOROPHIL: 0.03 K/CU MM
TOTAL NUCLEATED RBC: 0 K/CU MM
TOTAL PROTEIN: 7.7 GM/DL (ref 6.4–8.2)
TRICHOMONAS: ABNORMAL /HPF
UROBILINOGEN, URINE: 2 MG/DL (ref 0.2–1)
WBC # BLD: 9.4 K/CU MM (ref 4–10.5)
WBC UA: 1 /HPF (ref 0–5)
YEAST: ABNORMAL /HPF

## 2021-08-21 PROCEDURE — 82962 GLUCOSE BLOOD TEST: CPT

## 2021-08-21 PROCEDURE — 99285 EMERGENCY DEPT VISIT HI MDM: CPT

## 2021-08-21 PROCEDURE — 85610 PROTHROMBIN TIME: CPT

## 2021-08-21 PROCEDURE — 96375 TX/PRO/DX INJ NEW DRUG ADDON: CPT

## 2021-08-21 PROCEDURE — 82248 BILIRUBIN DIRECT: CPT

## 2021-08-21 PROCEDURE — 85025 COMPLETE CBC W/AUTO DIFF WBC: CPT

## 2021-08-21 PROCEDURE — 83690 ASSAY OF LIPASE: CPT

## 2021-08-21 PROCEDURE — 80053 COMPREHEN METABOLIC PANEL: CPT

## 2021-08-21 PROCEDURE — 96374 THER/PROPH/DIAG INJ IV PUSH: CPT

## 2021-08-21 PROCEDURE — 74177 CT ABD & PELVIS W/CONTRAST: CPT

## 2021-08-21 PROCEDURE — 2580000003 HC RX 258: Performed by: EMERGENCY MEDICINE

## 2021-08-21 PROCEDURE — 6360000002 HC RX W HCPCS: Performed by: EMERGENCY MEDICINE

## 2021-08-21 PROCEDURE — 6360000004 HC RX CONTRAST MEDICATION: Performed by: EMERGENCY MEDICINE

## 2021-08-21 PROCEDURE — 81001 URINALYSIS AUTO W/SCOPE: CPT

## 2021-08-21 RX ORDER — MORPHINE SULFATE 4 MG/ML
4 INJECTION, SOLUTION INTRAMUSCULAR; INTRAVENOUS ONCE
Status: COMPLETED | OUTPATIENT
Start: 2021-08-21 | End: 2021-08-21

## 2021-08-21 RX ORDER — ONDANSETRON 2 MG/ML
4 INJECTION INTRAMUSCULAR; INTRAVENOUS ONCE
Status: COMPLETED | OUTPATIENT
Start: 2021-08-21 | End: 2021-08-21

## 2021-08-21 RX ORDER — 0.9 % SODIUM CHLORIDE 0.9 %
500 INTRAVENOUS SOLUTION INTRAVENOUS ONCE
Status: COMPLETED | OUTPATIENT
Start: 2021-08-21 | End: 2021-08-22

## 2021-08-21 RX ADMIN — ONDANSETRON 4 MG: 2 INJECTION INTRAMUSCULAR; INTRAVENOUS at 22:54

## 2021-08-21 RX ADMIN — IOPAMIDOL 70 ML: 755 INJECTION, SOLUTION INTRAVENOUS at 23:36

## 2021-08-21 RX ADMIN — SODIUM CHLORIDE 500 ML: 9 INJECTION, SOLUTION INTRAVENOUS at 22:54

## 2021-08-21 RX ADMIN — MORPHINE SULFATE 4 MG: 4 INJECTION INTRAVENOUS at 22:54

## 2021-08-21 ASSESSMENT — PAIN DESCRIPTION - DESCRIPTORS: DESCRIPTORS: CONSTANT

## 2021-08-21 ASSESSMENT — PAIN DESCRIPTION - ONSET: ONSET: ON-GOING

## 2021-08-21 ASSESSMENT — PAIN DESCRIPTION - FREQUENCY: FREQUENCY: CONTINUOUS

## 2021-08-21 ASSESSMENT — PAIN DESCRIPTION - LOCATION: LOCATION: ABDOMEN

## 2021-08-21 ASSESSMENT — PAIN SCALES - GENERAL
PAINLEVEL_OUTOF10: 9
PAINLEVEL_OUTOF10: 9

## 2021-08-21 ASSESSMENT — PAIN DESCRIPTION - ORIENTATION: ORIENTATION: RIGHT

## 2021-08-21 ASSESSMENT — PAIN DESCRIPTION - PROGRESSION: CLINICAL_PROGRESSION: NOT CHANGED

## 2021-08-22 VITALS
WEIGHT: 197 LBS | DIASTOLIC BLOOD PRESSURE: 88 MMHG | RESPIRATION RATE: 18 BRPM | BODY MASS INDEX: 41.35 KG/M2 | HEIGHT: 58 IN | SYSTOLIC BLOOD PRESSURE: 150 MMHG | HEART RATE: 90 BPM | OXYGEN SATURATION: 95 % | TEMPERATURE: 98.1 F

## 2021-08-22 PROCEDURE — 6370000000 HC RX 637 (ALT 250 FOR IP): Performed by: EMERGENCY MEDICINE

## 2021-08-22 RX ORDER — OXYCODONE HYDROCHLORIDE 5 MG/1
5 TABLET ORAL ONCE
Status: COMPLETED | OUTPATIENT
Start: 2021-08-22 | End: 2021-08-22

## 2021-08-22 RX ORDER — ONDANSETRON 4 MG/1
4 TABLET, ORALLY DISINTEGRATING ORAL EVERY 8 HOURS PRN
Qty: 15 TABLET | Refills: 0 | Status: SHIPPED | OUTPATIENT
Start: 2021-08-22 | End: 2021-09-13 | Stop reason: DRUGHIGH

## 2021-08-22 RX ORDER — OXYCODONE HYDROCHLORIDE 5 MG/1
5 TABLET ORAL EVERY 6 HOURS PRN
Qty: 12 TABLET | Refills: 0 | Status: SHIPPED | OUTPATIENT
Start: 2021-08-22 | End: 2021-10-11 | Stop reason: SDUPTHER

## 2021-08-22 RX ADMIN — OXYCODONE HYDROCHLORIDE 5 MG: 5 TABLET ORAL at 00:39

## 2021-08-22 NOTE — ED PROVIDER NOTES
CHIEF COMPLAINT  Chief Complaint   Patient presents with    Abdominal Pain     right sided, reports having shunt in liver, abdomen distended        HPI  Wayne Iniguez is a 64 y.o. female with history of TIPS procedure with [facility] approximately 5 years ago who presents pain in her abdomen over the past 2 weeks that is worsened over the past 3 to 4 days and is greatest in the right upper quadrant but is also generalized. She also has had some associated nausea and feels like she is having some swelling of her legs and abdomen. No fevers. No chest pain or shortness of breath. No Covid exposure. Still urinating. Follows with Dr. Andre Akins of GI, has been attempting to rest with minimal improvement. Pain is aching, throbbing and poorly localized.       REVIEW OF SYSTEMS  Review of Systems   History obtained from chart review and the patient  General ROS: positive for  - fatigue  Ophthalmic ROS: negative for - decreased vision or double vision  ENT ROS: negative for - headaches  Hematological and Lymphatic ROS: negative for - weight loss  Endocrine ROS: negative for - unexpected weight changes  Respiratory ROS: no cough, shortness of breath, or wheezing  Cardiovascular ROS: no chest pain or dyspnea on exertion  Gastrointestinal ROS: positive for -abdominal pain, nausea  Genito-Urinary ROS: no dysuria, trouble voiding, or hematuria  Musculoskeletal ROS: positive for -bilateral leg swelling  Neurological ROS: no TIA or stroke symptoms      PAST MEDICAL HISTORY  Past Medical History:   Diagnosis Date    Acid reflux     Arthritis     left knee    CAD (coronary artery disease)     per [facility] - Dr. Narciso Barrett [facility]     follow with Dr Boone  Depression     \"have manic - depression see Dr Henry Chua Diabetes mellitus [facility])     dx 10+ yrs ago- follows with PCP    Drug abuse ([facility]     hx use of cocaine, heroin and marijuana- states last used 12/2014    Glaucoma bilateral    H/O Doppler lower venous ultrasound 04/04/2019    No DVT or SVT, Significant reflux of RGSV and LGSV.    H/O echocardiogram 12/18/2020    EF 55-60%, Mod LVH.  Hepatic encephalopathy ([facility] 05/2019    Hepatitis C     for liver bx 12/3/2015\"Have Hepatitis B and C and saw Dr Jessica Anderson for this 12/1/2015\"    Hiatal hernia     History of alcohol abuse     History of exercise stress test 01/02/2020    Treadmill, Normal exercise performance without angina and ischemic EKG changes.    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    Hyperlipemia     Irregular heart beat     per pt    Liver hematoma     Migraines     Last migraine:  2018    Nausea & vomiting     Schizophrenia ([facility]     per old chart    Seizures ([facility]     \"last one was 9/2015- [facility] Dr Kristy Alves at [facility]- she said not sure if acutal seizures- she thinks they are panic or anxiety attacks\"    SOB (shortness of breath)     with any exertion       FAMILY HISTORY  Family History   Problem Relation Age of Onset    Cancer Mother         lung ca    Arthritis Mother     Migraines Mother     Cancer Father         colon ca    Diabetes Father     High Blood Pressure Father     Arthritis Father     High Cholesterol Father     Migraines Father     Migraines Sister     Heart Disease Brother         WPW       SOCIAL HISTORY  Social History     Socioeconomic History    Marital status:      Spouse name: None    Number of children: None    Years of education: None    Highest education level: None   Occupational History    None   Tobacco Use    Smoking status: Current Some Day Smoker     Packs/day: 0.50     Years: 45.00     Pack years: 22.50     Types: Cigarettes     Start date: 1974    Smokeless tobacco: Never Used   Vaping Use    Vaping Use: Never used   Substance and Sexual Activity    Alcohol use: Not Currently     Alcohol/week: 2.0 - 3.0 standard drinks     Types: 2 - 3 Shots of liquor per week     Comment: 2-3 shots last 4/2019    Drug use: Yes     Frequency: 3.0 times per week     Types: Marijuana     Comment: daily    Sexual activity: Not Currently     Partners: Male   Other Topics Concern    None   Social History Narrative    None     Social Determinants of Health     Financial Resource Strain:     Difficulty of Paying Living Expenses:    Food Insecurity:     Worried About Running Out of Food in the Last Year:     Ran Out of Food in the Last Year:    Transportation Needs:     Lack of Transportation (Medical):      Lack of Transportation (Non-Medical):    Physical Activity:     Days of Exercise per Week:     Minutes of Exercise per Session:    Stress:     Feeling of Stress :    Social Connections:     Frequency of Communication with Friends and Family:     Frequency of Social Gatherings with Friends and Family:     Attends [employer] Services:     Active Member of Clubs or Organizations:     Attends Club or Organization Meetings:     Marital Status:    Intimate Partner Violence:     Fear of Current or Ex-Partner:     Emotionally Abused:     Physically Abused:     Sexually Abused:        SURGICAL HISTORY  Past Surgical History:   Procedure Laterality Date    BREAST SURGERY  10/2015    left breast bx    CARDIAC CATHETERIZATION      per old chart pt had cath done in 3/2011 and 9/2013    CARPAL TUNNEL RELEASE Left 1/9/2020    CARPAL TUNNEL RELEASE LEFT performed by Tres Horner DO at [facility] Right 05/26/2020    dr Monroe Bauman, carpal tunnel trigger finger release    CARPAL TUNNEL RELEASE Right 5/26/2020    RIGHT CARPAL TUNNEL RELEASE performed by Tres Horner DO at [address] [place] [address]  per old chart done [facility]  3/11/13    diverticulosis, cecal polyp    COLONOSCOPY  03/16/2017    Internal hemorrhoids- Dr. Laura Rabago, DIAGNOSTIC  03/16/2017    EGD: Small esophageal varices, portal hypertensive gastropathy, reflux esophagitis, hiatal hernia    EYE SURGERY Bilateral ? when    cyst removal, cataracts w/lens replacement    FINGER TRIGGER RELEASE Right 5/26/2020    FINGER TRIGGER RELEASE RIGHT RING FINGER performed by Marlena Sterling DO at [facility]      per old chart pt had CHOLO/BSO 1986    TIPS PROCEDURE  04/23/2019    TONSILLECTOMY  as a kid    UPPER GASTROINTESTINAL ENDOSCOPY N/A 11/14/2018    EGD DIAGNOSTIC ONLY performed by Isabel Gilmore MD at [facility] N/A 6/5/2019    EGD DIAGNOSTIC ONLY performed by Isabel Gilmore MD at [address] [place] [address]  No current facility-administered medications on file prior to encounter.      Current Outpatient Medications on File Prior to Encounter   Medication Sig Dispense Refill    insulin aspart (NOVOLOG FLEXPEN) 100 UNIT/ML injection pen **Low Dose Correction Algorithm**Insulin lispro (HUMALOG)  3 TIMES DAILY WITH MEALSGlucose: Dose: No [id number] 1 [id number] 2 [id number] 3 [id number] 4 Hufvq184-300 5 Qvgdu507 and above 6 Units 5 pen 3    Glucosource Lancets MISC Use one 3-4 times to check blood sugar 100 each 5    midodrine (PROAMATINE) 10 MG tablet Take 1 tablet by mouth 3 times daily (with meals) 90 tablet 3    metoprolol succinate (TOPROL XL) 50 MG extended release tablet Take 1 tablet by mouth daily 30 tablet 2    ipratropium-albuterol (DUONEB) 0.5-2.5 (3) MG/3ML SOLN nebulizer solution Inhale 3 mLs into the lungs every 4 hours 360 mL 0    furosemide (LASIX) 20 MG tablet Take 1 tablet by mouth daily Hold if systolic <415 30 tablet 1    predniSONE (DELTASONE) 10 MG tablet Take 1 tablet by mouth daily 40mg daily for 2 days, 20 mg daily for 2 days, 10 mg daily for 2 days, 5 mg daily for 2 days 15 tablet 0    lactulose (CHRONULAC) 10 GM/15ML solution Take 30 mLs by mouth 3 times daily 1892 mL 2    QUEtiapine (SEROQUEL) 50 MG tablet Take 3 tablets by mouth nightly 60 tablet 3    rifaximin (XIFAXAN) 550 MG tablet Take 1 tablet by mouth 2 times daily 42 tablet 0    FLUoxetine (PROZAC) 20 MG capsule Take 20 mg by mouth daily      neomycin (MYCIFRADIN) 500 MG tablet       traMADol (ULTRAM) 50 MG tablet       paliperidone (INVEGA) 3 MG extended release tablet       insulin glargine (LANTUS SOLOSTAR) 100 UNIT/ML injection pen Inject 15 Units into the skin nightly 5 pen 3    nitroGLYCERIN (NITROSTAT) 0.4 MG SL tablet Place 1 tablet under the tongue every 5 minutes as needed for Chest pain 25 tablet 3    ranolazine (RANEXA) 500 MG extended release tablet Take 1 tablet by mouth 2 times daily 60 tablet 5    Compression Stockings MISC by Does not apply route 20 - 30 mmh wear daily and take off at night  Thigh High 2 each 2    blood glucose monitor strips Test  Bid times a day & as needed for symptoms of irregular blood glucose. 100 strip 5    UNABLE TO FIND Rx for depends   Use 3-4  times daily 1 box 5    albuterol (PROVENTIL) (2.5 MG/3ML) 0.083% nebulizer solution Take 3 mLs by nebulization every 6 hours 120 vial 5    glucose monitoring kit (FREESTYLE) monitoring kit 1 kit by Does not apply route daily 1 kit 0    Blood Glucose Monitoring Suppl (ACURA BLOOD GLUCOSE METER) w/Device KIT Please check blood sugar 3 times daily. Please fill with what is covered by the insurance 1 kit 0    blood glucose monitor strips Test 3 times a day & as needed for symptoms of irregular blood glucose. Please fill with what is covered my patients insurance. 50 strip 3    SOFT TOUCH LANCETS MISC Please check blood sugar 3 times daily.  Please fill with what is covered by insurance 100 each 3    pantoprazole (PROTONIX) 40 MG tablet Take 1 tablet by mouth 2 times daily (before meals) 60 tablet 1    VIREAD 300 MG tablet Take 300 mg by mouth daily            ALLERGIES  Allergies   Allergen Reactions    Norco [Hydrocodone-Acetaminophen] Itching     Itching, rash, nausea and vomiting.  Tylenol [Acetaminophen] Itching, Nausea And Vomiting and Rash       PHYSICAL EXAM  VITAL SIGNS: BP (!) 150/88   Pulse 90   Temp 98.1 °F (36.7 °C) (Oral)   Resp 18   Ht 4' 9.5\" (1.461 m)   Wt 197 lb (89.4 kg)   SpO2 95%   BMI 41.89 kg/m²   Constitutional: Well developed, Well nourished, chronically ill appearance, resting in bed  HENT: Normocephalic, Atraumatic, Bilateral external ears normal, Oropharynx moist, No oral exudates, Nose normal.   Eyes: PERRL, EOMI, Conjunctiva normal, No discharge. Neck: Normal range of motion, Supple, No stridor. Cardiovascular: Normal heart rate, Normal rhythm, No murmurs, No rubs, No gallops. Thorax & Lungs: Normal breath sounds, No respiratory distress, No wheezing, No chest tenderness. Abdomen: Bowel sounds normal, Soft, mild diffuse tenderness, no guarding, no rebound, No masses, No pulsatile masses. No CVA tenderness. No fluid wave or pain out of proportion  Skin: Warm, Dry, No erythema, No rash. Extremities: Intact distal pulses, BLE symmetric nonpitting edema, No tenderness, No cyanosis, No clubbing. Musculoskeletal: Good gross range of motion in all major joints. No major deformities noted. Neurologic: Alert & oriented x 3, Normal gross motor function, Normal gross sensory function, No focal deficits noted. Psychiatric: Affect flat        RADIOLOGY/PROCEDURES/LABS  Last Imaging results   CT ABDOMEN PELVIS W IV CONTRAST Additional Contrast? None   Final Result      No acute findings in the abdomen or pelvis. Patent portosystemic shunt. Portal vein is patent. Similar appearance of   gastric varices.              Imaging reviewed by myself    Labs Reviewed   HEPATIC FUNCTION PANEL - Abnormal; Notable for the following components:       Result Value    Total Bilirubin 1.3 (*)     Bilirubin, Direct 0.4 (*)     Bilirubin, Indirect 0.9 (*)     Alkaline Phosphatase 195 (*) All other components within normal limits   CBC WITH AUTO DIFFERENTIAL - Abnormal; Notable for the following components:    MCH 32.0 (*)     Platelets 97 (*)     MPV 12.0 (*)     Segs Relative 77.5 (*)     Lymphocytes % 14.5 (*)     Monocytes % 6.1 (*)     All other components within normal limits   BASIC METABOLIC PANEL - Abnormal; Notable for the following components:    Glucose 300 (*)     All other components within normal limits   URINALYSIS - Abnormal; Notable for the following components:    Glucose, Urine >500 (*)     Protein, UA 30 (*)     Urobilinogen, Urine 2.0 (*)     Bacteria, UA RARE (*)     Mucus, UA RARE (*)     All other components within normal limits   POCT GLUCOSE - Abnormal; Notable for the following components:    POC Glucose 282 (*)     All other components within normal limits   LIPASE   PROTIME-INR         Medications   morphine sulfate (PF) injection 4 mg (4 mg Intravenous Given 8/21/21 2254)   ondansetron (ZOFRAN) injection 4 mg (4 mg Intravenous Given 8/21/21 2254)   0.9 % sodium chloride bolus (0 mLs Intravenous Stopped 8/22/21 0029)   iopamidol (ISOVUE-370) 76 % injection 100 mL (70 mLs Intravenous Given 8/21/21 2336)   oxyCODONE (ROXICODONE) immediate release tablet 5 mg (5 mg Oral Given 8/22/21 0039)       COURSE & MEDICAL DECISION MAKING  Pertinent Labs & Imaging studies reviewed. (See chart for details)    62-year-old female presents with abdominal pain. Her exam here is stable, her CT scan does not show evidence of obstruction or acute abnormality or intrahepatic cyst or laceration. Her total bilirubin is actually improved from comparison values in our charting and she is not having elevation of LFTs suggestive of hepatitis or obstruction. Her lipase is not suggestive of pancreatitis. Her pain was controlled in the department with morphine and Zofran and IV fluids, she was also trialed oxycodone for additional pain control as we are attempting to limit Tylenol.   She tolerated p.o. without difficulty. The remainder of her work-up is reassuring without evidence of anemia or suggestion of DKA or other acute abnormality found. She will be discharged to follow with her GI, strict return precautions put into place. FINAL IMPRESSION  Problem List Items Addressed This Visit     S/P TIPS (transjugular intrahepatic portosystemic shunt)      Other Visit Diagnoses     Abdominal pain, right upper quadrant    -  Primary    Relevant Medications    oxyCODONE (ROXICODONE) 5 MG immediate release tablet      1.    2.   3.    Patient gave me permission to discuss medical history, care, and plan with those present in the room.   Electronically signed by: Rima Hurtado MD, 8/22/2021  MD Rima Trimble MD  08/22/21 2612

## 2021-09-10 ENCOUNTER — TELEPHONE (OUTPATIENT)
Dept: FAMILY MEDICINE CLINIC | Age: 61
End: 2021-09-10

## 2021-09-10 NOTE — TELEPHONE ENCOUNTER
Patient called stating that she is feeling achy, lower back pain, nausea, and runny nose, would like to know if can receive a medication for nausea

## 2021-09-12 NOTE — TELEPHONE ENCOUNTER
We would have to recommend that she be seen to rule out Covid infection as some of her symptoms are consistent with that type of illness. After she has testing if she needs nausea medicine still she can let us know.

## 2021-09-13 ENCOUNTER — HOSPITAL ENCOUNTER (OUTPATIENT)
Age: 61
Setting detail: SPECIMEN
Discharge: HOME | End: 2021-09-13
Payer: COMMERCIAL

## 2021-09-13 ENCOUNTER — TELEPHONE (OUTPATIENT)
Dept: FAMILY MEDICINE CLINIC | Age: 61
End: 2021-09-13

## 2021-09-13 ENCOUNTER — OFFICE VISIT (OUTPATIENT)
Dept: FAMILY MEDICINE CLINIC | Age: 61
End: 2021-09-13
Payer: COMMERCIAL

## 2021-09-13 VITALS — OXYGEN SATURATION: 98 % | TEMPERATURE: 97 F | HEART RATE: 74 BPM

## 2021-09-13 DIAGNOSIS — R53.83 FATIGUE, UNSPECIFIED TYPE: ICD-10-CM

## 2021-09-13 DIAGNOSIS — R11.0 NAUSEA: ICD-10-CM

## 2021-09-13 DIAGNOSIS — R19.7 DIARRHEA, UNSPECIFIED TYPE: ICD-10-CM

## 2021-09-13 DIAGNOSIS — M79.10 MUSCLE ACHE: ICD-10-CM

## 2021-09-13 DIAGNOSIS — R09.89 CHEST CONGESTION: ICD-10-CM

## 2021-09-13 DIAGNOSIS — R09.81 NASAL CONGESTION: ICD-10-CM

## 2021-09-13 DIAGNOSIS — R05.9 COUGH: Primary | ICD-10-CM

## 2021-09-13 DIAGNOSIS — R06.2 WHEEZING: ICD-10-CM

## 2021-09-13 PROCEDURE — 3017F COLORECTAL CA SCREEN DOC REV: CPT | Performed by: NURSE PRACTITIONER

## 2021-09-13 PROCEDURE — U0003 INFECTIOUS AGENT DETECTION BY NUCLEIC ACID (DNA OR RNA); SEVERE ACUTE RESPIRATORY SYNDROME CORONAVIRUS 2 (SARS-COV-2) (CORONAVIRUS DISEASE [COVID-19]), AMPLIFIED PROBE TECHNIQUE, MAKING USE OF HIGH THROUGHPUT TECHNOLOGIES AS DESCRIBED BY CMS-2020-01-R: HCPCS

## 2021-09-13 PROCEDURE — G8417 CALC BMI ABV UP PARAM F/U: HCPCS | Performed by: NURSE PRACTITIONER

## 2021-09-13 PROCEDURE — G8427 DOCREV CUR MEDS BY ELIG CLIN: HCPCS | Performed by: NURSE PRACTITIONER

## 2021-09-13 PROCEDURE — U0005 INFEC AGEN DETEC AMPLI PROBE: HCPCS

## 2021-09-13 PROCEDURE — 4004F PT TOBACCO SCREEN RCVD TLK: CPT | Performed by: NURSE PRACTITIONER

## 2021-09-13 PROCEDURE — 99213 OFFICE O/P EST LOW 20 MIN: CPT | Performed by: NURSE PRACTITIONER

## 2021-09-13 RX ORDER — ONDANSETRON 8 MG/1
8 TABLET, ORALLY DISINTEGRATING ORAL EVERY 12 HOURS PRN
Qty: 15 TABLET | Refills: 0 | Status: SHIPPED | OUTPATIENT
Start: 2021-09-13 | End: 2021-10-11 | Stop reason: SDUPTHER

## 2021-09-13 RX ORDER — METHYLPREDNISOLONE 4 MG/1
TABLET ORAL
Qty: 1 KIT | Refills: 0 | Status: SHIPPED | OUTPATIENT
Start: 2021-09-13 | End: 2021-09-19

## 2021-09-13 NOTE — PROGRESS NOTES
9/13/21  Loan Walton Napoleon  1960    FLU/COVID-19 CLINIC EVALUATION    HPI SYMPTOMS:    Employer:    [] Fevers  [] Chills  [x] Cough  [] Coughing up blood  [x] Chest Congestion  [x] Nasal Congestion  [x] Feeling short of breath  [x] Sometimes  [] Frequently  [] All the time  [] Chest pain  [x] Headaches  [x]Tolerable  [] Severe  [] Sore throat  [x] Muscle aches  [x] Nausea  [x] Vomiting  []Unable to keep fluids down  [x] Diarrhea  []Severe    [x] OTHER SYMPTOMS:      Symptom Duration:   [] 1  [] 2   [] 3   [x] 4    [] 5   [] 6   [] 7   [] 8   [] 9   [] 10   [] 11   [] 12   [] 13   [] 14   [] Longer than 14 days    Symptom course:   [] Worsening     [x] Stable     [] Improving    RISK FACTORS:    [] Pregnant or possibly pregnant  [x] Age over 61  [x] Diabetes  [x] Heart disease  [] Asthma  [x] COPD/Other chronic lung diseases  [] Active Cancer  [] On Chemotherapy  [] Taking oral steroids  [] History Lymphoma/Leukemia  [] Close contact with a lab confirmed COVID-19 patient within 14 days of symptom onset  [] History of travel from affected geographical areas within 14 days of symptom onset       VITALS:  There were no vitals filed for this visit. TESTS:    POCT FLU:  [] Positive     []Negative    ASSESSMENT:    [] Flu  [] Possible COVID-19  [] Strep    PLAN:    [] Discharge home with written instructions for:  [] Flu management  [] Possible COVID-19 infection with self-quarantine and management of symptoms  [] Follow-up with primary care physician or emergency department if worsens  [] Evaluation per physician/NP/PA in clinic  [] Sent to ER       An  electronic signature was used to authenticate this note.      --Parul  on 9/13/2021 at 5:57 PM

## 2021-09-13 NOTE — PATIENT INSTRUCTIONS
Your COVID 19 test can take 1-5 days for the results to come back. We ask that you make a Mychart page and view your test results this way. You will need to Self quarantine until you know your results. Increase fluids and rest  Saline nasal spray as needed for nasal congestion  Warm salt gargles as needed for throat discomfort  Monitor temperature twice a day  Tylenol as needed for fevers and/or discomfort. Big deep breaths periodically throughout the day  Regular Mucinex over the counter as needed for chest congestion  If symptoms worsen -Go to the ER. Follow up with your primary care provider      To Whom it May Concern:    Lola Irizarry was tested for COVID-19 9/13/2021. He/she must stay home until test results are back. If test is positive, he/she must quarantine for a total of 10 days starting from day one of symptom onset. He/she must also be fever-free for 24 hours at that time, and also have improvement in symptoms. We do not recommend retesting as patients may continue to test positive for months even though no longer contagious.   It is suggested you call [address] or [address] [place] [address] with any questions regarding quarantine timeframe/return to work/school details.  '

## 2021-09-13 NOTE — TELEPHONE ENCOUNTER
----- Message from [address] [place] [address] sent at 9/10/2021  4:30 PM EDT -----  Subject: Message to Provider    QUESTIONS  Information for Provider? Patient called in said she has been vomiting and   has diarrhea, she is wanting to know if there can be a prescription sent   over to help . Best number if any questions is [phone] messages okay   ---------------------------------------------------------------------------  --------------  CALL BACK INFO  What is the best way for the office to contact you? OK to leave message on   voicemail  Preferred Call Back Phone Number? [phone]  ---------------------------------------------------------------------------  --------------  SCRIPT ANSWERS  Relationship to Patient?  Self

## 2021-09-13 NOTE — PROGRESS NOTES
9/13/2021    HPI:  Chief complaint and history of present illness as per medical assistant/nurse documented today in the Flu/COVID-19 clinic. Patient is here with complaints of cough, chest/nasal congestion, SOB at times, headache, muscle aches, nausea, vomiting - resolved, diarrhea, and fatigue x 4 days. Patient states she did get her covid vaccine. MEDICATIONS:  Prior to Visit Medications    Medication Sig Taking? Authorizing Provider   methylPREDNISolone (MEDROL, SERGE,) 4 MG tablet Take by mouth.  Yes ERLINDA Peña CNP   ondansetron (ZOFRAN-ODT) 8 MG TBDP disintegrating tablet Take 1 tablet by mouth every 12 hours as needed for Nausea or Vomiting Yes ERLINDA Peña CNP   insulin aspart (NOVOLOG FLEXPEN) 100 UNIT/ML injection pen **Low Dose Correction Algorithm**Insulin lispro (HUMALOG)  3 TIMES DAILY WITH MEALSGlucose: Dose: No [id number] 1 [id number] 2 [id number] 3 [id number] 4 [id number] 5 Qbvmb069 and above 6 Units  Javier Brian MD   Glucosource Lancets MISC Use one 3-4 times to check blood sugar  Javier Brian MD   midodrine (PROAMATINE) 10 MG tablet Take 1 tablet by mouth 3 times daily (with meals)  Melissa Espinoza MD   metoprolol succinate (TOPROL XL) 50 MG extended release tablet Take 1 tablet by mouth daily  Melissa Espinoza MD   ipratropium-albuterol (DUONEB) 0.5-2.5 (3) MG/3ML SOLN nebulizer solution Inhale 3 mLs into the lungs every 4 hours  Reta Zhang DO   furosemide (LASIX) 20 MG tablet Take 1 tablet by mouth daily Hold if systolic <472  ERLINDA Renae CNP   predniSONE (DELTASONE) 10 MG tablet Take 1 tablet by mouth daily 40mg daily for 2 days, 20 mg daily for 2 days, 10 mg daily for 2 days, 5 mg daily for 2 days  Willie Becker MD   lactulose (CHRONULAC) 10 GM/15ML solution Take 30 mLs by mouth 3 times daily  Fidela Canavan, MD   QUEtiapine (SEROQUEL) 50 MG tablet Take 3 tablets by mouth nightly  Fidela Canavan, MD   rifaximin (XIFAXAN) 550 MG 300 mg by mouth daily   Historical Provider, MD       Allergies   Allergen Reactions    Norco [Hydrocodone-Acetaminophen] Itching     Itching, rash, nausea and vomiting.  Tylenol [Acetaminophen] Itching, Nausea And Vomiting and Rash   ,   Past Medical History:   Diagnosis Date    Acid reflux     Arthritis     left knee    CAD (coronary artery disease)     per [facility] 9/6/13 - Dr. Henny Rhodes COPD (chronic obstructive pulmonary disease) ([facility]     follow with Dr Wilfredo Gramajo Depression     \"have manic - depression see Dr Renee Nichols Diabetes mellitus [facility])     dx 10+ yrs ago- follows with PCP    Drug abuse ([facility]     hx use of cocaine, heroin and marijuana- states last used 12/2014    Glaucoma     bilateral    H/O Doppler lower venous ultrasound 04/04/2019    No DVT or SVT, Significant reflux of RGSV and LGSV.    H/O echocardiogram 12/18/2020    EF 55-60%, Mod LVH.  Hepatic encephalopathy ([facility] 05/2019    Hepatitis C     for liver bx 12/3/2015\"Have Hepatitis B and C and saw Dr Ronda Crowder for this 12/1/2015\"    Hiatal hernia     History of alcohol abuse     History of exercise stress test 01/02/2020    Treadmill, Normal exercise performance without angina and ischemic EKG changes.     HTN (hypertension)     \"for the past two yrs on medication\" follows with Dr Henny Rhodes Hx of blood clots 2019    Portal vein thrombsis - TIPS procedure 4/23/19    Hyperlipemia     Irregular heart beat     per pt    Liver hematoma     Migraines     Last migraine:  2018    Nausea & vomiting     Schizophrenia ([facility]     per old chart    Seizures ([facility]     \"last one was 9/2015- saw Dr Blayne Sandoval at [facility]- she said not sure if acutal seizures- she thinks they are panic or anxiety attacks\"    SOB (shortness of breath)     with any exertion   ,   Past Surgical History:   Procedure Laterality Date    BREAST SURGERY  10/2015    left breast bx    CARDIAC CATHETERIZATION      per old chart pt had cath done in 3/2011 and 9/2013   Becky Barnard CARPAL TUNNEL RELEASE Left 1/9/2020    CARPAL TUNNEL RELEASE LEFT performed by Luis Enrique Little DO at [address] Right 05/26/2020    dr Harland Dakin, carpal tunnel trigger finger release    CARPAL TUNNEL RELEASE Right 5/26/2020    RIGHT CARPAL TUNNEL RELEASE performed by Luis Enrique iLttle DO at [id number] [place]  per old chart done [facility]  3/11/13    diverticulosis, cecal polyp    COLONOSCOPY  03/16/2017    Internal hemorrhoids- Dr. Javier Rivera, COLON, DIAGNOSTIC  03/16/2017    EGD: Small esophageal varices, portal hypertensive gastropathy, reflux esophagitis, hiatal hernia    EYE SURGERY Bilateral ? when    cyst removal, cataracts w/lens replacement    FINGER TRIGGER RELEASE Right 5/26/2020    FINGER TRIGGER RELEASE RIGHT RING FINGER performed by Luis Enrique Little DO at [facility]      per old chart pt had CHOLO/BSO 1986    TIPS PROCEDURE  04/23/2019    TONSILLECTOMY  as a kid    UPPER GASTROINTESTINAL ENDOSCOPY N/A 11/14/2018    EGD DIAGNOSTIC ONLY performed by Sarah Barreto MD at [facility] N/A 6/5/2019    EGD DIAGNOSTIC ONLY performed by Sarah Barreto MD at [facility] ENDOSCOPY   ,   Social History     Tobacco Use    Smoking status: Current Some Day Smoker     Packs/day: 0.50     Years: 45.00     Pack years: 22.50     Types: Cigarettes     Start date: 1974    Smokeless tobacco: Never Used   Vaping Use    Vaping Use: Never used   Substance Use Topics    Alcohol use: Not Currently     Alcohol/week: 2.0 - 3.0 standard drinks     Types: 2 - 3 Shots of liquor per week     Comment: 2-3 shots last 4/2019    Drug use: Yes     Frequency: 3.0 times per week     Types: Marijuana     Comment: daily   ,   Family History   Problem Relation Age of Onset    Cancer Mother         lung ca    Arthritis Mother     Migraines Mother     Cancer Father         colon ca    Diabetes Father     High Blood Pressure Father  Arthritis Father     High Cholesterol Father     Migraines Father     Migraines Sister     Heart Disease Brother         WPW   ,   Immunization History   Administered Date(s) Administered    COVID-19, Steve SilvaMARY ANNE, 30mcg/0.3mL 03/23/2021, 04/14/2021    Influenza Virus Vaccine 09/10/2018, 10/29/2020    Influenza, Quadv, IM, (6 mo and older Fluzone, Flulaval, Fluarix and 3 yrs and older Afluria) 08/22/2017    Influenza, Quadv, IM, PF (6 mo and older Fluzone, Flulaval, Fluarix, and 3 yrs and older Afluria) 12/19/2019    Pneumococcal Polysaccharide ([id number]) 07/31/2017   ,   Health Maintenance   Topic Date Due    Hepatitis A vaccine (1 of 2 - Risk 2-dose series) Never done    Diabetic retinal exam  Never done    Hepatitis B vaccine (1 of 3 - Risk 3-dose series) Never done    DTaP/Tdap/Td vaccine (1 - Tdap) Never done    Shingles Vaccine (1 of 2) Never done    Low dose CT lung screening  11/07/2015    Diabetic foot exam  06/04/2019    Annual Wellness Visit (AWV)  Never done    Lipid screen  11/27/2020    Diabetic microalbuminuria test  01/30/2021    Breast cancer screen  06/14/2021    Flu vaccine (1) 09/01/2021    A1C test (Diabetic or Prediabetic)  09/13/2021    Potassium monitoring  08/21/2022    Creatinine monitoring  08/21/2022    Colon cancer screen colonoscopy  03/16/2027    Pneumococcal 0-64 years Vaccine (2 of 2 - PPSV23) 12/11/2027    COVID-19 Vaccine  Completed    HIV screen  Completed    Hib vaccine  Aged Out    Meningococcal (ACWY) vaccine  Aged Out       PHYSICAL EXAM:  Physical Exam  Constitutional:       Appearance: Normal appearance. HENT:      Head: Normocephalic. Right Ear: Tympanic membrane, ear canal and external ear normal.      Left Ear: Tympanic membrane, ear canal and external ear normal.      Nose: Congestion present. Mouth/Throat:      Lips: Pink. Mouth: Mucous membranes are moist.      Pharynx: Oropharynx is clear.    Cardiovascular:      Rate and Rhythm: Normal rate and regular rhythm. Heart sounds: Normal heart sounds. Pulmonary:      Effort: Pulmonary effort is normal.      Breath sounds: Wheezing (scattered) present. Musculoskeletal:      Cervical back: Neck supple. Skin:     General: Skin is warm and dry. Neurological:      Mental Status: She is alert and oriented to person, place, and time. Psychiatric:         Mood and Affect: Mood normal.         Behavior: Behavior normal.         ASSESSMENT/PLAN:  1. Cough  Explained with patient if her covid test is negative and symptoms are not improving would consider an antibiotic at that time. Your COVID 19 test can take 1-5 days for the results to come back. We ask that you make a [employer] page and view your test results this way. You will need to Self quarantine until you know your results. Increase fluids and rest  Saline nasal spray as needed for nasal congestion  Warm salt gargles as needed for throat discomfort  Monitor temperature twice a day  Tylenol as needed for fevers and/or discomfort. Big deep breaths periodically throughout the day  Regular Mucinex over the counter as needed for chest congestion  If symptoms worsen -Go to the ER. Follow up with your primary care provider  - Covid-19 Ambulatory    2. Chest congestion  - Covid-19 Ambulatory    3. Nasal congestion  - Covid-19 Ambulatory    4. Muscle ache  - Covid-19 Ambulatory    5. Nausea  - Covid-19 Ambulatory  - ondansetron (ZOFRAN-ODT) 8 MG TBDP disintegrating tablet; Take 1 tablet by mouth every 12 hours as needed for Nausea or Vomiting  Dispense: 15 tablet; Refill: 0    6. Diarrhea, unspecified type  - Covid-19 Ambulatory    7. Fatigue, unspecified type  - Covid-19 Ambulatory    8. Wheezing  - Covid-19 Ambulatory  - methylPREDNISolone (MEDROL, SERGE,) 4 MG tablet; Take by mouth. Dispense: 1 kit; Refill: 0      FOLLOW-UP:  Return if symptoms worsen or fail to improve.     In addition to other information, the printed after visit summary provided to the patient includes:  [x] COVID-19 Self care instructions  [x] COVID-19 General patient information

## 2021-09-15 LAB
SARS-COV-2: NOT DETECTED
SOURCE: NORMAL

## 2021-10-04 ENCOUNTER — TELEPHONE (OUTPATIENT)
Dept: CARDIOLOGY CLINIC | Age: 61
End: 2021-10-04

## 2021-10-04 NOTE — TELEPHONE ENCOUNTER
Left message. Rosie Walden Approved.  9/8/2021-10-8/2021  [id number]      I left message asking if this Friday would be okay for them  At 10am

## 2021-10-05 ENCOUNTER — TELEPHONE (OUTPATIENT)
Dept: CARDIOLOGY CLINIC | Age: 61
End: 2021-10-05

## 2021-10-08 ENCOUNTER — TELEPHONE (OUTPATIENT)
Dept: FAMILY MEDICINE CLINIC | Age: 61
End: 2021-10-08

## 2021-10-08 ENCOUNTER — PROCEDURE VISIT (OUTPATIENT)
Dept: CARDIOLOGY CLINIC | Age: 61
End: 2021-10-08
Payer: COMMERCIAL

## 2021-10-08 DIAGNOSIS — E11.8 TYPE 2 DIABETES MELLITUS WITH COMPLICATION, WITH LONG-TERM CURRENT USE OF INSULIN (HCC): ICD-10-CM

## 2021-10-08 DIAGNOSIS — R10.11 ABDOMINAL PAIN, RIGHT UPPER QUADRANT: ICD-10-CM

## 2021-10-08 DIAGNOSIS — I83.893 VARICOSE VEINS OF BOTH LEGS WITH EDEMA: Primary | ICD-10-CM

## 2021-10-08 DIAGNOSIS — E66.01 MORBIDLY OBESE (HCC): ICD-10-CM

## 2021-10-08 DIAGNOSIS — Z79.4 TYPE 2 DIABETES MELLITUS WITH COMPLICATION, WITH LONG-TERM CURRENT USE OF INSULIN (HCC): ICD-10-CM

## 2021-10-08 DIAGNOSIS — Z72.0 TOBACCO ABUSE: ICD-10-CM

## 2021-10-08 DIAGNOSIS — E78.5 DYSLIPIDEMIA: ICD-10-CM

## 2021-10-08 PROCEDURE — 36482 ENDOVEN THER CHEM ADHES 1ST: CPT | Performed by: INTERNAL MEDICINE

## 2021-10-08 NOTE — TELEPHONE ENCOUNTER
----- Message from Lyn Wagner sent at 10/8/2021 10:51 AM EDT -----  Subject: Message to Provider    QUESTIONS  Information for Provider? Pt had Endovenous Ablation done on right leg   this morning. Pt is in a great deal of pain and was not given any pain   meds during procedure. She was told to talk to PCP about pain meds and pt   requested a refill on a pain medication. Please advise  --  CALL BACK INFO  What is the best way for the office to contact you? OK to leave message on   voicemail  Preferred Call Back Phone Number?  [phone]

## 2021-10-08 NOTE — TELEPHONE ENCOUNTER
----- Message from Johana Salmeron sent at 10/8/2021 10:44 AM EDT -----  Subject: Refill Request    QUESTIONS  Name of Medication? promethazine (PHENERGAN) 25 MG tablet  Patient-reported dosage and instructions? 25 mg twice daily  How many days do you have left? 0  Preferred Pharmacy? [facility] phone number (if available)? [phone]  ---------------------------------------------------------------------------  --------------,  Name of Medication? insulin aspart (NOVOLOG FLEXPEN) 100 UNIT/ML injection   pen  Patient-reported dosage and instructions? daily  How many days do you have left? 0  Preferred Pharmacy? [facility] phone number (if available)? [phone]  ---------------------------------------------------------------------------  --------------,  Name of Medication? insulin glargine (LANTUS SOLOSTAR) 100 UNIT/ML   injection pen  Patient-reported dosage and instructions? daily  How many days do you have left? 0  Preferred Pharmacy? [facility] phone number (if available)? [phone]  ---------------------------------------------------------------------------  --------------,  Name of Medication? oxyCODONE (ROXICODONE) 5 MG immediate release tablet  Patient-reported dosage and instructions? 5 mg three times daily  How many days do you have left? 0  Preferred Pharmacy? [facility] phone number (if available)? [phone]  ---------------------------------------------------------------------------  --------------  CALL BACK INFO  What is the best way for the office to contact you? OK to leave message on   voicemail  Preferred Call Back Phone Number?  [phone]

## 2021-10-08 NOTE — PROGRESS NOTES
VENOUS PRE-PROCEDURE H & P  10/8/2021    Subjective:  Christian Keyes is a 64 y.o. female    GENERAL - A-Ox3- In no respiratory distress  Heart - Regular rhythm, normal S1, S2, no gallops, no murmurs, no friction rubs  Chest - CTA & percussion    Vein Exam:   Right ext - varicosities, spider and reticular veins Yes, skin changes Yes, ulcers No, edema Yes  Left ext  - varicosities, spider and reticular veins Yes, skin changes Yes, ulcers No, edema Yes    Reflex Study:    No evidence of DVT or SVT in the bilateral common femoral vein, femoral    vein, popliteal vein, greater saphenous vein or small saphenous vein.    Significant reflux noted of the Right GSV at mid thigh (2.9s).  Significant reflux noted in the Left CFV. Assessment:   Patient has symptomatic C4 venous disease. Plan:   Ablation of right GSV  Informed consent obtained.     Yolanda Sloan MD, [facility]

## 2021-10-08 NOTE — PROGRESS NOTES
Endovenous Ablation with VenaSeal Operative Report    10/8/2021    Subjective:  Maryse Tsang is a 64 y.o. female    Procedure Performed:    Endovenous Ablation of the Great Saphenous Vein with VenaSeal Closure System of the  Right side. Indication  Duplex ultrasound was used to map out the insufficient saphenous vein, and access was determined and marked on the overlying skin. The depth and diameter of the vein(s) to be treated was documented. The patient was placed supine on the procedure table and the leg was prepped and draped using sterile technique. Ultrasound guidance was again used to localize the access site. 1% lidocaine was injected as a local anesthetic in the subcutaneous tissues at the target location in the GSV in the lower leg. Using ultrasound guidance, access was gained at this location with the 19 gauge thin walled access needle and followed by introduction of a short guidewire, location confirmed with ultrasound. A small, 3 mm incision was made at the access site to allow for introduction and placement of the 7 Fr x7cm introducer/dilator. The dilator and guidewire were removed. The 0.035 guidewire from the DR SHANKAR [facility] kit was then introduced and positioned at the saphenofemoral junction using ultrasound guidance. The 80 cm 7 Fr introducer sheath/dilator was positioned 5cm from the saphenofemoral junction. The guidewire and dilator were removed, and the remaining sheath was flushed with sterile saline, with the syringe remaining in place prior to the next steps. The cyanoacrylate adhesive was precisely primed into the 5 F delivery catheter and this catheter/syringe combination was attached within the dispenser gun. This \"assembly\" was introduced through the 7 F sheath and positioned 5 cm caudal of the saphenofemoral junction under ultrasound guidance.  The steps from the IFU were followed for dispensing amounts, locations and compression times, e.g 2 aliquots proximally with 3 minutes of compression, and 1 aliquot every 3cm distally with 30 sec of compression along the course of the vessel. Following the last injection and compression sequence, the catheter and introducer sheath were pulled out from the access site. Hemostasis was achieved with manual compression and an adhesive bandage was applied to the incision. Ultrasound confirmed complete coaptation and closure of the treated segments of the GSV, and the absence of any DVT at the saphenofemoral junction. Treatment dose was approximately 1.4 ml and the vein length treated was 36 cm. The drapes were removed and the patient cleaned and prepared for discharge. Post op ultrasound check is scheduled for   48- 72 hours and the patient was given written post-op instructions. Complications: none iommediate    Blood Loss: minimal    Conclusion:   Successful Endovenous Ablation of the Great Saphenous Vein with VenaSeal Closure System of the  Right side.       Jose Cuenca MD, [facility]

## 2021-10-11 ENCOUNTER — PROCEDURE VISIT (OUTPATIENT)
Dept: CARDIOLOGY CLINIC | Age: 61
End: 2021-10-11

## 2021-10-11 DIAGNOSIS — I87.301 CHRONIC VENOUS HYPERTENSION INVOLVING RIGHT SIDE: Primary | ICD-10-CM

## 2021-10-11 DIAGNOSIS — R11.0 NAUSEA: ICD-10-CM

## 2021-10-11 DIAGNOSIS — R10.11 ABDOMINAL PAIN, RIGHT UPPER QUADRANT: ICD-10-CM

## 2021-10-11 RX ORDER — INSULIN GLARGINE 100 [IU]/ML
15 INJECTION, SOLUTION SUBCUTANEOUS NIGHTLY
Qty: 5 PEN | Refills: 3 | Status: SHIPPED | OUTPATIENT
Start: 2021-10-11 | End: 2022-01-13 | Stop reason: SDUPTHER

## 2021-10-11 RX ORDER — PROMETHAZINE HYDROCHLORIDE 25 MG/1
25 TABLET ORAL EVERY 6 HOURS PRN
Qty: 28 TABLET | Refills: 0 | Status: SHIPPED | OUTPATIENT
Start: 2021-10-11 | End: 2021-10-18

## 2021-10-11 RX ORDER — INSULIN GLARGINE 100 [IU]/ML
15 INJECTION, SOLUTION SUBCUTANEOUS NIGHTLY
Qty: 5 PEN | Refills: 3 | OUTPATIENT
Start: 2021-10-11

## 2021-10-11 RX ORDER — INSULIN ASPART 100 [IU]/ML
INJECTION, SOLUTION INTRAVENOUS; SUBCUTANEOUS
Qty: 5 PEN | Refills: 3 | Status: SHIPPED | OUTPATIENT
Start: 2021-10-11 | End: 2022-01-13 | Stop reason: SDUPTHER

## 2021-10-11 RX ORDER — OXYCODONE HYDROCHLORIDE 5 MG/1
5 TABLET ORAL EVERY 6 HOURS PRN
Qty: 12 TABLET | Refills: 0 | Status: SHIPPED | OUTPATIENT
Start: 2021-10-11 | End: 2021-10-14

## 2021-10-11 RX ORDER — OXYCODONE HYDROCHLORIDE 5 MG/1
5 TABLET ORAL EVERY 6 HOURS PRN
Qty: 12 TABLET | Refills: 0 | OUTPATIENT
Start: 2021-10-11 | End: 2021-10-14

## 2021-10-11 RX ORDER — ONDANSETRON 8 MG/1
8 TABLET, ORALLY DISINTEGRATING ORAL EVERY 12 HOURS PRN
Qty: 15 TABLET | Refills: 0 | Status: SHIPPED | OUTPATIENT
Start: 2021-10-11 | End: 2022-01-03

## 2021-10-11 RX ORDER — INSULIN ASPART 100 [IU]/ML
INJECTION, SOLUTION INTRAVENOUS; SUBCUTANEOUS
Qty: 5 PEN | Refills: 3 | OUTPATIENT
Start: 2021-10-11

## 2021-10-13 ENCOUNTER — TELEPHONE (OUTPATIENT)
Dept: FAMILY MEDICINE CLINIC | Age: 61
End: 2021-10-13

## 2021-10-13 NOTE — TELEPHONE ENCOUNTER
Received a fax from [employer] for incontinence supply. Patient has never used incontinence supply per our records not is there any Dx of incontinence. Form shredded.

## 2021-10-18 ENCOUNTER — TELEPHONE (OUTPATIENT)
Dept: CARDIOLOGY CLINIC | Age: 61
End: 2021-10-18

## 2021-10-18 NOTE — TELEPHONE ENCOUNTER
Leg doppler 10/11/2021  The Right GSV is non-compressible with no evidence of flow just past the    saphenofemoral junction to the knee.    Right CFV is patent with good compressibility and respirophasic signal with    good augmentation

## 2021-10-27 ENCOUNTER — TELEPHONE (OUTPATIENT)
Dept: FAMILY MEDICINE CLINIC | Age: 61
End: 2021-10-27

## 2021-10-27 NOTE — TELEPHONE ENCOUNTER
Called and LM for pt to call the office. She is due to appt with PCP but needs A1c Checked. I was going to try to get her in with Mary on 11/8/21 or get her an appt with pcp when she is available.

## 2021-11-08 ENCOUNTER — FOLLOWUP TELEPHONE ENCOUNTER (OUTPATIENT)
Dept: FAMILY MEDICINE CLINIC | Age: 61
End: 2021-11-08

## 2021-11-08 ENCOUNTER — OFFICE VISIT (OUTPATIENT)
Dept: FAMILY MEDICINE CLINIC | Age: 61
End: 2021-11-08
Payer: COMMERCIAL

## 2021-11-08 DIAGNOSIS — E11.8 TYPE 2 DIABETES MELLITUS WITH COMPLICATION, WITH LONG-TERM CURRENT USE OF INSULIN (HCC): Primary | ICD-10-CM

## 2021-11-08 DIAGNOSIS — Z79.4 TYPE 2 DIABETES MELLITUS WITH COMPLICATION, WITH LONG-TERM CURRENT USE OF INSULIN (HCC): Primary | ICD-10-CM

## 2021-11-08 LAB — HBA1C MFR BLD: 8.1 %

## 2021-11-08 PROCEDURE — 83036 HEMOGLOBIN GLYCOSYLATED A1C: CPT | Performed by: FAMILY MEDICINE

## 2021-11-08 PROCEDURE — 99211 OFF/OP EST MAY X REQ PHY/QHP: CPT | Performed by: FAMILY MEDICINE

## 2021-11-08 PROCEDURE — 3052F HG A1C>EQUAL 8.0%<EQUAL 9.0%: CPT | Performed by: FAMILY MEDICINE

## 2021-11-08 NOTE — TELEPHONE ENCOUNTER
Contact lens Rx given - no change. Patient states that she is trying to get incontinence pads from [address] [place] [address] that there is paperwork that has been sent to PCP office for review/signature. Please call patient to follow up. TENNILLE Pool RN  Diabetes Educator  [address] [place] [address]  [phone] office  [phone] cell  [phone] fax  [email or web address]

## 2021-11-09 NOTE — TELEPHONE ENCOUNTER
Have not seen anything for this patient. I was out of the office yesterday will check the faxes that have come in and see if orders are there. Will speak with patient then.

## 2021-11-11 ENCOUNTER — OFFICE VISIT (OUTPATIENT)
Dept: CARDIOLOGY CLINIC | Age: 61
End: 2021-11-11
Payer: COMMERCIAL

## 2021-11-11 ENCOUNTER — TELEPHONE (OUTPATIENT)
Dept: FAMILY MEDICINE CLINIC | Age: 61
End: 2021-11-11

## 2021-11-11 ENCOUNTER — PROCEDURE VISIT (OUTPATIENT)
Dept: CARDIOLOGY CLINIC | Age: 61
End: 2021-11-11
Payer: COMMERCIAL

## 2021-11-11 VITALS
WEIGHT: 192.6 LBS | HEIGHT: 57 IN | HEART RATE: 70 BPM | DIASTOLIC BLOOD PRESSURE: 70 MMHG | BODY MASS INDEX: 41.55 KG/M2 | SYSTOLIC BLOOD PRESSURE: 122 MMHG

## 2021-11-11 DIAGNOSIS — Z79.4 TYPE 2 DIABETES MELLITUS WITH COMPLICATION, WITH LONG-TERM CURRENT USE OF INSULIN (HCC): ICD-10-CM

## 2021-11-11 DIAGNOSIS — E78.2 MIXED HYPERLIPIDEMIA: ICD-10-CM

## 2021-11-11 DIAGNOSIS — E78.5 DYSLIPIDEMIA: ICD-10-CM

## 2021-11-11 DIAGNOSIS — I87.301 IDIOPATHIC CHRONIC VENOUS HYPERTENSION OF RIGHT LOWER EXTREMITY WITHOUT COMPLICATIONS: Primary | ICD-10-CM

## 2021-11-11 DIAGNOSIS — E11.8 TYPE 2 DIABETES MELLITUS WITH COMPLICATION, WITH LONG-TERM CURRENT USE OF INSULIN (HCC): ICD-10-CM

## 2021-11-11 DIAGNOSIS — E66.01 MORBIDLY OBESE (HCC): ICD-10-CM

## 2021-11-11 DIAGNOSIS — I83.893 VARICOSE VEINS OF BOTH LEGS WITH EDEMA: Primary | ICD-10-CM

## 2021-11-11 DIAGNOSIS — I10 ESSENTIAL HYPERTENSION: ICD-10-CM

## 2021-11-11 DIAGNOSIS — Z72.0 TOBACCO ABUSE: ICD-10-CM

## 2021-11-11 DIAGNOSIS — G25.81 RESTLESS LEGS: ICD-10-CM

## 2021-11-11 PROCEDURE — 99212 OFFICE O/P EST SF 10 MIN: CPT | Performed by: INTERNAL MEDICINE

## 2021-11-11 PROCEDURE — 3017F COLORECTAL CA SCREEN DOC REV: CPT | Performed by: INTERNAL MEDICINE

## 2021-11-11 PROCEDURE — 3052F HG A1C>EQUAL 8.0%<EQUAL 9.0%: CPT | Performed by: INTERNAL MEDICINE

## 2021-11-11 PROCEDURE — G8417 CALC BMI ABV UP PARAM F/U: HCPCS | Performed by: INTERNAL MEDICINE

## 2021-11-11 PROCEDURE — G8484 FLU IMMUNIZE NO ADMIN: HCPCS | Performed by: INTERNAL MEDICINE

## 2021-11-11 PROCEDURE — G8427 DOCREV CUR MEDS BY ELIG CLIN: HCPCS | Performed by: INTERNAL MEDICINE

## 2021-11-11 PROCEDURE — 2022F DILAT RTA XM EVC RTNOPTHY: CPT | Performed by: INTERNAL MEDICINE

## 2021-11-11 PROCEDURE — 93971 EXTREMITY STUDY: CPT | Performed by: INTERNAL MEDICINE

## 2021-11-11 PROCEDURE — 4004F PT TOBACCO SCREEN RCVD TLK: CPT | Performed by: INTERNAL MEDICINE

## 2021-11-11 NOTE — LETTER
[facility]  [address] [place] [address] [address]  Phone: [phone]  Fax: [phone]    Chanel St MD    November 11, 2021     Richard Franco MD  [address] [facility]    Patient: Gabriela Edwards   MR Number: [id number]   YOB: 1960   Date of Visit: 11/11/2021       Dear Richard Franco: Thank you for referring Deyanira Porras to me for evaluation/treatment. Below are the relevant portions of my assessment and plan of care. If you have questions, please do not hesitate to call me. I look forward to following Delmar Bucio along with you.     Sincerely,      Chanel St MD

## 2021-11-11 NOTE — PATIENT INSTRUCTIONS
CORONARY ARTERY DISEASE:No, has myocardial bridge. HTN:well controlled on current medical regimen, see list above.             - changes in  treatment:   no   CARDIOMYOPATHY: None known   CONGESTIVE HEART FAILURE: NO KNOWN HISTORY.   VHD: No significant VHD noted  DYSLIPIDEMIA: Patient's profile is at / near [facility] is low                                See most recent Lab values in Labs section above. OTHER RELEVANT DIAGNOSIS:as noted in patient's active problem list:  Morbid obesity: Diet & Exercise. ARRHYTHMIAS:no  CHRONIC VENOUS INSUFFICIENCY:yes, Patient has been using compression stockings. Has C4 venous disease with abnormal vein study. S/P right GSV ablation with good results. Explained to the patient. Need to continue to wear stockings due to deep vein Reflux. TESTS ORDERED: None this visit     PREVIOUSLY ORDERED TESTS REVIEWED & DISCUSSED WITH THE PATIENT:     I personally reviewed & interpreted, all previously ordered tests as copied above. Latest Labs are pulled in to the note with dates. Labs, specially in Reference to Lipid profile, Cardiac testing in the form of Echo, stress tests & other relevant cardiac testing reviewed with patient & recommendations made based on assessment of the results. MEDICATIONS: List of medications patient is currently taking is reviewed in detail with the patient . Discussed any side effects or problems taking the medication. Recommend Continue present management & medications as listed. AFFIRMATION: I spent at least 20 minutes of time reviewing patient's history, previous & current medical problems & all Labs + testing. This includes chart prep even prior to the vosit. Various goals are discussed and multiple questions answered. Relevant concelling performed. Office follow up in six months.

## 2021-11-11 NOTE — PROGRESS NOTES
Madina Christian is a 64 y.o. female who has    CHIEF COMPLAINT AS FOLLOWS:  CHEST PAIN: Patient denies any C/O chest pains at this time. SOB: No C/O SOB at this time. LEG EDEMA:   B/L Lower extremity edema is present but better than before. PALPITATIONS: Denies any C/O Palpitations   DIZZINESS: No C/O Dizziness   SYNCOPE: None   OTHER:                                     HPI: Patient is here for F/U on his CVI, HTN & Dyslipidemia problems. CVI: S/P Ablation  HTN: Patient has known essential HTN. Has been treated with guideline recommended medical / physical/ diet therapy as stated below. Dyslipidemia: Patient has known mixed dyslipidemia. Has been treated with guideline recommended medical / physical/ diet therapy as stated below.                 Current Outpatient Medications   Medication Sig Dispense Refill    insulin aspart (NOVOLOG FLEXPEN) 100 UNIT/ML injection pen **Low Dose Correction Algorithm**Insulin lispro (HUMALOG)  3 TIMES DAILY WITH MEALSGlucose: Dose: No [id number] 1 [id number] 2 [id number] 3 [id number] 4 [id number] 5 Etvbc109 and above 6 Units 5 pen 3    insulin glargine (LANTUS SOLOSTAR) 100 UNIT/ML injection pen Inject 15 Units into the skin nightly 5 pen 3    ondansetron (ZOFRAN-ODT) 8 MG TBDP disintegrating tablet Take 1 tablet by mouth every 12 hours as needed for Nausea or Vomiting 15 tablet 0    Glucosource Lancets MISC Use one 3-4 times to check blood sugar 100 each 5    midodrine (PROAMATINE) 10 MG tablet Take 1 tablet by mouth 3 times daily (with meals) 90 tablet 3    metoprolol succinate (TOPROL XL) 50 MG extended release tablet Take 1 tablet by mouth daily 30 tablet 2    ipratropium-albuterol (DUONEB) 0.5-2.5 (3) MG/3ML SOLN nebulizer solution Inhale 3 mLs into the lungs every 4 hours 360 mL 0    furosemide (LASIX) 20 MG tablet Take 1 tablet by mouth daily Hold if systolic <866 30 tablet 1    predniSONE (DELTASONE) 10 MG tablet Take 1 tablet by mouth daily 40mg daily for 2 days, 20 mg daily for 2 days, 10 mg daily for 2 days, 5 mg daily for 2 days 15 tablet 0    lactulose (CHRONULAC) 10 GM/15ML solution Take 30 mLs by mouth 3 times daily 1892 mL 2    QUEtiapine (SEROQUEL) 50 MG tablet Take 3 tablets by mouth nightly 60 tablet 3    rifaximin (XIFAXAN) 550 MG tablet Take 1 tablet by mouth 2 times daily 42 tablet 0    FLUoxetine (PROZAC) 20 MG capsule Take 20 mg by mouth daily      neomycin (MYCIFRADIN) 500 MG tablet       traMADol (ULTRAM) 50 MG tablet       paliperidone (INVEGA) 3 MG extended release tablet       nitroGLYCERIN (NITROSTAT) 0.4 MG SL tablet Place 1 tablet under the tongue every 5 minutes as needed for Chest pain 25 tablet 3    ranolazine (RANEXA) 500 MG extended release tablet Take 1 tablet by mouth 2 times daily 60 tablet 5    Compression Stockings MISC by Does not apply route 20 - 30 mmh wear daily and take off at night  Thigh High 2 each 2    blood glucose monitor strips Test  Bid times a day & as needed for symptoms of irregular blood glucose. 100 strip 5    UNABLE TO FIND Rx for depends   Use 3-4  times daily 1 box 5    albuterol (PROVENTIL) (2.5 MG/3ML) 0.083% nebulizer solution Take 3 mLs by nebulization every 6 hours 120 vial 5    glucose monitoring kit (FREESTYLE) monitoring kit 1 kit by Does not apply route daily 1 kit 0    Blood Glucose Monitoring Suppl (ACURA BLOOD GLUCOSE METER) w/Device KIT Please check blood sugar 3 times daily. Please fill with what is covered by the insurance 1 kit 0    blood glucose monitor strips Test 3 times a day & as needed for symptoms of irregular blood glucose. Please fill with what is covered my patients insurance. 50 strip 3    SOFT TOUCH LANCETS MISC Please check blood sugar 3 times daily.  Please fill with what is covered by insurance 100 each 3    pantoprazole (PROTONIX) 40 MG tablet Take 1 tablet by mouth 2 times daily (before meals) 60 tablet 1    Component Value Date    CHOL 98 11/27/2019    CHOL 120 08/01/2017    TRIG 96 11/27/2019    TRIG 138 08/01/2017    HDL 38 (L) 11/27/2019    HDL 25 (L) 08/01/2017    LDLCALC 41 11/27/2019    LDLCALC 67 08/01/2017    LDLDIRECT 86 04/28/2013     Lab Results   Component Value Date    ALT 13 08/21/2021    ALT 7 (L) 12/08/2020    AST 18 08/21/2021    AST 25 12/08/2020     BMP:    Lab Results   Component Value Date     08/21/2021     12/08/2020    K 4.7 08/21/2021    K 3.5 12/08/2020     08/21/2021    CL 96 12/08/2020    CO2 24 08/21/2021    CO2 24 12/08/2020    BUN 11 08/21/2021    BUN 9 12/08/2020    CREATININE 0.7 08/21/2021    CREATININE 1.0 12/08/2020     CMP:   Lab Results   Component Value Date     08/21/2021     12/08/2020    K 4.7 08/21/2021    K 3.5 12/08/2020     08/21/2021    CL 96 12/08/2020    CO2 24 08/21/2021    CO2 24 12/08/2020    BUN 11 08/21/2021    BUN 9 12/08/2020    CREATININE 0.7 08/21/2021    CREATININE 1.0 12/08/2020    PROT 7.7 08/21/2021    PROT 7.9 12/08/2020    PROT 7.4 09/26/2012    PROT 6.6 07/27/2012     Lab Results   Component Value Date    TSH 1.73 08/01/2017    TSHHS 2.540 11/23/2020    TSHHS 0.739 01/13/2015     VENOUS US today     Right CFV is patent with good compressibility and respirophasic signal with    good augmentation.    The Right GSV is non-compressible with no evidence of flow just past the    saphenofemoral junction to the knee. ECHO 12/2020   Technically limited study due to COPD.   Left ventricular function is normal, EF is estimated at 55-60%.   Moderate left ventricular hypertrophy.   No evidence of diastolic dysfunction.   No regional wall motion abnormalities were detected.  RVSP is 22 mmHg.   No significant valvular abnormalities.   No evidence of pericardial effusion.     ETT 1/2020   Normal exercise performance without angina and ischemic EKG changes.    Functional Capacity: Fair Exercise Tolerance.  No chest pain noted during   [address] QUALITY MEASURES REVIEWED:  1.CAD:Patient is taking anti platelet agent:No  Patient does not have Hx of documented CAD  2. DYSLIPIDEMIA: Patient is on cholesterol lowering medication:No .  3.Beta-Blocker therapy for CAD, if prior Myocardial Infarction:Yes   4. Counselled regarding smoking cessation. Yes   Patient does Smoke. 5.Anticoagulation therapy (for A.Fib) No   Does Not have A.Fib.  6.Discussed weight management strategies. Assessment & Plan:  Primary / Secondary prevention is the goal by aggressive risk modification, healthy and therapeutic life style changes for cardiovascular risk reduction. CORONARY ARTERY DISEASE:No, has myocardial bridge. HTN:well controlled on current medical regimen, see list above.             - changes in  treatment:   no   CARDIOMYOPATHY: None known   CONGESTIVE HEART FAILURE: NO KNOWN HISTORY.   VHD: No significant VHD noted  DYSLIPIDEMIA: Patient's profile is at / near [facility] is low                                See most recent Lab values in Labs section above. OTHER RELEVANT DIAGNOSIS:as noted in patient's active problem list:  Morbid obesity: Diet & Exercise. ARRHYTHMIAS:no  CHRONIC VENOUS INSUFFICIENCY:yes, Patient has been using compression stockings. Has C4 venous disease with abnormal vein study. S/P right GSV ablation with good results. Explained to the patient. Need to continue to wear stockings due to deep vein Reflux. TESTS ORDERED: None this visit     PREVIOUSLY ORDERED TESTS REVIEWED & DISCUSSED WITH THE PATIENT:     I personally reviewed & interpreted, all previously ordered tests as copied above. Latest Labs are pulled in to the note with dates. Labs, specially in Reference to Lipid profile, Cardiac testing in the form of Echo, stress tests & other relevant cardiac testing reviewed with patient & recommendations made based on assessment of the results.      MEDICATIONS: List of medications patient is currently taking is reviewed in detail with the patient . Discussed any side effects or problems taking the medication. Recommend Continue present management & medications as listed. AFFIRMATION: I spent at least 20 minutes of time reviewing patient's history, previous & current medical problems & all Labs + testing. This includes chart prep even prior to the vosit. Various goals are discussed and multiple questions answered. Relevant concelling performed. Office follow up in six months.

## 2021-11-11 NOTE — TELEPHONE ENCOUNTER
Patient asking if [employer] has sent incontinence forms yet, spoke with her earlier in the week. No form yet.  She will contact them

## 2021-11-16 ENCOUNTER — TELEPHONE (OUTPATIENT)
Dept: FAMILY MEDICINE CLINIC | Age: 61
End: 2021-11-16

## 2022-01-02 ENCOUNTER — APPOINTMENT (OUTPATIENT)
Dept: GENERAL RADIOLOGY | Age: 62
End: 2022-01-02
Payer: COMMERCIAL

## 2022-01-02 ENCOUNTER — HOSPITAL ENCOUNTER (EMERGENCY)
Age: 62
Discharge: HOME | End: 2022-01-03
Payer: COMMERCIAL

## 2022-01-02 DIAGNOSIS — R11.2 NAUSEA AND VOMITING, INTRACTABILITY OF VOMITING NOT SPECIFIED, UNSPECIFIED VOMITING TYPE: Primary | ICD-10-CM

## 2022-01-02 LAB
SARS-COV-2, NAAT: NOT DETECTED
SOURCE: NORMAL

## 2022-01-02 PROCEDURE — 2580000003 HC RX 258: Performed by: PHYSICIAN ASSISTANT

## 2022-01-02 PROCEDURE — 6360000002 HC RX W HCPCS: Performed by: PHYSICIAN ASSISTANT

## 2022-01-02 PROCEDURE — 85025 COMPLETE CBC W/AUTO DIFF WBC: CPT

## 2022-01-02 PROCEDURE — 84484 ASSAY OF TROPONIN QUANT: CPT

## 2022-01-02 PROCEDURE — 96375 TX/PRO/DX INJ NEW DRUG ADDON: CPT

## 2022-01-02 PROCEDURE — 80053 COMPREHEN METABOLIC PANEL: CPT

## 2022-01-02 PROCEDURE — 81001 URINALYSIS AUTO W/SCOPE: CPT

## 2022-01-02 PROCEDURE — 99285 EMERGENCY DEPT VISIT HI MDM: CPT

## 2022-01-02 PROCEDURE — 71045 X-RAY EXAM CHEST 1 VIEW: CPT

## 2022-01-02 PROCEDURE — 83690 ASSAY OF LIPASE: CPT

## 2022-01-02 PROCEDURE — 96374 THER/PROPH/DIAG INJ IV PUSH: CPT

## 2022-01-02 PROCEDURE — 83880 ASSAY OF NATRIURETIC PEPTIDE: CPT

## 2022-01-02 PROCEDURE — 96361 HYDRATE IV INFUSION ADD-ON: CPT

## 2022-01-02 PROCEDURE — 87635 SARS-COV-2 COVID-19 AMP PRB: CPT

## 2022-01-02 PROCEDURE — 82140 ASSAY OF AMMONIA: CPT

## 2022-01-02 PROCEDURE — 83605 ASSAY OF LACTIC ACID: CPT

## 2022-01-02 PROCEDURE — 93005 ELECTROCARDIOGRAM TRACING: CPT | Performed by: PHYSICIAN ASSISTANT

## 2022-01-02 RX ORDER — 0.9 % SODIUM CHLORIDE 0.9 %
1000 INTRAVENOUS SOLUTION INTRAVENOUS ONCE
Status: COMPLETED | OUTPATIENT
Start: 2022-01-02 | End: 2022-01-02

## 2022-01-02 RX ORDER — MORPHINE SULFATE 2 MG/ML
4 INJECTION, SOLUTION INTRAMUSCULAR; INTRAVENOUS ONCE
Status: COMPLETED | OUTPATIENT
Start: 2022-01-02 | End: 2022-01-02

## 2022-01-02 RX ORDER — ONDANSETRON 2 MG/ML
4 INJECTION INTRAMUSCULAR; INTRAVENOUS ONCE
Status: COMPLETED | OUTPATIENT
Start: 2022-01-02 | End: 2022-01-02

## 2022-01-02 RX ADMIN — MORPHINE SULFATE 4 MG: 2 INJECTION, SOLUTION INTRAMUSCULAR; INTRAVENOUS at 23:41

## 2022-01-02 RX ADMIN — ONDANSETRON 4 MG: 2 INJECTION INTRAMUSCULAR; INTRAVENOUS at 21:51

## 2022-01-02 RX ADMIN — SODIUM CHLORIDE 1000 ML: 9 INJECTION, SOLUTION INTRAVENOUS at 21:51

## 2022-01-02 ASSESSMENT — PAIN SCALES - GENERAL: PAINLEVEL_OUTOF10: 8

## 2022-01-03 VITALS
TEMPERATURE: 97.7 F | OXYGEN SATURATION: 94 % | SYSTOLIC BLOOD PRESSURE: 122 MMHG | HEART RATE: 104 BPM | RESPIRATION RATE: 18 BRPM | DIASTOLIC BLOOD PRESSURE: 81 MMHG

## 2022-01-03 LAB
ALBUMIN SERPL-MCNC: 3.3 GM/DL (ref 3.4–5)
ALP BLD-CCNC: 157 IU/L (ref 40–129)
ALT SERPL-CCNC: 13 U/L (ref 10–40)
AMMONIA: 67 UMOL/L (ref 11–51)
ANION GAP SERPL CALCULATED.3IONS-SCNC: 15 MMOL/L (ref 4–16)
AST SERPL-CCNC: 19 IU/L (ref 15–37)
BACTERIA: ABNORMAL /HPF
BASOPHILS ABSOLUTE: 0.1 K/CU MM
BASOPHILS RELATIVE PERCENT: 0.5 % (ref 0–1)
BILIRUB SERPL-MCNC: 1.5 MG/DL (ref 0–1)
BILIRUBIN URINE: NEGATIVE MG/DL
BLOOD, URINE: ABNORMAL
BUN BLDV-MCNC: 11 MG/DL (ref 6–23)
CALCIUM SERPL-MCNC: 9 MG/DL (ref 8.3–10.6)
CHLORIDE BLD-SCNC: 101 MMOL/L (ref 99–110)
CLARITY: CLEAR
CO2: 21 MMOL/L (ref 21–32)
COLOR: YELLOW
CREAT SERPL-MCNC: 0.7 MG/DL (ref 0.6–1.1)
DIFFERENTIAL TYPE: ABNORMAL
EKG ATRIAL RATE: 132 BPM
EKG DIAGNOSIS: NORMAL
EKG P AXIS: 58 DEGREES
EKG P-R INTERVAL: 136 MS
EKG Q-T INTERVAL: 328 MS
EKG QRS DURATION: 76 MS
EKG QTC CALCULATION (BAZETT): 485 MS
EKG R AXIS: 47 DEGREES
EKG T AXIS: 67 DEGREES
EKG VENTRICULAR RATE: 132 BPM
EOSINOPHILS ABSOLUTE: 0 K/CU MM
EOSINOPHILS RELATIVE PERCENT: 0 % (ref 0–3)
GFR AFRICAN AMERICAN: >60 ML/MIN/1.73M2
GFR NON-AFRICAN AMERICAN: >60 ML/MIN/1.73M2
GLUCOSE BLD-MCNC: 354 MG/DL (ref 70–99)
GLUCOSE, URINE: >500 MG/DL
HCT VFR BLD CALC: 45.6 % (ref 37–47)
HEMOGLOBIN: 16 GM/DL (ref 12.5–16)
IMMATURE NEUTROPHIL %: 1 % (ref 0–0.43)
KETONES, URINE: ABNORMAL MG/DL
LACTATE: 1.7 MMOL/L (ref 0.4–2)
LACTATE: 3.4 MMOL/L (ref 0.4–2)
LEUKOCYTE ESTERASE, URINE: NEGATIVE
LIPASE: 31 IU/L (ref 13–60)
LYMPHOCYTES ABSOLUTE: 0.8 K/CU MM
LYMPHOCYTES RELATIVE PERCENT: 6.9 % (ref 24–44)
MCH RBC QN AUTO: 33 PG (ref 27–31)
MCHC RBC AUTO-ENTMCNC: 35.1 % (ref 32–36)
MCV RBC AUTO: 94 FL (ref 78–100)
MONOCYTES ABSOLUTE: 0.4 K/CU MM
MONOCYTES RELATIVE PERCENT: 4.1 % (ref 0–4)
NITRITE URINE, QUANTITATIVE: NEGATIVE
NUCLEATED RBC %: 0 %
PDW BLD-RTO: 13.9 % (ref 11.7–14.9)
PH, URINE: 7 (ref 5–8)
PLATELET # BLD: 94 K/CU MM (ref 140–440)
PMV BLD AUTO: 11.6 FL (ref 7.5–11.1)
POTASSIUM SERPL-SCNC: 4 MMOL/L (ref 3.5–5.1)
PRO-BNP: 109.7 PG/ML
PROTEIN UA: 30 MG/DL
RBC # BLD: 4.85 M/CU MM (ref 4.2–5.4)
RBC URINE: 1 /HPF (ref 0–6)
SEGMENTED NEUTROPHILS ABSOLUTE COUNT: 9.5 K/CU MM
SEGMENTED NEUTROPHILS RELATIVE PERCENT: 87.5 % (ref 36–66)
SODIUM BLD-SCNC: 137 MMOL/L (ref 135–145)
SPECIFIC GRAVITY UA: 1.02 (ref 1–1.03)
SQUAMOUS EPITHELIAL: 1 /HPF
TOTAL IMMATURE NEUTOROPHIL: 0.11 K/CU MM
TOTAL NUCLEATED RBC: 0 K/CU MM
TOTAL PROTEIN: 7.6 GM/DL (ref 6.4–8.2)
TRICHOMONAS: ABNORMAL /HPF
TROPONIN T: <0.01 NG/ML
UROBILINOGEN, URINE: NEGATIVE MG/DL (ref 0.2–1)
WBC # BLD: 10.8 K/CU MM (ref 4–10.5)
WBC UA: <1 /HPF (ref 0–5)

## 2022-01-03 PROCEDURE — 93010 ELECTROCARDIOGRAM REPORT: CPT | Performed by: INTERNAL MEDICINE

## 2022-01-03 PROCEDURE — 83605 ASSAY OF LACTIC ACID: CPT

## 2022-01-03 PROCEDURE — 96376 TX/PRO/DX INJ SAME DRUG ADON: CPT

## 2022-01-03 PROCEDURE — 96361 HYDRATE IV INFUSION ADD-ON: CPT

## 2022-01-03 PROCEDURE — 96372 THER/PROPH/DIAG INJ SC/IM: CPT

## 2022-01-03 PROCEDURE — 6360000002 HC RX W HCPCS: Performed by: PHYSICIAN ASSISTANT

## 2022-01-03 PROCEDURE — 2580000003 HC RX 258: Performed by: PHYSICIAN ASSISTANT

## 2022-01-03 RX ORDER — PROMETHAZINE HYDROCHLORIDE 25 MG/ML
25 INJECTION, SOLUTION INTRAMUSCULAR; INTRAVENOUS ONCE
Status: COMPLETED | OUTPATIENT
Start: 2022-01-03 | End: 2022-01-03

## 2022-01-03 RX ORDER — ONDANSETRON 4 MG/1
4 TABLET, ORALLY DISINTEGRATING ORAL EVERY 6 HOURS
Qty: 20 TABLET | Refills: 0 | Status: SHIPPED | OUTPATIENT
Start: 2022-01-03 | End: 2022-01-31

## 2022-01-03 RX ORDER — ONDANSETRON 2 MG/ML
4 INJECTION INTRAMUSCULAR; INTRAVENOUS ONCE
Status: COMPLETED | OUTPATIENT
Start: 2022-01-03 | End: 2022-01-03

## 2022-01-03 RX ORDER — 0.9 % SODIUM CHLORIDE 0.9 %
1000 INTRAVENOUS SOLUTION INTRAVENOUS ONCE
Status: COMPLETED | OUTPATIENT
Start: 2022-01-03 | End: 2022-01-03

## 2022-01-03 RX ORDER — PROMETHAZINE HYDROCHLORIDE 25 MG/1
25 SUPPOSITORY RECTAL EVERY 6 HOURS PRN
Qty: 12 SUPPOSITORY | Refills: 0 | Status: SHIPPED | OUTPATIENT
Start: 2022-01-03 | End: 2022-01-10

## 2022-01-03 RX ADMIN — SODIUM CHLORIDE 1000 ML: 9 INJECTION, SOLUTION INTRAVENOUS at 01:09

## 2022-01-03 RX ADMIN — PROMETHAZINE HYDROCHLORIDE 25 MG: 25 INJECTION INTRAMUSCULAR; INTRAVENOUS at 01:11

## 2022-01-03 RX ADMIN — ONDANSETRON 4 MG: 2 INJECTION INTRAMUSCULAR; INTRAVENOUS at 03:46

## 2022-01-03 ASSESSMENT — PAIN DESCRIPTION - LOCATION: LOCATION: ABDOMEN

## 2022-01-03 ASSESSMENT — PAIN DESCRIPTION - PAIN TYPE: TYPE: ACUTE PAIN

## 2022-01-03 ASSESSMENT — PAIN SCALES - GENERAL: PAINLEVEL_OUTOF10: 4

## 2022-01-03 ASSESSMENT — PAIN DESCRIPTION - FREQUENCY: FREQUENCY: CONTINUOUS

## 2022-01-03 ASSESSMENT — PAIN DESCRIPTION - DESCRIPTORS: DESCRIPTORS: ACHING

## 2022-01-03 NOTE — ED NOTES
Discharge instructions reviewed with patient; pt voiced understanding at this time.       Leonides Fox RN  01/03/22 [id number]

## 2022-01-03 NOTE — ED PROVIDER NOTES
This EKG was interpreted by me. Rate is 132, rhythm is sinus. SC and QT intervals are within normal limits. There is no ST segment or T wave changes. This EKG was compared to previous EKG from date 12/8/2020.       Dorys Jamil DO  01/02/22 [id number]

## 2022-01-03 NOTE — ED NOTES
Bed: H-01  Expected date:   Expected time:   Means of arrival:   Comments:  ems     Nancy Keys RN  01/02/22 [id number]

## 2022-01-03 NOTE — ED NOTES
Bed: ED-15  Expected date:   Expected time:   Means of arrival:   Comments:  Yadira Seals 6      Nadia Jack RN  01/02/22 [id number]

## 2022-01-03 NOTE — ED NOTES
Pt sitting up on side of bed wretching into emesis bag. PA made aware and orders to be placed.       Daniel Juárez  01/03/22 [id number]

## 2022-01-03 NOTE — ED PROVIDER NOTES
Emergency [address] EMERGENCY DEPARTMENT    Patient: Bryan Brooks  MRN: [id number]  : 1960  Date of Evaluation: 2022  ED Provider: Pilo Armando PA-C    Chief Complaint       Chief Complaint   Patient presents with    Fatigue     generalized weakness    Nausea    Emesis    Joint Pain     knees, neck, and elbow joint pain       Rayray Oreilly is a 63 y.o. female who presents to the emergency department for body aches, nausea/vomiting, generalized fatigue/weakness. Onset was 2 days ago. Constant, worsening. No known fever. Generalized abdominal cramping. No diarrhea. She was exposed to someone with COVID 1 week ago for a prolonged period of time before they told her they were positive. She has been vaccinated and boosted. ROS     CONSTITUTIONAL:  Denies fever.  + body aches, fatigue, weakness. EYES:  Denies visual changes. HEAD:  Denies headache. ENT:  Denies earache, nasal congestion, sore throat. NECK:  Denies neck pain. RESPIRATORY:  Denies any shortness of breath. CARDIOVASCULAR:  Denies chest pain. GI:  + n/v.      :  Denies urinary symptoms. MUSCULOSKELETAL:  Denies extremity pain or swelling. BACK:  Denies back pain. INTEGUMENT:  Denies skin changes. LYMPHATIC:  Denies lymphadenopathy. NEUROLOGIC:  Denies any numbness/tingling.     Past History     Past Medical History:   Diagnosis Date    Acid reflux     Arthritis     left knee    CAD (coronary artery disease)     per [facility] 13 - Dr. Alexander Carvajal COPD (chronic obstructive pulmonary disease) [facility])     follow with Dr Mayra Kinney Depression     \"have manic - depression see Dr Dede Licea Diabetes mellitus [facility])     dx 10+ yrs ago- follows with PCP    Drug abuse ([facility]     hx use of cocaine, heroin and marijuana- states last used 2014    Glaucoma     bilateral    H/O Doppler lower venous ultrasound 2019    No DVT or SVT, Significant reflux of RGSV and LGSV.    H/O echocardiogram 12/18/2020    EF 55-60%, Mod LVH.  Hepatic encephalopathy ([address] 05/2019    Hepatitis C     for liver bx 12/3/2015\"Have Hepatitis B and C and saw Dr Cherry Rosenbaum for this 12/1/2015\"    Hiatal hernia     History of alcohol abuse     History of exercise stress test 01/02/2020    Treadmill, Normal exercise performance without angina and ischemic EKG changes.     HTN (hypertension)     \"for the past two yrs on medication\" follows with Dr Kovacs Blind Hx of blood clots 2019    Portal vein thrombsis - TIPS procedure 4/23/19    Hyperlipemia     Irregular heart beat     per pt    Liver hematoma     Migraines     Last migraine:  2018    Nausea & vomiting     Schizophrenia ([address]     per old chart    Seizures ([address]     \"last one was 9/2015- saw Dr Claudean Ferrari at [facility]- she said not sure if acutal seizures- she thinks they are panic or anxiety attacks\"    SOB (shortness of breath)     with any exertion     Past Surgical History:   Procedure Laterality Date    BREAST SURGERY  10/2015    left breast bx    CARDIAC CATHETERIZATION      per old chart pt had cath done in 3/2011 and 9/2013    CARPAL TUNNEL RELEASE Left 1/9/2020    CARPAL TUNNEL RELEASE LEFT performed by Francisco Watkins DO at [place] [address] Right 05/26/2020    dr Sarika Recinos, carpal tunnel trigger finger release    CARPAL TUNNEL RELEASE Right 5/26/2020    RIGHT CARPAL TUNNEL RELEASE performed by Francisco Watkins DO at [address] [place] [address]  per old chart done [facility]  3/11/13    diverticulosis, cecal polyp    COLONOSCOPY  03/16/2017    Internal hemorrhoids- Dr. Sancho Bowden, COLON, DIAGNOSTIC  03/16/2017    EGD: Small esophageal varices, portal hypertensive gastropathy, reflux esophagitis, hiatal hernia    EYE SURGERY Bilateral ? when    cyst removal, cataracts w/lens replacement    FINGER TRIGGER RELEASE Right 5/26/2020    FINGER TRIGGER RELEASE RIGHT RING FINGER performed by Chester Chicas DO at [address]      per old chart pt had CHOLO/BSO 1986    TIPS PROCEDURE  04/23/2019    TONSILLECTOMY  as a kid    UPPER GASTROINTESTINAL ENDOSCOPY N/A 11/14/2018    EGD DIAGNOSTIC ONLY performed by Mabel Witt MD at [place] [address] N/A 6/5/2019    EGD DIAGNOSTIC ONLY performed by Mabel Witt MD at [address] [place] [address] Road History     Socioeconomic History    Marital status:      Spouse name: None    Number of children: None    Years of education: None    Highest education level: None   Occupational History    None   Tobacco Use    Smoking status: Current Some Day Smoker     Packs/day: 1.00     Years: 45.00     Pack years: 45.00     Types: Cigarettes     Start date: 1974    Smokeless tobacco: Never Used   Vaping Use    Vaping Use: Never used   Substance and Sexual Activity    Alcohol use: Not Currently     Alcohol/week: 2.0 - 3.0 standard drinks     Types: 2 - 3 Shots of liquor per week     Comment: 2-3 shots last 4/2019    Drug use: Yes     Frequency: 3.0 times per week     Types: Marijuana [place])     Comment: daily    Sexual activity: Not Currently     Partners: Male   Other Topics Concern    None   Social History Narrative    None     Social Determinants of Health     Financial Resource Strain:     Difficulty of Paying Living Expenses: Not on file   Food Insecurity:     Worried About Running Out of Food in the Last Year: Not on file    Elías of Food in the Last Year: Not on file   Transportation Needs:     Lack of Transportation (Medical): Not on file    Lack of Transportation (Non-Medical):  Not on file   Physical Activity:     Days of Exercise per Week: Not on file    Minutes of Exercise per Session: Not on file   Stress:     Feeling of Stress : Not on file   Social Connections:     Frequency of Communication with Friends and Family: Not on file    Frequency of Social Gatherings with Friends and Family: Not on file    Attends Church Services: Not on file    Active Member of Clubs or Organizations: Not on file    Attends Club or Organization Meetings: Not on file    Marital Status: Not on file   Intimate Partner Violence:     Fear of Current or Ex-Partner: Not on file    Emotionally Abused: Not on file    Physically Abused: Not on file    Sexually Abused: Not on file   Housing Stability:     Unable to Pay for Housing in the Last Year: Not on file    Number of [place] in the Last Year: Not on file    Unstable Housing in the Last Year: Not on file       Medications/Allergies     Previous Medications    ALBUTEROL (PROVENTIL) (2.5 MG/3ML) 0.083% NEBULIZER SOLUTION    Take 3 mLs by nebulization every 6 hours    ALBUTEROL (PROVENTIL) (2.5 MG/3ML) 0.083% NEBULIZER SOLUTION    Take 3 mLs by nebulization every 6 hours    BLOOD GLUCOSE MONITOR STRIPS    Test 3 times a day & as needed for symptoms of irregular blood glucose. Please fill with what is covered my patients insurance. BLOOD GLUCOSE MONITOR STRIPS    Test  Bid times a day & as needed for symptoms of irregular blood glucose. BLOOD GLUCOSE MONITORING SUPPL (ACURA BLOOD GLUCOSE METER) W/DEVICE KIT    Please check blood sugar 3 times daily.  Please fill with what is covered by the insurance    COMPRESSION STOCKINGS MISC    by Does not apply route 20 - 30 mmh wear daily and take off at night  Thigh High    FLUOXETINE (PROZAC) 20 MG CAPSULE    Take 20 mg by mouth daily    FUROSEMIDE (LASIX) 20 MG TABLET    Take 1 tablet by mouth daily Hold if systolic <048    GLUCOSE MONITORING KIT (FREESTYLE) MONITORING KIT    1 kit by Does not apply route daily    GLUCOSOURCE LANCETS MISC    Use one 3-4 times to check blood sugar    INSULIN ASPART (NOVOLOG FLEXPEN) 100 UNIT/ML INJECTION PEN    **Low Dose Correction Algorithm**Insulin lispro (HUMALOG)  3 TIMES DAILY WITH MEALSGlucose: Dose: No [id number] 1 [id number] 2 [id number] 3 [id number] 4 [id number] 5 [id number] and above 6 Units    INSULIN GLARGINE (LANTUS SOLOSTAR) 100 UNIT/ML INJECTION PEN    Inject 15 Units into the skin nightly    IPRATROPIUM-ALBUTEROL (DUONEB) 0.5-2.5 (3) MG/3ML SOLN NEBULIZER SOLUTION    Inhale 3 mLs into the lungs every 4 hours    LACTULOSE (CHRONULAC) 10 GM/15ML SOLUTION    Take 30 mLs by mouth 3 times daily    METOPROLOL SUCCINATE (TOPROL XL) 50 MG EXTENDED RELEASE TABLET    Take 1 tablet by mouth daily    MIDODRINE (PROAMATINE) 10 MG TABLET    Take 1 tablet by mouth 3 times daily (with meals)    NEOMYCIN (MYCIFRADIN) 500 MG TABLET        NITROGLYCERIN (NITROSTAT) 0.4 MG SL TABLET    Place 1 tablet under the tongue every 5 minutes as needed for Chest pain    ONDANSETRON (ZOFRAN-ODT) 8 MG TBDP DISINTEGRATING TABLET    Take 1 tablet by mouth every 12 hours as needed for Nausea or Vomiting    PALIPERIDONE (INVEGA) 3 MG EXTENDED RELEASE TABLET        PANTOPRAZOLE (PROTONIX) 40 MG TABLET    Take 1 tablet by mouth 2 times daily (before meals)    PREDNISONE (DELTASONE) 10 MG TABLET    Take 1 tablet by mouth daily 40mg daily for 2 days, 20 mg daily for 2 days, 10 mg daily for 2 days, 5 mg daily for 2 days    QUETIAPINE (SEROQUEL) 50 MG TABLET    Take 3 tablets by mouth nightly    RANOLAZINE (RANEXA) 500 MG EXTENDED RELEASE TABLET    Take 1 tablet by mouth 2 times daily    RIFAXIMIN (XIFAXAN) 550 MG TABLET    Take 1 tablet by mouth 2 times daily    SOFT TOUCH LANCETS MISC    Please check blood sugar 3 times daily. Please fill with what is covered by insurance    TRAMADOL (ULTRAM) 50 MG TABLET        UNABLE TO FIND    Rx for depends   Use 3-4  times daily    VIREAD 300 MG TABLET    Take 300 mg by mouth daily      Allergies   Allergen Reactions    Norco [Hydrocodone-Acetaminophen] Itching     Itching, rash, nausea and vomiting.      Tylenol [Acetaminophen] Itching, Nausea And Vomiting and Rash        Physical Exam       ED Triage Vitals [01/02/22 2111]   BP Temp Temp Source Pulse Resp SpO2 Height Weight   (!) 192/117 97.7 °F (36.5 °C) Oral 135 17 94 % -- --     GENERAL APPEARANCE:  Well-developed, well-nourished, no acute distress. HEAD:  NC/AT. EYES:  Sclera anicteric. ENT:  Ears, nose, mouth normal.     NECK:  Supple. CARDIO:  Tachycardic. LUNGS:   CTAB. Respirations unlabored. ABDOMEN:  Soft, non-distended, mild diffuse tenderness. BS active. Actively retching. EXTREMITIES:  No acute deformities. SKIN:  Warm and dry. NEUROLOGICAL:  Alert and oriented. PSYCHIATRIC:  Normal mood.      Diagnostics     Labs:  Labs Reviewed   CBC WITH AUTO DIFFERENTIAL - Abnormal; Notable for the following components:       Result Value    WBC 10.8 (*)     MCH 33.0 (*)     Platelets 94 (*)     MPV 11.6 (*)     Segs Relative 87.5 (*)     Lymphocytes % 6.9 (*)     Monocytes % 4.1 (*)     Immature Neutrophil % 1.0 (*)     All other components within normal limits   COMPREHENSIVE METABOLIC PANEL W/ REFLEX TO MG FOR LOW K - Abnormal; Notable for the following components:    Glucose 354 (*)     Albumin 3.3 (*)     Total Bilirubin 1.5 (*)     Alkaline Phosphatase 157 (*)     All other components within normal limits   AMMONIA - Abnormal; Notable for the following components:    Ammonia 67 (*)     All other components within normal limits   URINE RT REFLEX TO CULTURE - Abnormal; Notable for the following components:    Glucose, Urine >500 (*)     Ketones, Urine MODERATE (*)     Blood, Urine SMALL (*)     Protein, UA 30 (*)     Bacteria, UA RARE (*)     All other components within normal limits   LACTIC ACID, PLASMA - Abnormal; Notable for the following components:    Lactate 3.4 (*)     All other components within normal limits   COVID-19, RAPID   LIPASE   TROPONIN   BRAIN NATRIURETIC PEPTIDE   LACTIC ACID, PLASMA     Radiographs:  XR CHEST PORTABLE    Result Date: 1/2/2022  EXAMINATION: ONE XRAY VIEW OF THE CHEST 1/2/2022 9:24 pm COMPARISON: Chest radiograph dated December 8, 2020 HISTORY: ORDERING SYSTEM PROVIDED HISTORY: vomiting TECHNOLOGIST PROVIDED HISTORY: Reason for exam:->vomiting Reason for Exam: vomiting Additional signs and symptoms: vomiting Relevant Medical/Surgical History: vomiting FINDINGS: The cardiomediastinal silhouette is stable. Pulmonary vascular congestion is noted. Increased opacities are seen at the lung bases. Pulmonary vascular congestion with bibasilar atelectasis. Procedures/EKG:   Please see supervising physicians' note for interpretation. ED Course and MDM   -  Patient seen and evaluated in the emergency department. -  Triage and nursing notes reviewed and incorporated. -  Old chart records reviewed and incorporated. -  Supervising physician was Dr. Gorge Herrera. Patient was seen independently. -  Work-up included:  See above  -  ED medications:  NS, Zofran, Phenergan, morphine  -  Results discussed with patient. White count 10,800. Initial lactic 3.4--improved to 1.7 after fluids. She is hyperglycemic at 354. Normal electrolytes, anion gap. Ammonia bumped to 67. UA clear. CXR shows pulmonary vascular congestion with atelectasis. BNP WNL. Negative COVID. She is doing better on re-examination after fluids and anti-emetics. Able to tolerate PO intake. Will dc home with prescriptions for Zofran and Phenergan. Fluids > advance diet as tolerated. FU with PCP, return as needed. She is agreeable with plan of care and disposition.  -  Disposition:  Home    In light of current events, I did utilize appropriate PPE (including N95 and surgical face mask, safety glasses, and gloves, as recommended by the health facility/national standard best practice, during my bedside interactions with the patient. Final Impression      1.  Nausea and vomiting, intractability of vomiting not specified, unspecified vomiting type            DISPOSITION        Steph Cuenca, [address] [place] [address]  01/03/22 8248

## 2022-01-03 NOTE — ED TRIAGE NOTES
Pt states she is experiencing nausea, vomiting, muscle weakness, and joint pain. She states she was around friends who tested positive but she did not know until after they had been around them for 3 hours.

## 2022-01-12 ENCOUNTER — NURSE TRIAGE (OUTPATIENT)
Dept: OTHER | Facility: CLINIC | Age: 62
End: 2022-01-12

## 2022-01-12 NOTE — TELEPHONE ENCOUNTER
Received call from Pili Dill at [facility], caller not on line. Complaint: Weakness, nausea, confusion    Market: [address] [place] [address] Name: [id number]    Caller's telephone number verified as [phone]    Connected with caller via phone, please see below triage       Subjective: Caller states \"I went to the hospital a few weeks ago due to nausea and was tested for Covid and it was negative, and I keep having weakness, nausea and feel bad when I go from room to room. I also have constipation, and taking Lactulose, and now I go without taking anything and I wake up with it in my chest\"     Current Symptoms: Confusion at times, slurred, burning in stomach    Onset: several weeks ago; worsening    Associated Symptoms: reduced activity, decreased food and fluid intake, headache, neck pain at back of neck. Blood sugars 280    Pain Severity: 7/10; burning; constant    Temperature: None None    What has been tried: Phenergan, Compazine    LMP: NA Pregnant: NA    Recommended disposition: See PCP within 24 Hours, advised if no available appointments, to seek care in ER    Care advice provided, patient verbalizes understanding; denies any other questions or concerns; instructed to call back for any new or worsening symptoms. Writer provided warm transfer to Caden Venegas at [facility] for appointment scheduling     Attention Provider: Thank you for allowing me to participate in the care of your patient. The patient was connected to triage in response to information provided to the [facility]/PSC. Please do not respond through this encounter as the response is not directed to a shared pool.     Reason for Disposition   [1] MODERATE dizziness (e.g., interferes with normal activities) AND [2] has NOT been evaluated by physician for this  (Exception: dizziness caused by heat exposure, sudden standing, or poor fluid intake)    Protocols used: [facility]

## 2022-01-13 ENCOUNTER — TELEMEDICINE (OUTPATIENT)
Dept: FAMILY MEDICINE CLINIC | Age: 62
End: 2022-01-13
Payer: COMMERCIAL

## 2022-01-13 DIAGNOSIS — E11.8 TYPE 2 DIABETES MELLITUS WITH COMPLICATION, WITH LONG-TERM CURRENT USE OF INSULIN (HCC): ICD-10-CM

## 2022-01-13 DIAGNOSIS — J40 BRONCHITIS: Primary | ICD-10-CM

## 2022-01-13 DIAGNOSIS — Z79.4 TYPE 2 DIABETES MELLITUS WITH COMPLICATION, WITH LONG-TERM CURRENT USE OF INSULIN (HCC): ICD-10-CM

## 2022-01-13 PROCEDURE — 99213 OFFICE O/P EST LOW 20 MIN: CPT | Performed by: FAMILY MEDICINE

## 2022-01-13 PROCEDURE — G8427 DOCREV CUR MEDS BY ELIG CLIN: HCPCS | Performed by: FAMILY MEDICINE

## 2022-01-13 PROCEDURE — 2022F DILAT RTA XM EVC RTNOPTHY: CPT | Performed by: FAMILY MEDICINE

## 2022-01-13 PROCEDURE — 3017F COLORECTAL CA SCREEN DOC REV: CPT | Performed by: FAMILY MEDICINE

## 2022-01-13 PROCEDURE — 3046F HEMOGLOBIN A1C LEVEL >9.0%: CPT | Performed by: FAMILY MEDICINE

## 2022-01-13 RX ORDER — DOXYCYCLINE HYCLATE 100 MG
100 TABLET ORAL 2 TIMES DAILY
Qty: 20 TABLET | Refills: 0 | Status: SHIPPED | OUTPATIENT
Start: 2022-01-13 | End: 2022-01-23

## 2022-01-13 RX ORDER — INSULIN ASPART 100 [IU]/ML
INJECTION, SOLUTION INTRAVENOUS; SUBCUTANEOUS
Qty: 5 PEN | Refills: 3 | Status: SHIPPED | OUTPATIENT
Start: 2022-01-13 | End: 2022-11-01 | Stop reason: SDUPTHER

## 2022-01-13 RX ORDER — GLUCOSAMINE HCL/CHONDROITIN SU 500-400 MG
CAPSULE ORAL
Qty: 100 STRIP | Refills: 5 | Status: SHIPPED | OUTPATIENT
Start: 2022-01-13

## 2022-01-13 RX ORDER — INSULIN GLARGINE 100 [IU]/ML
15 INJECTION, SOLUTION SUBCUTANEOUS NIGHTLY
Qty: 5 PEN | Refills: 3 | Status: SHIPPED | OUTPATIENT
Start: 2022-01-13 | End: 2022-11-01 | Stop reason: SDUPTHER

## 2022-01-13 RX ORDER — METHYLPREDNISOLONE 4 MG/1
TABLET ORAL
Qty: 1 KIT | Refills: 0 | Status: SHIPPED | OUTPATIENT
Start: 2022-01-13 | End: 2022-02-16

## 2022-01-13 RX ORDER — GUAIFENESIN 600 MG/1
1200 TABLET, EXTENDED RELEASE ORAL 2 TIMES DAILY
Qty: 40 TABLET | Refills: 0 | Status: SHIPPED | OUTPATIENT
Start: 2022-01-13 | End: 2022-01-23

## 2022-01-13 ASSESSMENT — ENCOUNTER SYMPTOMS
SINUS PAIN: 0
SORE THROAT: 0
SINUS PRESSURE: 0
COUGH: 1
WHEEZING: 1
SHORTNESS OF BREATH: 1

## 2022-01-13 NOTE — PROGRESS NOTES
2022    TELEHEALTH EVALUATION -- Audio/Visual (During NLOKX-16 public health emergency)    Chief Complaint   Patient presents with    Cough     intermittent productive cough with white mucus, wheezing    URI     X since 2022    Nausea & Vomiting     not as bad but still has it    Other     she gets a sharp intermittent pain between her breast more near the left breast. Hurts more after she has a coughing fit or vomiting. X since 2022    Medication Refill         HPI:    Sudhir Braden (:  1960) has requested an audio/video evaluation for the following concern(s):    Patient seen today c/o:    Cough  This is a new problem. The problem has been unchanged. The cough is productive of sputum. Associated symptoms include chest pain (from the coughing), headaches, myalgias, nasal congestion, shortness of breath and wheezing. Pertinent negatives include no chills, ear congestion, fever, postnasal drip or sore throat. Associated symptoms comments: Vomiting with coughing. Nothing aggravates the symptoms. Risk factors for lung disease include smoking/tobacco exposure. She has tried a beta-agonist inhaler (tested for covid and was negatvie) for the symptoms. The treatment provided no relief. Her past medical history is significant for COPD. Review of Systems   Constitutional: Positive for activity change. Negative for appetite change, chills, fatigue and fever. HENT: Positive for congestion. Negative for postnasal drip, sinus pressure, sinus pain and sore throat. Respiratory: Positive for cough, shortness of breath and wheezing. Cardiovascular: Positive for chest pain (from the coughing). Negative for leg swelling. Genitourinary: Negative for dysuria. Musculoskeletal: Positive for myalgias. Neurological: Positive for headaches. Prior to Visit Medications    Medication Sig Taking?  Authorizing Provider   doxycycline hyclate (VIBRA-TABS) 100 MG tablet Take 1 tablet by mouth 2 times daily for 10 days Yes Estuardo Gann MD   methylPREDNISolone (MEDROL, SERGE,) 4 MG tablet Take by mouth. As prescribed Yes Estuardo Gann MD   guaiFENesin (MUCINEX) 600 MG extended release tablet Take 2 tablets by mouth 2 times daily for 10 days Yes Estuardo Gann MD   insulin glargine (LANTUS SOLOSTAR) 100 UNIT/ML injection pen Inject 15 Units into the skin nightly Yes Estuardo Gann MD   insulin aspart (NOVOLOG FLEXPEN) 100 UNIT/ML injection pen **Low Dose Correction Algorithm**Insulin lispro (HUMALOG)  3 TIMES DAILY WITH MEALSGlucose: Dose: No [id number] 1 [id number] 2 [id number] 3 Wcwhe050-888 4 [id number] 5 Lnven212 and above 6 Units Yes Estuardo Gann MD   blood glucose monitor strips Test  Bid times a day & as needed for symptoms of irregular blood glucose.  Yes Estuardo Gann MD   midodrine (PROAMATINE) 10 MG tablet Take 1 tablet by mouth 3 times daily (with meals) Yes Tori Remy MD   metoprolol succinate (TOPROL XL) 50 MG extended release tablet Take 1 tablet by mouth daily Yes Tori Remy MD   ipratropium-albuterol (DUONEB) 0.5-2.5 (3) MG/3ML SOLN nebulizer solution Inhale 3 mLs into the lungs every 4 hours Yes Nicole Amador DO   furosemide (LASIX) 20 MG tablet Take 1 tablet by mouth daily Hold if systolic <223 Yes Eduar , APRN - CNP   lactulose (CHRONULAC) 10 GM/15ML solution Take 30 mLs by mouth 3 times daily Yes Mariana Fuentes MD   QUEtiapine (SEROQUEL) 50 MG tablet Take 3 tablets by mouth nightly Yes Mariana Fuentes MD   rifaximin (XIFAXAN) 550 MG tablet Take 1 tablet by mouth 2 times daily Yes Mariana Fuentes MD   FLUoxetine (PROZAC) 20 MG capsule Take 20 mg by mouth daily Yes Historical Provider, MD   paliperidone (INVEGA) 3 MG extended release tablet  Yes Historical Provider, MD   nitroGLYCERIN (NITROSTAT) 0.4 MG SL tablet Place 1 tablet under the tongue every 5 minutes as needed for Chest pain Yes Eduar , ERLINDA - CNP   ranolazine (RANEXA) 500 MG extended release tablet Take 1 tablet by mouth 2 times daily Yes ERLINDA Sanon CNP   Compression Stockings MISC by Does not apply route 20 - 30 mmh wear daily and take off at night  Thigh High Yes ERLINDA Sanon CNP   albuterol (PROVENTIL) (2.5 MG/3ML) 0.083% nebulizer solution Take 3 mLs by nebulization every 6 hours Yes Lovely Joyner MD   Blood Glucose Monitoring Suppl [facility] BLOOD GLUCOSE METER) w/Device KIT Please check blood sugar 3 times daily. Please fill with what is covered by the insurance Yes Estuardo Gann MD   blood glucose monitor strips Test 3 times a day & as needed for symptoms of irregular blood glucose. Please fill with what is covered my patients insurance. Yes Estuardo Gann MD   ondansetron (ZOFRAN ODT) 4 MG disintegrating tablet Take 1 tablet by mouth every 6 hours  Beena M IGOR Claire   albuterol (PROVENTIL) (2.5 MG/3ML) 0.083% nebulizer solution Take 3 mLs by nebulization every 6 hours  Lovely Joyner MD   Glucosource Lancets MISC Use one 3-4 times to check blood sugar  Estuardo Gann MD   traMADol (ULTRAM) 50 MG tablet   Historical Provider, MD   UNABLE TO FIND Rx for depends   Use 3-4  times daily  Estuardo Gann MD   glucose monitoring kit (FREESTYLE) monitoring kit 1 kit by Does not apply route daily  Etsuardo Gann MD   SOFT TOUCH LANCETS MISC Please check blood sugar 3 times daily.  Please fill with what is covered by insurance  Estuardo Gann MD   pantoprazole (PROTONIX) 40 MG tablet Take 1 tablet by mouth 2 times daily (before meals)  Patient not taking: Reported on 1/13/2022  Ciara Gomez MD   VIREAD 300 MG tablet Take 300 mg by mouth daily   Patient not taking: Reported on 1/13/2022  Historical Provider, MD       Social History     Tobacco Use    Smoking status: Current Some Day Smoker     Packs/day: 1.00     Years: 45.00     Pack years: 45.00     Types: Cigarettes     Start date: 1974    Smokeless tobacco: Never Used   Vaping Use    Vaping Use: Never used   Substance Use Topics    Alcohol use: Not Currently     Alcohol/week: 2.0 - 3.0 standard drinks     Types: 2 - 3 Shots of liquor per week     Comment: 2-3 shots last 4/2019    Drug use: Yes     Frequency: 3.0 times per week     Types: Marijuana [place])     Comment: daily        Allergies   Allergen Reactions    Norco [Hydrocodone-Acetaminophen] Itching     Itching, rash, nausea and vomiting.  Tylenol [Acetaminophen] Itching, Nausea And Vomiting and Rash   ,   Past Medical History:   Diagnosis Date    Acid reflux     Arthritis     left knee    CAD (coronary artery disease)     [facility] Dr. Holley Necessary COPD (chronic obstructive pulmonary disease) ([facility] [address]     follow with Dr Adrian Nieto Depression     \"have manic - depression see Dr Merlyn Guido Diabetes mellitus [facility])     dx 10+ yrs ago- follows with PCP    Drug abuse ([facility] [address]     hx use of cocaine, heroin and marijuana- states last used 12/2014    Glaucoma     bilateral    H/O Doppler lower venous ultrasound 04/04/2019    No DVT or SVT, Significant reflux of RGSV and LGSV.    H/O echocardiogram 12/18/2020    EF 55-60%, Mod LVH.  Hepatic encephalopathy ([facility] [address] 05/2019    Hepatitis C     for liver bx 12/3/2015\"Have Hepatitis B and C and saw Dr Olivia Potter for this 12/1/2015\"    Hiatal hernia     History of alcohol abuse     History of exercise stress test 01/02/2020    Treadmill, Normal exercise performance without angina and ischemic EKG changes.     HTN (hypertension)     \"for the past two yrs on medication\" follows with Dr Holley Necessary Hx of blood clots 2019    Portal vein thrombsis - TIPS procedure 4/23/19    Hyperlipemia     Irregular heart beat     per pt    Liver hematoma     Migraines     Last migraine:  2018    Nausea & vomiting     Schizophrenia ([facility] [address]     per old chart    Seizures ([facility] [address]     \"last one was 9/2015- [facility] Dr Hiral Anderson at [facility]- she said not sure if acutal seizures- she thinks they are panic or anxiety attacks\"  SOB (shortness of breath)     with any exertion   ,   Past Surgical History:   Procedure Laterality Date    BREAST SURGERY  10/2015    left breast bx    CARDIAC CATHETERIZATION      per old chart pt had cath done in 3/2011 and 9/2013    CARPAL TUNNEL RELEASE Left 1/9/2020    CARPAL TUNNEL RELEASE LEFT performed by Myrtha Paget, DO at [address] Right 05/26/2020    dr Krystyna Bell, carpal tunnel trigger finger release    CARPAL TUNNEL RELEASE Right 5/26/2020    RIGHT CARPAL TUNNEL RELEASE performed by Myrtha Paget, DO at [id number]  per old chart done [facility]  3/11/13    diverticulosis, cecal polyp    COLONOSCOPY  03/16/2017    Internal hemorrhoids- Dr. Danny Price, COLON, DIAGNOSTIC  03/16/2017    EGD: Small esophageal varices, portal hypertensive gastropathy, reflux esophagitis, hiatal hernia    EYE SURGERY Bilateral ? when    cyst removal, cataracts w/lens replacement    FINGER TRIGGER RELEASE Right 5/26/2020    FINGER TRIGGER RELEASE RIGHT RING FINGER performed by Myrtha Paget, DO at [address]      per old chart pt had CHOLO/BSO 1986    TIPS PROCEDURE  04/23/2019    TONSILLECTOMY  as a kid    UPPER GASTROINTESTINAL ENDOSCOPY N/A 11/14/2018    EGD DIAGNOSTIC ONLY performed by Sanchez Daigle MD at [address] [place] [address],[address] Floor N/A 6/5/2019    EGD DIAGNOSTIC ONLY performed by Sanchez Daigle MD at [facility] ENDOSCOPY   ,   Social History     Tobacco Use    Smoking status: Current Some Day Smoker     Packs/day: 1.00     Years: 45.00     Pack years: 45.00     Types: Cigarettes     Start date: 1974    Smokeless tobacco: Never Used   Vaping Use    Vaping Use: Never used   Substance Use Topics    Alcohol use: Not Currently     Alcohol/week: 2.0 - 3.0 standard drinks     Types: 2 - 3 Shots of liquor per week     Comment: 2-3 shots last 4/2019    Drug use: Yes     Frequency: 3.0 times per week     Types: Marijuana (Weed)     Comment: daily   ,   Family History   Problem Relation Age of Onset    Cancer Mother         lung ca    Arthritis Mother     Migraines Mother     Cancer Father         colon ca    Diabetes Father     High Blood Pressure Father     Arthritis Father     High Cholesterol Father     Migraines Father     Migraines Sister     Heart Disease Brother         WPW       PHYSICAL EXAMINATION:  [ INSTRUCTIONS:  \"[x]\" Indicates a positive item  \"[]\" Indicates a negative item  -- DELETE ALL ITEMS NOT EXAMINED]  Vital Signs: (As obtained by patient/caregiver or practitioner observation)    Blood pressure-  Heart rate-    Respiratory rate-    Temperature-  Pulse oximetry-     Constitutional: [x] Appears well-developed and well-nourished [x] No apparent distress      [] Abnormal-   Mental status  [x] Alert and awake  [x] Oriented to person/place/time [x]Able to follow commands      Eyes:  EOM    []  Normal  [] Abnormal-  Sclera  []  Normal  [] Abnormal -         Discharge []  None visible  [] Abnormal -    HENT:   [x] Normocephalic, atraumatic. [] Abnormal   [] Mouth/Throat: Mucous membranes are moist.     External Ears [] Normal  [] Abnormal-     Neck: [] No visualized mass     Pulmonary/Chest: [x] Respiratory effort normal.  [x] No visualized signs of difficulty breathing or respiratory distress        [] Abnormal-      Musculoskeletal:   [] Normal gait with no signs of ataxia         [] Normal range of motion of neck        [] Abnormal-       Neurological:        [] No Facial Asymmetry (Cranial nerve 7 motor function) (limited exam to video visit)          [] No gaze palsy        [] Abnormal-         Skin:        [] No significant exanthematous lesions or discoloration noted on facial skin         [] Abnormal-            Psychiatric:       [] Normal Affect [] No Hallucinations        [] Abnormal-     Other pertinent observable physical exam findings-     ASSESSMENT/PLAN:  1.  Bronchitis  - covid test was negative, CXR normal, so will give:  - doxycycline hyclate (VIBRA-TABS) 100 MG tablet; Take 1 tablet by mouth 2 times daily for 10 days  Dispense: 20 tablet; Refill: 0  - methylPREDNISolone (MEDROL, SERGE,) 4 MG tablet; Take by mouth. As prescribed  Dispense: 1 kit; Refill: 0  - guaiFENesin (MUCINEX) 600 MG extended release tablet; Take 2 tablets by mouth 2 times daily for 10 days  Dispense: 40 tablet; Refill: 0    2. Type 2 diabetes mellitus with complication, with long-term current use of insulin (HCC)  - needs medications refill.  - insulin glargine (LANTUS SOLOSTAR) 100 UNIT/ML injection pen; Inject 15 Units into the skin nightly  Dispense: 5 pen; Refill: 3  - insulin aspart (NOVOLOG FLEXPEN) 100 UNIT/ML injection pen; **Low Dose Correction Algorithm**Insulin lispro (HUMALOG)  3 TIMES DAILY WITH MEALSGlucose: Dose: No [id number] 1 [id number] 2 [id number] 3 [id number] 4 [id number] 5 Innzw694 and above 6 Units  Dispense: 5 pen; Refill: 3  - blood glucose monitor strips; Test  Bid times a day & as needed for symptoms of irregular blood glucose. Dispense: 100 strip; Refill: 5      Return in about 1 month (around 2/13/2022) for ov. Mandeep Maldonado, was evaluated through a synchronous (real-time) audio-video encounter. The patient (or guardian if applicable) is aware that this is a billable service. Verbal consent to proceed has been obtained within the past 12 months. The visit was conducted pursuant to the emergency declaration under the [facility], [address] [place] [address] authority and the [employer] General Act. Patient identification was verified, and a caregiver was present when appropriate. The patient was located in a state where the provider was credentialed to provide care.     Total time spent on this encounter: 20 minutes    --Jair Abdullahi MD on 1/13/2022 at 9:23 AM    An electronic signature was used to authenticate this note.

## 2022-01-17 ENCOUNTER — TELEPHONE (OUTPATIENT)
Dept: FAMILY MEDICINE CLINIC | Age: 62
End: 2022-01-17

## 2022-01-17 DIAGNOSIS — E11.8 TYPE 2 DIABETES MELLITUS WITH COMPLICATION, WITH LONG-TERM CURRENT USE OF INSULIN (HCC): ICD-10-CM

## 2022-01-17 DIAGNOSIS — Z79.4 TYPE 2 DIABETES MELLITUS WITH COMPLICATION, WITH LONG-TERM CURRENT USE OF INSULIN (HCC): ICD-10-CM

## 2022-01-17 RX ORDER — GLUCOSAMINE HCL/CHONDROITIN SU 500-400 MG
CAPSULE ORAL
Qty: 50 STRIP | Refills: 3 | Status: SHIPPED | OUTPATIENT
Start: 2022-01-17

## 2022-01-17 NOTE — TELEPHONE ENCOUNTER
----- Message from [id number] Granados sent at 1/15/2022 10:00 AM EST -----  Subject: Medication Problem    QUESTIONS  Name of Medication? Blood Glucose Monitoring Suppl (ACURA BLOOD GLUCOSE   METER) w/Device KIT  Patient-reported dosage and instructions? Daily  What question or problem do you have with the medication? Patient has been   advised that her insurance company will no longer pay for this particular   device. Patient was told to have her PCP order a new type for future use. Preferred Pharmacy? [address] [place] [address], [address] [place] [address] [phone] [facility] [phone]  Pharmacy phone number (if available)? [phone]  Additional Information for Provider?   ---------------------------------------------------------------------------  --------------  CALL BACK INFO  What is the best way for the office to contact you? OK to leave message on   voicemail  Preferred Call Back Phone Number? [phone]  ---------------------------------------------------------------------------  --------------  SCRIPT ANSWERS  Relationship to Patient?  Self

## 2022-01-17 NOTE — TELEPHONE ENCOUNTER
Blood Glucose Monitoring Suppl (ACURA BLOOD GLUCOSE   METER) w/Device KIT   Patient-reported dosage and instructions? Daily   What question or problem do you have with the medication? Patient has been   advised that her insurance company will no longer pay for this particular   device. Patient was told to have her PCP order a new type for future use. Preferred Pharmacy? [address] [facility], [address] [place] [address] [phone] Osmar Wesley [phone]   Pharmacy phone number (if available)? [phone]   Additional Information for Provider

## 2022-01-17 NOTE — TELEPHONE ENCOUNTER
----- Message from [id number] Kaylynn Granados sent at 1/15/2022 10:00 AM EST -----  Subject: Medication Problem    QUESTIONS  Name of Medication? Blood Glucose Monitoring Suppl (ACURA BLOOD GLUCOSE   METER) w/Device KIT  Patient-reported dosage and instructions? Daily  What question or problem do you have with the medication? Patient has been   advised that her insurance company will no longer pay for this particular   device. Patient was told to have her PCP order a new type for future use. Preferred Pharmacy? [address] [place] [address], [address] [place] [address] [phone] Anayeli Simpson [phone]  Pharmacy phone number (if available)? [phone]  Additional Information for Provider?   ---------------------------------------------------------------------------  --------------  CALL BACK INFO  What is the best way for the office to contact you? OK to leave message on   voicemail  Preferred Call Back Phone Number? [phone]  ---------------------------------------------------------------------------  --------------  SCRIPT ANSWERS  Relationship to Patient?  Self

## 2022-01-30 ENCOUNTER — APPOINTMENT (OUTPATIENT)
Dept: GENERAL RADIOLOGY | Age: 62
End: 2022-01-30
Payer: COMMERCIAL

## 2022-01-30 PROCEDURE — 96361 HYDRATE IV INFUSION ADD-ON: CPT

## 2022-01-30 PROCEDURE — 99284 EMERGENCY DEPT VISIT MOD MDM: CPT

## 2022-01-30 PROCEDURE — 71045 X-RAY EXAM CHEST 1 VIEW: CPT

## 2022-01-30 PROCEDURE — 96374 THER/PROPH/DIAG INJ IV PUSH: CPT

## 2022-01-30 PROCEDURE — 96372 THER/PROPH/DIAG INJ SC/IM: CPT

## 2022-01-30 PROCEDURE — 96375 TX/PRO/DX INJ NEW DRUG ADDON: CPT

## 2022-01-30 ASSESSMENT — PAIN DESCRIPTION - FREQUENCY: FREQUENCY: CONTINUOUS

## 2022-01-30 ASSESSMENT — PAIN DESCRIPTION - ONSET: ONSET: SUDDEN

## 2022-01-30 ASSESSMENT — PAIN DESCRIPTION - PAIN TYPE: TYPE: ACUTE PAIN

## 2022-01-30 ASSESSMENT — PAIN SCALES - GENERAL: PAINLEVEL_OUTOF10: 8

## 2022-01-30 ASSESSMENT — PAIN DESCRIPTION - ORIENTATION: ORIENTATION: MID

## 2022-01-31 ENCOUNTER — HOSPITAL ENCOUNTER (EMERGENCY)
Age: 62
Discharge: HOME | End: 2022-01-31
Attending: EMERGENCY MEDICINE
Payer: COMMERCIAL

## 2022-01-31 ENCOUNTER — APPOINTMENT (OUTPATIENT)
Dept: CT IMAGING | Age: 62
End: 2022-01-31
Payer: COMMERCIAL

## 2022-01-31 VITALS
RESPIRATION RATE: 17 BRPM | DIASTOLIC BLOOD PRESSURE: 97 MMHG | TEMPERATURE: 97.8 F | HEIGHT: 57 IN | SYSTOLIC BLOOD PRESSURE: 179 MMHG | HEART RATE: 98 BPM | WEIGHT: 187 LBS | BODY MASS INDEX: 40.34 KG/M2 | OXYGEN SATURATION: 94 %

## 2022-01-31 DIAGNOSIS — R19.7 NAUSEA VOMITING AND DIARRHEA: Primary | ICD-10-CM

## 2022-01-31 DIAGNOSIS — R11.2 NAUSEA VOMITING AND DIARRHEA: Primary | ICD-10-CM

## 2022-01-31 DIAGNOSIS — R03.0 ELEVATED BLOOD PRESSURE READING: ICD-10-CM

## 2022-01-31 DIAGNOSIS — R10.9 ABDOMINAL CRAMPING: ICD-10-CM

## 2022-01-31 LAB
ALBUMIN SERPL-MCNC: 3.9 GM/DL (ref 3.4–5)
ALP BLD-CCNC: 189 IU/L (ref 40–129)
ALT SERPL-CCNC: 14 U/L (ref 10–40)
AMMONIA: 52 UMOL/L (ref 11–51)
ANION GAP SERPL CALCULATED.3IONS-SCNC: 13 MMOL/L (ref 4–16)
AST SERPL-CCNC: 18 IU/L (ref 15–37)
BACTERIA: ABNORMAL /HPF
BASOPHILS ABSOLUTE: 0.1 K/CU MM
BASOPHILS RELATIVE PERCENT: 0.5 % (ref 0–1)
BILIRUB SERPL-MCNC: 1.7 MG/DL (ref 0–1)
BILIRUBIN DIRECT: 0.6 MG/DL (ref 0–0.3)
BILIRUBIN URINE: NEGATIVE MG/DL
BILIRUBIN, INDIRECT: 1.1 MG/DL (ref 0–0.7)
BLOOD, URINE: ABNORMAL
BUN BLDV-MCNC: 14 MG/DL (ref 6–23)
CALCIUM SERPL-MCNC: 9.8 MG/DL (ref 8.3–10.6)
CHLORIDE BLD-SCNC: 98 MMOL/L (ref 99–110)
CLARITY: CLEAR
CO2: 27 MMOL/L (ref 21–32)
COLOR: YELLOW
CREAT SERPL-MCNC: 0.9 MG/DL (ref 0.6–1.1)
DIFFERENTIAL TYPE: ABNORMAL
EOSINOPHILS ABSOLUTE: 0 K/CU MM
EOSINOPHILS RELATIVE PERCENT: 0.4 % (ref 0–3)
GFR AFRICAN AMERICAN: >60 ML/MIN/1.73M2
GFR NON-AFRICAN AMERICAN: >60 ML/MIN/1.73M2
GLUCOSE BLD-MCNC: 408 MG/DL (ref 70–99)
GLUCOSE, URINE: ABNORMAL MG/DL
HCT VFR BLD CALC: 46.7 % (ref 37–47)
HEMOGLOBIN: 16.5 GM/DL (ref 12.5–16)
IMMATURE NEUTROPHIL %: 0.4 % (ref 0–0.43)
KETONES, URINE: ABNORMAL MG/DL
LEUKOCYTE ESTERASE, URINE: NEGATIVE
LIPASE: 44 IU/L (ref 13–60)
LYMPHOCYTES ABSOLUTE: 0.9 K/CU MM
LYMPHOCYTES RELATIVE PERCENT: 9.9 % (ref 24–44)
MCH RBC QN AUTO: 33.1 PG (ref 27–31)
MCHC RBC AUTO-ENTMCNC: 35.3 % (ref 32–36)
MCV RBC AUTO: 93.6 FL (ref 78–100)
MONOCYTES ABSOLUTE: 0.5 K/CU MM
MONOCYTES RELATIVE PERCENT: 5.6 % (ref 0–4)
NITRITE URINE, QUANTITATIVE: NEGATIVE
NUCLEATED RBC %: 0 %
PDW BLD-RTO: 13 % (ref 11.7–14.9)
PH, URINE: 7.5 (ref 5–8)
PLATELET # BLD: 99 K/CU MM (ref 140–440)
PMV BLD AUTO: 12 FL (ref 7.5–11.1)
POTASSIUM SERPL-SCNC: 3.7 MMOL/L (ref 3.5–5.1)
PROTEIN UA: ABNORMAL MG/DL
RBC # BLD: 4.99 M/CU MM (ref 4.2–5.4)
RBC URINE: 5 /HPF (ref 0–6)
SARS-COV-2, NAAT: NOT DETECTED
SEGMENTED NEUTROPHILS ABSOLUTE COUNT: 7.9 K/CU MM
SEGMENTED NEUTROPHILS RELATIVE PERCENT: 83.2 % (ref 36–66)
SODIUM BLD-SCNC: 138 MMOL/L (ref 135–145)
SOURCE: NORMAL
SPECIFIC GRAVITY UA: 1.02 (ref 1–1.03)
SQUAMOUS EPITHELIAL: <1 /HPF
TOTAL IMMATURE NEUTOROPHIL: 0.04 K/CU MM
TOTAL NUCLEATED RBC: 0 K/CU MM
TOTAL PROTEIN: 8.1 GM/DL (ref 6.4–8.2)
TRICHOMONAS: ABNORMAL /HPF
UROBILINOGEN, URINE: 0.2 MG/DL (ref 0.2–1)
WBC # BLD: 9.5 K/CU MM (ref 4–10.5)
WBC UA: <1 /HPF (ref 0–5)

## 2022-01-31 PROCEDURE — 80053 COMPREHEN METABOLIC PANEL: CPT

## 2022-01-31 PROCEDURE — 96361 HYDRATE IV INFUSION ADD-ON: CPT

## 2022-01-31 PROCEDURE — 96372 THER/PROPH/DIAG INJ SC/IM: CPT

## 2022-01-31 PROCEDURE — 82140 ASSAY OF AMMONIA: CPT

## 2022-01-31 PROCEDURE — 6370000000 HC RX 637 (ALT 250 FOR IP): Performed by: EMERGENCY MEDICINE

## 2022-01-31 PROCEDURE — 96375 TX/PRO/DX INJ NEW DRUG ADDON: CPT

## 2022-01-31 PROCEDURE — 6360000002 HC RX W HCPCS: Performed by: EMERGENCY MEDICINE

## 2022-01-31 PROCEDURE — 2500000003 HC RX 250 WO HCPCS: Performed by: EMERGENCY MEDICINE

## 2022-01-31 PROCEDURE — 2580000003 HC RX 258: Performed by: EMERGENCY MEDICINE

## 2022-01-31 PROCEDURE — 85025 COMPLETE CBC W/AUTO DIFF WBC: CPT

## 2022-01-31 PROCEDURE — 81001 URINALYSIS AUTO W/SCOPE: CPT

## 2022-01-31 PROCEDURE — 82248 BILIRUBIN DIRECT: CPT

## 2022-01-31 PROCEDURE — 96374 THER/PROPH/DIAG INJ IV PUSH: CPT

## 2022-01-31 PROCEDURE — 87635 SARS-COV-2 COVID-19 AMP PRB: CPT

## 2022-01-31 PROCEDURE — 74176 CT ABD & PELVIS W/O CONTRAST: CPT

## 2022-01-31 PROCEDURE — 83690 ASSAY OF LIPASE: CPT

## 2022-01-31 RX ORDER — HYDROMORPHONE HCL 110MG/55ML
0.5 PATIENT CONTROLLED ANALGESIA SYRINGE INTRAVENOUS ONCE
Status: COMPLETED | OUTPATIENT
Start: 2022-01-31 | End: 2022-01-31

## 2022-01-31 RX ORDER — DICYCLOMINE HYDROCHLORIDE 10 MG/ML
20 INJECTION INTRAMUSCULAR ONCE
Status: COMPLETED | OUTPATIENT
Start: 2022-01-31 | End: 2022-01-31

## 2022-01-31 RX ORDER — SUCRALFATE 1 G/1
1 TABLET ORAL 4 TIMES DAILY
Qty: 120 TABLET | Refills: 3 | Status: SHIPPED | OUTPATIENT
Start: 2022-01-31

## 2022-01-31 RX ORDER — PROMETHAZINE HYDROCHLORIDE 25 MG/ML
25 INJECTION, SOLUTION INTRAMUSCULAR; INTRAVENOUS ONCE
Status: COMPLETED | OUTPATIENT
Start: 2022-01-31 | End: 2022-01-31

## 2022-01-31 RX ORDER — ONDANSETRON 2 MG/ML
4 INJECTION INTRAMUSCULAR; INTRAVENOUS ONCE
Status: COMPLETED | OUTPATIENT
Start: 2022-01-31 | End: 2022-01-31

## 2022-01-31 RX ORDER — PROMETHAZINE HYDROCHLORIDE 25 MG/1
25 TABLET ORAL EVERY 6 HOURS PRN
Qty: 15 TABLET | Refills: 0 | Status: SHIPPED | OUTPATIENT
Start: 2022-01-31 | End: 2022-02-07

## 2022-01-31 RX ORDER — ONDANSETRON 4 MG/1
4 TABLET, ORALLY DISINTEGRATING ORAL EVERY 8 HOURS PRN
Qty: 15 TABLET | Refills: 0 | Status: SHIPPED | OUTPATIENT
Start: 2022-01-31

## 2022-01-31 RX ORDER — ONDANSETRON 4 MG/1
4 TABLET, ORALLY DISINTEGRATING ORAL ONCE
Status: COMPLETED | OUTPATIENT
Start: 2022-01-31 | End: 2022-01-31

## 2022-01-31 RX ORDER — FAMOTIDINE 40 MG/1
40 TABLET, FILM COATED ORAL DAILY
Qty: 30 TABLET | Refills: 0 | Status: SHIPPED | OUTPATIENT
Start: 2022-01-31 | End: 2022-08-16

## 2022-01-31 RX ORDER — 0.9 % SODIUM CHLORIDE 0.9 %
500 INTRAVENOUS SOLUTION INTRAVENOUS ONCE
Status: COMPLETED | OUTPATIENT
Start: 2022-01-31 | End: 2022-01-31

## 2022-01-31 RX ADMIN — ONDANSETRON 4 MG: 2 INJECTION INTRAMUSCULAR; INTRAVENOUS at 06:14

## 2022-01-31 RX ADMIN — DICYCLOMINE HYDROCHLORIDE 20 MG: 20 INJECTION, SOLUTION INTRAMUSCULAR at 01:40

## 2022-01-31 RX ADMIN — PROMETHAZINE HYDROCHLORIDE 25 MG: 25 INJECTION INTRAMUSCULAR; INTRAVENOUS at 06:13

## 2022-01-31 RX ADMIN — FAMOTIDINE 20 MG: 10 INJECTION, SOLUTION INTRAVENOUS at 06:14

## 2022-01-31 RX ADMIN — SODIUM CHLORIDE 500 ML: 9 INJECTION, SOLUTION INTRAVENOUS at 06:15

## 2022-01-31 RX ADMIN — ONDANSETRON 4 MG: 4 TABLET, ORALLY DISINTEGRATING ORAL at 01:39

## 2022-01-31 RX ADMIN — HYDROMORPHONE HYDROCHLORIDE 0.5 MG: 2 INJECTION, SOLUTION INTRAMUSCULAR; INTRAVENOUS; SUBCUTANEOUS at 06:13

## 2022-01-31 ASSESSMENT — PAIN SCALES - GENERAL: PAINLEVEL_OUTOF10: 8

## 2022-01-31 NOTE — Clinical Note
Hallie Shoulder was seen and treated in our emergency department on 1/30/2022. She may return to work on 02/02/2022. If you have any questions or concerns, please don't hesitate to call.       Lise Alonso MD

## 2022-01-31 NOTE — ED TRIAGE NOTES
Patient is a 63year old female that presents to the emergency department via ambulance. Patient has had nausea, vomiting, diarrhea since around 0500 this morning. Patient denies fevers and is afebrile upon assessment. Patient has vomited approximately 7 times and diarrhea consistent with the vomiting. Patient states there is no blood in emesis and it is brown in color; no blood is present in loose stools. Patient is hypertensive as documented in initial vitals. Patient also states that she has generalized body aches and her pain in her abdomen is around her belly button and radiates epigastrically. Patient is weak and lethargic.     Pan Onofre, RIVERP

## 2022-01-31 NOTE — Clinical Note
John Douglass was seen and treated in our emergency department on 1/30/2022. She may return to work on 02/02/2022. If you have any questions or concerns, please don't hesitate to call.       Emily Olivera MD

## 2022-01-31 NOTE — ED PROVIDER NOTES
and LGSV.    H/O echocardiogram 12/18/2020    EF 55-60%, Mod LVH.  Hepatic encephalopathy ([facility] [address] 05/2019    Hepatitis C     for liver bx 12/3/2015\"Have Hepatitis B and C and saw Dr John Mueller for this 12/1/2015\"    Hiatal hernia     History of alcohol abuse     History of exercise stress test 01/02/2020    Treadmill, Normal exercise performance without angina and ischemic EKG changes.     HTN (hypertension)     \"for the past two yrs on medication\" follows with Dr Onel Flores Hx of blood clots 2019    Portal vein thrombsis - TIPS procedure 4/23/19    Hyperlipemia     Irregular heart beat     per pt    Liver hematoma     Migraines     Last migraine:  2018    Nausea & vomiting     Schizophrenia ([facility] [address]     per old chart    Seizures ([facility] [address]     \"last one was 9/2015- saw Dr Ian Hermosillo at [facility]- she said not sure if acutal seizures- she thinks they are panic or anxiety attacks\"    SOB (shortness of breath)     with any exertion       FAMILY HISTORY  Family History   Problem Relation Age of Onset    Cancer Mother         lung ca    Arthritis Mother     Migraines Mother     Cancer Father         colon ca    Diabetes Father     High Blood Pressure Father     Arthritis Father     High Cholesterol Father     Migraines Father     Migraines Sister     Heart Disease Brother         WPW       SOCIAL HISTORY  Social History     Socioeconomic History    Marital status:      Spouse name: None    Number of children: None    Years of education: None    Highest education level: None   Occupational History    None   Tobacco Use    Smoking status: Former Smoker     Packs/day: 1.00     Years: 45.00     Pack years: 45.00     Types: Cigarettes     Start date: 1974    Smokeless tobacco: Never Used   Vaping Use    Vaping Use: Never used   Substance and Sexual Activity    Alcohol use: Not Currently     Alcohol/week: 2.0 - 3.0 standard drinks     Types: 2 - 3 Shots of liquor per week     Comment: 2-3 shots last 4/2019    Drug use: Yes     Frequency: 3.0 times per week     Types: Marijuana Daina RayDionne     Comment: daily    Sexual activity: Not Currently     Partners: Male   Other Topics Concern    None   Social History Narrative    None     Social Determinants of Health     Financial Resource Strain:     Difficulty of Paying Living Expenses: Not on file   Food Insecurity:     Worried About Running Out of Food in the Last Year: Not on file    Elías of Food in the Last Year: Not on file   Transportation Needs:     Lack of Transportation (Medical): Not on file    Lack of Transportation (Non-Medical):  Not on file   Physical Activity:     Days of Exercise per Week: Not on file    Minutes of Exercise per Session: Not on file   Stress:     Feeling of Stress : Not on file   Social Connections:     Frequency of Communication with Friends and Family: Not on file    Frequency of Social Gatherings with Friends and Family: Not on file    Attends Mandaen Services: Not on file    Active Member of [address] [place] [address] or Organizations: Not on file    Attends Club or Organization Meetings: Not on file    Marital Status: Not on file   Intimate Partner Violence:     Fear of Current or Ex-Partner: Not on file    Emotionally Abused: Not on file    Physically Abused: Not on file    Sexually Abused: Not on file   Housing Stability:     Unable to Pay for Housing in the Last Year: Not on file    Number of [place] in the Last Year: Not on file    Unstable Housing in the Last Year: Not on file       SURGICAL HISTORY  Past Surgical History:   Procedure Laterality Date    BREAST SURGERY  10/2015    left breast bx   [address]      per old chart pt had cath done in 3/2011 and 9/2013    CARPAL TUNNEL RELEASE Left 1/9/2020    CARPAL TUNNEL RELEASE LEFT performed by Edwin Arnold DO at [place] [address] Right 05/26/2020    dr Meeta Amado, carpal tunnel trigger finger release    CARPAL TUNNEL RELEASE Right 5/26/2020    RIGHT CARPAL TUNNEL RELEASE performed by Hugo Anand DO at [id number] [place]  per old chart done [facility]  3/11/13    diverticulosis, cecal polyp    COLONOSCOPY  03/16/2017    Internal hemorrhoids- Dr. Williams Glass, COLON, DIAGNOSTIC  03/16/2017    EGD: Small esophageal varices, portal hypertensive gastropathy, reflux esophagitis, hiatal hernia    EYE SURGERY Bilateral ? when    cyst removal, cataracts w/lens replacement    FINGER TRIGGER RELEASE Right 5/26/2020    FINGER TRIGGER RELEASE RIGHT RING FINGER performed by Hugo Anand DO at [facility]      per old chart pt had CHOLO/BSO 1986    TIPS PROCEDURE  04/23/2019    TONSILLECTOMY  as a kid    UPPER GASTROINTESTINAL ENDOSCOPY N/A 11/14/2018    EGD DIAGNOSTIC ONLY performed by Gricelda Lane MD at [address] [facility] 6/5/2019    EGD DIAGNOSTIC ONLY performed by Gricelda Lane MD at [facility]  No current facility-administered medications on file prior to encounter. Current Outpatient Medications on File Prior to Encounter   Medication Sig Dispense Refill    blood glucose monitor strips Test 3 times a day & as needed for symptoms of irregular blood glucose. Please fill with what is covered my patients insurance. 50 strip 3    methylPREDNISolone (MEDROL, SERGE,) 4 MG tablet Take by mouth. As prescribed 1 kit 0    insulin glargine (LANTUS SOLOSTAR) 100 UNIT/ML injection pen Inject 15 Units into the skin nightly 5 pen 3    insulin aspart (NOVOLOG FLEXPEN) 100 UNIT/ML injection pen **Low Dose Correction Algorithm**Insulin lispro (HUMALOG)  3 TIMES DAILY WITH MEALSGlucose: Dose: No [id number] 1 [id number] 2 [id number] 3 [id number] 4 [id number] 5 [id number] and above 6 Units 5 pen 3    blood glucose monitor strips Test  Bid times a day & as needed for symptoms of irregular blood glucose.  100 strip 5    albuterol (PROVENTIL) (2.5 MG/3ML) 0.083% nebulizer solution Take 3 mLs by nebulization every 6 hours 120 each 5    Glucosource Lancets MISC Use one 3-4 times to check blood sugar 100 each 5    midodrine (PROAMATINE) 10 MG tablet Take 1 tablet by mouth 3 times daily (with meals) 90 tablet 3    metoprolol succinate (TOPROL XL) 50 MG extended release tablet Take 1 tablet by mouth daily 30 tablet 2    ipratropium-albuterol (DUONEB) 0.5-2.5 (3) MG/3ML SOLN nebulizer solution Inhale 3 mLs into the lungs every 4 hours 360 mL 0    furosemide (LASIX) 20 MG tablet Take 1 tablet by mouth daily Hold if systolic <309 30 tablet 1    lactulose (CHRONULAC) 10 GM/15ML solution Take 30 mLs by mouth 3 times daily 1892 mL 2    QUEtiapine (SEROQUEL) 50 MG tablet Take 3 tablets by mouth nightly 60 tablet 3    rifaximin (XIFAXAN) 550 MG tablet Take 1 tablet by mouth 2 times daily 42 tablet 0    FLUoxetine (PROZAC) 20 MG capsule Take 20 mg by mouth daily      traMADol (ULTRAM) 50 MG tablet  (Patient not taking: Reported on 1/13/2022)      paliperidone (INVEGA) 3 MG extended release tablet       nitroGLYCERIN (NITROSTAT) 0.4 MG SL tablet Place 1 tablet under the tongue every 5 minutes as needed for Chest pain 25 tablet 3    ranolazine (RANEXA) 500 MG extended release tablet Take 1 tablet by mouth 2 times daily 60 tablet 5    Compression Stockings MISC by Does not apply route 20 - 30 mmh wear daily and take off at night  Thigh High 2 each 2    UNABLE TO FIND Rx for depends   Use 3-4  times daily 1 box 5    albuterol (PROVENTIL) (2.5 MG/3ML) 0.083% nebulizer solution Take 3 mLs by nebulization every 6 hours 120 vial 5    glucose monitoring kit (FREESTYLE) monitoring kit 1 kit by Does not apply route daily 1 kit 0    Blood Glucose Monitoring Suppl (ACURA BLOOD GLUCOSE METER) w/Device KIT Please check blood sugar 3 times daily.  Please fill with what is covered by the insurance 1 kit 0    SOFT TOUCH LANCETS MISC Please check blood sugar 3 times daily. Please fill with what is covered by insurance 100 each 3    pantoprazole (PROTONIX) 40 MG tablet Take 1 tablet by mouth 2 times daily (before meals) (Patient not taking: Reported on 1/13/2022) 60 tablet 1    VIREAD 300 MG tablet Take 300 mg by mouth daily  (Patient not taking: Reported on 1/13/2022)      famotidine (PEPCID) 20 MG tablet Take 1 tablet by mouth 2 times daily for 30 doses 30 tablet 0         ALLERGIES  Allergies   Allergen Reactions    Norco [Hydrocodone-Acetaminophen] Itching     Itching, rash, nausea and vomiting.  Tylenol [Acetaminophen] Itching, Nausea And Vomiting and Rash       PHYSICAL EXAM  VITAL SIGNS: BP (!) 179/97   Pulse 110   Temp 97.8 °F (36.6 °C) (Oral)   Resp 20   Ht 4' 9\" (1.448 m)   Wt 187 lb (84.8 kg)   SpO2 92%   BMI 40.47 kg/m²   Constitutional: Well developed, Well nourished, resting in chair, pleasant  HENT: Normocephalic, Atraumatic, Bilateral external ears normal, Oropharynx moist, No oral exudates, Nose normal.   Eyes: PERRL, EOMI, Conjunctiva normal, No discharge. Neck: Normal range of motion, Supple, No stridor. Cardiovascular: Mildly tachycardic heart rate, Normal rhythm, No murmurs, No rubs, No gallops. Thorax & Lungs: Normal breath sounds, No respiratory distress, No wheezing, No chest tenderness. Abdomen: Bowel sounds normal, Soft, minimal diffuse tenderness, no guarding, no rebound, No masses, No pulsatile masses. Skin: Warm, Dry, No erythema, No rash. Extremities: Intact distal pulses, No edema, No tenderness, No cyanosis, No clubbing. Musculoskeletal: Good gross range of motion in all major joints. No major deformities noted. Neurologic: Alert & oriented x 3, Normal gross motor function, Normal gross sensory function, No focal deficits noted.    Psychiatric: Affect flat        RADIOLOGY/PROCEDURES/LABS  Last Imaging results   CT ABDOMEN PELVIS WO CONTRAST Additional Contrast? None   Final Result   No acute abdominal or pelvic abnormality. Nodular hepatic border likely related to a degree of cirrhosis. Scattered uncomplicated diverticulosis. XR CHEST PORTABLE   Final Result   Atelectasis or scarring are suggested at the lung bases but without signs of   pneumonia or CHF. Tiny metallic foci over the right left lower chest are likely outside the   patient. Imaging reviewed by myself    Labs Reviewed   CBC WITH AUTO DIFFERENTIAL - Abnormal; Notable for the following components:       Result Value    Hemoglobin 16.5 (*)     MCH 33.1 (*)     Platelets 99 (*)     MPV 12.0 (*)     Segs Relative 83.2 (*)     Lymphocytes % 9.9 (*)     Monocytes % 5.6 (*)     All other components within normal limits   BASIC METABOLIC PANEL W/ REFLEX TO MG FOR LOW K - Abnormal; Notable for the following components:    Chloride 98 (*)     Glucose 408 (*)     All other components within normal limits   HEPATIC FUNCTION PANEL - Abnormal; Notable for the following components:     Total Bilirubin 1.7 (*)     Bilirubin, Direct 0.6 (*)     Bilirubin, Indirect 1.1 (*)     Alkaline Phosphatase 189 (*)     All other components within normal limits   URINALYSIS - Abnormal; Notable for the following components:    Color, UA YELLOW (*)     Bacteria, UA RARE (*)     All other components within normal limits   AMMONIA - Abnormal; Notable for the following components:    Ammonia 52 (*)     All other components within normal limits   COVID-19, RAPID   LIPASE         Medications   0.9 % sodium chloride bolus (has no administration in time range)   promethazine (PHENERGAN) injection 25 mg (has no administration in time range)   famotidine (PEPCID) injection 20 mg (has no administration in time range)   ondansetron (ZOFRAN) injection 4 mg (has no administration in time range)   HYDROmorphone (DILAUDID) injection 0.5 mg (has no administration in time range)   ondansetron (ZOFRAN-ODT) disintegrating tablet 4 mg (4 mg Oral Given 1/31/22 3029) dicyclomine (BENTYL) injection 20 mg (20 mg IntraMUSCular Given 1/31/22 0140)       COURSE & MEDICAL DECISION MAKING  Pertinent Labs & Imaging studies reviewed. (See chart for details)    53-year-old female presents with nausea, vomiting and diarrhea. Secondary to her chronic medical concerns I did believe she benefit from a CT scan. The CT scan does not demonstrate colitis, diverticulitis, SBO. Her abdominal exam is poorly localized, there is not appear to be significant free fluid. She was treated symptomatically with Bentyl and Zofran but had some recurrent nausea with dry heaving. At time of recheck that has resolved but she will be treated with some IV fluids, Phenergan, Pepcid and Zofran. Her total bilirubin is mostly indirect, likely chronic to her cirrhosis and is similar to comparison values. She does not have leukocytosis or significant anemia or electrolyte disturbance outside of mild hyperglycemia. Secondary to hyperglycemia she will be treated with IV fluids, additional round of nausea medication. Will prescribe additional nausea medication for home and directed her back to primary care as there is no acute illness suggesting reason for hospitalization and she is feeling somewhat improved at time of recheck. She is agreeable this plan of care, discharged pending completion of treatment. FINAL IMPRESSION  Problem List Items Addressed This Visit     None      Visit Diagnoses     Nausea vomiting and diarrhea    -  Primary    Elevated blood pressure reading        Abdominal cramping          1.    2.   3.    Patient gave me permission to discuss medical history, care, and plan with those present in the room.   Electronically signed by: Hermelindo Mills MD, 1/31/2022  MD Hermelindo Sharp MD  01/31/22 1791

## 2022-02-09 RX ORDER — FUROSEMIDE 20 MG/1
20 TABLET ORAL DAILY
Qty: 30 TABLET | Refills: 1 | Status: CANCELLED | OUTPATIENT
Start: 2022-02-09

## 2022-02-16 ENCOUNTER — OFFICE VISIT (OUTPATIENT)
Dept: FAMILY MEDICINE CLINIC | Age: 62
End: 2022-02-16
Payer: COMMERCIAL

## 2022-02-16 VITALS
OXYGEN SATURATION: 95 % | DIASTOLIC BLOOD PRESSURE: 78 MMHG | SYSTOLIC BLOOD PRESSURE: 124 MMHG | HEIGHT: 57 IN | BODY MASS INDEX: 40.82 KG/M2 | HEART RATE: 68 BPM | WEIGHT: 189.2 LBS

## 2022-02-16 DIAGNOSIS — Z79.4 TYPE 2 DIABETES MELLITUS WITH COMPLICATION, WITH LONG-TERM CURRENT USE OF INSULIN (HCC): Primary | ICD-10-CM

## 2022-02-16 DIAGNOSIS — K21.9 GASTROESOPHAGEAL REFLUX DISEASE WITHOUT ESOPHAGITIS: ICD-10-CM

## 2022-02-16 DIAGNOSIS — I25.10 CORONARY ARTERY DISEASE INVOLVING NATIVE HEART WITHOUT ANGINA PECTORIS, UNSPECIFIED VESSEL OR LESION TYPE: ICD-10-CM

## 2022-02-16 DIAGNOSIS — Z12.31 ENCOUNTER FOR SCREENING MAMMOGRAM FOR BREAST CANCER: ICD-10-CM

## 2022-02-16 DIAGNOSIS — K74.60 CIRRHOSIS OF LIVER WITHOUT ASCITES, UNSPECIFIED HEPATIC CIRRHOSIS TYPE (HCC): ICD-10-CM

## 2022-02-16 DIAGNOSIS — I10 PRIMARY HYPERTENSION: ICD-10-CM

## 2022-02-16 DIAGNOSIS — F41.9 ANXIETY: ICD-10-CM

## 2022-02-16 DIAGNOSIS — E11.8 TYPE 2 DIABETES MELLITUS WITH COMPLICATION, WITH LONG-TERM CURRENT USE OF INSULIN (HCC): Primary | ICD-10-CM

## 2022-02-16 DIAGNOSIS — J44.9 CHRONIC OBSTRUCTIVE PULMONARY DISEASE, UNSPECIFIED COPD TYPE (HCC): ICD-10-CM

## 2022-02-16 PROCEDURE — 3017F COLORECTAL CA SCREEN DOC REV: CPT | Performed by: FAMILY MEDICINE

## 2022-02-16 PROCEDURE — G8484 FLU IMMUNIZE NO ADMIN: HCPCS | Performed by: FAMILY MEDICINE

## 2022-02-16 PROCEDURE — 1036F TOBACCO NON-USER: CPT | Performed by: FAMILY MEDICINE

## 2022-02-16 PROCEDURE — G8417 CALC BMI ABV UP PARAM F/U: HCPCS | Performed by: FAMILY MEDICINE

## 2022-02-16 PROCEDURE — 2022F DILAT RTA XM EVC RTNOPTHY: CPT | Performed by: FAMILY MEDICINE

## 2022-02-16 PROCEDURE — 99214 OFFICE O/P EST MOD 30 MIN: CPT | Performed by: FAMILY MEDICINE

## 2022-02-16 PROCEDURE — G8427 DOCREV CUR MEDS BY ELIG CLIN: HCPCS | Performed by: FAMILY MEDICINE

## 2022-02-16 PROCEDURE — 3046F HEMOGLOBIN A1C LEVEL >9.0%: CPT | Performed by: FAMILY MEDICINE

## 2022-02-16 PROCEDURE — 3023F SPIROM DOC REV: CPT | Performed by: FAMILY MEDICINE

## 2022-02-16 NOTE — PROGRESS NOTES
Moon Urbano  1960 02/17/22    Chief Complaint   Patient presents with    1 Year Follow Up    Diabetes    Hypertension    Gastroesophageal Reflux    COPD    Anxiety    Coronary Artery Disease    Cirrhosis           Patient here for 1 year f/u, we did not see the patient as a follow-up for a long time, so she is here for a follow-up regarding HTN, DM, COPD, anxiety, and GERD, CAD, Liver cirrhosis, patient doing fine and has no complaints, The patient is taking hypertensive medications compliantly without side effects. Denies chest pain, dyspnea, edema, or TIA's. Her BS under control. Her COPD under control, and GERD under control too. Past Medical History:   Diagnosis Date    Acid reflux     Arthritis     left knee    CAD (coronary artery disease)     per [facility] 9/6/13 - Dr. Javier Mccoy COPD (chronic obstructive pulmonary disease) [facility])     follow with Dr Virginia Silva Depression     \"have manic - depression see Dr Jarad Agudelo Diabetes mellitus [facility])     dx 10+ yrs ago- follows with PCP    Drug abuse ([facility] [address]     hx use of cocaine, heroin and marijuana- states last used 12/2014    Glaucoma     bilateral    H/O Doppler lower venous ultrasound 04/04/2019    No DVT or SVT, Significant reflux of RGSV and LGSV.    H/O echocardiogram 12/18/2020    EF 55-60%, Mod LVH.  Hepatic encephalopathy ([facility] [address] 05/2019    Hepatitis C     for liver bx 12/3/2015\"Have Hepatitis B and C and saw Dr Grossman Head for this 12/1/2015\"    Hiatal hernia     History of alcohol abuse     History of exercise stress test 01/02/2020    Treadmill, Normal exercise performance without angina and ischemic EKG changes.     HTN (hypertension)     \"for the past two yrs on medication\" follows with Dr Javier Mccoy Hx of blood clots 2019    Portal vein thrombsis - TIPS procedure 4/23/19    Hyperlipemia     Irregular heart beat     per pt    Liver hematoma     Migraines     Last migraine:  2018    Nausea & vomiting     Schizophrenia ([facility] [address]     per old chart    Seizures ([facility] [address]     \"last one was 9/2015- saw Dr Isabella Mcpherson at [facility]- she said not sure if acutal seizures- she thinks they are panic or anxiety attacks\"    SOB (shortness of breath)     with any exertion     Past Surgical History:   Procedure Laterality Date    BREAST SURGERY  10/2015    left breast bx    CARDIAC CATHETERIZATION      per old chart pt had cath done in 3/2011 and 9/2013    CARPAL TUNNEL RELEASE Left 1/9/2020    CARPAL TUNNEL RELEASE LEFT performed by [place] , DO at [place] [id number] Right 05/26/2020    dr Rivera Mensah, carpal tunnel trigger finger release    CARPAL TUNNEL RELEASE Right 5/26/2020    RIGHT CARPAL TUNNEL RELEASE performed by Aurymarlin Hopson, DO at [id number] [facility]  per old chart done [facility]  3/11/13    diverticulosis, cecal polyp    COLONOSCOPY  03/16/2017    Internal hemorrhoids- Dr. Gabriella Hansen, COLON, DIAGNOSTIC  03/16/2017    EGD: Small esophageal varices, portal hypertensive gastropathy, reflux esophagitis, hiatal hernia    EYE SURGERY Bilateral ? when    cyst removal, cataracts w/lens replacement    FINGER TRIGGER RELEASE Right 5/26/2020    FINGER TRIGGER RELEASE RIGHT RING FINGER performed by [place] , DO at [facility]      per old chart pt had CHOLO/BSO 1986    TIPS PROCEDURE  04/23/2019    TONSILLECTOMY  as a kid    UPPER GASTROINTESTINAL ENDOSCOPY N/A 11/14/2018    EGD DIAGNOSTIC ONLY performed by Arminda Aguilar MD at [facility] N/A 6/5/2019    EGD DIAGNOSTIC ONLY performed by Arminda Aguilar MD at [facility] History   Problem Relation Age of Onset    Cancer Mother         lung ca   Mccall Saliva Arthritis Mother     Migraines Mother     Cancer Father         colon ca   Mccall Saliva Diabetes Father     High Blood Pressure Father     Arthritis Father     High Cholesterol Father     Migraines Father     Migraines Sister  Heart Disease Brother         WPW     Social History     Socioeconomic History    Marital status:      Spouse name: Not on file    Number of children: Not on file    Years of education: Not on file    Highest education level: Not on file   Occupational History    Not on file   Tobacco Use    Smoking status: Former Smoker     Packs/day: 1.00     Years: 45.00     Pack years: 45.00     Types: Cigarettes     Start date: 1974    Smokeless tobacco: Never Used   Vaping Use    Vaping Use: Never used   Substance and Sexual Activity    Alcohol use: Not Currently     Alcohol/week: 2.0 - 3.0 standard drinks     Types: 2 - 3 Shots of liquor per week     Comment: 2-3 shots last 4/2019    Drug use: Yes     Frequency: 3.0 times per week     Types: Marijuana Caleb Brim)     Comment: daily    Sexual activity: Not Currently     Partners: Male   Other Topics Concern    Not on file   Social History Narrative    Not on file     Social Determinants of Health     Financial Resource Strain:     Difficulty of Paying Living Expenses: Not on file   Food Insecurity:     Worried About Running Out of Food in the Last Year: Not on file    Elías of Food in the Last Year: Not on file   Transportation Needs:     Lack of Transportation (Medical): Not on file    Lack of Transportation (Non-Medical):  Not on file   Physical Activity:     Days of Exercise per Week: Not on file    Minutes of Exercise per Session: Not on file   Stress:     Feeling of Stress : Not on file   Social Connections:     Frequency of Communication with Friends and Family: Not on file    Frequency of Social Gatherings with Friends and Family: Not on file    Attends Evangelical Services: Not on file    Active Member of Clubs or Organizations: Not on file    Attends Club or Organization Meetings: Not on file    Marital Status: Not on file   Intimate Partner Violence:     Fear of Current or Ex-Partner: Not on file    Emotionally Abused: Not on file  Physically Abused: Not on file    Sexually Abused: Not on file   Housing Stability:     Unable to Pay for Housing in the Last Year: Not on file    Number of Places Lived in the Last Year: Not on file    Unstable Housing in the Last Year: Not on file       Allergies   Allergen Reactions    Norco [Hydrocodone-Acetaminophen] Itching     Itching, rash, nausea and vomiting.  Tylenol [Acetaminophen] Itching, Nausea And Vomiting and Rash     Current Outpatient Medications   Medication Sig Dispense Refill    sucralfate (CARAFATE) 1 GM tablet Take 1 tablet by mouth 4 times daily 120 tablet 3    ondansetron (ZOFRAN ODT) 4 MG disintegrating tablet Take 1 tablet by mouth every 8 hours as needed for Nausea 15 tablet 0    famotidine (PEPCID) 40 MG tablet Take 1 tablet by mouth daily 30 tablet 0    blood glucose monitor strips Test 3 times a day & as needed for symptoms of irregular blood glucose. Please fill with what is covered my patients insurance. 50 strip 3    insulin glargine (LANTUS SOLOSTAR) 100 UNIT/ML injection pen Inject 15 Units into the skin nightly 5 pen 3    insulin aspart (NOVOLOG FLEXPEN) 100 UNIT/ML injection pen **Low Dose Correction Algorithm**Insulin lispro (HUMALOG)  3 TIMES DAILY WITH MEALSGlucose: Dose: No [id number] 1 [id number] 2 [id number] 3 [id number] 4 [id number] 5 Umjkd419 and above 6 Units 5 pen 3    blood glucose monitor strips Test  Bid times a day & as needed for symptoms of irregular blood glucose.  100 strip 5    albuterol (PROVENTIL) (2.5 MG/3ML) 0.083% nebulizer solution Take 3 mLs by nebulization every 6 hours 120 each 5    Glucosource Lancets MISC Use one 3-4 times to check blood sugar 100 each 5    midodrine (PROAMATINE) 10 MG tablet Take 1 tablet by mouth 3 times daily (with meals) 90 tablet 3    metoprolol succinate (TOPROL XL) 50 MG extended release tablet Take 1 tablet by mouth daily 30 tablet 2    ipratropium-albuterol (DUONEB) 0.5-2.5 (3) MG/3ML SOLN nebulizer solution Inhale 3 mLs into the lungs every 4 hours 360 mL 0    furosemide (LASIX) 20 MG tablet Take 1 tablet by mouth daily Hold if systolic <445 30 tablet 1    lactulose (CHRONULAC) 10 GM/15ML solution Take 30 mLs by mouth 3 times daily 1892 mL 2    QUEtiapine (SEROQUEL) 50 MG tablet Take 3 tablets by mouth nightly 60 tablet 3    rifaximin (XIFAXAN) 550 MG tablet Take 1 tablet by mouth 2 times daily 42 tablet 0    FLUoxetine (PROZAC) 20 MG capsule Take 20 mg by mouth daily      paliperidone (INVEGA) 3 MG extended release tablet       nitroGLYCERIN (NITROSTAT) 0.4 MG SL tablet Place 1 tablet under the tongue every 5 minutes as needed for Chest pain 25 tablet 3    ranolazine (RANEXA) 500 MG extended release tablet Take 1 tablet by mouth 2 times daily 60 tablet 5    Compression Stockings MISC by Does not apply route 20 - 30 mmh wear daily and take off at night  Thigh High 2 each 2    UNABLE TO FIND Rx for depends   Use 3-4  times daily 1 box 5    albuterol (PROVENTIL) (2.5 MG/3ML) 0.083% nebulizer solution Take 3 mLs by nebulization every 6 hours 120 vial 5    glucose monitoring kit (FREESTYLE) monitoring kit 1 kit by Does not apply route daily 1 kit 0    Blood Glucose Monitoring Suppl (ACURA BLOOD GLUCOSE METER) w/Device KIT Please check blood sugar 3 times daily. Please fill with what is covered by the insurance 1 kit 0    SOFT TOUCH LANCETS MISC Please check blood sugar 3 times daily. Please fill with what is covered by insurance 100 each 3    pantoprazole (PROTONIX) 40 MG tablet Take 1 tablet by mouth 2 times daily (before meals) 60 tablet 1    VIREAD 300 MG tablet Take 300 mg by mouth daily        No current facility-administered medications for this visit. Review of Systems   Constitutional: Negative for activity change, chills, fatigue and fever. HENT: Negative for congestion. Respiratory: Negative for cough, shortness of breath and wheezing.     Cardiovascular: Negative for chest pain and leg swelling. Gastrointestinal: Negative for abdominal distention, abdominal pain, nausea and vomiting. Genitourinary: Negative for dysuria. Neurological: Negative for dizziness and headaches. Psychiatric/Behavioral: Negative for agitation and sleep disturbance. The patient is not nervous/anxious.         Lab Results   Component Value Date    WBC 9.5 01/31/2022    HGB 16.5 (H) 01/31/2022    HCT 46.7 01/31/2022    MCV 93.6 01/31/2022    PLT 99 (L) 01/31/2022     Lab Results   Component Value Date     01/31/2022    K 3.7 01/31/2022    CL 98 (L) 01/31/2022    CO2 27 01/31/2022    BUN 14 01/31/2022    CREATININE 0.9 01/31/2022    GLUCOSE 408 (HH) 01/31/2022    CALCIUM 9.8 01/31/2022    PROT 8.1 01/31/2022    LABALBU 3.9 01/31/2022    BILITOT 1.7 (H) 01/31/2022    ALKPHOS 189 (H) 01/31/2022    AST 18 01/31/2022    ALT 14 01/31/2022    LABGLOM >60 01/31/2022    GFRAA >60 01/31/2022    AGRATIO 1.1 04/04/2018    GLOB 3.5 04/04/2018     Lab Results   Component Value Date    CHOL 98 11/27/2019    CHOL 120 08/01/2017    CHOL 127 04/28/2013     Lab Results   Component Value Date    TRIG 96 11/27/2019    TRIG 138 08/01/2017    TRIG 122 04/28/2013     Lab Results   Component Value Date    HDL 38 (L) 11/27/2019    HDL 25 (L) 08/01/2017    HDL 34 (L) 04/28/2013     Lab Results   Component Value Date    LDLCALC 41 11/27/2019    LDLCALC 67 08/01/2017     Lab Results   Component Value Date    LABA1C 8.1 11/08/2021     Lab Results   Component Value Date    TSH 1.73 08/01/2017    TSHHS 2.540 11/23/2020         /78 (Site: Left Upper Arm, Position: Sitting, Cuff Size: Large Adult)   Pulse 68   Ht 4' 9\" (1.448 m)   Wt 189 lb 3.2 oz (85.8 kg)   SpO2 95%   BMI 40.94 kg/m²     BP Readings from Last 3 Encounters:   02/16/22 124/78   01/31/22 (!) 179/97   01/03/22 122/81       Wt Readings from Last 3 Encounters:   02/16/22 189 lb 3.2 oz (85.8 kg)   01/30/22 187 lb (84.8 kg)   12/13/21 197 lb (89.4 kg)         Physical Exam  Constitutional:       Appearance: Normal appearance. She is well-developed. She is not ill-appearing or diaphoretic. Comments: Looks much better than before   HENT:      Head: Normocephalic and atraumatic. Eyes:      General: No scleral icterus. Pupils: Pupils are equal, round, and reactive to light. Neck:      Thyroid: No thyromegaly. Cardiovascular:      Rate and Rhythm: Normal rate and regular rhythm. Heart sounds: Normal heart sounds. No murmur heard. Pulmonary:      Effort: Pulmonary effort is normal.      Breath sounds: Normal breath sounds. No wheezing or rales. Musculoskeletal:         General: Normal range of motion. Cervical back: Normal range of motion and neck supple. Right lower leg: No edema. Left lower leg: No edema. Lymphadenopathy:      Cervical: No cervical adenopathy. Neurological:      General: No focal deficit present. Mental Status: She is alert and oriented to person, place, and time. Cranial Nerves: No cranial nerve deficit. Motor: No abnormal muscle tone. Psychiatric:         Mood and Affect: Mood normal.         Behavior: Behavior normal.         ASSESSMENT/ PLAN:    1. Type 2 diabetes mellitus with complication, with long-term current use of insulin (HCC)  - patient doing fine, last A1C 8.1 last novemeber    2. Primary hypertension  - stable, continue same medication. -on metoprolol and lasix    3. Chronic obstructive pulmonary disease, unspecified COPD type (HCC)  - stable    4. Gastroesophageal reflux disease without esophagitis  - stable, on protonix    5. Anxiety  - stable, on prozac    6. Cirrhosis of liver without ascites, unspecified hepatic cirrhosis type (HCC)  - stable, f/u with the GI    7. Coronary artery disease involving native heart without angina pectoris, unspecified vessel or lesion type  - stable, f/u with the cardiology    8.  Encounter for screening mammogram for breast cancer  - ROB DIGITAL SCREEN W CAD BILATERAL; Future              - All old blood work reviewed with the patient  - Appropriate prescription are addressed. - After visit summery provided. - Questions answered and patient verbalizes understanding.  - Call for any problem, questions, or concerns.  - RTC if symptoms worse. Return in about 6 months (around 8/16/2022) for medicare wellness.

## 2022-02-17 PROBLEM — F41.9 ANXIETY: Status: ACTIVE | Noted: 2022-02-17

## 2022-02-17 PROBLEM — K74.60 CIRRHOSIS OF LIVER WITHOUT ASCITES (HCC): Status: ACTIVE | Noted: 2019-05-08

## 2022-02-17 PROBLEM — Z12.31 ENCOUNTER FOR SCREENING MAMMOGRAM FOR BREAST CANCER: Status: ACTIVE | Noted: 2022-02-17

## 2022-02-17 PROBLEM — I25.10 CORONARY ARTERY DISEASE INVOLVING NATIVE HEART WITHOUT ANGINA PECTORIS: Status: ACTIVE | Noted: 2022-02-17

## 2022-02-17 ASSESSMENT — ENCOUNTER SYMPTOMS
VOMITING: 0
COUGH: 0
SHORTNESS OF BREATH: 0
NAUSEA: 0
WHEEZING: 0
ABDOMINAL DISTENTION: 0
ABDOMINAL PAIN: 0

## 2022-03-18 ENCOUNTER — TELEPHONE (OUTPATIENT)
Dept: FAMILY MEDICINE CLINIC | Age: 62
End: 2022-03-18

## 2022-03-18 DIAGNOSIS — R92.8 ABNORMAL MAMMOGRAM: Primary | ICD-10-CM

## 2022-03-19 PROBLEM — Z12.31 ENCOUNTER FOR SCREENING MAMMOGRAM FOR BREAST CANCER: Status: RESOLVED | Noted: 2022-02-17 | Resolved: 2022-03-19

## 2022-04-07 ENCOUNTER — PROCEDURE VISIT (OUTPATIENT)
Dept: CARDIOLOGY CLINIC | Age: 62
End: 2022-04-07
Payer: MEDICARE

## 2022-04-07 DIAGNOSIS — I87.301 IDIOPATHIC CHRONIC VENOUS HYPERTENSION OF RIGHT LOWER EXTREMITY WITHOUT COMPLICATIONS: Primary | ICD-10-CM

## 2022-04-07 PROCEDURE — 93971 EXTREMITY STUDY: CPT | Performed by: INTERNAL MEDICINE

## 2022-04-08 ENCOUNTER — TELEPHONE (OUTPATIENT)
Dept: CARDIOLOGY CLINIC | Age: 62
End: 2022-04-08

## 2022-04-08 NOTE — TELEPHONE ENCOUNTER
Venous doppler 6 months post  Right CFV is patent with good compressibility and respirophasic signal with    good augmentation.    The Right GSV is non-compressible with no evidence of flow at the    saphenofemoral junction, distal thigh, and knee.    The right Mid Thigh GSV is partially compressible with evidence of flow,    without evidence of reflux. Right Mid Thigh tributary noted coming off the    right GSV Mid thigh. This is a new finding as compared to the last s/p    ablation exam 11/11/2021.      Patient verbally understood

## 2022-05-06 NOTE — PROGRESS NOTES
Patient will arrive at [place] [address] at [facility] on 5/17/2022 for her procedure at [address]                1. Do not eat or drink anything after midnight - unless instructed by your doctor prior to surgery. This includes                   no water, chewing gum or mints. 2. Follow your directions as prescribed by the doctor for your procedure and medications. 3. Check with your Doctor regarding stopping vitamins, supplements, blood thinners (Plavix, Coumadin, Lovenox, Effient, Pradaxa, Xarelto, Fragmin or                   other blood thinners) and follow their instructions. 4. Do not smoke, and do not drink any alcoholic beverages 24 hours prior to surgery. This includes NA Beer. 5. You may brush your teeth and gargle the morning of surgery. DO NOT SWALLOW WATER   6. You MUST make arrangements for a responsible adult to take you home after your surgery and be able to check on you every couple                   hours for the day. You will not be allowed to leave alone or drive yourself home. It is strongly suggested someone stay with you the first 24                   hrs. Your surgery will be cancelled if you do not have a ride home. 7. Please wear simple, loose fitting clothing to the hospital.  Louvella Castleman not bring valuables (money, credit cards, checkbooks, etc.) Do not wear any                   makeup (including no eye makeup) or nail polish on your fingers or toes. 8. DO NOT wear any jewelry or piercings on day of surgery. All body piercing jewelry must be removed. 9. If you have dentures, they will be removed before going to the OR; we will provide you a container. If you wear contact lenses or glasses,                  they will be removed; please bring a case for them. 10. If you  have a Living Will and Durable Power of  for Healthcare, please bring in a copy.            11. Please bring picture ID,  insurance card, paperwork from the doctors office    (H & P, Consent, & card for implantable devices). 12. Take a shower the morning of your procedure with Hibiclens or an anti-bacterial soap. Do not apply any deodorant, lotion, oil or powder. 13.  Enter thru the main entrance wearing a mask on the day of surgery. Patient will take her metoprolol, ranexa and protonix the morning of her procedure. She will take a breathing treatment that morning if she feels like she needs one. Ask patient not to smoke marijuana 24-48 hours before  her procedure.

## 2022-05-13 ENCOUNTER — ANESTHESIA EVENT (OUTPATIENT)
Dept: ENDOSCOPY | Age: 62
End: 2022-05-13
Payer: MEDICARE

## 2022-05-13 ASSESSMENT — LIFESTYLE VARIABLES: SMOKING_STATUS: 1

## 2022-05-13 NOTE — PROGRESS NOTES
Left message with arrival time change of 0715 at [facility] on 5/17/2022 for her procedure at 0915. Ask patient to call me back and let me know she got the message.

## 2022-05-13 NOTE — ANESTHESIA PRE PROCEDURE
Department of Anesthesiology  Preprocedure Note       Name:  Nasir Tesfaye   Age:  63 y.o.  :  1960                                          MRN:  [id number]         Date:  2022      Surgeon: Audrey Zuniga):  Melvina Hancock MD    Procedure: Procedure(s):  COLONOSCOPY DIAGNOSTIC    Medications prior to admission:   Prior to Admission medications    Medication Sig Start Date End Date Taking? Authorizing Provider   sucralfate (CARAFATE) 1 GM tablet Take 1 tablet by mouth 4 times daily 22   Marva Buerger, MD   ondansetron (ZOFRAN ODT) 4 MG disintegrating tablet Take 1 tablet by mouth every 8 hours as needed for Nausea 22   Marva Buerger, MD   famotidine (PEPCID) 40 MG tablet Take 1 tablet by mouth daily 22   Marva Buerger, MD   blood glucose monitor strips Test 3 times a day & as needed for symptoms of irregular blood glucose. Please fill with what is covered my patients insurance. 22   Murray Hale MD   insulin glargine (LANTUS SOLOSTAR) 100 UNIT/ML injection pen Inject 15 Units into the skin nightly 22   Murray Hale MD   insulin aspart (NOVOLOG FLEXPEN) 100 UNIT/ML injection pen **Low Dose Correction Algorithm**Insulin lispro (HUMALOG)  3 TIMES DAILY WITH MEALSGlucose: Dose: No [id number] 1 [id number] 2 [id number] 3 [id number] 4 [id number] 5 [id number] and above 6 Units 22   Murray Hale MD   blood glucose monitor strips Test  Bid times a day & as needed for symptoms of irregular blood glucose.  22   Murray Hale MD   albuterol (PROVENTIL) (2.5 MG/3ML) 0.083% nebulizer solution Take 3 mLs by nebulization every 6 hours 21   Cedric Roque MD   Glucosource Lancets MISC Use one 3-4 times to check blood sugar 2/10/21   Murray Hale MD   midodrine (PROAMATINE) 10 MG tablet Take 1 tablet by mouth 3 times daily (with meals) 12/15/20   Jaja Rosenbaum MD   metoprolol succinate (TOPROL XL) 50 MG extended release tablet Take 1 tablet by mouth daily 12/15/20   Lindsey Munoz MD   ipratropium-albuterol (DUONEB) 0.5-2.5 (3) MG/3ML SOLN nebulizer solution Inhale 3 mLs into the lungs every 4 hours 11/23/20   Tomeka Cardona DO   furosemide (LASIX) 20 MG tablet Take 1 tablet by mouth daily Hold if systolic <776 0/48/02   Candance Isles, APRN - CNP   lactulose (CHRONULAC) 10 GM/15ML solution Take 30 mLs by mouth 3 times daily 9/15/20   Bill Yanez MD   QUEtiapine (SEROQUEL) 50 MG tablet Take 3 tablets by mouth nightly 9/15/20   Bill Yanez MD   rifaximin (XIFAXAN) 550 MG tablet Take 1 tablet by mouth 2 times daily 9/15/20   Bill Yanez MD   FLUoxetine (PROZAC) 20 MG capsule Take 20 mg by mouth daily 9/11/20   Historical Provider, MD   paliperidone (INVEGA) 3 MG extended release tablet  8/13/20   Historical Provider, MD   nitroGLYCERIN (NITROSTAT) 0.4 MG SL tablet Place 1 tablet under the tongue every 5 minutes as needed for Chest pain 5/21/20   Candance Isles, APRN - CNP   ranolazine (RANEXA) 500 MG extended release tablet Take 1 tablet by mouth 2 times daily 5/21/20   Candance Isles, APRN - CNP   Compression Stockings MISC by Does not apply route 20 - 30 mmh wear daily and take off at night  Thigh High 5/21/20   Candance Isles, APRN - CNP   UNABLE TO FIND Rx for depends   Use 3-4  times daily 4/10/20   Sanya Seals MD   albuterol (PROVENTIL) (2.5 MG/3ML) 0.083% nebulizer solution Take 3 mLs by nebulization every 6 hours 4/6/20   Patsy Riley MD   glucose monitoring kit (FREESTYLE) monitoring kit 1 kit by Does not apply route daily 1/30/20   Sanya Seals MD   Blood Glucose Monitoring Suppl (ACURA BLOOD GLUCOSE METER) w/Device KIT Please check blood sugar 3 times daily. Please fill with what is covered by the insurance 1/21/20   Sanya Seals MD   SOFT TOUCH LANCETS MISC Please check blood sugar 3 times daily.  Please fill with what is covered by insurance 1/21/20   Sanya Seals MD   pantoprazole (PROTONIX) 40 MG tablet Take 1 tablet by mouth 2 times daily (before meals) 10/27/19   Luis Acuna MD   VIREAD 300 MG tablet Take 300 mg by mouth daily  11/20/17   Historical Provider, MD       Current medications:    No current facility-administered medications for this encounter. Current Outpatient Medications   Medication Sig Dispense Refill    sucralfate (CARAFATE) 1 GM tablet Take 1 tablet by mouth 4 times daily 120 tablet 3    ondansetron (ZOFRAN ODT) 4 MG disintegrating tablet Take 1 tablet by mouth every 8 hours as needed for Nausea 15 tablet 0    famotidine (PEPCID) 40 MG tablet Take 1 tablet by mouth daily 30 tablet 0    blood glucose monitor strips Test 3 times a day & as needed for symptoms of irregular blood glucose. Please fill with what is covered my patients insurance. 50 strip 3    insulin glargine (LANTUS SOLOSTAR) 100 UNIT/ML injection pen Inject 15 Units into the skin nightly 5 pen 3    insulin aspart (NOVOLOG FLEXPEN) 100 UNIT/ML injection pen **Low Dose Correction Algorithm**Insulin lispro (HUMALOG)  3 TIMES DAILY WITH MEALSGlucose: Dose: No [id number] 1 [id number] 2 [id number] 3 [id number] 4 [id number] 5 Gjoou036 and above 6 Units 5 pen 3    blood glucose monitor strips Test  Bid times a day & as needed for symptoms of irregular blood glucose.  100 strip 5    albuterol (PROVENTIL) (2.5 MG/3ML) 0.083% nebulizer solution Take 3 mLs by nebulization every 6 hours 120 each 5    Glucosource Lancets MISC Use one 3-4 times to check blood sugar 100 each 5    midodrine (PROAMATINE) 10 MG tablet Take 1 tablet by mouth 3 times daily (with meals) 90 tablet 3    metoprolol succinate (TOPROL XL) 50 MG extended release tablet Take 1 tablet by mouth daily 30 tablet 2    ipratropium-albuterol (DUONEB) 0.5-2.5 (3) MG/3ML SOLN nebulizer solution Inhale 3 mLs into the lungs every 4 hours 360 mL 0    furosemide (LASIX) 20 MG tablet Take 1 tablet by mouth daily Hold if systolic <711 30 tablet 1    lactulose (CHRONULAC) 10 GM/15ML solution Take 30 mLs by mouth 3 times daily 1892 mL 2    QUEtiapine (SEROQUEL) 50 MG tablet Take 3 tablets by mouth nightly 60 tablet 3    rifaximin (XIFAXAN) 550 MG tablet Take 1 tablet by mouth 2 times daily 42 tablet 0    FLUoxetine (PROZAC) 20 MG capsule Take 20 mg by mouth daily      paliperidone (INVEGA) 3 MG extended release tablet       nitroGLYCERIN (NITROSTAT) 0.4 MG SL tablet Place 1 tablet under the tongue every 5 minutes as needed for Chest pain 25 tablet 3    ranolazine (RANEXA) 500 MG extended release tablet Take 1 tablet by mouth 2 times daily 60 tablet 5    Compression Stockings MISC by Does not apply route 20 - 30 mmh wear daily and take off at night  Thigh High 2 each 2    UNABLE TO FIND Rx for depends   Use 3-4  times daily 1 box 5    albuterol (PROVENTIL) (2.5 MG/3ML) 0.083% nebulizer solution Take 3 mLs by nebulization every 6 hours 120 vial 5    glucose monitoring kit (FREESTYLE) monitoring kit 1 kit by Does not apply route daily 1 kit 0    Blood Glucose Monitoring Suppl (ACURA BLOOD GLUCOSE METER) w/Device KIT Please check blood sugar 3 times daily. Please fill with what is covered by the insurance 1 kit 0    SOFT TOUCH LANCETS MISC Please check blood sugar 3 times daily. Please fill with what is covered by insurance 100 each 3    pantoprazole (PROTONIX) 40 MG tablet Take 1 tablet by mouth 2 times daily (before meals) 60 tablet 1    VIREAD 300 MG tablet Take 300 mg by mouth daily          Allergies: Allergies   Allergen Reactions    Norco [Hydrocodone-Acetaminophen] Itching     Itching, rash, nausea and vomiting.      Tylenol [Acetaminophen] Itching, Nausea And Vomiting and Rash       Problem List:    Patient Active Problem List   Diagnosis Code    Left arm pain M79.602    Type 2 diabetes mellitus with complication, with long-term current use of insulin (HCC) E11.8, Z79.4    Chronic obstructive pulmonary disease ([facility] J44.9    Tobacco abuse Z72.0  Angina effort I20.8    Abnormal nuclear cardiac imaging test R93.1    Lung nodule R91.1    Chest pain R07.9    Dyslipidemia E78.5    Pancolitis (HCC) K51.00    Hematemesis without nausea K22.0    Alcoholic cirrhosis of liver without ascites (HCC) K70.30    Thrombocytopenia (HCC) H66.8    Periumbilical abdominal pain R10.33    History of colonic polyps Z86.010    Abnormal findings on diagnostic imaging of abdomen R93.5    Nausea R11.0    Gastroesophageal reflux disease without esophagitis K21.9    Mixed hyperlipidemia E78.2    Vitamin D deficiency E55.9    Left carpal tunnel syndrome G56.02    Right carpal tunnel syndrome G56.01    Esophageal hypertension K22.0    Candida esophagitis (HCC) B37.81    Colitis K52.9    Primary hypertension I10    GI bleed K92.2    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    Type 2 diabetes mellitus with complication, with long-term current use of insulin (HCC) E11.8, Z79.4    SOB (shortness of breath) R06.02    Portal vein thrombosis I81    Intractable nausea and vomiting R11.2    Abdominal pain R10.9    Acute GI bleeding K92.2    Chronic hepatitis C without hepatic coma (HCC) B18.2    Delayed gastric emptying K30    Restless legs G25.81    Acute colitis K52.9    Acute encephalopathy G93.40    S/P TIPS (transjugular intrahepatic portosystemic shunt) Z95.828    Cirrhosis of liver without ascites (HCC) K74.60    Acute upper GI bleed K92.2    Acute hepatic encephalopathy K72.00    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 Garrett Aguilar COPD (chronic obstructive pulmonary disease) [facility])     follow with Dr Alpesh Ibarra Depression     \"have manic - depression see Dr Williams Prakash Diabetes mellitus [facility])     dx 10+ yrs ago- follows with PCP    Drug abuse ([facility]     hx use of cocaine, heroin and marijuana- states last used 12/2014    Glaucoma     bilateral    H/O Doppler lower venous ultrasound 04/04/2019    No DVT or SVT, Significant reflux of RGSV and LGSV.    H/O echocardiogram 12/18/2020    EF 55-60%, Mod LVH.  Hepatic encephalopathy ([address] 05/2019    Hepatitis C     for liver bx 12/3/2015\"Have Hepatitis B and C and saw Dr Chrissie Pickering for this 12/1/2015\"    Hiatal hernia     History of alcohol abuse     History of exercise stress test 01/02/2020    Treadmill, Normal exercise performance without angina and ischemic EKG changes.     HTN (hypertension)     \"for the past two yrs on medication\" follows with Dr Garrett Aguilar Hx of blood clots 2019    Portal vein thrombsis - TIPS procedure 4/23/19    Hyperlipemia     Irregular heart beat     per pt    Liver hematoma     Migraines     Last migraine:  2018    Nausea & vomiting     Schizophrenia ([address]     per old chart    Seizures ([facility]     \"last one was 9/2015- [facility] Dr Nael Eastman at [facility]- she said not sure if acutal seizures- she thinks they are panic or anxiety attacks\"    SOB (shortness of breath)     with any exertion       Past Surgical History:        Procedure Laterality Date    BREAST SURGERY  10/2015    left breast bx    CARDIAC CATHETERIZATION      per old chart pt had cath done in 3/2011 and 9/2013    CARPAL TUNNEL RELEASE Left 1/9/2020    CARPAL TUNNEL RELEASE LEFT performed by Nicho Noble DO at [place] [id number] Right 05/26/2020    dr Marta Horton, carpal tunnel trigger finger release    CARPAL TUNNEL RELEASE Right 5/26/2020    RIGHT CARPAL TUNNEL RELEASE performed by Rubbie Klinefelter Amilcar Cuevas DO at [id number]  per old chart done [facility]  3/11/13    diverticulosis, cecal polyp    COLONOSCOPY  03/16/2017    Internal hemorrhoids- Dr. Henry Chavez, COLON, DIAGNOSTIC  03/16/2017    EGD: Small esophageal varices, portal hypertensive gastropathy, reflux esophagitis, hiatal hernia    EYE SURGERY Bilateral ? when    cyst removal, cataracts w/lens replacement    FINGER TRIGGER RELEASE Right 5/26/2020    FINGER TRIGGER RELEASE RIGHT RING FINGER performed by Lolly Cedillo DO at [address]      per old chart pt had CHOLO/BSO 1986    TIPS PROCEDURE  04/23/2019    TONSILLECTOMY  as a kid    UPPER GASTROINTESTINAL ENDOSCOPY N/A 11/14/2018    EGD DIAGNOSTIC ONLY performed by Eladio Lizarraga MD at [place] [id number] N/A 6/5/2019    EGD DIAGNOSTIC ONLY performed by Eladio Lizarraga MD at [address] [place] [address] History:    Social History     Tobacco Use    Smoking status: Former Smoker     Packs/day: 1.00     Years: 45.00     Pack years: 45.00     Types: Cigarettes     Start date: 1974    Smokeless tobacco: Never Used   Substance Use Topics    Alcohol use: Not Currently     Alcohol/week: 2.0 - 3.0 standard drinks     Types: 2 - 3 Shots of liquor per week     Comment: 2-3 shots last 4/2019                                Counseling given: Not Answered      Vital Signs (Current):   Vitals:    05/06/22 1242   Weight: 186 lb (84.4 kg)   Height: 4' 9.5\" (1.461 m)                                              BP Readings from Last 3 Encounters:   02/16/22 124/78   01/31/22 (!) 179/97   01/03/22 122/81       NPO Status:                                                                                 BMI:   Wt Readings from Last 3 Encounters:   02/16/22 189 lb 3.2 oz (85.8 kg)   01/30/22 187 lb (84.8 kg)   12/13/21 197 lb (89.4 kg)     Body mass index is 39.55 kg/m².     CBC:   Lab Results   Component Value Date    WBC 9.5 01/31/2022    RBC 4.99 01/31/2022    HGB 16.5 01/31/2022    HCT 46.7 01/31/2022    MCV 93.6 01/31/2022    RDW 13.0 01/31/2022    PLT 99 01/31/2022       CMP:   Lab Results   Component Value Date     01/31/2022    K 3.7 01/31/2022    CL 98 01/31/2022    CO2 27 01/31/2022    BUN 14 01/31/2022    CREATININE 0.9 01/31/2022    GFRAA >60 01/31/2022    AGRATIO 1.1 04/04/2018    LABGLOM >60 01/31/2022    GLUCOSE 408 01/31/2022    PROT 8.1 01/31/2022    PROT 7.4 09/26/2012    CALCIUM 9.8 01/31/2022    BILITOT 1.7 01/31/2022    ALKPHOS 189 01/31/2022    AST 18 01/31/2022    ALT 14 01/31/2022       POC Tests: No results for input(s): POCGLU, POCNA, POCK, POCCL, POCBUN, POCHEMO, POCHCT in the last 72 hours. Coags:   Lab Results   Component Value Date    PROTIME 13.7 08/21/2021    PROTIME 11.6 03/19/2011    INR 1.06 08/21/2021    APTT 78.6 04/18/2019       HCG (If Applicable):   Lab Results   Component Value Date    PREGTESTUR NEGATIVE 10/28/2013        ABGs:   Lab Results   Component Value Date    PO2ART 50 04/27/2012    BEART 4.9 04/27/2012        Type & Screen (If Applicable):  No results found for: LABABO, LABRH    Drug/Infectious Status (If Applicable):  Lab Results   Component Value Date    HEPCAB REPEATEDLY REACTIVE 04/07/2019       COVID-19 Screening (If Applicable):   Lab Results   Component Value Date    COVID19 NOT DETECTED 01/31/2022    COVID19 NOT DETECTED 09/13/2021           Anesthesia Evaluation  Patient summary reviewed  Airway: Mallampati: II  TM distance: >3 FB   Neck ROM: full  Mouth opening: > = 3 FB Dental:    (+) edentulous      Pulmonary: breath sounds clear to auscultation  (+) COPD:  shortness of breath:  current smoker                           Cardiovascular:    (+) hypertension:, angina:, CAD:, dysrhythmias:, hyperlipidemia      ECG reviewed  Rhythm: regular             Beta Blocker:  Dose within 24 Hrs      ROS comment: Summary   Technically limited study due to COPD.    Left ventricular function is normal, EF is estimated at 55-60%. Moderate left ventricular hypertrophy. No evidence of diastolic dysfunction. No regional wall motion abnormalities were detected. RVSP is 22 mmHg. No significant valvular abnormalities. No evidence of pericardial effusion. Signature      ------------------------------------------------------------------   Electronically signed by Yrn Lockwood MD (Interpreting   physician) on 12/18/2020 at 05:53 PM     Neuro/Psych:   (+) seizures:, neuromuscular disease:, headaches: migraine headaches, psychiatric history:depression/anxiety              ROS comment: Glaucoma  Schizophrenia  GI/Hepatic/Renal:   (+) hiatal hernia, GERD:, PUD, hepatitis: B and C, liver disease:, bowel prep, morbid obesity         ROS comment: S/P TIPS. Endo/Other:    (+) DiabetesType II DM, poorly controlled, , blood dyscrasia: thrombocytopenia, arthritis:., .                  ROS comment: Drug abuse (HCC) hx use of cocaine, heroin and marijuana, ETOH abuse Abdominal:   (+) obese,           Vascular:   + DVT, . Other Findings:           Anesthesia Plan      MAC     ASA 4       Induction: intravenous. Anesthetic plan and risks discussed with patient. Plan discussed with CRNA.     Attending anesthesiologist reviewed and agrees with Preprocedure content            ERLINDA Powell - VJ   5/13/2022

## 2022-05-17 ENCOUNTER — HOSPITAL ENCOUNTER (OUTPATIENT)
Age: 62
Setting detail: OUTPATIENT SURGERY
Discharge: HOME | End: 2022-05-17
Attending: INTERNAL MEDICINE | Admitting: INTERNAL MEDICINE
Payer: MEDICARE

## 2022-05-17 ENCOUNTER — ANESTHESIA (OUTPATIENT)
Dept: ENDOSCOPY | Age: 62
End: 2022-05-17
Payer: MEDICARE

## 2022-05-17 VITALS
HEART RATE: 75 BPM | BODY MASS INDEX: 39.04 KG/M2 | OXYGEN SATURATION: 97 % | TEMPERATURE: 97.3 F | RESPIRATION RATE: 16 BRPM | SYSTOLIC BLOOD PRESSURE: 125 MMHG | WEIGHT: 186 LBS | DIASTOLIC BLOOD PRESSURE: 60 MMHG | HEIGHT: 58 IN

## 2022-05-17 DIAGNOSIS — Z12.11 COLON CANCER SCREENING: ICD-10-CM

## 2022-05-17 DIAGNOSIS — B18.1 VIRAL HEPATITIS B CHRONIC (HCC): ICD-10-CM

## 2022-05-17 DIAGNOSIS — K70.30 ALCOHOLIC CIRRHOSIS, UNSPECIFIED WHETHER ASCITES PRESENT (HCC): ICD-10-CM

## 2022-05-17 LAB — GLUCOSE BLD-MCNC: 134 MG/DL (ref 70–99)

## 2022-05-17 PROCEDURE — 3609010600 HC COLONOSCOPY POLYPECTOMY SNARE/COLD BIOPSY: Performed by: INTERNAL MEDICINE

## 2022-05-17 PROCEDURE — 2580000003 HC RX 258: Performed by: ANESTHESIOLOGY

## 2022-05-17 PROCEDURE — 2709999900 HC NON-CHARGEABLE SUPPLY: Performed by: INTERNAL MEDICINE

## 2022-05-17 PROCEDURE — 2500000003 HC RX 250 WO HCPCS

## 2022-05-17 PROCEDURE — 6360000002 HC RX W HCPCS

## 2022-05-17 PROCEDURE — 88305 TISSUE EXAM BY PATHOLOGIST: CPT

## 2022-05-17 PROCEDURE — 3700000001 HC ADD 15 MINUTES (ANESTHESIA): Performed by: INTERNAL MEDICINE

## 2022-05-17 PROCEDURE — 3700000000 HC ANESTHESIA ATTENDED CARE: Performed by: INTERNAL MEDICINE

## 2022-05-17 PROCEDURE — 82962 GLUCOSE BLOOD TEST: CPT

## 2022-05-17 PROCEDURE — 7100000010 HC PHASE II RECOVERY - FIRST 15 MIN: Performed by: INTERNAL MEDICINE

## 2022-05-17 PROCEDURE — 7100000011 HC PHASE II RECOVERY - ADDTL 15 MIN: Performed by: INTERNAL MEDICINE

## 2022-05-17 RX ORDER — LIDOCAINE HYDROCHLORIDE 20 MG/ML
INJECTION, SOLUTION EPIDURAL; INFILTRATION; INTRACAUDAL; PERINEURAL PRN
Status: DISCONTINUED | OUTPATIENT
Start: 2022-05-17 | End: 2022-05-17 | Stop reason: SDUPTHER

## 2022-05-17 RX ORDER — PROPOFOL 10 MG/ML
INJECTION, EMULSION INTRAVENOUS PRN
Status: DISCONTINUED | OUTPATIENT
Start: 2022-05-17 | End: 2022-05-17 | Stop reason: SDUPTHER

## 2022-05-17 RX ORDER — SODIUM CHLORIDE, SODIUM LACTATE, POTASSIUM CHLORIDE, CALCIUM CHLORIDE 600; 310; 30; 20 MG/100ML; MG/100ML; MG/100ML; MG/100ML
INJECTION, SOLUTION INTRAVENOUS CONTINUOUS
Status: DISCONTINUED | OUTPATIENT
Start: 2022-05-17 | End: 2022-05-17 | Stop reason: HOSPADM

## 2022-05-17 RX ADMIN — PROPOFOL 160 MG: 10 INJECTION, EMULSION INTRAVENOUS at 09:45

## 2022-05-17 RX ADMIN — SODIUM CHLORIDE, POTASSIUM CHLORIDE, SODIUM LACTATE AND CALCIUM CHLORIDE: 600; 310; 30; 20 INJECTION, SOLUTION INTRAVENOUS at 09:36

## 2022-05-17 RX ADMIN — LIDOCAINE HYDROCHLORIDE 100 MG: 20 INJECTION, SOLUTION EPIDURAL; INFILTRATION; INTRACAUDAL; PERINEURAL at 09:45

## 2022-05-17 ASSESSMENT — PAIN - FUNCTIONAL ASSESSMENT: PAIN_FUNCTIONAL_ASSESSMENT: 0-10

## 2022-05-17 ASSESSMENT — ENCOUNTER SYMPTOMS: SHORTNESS OF BREATH: 1

## 2022-05-17 NOTE — OP NOTE
Operative Note      Patient: Shayan Culver  YOB: 1960  MRN: [id number]    Date of Procedure: 5/17/2022    Pre-Op Diagnosis: Alcoholic cirrhosis, unspecified whether ascites present ([facility] [K70.30] Viral hepatitis B chronic ([facility] [B18.1] Colon cancer screening [Z12.11]    [facility]      BRIEF OP REPORT:    Impression:    1) small diminutive nodules in cecum biopsied off at the cecum normal   2) normal mucosa whole colon, biopsy of the descending colon for colitis done   3) 4 mm polyp in descending colon cold snared retrieved, sigmoid diverticulosis mild, grade 2 hemorrhoids in the rectum, otherwise colonoscopy nondiagnostic        Suggest:   1) high-fiber diet, probiotics every day, 64 ounces water every day   2) follow-up 2 weeks   3) recall 5 years     Full EGD/COLONOSCOPY report available by going to \"chart review\" then \"procedures\" then  \"EGD/Colonoscopy\"  then \"View Endoscopy Report\"   Electronically signed by Davin Kong MD on 5/17/2022 at 10:04 AM

## 2022-05-17 NOTE — ANESTHESIA POSTPROCEDURE EVALUATION
Department of Anesthesiology  Postprocedure Note    Patient: Orville Lynn  MRN: [id number]  YOB: 1960  Date of evaluation: 5/17/2022  Time:  10:04 AM     Procedure Summary     Date: 05/17/22 Room / Location: [address]    Anesthesia Start: 8718 Anesthesia Stop: 1003    Procedure: COLONOSCOPY POLYPECTOMY SNARE/COLD BIOPSY (N/A ) Diagnosis:       Alcoholic cirrhosis, unspecified whether ascites present (HCC)      Viral hepatitis B chronic (HCC)      Colon cancer screening      (Alcoholic cirrhosis, unspecified whether ascites present ([facility] [K70.30] Viral hepatitis B chronic ([facility] [B18.1] Colon cancer screening [Z12.11])    Surgeons: Mel Chavez MD Responsible Provider: Lisa Araujo MD    Anesthesia Type: MAC ASA Status: 4          Anesthesia Type: No value filed. Melany Phase I: Melany Score: 10    Melany Phase II:      Last vitals: Reviewed and per EMR flowsheets.        Anesthesia Post Evaluation    Patient location during evaluation: bedside  Patient participation: complete - patient participated  Level of consciousness: awake  Pain score: 0  Airway patency: patent  Nausea & Vomiting: no nausea and no vomiting  Complications: no  Cardiovascular status: blood pressure returned to baseline  Respiratory status: acceptable and room air  Hydration status: euvolemic

## 2022-05-17 NOTE — PROGRESS NOTES
1014 Pt. Brought back to unit, bedside report received from ABDIRIZAK De La Cruz. Pt. Is A&O, VSS, pt eating / drinking appropriately. Education provided on use of call light in reach. 1030 DC instructions reviewed with pt and brother, all parties express understanding. Pt. To DC home in private vehicle.

## 2022-05-17 NOTE — PROGRESS NOTES
REPORT RECEIVED FROM  ROSSANA REVELES IN SDS. PREOP QUESTIONS, NPO STATUS AND ALLERGIES VERIFIED WITH PT. H/P AND CONSENT COMPLETED. DENIES TAKING BLOOD THINNERS. [address] ON 5/16/22 AT 1000 LAST. BROTHER AT BEDSIDE.

## 2022-05-17 NOTE — H&P
[facility] [place] [facility]   Pre-operative History and Physical    Patient: Cary Ryder  : 1960      History Obtained From:  patient        HISTORY OF PRESENT ILLNESS:                The patient is a 63 y.o. female with significant past medical history as below who presents for C scope    Indication screening    Past Medical History:        Diagnosis Date    Acid reflux     Arthritis     left knee    CAD (coronary artery disease)     per [facility] 13 - Dr. Chaya Burden COPD (chronic obstructive pulmonary disease) ([facility] [address]     follow with Dr Stephanie Gil Depression     \"have manic - depression see Dr Krishna Perez Diabetes mellitus [facility])     dx 10+ yrs ago- follows with PCP    Drug abuse ([facility] [address]     hx use of cocaine, heroin and marijuana- states last used 2014    Glaucoma     bilateral    H/O Doppler lower venous ultrasound 2019    No DVT or SVT, Significant reflux of RGSV and LGSV.    H/O echocardiogram 2020    EF 55-60%, Mod LVH.  Hepatic encephalopathy ([facility] [address] 2019    Hepatitis C     for liver bx 12/3/2015\"Have Hepatitis B and C and saw Dr Jodi Ashby for this 2015\"    Hiatal hernia     History of alcohol abuse     History of exercise stress test 2020    Treadmill, Normal exercise performance without angina and ischemic EKG changes.     HTN (hypertension)     \"for the past two yrs on medication\" follows with Dr Chaya Burden Hx of blood clots 2019    Portal vein thrombsis - TIPS procedure 19    Hyperlipemia     Irregular heart beat     per pt    Liver hematoma     Migraines     Last migraine:  2018    Nausea & vomiting     Schizophrenia ([facility] [address]     per old chart    Seizures ([facility] [address]     \"last one was 2015- saw Dr Christos Schofield at [facility]- she said not sure if acutal seizures- she thinks they are panic or anxiety attacks\"    SOB (shortness of breath)     with any exertion       Past Surgical History:        Procedure Laterality Date  BREAST SURGERY  10/2015    left breast bx    CARDIAC CATHETERIZATION      per old chart pt had cath done in 3/2011 and 9/2013    CARPAL TUNNEL RELEASE Left 1/9/2020    CARPAL TUNNEL RELEASE LEFT [facility] Right 05/26/2020    dr Hu Macario, carpal tunnel trigger finger release    CARPAL TUNNEL RELEASE Right 5/26/2020    RIGHT CARPAL TUNNEL RELEASE performed by Marlena Sterling DO at [id number] [place]  per old chart done [facility]  3/11/13    diverticulosis, cecal polyp    COLONOSCOPY  03/16/2017    Internal hemorrhoids- Dr. Joshua Wagoner, COLON, DIAGNOSTIC  03/16/2017    EGD: Small esophageal varices, portal hypertensive gastropathy, reflux esophagitis, hiatal hernia    EYE SURGERY Bilateral ? when    cyst removal, cataracts w/lens replacement    FINGER TRIGGER RELEASE Right 5/26/2020    FINGER TRIGGER RELEASE RIGHT RING FINGER performed by Marlena Sterling DO at [facility]      per old chart pt had CHOLO/BSO 1986    TIPS PROCEDURE  04/23/2019    TONSILLECTOMY  as a kid    UPPER GASTROINTESTINAL ENDOSCOPY N/A 11/14/2018    EGD DIAGNOSTIC ONLY performed by Isabel Gilmore MD at [facility] N/A 6/5/2019    EGD DIAGNOSTIC ONLY performed by Isabel Gilmore MD at [facility] ENDOSCOPY         Current Medications:    Medications    Prior to Admission medications    Medication Sig Start Date End Date Taking? Authorizing Provider   sucralfate (CARAFATE) 1 GM tablet Take 1 tablet by mouth 4 times daily 1/31/22   Rima Hurtado MD   ondansetron (ZOFRAN ODT) 4 MG disintegrating tablet Take 1 tablet by mouth every 8 hours as needed for Nausea 1/31/22   Rima Hurtado MD   famotidine (PEPCID) 40 MG tablet Take 1 tablet by mouth daily 1/31/22   Rima Hurtado MD   blood glucose monitor strips Test 3 times a day & as needed for symptoms of irregular blood glucose.  Please fill with what is covered my patients insurance. 1/17/22   Roverto Mejia MD   insulin glargine (LANTUS SOLOSTAR) 100 UNIT/ML injection pen Inject 15 Units into the skin nightly 1/13/22   Roverto Mejia MD   insulin aspart (NOVOLOG FLEXPEN) 100 UNIT/ML injection pen **Low Dose Correction Algorithm**Insulin lispro (HUMALOG)  3 TIMES DAILY WITH MEALSGlucose: Dose: No [id number] 1 [id number] 2 [id number] 3 [id number] 4 [id number] 5 [id number] and above 6 Units 1/13/22   Roverto Mejia MD   blood glucose monitor strips Test  Bid times a day & as needed for symptoms of irregular blood glucose.  1/13/22   Roverto Mejia MD   Glucosource Lancets MISC Use one 3-4 times to check blood sugar 2/10/21   Roverto Mejia MD   midodrine (PROAMATINE) 10 MG tablet Take 1 tablet by mouth 3 times daily (with meals) 12/15/20   Eliezer Pardo MD   metoprolol succinate (TOPROL XL) 50 MG extended release tablet Take 1 tablet by mouth daily 12/15/20   Eliezer Pardo MD   ipratropium-albuterol (DUONEB) 0.5-2.5 (3) MG/3ML SOLN nebulizer solution Inhale 3 mLs into the lungs every 4 hours 11/23/20   Ne JesusDO joaquim   furosemide (LASIX) 20 MG tablet Take 1 tablet by mouth daily Hold if systolic <514 7/73/27   ERLINDA Duncan - CNP   lactulose (CHRONULAC) 10 GM/15ML solution Take 30 mLs by mouth 3 times daily 9/15/20   Colette De La Torre MD   QUEtiapine (SEROQUEL) 50 MG tablet Take 3 tablets by mouth nightly 9/15/20   Colette De La Torre MD   rifaximin (XIFAXAN) 550 MG tablet Take 1 tablet by mouth 2 times daily 9/15/20   Colette De La Torre MD   FLUoxetine (PROZAC) 20 MG capsule Take 20 mg by mouth daily 9/11/20   Historical Provider, MD   paliperidone (INVEGA) 3 MG extended release tablet  8/13/20   Historical Provider, MD   nitroGLYCERIN (NITROSTAT) 0.4 MG SL tablet Place 1 tablet under the tongue every 5 minutes as needed for Chest pain 5/21/20   ELRINDA Duncan CNP   ranolazine (RANEXA) 500 MG extended release tablet Take 1 tablet by mouth 2 times daily 5/21/20   Read ERLINDA Mccormick CNP   Compression Stockings MISC by Does not apply route 20 - 30 mmh wear daily and take off at night  Thigh High 5/21/20   Read ERLINDA Mccormick CNP   UNABLE TO FIND Rx for depends   Use 3-4  times daily 4/10/20   Eulogio Reddy MD   glucose monitoring kit (FREESTYLE) monitoring kit 1 kit by Does not apply route daily 1/30/20   Eulogio Reddy MD   Blood Glucose Monitoring Suppl (ACURA BLOOD GLUCOSE METER) w/Device KIT Please check blood sugar 3 times daily. Please fill with what is covered by the insurance 1/21/20   Eulogio Reddy MD   pantoprazole (PROTONIX) 40 MG tablet Take 1 tablet by mouth 2 times daily (before meals) 10/27/19   Aurelia Hauser MD   VIREAD 300 MG tablet Take 300 mg by mouth daily  11/20/17   Historical Provider, MD      Scheduled Medications:   Infusions:    lactated ringers       PRN Medications: Allergies: Allergies   Allergen Reactions    Norco [Hydrocodone-Acetaminophen] Itching     Itching, rash, nausea and vomiting.  Tylenol [Acetaminophen] Itching, Nausea And Vomiting and Rash         Allergies:  Norco [hydrocodone-acetaminophen] and Tylenol [acetaminophen]    Social History:   TOBACCO:   reports that she has quit smoking. Her smoking use included cigarettes. She started smoking about 48 years ago. She has a 45.00 pack-year smoking history. She has never used smokeless tobacco.  ETOH:   reports previous alcohol use of about 2.0 - 3.0 standard drinks of alcohol per week.     Family History:       Problem Relation Age of Onset    Cancer Mother         lung ca    Arthritis Mother     Migraines Mother     Cancer Father         colon ca    Diabetes Father     High Blood Pressure Father     Arthritis Father     High Cholesterol Father     Migraines Father     Migraines Sister     Heart Disease Brother         WPW       REVIEW OF SYSTEMS:    Negative except for HPI        PHYSICAL EXAM:      Vitals: /62   Pulse 72   Temp 97.3 °F (36.3 °C) (Temporal)   Resp 16   Ht 4' 9.5\" (1.461 m)   Wt 186 lb (84.4 kg)   SpO2 97%   BMI 39.55 kg/m²     General Appearance:    Alert, cooperative, no distress, appears stated age   HEENT:    NO anemia, No jaundice , no Cyanosis, Supple neck   Neck:   Supple, symmetrical, trachea midline, no adenopathy;     thyroid:    Lungs:     Clear to auscultation bilaterally, respirations unlabored   Chest Wall:    No tenderness or deformity    Heart:    Regular rate and rhythm, S1 and S2 normal, no murmur, rub   or gallop   Abdomen:     Soft, non-tender, bowel sounds active all four quadrants,     no masses, no organomegaly, no ascitis   Rectal:    Planned at time of C scope   Extremities:   Extremities normal, atraumatic, no cyanosis or edema   Pulses:   2+ and symmetric all extremities   Skin:   Skin color, texture, turgor normal, no rashes or lesions   Lymph nodes:   No abnormality   Neurologic:   No focal deficits, moving all four extremities      ASA Grade:  ASA 3 - Patient with moderate systemic disease with functional limitations  [address]:    Prior Anesthesia complication: NILL    ASSESSMENT AND PLAN:    1. Patient is a 63 y.o. female here for colonoscopy with deep sedation  2. Procedure options, risks and benefits reviewed with patient. Patient expresses understanding.     Tawanna Charles MD  5/17/2022  8:44 AM

## 2022-05-18 ENCOUNTER — OFFICE VISIT (OUTPATIENT)
Dept: CARDIOLOGY CLINIC | Age: 62
End: 2022-05-18
Payer: MEDICARE

## 2022-05-18 VITALS
DIASTOLIC BLOOD PRESSURE: 88 MMHG | HEIGHT: 57 IN | SYSTOLIC BLOOD PRESSURE: 128 MMHG | WEIGHT: 180.2 LBS | BODY MASS INDEX: 38.88 KG/M2 | HEART RATE: 82 BPM

## 2022-05-18 DIAGNOSIS — E78.5 DYSLIPIDEMIA: ICD-10-CM

## 2022-05-18 DIAGNOSIS — I10 PRIMARY HYPERTENSION: ICD-10-CM

## 2022-05-18 DIAGNOSIS — G25.81 RESTLESS LEGS: ICD-10-CM

## 2022-05-18 DIAGNOSIS — I83.893 VARICOSE VEINS OF BOTH LEGS WITH EDEMA: ICD-10-CM

## 2022-05-18 DIAGNOSIS — Z79.4 TYPE 2 DIABETES MELLITUS WITH COMPLICATION, WITH LONG-TERM CURRENT USE OF INSULIN (HCC): ICD-10-CM

## 2022-05-18 DIAGNOSIS — Q24.5 CORONARY-MYOCARDIAL BRIDGE: ICD-10-CM

## 2022-05-18 DIAGNOSIS — E11.8 TYPE 2 DIABETES MELLITUS WITH COMPLICATION, WITH LONG-TERM CURRENT USE OF INSULIN (HCC): ICD-10-CM

## 2022-05-18 DIAGNOSIS — E66.01 MORBIDLY OBESE (HCC): ICD-10-CM

## 2022-05-18 DIAGNOSIS — E78.2 MIXED HYPERLIPIDEMIA: ICD-10-CM

## 2022-05-18 DIAGNOSIS — I20.8 OTHER FORMS OF ANGINA PECTORIS (HCC): ICD-10-CM

## 2022-05-18 DIAGNOSIS — I25.10 CORONARY ARTERY DISEASE INVOLVING NATIVE HEART WITHOUT ANGINA PECTORIS, UNSPECIFIED VESSEL OR LESION TYPE: Primary | ICD-10-CM

## 2022-05-18 PROCEDURE — 99214 OFFICE O/P EST MOD 30 MIN: CPT | Performed by: INTERNAL MEDICINE

## 2022-05-18 RX ORDER — MIDODRINE HYDROCHLORIDE 5 MG/1
5 TABLET ORAL
Qty: 90 TABLET | Refills: 5 | Status: SHIPPED | OUTPATIENT
Start: 2022-05-18 | End: 2022-07-20 | Stop reason: SDUPTHER

## 2022-05-18 RX ORDER — NITROGLYCERIN 0.4 MG/1
0.4 TABLET SUBLINGUAL EVERY 5 MIN PRN
Qty: 25 TABLET | Refills: 3 | Status: SHIPPED | OUTPATIENT
Start: 2022-05-18

## 2022-05-18 RX ORDER — METOPROLOL SUCCINATE 50 MG/1
50 TABLET, EXTENDED RELEASE ORAL DAILY
Qty: 30 TABLET | Refills: 2 | Status: SHIPPED | OUTPATIENT
Start: 2022-05-18 | End: 2022-07-20 | Stop reason: SDUPTHER

## 2022-05-18 RX ORDER — MIDODRINE HYDROCHLORIDE 10 MG/1
10 TABLET ORAL
Qty: 90 TABLET | Refills: 3 | Status: SHIPPED | OUTPATIENT
Start: 2022-05-18 | End: 2022-05-18

## 2022-05-18 RX ORDER — FUROSEMIDE 20 MG/1
20 TABLET ORAL DAILY
Qty: 30 TABLET | Refills: 1 | Status: SHIPPED | OUTPATIENT
Start: 2022-05-18 | End: 2022-07-20 | Stop reason: SDUPTHER

## 2022-05-18 NOTE — PROGRESS NOTES
Dave Lynn is a 63 y.o. female who has    CHIEF COMPLAINT AS FOLLOWS:  CHEST PAIN: Patient denies any C/O chest pains at this time. SOB: No C/O SOB at this time. LEG EDEMA:   B/L Lower extremity edema is present but better than before. PALPITATIONS: Denies any C/O Palpitations   DIZZINESS: No C/O Dizziness   SYNCOPE: None   OTHER/ ADDITIONAL COMPLAINTS:                                     HPI: Patient is here for F/U on her CVI, HTN & Dyslipidemia problems. CVI: S/P Ablation  HTN: Patient has known essential HTN. Has been treated with guideline recommended medical / physical/ diet therapy as stated below. Dyslipidemia: Patient has known mixed dyslipidemia. Has been treated with guideline recommended medical / physical/ diet therapy as stated below. Current Outpatient Medications   Medication Sig Dispense Refill    furosemide (LASIX) 20 MG tablet Take 1 tablet by mouth daily Hold if systolic <725 30 tablet 1    metoprolol succinate (TOPROL XL) 50 MG extended release tablet Take 1 tablet by mouth daily 30 tablet 2    midodrine (PROAMATINE) 10 MG tablet Take 1 tablet by mouth 3 times daily (with meals) 90 tablet 3    nitroGLYCERIN (NITROSTAT) 0.4 MG SL tablet Place 1 tablet under the tongue every 5 minutes as needed for Chest pain 25 tablet 3    rifAXIMin (XIFAXAN) 550 MG tablet Take 1 tablet by mouth 2 times daily 42 tablet 0    sucralfate (CARAFATE) 1 GM tablet Take 1 tablet by mouth 4 times daily 120 tablet 3    ondansetron (ZOFRAN ODT) 4 MG disintegrating tablet Take 1 tablet by mouth every 8 hours as needed for Nausea 15 tablet 0    famotidine (PEPCID) 40 MG tablet Take 1 tablet by mouth daily 30 tablet 0    blood glucose monitor strips Test 3 times a day & as needed for symptoms of irregular blood glucose. Please fill with what is covered my patients insurance.  50 strip 3    insulin glargine (LANTUS SOLOSTAR) 100 UNIT/ML injection pen Inject 15 Units into the skin nightly 5 pen 3    insulin aspart (NOVOLOG FLEXPEN) 100 UNIT/ML injection pen **Low Dose Correction Algorithm**Insulin lispro (HUMALOG)  3 TIMES DAILY WITH MEALSGlucose: Dose: No [id number] 1 [id number] 2 [id number] 3 [id number] 4 [id number] 5 [id number] and above 6 Units 5 pen 3    blood glucose monitor strips Test  Bid times a day & as needed for symptoms of irregular blood glucose. 100 strip 5    Glucosource Lancets MISC Use one 3-4 times to check blood sugar 100 each 5    ipratropium-albuterol (DUONEB) 0.5-2.5 (3) MG/3ML SOLN nebulizer solution Inhale 3 mLs into the lungs every 4 hours 360 mL 0    lactulose (CHRONULAC) 10 GM/15ML solution Take 30 mLs by mouth 3 times daily 1892 mL 2    QUEtiapine (SEROQUEL) 50 MG tablet Take 3 tablets by mouth nightly 60 tablet 3    FLUoxetine (PROZAC) 20 MG capsule Take 20 mg by mouth daily      paliperidone (INVEGA) 3 MG extended release tablet       ranolazine (RANEXA) 500 MG extended release tablet Take 1 tablet by mouth 2 times daily 60 tablet 5    Compression Stockings MISC by Does not apply route 20 - 30 mmh wear daily and take off at night  Thigh High 2 each 2    UNABLE TO FIND Rx for depends   Use 3-4  times daily 1 box 5    glucose monitoring kit (FREESTYLE) monitoring kit 1 kit by Does not apply route daily 1 kit 0    Blood Glucose Monitoring Suppl (ACURA BLOOD GLUCOSE METER) w/Device KIT Please check blood sugar 3 times daily. Please fill with what is covered by the insurance 1 kit 0    pantoprazole (PROTONIX) 40 MG tablet Take 1 tablet by mouth 2 times daily (before meals) 60 tablet 1    VIREAD 300 MG tablet Take 300 mg by mouth daily        No current facility-administered medications for this visit.      Allergies: Norco [hydrocodone-acetaminophen] and Tylenol [acetaminophen]  Review of Systems:    Constitutional: Negative for diaphoresis and fatigue  Respiratory: Negative for shortness of breath  Cardiovascular: Negative for chest pain, dyspnea on exertion, claudication, edema, irregular heartbeat, murmur, palpitations or shortness of breath  Musculoskeletal: Negative for muscle pain, muscular weakness, negative for pain in arm and leg or swelling in foot and leg    Objective:  /88   Pulse 82   Ht 4' 9\" (1.448 m)   Wt 180 lb 3.2 oz (81.7 kg)   BMI 38.99 kg/m²   Wt Readings from Last 3 Encounters:   05/18/22 180 lb 3.2 oz (81.7 kg)   05/17/22 186 lb (84.4 kg)   02/16/22 189 lb 3.2 oz (85.8 kg)     Body mass index is 38.99 kg/m². GENERAL - Alert, oriented, pleasant, in no apparent distress. EYES: No jaundice, no conjunctival pallor. Neck - Supple. No jugular venous distention noted. No carotid bruits. Cardiovascular - Normal S1 and S2 without obvious murmur or gallop. Extremities - No cyanosis, clubbing, or significant edema. Pulmonary - No respiratory distress. No wheezes or rales.       MEDICAL DECISION MAKING & DATA REVIEW:    Lab Review   Lab Results   Component Value Date    TROPONINT <0.010 01/02/2022    TROPONINT <0.010 12/08/2020     Lab Results   Component Value Date    BNP 9 04/27/2013    BNP 7 03/17/2011    PROBNP 109.7 01/02/2022    PROBNP 36.09 11/23/2020     Lab Results   Component Value Date    INR 1.06 08/21/2021    INR 1.16 09/13/2020     Lab Results   Component Value Date    LABA1C 8.1 11/08/2021    LABA1C 6.7 (H) 09/13/2020     Lab Results   Component Value Date    WBC 9.5 01/31/2022    WBC 10.8 (H) 01/02/2022    HCT 46.7 01/31/2022    HCT 45.6 01/02/2022    MCV 93.6 01/31/2022    MCV 94.0 01/02/2022    PLT 99 (L) 01/31/2022    PLT 94 (L) 01/02/2022     Lab Results   Component Value Date    CHOL 98 11/27/2019    CHOL 120 08/01/2017    TRIG 96 11/27/2019    TRIG 138 08/01/2017    HDL 38 (L) 11/27/2019    HDL 25 (L) 08/01/2017    LDLCALC 41 11/27/2019    LDLCALC 67 08/01/2017    LDLDIRECT 86 04/28/2013     Lab Results   Component Value Date    ALT 14 01/31/2022 ALT 13 01/02/2022    AST 18 01/31/2022    AST 19 01/02/2022     BMP:    Lab Results   Component Value Date     01/31/2022     01/02/2022    K 3.7 01/31/2022    K 4.0 01/02/2022    CL 98 01/31/2022     01/02/2022    CO2 27 01/31/2022    CO2 21 01/02/2022    BUN 14 01/31/2022    BUN 11 01/02/2022    CREATININE 0.9 01/31/2022    CREATININE 0.7 01/02/2022     CMP:   Lab Results   Component Value Date     01/31/2022     01/02/2022    K 3.7 01/31/2022    K 4.0 01/02/2022    CL 98 01/31/2022     01/02/2022    CO2 27 01/31/2022    CO2 21 01/02/2022    BUN 14 01/31/2022    BUN 11 01/02/2022    CREATININE 0.9 01/31/2022    CREATININE 0.7 01/02/2022    PROT 8.1 01/31/2022    PROT 7.6 01/02/2022    PROT 7.4 09/26/2012    PROT 6.6 07/27/2012     Lab Results   Component Value Date    TSH 1.73 08/01/2017    TSHHS 2.540 11/23/2020    TSHHS 0.739 01/13/2015        QUALITY MEASURES REVIEWED:  1.CAD:Patient is taking anti platelet agent:No  Patient does not have Hx of documented CAD  2. DYSLIPIDEMIA: Patient is on cholesterol lowering medication:No   3. Beta-Blocker therapy for CAD, if prior Myocardial Infarction:Yes   4. Counselled regarding smoking cessation. No   Patient does not Smoke. 5.Anticoagulation therapy (for A.Fib) No   Does Not have A.Fib.  6.Discussed weight management strategies. Assessment & Plan:  Primary / Secondary prevention is the goal by aggressive risk modification, healthy and therapeutic life style changes for cardiovascular risk reduction. CORONARY ARTERY DISEASE:No, has myocardial bridge. ETT 1/2020   Normal exercise performance without angina and ischemic EKG changes.    Functional Capacity: Fair Exercise Tolerance. No chest pain noted during   testing.   HTN: Borderline elevated on current medical regimen, see list above.             - changes in  treatment:  Yes, on Toprol XL  Reduce Midodrine dose.     ECHO 12/2020   Technically limited study due to COPD.   Left ventricular function is normal, EF is estimated at 55-60%.   Moderate left ventricular hypertrophy.   No evidence of diastolic dysfunction.   No regional wall motion abnormalities were detected.  RVSP is 22 mmHg.   No significant valvular abnormalities.   No evidence of pericardial effusion.  CARDIOMYOPATHY: None known   CONGESTIVE HEART FAILURE: NO KNOWN HISTORY.   VHD: No significant VHD noted  DYSLIPIDEMIA: Patient's profile is at / near [facility] is low                                See most recent Lab values in Labs section above. OTHER RELEVANT DIAGNOSIS:as noted in patient's active problem list:  Morbid obesity: Diet & Exercise. ARRHYTHMIAS:no  CHRONIC VENOUS INSUFFICIENCY:yes, Patient has been using compression stockings. Has C4 venous disease with abnormal vein study. S/P right GSV ablation with good results. Explained to the patient. Need to continue to wear stockings due to deep vein Reflux. 4/7/2022    Right CFV is patent with good compressibility and respirophasic signal with    good augmentation.    The Right GSV is non-compressible with no evidence of flow at the    saphenofemoral junction, distal thigh, and knee.    The right Mid Thigh GSV is partially compressible with evidence of flow,    without evidence of reflux. Right Mid Thigh tributary noted coming off the    right GSV Mid thigh. This is a new finding as compared to the last s/p    ablation exam 11/11/2021. TESTS ORDERED: none this visit     PREVIOUSLY ORDERED TESTS REVIEWED & DISCUSSED WITH THE PATIENT:     I personally reviewed & interpreted, all previously ordered tests as copied above. Latest Labs are pulled in to the note with dates.    Labs, specially in Reference to Lipid profile, Cardiac testing in the form of Echo ( dated: ), stress tests ( dated: ) & other relevant cardiac testing reviewed with patient & recommendations made based on assessment of the results. Discussed role of Cardiac risk factors & effects + treatment of co morbidities with patient & advised accordingly. MEDICATIONS: List of medications patient is currently taking is reviewed in detail with the patient. Discussed any side effects or problems taking the medication. Recommend Reduce Midodrine dose due to elevated BP. AFFIRMATION: I  reviewed patient's history, previous & current medical problems & all Labs + testing. This includes chart prep even prior to the vosit. Various goals are discussed and multiple questions answered. Relevant concelling performed. Office follow up in six months.

## 2022-05-18 NOTE — PATIENT INSTRUCTIONS
Please be informed that if you contact our office outside of normal business hours the physician on call cannot help with any scheduling or rescheduling issues, procedure instruction questions or any type of medication issue. We advise you for any urgent/emergency that you go to the nearest emergency room! PLEASE CALL OUR OFFICE DURING NORMAL BUSINESS HOURS    Monday - Friday   8 am to 5 pm    [place]: [phone]    [place]: [phone]    [place]:  [phone]    CORONARY ARTERY DISEASE:No, has myocardial bridge. ETT 1/2020   Normal exercise performance without angina and ischemic EKG changes.    Functional Capacity: Fair Exercise Tolerance. No chest pain noted during   testing. HTN: Borderline elevated on current medical regimen, see list above.             - changes in  treatment:  Yes, on Toprol XL  Reduce Midodrine dose.     ECHO 12/2020   Technically limited study due to COPD.   Left ventricular function is normal, EF is estimated at 55-60%.   Moderate left ventricular hypertrophy.   No evidence of diastolic dysfunction.   No regional wall motion abnormalities were detected.  RVSP is 22 mmHg.   No significant valvular abnormalities.   No evidence of pericardial effusion.  CARDIOMYOPATHY: None known   CONGESTIVE HEART FAILURE: NO KNOWN HISTORY.   VHD: No significant VHD noted  DYSLIPIDEMIA: Patient's profile is at / near [facility] is low                                See most recent Lab values in Labs section above. OTHER RELEVANT DIAGNOSIS:as noted in patient's active problem list:  Morbid obesity: Diet & Exercise. ARRHYTHMIAS:no  CHRONIC VENOUS INSUFFICIENCY:yes, Patient has been using compression stockings. Has C4 venous disease with abnormal vein study. S/P right GSV ablation with good results. Explained to the patient. Need to continue to wear stockings due to deep vein Reflux.   4/7/2022    Right CFV is patent with good compressibility and respirophasic signal with    good augmentation.    The Right GSV is non-compressible with no evidence of flow at the    saphenofemoral junction, distal thigh, and knee.    The right Mid Thigh GSV is partially compressible with evidence of flow,    without evidence of reflux. Right Mid Thigh tributary noted coming off the    right GSV Mid thigh. This is a new finding as compared to the last s/p    ablation exam 11/11/2021. TESTS ORDERED: none this visit     PREVIOUSLY ORDERED TESTS REVIEWED & DISCUSSED WITH THE PATIENT:     I personally reviewed & interpreted, all previously ordered tests as copied above. Latest Labs are pulled in to the note with dates. Labs, specially in Reference to Lipid profile, Cardiac testing in the form of Echo ( dated: ), stress tests ( dated: ) & other relevant cardiac testing reviewed with patient & recommendations made based on assessment of the results. Discussed role of Cardiac risk factors & effects + treatment of co morbidities with patient & advised accordingly. MEDICATIONS: List of medications patient is currently taking is reviewed in detail with the patient. Discussed any side effects or problems taking the medication. Recommend Reduce Midodrine dose due to elevated BP. AFFIRMATION: I  reviewed patient's history, previous & current medical problems & all Labs + testing. This includes chart prep even prior to the vosit. Various goals are discussed and multiple questions answered. Relevant concelling performed. Office follow up in six months.

## 2022-05-18 NOTE — LETTER
[facility]  [address] [address]  Phone: [phone]  Fax: [phone]    Jacques Andres MD    May 18, 2022     Emre Ross MD  [address] [place] [address],[address] Floor  [address] [place] [id number]    Patient: Nikunj Hutchins   MR Number: [id number]   YOB: 1960   Date of Visit: 5/18/2022       Dear Emre Ross: Thank you for referring Lis Carty to me for evaluation/treatment. Below are the relevant portions of my assessment and plan of care. If you have questions, please do not hesitate to call me. I look forward to following Iglesia Patel along with you.     Sincerely,      Jacques Andres MD

## 2022-07-21 RX ORDER — FUROSEMIDE 20 MG/1
20 TABLET ORAL DAILY
Qty: 90 TABLET | Refills: 1 | Status: SHIPPED | OUTPATIENT
Start: 2022-07-21 | End: 2022-10-24 | Stop reason: SDUPTHER

## 2022-07-21 RX ORDER — MIDODRINE HYDROCHLORIDE 5 MG/1
5 TABLET ORAL
Qty: 270 TABLET | Refills: 1 | Status: SHIPPED | OUTPATIENT
Start: 2022-07-21

## 2022-07-21 RX ORDER — RANOLAZINE 500 MG/1
500 TABLET, EXTENDED RELEASE ORAL 2 TIMES DAILY
Qty: 180 TABLET | Refills: 1 | Status: SHIPPED | OUTPATIENT
Start: 2022-07-21 | End: 2022-08-05 | Stop reason: SDUPTHER

## 2022-07-21 RX ORDER — METOPROLOL SUCCINATE 50 MG/1
50 TABLET, EXTENDED RELEASE ORAL DAILY
Qty: 90 TABLET | Refills: 1 | Status: SHIPPED | OUTPATIENT
Start: 2022-07-21

## 2022-08-05 RX ORDER — RANOLAZINE 500 MG/1
500 TABLET, EXTENDED RELEASE ORAL 2 TIMES DAILY
Qty: 180 TABLET | Refills: 1 | Status: SHIPPED | OUTPATIENT
Start: 2022-08-05

## 2022-08-16 ENCOUNTER — OFFICE VISIT (OUTPATIENT)
Dept: FAMILY MEDICINE CLINIC | Age: 62
End: 2022-08-16
Payer: MEDICARE

## 2022-08-16 VITALS
OXYGEN SATURATION: 94 % | BODY MASS INDEX: 39.87 KG/M2 | SYSTOLIC BLOOD PRESSURE: 122 MMHG | HEIGHT: 57 IN | WEIGHT: 184.8 LBS | HEART RATE: 78 BPM | DIASTOLIC BLOOD PRESSURE: 76 MMHG

## 2022-08-16 DIAGNOSIS — Z00.00 INITIAL MEDICARE ANNUAL WELLNESS VISIT: Primary | ICD-10-CM

## 2022-08-16 PROCEDURE — G0438 PPPS, INITIAL VISIT: HCPCS | Performed by: FAMILY MEDICINE

## 2022-08-16 RX ORDER — LORATADINE 10 MG/1
10 TABLET ORAL DAILY
Qty: 30 TABLET | Refills: 3 | Status: SHIPPED | OUTPATIENT
Start: 2022-08-16 | End: 2022-09-15

## 2022-08-16 ASSESSMENT — PATIENT HEALTH QUESTIONNAIRE - PHQ9
2. FEELING DOWN, DEPRESSED OR HOPELESS: 2
SUM OF ALL RESPONSES TO PHQ QUESTIONS 1-9: 20
SUM OF ALL RESPONSES TO PHQ QUESTIONS 1-9: 20
SUM OF ALL RESPONSES TO PHQ9 QUESTIONS 1 & 2: 4
SUM OF ALL RESPONSES TO PHQ QUESTIONS 1-9: 20
3. TROUBLE FALLING OR STAYING ASLEEP: 3
10. IF YOU CHECKED OFF ANY PROBLEMS, HOW DIFFICULT HAVE THESE PROBLEMS MADE IT FOR YOU TO DO YOUR WORK, TAKE CARE OF THINGS AT HOME, OR GET ALONG WITH OTHER PEOPLE: 2
5. POOR APPETITE OR OVEREATING: 3
SUM OF ALL RESPONSES TO PHQ QUESTIONS 1-9: 20
6. FEELING BAD ABOUT YOURSELF - OR THAT YOU ARE A FAILURE OR HAVE LET YOURSELF OR YOUR FAMILY DOWN: 3
7. TROUBLE CONCENTRATING ON THINGS, SUCH AS READING THE NEWSPAPER OR WATCHING TELEVISION: 2
4. FEELING TIRED OR HAVING LITTLE ENERGY: 3
9. THOUGHTS THAT YOU WOULD BE BETTER OFF DEAD, OR OF HURTING YOURSELF: 0
8. MOVING OR SPEAKING SO SLOWLY THAT OTHER PEOPLE COULD HAVE NOTICED. OR THE OPPOSITE, BEING SO FIGETY OR RESTLESS THAT YOU HAVE BEEN MOVING AROUND A LOT MORE THAN USUAL: 2
1. LITTLE INTEREST OR PLEASURE IN DOING THINGS: 2

## 2022-08-16 ASSESSMENT — COLUMBIA-SUICIDE SEVERITY RATING SCALE - C-SSRS
2. HAVE YOU ACTUALLY HAD ANY THOUGHTS OF KILLING YOURSELF?: NO
1. WITHIN THE PAST MONTH, HAVE YOU WISHED YOU WERE DEAD OR WISHED YOU COULD GO TO SLEEP AND NOT WAKE UP?: NO
6. HAVE YOU EVER DONE ANYTHING, STARTED TO DO ANYTHING, OR PREPARED TO DO ANYTHING TO END YOUR LIFE?: NO

## 2022-08-16 ASSESSMENT — LIFESTYLE VARIABLES: HOW OFTEN DO YOU HAVE A DRINK CONTAINING ALCOHOL: NEVER

## 2022-08-16 NOTE — PROGRESS NOTES
Medicare Annual Wellness Visit    Montrell Perez is here for Medicare AWV and 6 Month Follow-Up    Assessment & Plan   Initial Medicare annual wellness visit    Recommendations for Preventive Services Due: see orders and patient instructions/AVS.  Recommended screening schedule for the next 5-10 years is provided to the patient in written form: see Patient Instructions/AVS.     Return for Medicare Annual Wellness Visit in 1 year. Subjective   The following acute and/or chronic problems were also addressed today:  Has no complaints    Patient's complete Health Risk Assessment and screening values have been reviewed and are found in Flowsheets. The following problems were reviewed today and where indicated follow up appointments were made and/or referrals ordered.     Positive Risk Factor Screenings with Interventions:      Depression:  PHQ-2 Score: 4  PHQ-9 Total Score: 20    Severity:1-4 = minimal depression, 5-9 = mild depression, 10-14 = moderate depression, 15-19 = moderately severe depression, 20-27 = severe depression  Depression Interventions:  Lives with her brother      Drug Use Screening:    DAST-10 Score Interpretation:  1-2: Low level - Monitor, re-assess at a later date; 3-5: Moderate level - Further Investigation; 6-8: Substantial level - Intensive Assessment; 9-10: Severe level - Intensive Assessment  Substance Use - Drug Use Interventions:  Not ready to quit smoking       General Health and ACP:  General  In general, how would you say your health is?: Fair  In the past 7 days, have you experienced any of the following: New or Increased Pain, New or Increased Fatigue, Loneliness, Social Isolation, Stress or Anger?: No  Do you get the social and emotional support that you need?: Yes  Do you have a Living Will?: (!) No    Advance Directives       Power of  Living Will ACP-Advance Directive ACP-Power of     Not on File Not on File Not on File Not on [address] [facility] Interventions:  Needs to work on the living will    Health Habits/Nutrition:  Physical Activity: Insufficiently Active    Days of Exercise per Week: 7 days    Minutes of Exercise per Session: 10 min     Have you lost any weight without trying in the past 3 months?: No  Body mass index: (!) 39.99  Have you seen the dentist within the past year?: (!) No  Health Habits/Nutrition Interventions:  Doing fine     Safety:  Do you have working smoke detectors?: Yes  Do you have any tripping hazards - loose or unsecured carpets or rugs?: No  Do you have any tripping hazards - clutter in doorways, halls, or stairs?: No  Do you have either shower bars, grab bars, non-slip mats or non-slip surfaces in your shower or bathtub?: (!) No  Do all of your stairways have a railing or banister?: Yes  Do you always fasten your seatbelt when you are in a car?: Yes  Safety Interventions:  Doing fine           Objective   Vitals:    08/16/22 1601   BP: 122/76   Site: Left Upper Arm   Position: Sitting   Cuff Size: Large Adult   Pulse: 78   SpO2: 94%   Weight: 184 lb 12.8 oz (83.8 kg)   Height: 4' 9\" (1.448 m)      Body mass index is 39.99 kg/m². Allergies   Allergen Reactions    Norco [Hydrocodone-Acetaminophen] Itching     Itching, rash, nausea and vomiting. Tylenol [Acetaminophen] Itching, Nausea And Vomiting and Rash     Prior to Visit Medications    Medication Sig Taking? Authorizing Provider   loratadine (CLARITIN) 10 MG tablet Take 1 tablet by mouth in the morning. Yes Tushar Kennedy MD   ranolazine (RANEXA) 500 MG extended release tablet Take 1 tablet by mouth in the morning and 1 tablet before bedtime. Yes Kely Carballo MD   furosemide (LASIX) 20 MG tablet Take 1 tablet by mouth in the morning. Hold if systolic <343. Yes Kely Carballo MD   metoprolol succinate (TOPROL XL) 50 MG extended release tablet Take 1 tablet by mouth in the morning.  Yes Kely Carballo MD   midodrine (PROAMATINE) 5 MG tablet Take 1 tablet by mouth in the morning and 1 tablet at noon and 1 tablet in the evening. Take with meals. Yes Iglesia Gardner MD   ipratropium-albuterol (DUONEB) 0.5-2.5 (3) MG/3ML SOLN nebulizer solution Inhale 3 mLs into the lungs every 4 hours Yes Minna Stephen MD   nitroGLYCERIN (NITROSTAT) 0.4 MG SL tablet Place 1 tablet under the tongue every 5 minutes as needed for Chest pain Yes Iglesia Gardner MD   rifAXIMin (XIFAXAN) 550 MG tablet Take 1 tablet by mouth 2 times daily Yes Iglesia Gardner MD   sucralfate (CARAFATE) 1 GM tablet Take 1 tablet by mouth 4 times daily Yes Mani Stinson MD   ondansetron (ZOFRAN ODT) 4 MG disintegrating tablet Take 1 tablet by mouth every 8 hours as needed for Nausea Yes Mani Stinson MD   blood glucose monitor strips Test 3 times a day & as needed for symptoms of irregular blood glucose. Please fill with what is covered my patients insurance. Yes Evangelista Zapata MD   insulin glargine (LANTUS SOLOSTAR) 100 UNIT/ML injection pen Inject 15 Units into the skin nightly Yes Evangelista Zapata MD   insulin aspart (NOVOLOG FLEXPEN) 100 UNIT/ML injection pen **Low Dose Correction Algorithm**Insulin lispro (HUMALOG)  3 TIMES DAILY WITH MEALSGlucose: Dose: No [id number] 1 [id number] 2 [id number] 3 [id number] 4 [id number] 5 Aijtf203 and above 6 Units Yes Evangelista Zapata MD   blood glucose monitor strips Test  Bid times a day & as needed for symptoms of irregular blood glucose.  Yes Evangelista Zapata MD   Glucosource Lancets MISC Use one 3-4 times to check blood sugar Yes Evangelista Zapata MD   ipratropium-albuterol (DUONEB) 0.5-2.5 (3) MG/3ML SOLN nebulizer solution Inhale 3 mLs into the lungs every 4 hours Yes Janna Guerrero DO   lactulose (CHRONULAC) 10 GM/15ML solution Take 30 mLs by mouth 3 times daily Yes Shaheed Key MD   QUEtiapine (SEROQUEL) 50 MG tablet Take 3 tablets by mouth nightly Yes Shaheed Key MD   FLUoxetine (PROZAC) 20 MG capsule Take 20 mg by mouth daily Yes Historical Provider, MD   paliperidone (INVEGA) 3 MG extended release tablet  Yes Historical Provider, MD   Compression Stockings MISC by Does not apply route 20 - 30 mmh wear daily and take off at night  Thigh High Yes ERLINDA Omalley - CNP   UNABLE TO FIND Rx for depends   Use 3-4  times daily Yes Morena Noova MD   glucose monitoring kit (FREESTYLE) monitoring kit 1 kit by Does not apply route daily Yes Morena Novoa MD   Blood Glucose Monitoring Suppl (ACURA BLOOD GLUCOSE METER) w/Device KIT Please check blood sugar 3 times daily.  Please fill with what is covered by the insurance Yes Morena Novoa MD   pantoprazole (PROTONIX) 40 MG tablet Take 1 tablet by mouth 2 times daily (before meals) Yes Ryan Grenee MD   VIREAD 300 MG tablet Take 300 mg by mouth daily  Yes Historical Provider, MD Herman (Including outside providers/suppliers regularly involved in providing care):   Patient Care Team:  Morena Novoa MD as PCP - Maryanne Lamb MD as PCP - [facility]  Sally Mauricio MD as Consulting Physician (Cardiology)  Roel Garza MD as Consulting Physician (Anesthesiology)  Marlyn Cabrera MD as Consulting Physician (Hematology and Oncology)  Analy Jeff MD as Consulting Physician (Hematology and Oncology)     Reviewed and updated this visit:  Tobacco  Allergies  Meds  Med Hx  Surg Hx  Soc Hx  Fam Hx

## 2022-09-20 ENCOUNTER — OFFICE VISIT (OUTPATIENT)
Dept: OBGYN | Age: 62
End: 2022-09-20
Payer: MEDICARE

## 2022-09-20 ENCOUNTER — TELEPHONE (OUTPATIENT)
Dept: OBGYN | Age: 62
End: 2022-09-20

## 2022-09-20 VITALS
BODY MASS INDEX: 39.04 KG/M2 | WEIGHT: 186 LBS | SYSTOLIC BLOOD PRESSURE: 139 MMHG | DIASTOLIC BLOOD PRESSURE: 84 MMHG | HEIGHT: 58 IN

## 2022-09-20 DIAGNOSIS — N90.89 VULVAR MASS: ICD-10-CM

## 2022-09-20 DIAGNOSIS — N74 FEMALE PELVIC INFLAMMATORY DISORDERS IN DISEASES CLASSIFIED ELSEWHERE: ICD-10-CM

## 2022-09-20 DIAGNOSIS — N95.2 ATROPHIC VAGINITIS: ICD-10-CM

## 2022-09-20 DIAGNOSIS — N32.81 OVERACTIVE BLADDER: ICD-10-CM

## 2022-09-20 DIAGNOSIS — Z13.820 ENCOUNTER FOR OSTEOPOROSIS SCREENING IN ASYMPTOMATIC POSTMENOPAUSAL PATIENT: ICD-10-CM

## 2022-09-20 DIAGNOSIS — Z78.0 ENCOUNTER FOR OSTEOPOROSIS SCREENING IN ASYMPTOMATIC POSTMENOPAUSAL PATIENT: ICD-10-CM

## 2022-09-20 DIAGNOSIS — Z01.419 ENCOUNTER FOR ANNUAL ROUTINE GYNECOLOGICAL EXAMINATION: Primary | ICD-10-CM

## 2022-09-20 DIAGNOSIS — N89.8 VAGINAL DISCHARGE: ICD-10-CM

## 2022-09-20 DIAGNOSIS — R30.0 DYSURIA: ICD-10-CM

## 2022-09-20 DIAGNOSIS — Z12.31 SCREENING MAMMOGRAM FOR BREAST CANCER: ICD-10-CM

## 2022-09-20 LAB
BACTERIA: ABNORMAL /HPF
BILIRUBIN URINE: NEGATIVE
BLOOD, URINE: ABNORMAL
CLARITY: ABNORMAL
COLOR: YELLOW
EPITHELIAL CELLS, UA: 5 /HPF (ref 0–5)
GLUCOSE URINE: 500 MG/DL
HYALINE CASTS: 0 /LPF (ref 0–8)
KETONES, URINE: NEGATIVE MG/DL
LEUKOCYTE ESTERASE, URINE: ABNORMAL
MICROSCOPIC EXAMINATION: YES
NITRITE, URINE: NEGATIVE
PH UA: 5.5 (ref 5–8)
PROTEIN UA: NEGATIVE MG/DL
RBC UA: 32 /HPF (ref 0–4)
SPECIFIC GRAVITY UA: 1.02 (ref 1–1.03)
URINE TYPE: ABNORMAL
UROBILINOGEN, URINE: 0.2 E.U./DL
WBC UA: 69 /HPF (ref 0–5)

## 2022-09-20 PROCEDURE — 99203 OFFICE O/P NEW LOW 30 MIN: CPT | Performed by: OBSTETRICS & GYNECOLOGY

## 2022-09-20 RX ORDER — SOLIFENACIN SUCCINATE 5 MG/1
5 TABLET, FILM COATED ORAL DAILY
Qty: 30 TABLET | Refills: 11 | Status: SHIPPED | OUTPATIENT
Start: 2022-09-20

## 2022-09-20 RX ORDER — ESTRADIOL 0.1 MG/G
1 CREAM VAGINAL
Qty: 3 EACH | Refills: 3 | Status: SHIPPED | OUTPATIENT
Start: 2022-09-21

## 2022-09-20 SDOH — ECONOMIC STABILITY: INCOME INSECURITY: HOW HARD IS IT FOR YOU TO PAY FOR THE VERY BASICS LIKE FOOD, HOUSING, MEDICAL CARE, AND HEATING?: NOT HARD AT ALL

## 2022-09-20 SDOH — ECONOMIC STABILITY: TRANSPORTATION INSECURITY
IN THE PAST 12 MONTHS, HAS THE LACK OF TRANSPORTATION KEPT YOU FROM MEDICAL APPOINTMENTS OR FROM GETTING MEDICATIONS?: NO

## 2022-09-20 SDOH — ECONOMIC STABILITY: FOOD INSECURITY: WITHIN THE PAST 12 MONTHS, YOU WORRIED THAT YOUR FOOD WOULD RUN OUT BEFORE YOU GOT MONEY TO BUY MORE.: NEVER TRUE

## 2022-09-20 SDOH — ECONOMIC STABILITY: FOOD INSECURITY: WITHIN THE PAST 12 MONTHS, THE FOOD YOU BOUGHT JUST DIDN'T LAST AND YOU DIDN'T HAVE MONEY TO GET MORE.: NEVER TRUE

## 2022-09-20 SDOH — ECONOMIC STABILITY: HOUSING INSECURITY
IN THE LAST 12 MONTHS, WAS THERE A TIME WHEN YOU DID NOT HAVE A STEADY PLACE TO SLEEP OR SLEPT IN A SHELTER (INCLUDING NOW)?: NO

## 2022-09-20 SDOH — ECONOMIC STABILITY: INCOME INSECURITY: IN THE LAST 12 MONTHS, WAS THERE A TIME WHEN YOU WERE NOT ABLE TO PAY THE MORTGAGE OR RENT ON TIME?: NO

## 2022-09-20 SDOH — ECONOMIC STABILITY: TRANSPORTATION INSECURITY
IN THE PAST 12 MONTHS, HAS LACK OF TRANSPORTATION KEPT YOU FROM MEETINGS, WORK, OR FROM GETTING THINGS NEEDED FOR DAILY LIVING?: NO

## 2022-09-20 ASSESSMENT — ENCOUNTER SYMPTOMS
ALLERGIC/IMMUNOLOGIC NEGATIVE: 1
RESPIRATORY NEGATIVE: 1
GASTROINTESTINAL NEGATIVE: 1
EYES NEGATIVE: 1

## 2022-09-20 NOTE — PROGRESS NOTES
9/20/22    Jeovanny Bender  1960    Chief Complaint   Patient presents with    New Patient     Pt here for annual, had hyster/bso, is sexually active, hrt-none, [id number], mammo and u/s -[id number] 2, dexa-never, colonoscopy-2022. Vaginal Discharge     Pt c/o yellow discharge x 1 wk, denies having any itching or odor. Other     Pt c/o dysuria pressure x 1 wk. C/o perineal bumps x couple yrs. The patient is a 63 y.o. female, No obstetric history on file. who presents for her annual exam. She is menopausal.  She is not taking HRT. Suraj Gomez She reports additional symptoms of dyspareunia and vaginal discharge/irritation. She has had a hysterectomy with removal of ovaries. She is  sexually active. Pap smear history: Her last PAP smear was in 2010. Her results were normal.    Breast history: her most recent mammogram was in 2016. The results were: Normal    Osteoporosis Status: she has not had a bone density scan    Colonoscopy Status: she had a colonoscopy in 2022. The results were normal.    Past Medical History:   Diagnosis Date    Abnormal Pap smear of cervix     Acid reflux     Arthritis     left knee    Breast cyst     CAD (coronary artery disease)     per [facility] 9/6/13 - Dr. Manoj Vicente    COPD (chronic obstructive pulmonary disease) ([facility] [address]     follow with Dr Osbaldo Castellanos     [facility] manic - depression see Dr Adrian Parada    Diabetes mellitus [facility])     dx 10+ yrs ago- follows with PCP    Drug abuse ([facility] [address]     hx use of cocaine, heroin and marijuana- states last used 12/2014    Glaucoma     bilateral    H/O Doppler lower venous ultrasound 04/04/2019    No DVT or SVT, Significant reflux of RGSV and LGSV. H/O echocardiogram 12/18/2020    EF 55-60%, Mod LVH.     Hepatic encephalopathy ([facility] [address] 05/2019    Hepatitis C     for liver bx 12/3/2015\"Have Hepatitis B and C and saw Dr Kary Hdez for this 12/1/2015\"    Hiatal hernia     History of alcohol abuse     History of exercise stress test 01/02/2020    Treadmill, Normal exercise performance without angina and ischemic EKG changes.     HTN (hypertension)     \"for the past two yrs on medication\" follows with Dr Martinez Nurse    Hx of blood clots 2019    Portal vein thrombsis - TIPS procedure 4/23/19    Hyperlipemia     Irregular heart beat     per pt    Liver hematoma     Migraine     Migraines     Last migraine:  2018    Nausea & vomiting     Neurologic disorder     Schizophrenia ([address]     per old chart    Seizures ([address]     \"last one was 9/2015- saw Dr Luci Maldonado at [facility]- she said not sure if acutal seizures- she thinks they are panic or anxiety attacks\"    SOB (shortness of breath)     with any exertion       Past Surgical History:   Procedure Laterality Date    BREAST SURGERY  10/2015    left breast bx    CARDIAC CATHETERIZATION      per old chart pt had cath done in 3/2011 and 9/2013    CARPAL TUNNEL RELEASE Left 01/09/2020    CARPAL TUNNEL RELEASE LEFT performed by Cristiano Mora DO at . [address] Right 05/26/2020    dr Lul Briscoe, carpal tunnel trigger finger release    CARPAL TUNNEL RELEASE Right 05/26/2020    RIGHT CARPAL TUNNEL RELEASE performed by Cristiano Mora DO at [facility]  per old chart done 1985    COLONOSCOPY  03/11/2013    diverticulosis, cecal polyp    COLONOSCOPY  03/16/2017    Internal hemorrhoids- Dr. Alana Etienne    COLONOSCOPY N/A 05/17/2022    COLONOSCOPY POLYPECTOMY SNARE/COLD BIOPSY performed by Jewel Carranza MD at [facility], [facility], DIAGNOSTIC  03/16/2017    EGD: Small esophageal varices, portal hypertensive gastropathy, reflux esophagitis, hiatal hernia    EYE SURGERY Bilateral ? when    cyst removal, cataracts w/lens replacement    FINGER TRIGGER RELEASE Right 05/26/2020    FINGER TRIGGER RELEASE RIGHT RING FINGER performed by Cristiano Mora DO at [facility] ([facility])      per old chart pt had CHOLO/BSO 1986    SALPINGO-OOPHORECTOMY      TIPS PROCEDURE 04/23/2019    TONSILLECTOMY  as a kid    UPPER GASTROINTESTINAL ENDOSCOPY N/A 11/14/2018    EGD DIAGNOSTIC ONLY performed by Kelly Johnson MD at [address] N/A 06/05/2019    EGD DIAGNOSTIC ONLY performed by Kelly Johnson MD at [facility] ENDOSCOPY       Family History   Problem Relation Age of Onset    Cancer Mother         lung ca    Arthritis Mother     Migraines Mother     Cancer Father         colon ca    Diabetes Father     High Blood Pressure Father     Arthritis Father     High Cholesterol Father     Migraines Father     Migraines Sister     Heart Disease Brother         WPW       Social History     Tobacco Use    Smoking status: Former     Packs/day: 1.00     Years: 45.00     Pack years: 45.00     Types: Cigarettes     Start date: 1974    Smokeless tobacco: Never   Vaping Use    Vaping Use: Never used   Substance Use Topics    Alcohol use: Not Currently     Alcohol/week: 2.0 - 3.0 standard drinks     Types: 2 - 3 Shots of liquor per week     Comment: 2-3 shots last 4/2019    Drug use: Yes     Frequency: 3.0 times per week     Types: Marijuana (Weed)     Comment: daily       Current Outpatient Medications   Medication Sig Dispense Refill    solifenacin (VESICARE) 5 MG tablet Take 1 tablet by mouth daily 30 tablet 11    [START ON 9/21/2022] estradiol (ESTRACE VAGINAL) 0.1 MG/GM vaginal cream Place 1 g vaginally three times a week Use 1 g vaginally nightly for 2 weeks, then 1 g nightly 2-3 times per week. 3 each 3    ranolazine (RANEXA) 500 MG extended release tablet Take 1 tablet by mouth in the morning and 1 tablet before bedtime. 180 tablet 1    furosemide (LASIX) 20 MG tablet Take 1 tablet by mouth in the morning. Hold if systolic <818. 90 tablet 1    metoprolol succinate (TOPROL XL) 50 MG extended release tablet Take 1 tablet by mouth in the morning.  90 tablet 1    midodrine (PROAMATINE) 5 MG tablet Take 1 tablet by mouth in the morning and 1 tablet at noon and 1 tablet in the evening. Take with meals. 270 tablet 1    ipratropium-albuterol (DUONEB) 0.5-2.5 (3) MG/3ML SOLN nebulizer solution Inhale 3 mLs into the lungs every 4 hours 120 each 5    nitroGLYCERIN (NITROSTAT) 0.4 MG SL tablet Place 1 tablet under the tongue every 5 minutes as needed for Chest pain 25 tablet 3    rifAXIMin (XIFAXAN) 550 MG tablet Take 1 tablet by mouth 2 times daily 42 tablet 0    sucralfate (CARAFATE) 1 GM tablet Take 1 tablet by mouth 4 times daily 120 tablet 3    ondansetron (ZOFRAN ODT) 4 MG disintegrating tablet Take 1 tablet by mouth every 8 hours as needed for Nausea 15 tablet 0    blood glucose monitor strips Test 3 times a day & as needed for symptoms of irregular blood glucose. Please fill with what is covered my patients insurance. 50 strip 3    insulin glargine (LANTUS SOLOSTAR) 100 UNIT/ML injection pen Inject 15 Units into the skin nightly 5 pen 3    insulin aspart (NOVOLOG FLEXPEN) 100 UNIT/ML injection pen **Low Dose Correction Algorithm**Insulin lispro (HUMALOG)  3 TIMES DAILY WITH MEALSGlucose: Dose: No [id number] 1 [id number] 2 [id number] 3 [id number] 4 [id number] 5 [id number] and above 6 Units 5 pen 3    blood glucose monitor strips Test  Bid times a day & as needed for symptoms of irregular blood glucose.  100 strip 5    Glucosource Lancets MISC Use one 3-4 times to check blood sugar 100 each 5    ipratropium-albuterol (DUONEB) 0.5-2.5 (3) MG/3ML SOLN nebulizer solution Inhale 3 mLs into the lungs every 4 hours 360 mL 0    lactulose (CHRONULAC) 10 GM/15ML solution Take 30 mLs by mouth 3 times daily 1892 mL 2    QUEtiapine (SEROQUEL) 50 MG tablet Take 3 tablets by mouth nightly 60 tablet 3    FLUoxetine (PROZAC) 20 MG capsule Take 20 mg by mouth daily      paliperidone (INVEGA) 3 MG extended release tablet       Compression Stockings MISC by Does not apply route 20 - 30 mmh wear daily and take off at night  Thigh High 2 each 2    UNABLE TO FIND Rx for depends   Use 3-4 times daily 1 box 5    glucose monitoring kit (FREESTYLE) monitoring kit 1 kit by Does not apply route daily 1 kit 0    Blood Glucose Monitoring Suppl (ACURA BLOOD GLUCOSE METER) w/Device KIT Please check blood sugar 3 times daily. Please fill with what is covered by the insurance 1 kit 0    pantoprazole (PROTONIX) 40 MG tablet Take 1 tablet by mouth 2 times daily (before meals) 60 tablet 1    VIREAD 300 MG tablet Take 300 mg by mouth daily        No current facility-administered medications for this visit. Allergies   Allergen Reactions    Norco [Hydrocodone-Acetaminophen] Itching     Itching, rash, nausea and vomiting. Tylenol [Acetaminophen] Itching, Nausea And Vomiting and Rash       No obstetric history on file. Immunization History   Administered Date(s) Administered    COVID-19, PFIZER PURPLE top, DILUTE for use, (age 15 y+), 30mcg/0.3mL 03/23/2021, 04/14/2021, 12/16/2021    Influenza Virus Vaccine 09/10/2018, 10/29/2020    Influenza, AFLURIA (age 1 yrs+), FLUZONE, (age 10 mo+), MDV, 0.5mL 08/22/2017    Influenza, FLUARIX, FLULAVAL, FLUZONE (age 10 mo+) AND AFLURIA, (age 1 y+), PF, 0.5mL 12/19/2019    Pneumococcal Polysaccharide ([id number]) 07/31/2017       Review of Systems   Constitutional: Negative. Eyes: Negative. Respiratory: Negative. Cardiovascular: Negative. Gastrointestinal: Negative. Endocrine: Negative. Genitourinary:  Positive for dyspareunia and vaginal discharge. Musculoskeletal: Negative. Skin: Negative. Allergic/Immunologic: Negative. Neurological: Negative. Hematological: Negative. Psychiatric/Behavioral: Negative. /84   Ht 4' 9.5\" (1.461 m)   Wt 186 lb (84.4 kg)   BMI 39.55 kg/m²     Physical Exam  Exam conducted with a chaperone present. Constitutional:       Appearance: Normal appearance. HENT:      Head: Normocephalic and atraumatic. Nose: Nose normal.   Cardiovascular:      Rate and Rhythm: Normal rate.       Pulses: Normal pulses. Pulmonary:      Effort: Pulmonary effort is normal.   Chest:      Chest wall: No mass, lacerations, deformity, swelling or tenderness. Breasts:     Right: Normal. No swelling, bleeding, inverted nipple, mass, nipple discharge, skin change, tenderness, axillary adenopathy or supraclavicular adenopathy. Left: Normal. No swelling, bleeding, inverted nipple, mass, nipple discharge, skin change, tenderness, axillary adenopathy or supraclavicular adenopathy. Abdominal:      General: Abdomen is flat. There is no distension. Palpations: Abdomen is soft. There is no mass. Tenderness: There is no abdominal tenderness. Hernia: No hernia is present. There is no hernia in the left inguinal area or right inguinal area. Genitourinary:     Exam position: Lithotomy position. Pubic Area: No rash. Labia:         Right: No rash, tenderness or lesion. Left: No rash, tenderness or lesion. Urethra: No prolapse, urethral pain, urethral swelling or urethral lesion. Vagina: Normal. No vaginal discharge, erythema, tenderness, bleeding or lesions. Uterus: Absent. Adnexa: Right adnexa normal and left adnexa normal.        Right: No mass, tenderness or fullness. Left: No mass, tenderness or fullness. Rectum: No mass or anal fissure. Normal anal tone. Comments: Several masses of the perineum  Musculoskeletal:      Cervical back: Normal range of motion and neck supple. Lymphadenopathy:      Upper Body:      Right upper body: No supraclavicular, axillary or pectoral adenopathy. Left upper body: No supraclavicular, axillary or pectoral adenopathy. Lower Body: No right inguinal adenopathy. No left inguinal adenopathy. Skin:     General: Skin is warm and dry. Neurological:      General: No focal deficit present. Mental Status: She is alert and oriented to person, place, and time.    Psychiatric:         Mood and Affect: Mood normal.         Behavior: Behavior normal.         Thought Content: Thought content normal.         Judgment: Judgment normal.       No results found for this visit on 09/20/22. Assessment and Plan   Diagnosis Orders   1. Encounter for annual routine gynecological examination        2. Screening mammogram for breast cancer  ROB DIGITAL SCREEN W OR WO CAD BILATERAL      3. Vaginal discharge  Vaginal Pathogens Probes *A      4. Female pelvic inflammatory disorders in diseases classified elsewhere  Vaginal Pathogens Probes *A      5. Dysuria  Culture, Urine    Urinalysis with Microscopic      6. Overactive bladder  Culture, Urine    Urinalysis with Microscopic    solifenacin (VESICARE) 5 MG tablet      7. Encounter for osteoporosis screening in asymptomatic postmenopausal patient  DEXA BONE DENSITY AXIAL SKELETON      8. Atrophic vaginitis  estradiol (ESTRACE VAGINAL) 0.1 MG/GM vaginal cream      9. Vulvar mass          Discussed vulvar masses may be warts versus achrocordon, desires removal, will schedule    Return in about 1 year (around 9/20/2023).     Sonal Rosas MD

## 2022-09-20 NOTE — TELEPHONE ENCOUNTER
Pt states her pharmacy called and told her that her insurance does not cover the Vesicare. She has Collarity, what do you recommend ?

## 2022-09-21 LAB
CANDIDA SPECIES, DNA PROBE: NORMAL
GARDNERELLA VAGINALIS, DNA PROBE: NORMAL
TRICHOMONAS VAGINALIS DNA: NORMAL

## 2022-09-21 NOTE — TELEPHONE ENCOUNTER
Could you call the pharmacy and check and see if they will cover Enablex 7.5 mg tablets or Myrbetriq 25 mg tablets.   Please let me know

## 2022-09-22 LAB
ORGANISM: ABNORMAL
URINE CULTURE, ROUTINE: ABNORMAL

## 2022-09-22 RX ORDER — NITROFURANTOIN 25; 75 MG/1; MG/1
100 CAPSULE ORAL 2 TIMES DAILY
Qty: 14 CAPSULE | Refills: 0 | Status: SHIPPED | OUTPATIENT
Start: 2022-09-22 | End: 2022-09-29

## 2022-09-26 ENCOUNTER — PROCEDURE VISIT (OUTPATIENT)
Dept: OBGYN | Age: 62
End: 2022-09-26

## 2022-09-26 VITALS
DIASTOLIC BLOOD PRESSURE: 87 MMHG | HEIGHT: 58 IN | BODY MASS INDEX: 38.2 KG/M2 | WEIGHT: 182 LBS | SYSTOLIC BLOOD PRESSURE: 137 MMHG

## 2022-09-26 DIAGNOSIS — N90.89 VULVAR MASS: Primary | ICD-10-CM

## 2022-09-26 PROCEDURE — 99212 OFFICE O/P EST SF 10 MIN: CPT | Performed by: OBSTETRICS & GYNECOLOGY

## 2022-09-26 NOTE — PROGRESS NOTES
9/26/22    Dietra Cardinal  1960    Chief Complaint   Patient presents with    Procedure     Pt here for vulvar mass removal. Consent signed. Amaya Woody is a 63 y.o. female who presents today for evaluation of discuss vulvar mass. Schedule for removal but has questions and anxieyt. Past Medical History:   Diagnosis Date    Abnormal Pap smear of cervix     Acid reflux     Arthritis     left knee    Breast cyst     CAD (coronary artery disease)     per [facility] 9/6/13 - Dr. Karlee Barber    COPD (chronic obstructive pulmonary disease) ([facility] [address]     follow with Dr Michelle Stone    Depression     Cyndie Alanis manic - depression see Dr Dax Mason    Diabetes mellitus [facility])     dx 10+ yrs ago- follows with PCP    Drug abuse ([facility] [address]     hx use of cocaine, heroin and marijuana- states last used 12/2014    Glaucoma     bilateral    H/O Doppler lower venous ultrasound 04/04/2019    No DVT or SVT, Significant reflux of RGSV and LGSV. H/O echocardiogram 12/18/2020    EF 55-60%, Mod LVH. Hepatic encephalopathy ([facility] [address] 05/2019    Hepatitis C     for liver bx 12/3/2015\"Have Hepatitis B and C and saw Dr Guilherme Tabor for this 12/1/2015\"    Hiatal hernia     History of alcohol abuse     History of exercise stress test 01/02/2020    Treadmill, Normal exercise performance without angina and ischemic EKG changes.     HTN (hypertension)     \"for the past two yrs on medication\" follows with Dr Karlee Barber    Hx of blood clots 2019    Portal vein thrombsis - TIPS procedure 4/23/19    Hyperlipemia     Irregular heart beat     per pt    Liver hematoma     Migraine     Migraines     Last migraine:  2018    Nausea & vomiting     Neurologic disorder     Schizophrenia [facility])     per old chart    Seizures ([facility] [address]     \"last one was 9/2015- saw Dr Lorenzo Guzmán at [facility]- she said not sure if acutal seizures- she thinks they are panic or anxiety attacks\"    SOB (shortness of breath)     with any exertion    Vulvar mass        Past Surgical History:   Procedure Laterality Date    BREAST SURGERY  10/2015    left breast bx    CARDIAC CATHETERIZATION      per old chart pt had cath done in 3/2011 and 9/2013    CARPAL TUNNEL RELEASE Left 01/09/2020    CARPAL TUNNEL RELEASE LEFT performed by Wilver Borrero DO at [address] Right 05/26/2020    dr Elana Turpin, carpal tunnel trigger finger release    CARPAL TUNNEL RELEASE Right 05/26/2020    RIGHT CARPAL TUNNEL RELEASE performed by Wilver Borrero DO at [address]  per old chart done 1985    COLONOSCOPY  03/11/2013    diverticulosis, cecal polyp    COLONOSCOPY  03/16/2017    Internal hemorrhoids- Dr. Tonie Ricci    COLONOSCOPY N/A 05/17/2022    COLONOSCOPY POLYPECTOMY SNARE/COLD BIOPSY performed by Dorota Wright MD at [address] [place] [address], [address] [place] [address], DIAGNOSTIC  03/16/2017    EGD: Small esophageal varices, portal hypertensive gastropathy, reflux esophagitis, hiatal hernia    EYE SURGERY Bilateral ? when    cyst removal, cataracts w/lens replacement    FINGER TRIGGER RELEASE Right 05/26/2020    FINGER TRIGGER RELEASE RIGHT RING FINGER performed by Wilver Borrero DO at [address] [place] [address] ([facility])      per old chart pt had CHOLO/BSO 1986    SALPINGO-OOPHORECTOMY      TIPS PROCEDURE  04/23/2019    TONSILLECTOMY  as a kid    UPPER GASTROINTESTINAL ENDOSCOPY N/A 11/14/2018    EGD DIAGNOSTIC ONLY performed by Autumn Raymond MD at [address] [place] [address] N/A 06/05/2019    EGD DIAGNOSTIC ONLY performed by Autumn Raymodn MD at [facility] ENDOSCOPY       Social History     Tobacco Use    Smoking status: Former     Packs/day: 1.00     Years: 45.00     Pack years: 45.00     Types: Cigarettes     Start date: 1974    Smokeless tobacco: Never   Vaping Use    Vaping Use: Never used   Substance Use Topics    Alcohol use: Not Currently     Alcohol/week: 2.0 - 3.0 standard drinks     Types: 2 - 3 Shots of liquor per week     Comment: 2-3 shots last 4/2019    Drug use: Yes     Frequency: 3.0 times per week     Types: Marijuana Valdene Andrade)     Comment: daily       Family History   Problem Relation Age of Onset    Cancer Mother         lung ca    Arthritis Mother     Migraines Mother     Cancer Father         colon ca    Diabetes Father     High Blood Pressure Father     Arthritis Father     High Cholesterol Father     Migraines Father     Migraines Sister     Heart Disease Brother         WPW       Current Outpatient Medications   Medication Sig Dispense Refill    nitrofurantoin, macrocrystal-monohydrate, (MACROBID) 100 MG capsule Take 1 capsule by mouth 2 times daily for 7 days 14 capsule 0    mirabegron (MYRBETRIQ) 25 MG TB24 Take 1 tablet by mouth daily 30 tablet 5    solifenacin (VESICARE) 5 MG tablet Take 1 tablet by mouth daily 30 tablet 11    estradiol (ESTRACE VAGINAL) 0.1 MG/GM vaginal cream Place 1 g vaginally three times a week Use 1 g vaginally nightly for 2 weeks, then 1 g nightly 2-3 times per week. 3 each 3    ranolazine (RANEXA) 500 MG extended release tablet Take 1 tablet by mouth in the morning and 1 tablet before bedtime. 180 tablet 1    furosemide (LASIX) 20 MG tablet Take 1 tablet by mouth in the morning. Hold if systolic <957. 90 tablet 1    metoprolol succinate (TOPROL XL) 50 MG extended release tablet Take 1 tablet by mouth in the morning. 90 tablet 1    midodrine (PROAMATINE) 5 MG tablet Take 1 tablet by mouth in the morning and 1 tablet at noon and 1 tablet in the evening. Take with meals.  270 tablet 1    ipratropium-albuterol (DUONEB) 0.5-2.5 (3) MG/3ML SOLN nebulizer solution Inhale 3 mLs into the lungs every 4 hours 120 each 5    nitroGLYCERIN (NITROSTAT) 0.4 MG SL tablet Place 1 tablet under the tongue every 5 minutes as needed for Chest pain 25 tablet 3    rifAXIMin (XIFAXAN) 550 MG tablet Take 1 tablet by mouth 2 times daily 42 tablet 0    sucralfate (CARAFATE) 1 GM tablet Take 1 tablet by mouth 4 times daily 120 tablet 3    ondansetron (ZOFRAN ODT) 4 MG disintegrating tablet Take 1 tablet by mouth every 8 hours as needed for Nausea 15 tablet 0    blood glucose monitor strips Test 3 times a day & as needed for symptoms of irregular blood glucose. Please fill with what is covered my patients insurance. 50 strip 3    insulin glargine (LANTUS SOLOSTAR) 100 UNIT/ML injection pen Inject 15 Units into the skin nightly 5 pen 3    insulin aspart (NOVOLOG FLEXPEN) 100 UNIT/ML injection pen **Low Dose Correction Algorithm**Insulin lispro (HUMALOG)  3 TIMES DAILY WITH MEALSGlucose: Dose: No [id number] 1 [id number] 2 [id number] 3 [id number] 4 [id number] 5 [id number] and above 6 Units 5 pen 3    blood glucose monitor strips Test  Bid times a day & as needed for symptoms of irregular blood glucose. 100 strip 5    Glucosource Lancets MISC Use one 3-4 times to check blood sugar 100 each 5    ipratropium-albuterol (DUONEB) 0.5-2.5 (3) MG/3ML SOLN nebulizer solution Inhale 3 mLs into the lungs every 4 hours 360 mL 0    lactulose (CHRONULAC) 10 GM/15ML solution Take 30 mLs by mouth 3 times daily 1892 mL 2    QUEtiapine (SEROQUEL) 50 MG tablet Take 3 tablets by mouth nightly 60 tablet 3    FLUoxetine (PROZAC) 20 MG capsule Take 20 mg by mouth daily      paliperidone (INVEGA) 3 MG extended release tablet       Compression Stockings MISC by Does not apply route 20 - 30 mmh wear daily and take off at night  Thigh High 2 each 2    UNABLE TO FIND Rx for depends   Use 3-4  times daily 1 box 5    glucose monitoring kit (FREESTYLE) monitoring kit 1 kit by Does not apply route daily 1 kit 0    Blood Glucose Monitoring Suppl (ACURA BLOOD GLUCOSE METER) w/Device KIT Please check blood sugar 3 times daily. Please fill with what is covered by the insurance 1 kit 0    pantoprazole (PROTONIX) 40 MG tablet Take 1 tablet by mouth 2 times daily (before meals) 60 tablet 1    VIREAD 300 MG tablet Take 300 mg by mouth daily        No current facility-administered medications for this visit.        Allergies Allergen Reactions    Norco [Hydrocodone-Acetaminophen] Itching     Itching, rash, nausea and vomiting. Tylenol [Acetaminophen] Itching, Nausea And Vomiting and Rash       No obstetric history on file. Immunization History   Administered Date(s) Administered    COVID-19, PFIZER PURPLE top, DILUTE for use, (age 15 y+), 30mcg/0.3mL 03/23/2021, 04/14/2021, 12/16/2021    Influenza Virus Vaccine 09/10/2018, 10/29/2020    Influenza, AFLURIA (age 1 yrs+), FLUZONE, (age 10 mo+), MDV, 0.5mL 08/22/2017    Influenza, FLUARIX, FLULAVAL, FLUZONE (age 10 mo+) AND AFLURIA, (age 1 y+), PF, 0.5mL 12/19/2019    Pneumococcal Polysaccharide ([id number]) 07/31/2017       Review of Systems    /87   Ht 4' 9.5\" (1.461 m)   Wt 182 lb (82.6 kg)   BMI 38.70 kg/m²     Physical Exam    No results found for this visit on 09/26/22. ASSESSMENT AND PLAN   Diagnosis Orders   1. Vulvar mass          Schedule excision under anesthesia  Return in about 4 weeks (around 10/24/2022).     Anuradha Lopez MD

## 2022-09-26 NOTE — Clinical Note
Vulvar mass, schedule partial vulvectomy at [facility] (preop clearance with Dr. Michael Nichole cardiology and Татьяна Bermeo pulmonology)

## 2022-09-27 NOTE — PROGRESS NOTES
Per Shivani King at [employer], no prior authorization required for [id number]  Will need cardiac and pulmonary clearance, but is ready to schedule

## 2022-09-28 ENCOUNTER — TELEPHONE (OUTPATIENT)
Dept: FAMILY MEDICINE CLINIC | Age: 62
End: 2022-09-28

## 2022-09-28 NOTE — TELEPHONE ENCOUNTER
----- Message from Juan Fairfield sent at 9/28/2022  8:54 AM EDT -----  Subject: Message to Provider    QUESTIONS  Information for Provider? pt. Jinny Barrow called in checking the status   of paperwork completion to get her 's license back  ---------------------------------------------------------------------------  --------------  [address]  [phone]; OK to leave message on voicemail, OK to respond with   electronic message via [employer] portal (only for patients who have   registered [employer] account)  ---------------------------------------------------------------------------  --------------  SCRIPT ANSWERS  Relationship to Patient?  Self

## 2022-09-29 ENCOUNTER — TELEPHONE (OUTPATIENT)
Dept: CARDIOLOGY CLINIC | Age: 62
End: 2022-09-29

## 2022-10-04 ENCOUNTER — HOSPITAL ENCOUNTER (OUTPATIENT)
Dept: ULTRASOUND IMAGING | Age: 62
Discharge: HOME | End: 2022-10-04
Payer: MEDICARE

## 2022-10-04 DIAGNOSIS — K70.30 ALCOHOLIC CIRRHOSIS, UNSPECIFIED WHETHER ASCITES PRESENT (HCC): ICD-10-CM

## 2022-10-04 DIAGNOSIS — K76.82 HEPATIC ENCEPHALOPATHY: ICD-10-CM

## 2022-10-04 DIAGNOSIS — K22.10 ULCER OF ESOPHAGUS WITHOUT BLEEDING: ICD-10-CM

## 2022-10-04 DIAGNOSIS — R18.8 OTHER ASCITES: ICD-10-CM

## 2022-10-04 DIAGNOSIS — R10.13 ABDOMINAL PAIN, EPIGASTRIC: ICD-10-CM

## 2022-10-04 DIAGNOSIS — K21.9 GASTROESOPHAGEAL REFLUX DISEASE, UNSPECIFIED WHETHER ESOPHAGITIS PRESENT: ICD-10-CM

## 2022-10-04 PROCEDURE — 76705 ECHO EXAM OF ABDOMEN: CPT

## 2022-10-12 ENCOUNTER — HOSPITAL ENCOUNTER (OUTPATIENT)
Dept: WOMENS IMAGING | Age: 62
Discharge: HOME | End: 2022-10-12
Payer: MEDICARE

## 2022-10-12 DIAGNOSIS — Z13.820 ENCOUNTER FOR OSTEOPOROSIS SCREENING IN ASYMPTOMATIC POSTMENOPAUSAL PATIENT: ICD-10-CM

## 2022-10-12 DIAGNOSIS — Z78.0 ENCOUNTER FOR OSTEOPOROSIS SCREENING IN ASYMPTOMATIC POSTMENOPAUSAL PATIENT: ICD-10-CM

## 2022-10-12 DIAGNOSIS — Z12.31 SCREENING MAMMOGRAM FOR BREAST CANCER: ICD-10-CM

## 2022-10-12 PROCEDURE — 77063 BREAST TOMOSYNTHESIS BI: CPT

## 2022-10-12 PROCEDURE — 77080 DXA BONE DENSITY AXIAL: CPT

## 2022-10-16 DIAGNOSIS — M85.80 OSTEOPENIA AFTER MENOPAUSE: Primary | ICD-10-CM

## 2022-10-16 DIAGNOSIS — Z78.0 OSTEOPENIA AFTER MENOPAUSE: Primary | ICD-10-CM

## 2022-10-18 NOTE — PROGRESS NOTES
Pt informed and will stop in for labs and discuss osteopenia and lab results at preop appt sched for 10/25/22

## 2022-10-18 NOTE — PROGRESS NOTES
Patient notifed and scheduled for pre-op 10/25/22 and surgery 10/26/22 at 11:00am at [facility] cardiac and pulmonary clearance recieved

## 2022-10-20 ENCOUNTER — NURSE ONLY (OUTPATIENT)
Dept: OBGYN | Age: 62
End: 2022-10-20
Payer: MEDICARE

## 2022-10-20 DIAGNOSIS — Z78.0 OSTEOPENIA AFTER MENOPAUSE: ICD-10-CM

## 2022-10-20 DIAGNOSIS — M85.80 OSTEOPENIA AFTER MENOPAUSE: ICD-10-CM

## 2022-10-20 LAB — VITAMIN D 25-HYDROXY: 25.2 NG/ML

## 2022-10-20 PROCEDURE — 36415 COLL VENOUS BLD VENIPUNCTURE: CPT | Performed by: OBSTETRICS & GYNECOLOGY

## 2022-10-21 LAB
A/G RATIO: 1.3 (ref 1.1–2.2)
ALBUMIN SERPL-MCNC: 3.4 G/DL (ref 3.4–5)
ALP BLD-CCNC: 135 U/L (ref 40–129)
ALT SERPL-CCNC: 14 U/L (ref 10–40)
ANION GAP SERPL CALCULATED.3IONS-SCNC: 10 MMOL/L (ref 3–16)
AST SERPL-CCNC: 18 U/L (ref 15–37)
BILIRUB SERPL-MCNC: 1.3 MG/DL (ref 0–1)
BUN BLDV-MCNC: 13 MG/DL (ref 7–20)
CALCIUM SERPL-MCNC: 8.9 MG/DL (ref 8.3–10.6)
CHLORIDE BLD-SCNC: 103 MMOL/L (ref 99–110)
CO2: 25 MMOL/L (ref 21–32)
CREAT SERPL-MCNC: 1 MG/DL (ref 0.6–1.1)
GFR SERPL CREATININE-BSD FRML MDRD: >60 ML/MIN/{1.73_M2}
GLUCOSE BLD-MCNC: 320 MG/DL (ref 70–99)
POTASSIUM SERPL-SCNC: 4.3 MMOL/L (ref 3.5–5.1)
SODIUM BLD-SCNC: 138 MMOL/L (ref 136–145)
TOTAL PROTEIN: 6.1 G/DL (ref 6.4–8.2)

## 2022-10-24 ENCOUNTER — OFFICE VISIT (OUTPATIENT)
Dept: FAMILY MEDICINE CLINIC | Age: 62
End: 2022-10-24
Payer: MEDICARE

## 2022-10-24 ENCOUNTER — HOSPITAL ENCOUNTER (OUTPATIENT)
Age: 62
Setting detail: SPECIMEN
Discharge: HOME | End: 2022-10-24
Payer: MEDICARE

## 2022-10-24 VITALS
HEART RATE: 75 BPM | WEIGHT: 181 LBS | TEMPERATURE: 98 F | OXYGEN SATURATION: 93 % | HEIGHT: 58 IN | BODY MASS INDEX: 37.99 KG/M2

## 2022-10-24 DIAGNOSIS — J40 BRONCHITIS: Primary | ICD-10-CM

## 2022-10-24 PROCEDURE — U0003 INFECTIOUS AGENT DETECTION BY NUCLEIC ACID (DNA OR RNA); SEVERE ACUTE RESPIRATORY SYNDROME CORONAVIRUS 2 (SARS-COV-2) (CORONAVIRUS DISEASE [COVID-19]), AMPLIFIED PROBE TECHNIQUE, MAKING USE OF HIGH THROUGHPUT TECHNOLOGIES AS DESCRIBED BY CMS-2020-01-R: HCPCS

## 2022-10-24 PROCEDURE — U0005 INFEC AGEN DETEC AMPLI PROBE: HCPCS

## 2022-10-24 PROCEDURE — 99213 OFFICE O/P EST LOW 20 MIN: CPT | Performed by: NURSE PRACTITIONER

## 2022-10-24 RX ORDER — DOXYCYCLINE HYCLATE 100 MG
100 TABLET ORAL 2 TIMES DAILY
Qty: 20 TABLET | Refills: 0 | Status: SHIPPED | OUTPATIENT
Start: 2022-10-24 | End: 2022-11-03

## 2022-10-24 RX ORDER — FUROSEMIDE 20 MG/1
20 TABLET ORAL DAILY
Qty: 90 TABLET | Refills: 1 | Status: SHIPPED | OUTPATIENT
Start: 2022-10-24

## 2022-10-24 RX ORDER — BENZONATATE 100 MG/1
100 CAPSULE ORAL 3 TIMES DAILY PRN
Qty: 20 CAPSULE | Refills: 0 | Status: SHIPPED | OUTPATIENT
Start: 2022-10-24 | End: 2022-11-30

## 2022-10-24 RX ORDER — METHYLPREDNISOLONE 4 MG/1
TABLET ORAL
Qty: 1 KIT | Refills: 0 | Status: SHIPPED | OUTPATIENT
Start: 2022-10-24 | End: 2022-10-30

## 2022-10-24 NOTE — PROGRESS NOTES
10/24/22  Raleigh Bender  1960    FLU/COVID-19 CLINIC EVALUATION    HPI SYMPTOMS:    Employer:    [x] Fevers  [x] Chills  [x] Cough  [] Coughing up blood  [x] Chest Congestion  [x] Nasal Congestion  [x] Feeling short of breath  [] Sometimes  [] Frequently  [x] All the time  [x] Chest pain  [x] Headaches  [x]Tolerable  [] Severe  [] Sore throat  [x] Muscle aches  [] Nausea  [] Vomiting  []Unable to keep fluids down  [] Diarrhea  []Severe    [x] OTHER SYMPTOMS:  Fatigue    Symptom Duration:   [] 1  [] 2   [] 3   [x] 4    [] 5   [] 6   [] 7   [] 8   [] 9   [] 10   [] 11   [] 12   [] 13   [] 14   [] Longer than 14 days    Symptom course:   [x] Worsening     [] Stable     [] Improving    RISK FACTORS:    [] Pregnant or possibly pregnant  [] Age over 61  [x] Diabetes  [x] Heart disease  [x] Asthma  [x] COPD/Other chronic lung diseases  [] Active Cancer  [] On Chemotherapy  [] Taking oral steroids  [] History Lymphoma/Leukemia  [] Close contact with a lab confirmed COVID-19 patient within 14 days of symptom onset  [] History of travel from affected geographical areas within 14 days of symptom onset       VITALS:  There were no vitals filed for this visit. TESTS:    POCT FLU:  [] Positive     []Negative    ASSESSMENT:    [] Flu  [] Possible COVID-19  [] Strep    PLAN:    [] Discharge home with written instructions for:  [] Flu management  [] Possible COVID-19 infection with self-quarantine and management of symptoms  [] Follow-up with primary care physician or emergency department if worsens  [] Evaluation per physician/NP/PA in clinic  [] Sent to ER       An  electronic signature was used to authenticate this note. --Xiomara Hope MA on 10/24/2022 at 10:23 AM    Your COVID 19 test can take 1-5 days for the results to come back. We ask that you make a Mychart page and view your test results this way. You are encouraged to Self quarantine until you know your results.   If positive, please work on contact tracing. Increase fluids and rest  Saline nasal spray as needed for nasal congestion  Warm salt water gargles as needed for throat discomfort  Monitor temperature twice a day  Tylenol as needed for fevers and/or discomfort. Big deep breaths periodically throughout the day  Regular Mucinex over the counter as needed for chest congestion  If symptoms worsen -Go to the ER. Follow up with your primary care provider      To Whom it May Concern:    Woody Martínez was tested for COVID-19 10/24/2022. He/she must stay home until test results are back. If test is negative, ok to return to work/school. If test is positive, isolate for a total of 5 days, starting from day 1 of symptom onset. After 5 days, if fever-free for 24 hours and there has been a gradual improvement in symptoms, may come out of isolation, but must consistently wear a mask when around other people for 5 additional days. (5 days isolation, 5 days mask-wearing). We do not recommend retesting as patients may continue to test positive for months even though no longer contagious. It is suggested you call [address] [facility] or [address] [place] [address] with any questions regarding isolation timeframe/return to work/school details.

## 2022-10-24 NOTE — PROGRESS NOTES
10/24/2022    HPI:  Chief complaint and history of present illness as per medical assistant/nurse documented today in the Flu/COVID-19 clinic. Patient is here with complaints of fever/chills, - resolved cough, chest/nasal congestion, SOB, chest tightness with cough, headache, muscle aches, and fatigue x Thursday. Patient states she has had her covid vaccine. MEDICATIONS:  Prior to Visit Medications    Medication Sig Taking? Authorizing Provider   methylPREDNISolone (MEDROL, SERGE,) 4 MG tablet Take by mouth. Yes ERLINDA Foster CNP   benzonatate (TESSALON PERLES) 100 MG capsule Take 1 capsule by mouth 3 times daily as needed for Cough Yes ERLINDA Foster CNP   doxycycline hyclate (VIBRA-TABS) 100 MG tablet Take 1 tablet by mouth 2 times daily for 10 days Yes ERLINDA Foster CNP   mirabegron (MYRBETRIQ) 25 MG TB24 Take 1 tablet by mouth daily Yes Treva Barrett MD   solifenacin (VESICARE) 5 MG tablet Take 1 tablet by mouth daily Yes Treva Barrett MD   estradiol (ESTRACE VAGINAL) 0.1 MG/GM vaginal cream Place 1 g vaginally three times a week Use 1 g vaginally nightly for 2 weeks, then 1 g nightly 2-3 times per week. Yes Treva Barrett MD   ranolazine (RANEXA) 500 MG extended release tablet Take 1 tablet by mouth in the morning and 1 tablet before bedtime. Yes Jeanne Wiseman MD   furosemide (LASIX) 20 MG tablet Take 1 tablet by mouth in the morning. Hold if systolic <502. Yes Jeanne Wiseman MD   metoprolol succinate (TOPROL XL) 50 MG extended release tablet Take 1 tablet by mouth in the morning. Yes Jeanne Wiseman MD   midodrine (PROAMATINE) 5 MG tablet Take 1 tablet by mouth in the morning and 1 tablet at noon and 1 tablet in the evening. Take with meals.  Yes Jeanne Wiseman MD   ipratropium-albuterol (DUONEB) 0.5-2.5 (3) MG/3ML SOLN nebulizer solution Inhale 3 mLs into the lungs every 4 hours Yes Alexa Johnson MD   nitroGLYCERIN (NITROSTAT) 0.4 MG SL tablet Place 1 tablet under the tongue every 5 minutes as needed for Chest pain Yes Vanessa Peacock MD   rifAXIMin (XIFAXAN) 550 MG tablet Take 1 tablet by mouth 2 times daily Yes Vanessa Peacock MD   sucralfate (CARAFATE) 1 GM tablet Take 1 tablet by mouth 4 times daily Yes Suzette Jacobsen MD   ondansetron (ZOFRAN ODT) 4 MG disintegrating tablet Take 1 tablet by mouth every 8 hours as needed for Nausea Yes Suzette Jacobsen MD   blood glucose monitor strips Test 3 times a day & as needed for symptoms of irregular blood glucose. Please fill with what is covered my patients insurance. Yes Mariza Mitchell MD   insulin glargine (LANTUS SOLOSTAR) 100 UNIT/ML injection pen Inject 15 Units into the skin nightly Yes Mariza Mitchell MD   insulin aspart (NOVOLOG FLEXPEN) 100 UNIT/ML injection pen **Low Dose Correction Algorithm**Insulin lispro (HUMALOG)  3 TIMES DAILY WITH MEALSGlucose: Dose: No [id number] 1 [id number] 2 [id number] 3 [id number] 4 [id number] 5 [id number] and above 6 Units Yes Mariza Mitchell MD   blood glucose monitor strips Test  Bid times a day & as needed for symptoms of irregular blood glucose.  Yes Mariza Mitchell MD   Glucosource Lancets MISC Use one 3-4 times to check blood sugar Yes Mariza Mitchell MD   ipratropium-albuterol (DUONEB) 0.5-2.5 (3) MG/3ML SOLN nebulizer solution Inhale 3 mLs into the lungs every 4 hours Yes Kemi Heritage, DO   lactulose (CHRONULAC) 10 GM/15ML solution Take 30 mLs by mouth 3 times daily Yes Tyree Strickland MD   QUEtiapine (SEROQUEL) 50 MG tablet Take 3 tablets by mouth nightly Yes Tyree Strickland MD   FLUoxetine (PROZAC) 20 MG capsule Take 20 mg by mouth daily Yes Historical Provider, MD   paliperidone (INVEGA) 3 MG extended release tablet  Yes Historical Provider, MD   Compression Stockings MISC by Does not apply route 20 - 30 mmh wear daily and take off at night  Thigh High Yes Kiera Henriquez APRN - CNP   UNABLE TO FIND Rx for depends   Use 3-4  times daily Yes Katt RICARDO MD Juan   glucose monitoring kit (FREESTYLE) monitoring kit 1 kit by Does not apply route daily Yes Cayden Lizarraga MD   Blood Glucose Monitoring Suppl (ACURA BLOOD GLUCOSE METER) w/Device KIT Please check blood sugar 3 times daily. Please fill with what is covered by the insurance Yes Cayden Lizarraga MD   pantoprazole (PROTONIX) 40 MG tablet Take 1 tablet by mouth 2 times daily (before meals) Yes Marie Novak MD   VIREAD 300 MG tablet Take 300 mg by mouth daily  Yes Historical Provider, MD       Allergies   Allergen Reactions    Norco [Hydrocodone-Acetaminophen] Itching     Itching, rash, nausea and vomiting. Tylenol [Acetaminophen] Itching, Nausea And Vomiting and Rash   ,   Past Medical History:   Diagnosis Date    Abnormal Pap smear of cervix     Acid reflux     Arthritis     left knee    Breast cyst     CAD (coronary artery disease)     per [facility] 9/6[facility] - Dr. Robert Clement    COPD (chronic obstructive pulmonary disease) ([facility] [address]     follow with Dr Cole Avalos    Depression     Stephen Kumar manic - depression see Dr Cal Cartwright    Diabetes mellitus [facility])     dx 10+ yrs ago- follows with PCP    Drug abuse ([facility] [address]     hx use of cocaine, heroin and marijuana- states last used 12/2014    Glaucoma     bilateral    H/O Doppler lower venous ultrasound 04/04/2019    No DVT or SVT, Significant reflux of RGSV and LGSV. H/O echocardiogram 12/18/2020    EF 55-60%, Mod LVH. Hepatic encephalopathy 05/2019    Hepatitis C     for liver bx 12/3/2015\"Have Hepatitis B and C and saw Dr Tawana Rinne for this 12/1/2015\"    Hiatal hernia     History of alcohol abuse     History of exercise stress test 01/02/2020    Treadmill, Normal exercise performance without angina and ischemic EKG changes.     HTN (hypertension)     \"for the past two yrs on medication\" follows with Dr Robert Clement    Hx of blood clots 2019    Portal vein thrombsis - TIPS procedure 4/23/19    Hyperlipemia     Irregular heart beat     per pt    Liver hematoma     Migraine Migraines     Last migraine:  2018    Nausea & vomiting     Neurologic disorder     Schizophrenia [facility])     per old chart    Seizures ([address]     \"last one was 9/2015- saw Dr Orin Palacio at [facility]- she said not sure if acutal seizures- she thinks they are panic or anxiety attacks\"    SOB (shortness of breath)     with any exertion    Vulvar mass    ,   Past Surgical History:   Procedure Laterality Date    BREAST SURGERY  10/2015    left breast bx    CARDIAC CATHETERIZATION      per old chart pt had cath done in 3/2011 and 9/2013    CARPAL TUNNEL RELEASE Left 01/09/2020    CARPAL TUNNEL RELEASE LEFT performed by Odette Lemus DO at . [address] Right 05/26/2020     [facility], carpal tunnel trigger finger release    CARPAL TUNNEL RELEASE Right 05/26/2020    RIGHT CARPAL TUNNEL RELEASE performed by Odette Lemus DO at  [address]  per old chart done 1985    COLONOSCOPY  03/11/2013    diverticulosis, cecal polyp    COLONOSCOPY  03/16/2017    Internal hemorrhoids- Dr. Shruthi Green    COLONOSCOPY N/A 05/17/2022    COLONOSCOPY POLYPECTOMY SNARE/COLD BIOPSY performed by Lisa Mcdaniels MD at [address] [place] [address], [address] [place] [address], DIAGNOSTIC  03/16/2017    EGD: Small esophageal varices, portal hypertensive gastropathy, reflux esophagitis, hiatal hernia    EYE SURGERY Bilateral ? when    cyst removal, cataracts w/lens replacement    FINGER TRIGGER RELEASE Right 05/26/2020    FINGER TRIGGER RELEASE RIGHT RING FINGER performed by Odette Lemus DO at [facility] ([address] [place] [address])      per old chart pt had CHOLO/BSO 1986    SALPINGO-OOPHORECTOMY      TIPS PROCEDURE  04/23/2019    TONSILLECTOMY  as a kid    UPPER GASTROINTESTINAL ENDOSCOPY N/A 11/14/2018    EGD DIAGNOSTIC ONLY performed by Ivonne Mccall MD at [address] [facility] 06/05/2019    EGD DIAGNOSTIC ONLY performed by Ivonne Mccall MD at [address] [facility] ENDOSCOPY   ,   Social History     Tobacco Use Smoking status: Former     Packs/day: 0.50     Years: 45.00     Pack years: 22.50     Types: Cigarettes     Start date: 1974     Passive exposure: Past    Smokeless tobacco: Never   Vaping Use    Vaping Use: Never used   Substance Use Topics    Alcohol use: Not Currently     Alcohol/week: 2.0 - 3.0 standard drinks     Types: 2 - 3 Shots of liquor per week     Comment: 2-3 shots last 4/2019    Drug use: Yes     Frequency: 3.0 times per week     Types: Marijuana Lobo Luis     Comment: daily   ,   Family History   Problem Relation Age of Onset    Ovarian Cancer Mother     Cancer Mother         lung ca    Arthritis Mother     Migraines Mother     Cancer Father         colon ca    Diabetes Father     High Blood Pressure Father     Arthritis Father     High Cholesterol Father     Migraines Father     Migraines Sister     Heart Disease Brother         WPW    Breast Cancer Maternal Aunt     Breast Cancer Maternal Aunt     Breast Cancer Maternal Aunt     Breast Cancer Maternal Aunt    ,   Immunization History   Administered Date(s) Administered    COVID-19, PFIZER PURPLE top, DILUTE for use, (age 15 y+), 30mcg/0.3mL 03/23/2021, 04/14/2021, 12/16/2021    Influenza Virus Vaccine 09/10/2018, 10/29/2020    Influenza, AFLURIA (age 1 yrs+), FLUZONE, (age 10 mo+), MDV, 0.5mL 08/22/2017    Influenza, FLUARIX, FLULAVAL, FLUZONE (age 10 mo+) AND AFLURIA, (age 1 y+), PF, 0.5mL 12/19/2019    Pneumococcal Polysaccharide ([id number]) 07/31/2017   ,   Health Maintenance   Topic Date Due    Hepatitis A vaccine (1 of 2 - Risk 2-dose series) Never done    Diabetic retinal exam  Never done    Hepatitis B vaccine (1 of 3 - Risk 3-dose series) Never done    DTaP/Tdap/Td vaccine (1 - Tdap) Never done    Shingles vaccine (1 of 2) Never done    Pneumococcal 0-64 years Vaccine (2 - PCV) 07/31/2018    Diabetic foot exam  06/04/2019    Lipids  11/27/2020    Diabetic microalbuminuria test  01/30/2021    COVID-19 Vaccine (4 - Booster for Wilson Peter series) 02/10/2022    Flu vaccine (1) 08/01/2022    A1C test (Diabetic or Prediabetic)  11/08/2022    Depression Monitoring  08/16/2023    Annual Wellness Visit (AWV)  08/17/2023    Breast cancer screen  10/12/2024    Colorectal Cancer Screen  05/17/2032    HIV screen  Completed    Hib vaccine  Aged Out    Meningococcal (ACWY) vaccine  Aged Out       PHYSICAL EXAM:  Physical Exam  Constitutional:       Appearance: Normal appearance. HENT:      Head: Normocephalic. Right Ear: Tympanic membrane, ear canal and external ear normal.      Left Ear: Tympanic membrane, ear canal and external ear normal.      Nose: Nose normal.      Mouth/Throat:      Lips: Pink. Mouth: Mucous membranes are moist.      Pharynx: Oropharynx is clear. Cardiovascular:      Rate and Rhythm: Normal rate and regular rhythm. Heart sounds: Normal heart sounds. Pulmonary:      Effort: Pulmonary effort is normal.      Breath sounds: Rhonchi (scattered) present. Musculoskeletal:      Cervical back: Neck supple. Skin:     General: Skin is warm and dry. Neurological:      Mental Status: She is alert and oriented to person, place, and time. Psychiatric:         Mood and Affect: Mood normal.         Behavior: Behavior normal.       ASSESSMENT/PLAN:  1. Bronchitis  Discussed with patient the medrol dose pack can increase her blood sugars. Advised patient if her blood sugars increase to call PCP. Your COVID 19 test can take 1-5 days for the results to come back. We ask that you make a Mychart page and view your test results this way. You are encouraged to Self quarantine until you know your results. If positive, please work on contact tracing. Increase fluids and rest  Saline nasal spray as needed for nasal congestion  Warm salt water gargles as needed for throat discomfort  Monitor temperature twice a day  Tylenol as needed for fevers and/or discomfort.    Big deep breaths periodically throughout the day  Regular Mucinex over the counter as needed for chest congestion  If symptoms worsen -Go to the ER. Follow up with your primary care provider   methylPREDNISolone (MEDROL, SERGE,) 4 MG tablet; Take by mouth. Dispense: 1 kit; Refill: 0  - benzonatate (TESSALON PERLES) 100 MG capsule; Take 1 capsule by mouth 3 times daily as needed for Cough  Dispense: 20 capsule; Refill: 0  - COVID-19  - doxycycline hyclate (VIBRA-TABS) 100 MG tablet; Take 1 tablet by mouth 2 times daily for 10 days  Dispense: 20 tablet; Refill: 0      FOLLOW-UP:  Return if symptoms worsen or fail to improve.     In addition to other information, the printed after visit summary provided to the patient includes:  [x] COVID-19 Self care instructions  [x] COVID-19 General patient information

## 2022-10-24 NOTE — PATIENT INSTRUCTIONS
Your COVID 19 test can take 1-5 days for the results to come back. We ask that you make a Mychart page and view your test results this way. You are encouraged to Self quarantine until you know your results. If positive, please work on contact tracing. Increase fluids and rest  Saline nasal spray as needed for nasal congestion  Warm salt water gargles as needed for throat discomfort  Monitor temperature twice a day  Tylenol as needed for fevers and/or discomfort. Big deep breaths periodically throughout the day  Regular Mucinex over the counter as needed for chest congestion  If symptoms worsen -Go to the ER. Follow up with your primary care provider      To Whom it May Concern:    Excell Gilford was tested for COVID-19 10/24/2022. He/she must stay home until test results are back. If test is negative, ok to return to work/school. If test is positive, isolate for a total of 5 days, starting from day 1 of symptom onset. After 5 days, if fever-free for 24 hours and there has been a gradual improvement in symptoms, may come out of isolation, but must consistently wear a mask when around other people for 5 additional days. (5 days isolation, 5 days mask-wearing). We do not recommend retesting as patients may continue to test positive for months even though no longer contagious. It is suggested you call [email or web address] or [address] with any questions regarding isolation timeframe/return to work/school details.

## 2022-10-25 LAB
SARS-COV-2: NOT DETECTED
SOURCE: NORMAL

## 2022-10-31 ENCOUNTER — TELEPHONE (OUTPATIENT)
Dept: FAMILY MEDICINE CLINIC | Age: 62
End: 2022-10-31

## 2022-10-31 RX ORDER — AMOXICILLIN AND CLAVULANATE POTASSIUM 875; 125 MG/1; MG/1
1 TABLET, FILM COATED ORAL 2 TIMES DAILY
Qty: 20 TABLET | Refills: 0 | Status: ON HOLD | OUTPATIENT
Start: 2022-10-31 | End: 2022-11-09 | Stop reason: ALTCHOICE

## 2022-10-31 NOTE — TELEPHONE ENCOUNTER
[facility]  given a Aj Marleny, stated that she is still not feeling well, stated that she still has a cough, with chest congestion. Along with a fever, and sinus congestion. Would like to have something called in .

## 2022-11-01 DIAGNOSIS — E11.8 TYPE 2 DIABETES MELLITUS WITH COMPLICATION, WITH LONG-TERM CURRENT USE OF INSULIN (HCC): ICD-10-CM

## 2022-11-01 DIAGNOSIS — Z79.4 TYPE 2 DIABETES MELLITUS WITH COMPLICATION, WITH LONG-TERM CURRENT USE OF INSULIN (HCC): ICD-10-CM

## 2022-11-01 RX ORDER — INSULIN ASPART 100 [IU]/ML
INJECTION, SOLUTION INTRAVENOUS; SUBCUTANEOUS
Qty: 15 ADJUSTABLE DOSE PRE-FILLED PEN SYRINGE | Refills: 5 | Status: SHIPPED | OUTPATIENT
Start: 2022-11-01

## 2022-11-01 RX ORDER — INSULIN GLARGINE 100 [IU]/ML
15 INJECTION, SOLUTION SUBCUTANEOUS NIGHTLY
Qty: 15 ADJUSTABLE DOSE PRE-FILLED PEN SYRINGE | Refills: 5 | Status: SHIPPED | OUTPATIENT
Start: 2022-11-01 | End: 2022-11-11 | Stop reason: SDUPTHER

## 2022-11-02 NOTE — PROGRESS NOTES
.Surgery @ [facility] on 11/9/22 you will be called 11/8/22 with times               1. Do not eat or drink anything after midnight - unless instructed by your doctor prior to surgery. This includes                   no water, chewing gum or mints. 2. Follow your directions as prescribed by the doctor for your procedure and medications. Take Metoprolol, Midodrine, Pantoprazole and                  Carafate morning of surgery with sips of water. Take a breathing treatment morning of surgery. 3. Check with your Doctor regarding stopping vitamins, supplements, blood thinners and follow their instructions. Stop vitamins, supplements and NSAIDS: 11/2/22   4. Do not smoke, vape or use chewing tobacco morning of surgery. Do not drink any alcoholic beverages 24 hours prior to surgery. This includes NA Beer. No street drugs 7 days prior to surgery. 5. You may brush your teeth and gargle the morning of surgery. DO NOT SWALLOW WATER   6. You MUST make arrangements for a responsible adult to take you home after your surgery and be able to check on you every couple                   hours for the day. You will not be allowed to leave alone or drive yourself home. It is strongly suggested someone stay with you the first 24                   hrs. Your surgery will be cancelled if you do not have a ride home. 7. Please wear simple, loose fitting clothing to the hospital.  Dequan Mai not bring valuables (money, credit cards, checkbooks, etc.) Do not wear any                   makeup (including no eye makeup) or nail polish on your fingers or toes. 8. DO NOT wear any jewelry or piercings on day of surgery. All body piercing jewelry must be removed. 9. If you have dentures, they will be removed before going to the OR; we will provide you a container. If you wear contact lenses or glasses,                  they will be removed; please bring a case for them.            10. If you  have a Living Will and Durable Power of  for Healthcare, please bring in a copy. 11. Please bring picture ID,  insurance card, paperwork from the doctors office    (H & P, Consent, & card for implantable devices). 12. Take a shower the morning of your procedure with Hibiclens or an anti-bacterial soap. Do not apply any make-up, deodorant, lotion, oil or powder. 15.  Enter thru the main entrance on the day of surgery.

## 2022-11-03 ENCOUNTER — TELEPHONE (OUTPATIENT)
Dept: FAMILY MEDICINE CLINIC | Age: 62
End: 2022-11-03

## 2022-11-03 NOTE — TELEPHONE ENCOUNTER
Patient called and stated that her insurance would not pay for the Lantus medication. She also stated that she needed a new Blood Glucose machine. Pen needles that screw on to the pen and strips. I was not sure if these would be included with the new machine. She has a One Touch machine and uses [facility] on [address]. Please advise.   Thank you,

## 2022-11-07 ENCOUNTER — ANESTHESIA EVENT (OUTPATIENT)
Dept: OPERATING ROOM | Age: 62
End: 2022-11-07
Payer: MEDICARE

## 2022-11-07 ENCOUNTER — OFFICE VISIT (OUTPATIENT)
Dept: OBGYN | Age: 62
End: 2022-11-07
Payer: MEDICARE

## 2022-11-07 VITALS — WEIGHT: 177 LBS | DIASTOLIC BLOOD PRESSURE: 71 MMHG | SYSTOLIC BLOOD PRESSURE: 105 MMHG | BODY MASS INDEX: 38.3 KG/M2

## 2022-11-07 DIAGNOSIS — Z78.0 OSTEOPENIA AFTER MENOPAUSE: Primary | ICD-10-CM

## 2022-11-07 DIAGNOSIS — N90.89 VULVAR MASS: ICD-10-CM

## 2022-11-07 DIAGNOSIS — M85.80 OSTEOPENIA AFTER MENOPAUSE: Primary | ICD-10-CM

## 2022-11-07 PROCEDURE — 3078F DIAST BP <80 MM HG: CPT | Performed by: OBSTETRICS & GYNECOLOGY

## 2022-11-07 PROCEDURE — 99214 OFFICE O/P EST MOD 30 MIN: CPT | Performed by: OBSTETRICS & GYNECOLOGY

## 2022-11-07 PROCEDURE — 3074F SYST BP LT 130 MM HG: CPT | Performed by: OBSTETRICS & GYNECOLOGY

## 2022-11-07 RX ORDER — RALOXIFENE HYDROCHLORIDE 60 MG/1
60 TABLET, FILM COATED ORAL DAILY
Qty: 90 TABLET | Refills: 3 | Status: SHIPPED | OUTPATIENT
Start: 2022-11-07

## 2022-11-07 ASSESSMENT — LIFESTYLE VARIABLES: SMOKING_STATUS: 1

## 2022-11-07 ASSESSMENT — ENCOUNTER SYMPTOMS: SHORTNESS OF BREATH: 1

## 2022-11-07 NOTE — ANESTHESIA PRE PROCEDURE
Department of Anesthesiology  Preprocedure Note       Name:  Elida Potter   Age:  63 y.o.  :  1960                                          MRN:  [id number]         Date:  2022      Surgeon: Carmen Fuchs):  Ana Horan MD    Procedure: Procedure(s):  PARTIAL VULVECTOMY    Medications prior to admission:   Prior to Admission medications    Medication Sig Start Date End Date Taking? Authorizing Provider   insulin aspart (NOVOLOG FLEXPEN) 100 UNIT/ML injection pen **Low Dose Correction Algorithm**Insulin lispro (HUMALOG)  3 TIMES DAILY WITH MEALSGlucose: Dose: No [id number] 1 [id number] 2 [id number] 3 [id number] 4 Vhnxk764-359 5 Zrzvp195 and above 6 Units 22   Mario Hurley MD   insulin glargine (LANTUS SOLOSTAR) 100 UNIT/ML injection pen Inject 15 Units into the skin nightly 22   Mario Hurley MD   amoxicillin-clavulanate (AUGMENTIN) 875-125 MG per tablet Take 1 tablet by mouth 2 times daily for 10 days 10/31/22 11/10/22  Mario Hurley MD   furosemide (LASIX) 20 MG tablet Take 1 tablet by mouth daily Hold if systolic <112 63/10/69   Sharad Romo MD   benzonatate (TESSALON PERLES) 100 MG capsule Take 1 capsule by mouth 3 times daily as needed for Cough 10/24/22   Thomas Bryant APRN - CNP   mirabegron (MYRBETRIQ) 25 MG TB24 Take 1 tablet by mouth daily 22   Ana Horan MD   solifenacin (VESICARE) 5 MG tablet Take 1 tablet by mouth daily 22   Ana Horan MD   estradiol (ESTRACE VAGINAL) 0.1 MG/GM vaginal cream Place 1 g vaginally three times a week Use 1 g vaginally nightly for 2 weeks, then 1 g nightly 2-3 times per week. 22   Rj Somers MD   ranolazine (RANEXA) 500 MG extended release tablet Take 1 tablet by mouth in the morning and 1 tablet before bedtime.  22   Sharad Romo MD   metoprolol succinate (TOPROL XL) 50 MG extended release tablet Take 1 tablet by mouth in the morning. 22   Mark Cartwright MD Sachi   midodrine (PROAMATINE) 5 MG tablet Take 1 tablet by mouth in the morning and 1 tablet at noon and 1 tablet in the evening. Take with meals. 7/21/22   Leticia Mitchell MD   ipratropium-albuterol (DUONEB) 0.5-2.5 (3) MG/3ML SOLN nebulizer solution Inhale 3 mLs into the lungs every 4 hours 6/13/22   Cris Torres MD   nitroGLYCERIN (NITROSTAT) 0.4 MG SL tablet Place 1 tablet under the tongue every 5 minutes as needed for Chest pain 5/18/22   Leticia Mitchell MD   rifAXIMin Mindi Severin) 550 MG tablet Take 1 tablet by mouth 2 times daily 5/18/22   Leticia Mitchell MD   sucralfate (CARAFATE) 1 GM tablet Take 1 tablet by mouth 4 times daily 1/31/22   Tara Adame MD   ondansetron (ZOFRAN ODT) 4 MG disintegrating tablet Take 1 tablet by mouth every 8 hours as needed for Nausea 1/31/22   Tara Adame MD   blood glucose monitor strips Test 3 times a day & as needed for symptoms of irregular blood glucose. Please fill with what is covered my patients insurance. 1/17/22   Altagracia Serrano MD   blood glucose monitor strips Test  Bid times a day & as needed for symptoms of irregular blood glucose.  1/13/22   Altagracia Serrano MD   Glucosource Lancets MISC Use one 3-4 times to check blood sugar 2/10/21   Altagracia Serrano MD   ipratropium-albuterol (DUONEB) 0.5-2.5 (3) MG/3ML SOLN nebulizer solution Inhale 3 mLs into the lungs every 4 hours 11/23/20   Gold Menjivar,    lactulose [facility]) 10 GM/15ML solution Take 30 mLs by mouth 3 times daily 9/15/20   Delma Canavan, MD   QUEtiapine (SEROQUEL) 50 MG tablet Take 3 tablets by mouth nightly 9/15/20   Delma Canavan, MD   FLUoxetine (PROZAC) 20 MG capsule Take 20 mg by mouth daily 9/11/20   Historical Provider, MD   paliperidone (INVEGA) 3 MG extended release tablet  8/13/20   Historical Provider, MD   Compression Stockings MISC by Does not apply route 20 - 30 mmh wear daily and take off at night  Thigh High 5/21/20   ERLINDA Yepez - CNP   UNABLE TO FIND Rx for depends   Use 3-4  times daily 4/10/20   Mario Hurley MD   glucose monitoring kit (FREESTYLE) monitoring kit 1 kit by Does not apply route daily 1/30/20   Mario Hurley MD   Blood Glucose Monitoring [facility] BLOOD GLUCOSE METER) w/Device KIT Please check blood sugar 3 times daily. Please fill with what is covered by the insurance 1/21/20   Mario Hurley MD   pantoprazole (PROTONIX) 40 MG tablet Take 1 tablet by mouth 2 times daily (before meals) 10/27/19   Em Neri MD   VIREAD 300 MG tablet Take 300 mg by mouth daily  11/20/17   Historical Provider, MD       Current medications:    Current Outpatient Medications   Medication Sig Dispense Refill    insulin aspart (NOVOLOG FLEXPEN) 100 UNIT/ML injection pen **Low Dose Correction Algorithm**Insulin lispro (HUMALOG)  3 TIMES DAILY WITH MEALSGlucose: Dose: No [id number] 1 [id number] 2 [id number] 3 Bpjqt957-179 4 Gfxey115-580 5 Xkuoc656 and above 6 Units 15 Adjustable Dose Pre-filled Pen Syringe 5    insulin glargine (LANTUS SOLOSTAR) 100 UNIT/ML injection pen Inject 15 Units into the skin nightly 15 Adjustable Dose Pre-filled Pen Syringe 5    amoxicillin-clavulanate (AUGMENTIN) 875-125 MG per tablet Take 1 tablet by mouth 2 times daily for 10 days 20 tablet 0    furosemide (LASIX) 20 MG tablet Take 1 tablet by mouth daily Hold if systolic <941 90 tablet 1    benzonatate (TESSALON PERLES) 100 MG capsule Take 1 capsule by mouth 3 times daily as needed for Cough 20 capsule 0    mirabegron (MYRBETRIQ) 25 MG TB24 Take 1 tablet by mouth daily 30 tablet 5    solifenacin (VESICARE) 5 MG tablet Take 1 tablet by mouth daily 30 tablet 11    estradiol (ESTRACE VAGINAL) 0.1 MG/GM vaginal cream Place 1 g vaginally three times a week Use 1 g vaginally nightly for 2 weeks, then 1 g nightly 2-3 times per week. 3 each 3    ranolazine (RANEXA) 500 MG extended release tablet Take 1 tablet by mouth in the morning and 1 tablet before bedtime.  [address] [address] tablet 1    metoprolol succinate (TOPROL XL) 50 MG extended release tablet Take 1 tablet by mouth in the morning. 90 tablet 1    midodrine (PROAMATINE) 5 MG tablet Take 1 tablet by mouth in the morning and 1 tablet at noon and 1 tablet in the evening. Take with meals. 270 tablet 1    ipratropium-albuterol (DUONEB) 0.5-2.5 (3) MG/3ML SOLN nebulizer solution Inhale 3 mLs into the lungs every 4 hours 120 each 5    nitroGLYCERIN (NITROSTAT) 0.4 MG SL tablet Place 1 tablet under the tongue every 5 minutes as needed for Chest pain 25 tablet 3    rifAXIMin (XIFAXAN) 550 MG tablet Take 1 tablet by mouth 2 times daily 42 tablet 0    sucralfate (CARAFATE) 1 GM tablet Take 1 tablet by mouth 4 times daily 120 tablet 3    ondansetron (ZOFRAN ODT) 4 MG disintegrating tablet Take 1 tablet by mouth every 8 hours as needed for Nausea 15 tablet 0    blood glucose monitor strips Test 3 times a day & as needed for symptoms of irregular blood glucose. Please fill with what is covered my patients insurance. 50 strip 3    blood glucose monitor strips Test  Bid times a day & as needed for symptoms of irregular blood glucose.  100 strip 5    Glucosource Lancets MISC Use one 3-4 times to check blood sugar 100 each 5    ipratropium-albuterol (DUONEB) 0.5-2.5 (3) MG/3ML SOLN nebulizer solution Inhale 3 mLs into the lungs every 4 hours 360 mL 0    lactulose (CHRONULAC) 10 GM/15ML solution Take 30 mLs by mouth 3 times daily 1892 mL 2    QUEtiapine (SEROQUEL) 50 MG tablet Take 3 tablets by mouth nightly 60 tablet 3    FLUoxetine (PROZAC) 20 MG capsule Take 20 mg by mouth daily      paliperidone (INVEGA) 3 MG extended release tablet       Compression Stockings MISC by Does not apply route 20 - 30 mmh wear daily and take off at night  Thigh High 2 each 2    UNABLE TO FIND Rx for depends   Use 3-4  times daily 1 box 5    glucose monitoring kit (FREESTYLE) monitoring kit 1 kit by Does not apply route daily 1 kit 0    Blood Glucose Monitoring Suppl [facility] BLOOD GLUCOSE METER) w/Device KIT Please check blood sugar 3 times daily. Please fill with what is covered by the insurance 1 kit 0    pantoprazole (PROTONIX) 40 MG tablet Take 1 tablet by mouth 2 times daily (before meals) 60 tablet 1    VIREAD 300 MG tablet Take 300 mg by mouth daily        No current facility-administered medications for this visit. Allergies: Allergies   Allergen Reactions    Norco [Hydrocodone-Acetaminophen] Itching     Itching, rash, nausea and vomiting.      Tylenol [Acetaminophen] Itching, Nausea And Vomiting and Rash       Problem List:    Patient Active Problem List   Diagnosis Code    Left arm pain M79.602    Type 2 diabetes mellitus with complication, with long-term current use of insulin (HCC) E11.8, Z79.4    Chronic obstructive pulmonary disease (HCC) J44.9    Tobacco abuse Z72.0    Angina effort I20.8    Abnormal nuclear cardiac imaging test R93.1    Lung nodule R91.1    Chest pain R07.9    Dyslipidemia E78.5    Pancolitis ([address] K51.00    Hematemesis without nausea J62.2    Alcoholic cirrhosis of liver without ascites (HCC) K70.30    Thrombocytopenia (HCC) K05.4    Periumbilical abdominal pain R10.33    History of colonic polyps Z86.010    Abnormal findings on diagnostic imaging of abdomen R93.5    Nausea R11.0    Gastroesophageal reflux disease without esophagitis K21.9    Mixed hyperlipidemia E78.2    Vitamin D deficiency E55.9    Left carpal tunnel syndrome G56.02    Right carpal tunnel syndrome G56.01    Esophageal hypertension K22.4    Candida esophagitis (HCC) B37.81    Colitis K52.9    Primary hypertension I10    GI bleed K92.2    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    Type 2 diabetes mellitus with complication, with long-term current use of insulin (HCC) E11.8, Z79.4    SOB (shortness of breath) R06.02    Portal vein thrombosis I81    Intractable nausea and vomiting R11.2    Abdominal pain R10.9    Acute GI bleeding K92.2    Chronic hepatitis C without hepatic coma (HCC) B18.2    Delayed gastric emptying K30    Restless legs G25.81    Acute colitis K52.9    Acute encephalopathy G93.40    S/P TIPS (transjugular intrahepatic portosystemic shunt) Z95.828    Cirrhosis of liver without ascites (HCC) K74.60    Acute upper GI bleed K92.2    Acute hepatic encephalopathy K76.82    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    thrombocytopenia D69.59    Hepatic encephalopathy K76.82    Morbidly obese (HCC) E66.01    Non-intractable vomiting with nausea R11.2    Hyperammonemia (HCC) E72.20    Varicose veins of both legs with edema I83.893    Bronchitis J40    Anxiety F41.9    Coronary artery disease involving native heart without angina pectoris I25.10       Past Medical History:        Diagnosis Date    Abnormal Pap smear of cervix     Acid reflux     Arthritis     left knee    Breast cyst     CAD (coronary artery disease)     per [facility] 9/6/13 - Dr. Apurva Perez COPD (chronic obstructive pulmonary disease) ([facility]     follow with Dr Pham Del Valle Depression     \"have manic - depression see Dr Char Paget    Diabetes mellitus [facility])     dx 10+ yrs ago- follows with PCP    Drug abuse ([facility] [address]     hx use of cocaine, heroin and marijuana- states last used 12/2014    Glaucoma     bilateral    H/O Doppler lower venous ultrasound 04/04/2019    No DVT or SVT, Significant reflux of RGSV and LGSV.    H/O echocardiogram 12/18/2020    EF 55-60%, Mod LVH.  Hepatic encephalopathy 05/2019    Hepatitis C     for liver bx 12/3/2015\"Have Hepatitis B and C and saw Dr Georgia Mao for this 12/1/2015\"    Hiatal hernia     History of alcohol abuse     History of exercise stress test 01/02/2020    Treadmill, Normal exercise performance without angina and ischemic EKG changes.     HTN (hypertension)     \"for the past two yrs on medication\" follows with Dr Apurva Perez Hx of blood clots 2019    Portal vein thrombsis - TIPS procedure 4/23/19    Hyperlipemia     Irregular heart beat     per pt    Liver hematoma     Migraine     Migraines     Last migraine:  2018    Nausea & vomiting     Neurologic disorder     Schizophrenia [facility])     per old chart    Seizures ([address]     \"last one was 9/2015- saw Dr Manav Buckner at [facility]- she said not sure if acutal seizures- she thinks they are panic or anxiety attacks\"    SOB (shortness of breath)     with any exertion    Vulvar mass        Past Surgical History:        Procedure Laterality Date    BREAST SURGERY  10/2015    left breast bx    CARDIAC CATHETERIZATION      per old chart pt had cath done in 3/2011 and 9/2013    CARPAL TUNNEL RELEASE Left 01/09/2020    CARPAL TUNNEL RELEASE LEFT performed by Claudia Hager DO at [place] [address] Right 05/26/2020    dr Lyn Grady, carpal tunnel trigger finger release    CARPAL TUNNEL RELEASE Right 05/26/2020    RIGHT CARPAL TUNNEL RELEASE performed by Claudia Hager DO at [id number]  per old chart done [facility]  03/11/2013    diverticulosis, cecal polyp    COLONOSCOPY  03/16/2017    Internal hemorrhoids- Dr. Leandro Chaney COLONOSCOPY N/A 05/17/2022    COLONOSCOPY POLYPECTOMY SNARE/COLD BIOPSY performed by Landry Sauceda MD at [facility], DIAGNOSTIC  03/16/2017    EGD: Small esophageal varices, portal hypertensive gastropathy, reflux esophagitis, hiatal hernia    EYE SURGERY Bilateral ? when    cyst removal, cataracts w/lens replacement    FINGER TRIGGER RELEASE Right 05/26/2020    FINGER TRIGGER RELEASE RIGHT RING FINGER performed by Claudia Hager DO at [address] ([facility])      per old chart pt had CHOLO/BSO 1986    SALPINGO-OOPHORECTOMY      TIPS PROCEDURE  04/23/2019    TONSILLECTOMY  as a kid    UPPER GASTROINTESTINAL ENDOSCOPY N/A 11/14/2018    EGD DIAGNOSTIC ONLY performed by Cyn Talavera MD at [address] [place] [address] ENDOSCOPY N/A 06/05/2019    EGD DIAGNOSTIC ONLY performed by Dylon Hector MD at [facility] ENDOSCOPY       Social History:    Social History     Tobacco Use    Smoking status: Former     Packs/day: 0.50     Years: 45.00     Pack years: 22.50     Types: Cigarettes     Start date: 1974     Passive exposure: Past    Smokeless tobacco: Never   Substance Use Topics    Alcohol use: Not Currently     Alcohol/week: 2.0 - 3.0 standard drinks     Types: 2 - 3 Shots of liquor per week     Comment: 2-3 shots last 4/2019                                Counseling given: Not Answered      Vital Signs (Current): There were no vitals filed for this visit.                                            BP Readings from Last 3 Encounters:   09/26/22 137/87   09/20/22 139/84   08/16/22 122/76       NPO Status:                                                                                 BMI:   Wt Readings from Last 3 Encounters:   10/24/22 181 lb (82.1 kg)   10/17/22 182 lb (82.6 kg)   09/26/22 182 lb (82.6 kg)     There is no height or weight on file to calculate BMI.    CBC:   Lab Results   Component Value Date/Time    WBC 9.5 01/31/2022 12:37 AM    RBC 4.99 01/31/2022 12:37 AM    HGB 16.5 01/31/2022 12:37 AM    HCT 46.7 01/31/2022 12:37 AM    MCV 93.6 01/31/2022 12:37 AM    RDW 13.0 01/31/2022 12:37 AM    PLT 99 01/31/2022 12:37 AM       CMP:   Lab Results   Component Value Date/Time     10/20/2022 01:55 PM    K 4.3 10/20/2022 01:55 PM     10/20/2022 01:55 PM    CO2 25 10/20/2022 01:55 PM    BUN 13 10/20/2022 01:55 PM    CREATININE 1.0 10/20/2022 01:55 PM    GFRAA >60 01/31/2022 12:37 AM    AGRATIO 1.3 10/20/2022 01:55 PM    LABGLOM >60 10/20/2022 01:55 PM    GLUCOSE 320 10/20/2022 01:55 PM    PROT 6.1 10/20/2022 01:55 PM    PROT 7.4 09/26/2012 07:55 AM    CALCIUM 8.9 10/20/2022 01:55 PM    BILITOT 1.3 10/20/2022 01:55 PM    ALKPHOS 135 10/20/2022 01:55 PM    AST 18 10/20/2022 01:55 PM    ALT 14 10/20/2022 01:55 PM motion abnormalities were detected. RVSP is 22 mmHg. No significant valvular abnormalities. No evidence of pericardial effusion. Signature      ------------------------------------------------------------------   Electronically signed by Marychuy Serrano MD (Interpreting   physician) on 12/18/2020 at 05:53 PM    Naren Blas was evaluated from a cardiac standpoint for her surgery or procedure and based on her history, diagnosis, recent cardiac testing, she is considered a low risk candidate for any shirlene-operative cardiac complications.     Patients with known CAD with moderate or high risk should have surgical procedures done where they have access to invasive cardiology services if emergently needed.     Antiplatelet/anticoagulant therapy can be held prior to the surgery or procedure at the discretion of the surgeon to be resumed as soon as possible if held.     Please call with any further questions.     Respectfully,      Rubin Locke  Am/bl     This cardiac clearance is good for 6 months from 9/29/2022 unless new cardiac symptoms arise.        Neuro/Psych:   (+) seizures:, neuromuscular disease:, headaches: migraine headaches, psychiatric history:depression/anxiety              ROS comment: Glaucoma  Schizophrenia  GI/Hepatic/Renal:   (+) hiatal hernia, GERD:, PUD, hepatitis: B and C, liver disease (cirrhosis):, morbid obesity         ROS comment: S/P TIPS. Endo/Other:    (+) DiabetesType II DM, poorly controlled, using insulin, blood dyscrasia: thrombocytopenia, arthritis: OA., .                  ROS comment: Drug abuse (HCC) hx use of cocaine, heroin and marijuana, ETOH abuse Abdominal:   (+) obese,           Vascular:   + DVT, . Other Findings:             Anesthesia Plan      general     ASA 4       Induction: intravenous. MIPS: Postoperative opioids intended and Prophylactic antiemetics administered. Anesthetic plan and risks discussed with patient.       Plan discussed with CRNA.              Pre Anesthesia Evaluation complete. Anesthesia plan, risks, benefits, alternatives, and personal involved discussed with patient. Patients and/or legal guardian verbalized an understanding  and agreed to proceed.   Mony Blas DO  11/9/2022

## 2022-11-07 NOTE — PROGRESS NOTES
11/7/22    Ernesto Laboy  1960    Chief Complaint   Patient presents with    Pre-op Exam     Pt here for pre-op. 11/9/2022 Partial Vulvectomy d/t vulvar masses. Other     Pt wishes to discuss vitamin d deficiency and treatment. Ernesto Laboy is a 63 y.o. female who presents today for evaluation of vuvlar mass and worsening bone density    Past Medical History:   Diagnosis Date    Abnormal Pap smear of cervix     Acid reflux     Arthritis     left knee    Breast cyst     CAD (coronary artery disease)     per [facility] 9/6/13 - Dr. Yoli Kohler    COPD (chronic obstructive pulmonary disease) ([facility] [address]     follow with Dr Kimo Tillman    Depression     Court Pippa manic - depression see Dr Warden Maya    Diabetes mellitus [facility])     dx 10+ yrs ago- follows with PCP    Drug abuse ([facility] [address]     hx use of cocaine, heroin and marijuana- states last used 12/2014    Glaucoma     bilateral    H/O Doppler lower venous ultrasound 04/04/2019    No DVT or SVT, Significant reflux of RGSV and LGSV. H/O echocardiogram 12/18/2020    EF 55-60%, Mod LVH. Hepatic encephalopathy 05/2019    Hepatitis C     for liver bx 12/3/2015\"Have Hepatitis B and C and saw Dr Silvia Scherer for this 12/1/2015\"    Hiatal hernia     History of alcohol abuse     History of exercise stress test 01/02/2020    Treadmill, Normal exercise performance without angina and ischemic EKG changes.     HTN (hypertension)     \"for the past two yrs on medication\" follows with Dr Yoli Kohler    Hx of blood clots 2019    Portal vein thrombsis - TIPS procedure 4/23/19    Hyperlipemia     Irregular heart beat     per pt    Liver hematoma     Migraine     Migraines     Last migraine:  2018    Nausea & vomiting     Neurologic disorder     Schizophrenia [facility])     per old chart    Seizures ([facility] [address]     \"last one was 9/2015- saw Dr Luisa Oviedo at [facility]- she said not sure if acutal seizures- she thinks they are panic or anxiety attacks\"    SOB (shortness of breath)     with any exertion    Vulvar mass Past Surgical History:   Procedure Laterality Date    BREAST SURGERY  10/2015    left breast bx    CARDIAC CATHETERIZATION      per old chart pt had cath done in 3/2011 and 9/2013    CARPAL TUNNEL RELEASE Left 01/09/2020    CARPAL TUNNEL RELEASE LEFT performed by Mario Raymond DO at [address] Right 05/26/2020    dr Pawan Cuenca, carpal tunnel trigger finger release    CARPAL TUNNEL RELEASE Right 05/26/2020    RIGHT CARPAL TUNNEL RELEASE performed by Mario Raymond DO at [address]  per old chart done 1985    COLONOSCOPY  03/11/2013    diverticulosis, cecal polyp    COLONOSCOPY  03/16/2017    Internal hemorrhoids- Dr. Gaston Young    COLONOSCOPY N/A 05/17/2022    COLONOSCOPY POLYPECTOMY SNARE/COLD BIOPSY performed by Sherrie Lemons MD at [address] [place] [address], COLON, DIAGNOSTIC  03/16/2017    EGD: Small esophageal varices, portal hypertensive gastropathy, reflux esophagitis, hiatal hernia    EYE SURGERY Bilateral ? when    cyst removal, cataracts w/lens replacement    FINGER TRIGGER RELEASE Right 05/26/2020    FINGER TRIGGER RELEASE RIGHT RING FINGER performed by Mario Raymond DO at [facility] ([address])      per old chart pt had CHOLO/BSO 1986    SALPINGO-OOPHORECTOMY      TIPS PROCEDURE  04/23/2019    TONSILLECTOMY  as a kid    UPPER GASTROINTESTINAL ENDOSCOPY N/A 11/14/2018    EGD DIAGNOSTIC ONLY performed by Fior Krishnamurthy MD at [address] [place] [address] N/A 06/05/2019    EGD DIAGNOSTIC ONLY performed by Fior Krishnamurthy MD at [facility] ENDOSCOPY       Social History     Tobacco Use    Smoking status: Former     Packs/day: 0.50     Years: 45.00     Pack years: 22.50     Types: Cigarettes     Start date: 1974     Passive exposure: Past    Smokeless tobacco: Never   Vaping Use    Vaping Use: Never used   Substance Use Topics    Alcohol use: Not Currently     Alcohol/week: 2.0 - 3.0 standard drinks     Types: 2 - 3 Shots of liquor per week     Comment: 2-3 shots last 4/2019    Drug use: Yes     Frequency: 3.0 times per week     Types: Marijuana Deadra Sukhdev)     Comment: daily       Family History   Problem Relation Age of Onset    Ovarian Cancer Mother     Cancer Mother         lung ca    Arthritis Mother     Migraines Mother     Cancer Father         colon ca    Diabetes Father     High Blood Pressure Father     Arthritis Father     High Cholesterol Father     Migraines Father     Migraines Sister     Heart Disease Brother         WPW    Breast Cancer Maternal Aunt     Breast Cancer Maternal Aunt     Breast Cancer Maternal Aunt     Breast Cancer Maternal Aunt        Current Outpatient Medications   Medication Sig Dispense Refill    raloxifene (EVISTA) 60 MG tablet Take 1 tablet by mouth daily 90 tablet 3    insulin aspart (NOVOLOG FLEXPEN) 100 UNIT/ML injection pen **Low Dose Correction Algorithm**Insulin lispro (HUMALOG)  3 TIMES DAILY WITH MEALSGlucose: Dose: No [id number] 1 [id number] 2 [id number] 3 [id number] 4 [id number] 5 Qelkx639 and above 6 Units 15 Adjustable Dose Pre-filled Pen Syringe 5    insulin glargine (LANTUS SOLOSTAR) 100 UNIT/ML injection pen Inject 15 Units into the skin nightly 15 Adjustable Dose Pre-filled Pen Syringe 5    amoxicillin-clavulanate (AUGMENTIN) 875-125 MG per tablet Take 1 tablet by mouth 2 times daily for 10 days 20 tablet 0    furosemide (LASIX) 20 MG tablet Take 1 tablet by mouth daily Hold if systolic <424 90 tablet 1    benzonatate (TESSALON PERLES) 100 MG capsule Take 1 capsule by mouth 3 times daily as needed for Cough 20 capsule 0    mirabegron (MYRBETRIQ) 25 MG TB24 Take 1 tablet by mouth daily 30 tablet 5    solifenacin (VESICARE) 5 MG tablet Take 1 tablet by mouth daily 30 tablet 11    estradiol (ESTRACE VAGINAL) 0.1 MG/GM vaginal cream Place 1 g vaginally three times a week Use 1 g vaginally nightly for 2 weeks, then 1 g nightly 2-3 times per week.  3 each 3    ranolazine kit (FREESTYLE) monitoring kit 1 kit by Does not apply route daily 1 kit 0    Blood Glucose Monitoring Suppl (ACURA BLOOD GLUCOSE METER) w/Device KIT Please check blood sugar 3 times daily. Please fill with what is covered by the insurance 1 kit 0    pantoprazole (PROTONIX) 40 MG tablet Take 1 tablet by mouth 2 times daily (before meals) 60 tablet 1    VIREAD 300 MG tablet Take 300 mg by mouth daily        No current facility-administered medications for this visit. Allergies   Allergen Reactions    Norco [Hydrocodone-Acetaminophen] Itching     Itching, rash, nausea and vomiting. Tylenol [Acetaminophen] Itching, Nausea And Vomiting and Rash           Immunization History   Administered Date(s) Administered    COVID-19, PFIZER PURPLE top, DILUTE for use, (age 15 y+), 30mcg/0.3mL 2021, 2021, 2021    Influenza Virus Vaccine 09/10/2018, 10/29/2020    Influenza, AFLURIA (age 1 yrs+), FLUZONE, (age 10 mo+), MDV, 0.5mL 2017    Influenza, FLUARIX, FLULAVAL, FLUZONE (age 10 mo+) AND AFLURIA, (age 1 y+), PF, 0.5mL 2019    Pneumococcal Polysaccharide ([id number]) 2017       Review of Systems    /71   Wt 177 lb (80.3 kg)   BMI 38.30 kg/m²     Physical Exam  See dexa scan   No results found for this visit on 22. ASSESSMENT AND PLAN   Diagnosis Orders   1. Osteopenia after menopause  raloxifene (EVISTA) 60 MG tablet      2. Vulvar mass            Return in about 2 weeks (around 2022). Declines office biopsy due to concern for pain.      Dietary calcium 1200mg daily  Vit d3 2000 IU daily  Weight bearing exercise    Discussed procedure and risks  Ova MD Samantha

## 2022-11-08 ENCOUNTER — TELEPHONE (OUTPATIENT)
Dept: FAMILY MEDICINE CLINIC | Age: 62
End: 2022-11-08

## 2022-11-08 DIAGNOSIS — Z79.4 TYPE 2 DIABETES MELLITUS WITH COMPLICATION, WITH LONG-TERM CURRENT USE OF INSULIN (HCC): Primary | ICD-10-CM

## 2022-11-08 DIAGNOSIS — E11.8 TYPE 2 DIABETES MELLITUS WITH COMPLICATION, WITH LONG-TERM CURRENT USE OF INSULIN (HCC): Primary | ICD-10-CM

## 2022-11-08 RX ORDER — GLUCOSAMINE HCL/CHONDROITIN SU 500-400 MG
CAPSULE ORAL
Qty: 100 STRIP | Refills: 5 | Status: SHIPPED | OUTPATIENT
Start: 2022-11-08

## 2022-11-08 RX ORDER — LANCETS 30 GAUGE
EACH MISCELLANEOUS
Qty: 100 EACH | Refills: 5 | Status: SHIPPED | OUTPATIENT
Start: 2022-11-08

## 2022-11-08 NOTE — TELEPHONE ENCOUNTER
Patient called and stated that she needed a refill on her night time insulin. She stated that her insurance will not pay for Lantus. Please advise. Thank you. Started first trimester

## 2022-11-08 NOTE — PROGRESS NOTES
11/8/22 - Notified patient surgery @ [facility] on 11/9/22 @ [phone], arrival 0915. Understanding verbalized.

## 2022-11-08 NOTE — TELEPHONE ENCOUNTER
Patient was referred to med assist and she is waitng on the application. Patient is going to call her insurance company to see what insulin they will cover.

## 2022-11-09 ENCOUNTER — ANESTHESIA (OUTPATIENT)
Dept: OPERATING ROOM | Age: 62
End: 2022-11-09
Payer: MEDICARE

## 2022-11-09 ENCOUNTER — HOSPITAL ENCOUNTER (OUTPATIENT)
Age: 62
Setting detail: OUTPATIENT SURGERY
Discharge: HOME | End: 2022-11-09
Attending: OBSTETRICS & GYNECOLOGY | Admitting: OBSTETRICS & GYNECOLOGY
Payer: MEDICARE

## 2022-11-09 VITALS
HEART RATE: 77 BPM | HEIGHT: 57 IN | TEMPERATURE: 97.5 F | OXYGEN SATURATION: 94 % | WEIGHT: 176 LBS | RESPIRATION RATE: 16 BRPM | BODY MASS INDEX: 37.97 KG/M2 | SYSTOLIC BLOOD PRESSURE: 106 MMHG | DIASTOLIC BLOOD PRESSURE: 50 MMHG

## 2022-11-09 DIAGNOSIS — G89.18 POST-OP PAIN: Primary | ICD-10-CM

## 2022-11-09 DIAGNOSIS — N90.89 VULVAR MASS: ICD-10-CM

## 2022-11-09 LAB
ANION GAP SERPL CALCULATED.3IONS-SCNC: 8 MMOL/L (ref 4–16)
BASOPHILS ABSOLUTE: 0.1 K/CU MM
BASOPHILS RELATIVE PERCENT: 0.9 % (ref 0–1)
BUN BLDV-MCNC: 16 MG/DL (ref 6–23)
CALCIUM SERPL-MCNC: 8.5 MG/DL (ref 8.3–10.6)
CHLORIDE BLD-SCNC: 104 MMOL/L (ref 99–110)
CO2: 27 MMOL/L (ref 21–32)
CREAT SERPL-MCNC: 1 MG/DL (ref 0.6–1.1)
DIFFERENTIAL TYPE: ABNORMAL
EOSINOPHILS ABSOLUTE: 0.2 K/CU MM
EOSINOPHILS RELATIVE PERCENT: 3.6 % (ref 0–3)
GFR SERPL CREATININE-BSD FRML MDRD: >60 ML/MIN/1.73M2
GLUCOSE BLD-MCNC: 126 MG/DL (ref 70–99)
GLUCOSE BLD-MCNC: 137 MG/DL (ref 70–99)
GLUCOSE BLD-MCNC: 238 MG/DL (ref 70–99)
GLUCOSE BLD-MCNC: 248 MG/DL (ref 70–99)
HCT VFR BLD CALC: 47.6 % (ref 37–47)
HEMOGLOBIN: 16.3 GM/DL (ref 12.5–16)
IMMATURE NEUTROPHIL %: 0.3 % (ref 0–0.43)
LYMPHOCYTES ABSOLUTE: 1.4 K/CU MM
LYMPHOCYTES RELATIVE PERCENT: 22.2 % (ref 24–44)
MCH RBC QN AUTO: 33.4 PG (ref 27–31)
MCHC RBC AUTO-ENTMCNC: 34.2 % (ref 32–36)
MCV RBC AUTO: 97.5 FL (ref 78–100)
MONOCYTES ABSOLUTE: 0.5 K/CU MM
MONOCYTES RELATIVE PERCENT: 7.3 % (ref 0–4)
NUCLEATED RBC %: 0 %
PDW BLD-RTO: 13.7 % (ref 11.7–14.9)
PLATELET # BLD: 94 K/CU MM (ref 140–440)
PMV BLD AUTO: 12.4 FL (ref 7.5–11.1)
POTASSIUM SERPL-SCNC: 4.3 MMOL/L (ref 3.5–5.1)
RBC # BLD: 4.88 M/CU MM (ref 4.2–5.4)
SEGMENTED NEUTROPHILS ABSOLUTE COUNT: 4.2 K/CU MM
SEGMENTED NEUTROPHILS RELATIVE PERCENT: 65.7 % (ref 36–66)
SODIUM BLD-SCNC: 139 MMOL/L (ref 135–145)
TOTAL IMMATURE NEUTOROPHIL: 0.02 K/CU MM
TOTAL NUCLEATED RBC: 0 K/CU MM
WBC # BLD: 6.4 K/CU MM (ref 4–10.5)

## 2022-11-09 PROCEDURE — 7100000000 HC PACU RECOVERY - FIRST 15 MIN: Performed by: OBSTETRICS & GYNECOLOGY

## 2022-11-09 PROCEDURE — 6360000002 HC RX W HCPCS: Performed by: ANESTHESIOLOGY

## 2022-11-09 PROCEDURE — 2580000003 HC RX 258: Performed by: ANESTHESIOLOGY

## 2022-11-09 PROCEDURE — 85025 COMPLETE CBC W/AUTO DIFF WBC: CPT

## 2022-11-09 PROCEDURE — 6360000002 HC RX W HCPCS: Performed by: NURSE ANESTHETIST, CERTIFIED REGISTERED

## 2022-11-09 PROCEDURE — 7100000010 HC PHASE II RECOVERY - FIRST 15 MIN: Performed by: OBSTETRICS & GYNECOLOGY

## 2022-11-09 PROCEDURE — 2709999900 HC NON-CHARGEABLE SUPPLY: Performed by: OBSTETRICS & GYNECOLOGY

## 2022-11-09 PROCEDURE — 6370000000 HC RX 637 (ALT 250 FOR IP): Performed by: ANESTHESIOLOGY

## 2022-11-09 PROCEDURE — 2580000003 HC RX 258: Performed by: OBSTETRICS & GYNECOLOGY

## 2022-11-09 PROCEDURE — 3600000012 HC SURGERY LEVEL 2 ADDTL 15MIN: Performed by: OBSTETRICS & GYNECOLOGY

## 2022-11-09 PROCEDURE — 3700000001 HC ADD 15 MINUTES (ANESTHESIA): Performed by: OBSTETRICS & GYNECOLOGY

## 2022-11-09 PROCEDURE — 6370000000 HC RX 637 (ALT 250 FOR IP): Performed by: OBSTETRICS & GYNECOLOGY

## 2022-11-09 PROCEDURE — 6360000002 HC RX W HCPCS: Performed by: OBSTETRICS & GYNECOLOGY

## 2022-11-09 PROCEDURE — 3600000002 HC SURGERY LEVEL 2 BASE: Performed by: OBSTETRICS & GYNECOLOGY

## 2022-11-09 PROCEDURE — 7100000001 HC PACU RECOVERY - ADDTL 15 MIN: Performed by: OBSTETRICS & GYNECOLOGY

## 2022-11-09 PROCEDURE — 7100000011 HC PHASE II RECOVERY - ADDTL 15 MIN: Performed by: OBSTETRICS & GYNECOLOGY

## 2022-11-09 PROCEDURE — 56605 BIOPSY OF VULVA/PERINEUM: CPT | Performed by: OBSTETRICS & GYNECOLOGY

## 2022-11-09 PROCEDURE — 2500000003 HC RX 250 WO HCPCS: Performed by: OBSTETRICS & GYNECOLOGY

## 2022-11-09 PROCEDURE — 80048 BASIC METABOLIC PNL TOTAL CA: CPT

## 2022-11-09 PROCEDURE — 82962 GLUCOSE BLOOD TEST: CPT

## 2022-11-09 PROCEDURE — 3700000000 HC ANESTHESIA ATTENDED CARE: Performed by: OBSTETRICS & GYNECOLOGY

## 2022-11-09 RX ORDER — MIDAZOLAM HYDROCHLORIDE 2 MG/2ML
2 INJECTION, SOLUTION INTRAMUSCULAR; INTRAVENOUS
Status: DISCONTINUED | OUTPATIENT
Start: 2022-11-09 | End: 2022-11-09 | Stop reason: HOSPADM

## 2022-11-09 RX ORDER — PROCHLORPERAZINE EDISYLATE 5 MG/ML
5 INJECTION INTRAMUSCULAR; INTRAVENOUS
Status: COMPLETED | OUTPATIENT
Start: 2022-11-09 | End: 2022-11-09

## 2022-11-09 RX ORDER — DIPHENHYDRAMINE HYDROCHLORIDE 50 MG/ML
12.5 INJECTION INTRAMUSCULAR; INTRAVENOUS
Status: DISCONTINUED | OUTPATIENT
Start: 2022-11-09 | End: 2022-11-09 | Stop reason: HOSPADM

## 2022-11-09 RX ORDER — ONDANSETRON 2 MG/ML
INJECTION INTRAMUSCULAR; INTRAVENOUS PRN
Status: DISCONTINUED | OUTPATIENT
Start: 2022-11-09 | End: 2022-11-09 | Stop reason: SDUPTHER

## 2022-11-09 RX ORDER — DEXAMETHASONE SODIUM PHOSPHATE 4 MG/ML
INJECTION, SOLUTION INTRA-ARTICULAR; INTRALESIONAL; INTRAMUSCULAR; INTRAVENOUS; SOFT TISSUE PRN
Status: DISCONTINUED | OUTPATIENT
Start: 2022-11-09 | End: 2022-11-09 | Stop reason: SDUPTHER

## 2022-11-09 RX ORDER — SODIUM CHLORIDE, SODIUM LACTATE, POTASSIUM CHLORIDE, CALCIUM CHLORIDE 600; 310; 30; 20 MG/100ML; MG/100ML; MG/100ML; MG/100ML
INJECTION, SOLUTION INTRAVENOUS CONTINUOUS
Status: DISCONTINUED | OUTPATIENT
Start: 2022-11-09 | End: 2022-11-09 | Stop reason: HOSPADM

## 2022-11-09 RX ORDER — FENTANYL CITRATE 50 UG/ML
INJECTION, SOLUTION INTRAMUSCULAR; INTRAVENOUS PRN
Status: DISCONTINUED | OUTPATIENT
Start: 2022-11-09 | End: 2022-11-09 | Stop reason: SDUPTHER

## 2022-11-09 RX ORDER — LABETALOL HYDROCHLORIDE 5 MG/ML
10 INJECTION, SOLUTION INTRAVENOUS
Status: DISCONTINUED | OUTPATIENT
Start: 2022-11-09 | End: 2022-11-09 | Stop reason: HOSPADM

## 2022-11-09 RX ORDER — LIDOCAINE HYDROCHLORIDE 20 MG/ML
INJECTION, SOLUTION INTRAVENOUS PRN
Status: DISCONTINUED | OUTPATIENT
Start: 2022-11-09 | End: 2022-11-09 | Stop reason: SDUPTHER

## 2022-11-09 RX ORDER — BUPIVACAINE HYDROCHLORIDE AND EPINEPHRINE 5; 5 MG/ML; UG/ML
INJECTION, SOLUTION EPIDURAL; INTRACAUDAL; PERINEURAL
Status: COMPLETED | OUTPATIENT
Start: 2022-11-09 | End: 2022-11-09

## 2022-11-09 RX ORDER — TRAMADOL HYDROCHLORIDE 50 MG/1
50 TABLET ORAL EVERY 6 HOURS PRN
Qty: 20 TABLET | Refills: 0 | Status: SHIPPED | OUTPATIENT
Start: 2022-11-09 | End: 2022-11-14

## 2022-11-09 RX ORDER — AMOXICILLIN AND CLAVULANATE POTASSIUM 875; 125 MG/1; MG/1
1 TABLET, FILM COATED ORAL 2 TIMES DAILY WITH MEALS
Qty: 10 TABLET | Refills: 0 | Status: SHIPPED | OUTPATIENT
Start: 2022-11-09 | End: 2022-11-14

## 2022-11-09 RX ORDER — IPRATROPIUM BROMIDE AND ALBUTEROL SULFATE 2.5; .5 MG/3ML; MG/3ML
1 SOLUTION RESPIRATORY (INHALATION)
Status: DISCONTINUED | OUTPATIENT
Start: 2022-11-09 | End: 2022-11-09 | Stop reason: HOSPADM

## 2022-11-09 RX ORDER — OXYCODONE HYDROCHLORIDE 5 MG/1
5 TABLET ORAL
Status: DISCONTINUED | OUTPATIENT
Start: 2022-11-09 | End: 2022-11-09 | Stop reason: HOSPADM

## 2022-11-09 RX ORDER — FENTANYL CITRATE 50 UG/ML
50 INJECTION, SOLUTION INTRAMUSCULAR; INTRAVENOUS EVERY 5 MIN PRN
Status: DISCONTINUED | OUTPATIENT
Start: 2022-11-09 | End: 2022-11-09 | Stop reason: HOSPADM

## 2022-11-09 RX ORDER — HALOPERIDOL 5 MG/ML
1 INJECTION INTRAMUSCULAR
Status: DISCONTINUED | OUTPATIENT
Start: 2022-11-09 | End: 2022-11-09 | Stop reason: HOSPADM

## 2022-11-09 RX ORDER — PROPOFOL 10 MG/ML
INJECTION, EMULSION INTRAVENOUS PRN
Status: DISCONTINUED | OUTPATIENT
Start: 2022-11-09 | End: 2022-11-09 | Stop reason: SDUPTHER

## 2022-11-09 RX ORDER — HYDRALAZINE HYDROCHLORIDE 20 MG/ML
10 INJECTION INTRAMUSCULAR; INTRAVENOUS
Status: DISCONTINUED | OUTPATIENT
Start: 2022-11-09 | End: 2022-11-09 | Stop reason: HOSPADM

## 2022-11-09 RX ORDER — CELECOXIB 200 MG/1
200 CAPSULE ORAL ONCE
Status: COMPLETED | OUTPATIENT
Start: 2022-11-09 | End: 2022-11-09

## 2022-11-09 RX ADMIN — PROCHLORPERAZINE EDISYLATE 5 MG: 5 INJECTION INTRAMUSCULAR; INTRAVENOUS at 13:03

## 2022-11-09 RX ADMIN — DEXAMETHASONE SODIUM PHOSPHATE 4 MG: 4 INJECTION, SOLUTION INTRAMUSCULAR; INTRAVENOUS at 11:51

## 2022-11-09 RX ADMIN — PROPOFOL 150 MG: 10 INJECTION, EMULSION INTRAVENOUS at 11:52

## 2022-11-09 RX ADMIN — INSULIN HUMAN 6 UNITS: 100 INJECTION, SOLUTION PARENTERAL at 10:33

## 2022-11-09 RX ADMIN — SODIUM CHLORIDE, POTASSIUM CHLORIDE, SODIUM LACTATE AND CALCIUM CHLORIDE: 600; 310; 30; 20 INJECTION, SOLUTION INTRAVENOUS at 09:45

## 2022-11-09 RX ADMIN — FENTANYL CITRATE 50 MCG: 50 INJECTION, SOLUTION INTRAMUSCULAR; INTRAVENOUS at 12:03

## 2022-11-09 RX ADMIN — CELECOXIB 200 MG: 200 CAPSULE ORAL at 09:44

## 2022-11-09 RX ADMIN — LIDOCAINE HYDROCHLORIDE 100 MG: 20 INJECTION, SOLUTION INTRAVENOUS at 11:52

## 2022-11-09 RX ADMIN — CEFAZOLIN 2000 MG: 2 INJECTION, POWDER, FOR SOLUTION INTRAMUSCULAR; INTRAVENOUS at 11:41

## 2022-11-09 RX ADMIN — ONDANSETRON 4 MG: 2 INJECTION INTRAMUSCULAR; INTRAVENOUS at 11:51

## 2022-11-09 RX ADMIN — FENTANYL CITRATE 50 MCG: 50 INJECTION, SOLUTION INTRAMUSCULAR; INTRAVENOUS at 11:52

## 2022-11-09 RX ADMIN — PHENYLEPHRINE HYDROCHLORIDE 50 MCG: 10 INJECTION INTRAVENOUS at 11:59

## 2022-11-09 ASSESSMENT — PAIN - FUNCTIONAL ASSESSMENT: PAIN_FUNCTIONAL_ASSESSMENT: 0-10

## 2022-11-09 ASSESSMENT — PAIN SCALES - GENERAL
PAINLEVEL_OUTOF10: 2
PAINLEVEL_OUTOF10: 0
PAINLEVEL_OUTOF10: 0

## 2022-11-09 NOTE — H&P
Mirella Hutchins  1960  Primary Care Physician: Bhavna Sorenson MD    [address]: [address] [facility]    HPI: Mirella Hutchins is a 63 y.o. female  is vulvar pain and found to have  several vulvar masses on examination. She was originally scheduled for office excision on 2023 at which time she declined excision in the office. In addition she declined a punch biopsy to establish a diagnosis for these midline perineal masses. No diagnosis found.    Patient Active Problem List   Diagnosis    Left arm pain    Type 2 diabetes mellitus with complication, with long-term current use of insulin (HCC)    Chronic obstructive pulmonary disease (HCC)    Tobacco abuse    Angina effort    Abnormal nuclear cardiac imaging test    Lung nodule    Chest pain    Dyslipidemia    Pancolitis (HCC)    Hematemesis without nausea    Alcoholic cirrhosis of liver without ascites (HCC)    Thrombocytopenia (HCC)    Periumbilical abdominal pain    History of colonic polyps    Abnormal findings on diagnostic imaging of abdomen    Nausea    Gastroesophageal reflux disease without esophagitis    Mixed hyperlipidemia    Vitamin D deficiency    Left carpal tunnel syndrome    Right carpal tunnel syndrome    Esophageal hypertension    Candida esophagitis (HCC)    Colitis    Primary hypertension    GI bleed    Coronary-myocardial bridge    Type 2 diabetes mellitus with complication, with long-term current use of insulin (HCC)    SOB (shortness of breath)    Portal vein thrombosis    Intractable nausea and vomiting    Abdominal pain    Acute GI bleeding    Chronic hepatitis C without hepatic coma (HCC)    Delayed gastric emptying    Restless legs    Acute colitis    Acute encephalopathy    S/P TIPS (transjugular intrahepatic portosystemic shunt)    Cirrhosis of liver without ascites ([facility] [address]    Acute upper GI bleed    Acute hepatic encephalopathy    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    thrombocytopenia    Hepatic encephalopathy    Morbidly obese (HCC)    Non-intractable vomiting with nausea    Hyperammonemia (HCC)    Varicose veins of both legs with edema    Bronchitis    Anxiety    Coronary artery disease involving native heart without angina pectoris       OB History    Para Term  AB Living   0 0 0 0 0 0   SAB IAB Ectopic Molar Multiple Live Births   0 0 0 0 0 0     Past Medical History:   Diagnosis Date    Abnormal Pap smear of cervix     Acid reflux     Arthritis     left knee    Breast cyst     CAD (coronary artery disease)     per [facility] - Dr. Luisa Chavez    COPD (chronic obstructive pulmonary disease) ([facility] [address]     follow with Dr Jonny George    Depression     Joan Bryant manic - depression see Dr Ramandeep Vivas    Diabetes mellitus [facility])     dx 10+ yrs ago- follows with PCP    Drug abuse ([facility] [address]     hx use of cocaine, heroin and marijuana- states last used 2014    Glaucoma     bilateral    H/O Doppler lower venous ultrasound 2019    No DVT or SVT, Significant reflux of RGSV and LGSV. H/O echocardiogram 2020    EF 55-60%, Mod LVH. Hepatic encephalopathy 2019    Hepatitis C     for liver bx 12/3/2015\"Have Hepatitis B and C and saw Dr Clements August for this 2015\"    Hiatal hernia     History of alcohol abuse     History of exercise stress test 2020    Treadmill, Normal exercise performance without angina and ischemic EKG changes.     HTN (hypertension)     \"for the past two yrs on medication\" follows with Dr Luisa Chavez    Hx of blood clots 2019    Portal vein thrombsis - TIPS procedure 19    Hyperlipemia     Irregular heart beat     per pt    Liver hematoma     Migraine     Migraines     Last migraine:  2018    Nausea & vomiting     Neurologic disorder     Schizophrenia [facility])     per old chart    Seizures ([facility] [address]     \"last one was 2015- saw Dr Bijan Santiago at [facility]- she said not sure if acutal seizures- she thinks they are panic or anxiety attacks\" Years: 45.00     Pack years: 22.50     Types: Cigarettes     Start date: 1974     Passive exposure: Past    Smokeless tobacco: Never   Vaping Use    Vaping Use: Never used   Substance and Sexual Activity    Alcohol use: Not Currently     Alcohol/week: 2.0 - 3.0 standard drinks     Types: 2 - 3 Shots of liquor per week     Comment: 2-3 shots last 4/2019    Drug use: Yes     Frequency: 3.0 times per week     Types: Marijuana Evelia Forte)     Comment: daily    Sexual activity: Not Currently     Partners: Male   Other Topics Concern    Not on file   Social History Narrative    Not on file     Social Determinants of Health     Financial Resource Strain: Low Risk     Difficulty of Paying Living Expenses: Not hard at all   Food Insecurity: No Food Insecurity    Worried About [address] [employer] in the Last Year: Never true    [employer] in the Last Year: Never true   Transportation Needs: No Transportation Needs    Lack of Transportation (Medical): No    Lack of Transportation (Non-Medical): No   Physical Activity: Insufficiently Active    Days of Exercise per Week: 7 days    Minutes of Exercise per Session: 10 min   Stress: Not on file   Social Connections: Not on file   Intimate Partner Violence: Not on file   Housing Stability: Unknown    Unable to Pay for Housing in the Last Year: No    Number of Places Lived in the Last Year: Not on file    Unstable Housing in the Last Year: No         MEDICATIONS:  No current facility-administered medications for this encounter. Current Outpatient Medications   Medication Sig Dispense Refill    blood glucose monitor strips Test 3-4 times a day & as needed for symptoms of irregular blood glucose.  100 strip 5    blood glucose monitor kit and supplies Use 3-4 times daily 1 kit 0    Lancets MISC Use one lancet 4 times daily 100 each 5    raloxifene (EVISTA) 60 MG tablet Take 1 tablet by mouth daily 90 tablet 3    insulin aspart (NOVOLOG FLEXPEN) 100 UNIT/ML injection pen **Low Dose Correction Algorithm**Insulin lispro (HUMALOG)  3 TIMES DAILY WITH MEALSGlucose: Dose: No [id number] 1 [id number] 2 [id number] 3 [id number] 4 [id number] 5 Vfghl844 and above 6 Units 15 Adjustable Dose Pre-filled Pen Syringe 5    insulin glargine (LANTUS SOLOSTAR) 100 UNIT/ML injection pen Inject 15 Units into the skin nightly 15 Adjustable Dose Pre-filled Pen Syringe 5    amoxicillin-clavulanate (AUGMENTIN) 875-125 MG per tablet Take 1 tablet by mouth 2 times daily for 10 days 20 tablet 0    furosemide (LASIX) 20 MG tablet Take 1 tablet by mouth daily Hold if systolic <109 90 tablet 1    benzonatate (TESSALON PERLES) 100 MG capsule Take 1 capsule by mouth 3 times daily as needed for Cough 20 capsule 0    mirabegron (MYRBETRIQ) 25 MG TB24 Take 1 tablet by mouth daily 30 tablet 5    solifenacin (VESICARE) 5 MG tablet Take 1 tablet by mouth daily 30 tablet 11    estradiol (ESTRACE VAGINAL) 0.1 MG/GM vaginal cream Place 1 g vaginally three times a week Use 1 g vaginally nightly for 2 weeks, then 1 g nightly 2-3 times per week. 3 each 3    ranolazine (RANEXA) 500 MG extended release tablet Take 1 tablet by mouth in the morning and 1 tablet before bedtime. 180 tablet 1    metoprolol succinate (TOPROL XL) 50 MG extended release tablet Take 1 tablet by mouth in the morning. 90 tablet 1    midodrine (PROAMATINE) 5 MG tablet Take 1 tablet by mouth in the morning and 1 tablet at noon and 1 tablet in the evening. Take with meals.  270 tablet 1    ipratropium-albuterol (DUONEB) 0.5-2.5 (3) MG/3ML SOLN nebulizer solution Inhale 3 mLs into the lungs every 4 hours 120 each 5    nitroGLYCERIN (NITROSTAT) 0.4 MG SL tablet Place 1 tablet under the tongue every 5 minutes as needed for Chest pain 25 tablet 3    rifAXIMin (XIFAXAN) 550 MG tablet Take 1 tablet by mouth 2 times daily 42 tablet 0    sucralfate (CARAFATE) 1 GM tablet Take 1 tablet by mouth 4 times daily 120 tablet 3    ondansetron (ZOFRAN ODT) 4 MG disintegrating tablet Take 1 tablet by mouth every 8 hours as needed for Nausea 15 tablet 0    blood glucose monitor strips Test 3 times a day & as needed for symptoms of irregular blood glucose. Please fill with what is covered my patients insurance. 50 strip 3    blood glucose monitor strips Test  Bid times a day & as needed for symptoms of irregular blood glucose. 100 strip 5    Glucosource Lancets MISC Use one 3-4 times to check blood sugar 100 each 5    ipratropium-albuterol (DUONEB) 0.5-2.5 (3) MG/3ML SOLN nebulizer solution Inhale 3 mLs into the lungs every 4 hours 360 mL 0    lactulose (CHRONULAC) 10 GM/15ML solution Take 30 mLs by mouth 3 times daily 1892 mL 2    QUEtiapine (SEROQUEL) 50 MG tablet Take 3 tablets by mouth nightly 60 tablet 3    FLUoxetine (PROZAC) 20 MG capsule Take 20 mg by mouth daily      paliperidone (INVEGA) 3 MG extended release tablet       Compression Stockings MISC by Does not apply route 20 - 30 mmh wear daily and take off at night  Thigh High 2 each 2    UNABLE TO FIND Rx for depends   Use 3-4  times daily 1 box 5    glucose monitoring kit (FREESTYLE) monitoring kit 1 kit by Does not apply route daily 1 kit 0    Blood Glucose Monitoring Suppl (ACURA BLOOD GLUCOSE METER) w/Device KIT Please check blood sugar 3 times daily. Please fill with what is covered by the insurance 1 kit 0    pantoprazole (PROTONIX) 40 MG tablet Take 1 tablet by mouth 2 times daily (before meals) 60 tablet 1    VIREAD 300 MG tablet Take 300 mg by mouth daily              ALLERGIES:  Allergies as of 11/01/2022 - Fully Reviewed 10/24/2022   Allergen Reaction Noted    Norco [hydrocodone-acetaminophen] Itching 02/14/2017    Tylenol [acetaminophen] Itching, Nausea And Vomiting, and Rash 11/29/2011         REVIEW OF SYSTEMS:  General appearance:Appears healthy. Alert; in no acute distress. Pleasant.   HEENT: Negative  CARDIOVASCULAR: Denies: chest pain, dyspnea on exertion, palpitations  RESPIRATORY: Denies: cough, shortness of breath, wheezing  GI: Denies: abdominal pain, flank pain, nausea, vomiting, diarrhea  : Denies: dysuria, frequency/urgency, hematuria, pelvic pain  MUSCULOSKELETAL: Denies: back pain, joint swelling, muscle pain  SKIN: Denies: rash, hives. HEMATOLOGIC: Denies Bleeding Disorder, Coagulopathy or Transfusion  NEUROLOGIC: Denies Migraines, Headaches, CVA, TIA  ENDOCRINE: Denies any Endocrinologic disorders      Height 4' 9\" (1.448 m), weight 187 lb (84.8 kg), not currently breastfeeding. 11/09/22 0924 98.6 (37) 18 58 113/59 -- -- -- -- 96 None (Room air)       General Appearance:  awake, alert, oriented, in no acute distress  Skin:  Skin color, texture, turgor normal. No rashes or lesions. Mouth/Throat:  Mucosa moist.  No lesions. Pharynx without erythema, edema or exudate. Neck:  neck- supple, no mass, non-tender  Lungs:  No chest wall tenderness. Heart:  Heart regular rate and rhythm  Abdomen:  Soft, non-tender, normal bowel sounds. No bruits, organomegaly or masses. Extremities: Extremities warm to touch, pink, with no edema. Musculoskeletal:  negative  Peripheral Pulses:  Normal  Neurologic:  negative  Female Urogen: Several vulvar/perineal masses    Diagnostics:  Labs:  Results for orders placed or performed during the hospital encounter of 10/24/22   COVID-19    Specimen: Nasopharyngeal   Result Value Ref Range    Source THROAT     SARS-CoV-2 NOT DETECTED NOT DETECTED           ASSESSMENT:  Vulvar pain with vulvar mass who declined office excision and office biopsy. Rule out vulvar intraepithelial neoplasia and vulvar carcinoma.     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 04/08/2020    thrombocytopenia 04/08/2020    Acute hepatic encephalopathy 01/15/2020    Acute upper GI bleed 10/25/2019    S/P TIPS (transjugular intrahepatic portosystemic shunt) 05/08/2019    Cirrhosis of liver without ascites ([address] 05/08/2019    Acute encephalopathy 05/01/2019    Acute colitis 04/06/2019    Restless legs 03/18/2019    Delayed gastric emptying 12/22/2018     Delayed gastric emptying seen on gastric emptying scan in December 2017      Chronic hepatitis C without hepatic coma ([address] 11/20/2018    Acute GI bleeding 11/12/2018    Abdominal pain 10/27/2018    Portal vein thrombosis 10/25/2018    Intractable nausea and vomiting 10/25/2018    SOB (shortness of breath)     Type 2 diabetes mellitus with complication, with long-term current use of insulin ([address] 06/04/2018    Coronary-myocardial bridge 03/09/2018    GI bleed 12/21/2017    Primary hypertension 12/10/2017    Colitis 12/07/2017    Esophageal hypertension 09/20/2017    Candida esophagitis ([address] 09/20/2017    Left carpal tunnel syndrome 08/30/2017    Right carpal tunnel syndrome 08/30/2017    Vitamin D deficiency 08/22/2017    Mixed hyperlipidemia 13/92/0976    Periumbilical abdominal pain     History of colonic polyps     Abnormal findings on diagnostic imaging of abdomen     Nausea     Gastroesophageal reflux disease without esophagitis     Thrombocytopenia ([address] 02/24/2017    Pancolitis ([address] 02/14/2017    Hematemesis without nausea 86/39/4567    Alcoholic cirrhosis of liver without ascites ([address] 02/14/2017    Dyslipidemia 11/23/2016    Chest pain 06/07/2016    Lung nodule 11/18/2013    Angina effort 09/06/2013     Replacing deprecated diagnoses      Abnormal nuclear cardiac imaging test 09/06/2013    Left arm pain 04/27/2013    Tobacco abuse 04/27/2013    Type 2 diabetes mellitus with complication, with long-term current use of insulin ([address] Chronic obstructive pulmonary disease (HCC)           Plan:  Partial vulvectomy      Counseling: The patient was counseled on all options both medical and surgical, conservative as well as definitive. She has elected to proceed with the procedure as stated above. The patient was counseled on the procedure. Risks and complications were reviewed in detail including infection, hemorrhage, blood transfusion, injury to organs such as bowel, bladder and neurovascular structures with the possibility of a prolonged postoperative course. I discussed the increased risk of surgery including complications postsurgical complications as well as cardiac complications as well as medical complications due to her multiple medical comorbidities. Again, she declines office excision or office biopsy of these masses. The patients orders, labs, consents have been completed. The patient is aware that this procedure may not alleviate her symptoms. That there may be a necessity for a second surgery and that there may be an incomplete removal of abnormal tissue. Hematuria

## 2022-11-09 NOTE — PROGRESS NOTES
940 notified dr Samuels Overall that pts bs was 248. Informed dr that patient took novolog 8 units this am for blood sugar level of 328 and that patient has not been taking her nightly coverage of insulin d/t insurance will not approve.   Orders recd

## 2022-11-09 NOTE — PROGRESS NOTES
1410- Resting in bed. States is still sleepy and would like to rest for a while. 1440- Requests to go home. 1453- Out to car per wheelchair. Home with sister.

## 2022-11-09 NOTE — OP NOTE
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 This area was excised with an elliptical incision measuring 1.5 x 1 cm. In the midline of the perineum there were multiple exophytic masses of uncertain diagnosis. An elliptical perineal body skin incision was made measuring 2.5 x 3 cm excising all these perineal masses. Estimated blood loss: 2 cc    Blood Transfusion?:  No     Drains: None    Specimens: Left vulvar lesion, perineal masses    Complications: None    Condition: Stable    Narrative:    After informed consent was obtained, patient was brought the operating suite where general anesthesia was obtained by the anesthesia service. Patient was placed in the lithotomy position and prepped and draped in the normal sterile fashion. The left vulvar lesion was infiltrated 0.5% Marcaine with epinephrine. An elliptical incision was made with a scalpel. The incision was closed with 4-0 Monocryl in a continuous running fashion. The skin inferior to the perineal lesions was infiltrated with 0.5% Marcaine with epinephrine. An elliptical incision was made with a scalpel. To take tension off the skin the deep tissue was closed with 2-0 Vicryl and 3 interrupted sutures. Skin was then closed with 4-0 Monocryl in a continuous running fashion. Hemostasis was assured. Bacitracin was applied. Patient was placed in lithotomy position and was awoken from anesthesia and brought recovery in stable condition there were no complications.        Ana Horan MD 11/9/2022 12:31 PM

## 2022-11-09 NOTE — ANESTHESIA POSTPROCEDURE EVALUATION
Department of Anesthesiology  Postprocedure Note    Patient: Dameon López  MRN: [id number]  YOB: 1960  Date of evaluation: 11/9/2022      Procedure Summary     Date: 11/09/22 Room / Location: [address]    Anesthesia Start: 4175 Anesthesia Stop: 8931    Procedure: PARTIAL VULVECTOMY (Vulva) Diagnosis:       Vulvar mass      (Vulvar mass [N90.89])    Surgeons: Martin Yen MD Responsible Provider: Bob Gomes DO    Anesthesia Type: general ASA Status: 4          Anesthesia Type: No value filed.     Melany Phase I: Melany Score: 7    Melany Phase II:        Anesthesia Post Evaluation    Patient location during evaluation: PACU  Patient participation: complete - patient participated  Level of consciousness: sleepy but conscious  Airway patency: patent  Nausea & Vomiting: no nausea  Complications: no  Cardiovascular status: hemodynamically stable  Respiratory status: acceptable  Hydration status: euvolemic

## 2022-11-09 NOTE — DISCHARGE INSTRUCTIONS
[facility]  [phone]    Do not drive, work around [address] [place], [place] [address]th  or use equipment. Do not drink any alcoholic beverages. Do not smoke while alone. Avoid making important decisions. Plan to spend a quiet, relaxed evening @ home. Resume normal activities as you begin to feel better. Eat lightly for your first meal, then gradually increase your diet to what is normal for you. In case of nausea, avoid food and drink only clear liquids. Resume food as nausea ceases. Notify your surgeon if you experience fever, chills, large amount of bleeding, difficulty breathing, persistent nausea and vomiting or any other disturbing problem. Call for a follow-up appointment with your surgeon. Navi Ramesh MD/Yves Ruiz MD   [address], [address] [place] [address]   Phone [phone]   Fax [phone]     Hysterectomy, , and Exploratory Laparotomy    POST-OPERATIVE INSTRUCTIONS        DANGER SIGNS: Call OB/GYN if they occur:     A fever of more than 100.4   Report excessive bleeding (saturating a pad every ½ to 1 hour). Severe pain, unrelieved by normal medication. Shortness of breath, difficulty breathing or chest pain. Painful or burning urination. Severe back pain. Painful swelling or redness in legs. Drainage or pus from the wound. Foul smelling vaginal discharge. Eating and drinking: You may continue your regular diet, however, we recommend that you:    Eat multiple small meals. Eat foods high in protein such as meat, fish, eggs, and dairy products. Avoid hot, spicy, and fatty foods. Eat fruits with vitamin C (i.e. citrus fruits), vegetables, and high fiber foods. Drink at least 3 quarts of liquid a day (try to drink more earlier in the day). Constipation:     Narcotic pain medications such as Percocet, Vicodin, and codeine can cause constipation.    Try the following to avoid constipation:   Eat high fiber foods such as prunes or adda fiber supplement such as Metamucil or Citrucel. Milk of Magnesia. Stool softener - look for Docusate sodium on the label. Activity:     Avoid strenuous physical activity. Do not lift anything greater than 10lbs until approved by your surgeon. This includes heavy housework (i.e. vacuum cleaning). Try to limit going up or down stairs to twice a day for the first week or two. Avoid tub baths for two weeks. Do not drive while taking narcotic pain medications such as Percocet, Vicodin, or Tylenol with codeine. They may make you drowsy. Avoid putting anything into your vagina (tampons, douching, and sexual intercourse) until your doctor says these activities are OK; usually 6-8 weeks. If you exercise regularly, you should not restart until the doctor tells you that it is OK to do so. It is OK to walk as your body tolerates. Medication:     You may resume all your usual medications after surgery, unless told otherwise. While you will be sent home with strong pain medications containing small amounts of narcotics, you may also take milder medications such as Naprosyn (Aleve), Acetaminophen (Tylenol), or Ibuprofen (Motrin) for your pain. At first, take the narcotic pain medicines during the day, and use the milder medications to take the edge off in between. Take your pain medication when you first begin to feel discomfort. Do not wait until your pain is intense to take your medications. The narcotic pain medications may cause nausea or constipation. As you heal, you may find that you feel better without those medications. If Acetaminophen (Tylenol) or Ibuprofen (Motrin) relieves the pain, use these medications instead. Incision:     Abdominal incision:   You may get the incision wet in the shower but avoid tub baths for two weeks. If the incision appears dirty or caked, you may clean it using hydrogen peroxide or a cotton swab.    If you have small plastic bandages called steri-strips on the incision, they may gradually fall off in the shower. You may trim the curled edges with scissors. They should fall off within one to two weeks; if not, you may gently pull them off. Vaginal Bleeding: It is normal to experience vaginal spotting and light discharge for up to a month after surgery, whether incision was abdominal or vaginal.     Bladder Catheter: If you are discharged from the hospital with a catheter, you should return to the physicians office as instructed by your surgeon for catheter removal.     Return Visit: You will need to return to see your surgeon two weeks after the date of your surgery. At that time, your incision will be checked and any questions you may have will be answered (i.e. what more you can do physically; such as driving a car, exercise). Returning to work: This depends on the type of surgery you had and how quickly you recover. Your doctor will determine when its appropriate for you to return to work. Leave surgical dressing/bandages on for 24 hours. You may shower or bathe in the morning. You may experience some shoulder pain (especially on the right) and chest pain. This is normal and caused by the gas injected into the abdomen. The gas is inserted to create a working and viewing space inside the abdomen during surgery. No intercourse, douching or tampons for 7 days. Expect some vaginal bleeding   Take pain medication as directed. You may resume your normal daily activities as tolerated. Resume your normal diet. Try to avoid constipation by eating high fiber foods or adding a fiber supplement such as Metamucil, Fibercon, or Benefiber; especially while taking a narcotic pain medication.

## 2022-11-10 DIAGNOSIS — E11.8 TYPE 2 DIABETES MELLITUS WITH COMPLICATION, WITH LONG-TERM CURRENT USE OF INSULIN (HCC): ICD-10-CM

## 2022-11-10 DIAGNOSIS — Z79.4 TYPE 2 DIABETES MELLITUS WITH COMPLICATION, WITH LONG-TERM CURRENT USE OF INSULIN (HCC): ICD-10-CM

## 2022-11-11 RX ORDER — INSULIN GLARGINE 100 [IU]/ML
15 INJECTION, SOLUTION SUBCUTANEOUS NIGHTLY
Qty: 15 ADJUSTABLE DOSE PRE-FILLED PEN SYRINGE | Refills: 5 | Status: SHIPPED | OUTPATIENT
Start: 2022-11-11 | End: 2022-11-15

## 2022-11-14 ENCOUNTER — TELEPHONE (OUTPATIENT)
Dept: FAMILY MEDICINE CLINIC | Age: 62
End: 2022-11-14

## 2022-11-14 NOTE — TELEPHONE ENCOUNTER
We refer her to med assist, but seems to be useless, So we need to call the pharmacy and see what other insulin covered by her insurance

## 2022-11-14 NOTE — TELEPHONE ENCOUNTER
Patients insurance does not cover her current   nightly medication for insulin. Patient is wanting to know if that   medication can or has been switched so her insurance will cover it.

## 2022-11-14 NOTE — TELEPHONE ENCOUNTER
----- Message from Jg Alexander sent at 11/14/2022  1:26 PM EST -----  Subject: Message to Provider    QUESTIONS  Information for Provider? Patients insurance does not cover her current   nightly medication for insulin. Patient is wanting to know if that   medication can or has been switched so her insurance will cover it. Please   call patient back to discuss,   ---------------------------------------------------------------------------  --------------  [id number]  [phone]; OK to leave message on voicemail, OK to respond with   electronic message via [employer] portal (only for patients who have   registered [employer] account)  ---------------------------------------------------------------------------  --------------  SCRIPT ANSWERS  Relationship to Patient?  Self

## 2022-11-15 RX ORDER — INSULIN GLARGINE 100 [IU]/ML
15 INJECTION, SOLUTION SUBCUTANEOUS NIGHTLY
Qty: 5 ADJUSTABLE DOSE PRE-FILLED PEN SYRINGE | Refills: 0 | Status: SHIPPED | OUTPATIENT
Start: 2022-11-15

## 2022-11-15 NOTE — TELEPHONE ENCOUNTER
Suggested alternatives:  Basaglar 100 unit/ML Kwikpen, levemir flextouch 100 unit/ML, Tresiba flextouch 100 unit/ML, tresiba 100 unit/ML vial

## 2022-11-22 ENCOUNTER — OFFICE VISIT (OUTPATIENT)
Dept: OBGYN | Age: 62
End: 2022-11-22

## 2022-11-22 VITALS — DIASTOLIC BLOOD PRESSURE: 76 MMHG | SYSTOLIC BLOOD PRESSURE: 117 MMHG | WEIGHT: 180 LBS | BODY MASS INDEX: 38.95 KG/M2

## 2022-11-22 DIAGNOSIS — Z09 POSTOPERATIVE EXAMINATION: ICD-10-CM

## 2022-11-22 DIAGNOSIS — N90.1 VULVAR INTRAEPITHELIAL NEOPLASIA (VIN) GRADE 2: Primary | ICD-10-CM

## 2022-11-22 PROCEDURE — 99024 POSTOP FOLLOW-UP VISIT: CPT | Performed by: OBSTETRICS & GYNECOLOGY

## 2022-11-22 NOTE — PROGRESS NOTES
Postop Progress Note    Subjective    Woody Martínez presents to the office for postop follow up. Objective    Vitals:    11/22/22 1550   BP: 117/76     General: alert, cooperative and no distress  Incision: healing well    Assessment  Doing well postoperatively. Plan  1. Continue any current medications  2. Wound care discussed  3. Pt is to increase activities as tolerated  4. Usual diet  5.  Follow up: as needed or 6m    Electronically signed by Pam Sultana MD on 11/22/2022 at 4:26 PM

## 2022-11-30 ENCOUNTER — APPOINTMENT (OUTPATIENT)
Dept: CT IMAGING | Age: 62
End: 2022-11-30
Payer: MEDICARE

## 2022-11-30 ENCOUNTER — HOSPITAL ENCOUNTER (EMERGENCY)
Age: 62
Discharge: HOME | End: 2022-11-30
Attending: EMERGENCY MEDICINE
Payer: MEDICARE

## 2022-11-30 ENCOUNTER — APPOINTMENT (OUTPATIENT)
Dept: GENERAL RADIOLOGY | Age: 62
End: 2022-11-30
Payer: MEDICARE

## 2022-11-30 VITALS
SYSTOLIC BLOOD PRESSURE: 119 MMHG | HEART RATE: 83 BPM | HEIGHT: 58 IN | DIASTOLIC BLOOD PRESSURE: 80 MMHG | WEIGHT: 176 LBS | OXYGEN SATURATION: 92 % | RESPIRATION RATE: 27 BRPM | TEMPERATURE: 98.3 F | BODY MASS INDEX: 36.94 KG/M2

## 2022-11-30 DIAGNOSIS — J18.9 COMMUNITY ACQUIRED PNEUMONIA, UNSPECIFIED LATERALITY: Primary | ICD-10-CM

## 2022-11-30 LAB
ANION GAP SERPL CALCULATED.3IONS-SCNC: 8 MMOL/L (ref 4–16)
BASOPHILS ABSOLUTE: 0 K/CU MM
BASOPHILS RELATIVE PERCENT: 0.4 % (ref 0–1)
BUN BLDV-MCNC: 12 MG/DL (ref 6–23)
CALCIUM SERPL-MCNC: 8.2 MG/DL (ref 8.3–10.6)
CHLORIDE BLD-SCNC: 102 MMOL/L (ref 99–110)
CO2: 25 MMOL/L (ref 21–32)
CREAT SERPL-MCNC: 0.8 MG/DL (ref 0.6–1.1)
D DIMER: 764 NG/ML(DDU)
DIFFERENTIAL TYPE: ABNORMAL
EOSINOPHILS ABSOLUTE: 0.1 K/CU MM
EOSINOPHILS RELATIVE PERCENT: 1.2 % (ref 0–3)
GFR SERPL CREATININE-BSD FRML MDRD: >60 ML/MIN/1.73M2
GLUCOSE BLD-MCNC: 184 MG/DL (ref 70–99)
HCT VFR BLD CALC: 42.4 % (ref 37–47)
HEMOGLOBIN: 14.5 GM/DL (ref 12.5–16)
IMMATURE NEUTROPHIL %: 0.4 % (ref 0–0.43)
LACTATE: 1.2 MMOL/L (ref 0.4–2)
LYMPHOCYTES ABSOLUTE: 1.3 K/CU MM
LYMPHOCYTES RELATIVE PERCENT: 17.4 % (ref 24–44)
MAGNESIUM: 2 MG/DL (ref 1.8–2.4)
MCH RBC QN AUTO: 33.2 PG (ref 27–31)
MCHC RBC AUTO-ENTMCNC: 34.2 % (ref 32–36)
MCV RBC AUTO: 97 FL (ref 78–100)
MONOCYTES ABSOLUTE: 0.7 K/CU MM
MONOCYTES RELATIVE PERCENT: 9.2 % (ref 0–4)
NUCLEATED RBC %: 0 %
PDW BLD-RTO: 13.1 % (ref 11.7–14.9)
PLATELET # BLD: 75 K/CU MM (ref 140–440)
PMV BLD AUTO: 12.2 FL (ref 7.5–11.1)
POTASSIUM SERPL-SCNC: 4 MMOL/L (ref 3.5–5.1)
PRO-BNP: 288.7 PG/ML
RAPID INFLUENZA  B AGN: NEGATIVE
RAPID INFLUENZA A AGN: NEGATIVE
RBC # BLD: 4.37 M/CU MM (ref 4.2–5.4)
SARS-COV-2, NAAT: NOT DETECTED
SEGMENTED NEUTROPHILS ABSOLUTE COUNT: 5.5 K/CU MM
SEGMENTED NEUTROPHILS RELATIVE PERCENT: 71.4 % (ref 36–66)
SODIUM BLD-SCNC: 135 MMOL/L (ref 135–145)
SOURCE: NORMAL
TOTAL IMMATURE NEUTOROPHIL: 0.03 K/CU MM
TOTAL NUCLEATED RBC: 0 K/CU MM
TROPONIN T: <0.01 NG/ML
WBC # BLD: 7.7 K/CU MM (ref 4–10.5)

## 2022-11-30 PROCEDURE — 6370000000 HC RX 637 (ALT 250 FOR IP): Performed by: NURSE PRACTITIONER

## 2022-11-30 PROCEDURE — 83735 ASSAY OF MAGNESIUM: CPT

## 2022-11-30 PROCEDURE — 71275 CT ANGIOGRAPHY CHEST: CPT

## 2022-11-30 PROCEDURE — 80048 BASIC METABOLIC PNL TOTAL CA: CPT

## 2022-11-30 PROCEDURE — 94640 AIRWAY INHALATION TREATMENT: CPT

## 2022-11-30 PROCEDURE — 6360000002 HC RX W HCPCS: Performed by: EMERGENCY MEDICINE

## 2022-11-30 PROCEDURE — 85379 FIBRIN DEGRADATION QUANT: CPT

## 2022-11-30 PROCEDURE — 6360000002 HC RX W HCPCS: Performed by: NURSE PRACTITIONER

## 2022-11-30 PROCEDURE — 93005 ELECTROCARDIOGRAM TRACING: CPT | Performed by: EMERGENCY MEDICINE

## 2022-11-30 PROCEDURE — 83605 ASSAY OF LACTIC ACID: CPT

## 2022-11-30 PROCEDURE — 87635 SARS-COV-2 COVID-19 AMP PRB: CPT

## 2022-11-30 PROCEDURE — 99285 EMERGENCY DEPT VISIT HI MDM: CPT

## 2022-11-30 PROCEDURE — 84484 ASSAY OF TROPONIN QUANT: CPT

## 2022-11-30 PROCEDURE — 6370000000 HC RX 637 (ALT 250 FOR IP): Performed by: EMERGENCY MEDICINE

## 2022-11-30 PROCEDURE — 6360000004 HC RX CONTRAST MEDICATION: Performed by: EMERGENCY MEDICINE

## 2022-11-30 PROCEDURE — 87804 INFLUENZA ASSAY W/OPTIC: CPT

## 2022-11-30 PROCEDURE — 71045 X-RAY EXAM CHEST 1 VIEW: CPT

## 2022-11-30 PROCEDURE — 96374 THER/PROPH/DIAG INJ IV PUSH: CPT

## 2022-11-30 PROCEDURE — 85025 COMPLETE CBC W/AUTO DIFF WBC: CPT

## 2022-11-30 PROCEDURE — 83880 ASSAY OF NATRIURETIC PEPTIDE: CPT

## 2022-11-30 PROCEDURE — 87040 BLOOD CULTURE FOR BACTERIA: CPT

## 2022-11-30 RX ORDER — NAPROXEN 250 MG/1
500 TABLET ORAL ONCE
Status: COMPLETED | OUTPATIENT
Start: 2022-11-30 | End: 2022-11-30

## 2022-11-30 RX ORDER — BENZONATATE 100 MG/1
100 CAPSULE ORAL ONCE
Status: COMPLETED | OUTPATIENT
Start: 2022-11-30 | End: 2022-11-30

## 2022-11-30 RX ORDER — BENZONATATE 100 MG/1
100 CAPSULE ORAL 3 TIMES DAILY PRN
Qty: 10 CAPSULE | Refills: 0 | Status: SHIPPED | OUTPATIENT
Start: 2022-11-30 | End: 2022-12-07

## 2022-11-30 RX ORDER — AZITHROMYCIN 250 MG/1
500 TABLET, FILM COATED ORAL ONCE
Status: COMPLETED | OUTPATIENT
Start: 2022-11-30 | End: 2022-11-30

## 2022-11-30 RX ORDER — AZITHROMYCIN 250 MG/1
250 TABLET, FILM COATED ORAL SEE ADMIN INSTRUCTIONS
Qty: 6 TABLET | Refills: 0 | Status: SHIPPED | OUTPATIENT
Start: 2022-11-30 | End: 2022-12-05

## 2022-11-30 RX ORDER — IPRATROPIUM BROMIDE AND ALBUTEROL SULFATE 2.5; .5 MG/3ML; MG/3ML
1 SOLUTION RESPIRATORY (INHALATION) ONCE
Status: COMPLETED | OUTPATIENT
Start: 2022-11-30 | End: 2022-11-30

## 2022-11-30 RX ORDER — ALBUTEROL SULFATE 2.5 MG/3ML
2.5 SOLUTION RESPIRATORY (INHALATION) ONCE
Status: COMPLETED | OUTPATIENT
Start: 2022-11-30 | End: 2022-11-30

## 2022-11-30 RX ORDER — PREDNISONE 20 MG/1
40 TABLET ORAL DAILY
Qty: 10 TABLET | Refills: 0 | Status: SHIPPED | OUTPATIENT
Start: 2022-11-30 | End: 2022-12-05

## 2022-11-30 RX ORDER — METHYLPREDNISOLONE SODIUM SUCCINATE 125 MG/2ML
80 INJECTION, POWDER, LYOPHILIZED, FOR SOLUTION INTRAMUSCULAR; INTRAVENOUS ONCE
Status: COMPLETED | OUTPATIENT
Start: 2022-11-30 | End: 2022-11-30

## 2022-11-30 RX ORDER — GUAIFENESIN/DEXTROMETHORPHAN 100-10MG/5
5 SYRUP ORAL ONCE
Status: COMPLETED | OUTPATIENT
Start: 2022-11-30 | End: 2022-11-30

## 2022-11-30 RX ORDER — ALBUTEROL SULFATE 90 UG/1
2 AEROSOL, METERED RESPIRATORY (INHALATION) ONCE
Status: DISCONTINUED | OUTPATIENT
Start: 2022-11-30 | End: 2022-11-30

## 2022-11-30 RX ORDER — ALBUTEROL SULFATE 90 UG/1
2 AEROSOL, METERED RESPIRATORY (INHALATION) EVERY 6 HOURS PRN
Status: DISCONTINUED | OUTPATIENT
Start: 2022-11-30 | End: 2022-11-30 | Stop reason: HOSPADM

## 2022-11-30 RX ORDER — ALBUTEROL SULFATE 90 UG/1
2 AEROSOL, METERED RESPIRATORY (INHALATION) EVERY 4 HOURS PRN
Qty: 18 G | Refills: 1 | Status: SHIPPED | OUTPATIENT
Start: 2022-11-30 | End: 2022-12-30

## 2022-11-30 RX ADMIN — AZITHROMYCIN MONOHYDRATE 500 MG: 250 TABLET ORAL at 18:51

## 2022-11-30 RX ADMIN — GUAIFENESIN AND DEXTROMETHORPHAN 5 ML: 100; 10 SYRUP ORAL at 18:50

## 2022-11-30 RX ADMIN — BENZONATATE 100 MG: 100 CAPSULE ORAL at 18:51

## 2022-11-30 RX ADMIN — IPRATROPIUM BROMIDE AND ALBUTEROL SULFATE 1 AMPULE: .5; 2.5 SOLUTION RESPIRATORY (INHALATION) at 18:17

## 2022-11-30 RX ADMIN — METHYLPREDNISOLONE SODIUM SUCCINATE 80 MG: 125 INJECTION, POWDER, FOR SOLUTION INTRAMUSCULAR; INTRAVENOUS at 18:50

## 2022-11-30 RX ADMIN — IOPAMIDOL 75 ML: 755 INJECTION, SOLUTION INTRAVENOUS at 18:04

## 2022-11-30 RX ADMIN — ALBUTEROL SULFATE 2.5 MG: 2.5 SOLUTION RESPIRATORY (INHALATION) at 18:17

## 2022-11-30 RX ADMIN — IPRATROPIUM BROMIDE AND ALBUTEROL SULFATE 1 AMPULE: .5; 2.5 SOLUTION RESPIRATORY (INHALATION) at 16:57

## 2022-11-30 RX ADMIN — NAPROXEN 500 MG: 250 TABLET ORAL at 18:51

## 2022-11-30 ASSESSMENT — ENCOUNTER SYMPTOMS
COUGH: 1
SORE THROAT: 1
ABDOMINAL PAIN: 0
TROUBLE SWALLOWING: 0
SHORTNESS OF BREATH: 1
WHEEZING: 1

## 2022-11-30 NOTE — ED PROVIDER NOTES
[address] ENCOUNTER        Pt Name: Sada Armando  MRN: [id number]  [place] 1960  Date of evaluation: 11/30/2022  Provider: ERLINDA Vegas - CNP  PCP: Jayashree Carter MD  Note Started: 3:49 PM EST     I have seen and evaluated this patient with my supervising physician No att. providers found. Triage CHIEF COMPLAINT       Chief Complaint   Patient presents with    Generalized Body Aches    Shortness of Breath     Wheezing       HISTORY OF PRESENT ILLNESS   (Location/Symptom, Timing/Onset, Context/Setting, Quality, Duration, Modifying Factors, Severity)  Note limiting factors. Chief Complaint:     Sada Armando is a 63 y.o. female with a history of COPD, hypertension, hyperlipidemia, A. fib, diabetes mellitus, cirrhosis presents to the emergency department for a headache, nonproductive cough, body aches, sore throat, fever. That has been ongoing for the past 2 days. Patient has been taking ibuprofen which helps for a little bit then returns. Relieving factors include nothing, exacerbating factors include nothing   denies head trauma or loss of consciousness, denies vision loss or blurred vision, denies any parenthesis, denies general or unilateral weakness. Patient positive for sick contacts. Patient denies recent travel, denies recent surgery, denies recent exogenous hormone use. Patient was noted to ambulate without difficulty into the emergency department. Nursing Notes were all reviewed and agreed with or any disagreements were addressed in the HPI. REVIEW OF SYSTEMS    (2-9 systems for level 4, 10 or more for level 5)     Review of Systems   Constitutional:  Positive for fatigue and fever. Negative for appetite change. HENT:  Positive for sore throat. Negative for congestion and trouble swallowing. Respiratory:  Positive for cough, shortness of breath and wheezing. Cardiovascular:  Positive for chest pain. Gastrointestinal:  Negative for abdominal pain. Genitourinary:  Negative for dysuria. Musculoskeletal:  Positive for myalgias. Skin:  Negative for rash. Neurological:  Positive for headaches. Negative for weakness. PAST MEDICAL HISTORY     Past Medical History:   Diagnosis Date    Abnormal Pap smear of cervix     Acid reflux     Arthritis     left knee    Breast cyst     CAD (coronary artery disease)     per [facility] 9/6/13 - Dr. Dipika Mi    COPD (chronic obstructive pulmonary disease) ([facility] [address]     follow with Dr Bud Vilchis    Depression     [facility] manic - depression see Dr Pam Santillan    Diabetes mellitus [facility])     dx 10+ yrs ago- follows with PCP    Drug abuse ([facility] [address]     hx use of cocaine, heroin and marijuana- states last used 12/2014    Glaucoma     bilateral    H/O Doppler lower venous ultrasound 04/04/2019    No DVT or SVT, Significant reflux of RGSV and LGSV. H/O echocardiogram 12/18/2020    EF 55-60%, Mod LVH. Hepatic encephalopathy 05/2019    Hepatitis C     for liver bx 12/3/2015\"Have Hepatitis B and C and saw Dr Sabrina Pleitez for this 12/1/2015\"    Hiatal hernia     History of alcohol abuse     History of exercise stress test 01/02/2020    Treadmill, Normal exercise performance without angina and ischemic EKG changes.     HTN (hypertension)     \"for the past two yrs on medication\" follows with Dr Dipika Mi    Hx of blood clots 2019    Portal vein thrombsis - TIPS procedure 4/23/19    Hyperlipemia     Irregular heart beat     per pt    Liver hematoma     Migraine     Migraines     Last migraine:  2018    Nausea & vomiting     Neurologic disorder     Schizophrenia [facility])     per old chart    Seizures ([facility] [address]     \"last one was 9/2015- saw Dr Gilda Nieto at [facility]- she said not sure if acutal seizures- she thinks they are panic or anxiety attacks\"    SOB (shortness of breath)     with any exertion    Vulvar mass        SURGICAL HISTORY     Past Surgical History:   Procedure Laterality Date    BREAST SURGERY  10/2015    left breast bx    CARDIAC CATHETERIZATION      per old chart pt had cath done in 3/2011 and 9/2013    CARPAL TUNNEL RELEASE Left 01/09/2020    CARPAL TUNNEL RELEASE LEFT performed by Odette Lemus DO at [address] Right 05/26/2020    dr Pablo [place], carpal tunnel trigger finger release    CARPAL TUNNEL RELEASE Right 05/26/2020    RIGHT CARPAL TUNNEL RELEASE performed by Odette Lemus DO at [address]  per old chart done 1985    COLONOSCOPY  03/11/2013    diverticulosis, cecal polyp    COLONOSCOPY  03/16/2017    Internal hemorrhoids- Dr. Shruthi Green    COLONOSCOPY N/A 05/17/2022    COLONOSCOPY POLYPECTOMY SNARE/COLD BIOPSY performed by Lisa Mcdaniels MD at [facility], [address] [place] [address], DIAGNOSTIC  03/16/2017    EGD: Small esophageal varices, portal hypertensive gastropathy, reflux esophagitis, hiatal hernia    EYE SURGERY Bilateral ? when    cyst removal, cataracts w/lens replacement    FINGER TRIGGER RELEASE Right 05/26/2020    FINGER TRIGGER RELEASE RIGHT RING FINGER performed by Odette Lemus DO at [facility] ([facility])      per old chart pt had CHOLO/BSO 1986    SALPINGO-OOPHORECTOMY      TIPS PROCEDURE  04/23/2019    TONSILLECTOMY  as a kid    UPPER GASTROINTESTINAL ENDOSCOPY N/A 11/14/2018    EGD DIAGNOSTIC ONLY performed by Ivonne Mccall MD at [facility] 67 N/A 06/05/2019    EGD DIAGNOSTIC ONLY performed by Ivonne Mccall MD at [facility] N/A 11/9/2022    [address] [place] [address] performed by Tiana Crigler, MD at [facility]       Previous Medications    BLOOD GLUCOSE MONITOR KIT AND SUPPLIES    Use 3-4 times daily    BLOOD GLUCOSE MONITOR STRIPS    Test  Bid times a day & as needed for symptoms of irregular blood glucose.     BLOOD GLUCOSE MONITOR STRIPS    Test 3 times a day & as needed for symptoms of irregular blood glucose. Please fill with what is covered my patients insurance. BLOOD GLUCOSE MONITOR STRIPS    Test 3-4 times a day & as needed for symptoms of irregular blood glucose. BLOOD GLUCOSE MONITORING SUPPL (ACURA BLOOD GLUCOSE METER) W/DEVICE KIT    Please check blood sugar 3 times daily. Please fill with what is covered by the insurance    COMPRESSION STOCKINGS MISC    by Does not apply route 20 - 30 mmh wear daily and take off at night  Thigh High    ESTRADIOL (ESTRACE VAGINAL) 0.1 MG/GM VAGINAL CREAM    Place 1 g vaginally three times a week Use 1 g vaginally nightly for 2 weeks, then 1 g nightly 2-3 times per week. FLUOXETINE (PROZAC) 20 MG CAPSULE    Take 20 mg by mouth daily    FUROSEMIDE (LASIX) 20 MG TABLET    Take 1 tablet by mouth daily Hold if systolic <753    GLUCOSE MONITORING KIT (FREESTYLE) MONITORING KIT    1 kit by Does not apply route daily    GLUCOSOURCE LANCETS MISC    Use one 3-4 times to check blood sugar    INSULIN ASPART (NOVOLOG FLEXPEN) 100 UNIT/ML INJECTION PEN    **Low Dose Correction Algorithm**Insulin lispro (HUMALOG)  3 TIMES DAILY WITH MEALSGlucose: Dose: No [id number] 1 Ykew315-511 2 Refkw154-924 3 Ofzdj224-267 4 Jrado900-629 5 Wmyis561 and above 6 Units    INSULIN GLARGINE (BASAGLAR KWIKPEN) 100 UNIT/ML INJECTION PEN    Inject 15 Units into the skin nightly    IPRATROPIUM-ALBUTEROL (DUONEB) 0.5-2.5 (3) MG/3ML SOLN NEBULIZER SOLUTION    Inhale 3 mLs into the lungs every 4 hours    IPRATROPIUM-ALBUTEROL (DUONEB) 0.5-2.5 (3) MG/3ML SOLN NEBULIZER SOLUTION    Inhale 3 mLs into the lungs every 4 hours    LACTULOSE (CHRONULAC) 10 GM/15ML SOLUTION    Take 30 mLs by mouth 3 times daily    LANCETS MISC    Use one lancet 4 times daily    METOPROLOL SUCCINATE (TOPROL XL) 50 MG EXTENDED RELEASE TABLET    Take 1 tablet by mouth in the morning. MIDODRINE (PROAMATINE) 5 MG TABLET    Take 1 tablet by mouth in the morning and 1 tablet at noon and 1 tablet in the evening.  Take with meals.    MIRABEGRON (MYRBETRIQ) 25 MG TB24    Take 1 tablet by mouth daily    MUPIROCIN (BACTROBAN) 2 % OINTMENT    Apply topically 3 times daily. NITROGLYCERIN (NITROSTAT) 0.4 MG SL TABLET    Place 1 tablet under the tongue every 5 minutes as needed for Chest pain    ONDANSETRON (ZOFRAN ODT) 4 MG DISINTEGRATING TABLET    Take 1 tablet by mouth every 8 hours as needed for Nausea    PALIPERIDONE (INVEGA) 3 MG EXTENDED RELEASE TABLET        PANTOPRAZOLE (PROTONIX) 40 MG TABLET    Take 1 tablet by mouth 2 times daily (before meals)    QUETIAPINE (SEROQUEL) 50 MG TABLET    Take 3 tablets by mouth nightly    RALOXIFENE (EVISTA) 60 MG TABLET    Take 1 tablet by mouth daily    RANOLAZINE (RANEXA) 500 MG EXTENDED RELEASE TABLET    Take 1 tablet by mouth in the morning and 1 tablet before bedtime.     RIFAXIMIN (XIFAXAN) 550 MG TABLET    Take 1 tablet by mouth 2 times daily    SOLIFENACIN (VESICARE) 5 MG TABLET    Take 1 tablet by mouth daily    SUCRALFATE (CARAFATE) 1 GM TABLET    Take 1 tablet by mouth 4 times daily    UNABLE TO FIND    Rx for depends   Use 3-4  times daily    VIREAD 300 MG TABLET    Take 300 mg by mouth daily        ALLERGIES     Norco [hydrocodone-acetaminophen] and Tylenol [acetaminophen]    FAMILYHISTORY       Family History   Problem Relation Age of Onset    Ovarian Cancer Mother     Cancer Mother         lung ca    Arthritis Mother     Migraines Mother     Cancer Father         colon ca    Diabetes Father     High Blood Pressure Father     Arthritis Father     High Cholesterol Father     Migraines Father     Migraines Sister     Heart Disease Brother         WPW    Breast Cancer Maternal Aunt     Breast Cancer Maternal Aunt     Breast Cancer Maternal Aunt     Breast Cancer Maternal Aunt         SOCIAL HISTORY       Social History     Socioeconomic History    Marital status:      Spouse name: None    Number of children: None    Years of education: None    Highest education level: None Tobacco Use    Smoking status: Former     Packs/day: 0.50     Years: 45.00     Pack years: 22.50     Types: Cigarettes     Start date: 1974     Passive exposure: Past    Smokeless tobacco: Never   Vaping Use    Vaping Use: Never used   Substance and Sexual Activity    Alcohol use: Not Currently     Alcohol/week: 2.0 - 3.0 standard drinks     Types: 2 - 3 Shots of liquor per week     Comment: 2-3 shots last 4/2019    Drug use: Yes     Frequency: 3.0 times per week     Types: Marijuana (Weed)     Comment: daily last smoked 3 mos ago as of 11-9-22    Sexual activity: Not Currently     Partners: Male     Social Determinants of Health     Financial Resource Strain: Low Risk     Difficulty of Paying Living Expenses: Not hard at all   Food Insecurity: No Food Insecurity    Worried About Running Out of Food in the Last Year: Never true    Ran Out of Food in the Last Year: Never true   Transportation Needs: No Transportation Needs    Lack of Transportation (Medical): No    Lack of Transportation (Non-Medical): No   Physical Activity: Insufficiently Active    Days of Exercise per Week: 7 days    Minutes of Exercise per Session: 10 min   Housing Stability: Unknown    Unable to Pay for Housing in the Last Year: No    Unstable Housing in the Last Year: No       SCREENINGS    Beatriz Coma Scale  Eye Opening: Spontaneous  Best Verbal Response: Oriented  Best Motor Response: Obeys commands  [place] Coma Scale Score: 15        PHYSICAL EXAM    (up to 7 for level 4, 8 or more for level 5)     ED Triage Vitals [11/30/22 1309]   BP Temp Temp Source Heart Rate Resp SpO2 Height Weight   118/64 98.3 °F (36.8 °C) Oral 82 19 93 % 4' 9.5\" (1.461 m) 176 lb (79.8 kg)       Physical Exam  Vitals and nursing note reviewed. Constitutional:       General: She is not in acute distress. Appearance: She is not ill-appearing or toxic-appearing. HENT:      Head: Normocephalic and atraumatic.       Right Ear: External ear normal.      Left Ear: External ear normal.      Mouth/Throat:      Mouth: Mucous membranes are moist.      Pharynx: No oropharyngeal exudate or posterior oropharyngeal erythema. Eyes:      General: No scleral icterus. Cardiovascular:      Rate and Rhythm: Normal rate and regular rhythm. Pulses: Normal pulses. Heart sounds: Normal heart sounds. No murmur heard. No gallop. Pulmonary:      Effort: Pulmonary effort is normal. No respiratory distress. Breath sounds: Examination of the right-upper field reveals wheezing. Examination of the left-upper field reveals wheezing. Examination of the right-lower field reveals decreased breath sounds and wheezing. Examination of the left-lower field reveals decreased breath sounds and wheezing. Decreased breath sounds and wheezing present. Chest:      Chest wall: No tenderness. Abdominal:      General: There is no distension. Palpations: Abdomen is soft. Tenderness: There is no abdominal tenderness. Musculoskeletal:         General: Normal range of motion. Cervical back: Normal range of motion and neck supple. No rigidity or tenderness. Skin:     General: Skin is warm and dry. Capillary Refill: Capillary refill takes less than 2 seconds. Neurological:      General: No focal deficit present. Mental Status: She is alert and oriented to person, place, and time. Psychiatric:         Mood and Affect: Mood normal.       DIAGNOSTIC RESULTS   LABS:  I have reviewed and interpreted all of the currently available lab results from this visit (if applicable) Only abnormal lab results are displayed. All other labs were within normal range or not returned as of this dictation.     Labs Reviewed   CBC WITH AUTO DIFFERENTIAL - Abnormal; Notable for the following components:       Result Value    MCH 33.2 (*)     Platelets 75 (*)     MPV 12.2 (*)     Segs Relative 71.4 (*)     Lymphocytes % 17.4 (*)     Monocytes % 9.2 (*)     All other components within normal limits   BASIC METABOLIC PANEL - Abnormal; Notable for the following components:    Glucose 184 (*)     Calcium 8.2 (*)     All other components within normal limits   D-DIMER, QUANTITATIVE - Abnormal; Notable for the following components:    D-Dimer, Quant 764 (*)     All other components within normal limits   RAPID INFLUENZA A/B ANTIGENS   COVID-19, RAPID   CULTURE, BLOOD 2   CULTURE, BLOOD 1   LACTIC ACID   MAGNESIUM   BRAIN NATRIURETIC PEPTIDE   TROPONIN   URINALYSIS WITH REFLEX TO CULTURE   BLOOD GAS, VENOUS   SPECIMEN REJECTION       Radiographs (if obtained):  [] The following radiograph was interpreted by myself in the absence of a radiologist:   [x] Radiologist's Report Reviewed:  CTA PULMONARY W CONTRAST   Final Result   No evidence of pulmonary embolism or acute pulmonary abnormality. Probable bilateral pneumonia. Follow-up to resolution is recommended. XR CHEST PORTABLE   Preliminary Result   Increased interstitial prominence may reflect mild interstitial pulmonary   edema. An interstitial infectious process is felt less likely. Borderline cardiac silhouette enlargement. Chart review shows recent radiograph(s):  No results found. EKG: When ordered, EKG's are interpreted by the Emergency Department Physician in the absence of a cardiologist.  Please see their note for interpretation of EKG. PROCEDURES   Unless otherwise noted below, none     Procedures    CRITICAL CARE TIME     na    CONSULTS:  IP CONSULT TO CARDIOLOGY    EMERGENCY DEPARTMENT COURSE and DIFFERENTIAL DIAGNOSIS/MDM:   Vitals:    Vitals:    11/30/22 1632 11/30/22 1659 11/30/22 1817 11/30/22 1818   BP:       Pulse: 74 71 70 70   Resp: 25 30 28 23   Temp:       TempSrc:       SpO2: 97% 97% 97% 97%   Weight:       Height:           Is this patient to be included in the SEP-1 Core Measure due to severe sepsis or septic shock?    No   Exclusion criteria - the patient is NOT to be included for SEP-1 Core Measure due to:  2+ SIRS criteria are not met     This is an alert and oriented x4, 63 y.o. yo female who presents emergency department with a headache. Patient has rapid      labored respirations. Vital signs within acceptable parameters. She is not tachycardic. Patient is afebrile. She has rapid respirations with audible wheezing. She is not hypoxic as evidenced by a 97 % pulse ox on room air. Based on the history, exam and diagnostic testing the most likely etiology of this patient's shortness of breath is likely pneumonia. Other etiologies considered. Labs notable for a normal white count. No anemia, electrolytes within normal parameters. BNP is encouraging at 288. Patient is negative for COVID-19 and influenza. Normal lactic acid. Chest x-ray is without infiltrates to suggest pneumonia. There is no chest pain. Wells score moderate therefore a screening D-dimer was done and was elevated. Testing for PE indicated. CTA - Chest is negative for PE but confirms bilateral pneumonia. Test results reviewed with the patient. She voices some respiratory improvement with neb treatments. In lieu of patient's work-up being with a negative white count and normal vitals, I discussed with the patient the option of hospitalization versus outpatient management. Through shared decision making the patient will be discharged home with a course of outpatient antibiotics and other medications to help with symptomatic management. Strict ED return guidelines have been reviewed. The patient agrees to return to the emergency department for any worsening signs or symptoms. FINAL IMPRESSION      1.  Community acquired pneumonia, unspecified laterality          DISPOSITION/PLAN   DISPOSITION Decision To Discharge 11/30/2022 07:07:27 PM      PATIENT REFERREDTO:  John Johnson MD  [address] [facility]  [phone]    Schedule an appointment as soon as possible for a visit in 3 days  follow up    DISCHARGE MEDICATIONS:  New Prescriptions    ALBUTEROL SULFATE HFA (PROVENTIL HFA) 108 (90 BASE) MCG/ACT INHALER    Inhale 2 puffs into the lungs every 4 hours as needed for Wheezing or Shortness of Breath With spacer (and mask if indicated). Thanks. AZITHROMYCIN (ZITHROMAX) 250 MG TABLET    Take 1 tablet by mouth See Admin Instructions for 5 days 500mg on day 1 followed by 250mg on days 2 - 5    BENZONATATE (TESSALON PERLES) 100 MG CAPSULE    Take 1 capsule by mouth 3 times daily as needed for Cough    PREDNISONE (DELTASONE) 20 MG TABLET    Take 2 tablets by mouth daily for 5 days       DISCONTINUED MEDICATIONS:  Discontinued Medications    BENZONATATE (TESSALON PERLES) 100 MG CAPSULE    Take 1 capsule by mouth 3 times daily as needed for Cough       Comment: Please note this report has been produced using speech recognition software and may contain errors related to that system including errors in grammar, punctuation, and spelling, as well as words and phrases that may be inappropriate. If there are any questions or concerns please feel free to contact the dictating provider for clarification.     ERLINDA Cullen CNP (electronically signed)      ERLINDA Cullen CNP  11/30/22 1934

## 2022-12-01 ENCOUNTER — OFFICE VISIT (OUTPATIENT)
Dept: FAMILY MEDICINE CLINIC | Age: 62
End: 2022-12-01
Payer: MEDICARE

## 2022-12-01 VITALS
HEART RATE: 77 BPM | BODY MASS INDEX: 38.28 KG/M2 | DIASTOLIC BLOOD PRESSURE: 84 MMHG | OXYGEN SATURATION: 95 % | WEIGHT: 180 LBS | SYSTOLIC BLOOD PRESSURE: 124 MMHG

## 2022-12-01 DIAGNOSIS — I25.10 CORONARY ARTERY DISEASE INVOLVING NATIVE HEART WITHOUT ANGINA PECTORIS, UNSPECIFIED VESSEL OR LESION TYPE: ICD-10-CM

## 2022-12-01 DIAGNOSIS — F41.9 ANXIETY: ICD-10-CM

## 2022-12-01 DIAGNOSIS — K74.60 CIRRHOSIS OF LIVER WITHOUT ASCITES, UNSPECIFIED HEPATIC CIRRHOSIS TYPE (HCC): ICD-10-CM

## 2022-12-01 DIAGNOSIS — Z79.4 TYPE 2 DIABETES MELLITUS WITH COMPLICATION, WITH LONG-TERM CURRENT USE OF INSULIN (HCC): Primary | ICD-10-CM

## 2022-12-01 DIAGNOSIS — R07.81 RIB PAIN: ICD-10-CM

## 2022-12-01 DIAGNOSIS — J18.9 PNEUMONIA OF BOTH LOWER LOBES DUE TO INFECTIOUS ORGANISM: ICD-10-CM

## 2022-12-01 DIAGNOSIS — K21.9 GASTROESOPHAGEAL REFLUX DISEASE WITHOUT ESOPHAGITIS: ICD-10-CM

## 2022-12-01 DIAGNOSIS — K51.00 PANCOLITIS (HCC): ICD-10-CM

## 2022-12-01 DIAGNOSIS — E72.20 HYPERAMMONEMIA (HCC): ICD-10-CM

## 2022-12-01 DIAGNOSIS — E11.8 TYPE 2 DIABETES MELLITUS WITH COMPLICATION, WITH LONG-TERM CURRENT USE OF INSULIN (HCC): Primary | ICD-10-CM

## 2022-12-01 DIAGNOSIS — I10 PRIMARY HYPERTENSION: ICD-10-CM

## 2022-12-01 DIAGNOSIS — D69.6 THROMBOCYTOPENIA (HCC): ICD-10-CM

## 2022-12-01 LAB
EKG ATRIAL RATE: 72 BPM
EKG DIAGNOSIS: NORMAL
EKG P AXIS: 67 DEGREES
EKG P-R INTERVAL: 142 MS
EKG Q-T INTERVAL: 410 MS
EKG QRS DURATION: 62 MS
EKG QTC CALCULATION (BAZETT): 448 MS
EKG R AXIS: 64 DEGREES
EKG T AXIS: 70 DEGREES
EKG VENTRICULAR RATE: 72 BPM
HBA1C MFR BLD: 8.5 %

## 2022-12-01 PROCEDURE — 83036 HEMOGLOBIN GLYCOSYLATED A1C: CPT | Performed by: FAMILY MEDICINE

## 2022-12-01 PROCEDURE — 99214 OFFICE O/P EST MOD 30 MIN: CPT | Performed by: FAMILY MEDICINE

## 2022-12-01 PROCEDURE — 3078F DIAST BP <80 MM HG: CPT | Performed by: FAMILY MEDICINE

## 2022-12-01 PROCEDURE — 3052F HG A1C>EQUAL 8.0%<EQUAL 9.0%: CPT | Performed by: FAMILY MEDICINE

## 2022-12-01 PROCEDURE — 93010 ELECTROCARDIOGRAM REPORT: CPT | Performed by: INTERNAL MEDICINE

## 2022-12-01 PROCEDURE — 3074F SYST BP LT 130 MM HG: CPT | Performed by: FAMILY MEDICINE

## 2022-12-01 RX ORDER — PHENYTOIN SODIUM 100 MG/1
CAPSULE, EXTENDED RELEASE ORAL
COMMUNITY
Start: 2015-01-18

## 2022-12-01 RX ORDER — OXYCODONE HYDROCHLORIDE 5 MG/1
TABLET ORAL
COMMUNITY
End: 2022-12-01

## 2022-12-01 RX ORDER — TRAMADOL HYDROCHLORIDE 50 MG/1
50 TABLET ORAL EVERY 8 HOURS PRN
Qty: 21 TABLET | Refills: 0 | Status: SHIPPED | OUTPATIENT
Start: 2022-12-01 | End: 2022-12-08

## 2022-12-01 RX ORDER — TIZANIDINE HYDROCHLORIDE 4 MG/1
CAPSULE, GELATIN COATED ORAL
COMMUNITY
End: 2022-12-01

## 2022-12-01 RX ORDER — ASPIRIN 81 MG/1
TABLET ORAL
COMMUNITY
End: 2022-12-01

## 2022-12-01 ASSESSMENT — ENCOUNTER SYMPTOMS
ABDOMINAL DISTENTION: 0
COUGH: 1
SHORTNESS OF BREATH: 1
WHEEZING: 1

## 2022-12-01 NOTE — PROGRESS NOTES
Georgette Howard  1960 12/01/22    Chief Complaint   Patient presents with    Follow-up    Diabetes    Follow-Up from Hospital     Ed follow up on bilateral pneumonia            Here for a follow-up regarding her diabetes mellitus, hypertension , coronary artery disease , GERD, patient did not check her A1c for more than a year, patient was seen last time in August for Medicare wellness visit, patient taking the Basaglar 15 units at night and she is taking NovoLog on a sliding scale, patient also was seen by the emergency room yesterday for cough shortness of breath, and had a CT done showed bilateral pneumonia, white blood cell within normal range, but refused to be admitted and preferred to get the antibiotic, backup prednisone and cough medicine was prescribed, patient did not to start taking the medication yet, that is she is coughing a lot and she has a low-grade fever, wheezing or chest, shortness of breath, is asking for pain medicine for her ribs pain from the coughing. Past Medical History:   Diagnosis Date    Abnormal Pap smear of cervix     Acid reflux     Arthritis     left knee    Breast cyst     CAD (coronary artery disease)     per [facility] 9/6/13 - Dr. Camryn Liu    COPD (chronic obstructive pulmonary disease) ([address]     follow with Dr Daniel Aguero    Depression     Bebo Males manic - depression see Dr Radha Nunes    Diabetes mellitus [facility])     dx 10+ yrs ago- follows with PCP    Drug abuse ([address]     hx use of cocaine, heroin and marijuana- states last used 12/2014    Glaucoma     bilateral    H/O Doppler lower venous ultrasound 04/04/2019    No DVT or SVT, Significant reflux of RGSV and LGSV. H/O echocardiogram 12/18/2020    EF 55-60%, Mod LVH.     Hepatic encephalopathy 05/2019    Hepatitis C     for liver bx 12/3/2015\"Have Hepatitis B and C and saw Dr Christoph Luciano for this 12/1/2015\"    Hiatal hernia     History of alcohol abuse     History of exercise stress test 01/02/2020    Treadmill, Normal exercise performance without angina and ischemic EKG changes.     HTN (hypertension)     \"for the past two yrs on medication\" follows with Dr Gwendolyn Pineda    Hx of blood clots 2019    Portal vein thrombsis - TIPS procedure 4/23/19    Hyperlipemia     Irregular heart beat     per pt    Liver hematoma     Migraine     Migraines     Last migraine:  2018    Nausea & vomiting     Neurologic disorder     Schizophrenia ([address]     per old chart    Seizures ([address]     \"last one was 9/2015- saw Dr Stanford Rees at [facility]- she said not sure if acutal seizures- she thinks they are panic or anxiety attacks\"    SOB (shortness of breath)     with any exertion    Vulvar mass      Past Surgical History:   Procedure Laterality Date    BREAST SURGERY  10/2015    left breast bx    CARDIAC CATHETERIZATION      per old chart pt had cath done in 3/2011 and 9/2013    CARPAL TUNNEL RELEASE Left 01/09/2020    CARPAL TUNNEL RELEASE LEFT performed by Libertad Nuñez DO at [address] Right 05/26/2020    dr Pamula Collet, carpal tunnel trigger finger release    CARPAL TUNNEL RELEASE Right 05/26/2020    RIGHT CARPAL TUNNEL RELEASE performed by Libertad Nuñez DO at [address]  per old chart done 1985    COLONOSCOPY  03/11/2013    diverticulosis, cecal polyp    COLONOSCOPY  03/16/2017    Internal hemorrhoids- Dr. Jaye Chu    COLONOSCOPY N/A 05/17/2022    COLONOSCOPY POLYPECTOMY SNARE/COLD BIOPSY performed by Jennifer Lee MD at [address] [facility], [address] [place] [address], DIAGNOSTIC  03/16/2017    EGD: Small esophageal varices, portal hypertensive gastropathy, reflux esophagitis, hiatal hernia    EYE SURGERY Bilateral ? when    cyst removal, cataracts w/lens replacement    FINGER TRIGGER RELEASE Right 05/26/2020    FINGER TRIGGER RELEASE RIGHT RING FINGER performed by Libertad Nuñez DO at [address] ([facility])      per old chart pt had CHOLO/BSO 1986    SALPINGO-OOPHORECTOMY      TIPS PROCEDURE  04/23/2019 TONSILLECTOMY  as a kid    UPPER GASTROINTESTINAL ENDOSCOPY N/A 11/14/2018    EGD DIAGNOSTIC ONLY performed by Shelia Brar MD at [facility] N/A 06/05/2019    EGD DIAGNOSTIC ONLY performed by Shelia Brar MD at [place] [id number] N/A 11/9/2022    PARTIAL VULVECTOMY performed by Solo Carrion MD at [facility] OR     Family History   Problem Relation Age of Onset    Ovarian Cancer Mother     Cancer Mother         lung ca    Arthritis Mother     Migraines Mother     Cancer Father         colon ca    Diabetes Father     High Blood Pressure Father     Arthritis Father     High Cholesterol Father     Migraines Father     Migraines Sister     Heart Disease Brother         WPW    Breast Cancer Maternal Aunt     Breast Cancer Maternal Aunt     Breast Cancer Maternal Aunt     Breast Cancer Maternal Aunt      Social History     Socioeconomic History    Marital status:      Spouse name: Not on file    Number of children: Not on file    Years of education: Not on file    Highest education level: Not on file   Occupational History    Not on file   Tobacco Use    Smoking status: Former     Packs/day: 0.50     Years: 45.00     Pack years: 22.50     Types: Cigarettes     Start date: 1974     Passive exposure: Past    Smokeless tobacco: Never   Vaping Use    Vaping Use: Never used   Substance and Sexual Activity    Alcohol use: Not Currently     Alcohol/week: 2.0 - 3.0 standard drinks     Types: 2 - 3 Shots of liquor per week     Comment: 2-3 shots last 4/2019    Drug use: Yes     Frequency: 3.0 times per week     Types: Marijuana (Weed)     Comment: daily last smoked 3 mos ago as of 11-9-22    Sexual activity: Not Currently     Partners: Male   Other Topics Concern    Not on file   Social History Narrative    Not on file     Social Determinants of Health     Financial Resource Strain: Low Risk     Difficulty of Paying Living Expenses: Not hard at all   Food Insecurity: No Food Insecurity    Worried About Running Out of Food in the Last Year: Never true    Ran Out of Food in the Last Year: Never true   Transportation Needs: No Transportation Needs    Lack of Transportation (Medical): No    Lack of Transportation (Non-Medical): No   Physical Activity: Insufficiently Active    Days of Exercise per Week: 7 days    Minutes of Exercise per Session: 10 min   Stress: Not on file   Social Connections: Not on file   Intimate Partner Violence: Not on file   Housing Stability: Unknown    Unable to Pay for Housing in the Last Year: No    Number of Places Lived in the Last Year: Not on file    Unstable Housing in the Last Year: No       Allergies   Allergen Reactions    Hydrocodone-Acetaminophen Anaphylaxis    Norco [Hydrocodone-Acetaminophen] Itching     Itching, rash, nausea and vomiting. Tylenol [Acetaminophen] Itching, Nausea And Vomiting and Rash     Current Outpatient Medications   Medication Sig Dispense Refill    phenytoin (DILANTIN) 100 MG ER capsule Phenytoin Sodium Extended Oral        active      traMADol (ULTRAM) 50 MG tablet Take 1 tablet by mouth every 8 hours as needed for Pain for up to 7 days. Intended supply: 5 days. Take lowest dose possible to manage pain 21 tablet 0    albuterol sulfate HFA (PROVENTIL HFA) 108 (90 Base) MCG/ACT inhaler Inhale 2 puffs into the lungs every 4 hours as needed for Wheezing or Shortness of Breath With spacer (and mask if indicated). Thanks.  18 g 1    azithromycin (ZITHROMAX) 250 MG tablet Take 1 tablet by mouth See Admin Instructions for 5 days 500mg on day 1 followed by 250mg on days 2 - 5 6 tablet 0    predniSONE (DELTASONE) 20 MG tablet Take 2 tablets by mouth daily for 5 days 10 tablet 0    benzonatate (TESSALON PERLES) 100 MG capsule Take 1 capsule by mouth 3 times daily as needed for Cough 10 capsule 0    insulin glargine (BASAGLAR KWIKPEN) 100 UNIT/ML injection pen Inject 15 Units into the skin nightly 5 Adjustable Dose Pre-filled Pen Syringe 0    mupirocin (BACTROBAN) 2 % ointment Apply topically 3 times daily. 30 g 0    blood glucose monitor strips Test 3-4 times a day & as needed for symptoms of irregular blood glucose. 100 strip 5    blood glucose monitor kit and supplies Use 3-4 times daily 1 kit 0    Lancets MISC Use one lancet 4 times daily 100 each 5    raloxifene (EVISTA) 60 MG tablet Take 1 tablet by mouth daily 90 tablet 3    insulin aspart (NOVOLOG FLEXPEN) 100 UNIT/ML injection pen **Low Dose Correction Algorithm**Insulin lispro (HUMALOG)  3 TIMES DAILY WITH MEALSGlucose: Dose: No [id number] 1 [id number] 2 [id number] 3 [id number] 4 [id number] 5 [id number] and above 6 Units 15 Adjustable Dose Pre-filled Pen Syringe 5    furosemide (LASIX) 20 MG tablet Take 1 tablet by mouth daily Hold if systolic <734 90 tablet 1    mirabegron (MYRBETRIQ) 25 MG TB24 Take 1 tablet by mouth daily 30 tablet 5    solifenacin (VESICARE) 5 MG tablet Take 1 tablet by mouth daily 30 tablet 11    estradiol (ESTRACE VAGINAL) 0.1 MG/GM vaginal cream Place 1 g vaginally three times a week Use 1 g vaginally nightly for 2 weeks, then 1 g nightly 2-3 times per week. 3 each 3    ranolazine (RANEXA) 500 MG extended release tablet Take 1 tablet by mouth in the morning and 1 tablet before bedtime. 180 tablet 1    metoprolol succinate (TOPROL XL) 50 MG extended release tablet Take 1 tablet by mouth in the morning. 90 tablet 1    midodrine (PROAMATINE) 5 MG tablet Take 1 tablet by mouth in the morning and 1 tablet at noon and 1 tablet in the evening. Take with meals.  270 tablet 1    ipratropium-albuterol (DUONEB) 0.5-2.5 (3) MG/3ML SOLN nebulizer solution Inhale 3 mLs into the lungs every 4 hours 120 each 5    nitroGLYCERIN (NITROSTAT) 0.4 MG SL tablet Place 1 tablet under the tongue every 5 minutes as needed for Chest pain 25 tablet 3    rifAXIMin (XIFAXAN) 550 MG tablet Take 1 tablet by mouth 2 times daily 42 tablet 0    sucralfate (CARAFATE) 1 GM tablet Take 1 tablet by mouth 4 times daily 120 tablet 3    ondansetron (ZOFRAN ODT) 4 MG disintegrating tablet Take 1 tablet by mouth every 8 hours as needed for Nausea 15 tablet 0    blood glucose monitor strips Test 3 times a day & as needed for symptoms of irregular blood glucose. Please fill with what is covered my patients insurance. 50 strip 3    blood glucose monitor strips Test  Bid times a day & as needed for symptoms of irregular blood glucose. 100 strip 5    Glucosource Lancets MISC Use one 3-4 times to check blood sugar 100 each 5    ipratropium-albuterol (DUONEB) 0.5-2.5 (3) MG/3ML SOLN nebulizer solution Inhale 3 mLs into the lungs every 4 hours 360 mL 0    lactulose (CHRONULAC) 10 GM/15ML solution Take 30 mLs by mouth 3 times daily 1892 mL 2    QUEtiapine (SEROQUEL) 50 MG tablet Take 3 tablets by mouth nightly 60 tablet 3    FLUoxetine (PROZAC) 20 MG capsule Take 20 mg by mouth daily      paliperidone (INVEGA) 3 MG extended release tablet       Compression Stockings MISC by Does not apply route 20 - 30 mmh wear daily and take off at night  Thigh High 2 each 2    UNABLE TO FIND Rx for depends   Use 3-4  times daily 1 box 5    glucose monitoring kit (FREESTYLE) monitoring kit 1 kit by Does not apply route daily 1 kit 0    Blood Glucose Monitoring Suppl (ACURA BLOOD GLUCOSE METER) w/Device KIT Please check blood sugar 3 times daily. Please fill with what is covered by the insurance 1 kit 0    pantoprazole (PROTONIX) 40 MG tablet Take 1 tablet by mouth 2 times daily (before meals) 60 tablet 1    VIREAD 300 MG tablet Take 300 mg by mouth daily        No current facility-administered medications for this visit. Review of Systems   Constitutional:  Positive for chills and fever. Negative for activity change and fatigue. HENT:  Negative for congestion. Respiratory:  Positive for cough, shortness of breath and wheezing. Cardiovascular:  Negative for chest pain and leg swelling. Gastrointestinal:  Negative for abdominal distention. Genitourinary:  Negative for dysuria. Neurological:  Negative for dizziness and headaches. Psychiatric/Behavioral:  Negative for agitation and sleep disturbance. The patient is not nervous/anxious. Lab Results   Component Value Date    WBC 7.7 11/30/2022    HGB 14.5 11/30/2022    HCT 42.4 11/30/2022    MCV 97.0 11/30/2022    PLT 75 (L) 11/30/2022     Lab Results   Component Value Date     11/30/2022    K 4.0 11/30/2022     11/30/2022    CO2 25 11/30/2022    BUN 12 11/30/2022    CREATININE 0.8 11/30/2022    GLUCOSE 184 (H) 11/30/2022    CALCIUM 8.2 (L) 11/30/2022    PROT 6.1 (L) 10/20/2022    LABALBU 3.4 10/20/2022    BILITOT 1.3 (H) 10/20/2022    ALKPHOS 135 (H) 10/20/2022    AST 18 10/20/2022    ALT 14 10/20/2022    LABGLOM >60 11/30/2022    GFRAA >60 01/31/2022    AGRATIO 1.3 10/20/2022    GLOB 3.5 04/04/2018     Lab Results   Component Value Date    CHOL 98 11/27/2019    CHOL 120 08/01/2017    CHOL 127 04/28/2013     Lab Results   Component Value Date    TRIG 96 11/27/2019    TRIG 138 08/01/2017    TRIG 122 04/28/2013     Lab Results   Component Value Date    HDL 38 (L) 11/27/2019    HDL 25 (L) 08/01/2017    HDL 34 (L) 04/28/2013     Lab Results   Component Value Date    LDLCALC 41 11/27/2019    LDLCALC 67 08/01/2017     Lab Results   Component Value Date    LABA1C 8.5 12/01/2022     Lab Results   Component Value Date    TSH 1.73 08/01/2017    TSHHS 2.540 11/23/2020         /84 (Site: Left Upper Arm, Position: Sitting, Cuff Size: Medium Adult)   Pulse 77   Wt 180 lb (81.6 kg)   SpO2 95%   BMI 38.28 kg/m²     BP Readings from Last 3 Encounters:   12/01/22 124/84   11/30/22 119/80   11/22/22 117/76       Wt Readings from Last 3 Encounters:   12/01/22 180 lb (81.6 kg)   11/30/22 176 lb (79.8 kg)   11/22/22 180 lb (81.6 kg)         Physical Exam  Constitutional:       General: She is not in acute distress.      Appearance: Normal appearance. She is well-developed. She is ill-appearing. She is not diaphoretic. HENT:      Head: Normocephalic and atraumatic. Eyes:      General: No scleral icterus. Pupils: Pupils are equal, round, and reactive to light. Neck:      Thyroid: No thyromegaly. Cardiovascular:      Rate and Rhythm: Normal rate and regular rhythm. Heart sounds: Normal heart sounds. No murmur heard. Pulmonary:      Effort: Pulmonary effort is normal.      Breath sounds: Decreased air movement present. Wheezing present. No rales. Musculoskeletal:         General: Normal range of motion. Cervical back: Normal range of motion and neck supple. Right lower leg: No edema. Left lower leg: No edema. Lymphadenopathy:      Cervical: No cervical adenopathy. Neurological:      General: No focal deficit present. Mental Status: She is alert and oriented to person, place, and time. Cranial Nerves: No cranial nerve deficit. Motor: No abnormal muscle tone. Psychiatric:         Mood and Affect: Mood normal.         Behavior: Behavior normal.       ASSESSMENT/ PLAN:    1. Type 2 diabetes mellitus with complication, with long-term current use of insulin (HCC)  -Last A1c was done in November 2021 was 8.1 today is 8.5 patient does take Basaglar 15 units and NovoLog sliding scale so we asked her to increase the Basaglar 5 units every week until has a fasting blood sugar 150 or less  - POCT glycosylated hemoglobin (Hb A1C)    2. Primary hypertension  -Stable she is taking metoprolol and Ranexa    3. Coronary artery disease involving native heart without angina pectoris, unspecified vessel or lesion type  -Stable and follow-up with the cardiologist    4. Gastroesophageal reflux disease without esophagitis  -Stable    5. Cirrhosis of liver without ascites, unspecified hepatic cirrhosis type ([facility]  -On multi medication follow-up with Dr. No Morales    6.  Anxiety  -She does take Prozac and other medications does follow-up with the mental health    7. Pneumonia of both lower lobes due to infectious organism  -Already seen yesterday by the emergency room patient refused to be admitted her white blood cell within normal range and the CT did show dependent lobar pneumonia antibiotic was sent with the prednisone patient did not take her medication yet she is going to start today'  Told her if you are getting worse need to go to the emergency room    8. Rib pain  -From the cough we will give her just a 21 tablets  - traMADol (ULTRAM) 50 MG tablet; Take 1 tablet by mouth every 8 hours as needed for Pain for up to 7 days. Intended supply: 5 days. Take lowest dose possible to manage pain  Dispense: 21 tablet; Refill: 0    9. Pancolitis ([address]  -Improved    10. Hyperammonemia (HCC)  -Improved    11. Thrombocytopenia (HCC)  -Stable and does follow-up with Dr. Silvia Abbott for her liver cirrhosis              - All old blood work reviewed with the patient  - Appropriate prescription are addressed. - After visit summery provided. - Questions answered and patient verbalizes understanding.  - Call for any problem, questions, or concerns. Return in about 6 months (around 6/1/2023).

## 2022-12-04 LAB
CULTURE: NORMAL
CULTURE: NORMAL
Lab: NORMAL
Lab: NORMAL
SPECIMEN: NORMAL
SPECIMEN: NORMAL

## 2022-12-08 ENCOUNTER — CARE COORDINATION (OUTPATIENT)
Dept: CARE COORDINATION | Age: 62
End: 2022-12-08

## 2022-12-08 SDOH — SOCIAL STABILITY: SOCIAL NETWORK: HOW OFTEN DO YOU GET TOGETHER WITH FRIENDS OR RELATIVES?: TWICE A WEEK

## 2022-12-08 SDOH — HEALTH STABILITY: PHYSICAL HEALTH: ON AVERAGE, HOW MANY MINUTES DO YOU ENGAGE IN EXERCISE AT THIS LEVEL?: 0 MIN

## 2022-12-08 SDOH — ECONOMIC STABILITY: FOOD INSECURITY: WITHIN THE PAST 12 MONTHS, YOU WORRIED THAT YOUR FOOD WOULD RUN OUT BEFORE YOU GOT MONEY TO BUY MORE.: SOMETIMES TRUE

## 2022-12-08 SDOH — HEALTH STABILITY: PHYSICAL HEALTH: ON AVERAGE, HOW MANY DAYS PER WEEK DO YOU ENGAGE IN MODERATE TO STRENUOUS EXERCISE (LIKE A BRISK WALK)?: 0 DAYS

## 2022-12-08 SDOH — HEALTH STABILITY: MENTAL HEALTH: HOW MANY STANDARD DRINKS CONTAINING ALCOHOL DO YOU HAVE ON A TYPICAL DAY?: PATIENT DOES NOT DRINK

## 2022-12-08 SDOH — SOCIAL STABILITY: SOCIAL NETWORK: HOW OFTEN DO YOU ATTENT MEETINGS OF THE CLUB OR ORGANIZATION YOU BELONG TO?: MORE THAN 4 TIMES PER YEAR

## 2022-12-08 SDOH — ECONOMIC STABILITY: INCOME INSECURITY: HOW HARD IS IT FOR YOU TO PAY FOR THE VERY BASICS LIKE FOOD, HOUSING, MEDICAL CARE, AND HEATING?: SOMEWHAT HARD

## 2022-12-08 SDOH — SOCIAL STABILITY: SOCIAL NETWORK: ARE YOU MARRIED, WIDOWED, DIVORCED, SEPARATED, NEVER MARRIED, OR LIVING WITH A PARTNER?: DIVORCED

## 2022-12-08 SDOH — HEALTH STABILITY: MENTAL HEALTH: HOW OFTEN DO YOU HAVE A DRINK CONTAINING ALCOHOL?: NEVER

## 2022-12-08 SDOH — ECONOMIC STABILITY: TRANSPORTATION INSECURITY
IN THE PAST 12 MONTHS, HAS LACK OF TRANSPORTATION KEPT YOU FROM MEETINGS, WORK, OR FROM GETTING THINGS NEEDED FOR DAILY LIVING?: YES

## 2022-12-08 SDOH — ECONOMIC STABILITY: FOOD INSECURITY: WITHIN THE PAST 12 MONTHS, THE FOOD YOU BOUGHT JUST DIDN'T LAST AND YOU DIDN'T HAVE MONEY TO GET MORE.: SOMETIMES TRUE

## 2022-12-08 SDOH — ECONOMIC STABILITY: TRANSPORTATION INSECURITY
IN THE PAST 12 MONTHS, HAS THE LACK OF TRANSPORTATION KEPT YOU FROM MEDICAL APPOINTMENTS OR FROM GETTING MEDICATIONS?: YES

## 2022-12-08 SDOH — ECONOMIC STABILITY: HOUSING INSECURITY: IN THE LAST 12 MONTHS, HOW MANY PLACES HAVE YOU LIVED?: 1

## 2022-12-08 SDOH — SOCIAL STABILITY: SOCIAL NETWORK: HOW OFTEN DO YOU ATTEND CHURCH OR RELIGIOUS SERVICES?: MORE THAN 4 TIMES PER YEAR

## 2022-12-08 SDOH — ECONOMIC STABILITY: INCOME INSECURITY: IN THE LAST 12 MONTHS, WAS THERE A TIME WHEN YOU WERE NOT ABLE TO PAY THE MORTGAGE OR RENT ON TIME?: NO

## 2022-12-08 SDOH — SOCIAL STABILITY: SOCIAL NETWORK
DO YOU BELONG TO ANY CLUBS OR ORGANIZATIONS SUCH AS CHURCH GROUPS UNIONS, FRATERNAL OR ATHLETIC GROUPS, OR SCHOOL GROUPS?: YES

## 2022-12-08 SDOH — SOCIAL STABILITY: SOCIAL NETWORK: IN A TYPICAL WEEK, HOW MANY TIMES DO YOU TALK ON THE PHONE WITH FAMILY, FRIENDS, OR NEIGHBORS?: THREE TIMES A WEEK

## 2022-12-08 SDOH — HEALTH STABILITY: MENTAL HEALTH
STRESS IS WHEN SOMEONE FEELS TENSE, NERVOUS, ANXIOUS, OR CAN'T SLEEP AT NIGHT BECAUSE THEIR MIND IS TROUBLED. HOW STRESSED ARE YOU?: ONLY A LITTLE

## 2022-12-08 ASSESSMENT — ENCOUNTER SYMPTOMS: DYSPNEA ASSOCIATED WITH: EXERTION

## 2022-12-08 NOTE — CARE COORDINATION
Contacted Sada Armando and left voicemail regarding Dietitian referral. Left call back number and will follow up as appropriate.          [address] [facility], [address] [facility]

## 2022-12-08 NOTE — CARE COORDINATION
Ambulatory Care Coordination Note  12/8/2022    ACC: Dean Marion, RN         Spoke with patient for ACM follow up. Oriented to the role of ACM. Patient consents to ongoing follow up. PNA:  Patient reports that she is feeling better. SOB on exertion. Loose; less frequent cough. Instructed on compliance with medications as directed; fluids to thin mucous. Encouraged deep breathing exercises. Patient completed antibiotic course. Confirms that she is using her inhaler as directed. Patient confirms PCP follow up and has upcoming appointment with Pulmonology. COPD:  Patient has med nebs, inhaler; confirms that she is using as directed; denies increased frequency. Reviewed COPD zone tool and s/s to report to MD.  Copy of zone tool sent via My Chart for reference. DM:  Patient reports most recent BS was 275. A1C 8.5 12/1/22. Reports that BS was running in the 400's. Instructed on compliance with diet, medications. Encouraged routine BS  monitoring; log for Provider review. Copy of zone tool sent via My Chart for reference. Patient reports that she is checking her BS twice daily. Patient reports that her food stamps were recently cut and she does not always have enough food for the week. Reports that she struggles with dietary compliance d/t financial limitations. Discussed referrals to RD/ LSW to assist with education/ resource needs. Patient is agreeable. Offered patient enrollment in the Remote Patient Monitoring (RPM) program for in-home monitoring: NA.    Patient reports that she lives with her sister and is independent at home; but transportation is a barrier. Instructed on USS support and patient consents to referral.    No questions noted. ACM contact information provided should questions arise. Plan for next outreach:  Confirm support, zone review    Spoke with Aga/ MUSTAPHA. Referral made for transp. Support. Referral made for RD support.     Referral made for LSW support. Ambulatory Care Coordination Assessment    Care Coordination Protocol  Referral from Primary Care Provider: No  Week 1 - Initial Assessment     Do you have all of your prescriptions and are they filled?: Yes (Comment: Being delivered today)  Barriers to medication adherence: None  Are you able to afford your medications?: Yes  How often do you have trouble taking your medications the way you have been told to take them?: I always take them as prescribed. Do you have Home O2 Therapy?: No      Ability to seek help/take action for Emergent Urgent situations i.e. fire, crime, inclement weather or health crisis. : Independent  Ability to ambulate to restroom: Independent  Ability handle personal hygeine needs (bathing/dressing/grooming): Independent  Ability to manage Medications: Independent  Ability to prepare Food Preparation: Independent  Ability to maintain home (clean home, laundry): Independent  Ability to drive and/or has transportation: Dependent  Ability to do shopping: Needs Assistance  Ability to manage finances:  Independent  Is patient able to live independently?: Yes     Current Housing: Private Residence           Frequent urination at night?: Yes  Do you use rails/bars?: No  Do you have a non-slip tub mat?: No     Are you experiencing loss of meaning?: No  Are you experiencing loss of hope and peace?: No     Thinking about your patient's physical health needs, are there any symptoms or problems (risk indicators) you are unsure about that require further investigation?: No identified areas of uncertainly or problems already being investigated   Are the patients physical health problems impacting on their mental well-being?: No identified areas of concern   Are there any problems with your patients lifestyle behaviors (alcohol, drugs, diet, exercise) that are impacting on physical or mental well-being?: No identified areas of concern   Do you have any other concerns about your patients mental well-being? How would you rate their severity and impact on the patient?: No identified areas of concern   How would you rate their home environment in terms of safety and stability (including domestic violence, insecure housing, neighbor harassment)?: Consistently safe, supportive, stable, no identified problems   How do daily activities impact on the patient's well-being? (include current or anticipated unemployment, work, caregiving, access to transportation or other): No identified problems or perceived positive benefits   How would you rate their social network (family, work, friends)?: Good participation with social networks   How would you rate their financial resources (including ability to afford all required medical care)?: Financially secure, some resource challenges   How wells does the patient now understand their health and well-being (symptoms, signs or risk factors) and what they need to do to manage their health?: Reasonable to good understanding and already engages in managing health or is willing to undertake better management   How well do you think your patient can engage in healthcare discussions? (Barriers include language, deafness, aphasia, alcohol or drug problems, learning difficulties, concentration): Clear and open communication, no identified barriers   Do other services need to be involved to help this patient?: Other care/services in place but not sufficient   Are current services involved with this patient well-coordinated? (Include coordination with other services you are now recommendation): Required care/services in place with some coordination barriers   Suggested Interventions and Community Resources  Fall Risk Prevention: In Process Medication Assistance Program: In Process   Registered Dietician: In Process   Senior Services: In Process   Social Work: In Process   Zone Management Tools:  In Process         Set up/Review an Education Plan, Set up/Review Goals Prior to Admission medications    Medication Sig Start Date End Date Taking? Authorizing Provider   traMADol (ULTRAM) 50 MG tablet Take 1 tablet by mouth every 8 hours as needed for Pain for up to 7 days. Intended supply: 5 days. Take lowest dose possible to manage pain 12/1/22 12/8/22 Yes Matt Colvin MD   albuterol sulfate HFA (PROVENTIL HFA) 108 (90 Base) MCG/ACT inhaler Inhale 2 puffs into the lungs every 4 hours as needed for Wheezing or Shortness of Breath With spacer (and mask if indicated). Thanks. 11/30/22 12/30/22 Yes ERLINDA Ferreira - CNP   insulin glargine (BASAGLAR KWIKPEN) 100 UNIT/ML injection pen Inject 15 Units into the skin nightly 11/15/22  Yes Matt Colvin MD   mupirocin (BACTROBAN) 2 % ointment Apply topically 3 times daily. 11/9/22  Yes Martin Yen MD   blood glucose monitor strips Test 3-4 times a day & as needed for symptoms of irregular blood glucose.  11/8/22  Yes Matt Colvin MD   blood glucose monitor kit and supplies Use 3-4 times daily 11/8/22  Yes Matt Colvin MD   Lancets MISC Use one lancet 4 times daily 11/8/22  Yes Matt Colvin MD   raloxifene (EVISTA) 60 MG tablet Take 1 tablet by mouth daily 11/7/22  Yes Martin Yen MD   insulin aspart (NOVOLOG FLEXPEN) 100 UNIT/ML injection pen **Low Dose Correction Algorithm**Insulin lispro (HUMALOG)  3 TIMES DAILY WITH MEALSGlucose: Dose: No [id number] 1 [id number] 2 [id number] 3 [id number] 4 [id number] 5 [id number] and above 6 Units 11/1/22  Yes Matt Colvin MD   furosemide (LASIX) 20 MG tablet Take 1 tablet by mouth daily Hold if systolic <259 59/22/24  Yes Lanny Reaves MD   mirabegron [facility]) 25 MG TB24 Take 1 tablet by mouth daily 9/22/22  Yes Martin Yen MD   solifenacin (VESICARE) 5 MG tablet Take 1 tablet by mouth daily 9/20/22  Yes Martin Yen MD   estradiol (ESTRACE VAGINAL) 0.1 MG/GM vaginal cream Place 1 g vaginally three times a week Use 1 g vaginally nightly for 2 weeks, then 1 g nightly 2-3 times per week. 9/21/22  Yes Camden Bhagat MD   ranolazine (RANEXA) 500 MG extended release tablet Take 1 tablet by mouth in the morning and 1 tablet before bedtime. 8/5/22  Yes Nga Soto MD   metoprolol succinate (TOPROL XL) 50 MG extended release tablet Take 1 tablet by mouth in the morning. 7/21/22  Yes Nga Soto MD   midodrine (PROAMATINE) 5 MG tablet Take 1 tablet by mouth in the morning and 1 tablet at noon and 1 tablet in the evening. Take with meals. 7/21/22  Yes Nga Soto MD   ipratropium-albuterol (DUONEB) 0.5-2.5 (3) MG/3ML SOLN nebulizer solution Inhale 3 mLs into the lungs every 4 hours 6/13/22  Yes Dolan Paget, MD   nitroGLYCERIN (NITROSTAT) 0.4 MG SL tablet Place 1 tablet under the tongue every 5 minutes as needed for Chest pain 5/18/22  Yes Nga Soto MD   rifAXIMin (XIFAXAN) 550 MG tablet Take 1 tablet by mouth 2 times daily 5/18/22  Yes Nga Soto MD   sucralfate (CARAFATE) 1 GM tablet Take 1 tablet by mouth 4 times daily 1/31/22  Yes Nicole Skinner MD   ondansetron (ZOFRAN ODT) 4 MG disintegrating tablet Take 1 tablet by mouth every 8 hours as needed for Nausea 1/31/22  Yes Nicole Skinner MD   blood glucose monitor strips Test 3 times a day & as needed for symptoms of irregular blood glucose. Please fill with what is covered my patients insurance. 1/17/22  Yes Madhu Crum MD   blood glucose monitor strips Test  Bid times a day & as needed for symptoms of irregular blood glucose.  1/13/22  Yes Madhu Crum MD   Glucosource Lancets MISC Use one 3-4 times to check blood sugar 2/10/21  Yes Madhu Crum MD   ipratropium-albuterol (DUONEB) 0.5-2.5 (3) MG/3ML SOLN nebulizer solution Inhale 3 mLs into the lungs every 4 hours 11/23/20  Yes Duard Merlin,    lactulose (CHRONULAC) 10 GM/15ML solution Take 30 mLs by mouth 3 times daily 9/15/20  Yes Brandon Austin MD   QUEtiapine (SEROQUEL) 50 MG tablet Take 3 tablets by mouth nightly 9/15/20  Yes Chivo Humphries MD   FLUoxetine (PROZAC) 20 MG capsule Take 20 mg by mouth daily 9/11/20  Yes Historical Provider, MD   paliperidone (INVEGA) 3 MG extended release tablet  8/13/20  Yes Historical Provider, MD   Compression Stockings MISC by Does not apply route 20 - 30 mmh wear daily and take off at night  Thigh High 5/21/20  Yes Lucero Flair, APRN - CNP   glucose monitoring kit (FREESTYLE) monitoring kit 1 kit by Does not apply route daily 1/30/20  Yes Cayden Lizarraga MD   Blood Glucose Monitoring Suppl (ACURA BLOOD GLUCOSE METER) w/Device KIT Please check blood sugar 3 times daily. Please fill with what is covered by the insurance 1/21/20  Yes Cayden Lizarraga MD   pantoprazole (PROTONIX) 40 MG tablet Take 1 tablet by mouth 2 times daily (before meals) 10/27/19  Yes Marie Novak MD   VIREAD 300 MG tablet Take 300 mg by mouth daily  11/20/17  Yes Historical Provider, MD   phenytoin (DILANTIN) 100 MG ER capsule Phenytoin Sodium Extended Oral        active  Patient not taking: Reported on 12/8/2022 1/18/15   Historical Provider, MD Gwen Manrique TO FIND Rx for depends   Use 3-4  times daily 4/10/20   Cayden Lizarraga MD       Future Appointments   Date Time Provider Cornell Hightower   12/12/2022 10:00 MD Caron [facility]   1/27/2023 10:15 AM Jonathan Robison MD [facility]   5/25/2023  3:00 PM Kentrell Dunne MD [facility]   6/1/2023 11:00 AM MD Catie Braxton [facility]   8/22/2023  2:30 PM MD Catie Braxton     ,   Diabetes Assessment      Meal Planning: None   How often do you test your blood sugar?: Daily, Other (Comment: twice daily)   Do you have barriers with adherence to non-pharmacologic self-management interventions?  (Nutrition/Exercise/Self-Monitoring): Yes   Have you ever had to go to the ED for symptoms of low blood sugar?: No       No patient-reported symptoms   Do you have hyperglycemia symptoms?: No   Do you have hypoglycemia symptoms?: No   Last Blood Sugar Value: 275   Blood Sugar Monitoring Regimen: 2 Hours Post Meal   Blood Sugar Trends: Steady Decrease        , and   General Assessment    Do you have any symptoms that are causing concern?: No

## 2022-12-12 ENCOUNTER — CARE COORDINATION (OUTPATIENT)
Dept: CARE COORDINATION | Age: 62
End: 2022-12-12

## 2022-12-12 NOTE — CARE COORDINATION
[facility] regarding Dietitian referral. Pt answered, RD explained reason for call and role in care. Pt requested RD call back on a different day- patient prefers a call on Friday 12/16/22. Will contact pt as requested and follow up as appropriate.        [address] [facility], [address] [place] [address]

## 2022-12-12 NOTE — CARE COORDINATION
Initial Contact Social Work Note - Ambulatory  12/12/2022      Date of referral: 12/8/2022  Referral received from: [address] [place] [address]  Reason for referral: food insecurity    Previous SW referral: No  If yes, brief summary of outcome: n/a    Two Identifiers Verified: Yes    Insurance Provider: [employer] PPO    Support System:   my sister, Buzz Pagan and sister Meme Martin. Pt resides with [facility] Status:  n/a    Community Providers: SNAP/Food [place], J[place],     ADL Assistance Needed: N/A    Housing/Living Concerns or Home Modification Needs: looking for an apartment, not able to find anything. Transportation Concern: Pt reported she does need assistance with transportation, Leslie Mesa made referral to S and Pt has their contact information    Medication Cost Concern: none reported     Financial Concern(s): Pt reports her food stamps were decreased from $116 to $70/month, believes it is because they are ending the extra money given monthly due to covid protocols. Income (only if applicable): 9,681 from [employer] monthly    Ability to Read/Write: Yes    Advance Care Plan:   Would like a copy, interested in completing via [employer]. Other: Phone call to Pt to complete initial SW assessment. Pt answered and was in agreement with SW assessment and with ongoing SW follow up calls. Pt identified her 2 sisters as her support system, stating she is currently residing with her sister, Buzz Pagan. Pt did report she has been looking for low income housing, stating she has contacted several apartments and has been denied due to age and previous evictions. SW offered to send list of low income housing to Pt. Pt was in agreement. Pt did identify that her food stamps were decreased to $70 / month from $120 and she needs assistance with cost of food. SW offered to send information regarding food pantries. Pt was in agreement to send via postal mail. SW did discuss completing ACP documents.   Pt stated her sister and herself were interested in completing ACP forms. SW discussed the option of completing via [employer] if she or her sister have email. Pt stated she was in agreement. SW discussed plan to have forms mailed to the home and this SW will follow up in around 10 days to confirm she received the documents / community resource information and to schedule to complete documents via [employer]. SW emailed coworker, Laddonia Leticia requesting to [employer], ACP documents and Low income housing information    Identified Needs:  Food assistance   Low income housing  ACP      Social Work Plan:  SW mail out information on food pantries, low income housing and ACP documents    Next Steps: SW discussed plans to follow up with Pt on 12/21 regarding food assistance, low income housing and ACP. Method of Communication With Provider (if appropriate): Chart Routing       Goals Addressed                      This Visit's Progress      Patient Stated (pt-stated)         Pt identified need for food assistance and low income housing. Barriers: financial, overwhelmed by complexity of regimen, stress, and time constraints  Plan for overcoming my barriers: Pt will be provided with support by her family, ACM and this SW in achieving goals.    Confidence: 8/10  Anticipated Goal Completion Date: 3/12/2023        Patient Stated (pt-stated)         Pt will complete ACP documents    Barriers: stress  Plan for overcoming my barriers: SW will assist in answering questions regarding ACP documents and assist with the process of completing the forms  Confidence: 9/10  Anticipated Goal Completion Date: 2/12/2023

## 2022-12-13 ENCOUNTER — CARE COORDINATION (OUTPATIENT)
Dept: CARE COORDINATION | Age: 62
End: 2022-12-13

## 2022-12-14 RX ORDER — INSULIN GLARGINE 100 [IU]/ML
15 INJECTION, SOLUTION SUBCUTANEOUS NIGHTLY
Qty: 5 ADJUSTABLE DOSE PRE-FILLED PEN SYRINGE | Refills: 5 | Status: SHIPPED | OUTPATIENT
Start: 2022-12-14

## 2022-12-15 ENCOUNTER — CARE COORDINATION (OUTPATIENT)
Dept: CARE COORDINATION | Age: 62
End: 2022-12-15

## 2022-12-16 ENCOUNTER — CARE COORDINATION (OUTPATIENT)
Dept: CARE COORDINATION | Age: 62
End: 2022-12-16

## 2022-12-16 NOTE — CARE COORDINATION
[facility] regarding Dietitian referral. Pt answered, RD explained reason for call and role in care. Patient declined nutrition assessment at this time. RD offered to mail educational handouts with a follow up call, pt accepted and RD verified address. [facility]. Per chart review, patient's current A1C is 8.5% as of 12/1/22. RD will mail Meal Planner Diabetes, Knowing Your #s, Sample Menu, Hyperglycemia and Hypoglycemia. RD will outreach in 3 weeks to follow up, ensure handouts were received and answer any nutrition related questions at this time.      [address] [facility], [address] [place] [address]

## 2022-12-20 ENCOUNTER — CARE COORDINATION (OUTPATIENT)
Dept: CARE COORDINATION | Age: 62
End: 2022-12-20

## 2022-12-20 ENCOUNTER — APPOINTMENT (OUTPATIENT)
Dept: GENERAL RADIOLOGY | Age: 62
End: 2022-12-20
Payer: MEDICARE

## 2022-12-20 ENCOUNTER — HOSPITAL ENCOUNTER (OUTPATIENT)
Age: 62
Setting detail: OBSERVATION
Discharge: HOME | End: 2022-12-23
Attending: EMERGENCY MEDICINE | Admitting: STUDENT IN AN ORGANIZED HEALTH CARE EDUCATION/TRAINING PROGRAM
Payer: MEDICARE

## 2022-12-20 DIAGNOSIS — J18.9 COMMUNITY ACQUIRED PNEUMONIA, UNSPECIFIED LATERALITY: ICD-10-CM

## 2022-12-20 DIAGNOSIS — Z79.4 TYPE 2 DIABETES MELLITUS WITH HYPERGLYCEMIA, WITH LONG-TERM CURRENT USE OF INSULIN (HCC): ICD-10-CM

## 2022-12-20 DIAGNOSIS — J18.9 PNEUMONIA OF BOTH LOWER LOBES DUE TO INFECTIOUS ORGANISM: ICD-10-CM

## 2022-12-20 DIAGNOSIS — E11.65 TYPE 2 DIABETES MELLITUS WITH HYPERGLYCEMIA, WITH LONG-TERM CURRENT USE OF INSULIN (HCC): ICD-10-CM

## 2022-12-20 DIAGNOSIS — S20.212A RIB CONTUSION, LEFT, INITIAL ENCOUNTER: Primary | ICD-10-CM

## 2022-12-20 PROBLEM — J16.0 CAP (COMMUNITY ACQUIRED PNEUMONIA) DUE TO CHLAMYDIA SPECIES: Status: ACTIVE | Noted: 2022-12-20

## 2022-12-20 LAB
ADENOVIRUS DETECTION BY PCR: NOT DETECTED
ALBUMIN SERPL-MCNC: 3.1 GM/DL (ref 3.4–5)
ALP BLD-CCNC: 173 IU/L (ref 40–129)
ALT SERPL-CCNC: 15 U/L (ref 10–40)
ANION GAP SERPL CALCULATED.3IONS-SCNC: 8 MMOL/L (ref 4–16)
AST SERPL-CCNC: 15 IU/L (ref 15–37)
BASOPHILS ABSOLUTE: 0.1 K/CU MM
BASOPHILS RELATIVE PERCENT: 0.8 % (ref 0–1)
BILIRUB SERPL-MCNC: 1.3 MG/DL (ref 0–1)
BORDETELLA PARAPERTUSSIS BY PCR: NOT DETECTED
BORDETELLA PERTUSSIS PCR: NOT DETECTED
BUN BLDV-MCNC: 16 MG/DL (ref 6–23)
CALCIUM SERPL-MCNC: 9 MG/DL (ref 8.3–10.6)
CHLAMYDOPHILA PNEUMONIA PCR: NOT DETECTED
CHLORIDE BLD-SCNC: 104 MMOL/L (ref 99–110)
CO2: 26 MMOL/L (ref 21–32)
CORONAVIRUS 229E PCR: NOT DETECTED
CORONAVIRUS HKU1 PCR: NOT DETECTED
CORONAVIRUS NL63 PCR: NOT DETECTED
CORONAVIRUS OC43 PCR: NOT DETECTED
CREAT SERPL-MCNC: 1 MG/DL (ref 0.6–1.1)
DIFFERENTIAL TYPE: ABNORMAL
EKG ATRIAL RATE: 76 BPM
EKG DIAGNOSIS: NORMAL
EKG P AXIS: 46 DEGREES
EKG P-R INTERVAL: 138 MS
EKG Q-T INTERVAL: 414 MS
EKG QRS DURATION: 78 MS
EKG QTC CALCULATION (BAZETT): 465 MS
EKG R AXIS: 15 DEGREES
EKG T AXIS: 36 DEGREES
EKG VENTRICULAR RATE: 76 BPM
EOSINOPHILS ABSOLUTE: 0.2 K/CU MM
EOSINOPHILS RELATIVE PERCENT: 2.8 % (ref 0–3)
GFR SERPL CREATININE-BSD FRML MDRD: >60 ML/MIN/1.73M2
GLUCOSE BLD-MCNC: 315 MG/DL
GLUCOSE BLD-MCNC: 315 MG/DL (ref 70–99)
GLUCOSE BLD-MCNC: 340 MG/DL (ref 70–99)
GLUCOSE BLD-MCNC: 372 MG/DL (ref 70–99)
GLUCOSE BLD-MCNC: 401 MG/DL (ref 70–99)
HCT VFR BLD CALC: 43.1 % (ref 37–47)
HEMOGLOBIN: 14.9 GM/DL (ref 12.5–16)
HUMAN METAPNEUMOVIRUS PCR: NOT DETECTED
IMMATURE NEUTROPHIL %: 0.3 % (ref 0–0.43)
INFLUENZA A BY PCR: NOT DETECTED
INFLUENZA A H1 (2009) PCR: NOT DETECTED
INFLUENZA A H1 PANDEMIC PCR: NOT DETECTED
INFLUENZA A H3 PCR: NOT DETECTED
INFLUENZA B BY PCR: NOT DETECTED
LACTATE: 2.3 MMOL/L (ref 0.5–1.9)
LIPASE: 192 IU/L (ref 13–60)
LYMPHOCYTES ABSOLUTE: 1.7 K/CU MM
LYMPHOCYTES RELATIVE PERCENT: 23.1 % (ref 24–44)
MAGNESIUM: 1.8 MG/DL (ref 1.8–2.4)
MCH RBC QN AUTO: 32.8 PG (ref 27–31)
MCHC RBC AUTO-ENTMCNC: 34.6 % (ref 32–36)
MCV RBC AUTO: 94.9 FL (ref 78–100)
MONOCYTES ABSOLUTE: 0.5 K/CU MM
MONOCYTES RELATIVE PERCENT: 7 % (ref 0–4)
MYCOPLASMA PNEUMONIAE PCR: NOT DETECTED
NUCLEATED RBC %: 0 %
PARAINFLUENZA 1 PCR: NOT DETECTED
PARAINFLUENZA 2 PCR: NOT DETECTED
PARAINFLUENZA 3 PCR: NOT DETECTED
PARAINFLUENZA 4 PCR: NOT DETECTED
PDW BLD-RTO: 13.2 % (ref 11.7–14.9)
PLATELET # BLD: 94 K/CU MM (ref 140–440)
PMV BLD AUTO: 12.5 FL (ref 7.5–11.1)
POTASSIUM SERPL-SCNC: 4.4 MMOL/L (ref 3.5–5.1)
PROCALCITONIN: 0.05
RBC # BLD: 4.54 M/CU MM (ref 4.2–5.4)
RHINOVIRUS ENTEROVIRUS PCR: NOT DETECTED
RSV PCR: NOT DETECTED
SARS-COV-2, NAAT: NOT DETECTED
SARS-COV-2: NOT DETECTED
SEGMENTED NEUTROPHILS ABSOLUTE COUNT: 4.8 K/CU MM
SEGMENTED NEUTROPHILS RELATIVE PERCENT: 66 % (ref 36–66)
SODIUM BLD-SCNC: 138 MMOL/L (ref 135–145)
SOURCE: NORMAL
TOTAL IMMATURE NEUTOROPHIL: 0.02 K/CU MM
TOTAL NUCLEATED RBC: 0 K/CU MM
TOTAL PROTEIN: 6.4 GM/DL (ref 6.4–8.2)
TROPONIN T: <0.01 NG/ML
WBC # BLD: 7.2 K/CU MM (ref 4–10.5)

## 2022-12-20 PROCEDURE — 85025 COMPLETE CBC W/AUTO DIFF WBC: CPT

## 2022-12-20 PROCEDURE — 96367 TX/PROPH/DG ADDL SEQ IV INF: CPT

## 2022-12-20 PROCEDURE — 2580000003 HC RX 258

## 2022-12-20 PROCEDURE — 96372 THER/PROPH/DIAG INJ SC/IM: CPT

## 2022-12-20 PROCEDURE — 80053 COMPREHEN METABOLIC PANEL: CPT

## 2022-12-20 PROCEDURE — 99285 EMERGENCY DEPT VISIT HI MDM: CPT

## 2022-12-20 PROCEDURE — G0378 HOSPITAL OBSERVATION PER HR: HCPCS

## 2022-12-20 PROCEDURE — 82962 GLUCOSE BLOOD TEST: CPT

## 2022-12-20 PROCEDURE — 6370000000 HC RX 637 (ALT 250 FOR IP)

## 2022-12-20 PROCEDURE — 96365 THER/PROPH/DIAG IV INF INIT: CPT

## 2022-12-20 PROCEDURE — 84145 PROCALCITONIN (PCT): CPT

## 2022-12-20 PROCEDURE — 93010 ELECTROCARDIOGRAM REPORT: CPT | Performed by: INTERNAL MEDICINE

## 2022-12-20 PROCEDURE — 83605 ASSAY OF LACTIC ACID: CPT

## 2022-12-20 PROCEDURE — 83690 ASSAY OF LIPASE: CPT

## 2022-12-20 PROCEDURE — 6370000000 HC RX 637 (ALT 250 FOR IP): Performed by: NURSE PRACTITIONER

## 2022-12-20 PROCEDURE — 6360000002 HC RX W HCPCS

## 2022-12-20 PROCEDURE — 2580000003 HC RX 258: Performed by: EMERGENCY MEDICINE

## 2022-12-20 PROCEDURE — 0202U NFCT DS 22 TRGT SARS-COV-2: CPT

## 2022-12-20 PROCEDURE — 93005 ELECTROCARDIOGRAM TRACING: CPT | Performed by: NURSE PRACTITIONER

## 2022-12-20 PROCEDURE — 6360000002 HC RX W HCPCS: Performed by: EMERGENCY MEDICINE

## 2022-12-20 PROCEDURE — 6370000000 HC RX 637 (ALT 250 FOR IP): Performed by: EMERGENCY MEDICINE

## 2022-12-20 PROCEDURE — 84484 ASSAY OF TROPONIN QUANT: CPT

## 2022-12-20 PROCEDURE — 83735 ASSAY OF MAGNESIUM: CPT

## 2022-12-20 PROCEDURE — 36415 COLL VENOUS BLD VENIPUNCTURE: CPT

## 2022-12-20 PROCEDURE — 87635 SARS-COV-2 COVID-19 AMP PRB: CPT

## 2022-12-20 PROCEDURE — 87040 BLOOD CULTURE FOR BACTERIA: CPT

## 2022-12-20 PROCEDURE — 93005 ELECTROCARDIOGRAM TRACING: CPT | Performed by: EMERGENCY MEDICINE

## 2022-12-20 PROCEDURE — 71101 X-RAY EXAM UNILAT RIBS/CHEST: CPT

## 2022-12-20 RX ORDER — SODIUM CHLORIDE 9 MG/ML
INJECTION, SOLUTION INTRAVENOUS PRN
Status: DISCONTINUED | OUTPATIENT
Start: 2022-12-20 | End: 2022-12-23 | Stop reason: HOSPADM

## 2022-12-20 RX ORDER — ACETAMINOPHEN 325 MG/1
650 TABLET ORAL EVERY 6 HOURS PRN
Status: CANCELLED | OUTPATIENT
Start: 2022-12-20

## 2022-12-20 RX ORDER — PREDNISONE 20 MG/1
40 TABLET ORAL DAILY
Status: DISCONTINUED | OUTPATIENT
Start: 2022-12-20 | End: 2022-12-23 | Stop reason: HOSPADM

## 2022-12-20 RX ORDER — DEXTROSE MONOHYDRATE 100 MG/ML
INJECTION, SOLUTION INTRAVENOUS CONTINUOUS PRN
Status: DISCONTINUED | OUTPATIENT
Start: 2022-12-20 | End: 2022-12-23 | Stop reason: HOSPADM

## 2022-12-20 RX ORDER — INSULIN GLARGINE 100 [IU]/ML
15 INJECTION, SOLUTION SUBCUTANEOUS NIGHTLY
Status: DISCONTINUED | OUTPATIENT
Start: 2022-12-20 | End: 2022-12-21

## 2022-12-20 RX ORDER — QUETIAPINE FUMARATE 100 MG/1
100 TABLET, FILM COATED ORAL NIGHTLY
Status: DISCONTINUED | OUTPATIENT
Start: 2022-12-20 | End: 2022-12-23 | Stop reason: HOSPADM

## 2022-12-20 RX ORDER — ACETAMINOPHEN 650 MG/1
650 SUPPOSITORY RECTAL EVERY 6 HOURS PRN
Status: CANCELLED | OUTPATIENT
Start: 2022-12-20

## 2022-12-20 RX ORDER — FUROSEMIDE 20 MG/1
20 TABLET ORAL DAILY
Status: DISCONTINUED | OUTPATIENT
Start: 2022-12-21 | End: 2022-12-23 | Stop reason: HOSPADM

## 2022-12-20 RX ORDER — SODIUM CHLORIDE 0.9 % (FLUSH) 0.9 %
5-40 SYRINGE (ML) INJECTION PRN
Status: DISCONTINUED | OUTPATIENT
Start: 2022-12-20 | End: 2022-12-23 | Stop reason: HOSPADM

## 2022-12-20 RX ORDER — LACTULOSE 10 G/15ML
30 SOLUTION ORAL 2 TIMES DAILY
Status: DISCONTINUED | OUTPATIENT
Start: 2022-12-20 | End: 2022-12-23 | Stop reason: HOSPADM

## 2022-12-20 RX ORDER — LIDOCAINE 4 G/G
1 PATCH TOPICAL DAILY
Status: DISCONTINUED | OUTPATIENT
Start: 2022-12-20 | End: 2022-12-23 | Stop reason: HOSPADM

## 2022-12-20 RX ORDER — RANOLAZINE 500 MG/1
500 TABLET, EXTENDED RELEASE ORAL 2 TIMES DAILY
Status: DISCONTINUED | OUTPATIENT
Start: 2022-12-20 | End: 2022-12-23 | Stop reason: HOSPADM

## 2022-12-20 RX ORDER — KETOROLAC TROMETHAMINE 30 MG/ML
15 INJECTION, SOLUTION INTRAMUSCULAR; INTRAVENOUS ONCE
Status: DISCONTINUED | OUTPATIENT
Start: 2022-12-20 | End: 2022-12-20

## 2022-12-20 RX ORDER — AZITHROMYCIN 250 MG/1
500 TABLET, FILM COATED ORAL ONCE
Status: COMPLETED | OUTPATIENT
Start: 2022-12-20 | End: 2022-12-20

## 2022-12-20 RX ORDER — IBUPROFEN 600 MG/1
600 TABLET ORAL ONCE
Status: COMPLETED | OUTPATIENT
Start: 2022-12-20 | End: 2022-12-20

## 2022-12-20 RX ORDER — PANTOPRAZOLE SODIUM 40 MG/1
40 TABLET, DELAYED RELEASE ORAL
Status: DISCONTINUED | OUTPATIENT
Start: 2022-12-21 | End: 2022-12-23 | Stop reason: HOSPADM

## 2022-12-20 RX ORDER — FLUOXETINE HYDROCHLORIDE 20 MG/1
20 CAPSULE ORAL DAILY
Status: DISCONTINUED | OUTPATIENT
Start: 2022-12-21 | End: 2022-12-23 | Stop reason: HOSPADM

## 2022-12-20 RX ORDER — TRAMADOL HYDROCHLORIDE 50 MG/1
50 TABLET ORAL ONCE
Status: COMPLETED | OUTPATIENT
Start: 2022-12-20 | End: 2022-12-20

## 2022-12-20 RX ORDER — SODIUM CHLORIDE 0.9 % (FLUSH) 0.9 %
5-40 SYRINGE (ML) INJECTION EVERY 12 HOURS SCHEDULED
Status: DISCONTINUED | OUTPATIENT
Start: 2022-12-20 | End: 2022-12-23 | Stop reason: HOSPADM

## 2022-12-20 RX ORDER — INSULIN LISPRO 100 [IU]/ML
0-4 INJECTION, SOLUTION INTRAVENOUS; SUBCUTANEOUS
Status: DISCONTINUED | OUTPATIENT
Start: 2022-12-21 | End: 2022-12-21

## 2022-12-20 RX ORDER — METOPROLOL SUCCINATE 50 MG/1
50 TABLET, EXTENDED RELEASE ORAL DAILY
Status: DISCONTINUED | OUTPATIENT
Start: 2022-12-21 | End: 2022-12-23 | Stop reason: HOSPADM

## 2022-12-20 RX ORDER — RALOXIFENE HYDROCHLORIDE 60 MG/1
60 TABLET, FILM COATED ORAL DAILY
Status: DISCONTINUED | OUTPATIENT
Start: 2022-12-21 | End: 2022-12-23 | Stop reason: HOSPADM

## 2022-12-20 RX ORDER — BENZONATATE 100 MG/1
100 CAPSULE ORAL 3 TIMES DAILY PRN
Status: DISCONTINUED | OUTPATIENT
Start: 2022-12-20 | End: 2022-12-23 | Stop reason: HOSPADM

## 2022-12-20 RX ORDER — ONDANSETRON 2 MG/ML
4 INJECTION INTRAMUSCULAR; INTRAVENOUS EVERY 6 HOURS PRN
Status: DISCONTINUED | OUTPATIENT
Start: 2022-12-20 | End: 2022-12-23 | Stop reason: HOSPADM

## 2022-12-20 RX ORDER — TROSPIUM CHLORIDE 20 MG/1
20 TABLET, FILM COATED ORAL
Status: DISCONTINUED | OUTPATIENT
Start: 2022-12-21 | End: 2022-12-23 | Stop reason: HOSPADM

## 2022-12-20 RX ORDER — IBUPROFEN 200 MG
1 CAPSULE ORAL DAILY
COMMUNITY

## 2022-12-20 RX ORDER — AZITHROMYCIN 250 MG/1
250 TABLET, FILM COATED ORAL DAILY
Status: DISCONTINUED | OUTPATIENT
Start: 2022-12-21 | End: 2022-12-23

## 2022-12-20 RX ORDER — POLYETHYLENE GLYCOL 3350 17 G/17G
17 POWDER, FOR SOLUTION ORAL DAILY PRN
Status: DISCONTINUED | OUTPATIENT
Start: 2022-12-20 | End: 2022-12-23 | Stop reason: HOSPADM

## 2022-12-20 RX ORDER — ONDANSETRON 4 MG/1
4 TABLET, ORALLY DISINTEGRATING ORAL EVERY 8 HOURS PRN
Status: DISCONTINUED | OUTPATIENT
Start: 2022-12-20 | End: 2022-12-23 | Stop reason: HOSPADM

## 2022-12-20 RX ORDER — ALBUTEROL SULFATE 90 UG/1
2 AEROSOL, METERED RESPIRATORY (INHALATION) EVERY 4 HOURS
Status: DISCONTINUED | OUTPATIENT
Start: 2022-12-20 | End: 2022-12-21

## 2022-12-20 RX ORDER — ENOXAPARIN SODIUM 100 MG/ML
40 INJECTION SUBCUTANEOUS EVERY EVENING
Status: DISCONTINUED | OUTPATIENT
Start: 2022-12-20 | End: 2022-12-23 | Stop reason: HOSPADM

## 2022-12-20 RX ORDER — TENOFOVIR DISOPROXIL FUMARATE 300 MG/1
300 TABLET, FILM COATED ORAL DAILY
Status: DISCONTINUED | OUTPATIENT
Start: 2022-12-21 | End: 2022-12-23 | Stop reason: HOSPADM

## 2022-12-20 RX ORDER — INSULIN LISPRO 100 [IU]/ML
0-4 INJECTION, SOLUTION INTRAVENOUS; SUBCUTANEOUS NIGHTLY
Status: DISCONTINUED | OUTPATIENT
Start: 2022-12-20 | End: 2022-12-21

## 2022-12-20 RX ADMIN — SODIUM CHLORIDE: 9 INJECTION, SOLUTION INTRAVENOUS at 22:06

## 2022-12-20 RX ADMIN — VANCOMYCIN HYDROCHLORIDE 1500 MG: 10 INJECTION, POWDER, LYOPHILIZED, FOR SOLUTION INTRAVENOUS at 23:08

## 2022-12-20 RX ADMIN — BENZONATATE 100 MG: 100 CAPSULE ORAL at 23:01

## 2022-12-20 RX ADMIN — QUETIAPINE FUMARATE 100 MG: 100 TABLET ORAL at 23:01

## 2022-12-20 RX ADMIN — CEFEPIME HYDROCHLORIDE 2000 MG: 2 INJECTION, POWDER, FOR SOLUTION INTRAVENOUS at 22:00

## 2022-12-20 RX ADMIN — ENOXAPARIN SODIUM 40 MG: 100 INJECTION SUBCUTANEOUS at 23:01

## 2022-12-20 RX ADMIN — RANOLAZINE 500 MG: 500 TABLET, FILM COATED, EXTENDED RELEASE ORAL at 23:10

## 2022-12-20 RX ADMIN — ALUMINUM HYDROXIDE, MAGNESIUM HYDROXIDE, AND SIMETHICONE: 200; 200; 20 SUSPENSION ORAL at 21:56

## 2022-12-20 RX ADMIN — AZITHROMYCIN MONOHYDRATE 500 MG: 250 TABLET ORAL at 13:43

## 2022-12-20 RX ADMIN — INSULIN GLARGINE 15 UNITS: 100 INJECTION, SOLUTION SUBCUTANEOUS at 22:20

## 2022-12-20 RX ADMIN — TRAMADOL HYDROCHLORIDE 50 MG: 50 TABLET, COATED ORAL at 12:26

## 2022-12-20 RX ADMIN — SODIUM CHLORIDE, PRESERVATIVE FREE 10 ML: 5 INJECTION INTRAVENOUS at 22:09

## 2022-12-20 RX ADMIN — PREDNISONE 40 MG: 20 TABLET ORAL at 22:20

## 2022-12-20 RX ADMIN — IBUPROFEN 600 MG: 600 TABLET, FILM COATED ORAL at 12:26

## 2022-12-20 RX ADMIN — INSULIN LISPRO 4 UNITS: 100 INJECTION, SOLUTION INTRAVENOUS; SUBCUTANEOUS at 22:21

## 2022-12-20 RX ADMIN — LACTULOSE 20 G: 10 SOLUTION ORAL at 23:02

## 2022-12-20 RX ADMIN — CEFTRIAXONE SODIUM 1000 MG: 1 INJECTION, POWDER, FOR SOLUTION INTRAMUSCULAR; INTRAVENOUS at 13:43

## 2022-12-20 ASSESSMENT — PAIN SCALES - GENERAL
PAINLEVEL_OUTOF10: 4
PAINLEVEL_OUTOF10: 0

## 2022-12-20 ASSESSMENT — PAIN DESCRIPTION - PAIN TYPE: TYPE: ACUTE PAIN

## 2022-12-20 ASSESSMENT — PAIN DESCRIPTION - LOCATION: LOCATION: CHEST

## 2022-12-20 ASSESSMENT — ENCOUNTER SYMPTOMS
CHEST TIGHTNESS: 1
DIARRHEA: 0
VOMITING: 0
NAUSEA: 0
ABDOMINAL PAIN: 0
COUGH: 1
WHEEZING: 1

## 2022-12-20 ASSESSMENT — PAIN DESCRIPTION - DESCRIPTORS: DESCRIPTORS: ACHING

## 2022-12-20 ASSESSMENT — PAIN - FUNCTIONAL ASSESSMENT: PAIN_FUNCTIONAL_ASSESSMENT: ACTIVITIES ARE NOT PREVENTED

## 2022-12-20 ASSESSMENT — PAIN DESCRIPTION - ORIENTATION: ORIENTATION: RIGHT;LEFT;ANTERIOR;LOWER

## 2022-12-20 ASSESSMENT — PAIN DESCRIPTION - FREQUENCY: FREQUENCY: INTERMITTENT

## 2022-12-20 NOTE — ED NOTES
Medication History  [facility]    Patient Name: Susana Mendoza 1960     Medication history has been completed by: Gold Myers CPhT    Source(s) of information: patient and insurance claims     Primary Care Physician: Michelle Handy MD     Pharmacy: [facility]    Allergies as of 12/20/2022 - Fully Reviewed 12/20/2022   Allergen Reaction Noted    Hydrocodone-acetaminophen Anaphylaxis 05/27/2022    Norco [hydrocodone-acetaminophen] Itching 02/14/2017    Tylenol [acetaminophen] Itching, Nausea And Vomiting, and Rash 11/29/2011        Prior to Admission medications    Medication Sig Start Date End Date Taking? Authorizing Provider   Multiple Vitamin (MULTIVITAMIN PO) Take 1 tablet by mouth daily   Yes Historical Provider, MD   VITAMIN D PO Take 1 capsule by mouth 2 times daily   Yes Historical Provider, MD   calcium carbonate (OYSTER SHELL CALCIUM 500 MG) 1250 (500 Ca) MG tablet Take 1 tablet by mouth daily   Yes Historical Provider, MD JOSELUIS CHAND 100 UNIT/ML injection pen INJECT 15 UNITS INTO THE SKIN NIGHTLY  Patient taking differently: Inject 25 Units into the skin nightly 12/14/22   Michelle Handy MD   albuterol sulfate HFA (PROVENTIL HFA) 108 (90 Base) MCG/ACT inhaler Inhale 2 puffs into the lungs every 4 hours as needed for Wheezing or Shortness of Breath With spacer (and mask if indicated). Thanks. 11/30/22 12/30/22  ERLINDA Serrano - CNP   mupirocin (BACTROBAN) 2 % ointment Apply topically 3 times daily. 11/9/22   Rj Tipton MD   blood glucose monitor strips Test 3-4 times a day & as needed for symptoms of irregular blood glucose.  11/8/22   Michelle Handy MD   blood glucose monitor kit and supplies Use 3-4 times daily 11/8/22   Michelle Handy MD   Lancets MISC Use one lancet 4 times daily 11/8/22   Michelle Handy MD   raloxifene (EVISTA) 60 MG tablet Take 1 tablet by mouth daily 11/7/22   Kenny Fernández MD   insulin aspart (Martha Bess) 100 UNIT/ML injection pen **Low Dose Correction Algorithm**Insulin lispro (HUMALOG)  3 TIMES DAILY WITH MEALSGlucose: Dose: No [id number] 1 [id number] 2 [id number] 3 [id number] 4 [id number] 5 Rxevg350 and above 6 Units 11/1/22   Jos Brown MD   furosemide (LASIX) 20 MG tablet Take 1 tablet by mouth daily Hold if systolic <857 15/67/78   Litzy Emmanuel MD   mirabegron [facility]) 25 MG TB24 Take 1 tablet by mouth daily 9/22/22   Flo Tavarez MD   estradiol (ESTRACE VAGINAL) 0.1 MG/GM vaginal cream Place 1 g vaginally three times a week Use 1 g vaginally nightly for 2 weeks, then 1 g nightly 2-3 times per week. 9/21/22   Rj Maya MD   ranolazine (RANEXA) 500 MG extended release tablet Take 1 tablet by mouth in the morning and 1 tablet before bedtime. 8/5/22   Litzy Emmanuel MD   metoprolol succinate (TOPROL XL) 50 MG extended release tablet Take 1 tablet by mouth in the morning. 7/21/22   Litzy Emmanuel MD   midodrine (PROAMATINE) 5 MG tablet Take 1 tablet by mouth in the morning and 1 tablet at noon and 1 tablet in the evening. Take with meals. 7/21/22   Litzy Emmanuel MD   ipratropium-albuterol (DUONEB) 0.5-2.5 (3) MG/3ML SOLN nebulizer solution Inhale 3 mLs into the lungs every 4 hours 6/13/22   Yimi Card MD   nitroGLYCERIN (NITROSTAT) 0.4 MG SL tablet Place 1 tablet under the tongue every 5 minutes as needed for Chest pain 5/18/22   Litzy Emmanuel MD   rifAXIMin [place]) 550 MG tablet Take 1 tablet by mouth 2 times daily 5/18/22   Litzy Emmanuel MD   sucralfate (CARAFATE) 1 GM tablet Take 1 tablet by mouth 4 times daily 1/31/22   Nicki Vega MD   ondansetron (ZOFRAN ODT) 4 MG disintegrating tablet Take 1 tablet by mouth every 8 hours as needed for Nausea 1/31/22   Nicki Vega MD   blood glucose monitor strips Test 3 times a day & as needed for symptoms of irregular blood glucose. Please fill with what is covered my patients insurance.  1/17/22   Katt Jodi Young MD   blood glucose monitor strips Test  Bid times a day & as needed for symptoms of irregular blood glucose. 1/13/22   Dossie Sensor, MD   Glucosource Lancets MISC Use one 3-4 times to check blood sugar 2/10/21   Cliffsie Sensor, MD   lactulose (CHRONULAC) 10 GM/15ML solution Take 30 mLs by mouth 3 times daily  Patient taking differently: Take 30 mLs by mouth 2 times daily 9/15/20   Mel Hernandez MD   QUEtiapine (SEROQUEL) 50 MG tablet Take 3 tablets by mouth nightly  Patient taking differently: Take 100 mg by mouth nightly 12/20/22 Patient states she only takes 100 mg at [facility] 9/15/20   Mel Hernandez MD   FLUoxetine (PROZAC) 20 MG capsule Take 20 mg by mouth daily 9/11/20   Historical Provider, MD   paliperidone (INVEGA) 6 MG extended release tablet Take 6 mg by mouth every morning 8/13/20   Historical Provider, MD   Compression Stockings MISC by Does not apply route 20 - 30 mmh wear daily and take off at night  Thigh High 5/21/20   ERLINDA De La Rosa - CNP   glucose monitoring kit (FREESTYLE) monitoring kit 1 kit by Does not apply route daily 1/30/20   Pauline Campuzano MD   Blood Glucose Monitoring Suppl (ACURA BLOOD GLUCOSE METER) w/Device KIT Please check blood sugar 3 times daily. Please fill with what is covered by the insurance 1/21/20   Pauline Campuzano MD   VIREAD 300 MG tablet Take 300 mg by mouth daily  11/20/17   Historical Provider, MD     Medications added or changed (ex.  new medication, dosage change, interval change, formulation change):  Multivitamin (added)  Vitamin D (added)  Calcium carbonate (added)  Invega dosage change from 3 mg to 6 mg  Quetiapine dosage change from 150 mg to 100 mg    Medications removed from list (include reason, ex. noncompliance, medication cost, therapy complete etc.):   Doxycycline therapy complete per patient  Prednisone therapy complete per patient  Phenytoin no longer taking  Pantoprazole not taking  Solifenacin not taking  Depends clean up list  Metoprolol succinate flagged for review patient unsure if she is taking this last fill date 07/19/22 for 30 day supply    Comments:  Medication list reviewed with patient and insurance claims verified. Insulin dosage updated per information received from patient  Xifaxan therapy patient states she is taking this, last fill date 04/11/22 for  30 day supply  Sucralfate therapy patient states she is taking this, last fill date 01/31/22 for  30 day supply  Midodrine therapy patient states she is taking this, last fill date 07/19/22 for  30 day supply  Patient states she has only taken 10 units of insulin today.     To my knowledge the above medication history is accurate as of 12/20/2022 2:41 PM.   Rosa Aparicio CPhT   12/20/2022 2:41 PM

## 2022-12-20 NOTE — DISCHARGE INSTRUCTIONS
- please schedule an appointment to see your PCP  - please schedule an appointment to see your pulmonologist   - please go to lab in one week for blood work  - please take medications as prescribed  - please monitor your glucose closely by checking it few times a day; hold Insulin glargine (Lantus) if your glucose is <110 at night    Follow-up with primary care physician for reevaluation. Call for an appointment  Take tramadol as needed for pain. No drinking or driving while taking  Apply Lidoderm patch to affected area. Take ibuprofen as needed for pain and inflammation  Return to the emergency department AMI increased pain fever chills nausea vomiting dizzy lightheadedness or worsening symptoms.

## 2022-12-20 NOTE — ED TRIAGE NOTES
C/o left side rib pain after leaning over washer to pull out laundry this morning. Pt felt a \"pop\" and has had pain since.  No respiratory distress noted

## 2022-12-20 NOTE — ED PROVIDER NOTES
Emergency Department Encounter    Patient: Annamaria Hammond  MRN: [id number]  : 1960  Date of Evaluation: 2022  ED Provider:  Keila Martinez DO    Triage Chief Complaint:   Rib Injury (Pt states she was leaning over washer pulling clothes out and felt a \"pop\" and has had left side rib pain since.)    [place]:  Annamaria Hammond is a 63 y.o. female that presents to the emergency department complaining of left anterior rib pain under her left breast.  Patient states she was leaning over the washing machine to get clothing out this morning. Patient states she heard a pop in her ribs. Patient states since then she had severe pain 8 out of 10 on the pain scale on the wrist.  Patient states painful to move or take a deep breath. She states she is unsure if the ribs are cracked. Patient states she has not taken anything for pain. Patient states she is never had this before. Patient states she does have brittle bones. Patient states chronically short of breath she does have COPD and she smokes. She states she has not had to use any inhalers. Patient states she actually currently on antibiotics for pneumonia. Denies any nausea vomiting diarrhea fevers and chills. States she does have a chronic cough for the last couple weeks. Patient states currently on antibiotic and prednisone. Patient states here for evaluation.     ROS - see HPI, below listed is current ROS at time of my eval:  General:  No fevers, no chills, no weakness  Eyes:  No recent vison changes, no discharge  ENT:  No sore throat, no nasal congestion, no hearing changes  Cardiovascular:  No chest pain, no palpitations  Respiratory:  No shortness of breath, no cough, no wheezing  Gastrointestinal:  No pain, no nausea, no vomiting, no diarrhea  Musculoskeletal: Positive for left lower anterior rib pain, no muscle pain, no joint pain  Skin:  No rash, no pruritis, no easy bruising  Neurologic:  No speech problems, no headache, no extremity numbness, no extremity tingling, no extremity weakness  Psychiatric:  No anxiety  Genitourinary:  No dysuria, no hematuria  Endocrine:  No unexpected weight gain, no unexpected weight loss  Extremities:  no edema, no pain    Past Medical History:   Diagnosis Date    Abnormal Pap smear of cervix     Acid reflux     Arthritis     left knee    Breast cyst     CAD (coronary artery disease)     per [facility] 9/6/13 - Dr. Feliciano Current    COPD (chronic obstructive pulmonary disease) ([facility]     follow with Dr Danielle Lomas    Depression     Shade Hodges manic - depression see Dr Manav Philippe    Diabetes mellitus [facility])     dx 10+ yrs ago- follows with PCP    Drug abuse ([facility] [address]     hx use of cocaine, heroin and marijuana- states last used 12/2014    Glaucoma     bilateral    H/O Doppler lower venous ultrasound 04/04/2019    No DVT or SVT, Significant reflux of RGSV and LGSV. H/O echocardiogram 12/18/2020    EF 55-60%, Mod LVH. Hepatic encephalopathy 05/2019    Hepatitis C     for liver bx 12/3/2015\"Have Hepatitis B and C and saw Dr Zenaida Richardson for this 12/1/2015\"    Hiatal hernia     History of alcohol abuse     History of exercise stress test 01/02/2020    Treadmill, Normal exercise performance without angina and ischemic EKG changes.     HTN (hypertension)     \"for the past two yrs on medication\" follows with Dr Felicaino Current    Hx of blood clots 2019    Portal vein thrombsis - TIPS procedure 4/23/19    Hyperlipemia     Irregular heart beat     per pt    Liver hematoma     Migraine     Migraines     Last migraine:  2018    Nausea & vomiting     Neurologic disorder     Schizophrenia [facility])     per old chart    Seizures ([facility] [address]     \"last one was 9/2015- saw Dr Sima Orr at [facility]- she said not sure if acutal seizures- she thinks they are panic or anxiety attacks\"    SOB (shortness of breath)     with any exertion    Vulvar mass      Past Surgical History:   Procedure Laterality Date    BREAST SURGERY  10/2015    left breast bx    CARDIAC CATHETERIZATION      per old chart pt had cath done in 3/2011 and 9/2013    CARPAL TUNNEL RELEASE Left 01/09/2020    CARPAL TUNNEL RELEASE LEFT performed by Harmony Vega DO at [address] Right 05/26/2020    dr Kierra Duncan, carpal tunnel trigger finger release    CARPAL TUNNEL RELEASE Right 05/26/2020    RIGHT CARPAL TUNNEL RELEASE performed by Harmony Vega DO at [place]  per old chart done 1985    COLONOSCOPY  03/11/2013    diverticulosis, cecal polyp    COLONOSCOPY  03/16/2017    Internal hemorrhoids- Dr. Kelsy Cantu    COLONOSCOPY N/A 05/17/2022    COLONOSCOPY POLYPECTOMY SNARE/COLD BIOPSY [facility], [facility]AGNOSTIC  03/16/2017    EGD: Small esophageal varices, portal hypertensive gastropathy, reflux esophagitis, hiatal hernia    EYE SURGERY Bilateral ? when    cyst removal, cataracts w/lens replacement    FINGER TRIGGER RELEASE Right 05/26/2020    FINGER TRIGGER RELEASE RIGHT RING FINGER performed by Harmony Vega DO at [address] ([facility])      per old chart pt had CHOLO/BSO 1986    SALPINGO-OOPHORECTOMY      TIPS PROCEDURE  04/23/2019    TONSILLECTOMY  as a kid    UPPER GASTROINTESTINAL ENDOSCOPY N/A 11/14/2018    EGD DIAGNOSTIC ONLY performed by Surjit Buitrago MD at [facility] 145 N/A 06/05/2019    EGD DIAGNOSTIC ONLY performed by Surjit Buitrago MD at [facility] 150 N/A 11/9/2022    [address] performed by Mary Jo Parish MD at [facility] OR     Family History   Problem Relation Age of Onset    Ovarian Cancer Mother     Cancer Mother         lung ca    Arthritis Mother     Migraines Mother     Cancer Father         colon ca    Diabetes Father     High Blood Pressure Father     Arthritis Father     High Cholesterol Father     Migraines Father     Migraines Sister     Heart Disease Brother         WPW    Breast Cancer Maternal Aunt     Breast Cancer Maternal Aunt     Breast Cancer Maternal Aunt     Breast Cancer Maternal Aunt      Social History     Socioeconomic History    Marital status:      Spouse name: Not on file    Number of children: Not on file    Years of education: Not on file    Highest education level: Not on file   Occupational History    Not on file   Tobacco Use    Smoking status: Former     Packs/day: 0.50     Years: 45.00     Pack years: 22.50     Types: Cigarettes     Start date: 1974     Passive exposure: Past    Smokeless tobacco: Never   Vaping Use    Vaping Use: Never used   Substance and Sexual Activity    Alcohol use: Not Currently     Alcohol/week: 2.0 - 3.0 standard drinks     Types: 2 - 3 Shots of liquor per week     Comment: 2-3 shots last 4/2019    Drug use: Yes     Frequency: 3.0 times per week     Types: Marijuana (Weed)     Comment: daily last smoked 3 mos ago as of 11-9-22    Sexual activity: Not Currently     Partners: Male   Other Topics Concern    Not on file   Social History Narrative    Not on file     Social Determinants of Health     Financial Resource Strain: Medium Risk    Difficulty of Paying Living Expenses: Somewhat hard   Food Insecurity: Food Insecurity Present    Worried About Running Out of Food in the Last Year: Sometimes true    Ran Out of Food in the Last Year: Sometimes true   Transportation Needs: Unmet Transportation Needs    Lack of Transportation (Medical): Yes    Lack of Transportation (Non-Medical): Yes   Physical Activity: Inactive    Days of Exercise per Week: 0 days    Minutes of Exercise per Session: 0 min   Stress: No Stress Concern Present    Feeling of Stress : Only a little   Social Connections: Moderately Integrated    Frequency of Communication with Friends and Family:  Three times a week    Frequency of Social Gatherings with Friends and Family: Twice a week    Attends [employer] Services: More than 4 times per year    Active Member of [employer] or Organizations: Yes    Attends Club or Organization Meetings: More than 4 times per year    Marital Status:    Intimate Partner Violence: Not on file   Housing Stability: Low Risk     Unable to Pay for Housing in the Last Year: No    Number of Places Lived in the Last Year: 1    Unstable Housing in the Last Year: No     Current Facility-Administered Medications   Medication Dose Route Frequency Provider Last Rate Last Admin    lidocaine 4 % external patch 1 patch  1 patch TransDERmal Daily Catie Coronado Klever, DO   1 patch at 12/20/22 1226     Current Outpatient Medications   Medication Sig Dispense Refill    BASAGLAR KWIKPEN 100 UNIT/ML injection pen INJECT 15 UNITS INTO THE SKIN NIGHTLY 5 Adjustable Dose Pre-filled Pen Syringe 5    predniSONE (DELTASONE) 10 MG tablet Take 1 tablet by mouth daily 40mg daily for 2 days, 20 mg daily for 2 days, 10 mg daily for 2 days, 5 mg daily for 2 days 15 tablet 0    doxycycline hyclate (VIBRAMYCIN) 100 MG capsule Take 1 capsule by mouth daily 10 capsule 0    phenytoin (DILANTIN) 100 MG ER capsule       albuterol sulfate HFA (PROVENTIL HFA) 108 (90 Base) MCG/ACT inhaler Inhale 2 puffs into the lungs every 4 hours as needed for Wheezing or Shortness of Breath With spacer (and mask if indicated). Thanks. 18 g 1    mupirocin (BACTROBAN) 2 % ointment Apply topically 3 times daily. 30 g 0    blood glucose monitor strips Test 3-4 times a day & as needed for symptoms of irregular blood glucose.  100 strip 5    blood glucose monitor kit and supplies Use 3-4 times daily 1 kit 0    Lancets MISC Use one lancet 4 times daily 100 each 5    raloxifene (EVISTA) 60 MG tablet Take 1 tablet by mouth daily 90 tablet 3    insulin aspart (NOVOLOG FLEXPEN) 100 UNIT/ML injection pen **Low Dose Correction Algorithm**Insulin lispro (HUMALOG)  3 TIMES DAILY WITH MEALSGlucose: Dose: No [id number] 1 [id number] 2 [id number] 3 [id number] 4 [id number] 5 [id number] and above 6 Units 15 Adjustable Dose Pre-filled Pen Syringe 5    furosemide (LASIX) 20 MG tablet Take 1 tablet by mouth daily Hold if systolic <008 90 tablet 1    mirabegron (MYRBETRIQ) 25 MG TB24 Take 1 tablet by mouth daily 30 tablet 5    solifenacin (VESICARE) 5 MG tablet Take 1 tablet by mouth daily 30 tablet 11    estradiol (ESTRACE VAGINAL) 0.1 MG/GM vaginal cream Place 1 g vaginally three times a week Use 1 g vaginally nightly for 2 weeks, then 1 g nightly 2-3 times per week. 3 each 3    ranolazine (RANEXA) 500 MG extended release tablet Take 1 tablet by mouth in the morning and 1 tablet before bedtime. 180 tablet 1    metoprolol succinate (TOPROL XL) 50 MG extended release tablet Take 1 tablet by mouth in the morning. 90 tablet 1    midodrine (PROAMATINE) 5 MG tablet Take 1 tablet by mouth in the morning and 1 tablet at noon and 1 tablet in the evening. Take with meals. 270 tablet 1    ipratropium-albuterol (DUONEB) 0.5-2.5 (3) MG/3ML SOLN nebulizer solution Inhale 3 mLs into the lungs every 4 hours 120 each 5    nitroGLYCERIN (NITROSTAT) 0.4 MG SL tablet Place 1 tablet under the tongue every 5 minutes as needed for Chest pain 25 tablet 3    rifAXIMin (XIFAXAN) 550 MG tablet Take 1 tablet by mouth 2 times daily 42 tablet 0    sucralfate (CARAFATE) 1 GM tablet Take 1 tablet by mouth 4 times daily 120 tablet 3    ondansetron (ZOFRAN ODT) 4 MG disintegrating tablet Take 1 tablet by mouth every 8 hours as needed for Nausea 15 tablet 0    blood glucose monitor strips Test 3 times a day & as needed for symptoms of irregular blood glucose. Please fill with what is covered my patients insurance. 50 strip 3    blood glucose monitor strips Test  Bid times a day & as needed for symptoms of irregular blood glucose.  100 strip 5    Glucosource Lancets MISC Use one 3-4 times to check blood sugar 100 each 5    ipratropium-albuterol (DUONEB) 0.5-2.5 (3) MG/3ML SOLN nebulizer solution Inhale 3 mLs into the lungs every 4 hours 360 mL 0    lactulose (CHRONULAC) 10 GM/15ML solution Take 30 mLs by mouth 3 times daily 1892 mL 2    QUEtiapine (SEROQUEL) 50 MG tablet Take 3 tablets by mouth nightly 60 tablet 3    FLUoxetine (PROZAC) 20 MG capsule Take 20 mg by mouth daily      paliperidone (INVEGA) 3 MG extended release tablet       Compression Stockings MISC by Does not apply route 20 - 30 mmh wear daily and take off at night  Thigh High 2 each 2    UNABLE TO FIND Rx for depends   Use 3-4  times daily 1 box 5    glucose monitoring kit (FREESTYLE) monitoring kit 1 kit by Does not apply route daily 1 kit 0    Blood Glucose Monitoring Suppl (ACURA BLOOD GLUCOSE METER) w/Device KIT Please check blood sugar 3 times daily. Please fill with what is covered by the insurance 1 kit 0    pantoprazole (PROTONIX) 40 MG tablet Take 1 tablet by mouth 2 times daily (before meals) 60 tablet 1    VIREAD 300 MG tablet Take 300 mg by mouth daily        Allergies   Allergen Reactions    Hydrocodone-Acetaminophen Anaphylaxis    Norco [Hydrocodone-Acetaminophen] Itching     Itching, rash, nausea and vomiting. Tylenol [Acetaminophen] Itching, Nausea And Vomiting and Rash       Nursing Notes Reviewed    Physical Exam:  Triage VS:    ED Triage Vitals [12/20/22 1122]   Enc Vitals Group      /72      Heart Rate 82      Resp 16      Temp 98 °F (36.7 °C)      Temp src       SpO2 97 %      Weight 186 lb (84.4 kg)      Height 4' 9.5\" (1.461 m)      Head Circumference       Peak Flow       Pain Score       Pain Loc       Pain Edu? Excl. in [address]? /75   Pulse 91   Temp 97.9 °F (36.6 °C) (Oral)   Resp 16   Ht 4' 9.5\" (1.461 m)   Wt 190 lb 4.8 oz (86.3 kg)   SpO2 96%   BMI 40.47 kg/m²       My pulse ox interpretation is - normal    General appearance:  No acute distress. Skin:  Warm. Dry. Eye:  Extraocular movements intact. Ears, nose, mouth and throat:  Oral mucosa moist   Neck:  Trachea midline. Extremity:  No swelling. Normal ROM     Heart:  Regular rate and rhythm, normal S1 & S2, no extra heart sounds. Perfusion:  intact  Respiratory: Coarse productive cough, coarse breath sounds bilaterally diminished right greater than left lung. Respirations nonlabored. Abdominal:  Normal bowel sounds. Soft. Nontender. Non distended. Back:  No CVA tenderness to palpation     Neurological:  Alert and oriented times 3. No focal neuro deficits.              Psychiatric:  Appropriate    I have reviewed and interpreted all of the currently available lab results from this visit (if applicable):  Results for orders placed or performed during the hospital encounter of 12/20/22   Respiratory Panel, Molecular, with COVID-19 (Restricted: peds pts or suitable admitted adults)    Specimen: Nasopharyngeal   Result Value Ref Range    Adenovirus Detection by PCR NOT DETECTED NOT DETECTED    Coronavirus 229E PCR NOT DETECTED NOT DETECTED    Coronavirus HKU1 PCR NOT DETECTED NOT DETECTED    Coronavirus NL63 PCR NOT DETECTED NOT DETECTED    Coronavirus OC43 PCR NOT DETECTED NOT DETECTED    SARS-CoV-2 NOT DETECTED NOT DETECTED    Human Metapneumovirus PCR NOT DETECTED NOT DETECTED    Rhinovirus Enterovirus PCR NOT DETECTED NOT DETECTED    Influenza A by PCR NOT DETECTED NOT DETECTED    Influenza A H1 Pandemic PCR NOT DETECTED NOT DETECTED    Influenza A H1 (2009) PCR NOT DETECTED NOT DETECTED    Influenza A H3 PCR NOT DETECTED NOT DETECTED    Influenza B by PCR NOT DETECTED NOT DETECTED    Parainfluenza 1 PCR NOT DETECTED NOT DETECTED    Parainfluenza 2 PCR NOT DETECTED NOT DETECTED    Parainfluenza 3 PCR NOT DETECTED NOT DETECTED    Parainfluenza 4 PCR NOT DETECTED NOT DETECTED    RSV PCR NOT DETECTED NOT DETECTED    Bordetella parapertussis by PCR NOT DETECTED NOT DETECTED    B Pertussis by PCR NOT DETECTED NOT DETECTED    Chlamydophila Pneumonia PCR NOT DETECTED NOT DETECTED    Mycoplasma pneumo by PCR NOT DETECTED NOT DETECTED   COVID-19, Rapid    Specimen: Nasopharyngeal   Result Value Ref Range    Source UNKNOWN     SARS-CoV-2, NAAT NOT DETECTED NOT DETECTED   Culture, Blood 1    Specimen: Blood   Result Value Ref Range    Specimen BLOOD     Special Requests NONE     Culture NO GROWTH AT 48 HOURS    Culture, Blood 2    Specimen: Blood   Result Value Ref Range    Specimen BLOOD     Special Requests NONE     Culture NO GROWTH AT 48 HOURS    Strep Pneumoniae Antigen    Specimen: CSF   Result Value Ref Range    Strep pneumo Ag URINE NEGATIVE    Legionella antigen, urine    Specimen: Urine   Result Value Ref Range    Legionella Urinary Ag NEGATIVE NEGATIVE   Comprehensive Metabolic Panel   Result Value Ref Range    Sodium 138 135 - 145 MMOL/L    Potassium 4.4 3.5 - 5.1 MMOL/L    Chloride 104 99 - 110 mMol/L    CO2 26 21 - 32 MMOL/L    BUN 16 6 - 23 MG/DL    Creatinine 1.0 0.6 - 1.1 MG/DL    Est, Glom Filt Rate >60 >60 mL/min/1.73m2    Glucose 340 (H) 70 - 99 MG/DL    Calcium 9.0 8.3 - 10.6 MG/DL    Albumin 3.1 (L) 3.4 - 5.0 GM/DL    Total Protein 6.4 6.4 - 8.2 GM/DL    Total Bilirubin 1.3 (H) 0.0 - 1.0 MG/DL    ALT 15 10 - 40 U/L    AST 15 15 - 37 IU/L    Alkaline Phosphatase 173 (H) 40 - 129 IU/L    Anion Gap 8 4 - 16   Troponin   Result Value Ref Range    Troponin T <0.010 <0.01 NG/ML   Troponin   Result Value Ref Range    Troponin T <0.010 <0.01 NG/ML   Magnesium   Result Value Ref Range    Magnesium 1.8 1.8 - 2.4 mg/dl   Lipase   Result Value Ref Range    Lipase 192 (H) 13 - 60 IU/L   CBC with Auto Differential   Result Value Ref Range    WBC 7.2 4.0 - 10.5 K/CU MM    RBC 4.54 4.2 - 5.4 M/CU MM    Hemoglobin 14.9 12.5 - 16.0 GM/DL    Hematocrit 43.1 37 - 47 %    MCV 94.9 78 - 100 FL    MCH 32.8 (H) 27 - 31 PG    MCHC 34.6 32.0 - 36.0 %    RDW 13.2 11.7 - 14.9 %    Platelets 94 (L) 758 - 440 K/CU MM    MPV 12.5 (H) 7.5 - 11.1 FL    Differential Type AUTOMATED DIFFERENTIAL     Segs Relative 66.0 36 - 66 %    Lymphocytes % 23.1 (L) 24 - 44 %    Monocytes % 7.0 (H) 0 - 4 %    Eosinophils % 2.8 0 - 3 %    Basophils % 0.8 0 - 1 %    Segs Absolute 4.8 K/CU MM    Lymphocytes Absolute 1.7 K/CU MM    Monocytes Absolute 0.5 K/CU MM    Eosinophils Absolute 0.2 K/CU MM    Basophils Absolute 0.1 K/CU MM    Nucleated RBC % 0.0 %    Total Nucleated RBC 0.0 K/CU MM    Total Immature Neutrophil 0.02 K/CU MM    Immature Neutrophil % 0.3 0 - 0.43 %   Comprehensive Metabolic Panel w/ Reflex to MG   Result Value Ref Range    Sodium 134 (L) 135 - 145 MMOL/L    Potassium 5.4 (H) 3.5 - 5.1 MMOL/L    Chloride 102 99 - 110 mMol/L    CO2 25 21 - 32 MMOL/L    BUN 20 6 - 23 MG/DL    Creatinine 0.9 0.6 - 1.1 MG/DL    Est, Glom Filt Rate >60 >60 mL/min/1.73m2    Glucose 415 (HH) 70 - 99 MG/DL    Calcium 8.9 8.3 - 10.6 MG/DL    Albumin 2.8 (L) 3.4 - 5.0 GM/DL    Total Protein 5.8 (L) 6.4 - 8.2 GM/DL    Total Bilirubin 1.2 (H) 0.0 - 1.0 MG/DL    ALT 13 10 - 40 U/L    AST 13 (L) 15 - 37 IU/L    Alkaline Phosphatase 154 (H) 40 - 128 IU/L    Anion Gap 7 4 - 16   CBC with Auto Differential   Result Value Ref Range    WBC 4.7 4.0 - 10.5 K/CU MM    RBC 4.38 4.2 - 5.4 M/CU MM    Hemoglobin 14.5 12.5 - 16.0 GM/DL    Hematocrit 41.5 37 - 47 %    MCV 94.7 78 - 100 FL    MCH 33.1 (H) 27 - 31 PG    MCHC 34.9 32.0 - 36.0 %    RDW 13.1 11.7 - 14.9 %    Platelets 77 (L) 868 - 440 K/CU MM    MPV 12.3 (H) 7.5 - 11.1 FL    Differential Type AUTOMATED DIFFERENTIAL     Segs Relative 85.8 (H) 36 - 66 %    Lymphocytes % 11.9 (L) 24 - 44 %    Monocytes % 1.5 0 - 4 %    Eosinophils % 0.4 0 - 3 %    Basophils % 0.4 0 - 1 %    Segs Absolute 4.1 K/CU MM    Lymphocytes Absolute 0.6 K/CU MM    Monocytes Absolute 0.1 K/CU MM    Eosinophils Absolute 0.0 K/CU MM    Basophils Absolute 0.0 K/CU MM    Nucleated RBC % 0.0 %    Total Nucleated RBC 0.0 K/CU MM    Total Immature Neutrophil 0.00 K/CU MM    Immature Neutrophil % 0.0 0 - 0.43 %   Procalcitonin   Result Value Ref Range    Procalcitonin 0.047    Lactic Acid   Result Value Ref Range    Lactate 2.3 (HH) 0.5 - 1.9 mMOL/L   Troponin   Result Value Ref Range Troponin T <0.010 <0.01 NG/ML   Lactate, Sepsis   Result Value Ref Range    Lactic Acid, Sepsis 1.6 0.5 - 1.9 mMOL/L   Glucose, Random   Result Value Ref Range    Glucose 485 (HH) 70 - 99 MG/DL   Vancomycin Level, Random   Result Value Ref Range    Vancomycin Rm 23.5 UG/ML    DOSE AMOUNT DOSE AMT.  GIVEN - `     DOSE TIME DOSE TIME GIVEN - `    Basic Metabolic Panel w/ Reflex to MG   Result Value Ref Range    Sodium 131 (L) 135 - 145 MMOL/L    Potassium 4.3 3.5 - 5.1 MMOL/L    Chloride 98 (L) 99 - 110 mMol/L    CO2 25 21 - 32 MMOL/L    Anion Gap 8 4 - 16    BUN 23 6 - 23 MG/DL    Creatinine 1.1 0.6 - 1.1 MG/DL    Est, Glom Filt Rate 58 (L) >60 mL/min/1.73m2    Glucose 374 (H) 70 - 99 MG/DL    Calcium 9.0 8.3 - 10.6 MG/DL   CBC with Auto Differential   Result Value Ref Range    WBC 10.7 (H) 4.0 - 10.5 K/CU MM    RBC 3.86 (L) 4.2 - 5.4 M/CU MM    Hemoglobin 12.7 12.5 - 16.0 GM/DL    Hematocrit 36.5 (L) 37 - 47 %    MCV 94.6 78 - 100 FL    MCH 32.9 (H) 27 - 31 PG    MCHC 34.8 32.0 - 36.0 %    RDW 13.2 11.7 - 14.9 %    Platelets 84 (L) 606 - 440 K/CU MM    MPV 11.8 (H) 7.5 - 11.1 FL    Differential Type AUTOMATED DIFFERENTIAL     Segs Relative 86.3 (H) 36 - 66 %    Lymphocytes % 8.5 (L) 24 - 44 %    Monocytes % 4.4 (H) 0 - 4 %    Eosinophils % 0.2 0 - 3 %    Basophils % 0.1 0 - 1 %    Segs Absolute 9.2 K/CU MM    Lymphocytes Absolute 0.9 K/CU MM    Monocytes Absolute 0.5 K/CU MM    Eosinophils Absolute 0.0 K/CU MM    Basophils Absolute 0.0 K/CU MM    Nucleated RBC % 0.0 %    Total Nucleated RBC 0.0 K/CU MM    Total Immature Neutrophil 0.05 K/CU MM    Immature Neutrophil % 0.5 (H) 0 - 0.43 %   Lactic Acid   Result Value Ref Range    Lactate 2.1 (HH) 0.5 - 1.9 mMOL/L   Hemoglobin A1C   Result Value Ref Range    Hemoglobin A1C 9.3 (H) 4.2 - 6.3 %    eAG 220 mg/dL   POC Blood Glucose   Result Value Ref Range    Glucose 315 mg/dL   POCT Glucose   Result Value Ref Range    POC Glucose 315 (H) 70 - 99 MG/DL   POCT Glucose Result Value Ref Range    POC Glucose 401 (H) 70 - 99 MG/DL   POCT Glucose   Result Value Ref Range    POC Glucose 372 (H) 70 - 99 MG/DL   POCT Glucose   Result Value Ref Range    POC Glucose 420 (H) 70 - 99 MG/DL   POCT Glucose   Result Value Ref Range    POC Glucose 443 (H) 70 - 99 MG/DL   POCT Glucose   Result Value Ref Range    POC Glucose 462 (H) 70 - 99 MG/DL   POCT Glucose   Result Value Ref Range    POC Glucose 449 (H) 70 - 99 MG/DL   POCT Glucose   Result Value Ref Range    POC Glucose 477 (H) 70 - 99 MG/DL   POCT Glucose   Result Value Ref Range    POC Glucose 396 (H) 70 - 99 MG/DL   POCT Glucose   Result Value Ref Range    POC Glucose 248 (H) 70 - 99 MG/DL   POCT Glucose   Result Value Ref Range    POC Glucose 234 (H) 70 - 99 MG/DL   EKG 12 Lead   Result Value Ref Range    Ventricular Rate 76 BPM    Atrial Rate 76 BPM    P-R Interval 138 ms    QRS Duration 78 ms    Q-T Interval 414 ms    QTc Calculation (Bazett) 465 ms    P Axis 46 degrees    R Axis 15 degrees    T Axis 36 degrees    Diagnosis       Normal sinus rhythm  Normal ECG  When compared with ECG of 30-NOV-2022 16:03,  Criteria for Septal infarct are no longer present  Confirmed by KERRI Seymour ([id number]) on 12/20/2022 7:35:25 PM     EKG 12 Lead   Result Value Ref Range    Ventricular Rate 79 BPM    Atrial Rate 79 BPM    P-R Interval 134 ms    QRS Duration 82 ms    Q-T Interval 412 ms    QTc Calculation (Bazett) 472 ms    P Axis 59 degrees    R Axis 26 degrees    T Axis 49 degrees    Diagnosis       Normal sinus rhythm  Normal ECG  When compared with ECG of 20-DEC-2022 14:05,  No significant change was found  Confirmed by KERRI Seymour ([id number]) on 12/21/2022 7:09:21 PM        Radiographs (if obtained):  Radiologist's Report Reviewed:  XR RIBS LEFT INCLUDE CHEST (MIN 3 VIEWS)   Preliminary Result   Bilateral lower lobe infiltrates worse on the right consistent with pneumonia.       Specific views of the left ribs demonstrate no obvious rib fractures. Liver   shunt likely represents a TIPS. EKG (if obtained): (All EKG's are interpreted by myself in the absence of a cardiologist)    Medications   lidocaine 4 % external patch 1 patch (1 patch TransDERmal Patch Applied 12/20/22 1226)   cefTRIAXone (ROCEPHIN) 1,000 mg in dextrose 5 % 50 mL IVPB mini-bag (has no administration in time range)   azithromycin (ZITHROMAX) tablet 500 mg (has no administration in time range)   traMADol (ULTRAM) tablet 50 mg (50 mg Oral Given 12/20/22 1226)   ibuprofen (ADVIL;MOTRIN) tablet 600 mg (600 mg Oral Given 12/20/22 1226)       MDM:  Patient presents to the emergency department complaint of the left anterior rib pain after leaning over the washing machine to get the clothing out this morning. Patient states she heard a pop. On physical exam patient with tenderness or pain left anterior ribs just under the left breast.  No crepitus step-off or deformity noted on exam.  Patient was ordered Lidoderm patch 4%, tramadol 80 mg p.o. and ibuprofen 600 mg p.o. in the emergency department. Chest and left rib x-ray was ordered. Patient states she is a diabetic point-of-care glucose was taken and it was 315. Patient states her last insulin dose was 730 this morning. Imaging studies have revealed bilateral lower lobe infiltrates worse on the right consistent with pneumonia no obvious rib fractures. Patient has been on 2 rounds of antibiotics status post Z-[place] November 30 and started on doxycycline December 12. Patient still with pneumonia only got 1 tablet of doxycycline left by her pulmonologist.  Patient will need admission for failed outpatient treatment of pneumonia. Patient agreeable for admission. Hospitalist will be consulted. I did order Rocephin 1 g IV, azithromycin 500 mg p.o. in the emergency department. Laboratory studies have been ordered. Patient with normal sodium potassium kidney function calcium. Patient glucose of 340.   Patient with normal anion gap of 8. Patient normal liver enzymes have a slightly elevated total bilirubin of 1.3, alk phos 173. Patient with negative troponin x2, normal magnesium, patient with a elevated lipase of 192. Patient normal white blood cell count hemoglobin and platelets. Clinical Impression:  1. Rib contusion, left, initial encounter    2. Pneumonia of both lower lobes due to infectious organism    3. Type 2 diabetes mellitus with hyperglycemia, with long-term current use of insulin [facility])          ED Provider Disposition Time  DISPOSITION  1:23 PM        Comment: Please note this report has been produced using speech recognition software and may contain errors related to that system including errors in grammar, punctuation, and spelling, as well as words and phrases that may be inappropriate. Efforts were made to edit the dictations.        Ora Mustafa,   12/22/22 9666

## 2022-12-20 NOTE — ED NOTES
This report has not been signed. Information might be incomplete. XR RIBS LEFT INCLUDE CHEST (MIN 3 VIEWS) [[id number]]  Status: Preliminary result     Order Providers    [address], DO            Reading Providers    Read Date Phone Pager   [address] Dec 20, 2022  1:07 [phone]        XR RIBS LEFT INCLUDE CHEST (MIN 3 VIEWS): Patient Communication     Not Released  Not seen     Radiation Dose Estimates    No radiation information found for this patient  Narrative   EXAMINATION:   6 XRAY VIEWS OF THE LEFT RIBS WITH FRONTAL XRAY VIEW OF THE CHEST       12/20/2022 12:16 pm       COMPARISON:   November 30, 2022       HISTORY:   ORDERING SYSTEM PROVIDED HISTORY: pain felt rib pop   TECHNOLOGIST PROVIDED HISTORY:   Reason for exam:->pain felt rib pop   Reason for Exam: pain after ribs popped while pulling clothes out of washer       FINDINGS:   Chest x-ray demonstrates infiltrates in both lower lobes, especially the   right consistent with pneumonia.       Views of the left ribs demonstrate no obvious fractures or destructive   changes.  Clavicle, scapula and shoulder appear unremarkable.  Incidentally,   a liver shunt noted likely representing a TIPS.         Impression   Bilateral lower lobe infiltrates worse on the right consistent with pneumonia.       Specific views of the left ribs demonstrate no obvious rib fractures.  Liver   shunt likely represents a TIPS.               Екатерина Vareal RN  12/20/22 4234 Complex Repair And Flap Additional Text (Will Appearing After The Standard Complex Repair Text): The complex repair was not sufficient to completely close the primary defect. The remaining additional defect was repaired with the flap mentioned below.

## 2022-12-20 NOTE — H&P
V2.0  History and Physical      Name:  Gabriela Edwards /Age/Sex: 1960  (63 y.o. female)   MRN & CSN:  [id number] & [id number] Encounter Date/Time: 2022 1:39 PM EST   Location:  ED25/ED-25 PCP: Richard Franco MD       Hospital Day: 1    Assessment and Plan:   Gabriela Edwards is a 63 y.o. female with a pmh of CAD, COPD who presents with CAP (community acquired pneumonia) due to [address]    * (Principal) CAP (community acquired pneumonia) due to Chlamydia species 2022 Yes       Community-acquired pneumonia  Acute moderate COPD exacerbation  --Patient received Rocephin IV and Azithromycin in ED  --X-ray of left ribs x-ray chest.  Impression shows bilateral lower lobe infiltrates worse on the right consistent with pneumonia  --Patient received outpatient treatment with doxycycline and a Z-[place].  --CBC, troponin, chemistry panel, and respiratory panel unremarkable with exception of glucose, 340  --continue azithromycin, vancomycin, cefepime, and vancomycin for now, and will consider to de-escalate pending cultures and antigens  --MRSA culture and antigens ordered  --EKG NSR; HR 76; No ST elevation  --No leukocytosis, troponin negative x2  --LA ordered; procal ordered  --Continue MDIs  --Start prednisone 40mg PO x 5 days, as need Tessalon Perles   --Follows outpatient with Dr. Alexia Wallace for COPD    CAD  -- Left heart cath  with Dr. Martinez Villa  --Troponin negative x2  --Denies chest pain  --Patient on Ranexa, statin,  BB,     --T2DM, insulin dependent  --Hold oral hypoglycemics  -- Sliding scale insulin  - Hypoglycemia protocol  --Lantus at hs  - POCT before meals and at bedtime    Hypertension  --prescribed Toprol XL    Mixed hyperlipidemia  --Continue statin    Schizophrenia  -- Continue Seroquel, for now.  Patient takes [place] as outpatient.   -monitor for prolong QTC in the setting of macrolide  usage      Depression  --Continue fluoxetine GERD   -- Continue PPI    Hepatitis   History of drug abuse  --Patient on Viread as outpatient        Further Chronic conditions   Continue outpatient regimen, unless contraindicated as noted above. Disposition:   Current Living situation: Home  Expected Disposition: Home  Estimated D/C: 1-2 days    Diet No diet orders on file   DVT Prophylaxis [x] Lovenox, []  Heparin, [] SCDs, [] Ambulation,  [] Eliquis, [] Xarelto   Code Status Prior   Surrogate Decision Maker/ POA self     History from:     patient, electronic medical record    History of Present Illness:     Chief Complaint:left lateral  pain during inspiration and coughing. Patient seen at bedside,not in distress, continues to endorse left lateral pain on inspiration and cough. Patient return to ED after leaning over when doing laundry. Patient reports that she thought she fractured her rib. Per rib xray, patient noted to have pneumonia after outpatient treatment. Plan of care, labs and imaging discussed. Patient verbalizes agreement with treatment plan. Review of Systems: Need 10 Elements   Review of Systems   Constitutional:  Positive for chills and fever. PTA   Respiratory:  Positive for cough, chest tightness and wheezing. Gastrointestinal:  Negative for abdominal pain, diarrhea, nausea and vomiting. All other systems reviewed and are negative. Objective:   No intake or output data in the 24 hours ending 12/20/22 1906   Vitals:   Vitals:    12/20/22 1632 12/20/22 1702 12/20/22 1733 12/20/22 1832   BP: (!) 101/56 95/63 110/65 108/65   Pulse: 77 75 74 73   Resp: 19 19 18 18   Temp:       TempSrc:       SpO2: 92% 93% 94% 93%   Weight:       Height:           Medications Prior to Admission     Prior to Admission medications    Medication Sig Start Date End Date Taking?  Authorizing Provider   Multiple Vitamin (MULTIVITAMIN PO) Take 1 tablet by mouth daily   Yes Historical Provider, MD   VITAMIN D PO Take 1 capsule by mouth 2 times daily   Yes Historical Provider, MD   calcium carbonate (OYSTER SHELL CALCIUM 500 MG) 1250 (500 Ca) MG tablet Take 1 tablet by mouth daily   Yes Historical Provider, MD JOSELUIS CHAND 100 UNIT/ML injection pen INJECT 15 UNITS INTO THE SKIN NIGHTLY  Patient taking differently: Inject 25 Units into the skin nightly 12/14/22   Ramiro Pryor MD   albuterol sulfate HFA (PROVENTIL HFA) 108 (90 Base) MCG/ACT inhaler Inhale 2 puffs into the lungs every 4 hours as needed for Wheezing or Shortness of Breath With spacer (and mask if indicated). Thanks. 11/30/22 12/30/22  Vickie Horn APRN - CNP   mupirocin (BACTROBAN) 2 % ointment Apply topically 3 times daily. 11/9/22   Rj Posey MD   blood glucose monitor strips Test 3-4 times a day & as needed for symptoms of irregular blood glucose. 11/8/22   Ramiro Pryor MD   blood glucose monitor kit and supplies Use 3-4 times daily 11/8/22   Ramiro Pryor MD   Lancets MISC Use one lancet 4 times daily 11/8/22   Ramiro Pryor MD   raloxifene (EVISTA) 60 MG tablet Take 1 tablet by mouth daily 11/7/22   Mickey Hernandez MD   insulin aspart (NOVOLOG FLEXPEN) 100 UNIT/ML injection pen **Low Dose Correction Algorithm**Insulin lispro (HUMALOG)  3 TIMES DAILY WITH MEALSGlucose: Dose: No [id number] 1 [id number] 2 [id number] 3 Swzre270-346 4 Jzjct776-695 5 Fwjtf273 and above 6 Units 11/1/22   Ramiro Pryor MD   furosemide (LASIX) 20 MG tablet Take 1 tablet by mouth daily Hold if systolic <141 75/91/60   Valene Mortimer, MD   mirabegron [facility]) 25 MG TB24 Take 1 tablet by mouth daily 9/22/22   Mickey Hernandez MD   estradiol (ESTRACE VAGINAL) 0.1 MG/GM vaginal cream Place 1 g vaginally three times a week Use 1 g vaginally nightly for 2 weeks, then 1 g nightly 2-3 times per week. 9/21/22   Rj Loraine Posey, MD   ranolazine (RANEXA) 500 MG extended release tablet Take 1 tablet by mouth in the morning and 1 tablet before bedtime. 8/5/22   Lilly Moore MD   metoprolol succinate (TOPROL XL) 50 MG extended release tablet Take 1 tablet by mouth in the morning. 7/21/22   Lilly Moore MD   midodrine (PROAMATINE) 5 MG tablet Take 1 tablet by mouth in the morning and 1 tablet at noon and 1 tablet in the evening. Take with meals. 7/21/22   Lilly Moore MD   ipratropium-albuterol (DUONEB) 0.5-2.5 (3) MG/3ML SOLN nebulizer solution Inhale 3 mLs into the lungs every 4 hours 6/13/22   Arya Patel MD   nitroGLYCERIN (NITROSTAT) 0.4 MG SL tablet Place 1 tablet under the tongue every 5 minutes as needed for Chest pain 5/18/22   Lilly Moore MD   rifAXIMin [place]) 550 MG tablet Take 1 tablet by mouth 2 times daily 5/18/22   Lilly Moore MD   sucralfate (CARAFATE) 1 GM tablet Take 1 tablet by mouth 4 times daily 1/31/22   Home Bey MD   ondansetron (ZOFRAN ODT) 4 MG disintegrating tablet Take 1 tablet by mouth every 8 hours as needed for Nausea 1/31/22   Home Bey MD   blood glucose monitor strips Test 3 times a day & as needed for symptoms of irregular blood glucose. Please fill with what is covered my patients insurance. 1/17/22   Charles Palafox MD   blood glucose monitor strips Test  Bid times a day & as needed for symptoms of irregular blood glucose.  1/13/22   Charles Palafox MD   Glucosource Lancets MISC Use one 3-4 times to check blood sugar 2/10/21   Charles Palafox MD   lactulose (CHRONULAC) 10 GM/15ML solution Take 30 mLs by mouth 3 times daily  Patient taking differently: Take 30 mLs by mouth 2 times daily 9/15/20   Trae España MD   QUEtiapine (SEROQUEL) 50 MG tablet Take 3 tablets by mouth nightly  Patient taking differently: Take 100 mg by mouth nightly 12/20/22 Patient states she only takes 100 mg at [facility] 9/15/20   Trae España MD   FLUoxetine (PROZAC) 20 MG capsule Take 20 mg by mouth daily 9/11/20   Historical Provider, MD   paliperidone (INVEGA) 6 MG extended release tablet Take 6 mg by mouth every morning 8/13/20   Historical Provider, MD   Compression Stockings MISC by Does not apply route 20 - 30 mmh wear daily and take off at night  Thigh High 5/21/20   ERLINDA Bowens - CNP   glucose monitoring kit (FREESTYLE) monitoring kit 1 kit by Does not apply route daily 1/30/20   Aislinn Robison MD   Blood Glucose Monitoring Suppl (ACURA BLOOD GLUCOSE METER) w/Device KIT Please check blood sugar 3 times daily. Please fill with what is covered by the insurance 1/21/20   Aislinn Robison MD   VIREAD 300 MG tablet Take 300 mg by mouth daily  11/20/17   Historical Provider, MD       Physical Exam: Need 8 Elements   Physical Exam  Constitutional:       General: She is not in acute distress. Appearance: She is not ill-appearing, toxic-appearing or diaphoretic. HENT:      Nose: Nose normal.      Mouth/Throat:      Mouth: Mucous membranes are moist.   Eyes:      Conjunctiva/sclera: Conjunctivae normal.   Cardiovascular:      Rate and Rhythm: Normal rate. Pulmonary:      Effort: Pulmonary effort is normal.      Breath sounds: Wheezing and rhonchi present. Chest:      Chest wall: Tenderness present. Abdominal:      General: Bowel sounds are normal.      Palpations: Abdomen is soft. Musculoskeletal:         General: Normal range of motion. Skin:     General: Skin is warm and dry. Capillary Refill: Capillary refill takes less than 2 seconds. Neurological:      General: No focal deficit present. Mental Status: She is alert and oriented to person, place, and time.    Psychiatric:         Mood and Affect: Mood normal.         Behavior: Behavior normal.        Past Medical History:   PMHx   Past Medical History:   Diagnosis Date    Abnormal Pap smear of cervix     Acid reflux     Arthritis     left knee    Breast cyst     CAD (coronary artery disease)     per [facility]/6[facility] - Dr. Domingo Garcia    COPD (chronic obstructive pulmonary disease) [facility])     follow with Dr Beba Tamayo    Depression     [facility] manic - depression see Dr Lisset Wong    Diabetes mellitus [facility])     dx 10+ yrs ago- follows with PCP    Drug abuse ([facility] [address]     hx use of cocaine, heroin and marijuana- states last used 12/2014    Glaucoma     bilateral    H/O Doppler lower venous ultrasound 04/04/2019    No DVT or SVT, Significant reflux of RGSV and LGSV. H/O echocardiogram 12/18/2020    EF 55-60%, Mod LVH. Hepatic encephalopathy 05/2019    Hepatitis C     for liver bx 12/3/2015\"Have Hepatitis B and C and saw Dr José Wolf for this 12/1/2015\"    Hiatal hernia     History of alcohol abuse     History of exercise stress test 01/02/2020    Treadmill, Normal exercise performance without angina and ischemic EKG changes. HTN (hypertension)     \"for the past two yrs on medication\" follows with Dr Anni Luke    Hx of blood clots 2019    Portal vein thrombsis - TIPS procedure 4/23/19    Hyperlipemia     Irregular heart beat     per pt    Liver hematoma     Migraine     Migraines     Last migraine:  2018    Nausea & vomiting     Neurologic disorder     Schizophrenia [facility])     per old chart    Seizures ([facility] [address]     \"last one was 9/2015- saw Dr Manav Buckner at [facility]- she said not sure if acutal seizures- she thinks they are panic or anxiety attacks\"    SOB (shortness of breath)     with any exertion    Vulvar mass      PSHX:  has a past surgical history that includes Cholecystectomy (per old chart done 1985); Tonsillectomy (as a kid); Breast surgery (10/2015); Endoscopy, colon, diagnostic (03/16/2017); Upper gastrointestinal endoscopy (N/A, 11/14/2018); TIPS procedure (04/23/2019); Upper gastrointestinal endoscopy (N/A, 06/05/2019); Colonoscopy (03/11/2013); Colonoscopy (03/16/2017); eye surgery (Bilateral, ? when); Hysterectomy; Carpal tunnel release (Left, 01/09/2020); Carpal tunnel release (Right, 05/26/2020); Carpal tunnel release (Right, 05/26/2020); Finger trigger release (Right, 05/26/2020); Cardiac catheterization; Colonoscopy (N/A, 05/17/2022);  Salpingo-oophorectomy; and Vulvectomy (N/A, 11/9/2022). Allergies: Allergies   Allergen Reactions    Hydrocodone-Acetaminophen Anaphylaxis    Norco [Hydrocodone-Acetaminophen] Itching     Itching, rash, nausea and vomiting. Tylenol [Acetaminophen] Itching, Nausea And Vomiting and Rash     Fam HX: family history includes Arthritis in her father and mother; Breast Cancer in her maternal aunt, maternal aunt, maternal aunt, and maternal aunt; Cancer in her father and mother; Diabetes in her father; Heart Disease in her brother; High Blood Pressure in her father; High Cholesterol in her father; Migraines in her father, mother, and sister; Ovarian Cancer in her mother.   Soc HX:   Social History     Socioeconomic History    Marital status:      Spouse name: None    Number of children: None    Years of education: None    Highest education level: None   Tobacco Use    Smoking status: Former     Packs/day: 0.50     Years: 45.00     Pack years: 22.50     Types: Cigarettes     Start date: 1974     Passive exposure: Past    Smokeless tobacco: Never   Vaping Use    Vaping Use: Never used   Substance and Sexual Activity    Alcohol use: Not Currently     Alcohol/week: 2.0 - 3.0 standard drinks     Types: 2 - 3 Shots of liquor per week     Comment: 2-3 shots last 4/2019    Drug use: Yes     Frequency: 3.0 times per week     Types: Marijuana (Weed)     Comment: daily last smoked 3 mos ago as of 11-9-22    Sexual activity: Not Currently     Partners: Male     Social Determinants of Health     Financial Resource Strain: Medium Risk    Difficulty of Paying Living Expenses: Somewhat hard   Food Insecurity: Food Insecurity Present    Worried About Running Out of Food in the Last Year: Sometimes true    Ran Out of Food in the Last Year: Sometimes true   Transportation Needs: Unmet Transportation Needs    Lack of Transportation (Medical): Yes    Lack of Transportation (Non-Medical): Yes   Physical Activity: Inactive    Days of Exercise per Week: 0 days Minutes of Exercise per Session: 0 min   Stress: No Stress Concern Present    Feeling of Stress : Only a little   Social Connections: Moderately Integrated    Frequency of Communication with Friends and Family: Three times a week    Frequency of Social Gatherings with Friends and Family: Twice a week    Attends Islam Services: More than 4 times per year    Active Member of Clubs or Organizations: Yes    Attends Club or Organization Meetings: More than 4 times per year    Marital Status:    Housing Stability: Low Risk     Unable to Pay for Housing in the Last Year: No    Number of Places Lived in the Last Year: 1    Unstable Housing in the Last Year: No       Medications:   Medications:    lidocaine  1 patch TransDERmal Daily      Infusions:   PRN Meds:      Labs      CBC:   Recent Labs     12/20/22  1340   WBC 7.2   HGB 14.9   PLT 94*     BMP:    Recent Labs     12/20/22  1219 12/20/22  1340   NA  --  138   K  --  4.4   CL  --  104   CO2  --  26   BUN  --  16   CREATININE  --  1.0   GLUCOSE 315 340*     Hepatic:   Recent Labs     12/20/22  1340   AST 15   ALT 15   BILITOT 1.3*   ALKPHOS 173*     Lipids:   Lab Results   Component Value Date/Time    CHOL 98 11/27/2019 01:52 PM    CHOL 120 08/01/2017 04:15 PM    HDL 38 11/27/2019 01:52 PM    TRIG 96 11/27/2019 01:52 PM     Hemoglobin A1C:   Lab Results   Component Value Date/Time    LABA1C 8.5 12/01/2022 11:24 AM     TSH:   Lab Results   Component Value Date/Time    TSH 1.73 08/01/2017 04:15 PM     Troponin:   Lab Results   Component Value Date/Time    TROPONINT <0.010 12/20/2022 03:07 PM    TROPONINT <0.010 12/20/2022 01:40 PM    TROPONINT <0.010 11/30/2022 03:09 PM     Lactic Acid: No results for input(s): LACTA in the last 72 hours. BNP: No results for input(s): PROBNP in the last 72 hours.   UA:  Lab Results   Component Value Date/Time    NITRU Negative 09/20/2022 11:09 AM    COLORU Yellow 09/20/2022 11:09 AM    PHUR 5.5 09/20/2022 11:09 AM    WBCUA 69 09/20/2022 11:09 AM    RBCUA 32 09/20/2022 11:09 AM    RBCUA 5 01/31/2022 12:37 AM    MUCUS RARE 08/21/2021 10:45 PM    TRICHOMONAS NONE SEEN 01/31/2022 12:37 AM    YEAST RARE 08/21/2021 10:45 PM    BACTERIA None Seen 09/20/2022 11:09 AM    CLARITYU CLOUDY 09/20/2022 11:09 AM    SPECGRAV 1.019 09/20/2022 11:09 AM    LEUKOCYTESUR MODERATE 09/20/2022 11:09 AM    UROBILINOGEN 0.2 09/20/2022 11:09 AM    BILIRUBINUR Negative 09/20/2022 11:09 AM    BILIRUBINUR neg 07/15/2017 10:58 AM    BLOODU LARGE 09/20/2022 11:09 AM    GLUCOSEU 500 09/20/2022 11:09 AM    KETUA Negative 09/20/2022 11:09 AM    AMORPHOUS RARE 05/01/2019 12:00 AM     Urine Cultures:   Lab Results   Component Value Date/Time    LABURIN >100,000 CFU/ml 09/20/2022 11:09 AM    LABURIN 50 mg/dL 01/30/2020 09:31 AM     Blood Cultures: No results found for: BC  No results found for: BLOODCULT2  Organism:   Lab Results   Component Value Date/Time    ORG Escherichia coli 09/20/2022 11:09 AM       Imaging/Diagnostics Last 24 Hours   XR RIBS LEFT INCLUDE CHEST (MIN 3 VIEWS)    Result Date: 12/20/2022  EXAMINATION: 6 XRAY VIEWS OF THE LEFT RIBS WITH FRONTAL XRAY VIEW OF THE CHEST 12/20/2022 12:16 pm COMPARISON: November 30, 2022 HISTORY: ORDERING SYSTEM PROVIDED HISTORY: pain felt rib pop TECHNOLOGIST PROVIDED HISTORY: Reason for exam:->pain felt rib pop Reason for Exam: pain after ribs popped while pulling clothes out of washer FINDINGS: Chest x-ray demonstrates infiltrates in both lower lobes, especially the right consistent with pneumonia. Views of the left ribs demonstrate no obvious fractures or destructive changes. Clavicle, scapula and shoulder appear unremarkable. Incidentally, a liver shunt noted likely representing a TIPS. Bilateral lower lobe infiltrates worse on the right consistent with pneumonia. Specific views of the left ribs demonstrate no obvious rib fractures. Liver shunt likely represents a TIPS.        Personally reviewed Lab Studies, Imaging, and discussed case with Dr. Jace Moya    Electronically signed by ERLINDA Jenkins CNP on 12/20/2022 at 7:06 PM

## 2022-12-20 NOTE — ED NOTES
ED TO INPATIENT SBAR HANDOFF    Patient Name: Jesika Rubin   :  1960  63 y.o. MRN:  [id number]  Preferred Name    ED Room #:  ED25/ED-25  Family/Caregiver Present no   Restraints no   Sitter no   Sepsis Risk Score Sepsis Risk Score: 3.12    Situation  Code Status: Prior No additional code details. Allergies: Hydrocodone-acetaminophen, Norco [hydrocodone-acetaminophen], and Tylenol [acetaminophen]  Weight: Patient Vitals for the past 96 hrs (Last 3 readings):   Weight   22 1122 186 lb (84.4 kg)     Arrived from: home  Chief Complaint:   Chief Complaint   Patient presents with    Rib Injury     Pt states she was leaning over washer pulling clothes out and felt a \"pop\" and has had left side rib pain since. Hospital Problem/Diagnosis:  Active Problems:    * No active hospital problems. *  Resolved Problems:    * No resolved hospital problems. *    Imaging:   XR RIBS LEFT INCLUDE CHEST (MIN 3 VIEWS)   Preliminary Result   Bilateral lower lobe infiltrates worse on the right consistent with pneumonia. Specific views of the left ribs demonstrate no obvious rib fractures. Liver   shunt likely represents a TIPS.            Abnormal labs:   Abnormal Labs Reviewed   POCT GLUCOSE - Abnormal; Notable for the following components:       Result Value    POC Glucose 315 (*)     All other components within normal limits     Critical values: no     Abnormal Assessment Findings: Pneumonia    Background  History:   Past Medical History:   Diagnosis Date    Abnormal Pap smear of cervix     Acid reflux     Arthritis     left knee    Breast cyst     CAD (coronary artery disease)     per [facility] - Dr. Vic Read COPD (chronic obstructive pulmonary disease) ([facility] [address]     follow with Dr Doris Nesbitt Depression     \"have manic - depression see Dr Giorgi Bunch Diabetes mellitus [facility])     dx 10+ yrs ago- follows with PCP    Drug abuse ([facility] [address]     hx use of cocaine, heroin and marijuana- states last used 2014  Glaucoma     bilateral    H/O Doppler lower venous ultrasound 04/04/2019    No DVT or SVT, Significant reflux of RGSV and LGSV.    H/O echocardiogram 12/18/2020    EF 55-60%, Mod LVH.  Hepatic encephalopathy 05/2019    Hepatitis C     for liver bx 12/3/2015\"Have Hepatitis B and C and saw Dr Marely Martinez for this 12/1/2015\"    Hiatal hernia     History of alcohol abuse     History of exercise stress test 01/02/2020    Treadmill, Normal exercise performance without angina and ischemic EKG changes.    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    Hyperlipemia     Irregular heart beat     per pt    Liver hematoma     Migraine     Migraines     Last migraine:  2018    Nausea & vomiting     Neurologic disorder     Schizophrenia [facility])     per old chart    Seizures ([facility] [address]     \"last one was 9/2015- saw Dr Lydia Collins at [facility]- she said not sure if acutal seizures- she thinks they are panic or anxiety attacks\"    SOB (shortness of breath)     with any exertion    Vulvar mass        Assessment    Vitals/MEWS: MEWS Score: 2  Level of Consciousness: Alert (0)   Vitals:    12/20/22 1122 12/20/22 1330   BP: 118/72 111/62   Pulse: 82 79   Resp: 16 21   Temp: 98 °F (36.7 °C) 98.2 °F (36.8 °C)   TempSrc:  Oral   SpO2: 97% 95%   Weight: 186 lb (84.4 kg)    Height: 4' 9.5\" (1.461 m)      FiO2 (%):   O2 Flow Rate: O2 Device: None (Room air)    Cardiac Rhythm:    Pain Assessment:  [] Verbal [] Tiffany Brenden Scale  Pain Scale:    Last documented pain score (0-10 scale)    Last documented pain medication administered:   Mental Status: oriented  NIH Score:    C-SSRS: Risk of Suicide: No Risk  Bedside swallow:    Beatriz Coma Scale (GCS): Beatriz Coma Scale  Eye Opening: Spontaneous  Best Verbal Response: Oriented  Best Motor Response: Obeys commands  Beatriz Coma Scale Score: 15  Active LDA's:   Peripheral IV 12/20/22 Left Antecubital (Active)     PO Status: Regular  Pertinent or High Risk Medications/Drips: no   o If Yes, please provide details:   Pending Blood Product Administration: no     You may also review the ED PT Care Timeline found under the Summary Nursing Index tab. Recommendation    Pending orders   Plan for Discharge (if known):    Additional Comments:    If any further questions, please call Sending RN at [phone]    Electronically signed by: Electronically signed by Moises Tirado RN on 12/20/2022 at 2:02 PM       Moises Tirado RN  12/20/22 3412

## 2022-12-21 ENCOUNTER — CARE COORDINATION (OUTPATIENT)
Dept: CARE COORDINATION | Age: 62
End: 2022-12-21

## 2022-12-21 PROBLEM — J18.9 CAP (COMMUNITY ACQUIRED PNEUMONIA): Status: ACTIVE | Noted: 2022-12-20

## 2022-12-21 LAB
ALBUMIN SERPL-MCNC: 2.8 GM/DL (ref 3.4–5)
ALP BLD-CCNC: 154 IU/L (ref 40–128)
ALT SERPL-CCNC: 13 U/L (ref 10–40)
ANION GAP SERPL CALCULATED.3IONS-SCNC: 7 MMOL/L (ref 4–16)
AST SERPL-CCNC: 13 IU/L (ref 15–37)
BASOPHILS ABSOLUTE: 0 K/CU MM
BASOPHILS RELATIVE PERCENT: 0.4 % (ref 0–1)
BILIRUB SERPL-MCNC: 1.2 MG/DL (ref 0–1)
BUN BLDV-MCNC: 20 MG/DL (ref 6–23)
CALCIUM SERPL-MCNC: 8.9 MG/DL (ref 8.3–10.6)
CHLORIDE BLD-SCNC: 102 MMOL/L (ref 99–110)
CO2: 25 MMOL/L (ref 21–32)
CREAT SERPL-MCNC: 0.9 MG/DL (ref 0.6–1.1)
DIFFERENTIAL TYPE: ABNORMAL
EKG ATRIAL RATE: 79 BPM
EKG DIAGNOSIS: NORMAL
EKG P AXIS: 59 DEGREES
EKG P-R INTERVAL: 134 MS
EKG Q-T INTERVAL: 412 MS
EKG QRS DURATION: 82 MS
EKG QTC CALCULATION (BAZETT): 472 MS
EKG R AXIS: 26 DEGREES
EKG T AXIS: 49 DEGREES
EKG VENTRICULAR RATE: 79 BPM
EOSINOPHILS ABSOLUTE: 0 K/CU MM
EOSINOPHILS RELATIVE PERCENT: 0.4 % (ref 0–3)
GFR SERPL CREATININE-BSD FRML MDRD: >60 ML/MIN/1.73M2
GLUCOSE BLD-MCNC: 415 MG/DL (ref 70–99)
GLUCOSE BLD-MCNC: 420 MG/DL (ref 70–99)
GLUCOSE BLD-MCNC: 443 MG/DL (ref 70–99)
GLUCOSE BLD-MCNC: 449 MG/DL (ref 70–99)
GLUCOSE BLD-MCNC: 462 MG/DL (ref 70–99)
GLUCOSE BLD-MCNC: 477 MG/DL (ref 70–99)
GLUCOSE BLD-MCNC: 485 MG/DL (ref 70–99)
HCT VFR BLD CALC: 41.5 % (ref 37–47)
HEMOGLOBIN: 14.5 GM/DL (ref 12.5–16)
IMMATURE NEUTROPHIL %: 0 % (ref 0–0.43)
LACTIC ACID, SEPSIS: 1.6 MMOL/L (ref 0.5–1.9)
LEGIONELLA URINARY AG: NEGATIVE
LYMPHOCYTES ABSOLUTE: 0.6 K/CU MM
LYMPHOCYTES RELATIVE PERCENT: 11.9 % (ref 24–44)
MCH RBC QN AUTO: 33.1 PG (ref 27–31)
MCHC RBC AUTO-ENTMCNC: 34.9 % (ref 32–36)
MCV RBC AUTO: 94.7 FL (ref 78–100)
MONOCYTES ABSOLUTE: 0.1 K/CU MM
MONOCYTES RELATIVE PERCENT: 1.5 % (ref 0–4)
NUCLEATED RBC %: 0 %
PDW BLD-RTO: 13.1 % (ref 11.7–14.9)
PLATELET # BLD: 77 K/CU MM (ref 140–440)
PMV BLD AUTO: 12.3 FL (ref 7.5–11.1)
POTASSIUM SERPL-SCNC: 5.4 MMOL/L (ref 3.5–5.1)
RBC # BLD: 4.38 M/CU MM (ref 4.2–5.4)
SEGMENTED NEUTROPHILS ABSOLUTE COUNT: 4.1 K/CU MM
SEGMENTED NEUTROPHILS RELATIVE PERCENT: 85.8 % (ref 36–66)
SODIUM BLD-SCNC: 134 MMOL/L (ref 135–145)
STREP PNEUMONIAE ANTIGEN: NORMAL
TOTAL IMMATURE NEUTOROPHIL: 0 K/CU MM
TOTAL NUCLEATED RBC: 0 K/CU MM
TOTAL PROTEIN: 5.8 GM/DL (ref 6.4–8.2)
WBC # BLD: 4.7 K/CU MM (ref 4–10.5)

## 2022-12-21 PROCEDURE — 6370000000 HC RX 637 (ALT 250 FOR IP)

## 2022-12-21 PROCEDURE — 94640 AIRWAY INHALATION TREATMENT: CPT

## 2022-12-21 PROCEDURE — 6370000000 HC RX 637 (ALT 250 FOR IP): Performed by: INTERNAL MEDICINE

## 2022-12-21 PROCEDURE — 2580000003 HC RX 258

## 2022-12-21 PROCEDURE — 96375 TX/PRO/DX INJ NEW DRUG ADDON: CPT

## 2022-12-21 PROCEDURE — 6360000002 HC RX W HCPCS: Performed by: NURSE PRACTITIONER

## 2022-12-21 PROCEDURE — 6370000000 HC RX 637 (ALT 250 FOR IP): Performed by: NURSE PRACTITIONER

## 2022-12-21 PROCEDURE — G0378 HOSPITAL OBSERVATION PER HR: HCPCS

## 2022-12-21 PROCEDURE — 93010 ELECTROCARDIOGRAM REPORT: CPT | Performed by: INTERNAL MEDICINE

## 2022-12-21 PROCEDURE — 83605 ASSAY OF LACTIC ACID: CPT

## 2022-12-21 PROCEDURE — 87081 CULTURE SCREEN ONLY: CPT

## 2022-12-21 PROCEDURE — 6360000002 HC RX W HCPCS

## 2022-12-21 PROCEDURE — 96366 THER/PROPH/DIAG IV INF ADDON: CPT

## 2022-12-21 PROCEDURE — 2580000003 HC RX 258: Performed by: NURSE PRACTITIONER

## 2022-12-21 PROCEDURE — 96372 THER/PROPH/DIAG INJ SC/IM: CPT

## 2022-12-21 PROCEDURE — 87449 NOS EACH ORGANISM AG IA: CPT

## 2022-12-21 PROCEDURE — 6370000000 HC RX 637 (ALT 250 FOR IP): Performed by: EMERGENCY MEDICINE

## 2022-12-21 PROCEDURE — 87899 AGENT NOS ASSAY W/OPTIC: CPT

## 2022-12-21 PROCEDURE — 94761 N-INVAS EAR/PLS OXIMETRY MLT: CPT

## 2022-12-21 PROCEDURE — 82962 GLUCOSE BLOOD TEST: CPT

## 2022-12-21 PROCEDURE — 80053 COMPREHEN METABOLIC PANEL: CPT

## 2022-12-21 PROCEDURE — 36415 COLL VENOUS BLD VENIPUNCTURE: CPT

## 2022-12-21 PROCEDURE — 85025 COMPLETE CBC W/AUTO DIFF WBC: CPT

## 2022-12-21 RX ORDER — TRAMADOL HYDROCHLORIDE 50 MG/1
50 TABLET ORAL EVERY 4 HOURS PRN
Status: DISCONTINUED | OUTPATIENT
Start: 2022-12-21 | End: 2022-12-21

## 2022-12-21 RX ORDER — TRAMADOL HYDROCHLORIDE 50 MG/1
50 TABLET ORAL EVERY 6 HOURS PRN
Status: DISCONTINUED | OUTPATIENT
Start: 2022-12-21 | End: 2022-12-22

## 2022-12-21 RX ORDER — KETOROLAC TROMETHAMINE 30 MG/ML
15 INJECTION, SOLUTION INTRAMUSCULAR; INTRAVENOUS ONCE
Status: COMPLETED | OUTPATIENT
Start: 2022-12-22 | End: 2022-12-21

## 2022-12-21 RX ORDER — INSULIN LISPRO 100 [IU]/ML
0-8 INJECTION, SOLUTION INTRAVENOUS; SUBCUTANEOUS
Status: DISCONTINUED | OUTPATIENT
Start: 2022-12-21 | End: 2022-12-23

## 2022-12-21 RX ORDER — INSULIN GLARGINE 100 [IU]/ML
40 INJECTION, SOLUTION SUBCUTANEOUS NIGHTLY
Status: DISCONTINUED | OUTPATIENT
Start: 2022-12-21 | End: 2022-12-22

## 2022-12-21 RX ORDER — SODIUM CHLORIDE 9 MG/ML
INJECTION, SOLUTION INTRAVENOUS CONTINUOUS
Status: DISPENSED | OUTPATIENT
Start: 2022-12-21 | End: 2022-12-22

## 2022-12-21 RX ORDER — INSULIN LISPRO 100 [IU]/ML
0-4 INJECTION, SOLUTION INTRAVENOUS; SUBCUTANEOUS NIGHTLY
Status: DISCONTINUED | OUTPATIENT
Start: 2022-12-21 | End: 2022-12-21

## 2022-12-21 RX ORDER — INSULIN LISPRO 100 [IU]/ML
10 INJECTION, SOLUTION INTRAVENOUS; SUBCUTANEOUS ONCE
Status: COMPLETED | OUTPATIENT
Start: 2022-12-21 | End: 2022-12-21

## 2022-12-21 RX ORDER — INSULIN LISPRO 100 [IU]/ML
15 INJECTION, SOLUTION INTRAVENOUS; SUBCUTANEOUS
Status: DISCONTINUED | OUTPATIENT
Start: 2022-12-22 | End: 2022-12-22

## 2022-12-21 RX ORDER — IPRATROPIUM BROMIDE AND ALBUTEROL SULFATE 2.5; .5 MG/3ML; MG/3ML
1 SOLUTION RESPIRATORY (INHALATION)
Status: DISCONTINUED | OUTPATIENT
Start: 2022-12-21 | End: 2022-12-23 | Stop reason: HOSPADM

## 2022-12-21 RX ADMIN — ALBUTEROL SULFATE 2 PUFF: 90 AEROSOL, METERED RESPIRATORY (INHALATION) at 07:19

## 2022-12-21 RX ADMIN — INSULIN LISPRO 8 UNITS: 100 INJECTION, SOLUTION INTRAVENOUS; SUBCUTANEOUS at 20:37

## 2022-12-21 RX ADMIN — ENOXAPARIN SODIUM 40 MG: 100 INJECTION SUBCUTANEOUS at 16:33

## 2022-12-21 RX ADMIN — RANOLAZINE 500 MG: 500 TABLET, FILM COATED, EXTENDED RELEASE ORAL at 20:34

## 2022-12-21 RX ADMIN — VANCOMYCIN HYDROCHLORIDE 1500 MG: 10 INJECTION, POWDER, LYOPHILIZED, FOR SOLUTION INTRAVENOUS at 23:02

## 2022-12-21 RX ADMIN — IPRATROPIUM BROMIDE AND ALBUTEROL SULFATE 1 AMPULE: 2.5; .5 SOLUTION RESPIRATORY (INHALATION) at 20:56

## 2022-12-21 RX ADMIN — SODIUM CHLORIDE, PRESERVATIVE FREE 10 ML: 5 INJECTION INTRAVENOUS at 11:54

## 2022-12-21 RX ADMIN — IPRATROPIUM BROMIDE AND ALBUTEROL SULFATE 1 AMPULE: 2.5; .5 SOLUTION RESPIRATORY (INHALATION) at 11:42

## 2022-12-21 RX ADMIN — INSULIN LISPRO 8 UNITS: 100 INJECTION, SOLUTION INTRAVENOUS; SUBCUTANEOUS at 11:06

## 2022-12-21 RX ADMIN — QUETIAPINE FUMARATE 100 MG: 100 TABLET ORAL at 20:34

## 2022-12-21 RX ADMIN — INSULIN LISPRO 8 UNITS: 100 INJECTION, SOLUTION INTRAVENOUS; SUBCUTANEOUS at 16:33

## 2022-12-21 RX ADMIN — TRAMADOL HYDROCHLORIDE 50 MG: 50 TABLET, COATED ORAL at 20:34

## 2022-12-21 RX ADMIN — ALBUTEROL SULFATE 2 PUFF: 90 AEROSOL, METERED RESPIRATORY (INHALATION) at 00:29

## 2022-12-21 RX ADMIN — TRAMADOL HYDROCHLORIDE 50 MG: 50 TABLET, COATED ORAL at 12:13

## 2022-12-21 RX ADMIN — AZITHROMYCIN MONOHYDRATE 250 MG: 250 TABLET ORAL at 15:41

## 2022-12-21 RX ADMIN — RALOXIFENE 60 MG: 60 TABLET ORAL at 11:51

## 2022-12-21 RX ADMIN — TROSPIUM CHLORIDE 20 MG: 20 TABLET, FILM COATED ORAL at 15:41

## 2022-12-21 RX ADMIN — CEFEPIME HYDROCHLORIDE 2000 MG: 2 INJECTION, POWDER, FOR SOLUTION INTRAVENOUS at 11:50

## 2022-12-21 RX ADMIN — SODIUM ZIRCONIUM CYCLOSILICATE 5 G: 5 POWDER, FOR SUSPENSION ORAL at 20:34

## 2022-12-21 RX ADMIN — PANTOPRAZOLE SODIUM 40 MG: 40 TABLET, DELAYED RELEASE ORAL at 06:16

## 2022-12-21 RX ADMIN — PREDNISONE 40 MG: 20 TABLET ORAL at 11:51

## 2022-12-21 RX ADMIN — KETOROLAC TROMETHAMINE 15 MG: 30 INJECTION, SOLUTION INTRAMUSCULAR; INTRAVENOUS at 23:49

## 2022-12-21 RX ADMIN — IPRATROPIUM BROMIDE AND ALBUTEROL SULFATE 1 AMPULE: 2.5; .5 SOLUTION RESPIRATORY (INHALATION) at 15:09

## 2022-12-21 RX ADMIN — INSULIN LISPRO 10 UNITS: 100 INJECTION, SOLUTION INTRAVENOUS; SUBCUTANEOUS at 20:35

## 2022-12-21 RX ADMIN — RANOLAZINE 500 MG: 500 TABLET, FILM COATED, EXTENDED RELEASE ORAL at 11:51

## 2022-12-21 RX ADMIN — FUROSEMIDE 20 MG: 20 TABLET ORAL at 11:51

## 2022-12-21 RX ADMIN — TROSPIUM CHLORIDE 20 MG: 20 TABLET, FILM COATED ORAL at 06:16

## 2022-12-21 RX ADMIN — SODIUM CHLORIDE: 9 INJECTION, SOLUTION INTRAVENOUS at 15:39

## 2022-12-21 RX ADMIN — FLUOXETINE 20 MG: 20 CAPSULE ORAL at 11:51

## 2022-12-21 RX ADMIN — SODIUM ZIRCONIUM CYCLOSILICATE 5 G: 5 POWDER, FOR SUSPENSION ORAL at 11:52

## 2022-12-21 RX ADMIN — LACTULOSE 20 G: 10 SOLUTION ORAL at 11:51

## 2022-12-21 RX ADMIN — INSULIN GLARGINE 40 UNITS: 100 INJECTION, SOLUTION SUBCUTANEOUS at 20:35

## 2022-12-21 RX ADMIN — CEFEPIME HYDROCHLORIDE 2000 MG: 2 INJECTION, POWDER, FOR SOLUTION INTRAVENOUS at 20:38

## 2022-12-21 RX ADMIN — LACTULOSE 20 G: 10 SOLUTION ORAL at 20:34

## 2022-12-21 RX ADMIN — TENOFOVIR DISOPROXIL FUMARATE 300 MG: 300 TABLET, COATED ORAL at 12:02

## 2022-12-21 ASSESSMENT — PAIN DESCRIPTION - ORIENTATION: ORIENTATION: LEFT

## 2022-12-21 ASSESSMENT — PAIN SCALES - GENERAL
PAINLEVEL_OUTOF10: 7
PAINLEVEL_OUTOF10: 6

## 2022-12-21 ASSESSMENT — PAIN DESCRIPTION - DESCRIPTORS: DESCRIPTORS: ACHING;DISCOMFORT

## 2022-12-21 ASSESSMENT — PAIN DESCRIPTION - LOCATION: LOCATION: BACK;RIB CAGE

## 2022-12-21 ASSESSMENT — PAIN SCALES - WONG BAKER: WONGBAKER_NUMERICALRESPONSE: 0

## 2022-12-21 ASSESSMENT — PAIN - FUNCTIONAL ASSESSMENT: PAIN_FUNCTIONAL_ASSESSMENT: ACTIVITIES ARE NOT PREVENTED

## 2022-12-21 NOTE — PROGRESS NOTES
[address] [facility]  consulted by ERLINDA Thakkar for monitoring and adjustment. Indication for treatment: Vancomycin indication: CAP with risk factors  Goal trough: Trough Goal: 15-20 mcg/mL  AUC/CECE: 400-600    Risk Factors for MRSA Identified:   Hospitalization within the past 90 days; Failed outpatient PO antibiotics (doxycycline)    Pertinent Laboratory Values:   Temp Readings from Last 3 Encounters:   12/20/22 97.9 °F (36.6 °C) (Oral)   11/30/22 98.3 °F (36.8 °C) (Oral)   11/09/22 97.5 °F (36.4 °C) (Temporal)     Recent Labs     12/20/22  1340   WBC 7.2     Recent Labs     12/20/22  1340   BUN 16   CREATININE 1.0     Estimated Creatinine Clearance: 58 mL/min (based on SCr of 1 mg/dL). No intake or output data in the 24 hours ending 12/20/22 2021    Pertinent Cultures:   Date    Source    Results  12/20   Strep Pneumo/Legionella Pending  12/20   Blood    Pending  12/20    MRSA nasal   Pending    Vancomycin level:   TROUGH:  No results for input(s): VANCOTROUGH in the last 72 hours. RANDOM:  No results for input(s): VANCORANDOM in the last 72 hours. Assessment:  HPI: Patient presents with rib pain after failing outpatient antibiotics (doxycycline). Few additional details available in notes at this time.   SCr, BUN, and urine output: Scr at baseline/WNL (Scr range: 0.8-1)  Day(s) of therapy: 1  Vancomycin concentration: to be collected    Plan:  Vancomycin 1500 mg IVPB q24h x 5 days  Predicted trough: 12,   Plan to collect a level in 48h  Pharmacy will continue to monitor patient and adjust therapy as indicated    Zena 3 12/22 @ 0600    Thank you for the consult,  Teresa Pleitez, [facility], PharmD  12/20/2022 8:21 PM

## 2022-12-21 NOTE — PROGRESS NOTES
[address] [facility]  consulted by ERLINDA Chin for monitoring and adjustment. Indication for treatment: Vancomycin indication: CAP with risk factors  Goal trough: Trough Goal: 15-20 mcg/mL  AUC/CECE: 400-600    Risk Factors for MRSA Identified:   Hospitalization within the past 90 days; Failed outpatient PO antibiotics (doxycycline)    Pertinent Laboratory Values:   Temp Readings from Last 3 Encounters:   12/21/22 97.7 °F (36.5 °C)   11/30/22 98.3 °F (36.8 °C) (Oral)   11/09/22 97.5 °F (36.4 °C) (Temporal)     Recent Labs     12/20/22  1340 12/20/22  2138 12/21/22  0648   WBC 7.2  --  4.7   LACTATE  --  2.3*  --        Recent Labs     12/20/22  1340 12/21/22  0648   BUN 16 20   CREATININE 1.0 0.9       Estimated Creatinine Clearance: 65 mL/min (based on SCr of 0.9 mg/dL). Intake/Output Summary (Last 24 hours) at 12/21/2022 1329  Last data filed at 12/21/2022 0600  Gross per 24 hour   Intake 469.15 ml   Output 700 ml   Net -230.85 ml       Pertinent Cultures:   Date    Source    Results  12/20   Strep Pneumo/Legionella Negative  12/20   Blood    Pending  12/20    MRSA nasal   Negative    Vancomycin level:   TROUGH:  No results for input(s): VANCOTROUGH in the last 72 hours. RANDOM:  No results for input(s): VANCORANDOM in the last 72 hours. Assessment:  HPI: Patient presents with rib pain after failing outpatient antibiotics (doxycycline). Jocelynn Coker is a 63 y.o. female with a pmh of CAD, COPD who presents with CAP (community acquired pneumonia) due to Chlamydia species  SCr, BUN, and urine output: Scr stable @0.9 (Scr range: 0.8-1)  Day(s) of therapy: 2  Vancomycin concentration:  12/22 - random @06:00    Plan:  Renal trends remain stable, SCR just above baseline.   Vancomycin 1500 mg IVPB q24h x 5 days  Predicted trough: 11.4,   Check the vanco level tomorrow am.  Pharmacy will continue to monitor patient and adjust therapy as indicated    VANCOMYCIN CONCENTRATION SCHEDULED FOR 12/22 @ 0600    Thank you for the consult,  Wilber Lugo.  JEREMY Flores [facility]  12/21/2022 1:29 PM

## 2022-12-21 NOTE — CARE COORDINATION
Discussed pt in IDR, pt lives at home with her sister, pt remains fully ind with ADLs, has pcp/ active insurance. No acute CM needs id'd at this time.

## 2022-12-21 NOTE — PROGRESS NOTES
V2.0  [facility] Hospitalist Progress Note      Name:  Malena Bonner /Age/Sex: 1960  (63 y.o. female)   MRN & CSN:  [id number] & [id number] Encounter Date/Time: 2022 7:59 AM EST    Location:  [address] [place] [address]-A PCP: Morena Novoa MD       Hospital Day: 2    Assessment and Plan:   Malena Bonner is a 63 y.o. female with pmh of COPD, CAD, DM-2, HTN, HLD, Schizophrenia, Depression, Hepatitis, who presents with CAP (community acquired pneumonia) Bilateral Lower Lobes      This patient was discussed with Dr. Melida Payne. He was agreeable with the plan and management as dictated above.      Plan:    Acute moderate COPD exacerbation w/ CAP: failed outpatient tx  --was on Zpack/Doxy PO outpatient, sxs worse, received Rocephin IV and Azithromycin in ED  --X-ray of left ribs x-ray chest.  Impression shows bilateral lower lobe infiltrates worse on the right consistent with pneumonia, Viral Resp Panel NEG  --continue azithromycin, vancomycin, cefepime, and vancomycin for now, and will consider to de-escalate pending cultures and antigens  --prednisone 40mg PO x 5 days, as need Tessalon Perles   --MRSA culture and urinary antigens ordered  --EKG NSR; HR 76; No ST elevation  --No leukocytosis, troponin negative x2  --Lactate 2.3, Procal 0.047  --DuoNebs Q4 hours while awake, like she uses at home  --Oxygen protocol- to start if O2 sat less than 90%  --Follows outpatient with Dr. Genia Escobedo for COPD  -for pain: tramadol 50 mg PO Q6 prn with ibuprofen 600 mg      Hyperkalemia  -Potassium 5.4 today, giving oral Kayexalate and rechecking level tomorrow am    CAD  -- Left heart cath  with Dr. Imelda Schaefer  --Troponin negative x2  --Denies chest pain  --Patient on Ranexa, statin,  BB,      --T2DM, insulin dependent  --Holding oral hypoglycemics  -- Sliding scale insulin-changed from Low to Medium Dose due to Hyperglycemia, sugars may be higher due to oral prednisone   - Hypoglycemia protocol  --Lantus 15 Units SQ QHS  - POCT glucose before meals and at bedtime  -Consulting Endo- Dr. Renetta Gould to help manage her sugars, not coming down     Hypertension  --prescribed Toprol XL     Mixed hyperlipidemia  --Continue statin     Schizophrenia  -- Continue Seroquel, for now. Patient takes [place] as outpatient.   -monitor for prolong QTC in the setting of macrolide  usage     Depression  --Continue fluoxetine      GERD   -- Continue PPI     Hepatitis: Hx of drug abuse  -cont home Viread 300 mg PO QD     Diet ADULT DIET; Regular   DVT Prophylaxis [x] Lovenox, []  Heparin, [] SCDs, [] Ambulation,  [] Eliquis, [] Xarelto  [] Coumadin   Code Status Full Code   Disposition From: Home  Expected Disposition: Home  Estimated Date of Discharge: 2 days  Patient requires continued admission due to Acute Resp Failure due to CAP that worsened on PO antibiotics outpatient, needs IV antibiotics   Surrogate Decision Maker/ POA Self     Subjective:     Chief Complaint: Rib Injury (Pt states she was leaning over washer pulling clothes out and felt a \"pop\" and has had left side rib pain since.)       Pooja West is a 63 y.o. female who presents with Left Rib Pain and Shortness of Breath. CXR shows bilateral LL pneumonia, despite outpatient treatment with Zpack and doxycycline. Her Lactic Acid was elevated at 2.3 and she was found to have worsening pneumonia on CXR, therefore admission with IV antibiotics is warranted. She is very fatigued on exam today. Having left rib pain, so started tramadol 50 mg PO Q6 hours along with ibuprofen, due to acetaminophen allergy. Review of Systems:    Ten point ROS is negative, unless otherwise noted above. Objective:      Intake/Output Summary (Last 24 hours) at 12/21/2022 1246  Last data filed at 12/21/2022 0600  Gross per 24 hour   Intake 469.15 ml   Output 700 ml   Net -230.85 ml        Vitals:   Vitals:    12/21/22 1213   BP:    Pulse:    Resp: 20   Temp:    SpO2:        Physical Exam:     General: NAD  Eyes: EOMI  ENT: neck supple  Cardiovascular: Regular rate and rhythm, Normal S1/S2, no murmurs or gallops   Respiratory: Clear to auscultation bilaterally, no rales, rhonchi or wheezes throughout all lung fields, normal oxygenation on RA  Gastrointestinal: Soft, non tender, normal BS x 4  Genitourinary: no suprapubic tenderness  Musculoskeletal: No edema  Skin: warm, dry  Neuro: Alert and oriented x 3, but is very groggy and wants to sleep  Psych: Mood appropriate.      Medications:   Medications:    insulin lispro  0-8 Units SubCUTAneous TID WC    insulin lispro  0-4 Units SubCUTAneous Nightly    sodium zirconium cyclosilicate  5 g Oral TID    ipratropium-albuterol  1 ampule Inhalation Q4H WA    lidocaine  1 patch TransDERmal Daily    sodium chloride flush  5-40 mL IntraVENous 2 times per day    cefepime  2,000 mg IntraVENous Q12H    azithromycin  250 mg Oral Daily    insulin glargine  15 Units SubCUTAneous Nightly    FLUoxetine  20 mg Oral Daily    furosemide  20 mg Oral Daily    lactulose  30 mL Oral BID    QUEtiapine  100 mg Oral Nightly    ranolazine  500 mg Oral BID    raloxifene  60 mg Oral Daily    tenofovir disoproxil fumarate  300 mg Oral Daily    pantoprazole  40 mg Oral QAM AC    predniSONE  40 mg Oral Daily    metoprolol succinate  50 mg Oral Daily    vancomycin  1,500 mg IntraVENous Q24H    enoxaparin  40 mg SubCUTAneous QPM    trospium  20 mg Oral BID AC      Infusions:    sodium chloride 10 mL/hr at 12/20/22 2206    dextrose       PRN Meds: traMADol, 50 mg, Q6H PRN  sodium chloride flush, 5-40 mL, PRN  sodium chloride, , PRN  ondansetron, 4 mg, Q8H PRN   Or  ondansetron, 4 mg, Q6H PRN  polyethylene glycol, 17 g, Daily PRN  glucose, 4 tablet, PRN  dextrose bolus, 125 mL, PRN   Or  dextrose bolus, 250 mL, PRN  glucagon (rDNA), 1 mg, PRN  dextrose, , Continuous PRN  benzonatate, 100 mg, TID PRN      Labs      Recent Results (from the past 24 hour(s))   Comprehensive Metabolic Panel    Collection Time: 12/20/22  1:40 PM   Result Value Ref Range    Sodium 138 135 - 145 MMOL/L    Potassium 4.4 3.5 - 5.1 MMOL/L    Chloride 104 99 - 110 mMol/L    CO2 26 21 - 32 MMOL/L    BUN 16 6 - 23 MG/DL    Creatinine 1.0 0.6 - 1.1 MG/DL    Est, Glom Filt Rate >60 >60 mL/min/1.73m2    Glucose 340 (H) 70 - 99 MG/DL    Calcium 9.0 8.3 - 10.6 MG/DL    Albumin 3.1 (L) 3.4 - 5.0 GM/DL    Total Protein 6.4 6.4 - 8.2 GM/DL    Total Bilirubin 1.3 (H) 0.0 - 1.0 MG/DL    ALT 15 10 - 40 U/L    AST 15 15 - 37 IU/L    Alkaline Phosphatase 173 (H) 40 - 129 IU/L    Anion Gap 8 4 - 16   Troponin    Collection Time: 12/20/22  1:40 PM   Result Value Ref Range    Troponin T <0.010 <0.01 NG/ML   Magnesium    Collection Time: 12/20/22  1:40 PM   Result Value Ref Range    Magnesium 1.8 1.8 - 2.4 mg/dl   Lipase    Collection Time: 12/20/22  1:40 PM   Result Value Ref Range    Lipase 192 (H) 13 - 60 IU/L   CBC with Auto Differential    Collection Time: 12/20/22  1:40 PM   Result Value Ref Range    WBC 7.2 4.0 - 10.5 K/CU MM    RBC 4.54 4.2 - 5.4 M/CU MM    Hemoglobin 14.9 12.5 - 16.0 GM/DL    Hematocrit 43.1 37 - 47 %    MCV 94.9 78 - 100 FL    MCH 32.8 (H) 27 - 31 PG    MCHC 34.6 32.0 - 36.0 %    RDW 13.2 11.7 - 14.9 %    Platelets 94 (L) 785 - 440 K/CU MM    MPV 12.5 (H) 7.5 - 11.1 FL    Differential Type AUTOMATED DIFFERENTIAL     Segs Relative 66.0 36 - 66 %    Lymphocytes % 23.1 (L) 24 - 44 %    Monocytes % 7.0 (H) 0 - 4 %    Eosinophils % 2.8 0 - 3 %    Basophils % 0.8 0 - 1 %    Segs Absolute 4.8 K/CU MM    Lymphocytes Absolute 1.7 K/CU MM    Monocytes Absolute 0.5 K/CU MM    Eosinophils Absolute 0.2 K/CU MM    Basophils Absolute 0.1 K/CU MM    Nucleated RBC % 0.0 %    Total Nucleated RBC 0.0 K/CU MM    Total Immature Neutrophil 0.02 K/CU MM    Immature Neutrophil % 0.3 0 - 0.43 %   Respiratory Panel, Molecular, with COVID-19 (Restricted: peds pts or suitable admitted adults)    Collection Time: 12/20/22  1:40 PM    Specimen: Nasopharyngeal Result Value Ref Range    Adenovirus Detection by PCR NOT DETECTED NOT DETECTED    Coronavirus 229E PCR NOT DETECTED NOT DETECTED    Coronavirus HKU1 PCR NOT DETECTED NOT DETECTED    Coronavirus NL63 PCR NOT DETECTED NOT DETECTED    Coronavirus OC43 PCR NOT DETECTED NOT DETECTED    SARS-CoV-2 NOT DETECTED NOT DETECTED    Human Metapneumovirus PCR NOT DETECTED NOT DETECTED    Rhinovirus Enterovirus PCR NOT DETECTED NOT DETECTED    Influenza A by PCR NOT DETECTED NOT DETECTED    Influenza A H1 Pandemic PCR NOT DETECTED NOT DETECTED    Influenza A H1 (2009) PCR NOT DETECTED NOT DETECTED    Influenza A H3 PCR NOT DETECTED NOT DETECTED    Influenza B by PCR NOT DETECTED NOT DETECTED    Parainfluenza 1 PCR NOT DETECTED NOT DETECTED    Parainfluenza 2 PCR NOT DETECTED NOT DETECTED    Parainfluenza 3 PCR NOT DETECTED NOT DETECTED    Parainfluenza 4 PCR NOT DETECTED NOT DETECTED    RSV PCR NOT DETECTED NOT DETECTED    Bordetella parapertussis by PCR NOT DETECTED NOT DETECTED    B Pertussis by PCR NOT DETECTED NOT DETECTED    Chlamydophila Pneumonia PCR NOT DETECTED NOT DETECTED    Mycoplasma pneumo by PCR NOT DETECTED NOT DETECTED   COVID-19, Rapid    Collection Time: 12/20/22  2:00 PM    Specimen: Nasopharyngeal   Result Value Ref Range    Source UNKNOWN     SARS-CoV-2, NAAT NOT DETECTED NOT DETECTED   EKG 12 Lead    Collection Time: 12/20/22  2:05 PM   Result Value Ref Range    Ventricular Rate 76 BPM    Atrial Rate 76 BPM    P-R Interval 138 ms    QRS Duration 78 ms    Q-T Interval 414 ms    QTc Calculation (Bazett) 465 ms    P Axis 46 degrees    R Axis 15 degrees    T Axis 36 degrees    Diagnosis       Normal sinus rhythm  Normal ECG  When compared with ECG of 30-NOV-2022 16:03,  Criteria for Septal infarct are no longer present  Confirmed by KERRI Newman ([id number]) on 12/20/2022 7:35:25 PM     Troponin    Collection Time: 12/20/22  3:07 PM   Result Value Ref Range    Troponin T <0.010 <0.01 NG/ML   EKG 12 Lead Collection Time: 12/20/22  9:13 PM   Result Value Ref Range    Ventricular Rate 79 BPM    Atrial Rate 79 BPM    P-R Interval 134 ms    QRS Duration 82 ms    Q-T Interval 412 ms    QTc Calculation (Bazett) 472 ms    P Axis 59 degrees    R Axis 26 degrees    T Axis 49 degrees    Diagnosis       Normal sinus rhythm  Normal ECG  When compared with ECG of 20-DEC-2022 14:05,  No significant change was found     Procalcitonin    Collection Time: 12/20/22  9:38 PM   Result Value Ref Range    Procalcitonin 0.047    Lactic Acid    Collection Time: 12/20/22  9:38 PM   Result Value Ref Range    Lactate 2.3 (HH) 0.5 - 1.9 mMOL/L   Troponin    Collection Time: 12/20/22  9:38 PM   Result Value Ref Range    Troponin T <0.010 <0.01 NG/ML   POCT Glucose    Collection Time: 12/20/22  9:50 PM   Result Value Ref Range    POC Glucose 401 (H) 70 - 99 MG/DL   POCT Glucose    Collection Time: 12/20/22  9:55 PM   Result Value Ref Range    POC Glucose 372 (H) 70 - 99 MG/DL   Comprehensive Metabolic Panel w/ Reflex to MG    Collection Time: 12/21/22  6:48 AM   Result Value Ref Range    Sodium 134 (L) 135 - 145 MMOL/L    Potassium 5.4 (H) 3.5 - 5.1 MMOL/L    Chloride 102 99 - 110 mMol/L    CO2 25 21 - 32 MMOL/L    BUN 20 6 - 23 MG/DL    Creatinine 0.9 0.6 - 1.1 MG/DL    Est, Glom Filt Rate >60 >60 mL/min/1.73m2    Glucose 415 (HH) 70 - 99 MG/DL    Calcium 8.9 8.3 - 10.6 MG/DL    Albumin 2.8 (L) 3.4 - 5.0 GM/DL    Total Protein 5.8 (L) 6.4 - 8.2 GM/DL    Total Bilirubin 1.2 (H) 0.0 - 1.0 MG/DL    ALT 13 10 - 40 U/L    AST 13 (L) 15 - 37 IU/L    Alkaline Phosphatase 154 (H) 40 - 128 IU/L    Anion Gap 7 4 - 16   CBC with Auto Differential    Collection Time: 12/21/22  6:48 AM   Result Value Ref Range    WBC 4.7 4.0 - 10.5 K/CU MM    RBC 4.38 4.2 - 5.4 M/CU MM    Hemoglobin 14.5 12.5 - 16.0 GM/DL    Hematocrit 41.5 37 - 47 %    MCV 94.7 78 - 100 FL    MCH 33.1 (H) 27 - 31 PG    MCHC 34.9 32.0 - 36.0 %    RDW 13.1 11.7 - 14.9 %    Platelets 77 (L) 140 - 440 K/CU MM    MPV 12.3 (H) 7.5 - 11.1 FL    Differential Type AUTOMATED DIFFERENTIAL     Segs Relative 85.8 (H) 36 - 66 %    Lymphocytes % 11.9 (L) 24 - 44 %    Monocytes % 1.5 0 - 4 %    Eosinophils % 0.4 0 - 3 %    Basophils % 0.4 0 - 1 %    Segs Absolute 4.1 K/CU MM    Lymphocytes Absolute 0.6 K/CU MM    Monocytes Absolute 0.1 K/CU MM    Eosinophils Absolute 0.0 K/CU MM    Basophils Absolute 0.0 K/CU MM    Nucleated RBC % 0.0 %    Total Nucleated RBC 0.0 K/CU MM    Total Immature Neutrophil 0.00 K/CU MM    Immature Neutrophil % 0.0 0 - 0.43 %   Lactate, Sepsis    Collection Time: 12/21/22  6:48 AM   Result Value Ref Range    Lactic Acid, Sepsis 1.6 0.5 - 1.9 mMOL/L   POCT Glucose    Collection Time: 12/21/22  6:56 AM   Result Value Ref Range    POC Glucose 420 (H) 70 - 99 MG/DL   Glucose, Random    Collection Time: 12/21/22  9:05 AM   Result Value Ref Range    Glucose 485 (HH) 70 - 99 MG/DL   POCT Glucose    Collection Time: 12/21/22 10:33 AM   Result Value Ref Range    POC Glucose 443 (H) 70 - 99 MG/DL        Imaging/Diagnostics Last 24 Hours   XR RIBS LEFT INCLUDE CHEST (MIN 3 VIEWS)    Result Date: 12/20/2022  EXAMINATION: 6 XRAY VIEWS OF THE LEFT RIBS WITH FRONTAL XRAY VIEW OF THE CHEST 12/20/2022 12:16 pm COMPARISON: November 30, 2022 HISTORY: ORDERING SYSTEM PROVIDED HISTORY: pain felt rib pop TECHNOLOGIST PROVIDED HISTORY: Reason for exam:->pain felt rib pop Reason for Exam: pain after ribs popped while pulling clothes out of washer FINDINGS: Chest x-ray demonstrates infiltrates in both lower lobes, especially the right consistent with pneumonia. Views of the left ribs demonstrate no obvious fractures or destructive changes. Clavicle, scapula and shoulder appear unremarkable. Incidentally, a liver shunt noted likely representing a TIPS. Bilateral lower lobe infiltrates worse on the right consistent with pneumonia. Specific views of the left ribs demonstrate no obvious rib fractures.   Liver shunt likely represents a TIPS.        Electronically signed by ERLINDA Field CNP on 12/21/2022 at 12:46 PM

## 2022-12-21 NOTE — CARE COORDINATION
CM -, Room ED-25-- [facility] criteria for Pneumonia  reviewed at this time, criteria supports an OBSERVATION admission     Wendy Orozco CM

## 2022-12-22 ENCOUNTER — CARE COORDINATION (OUTPATIENT)
Dept: CARE COORDINATION | Age: 62
End: 2022-12-22

## 2022-12-22 LAB
ANION GAP SERPL CALCULATED.3IONS-SCNC: 8 MMOL/L (ref 4–16)
BASOPHILS ABSOLUTE: 0 K/CU MM
BASOPHILS RELATIVE PERCENT: 0.1 % (ref 0–1)
BUN BLDV-MCNC: 23 MG/DL (ref 6–23)
CALCIUM SERPL-MCNC: 9 MG/DL (ref 8.3–10.6)
CHLORIDE BLD-SCNC: 98 MMOL/L (ref 99–110)
CO2: 25 MMOL/L (ref 21–32)
CREAT SERPL-MCNC: 1.1 MG/DL (ref 0.6–1.1)
DIFFERENTIAL TYPE: ABNORMAL
DOSE AMOUNT: NORMAL
DOSE TIME: NORMAL
EOSINOPHILS ABSOLUTE: 0 K/CU MM
EOSINOPHILS RELATIVE PERCENT: 0.2 % (ref 0–3)
ESTIMATED AVERAGE GLUCOSE: 220 MG/DL
GFR SERPL CREATININE-BSD FRML MDRD: 58 ML/MIN/1.73M2
GLUCOSE BLD-MCNC: 202 MG/DL (ref 70–99)
GLUCOSE BLD-MCNC: 234 MG/DL (ref 70–99)
GLUCOSE BLD-MCNC: 248 MG/DL (ref 70–99)
GLUCOSE BLD-MCNC: 319 MG/DL (ref 70–99)
GLUCOSE BLD-MCNC: 374 MG/DL (ref 70–99)
GLUCOSE BLD-MCNC: 396 MG/DL (ref 70–99)
HBA1C MFR BLD: 9.3 % (ref 4.2–6.3)
HCT VFR BLD CALC: 36.5 % (ref 37–47)
HEMOGLOBIN: 12.7 GM/DL (ref 12.5–16)
IMMATURE NEUTROPHIL %: 0.5 % (ref 0–0.43)
LACTATE: 2.1 MMOL/L (ref 0.5–1.9)
LYMPHOCYTES ABSOLUTE: 0.9 K/CU MM
LYMPHOCYTES RELATIVE PERCENT: 8.5 % (ref 24–44)
MCH RBC QN AUTO: 32.9 PG (ref 27–31)
MCHC RBC AUTO-ENTMCNC: 34.8 % (ref 32–36)
MCV RBC AUTO: 94.6 FL (ref 78–100)
MONOCYTES ABSOLUTE: 0.5 K/CU MM
MONOCYTES RELATIVE PERCENT: 4.4 % (ref 0–4)
NUCLEATED RBC %: 0 %
PDW BLD-RTO: 13.2 % (ref 11.7–14.9)
PLATELET # BLD: 84 K/CU MM (ref 140–440)
PMV BLD AUTO: 11.8 FL (ref 7.5–11.1)
POTASSIUM SERPL-SCNC: 4.3 MMOL/L (ref 3.5–5.1)
RBC # BLD: 3.86 M/CU MM (ref 4.2–5.4)
SEGMENTED NEUTROPHILS ABSOLUTE COUNT: 9.2 K/CU MM
SEGMENTED NEUTROPHILS RELATIVE PERCENT: 86.3 % (ref 36–66)
SODIUM BLD-SCNC: 131 MMOL/L (ref 135–145)
TOTAL IMMATURE NEUTOROPHIL: 0.05 K/CU MM
TOTAL NUCLEATED RBC: 0 K/CU MM
VANCOMYCIN RANDOM: 23.5 UG/ML
WBC # BLD: 10.7 K/CU MM (ref 4–10.5)

## 2022-12-22 PROCEDURE — 6370000000 HC RX 637 (ALT 250 FOR IP): Performed by: EMERGENCY MEDICINE

## 2022-12-22 PROCEDURE — 6370000000 HC RX 637 (ALT 250 FOR IP): Performed by: INTERNAL MEDICINE

## 2022-12-22 PROCEDURE — 85025 COMPLETE CBC W/AUTO DIFF WBC: CPT

## 2022-12-22 PROCEDURE — 6370000000 HC RX 637 (ALT 250 FOR IP)

## 2022-12-22 PROCEDURE — 96366 THER/PROPH/DIAG IV INF ADDON: CPT

## 2022-12-22 PROCEDURE — 2580000003 HC RX 258: Performed by: NURSE PRACTITIONER

## 2022-12-22 PROCEDURE — 2700000000 HC OXYGEN THERAPY PER DAY

## 2022-12-22 PROCEDURE — 6360000002 HC RX W HCPCS

## 2022-12-22 PROCEDURE — 94761 N-INVAS EAR/PLS OXIMETRY MLT: CPT

## 2022-12-22 PROCEDURE — 94640 AIRWAY INHALATION TREATMENT: CPT

## 2022-12-22 PROCEDURE — 6370000000 HC RX 637 (ALT 250 FOR IP): Performed by: NURSE PRACTITIONER

## 2022-12-22 PROCEDURE — 94762 N-INVAS EAR/PLS OXIMTRY CONT: CPT

## 2022-12-22 PROCEDURE — 83605 ASSAY OF LACTIC ACID: CPT

## 2022-12-22 PROCEDURE — 80048 BASIC METABOLIC PNL TOTAL CA: CPT

## 2022-12-22 PROCEDURE — 83036 HEMOGLOBIN GLYCOSYLATED A1C: CPT

## 2022-12-22 PROCEDURE — 80202 ASSAY OF VANCOMYCIN: CPT

## 2022-12-22 PROCEDURE — 82962 GLUCOSE BLOOD TEST: CPT

## 2022-12-22 PROCEDURE — 2580000003 HC RX 258

## 2022-12-22 PROCEDURE — G0378 HOSPITAL OBSERVATION PER HR: HCPCS

## 2022-12-22 PROCEDURE — 36415 COLL VENOUS BLD VENIPUNCTURE: CPT

## 2022-12-22 PROCEDURE — 94618 PULMONARY STRESS TESTING: CPT

## 2022-12-22 RX ORDER — INSULIN GLARGINE 100 [IU]/ML
50 INJECTION, SOLUTION SUBCUTANEOUS NIGHTLY
Status: DISCONTINUED | OUTPATIENT
Start: 2022-12-22 | End: 2022-12-23

## 2022-12-22 RX ORDER — OXYCODONE HYDROCHLORIDE 5 MG/1
5 TABLET ORAL EVERY 4 HOURS PRN
Status: DISCONTINUED | OUTPATIENT
Start: 2022-12-22 | End: 2022-12-23 | Stop reason: HOSPADM

## 2022-12-22 RX ORDER — GUAIFENESIN/DEXTROMETHORPHAN 100-10MG/5
5 SYRUP ORAL EVERY 4 HOURS PRN
Status: DISCONTINUED | OUTPATIENT
Start: 2022-12-22 | End: 2022-12-23 | Stop reason: HOSPADM

## 2022-12-22 RX ORDER — TRAMADOL HYDROCHLORIDE 50 MG/1
50 TABLET ORAL EVERY 6 HOURS PRN
Status: DISCONTINUED | OUTPATIENT
Start: 2022-12-22 | End: 2022-12-23 | Stop reason: HOSPADM

## 2022-12-22 RX ORDER — INSULIN LISPRO 100 [IU]/ML
20 INJECTION, SOLUTION INTRAVENOUS; SUBCUTANEOUS
Status: DISCONTINUED | OUTPATIENT
Start: 2022-12-22 | End: 2022-12-23 | Stop reason: HOSPADM

## 2022-12-22 RX ORDER — GUAIFENESIN 600 MG/1
600 TABLET, EXTENDED RELEASE ORAL 2 TIMES DAILY
Status: DISCONTINUED | OUTPATIENT
Start: 2022-12-22 | End: 2022-12-23 | Stop reason: HOSPADM

## 2022-12-22 RX ADMIN — TRAMADOL HYDROCHLORIDE 50 MG: 50 TABLET, COATED ORAL at 20:17

## 2022-12-22 RX ADMIN — TROSPIUM CHLORIDE 20 MG: 20 TABLET, FILM COATED ORAL at 06:42

## 2022-12-22 RX ADMIN — INSULIN LISPRO 8 UNITS: 100 INJECTION, SOLUTION INTRAVENOUS; SUBCUTANEOUS at 03:17

## 2022-12-22 RX ADMIN — INSULIN LISPRO 20 UNITS: 100 INJECTION, SOLUTION INTRAVENOUS; SUBCUTANEOUS at 13:11

## 2022-12-22 RX ADMIN — INSULIN LISPRO 2 UNITS: 100 INJECTION, SOLUTION INTRAVENOUS; SUBCUTANEOUS at 13:11

## 2022-12-22 RX ADMIN — LACTULOSE 20 G: 10 SOLUTION ORAL at 22:50

## 2022-12-22 RX ADMIN — CEFEPIME HYDROCHLORIDE 2000 MG: 2 INJECTION, POWDER, FOR SOLUTION INTRAVENOUS at 08:21

## 2022-12-22 RX ADMIN — TROSPIUM CHLORIDE 20 MG: 20 TABLET, FILM COATED ORAL at 17:16

## 2022-12-22 RX ADMIN — SODIUM CHLORIDE: 9 INJECTION, SOLUTION INTRAVENOUS at 03:17

## 2022-12-22 RX ADMIN — RANOLAZINE 500 MG: 500 TABLET, FILM COATED, EXTENDED RELEASE ORAL at 08:21

## 2022-12-22 RX ADMIN — INSULIN GLARGINE 50 UNITS: 100 INJECTION, SOLUTION SUBCUTANEOUS at 20:21

## 2022-12-22 RX ADMIN — INSULIN LISPRO 6 UNITS: 100 INJECTION, SOLUTION INTRAVENOUS; SUBCUTANEOUS at 20:21

## 2022-12-22 RX ADMIN — TRAMADOL HYDROCHLORIDE 50 MG: 50 TABLET, COATED ORAL at 09:31

## 2022-12-22 RX ADMIN — INSULIN LISPRO 20 UNITS: 100 INJECTION, SOLUTION INTRAVENOUS; SUBCUTANEOUS at 09:28

## 2022-12-22 RX ADMIN — FUROSEMIDE 20 MG: 20 TABLET ORAL at 08:20

## 2022-12-22 RX ADMIN — CEFEPIME HYDROCHLORIDE 2000 MG: 2 INJECTION, POWDER, FOR SOLUTION INTRAVENOUS at 20:17

## 2022-12-22 RX ADMIN — SODIUM CHLORIDE 50 ML: 9 INJECTION, SOLUTION INTRAVENOUS at 20:16

## 2022-12-22 RX ADMIN — IPRATROPIUM BROMIDE AND ALBUTEROL SULFATE 1 AMPULE: 2.5; .5 SOLUTION RESPIRATORY (INHALATION) at 15:52

## 2022-12-22 RX ADMIN — LACTULOSE 20 G: 10 SOLUTION ORAL at 08:20

## 2022-12-22 RX ADMIN — SODIUM CHLORIDE, PRESERVATIVE FREE 10 ML: 5 INJECTION INTRAVENOUS at 20:19

## 2022-12-22 RX ADMIN — RALOXIFENE 60 MG: 60 TABLET ORAL at 08:21

## 2022-12-22 RX ADMIN — VANCOMYCIN HYDROCHLORIDE 1500 MG: 10 INJECTION, POWDER, LYOPHILIZED, FOR SOLUTION INTRAVENOUS at 22:09

## 2022-12-22 RX ADMIN — INSULIN LISPRO 2 UNITS: 100 INJECTION, SOLUTION INTRAVENOUS; SUBCUTANEOUS at 09:28

## 2022-12-22 RX ADMIN — METOPROLOL SUCCINATE 50 MG: 50 TABLET, EXTENDED RELEASE ORAL at 08:20

## 2022-12-22 RX ADMIN — OXYCODONE HYDROCHLORIDE 5 MG: 5 TABLET ORAL at 12:29

## 2022-12-22 RX ADMIN — OXYCODONE HYDROCHLORIDE 5 MG: 5 TABLET ORAL at 22:10

## 2022-12-22 RX ADMIN — PREDNISONE 40 MG: 20 TABLET ORAL at 08:21

## 2022-12-22 RX ADMIN — IPRATROPIUM BROMIDE AND ALBUTEROL SULFATE 1 AMPULE: 2.5; .5 SOLUTION RESPIRATORY (INHALATION) at 07:44

## 2022-12-22 RX ADMIN — PANTOPRAZOLE SODIUM 40 MG: 40 TABLET, DELAYED RELEASE ORAL at 06:42

## 2022-12-22 RX ADMIN — IPRATROPIUM BROMIDE AND ALBUTEROL SULFATE 1 AMPULE: 2.5; .5 SOLUTION RESPIRATORY (INHALATION) at 11:26

## 2022-12-22 RX ADMIN — INSULIN LISPRO 20 UNITS: 100 INJECTION, SOLUTION INTRAVENOUS; SUBCUTANEOUS at 18:30

## 2022-12-22 RX ADMIN — QUETIAPINE FUMARATE 100 MG: 100 TABLET ORAL at 20:18

## 2022-12-22 RX ADMIN — INSULIN LISPRO 2 UNITS: 100 INJECTION, SOLUTION INTRAVENOUS; SUBCUTANEOUS at 18:30

## 2022-12-22 RX ADMIN — OXYCODONE HYDROCHLORIDE 5 MG: 5 TABLET ORAL at 17:16

## 2022-12-22 RX ADMIN — IPRATROPIUM BROMIDE AND ALBUTEROL SULFATE 1 AMPULE: 2.5; .5 SOLUTION RESPIRATORY (INHALATION) at 22:20

## 2022-12-22 RX ADMIN — RANOLAZINE 500 MG: 500 TABLET, FILM COATED, EXTENDED RELEASE ORAL at 20:18

## 2022-12-22 RX ADMIN — FLUOXETINE 20 MG: 20 CAPSULE ORAL at 08:21

## 2022-12-22 RX ADMIN — AZITHROMYCIN MONOHYDRATE 250 MG: 250 TABLET ORAL at 08:21

## 2022-12-22 RX ADMIN — TENOFOVIR DISOPROXIL FUMARATE 300 MG: 300 TABLET, COATED ORAL at 08:21

## 2022-12-22 ASSESSMENT — PAIN SCALES - GENERAL
PAINLEVEL_OUTOF10: 7

## 2022-12-22 ASSESSMENT — PAIN DESCRIPTION - LOCATION
LOCATION: BACK
LOCATION: BACK

## 2022-12-22 NOTE — CONSULTS
Endocrinology   Consult Note  12/20/2022 11:33 AM     Primary Care provider: Eduar Willett MD     Referring physician:  Otis Garcia MD     Dear Doctor Megan Stevenser for the Consult     Pt. Was Admitted for : Shortness of breath chest pain on deep breathing    Reason for Consult: Better control of blood glucose    History Obtained From:  Patient/ EMR       HISTORY OF PRESENT ILLNESS:                The patient is a 63 y.o. female with significant past medical history of arthritis, CAD, diabetes mellitus, history of drug abuse hypertension, hyperlipidemia was admitted to hospital for this of breath and chest wall pain on deep breathing and found to have bilateral pneumonia. Has been running very high blood glucose levels. I was  consulted for better control of blood glucose. ROS:   Pt's ROS done in detail. Abnormal ROS are noted in Medical and Surgical History Section below: Other Medical History:        Diagnosis Date    Abnormal Pap smear of cervix     Acid reflux     Arthritis     left knee    Breast cyst     CAD (coronary artery disease)     per [facility] 9/6/13 - Dr. Tia Noe    COPD (chronic obstructive pulmonary disease) ([facility] [address]     follow with Dr Adarsh Ramsey    Depression     Chevy Kelley manic - depression see Dr Jen Brooks    Diabetes mellitus [facility])     dx 10+ yrs ago- follows with PCP    Drug abuse ([facility] [address]     hx use of cocaine, heroin and marijuana- states last used 12/2014    Glaucoma     bilateral    H/O Doppler lower venous ultrasound 04/04/2019    No DVT or SVT, Significant reflux of RGSV and LGSV. H/O echocardiogram 12/18/2020    EF 55-60%, Mod LVH. Hepatic encephalopathy 05/2019    Hepatitis C     for liver bx 12/3/2015\"Have Hepatitis B and C and saw Dr Ora De Guzman for this 12/1/2015\"    Hiatal hernia     History of alcohol abuse     History of exercise stress test 01/02/2020    Treadmill, Normal exercise performance without angina and ischemic EKG changes.     HTN (hypertension)     \"for the past two yrs on medication\" follows with Dr Jac Douglas    Hx of blood clots 2019    Portal vein thrombsis - TIPS procedure 4/23/19    Hyperlipemia     Irregular heart beat     per pt    Liver hematoma     Migraine     Migraines     Last migraine:  2018    Nausea & vomiting     Neurologic disorder     Schizophrenia [facility])     per old chart    Seizures ([address]     \"last one was 9/2015- saw Dr Rishi Lujan at [facility]- she said not sure if acutal seizures- she thinks they are panic or anxiety attacks\"    SOB (shortness of breath)     with any exertion    Vulvar mass      Surgical History:        Procedure Laterality Date    BREAST SURGERY  10/2015    left breast bx    CARDIAC CATHETERIZATION      per old chart pt had cath done in 3/2011 and 9/2013    CARPAL TUNNEL RELEASE Left 01/09/2020    CARPAL TUNNEL RELEASE LEFT performed by Sue Haskins DO at [address] [place] [address] Right 05/26/2020    dr Collette Lee, carpal tunnel trigger finger release    CARPAL TUNNEL RELEASE Right 05/26/2020    RIGHT CARPAL TUNNEL RELEASE performed by Sue Haskins DO at [address]  per old chart done 1985    COLONOSCOPY  03/11/2013    diverticulosis, cecal polyp    COLONOSCOPY  03/16/2017    Internal hemorrhoids- Dr. Elizabeth Chávez    COLONOSCOPY N/A 05/17/2022    COLONOSCOPY POLYPECTOMY SNARE/COLD BIOPSY performed by Neel Gold MD at [address], DIAGNOSTIC  03/16/2017    EGD: Small esophageal varices, portal hypertensive gastropathy, reflux esophagitis, hiatal hernia    EYE SURGERY Bilateral ? when    cyst removal, cataracts w/lens replacement    FINGER TRIGGER RELEASE Right 05/26/2020    FINGER TRIGGER RELEASE RIGHT RING FINGER performed by Sue Haskins DO at [address] ([address] [place] [address])      per old chart pt had CHOLO/BSO 1986    SALPINGO-OOPHORECTOMY      TIPS PROCEDURE  04/23/2019    TONSILLECTOMY  as a kid    UPPER GASTROINTESTINAL ENDOSCOPY N/A 11/14/2018    EGD DIAGNOSTIC ONLY performed by Napoleon Ashton MD at [facility] 67 N/A 06/05/2019    EGD DIAGNOSTIC ONLY performed by Napoleon Ashton MD at [facility] 150 N/A 11/9/2022    PARTIAL VULVECTOMY performed by Simon Skinner MD at [facility] OR       Allergies:  Hydrocodone-acetaminophen, Norco [hydrocodone-acetaminophen], and Tylenol [acetaminophen]    Family History:       Problem Relation Age of Onset    Ovarian Cancer Mother     Cancer Mother         lung ca    Arthritis Mother     Migraines Mother     Cancer Father         colon ca    Diabetes Father     High Blood Pressure Father     Arthritis Father     High Cholesterol Father     Migraines Father     Migraines Sister     Heart Disease Brother         WPW    Breast Cancer Maternal Aunt     Breast Cancer Maternal Aunt     Breast Cancer Maternal Aunt     Breast Cancer Maternal Aunt      REVIEW OF SYSTEMS:  Review of System Done as noted above     PHYSICAL EXAM:      Vitals:    /76   Pulse (!) 103   Temp 97.9 °F (36.6 °C) (Oral)   Resp 17   Ht 4' 9.5\" (1.461 m)   Wt 186 lb (84.4 kg)   SpO2 92%   BMI 39.55 kg/m²     CONSTITUTIONAL:  awake, alert, cooperative, appears stated age   EYES:  vision intact Fundoscopic Exam not performed   ENT:Normal  NECK:  Supple, No JVD. Thyroid Exam:Normal   LUNGS:  Has Vesicular Breath Sounds, diminished breath sounds in the both sides  CARDIOVASCULAR:  Normal apical impulse, regular rate and rhythm, normal S1 and S2, no S3 or S4, and has no  murmur   ABDOMEN:  No scars, normal bowel sounds, soft, non-distended, non-tender, no masses palpated, no hepatolienomegaly  Musculoskeletal: Normal  Extremities: Normal, peripheral pulses normal, , has no edema   NEUROLOGIC:  Awake, alert, oriented to name, place and time. Cranial nerves II-XII are grossly intact. Motor is  intact.   Sensory neuropathy,  and gait is normal.    DATA:    CBC:   Recent Labs     12/20/22  1340 12/21/22  0648   WBC 7.2 4. 7   HGB 14.9 14.5   PLT 94* 77*    CMP:  Recent Labs     12/20/22  1340 12/21/22  0648    134*   K 4.4 5.4*    102   CO2 26 25   BUN 16 20   CREATININE 1.0 0.9   CALCIUM 9.0 8.9   PROT 6.4 5.8*   LABALBU 3.1* 2.8*   BILITOT 1.3* 1.2*   ALKPHOS 173* 154*   AST 15 13*   ALT 15 13     Lipids:   Lab Results   Component Value Date/Time    CHOL 98 11/27/2019 01:52 PM    CHOL 120 08/01/2017 04:15 PM    HDL 38 11/27/2019 01:52 PM    TRIG 96 11/27/2019 01:52 PM     Glucose:   Recent Labs     12/21/22  1033 12/21/22  1458 12/21/22  1629   POCGLU 443* 462* 449*     Hemoglobin A1C:   Lab Results   Component Value Date/Time    LABA1C 8.5 12/01/2022 11:24 AM     Free T4:   Lab Results   Component Value Date/Time    T4FREE 1.0 08/01/2017 04:15 PM     Free T3: No results found for: FT3  TSH High Sensitivity:   Lab Results   Component Value Date/Time    TSHHS 2.540 11/23/2020 12:04 AM       XR RIBS LEFT INCLUDE CHEST (MIN 3 VIEWS)   Final Result   Bilateral lower lobe infiltrates worse on the right consistent with pneumonia. Specific views of the left ribs demonstrate no obvious rib fractures. Liver   shunt likely represents a TIPS.                 Scheduled Medicines   Medications:    insulin lispro  0-8 Units SubCUTAneous TID WC    sodium zirconium cyclosilicate  5 g Oral TID    ipratropium-albuterol  1 ampule Inhalation Q4H WA    insulin glargine  40 Units SubCUTAneous Nightly    [START ON 12/22/2022] insulin lispro  15 Units SubCUTAneous TID WC    insulin lispro  0-8 Units SubCUTAneous 2 times per day    insulin lispro  10 Units SubCUTAneous Once    lidocaine  1 patch TransDERmal Daily    sodium chloride flush  5-40 mL IntraVENous 2 times per day    cefepime  2,000 mg IntraVENous Q12H    azithromycin  250 mg Oral Daily    FLUoxetine  20 mg Oral Daily    furosemide  20 mg Oral Daily    lactulose  30 mL Oral BID    QUEtiapine  100 mg Oral Nightly    ranolazine  500 mg Oral BID    raloxifene  60 mg Oral Daily tenofovir disoproxil fumarate  300 mg Oral Daily    pantoprazole  40 mg Oral QAM AC    predniSONE  40 mg Oral Daily    metoprolol succinate  50 mg Oral Daily    vancomycin  1,500 mg IntraVENous Q24H    enoxaparin  40 mg SubCUTAneous QPM    trospium  20 mg Oral BID AC      Infusions:    sodium chloride 75 mL/hr at 12/21/22 1539    sodium chloride 10 mL/hr at 12/20/22 2206    dextrose           IMPRESSION    Patient Active Problem List   Diagnosis    Left arm pain    Type 2 diabetes mellitus with complication, with long-term current use of insulin (HCC)    Chronic obstructive pulmonary disease (HCC)    Tobacco abuse    Angina effort    Abnormal nuclear cardiac imaging test    Lung nodule    Chest pain    Dyslipidemia    Pancolitis (HCC)    Hematemesis without nausea    Alcoholic cirrhosis of liver without ascites (HCC)    Thrombocytopenia (HCC)    Periumbilical abdominal pain    History of colonic polyps    Abnormal findings on diagnostic imaging of abdomen    Nausea    Gastroesophageal reflux disease without esophagitis    Mixed hyperlipidemia    Vitamin D deficiency    Left carpal tunnel syndrome    Right carpal tunnel syndrome    Esophageal hypertension    Candida esophagitis (HCC)    Colitis    Primary hypertension    GI bleed    Coronary-myocardial bridge    Type 2 diabetes mellitus with complication, with long-term current use of insulin (HCC)    SOB (shortness of breath)    Portal vein thrombosis    Intractable nausea and vomiting    Abdominal pain    Acute GI bleeding    Chronic hepatitis C without hepatic coma (HCC)    Delayed gastric emptying    Restless legs    Acute colitis    Acute encephalopathy    S/P TIPS (transjugular intrahepatic portosystemic shunt)    Cirrhosis of liver without ascites (HCC)    Acute upper GI bleed    Acute hepatic encephalopathy    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 Labs and X ray results   Reviewed Home and Current Medicines   Will Start On meal/ Correction bolus Humalog/ Lantus Insulin regime    Monitor Blood glucose frequently   Modify  the dose of Insulin  as needed        Will follow with you  Again thank you for sharing pt's care with me.      Truly yours,       Bhavani Reyes MD

## 2022-12-22 NOTE — CARE COORDINATION
Chart review completed. Pt is IP at [facility]. SW will follow up with Pt in one week on 12/29 to complete chart review, follow up regarding food and housing.

## 2022-12-22 NOTE — PROGRESS NOTES
[facility]  consulted by ERLINDA Yoo for monitoring and adjustment. Indication for treatment: Vancomycin indication: CAP with risk factors  Goal trough: Trough Goal: 15-20 mcg/mL  AUC/CECE: 400-600    Risk Factors for MRSA Identified:   Hospitalization within the past 90 days; Failed outpatient PO antibiotics (doxycycline)    Pertinent Laboratory Values:   Temp Readings from Last 3 Encounters:   12/22/22 97.6 °F (36.4 °C) (Oral)   11/30/22 98.3 °F (36.8 °C) (Oral)   11/09/22 97.5 °F (36.4 °C) (Temporal)     Recent Labs     12/20/22  1340 12/20/22  2138 12/21/22  0648 12/22/22  0330   WBC 7.2  --  4.7 10.7*   LACTATE  --  2.3*  --  2.1*       Recent Labs     12/20/22  1340 12/21/22  0648 12/22/22  0330   BUN 16 20 23   CREATININE 1.0 0.9 1.1       Estimated Creatinine Clearance: 53 mL/min (based on SCr of 1.1 mg/dL). No intake or output data in the 24 hours ending 12/22/22 0745      Pertinent Cultures:   Date    Source    Results  12/20   Strep Pneumo/Legionella Negative  12/20   Blood    Pending  12/21    MRSA nasal   Pending  12/20    Respiratory Panel  Negative     Vancomycin level:   TROUGH:  No results for input(s): VANCOTROUGH in the last 72 hours. RANDOM:    Recent Labs     12/22/22  0330   VANCORANDOM 23.5       Assessment:  HPI: Patient presents with rib pain after failing outpatient antibiotics (doxycycline).   Chanda Lopez is a 63 y.o. female with a pmh of CAD, COPD who presents with CAP (community acquired pneumonia)  SCr, BUN, and urine output: Scr stable @1.1 (Scr range: 0.8-1)  Day(s) of therapy: 3  Vancomycin concentration:  12/22 - 23.5 ~4 hours post vancomycin     Plan:  Continue vancomycin 1500 mg IV every 24 hours to target an AUC of 525 at steady state   Continue to follow MRSA nares for de-escalation of vancomycin   Next vancomycin level for 12/25  Pharmacy will continue to monitor patient and adjust therapy as indicated    VANCOMYCIN CONCENTRATION SCHEDULED FOR 12/25 @ 0600    Thank you for the consult,  Bettina Lazar, [facility]  12/22/2022 7:45 AM

## 2022-12-22 NOTE — PROGRESS NOTES
V2.0  [facility] Hospitalist Progress Note      Name:  Boy Martinez /Age/Sex: 1960  (63 y.o. female)   MRN & CSN:  [id number] & [id number] Encounter Date/Time: 2022 7:59 AM EST    Location:  [address] [place] [address] PCP: Chantell Rome MD       Hospital Day: 3    Assessment and Plan:   Boy Martinez is a 63 y.o. female with pmh of COPD, CAD, DM-2, HTN, HLD, Schizophrenia, Depression, Hepatitis, who presents with CAP (community acquired pneumonia) Bilateral Lower Lobes      This patient was discussed with Dr. Davis Husbands. He was agreeable with the plan and management as dictated above.      Plan:    Acute moderate COPD exacerbation w/ CAP: failed outpatient tx  --was on Zpack/Doxy PO outpatient, sxs worse, received Rocephin IV and Azithromycin in ED  --X-ray of left ribs x-ray chest.  Impression shows bilateral lower lobe infiltrates worse on the right consistent with pneumonia, Viral Resp Panel NEG  --continue azithromycin 250 mg PO x 4 days, Vancomycin and cefepime IV due to failed outpatient TX with progression of CAP   --prednisone 40mg PO x 5 days, as need Tessalon Perles   --MRSA culture and urinary antigens ordered  --EKG NSR; HR 76; No ST elevation  --No leukocytosis, troponin negative x2  --Lactate was 2.3, now 2.1 on ,  Procal 0.047  --DuoNebs Q4 hours while awake, like she uses at home  --Oxygen protocol- to start if O2 sat less than 90%, needing 2 L/NC to keep her sats up, ordering Home Oxygen Consult so she can go home with oxygen   --Follows outpatient with Dr. Lencho Abbott for COPD  -for pain: tramadol 50 mg PO Q6 prn with ibuprofen 600 mg   -adding oxydone 5 mg IR Q4 prn for severe left rib pain, tramadol not working     Hyperkalemia- resolved  -Potassium 5.4 today, giving oral Kayexalate and rechecking level tomorrow am    CAD  -- Left heart cath  with Dr. Ana Conley  --Troponin negative x2  --Denies chest pain  --Patient on Ranexa, statin,  BB,      --T2DM, insulin dependent  --Holding oral hypoglycemics  -- Sliding scale insulin-changed from Low to Medium Dose due to Hyperglycemia, sugars may be higher due to oral prednisone   - Hypoglycemia protocol  --Lantus 15 Units SQ QHS  - POCT glucose before meals and at bedtime  -Consulting Endo- Dr. Raleigh Webster to manage her sugars, staying in mid 400's     Hypertension  --prescribed Toprol XL     Mixed hyperlipidemia  --Continue statin     Schizophrenia  -- Continue Seroquel, for now. Patient takes [place] as outpatient.   -monitor for prolong QTC in the setting of macrolide  usage     Depression  --Continue fluoxetine      GERD   -- Continue PPI     Hepatitis: Hx of drug abuse  -cont home Viread 300 mg PO QD     Diet ADULT DIET; Regular   DVT Prophylaxis [x] Lovenox, []  Heparin, [] SCDs, [] Ambulation,  [] Eliquis, [] Xarelto  [] Coumadin   Code Status Full Code   Disposition From: Home  Expected Disposition: Home  Estimated Date of Discharge: 2 days  Patient requires continued admission due to Acute Resp Failure due to CAP that worsened on PO antibiotics outpatient, needs IV antibiotics, O2 sats dropping off oxygen, increased O2 requirements   Surrogate Decision Maker/ POA Self     Subjective:     Chief Complaint: Rib Injury (Pt states she was leaning over washer pulling clothes out and felt a \"pop\" and has had left side rib pain since.)       Stefan Frias is a 63 y.o. female who presents with Left Rib Pain and Shortness of Breath. CXR shows bilateral LL pneumonia, despite outpatient treatment with Zpack and doxycycline. Her Lactic Acid was elevated at 2.3 and she was found to have worsening pneumonia on CXR, therefore admission with IV antibiotics is warranted. She is very fatigued on exam today. Having left rib pain, so started tramadol 50 mg PO Q6 hours along with ibuprofen, due to acetaminophen allergy. Left rib pain is severe when she is coughing, so adding oxycodone 5 mg PO for severe pain as tramadol isn't helping.  Ordered Home O2 Consult so she can go home on oxygen to use prn. Hasn't had home o2 for about a year now. Review of Systems:    Ten point ROS is negative, unless otherwise noted above. Objective: Intake/Output Summary (Last 24 hours) at 12/22/2022 1017  Last data filed at 12/22/2022 0932  Gross per 24 hour   Intake 240 ml   Output --   Net 240 ml        Vitals:   Vitals:    12/22/22 0221 12/22/22 0742 12/22/22 0747 12/22/22 0931   BP: (!) 97/57 121/75     Pulse: 96 97 98    Resp: 19 19 17 11   Temp: 97.7 °F (36.5 °C) 97.6 °F (36.4 °C)     TempSrc: Oral Oral     SpO2:  92% 94%    Weight: 190 lb 4.8 oz (86.3 kg)      Height:           Physical Exam:     General: NAD  Eyes: EOMI  ENT: neck supple  Cardiovascular: Regular rate and rhythm, Normal S1/S2, no murmurs or gallops   Respiratory: mild wheezes and rhonchi heard throughout all lung fields, normal oxygenation on 2 L/NC O2   Gastrointestinal: Soft, non tender, normal BS x 4  Genitourinary: no suprapubic tenderness  Musculoskeletal: No edema  Skin: warm, dry  Neuro: Alert and oriented x 3, but is very groggy and wants to sleep  Psych: Mood appropriate.      Medications:   Medications:    insulin glargine  50 Units SubCUTAneous Nightly    insulin lispro  20 Units SubCUTAneous TID WC    insulin NPH  20 Units SubCUTAneous Once    insulin lispro  0-8 Units SubCUTAneous TID WC    ipratropium-albuterol  1 ampule Inhalation Q4H WA    insulin lispro  0-8 Units SubCUTAneous 2 times per day    lidocaine  1 patch TransDERmal Daily    sodium chloride flush  5-40 mL IntraVENous 2 times per day    cefepime  2,000 mg IntraVENous Q12H    azithromycin  250 mg Oral Daily    FLUoxetine  20 mg Oral Daily    furosemide  20 mg Oral Daily    lactulose  30 mL Oral BID    QUEtiapine  100 mg Oral Nightly    ranolazine  500 mg Oral BID    raloxifene  60 mg Oral Daily    tenofovir disoproxil fumarate  300 mg Oral Daily    pantoprazole  40 mg Oral QAM AC    predniSONE  40 mg Oral Daily    metoprolol succinate  50 mg Oral Daily    vancomycin  1,500 mg IntraVENous Q24H    enoxaparin  40 mg SubCUTAneous QPM    trospium  20 mg Oral BID AC      Infusions:    sodium chloride 75 mL/hr at 12/22/22 0317    sodium chloride 10 mL/hr at 12/20/22 2206    dextrose       PRN Meds: traMADol, 50 mg, Q6H PRN  sodium chloride flush, 5-40 mL, PRN  sodium chloride, , PRN  ondansetron, 4 mg, Q8H PRN   Or  ondansetron, 4 mg, Q6H PRN  polyethylene glycol, 17 g, Daily PRN  glucose, 4 tablet, PRN  dextrose bolus, 125 mL, PRN   Or  dextrose bolus, 250 mL, PRN  glucagon (rDNA), 1 mg, PRN  dextrose, , Continuous PRN  benzonatate, 100 mg, TID PRN      Labs      Recent Results (from the past 24 hour(s))   POCT Glucose    Collection Time: 12/21/22 10:33 AM   Result Value Ref Range    POC Glucose 443 (H) 70 - 99 MG/DL   POCT Glucose    Collection Time: 12/21/22  2:58 PM   Result Value Ref Range    POC Glucose 462 (H) 70 - 99 MG/DL   POCT Glucose    Collection Time: 12/21/22  4:29 PM   Result Value Ref Range    POC Glucose 449 (H) 70 - 99 MG/DL   POCT Glucose    Collection Time: 12/21/22  8:21 PM   Result Value Ref Range    POC Glucose 477 (H) 70 - 99 MG/DL   POCT Glucose    Collection Time: 12/22/22  2:18 AM   Result Value Ref Range    POC Glucose 396 (H) 70 - 99 MG/DL   Vancomycin Level, Random    Collection Time: 12/22/22  3:30 AM   Result Value Ref Range    Vancomycin Rm 23.5 UG/ML    DOSE AMOUNT DOSE AMT.  GIVEN - `     DOSE TIME DOSE TIME GIVEN - `    Basic Metabolic Panel w/ Reflex to MG    Collection Time: 12/22/22  3:30 AM   Result Value Ref Range    Sodium 131 (L) 135 - 145 MMOL/L    Potassium 4.3 3.5 - 5.1 MMOL/L    Chloride 98 (L) 99 - 110 mMol/L    CO2 25 21 - 32 MMOL/L    Anion Gap 8 4 - 16    BUN 23 6 - 23 MG/DL    Creatinine 1.1 0.6 - 1.1 MG/DL    Est, Glom Filt Rate 58 (L) >60 mL/min/1.73m2    Glucose 374 (H) 70 - 99 MG/DL    Calcium 9.0 8.3 - 10.6 MG/DL   CBC with Auto Differential    Collection Time: 12/22/22  3:30 AM   Result Value Ref Range    WBC 10.7 (H) 4.0 - 10.5 K/CU MM    RBC 3.86 (L) 4.2 - 5.4 M/CU MM    Hemoglobin 12.7 12.5 - 16.0 GM/DL    Hematocrit 36.5 (L) 37 - 47 %    MCV 94.6 78 - 100 FL    MCH 32.9 (H) 27 - 31 PG    MCHC 34.8 32.0 - 36.0 %    RDW 13.2 11.7 - 14.9 %    Platelets 84 (L) 324 - 440 K/CU MM    MPV 11.8 (H) 7.5 - 11.1 FL    Differential Type AUTOMATED DIFFERENTIAL     Segs Relative 86.3 (H) 36 - 66 %    Lymphocytes % 8.5 (L) 24 - 44 %    Monocytes % 4.4 (H) 0 - 4 %    Eosinophils % 0.2 0 - 3 %    Basophils % 0.1 0 - 1 %    Segs Absolute 9.2 K/CU MM    Lymphocytes Absolute 0.9 K/CU MM    Monocytes Absolute 0.5 K/CU MM    Eosinophils Absolute 0.0 K/CU MM    Basophils Absolute 0.0 K/CU MM    Nucleated RBC % 0.0 %    Total Nucleated RBC 0.0 K/CU MM    Total Immature Neutrophil 0.05 K/CU MM    Immature Neutrophil % 0.5 (H) 0 - 0.43 %   Lactic Acid    Collection Time: 12/22/22  3:30 AM   Result Value Ref Range    Lactate 2.1 (HH) 0.5 - 1.9 mMOL/L   Hemoglobin A1C    Collection Time: 12/22/22  3:30 AM   Result Value Ref Range    Hemoglobin A1C 9.3 (H) 4.2 - 6.3 %    eAG 220 mg/dL   POCT Glucose    Collection Time: 12/22/22  8:17 AM   Result Value Ref Range    POC Glucose 248 (H) 70 - 99 MG/DL        Imaging/Diagnostics Last 24 Hours   XR RIBS LEFT INCLUDE CHEST (MIN 3 VIEWS)    Result Date: 12/20/2022  EXAMINATION: 6 XRAY VIEWS OF THE LEFT RIBS WITH FRONTAL XRAY VIEW OF THE CHEST 12/20/2022 12:16 pm COMPARISON: November 30, 2022 HISTORY: ORDERING SYSTEM PROVIDED HISTORY: pain felt rib pop TECHNOLOGIST PROVIDED HISTORY: Reason for exam:->pain felt rib pop Reason for Exam: pain after ribs popped while pulling clothes out of washer FINDINGS: Chest x-ray demonstrates infiltrates in both lower lobes, especially the right consistent with pneumonia. Views of the left ribs demonstrate no obvious fractures or destructive changes. Clavicle, scapula and shoulder appear unremarkable.   Incidentally, a liver shunt noted likely representing a TIPS.     Bilateral lower lobe infiltrates worse on the right consistent with pneumonia. Specific views of the left ribs demonstrate no obvious rib fractures. Liver shunt likely represents a TIPS.        Electronically signed by ERLINDA Chappell CNP on 12/22/2022 at 10:17 AM

## 2022-12-23 VITALS
TEMPERATURE: 97.5 F | RESPIRATION RATE: 14 BRPM | WEIGHT: 196.65 LBS | BODY MASS INDEX: 41.28 KG/M2 | OXYGEN SATURATION: 95 % | SYSTOLIC BLOOD PRESSURE: 124 MMHG | HEART RATE: 87 BPM | HEIGHT: 58 IN | DIASTOLIC BLOOD PRESSURE: 70 MMHG

## 2022-12-23 LAB
ANION GAP SERPL CALCULATED.3IONS-SCNC: 7 MMOL/L (ref 4–16)
BASOPHILS ABSOLUTE: 0 K/CU MM
BASOPHILS RELATIVE PERCENT: 0.2 % (ref 0–1)
BUN BLDV-MCNC: 19 MG/DL (ref 6–23)
CALCIUM SERPL-MCNC: 9.1 MG/DL (ref 8.3–10.6)
CHLORIDE BLD-SCNC: 98 MMOL/L (ref 99–110)
CO2: 28 MMOL/L (ref 21–32)
CREAT SERPL-MCNC: 1 MG/DL (ref 0.6–1.1)
CULTURE: NORMAL
DIFFERENTIAL TYPE: ABNORMAL
EOSINOPHILS ABSOLUTE: 0.1 K/CU MM
EOSINOPHILS RELATIVE PERCENT: 0.5 % (ref 0–3)
GFR SERPL CREATININE-BSD FRML MDRD: >60 ML/MIN/1.73M2
GLUCOSE BLD-MCNC: 176 MG/DL (ref 70–99)
GLUCOSE BLD-MCNC: 258 MG/DL (ref 70–99)
GLUCOSE BLD-MCNC: 262 MG/DL (ref 70–99)
GLUCOSE BLD-MCNC: 268 MG/DL (ref 70–99)
HCT VFR BLD CALC: 39.9 % (ref 37–47)
HEMOGLOBIN: 13.8 GM/DL (ref 12.5–16)
IMMATURE NEUTROPHIL %: 0.4 % (ref 0–0.43)
LYMPHOCYTES ABSOLUTE: 0.9 K/CU MM
LYMPHOCYTES RELATIVE PERCENT: 8.1 % (ref 24–44)
Lab: NORMAL
MCH RBC QN AUTO: 32.9 PG (ref 27–31)
MCHC RBC AUTO-ENTMCNC: 34.6 % (ref 32–36)
MCV RBC AUTO: 95 FL (ref 78–100)
MONOCYTES ABSOLUTE: 0.7 K/CU MM
MONOCYTES RELATIVE PERCENT: 6.5 % (ref 0–4)
NUCLEATED RBC %: 0 %
PDW BLD-RTO: 13.2 % (ref 11.7–14.9)
PLATELET # BLD: 80 K/CU MM (ref 140–440)
PMV BLD AUTO: 12.1 FL (ref 7.5–11.1)
POTASSIUM SERPL-SCNC: 5.2 MMOL/L (ref 3.5–5.1)
RBC # BLD: 4.2 M/CU MM (ref 4.2–5.4)
SEGMENTED NEUTROPHILS ABSOLUTE COUNT: 9.1 K/CU MM
SEGMENTED NEUTROPHILS RELATIVE PERCENT: 84.3 % (ref 36–66)
SODIUM BLD-SCNC: 133 MMOL/L (ref 135–145)
SPECIMEN: NORMAL
TOTAL IMMATURE NEUTOROPHIL: 0.04 K/CU MM
TOTAL NUCLEATED RBC: 0 K/CU MM
WBC # BLD: 10.8 K/CU MM (ref 4–10.5)

## 2022-12-23 PROCEDURE — 6370000000 HC RX 637 (ALT 250 FOR IP): Performed by: NURSE PRACTITIONER

## 2022-12-23 PROCEDURE — 2700000000 HC OXYGEN THERAPY PER DAY

## 2022-12-23 PROCEDURE — 6360000002 HC RX W HCPCS

## 2022-12-23 PROCEDURE — 85025 COMPLETE CBC W/AUTO DIFF WBC: CPT

## 2022-12-23 PROCEDURE — 82962 GLUCOSE BLOOD TEST: CPT

## 2022-12-23 PROCEDURE — 6370000000 HC RX 637 (ALT 250 FOR IP): Performed by: INTERNAL MEDICINE

## 2022-12-23 PROCEDURE — 6370000000 HC RX 637 (ALT 250 FOR IP): Performed by: EMERGENCY MEDICINE

## 2022-12-23 PROCEDURE — 36415 COLL VENOUS BLD VENIPUNCTURE: CPT

## 2022-12-23 PROCEDURE — 94640 AIRWAY INHALATION TREATMENT: CPT

## 2022-12-23 PROCEDURE — 2580000003 HC RX 258

## 2022-12-23 PROCEDURE — 94761 N-INVAS EAR/PLS OXIMETRY MLT: CPT

## 2022-12-23 PROCEDURE — 94664 DEMO&/EVAL PT USE INHALER: CPT

## 2022-12-23 PROCEDURE — G0378 HOSPITAL OBSERVATION PER HR: HCPCS

## 2022-12-23 PROCEDURE — 6370000000 HC RX 637 (ALT 250 FOR IP): Performed by: STUDENT IN AN ORGANIZED HEALTH CARE EDUCATION/TRAINING PROGRAM

## 2022-12-23 PROCEDURE — 94618 PULMONARY STRESS TESTING: CPT

## 2022-12-23 PROCEDURE — 96366 THER/PROPH/DIAG IV INF ADDON: CPT

## 2022-12-23 PROCEDURE — 80048 BASIC METABOLIC PNL TOTAL CA: CPT

## 2022-12-23 PROCEDURE — 6370000000 HC RX 637 (ALT 250 FOR IP)

## 2022-12-23 RX ORDER — PREDNISONE 20 MG/1
40 TABLET ORAL DAILY
Qty: 2 TABLET | Refills: 0 | Status: SHIPPED | OUTPATIENT
Start: 2022-12-24 | End: 2022-12-25

## 2022-12-23 RX ORDER — BENZONATATE 100 MG/1
100 CAPSULE ORAL 3 TIMES DAILY PRN
Qty: 21 CAPSULE | Refills: 0 | Status: SHIPPED | OUTPATIENT
Start: 2022-12-23 | End: 2022-12-30

## 2022-12-23 RX ORDER — GUAIFENESIN/DEXTROMETHORPHAN 100-10MG/5
5 SYRUP ORAL EVERY 4 HOURS PRN
Qty: 120 ML | Refills: 0 | Status: SHIPPED | OUTPATIENT
Start: 2022-12-23 | End: 2023-01-02

## 2022-12-23 RX ORDER — INSULIN LISPRO 100 [IU]/ML
0-12 INJECTION, SOLUTION INTRAVENOUS; SUBCUTANEOUS
Status: DISCONTINUED | OUTPATIENT
Start: 2022-12-23 | End: 2022-12-23 | Stop reason: HOSPADM

## 2022-12-23 RX ORDER — INSULIN GLARGINE 100 [IU]/ML
60 INJECTION, SOLUTION SUBCUTANEOUS NIGHTLY
Qty: 10 ML | Refills: 3 | Status: SHIPPED | OUTPATIENT
Start: 2022-12-23

## 2022-12-23 RX ORDER — POLYETHYLENE GLYCOL 3350 17 G/17G
17 POWDER, FOR SOLUTION ORAL DAILY
Status: DISCONTINUED | OUTPATIENT
Start: 2022-12-23 | End: 2022-12-23 | Stop reason: HOSPADM

## 2022-12-23 RX ORDER — INSULIN LISPRO 100 [IU]/ML
0-16 INJECTION, SOLUTION INTRAVENOUS; SUBCUTANEOUS
Status: DISCONTINUED | OUTPATIENT
Start: 2022-12-23 | End: 2022-12-23 | Stop reason: HOSPADM

## 2022-12-23 RX ORDER — LEVOFLOXACIN 750 MG/1
750 TABLET ORAL DAILY
Qty: 7 TABLET | Refills: 0 | Status: SHIPPED | OUTPATIENT
Start: 2022-12-23 | End: 2022-12-30

## 2022-12-23 RX ORDER — SENNA PLUS 8.6 MG/1
1 TABLET ORAL NIGHTLY
Status: DISCONTINUED | OUTPATIENT
Start: 2022-12-23 | End: 2022-12-23 | Stop reason: HOSPADM

## 2022-12-23 RX ORDER — LIDOCAINE 4 G/G
1 PATCH TOPICAL DAILY
Qty: 30 EACH | Refills: 0 | Status: SHIPPED | OUTPATIENT
Start: 2022-12-24 | End: 2022-12-25

## 2022-12-23 RX ORDER — OXYCODONE HYDROCHLORIDE 5 MG/1
5 TABLET ORAL EVERY 6 HOURS PRN
Qty: 12 TABLET | Refills: 0 | Status: SHIPPED | OUTPATIENT
Start: 2022-12-23 | End: 2022-12-26

## 2022-12-23 RX ORDER — INSULIN GLARGINE 100 [IU]/ML
60 INJECTION, SOLUTION SUBCUTANEOUS NIGHTLY
Status: DISCONTINUED | OUTPATIENT
Start: 2022-12-23 | End: 2022-12-23 | Stop reason: HOSPADM

## 2022-12-23 RX ORDER — INSULIN LISPRO 100 [IU]/ML
20 INJECTION, SOLUTION INTRAVENOUS; SUBCUTANEOUS
Status: CANCELLED | OUTPATIENT
Start: 2022-12-23

## 2022-12-23 RX ORDER — GUAIFENESIN 600 MG/1
600 TABLET, EXTENDED RELEASE ORAL 2 TIMES DAILY
Qty: 60 TABLET | Refills: 0 | Status: SHIPPED | OUTPATIENT
Start: 2022-12-23 | End: 2023-01-22

## 2022-12-23 RX ORDER — INSULIN LISPRO 100 [IU]/ML
INJECTION, SOLUTION INTRAVENOUS; SUBCUTANEOUS
Qty: 18 ML | Refills: 0 | Status: SHIPPED | OUTPATIENT
Start: 2022-12-23

## 2022-12-23 RX ORDER — PANTOPRAZOLE SODIUM 40 MG/1
40 TABLET, DELAYED RELEASE ORAL
Qty: 60 TABLET | Refills: 1 | Status: SHIPPED | OUTPATIENT
Start: 2022-12-23

## 2022-12-23 RX ADMIN — SODIUM CHLORIDE, PRESERVATIVE FREE 10 ML: 5 INJECTION INTRAVENOUS at 11:45

## 2022-12-23 RX ADMIN — RALOXIFENE 60 MG: 60 TABLET ORAL at 09:55

## 2022-12-23 RX ADMIN — INSULIN LISPRO 4 UNITS: 100 INJECTION, SOLUTION INTRAVENOUS; SUBCUTANEOUS at 03:02

## 2022-12-23 RX ADMIN — POLYETHYLENE GLYCOL (3350) 17 G: 17 POWDER, FOR SOLUTION ORAL at 11:45

## 2022-12-23 RX ADMIN — TROSPIUM CHLORIDE 20 MG: 20 TABLET, FILM COATED ORAL at 05:04

## 2022-12-23 RX ADMIN — INSULIN HUMAN 15 UNITS: 100 INJECTION, SUSPENSION SUBCUTANEOUS at 10:24

## 2022-12-23 RX ADMIN — INSULIN LISPRO 20 UNITS: 100 INJECTION, SOLUTION INTRAVENOUS; SUBCUTANEOUS at 13:39

## 2022-12-23 RX ADMIN — INSULIN LISPRO 8 UNITS: 100 INJECTION, SOLUTION INTRAVENOUS; SUBCUTANEOUS at 10:10

## 2022-12-23 RX ADMIN — IPRATROPIUM BROMIDE AND ALBUTEROL SULFATE 1 AMPULE: 2.5; .5 SOLUTION RESPIRATORY (INHALATION) at 12:24

## 2022-12-23 RX ADMIN — FLUOXETINE 20 MG: 20 CAPSULE ORAL at 09:55

## 2022-12-23 RX ADMIN — CEFEPIME HYDROCHLORIDE 2000 MG: 2 INJECTION, POWDER, FOR SOLUTION INTRAVENOUS at 11:30

## 2022-12-23 RX ADMIN — INSULIN LISPRO 20 UNITS: 100 INJECTION, SOLUTION INTRAVENOUS; SUBCUTANEOUS at 10:23

## 2022-12-23 RX ADMIN — IPRATROPIUM BROMIDE AND ALBUTEROL SULFATE 1 AMPULE: 2.5; .5 SOLUTION RESPIRATORY (INHALATION) at 08:46

## 2022-12-23 RX ADMIN — METOPROLOL SUCCINATE 50 MG: 50 TABLET, EXTENDED RELEASE ORAL at 09:55

## 2022-12-23 RX ADMIN — FUROSEMIDE 20 MG: 20 TABLET ORAL at 09:55

## 2022-12-23 RX ADMIN — PREDNISONE 40 MG: 20 TABLET ORAL at 09:55

## 2022-12-23 RX ADMIN — TENOFOVIR DISOPROXIL FUMARATE 300 MG: 300 TABLET, COATED ORAL at 09:56

## 2022-12-23 RX ADMIN — LACTULOSE 20 G: 10 SOLUTION ORAL at 09:56

## 2022-12-23 RX ADMIN — SENNOSIDES 8.6 MG: 8.6 TABLET, FILM COATED ORAL at 11:45

## 2022-12-23 RX ADMIN — GUAIFENESIN 600 MG: 600 TABLET, EXTENDED RELEASE ORAL at 09:55

## 2022-12-23 RX ADMIN — RANOLAZINE 500 MG: 500 TABLET, FILM COATED, EXTENDED RELEASE ORAL at 09:55

## 2022-12-23 RX ADMIN — TRAMADOL HYDROCHLORIDE 50 MG: 50 TABLET, COATED ORAL at 13:43

## 2022-12-23 RX ADMIN — PANTOPRAZOLE SODIUM 40 MG: 40 TABLET, DELAYED RELEASE ORAL at 05:04

## 2022-12-23 RX ADMIN — OXYCODONE HYDROCHLORIDE 5 MG: 5 TABLET ORAL at 10:08

## 2022-12-23 ASSESSMENT — PAIN DESCRIPTION - ORIENTATION
ORIENTATION: LOWER

## 2022-12-23 ASSESSMENT — PAIN SCALES - GENERAL
PAINLEVEL_OUTOF10: 7
PAINLEVEL_OUTOF10: 5
PAINLEVEL_OUTOF10: 7
PAINLEVEL_OUTOF10: 5

## 2022-12-23 ASSESSMENT — PAIN DESCRIPTION - PAIN TYPE
TYPE: ACUTE PAIN

## 2022-12-23 ASSESSMENT — PAIN DESCRIPTION - DESCRIPTORS
DESCRIPTORS: ACHING;DISCOMFORT
DESCRIPTORS: ACHING

## 2022-12-23 ASSESSMENT — PAIN DESCRIPTION - LOCATION
LOCATION: CHEST

## 2022-12-23 ASSESSMENT — PAIN DESCRIPTION - FREQUENCY
FREQUENCY: INTERMITTENT

## 2022-12-23 NOTE — PROGRESS NOTES
Patient was seen in hospital for CAD, PNEUMONIA, COPD. I am prescribing oxygen because the diagnosis and testing requires the patient to have oxygen in the home. Conditions will improve or be benefited by oxygen use. The patient is able to perform good mobility and therefore requires the use of a portable oxygen system for ambulation. Added by Mi Purvis MD   Patient seen and examined at bedside.  Still requires O2 NC.

## 2022-12-23 NOTE — PROGRESS NOTES
Progress Note( Dr. Nicki Mchugh)  12/23/2022  Subjective:   Admit Date: 12/20/2022  PCP: Florin Casas MD    Admitted For :Shortness of breath chest pain on deep breathing       Consulted For: Better control of blood glucose    Interval History: Short of breath but somewhat as better    Denies any chest pains,   Yes SOB . Better  Denies nausea or vomiting. No new bowel or bladder symptoms. Intake/Output Summary (Last 24 hours) at 12/23/2022 0646  Last data filed at 12/22/2022 0932  Gross per 24 hour   Intake 240 ml   Output --   Net 240 ml       DATA    CBC:   Recent Labs     12/21/22  0648 12/22/22  0330 12/23/22  0436   WBC 4.7 10.7* 10.8*   HGB 14.5 12.7 13.8   PLT 77* 84* 80*    CMP:  Recent Labs     12/20/22  1340 12/21/22  0648 12/22/22  0330 12/23/22  0436    134* 131* 133*   K 4.4 5.4* 4.3 5.2*    102 98* 98*   CO2 26 25 25 28   BUN 16 20 23 19   CREATININE 1.0 0.9 1.1 1.0   CALCIUM 9.0 8.9 9.0 9.1   PROT 6.4 5.8*  --   --    LABALBU 3.1* 2.8*  --   --    BILITOT 1.3* 1.2*  --   --    ALKPHOS 173* 154*  --   --    AST 15 13*  --   --    ALT 15 13  --   --      Lipids:   Lab Results   Component Value Date/Time    CHOL 98 11/27/2019 01:52 PM    CHOL 120 08/01/2017 04:15 PM    HDL 38 11/27/2019 01:52 PM    TRIG 96 11/27/2019 01:52 PM     Glucose:  Recent Labs     12/22/22  1711 12/22/22  1956 12/23/22  0257   POCGLU 202* 319* 268*     [id number]:  Lab Results   Component Value Date/Time    LABA1C 9.3 12/22/2022 03:30 AM     High Sensitivity TSH:   Lab Results   Component Value Date/Time    TSHHS 2.540 11/23/2020 12:04 AM     Free T3: No results found for: FT3  Free T4:  Lab Results   Component Value Date/Time    T4FREE 1.0 08/01/2017 04:15 PM       XR RIBS LEFT INCLUDE CHEST (MIN 3 VIEWS)   Final Result   Bilateral lower lobe infiltrates worse on the right consistent with pneumonia. Specific views of the left ribs demonstrate no obvious rib fractures.   Liver   shunt likely represents caleb TIPS. Scheduled Medicines   Medications:    insulin glargine  50 Units SubCUTAneous Nightly    insulin lispro  20 Units SubCUTAneous TID WC    insulin NPH  20 Units SubCUTAneous Once    guaiFENesin  600 mg Oral BID    insulin lispro  0-8 Units SubCUTAneous TID WC    ipratropium-albuterol  1 ampule Inhalation Q4H WA    insulin lispro  0-8 Units SubCUTAneous 2 times per day    lidocaine  1 patch TransDERmal Daily    sodium chloride flush  5-40 mL IntraVENous 2 times per day    cefepime  2,000 mg IntraVENous Q12H    azithromycin  250 mg Oral Daily    FLUoxetine  20 mg Oral Daily    furosemide  20 mg Oral Daily    lactulose  30 mL Oral BID    QUEtiapine  100 mg Oral Nightly    ranolazine  500 mg Oral BID    raloxifene  60 mg Oral Daily    tenofovir disoproxil fumarate  300 mg Oral Daily    pantoprazole  40 mg Oral QAM AC    predniSONE  40 mg Oral Daily    metoprolol succinate  50 mg Oral Daily    vancomycin  1,500 mg IntraVENous Q24H    enoxaparin  40 mg SubCUTAneous QPM    trospium  20 mg Oral BID AC      Infusions:    sodium chloride 50 mL (12/22/22 2016)    dextrose           Objective:   Vitals: /75   Pulse 79   Temp 97.8 °F (36.6 °C) (Oral)   Resp 19   Ht 4' 9.5\" (1.461 m)   Wt 196 lb 10.4 oz (89.2 kg)   SpO2 95%   BMI 41.82 kg/m²   General appearance: alert and cooperative with exam  Neck: no JVD or bruit  Thyroid : Normal lobes   Lungs: Has Vesicular Breath sounds diminished breath sound but better occasional wheezing  Heart:  regular rate and rhythm  Abdomen: soft, non-tender; bowel sounds normal; no masses,  no organomegaly  Musculoskeletal: Normal  Extremities: extremities normal, , no edema  Neurologic:  Awake, alert, oriented to name, place and time. Cranial nerves II-XII are grossly intact. Motor is  intact.   Sensory neuropathy and gait is normal.    Assessment:     Patient Active Problem List:     Left arm pain     Type 2 diabetes mellitus with complication, with long-term current use of insulin (HCC)     Chronic obstructive pulmonary disease (HCC)     Tobacco abuse     Angina effort     Abnormal nuclear cardiac imaging test     Lung nodule     Chest pain     Dyslipidemia     Pancolitis (HCC)     Hematemesis without nausea     Alcoholic cirrhosis of liver without ascites (HCC)     Thrombocytopenia (HCC)     Periumbilical abdominal pain     History of colonic polyps     Abnormal findings on diagnostic imaging of abdomen     Nausea     Gastroesophageal reflux disease without esophagitis     Mixed hyperlipidemia     Vitamin D deficiency     Left carpal tunnel syndrome     Right carpal tunnel syndrome     Esophageal hypertension     Candida esophagitis (HCC)     Colitis     Primary hypertension     GI bleed     Coronary-myocardial bridge     Type 2 diabetes mellitus with complication, with long-term current use of insulin (HCC)     SOB (shortness of breath)     Portal vein thrombosis     Intractable nausea and vomiting     Abdominal pain     Acute GI bleeding     Chronic hepatitis C without hepatic coma (HCC)     Delayed gastric emptying     Restless legs     Acute colitis     Acute encephalopathy     S/P TIPS (transjugular intrahepatic portosystemic shunt)     Cirrhosis of liver without ascites (HCC)     Acute upper GI bleed     Acute hepatic encephalopathy     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  Labs and X ray results   Reviewed Current Medicines   On meal/ Correction bolus Humalog/ Basal Lantus Insulin regime /  Monitor Blood glucose frequently   Modified  the dose of Insulin/ other medicines as needed   Will follow     .      Carmella Rivera MD, MD

## 2022-12-23 NOTE — PROGRESS NOTES
Pt home O2 paperwork faxed to [facility] [phone]. Please do not discharge pt without oxygen. This testing will be good for 48 hours and will have to be repeated if pt has not discharged prior to then. If portable oxygen concentrator or tank has not been delivered prior to pt being ready to leave,  please call [facility].

## 2022-12-23 NOTE — PROGRESS NOTES
Progress Note( Dr. Crissy Coates  Subjective:   Admit Date: 12/20/2022  PCP: Morena Novoa MD    Admitted For : Shortness of breath chest pain on deep breathing    Consulted For: Better control of blood glucose    Interval History: Short of breath but somewhat as better    Denies any chest pains,   Yes SOB . Better  Denies nausea or vomiting. No new bowel or bladder symptoms. Intake/Output Summary (Last 24 hours) at 12/23/2022 0646  Last data filed at 12/22/2022 0932  Gross per 24 hour   Intake 240 ml   Output --   Net 240 ml         DATA    CBC:   Recent Labs     12/21/22  0648 12/22/22  0330 12/23/22  0436   WBC 4.7 10.7* 10.8*   HGB 14.5 12.7 13.8   PLT 77* 84* 80*      CMP:  Recent Labs     12/20/22  1340 12/21/22  0648 12/22/22  0330 12/23/22  0436    134* 131* 133*   K 4.4 5.4* 4.3 5.2*    102 98* 98*   CO2 26 25 25 28   BUN 16 20 23 19   CREATININE 1.0 0.9 1.1 1.0   CALCIUM 9.0 8.9 9.0 9.1   PROT 6.4 5.8*  --   --    LABALBU 3.1* 2.8*  --   --    BILITOT 1.3* 1.2*  --   --    ALKPHOS 173* 154*  --   --    AST 15 13*  --   --    ALT 15 13  --   --        Lipids:   Lab Results   Component Value Date/Time    CHOL 98 11/27/2019 01:52 PM    CHOL 120 08/01/2017 04:15 PM    HDL 38 11/27/2019 01:52 PM    TRIG 96 11/27/2019 01:52 PM     Glucose:  Recent Labs     12/22/22  1711 12/22/22  1956 12/23/22  0257   POCGLU 202* 319* 268*       [id number]:  Lab Results   Component Value Date/Time    LABA1C 9.3 12/22/2022 03:30 AM     High Sensitivity TSH:   Lab Results   Component Value Date/Time    TSHHS 2.540 11/23/2020 12:04 AM     Free T3: No results found for: FT3  Free T4:  Lab Results   Component Value Date/Time    T4FREE 1.0 08/01/2017 04:15 PM       XR RIBS LEFT INCLUDE CHEST (MIN 3 VIEWS)   Final Result   Bilateral lower lobe infiltrates worse on the right consistent with pneumonia. Specific views of the left ribs demonstrate no obvious rib fractures. Liver   shunt likely represents a TIPS. Scheduled Medicines   Medications:    insulin glargine  50 Units SubCUTAneous Nightly    insulin lispro  20 Units SubCUTAneous TID WC    insulin NPH  20 Units SubCUTAneous Once    guaiFENesin  600 mg Oral BID    insulin lispro  0-8 Units SubCUTAneous TID WC    ipratropium-albuterol  1 ampule Inhalation Q4H WA    insulin lispro  0-8 Units SubCUTAneous 2 times per day    lidocaine  1 patch TransDERmal Daily    sodium chloride flush  5-40 mL IntraVENous 2 times per day    cefepime  2,000 mg IntraVENous Q12H    azithromycin  250 mg Oral Daily    FLUoxetine  20 mg Oral Daily    furosemide  20 mg Oral Daily    lactulose  30 mL Oral BID    QUEtiapine  100 mg Oral Nightly    ranolazine  500 mg Oral BID    raloxifene  60 mg Oral Daily    tenofovir disoproxil fumarate  300 mg Oral Daily    pantoprazole  40 mg Oral QAM AC    predniSONE  40 mg Oral Daily    metoprolol succinate  50 mg Oral Daily    vancomycin  1,500 mg IntraVENous Q24H    enoxaparin  40 mg SubCUTAneous QPM    trospium  20 mg Oral BID AC      Infusions:    sodium chloride 50 mL (12/22/22 2016)    dextrose           Objective:   Vitals: /75   Pulse 79   Temp 97.8 °F (36.6 °C) (Oral)   Resp 19   Ht 4' 9.5\" (1.461 m)   Wt 196 lb 10.4 oz (89.2 kg)   SpO2 95%   BMI 41.82 kg/m²   General appearance: alert and cooperative with exam  Neck: no JVD or bruit  Thyroid : Normal lobes   Lungs: Has Vesicular Breath sounds diminished breath sounds some wheezing and some rales  Heart:  regular rate and rhythm  Abdomen: soft, non-tender; bowel sounds normal; no masses,  no organomegaly  Musculoskeletal: Normal  Extremities: extremities normal, , no edema  Neurologic:  Awake, alert, oriented to name, place and time. Cranial nerves II-XII are grossly intact. Motor is  intact.   Sensory , neuropathy and gait is normal.    Assessment:     Patient Active Problem List:     Left arm pain     Type 2 diabetes mellitus with complication, with long-term current use of insulin (HCC)     Chronic obstructive pulmonary disease (HCC)     Tobacco abuse     Angina effort     Abnormal nuclear cardiac imaging test     Lung nodule     Chest pain     Dyslipidemia     Pancolitis (HCC)     Hematemesis without nausea     Alcoholic cirrhosis of liver without ascites (HCC)     Thrombocytopenia (HCC)     Periumbilical abdominal pain     History of colonic polyps     Abnormal findings on diagnostic imaging of abdomen     Nausea     Gastroesophageal reflux disease without esophagitis     Mixed hyperlipidemia     Vitamin D deficiency     Left carpal tunnel syndrome     Right carpal tunnel syndrome     Esophageal hypertension     Candida esophagitis (HCC)     Colitis     Primary hypertension     GI bleed     Coronary-myocardial bridge     Type 2 diabetes mellitus with complication, with long-term current use of insulin (HCC)     SOB (shortness of breath)     Portal vein thrombosis     Intractable nausea and vomiting     Abdominal pain     Acute GI bleeding     Chronic hepatitis C without hepatic coma (HCC)     Delayed gastric emptying     Restless legs     Acute colitis     Acute encephalopathy     S/P TIPS (transjugular intrahepatic portosystemic shunt)     Cirrhosis of liver without ascites (HCC)     Acute upper GI bleed     Acute hepatic encephalopathy     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  Labs and X ray results   Reviewed Current Medicines   On meal/ Correction bolus Humalog/ Basal Lantus Insulin regime  Monitor Blood glucose frequently   Modified  the dose of Insulin/ other medicines as needed   Will follow     .      Josey Vásquez MD, MD

## 2022-12-23 NOTE — PROGRESS NOTES
12/23/2022 11:25 AM  Patient Room #: 5966/3687-Z  Patient Name: Nancy Jonas    (Step 1 Done by RN if possible otherwise call Pulmonary Diagnostics)  Place patient on room air at rest for at least 30 minutes. If patient falls below 88% before 30 minutes then you can record the level and stop. Record room air saturation level 87 %. If patient is at 88% or below, they will qualify for home oxygen and you can stop. If level does not fall below 88%, fill in level above. If indicated continue to Step 2. Signature:_Mikaela Rodríguez, RRT_ Date: _12/23/2022__  (Step 2&3 Done by P)  Ambulate patient on room air until saturation falls below 89%. Record level of room air saturation with ambulation___ %. Next, place patient back on ___lpm oxygen and ambulate, record level __%. (Note:  this level must show improvement from room air level done with ambulation.)  If patients saturation on room air with ambulation is 88% or below AND patient shows improvement with oxygen during ambulation, they will qualify for home oxygen and you can stop. If patient does not drop below 89%, then patient should have an overnight oximetry trending on room air to see if level falls below 88%. Complete level in Step 3 below. Room air overnight oximetry level 88 % for___  cumulative minutes. If patients room air oxygen level is < 89% for at least 5 cumulative minutes, patient will qualify for home oxygen and you can stop. (Attach Night Trending Report)    Complete order below: Diagnosis:_COPD, CAD, _  Home oxygen at:  Length of Need: X Lifetime   ?  3 Months     _2__lpm or __%   via  [x] nasal cannula  []mask  [] other         [x]continuous [x]  with activity  [x]  Nocturnal   [x] Portable Tanks [x]  Concentrator  [x] Conserving Device        Therapist Signature: Jimmy Mosley, RRT_     Date:  _12/23/2022__  Physician Signature:  __Electronically Signed in EMR_    Date:___  Physician Printed Name:  Tavo Corrales Kandace Bansal MD  NPI:  [id number]___    [x] Patient Qualifies      [] Patient Does NOT qualify

## 2022-12-23 NOTE — PROGRESS NOTES
Began overnight pulse oximetry study with patient on room air, and patient SpO2 immediately dropped to 87% and remained below 88% for several minutes at rest. Halted study and placed patent back on 2 liters per minute nasal cannula and SpO2 climbed to 94%.

## 2022-12-24 NOTE — DISCHARGE SUMMARY
Discharge Summary    Name:  Montrell Perez /Age/Sex: 1960  (63 y.o. female)   MRN & CSN:  [id number] & [id number] Admission Date/Time: 2022 11:33 AM   Attending:  No att. providers found Discharging Physician: Lashon Garay MD     Hospital Course:   Montrell Perez is a 63 y.o.  female  who presents with CAP (community acquired pneumonia)    HPI  \"Patient seen at bedside,not in distress, continues to endorse left lateral pain on inspiration and cough. Patient return to ED after leaning over when doing laundry. Patient reports that she thought she fractured her rib. Per rib xray, patient noted to have pneumonia after outpatient treatment. Plan of care, labs and imaging discussed. Patient verbalizes agreement with treatment plan. \"       The following problems have been addressed during this hospitalization. Acute moderate COPD exacerbation w/ CAP: failed outpatient tx  --X-ray of left ribs x-ray chest.  Impression shows bilateral lower lobe infiltrates worse on the right consistent with pneumonia  Viral Resp Panel NEG  --was on Zpack/Doxy PO outpatient, sxs worse  Started on azithro+ceftriaxone initially switched to cefepime and vanc  - discharged on levofloxacin 7 days total  --received prednisone 40mg PO x 5 days  - symptomatic treatment  - received breathing treatment  - required home O2 on discharge  - pt to follow up with pulm outpatient  - received oxycodone for pain     Hyperkalemia- resolved  Received oral Kayexalate      CAD  -- Left heart cath  with Dr. Patsy Bradford  --Troponin negative x2  --Denies chest pain  --Patient on Ranexa, statin,  BB     --T2DM, insulin dependent  --Holding oral hypoglycemics  High glucose levels.  Home insulin regimen changed on discharge  - pt to monitor blood glucose and see endo outpatient  - pt discharged on lantus 50U nightly, Lispro 20U TID and HDSSI       Mixed hyperlipidemia  --Continue statin        Hepatitis: Hx of drug abuse  -received home Viread 300 mg PO QD     Patient was seen and examined at bedside on day of discharge. This note can be used as the progress note for today's visit. Pt states she feels better. Still has some cough and SOB but improved. Pt still require O2; home O2 done and pt was discharged on NC O2. Denies lightheadedness, dizziness, fever, night sweats, chills, chest pain, palpitations, abd pain, nausea, vomiting, diarrhea, dysuria. Physical Exam  Vitals:   Vitals:    12/23/22 1500   BP: 124/70   Pulse: 87   Resp: 14   Temp: 97.5 °F (36.4 °C)   SpO2: 95%       General: NAD  Eyes: EOMI  ENT: neck supple  Cardiovascular: Regular rate. Respiratory: mild rales  Gastrointestinal: Soft, non tender  Genitourinary: no suprapubic tenderness  Musculoskeletal: No edema  Skin: warm, dry  Neuro: Alert. Psych: Mood appropriate. The patient expressed appropriate understanding of and agreement with the discharge recommendations, medications, and plan.      Consults this admission:  PHARMACY TO [address] [place] [address] TO ENDOCRINOLOGY      Discharge Instruction:   Handoff to PCP:   - monitor glucose    Follow up appointments: pulm, endo  Primary care physician: Aislinn Robison MD      Diet:  diabetic diet   Activity: activity as tolerated  Disposition: Discharged to:   [x]Home, []C, []SNF, []Acute Rehab, []Hospice   Condition on discharge: Stable    Discharge Medications:        Medication List        START taking these medications      benzonatate 100 MG capsule  Commonly known as: TESSALON  Take 1 capsule by mouth 3 times daily as needed for Cough     guaiFENesin 600 MG extended release tablet  Commonly known as: MUCINEX  Take 1 tablet by mouth 2 times daily     guaiFENesin-dextromethorphan 100-10 MG/5ML syrup  Commonly known as: ROBITUSSIN DM  Take 5 mLs by mouth every 4 hours as needed for Cough     insulin glargine 100 UNIT/ML injection vial  Commonly known as: LANTUS  Inject 60 Units into the skin nightly  Replaces: Aida Soriano 100 UNIT/ML injection pen     insulin lispro (1 Unit Dial) 100 UNIT/ML Sopn  Commonly known as: HumaLOG KwikPen  10U insulin befor meals 3x/day    Take insulin according to glucose check 2 hours after meals:   0  140-199 3  200-249 6  250-299 9  300-349 12  350-400 15  >400 18    Take insulin according to glucose check before sleep:   0  140-199 2  200-249 3  250-299 5  300-349 6  350-400 7  >400 9     levoFLOXacin 750 MG tablet  Commonly known as: LEVAQUIN  Take 1 tablet by mouth daily for 7 days     lidocaine 4 % external patch  Place 1 patch onto the skin daily     oxyCODONE 5 MG immediate release tablet  Commonly known as: ROXICODONE  Take 1 tablet by mouth every 6 hours as needed for Pain for up to 3 days. Max Daily Amount: 20 mg            CHANGE how you take these medications      blood glucose test strips  Test  Bid times a day & as needed for symptoms of irregular blood glucose. What changed: Another medication with the same name was removed. Continue taking this medication, and follow the directions you see here. predniSONE 20 MG tablet  Commonly known as: DELTASONE  Take 2 tablets by mouth daily for 1 dose  What changed:   medication strength  how much to take  additional instructions            CONTINUE taking these medications      * [employer] Blood Glucose Meter w/Device Kit  Please check blood sugar 3 times daily. Please fill with what is covered by the insurance     * glucose monitoring kit  1 kit by Does not apply route daily     * blood glucose monitor kit and supplies  Use 3-4 times daily     albuterol sulfate  (90 Base) MCG/ACT inhaler  Commonly known as: Proventil HFA  Inhale 2 puffs into the lungs every 4 hours as needed for Wheezing or Shortness of Breath With spacer (and mask if indicated). Thanks.      calcium carbonate 1250 (500 Ca) MG tablet  Commonly known as: OYSTER SHELL CALCIUM 500 mg     Compression Stockings Misc  by Does not apply route 20 - 30 mmh wear daily and take off at night  Thigh High     estradiol 0.1 MG/GM vaginal cream  Commonly known as: ESTRACE VAGINAL  Place 1 g vaginally three times a week Use 1 g vaginally nightly for 2 weeks, then 1 g nightly 2-3 times per week. FLUoxetine 20 MG capsule  Commonly known as: PROZAC     furosemide 20 MG tablet  Commonly known as: Lasix  Take 1 tablet by mouth daily Hold if systolic <463     * Glucosource Lancets Misc  Use one 3-4 times to check blood sugar     * Lancets Misc  Use one lancet 4 times daily     ipratropium-albuterol 0.5-2.5 (3) MG/3ML Soln nebulizer solution  Commonly known as: DUONEB  Inhale 3 mLs into the lungs every 4 hours     metoprolol succinate 50 MG extended release tablet  Commonly known as: TOPROL XL  Take 1 tablet by mouth in the morning. mirabegron 25 MG Tb24  Commonly known as: Myrbetriq  Take 1 tablet by mouth daily     MULTIVITAMIN PO     mupirocin 2 % ointment  Commonly known as: BACTROBAN  Apply topically 3 times daily. nitroGLYCERIN 0.4 MG SL tablet  Commonly known as: NITROSTAT  Place 1 tablet under the tongue every 5 minutes as needed for Chest pain     ondansetron 4 MG disintegrating tablet  Commonly known as: Zofran ODT  Take 1 tablet by mouth every 8 hours as needed for Nausea     paliperidone 6 MG extended release tablet  Commonly known as: INVEGA     pantoprazole 40 MG tablet  Commonly known as: PROTONIX  Take 1 tablet by mouth 2 times daily (before meals)     raloxifene 60 MG tablet  Commonly known as: Evista  Take 1 tablet by mouth daily     ranolazine 500 MG extended release tablet  Commonly known as: RANEXA  Take 1 tablet by mouth in the morning and 1 tablet before bedtime.      rifAXIMin 550 MG tablet  Commonly known as: XIFAXAN  Take 1 tablet by mouth 2 times daily     sucralfate 1 GM tablet  Commonly known as: Carafate  Take 1 tablet by mouth 4 times daily     Viread 300 MG tablet  Generic drug: tenofovir disoproxil fumarate     VITAMIN D PO           * This list has 5 medication(s) that are the same as other medications prescribed for you. Read the directions carefully, and ask your doctor or other care provider to review them with you. STOP taking these medications      Basaglar KwikPen 100 UNIT/ML injection pen  Generic drug: insulin glargine  Replaced by: insulin glargine 100 UNIT/ML injection vial     doxycycline hyclate 100 MG capsule  Commonly known as: VIBRAMYCIN     midodrine 5 MG tablet  Commonly known as: PROAMATINE     NovoLOG FlexPen 100 UNIT/ML injection pen  Generic drug: insulin aspart            ASK your doctor about these medications      lactulose 10 GM/15ML solution  Commonly known as: CHRONULAC  Take 30 mLs by mouth 3 times daily     QUEtiapine 50 MG tablet  Commonly known as: SEROQUEL  Take 3 tablets by mouth nightly               Where to Get Your Medications        These medications were sent to [facility]/pharmacy [address] [place] [address] [phone] - F [phone]  [address] [place] [address]      Phone: [phone]   benzonatate 100 MG capsule  guaiFENesin 600 MG extended release tablet  guaiFENesin-dextromethorphan 100-10 MG/5ML syrup  insulin glargine 100 UNIT/ML injection vial  levoFLOXacin 750 MG tablet  lidocaine 4 % external patch  pantoprazole 40 MG tablet  predniSONE 20 MG tablet       You can get these medications from any pharmacy    Bring a paper prescription for each of these medications  insulin lispro (1 Unit Dial) 100 UNIT/ML Sopn  oxyCODONE 5 MG immediate release tablet         Objective Findings at Discharge:       BMP/CBC  Recent Labs     12/22/22  0330 12/23/22  0436   * 133*   K 4.3 5.2*   CL 98* 98*   CO2 25 28   BUN 23 19   CREATININE 1.1 1.0   WBC 10.7* 10.8*   HCT 36.5* 39.9   PLT 84* 80*       IMAGING:      Additional Information: Patient seen and examined day of discharge.  For more information regarding patient's care please contact [facility] records [phone]    Discharge Time of 35 minutes    Electronically signed by Fabiana Alfaro MD on 12/24/2022 at 2:36 PM

## 2022-12-25 ENCOUNTER — APPOINTMENT (OUTPATIENT)
Dept: GENERAL RADIOLOGY | Age: 62
End: 2022-12-25
Payer: MEDICARE

## 2022-12-25 ENCOUNTER — HOSPITAL ENCOUNTER (EMERGENCY)
Age: 62
Discharge: HOME | End: 2022-12-25
Attending: EMERGENCY MEDICINE
Payer: MEDICARE

## 2022-12-25 VITALS
HEIGHT: 58 IN | BODY MASS INDEX: 39.04 KG/M2 | WEIGHT: 186 LBS | SYSTOLIC BLOOD PRESSURE: 111 MMHG | TEMPERATURE: 98 F | RESPIRATION RATE: 16 BRPM | OXYGEN SATURATION: 94 % | HEART RATE: 80 BPM | DIASTOLIC BLOOD PRESSURE: 65 MMHG
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
## 2022-12-25 DIAGNOSIS — S22.31XA CLOSED FRACTURE OF ONE RIB OF RIGHT SIDE, INITIAL ENCOUNTER: ICD-10-CM

## 2022-12-25 LAB
CULTURE: NORMAL
CULTURE: NORMAL
Lab: NORMAL
Lab: NORMAL
SPECIMEN: NORMAL
SPECIMEN: NORMAL

## 2022-12-25 PROCEDURE — 6370000000 HC RX 637 (ALT 250 FOR IP): Performed by: EMERGENCY MEDICINE

## 2022-12-25 PROCEDURE — 71101 X-RAY EXAM UNILAT RIBS/CHEST: CPT

## 2022-12-25 PROCEDURE — 99283 EMERGENCY DEPT VISIT LOW MDM: CPT

## 2022-12-25 RX ORDER — LIDOCAINE 50 MG/G
1 PATCH TOPICAL DAILY
Qty: 30 PATCH | Refills: 0 | Status: SHIPPED | OUTPATIENT
Start: 2022-12-25 | End: 2023-01-24

## 2022-12-25 RX ORDER — OXYCODONE HYDROCHLORIDE 5 MG/1
10 TABLET ORAL ONCE
Status: COMPLETED | OUTPATIENT
Start: 2022-12-25 | End: 2022-12-25

## 2022-12-25 RX ORDER — LIDOCAINE 4 G/G
1 PATCH TOPICAL DAILY
Status: DISCONTINUED | OUTPATIENT
Start: 2022-12-25 | End: 2022-12-25 | Stop reason: HOSPADM

## 2022-12-25 RX ADMIN — OXYCODONE HYDROCHLORIDE 10 MG: 5 TABLET ORAL at 16:29

## 2022-12-25 ASSESSMENT — PAIN SCALES - GENERAL
PAINLEVEL_OUTOF10: 10
PAINLEVEL_OUTOF10: 5

## 2022-12-25 ASSESSMENT — PAIN - FUNCTIONAL ASSESSMENT: PAIN_FUNCTIONAL_ASSESSMENT: 0-10

## 2022-12-25 NOTE — ED PROVIDER NOTES
Emergency Department Encounter    Patient: Woody Martínez  MRN: [id number]  : 1960  Date of Evaluation: 2022  ED Provider:  Best Pedro MD    Triage Chief Complaint:   Fall (Right rib pain, states it is burning and stinging)    [place]:  Woody Martínez is a 63 y.o. female that presents with complaint of a fall and right posterior rib pain, radiating around the lateral aspect. Hurts when she takes of breath, hurts when she moves. She slipped coming out of the [employer] service at [employer] today, on icy steps, and went down and hit her back. No midline neck or back pain. She did strike her head, not on blood thinners. No nausea or vomiting. Pain in the right ribs has continued. Of note she had been hospitalized from the  to the  with pneumonia, had also felt like she popped her left rib. ROS - see HPI, below listed is current ROS at time of my eval:  10 systems reviewed negative except as above    Past Medical History:   Diagnosis Date    Abnormal Pap smear of cervix     Acid reflux     Arthritis     left knee    Breast cyst     CAD (coronary artery disease)     per [facility] - Dr. Fadia Little    COPD (chronic obstructive pulmonary disease) ([facility]     follow with Dr Strickland [facility]    Depression     Henrik Mcclain manic - depression see Dr Franko Ni    Diabetes mellitus [facility])     dx 10+ yrs ago- follows with PCP    Drug abuse ([facility]     hx use of cocaine, heroin and marijuana- states last used 2014    Glaucoma     bilateral    H/O Doppler lower venous ultrasound 2019    No DVT or SVT, Significant reflux of RGSV and LGSV. H/O echocardiogram 2020    EF 55-60%, Mod LVH. Hepatic encephalopathy 2019    Hepatitis C     for liver bx 12/3/2015\"Have Hepatitis B and C and saw Dr Bryan Joaquin for this 2015\"    Hiatal hernia     History of alcohol abuse     History of exercise stress test 2020    Treadmill, Normal exercise performance without angina and ischemic EKG changes.     HTN (hypertension)     \"for the past two yrs on medication\" follows with Dr Kevin Abrams    Hx of blood clots 2019    Portal vein thrombsis - TIPS procedure 4/23/19    Hyperlipemia     Irregular heart beat     per pt    Liver hematoma     Migraine     Migraines     Last migraine:  2018    Nausea & vomiting     Neurologic disorder     Schizophrenia ([address]     per old chart    Seizures ([address]     \"last one was 9/2015- saw Dr Mino Sol at [facility]- she said not sure if acutal seizures- she thinks they are panic or anxiety attacks\"    SOB (shortness of breath)     with any exertion    Vulvar mass      Past Surgical History:   Procedure Laterality Date    BREAST SURGERY  10/2015    left breast bx    CARDIAC CATHETERIZATION      per old chart pt had cath done in 3/2011 and 9/2013    CARPAL TUNNEL RELEASE Left 01/09/2020    CARPAL TUNNEL RELEASE LEFT performed by Kristi Parekh DO at [address] [place] [address] Right 05/26/2020    dr Isac Denson, carpal tunnel trigger finger release    CARPAL TUNNEL RELEASE Right 05/26/2020    RIGHT CARPAL TUNNEL RELEASE performed by Kristi Parekh DO at [facility]  per old chart done 1985    COLONOSCOPY  03/11/2013    diverticulosis, cecal polyp    COLONOSCOPY  03/16/2017    Internal hemorrhoids- Dr. Alphonso Clark    COLONOSCOPY N/A 05/17/2022    COLONOSCOPY POLYPECTOMY SNARE/COLD BIOPSY performed by Veronica Solis MD at [address] [place] [address], [address] [place] [address], DIAGNOSTIC  03/16/2017    EGD: Small esophageal varices, portal hypertensive gastropathy, reflux esophagitis, hiatal hernia    EYE SURGERY Bilateral ? when    cyst removal, cataracts w/lens replacement    FINGER TRIGGER RELEASE Right 05/26/2020    FINGER TRIGGER RELEASE RIGHT RING FINGER performed by Kristi Parekh DO at [facility] ([address] [place] [address])      per old chart pt had CHOLO/BSO 1986    SALPINGO-OOPHORECTOMY      TIPS PROCEDURE  04/23/2019    TONSILLECTOMY  as a kid    UPPER GASTROINTESTINAL ENDOSCOPY N/A of Food in the Last Year: Sometimes true    Ran Out of Food in the Last Year: Sometimes true   Transportation Needs: Unmet Transportation Needs    Lack of Transportation (Medical): Yes    Lack of Transportation (Non-Medical): Yes   Physical Activity: Inactive    Days of Exercise per Week: 0 days    Minutes of Exercise per Session: 0 min   Stress: No Stress Concern Present    Feeling of Stress : Only a little   Social Connections: Moderately Integrated    Frequency of Communication with Friends and Family: Three times a week    Frequency of Social Gatherings with Friends and Family: Twice a week    Attends Protestant Services: More than 4 times per year    Active Member of [address] [place] [address] [employer] or Organizations: Yes    Attends Club or Organization Meetings: More than 4 times per year    Marital Status:    Intimate Partner Violence: Not on file   Housing Stability: [address] to Pay for Housing in the Last Year: No    Number of [place] in the Last Year: 1    Unstable Housing in the Last Year: No     Current Facility-Administered Medications   Medication Dose Route Frequency Provider Last Rate Last Admin    lidocaine 4 % external patch 1 patch  1 patch TransDERmal Daily Kay Okeefe MD   1 patch at 12/25/22 6167     Current Outpatient Medications   Medication Sig Dispense Refill    lidocaine (LIDODERM) 5 % Place 1 patch onto the skin daily 12 hours on, 12 hours off.  30 patch 0    insulin glargine (LANTUS) 100 UNIT/ML injection vial Inject 60 Units into the skin nightly 10 mL 3    benzonatate (TESSALON) 100 MG capsule Take 1 capsule by mouth 3 times daily as needed for Cough 21 capsule 0    levoFLOXacin (LEVAQUIN) 750 MG tablet Take 1 tablet by mouth daily for 7 days 7 tablet 0    guaiFENesin (MUCINEX) 600 MG extended release tablet Take 1 tablet by mouth 2 times daily 60 tablet 0    guaiFENesin-dextromethorphan (ROBITUSSIN DM) 100-10 MG/5ML syrup Take 5 mLs by mouth every 4 hours as needed for Cough 120 mL 0 oxyCODONE (ROXICODONE) 5 MG immediate release tablet Take 1 tablet by mouth every 6 hours as needed for Pain for up to 3 days. Max Daily Amount: 20 mg 12 tablet 0    pantoprazole (PROTONIX) 40 MG tablet Take 1 tablet by mouth 2 times daily (before meals) 60 tablet 1    predniSONE (DELTASONE) 20 MG tablet Take 2 tablets by mouth daily for 1 dose 2 tablet 0    insulin lispro, 1 Unit Dial, (HUMALOG KWIKPEN) 100 UNIT/ML SOPN 10U insulin befor meals 3x/day    Take insulin according to glucose check 2 hours after meals:   0  140-199 3  200-249 6  250-299 9  300-349 12  350-400 15  >400 18    Take insulin according to glucose check before sleep:   0  140-199 2  200-249 3  250-299 5  300-349 6  350-400 7  >400 9 18 mL 0    Multiple Vitamin (MULTIVITAMIN PO) Take 1 tablet by mouth daily      VITAMIN D PO Take 1 capsule by mouth 2 times daily      calcium carbonate (OYSTER SHELL CALCIUM 500 MG) 1250 (500 Ca) MG tablet Take 1 tablet by mouth daily      albuterol sulfate HFA (PROVENTIL HFA) 108 (90 Base) MCG/ACT inhaler Inhale 2 puffs into the lungs every 4 hours as needed for Wheezing or Shortness of Breath With spacer (and mask if indicated). Thanks. 18 g 1    mupirocin (BACTROBAN) 2 % ointment Apply topically 3 times daily. 30 g 0    blood glucose monitor kit and supplies Use 3-4 times daily 1 kit 0    Lancets MISC Use one lancet 4 times daily 100 each 5    raloxifene (EVISTA) 60 MG tablet Take 1 tablet by mouth daily 90 tablet 3    furosemide (LASIX) 20 MG tablet Take 1 tablet by mouth daily Hold if systolic <425 90 tablet 1    mirabegron (MYRBETRIQ) 25 MG TB24 Take 1 tablet by mouth daily 30 tablet 5    estradiol (ESTRACE VAGINAL) 0.1 MG/GM vaginal cream Place 1 g vaginally three times a week Use 1 g vaginally nightly for 2 weeks, then 1 g nightly 2-3 times per week. 3 each 3    ranolazine (RANEXA) 500 MG extended release tablet Take 1 tablet by mouth in the morning and 1 tablet before bedtime.  180 tablet 1 metoprolol succinate (TOPROL XL) 50 MG extended release tablet Take 1 tablet by mouth in the morning. 90 tablet 1    ipratropium-albuterol (DUONEB) 0.5-2.5 (3) MG/3ML SOLN nebulizer solution Inhale 3 mLs into the lungs every 4 hours 120 each 5    nitroGLYCERIN (NITROSTAT) 0.4 MG SL tablet Place 1 tablet under the tongue every 5 minutes as needed for Chest pain 25 tablet 3    rifAXIMin (XIFAXAN) 550 MG tablet Take 1 tablet by mouth 2 times daily 42 tablet 0    sucralfate (CARAFATE) 1 GM tablet Take 1 tablet by mouth 4 times daily 120 tablet 3    ondansetron (ZOFRAN ODT) 4 MG disintegrating tablet Take 1 tablet by mouth every 8 hours as needed for Nausea 15 tablet 0    blood glucose monitor strips Test  Bid times a day & as needed for symptoms of irregular blood glucose. 100 strip 5    Glucosource Lancets MISC Use one 3-4 times to check blood sugar 100 each 5    lactulose (CHRONULAC) 10 GM/15ML solution Take 30 mLs by mouth 3 times daily (Patient taking differently: Take 30 mLs by mouth 2 times daily) 1892 mL 2    QUEtiapine (SEROQUEL) 50 MG tablet Take 3 tablets by mouth nightly (Patient taking differently: Take 100 mg by mouth nightly 12/20/22 Patient states she only takes 100 mg at HS) 60 tablet 3    FLUoxetine (PROZAC) 20 MG capsule Take 20 mg by mouth daily      paliperidone (INVEGA) 6 MG extended release tablet Take 6 mg by mouth every morning      Compression Stockings MISC by Does not apply route 20 - 30 mmh wear daily and take off at night  Thigh High 2 each 2    glucose monitoring kit (FREESTYLE) monitoring kit 1 kit by Does not apply route daily 1 kit 0    Blood Glucose Monitoring Suppl (ACURA BLOOD GLUCOSE METER) w/Device KIT Please check blood sugar 3 times daily.  Please fill with what is covered by the insurance 1 kit 0    VIREAD 300 MG tablet Take 300 mg by mouth daily        Allergies   Allergen Reactions    Hydrocodone-Acetaminophen Anaphylaxis    Norco [Hydrocodone-Acetaminophen] Itching     Itching, rash, nausea and vomiting. Tylenol [Acetaminophen] Itching, Nausea And Vomiting and Rash       Nursing Notes Reviewed    Physical Exam:  ED Triage Vitals [12/25/22 1519]   Enc Vitals Group      /73      Heart Rate 84      Resp 16      Temp 98.1 °F (36.7 °C)      Temp Source Oral      SpO2 93 %      Weight 186 lb (84.4 kg)      Height 4' 9.5\" (1.461 m)      Head Circumference       Peak Flow       Pain Score       Pain Loc       Pain Edu? Excl. in [address]? My pulse ox interpretation is - normal    General appearance:  No acute distress. Skin:  Warm. Dry. Eye:  Extraocular movements intact. Ears, nose, mouth and throat:  Oral mucosa moist   Neck:  Trachea midline. Extremity:  No swelling. Normal ROM     Heart:  Regular rate and rhythm, normal S1 & S2, no extra heart sounds. Perfusion:  intact  Respiratory:  Lungs clear to auscultation bilaterally. Respirations nonlabored. Tender palpation over posterior right ribs  Abdominal:    Soft. Nontender. Non distended. Back: Nontender over C, T and L-spine, no step-offs or deformities  Neurological:  Alert and oriented           Psychiatric:  Appropriate    I have reviewed and interpreted all of the currently available lab results from this visit (if applicable):  No results found for this visit on 12/25/22. Radiographs (if obtained):  Radiologist's Report Reviewed:  XR RIBS LEFT INCLUDE CHEST (MIN 3 VIEWS)    Result Date: 12/20/2022  EXAMINATION: 6 XRAY VIEWS OF THE LEFT RIBS WITH FRONTAL XRAY VIEW OF THE CHEST 12/20/2022 12:16 pm COMPARISON: November 30, 2022 HISTORY: ORDERING SYSTEM PROVIDED HISTORY: pain felt rib pop TECHNOLOGIST PROVIDED HISTORY: Reason for exam:->pain felt rib pop Reason for Exam: pain after ribs popped while pulling clothes out of washer FINDINGS: Chest x-ray demonstrates infiltrates in both lower lobes, especially the right consistent with pneumonia.  Views of the left ribs demonstrate no obvious fractures or destructive changes. Clavicle, scapula and shoulder appear unremarkable. Incidentally, a liver shunt noted likely representing a TIPS. Bilateral lower lobe infiltrates worse on the right consistent with pneumonia. Specific views of the left ribs demonstrate no obvious rib fractures. Liver shunt likely represents a TIPS. EKG (if obtained): (All EKG's are interpreted by myself in the absence of a cardiologist)      MDM:  79-year-old female with history as above presents with fall at [employer] today, she does have significant tenderness of her posterior right ribs, lungs are clear, her vitals are normal.  She denies other injury. Otherwise exam is unremarkable. Found to have a posterior 10th rib fracture on the right, with dose of oxycodone here as well as Lidoderm patch she is feeling much better. She was sleeping on my reassessment. She does have oxycodone already at home, I will write for Lidoderm patches. She is comfortable with plan for discharge, sister at bedside, given opportunity to ask questions. Discharged in stable condition. Clinical Impression:  1. Fall, initial encounter    2. Closed fracture of one rib of right side, initial encounter      Disposition referral (if applicable):  Radhames Paula MD  [address] [place]  [phone]    Schedule an appointment as soon as possible for a visit       [facility] Emergency Department  [facility] [id number] [phone]    If symptoms worsen  Disposition medications (if applicable):  New Prescriptions    LIDOCAINE (LIDODERM) 5 %    Place 1 patch onto the skin daily 12 hours on, 12 hours off.      ED Provider Disposition Time  DISPOSITION Decision To Discharge 12/25/2022 05:46:07 PM      Comment: Please note this report has been produced using speech recognition software and may contain errors related to that system including errors in grammar, punctuation, and spelling, as well as words and phrases that may be inappropriate. Efforts were made to edit the dictations.        Chris Castrejon MD  12/25/22 0035

## 2022-12-25 NOTE — ED NOTES
D/c instructions reviewed with pt and pts sister. All questions answered. Pt a/o and d/c at this time.       Michelle Theodore RN  12/25/22 9970

## 2022-12-27 ENCOUNTER — CARE COORDINATION (OUTPATIENT)
Dept: CARE COORDINATION | Age: 62
End: 2022-12-27

## 2022-12-28 ENCOUNTER — TELEPHONE (OUTPATIENT)
Dept: FAMILY MEDICINE CLINIC | Age: 62
End: 2022-12-28

## 2022-12-28 ENCOUNTER — CARE COORDINATION (OUTPATIENT)
Dept: CARE COORDINATION | Age: 62
End: 2022-12-28

## 2022-12-28 DIAGNOSIS — R07.81 RIB PAIN: ICD-10-CM

## 2022-12-28 RX ORDER — TRAMADOL HYDROCHLORIDE 50 MG/1
50 TABLET ORAL EVERY 8 HOURS PRN
Qty: 21 TABLET | Refills: 0 | Status: SHIPPED | OUTPATIENT
Start: 2022-12-28 | End: 2023-01-04

## 2022-12-28 NOTE — CARE COORDINATION
Attempted phone call to Pt. No answer, voicemail message left requesting return call, contact number provided. WANG plan of care:  SW will follow up with Pt on 1/3 to confirm she received community resources in the mail and schedule to complete ACP documents via [address] [place] [address].

## 2023-01-03 ENCOUNTER — CARE COORDINATION (OUTPATIENT)
Dept: CARE COORDINATION | Age: 63
End: 2023-01-03

## 2023-01-03 NOTE — CARE COORDINATION
Pt reported she fell and broke her ribs recently and has been in bed. Pt confirmed she has received ACP, food and housing information in the mail. Pt reported due to her fall / injury, she has not had a chance to review the documents much. Pt reported she is going to contact PCP to schedule a visit as she wants someone to take a look at her back as she is still in pain. WANG plan of care:  SW will follow up with Pt in one week (1/10) regarding ACP, food and housing.

## 2023-01-04 ENCOUNTER — CARE COORDINATION (OUTPATIENT)
Dept: CARE COORDINATION | Age: 63
End: 2023-01-04

## 2023-01-04 ENCOUNTER — OFFICE VISIT (OUTPATIENT)
Dept: FAMILY MEDICINE CLINIC | Age: 63
End: 2023-01-04
Payer: MEDICARE

## 2023-01-04 VITALS
HEIGHT: 58 IN | HEART RATE: 91 BPM | DIASTOLIC BLOOD PRESSURE: 78 MMHG | OXYGEN SATURATION: 92 % | WEIGHT: 184.4 LBS | BODY MASS INDEX: 38.71 KG/M2 | SYSTOLIC BLOOD PRESSURE: 126 MMHG

## 2023-01-04 DIAGNOSIS — E66.01 SEVERE OBESITY (BMI 35.0-39.9) WITH COMORBIDITY (HCC): ICD-10-CM

## 2023-01-04 DIAGNOSIS — M54.50 ACUTE LEFT-SIDED LOW BACK PAIN, UNSPECIFIED WHETHER SCIATICA PRESENT: Primary | ICD-10-CM

## 2023-01-04 PROCEDURE — 96372 THER/PROPH/DIAG INJ SC/IM: CPT | Performed by: FAMILY MEDICINE

## 2023-01-04 PROCEDURE — 99214 OFFICE O/P EST MOD 30 MIN: CPT | Performed by: FAMILY MEDICINE

## 2023-01-04 PROCEDURE — 3078F DIAST BP <80 MM HG: CPT | Performed by: FAMILY MEDICINE

## 2023-01-04 PROCEDURE — 3074F SYST BP LT 130 MM HG: CPT | Performed by: FAMILY MEDICINE

## 2023-01-04 RX ORDER — HYDROCODONE BITARTRATE AND ACETAMINOPHEN 5; 325 MG/1; MG/1
1 TABLET ORAL 2 TIMES DAILY PRN
Qty: 60 TABLET | Refills: 0 | Status: SHIPPED | OUTPATIENT
Start: 2023-01-04 | End: 2023-02-03

## 2023-01-04 RX ORDER — PREDNISONE 10 MG/1
10 TABLET ORAL 2 TIMES DAILY
Qty: 10 TABLET | Refills: 0 | Status: SHIPPED | OUTPATIENT
Start: 2023-01-04 | End: 2023-01-09

## 2023-01-04 RX ORDER — KETOROLAC TROMETHAMINE 30 MG/ML
60 INJECTION, SOLUTION INTRAMUSCULAR; INTRAVENOUS ONCE
Status: COMPLETED | OUTPATIENT
Start: 2023-01-04 | End: 2023-01-04

## 2023-01-04 RX ORDER — TIZANIDINE 4 MG/1
4 TABLET ORAL 2 TIMES DAILY PRN
Qty: 30 TABLET | Refills: 1 | Status: SHIPPED | OUTPATIENT
Start: 2023-01-04 | End: 2023-01-16 | Stop reason: SDUPTHER

## 2023-01-04 RX ADMIN — KETOROLAC TROMETHAMINE 60 MG: 30 INJECTION, SOLUTION INTRAMUSCULAR; INTRAVENOUS at 13:37

## 2023-01-04 ASSESSMENT — PATIENT HEALTH QUESTIONNAIRE - PHQ9
4. FEELING TIRED OR HAVING LITTLE ENERGY: 3
9. THOUGHTS THAT YOU WOULD BE BETTER OFF DEAD, OR OF HURTING YOURSELF: 0
SUM OF ALL RESPONSES TO PHQ9 QUESTIONS 1 & 2: 2
3. TROUBLE FALLING OR STAYING ASLEEP: 3
8. MOVING OR SPEAKING SO SLOWLY THAT OTHER PEOPLE COULD HAVE NOTICED. OR THE OPPOSITE, BEING SO FIGETY OR RESTLESS THAT YOU HAVE BEEN MOVING AROUND A LOT MORE THAN USUAL: 1
SUM OF ALL RESPONSES TO PHQ QUESTIONS 1-9: 9
SUM OF ALL RESPONSES TO PHQ QUESTIONS 1-9: 9
7. TROUBLE CONCENTRATING ON THINGS, SUCH AS READING THE NEWSPAPER OR WATCHING TELEVISION: 0
SUM OF ALL RESPONSES TO PHQ QUESTIONS 1-9: 9
2. FEELING DOWN, DEPRESSED OR HOPELESS: 1
10. IF YOU CHECKED OFF ANY PROBLEMS, HOW DIFFICULT HAVE THESE PROBLEMS MADE IT FOR YOU TO DO YOUR WORK, TAKE CARE OF THINGS AT HOME, OR GET ALONG WITH OTHER PEOPLE: 1
6. FEELING BAD ABOUT YOURSELF - OR THAT YOU ARE A FAILURE OR HAVE LET YOURSELF OR YOUR FAMILY DOWN: 0
5. POOR APPETITE OR OVEREATING: 0
1. LITTLE INTEREST OR PLEASURE IN DOING THINGS: 1
SUM OF ALL RESPONSES TO PHQ QUESTIONS 1-9: 9

## 2023-01-04 ASSESSMENT — ENCOUNTER SYMPTOMS
BACK PAIN: 1
BOWEL INCONTINENCE: 0
ABDOMINAL PAIN: 0

## 2023-01-04 NOTE — PROGRESS NOTES
Jesika Rubin  1960 01/28/23    Chief Complaint   Patient presents with    Follow-up     Would like referred to back doctor            Patient here c/o:    Back Pain  This is a new problem. Episode onset: x 4 days. The problem occurs daily. The problem has been gradually worsening since onset. The pain is present in the lumbar spine. The pain is at a severity of 10/10. The pain is severe. The symptoms are aggravated by lying down and sitting. Associated symptoms include headaches and leg pain (left leg). Pertinent negatives include no abdominal pain, bladder incontinence, bowel incontinence, chest pain, dysuria, numbness, paresis, paresthesias, perianal numbness, tingling or weakness. Risk factors include history of steroid use and sedentary lifestyle. She has tried NSAIDs for the symptoms. The treatment provided no relief. Past Medical History:   Diagnosis Date    Abnormal Pap smear of cervix     Acid reflux     Arthritis     left knee    Breast cyst     CAD (coronary artery disease)     per [facility] 9/6/[facility] - Dr. Kosta Inman    COPD (chronic obstructive pulmonary disease) ([facility]     follow with Dr Yanely Melendrez    Depression     Cinderella Lis manic - depression see Dr Jeremy Valadez    Diabetes mellitus [facility])     dx 10+ yrs ago- follows with PCP    Drug abuse ([facility]     hx use of cocaine, heroin and marijuana- states last used 12/2014    Glaucoma     bilateral    H/O Doppler lower venous ultrasound 04/04/2019    No DVT or SVT, Significant reflux of RGSV and LGSV. H/O echocardiogram 12/18/2020    EF 55-60%, Mod LVH. Hepatic encephalopathy 05/2019    Hepatitis C     for liver bx 12/3/2015\"Have Hepatitis B and C and saw Dr Rey Mathias for this 12/1/2015\"    Hiatal hernia     History of alcohol abuse     History of exercise stress test 01/02/2020    Treadmill, Normal exercise performance without angina and ischemic EKG changes.     HTN (hypertension)     \"for the past two yrs on medication\" follows with Dr Kosta Inman    Hx of blood clots 2019    Portal vein thrombsis - TIPS procedure 4/23/19    Hyperlipemia     Irregular heart beat     per pt    Liver hematoma     Migraine     Migraines     Last migraine:  2018    Nausea & vomiting     Neurologic disorder     Schizophrenia ([address]     per old chart    Seizures ([address]     \"last one was 9/2015- saw Dr Jessica Gibson at [facility]- she said not sure if acutal seizures- she thinks they are panic or anxiety attacks\"    SOB (shortness of breath)     with any exertion    Vulvar mass      Past Surgical History:   Procedure Laterality Date    BREAST SURGERY  10/2015    left breast bx    CARDIAC CATHETERIZATION      per old chart pt had cath done in 3/2011 and 9/2013    CARPAL TUNNEL RELEASE Left 01/09/2020    CARPAL TUNNEL RELEASE LEFT performed by Shruti Shafer DO at [address] Right 05/26/2020    dr Zeke Banegas, carpal tunnel trigger finger release    CARPAL TUNNEL RELEASE Right 05/26/2020    RIGHT CARPAL TUNNEL RELEASE performed by Shruti Shafer DO at [address]  per old chart done 1985    COLONOSCOPY  03/11/2013    diverticulosis, cecal polyp    COLONOSCOPY  03/16/2017    Internal hemorrhoids- Dr. Josh Cheng    COLONOSCOPY N/A 05/17/2022    COLONOSCOPY POLYPECTOMY SNARE/COLD BIOPSY performed by Ana Ralph MD at [address] [place] [address], [address] [place] [address], DIAGNOSTIC  03/16/2017    EGD: Small esophageal varices, portal hypertensive gastropathy, reflux esophagitis, hiatal hernia    EYE SURGERY Bilateral ? when    cyst removal, cataracts w/lens replacement    FINGER TRIGGER RELEASE Right 05/26/2020    FINGER TRIGGER RELEASE RIGHT RING FINGER performed by Shruti Shafer DO at [address] [place] [address] ([address] [place] [address])      per old chart pt had CHOLO/BSO 1986    SALPINGO-OOPHORECTOMY      TIPS PROCEDURE  04/23/2019    TONSILLECTOMY  as a kid    UPPER GASTROINTESTINAL ENDOSCOPY N/A 11/14/2018    EGD DIAGNOSTIC ONLY performed by Dixie Webb MD at [address] [place] [address] GASTROINTESTINAL ENDOSCOPY N/A 06/05/2019    EGD DIAGNOSTIC ONLY performed by Brandan Ibarra MD at [place] [address] N/A 11/9/2022    PARTIAL VULVECTOMY performed by Sky Aguilar MD at [facility] OR     Family History   Problem Relation Age of Onset    Ovarian Cancer Mother     Cancer Mother         lung ca    Arthritis Mother     Migraines Mother     Cancer Father         colon ca    Diabetes Father     High Blood Pressure Father     Arthritis Father     High Cholesterol Father     Migraines Father     Migraines Sister     Heart Disease Brother         WPW    Breast Cancer Maternal Aunt     Breast Cancer Maternal Aunt     Breast Cancer Maternal Aunt     Breast Cancer Maternal Aunt      Social History     Socioeconomic History    Marital status:      Spouse name: Not on file    Number of children: Not on file    Years of education: Not on file    Highest education level: Not on file   Occupational History    Not on file   Tobacco Use    Smoking status: Every Day     Packs/day: 0.25     Years: 45.00     Pack years: 11.25     Types: Cigarettes     Start date: 1974     Passive exposure: Past    Smokeless tobacco: Never   Vaping Use    Vaping Use: Never used   Substance and Sexual Activity    Alcohol use: Not Currently     Comment: None/Caffiene - 1 cup of coffee/day    Drug use: Not Currently     Types: Marijuana [facility])    Sexual activity: Not Currently     Partners: Male   Other Topics Concern    Not on file   Social History Narrative    Not on file     Social Determinants of Health     Financial Resource Strain: Medium Risk    Difficulty of Paying Living Expenses: Somewhat hard   Food Insecurity: Food Insecurity Present    Worried About Running Out of Food in the Last Year: Sometimes true    Ran Out of Food in the Last Year: Sometimes true   Transportation Needs: Unmet Transportation Needs    Lack of Transportation (Medical): Yes    Lack of Transportation (Non-Medical): Yes   Physical Activity: Inactive    Days of Exercise per Week: 0 days    Minutes of Exercise per Session: 0 min   Stress: No Stress Concern Present    Feeling of Stress : Only a little   Social Connections: Moderately Integrated    Frequency of Communication with Friends and Family: Three times a week    Frequency of Social Gatherings with Friends and Family: Twice a week    Attends [employer] Services: More than 4 times per year    Active Member of [employer] or Organizations: Yes    Attends Club or Organization Meetings: More than 4 times per year    Marital Status:    Intimate Partner Violence: Not on file   Housing Stability: [address] to Pay for Housing in the Last Year: No    Number of [place] in the Last Year: 1    Unstable Housing in the Last Year: No       Allergies   Allergen Reactions    Hydrocodone-Acetaminophen Anaphylaxis    Norco [Hydrocodone-Acetaminophen] Itching     Itching, rash, nausea and vomiting. Tylenol [Acetaminophen] Itching, Nausea And Vomiting and Rash     Current Outpatient Medications   Medication Sig Dispense Refill    HYDROcodone-acetaminophen (NORCO) 5-325 MG per tablet Take 1 tablet by mouth 2 times daily as needed for Pain for up to 30 days.  Max Daily Amount: 1 tablet 60 tablet 0    insulin glargine (LANTUS) 100 UNIT/ML injection vial Inject 60 Units into the skin nightly 10 mL 3    pantoprazole (PROTONIX) 40 MG tablet Take 1 tablet by mouth 2 times daily (before meals) 60 tablet 1    insulin lispro, 1 Unit Dial, (HUMALOG KWIKPEN) 100 UNIT/ML SOPN 10U insulin befor meals 3x/day    Take insulin according to glucose check 2 hours after meals:   0  140-199 3  200-249 6  250-299 9  300-349 12  350-400 15  >400 18    Take insulin according to glucose check before sleep:   0  140-199 2  200-249 3  250-299 5  300-349 6  350-400 7  >400 9 18 mL 0    Multiple Vitamin (MULTIVITAMIN PO) Take 1 tablet by mouth daily      VITAMIN D PO Take 1 capsule by mouth 2 times daily calcium carbonate (OYSTER SHELL CALCIUM 500 MG) 1250 (500 Ca) MG tablet Take 1 tablet by mouth daily      albuterol sulfate HFA (PROVENTIL HFA) 108 (90 Base) MCG/ACT inhaler Inhale 2 puffs into the lungs every 4 hours as needed for Wheezing or Shortness of Breath With spacer (and mask if indicated). Thanks. 18 g 1    mupirocin (BACTROBAN) 2 % ointment Apply topically 3 times daily. 30 g 0    blood glucose monitor kit and supplies Use 3-4 times daily 1 kit 0    raloxifene (EVISTA) 60 MG tablet Take 1 tablet by mouth daily 90 tablet 3    mirabegron (MYRBETRIQ) 25 MG TB24 Take 1 tablet by mouth daily 30 tablet 5    estradiol (ESTRACE VAGINAL) 0.1 MG/GM vaginal cream Place 1 g vaginally three times a week Use 1 g vaginally nightly for 2 weeks, then 1 g nightly 2-3 times per week. 3 each 3    ranolazine (RANEXA) 500 MG extended release tablet Take 1 tablet by mouth in the morning and 1 tablet before bedtime. 180 tablet 1    metoprolol succinate (TOPROL XL) 50 MG extended release tablet Take 1 tablet by mouth in the morning. 90 tablet 1    ipratropium-albuterol (DUONEB) 0.5-2.5 (3) MG/3ML SOLN nebulizer solution Inhale 3 mLs into the lungs every 4 hours 120 each 5    nitroGLYCERIN (NITROSTAT) 0.4 MG SL tablet Place 1 tablet under the tongue every 5 minutes as needed for Chest pain 25 tablet 3    rifAXIMin (XIFAXAN) 550 MG tablet Take 1 tablet by mouth 2 times daily (Patient not taking: Reported on 1/27/2023) 42 tablet 0    sucralfate (CARAFATE) 1 GM tablet Take 1 tablet by mouth 4 times daily (Patient not taking: Reported on 1/27/2023) 120 tablet 3    ondansetron (ZOFRAN ODT) 4 MG disintegrating tablet Take 1 tablet by mouth every 8 hours as needed for Nausea 15 tablet 0    blood glucose monitor strips Test  Bid times a day & as needed for symptoms of irregular blood glucose.  100 strip 5    Glucosource Lancets MISC Use one 3-4 times to check blood sugar 100 each 5    lactulose (CHRONULAC) 10 GM/15ML solution Take 30 mLs by mouth 3 times daily (Patient taking differently: Take 30 mLs by mouth 2 times daily) 1892 mL 2    QUEtiapine (SEROQUEL) 50 MG tablet Take 3 tablets by mouth nightly (Patient taking differently: Take 100 mg by mouth nightly 12/20/22 Patient states she only takes 100 mg at HS) 60 tablet 3    FLUoxetine (PROZAC) 20 MG capsule Take 20 mg by mouth daily      paliperidone (INVEGA) 6 MG extended release tablet Take 6 mg by mouth every morning      Compression Stockings MISC by Does not apply route 20 - 30 mmh wear daily and take off at night  Thigh High 2 each 2    glucose monitoring kit (FREESTYLE) monitoring kit 1 kit by Does not apply route daily 1 kit 0    Blood Glucose Monitoring Suppl (ACURA BLOOD GLUCOSE METER) w/Device KIT Please check blood sugar 3 times daily. Please fill with what is covered by the insurance 1 kit 0    VIREAD 300 MG tablet Take 300 mg by mouth daily       furosemide (LASIX) 20 MG tablet Take 1 tablet by mouth daily Hold if systolic <839 90 tablet 1    predniSONE (DELTASONE) 10 MG tablet Take 1 tablet by mouth daily 40mg daily for 2 days, 20 mg daily for 2 days, 10 mg daily for 2 days, 5 mg daily for 2 days 15 tablet 0    levoFLOXacin (LEVAQUIN) 500 MG tablet Take 1 tablet by mouth daily for 7 days 7 tablet 0    tiZANidine (ZANAFLEX) 4 MG tablet Take 1 tablet by mouth 2 times daily as needed (spasm) 90 tablet 1    Lancets MISC Use one lancet 4 times daily 100 each 5     No current facility-administered medications for this visit. Review of Systems   Constitutional:  Negative for activity change, diaphoresis and fatigue. HENT:  Negative for congestion. Respiratory:  Negative for cough and shortness of breath. Cardiovascular:  Negative for chest pain. Gastrointestinal:  Negative for abdominal pain and bowel incontinence. Genitourinary:  Negative for bladder incontinence and dysuria. Musculoskeletal:  Positive for back pain. Neurological:  Positive for headaches. Negative for dizziness, tingling, weakness, numbness and paresthesias. Psychiatric/Behavioral:  Negative for agitation and decreased concentration. Lab Results   Component Value Date    WBC 5.5 01/13/2023    HGB 14.5 01/13/2023    HCT 41.8 01/13/2023    MCV 93.5 01/13/2023    PLT 88 (L) 01/13/2023     Lab Results   Component Value Date     (L) 01/27/2023    K 4.7 01/27/2023    CL 97 (L) 01/27/2023    CO2 26 01/27/2023    BUN 16 01/27/2023    CREATININE 0.8 01/27/2023    GLUCOSE 406 (HH) 01/27/2023    CALCIUM 8.9 01/27/2023    PROT 6.5 01/13/2023    LABALBU 3.0 (L) 01/13/2023    BILITOT 1.2 (H) 01/13/2023    ALKPHOS 171 (H) 01/13/2023    AST 16 01/13/2023    ALT 12 01/13/2023    LABGLOM >60 01/27/2023    GFRAA >60 01/31/2022    AGRATIO 1.3 10/20/2022    GLOB 3.5 04/04/2018     Lab Results   Component Value Date    CHOL 98 11/27/2019    CHOL 120 08/01/2017    CHOL 127 04/28/2013     Lab Results   Component Value Date    TRIG 96 11/27/2019    TRIG 138 08/01/2017    TRIG 122 04/28/2013     Lab Results   Component Value Date    HDL 38 (L) 11/27/2019    HDL 25 (L) 08/01/2017    HDL 34 (L) 04/28/2013     Lab Results   Component Value Date    LDLCALC 41 11/27/2019    LDLCALC 67 08/01/2017     Lab Results   Component Value Date    LABA1C 9.3 (H) 12/22/2022     Lab Results   Component Value Date    TSH 1.73 08/01/2017    TSHHS 2.540 11/23/2020         /78 (Site: Left Upper Arm, Position: Sitting, Cuff Size: Medium Adult)   Pulse 91   Ht 4' 9.5\" (1.461 m)   Wt 184 lb 6.4 oz (83.6 kg)   SpO2 92%   BMI 39.21 kg/m²     BP Readings from Last 3 Encounters:   01/27/23 (!) 88/50   01/13/23 (!) 141/82   01/04/23 126/78       Wt Readings from Last 3 Encounters:   01/27/23 172 lb (78 kg)   01/04/23 184 lb 6.4 oz (83.6 kg)   12/25/22 186 lb (84.4 kg)         Physical Exam  Constitutional:       General: She is not in acute distress. Appearance: Normal appearance. She is well-developed.  She is not ill-appearing or diaphoretic. HENT:      Head: Normocephalic and atraumatic. Eyes:      General: No scleral icterus. Pupils: Pupils are equal, round, and reactive to light. Neck:      Thyroid: No thyromegaly. Cardiovascular:      Rate and Rhythm: Normal rate and regular rhythm. Heart sounds: Normal heart sounds. No murmur heard. Pulmonary:      Effort: Pulmonary effort is normal.      Breath sounds: Decreased air movement present. Wheezing present. No rales. Musculoskeletal:         General: Normal range of motion. Cervical back: Normal range of motion and neck supple. No rigidity. Lumbar back: Spasms and tenderness present. No swelling, edema, deformity or signs of trauma. Right lower leg: No edema. Left lower leg: No edema. Neurological:      General: No focal deficit present. Mental Status: She is alert and oriented to person, place, and time. Cranial Nerves: No cranial nerve deficit. Motor: No abnormal muscle tone. Psychiatric:         Mood and Affect: Mood normal.         Behavior: Behavior normal.       ASSESSMENT/ PLAN:    1. Acute left-sided low back pain, unspecified whether sciatica present  - will try:  - XR LUMBAR SPINE (2-3 VIEWS); Future  - predniSONE (DELTASONE) 10 MG tablet; Take 1 tablet by mouth 2 times daily for 5 days  Dispense: 10 tablet; Refill: 0  - HYDROcodone-acetaminophen (NORCO) 5-325 MG per tablet; Take 1 tablet by mouth 2 times daily as needed for Pain for up to 30 days. Max Daily Amount: 1 tablet  Dispense: 60 tablet; Refill: 0  - ketorolac (TORADOL) injection 60 mg    2. Severe obesity (BMI 35.0-39. 9) with comorbidity ([address]  - her wt less now, encourage wt loss            - All old blood work reviewed with the patient  - Appropriate prescription are addressed. - After visit summery provided. - Questions answered and patient verbalizes understanding.  - Call for any problem, questions, or concerns.        Return in about 1 month (around 2/4/2023).

## 2023-01-05 ENCOUNTER — HOSPITAL ENCOUNTER (OUTPATIENT)
Dept: GENERAL RADIOLOGY | Age: 63
Discharge: HOME | End: 2023-01-05
Payer: MEDICARE

## 2023-01-05 ENCOUNTER — TELEPHONE (OUTPATIENT)
Dept: FAMILY MEDICINE CLINIC | Age: 63
End: 2023-01-05

## 2023-01-05 ENCOUNTER — HOSPITAL ENCOUNTER (OUTPATIENT)
Age: 63
Discharge: HOME | End: 2023-01-05
Payer: MEDICARE

## 2023-01-05 DIAGNOSIS — M54.50 ACUTE LEFT-SIDED LOW BACK PAIN, UNSPECIFIED WHETHER SCIATICA PRESENT: ICD-10-CM

## 2023-01-05 PROCEDURE — 72100 X-RAY EXAM L-S SPINE 2/3 VWS: CPT

## 2023-01-05 NOTE — TELEPHONE ENCOUNTER
Patient called and stated that she is allergic to Norco and wanted to know if you would call something else in for her. Please advise.   Thank you,

## 2023-01-06 ENCOUNTER — CARE COORDINATION (OUTPATIENT)
Dept: CARE COORDINATION | Age: 63
End: 2023-01-06

## 2023-01-06 ENCOUNTER — TELEPHONE (OUTPATIENT)
Dept: FAMILY MEDICINE CLINIC | Age: 63
End: 2023-01-06

## 2023-01-06 NOTE — TELEPHONE ENCOUNTER
Patient  called and stated that she is allergic to Hector Cleaves and would like to know if provider could order something else. Please advise.

## 2023-01-06 NOTE — CARE COORDINATION
Contacted Stefan Frias and left voicemail regarding Dietitian follow up. Left call back number. RD spoke with patient on 12/16/22 and patient declined nutrition assessment/education. RD mailed educational handouts regarding Diabetes. RD outreached today 1/6/23 and left VM. RD will continue to follow/assist with patient return call.        [facility], [address] [place] [address]

## 2023-01-08 NOTE — TELEPHONE ENCOUNTER
There is nothing else, if she can not take the norco or the tramadol just stay with the tylenol and the Advil

## 2023-01-09 ENCOUNTER — CARE COORDINATION (OUTPATIENT)
Dept: CARE COORDINATION | Age: 63
End: 2023-01-09

## 2023-01-09 NOTE — TELEPHONE ENCOUNTER
Morena Novoa MD         8:48 AM  Note   I called the patient and told her she can take advil, if not tolerating the norco and the ultram

## 2023-01-10 ENCOUNTER — CARE COORDINATION (OUTPATIENT)
Dept: CARE COORDINATION | Age: 63
End: 2023-01-10

## 2023-01-10 NOTE — CARE COORDINATION
Phone call to Pt to follow up regarding ACP, food and housing. Pt confirmed she received the documents in the mail, with no further questions at this time. Pt reported she and her sisters are going to complete the ACP documents together, declining to complete them via [employer]. SW did notify Pt she will need to have the documents notarized for have 2 witnesses sign, encouraging her to provide PCP with a completed copy. Pt stated understanding. SW did encourage Pt to keep this SW's contact number to call with any questions. SW plan of care:  SW will resolve at this time due to no current SW needs.

## 2023-01-13 ENCOUNTER — HOSPITAL ENCOUNTER (EMERGENCY)
Age: 63
Discharge: HOME | End: 2023-01-13
Payer: MEDICARE

## 2023-01-13 ENCOUNTER — APPOINTMENT (OUTPATIENT)
Dept: CT IMAGING | Age: 63
End: 2023-01-13
Payer: MEDICARE

## 2023-01-13 ENCOUNTER — APPOINTMENT (OUTPATIENT)
Dept: GENERAL RADIOLOGY | Age: 63
End: 2023-01-13
Payer: MEDICARE

## 2023-01-13 VITALS
SYSTOLIC BLOOD PRESSURE: 141 MMHG | RESPIRATION RATE: 18 BRPM | TEMPERATURE: 97.8 F | HEART RATE: 85 BPM | OXYGEN SATURATION: 98 % | DIASTOLIC BLOOD PRESSURE: 82 MMHG

## 2023-01-13 DIAGNOSIS — R42 INTERMITTENT LIGHTHEADEDNESS: Primary | ICD-10-CM

## 2023-01-13 LAB
ALBUMIN SERPL-MCNC: 3 GM/DL (ref 3.4–5)
ALP BLD-CCNC: 171 IU/L (ref 40–129)
ALT SERPL-CCNC: 12 U/L (ref 10–40)
ANION GAP SERPL CALCULATED.3IONS-SCNC: 6 MMOL/L (ref 4–16)
AST SERPL-CCNC: 16 IU/L (ref 15–37)
BACTERIA: ABNORMAL /HPF
BASOPHILS ABSOLUTE: 0.1 K/CU MM
BASOPHILS RELATIVE PERCENT: 0.9 % (ref 0–1)
BILIRUB SERPL-MCNC: 1.2 MG/DL (ref 0–1)
BILIRUBIN URINE: NEGATIVE MG/DL
BLOOD, URINE: ABNORMAL
BUN BLDV-MCNC: 15 MG/DL (ref 6–23)
CALCIUM SERPL-MCNC: 8.8 MG/DL (ref 8.3–10.6)
CHLORIDE BLD-SCNC: 102 MMOL/L (ref 99–110)
CLARITY: CLEAR
CO2: 27 MMOL/L (ref 21–32)
COLOR: YELLOW
CREAT SERPL-MCNC: 0.9 MG/DL (ref 0.6–1.1)
DIFFERENTIAL TYPE: ABNORMAL
EKG ATRIAL RATE: 77 BPM
EKG DIAGNOSIS: NORMAL
EKG P AXIS: 31 DEGREES
EKG P-R INTERVAL: 124 MS
EKG Q-T INTERVAL: 430 MS
EKG QRS DURATION: 78 MS
EKG QTC CALCULATION (BAZETT): 486 MS
EKG R AXIS: 25 DEGREES
EKG T AXIS: 54 DEGREES
EKG VENTRICULAR RATE: 77 BPM
EOSINOPHILS ABSOLUTE: 0.2 K/CU MM
EOSINOPHILS RELATIVE PERCENT: 3.4 % (ref 0–3)
GFR SERPL CREATININE-BSD FRML MDRD: >60 ML/MIN/1.73M2
GLUCOSE BLD-MCNC: 308 MG/DL (ref 70–99)
GLUCOSE, URINE: 500 MG/DL
HCT VFR BLD CALC: 41.8 % (ref 37–47)
HEMOGLOBIN: 14.5 GM/DL (ref 12.5–16)
IMMATURE NEUTROPHIL %: 0.2 % (ref 0–0.43)
KETONES, URINE: NEGATIVE MG/DL
LEUKOCYTE ESTERASE, URINE: NEGATIVE
LYMPHOCYTES ABSOLUTE: 1.7 K/CU MM
LYMPHOCYTES RELATIVE PERCENT: 31.2 % (ref 24–44)
MAGNESIUM: 2 MG/DL (ref 1.8–2.4)
MCH RBC QN AUTO: 32.4 PG (ref 27–31)
MCHC RBC AUTO-ENTMCNC: 34.7 % (ref 32–36)
MCV RBC AUTO: 93.5 FL (ref 78–100)
MONOCYTES ABSOLUTE: 0.5 K/CU MM
MONOCYTES RELATIVE PERCENT: 8.5 % (ref 0–4)
MUCUS: ABNORMAL HPF
NITRITE URINE, QUANTITATIVE: NEGATIVE
NUCLEATED RBC %: 0 %
PDW BLD-RTO: 13.2 % (ref 11.7–14.9)
PH, URINE: 5.5 (ref 5–8)
PLATELET # BLD: 88 K/CU MM (ref 140–440)
PMV BLD AUTO: 12.5 FL (ref 7.5–11.1)
POTASSIUM SERPL-SCNC: 4.1 MMOL/L (ref 3.5–5.1)
PRO-BNP: 74.2 PG/ML
PROTEIN UA: NEGATIVE MG/DL
RBC # BLD: 4.47 M/CU MM (ref 4.2–5.4)
RBC URINE: 11 /HPF (ref 0–6)
SEGMENTED NEUTROPHILS ABSOLUTE COUNT: 3.1 K/CU MM
SEGMENTED NEUTROPHILS RELATIVE PERCENT: 55.8 % (ref 36–66)
SODIUM BLD-SCNC: 135 MMOL/L (ref 135–145)
SPECIFIC GRAVITY UA: 1.02 (ref 1–1.03)
SQUAMOUS EPITHELIAL: 2 /HPF
TOTAL CK: 50 IU/L (ref 26–140)
TOTAL IMMATURE NEUTOROPHIL: 0.01 K/CU MM
TOTAL NUCLEATED RBC: 0 K/CU MM
TOTAL PROTEIN: 6.5 GM/DL (ref 6.4–8.2)
TRICHOMONAS: ABNORMAL /HPF
TROPONIN T: <0.01 NG/ML
UROBILINOGEN, URINE: 1 MG/DL (ref 0.2–1)
WBC # BLD: 5.5 K/CU MM (ref 4–10.5)
WBC UA: 1 /HPF (ref 0–5)

## 2023-01-13 PROCEDURE — 80053 COMPREHEN METABOLIC PANEL: CPT

## 2023-01-13 PROCEDURE — 96360 HYDRATION IV INFUSION INIT: CPT

## 2023-01-13 PROCEDURE — 2580000003 HC RX 258: Performed by: PHYSICIAN ASSISTANT

## 2023-01-13 PROCEDURE — 70450 CT HEAD/BRAIN W/O DYE: CPT

## 2023-01-13 PROCEDURE — 85025 COMPLETE CBC W/AUTO DIFF WBC: CPT

## 2023-01-13 PROCEDURE — 93005 ELECTROCARDIOGRAM TRACING: CPT | Performed by: PHYSICIAN ASSISTANT

## 2023-01-13 PROCEDURE — 99285 EMERGENCY DEPT VISIT HI MDM: CPT

## 2023-01-13 PROCEDURE — 6370000000 HC RX 637 (ALT 250 FOR IP): Performed by: PHYSICIAN ASSISTANT

## 2023-01-13 PROCEDURE — 81001 URINALYSIS AUTO W/SCOPE: CPT

## 2023-01-13 PROCEDURE — 84484 ASSAY OF TROPONIN QUANT: CPT

## 2023-01-13 PROCEDURE — 93010 ELECTROCARDIOGRAM REPORT: CPT | Performed by: INTERNAL MEDICINE

## 2023-01-13 PROCEDURE — 71045 X-RAY EXAM CHEST 1 VIEW: CPT

## 2023-01-13 PROCEDURE — 83880 ASSAY OF NATRIURETIC PEPTIDE: CPT

## 2023-01-13 PROCEDURE — 82550 ASSAY OF CK (CPK): CPT

## 2023-01-13 PROCEDURE — 83735 ASSAY OF MAGNESIUM: CPT

## 2023-01-13 RX ORDER — MECLIZINE HYDROCHLORIDE 25 MG/1
25 TABLET ORAL 3 TIMES DAILY PRN
Qty: 15 TABLET | Refills: 0 | Status: SHIPPED | OUTPATIENT
Start: 2023-01-13 | End: 2023-01-23

## 2023-01-13 RX ORDER — 0.9 % SODIUM CHLORIDE 0.9 %
500 INTRAVENOUS SOLUTION INTRAVENOUS ONCE
Status: COMPLETED | OUTPATIENT
Start: 2023-01-13 | End: 2023-01-13

## 2023-01-13 RX ORDER — MECLIZINE HYDROCHLORIDE 25 MG/1
25 TABLET ORAL ONCE
Status: COMPLETED | OUTPATIENT
Start: 2023-01-13 | End: 2023-01-13

## 2023-01-13 RX ADMIN — SODIUM CHLORIDE 500 ML: 9 INJECTION, SOLUTION INTRAVENOUS at 15:02

## 2023-01-13 RX ADMIN — MECLIZINE HYDROCHLORIDE 25 MG: 25 TABLET ORAL at 15:02

## 2023-01-13 NOTE — ED PROVIDER NOTES
Emergency Department Encounter  Location: [address] [place] [address]    Patient: Martinez Haley  MRN: [id number]  : 1960  Date of evaluation: 2023  ED Provider: Puma Riggins PA-C    [address] [place] [address] Drive was checked out to me by NELY stinson. Please see his/her initial documentation for details of the patient's initial ED presentation, physical exam and completed studies. In brief, Martinez Haley is a 63 y.o. female that presented to the emergency department for intermittent dizziness x2 weeks. She describes it as lightheadness and unsteadiness. No vertiginous sx. No headahce. Initial nih is zero. Labs and ua reassuring. CT head pending. Plan for OP neuro FU if CT scan in negative and repeat neuro exam is unremarkable. GENERAL APPEARANCE: Awake and alert. Cooperative. No acute distress. HEAD: Normocephalic. Atraumatic. EYES: EOM's grossly intact. Sclera anicteric. ENT: Mucous membranes are moist. Tolerates saliva. No trismus. NECK: Supple. No meningismus. Trachea midline. HEART: RRR. Radial pulses 2+. LUNGS: Respirations unlabored. CTAB  ABDOMEN: Soft. Non-tender. No guarding or rebound. EXTREMITIES: No acute deformities. SKIN: Warm and dry. NEUROLOGICAL: A&Ox4. GCS 15. Cranial nerves 2-12 grossly intact, PEERLA, EOMs intact, Short and long term memory intact, Pt shows good judgement, follows command well, carries on logical conversation. No ataxia with finger to nose.  strength full bilateral.  UE, LE, Facial sensation full and equal bilateral, no cerebellar dysfunction, negative motor drift. Bicep, Triceps,  strength full and symmetrical. LE DTRs full and symmetrical. Sharp and dull sensation in distal extremities present.         I have reviewed and interpreted all of the currently available lab results and diagnostics from this visit:  Results for orders placed or performed during the hospital encounter of 01/13/23   CBC with Auto Differential   Result Value Ref Range    WBC 5.5 4.0 - 10.5 K/CU MM    RBC 4.47 4.2 - 5.4 M/CU MM    Hemoglobin 14.5 12.5 - 16.0 GM/DL    Hematocrit 41.8 37 - 47 %    MCV 93.5 78 - 100 FL    MCH 32.4 (H) 27 - 31 PG    MCHC 34.7 32.0 - 36.0 %    RDW 13.2 11.7 - 14.9 %    Platelets 88 (L) 108 - 440 K/CU MM    MPV 12.5 (H) 7.5 - 11.1 FL    Differential Type AUTOMATED DIFFERENTIAL     Segs Relative 55.8 36 - 66 %    Lymphocytes % 31.2 24 - 44 %    Monocytes % 8.5 (H) 0 - 4 %    Eosinophils % 3.4 (H) 0 - 3 %    Basophils % 0.9 0 - 1 %    Segs Absolute 3.1 K/CU MM    Lymphocytes Absolute 1.7 K/CU MM    Monocytes Absolute 0.5 K/CU MM    Eosinophils Absolute 0.2 K/CU MM    Basophils Absolute 0.1 K/CU MM    Nucleated RBC % 0.0 %    Total Nucleated RBC 0.0 K/CU MM    Total Immature Neutrophil 0.01 K/CU MM    Immature Neutrophil % 0.2 0 - 0.43 %   Comprehensive Metabolic Panel   Result Value Ref Range    Sodium 135 135 - 145 MMOL/L    Potassium 4.1 3.5 - 5.1 MMOL/L    Chloride 102 99 - 110 mMol/L    CO2 27 21 - 32 MMOL/L    BUN 15 6 - 23 MG/DL    Creatinine 0.9 0.6 - 1.1 MG/DL    Est, Glom Filt Rate >60 >60 mL/min/1.73m2    Glucose 308 (H) 70 - 99 MG/DL    Calcium 8.8 8.3 - 10.6 MG/DL    Albumin 3.0 (L) 3.4 - 5.0 GM/DL    Total Protein 6.5 6.4 - 8.2 GM/DL    Total Bilirubin 1.2 (H) 0.0 - 1.0 MG/DL    ALT 12 10 - 40 U/L    AST 16 15 - 37 IU/L    Alkaline Phosphatase 171 (H) 40 - 129 IU/L    Anion Gap 6 4 - 16   Troponin   Result Value Ref Range    Troponin T <0.010 <0.01 NG/ML   Magnesium   Result Value Ref Range    Magnesium 2.0 1.8 - 2.4 mg/dl   CK   Result Value Ref Range    Total CK 50 26 - 140 IU/L   Urinalysis   Result Value Ref Range    Color, UA YELLOW YELLOW    Clarity, UA CLEAR CLEAR    Glucose, Urine 500 (A) NEGATIVE MG/DL    Bilirubin Urine NEGATIVE NEGATIVE MG/DL    Ketones, Urine NEGATIVE NEGATIVE MG/DL    Specific Gravity, UA 1.025 1.001 - 1.035    Blood, Urine SMALL NUMBER OR AMOUNT OBSERVED (A) NEGATIVE    pH, Urine 5.5 5.0 - 8.0    Protein, UA NEGATIVE NEGATIVE MG/DL    Urobilinogen, Urine 1.0 0.2 - 1.0 MG/DL    Nitrite Urine, Quantitative NEGATIVE NEGATIVE    Leukocyte Esterase, Urine NEGATIVE NEGATIVE   Microscopic Urinalysis   Result Value Ref Range    RBC, UA 11 (H) 0 - 6 /HPF    WBC, UA 1 0 - 5 /HPF    Bacteria, UA OCCASIONAL (A) NEGATIVE /HPF    Squam Epithel, UA 2 /HPF    Mucus, UA RARE (A) NEGATIVE HPF    Trichomonas, UA NONE SEEN NONE SEEN /HPF   Brain Natriuretic Peptide   Result Value Ref Range    Pro-BNP 74.20 <300 PG/ML   EKG 12 Lead   Result Value Ref Range    Ventricular Rate 77 BPM    Atrial Rate 77 BPM    P-R Interval 124 ms    QRS Duration 78 ms    Q-T Interval 430 ms    QTc Calculation (Bazett) 486 ms    P Axis 31 degrees    R Axis 25 degrees    T Axis 54 degrees    Diagnosis       Normal sinus rhythm  Prolonged QT  Abnormal ECG  When compared with ECG of 20-DEC-2022 21:13,  No significant change was found       CT HEAD WO CONTRAST    Result Date: 1/13/2023  EXAMINATION: CT OF THE HEAD WITHOUT CONTRAST  1/13/2023 5:46 pm TECHNIQUE: CT of the head was performed without the administration of intravenous contrast. Automated exposure control, iterative reconstruction, and/or weight based adjustment of the mA/kV was utilized to reduce the radiation dose to as low as reasonably achievable. COMPARISON: None. HISTORY: ORDERING SYSTEM PROVIDED HISTORY: intermittent with activity TECHNOLOGIST PROVIDED HISTORY: Has a \"code stroke\" or \"stroke alert\" been called? ->No Reason for exam:->intermittent with activity Decision Support Exception - unselect if not a suspected or confirmed emergency medical condition->Emergency Medical Condition (MA) Reason for Exam: dizziness FINDINGS: BRAIN/VENTRICLES: There is no acute intracranial hemorrhage, mass effect or midline shift. No abnormal extra-axial fluid collection.   The gray-white differentiation is maintained without evidence of an acute infarct. There is no evidence of hydrocephalus. ORBITS: The visualized portion of the orbits demonstrate no acute abnormality. SINUSES: The visualized paranasal sinuses and mastoid air cells demonstrate no acute abnormality. SOFT TISSUES/SKULL:  No acute abnormality of the visualized skull or soft tissues. No acute intracranial abnormality. XR CHEST PORTABLE    Result Date: 1/13/2023  EXAMINATION: ONE XRAY VIEW OF THE CHEST 1/13/2023 11:51 am COMPARISON: 12/25/2022 HISTORY: ORDERING SYSTEM PROVIDED HISTORY: chest pain TECHNOLOGIST PROVIDED HISTORY: Reason for exam:->chest pain Reason for Exam: chest pain Additional signs and symptoms: NA Relevant Medical/Surgical History: diabetes, cad, copd FINDINGS: Pulmonary vasculature is congested. Increased interstitial opacities seen More focal patchy opacities are noted in the mid to lower lungs bilaterally, greater on the left, with obscuration of left heart border. The cardial pericardial silhouette is normal in size. No pneumothorax is seen. No free air. No acute bony abnormality. Patchy infiltrates in the mid to lower lungs bilaterally, greater on the left, pneumonia versus edema. This appears to be superimposed on a background of interstitial edema. Given the normal heart size, consider both cardiogenic and noncardiogenic etiologies. Final ED Course and MDM: In brief, Alvin Isabel is a 63 y.o. female whose care was signed out to me by the outgoing provider. In brief, patient is neurologically intact presents today with intermittent dizziness and lightheadedness. CT scan is negative no focal neurological deficits on multiple neurological examinations. Her labs are essentially unremarkable for any acute abnormality particularly patient's urinalysis shows no compelling evidence of infection. BMP only 74 CK 50 magnesium 2.0 troponin within normal limits. Metabolic panel shows no acute abnormalities requiring emergent intervention.   And CBC shows no marked leukocytosis or severe infectious signs. Chest x-ray does reveal patchy infiltrates in the mid lower lungs bilateral greater left and right pneumonia versus edema. Given patient has had no constitutional symptoms this is likely edema. Have low suspicion of pneumonia. Patient is educated on these findings. Told to follow-up with PCP. Return here should she develop any constitutional symptoms or shortness of breath. Patient will be discharged. With PCP meclizine follow-up. I have Low suspicion of acute neurovascular event. ED Medication Orders (From admission, onward)      Start Ordered     Status Ordering Provider    01/13/23 1500 01/13/23 1448  0.9 % sodium chloride bolus  ONCE         Last MAR action: Stopped - by Ivonne Mortimer, BEN on 01/13/23 at [address], [place]    01/13/23 1500 01/13/23 1450  meclizine (ANTIVERT) tablet 25 mg  ONCE         Last MAR action: Given - by Alyssia Hough on 01/13/23 at [address] 14            Final Impression      1.  Intermittent lightheadedness        DISPOSITION Decision To Discharge 01/13/2023 06:53:22 PM     (Please note that portions of this note may have been completed with a voice recognition program. Efforts were made to edit the dictations but occasionally words are mis-transcribed.)    Fabricio Barrera PA-C  [place] [address] [place] [address]  01/13/23 2996

## 2023-01-13 NOTE — CARE COORDINATION
CM review of pt chart for readmission risk, last admission 12/20-23/23 observation for CAP. Pt discharged home. Pt returned to ER.,12/25/22 for fall rib fracture treated and discharged home. Pt returns to ER today with c/o dizziness, Dr Elliott Regalado provider, no acute findings for admission.  LENA,RN/CM

## 2023-01-13 NOTE — ED NOTES
Pt ambulated to bathroom and back with steady gait with no assistance     Bebe Gomez RN  01/13/23 0815

## 2023-01-13 NOTE — ED PROVIDER NOTES
12 lead EKG per my interpretation:  Normal Sinus Rhythm at 77  [place] is   Normal  QTc is  within an acceptable range  There is no specific T wave changes appreciated. There is no specific ST wave changes appreciated. No STEMI    Prior EKG to compare with was available and no clinically significant change in her morphology compared to prior from 12/21/2022.     MD Adal Allred MD  01/13/23 8931

## 2023-01-13 NOTE — ED PROVIDER NOTES
EMERGENCY DEPARTMENT ENCOUNTER    [facility] EMERGENCY DEPARTMENT        TRIAGE CHIEF COMPLAINT:   Dizziness (Dizziness for the last 3-4 days. States that she has also had some shaking in her arms)      [place]:  Martinez Halye is a 63 y.o. female that presents with generalized intermittent dizziness and left arm tremors. She endorsed symptoms to triage for 3 to 4 days but reports to me that symptoms have actually been going on intermittently for the last 2 weeks. States that she was seen in the ED in December 2022 after a fall where she fractured her 10th rib. Has been doing well since. She does have a baseline ox requirement of 2 L. She states over the last 2 weeks, she has been having dizziness described as \"lightheaded and rocking boat sensation\" especially when she is walking. She feels like her gait is unsteady. She is denying any tongue or lip swelling, difficulty breathing or swallowing. No associated chest pain or shortness of breath. Has not had increase in her baseline ox requirement. No fever or chills. No recent head injuries. She states at rest, she feels well but becomes symptomatic with any activity. No associated palpitations shortness of breath or pleuritic chest pain. He is adamantly denying any current dizzy spinning sensation vision changes or headache at this time. No other anticoagulation therapy other than the baby aspirin. No previous history of CVA. No other new foods medications or antibiotic use. Double vision or loss of vision.           ROS:  General:  No fevers, no chills   Cardiovascular:  No chest pain, no palpitations  Respiratory:  No shortness of breath, no cough, no wheezing  Gastrointestinal:  No pain, no nausea, no vomiting, no diarrhea  Musculoskeletal:  No muscle pain, no joint pain  Skin:  No rash, no pruritis, no easy bruising  Neurologic:  See HPI  Psychiatric:  No anxiety  Genitourinary:  No dysuria, no hematuria  Extremities:  No edema    Past Medical History:   Diagnosis Date    Abnormal Pap smear of cervix     Acid reflux     Arthritis     left knee    Breast cyst     CAD (coronary artery disease)     per [facility] 9/6/13 - Dr. Lance [address]    COPD (chronic obstructive pulmonary disease) ([facility] [address]     follow with Dr Kimo Tillman    Depression     [place] manic - depression see Dr Warden Maya    Diabetes mellitus [facility])     dx 10+ yrs ago- follows with PCP    Drug abuse ([facility] [address]     hx use of cocaine, heroin and marijuana- states last used 12/2014    Glaucoma     bilateral    H/O Doppler lower venous ultrasound 04/04/2019    No DVT or SVT, Significant reflux of RGSV and LGSV. H/O echocardiogram 12/18/2020    EF 55-60%, Mod LVH. Hepatic encephalopathy 05/2019    Hepatitis C     for liver bx 12/3/2015\"Have Hepatitis B and C and saw Dr Silvia Scherer for this 12/1/2015\"    Hiatal hernia     History of alcohol abuse     History of exercise stress test 01/02/2020    Treadmill, Normal exercise performance without angina and ischemic EKG changes.     HTN (hypertension)     \"for the past two yrs on medication\" follows with Dr Lance [address]    Hx of blood clots 2019    Portal vein thrombsis - TIPS procedure 4/23/19    Hyperlipemia     Irregular heart beat     per pt    Liver hematoma     Migraine     Migraines     Last migraine:  2018    Nausea & vomiting     Neurologic disorder     Schizophrenia [facility])     per old chart    Seizures ([address]     \"last one was 9/2015- saw Dr Luisa Oviedo at [facility]- she said not sure if acutal seizures- she thinks they are panic or anxiety attacks\"    SOB (shortness of breath)     with any exertion    Vulvar mass      Past Surgical History:   Procedure Laterality Date    BREAST SURGERY  10/2015    left breast bx    CARDIAC CATHETERIZATION      per old chart pt had cath done in 3/2011 and 9/2013    CARPAL TUNNEL RELEASE Left 01/09/2020    CARPAL TUNNEL RELEASE LEFT performed by Honey Delacruz DO at [place] [id number] Right 05/26/2020    dr Miguelangel Interiano, carpal tunnel trigger finger release    CARPAL TUNNEL RELEASE Right 05/26/2020    RIGHT CARPAL TUNNEL RELEASE performed by Javier Santizo DO at [address]  per old chart done 1985    COLONOSCOPY  03/11/2013    diverticulosis, cecal polyp    COLONOSCOPY  03/16/2017    Internal hemorrhoids- Dr. Madison Castellanos    COLONOSCOPY N/A 05/17/2022    COLONOSCOPY POLYPECTOMY SNARE/COLD BIOPSY performed by Melissa Potts MD at [address] [place] [address], COLON, DIAGNOSTIC  03/16/2017    EGD: Small esophageal varices, portal hypertensive gastropathy, reflux esophagitis, hiatal hernia    EYE SURGERY Bilateral ? when    cyst removal, cataracts w/lens replacement    FINGER TRIGGER RELEASE Right 05/26/2020    FINGER TRIGGER RELEASE RIGHT RING FINGER performed by Javier Santizo DO at [facility] ([facility])      per old chart pt had CHOLO/BSO 1986    SALPINGO-OOPHORECTOMY      TIPS PROCEDURE  04/23/2019    TONSILLECTOMY  as a kid    UPPER GASTROINTESTINAL ENDOSCOPY N/A 11/14/2018    EGD DIAGNOSTIC ONLY performed by Krystyna Betancourt MD at [facility] 145 N/A 06/05/2019    EGD DIAGNOSTIC ONLY performed by Krystyna Betancourt MD at [facility] 150 N/A 11/9/2022    [address] [facility] performed by Hai Palacios MD at [facility] OR     Family History   Problem Relation Age of Onset    Ovarian Cancer Mother     Cancer Mother         lung ca    Arthritis Mother     Migraines Mother     Cancer Father         colon ca    Diabetes Father     High Blood Pressure Father     Arthritis Father     High Cholesterol Father     Migraines Father     Migraines Sister     Heart Disease Brother         WPW    Breast Cancer Maternal Aunt     Breast Cancer Maternal Aunt     Breast Cancer Maternal Aunt     Breast Cancer Maternal Aunt      Social History     Socioeconomic History    Marital status:      Spouse name: Not on file    Number of children: Not on file    Years of education: Not on file    Highest education level: Not on file   Occupational History    Not on file   Tobacco Use    Smoking status: Some Days     Packs/day: 0.50     Years: 45.00     Pack years: 22.50     Types: Cigarettes     Start date: 67     Passive exposure: Past    Smokeless tobacco: Never   Vaping Use    Vaping Use: Never used   Substance and Sexual Activity    Alcohol use: Not Currently     Alcohol/week: 2.0 - 3.0 standard drinks     Types: 2 - 3 Shots of liquor per week     Comment: 2-3 shots last 4/2019    Drug use: Not Currently     Frequency: 3.0 times per week     Types: Marijuana (Weed)     Comment: daily last smoked 3 mos ago as of 11-9-22    Sexual activity: Not Currently     Partners: Male   Other Topics Concern    Not on file   Social History Narrative    Not on file     Social Determinants of Health     Financial Resource Strain: Medium Risk    Difficulty of Paying Living Expenses: Somewhat hard   Food Insecurity: Food Insecurity Present    Worried About Running Out of Food in the Last Year: Sometimes true    Ran Out of Food in the Last Year: Sometimes true   Transportation Needs: Unmet Transportation Needs    Lack of Transportation (Medical): Yes    Lack of Transportation (Non-Medical): Yes   Physical Activity: Inactive    Days of Exercise per Week: 0 days    Minutes of Exercise per Session: 0 min   Stress: No Stress Concern Present    Feeling of Stress : Only a little   Social Connections: Moderately Integrated    Frequency of Communication with Friends and Family:  Three times a week    Frequency of Social Gatherings with Friends and Family: Twice a week    Attends Yazidi Services: More than 4 times per year    Active Member of [employer] or Organizations: Yes    Attends Club or Organization Meetings: More than 4 times per year    Marital Status:    Intimate Partner Violence: Not on file   Housing Stability: [address] to Pay for Housing in the Last Year: No    Number of Places Lived in the Last Year: 1    Unstable Housing in the Last Year: No     No current facility-administered medications for this encounter. Current Outpatient Medications   Medication Sig Dispense Refill    tiZANidine (ZANAFLEX) 4 MG tablet Take 1 tablet by mouth 2 times daily as needed (spasm) 30 tablet 1    HYDROcodone-acetaminophen (NORCO) 5-325 MG per tablet Take 1 tablet by mouth 2 times daily as needed for Pain for up to 30 days. Max Daily Amount: 1 tablet 60 tablet 0    lidocaine (LIDODERM) 5 % Place 1 patch onto the skin daily 12 hours on, 12 hours off. 30 patch 0    insulin glargine (LANTUS) 100 UNIT/ML injection vial Inject 60 Units into the skin nightly 10 mL 3    guaiFENesin (MUCINEX) 600 MG extended release tablet Take 1 tablet by mouth 2 times daily 60 tablet 0    pantoprazole (PROTONIX) 40 MG tablet Take 1 tablet by mouth 2 times daily (before meals) 60 tablet 1    insulin lispro, 1 Unit Dial, (HUMALOG KWIKPEN) 100 UNIT/ML SOPN 10U insulin befor meals 3x/day    Take insulin according to glucose check 2 hours after meals:   0  140-199 3  200-249 6  250-299 9  300-349 12  350-400 15  >400 18    Take insulin according to glucose check before sleep:   0  140-199 2  200-249 3  250-299 5  300-349 6  350-400 7  >400 9 18 mL 0    Multiple Vitamin (MULTIVITAMIN PO) Take 1 tablet by mouth daily      VITAMIN D PO Take 1 capsule by mouth 2 times daily      calcium carbonate (OYSTER SHELL CALCIUM 500 MG) 1250 (500 Ca) MG tablet Take 1 tablet by mouth daily      albuterol sulfate HFA (PROVENTIL HFA) 108 (90 Base) MCG/ACT inhaler Inhale 2 puffs into the lungs every 4 hours as needed for Wheezing or Shortness of Breath With spacer (and mask if indicated). Thanks. 18 g 1    mupirocin (BACTROBAN) 2 % ointment Apply topically 3 times daily.  30 g 0    blood glucose monitor kit and supplies Use 3-4 times daily 1 kit 0    Lancets MISC Use one lancet 4 times daily (Patient not taking: Reported on 1/4/2023) 100 each 5    raloxifene (EVISTA) 60 MG tablet Take 1 tablet by mouth daily 90 tablet 3    furosemide (LASIX) 20 MG tablet Take 1 tablet by mouth daily Hold if systolic <087 90 tablet 1    mirabegron (MYRBETRIQ) 25 MG TB24 Take 1 tablet by mouth daily 30 tablet 5    estradiol (ESTRACE VAGINAL) 0.1 MG/GM vaginal cream Place 1 g vaginally three times a week Use 1 g vaginally nightly for 2 weeks, then 1 g nightly 2-3 times per week. 3 each 3    ranolazine (RANEXA) 500 MG extended release tablet Take 1 tablet by mouth in the morning and 1 tablet before bedtime. 180 tablet 1    metoprolol succinate (TOPROL XL) 50 MG extended release tablet Take 1 tablet by mouth in the morning. 90 tablet 1    ipratropium-albuterol (DUONEB) 0.5-2.5 (3) MG/3ML SOLN nebulizer solution Inhale 3 mLs into the lungs every 4 hours 120 each 5    nitroGLYCERIN (NITROSTAT) 0.4 MG SL tablet Place 1 tablet under the tongue every 5 minutes as needed for Chest pain 25 tablet 3    rifAXIMin (XIFAXAN) 550 MG tablet Take 1 tablet by mouth 2 times daily 42 tablet 0    sucralfate (CARAFATE) 1 GM tablet Take 1 tablet by mouth 4 times daily 120 tablet 3    ondansetron (ZOFRAN ODT) 4 MG disintegrating tablet Take 1 tablet by mouth every 8 hours as needed for Nausea 15 tablet 0    blood glucose monitor strips Test  Bid times a day & as needed for symptoms of irregular blood glucose.  100 strip 5    Glucosource Lancets MISC Use one 3-4 times to check blood sugar 100 each 5    lactulose (CHRONULAC) 10 GM/15ML solution Take 30 mLs by mouth 3 times daily (Patient taking differently: Take 30 mLs by mouth 2 times daily) 1892 mL 2    QUEtiapine (SEROQUEL) 50 MG tablet Take 3 tablets by mouth nightly (Patient taking differently: Take 100 mg by mouth nightly 12/20/22 Patient states she only takes 100 mg at HS) 60 tablet 3    FLUoxetine (PROZAC) 20 MG capsule Take 20 mg by mouth daily      paliperidone (INVEGA) 6 MG extended release tablet Take 6 mg by mouth every morning      Compression Stockings MISC by Does not apply route 20 - 30 mmh wear daily and take off at night  Thigh High 2 each 2    glucose monitoring kit (FREESTYLE) monitoring kit 1 kit by Does not apply route daily 1 kit 0    Blood Glucose Monitoring Suppl (ACURA BLOOD GLUCOSE METER) w/Device KIT Please check blood sugar 3 times daily. Please fill with what is covered by the insurance 1 kit 0    VIREAD 300 MG tablet Take 300 mg by mouth daily        Allergies   Allergen Reactions    Hydrocodone-Acetaminophen Anaphylaxis    Norco [Hydrocodone-Acetaminophen] Itching     Itching, rash, nausea and vomiting. Tylenol [Acetaminophen] Itching, Nausea And Vomiting and Rash       Nursing Notes Reviewed  PHYSICAL EXAM    VITAL SIGNS: BP (!) 152/83   Pulse 85   Temp 97.8 °F (36.6 °C) (Oral)   Resp 18   SpO2 98%    Constitutional:  Well developed, Well nourished, In no acute distress  Head:  Normocephalic, Atraumatic  Eyes: PERRL. EOMI. no nystagmus. Sclera clear. Conjunctiva normal, No discharge. Neck/Lymphatics: Supple, no JVD, trachea is midline. No nuchal rigidity. Cardiovascular:  RRR, Normal S1 & S2    Peripheral Vascular: Distal pulses 2+, Capillary refill <2seconds  Respiratory:  Respirations nonnlabored, 98% on home 2 L. Coarse air exchange bilaterally, no rales or stridor. Speaking full sentences without difficulty. Abdomen: Bowel sounds normal in all quadrants, Soft, Non tender/Nondistended, No palpable abdominal masses. Musculoskeletal: No edema, No tenderness, No cyanosis, 5/5 strength bilaterally, BUE/BLE symmetrical without atrophy or deformities  Integument:  Warm, Dry, Intact, Skin turgor and texture normal  Neurologic:  Alert & oriented x3 , No focal deficits noted. Cranial nerves II through XII grossly intact. No slurred speech. No facial droop. Finger to nose intact, rapid alternating movements intact.   Normal gross motor coordination & motor strength bilateral upper and lower extremities. Upper and Lower extremities DTRs intact. Sensation intact. Negative Rombergs. No pronator drift.  No truncal ataxia at rest.    Psychiatric:  Affect appropriate      I have reviewed and interpreted all of the currently available lab results from this visit (if applicable):  Results for orders placed or performed during the hospital encounter of 01/13/23   CBC with Auto Differential   Result Value Ref Range    WBC 5.5 4.0 - 10.5 K/CU MM    RBC 4.47 4.2 - 5.4 M/CU MM    Hemoglobin 14.5 12.5 - 16.0 GM/DL    Hematocrit 41.8 37 - 47 %    MCV 93.5 78 - 100 FL    MCH 32.4 (H) 27 - 31 PG    MCHC 34.7 32.0 - 36.0 %    RDW 13.2 11.7 - 14.9 %    Platelets 88 (L) 534 - 440 K/CU MM    MPV 12.5 (H) 7.5 - 11.1 FL    Differential Type AUTOMATED DIFFERENTIAL     Segs Relative 55.8 36 - 66 %    Lymphocytes % 31.2 24 - 44 %    Monocytes % 8.5 (H) 0 - 4 %    Eosinophils % 3.4 (H) 0 - 3 %    Basophils % 0.9 0 - 1 %    Segs Absolute 3.1 K/CU MM    Lymphocytes Absolute 1.7 K/CU MM    Monocytes Absolute 0.5 K/CU MM    Eosinophils Absolute 0.2 K/CU MM    Basophils Absolute 0.1 K/CU MM    Nucleated RBC % 0.0 %    Total Nucleated RBC 0.0 K/CU MM    Total Immature Neutrophil 0.01 K/CU MM    Immature Neutrophil % 0.2 0 - 0.43 %   Comprehensive Metabolic Panel   Result Value Ref Range    Sodium 135 135 - 145 MMOL/L    Potassium 4.1 3.5 - 5.1 MMOL/L    Chloride 102 99 - 110 mMol/L    CO2 27 21 - 32 MMOL/L    BUN 15 6 - 23 MG/DL    Creatinine 0.9 0.6 - 1.1 MG/DL    Est, Glom Filt Rate >60 >60 mL/min/1.73m2    Glucose 308 (H) 70 - 99 MG/DL    Calcium 8.8 8.3 - 10.6 MG/DL    Albumin 3.0 (L) 3.4 - 5.0 GM/DL    Total Protein 6.5 6.4 - 8.2 GM/DL    Total Bilirubin 1.2 (H) 0.0 - 1.0 MG/DL    ALT 12 10 - 40 U/L    AST 16 15 - 37 IU/L    Alkaline Phosphatase 171 (H) 40 - 129 IU/L    Anion Gap 6 4 - 16   Troponin   Result Value Ref Range    Troponin T <0.010 <0.01 NG/ML   Magnesium   Result Value Ref Range Magnesium 2.0 1.8 - 2.4 mg/dl   CK   Result Value Ref Range    Total CK 50 26 - 140 IU/L   Urinalysis   Result Value Ref Range    Color, UA YELLOW YELLOW    Clarity, UA CLEAR CLEAR    Glucose, Urine 500 (A) NEGATIVE MG/DL    Bilirubin Urine NEGATIVE NEGATIVE MG/DL    Ketones, Urine NEGATIVE NEGATIVE MG/DL    Specific Gravity, UA 1.025 1.001 - 1.035    Blood, Urine SMALL NUMBER OR AMOUNT OBSERVED (A) NEGATIVE    pH, Urine 5.5 5.0 - 8.0    Protein, UA NEGATIVE NEGATIVE MG/DL    Urobilinogen, Urine 1.0 0.2 - 1.0 MG/DL    Nitrite Urine, Quantitative NEGATIVE NEGATIVE    Leukocyte Esterase, Urine NEGATIVE NEGATIVE   Microscopic Urinalysis   Result Value Ref Range    RBC, UA 11 (H) 0 - 6 /HPF    WBC, UA 1 0 - 5 /HPF    Bacteria, UA OCCASIONAL (A) NEGATIVE /HPF    Squam Epithel, UA 2 /HPF    Mucus, UA RARE (A) NEGATIVE HPF    Trichomonas, UA NONE SEEN NONE SEEN /HPF   EKG 12 Lead   Result Value Ref Range    Ventricular Rate 77 BPM    Atrial Rate 77 BPM    P-R Interval 124 ms    QRS Duration 78 ms    Q-T Interval 430 ms    QTc Calculation (Bazett) 486 ms    P Axis 31 degrees    R Axis 25 degrees    T Axis 54 degrees    Diagnosis       Normal sinus rhythm  Prolonged QT  Abnormal ECG  When compared with ECG of 20-DEC-2022 21:13,  No significant change was found          Radiographs (if obtained):  [] The following radiograph was interpreted by myself in the absence of a radiologist:   [] Radiologist's Report Reviewed:  XR CHEST PORTABLE   Final Result   Patchy infiltrates in the mid to lower lungs bilaterally, greater on the   left, pneumonia versus edema. This appears to be superimposed on a background of interstitial edema. Given   the normal heart size, consider both cardiogenic and noncardiogenic   etiologies.          CT HEAD WO CONTRAST    (Results Pending)          XR CHEST PORTABLE (Final result)  Result time 01/13/23 15:56:01  Final result by Rachid Majano MD (01/13/23 15:56:01)                Impression: Patchy infiltrates in the mid to lower lungs bilaterally, greater on the   left, pneumonia versus edema. This appears to be superimposed on a background of interstitial edema. Given   the normal heart size, consider both cardiogenic and noncardiogenic   etiologies. Narrative:    EXAMINATION:   ONE XRAY VIEW OF THE CHEST     1/13/2023 11:51 am     COMPARISON:   12/25/2022     HISTORY:   ORDERING SYSTEM PROVIDED HISTORY: chest pain   TECHNOLOGIST PROVIDED HISTORY:   Reason for exam:->chest pain   Reason for Exam: chest pain   Additional signs and symptoms: NA   Relevant Medical/Surgical History: diabetes, cad, copd     FINDINGS:   Pulmonary vasculature is congested. Increased interstitial opacities seen     More focal patchy opacities are noted in the mid to lower lungs bilaterally,   greater on the left, with obscuration of left heart border. The cardial pericardial silhouette is normal in size. No pneumothorax is   seen. No free air. No acute bony abnormality. Pending head CT at time of this dictation  EKG Interpretation  Please see ED physician's note - Dr. Sarah Beth Hay - for EKG interpretation        Chart review shows recent radiographs:  XR RIBS LEFT INCLUDE CHEST (MIN 3 VIEWS)    Result Date: 12/20/2022  EXAMINATION: 6 XRAY VIEWS OF THE LEFT RIBS WITH FRONTAL XRAY VIEW OF THE CHEST 12/20/2022 12:16 pm COMPARISON: November 30, 2022 HISTORY: ORDERING SYSTEM PROVIDED HISTORY: pain felt rib pop TECHNOLOGIST PROVIDED HISTORY: Reason for exam:->pain felt rib pop Reason for Exam: pain after ribs popped while pulling clothes out of washer FINDINGS: Chest x-ray demonstrates infiltrates in both lower lobes, especially the right consistent with pneumonia. Views of the left ribs demonstrate no obvious fractures or destructive changes. Clavicle, scapula and shoulder appear unremarkable. Incidentally, a liver shunt noted likely representing a TIPS.      Bilateral lower lobe infiltrates worse on the right consistent with pneumonia. Specific views of the left ribs demonstrate no obvious rib fractures. Liver shunt likely represents a TIPS. XR RIBS RIGHT INCLUDE CHEST (MIN 3 VIEWS)    Result Date: 12/25/2022  EXAMINATION: 8 XRAY VIEWS OF THE RIGHT RIBS WITH FRONTAL XRAY VIEW OF THE CHEST 12/25/2022 4:33 pm COMPARISON: Chest radiograph and CT chest angiogram 11/30/2022. Left rib series 12/20/2022. HISTORY: ORDERING SYSTEM PROVIDED HISTORY: fall, pain TECHNOLOGIST PROVIDED HISTORY: Reason for exam:->fall, pain Reason for Exam: fall posterior right rib pain FINDINGS: The cardiomediastinal silhouette is within normal limits. Mild bibasilar atelectasis. No pneumothorax, vascular congestion, consolidation, or pleural effusion is identified. There is an age-indeterminate possibly acute nondisplaced fracture of the right posterolateral 10th rib. Age-indeterminate possibly acute nondisplaced fracture of the right posterolateral 10th rib. XR LUMBAR SPINE (2-3 VIEWS)    Result Date: 1/6/2023  EXAMINATION: 3 XRAY VIEWS OF THE LUMBAR SPINE 1/5/2023 3:19 pm COMPARISON: 04/20/2020 HISTORY: ORDERING SYSTEM PROVIDED HISTORY: Acute left-sided low back pain, unspecified whether sciatica present TECHNOLOGIST PROVIDED HISTORY: Reason for Exam: pt reports she had a recent fall, fx right 10 rib per patient, pt reports lower back pain radiating down left lower extremity FINDINGS: Alignment is anatomic. Multilevel degenerative disc disease and facet joint arthropathy are noted. No fractures or destructive bony abnormalities are identified. 1. Multilevel degenerative disc disease and facet joint arthropathy mildly progressed compared to 2020 2. No acute lumbar spine abnormality       ED COURSE & MEDICAL DECISION MAKING       Vital signs and nursing notes reviewed during ED course. I have independently evaluated this patient .  Supervising physician - Dr. Diane Velasco - was present in ED and available for consultation throughout entirety of patient's care. All pertinent Lab data and radiographic results reviewed with patient at bedside. The patient and/or the family were informed of the results of any tests/labs/imaging, the treatment plan, and time was allotted to answer questions. Clinical Impression:  1. Intermittent lightheadedness        CC/HPI Summary, DDx, ED Course, and Reassessment: Patient presents with intermittent lightheadedness gait ataxia for the last 2 weeks. On exam, pleasant nontoxic 82-year-old female, afebrile, no acute distress. Is 98% on her home 2 L without increased work of breathing or report of worsening cardiac/respiratory complaints. Lungs with coarse air exchange, no pitting edema or asymmetry of the lower legs. Abdomen soft nontender. Patient does have a nonfocal neurological exam.  Cranial nerves II through XII grossly intact. No pronator drift. No truncal ataxia at rest and she is denying any dizziness lightheadedness at time of my evaluation. NIH score is 0 so stroke alert was initiated. Start patient on IV fluids and meclizine. Orthostatic BP vital signs per ED nursing notes are within normal limits. CBC shows normal white count, hemoglobin. Platelets are 88 however similar to her baseline. CMP does show a glucose of 308 with otherwise normal CO2 and anion gap. Normal electrolytes. Troponin, magnesium CK within normal range. No acute ischemic features on EKG. UA does show small amount of gross blood, 11 red blood cells, occasional bacteria. Chest x-ray shows patchy infiltrates in the mid to lower lungs bilaterally, greater on the left concerning for pneumonia versus edema. Appears superimposed on a background of interstitial edema, no evidence of cardiomegaly. Did add on a BNP which is pending at this time. While in the ED, patient does well. Per ED nursing notes, patient ambulated with a steady gait in no obvious distress.   Pending at time of this dictation, awaiting BNP and CT head results for disposition. 1/13/2023 5:52 PM EST I have signed out [id number] [place] [id number] Emergency Department care to [facility]. We discussed the history, physical exam, completed/pending test results (if obtained) and current treatment plan. Please refer to his/her chart for the patients further details, remaining Emergency Department course, final disposition and diagnosis.     Disposition referral (if applicable):  Tita Albarran MD  [address]  [phone]    Schedule an appointment as soon as possible for a visit       Disposition medications (if applicable):  New Prescriptions    No medications on file         (Please note that portions of this note may have been completed with a voice recognition program. Efforts were made to edit the dictations but occasionally words are mis-transcribed.)          Lindsey Zavala PA-C  01/13/23 [id number]

## 2023-01-14 NOTE — ED NOTES
Pt states is calling her family for a ride home, approx 15-20  Min ETA, Pt to stay in room while waiting due to O2 use     Mayra Villalobos RN  01/13/23 [id number]

## 2023-01-14 NOTE — ACP (ADVANCE CARE PLANNING)
Patient does not have any ACP documents/Medical Power of . LSW notes hospital will follow [place]'s Next of Kin hierarchy in the following descending order for priority:    Guardian  Spouse  [de-identified] of adult Children  Parents  [de-identified] of adult Siblings  Nearest Relative not described above    Per [place]'s Next of Kin hierarchy: Patients' Brothers and Sisters will be [address] [facility].

## 2023-01-16 ENCOUNTER — CARE COORDINATION (OUTPATIENT)
Dept: CARE COORDINATION | Age: 63
End: 2023-01-16

## 2023-01-16 RX ORDER — TIZANIDINE 4 MG/1
4 TABLET ORAL 2 TIMES DAILY PRN
Qty: 90 TABLET | Refills: 1 | Status: SHIPPED | OUTPATIENT
Start: 2023-01-16 | End: 2023-01-31

## 2023-01-17 ENCOUNTER — CARE COORDINATION (OUTPATIENT)
Dept: CARE COORDINATION | Age: 63
End: 2023-01-17

## 2023-01-25 ENCOUNTER — CARE COORDINATION (OUTPATIENT)
Dept: CARE COORDINATION | Age: 63
End: 2023-01-25

## 2023-01-26 RX ORDER — FUROSEMIDE 20 MG/1
20 TABLET ORAL DAILY
Qty: 90 TABLET | Refills: 1 | Status: SHIPPED | OUTPATIENT
Start: 2023-01-26

## 2023-01-27 ENCOUNTER — OFFICE VISIT (OUTPATIENT)
Dept: CARDIOLOGY CLINIC | Age: 63
End: 2023-01-27

## 2023-01-27 ENCOUNTER — TELEPHONE (OUTPATIENT)
Dept: CARDIOLOGY CLINIC | Age: 63
End: 2023-01-27

## 2023-01-27 ENCOUNTER — HOSPITAL ENCOUNTER (OUTPATIENT)
Age: 63
Discharge: HOME | End: 2023-01-27
Payer: MEDICARE

## 2023-01-27 ENCOUNTER — TELEPHONE (OUTPATIENT)
Dept: FAMILY MEDICINE CLINIC | Age: 63
End: 2023-01-27

## 2023-01-27 VITALS
SYSTOLIC BLOOD PRESSURE: 88 MMHG | WEIGHT: 172 LBS | BODY MASS INDEX: 37.11 KG/M2 | HEART RATE: 66 BPM | DIASTOLIC BLOOD PRESSURE: 50 MMHG | HEIGHT: 57 IN

## 2023-01-27 DIAGNOSIS — E11.8 TYPE 2 DIABETES MELLITUS WITH COMPLICATION, WITH LONG-TERM CURRENT USE OF INSULIN (HCC): ICD-10-CM

## 2023-01-27 DIAGNOSIS — I20.8 EXERCISE-INDUCED ANGINA (HCC): ICD-10-CM

## 2023-01-27 DIAGNOSIS — D69.6 THROMBOCYTOPENIA (HCC): Chronic | ICD-10-CM

## 2023-01-27 DIAGNOSIS — Q24.5 CORONARY-MYOCARDIAL BRIDGE: ICD-10-CM

## 2023-01-27 DIAGNOSIS — I10 PRIMARY HYPERTENSION: ICD-10-CM

## 2023-01-27 DIAGNOSIS — G25.81 RESTLESS LEGS: ICD-10-CM

## 2023-01-27 DIAGNOSIS — Z72.0 TOBACCO ABUSE: ICD-10-CM

## 2023-01-27 DIAGNOSIS — Z79.4 TYPE 2 DIABETES MELLITUS WITH COMPLICATION, WITH LONG-TERM CURRENT USE OF INSULIN (HCC): ICD-10-CM

## 2023-01-27 DIAGNOSIS — E78.5 DYSLIPIDEMIA: ICD-10-CM

## 2023-01-27 DIAGNOSIS — I25.10 CORONARY ARTERY DISEASE INVOLVING NATIVE HEART WITHOUT ANGINA PECTORIS, UNSPECIFIED VESSEL OR LESION TYPE: Primary | ICD-10-CM

## 2023-01-27 DIAGNOSIS — I83.893 VARICOSE VEINS OF BOTH LEGS WITH EDEMA: ICD-10-CM

## 2023-01-27 DIAGNOSIS — I25.10 CORONARY ARTERY DISEASE INVOLVING NATIVE HEART WITHOUT ANGINA PECTORIS, UNSPECIFIED VESSEL OR LESION TYPE: ICD-10-CM

## 2023-01-27 PROBLEM — E78.2 MIXED HYPERLIPIDEMIA: Status: RESOLVED | Noted: 2017-07-31 | Resolved: 2023-01-27

## 2023-01-27 LAB
ANION GAP SERPL CALCULATED.3IONS-SCNC: 8 MMOL/L (ref 4–16)
BUN BLDV-MCNC: 16 MG/DL (ref 6–23)
CALCIUM SERPL-MCNC: 8.9 MG/DL (ref 8.3–10.6)
CHLORIDE BLD-SCNC: 97 MMOL/L (ref 99–110)
CO2: 26 MMOL/L (ref 21–32)
CREAT SERPL-MCNC: 0.8 MG/DL (ref 0.6–1.1)
GFR SERPL CREATININE-BSD FRML MDRD: >60 ML/MIN/1.73M2
GLUCOSE BLD-MCNC: 406 MG/DL (ref 70–99)
POTASSIUM SERPL-SCNC: 4.7 MMOL/L (ref 3.5–5.1)
SODIUM BLD-SCNC: 131 MMOL/L (ref 135–145)

## 2023-01-27 PROCEDURE — 80048 BASIC METABOLIC PNL TOTAL CA: CPT

## 2023-01-27 PROCEDURE — 36415 COLL VENOUS BLD VENIPUNCTURE: CPT

## 2023-01-27 NOTE — PROGRESS NOTES
Virgil Reyna is a 63 y.o. female who has    CHIEF COMPLAINT AS FOLLOWS:  CHEST PAIN:  Patient denies any C/O chest pains at this time. Says has spasms in her chest couple of times a month. Had a fall Last month & had a rib fracture. SOB: No C/O SOB at this time. LEG EDEMA:   B/L Lower extremity edema is present but better than before. PALPITATIONS: Denies any C/O Palpitations   DIZZINESS: No C/O Dizziness   SYNCOPE: None   OTHER/ ADDITIONAL COMPLAINTS:                                     HPI: Patient is here for F/U on her CVI, HTN & Dyslipidemia problems. CVI: S/P Ablation. HTN: Patient has known essential HTN. Has been treated with guideline recommended medical / physical/ diet therapy as stated below. Dyslipidemia: Patient has known mixed dyslipidemia. Has been treated with guideline recommended medical / physical/ diet therapy as stated below. Current Outpatient Medications   Medication Sig Dispense Refill    furosemide (LASIX) 20 MG tablet Take 1 tablet by mouth daily Hold if systolic <599 90 tablet 1    predniSONE (DELTASONE) 10 MG tablet Take 1 tablet by mouth daily 40mg daily for 2 days, 20 mg daily for 2 days, 10 mg daily for 2 days, 5 mg daily for 2 days 15 tablet 0    levoFLOXacin (LEVAQUIN) 500 MG tablet Take 1 tablet by mouth daily for 7 days 7 tablet 0    tiZANidine (ZANAFLEX) 4 MG tablet Take 1 tablet by mouth 2 times daily as needed (spasm) 90 tablet 1    HYDROcodone-acetaminophen (NORCO) 5-325 MG per tablet Take 1 tablet by mouth 2 times daily as needed for Pain for up to 30 days.  Max Daily Amount: 1 tablet 60 tablet 0    insulin glargine (LANTUS) 100 UNIT/ML injection vial Inject 60 Units into the skin nightly 10 mL 3    pantoprazole (PROTONIX) 40 MG tablet Take 1 tablet by mouth 2 times daily (before meals) 60 tablet 1    insulin lispro, 1 Unit Dial, (HUMALOG KWIKPEN) 100 UNIT/ML SOPN 10U insulin befor meals 3x/day    Take insulin according to glucose check 2 hours after meals:   0  140-199 3  200-249 6  250-299 9  300-349 12  350-400 15  >400 18    Take insulin according to glucose check before sleep:   0  140-199 2  200-249 3  250-299 5  300-349 6  350-400 7  >400 9 18 mL 0    Multiple Vitamin (MULTIVITAMIN PO) Take 1 tablet by mouth daily      VITAMIN D PO Take 1 capsule by mouth 2 times daily      calcium carbonate (OYSTER SHELL CALCIUM 500 MG) 1250 (500 Ca) MG tablet Take 1 tablet by mouth daily      albuterol sulfate HFA (PROVENTIL HFA) 108 (90 Base) MCG/ACT inhaler Inhale 2 puffs into the lungs every 4 hours as needed for Wheezing or Shortness of Breath With spacer (and mask if indicated). Thanks. 18 g 1    mupirocin (BACTROBAN) 2 % ointment Apply topically 3 times daily. 30 g 0    blood glucose monitor kit and supplies Use 3-4 times daily 1 kit 0    Lancets MISC Use one lancet 4 times daily 100 each 5    raloxifene (EVISTA) 60 MG tablet Take 1 tablet by mouth daily 90 tablet 3    mirabegron (MYRBETRIQ) 25 MG TB24 Take 1 tablet by mouth daily 30 tablet 5    estradiol (ESTRACE VAGINAL) 0.1 MG/GM vaginal cream Place 1 g vaginally three times a week Use 1 g vaginally nightly for 2 weeks, then 1 g nightly 2-3 times per week. 3 each 3    ranolazine (RANEXA) 500 MG extended release tablet Take 1 tablet by mouth in the morning and 1 tablet before bedtime. 180 tablet 1    metoprolol succinate (TOPROL XL) 50 MG extended release tablet Take 1 tablet by mouth in the morning.  90 tablet 1    ipratropium-albuterol (DUONEB) 0.5-2.5 (3) MG/3ML SOLN nebulizer solution Inhale 3 mLs into the lungs every 4 hours 120 each 5    nitroGLYCERIN (NITROSTAT) 0.4 MG SL tablet Place 1 tablet under the tongue every 5 minutes as needed for Chest pain 25 tablet 3    ondansetron (ZOFRAN ODT) 4 MG disintegrating tablet Take 1 tablet by mouth every 8 hours as needed for Nausea 15 tablet 0    blood glucose monitor strips Test  Bid times a day & as needed for symptoms of irregular blood glucose. 100 strip 5    Glucosource Lancets MISC Use one 3-4 times to check blood sugar 100 each 5    lactulose (CHRONULAC) 10 GM/15ML solution Take 30 mLs by mouth 3 times daily (Patient taking differently: Take 30 mLs by mouth 2 times daily) 1892 mL 2    QUEtiapine (SEROQUEL) 50 MG tablet Take 3 tablets by mouth nightly (Patient taking differently: Take 100 mg by mouth nightly 12/20/22 Patient states she only takes 100 mg at HS) 60 tablet 3    FLUoxetine (PROZAC) 20 MG capsule Take 20 mg by mouth daily      paliperidone (INVEGA) 6 MG extended release tablet Take 6 mg by mouth every morning      Compression Stockings MISC by Does not apply route 20 - 30 mmh wear daily and take off at night  Thigh High 2 each 2    glucose monitoring kit (FREESTYLE) monitoring kit 1 kit by Does not apply route daily 1 kit 0    Blood Glucose Monitoring Suppl (ACURA BLOOD GLUCOSE METER) w/Device KIT Please check blood sugar 3 times daily. Please fill with what is covered by the insurance 1 kit 0    VIREAD 300 MG tablet Take 300 mg by mouth daily       rifAXIMin (XIFAXAN) 550 MG tablet Take 1 tablet by mouth 2 times daily (Patient not taking: Reported on 1/27/2023) 42 tablet 0    sucralfate (CARAFATE) 1 GM tablet Take 1 tablet by mouth 4 times daily (Patient not taking: Reported on 1/27/2023) 120 tablet 3     No current facility-administered medications for this visit.      Allergies: Hydrocodone-acetaminophen, Norco [hydrocodone-acetaminophen], and Tylenol [acetaminophen]  Review of Systems:    Constitutional: Negative for diaphoresis and fatigue  Respiratory: Negative for shortness of breath  Cardiovascular: Negative for chest pain, dyspnea on exertion, claudication, edema, irregular heartbeat, murmur, palpitations or shortness of breath  Musculoskeletal: Negative for muscle pain, muscular weakness, negative for pain in arm and leg or swelling in foot and leg    Objective:  BP (!) 88/50 (Site: Left Upper Arm, Position: Sitting, Cuff Size: Medium Adult)   Pulse 66   Ht 4' 9\" (1.448 m)   Wt 172 lb (78 kg)   BMI 37.22 kg/m²   Wt Readings from Last 3 Encounters:   01/27/23 172 lb (78 kg)   01/04/23 184 lb 6.4 oz (83.6 kg)   12/25/22 186 lb (84.4 kg)     Body mass index is 37.22 kg/m². GENERAL - Alert, oriented, pleasant, in no apparent distress. EYES: No jaundice, no conjunctival pallor. Neck - Supple. No jugular venous distention noted. No carotid bruits. Cardiovascular - Normal S1 and S2 without obvious murmur or gallop. Extremities - No cyanosis, clubbing, or significant edema. Pulmonary - No respiratory distress. No wheezes or rales.       MEDICAL DECISION MAKING & DATA REVIEW:    Lab Review   Lab Results   Component Value Date/Time    TROPONINT <0.010 01/13/2023 02:40 PM    TROPONINT <0.010 12/20/2022 09:38 PM     Lab Results   Component Value Date/Time    BNP 9 04/27/2013 12:30 PM    BNP 7 03/17/2011 11:07 PM    PROBNP 74.20 01/13/2023 03:13 PM    PROBNP 288.7 11/30/2022 03:09 PM     Lab Results   Component Value Date    INR 1.06 08/21/2021    INR 1.16 09/13/2020     Lab Results   Component Value Date    LABA1C 9.3 (H) 12/22/2022    LABA1C 8.5 12/01/2022     Lab Results   Component Value Date    WBC 5.5 01/13/2023    WBC 10.8 (H) 12/23/2022    HCT 41.8 01/13/2023    HCT 39.9 12/23/2022    MCV 93.5 01/13/2023    MCV 95.0 12/23/2022    PLT 88 (L) 01/13/2023    PLT 80 (L) 12/23/2022     Lab Results   Component Value Date    CHOL 98 11/27/2019    CHOL 120 08/01/2017    TRIG 96 11/27/2019    TRIG 138 08/01/2017    HDL 38 (L) 11/27/2019    HDL 25 (L) 08/01/2017    LDLCALC 41 11/27/2019    LDLCALC 67 08/01/2017    LDLDIRECT 86 04/28/2013     Lab Results   Component Value Date    ALT 12 01/13/2023    ALT 13 12/21/2022    AST 16 01/13/2023    AST 13 (L) 12/21/2022     BMP:    Lab Results   Component Value Date/Time     01/13/2023 02:40 PM     12/23/2022 04:36 AM    K 4.1 01/13/2023 02:40 PM    K 5.2 12/23/2022 04:36 AM     01/13/2023 02:40 PM    CL 98 12/23/2022 04:36 AM    CO2 27 01/13/2023 02:40 PM    CO2 28 12/23/2022 04:36 AM    BUN 15 01/13/2023 02:40 PM    BUN 19 12/23/2022 04:36 AM    CREATININE 0.9 01/13/2023 02:40 PM    CREATININE 1.0 12/23/2022 04:36 AM     CMP:   Lab Results   Component Value Date/Time     01/13/2023 02:40 PM     12/23/2022 04:36 AM    K 4.1 01/13/2023 02:40 PM    K 5.2 12/23/2022 04:36 AM     01/13/2023 02:40 PM    CL 98 12/23/2022 04:36 AM    CO2 27 01/13/2023 02:40 PM    CO2 28 12/23/2022 04:36 AM    BUN 15 01/13/2023 02:40 PM    BUN 19 12/23/2022 04:36 AM    CREATININE 0.9 01/13/2023 02:40 PM    CREATININE 1.0 12/23/2022 04:36 AM    PROT 6.5 01/13/2023 02:40 PM    PROT 5.8 12/21/2022 06:48 AM    PROT 7.4 09/26/2012 07:55 AM    PROT 6.6 07/27/2012 06:00 PM     Lab Results   Component Value Date/Time    TSH 1.73 08/01/2017 04:15 PM    TSHHS 2.540 11/23/2020 12:04 AM    TSHHS 0.739 01/13/2015 12:05 AM       QUALITY MEASURES REVIEWED:  1.CAD:Patient is taking anti platelet agent:No  Patient does not have Hx of documented CAD  2. DYSLIPIDEMIA: Patient is on cholesterol lowering medication:No   3. Beta-Blocker therapy for CAD, if prior Myocardial Infarction:Yes   4. Counselled regarding smoking cessation. No   Patient does not Smoke. 5.Anticoagulation therapy (for A.Fib) No   Does Not have A.Fib.  6.Discussed weight management strategies. Assessment & Plan:  Primary / Secondary prevention is the goal by aggressive risk modification, healthy and therapeutic life style changes for cardiovascular risk reduction. CORONARY ARTERY DISEASE:No, has myocardial bridge. ETT 1/2020   Normal exercise performance without angina and ischemic EKG changes. Functional Capacity: Fair Exercise Tolerance. No chest pain noted during   testing. EKG: NSR 66/min. Peaked T-waves ? Hyperkalemia. HTN: Borderline low on current medical regimen, see list above. - changes in  treatment:  No, on Toprol XL  Reduce Midodrine dose. ECHO 12/2020   Technically limited study due to COPD. Left ventricular function is normal, EF is estimated at 55-60%. Moderate left ventricular hypertrophy. No evidence of diastolic dysfunction. No regional wall motion abnormalities were detected. RVSP is 22 mmHg. No significant valvular abnormalities. No evidence of pericardial effusion. CARDIOMYOPATHY: None known   CONGESTIVE HEART FAILURE: NO KNOWN HISTORY.   VHD: No significant VHD noted  DYSLIPIDEMIA: Patient's profile is at / near Goal.no                                HDL is low                                See most recent Lab values in Labs section above. OTHER RELEVANT DIAGNOSIS:as noted in patient's active problem list:  Morbid obesity: Diet & Exercise. ARRHYTHMIAS:no  CHRONIC VENOUS INSUFFICIENCY:yes, Patient has been using compression stockings. Has C4 venous disease with abnormal vein study. S/P right GSV ablation with good results. Explained to the patient. Need to continue to wear stockings due to deep vein Reflux. 4/7/2022    Right CFV is patent with good compressibility and respirophasic signal with    good augmentation. The Right GSV is non-compressible with no evidence of flow at the    saphenofemoral junction, distal thigh, and knee. The right Mid Thigh GSV is partially compressible with evidence of flow,    without evidence of reflux. Right Mid Thigh tributary noted coming off the    right GSV Mid thigh. This is a new finding as compared to the last s/p    ablation exam 11/11/2021. TESTS ORDERED: STAT BMP. PREVIOUSLY ORDERED TESTS REVIEWED & DISCUSSED WITH THE PATIENT:     I personally reviewed & interpreted, all previously ordered tests as copied above. Latest Labs are pulled in to the note with dates.    Labs, specially in Reference to Lipid profile, Cardiac testing in the form of Echo ( dated: ), stress tests ( dated: ) & other relevant cardiac testing reviewed with patient & recommendations made based on assessment of the results.    Discussed role of Cardiac risk factors & effects + treatment of co morbidities with patient & advised accordingly.     MEDICATIONS: List of medications patient is currently taking is reviewed in detail with the patient. Discussed any side effects or problems taking the medication.     Recommend Continue present management & medications as listed. Will check serum K STAT.    AFFIRMATION: I  reviewed patient's history, previous & current medical problems & all Labs + testing. This includes chart prep even prior to the vosit. Various goals are discussed and multiple questions answered.Relevant concelling performed.     Office follow up in six months.

## 2023-01-27 NOTE — PATIENT INSTRUCTIONS
Please be informed that if you contact our office outside of normal business hours the physician on call cannot help with any scheduling or rescheduling issues, procedure instruction questions or any type of medication issue.    We advise you for any urgent/emergency that you go to the nearest emergency room!    PLEASE CALL OUR OFFICE DURING NORMAL BUSINESS HOURS    Monday - Friday   8 am to 5 pm    [place]: [phone]    [place]: [phone]    [place]:  [phone]  **It is YOUR responsibilty to bring medication bottles and/or updated medication list to EACH APPOINTMENT. This will allow us to better serve you and all your healthcare needs**  [facility] Locations - No appointment necessary.  Sites open Monday to Friday. Call your preferred location for test preparation, business   hours and other information you need. [facility] accepts all insurances.  [facility]   [address]   [address] [place] [address]  Phone: [phone] [place]  [address], [place] [address]  Phone: [phone]     Thank you for allowing us to care for you today!   We want to ensure we can follow your treatment plan and we strive to give you the best outcomes and experience possible.   If you ever have a life threatening emergency and call 911 - for an ambulance (EMS)   Our providers can only care for you at:   [facility] or [facility].   Even if you have someone take you or you drive yourself we can only care for you in a [facility] facility. Our providers are not setup at the other healthcare locations!     CORONARY ARTERY DISEASE:No, has myocardial bridge.  ETT 1/2020   Normal exercise performance without angina and ischemic EKG changes.    Functional Capacity: Fair Exercise Tolerance. No chest pain noted during   testing.    EKG: NSR 66/min.Peaked T-waves ? Hyperkalemia.    HTN: Borderline low on current medical regimen, see list above.             -  changes in  treatment:  No, on Toprol XL  Reduce Midodrine dose. ECHO 12/2020   Technically limited study due to COPD. Left ventricular function is normal, EF is estimated at 55-60%. Moderate left ventricular hypertrophy. No evidence of diastolic dysfunction. No regional wall motion abnormalities were detected. RVSP is 22 mmHg. No significant valvular abnormalities. No evidence of pericardial effusion. CARDIOMYOPATHY: None known   CONGESTIVE HEART FAILURE: NO KNOWN HISTORY.   VHD: No significant VHD noted  DYSLIPIDEMIA: Patient's profile is at / near Goal.no                                HDL is low                                See most recent Lab values in Labs section above. OTHER RELEVANT DIAGNOSIS:as noted in patient's active problem list:  Morbid obesity: Diet & Exercise. ARRHYTHMIAS:no  CHRONIC VENOUS INSUFFICIENCY:yes, Patient has been using compression stockings. Has C4 venous disease with abnormal vein study. S/P right GSV ablation with good results. Explained to the patient. Need to continue to wear stockings due to deep vein Reflux. 4/7/2022    Right CFV is patent with good compressibility and respirophasic signal with    good augmentation. The Right GSV is non-compressible with no evidence of flow at the    saphenofemoral junction, distal thigh, and knee. The right Mid Thigh GSV is partially compressible with evidence of flow,    without evidence of reflux. Right Mid Thigh tributary noted coming off the    right GSV Mid thigh. This is a new finding as compared to the last s/p    ablation exam 11/11/2021. TESTS ORDERED: STAT BMP. PREVIOUSLY ORDERED TESTS REVIEWED & DISCUSSED WITH THE PATIENT:     I personally reviewed & interpreted, all previously ordered tests as copied above. Latest Labs are pulled in to the note with dates.    Labs, specially in Reference to Lipid profile, Cardiac testing in the form of Echo ( dated: ), stress tests ( dated: ) & other relevant cardiac testing reviewed with patient & recommendations made based on assessment of the results. Discussed role of Cardiac risk factors & effects + treatment of co morbidities with patient & advised accordingly. MEDICATIONS: List of medications patient is currently taking is reviewed in detail with the patient. Discussed any side effects or problems taking the medication. Recommend Continue present management & medications as listed. Will check serum K STAT. AFFIRMATION: I  reviewed patient's history, previous & current medical problems & all Labs + testing. This includes chart prep even prior to the vosit. Various goals are discussed and multiple questions answered. Relevant concelling performed. Office follow up in six months.

## 2023-01-27 NOTE — PROGRESS NOTES
Vein \"LEG PROBLEM Questionnaire\"  Do you have prominent leg veins? Yes   Do you have any skin discoloration? Yes  Do you have any healed or active sores? No  Do you have swelling of the legs? Yes  Do you have a family history of varicose veins? 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 Have you been fighting overweight problems? Yes  8. Do you have restless legs? No  9. Do you have any night time cramps? Yes  10. Do you have any of the following in your legs:        Tiredness I  11. If Yes - Have they worn compression stockings Yes  12.  If they have worn compression stockings  Years

## 2023-01-27 NOTE — LETTER
[facility]  [address] [address]  Phone: [phone]  Fax: [phone]    Diane Duncan MD    January 27, 2023     Mile Guerrero MD  [address] [place], [place] [address] [place] [id number]    Patient: Wilda Gupta   MR Number: [id number]   YOB: 1960   Date of Visit: 1/27/2023       Dear Mile Guerrero: Thank you for referring Treasure Rodriguez to me for evaluation/treatment. Below are the relevant portions of my assessment and plan of care. If you have questions, please do not hesitate to call me. I look forward to following Clydene Buerger along with you.     Sincerely,      Diane Duncan MD

## 2023-01-27 NOTE — TELEPHONE ENCOUNTER
Marychuy Bales from Dr. Denise Goldstein office called and stated that the patient's blood sugar is 406. I talked to Dr. Jayna Sanchez since Dr. Ain Newby is out of the office. Dr. Jayna Sanchez wanted us to call the patient and confirm that she is taking her insulin and if she has any of her blood sugar readings. I called and left a message for the patient. I have tried to call that patient 4 times with no answer.

## 2023-01-28 ASSESSMENT — ENCOUNTER SYMPTOMS
SHORTNESS OF BREATH: 0
COUGH: 0

## 2023-02-01 ENCOUNTER — CARE COORDINATION (OUTPATIENT)
Dept: CARE COORDINATION | Age: 63
End: 2023-02-01

## 2023-02-02 ENCOUNTER — CARE COORDINATION (OUTPATIENT)
Dept: CARE COORDINATION | Age: 63
End: 2023-02-02

## 2023-02-06 ENCOUNTER — APPOINTMENT (OUTPATIENT)
Dept: CT IMAGING | Age: 63
DRG: 637 | End: 2023-02-06
Payer: MEDICARE

## 2023-02-06 ENCOUNTER — APPOINTMENT (OUTPATIENT)
Dept: GENERAL RADIOLOGY | Age: 63
DRG: 637 | End: 2023-02-06
Payer: MEDICARE

## 2023-02-06 ENCOUNTER — HOSPITAL ENCOUNTER (INPATIENT)
Age: 63
LOS: 2 days | Discharge: HOME | DRG: 637 | End: 2023-02-08
Attending: STUDENT IN AN ORGANIZED HEALTH CARE EDUCATION/TRAINING PROGRAM | Admitting: INTERNAL MEDICINE
Payer: MEDICARE

## 2023-02-06 DIAGNOSIS — R73.9 HYPERGLYCEMIA: ICD-10-CM

## 2023-02-06 DIAGNOSIS — E11.65 TYPE 2 DIABETES MELLITUS WITH HYPERGLYCEMIA, WITH LONG-TERM CURRENT USE OF INSULIN (HCC): ICD-10-CM

## 2023-02-06 DIAGNOSIS — Z79.4 TYPE 2 DIABETES MELLITUS WITH HYPERGLYCEMIA, WITH LONG-TERM CURRENT USE OF INSULIN (HCC): ICD-10-CM

## 2023-02-06 DIAGNOSIS — R27.0 ATAXIA: ICD-10-CM

## 2023-02-06 DIAGNOSIS — R47.81 SLURRED SPEECH: Primary | ICD-10-CM

## 2023-02-06 DIAGNOSIS — E87.1 HYPONATREMIA: ICD-10-CM

## 2023-02-06 LAB
ALBUMIN SERPL-MCNC: 3.3 GM/DL (ref 3.4–5)
ALP BLD-CCNC: 216 IU/L (ref 40–129)
ALT SERPL-CCNC: 15 U/L (ref 10–40)
AMMONIA: 90 UMOL/L (ref 11–51)
ANION GAP SERPL CALCULATED.3IONS-SCNC: 11 MMOL/L (ref 4–16)
ANION GAP SERPL CALCULATED.3IONS-SCNC: 5 MMOL/L (ref 4–16)
AST SERPL-CCNC: 21 IU/L (ref 15–37)
B-OH-BUTYR SERPL-MCNC: 3.5 MG/DL (ref 0–3)
BACTERIA: NEGATIVE /HPF
BASE EXCESS: 2 (ref 0–2.4)
BASOPHILS ABSOLUTE: 0.1 K/CU MM
BASOPHILS RELATIVE PERCENT: 0.7 % (ref 0–1)
BILIRUB SERPL-MCNC: 1.1 MG/DL (ref 0–1)
BILIRUBIN URINE: NEGATIVE MG/DL
BLOOD, URINE: ABNORMAL
BUN SERPL-MCNC: 26 MG/DL (ref 6–23)
CALCIUM SERPL-MCNC: 9.1 MG/DL (ref 8.3–10.6)
CHLORIDE BLD-SCNC: 104 MMOL/L (ref 99–110)
CHLORIDE BLD-SCNC: 94 MMOL/L (ref 99–110)
CHP ED QC CHECK: YES
CHP ED QC CHECK: YES
CLARITY: CLEAR
CO2: 22 MMOL/L (ref 21–32)
CO2: 26 MMOL/L (ref 21–32)
COLOR: YELLOW
COMMENT: ABNORMAL
CREAT SERPL-MCNC: 1 MG/DL (ref 0.6–1.1)
DIFFERENTIAL TYPE: ABNORMAL
EKG ATRIAL RATE: 115 BPM
EKG DIAGNOSIS: NORMAL
EKG P AXIS: 63 DEGREES
EKG P-R INTERVAL: 130 MS
EKG Q-T INTERVAL: 368 MS
EKG QRS DURATION: 76 MS
EKG QTC CALCULATION (BAZETT): 509 MS
EKG R AXIS: 27 DEGREES
EKG T AXIS: 57 DEGREES
EKG VENTRICULAR RATE: 115 BPM
EOSINOPHILS ABSOLUTE: 0.2 K/CU MM
EOSINOPHILS RELATIVE PERCENT: 2.5 % (ref 0–3)
GFR SERPL CREATININE-BSD FRML MDRD: >60 ML/MIN/1.73M2
GLUCOSE BLD-MCNC: 262 MG/DL (ref 70–99)
GLUCOSE BLD-MCNC: 421 MG/DL
GLUCOSE BLD-MCNC: 421 MG/DL (ref 70–99)
GLUCOSE BLD-MCNC: 421 MG/DL (ref 70–99)
GLUCOSE BLD-MCNC: 450 MG/DL (ref 70–99)
GLUCOSE BLD-MCNC: 512 MG/DL
GLUCOSE BLD-MCNC: 512 MG/DL (ref 70–99)
GLUCOSE SERPL-MCNC: 521 MG/DL (ref 70–99)
GLUCOSE, URINE: >1000 MG/DL
HCO3 VENOUS: 23 MMOL/L (ref 19–25)
HCT VFR BLD CALC: 43 % (ref 37–47)
HEMOGLOBIN: 15.2 GM/DL (ref 12.5–16)
IMMATURE NEUTROPHIL %: 0.3 % (ref 0–0.43)
INR BLD: 0.99 INDEX
KETONES, URINE: NEGATIVE MG/DL
LACTIC ACID, SEPSIS: 1.5 MMOL/L (ref 0.5–1.9)
LACTIC ACID, SEPSIS: 2.1 MMOL/L (ref 0.5–1.9)
LACTIC ACID, SEPSIS: 2.8 MMOL/L (ref 0.5–1.9)
LEUKOCYTE ESTERASE, URINE: NEGATIVE
LYMPHOCYTES ABSOLUTE: 1.2 K/CU MM
LYMPHOCYTES RELATIVE PERCENT: 17.7 % (ref 24–44)
MAGNESIUM: 1.8 MG/DL (ref 1.8–2.4)
MCH RBC QN AUTO: 31.9 PG (ref 27–31)
MCHC RBC AUTO-ENTMCNC: 35.3 % (ref 32–36)
MCV RBC AUTO: 90.1 FL (ref 78–100)
MONOCYTES ABSOLUTE: 0.7 K/CU MM
MONOCYTES RELATIVE PERCENT: 10.2 % (ref 0–4)
NITRITE URINE, QUANTITATIVE: NEGATIVE
NUCLEATED RBC %: 0 %
O2 SAT, VEN: 94.8 % (ref 50–70)
OSMOLALITY UR: 293 MOS/L (ref 280–300)
PCO2, VEN: 38 MMHG (ref 38–52)
PDW BLD-RTO: 12.7 % (ref 11.7–14.9)
PH VENOUS: 7.39 (ref 7.32–7.42)
PH, URINE: 5.5 (ref 5–8)
PLATELET # BLD: 71 K/CU MM (ref 140–440)
PMV BLD AUTO: 12.4 FL (ref 7.5–11.1)
PO2, VEN: 280 MMHG (ref 28–48)
POTASSIUM SERPL-SCNC: 3.5 MMOL/L (ref 3.5–5.1)
POTASSIUM SERPL-SCNC: 4.7 MMOL/L (ref 3.5–5.1)
PROTEIN UA: NEGATIVE MG/DL
PROTHROMBIN TIME: 12.8 SECONDS (ref 11.7–14.5)
RBC # BLD: 4.77 M/CU MM (ref 4.2–5.4)
RBC URINE: 9 /HPF (ref 0–6)
SEGMENTED NEUTROPHILS ABSOLUTE COUNT: 4.7 K/CU MM
SEGMENTED NEUTROPHILS RELATIVE PERCENT: 68.6 % (ref 36–66)
SODIUM BLD-SCNC: 127 MMOL/L (ref 135–145)
SODIUM BLD-SCNC: 135 MMOL/L (ref 135–145)
SODIUM BLD-SCNC: 136 MMOL/L (ref 135–145)
SPECIFIC GRAVITY UA: <1.005 (ref 1–1.03)
SQUAMOUS EPITHELIAL: <1 /HPF
TOTAL IMMATURE NEUTOROPHIL: 0.02 K/CU MM
TOTAL NUCLEATED RBC: 0 K/CU MM
TOTAL PROTEIN: 6.5 GM/DL (ref 6.4–8.2)
TRICHOMONAS: ABNORMAL /HPF
TROPONIN T: <0.01 NG/ML
TROPONIN T: <0.01 NG/ML
UROBILINOGEN, URINE: 0.2 MG/DL (ref 0.2–1)
WBC # BLD: 6.8 K/CU MM (ref 4–10.5)
WBC UA: <1 /HPF (ref 0–5)
YEAST: ABNORMAL /HPF

## 2023-02-06 PROCEDURE — 82010 KETONE BODYS QUAN: CPT

## 2023-02-06 PROCEDURE — 81001 URINALYSIS AUTO W/SCOPE: CPT

## 2023-02-06 PROCEDURE — 99285 EMERGENCY DEPT VISIT HI MDM: CPT

## 2023-02-06 PROCEDURE — 2580000003 HC RX 258: Performed by: NURSE PRACTITIONER

## 2023-02-06 PROCEDURE — 2060000000 HC ICU INTERMEDIATE R&B

## 2023-02-06 PROCEDURE — 83735 ASSAY OF MAGNESIUM: CPT

## 2023-02-06 PROCEDURE — 93005 ELECTROCARDIOGRAM TRACING: CPT | Performed by: NURSE PRACTITIONER

## 2023-02-06 PROCEDURE — 80053 COMPREHEN METABOLIC PANEL: CPT

## 2023-02-06 PROCEDURE — 2580000003 HC RX 258: Performed by: INTERNAL MEDICINE

## 2023-02-06 PROCEDURE — 6360000002 HC RX W HCPCS: Performed by: NURSE PRACTITIONER

## 2023-02-06 PROCEDURE — 87077 CULTURE AEROBIC IDENTIFY: CPT

## 2023-02-06 PROCEDURE — 96367 TX/PROPH/DG ADDL SEQ IV INF: CPT

## 2023-02-06 PROCEDURE — 84484 ASSAY OF TROPONIN QUANT: CPT

## 2023-02-06 PROCEDURE — 96365 THER/PROPH/DIAG IV INF INIT: CPT

## 2023-02-06 PROCEDURE — 6370000000 HC RX 637 (ALT 250 FOR IP): Performed by: NURSE PRACTITIONER

## 2023-02-06 PROCEDURE — 70450 CT HEAD/BRAIN W/O DYE: CPT

## 2023-02-06 PROCEDURE — 83605 ASSAY OF LACTIC ACID: CPT

## 2023-02-06 PROCEDURE — 87040 BLOOD CULTURE FOR BACTERIA: CPT

## 2023-02-06 PROCEDURE — 93010 ELECTROCARDIOGRAM REPORT: CPT | Performed by: INTERNAL MEDICINE

## 2023-02-06 PROCEDURE — 70496 CT ANGIOGRAPHY HEAD: CPT

## 2023-02-06 PROCEDURE — 85610 PROTHROMBIN TIME: CPT

## 2023-02-06 PROCEDURE — 4A03X5D MEASUREMENT OF ARTERIAL FLOW, INTRACRANIAL, EXTERNAL APPROACH: ICD-10-PCS | Performed by: INTERNAL MEDICINE

## 2023-02-06 PROCEDURE — 36415 COLL VENOUS BLD VENIPUNCTURE: CPT

## 2023-02-06 PROCEDURE — 84295 ASSAY OF SERUM SODIUM: CPT

## 2023-02-06 PROCEDURE — 96372 THER/PROPH/DIAG INJ SC/IM: CPT

## 2023-02-06 PROCEDURE — 83036 HEMOGLOBIN GLYCOSYLATED A1C: CPT

## 2023-02-06 PROCEDURE — 80051 ELECTROLYTE PANEL: CPT

## 2023-02-06 PROCEDURE — 83930 ASSAY OF BLOOD OSMOLALITY: CPT

## 2023-02-06 PROCEDURE — 82140 ASSAY OF AMMONIA: CPT

## 2023-02-06 PROCEDURE — 71045 X-RAY EXAM CHEST 1 VIEW: CPT

## 2023-02-06 PROCEDURE — 6360000004 HC RX CONTRAST MEDICATION: Performed by: NURSE PRACTITIONER

## 2023-02-06 PROCEDURE — 85025 COMPLETE CBC W/AUTO DIFF WBC: CPT

## 2023-02-06 PROCEDURE — 82805 BLOOD GASES W/O2 SATURATION: CPT

## 2023-02-06 PROCEDURE — 82962 GLUCOSE BLOOD TEST: CPT

## 2023-02-06 RX ORDER — SODIUM CHLORIDE 450 MG/100ML
INJECTION, SOLUTION INTRAVENOUS CONTINUOUS
Status: DISCONTINUED | OUTPATIENT
Start: 2023-02-06 | End: 2023-02-07

## 2023-02-06 RX ORDER — PANTOPRAZOLE SODIUM 40 MG/1
40 TABLET, DELAYED RELEASE ORAL
Status: DISCONTINUED | OUTPATIENT
Start: 2023-02-07 | End: 2023-02-08 | Stop reason: HOSPADM

## 2023-02-06 RX ORDER — DEXTROSE AND SODIUM CHLORIDE 5; .45 G/100ML; G/100ML
INJECTION, SOLUTION INTRAVENOUS CONTINUOUS PRN
Status: DISCONTINUED | OUTPATIENT
Start: 2023-02-06 | End: 2023-02-07

## 2023-02-06 RX ORDER — TROSPIUM CHLORIDE 20 MG/1
20 TABLET, FILM COATED ORAL NIGHTLY
Status: DISCONTINUED | OUTPATIENT
Start: 2023-02-06 | End: 2023-02-08 | Stop reason: HOSPADM

## 2023-02-06 RX ORDER — INSULIN GLARGINE 100 [IU]/ML
20 INJECTION, SOLUTION SUBCUTANEOUS NIGHTLY
Status: DISCONTINUED | OUTPATIENT
Start: 2023-02-07 | End: 2023-02-07

## 2023-02-06 RX ORDER — TRAMADOL HYDROCHLORIDE 50 MG/1
50 TABLET ORAL EVERY 4 HOURS PRN
COMMUNITY
Start: 2023-01-26

## 2023-02-06 RX ORDER — RANOLAZINE 500 MG/1
500 TABLET, EXTENDED RELEASE ORAL 2 TIMES DAILY
Status: DISCONTINUED | OUTPATIENT
Start: 2023-02-06 | End: 2023-02-08 | Stop reason: HOSPADM

## 2023-02-06 RX ORDER — ONDANSETRON 2 MG/ML
4 INJECTION INTRAMUSCULAR; INTRAVENOUS EVERY 6 HOURS PRN
Status: DISCONTINUED | OUTPATIENT
Start: 2023-02-06 | End: 2023-02-08 | Stop reason: HOSPADM

## 2023-02-06 RX ORDER — ASPIRIN 81 MG/1
81 TABLET ORAL DAILY
Status: DISCONTINUED | OUTPATIENT
Start: 2023-02-06 | End: 2023-02-08 | Stop reason: HOSPADM

## 2023-02-06 RX ORDER — POLYETHYLENE GLYCOL 3350 17 G/17G
17 POWDER, FOR SOLUTION ORAL DAILY PRN
Status: DISCONTINUED | OUTPATIENT
Start: 2023-02-06 | End: 2023-02-08 | Stop reason: HOSPADM

## 2023-02-06 RX ORDER — INSULIN LISPRO 100 [IU]/ML
0-16 INJECTION, SOLUTION INTRAVENOUS; SUBCUTANEOUS EVERY 4 HOURS
Status: DISCONTINUED | OUTPATIENT
Start: 2023-02-06 | End: 2023-02-07

## 2023-02-06 RX ORDER — LACTULOSE 10 G/15ML
20 SOLUTION ORAL ONCE
Status: COMPLETED | OUTPATIENT
Start: 2023-02-06 | End: 2023-02-06

## 2023-02-06 RX ORDER — POTASSIUM CHLORIDE 7.45 MG/ML
10 INJECTION INTRAVENOUS PRN
Status: DISCONTINUED | OUTPATIENT
Start: 2023-02-06 | End: 2023-02-08 | Stop reason: HOSPADM

## 2023-02-06 RX ORDER — ASPIRIN 300 MG/1
300 SUPPOSITORY RECTAL DAILY
Status: DISCONTINUED | OUTPATIENT
Start: 2023-02-06 | End: 2023-02-06

## 2023-02-06 RX ORDER — SODIUM CHLORIDE 0.9 % (FLUSH) 0.9 %
5-40 SYRINGE (ML) INJECTION EVERY 12 HOURS SCHEDULED
Status: DISCONTINUED | OUTPATIENT
Start: 2023-02-06 | End: 2023-02-08 | Stop reason: HOSPADM

## 2023-02-06 RX ORDER — SODIUM CHLORIDE 9 MG/ML
INJECTION, SOLUTION INTRAVENOUS PRN
Status: DISCONTINUED | OUTPATIENT
Start: 2023-02-06 | End: 2023-02-08 | Stop reason: HOSPADM

## 2023-02-06 RX ORDER — MAGNESIUM SULFATE 1 G/100ML
1000 INJECTION INTRAVENOUS PRN
Status: DISCONTINUED | OUTPATIENT
Start: 2023-02-06 | End: 2023-02-07

## 2023-02-06 RX ORDER — PALIPERIDONE 1.5 MG/1
6 TABLET, EXTENDED RELEASE ORAL EVERY MORNING
Status: DISCONTINUED | OUTPATIENT
Start: 2023-02-07 | End: 2023-02-08 | Stop reason: HOSPADM

## 2023-02-06 RX ORDER — TENOFOVIR DISOPROXIL FUMARATE 300 MG/1
300 TABLET, FILM COATED ORAL DAILY
Status: DISCONTINUED | OUTPATIENT
Start: 2023-02-07 | End: 2023-02-08 | Stop reason: HOSPADM

## 2023-02-06 RX ORDER — CYCLOBENZAPRINE HCL 10 MG
10 TABLET ORAL 3 TIMES DAILY PRN
Status: ON HOLD | COMMUNITY
Start: 2023-01-26 | End: 2023-02-08 | Stop reason: HOSPADM

## 2023-02-06 RX ORDER — LACTULOSE 10 G/15ML
20 SOLUTION ORAL 3 TIMES DAILY
Status: DISCONTINUED | OUTPATIENT
Start: 2023-02-06 | End: 2023-02-08 | Stop reason: HOSPADM

## 2023-02-06 RX ORDER — METOPROLOL SUCCINATE 50 MG/1
50 TABLET, EXTENDED RELEASE ORAL DAILY
Status: CANCELLED | OUTPATIENT
Start: 2023-02-07

## 2023-02-06 RX ORDER — SODIUM CHLORIDE 0.9 % (FLUSH) 0.9 %
5-40 SYRINGE (ML) INJECTION PRN
Status: DISCONTINUED | OUTPATIENT
Start: 2023-02-06 | End: 2023-02-08 | Stop reason: HOSPADM

## 2023-02-06 RX ORDER — 0.9 % SODIUM CHLORIDE 0.9 %
1000 INTRAVENOUS SOLUTION INTRAVENOUS ONCE
Status: COMPLETED | OUTPATIENT
Start: 2023-02-06 | End: 2023-02-06

## 2023-02-06 RX ORDER — ATORVASTATIN CALCIUM 40 MG/1
80 TABLET, FILM COATED ORAL NIGHTLY
Status: DISCONTINUED | OUTPATIENT
Start: 2023-02-06 | End: 2023-02-07

## 2023-02-06 RX ORDER — QUETIAPINE FUMARATE 100 MG/1
100 TABLET, FILM COATED ORAL NIGHTLY
Status: DISCONTINUED | OUTPATIENT
Start: 2023-02-06 | End: 2023-02-08 | Stop reason: HOSPADM

## 2023-02-06 RX ORDER — ONDANSETRON 4 MG/1
4 TABLET, ORALLY DISINTEGRATING ORAL EVERY 8 HOURS PRN
Status: DISCONTINUED | OUTPATIENT
Start: 2023-02-06 | End: 2023-02-08 | Stop reason: HOSPADM

## 2023-02-06 RX ORDER — QUETIAPINE FUMARATE 100 MG/1
100 TABLET, FILM COATED ORAL NIGHTLY
COMMUNITY
Start: 2023-01-10

## 2023-02-06 RX ORDER — DEXTROSE MONOHYDRATE 100 MG/ML
INJECTION, SOLUTION INTRAVENOUS CONTINUOUS PRN
Status: DISCONTINUED | OUTPATIENT
Start: 2023-02-06 | End: 2023-02-08 | Stop reason: HOSPADM

## 2023-02-06 RX ORDER — ALBUTEROL SULFATE 90 UG/1
2 AEROSOL, METERED RESPIRATORY (INHALATION) EVERY 4 HOURS PRN
Status: DISCONTINUED | OUTPATIENT
Start: 2023-02-06 | End: 2023-02-08 | Stop reason: HOSPADM

## 2023-02-06 RX ORDER — INSULIN GLARGINE 100 [IU]/ML
0.25 INJECTION, SOLUTION SUBCUTANEOUS NIGHTLY
Status: DISCONTINUED | OUTPATIENT
Start: 2023-02-06 | End: 2023-02-06

## 2023-02-06 RX ADMIN — SODIUM CHLORIDE 1000 ML: 9 INJECTION, SOLUTION INTRAVENOUS at 11:38

## 2023-02-06 RX ADMIN — SODIUM CHLORIDE: 4.5 INJECTION, SOLUTION INTRAVENOUS at 20:36

## 2023-02-06 RX ADMIN — ATORVASTATIN CALCIUM 80 MG: 40 TABLET, FILM COATED ORAL at 20:37

## 2023-02-06 RX ADMIN — CEFTRIAXONE SODIUM 1000 MG: 1 INJECTION, POWDER, FOR SOLUTION INTRAMUSCULAR; INTRAVENOUS at 12:35

## 2023-02-06 RX ADMIN — INSULIN HUMAN 15 UNITS: 100 INJECTION, SOLUTION PARENTERAL at 14:19

## 2023-02-06 RX ADMIN — LACTULOSE 20 G: 10 SOLUTION ORAL at 12:36

## 2023-02-06 RX ADMIN — INSULIN LISPRO 8 UNITS: 100 INJECTION, SOLUTION INTRAVENOUS; SUBCUTANEOUS at 20:37

## 2023-02-06 RX ADMIN — TROSPIUM CHLORIDE 20 MG: 20 TABLET, FILM COATED ORAL at 20:37

## 2023-02-06 RX ADMIN — RANOLAZINE 500 MG: 500 TABLET, FILM COATED, EXTENDED RELEASE ORAL at 20:37

## 2023-02-06 RX ADMIN — VANCOMYCIN HYDROCHLORIDE 2000 MG: 10 INJECTION, POWDER, LYOPHILIZED, FOR SOLUTION INTRAVENOUS at 14:21

## 2023-02-06 RX ADMIN — QUETIAPINE FUMARATE 100 MG: 100 TABLET ORAL at 20:37

## 2023-02-06 RX ADMIN — INSULIN GLARGINE 20 UNITS: 100 INJECTION, SOLUTION SUBCUTANEOUS at 20:37

## 2023-02-06 RX ADMIN — SODIUM CHLORIDE, PRESERVATIVE FREE 10 ML: 5 INJECTION INTRAVENOUS at 20:43

## 2023-02-06 RX ADMIN — IOPAMIDOL 80 ML: 755 INJECTION, SOLUTION INTRAVENOUS at 11:31

## 2023-02-06 RX ADMIN — ASPIRIN 81 MG: 81 TABLET, COATED ORAL at 20:37

## 2023-02-06 ASSESSMENT — PAIN SCALES - GENERAL
PAINLEVEL_OUTOF10: 4
PAINLEVEL_OUTOF10: 3
PAINLEVEL_OUTOF10: 4

## 2023-02-06 ASSESSMENT — PAIN - FUNCTIONAL ASSESSMENT: PAIN_FUNCTIONAL_ASSESSMENT: ACTIVITIES ARE NOT PREVENTED

## 2023-02-06 ASSESSMENT — PAIN DESCRIPTION - PAIN TYPE
TYPE: ACUTE PAIN;CHRONIC PAIN
TYPE: ACUTE PAIN;CHRONIC PAIN

## 2023-02-06 ASSESSMENT — PAIN DESCRIPTION - ONSET
ONSET: ON-GOING
ONSET: ON-GOING

## 2023-02-06 ASSESSMENT — PAIN DESCRIPTION - DESCRIPTORS
DESCRIPTORS: ACHING
DESCRIPTORS: ACHING

## 2023-02-06 ASSESSMENT — PAIN DESCRIPTION - FREQUENCY
FREQUENCY: INTERMITTENT
FREQUENCY: INTERMITTENT

## 2023-02-06 ASSESSMENT — PAIN DESCRIPTION - LOCATION
LOCATION: BACK;GENERALIZED
LOCATION: BACK
LOCATION: BACK;GENERALIZED

## 2023-02-06 ASSESSMENT — PAIN SCALES - WONG BAKER: WONGBAKER_NUMERICALRESPONSE: 0

## 2023-02-06 NOTE — ED NOTES
Medication History  [facility]    Patient Name: Soraida Kennedy 1960     Medication history has been completed by: Kiet Hinton CPhT    Source(s) of information: patient and insurance claims     Primary Care Physician: Radha Qureshi MD     Ph[facility]    Allergies as of 02/06/2023 - Fully Reviewed 02/06/2023   Allergen Reaction Noted    Hydrocodone-acetaminophen Anaphylaxis 05/27/2022    Norco [hydrocodone-acetaminophen] Itching 02/14/2017    Tylenol [acetaminophen] Itching, Nausea And Vomiting, and Rash 11/29/2011        Prior to Admission medications    Medication Sig Start Date End Date Taking? Authorizing Provider   cyclobenzaprine (FLEXERIL) 10 MG tablet Take 10 mg by mouth 3 times daily as needed 1/26/23   Historical Provider, MD   traMADol (ULTRAM) 50 MG tablet Take 50 mg by mouth every 4 hours as needed.  1/26/23   Historical Provider, MD   QUEtiapine (SEROQUEL) 100 MG tablet Take 100 mg by mouth nightly 02/06/23 Patient states she only takes 100 mg at [facility] 1/10/23   Historical Provider, MD   furosemide (LASIX) 20 MG tablet Take 1 tablet by mouth daily Hold if systolic <953 3/29/15   Katie Pak MD   pantoprazole (PROTONIX) 40 MG tablet Take 1 tablet by mouth 2 times daily (before meals) 12/23/22   Gabriella Talavera MD   insulin lispro, 1 Unit Dial, (HUMALOG KWIKPEN) 100 UNIT/ML SOPN 10U insulin befor meals 3x/day    Take insulin according to glucose check 2 hours after meals:   0  140-199 3  200-249 6  250-299 9  300-349 12  350-400 15  >400 18    Take insulin according to glucose check before sleep:   0  140-199 2  200-249 3  250-299 5  300-349 6  350-400 7  >400 9 12/23/22   Gabriella Talavera MD   Multiple Vitamin (MULTIVITAMIN PO) Take 1 tablet by mouth daily    Historical Provider, MD   VITAMIN D PO Take 1 capsule by mouth 2 times daily    Historical Provider, MD   calcium carbonate (OYSTER SHELL CALCIUM 500 MG) 1250 (500 Ca) MG tablet Take 1 tablet by mouth daily Historical Provider, MD   albuterol sulfate HFA (PROVENTIL HFA) 108 (90 Base) MCG/ACT inhaler Inhale 2 puffs into the lungs every 4 hours as needed for Wheezing or Shortness of Breath With spacer (and mask if indicated). Thanks. 11/30/22 1/27/23  ERLINDA Steele - CNP   mupirocin (BACTROBAN) 2 % ointment Apply topically 3 times daily. 11/9/22   Rj Stoddard MD   blood glucose monitor kit and supplies Use 3-4 times daily 11/8/22   Real Mccormack MD   Lancets MISC Use one lancet 4 times daily 11/8/22   Real Mccormack MD   raloxifene (EVISTA) 60 MG tablet Take 1 tablet by mouth daily 11/7/22   India Boas, MD   mirabegron [facility]) 25 MG TB24 Take 1 tablet by mouth daily 9/22/22   India Boas, MD   estradiol (ESTRACE VAGINAL) 0.1 MG/GM vaginal cream Place 1 g vaginally three times a week Use 1 g vaginally nightly for 2 weeks, then 1 g nightly 2-3 times per week. 9/21/22   Rj Stoddard MD   ranolazine (RANEXA) 500 MG extended release tablet Take 1 tablet by mouth in the morning and 1 tablet before bedtime. 8/5/22   Vahid Salgado MD   metoprolol succinate (TOPROL XL) 50 MG extended release tablet Take 1 tablet by mouth in the morning. 7/21/22   Vahid Salgado MD   ipratropium-albuterol (DUONEB) 0.5-2.5 (3) MG/3ML SOLN nebulizer solution Inhale 3 mLs into the lungs every 4 hours 6/13/22   Shellie Aviles MD   nitroGLYCERIN (NITROSTAT) 0.4 MG SL tablet Place 1 tablet under the tongue every 5 minutes as needed for Chest pain 5/18/22   Vahid Salgado MD   ondansetron (ZOFRAN ODT) 4 MG disintegrating tablet Take 1 tablet by mouth every 8 hours as needed for Nausea 1/31/22   Ne Bledsoe MD   blood glucose monitor strips Test  Bid times a day & as needed for symptoms of irregular blood glucose.  1/13/22   Real Mccormack MD   Glucosource Lancets MISC Use one 3-4 times to check blood sugar 2/10/21   Real Mccormack MD   lactulose (CHRONULAC) 10 GM/15ML solution Take 30 mLs by mouth 3 times daily  Patient taking differently: Take 30 mLs by mouth 2 times daily 9/15/20   Clara Tirado MD   FLUoxetine (PROZAC) 20 MG capsule Take 30 mg by mouth daily Takes a 10 mg with a 20 mg to equal 30 mg daily 9/11/20   Historical Provider, MD   paliperidone (INVEGA) 6 MG extended release tablet Take 6 mg by mouth every morning 8/13/20   Historical Provider, MD   Compression Stockings MISC by Does not apply route 20 - 30 mmh wear daily and take off at night  Thigh High 5/21/20   Lars Danger, APRN - CNP   glucose monitoring kit (FREESTYLE) monitoring kit 1 kit by Does not apply route daily 1/30/20   Sukhjinder Hickman MD   Blood Glucose Monitoring Suppl (ACURA BLOOD GLUCOSE METER) w/Device KIT Please check blood sugar 3 times daily. Please fill with what is covered by the insurance 1/21/20   Sukhjinder Hickman MD   VIREAD 300 MG tablet Take 300 mg by mouth daily  11/20/17   Historical Provider, MD     Medications added or changed (ex. new medication, dosage change, interval change, formulation change):  Cyclobenzaprine (added)  Tramadol (added)  Fluoxetine dosage change from 20 mg to 30 mg    Medications removed from list (include reason, ex. noncompliance, medication cost, therapy complete etc.):   Lantus no longer taking  Prednisone therapy complete  Rifaximin no longer taking  Sucralfate no longer taking    Comments:  Medication list reviewed with patient and insurance claims verified. Patient reports she is no longer taking lantus or basaglar at [facility]. States takes Humalog per sliding scale  Metoprolol  ER last fill date 07/19/22 patient states she is taking this, reports she had several extra bottles.   Viread therapy updated patient states she takes this every morning  Claims for Norco patient not taking  Claims for tizanidine patient not taking    To my knowledge the above medication history is accurate as of 2/6/2023 2:17 PM.   Rosemary Redman CPhT   2/6/2023 2:17 PM

## 2023-02-06 NOTE — ED NOTES
Pt void per bedside commode 250 ml, specimen sent to lab     Regina Phillip, ABDIRIZAK  02/06/23 0032

## 2023-02-06 NOTE — ED TRIAGE NOTES
Pt presents to ED from home for hyperglycemia x 3 days, states monitor has been reading high.  Took 20 units of humalog this morning

## 2023-02-06 NOTE — ED NOTES
ED TO INPATIENT SBAR HANDOFF    Patient Name: Beti Dave   :  1960  63 y.o. MRN:  [id number]  Preferred Name  [facility]  ED Room #:  ED25/ED-25  Family/Caregiver Present yes   Restraints no   Sitter no   Sepsis Risk Score Sepsis Risk Score: 3.13    Situation  Code Status: Prior No additional code details. Allergies: Hydrocodone-acetaminophen, Norco [hydrocodone-acetaminophen], and Tylenol [acetaminophen]  Weight: Patient Vitals for the past 96 hrs (Last 3 readings):   Weight   23 1245 171 lb 15.3 oz (78 kg)     Arrived from: home  Chief Complaint:   Chief Complaint   Patient presents with    Hyperglycemia     Pt reports taking sugar this morning 590; Pt complains of HA and Nausea      Hospital Problem/Diagnosis:  Active Problems:    * No active hospital problems. *  Resolved Problems:    * No resolved hospital problems. *    Imaging:   XR CHEST PORTABLE   Final Result   1. Mild vascular congestion. CTA HEAD NECK W CONTRAST   Final Result   No apparent arterial high grade stenosis, occlusion or aneurysm within the   head or neck. CT HEAD WO CONTRAST   Final Result   No acute intracranial abnormality. The results were conveyed to nurse practitioner Rickey Rubin on 2023 at   11:37 a.m.            Abnormal labs:   Abnormal Labs Reviewed   CBC WITH AUTO DIFFERENTIAL - Abnormal; Notable for the following components:       Result Value    MCH 31.9 (*)     Platelets 71 (*)     MPV 12.4 (*)     Segs Relative 68.6 (*)     Lymphocytes % 17.7 (*)     Monocytes % 10.2 (*)     All other components within normal limits   COMPREHENSIVE METABOLIC PANEL - Abnormal; Notable for the following components:    Sodium 127 (*)     Chloride 94 (*)     BUN 26 (*)     Glucose 521 (*)     Albumin 3.3 (*)     Total Bilirubin 1.1 (*)     Alkaline Phosphatase 216 (*)     All other components within normal limits   BLOOD GAS, VENOUS - Abnormal; Notable for the following components:    pO2, Mekhi 280 (*) O2 Sat, Mekhi 94.8 (*)     All other components within normal limits   BETA-HYDROXYBUTYRATE - Abnormal; Notable for the following components:    Beta-Hydroxybutyrate 3.5 (*)     All other components within normal limits   AMMONIA - Abnormal; Notable for the following components:    Ammonia 90 (*)     All other components within normal limits   LACTATE, SEPSIS - Abnormal; Notable for the following components:    Lactic Acid, Sepsis 2.8 (*)     All other components within normal limits   URINALYSIS - Abnormal; Notable for the following components:    Glucose, Urine >1,000 (*)     Blood, Urine MODERATE NUMBER OR AMOUNT OBSERVED (*)     All other components within normal limits   MICROSCOPIC URINALYSIS - Abnormal; Notable for the following components:    RBC, UA 9 (*)     All other components within normal limits   POCT GLUCOSE - Abnormal; Notable for the following components:    POC Glucose 512 (*)     All other components within normal limits   POCT GLUCOSE - Abnormal; Notable for the following components:    POC Glucose 450 (*)     All other components within normal limits     Critical values: yes     Abnormal Assessment Findings: arrived to ED for hyperglycemia x3 days - stroke alert called for slurred speech LKW 2100 when went to bed. CTs negative, pt remains A&Ox4    Background  History:   Past Medical History:   Diagnosis Date    Abnormal Pap smear of cervix     Acid reflux     Arthritis     left knee    Breast cyst     CAD (coronary artery disease)     per [facility] 9/6/[facility] - Dr. Parisa Potter    COPD (chronic obstructive pulmonary disease) ([facility] [address]     follow with Dr Tremayne Paredes    Depression     Debbie Serra manic - depression see Dr Osei Pedro    Diabetes mellitus [facility])     dx 10+ yrs ago- follows with PCP    Drug abuse ([facility] [address]     hx use of cocaine, heroin and marijuana- states last used 12/2014    Glaucoma     bilateral    H/O Doppler lower venous ultrasound 04/04/2019    No DVT or SVT, Significant reflux of RGSV and LGSV.     H/O echocardiogram 12/18/2020    EF 55-60%, Mod LVH. Hepatic encephalopathy 05/2019    Hepatitis C     for liver bx 12/3/2015\"Have Hepatitis B and C and saw Dr Cynthia Thomas for this 12/1/2015\"    Hiatal hernia     History of alcohol abuse     History of exercise stress test 01/02/2020    Treadmill, Normal exercise performance without angina and ischemic EKG changes. HTN (hypertension)     \"for the past two yrs on medication\" follows with Dr Lexy Baltazar    Hx of blood clots 2019    Portal vein thrombsis - TIPS procedure 4/23/19    Hyperlipemia     Irregular heart beat     per pt    Liver hematoma     Migraine     Migraines     Last migraine:  2018    Nausea & vomiting     Neurologic disorder     Schizophrenia ([facility] [address]     per old chart    Seizures ([facility] [address]     \"last one was 9/2015- saw Dr Arash Ash at [facility]- she said not sure if acutal seizures- she thinks they are panic or anxiety attacks\"    SOB (shortness of breath)     with any exertion    Vulvar mass        Assessment    Vitals/MEWS:    Level of Consciousness: Alert (0)   Vitals:    02/06/23 1056 02/06/23 1202 02/06/23 1232 02/06/23 1245   BP: 116/69 108/65 106/67    Pulse: (!) 119 (!) 107 (!) 104    Resp: 18 21 19    Temp: 99.1 °F (37.3 °C)      TempSrc: Oral      SpO2: 93% 92% 91%    Weight:    171 lb 15.3 oz (78 kg)     FiO2 (%): nasal cannula 2L at baseline  O2 Flow Rate:      Cardiac Rhythm: ST  Pain Assessment:  [x] Verbal [] Chloe Maul Scale  Pain Scale:    Last documented pain score (0-10 scale)    Last documented pain medication administered:   Mental Status: oriented and alert  NIH Score: NIH NIH Stroke Scale  Interval: Baseline  Level of Consciousness (1a): Alert  LOC Questions (1b): Answers both correctly  LOC Commands (1c): Performs both tasks correctly  Best Gaze (2): Normal  Visual (3): No visual loss  Facial Palsy (4): Normal symmetrical movement  Motor Arm, Left (5a): No drift  Motor Arm, Right (5b):  No drift  Motor Leg, Left (6a): No drift  Motor Leg, Right (6b): No drift  Limb Ataxia (7): (!) Present in two limbs  Sensory (8): Normal  Best Language (9): No aphasia  Dysarthria (10): Mild to moderate, slurs some words  Extinction and Inattention (11): No abnormality  Total: 3   C-SSRS: Risk of Suicide: No Risk  Bedside swallow:    [place] Coma Scale (GCS): Beatriz Coma Scale  Eye Opening: Spontaneous  Best Verbal Response: Oriented  Best Motor Response: Obeys commands  [place] Coma Scale Score: 15  Active LDA's:   Peripheral IV 02/06/23 Right Antecubital (Active)   Site Assessment Clean, dry & intact 02/06/23 1115   Line Status Blood return noted;Normal saline locked;Specimen collected 02/06/23 1115   Phlebitis Assessment No symptoms 02/06/23 1115   Infiltration Assessment 0 02/06/23 1115   Alcohol Cap Used No 02/06/23 1115   Dressing Status New dressing applied 02/06/23 1115   Dressing Type Transparent 02/06/23 1115   Dressing Intervention New 02/06/23 1115     PO Status: Regular  Pertinent or High Risk Medications/Drips: no   If Yes, please provide details: n/a  Pending Blood Product Administration: no     You may also review the ED PT Care Timeline found under the Summary Nursing Index tab. Recommendation    Pending orders n/a  Plan for Discharge (if known):    Additional Comments: n/a   If any further questions, please call Sending RN at [phone]    Electronically signed by: Electronically signed by Kelvin Villatoro RN on 2/6/2023 at 2:28 PM       Kelvin Villatoro RN  02/06/23 9162

## 2023-02-06 NOTE — CONSULTS
Endocrinology   Consult Note  2/6/2023 10:59 AM     Primary Care provider: Armida De Leon MD     Referring physician:  Leni Whitaker MD     Dear Doctor Mohit Amin     Thank You for the Consult     Pt. Was Admitted for : Altered mental state with high blood glucose    Reason for Consult: Better control of blood glucose      History Obtained From:  EMR       HISTORY OF PRESENT ILLNESS:                The patient is a 63 y.o. female with significant past medical history of CAD, COPD, diabetes mellitus, pretension, hyperlipidemia schizophrenia, hepatitis C, thrombocytopenia, GERD, was brought in with history of altered mental state with the hyperal blood glucose levels has since slurred speech and unstable gait . being investigated for CVA she came in her blood glucose very high over 500. The emergency room another physician thought she would be possibly admitted to ICU under IV insulin infusion drip protocol but subsequently her blood glucose started to improve so they decided to admit her to stepdown care unit with subcu insulin injections without DKA protocol. I was  consulted for better control of blood glucose. ROS:   Pt's ROS done in detail. Abnormal ROS are noted in Medical and Surgical History Section below: Other Medical History:        Diagnosis Date    Abnormal Pap smear of cervix     Acid reflux     Arthritis     left knee    Breast cyst     CAD (coronary artery disease)     per [facility] 9/6/13 - Dr. Rj Figueroa    COPD (chronic obstructive pulmonary disease) ([facility] [address]     follow with Dr Venessa Prescott    Depression     Rachel Coon manic - depression see Dr Gus Mckenzie    Diabetes mellitus [facility])     dx 10+ yrs ago- follows with PCP    Drug abuse ([facility] [address]     hx use of cocaine, heroin and marijuana- states last used 12/2014    Glaucoma     bilateral    H/O Doppler lower venous ultrasound 04/04/2019    No DVT or SVT, Significant reflux of RGSV and LGSV. H/O echocardiogram 12/18/2020    EF 55-60%, Mod LVH.     Hepatic encephalopathy 05/2019    Hepatitis C     for liver bx 12/3/2015\"Have Hepatitis B and C and saw Dr Flory Cleaning for this 12/1/2015\"    Hiatal hernia     History of alcohol abuse     History of exercise stress test 01/02/2020    Treadmill, Normal exercise performance without angina and ischemic EKG changes.     HTN (hypertension)     \"for the past two yrs on medication\" [facility]    Hx of blood clots 2019    Portal vein thrombsis - TIPS procedure 4/23/19    Hyperlipemia     Irregular heart beat     per pt    Liver hematoma     Migraine     Migraines     Last migraine:  2018    Nausea & vomiting     Neurologic disorder     Schizophrenia [facility])     per old chart    Seizures ([address]     \"last one was 9/2015- saw Dr Eda Colon at [facility]- she said not sure if acutal seizures- she thinks they are panic or anxiety attacks\"    SOB (shortness of breath)     with any exertion    Vulvar mass      Surgical History:        Procedure Laterality Date    BREAST SURGERY  10/2015    left breast bx    CARDIAC CATHETERIZATION      per old chart pt had cath done in 3/2011 and 9/2013    CARPAL TUNNEL RELEASE Left 01/09/2020    CARPAL TUNNEL RELEASE LEFT performed by Davonte Mason DO at [address] Right 05/26/2020    dr Dorothy Holguin, carpal tunnel trigger finger release    CARPAL TUNNEL RELEASE Right 05/26/2020    RIGHT CARPAL TUNNEL RELEASE performed by Davonte Mason DO at [address]  per old chart done 1985    COLONOSCOPY  03/11/2013    diverticulosis, cecal polyp    COLONOSCOPY  03/16/2017    Internal hemorrhoids- Dr. Ricardo Mojica    COLONOSCOPY N/A 05/17/2022    COLONOSCOPY POLYPECTOMY SNARE/COLD BIOPSY performed by Lisandra Rincon MD at [address] [place] [address], [place], DIAGNOSTIC  03/16/2017    EGD: Small esophageal varices, portal hypertensive gastropathy, reflux esophagitis, hiatal hernia    EYE SURGERY Bilateral ? when    cyst removal, cataracts w/lens replacement    FINGER TRIGGER RELEASE Right 05/26/2020 FINGER TRIGGER RELEASE RIGHT RING FINGER performed by Dandre Escobedo DO at [facility] ([facility])      per old chart pt had CHOLO/BSO 1986    SALPINGO-OOPHORECTOMY      TIPS PROCEDURE  04/23/2019    TONSILLECTOMY  as a kid    UPPER GASTROINTESTINAL ENDOSCOPY N/A 11/14/2018    EGD DIAGNOSTIC ONLY performed by Nishi Mclaughlin MD at [facility] 86 N/A 06/05/2019    EGD DIAGNOSTIC ONLY performed by Nishi Mclaughlin MD at [facility] 150 N/A 11/9/2022    PARTIAL VULVECTOMY performed by Ankit Valentine MD at [facility] OR       Allergies:  Hydrocodone-acetaminophen, Norco [hydrocodone-acetaminophen], and Tylenol [acetaminophen]    Family History:       Problem Relation Age of Onset    Ovarian Cancer Mother     Cancer Mother         lung ca    Arthritis Mother     Migraines Mother     Cancer Father         colon ca    Diabetes Father     High Blood Pressure Father     Arthritis Father     High Cholesterol Father     Migraines Father     Migraines Sister     Heart Disease Brother         WPW    Breast Cancer Maternal Aunt     Breast Cancer Maternal Aunt     Breast Cancer Maternal Aunt     Breast Cancer Maternal Aunt      REVIEW OF SYSTEMS:  Review of System Done as noted above     PHYSICAL EXAM:      Vitals:    BP 97/65   Pulse 96   Temp 99.1 °F (37.3 °C) (Oral)   Resp 17   Wt 171 lb 15.3 oz (78 kg)   SpO2 94%   BMI 37.21 kg/m²     CONSTITUTIONAL:  awake, drowsy, cooperative, appears stated age   EYES:  vision intact Fundoscopic Exam not performed   ENT:Normal  NECK:  Supple, No JVD.    Thyroid Exam:Normal   LUNGS:  Has Vesicular Breath Sounds,   CARDIOVASCULAR:  Normal apical impulse, regular rate and rhythm, normal S1 and S2, no S3 or S4, and has no  murmur   ABDOMEN:  No scars, normal bowel sounds, soft, non-distended, non-tender, no masses palpated, no hepatolienomegaly  Musculoskeletal: Normal  Extremities: Normal, peripheral pulses normal, , has no edema   NEUROLOGIC:  Awake, under eyes when called her name able to sustain conversation oriented to name, not to place and time. Drowsy cranial nerves II-XII are grossly intact. Motor less. Sensory is intact. ,  and gait is abnormal.  Unstable    DATA:    CBC:   Recent Labs     02/06/23  1117   WBC 6.8   HGB 15.2   PLT 71*    CMP:  Recent Labs     02/06/23  1117   *   K 4.7   CL 94*   CO2 22   BUN 26*   CREATININE 1.0   CALCIUM 9.1   PROT 6.5   LABALBU 3.3*   BILITOT 1.1*   ALKPHOS 216*   AST 21   ALT 15     Lipids:   Lab Results   Component Value Date/Time    CHOL 98 11/27/2019 01:52 PM    CHOL 120 08/01/2017 04:15 PM    HDL 38 11/27/2019 01:52 PM    TRIG 96 11/27/2019 01:52 PM     Glucose:   Recent Labs     02/06/23  1417 02/06/23  1459 02/06/23  1541   POCGLU 450* 421* 421*     Hemoglobin A1C:   Lab Results   Component Value Date/Time    LABA1C 9.3 12/22/2022 03:30 AM     Free T4:   Lab Results   Component Value Date/Time    T4FREE 1.0 08/01/2017 04:15 PM     Free T3: No results found for: FT3  TSH High Sensitivity:   Lab Results   Component Value Date/Time    TSHHS 2.540 11/23/2020 12:04 AM       XR CHEST PORTABLE   Final Result   1. Mild vascular congestion. CTA HEAD NECK W CONTRAST   Final Result   No apparent arterial high grade stenosis, occlusion or aneurysm within the   head or neck. CT HEAD WO CONTRAST   Final Result   No acute intracranial abnormality. The results were conveyed to nurse practitioner Marco A Martines on 02/06/2023 at   11:37 a.m.                 Scheduled Medicines   Medications:    lactulose  20 g Oral TID    insulin glargine  0.25 Units/kg SubCUTAneous Nightly    insulin lispro  0-16 Units SubCUTAneous Q4H      Infusions:    sodium chloride           IMPRESSION    Patient Active Problem List   Diagnosis    Left arm pain    Type 2 diabetes mellitus with complication, with long-term current use of insulin (HCC)    Chronic obstructive pulmonary disease ([facility] [address]    Tobacco abuse    Angina effort    Abnormal nuclear cardiac imaging test    Lung nodule    Chest pain    Dyslipidemia    Pancolitis (HCC)    Hematemesis without nausea    Alcoholic cirrhosis of liver without ascites (HCC)    Thrombocytopenia (HCC)    Periumbilical abdominal pain    History of colonic polyps    Abnormal findings on diagnostic imaging of abdomen    Nausea    Gastroesophageal reflux disease without esophagitis    Vitamin D deficiency    Left carpal tunnel syndrome    Right carpal tunnel syndrome    Esophageal hypertension    Candida esophagitis (HCC)    Colitis    Primary hypertension    GI bleed    Coronary-myocardial bridge    Type 2 diabetes mellitus with complication, with long-term current use of insulin (HCC)    SOB (shortness of breath)    Portal vein thrombosis    Intractable nausea and vomiting    Abdominal pain    Acute GI bleeding    Chronic hepatitis C without hepatic coma (HCC)    Delayed gastric emptying    Restless legs    Acute colitis    Acute encephalopathy    S/P TIPS (transjugular intrahepatic portosystemic shunt)    Cirrhosis of liver without ascites (HCC)    Acute upper GI bleed    Acute hepatic encephalopathy    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  Labs and X ray results   Reviewed Home and Current Medicines   Will Start On Correction bolus Humalog/ Lantus Insulin regime   Monitor Blood glucose frequently   Modify  the dose of Insulin  as needed        Will follow with you  Again thank you for sharing pt's care with me.      Truly yours,       Frankie Castañeda MD

## 2023-02-06 NOTE — ED PROVIDER NOTES
EMERGENCY DEPARTMENT ENCOUNTER      PCP: Gabi Brown MD    CHIEF COMPLAINT    Chief Complaint   Patient presents with    Hyperglycemia     Pt reports taking sugar this morning 590; Pt complains of HA and Nausea              HPI    Raymundo Wilkins is a 63 y.o. female with history of IDDM, coronary artery disease, cirrhosis, hepatitis C, thrombocytopenia, tobacco use, hypertension, dyslipidemia, and schizophrenia who presents via EMS for complaints of elevated blood glucose, slurred speech, and being off balance. The patient states she went to bed at 9 PM last night and felt baseline health. She states she woke up this morning and noticed her speech was not clear. She states she also felt off balance when she was walking. She took her blood glucose and it was measuring high and multiple times. She states she was in the hospital in January with pneumonia, however has not had any symptoms since. She denies headache, cough, abdominal pain, urinary symptoms, or other complaints. REVIEW OF SYSTEMS   Constitutional:  Denies fever, chills, weight loss or weakness   Eyes:   Denies discharge, diplopia, blurred vision, or loss visual field  HENT:  Denies sore throat or ear pain. Denies hearing loss. Heart:  Denies chest pain  Lungs:  Denies shortness of breath  GI  Denies abdominal pain. :  Denies Dysuria or Hematuria. Adamantly denies pregnancy  Musculoskeletal:  Denies back pain. Skin:  Denies rash   Endocrine:  Complains of polyuria or polydypsia and elevated BG  Lymphatic:  Denies swollen glands   Psychiatric:   Denies depression, suicidal ideation or homicidal ideation     Neurologic:  See HPI.   Complains of feeling off balance  All other review of systems negative at this time  See HPI and nursing notes for additional information      PAST MEDICAL & SURGICAL HISTORY    Past Medical History:   Diagnosis Date    Abnormal Pap smear of cervix     Acid reflux     Arthritis     left knee    Breast cyst CAD (coronary artery disease)     per [facility] 9/6/13 - Dr. Alicia Patino    COPD (chronic obstructive pulmonary disease) [facility])     follow with Dr Jaron Rhodes    Depression     Veena Argueta manic - depression see Dr Latonya Vilchis    Diabetes mellitus [facility])     dx 10+ yrs ago- follows with PCP    Drug abuse ([facility] [address]     hx use of cocaine, heroin and marijuana- states last used 12/2014    Glaucoma     bilateral    H/O Doppler lower venous ultrasound 04/04/2019    No DVT or SVT, Significant reflux of RGSV and LGSV. H/O echocardiogram 12/18/2020    EF 55-60%, Mod LVH. Hepatic encephalopathy 05/2019    Hepatitis C     for liver bx 12/3/2015\"Have Hepatitis B and C and saw Dr Gricelda Morgan for this 12/1/2015\"    Hiatal hernia     History of alcohol abuse     History of exercise stress test 01/02/2020    Treadmill, Normal exercise performance without angina and ischemic EKG changes.     HTN (hypertension)     \"for the past two yrs on medication\" follows with Dr Alicia Patino    Hx of blood clots 2019    Portal vein thrombsis - TIPS procedure 4/23/19    Hyperlipemia     Irregular heart beat     per pt    Liver hematoma     Migraine     Migraines     Last migraine:  2018    Nausea & vomiting     Neurologic disorder     Schizophrenia [facility])     per old chart    Seizures ([facility] [address]     \"last one was 9/2015- saw Dr Liseth Min at [facility]- she said not sure if acutal seizures- she thinks they are panic or anxiety attacks\"    SOB (shortness of breath)     with any exertion    Vulvar mass      Past Surgical History:   Procedure Laterality Date    BREAST SURGERY  10/2015    left breast bx    CARDIAC CATHETERIZATION      per old chart pt had cath done in 3/2011 and 9/2013    CARPAL TUNNEL RELEASE Left 01/09/2020    CARPAL TUNNEL RELEASE LEFT performed by Ailyn Rosado, DO at [address] Right 05/26/2020    dr Mildred Horton, carpal tunnel trigger finger release    CARPAL TUNNEL RELEASE Right 05/26/2020    RIGHT CARPAL TUNNEL RELEASE performed by Ailny Rosado, DO at [address]  per old chart done 1985    COLONOSCOPY  03/11/2013    diverticulosis, cecal polyp    COLONOSCOPY  03/16/2017    Internal hemorrhoids- Dr. Sam Murphy    COLONOSCOPY N/A 05/17/2022    COLONOSCOPY POLYPECTOMY SNARE/COLD BIOPSY [facility] COL[facility], DIAGNOSTIC  03/16/2017    EGD: Small esophageal varices, portal hypertensive gastropathy, reflux esophagitis, hiatal hernia    EYE SURGERY Bilateral ? when    cyst removal, cataracts w/lens replacement    FINGER TRIGGER RELEASE Right 05/26/2020    FINGER TRIGGER RELEASE RIGHT RING FINGER performed by Chavez Mcfarland DO at [address] ([facility])      per old chart pt had CHOLO/BSO 1986    SALPINGO-OOPHORECTOMY      TIPS PROCEDURE  04/23/2019    TONSILLECTOMY  as a kid    UPPER GASTROINTESTINAL ENDOSCOPY N/A 11/14/2018    EGD DIAGNOSTIC ONLY performed by Qamar Phelps MD at [place] [address] 06/05/2019    EGD DIAGNOSTIC ONLY performed by Qamar Phelps MD at [id number] [place] [address] 11/9/2022    PARTIAL VULVECTOMY performed by David Lynch MD at [address]    Current Outpatient Rx   Medication Sig Dispense Refill    cyclobenzaprine (FLEXERIL) 10 MG tablet Take 10 mg by mouth 3 times daily as needed      traMADol (ULTRAM) 50 MG tablet Take 50 mg by mouth every 4 hours as needed for Pain.       QUEtiapine (SEROQUEL) 100 MG tablet Take 100 mg by mouth nightly 02/06/23 Patient states she only takes 100 mg at HS      furosemide (LASIX) 20 MG tablet Take 1 tablet by mouth daily Hold if systolic <894 90 tablet 1    pantoprazole (PROTONIX) 40 MG tablet Take 1 tablet by mouth 2 times daily (before meals) 60 tablet 1    insulin lispro, 1 Unit Dial, (HUMALOG KWIKPEN) 100 UNIT/ML SOPN 10U insulin befor meals 3x/day    Take insulin according to glucose check 2 hours after meals:   0  140-199 3  200-249 6  250-299 9  300-349 12  350-400 15  >400 18    Take insulin according to glucose check before sleep:   0  140-199 2  200-249 3  250-299 5  300-349 6  350-400 7  >400 9 (Patient taking differently: Inject into the skin See Admin Instructions 02/06/23 patient states she is on sliding scale  Take insulin according to glucose check 2 hours after meals:   0  140-199 3  200-249 6  250-299 9  300-349 12  350-400 15  >400 18) 18 mL 0    Multiple Vitamin (MULTIVITAMIN PO) Take 1 tablet by mouth daily      VITAMIN D PO Take 1 capsule by mouth 2 times daily      calcium carbonate (OYSTER SHELL CALCIUM 500 MG) 1250 (500 Ca) MG tablet Take 1 tablet by mouth daily      albuterol sulfate HFA (PROVENTIL HFA) 108 (90 Base) MCG/ACT inhaler Inhale 2 puffs into the lungs every 4 hours as needed for Wheezing or Shortness of Breath With spacer (and mask if indicated). Thanks. 18 g 1    mupirocin (BACTROBAN) 2 % ointment Apply topically 3 times daily. 30 g 0    blood glucose monitor kit and supplies Use 3-4 times daily 1 kit 0    Lancets MISC Use one lancet 4 times daily 100 each 5    raloxifene (EVISTA) 60 MG tablet Take 1 tablet by mouth daily 90 tablet 3    mirabegron (MYRBETRIQ) 25 MG TB24 Take 1 tablet by mouth daily 30 tablet 5    estradiol (ESTRACE VAGINAL) 0.1 MG/GM vaginal cream Place 1 g vaginally three times a week Use 1 g vaginally nightly for 2 weeks, then 1 g nightly 2-3 times per week. 3 each 3    ranolazine (RANEXA) 500 MG extended release tablet Take 1 tablet by mouth in the morning and 1 tablet before bedtime. 180 tablet 1    metoprolol succinate (TOPROL XL) 50 MG extended release tablet Take 1 tablet by mouth in the morning.  90 tablet 1    ipratropium-albuterol (DUONEB) 0.5-2.5 (3) MG/3ML SOLN nebulizer solution Inhale 3 mLs into the lungs every 4 hours 120 each 5    nitroGLYCERIN (NITROSTAT) 0.4 MG SL tablet Place 1 tablet under the tongue every 5 minutes as needed for Chest pain 25 tablet 3 ondansetron (ZOFRAN ODT) 4 MG disintegrating tablet Take 1 tablet by mouth every 8 hours as needed for Nausea 15 tablet 0    blood glucose monitor strips Test  Bid times a day & as needed for symptoms of irregular blood glucose. 100 strip 5    Glucosource Lancets MISC Use one 3-4 times to check blood sugar 100 each 5    lactulose (CHRONULAC) 10 GM/15ML solution Take 30 mLs by mouth 3 times daily (Patient taking differently: Take 30 mLs by mouth 2 times daily) 1892 mL 2    FLUoxetine (PROZAC) 20 MG capsule Take 30 mg by mouth daily Takes a 10 mg with a 20 mg to equal 30 mg daily      paliperidone (INVEGA) 6 MG extended release tablet Take 6 mg by mouth every morning      Compression Stockings MISC by Does not apply route 20 - 30 mmh wear daily and take off at night  Thigh High 2 each 2    glucose monitoring kit (FREESTYLE) monitoring kit 1 kit by Does not apply route daily 1 kit 0    Blood Glucose Monitoring Suppl (ACURA BLOOD GLUCOSE METER) w/Device KIT Please check blood sugar 3 times daily. Please fill with what is covered by the insurance 1 kit 0    VIREAD 300 MG tablet Take 300 mg by mouth daily          ALLERGIES    Allergies   Allergen Reactions    Hydrocodone-Acetaminophen Anaphylaxis    Norco [Hydrocodone-Acetaminophen] Itching     Itching, rash, nausea and vomiting.      Tylenol [Acetaminophen] Itching, Nausea And Vomiting and Rash       SOCIAL & FAMILY HISTORY    Social History     Socioeconomic History    Marital status:      Spouse name: None    Number of children: None    Years of education: None    Highest education level: None   Tobacco Use    Smoking status: Every Day     Packs/day: 0.25     Years: 45.00     Pack years: 11.25     Types: Cigarettes     Start date: 1974     Passive exposure: Past    Smokeless tobacco: Never   Vaping Use    Vaping Use: Never used   Substance and Sexual Activity    Alcohol use: Not Currently     Comment: None/Caffiene - 1 cup of coffee/day    Drug use: Not Currently     Types: Marijuana Bijal Dus)    Sexual activity: Not Currently     Partners: Male     Social Determinants of Health     Financial Resource Strain: Medium Risk    Difficulty of Paying Living Expenses: Somewhat hard   Food Insecurity: Food Insecurity Present    Worried About Running Out of Food in the Last Year: Sometimes true    Ran Out of Food in the Last Year: Sometimes true   Transportation Needs: Unmet Transportation Needs    Lack of Transportation (Medical): Yes    Lack of Transportation (Non-Medical): Yes   Physical Activity: Inactive    Days of Exercise per Week: 0 days    Minutes of Exercise per Session: 0 min   Stress: No Stress Concern Present    Feeling of Stress : Only a little   Social Connections: Moderately Integrated    Frequency of Communication with Friends and Family:  Three times a week    Frequency of Social Gatherings with Friends and Family: Twice a week    Attends [employer] Services: More than 4 times per year    Active Member of [employer] or Organizations: Yes    Attends Club or Organization Meetings: More than 4 times per year    Marital Status:    Housing Stability: Low Risk     Unable to Pay for Housing in the Last Year: No    Number of Places Lived in the Last Year: 1    Unstable Housing in the Last Year: No     Family History   Problem Relation Age of Onset    Ovarian Cancer Mother     Cancer Mother         lung ca    Arthritis Mother     Migraines Mother     Cancer Father         colon ca    Diabetes Father     High Blood Pressure Father     Arthritis Father     High Cholesterol Father     Migraines Father     Migraines Sister     Heart Disease Brother         WPW    Breast Cancer Maternal Aunt     Breast Cancer Maternal Aunt     Breast Cancer Maternal Aunt     Breast Cancer Maternal Aunt        PHYSICAL EXAM    VITAL SIGNS: /68   Pulse 93   Temp 99.1 °F (37.3 °C) (Oral)   Resp 19   Wt 171 lb 15.3 oz (78 kg)   SpO2 95%   BMI 37.21 kg/m²    Constitutional:  Well developed, well nourished, no acute distress     HENT:  Atraumatic.    hearing intact and equal bilaterally  Ear canals clear, TMs clear  mastoids without redness, warmth, swelling  Eyes: Conjunctiva clear. No tearing . Pupils equally round and react to light, extraocular movement are intact. No obvious nystagmus   Neck: supple, no JVD. no carotid bruits or murmurs heard  Cardiovascular:  Reg rate & rhythm, no murmurs/rubs/gallops. Respiratory:  Lungs Clear, no retractions   GI:  Soft, nontender, normal bowel sounds  Musculoskeletal:  No edema, no deformities  Integument:  Well hydrated, no rash, no petechiae   Psych: Pleasant affect, no hallucinations     Neurologic:    - Alert & oriented person, place, time, and situation, speech is slurred  - No obvious gross motor deficits  - Cranial nerves 2-12 grossly intact  - Sensation intact to light touch  - Strength 5/5 in upper and lower extremities bilaterally  - Normal finger to nose test bilaterally  - Rapid alternating movements intact  - Normal heel-shin bilaterally  - No pronator drift. - Light touch sensation intact throughout.   - Unsteady gait with truncal ataxia    NIH Stroke Scale  (Total score at bottom)      (1A) LOC  (Alert?):  0 - alert; keenly responsive    (1B) LOC Questions (Month / Age):  0 - answers both questions correctly     (1C) LOC Command  (Close eyes, squeeze my hands):  0 - performs both tasks correctly    (2) Best Gaze  (Eyes open-patient follows finger or face):  0 - normal    (3) Visual  (Introduce visual stimulus to patient's visual field quadrants):  0 - no visual loss    (4)  Facial palsy  (Show teeth, raise eyebrows and squeeze eyes shut):  0 - normal symmetric movement    (5) Motor arms  (Elevate extremity to 90° in sitting or 45° if supine -  score drift/movement)       (5A)  motor arm LEFT :  0 - no drift, limb holds 90 (or 45) degrees for full 10 seconds       (5B) motor arm RIGHT:   0 - no drift, limb holds 90 (or 45) degrees for full 10 seconds      (6) Motor legs  (Elevate extremity to 30° while supine and score drift/movement)       (6A)  motor leg LEFT:   0 - no drift; leg holds 30 degree position for full 5 seconds       (6B) motor leg RIGHT:  0 - no drift; leg holds 30 degree position for full 5 seconds      (7)  Limb Ataxia  (Finger-nose, heel-shin):  0 - absent    (8) Sensory  (face, arms trunk, and legs-compare side to side):  0 - normal; no sensory loss    (9)  Best language  (Name items, describes a picture, read sentence-see attached testing cards):  0 - no aphasia, normal    (10)  Dysarthria  (Evaluate speech clarity by patient repeating listed words):  1 - mild to moderate, patient slurs at least some words and at worst, can be understood with some difficulty    (11)  Extinction and inattention  (can feel \"left, right, or both sides\" of face, arms, legs w/ closed eyes) (can see moving index finger in \"left, right, or both\":  0 - no abnormality        Total NIHSS:  1        LABS:  Results for orders placed or performed during the hospital encounter of 02/06/23   CBC with Auto Differential   Result Value Ref Range    WBC 6.8 4.0 - 10.5 K/CU MM    RBC 4.77 4.2 - 5.4 M/CU MM    Hemoglobin 15.2 12.5 - 16.0 GM/DL    Hematocrit 43.0 37 - 47 %    MCV 90.1 78 - 100 FL    MCH 31.9 (H) 27 - 31 PG    MCHC 35.3 32.0 - 36.0 %    RDW 12.7 11.7 - 14.9 %    Platelets 71 (L) 677 - 440 K/CU MM    MPV 12.4 (H) 7.5 - 11.1 FL    Differential Type AUTOMATED DIFFERENTIAL     Segs Relative 68.6 (H) 36 - 66 %    Lymphocytes % 17.7 (L) 24 - 44 %    Monocytes % 10.2 (H) 0 - 4 %    Eosinophils % 2.5 0 - 3 %    Basophils % 0.7 0 - 1 %    Segs Absolute 4.7 K/CU MM    Lymphocytes Absolute 1.2 K/CU MM    Monocytes Absolute 0.7 K/CU MM    Eosinophils Absolute 0.2 K/CU MM    Basophils Absolute 0.1 K/CU MM    Nucleated RBC % 0.0 %    Total Nucleated RBC 0.0 K/CU MM    Total Immature Neutrophil 0.02 K/CU MM    Immature Neutrophil % 0.3 0 - 0.43 % Comprehensive Metabolic Panel   Result Value Ref Range    Sodium 127 (L) 135 - 145 MMOL/L    Potassium 4.7 3.5 - 5.1 MMOL/L    Chloride 94 (L) 99 - 110 mMol/L    CO2 22 21 - 32 MMOL/L    BUN 26 (H) 6 - 23 MG/DL    Creatinine 1.0 0.6 - 1.1 MG/DL    Est, Glom Filt Rate >60 >60 mL/min/1.73m2    Glucose 521 (HH) 70 - 99 MG/DL    Calcium 9.1 8.3 - 10.6 MG/DL    Albumin 3.3 (L) 3.4 - 5.0 GM/DL    Total Protein 6.5 6.4 - 8.2 GM/DL    Total Bilirubin 1.1 (H) 0.0 - 1.0 MG/DL    ALT 15 10 - 40 U/L    AST 21 15 - 37 IU/L    Alkaline Phosphatase 216 (H) 40 - 129 IU/L    Anion Gap 11 4 - 16   Blood Gas, Venous   Result Value Ref Range    pH, Mekhi 7.39 7.32 - 7.42    pCO2, Mekhi 38 38 - 52 mmHG    pO2, Mekhi 280 (H) 28 - 48 mmHG    Base Excess 2 0 - 2.4    HCO3, Venous 23.0 19 - 25 MMOL/L    O2 Sat, Mekhi 94.8 (H) 50 - 70 %    Comment VBG    Beta-Hydroxybutyrate   Result Value Ref Range    Beta-Hydroxybutyrate 3.5 (H) 0.0 - 3.0 MG/DL   Magnesium   Result Value Ref Range    Magnesium 1.8 1.8 - 2.4 mg/dl   Troponin   Result Value Ref Range    Troponin T <0.010 <0.01 NG/ML   Protime-INR   Result Value Ref Range    Protime 12.8 11.7 - 14.5 SECONDS    INR 0.99 INDEX   Ammonia   Result Value Ref Range    Ammonia 90 (H) 11 - 51 UMOL/L   Lactate, Sepsis   Result Value Ref Range    Lactic Acid, Sepsis 2.8 (HH) 0.5 - 1.9 mMOL/L   Lactate, Sepsis   Result Value Ref Range    Lactic Acid, Sepsis 2.1 (HH) 0.5 - 1.9 mMOL/L   Urinalysis   Result Value Ref Range    Color, UA YELLOW YELLOW    Clarity, UA CLEAR CLEAR    Glucose, Urine >1,000 (A) NEGATIVE MG/DL    Bilirubin Urine NEGATIVE NEGATIVE MG/DL    Ketones, Urine NEGATIVE NEGATIVE MG/DL    Specific Gravity, UA <1.005 1.001 - 1.035    Blood, Urine MODERATE NUMBER OR AMOUNT OBSERVED (A) NEGATIVE    pH, Urine 5.5 5.0 - 8.0    Protein, UA NEGATIVE NEGATIVE MG/DL    Urobilinogen, Urine 0.2 0.2 - 1.0 MG/DL    Nitrite Urine, Quantitative NEGATIVE NEGATIVE    Leukocyte Esterase, Urine NEGATIVE NEGATIVE Microscopic Urinalysis   Result Value Ref Range    RBC, UA 9 (H) 0 - 6 /HPF    WBC, UA <1 0 - 5 /HPF    Bacteria, UA NEGATIVE NEGATIVE /HPF    Yeast, UA OCCASIONAL /HPF    Squam Epithel, UA <1 /HPF    Trichomonas, UA NONE SEEN NONE SEEN /HPF   POC Blood Glucose   Result Value Ref Range    Glucose 512 mg/dL    QC OK? yes    POCT Glucose   Result Value Ref Range    POC Glucose 512 (H) 70 - 99 MG/DL   POCT Glucose   Result Value Ref Range    POC Glucose 450 (H) 70 - 99 MG/DL   POC Blood Glucose   Result Value Ref Range    Glucose 421 mg/dL    QC OK? yes    POCT Glucose   Result Value Ref Range    POC Glucose 421 (H) 70 - 99 MG/DL   EKG 12 Lead   Result Value Ref Range    Ventricular Rate 115 BPM    Atrial Rate 115 BPM    P-R Interval 130 ms    QRS Duration 76 ms    Q-T Interval 368 ms    QTc Calculation (Bazett) 509 ms    P Axis 63 degrees    R Axis 27 degrees    T Axis 57 degrees    Diagnosis       Sinus tachycardia  Otherwise normal ECG  When compared with ECG of 13-JAN-2023 14:36,  Vent. rate has increased BY  38 BPM  Confirmed by [facility] ([id number]) on 2/6/2023 3:02:51 PM       EKG:    EKG shows a sinus tachycardia at 115 bpm with no ST or T wave elevation or depression. Please see emergency department physician's note for final interpretation. IMAGING:   XR CHEST PORTABLE   Final Result   1. Mild vascular congestion. CTA HEAD NECK W CONTRAST   Final Result   No apparent arterial high grade stenosis, occlusion or aneurysm within the   head or neck. CT HEAD WO CONTRAST   Final Result   No acute intracranial abnormality. The results were conveyed to nurse practitioner Andrea Moura on 02/06/2023 at   11:37 a.m.                    CC/HPI Summary, DDx, ED Course, and Reassessment:   63 y.o. female with history of IDDM, coronary artery disease, cirrhosis, hepatitis C, thrombocytopenia, tobacco use, hypertension, dyslipidemia, and schizophrenia who presents via EMS for complaints of elevated blood glucose, slurred speech, and being off balance. Last known well was 9 pm last night. Stroke alert initiated on when I evaluated patient. EKG obtained. An IV was established and blood work sent to lab. She was given 1 L of normal saline. CT of the head without contrast and CTA head and neck obtained. Portable chest x-ray obtained. Initial lactic is 2.8. She was medicated with ceftriaxone and vancomycin for possible sepsis of unknown origin. Her blood glucose is 512. She is not acidotic and anion gap is normal.  She was medicated with 15 units of regular insulin. History from : Patient    Limitations to history : None    Patient was given the following medications:  Medications   0.9 % sodium chloride bolus (0 mLs IntraVENous Stopped 2/6/23 1238)   iopamidol (ISOVUE-370) 76 % injection 80 mL (80 mLs IntraVENous Given 2/6/23 1131)   cefTRIAXone (ROCEPHIN) 1,000 mg in sodium chloride 0.9 % 50 mL IVPB (mini-bag) (0 mg IntraVENous Stopped 2/6/23 1305)   lactulose (CHRONULAC) 10 GM/15ML solution 20 g (20 g Oral Given 2/6/23 1236)   vancomycin (VANCOCIN) 2,000 mg in sodium chloride 0.9 % 500 mL IVPB (2,000 mg IntraVENous New Bag 2/6/23 1421)   insulin regular (HUMULIN R;NOVOLIN R) injection 15 Units (15 Units SubCUTAneous Given 2/6/23 1419)     Chronic conditions affecting care: DM, alcoholic cirrhosis of the liver, hepatitis C, coronary artery disease, hypertension    Discussion with Other Profesionals : Admitting Team I initially consulted with Leonardo Gardiner CNP with hospitalist service for admission. She is concerned the patient may have HHS and will require an insulin drip and request the patient be sent to the ICU. Intensivist Dr. Cheryl Salgado was consulted via telephone and did not feel the patient required admission to the ICU. She states she will consult with hospitalist team.    Social Determinants : None    Records Reviewed :  Outpatient Notes multiple previous outpatient primary care notes and emergency department notes reviewed. Disposition Considerations (tests considered but not done, Shared Decision Making, Pt Expectation of Test or Tx.):   Appropriate for outpatient management CBC did not show any leukocytosis or anemia she does have thrombocytopenia with platelet count of 71. Sterile CMP showed a glucose of 127, chloride of 94, and glucose of 521. Anion gap is normal.  VBG showed a normal pH. Beta hydroxy butyrate is 3.5. Chest x-ray showed mild vascular congestion. CT head without contrast showed no acute findings. CTA showed no acute findings. [facility] telestroke team was consulted initially on arrival.  They state if the patient CTA is normal to admit her to our hospital for an MRI. Hospitalist Yovana Camp CNP was consulted for admission. She came down the patient bedside and her and her attending felt to the patient was in [facility] and would require admission to the ICU on an insulin drip. I called and consulted with Dr. Chino Betancourt the intensivist who states she did not feel the patient required admission to the ICU. Patient admitted to hospitalist service. I am the Primary Clinician of Record. I have personally provided 35 minutes of critical care time exclusive of time spent on separately billable procedures. Time includes review of laboratory data, radiology results, discussion with consultants, and monitoring for potential decompensation. Interventions were performed as documented above. Clinical  IMPRESSION    1. Slurred speech    2. Ataxia    3. Hyperglycemia    4. Hyponatremia    5. Type 2 diabetes mellitus with hyperglycemia, with long-term current use of insulin (HCC)                Comment: Please note this report has been produced using speech recognition software and may contain errors related to that system including errors in grammar, punctuation, and spelling, as well as words and phrases that may be inappropriate.  If there are any questions or concerns please feel free to contact the dictating provider for clarification.             Anamaria Gil, ERLINDA - OSCAR  02/06/23 [id number]

## 2023-02-06 NOTE — CARE COORDINATION
CM - Room 25 --[facility] criteria for Hyperglycemia / diabetes mellitis  reviewed at this time, criteria supports INPATIENT admission

## 2023-02-06 NOTE — H&P
V2.0  History and Physical      Name:  Lea Roman /Age/Sex: 1960  (63 y.o. female)   MRN & CSN:  [id number] & [id number] Encounter Date/Time: 2023 4:44 PM EST   Location:  ED25/ED-25 PCP: Jos Rutledge MD       Hospital Day: 1    Assessment and Plan:   Lea Roman is a 63 y.o. female with a pmh of uncontrolled type 2 diabetes mellitus who presents with Hyperglycemia due to diabetes mellitus [facility]    Hospital Problems             Last Modified POA    * (Principal) Hyperglycemia due to diabetes mellitus ([facility] [address] 2023 Yes       Hyerglycemic HypoerOsmolar NonKetosis  Hyponatremia  Non anion gap metabolic acidosis   insulin-dependent type 2 diabetes mellitus   Lab work notable for blood glucose 521, VBG pH 7.39, serum bicarbonate of 22, anion gap 11, urine noted to be negative for ketones, ammonia 90, beta hydroxybutyrate 3.5   Case discussed with Dr. Steph Cintron, lab work and exam consistent hyperosmolar hyperglycemic state, recommend initiation of insulin drip and consult to critical care for admission to ICU.   Patient evaluated by critical care and feel patient can be appropriately managed in stepdown, will admit to hospital stepdown   Consult to endocrinology, Dr. Kenny Chavarria, appreciate assist   Corrected sodium 134  Initiate 0.45 at 125 cc/h              IVF hydration              BMP q6h, replace electrolytes as needed   Antiemetics PRN              Start glargine 20 units nightly    Pseudohyponatremia   Corrected Na 134          Strokelike symptoms  Altered Mental Status    Stroke Alert Called in ED    Last Known Well 2100 NIH 1   Not a TPA Cannidate due to low NIH, thought secondary to hyperglycemia   -CT head negative for acute intercranial abnormality, CTA head or neck negative for high-grade stenosis              MRI brain pending  Echocardiogram ordered  Check lipid panel, hemoglobin A1c  Aspirin 81 mg po qd, Statin initiated  Consult neurology              Neuro checks q4 hours    Permissive HTN- Hold home toprol XL - PRN Labetolol for SBP > 220   Telemetro  monitoring   Physical/ Occupational Therapy consults   Speech therapy consult   Trend Labs        Sepsis in the setting of hyperglycemia, HHS  Patient withlactic acidosis, tachycardia and tachypnea   Sepsis fluid bolus given in ER then maintenance IVF  Lactate 2.8, trending down with fluid  Blood cultures x2, send UA urine culture, check urine for strep and Legionella  She is on vancomycin and cefepime, low threshold to de-escalate as likely inflammatory in the setting of hyperglycemic hyper osmolar state   Denies fevers, chills, white count 6.8   Urine pending      Alcoholic cirrhosis of liver without ascites  Elevated ammonia  Hepatic encephalopathy  Hepatitis   Increase lactulose to 3 times daily   Review medications for hepatotoxicity   Trend ammonia levels  - -Consider GI consult if failure to improve   Continue home for Vired   Head CT negative for acute intercranial process, neurochecks. Tele monitoring.                -Suspect 2/2 toxins/medications/polypharmacy/medication side effects. Hold sedating and hypnotic medications. Hold narcotic medications. Consider steroids as cause. Consider benzos, alcohol, narcotics, sleep aids as causes. Neurochecks. l              -Consult neurology for evaluation              -Hold home Toprol-XL, to allow higher BP,, see above    Continue home medications for chronic medical conditions unless otherwise noted above including but not limited to  COPD  CAD  Mixed hyperlipidemia and  Schizophrenia  Depression  GERD  Essential hypertension  Tobacco abuse,  Thrombocytopenia    Disposition:   Current Living situation: Home  Expected Disposition: Same  Estimated D/C: 5 days    Diet Diet NPO   DVT Prophylaxis [] Lovenox, [x]  Heparin, [] SCDs, [] Ambulation,  [] Eliquis, [] Xarelto, [] Coumadin   Code Status Prior   Surrogate Decision Maker/ POA      History from:     patient, electronic medical record    History of Present Illness:     Chief Complaint: Malou Mcelroy is a 63 y.o. female with a history of CAD, schizophrenia, hepatitis C, thrombocytopenia, tobacco abuse, hepatitis who presented to the ED for complaints of elevated blood glucose, slurred speech and being off balance. Last 1 well was 9 PM last night. Patient states she went to bed at 9 PM last night and felt baseline health. She states she woke this morning and noticed her speech was not clear. She states she also felt off balance when walking. She took her blood glucose on a reader and measured high multiple times. Review of systems limited due to patient's somnolence,. She does wake up and follow commands x3     Review of Systems: Need 10 Elements   Review of Systems   Constitutional:  Positive for activity change and fatigue. Endocrine:        High blood sugars   Musculoskeletal:  Positive for gait problem. Neurological:  Positive for dizziness and speech difficulty. Objective:   No intake or output data in the 24 hours ending 02/06/23 1747   Vitals:   Vitals:    02/06/23 1532 02/06/23 1544 02/06/23 1602 02/06/23 1631   BP: 101/69 105/68 109/71 97/65   Pulse: 94 93 96 96   Resp: 20 19 19 17   Temp:       TempSrc:       SpO2: 94% 95% 95% 94%   Weight:           Medications Prior to Admission     Prior to Admission medications    Medication Sig Start Date End Date Taking? Authorizing Provider   cyclobenzaprine (FLEXERIL) 10 MG tablet Take 10 mg by mouth 3 times daily as needed 1/26/23   Historical Provider, MD   traMADol (ULTRAM) 50 MG tablet Take 50 mg by mouth every 4 hours as needed for Pain.  1/26/23   Historical Provider, MD   QUEtiapine (SEROQUEL) 100 MG tablet Take 100 mg by mouth nightly 02/06/23 Patient states she only takes 100 mg at [employer] 1/10/23   Historical Provider, MD   furosemide (LASIX) 20 MG tablet Take 1 tablet by mouth daily Hold if systolic <522 9/73/03   Liliana Estrella MD   pantoprazole (PROTONIX) 40 MG tablet Take 1 tablet by mouth 2 times daily (before meals) 12/23/22   Coco Dougherty MD   insulin lispro, 1 Unit Dial, (HUMALOG KWIKPEN) 100 UNIT/ML SOPN 10U insulin befor meals 3x/day    Take insulin according to glucose check 2 hours after meals:   0  140-199 3  200-249 6  250-299 9  300-349 12  350-400 15  >400 18    Take insulin according to glucose check before sleep:   0  140-199 2  200-249 3  250-299 5  300-349 6  350-400 7  >400 9  Patient taking differently: Inject into the skin See Admin Instructions 02/06/23 patient states she is on sliding scale  Take insulin according to glucose check 2 hours after meals:   0  140-199 3  200-249 6  250-299 9  300-349 12  350-400 15  >400 18 12/23/22   Coco Dougherty MD   Multiple Vitamin (MULTIVITAMIN PO) Take 1 tablet by mouth daily    Historical Provider, MD   VITAMIN D PO Take 1 capsule by mouth 2 times daily    Historical Provider, MD   calcium carbonate (OYSTER SHELL CALCIUM 500 MG) 1250 (500 Ca) MG tablet Take 1 tablet by mouth daily    Historical Provider, MD   albuterol sulfate HFA (PROVENTIL HFA) 108 (90 Base) MCG/ACT inhaler Inhale 2 puffs into the lungs every 4 hours as needed for Wheezing or Shortness of Breath With spacer (and mask if indicated). Thanks. 11/30/22 1/27/23  Cloretta Marleni, APRN - CNP   mupirocin (BACTROBAN) 2 % ointment Apply topically 3 times daily. 11/9/22   Rj Eduardo MD   blood glucose monitor kit and supplies Use 3-4 times daily 11/8/22   Jacob Oliveira MD   Lancets MISC Use one lancet 4 times daily 11/8/22   Jacob Oliveira MD   raloxifene (EVISTA) 60 MG tablet Take 1 tablet by mouth daily 11/7/22   Cynthia Glover MD   mirabegron [facility]) 25 MG TB24 Take 1 tablet by mouth daily 9/22/22   Cynthia Glover MD   estradiol (ESTRACE VAGINAL) 0.1 MG/GM vaginal cream Place 1 g vaginally three times a week Use 1 g vaginally nightly for 2 weeks, then 1 g nightly 2-3 times per week.  9/21/22   Antolin Gates MD Ade   ranolazine (RANEXA) 500 MG extended release tablet Take 1 tablet by mouth in the morning and 1 tablet before bedtime. 8/5/22   James Moss MD   metoprolol succinate (TOPROL XL) 50 MG extended release tablet Take 1 tablet by mouth in the morning. 7/21/22   James Moss MD   ipratropium-albuterol (DUONEB) 0.5-2.5 (3) MG/3ML SOLN nebulizer solution Inhale 3 mLs into the lungs every 4 hours 6/13/22   Avery Floyd MD   nitroGLYCERIN (NITROSTAT) 0.4 MG SL tablet Place 1 tablet under the tongue every 5 minutes as needed for Chest pain 5/18/22   James Moss MD   ondansetron (ZOFRAN ODT) 4 MG disintegrating tablet Take 1 tablet by mouth every 8 hours as needed for Nausea 1/31/22   Kaiden Mcknight MD   blood glucose monitor strips Test  Bid times a day & as needed for symptoms of irregular blood glucose. 1/13/22   Treasure Crawford MD   Glucosource Lancets MISC Use one 3-4 times to check blood sugar 2/10/21   Treasure Crawford MD   lactulose (CHRONULAC) 10 GM/15ML solution Take 30 mLs by mouth 3 times daily  Patient taking differently: Take 30 mLs by mouth 2 times daily 9/15/20   Criss Patterson MD   FLUoxetine (PROZAC) 20 MG capsule Take 30 mg by mouth daily Takes a 10 mg with a 20 mg to equal 30 mg daily 9/11/20   Historical Provider, MD   paliperidone (INVEGA) 6 MG extended release tablet Take 6 mg by mouth every morning 8/13/20   Historical Provider, MD   Compression Stockings MISC by Does not apply route 20 - 30 mmh wear daily and take off at night  Thigh High 5/21/20   ERLINDA Gardiner - CNP   glucose monitoring kit (FREESTYLE) monitoring kit 1 kit by Does not apply route daily 1/30/20   Treasure Crawford MD   Blood Glucose Monitoring Suppl (ACURA BLOOD GLUCOSE METER) w/Device KIT Please check blood sugar 3 times daily.  Please fill with what is covered by the insurance 1/21/20   Treasure Crawford MD   VIREAD 300 MG tablet Take 300 mg by mouth daily  11/20/17   Historical Provider, MD Physical Exam: Need 8 Elements   Physical Exam  Vitals and nursing note reviewed. Constitutional:       General: She is not in acute distress. Appearance: Normal appearance. HENT:      Head: Normocephalic. Nose: Nose normal.      Mouth/Throat:      Pharynx: Oropharynx is clear. Eyes:      Pupils: Pupils are equal, round, and reactive to light. Cardiovascular:      Rate and Rhythm: Normal rate and regular rhythm. Pulses: Normal pulses. Pulmonary:      Effort: Pulmonary effort is normal. No respiratory distress. Breath sounds: No wheezing or rhonchi. Abdominal:      General: Abdomen is flat. Bowel sounds are normal. There is no distension. Musculoskeletal:         General: Normal range of motion. Cervical back: Normal range of motion. Skin:     General: Skin is warm and dry. Capillary Refill: Capillary refill takes less than 2 seconds. Neurological:      General: No focal deficit present. Mental Status: She is oriented to person, place, and time. She is lethargic. Cranial Nerves: Cranial nerves 2-12 are intact. No cranial nerve deficit, dysarthria or facial asymmetry. Sensory: Sensation is intact. Motor: Weakness present. Comments: Alert and oriented x4, face symmetrical, no obvious droop, speech clear and coherent, no pronator drift, no significant finger to nose dysmetria, sensation intact to light touch and pinprick sensation.    Upper extremity strength: Bilateral upper extremities 5/5  negative Harry's bilaterally  Lower extremity strength: Bilateral lower extremities 5/5 throughout, , no clonus detected NIH 0     Psychiatric:         Mood and Affect: Mood normal.          Past Medical History:   PMHx   Past Medical History:   Diagnosis Date    Abnormal Pap smear of cervix     Acid reflux     Arthritis     left knee    Breast cyst     CAD (coronary artery disease)     per [facility] 9/6/13 - Dr. Fouzia Allan    COPD (chronic obstructive pulmonary disease) ([facility] [address]     follow with Dr Rula Perez    Depression     Curt Smith manic - depression see Dr Brisa Gomes    Diabetes mellitus [facility])     dx 10+ yrs ago- follows with PCP    [facility]     hx use of cocaine, heroin and marijuana- states last used 12/2014    Glaucoma     bilateral    H/O Doppler lower venous ultrasound 04/04/2019    No DVT or SVT, Significant reflux of RGSV and LGSV. H/O echocardiogram 12/18/2020    EF 55-60%, Mod LVH. Hepatic encephalopathy 05/2019    Hepatitis C     for liver bx 12/3/2015\"Have Hepatitis B and C and saw Dr Anahi Pratt for this 12/1/2015\"    Hiatal hernia     History of alcohol abuse     History of exercise stress test 01/02/2020    Treadmill, Normal exercise performance without angina and ischemic EKG changes. HTN (hypertension)     \"for the past two yrs on medication\" follows with Dr Quita Mcdowell    Hx of blood clots 2019    Portal vein thrombsis - TIPS procedure 4/23/19    Hyperlipemia     Irregular heart beat     per pt    Liver hematoma     Migraine     Migraines     Last migraine:  2018    Nausea & vomiting     Neurologic disorder     Schizophrenia [facility])     per old chart    Seizures ([facility]     \"last one was 9/2015- saw Dr Noemi Long at [facility]- she said not sure if acutal seizures- she thinks they are panic or anxiety attacks\"    SOB (shortness of breath)     with any exertion    Vulvar mass      PSHX:  has a past surgical history that includes Cholecystectomy (per old chart done 1985); Tonsillectomy (as a kid); Breast surgery (10/2015); Endoscopy, colon, diagnostic (03/16/2017); Upper gastrointestinal endoscopy (N/A, 11/14/2018); TIPS procedure (04/23/2019); Upper gastrointestinal endoscopy (N/A, 06/05/2019); Colonoscopy (03/11/2013); Colonoscopy (03/16/2017); eye surgery (Bilateral, ? when); Hysterectomy; Carpal tunnel release (Left, 01/09/2020); Carpal tunnel release (Right, 05/26/2020); Carpal tunnel release (Right, 05/26/2020);  Finger trigger release (Right, 05/26/2020); Cardiac catheterization; Colonoscopy (N/A, 05/17/2022); Salpingo-oophorectomy; and Vulvectomy (N/A, 11/9/2022). Allergies: Allergies   Allergen Reactions    Hydrocodone-Acetaminophen Anaphylaxis    Norco [Hydrocodone-Acetaminophen] Itching     Itching, rash, nausea and vomiting. Tylenol [Acetaminophen] Itching, Nausea And Vomiting and Rash     Fam HX:  family history includes Arthritis in her father and mother; Breast Cancer in her maternal aunt, maternal aunt, maternal aunt, and maternal aunt; Cancer in her father and mother; Diabetes in her father; Heart Disease in her brother; High Blood Pressure in her father; High Cholesterol in her father; Migraines in her father, mother, and sister; Ovarian Cancer in her mother.   Soc HX:   Social History     Socioeconomic History    Marital status:      Spouse name: None    Number of children: None    Years of education: None    Highest education level: None   Tobacco Use    Smoking status: Every Day     Packs/day: 0.25     Years: 45.00     Pack years: 11.25     Types: Cigarettes     Start date: 1974     Passive exposure: Past    Smokeless tobacco: Never   Vaping Use    Vaping Use: Never used   Substance and Sexual Activity    Alcohol use: Not Currently     Comment: None/Caffiene - 1 cup of coffee/day    Drug use: Not Currently     Types: Marijuana Jose Bolanos)    Sexual activity: Not Currently     Partners: Male     Social Determinants of Health     Financial Resource Strain: Medium Risk    Difficulty of Paying Living Expenses: Somewhat hard   Food Insecurity: Food Insecurity Present    Worried About Running Out of Food in the Last Year: Sometimes true    Ran Out of Food in the Last Year: Sometimes true   Transportation Needs: Unmet Transportation Needs    Lack of Transportation (Medical): Yes    Lack of Transportation (Non-Medical): Yes   Physical Activity: Inactive    Days of Exercise per Week: 0 days    Minutes of Exercise per Session: 0 min Stress: No Stress Concern Present    Feeling of Stress : Only a little   Social Connections: Moderately Integrated    Frequency of Communication with Friends and Family: Three times a week    Frequency of Social Gatherings with Friends and Family: Twice a week    Attends Yazidism Services: More than 4 times per year    Active Member of Clubs or Organizations: Yes    Attends Club or Organization Meetings: More than 4 times per year    Marital Status:    Housing Stability: Low Risk     Unable to Pay for Housing in the Last Year: No    Number of Places Lived in the Last Year: 1    Unstable Housing in the Last Year: No       Medications:   Medications:    lactulose  20 g Oral TID    insulin glargine  0.25 Units/kg SubCUTAneous Nightly    insulin lispro  0-16 Units SubCUTAneous Q4H      Infusions:    sodium chloride       PRN Meds:      Labs      CBC:   Recent Labs     02/06/23  1117   WBC 6.8   HGB 15.2   PLT 71*     BMP:    Recent Labs     02/06/23  1117 02/06/23  1123 02/06/23  1541   *  --   --    K 4.7  --   --    CL 94*  --   --    CO2 22  --   --    BUN 26*  --   --    CREATININE 1.0  --   --    GLUCOSE 521* 512 421     Hepatic:   Recent Labs     02/06/23  1117   AST 21   ALT 15   BILITOT 1.1*   ALKPHOS 216*     Lipids:   Lab Results   Component Value Date/Time    CHOL 98 11/27/2019 01:52 PM    CHOL 120 08/01/2017 04:15 PM    HDL 38 11/27/2019 01:52 PM    TRIG 96 11/27/2019 01:52 PM     Hemoglobin A1C:   Lab Results   Component Value Date/Time    LABA1C 9.3 12/22/2022 03:30 AM     TSH:   Lab Results   Component Value Date/Time    TSH 1.73 08/01/2017 04:15 PM     Troponin:   Lab Results   Component Value Date/Time    TROPONINT <0.010 02/06/2023 11:17 AM    TROPONINT <0.010 01/13/2023 02:40 PM    TROPONINT <0.010 12/20/2022 09:38 PM     Lactic Acid: No results for input(s): LACTA in the last 72 hours. BNP: No results for input(s): PROBNP in the last 72 hours.   UA:  Lab Results   Component Value Date/Time    NITRU NEGATIVE 02/06/2023 12:57 PM    NITRU Negative 09/20/2022 11:09 AM    COLORU YELLOW 02/06/2023 12:57 PM    PHUR 5.5 09/20/2022 11:09 AM    WBCUA <1 02/06/2023 12:57 PM    RBCUA 9 02/06/2023 12:57 PM    MUCUS RARE 01/13/2023 03:05 PM    TRICHOMONAS NONE SEEN 02/06/2023 12:57 PM    YEAST OCCASIONAL 02/06/2023 12:57 PM    BACTERIA NEGATIVE 02/06/2023 12:57 PM    CLARITYU CLEAR 02/06/2023 12:57 PM    SPECGRAV <1.005 02/06/2023 12:57 PM    LEUKOCYTESUR NEGATIVE 02/06/2023 12:57 PM    UROBILINOGEN 0.2 02/06/2023 12:57 PM    BILIRUBINUR NEGATIVE 02/06/2023 12:57 PM    BILIRUBINUR neg 07/15/2017 10:58 AM    BLOODU MODERATE NUMBER OR AMOUNT OBSERVED 02/06/2023 12:57 PM    GLUCOSEU 500 09/20/2022 11:09 AM    KETUA NEGATIVE 02/06/2023 12:57 PM    AMORPHOUS RARE 05/01/2019 12:00 AM     Urine Cultures:   Lab Results   Component Value Date/Time    LABURIN >100,000 CFU/ml 09/20/2022 11:09 AM    LABURIN 50 mg/dL 01/30/2020 09:31 AM     Blood Cultures: No results found for: BC  No results found for: BLOODCULT2  Organism:   Lab Results   Component Value Date/Time    ORG Escherichia coli 09/20/2022 11:09 AM       Imaging/Diagnostics Last 24 Hours   CT HEAD WO CONTRAST    Result Date: 2/6/2023  EXAMINATION: CT OF THE HEAD WITHOUT CONTRAST  2/6/2023 11:27 am TECHNIQUE: CT of the head was performed without the administration of intravenous contrast. Automated exposure control, iterative reconstruction, and/or weight based adjustment of the mA/kV was utilized to reduce the radiation dose to as low as reasonably achievable. COMPARISON: None HISTORY: ORDERING SYSTEM PROVIDED HISTORY: Stroke Symptoms TECHNOLOGIST PROVIDED HISTORY: Has a \"code stroke\" or \"stroke alert\" been called? ->Yes Reason for exam:->Stroke Symptoms Decision Support Exception - unselect if not a suspected or confirmed emergency medical condition->Emergency Medical Condition (MA) Reason for Exam: Stroke Symptoms FINDINGS: BRAIN/VENTRICLES: There is no acute intracranial hemorrhage, mass effect or midline shift. No abnormal extra-axial fluid collection. The gray-white differentiation is maintained without evidence of an acute infarct. There is no evidence of hydrocephalus. ORBITS: The visualized portion of the orbits demonstrate no acute abnormality. SINUSES: The visualized paranasal sinuses and mastoid air cells demonstrate no acute abnormality. SOFT TISSUES/SKULL:  No acute abnormality of the visualized skull or soft tissues. No acute intracranial abnormality. The results were conveyed to nurse practitioner Eulogio Ross on 02/06/2023 at 11:37 a.m.     XR CHEST PORTABLE    Result Date: 2/6/2023  EXAMINATION: ONE XRAY VIEW OF THE CHEST 2/6/2023 11:48 am COMPARISON: 01/13/2023. HISTORY: ORDERING SYSTEM PROVIDED HISTORY: stroke symptoms TECHNOLOGIST PROVIDED HISTORY: Reason for exam:->stroke symptoms Reason for Exam: stroke symptoms FINDINGS: The heart size is within normal limits. The pulmonary vasculature is mildly congested. No acute infiltrates are seen. No pneumothoraces are noted. 1. Mild vascular congestion. CTA HEAD NECK W CONTRAST    Result Date: 2/6/2023  EXAMINATION: CTA OF THE HEAD AND NECK WITH CONTRAST 2/6/2023 11:31 am: TECHNIQUE: CTA of the head and neck was performed with the administration of intravenous contrast. Multiplanar reformatted images are provided for review. MIP images are provided for review. Stenosis of the internal carotid arteries measured using NASCET criteria. Automated exposure control, iterative reconstruction, and/or weight based adjustment of the mA/kV was utilized to reduce the radiation dose to as low as reasonably achievable. This scan was analyzed using Viz. ai contact LVO. Identification of suspected findings is not for diagnostic use beyond notification. Viz LVO is limited to analysis of imaging data and should not be used in-lieu of full patient evaluation or relied upon to make or confirm diagnosis. COMPARISON: Concurrent head CT HISTORY: ORDERING SYSTEM PROVIDED HISTORY: Stroke Symptoms FINDINGS: CTA NECK: AORTIC ARCH/ARCH VESSELS: No dissection or arterial injury. No significant stenosis of the brachiocephalic or subclavian arteries. CAROTID ARTERIES: No dissection, arterial injury, or hemodynamically significant stenosis by NASCET criteria. VERTEBRAL ARTERIES: No dissection, arterial injury, or significant stenosis. SOFT TISSUES: The lung apices are clear. Mild bronchial wall thickening suggesting bronchiolitis. No cervical or superior mediastinal lymphadenopathy. The larynx and pharynx are unremarkable. No acute abnormality of the salivary and thyroid glands. BONES: No acute osseous abnormality. CTA HEAD: ANTERIOR CIRCULATION: No significant stenosis of the intracranial internal carotid, anterior cerebral, or middle cerebral arteries. No aneurysm. POSTERIOR CIRCULATION: No significant stenosis of the vertebral, basilar, or posterior cerebral arteries. No aneurysm. OTHER: No dural venous sinus thrombosis on this non-dedicated study. BRAIN: No mass effect or midline shift. No extra-axial fluid collection. The gray-white differentiation is maintained. No apparent arterial high grade stenosis, occlusion or aneurysm within the head or neck.        Personally reviewed Lab Studies, Imaging, and discussed case with Dr Meghna Machado    Electronically signed by ERLINDA Reyes CNP on 2/6/2023 at 5:47 PM

## 2023-02-06 NOTE — ED NOTES
Call to [facility] stat line     Alin Langford, [address] [facility]  02/06/23 [id number]

## 2023-02-06 NOTE — PROGRESS NOTES
Consult received for admission to hospital.  Patient seen and examined. Patient somnolent, family at bedside endorses persistent blood glucose at home greater than 400 for the last week. .     On lab work patient notable for blood glucose 521, VBG pH 7.39, serum bicarbonate of 22, anion gap 11, urine noted to be negative for ketones, ammonia 90, beta hydroxybutyrate 3.5    Case discussed with Dr. Josef Villatoro, lab work and exam consistent hyperosmolar hyperglycemic state, recommend initiation of insulin drip and consult to critical care for admission to ICU.

## 2023-02-06 NOTE — ED NOTES
[facility] neurologist at bedside on robot     Lisa Wakefield, ABDIRIZAK  02/06/23 1387

## 2023-02-07 ENCOUNTER — CARE COORDINATION (OUTPATIENT)
Dept: CARE COORDINATION | Age: 63
End: 2023-02-07

## 2023-02-07 LAB
ALBUMIN SERPL-MCNC: 2.8 GM/DL (ref 3.4–5)
ALP BLD-CCNC: 113 IU/L (ref 40–128)
ALT SERPL-CCNC: 11 U/L (ref 10–40)
AMMONIA: 113 UMOL/L (ref 11–51)
ANION GAP SERPL CALCULATED.3IONS-SCNC: 8 MMOL/L (ref 4–16)
AST SERPL-CCNC: 15 IU/L (ref 15–37)
BASOPHILS ABSOLUTE: 0.1 K/CU MM
BASOPHILS RELATIVE PERCENT: 1 % (ref 0–1)
BILIRUB SERPL-MCNC: 1 MG/DL (ref 0–1)
BUN SERPL-MCNC: 20 MG/DL (ref 6–23)
CALCIUM SERPL-MCNC: 8.1 MG/DL (ref 8.3–10.6)
CHLORIDE BLD-SCNC: 104 MMOL/L (ref 99–110)
CHOLEST SERPL-MCNC: 102 MG/DL
CO2: 26 MMOL/L (ref 21–32)
CREAT SERPL-MCNC: 0.9 MG/DL (ref 0.6–1.1)
DIFFERENTIAL TYPE: ABNORMAL
EOSINOPHILS ABSOLUTE: 0.3 K/CU MM
EOSINOPHILS RELATIVE PERCENT: 4.3 % (ref 0–3)
ESTIMATED AVERAGE GLUCOSE: 243 MG/DL
GFR SERPL CREATININE-BSD FRML MDRD: >60 ML/MIN/1.73M2
GLUCOSE BLD-MCNC: 102 MG/DL (ref 70–99)
GLUCOSE BLD-MCNC: 161 MG/DL (ref 70–99)
GLUCOSE BLD-MCNC: 244 MG/DL (ref 70–99)
GLUCOSE BLD-MCNC: 252 MG/DL (ref 70–99)
GLUCOSE BLD-MCNC: 255 MG/DL (ref 70–99)
GLUCOSE BLD-MCNC: 256 MG/DL (ref 70–99)
GLUCOSE BLD-MCNC: 95 MG/DL (ref 70–99)
GLUCOSE SERPL-MCNC: 86 MG/DL (ref 70–99)
HBA1C MFR BLD: 10.1 % (ref 4.2–6.3)
HCT VFR BLD CALC: 39.5 % (ref 37–47)
HDLC SERPL-MCNC: 34 MG/DL
HEMOGLOBIN: 13.9 GM/DL (ref 12.5–16)
IMMATURE NEUTROPHIL %: 0.2 % (ref 0–0.43)
LACTIC ACID, SEPSIS: 1.2 MMOL/L (ref 0.5–1.9)
LDLC SERPL CALC-MCNC: 52 MG/DL
LV EF: 58 %
LVEF MODALITY: NORMAL
LYMPHOCYTES ABSOLUTE: 1.9 K/CU MM
LYMPHOCYTES RELATIVE PERCENT: 32.5 % (ref 24–44)
MAGNESIUM: 1.9 MG/DL (ref 1.8–2.4)
MCH RBC QN AUTO: 32 PG (ref 27–31)
MCHC RBC AUTO-ENTMCNC: 35.2 % (ref 32–36)
MCV RBC AUTO: 90.8 FL (ref 78–100)
MONOCYTES ABSOLUTE: 0.8 K/CU MM
MONOCYTES RELATIVE PERCENT: 13 % (ref 0–4)
NUCLEATED RBC %: 0 %
PDW BLD-RTO: 13.2 % (ref 11.7–14.9)
PHOSPHORUS: 2.6 MG/DL (ref 2.5–4.9)
PLATELET # BLD: 74 K/CU MM (ref 140–440)
PMV BLD AUTO: 12.2 FL (ref 7.5–11.1)
POTASSIUM SERPL-SCNC: 3.9 MMOL/L (ref 3.5–5.1)
RBC # BLD: 4.35 M/CU MM (ref 4.2–5.4)
SEGMENTED NEUTROPHILS ABSOLUTE COUNT: 2.8 K/CU MM
SEGMENTED NEUTROPHILS RELATIVE PERCENT: 49 % (ref 36–66)
SODIUM BLD-SCNC: 138 MMOL/L (ref 135–145)
T4 FREE SERPL-MCNC: 1.1 NG/DL (ref 0.9–1.8)
TOTAL IMMATURE NEUTOROPHIL: 0.01 K/CU MM
TOTAL NUCLEATED RBC: 0 K/CU MM
TOTAL PROTEIN: 5.2 GM/DL (ref 6.4–8.2)
TRIGL SERPL-MCNC: 81 MG/DL
TROPONIN T: <0.01 NG/ML
TSH SERPL DL<=0.005 MIU/L-ACNC: 1.15 UIU/ML (ref 0.27–4.2)
WBC # BLD: 5.8 K/CU MM (ref 4–10.5)

## 2023-02-07 PROCEDURE — 80053 COMPREHEN METABOLIC PANEL: CPT

## 2023-02-07 PROCEDURE — 83605 ASSAY OF LACTIC ACID: CPT

## 2023-02-07 PROCEDURE — 6360000002 HC RX W HCPCS: Performed by: INTERNAL MEDICINE

## 2023-02-07 PROCEDURE — 97162 PT EVAL MOD COMPLEX 30 MIN: CPT

## 2023-02-07 PROCEDURE — 83735 ASSAY OF MAGNESIUM: CPT

## 2023-02-07 PROCEDURE — 2060000000 HC ICU INTERMEDIATE R&B

## 2023-02-07 PROCEDURE — 99223 1ST HOSP IP/OBS HIGH 75: CPT | Performed by: PSYCHIATRY & NEUROLOGY

## 2023-02-07 PROCEDURE — 92610 EVALUATE SWALLOWING FUNCTION: CPT

## 2023-02-07 PROCEDURE — 6370000000 HC RX 637 (ALT 250 FOR IP): Performed by: INTERNAL MEDICINE

## 2023-02-07 PROCEDURE — 84443 ASSAY THYROID STIM HORMONE: CPT

## 2023-02-07 PROCEDURE — 82962 GLUCOSE BLOOD TEST: CPT

## 2023-02-07 PROCEDURE — 85025 COMPLETE CBC W/AUTO DIFF WBC: CPT

## 2023-02-07 PROCEDURE — 6360000002 HC RX W HCPCS: Performed by: NURSE PRACTITIONER

## 2023-02-07 PROCEDURE — 36415 COLL VENOUS BLD VENIPUNCTURE: CPT

## 2023-02-07 PROCEDURE — 94761 N-INVAS EAR/PLS OXIMETRY MLT: CPT

## 2023-02-07 PROCEDURE — 93306 TTE W/DOPPLER COMPLETE: CPT

## 2023-02-07 PROCEDURE — 97530 THERAPEUTIC ACTIVITIES: CPT

## 2023-02-07 PROCEDURE — 84484 ASSAY OF TROPONIN QUANT: CPT

## 2023-02-07 PROCEDURE — 6370000000 HC RX 637 (ALT 250 FOR IP): Performed by: NURSE PRACTITIONER

## 2023-02-07 PROCEDURE — 84439 ASSAY OF FREE THYROXINE: CPT

## 2023-02-07 PROCEDURE — 80048 BASIC METABOLIC PNL TOTAL CA: CPT

## 2023-02-07 PROCEDURE — 82140 ASSAY OF AMMONIA: CPT

## 2023-02-07 PROCEDURE — 80061 LIPID PANEL: CPT

## 2023-02-07 PROCEDURE — 2700000000 HC OXYGEN THERAPY PER DAY

## 2023-02-07 PROCEDURE — 2580000003 HC RX 258: Performed by: INTERNAL MEDICINE

## 2023-02-07 PROCEDURE — 84100 ASSAY OF PHOSPHORUS: CPT

## 2023-02-07 PROCEDURE — 2580000003 HC RX 258: Performed by: NURSE PRACTITIONER

## 2023-02-07 RX ORDER — ENOXAPARIN SODIUM 100 MG/ML
40 INJECTION SUBCUTANEOUS EVERY EVENING
Status: DISCONTINUED | OUTPATIENT
Start: 2023-02-07 | End: 2023-02-08 | Stop reason: HOSPADM

## 2023-02-07 RX ORDER — ATORVASTATIN CALCIUM 40 MG/1
40 TABLET, FILM COATED ORAL NIGHTLY
Status: DISCONTINUED | OUTPATIENT
Start: 2023-02-07 | End: 2023-02-08 | Stop reason: HOSPADM

## 2023-02-07 RX ORDER — INSULIN GLARGINE 100 [IU]/ML
15 INJECTION, SOLUTION SUBCUTANEOUS NIGHTLY
Status: DISCONTINUED | OUTPATIENT
Start: 2023-02-07 | End: 2023-02-08

## 2023-02-07 RX ORDER — INSULIN LISPRO 100 [IU]/ML
10 INJECTION, SOLUTION INTRAVENOUS; SUBCUTANEOUS
Status: DISCONTINUED | OUTPATIENT
Start: 2023-02-08 | End: 2023-02-08 | Stop reason: HOSPADM

## 2023-02-07 RX ORDER — INSULIN LISPRO 100 [IU]/ML
0-16 INJECTION, SOLUTION INTRAVENOUS; SUBCUTANEOUS
Status: DISCONTINUED | OUTPATIENT
Start: 2023-02-07 | End: 2023-02-08 | Stop reason: HOSPADM

## 2023-02-07 RX ORDER — METOPROLOL SUCCINATE 50 MG/1
50 TABLET, EXTENDED RELEASE ORAL DAILY
Status: DISCONTINUED | OUTPATIENT
Start: 2023-02-07 | End: 2023-02-08 | Stop reason: HOSPADM

## 2023-02-07 RX ORDER — INSULIN LISPRO 100 [IU]/ML
0-16 INJECTION, SOLUTION INTRAVENOUS; SUBCUTANEOUS
Status: DISCONTINUED | OUTPATIENT
Start: 2023-02-08 | End: 2023-02-08 | Stop reason: HOSPADM

## 2023-02-07 RX ORDER — TRAMADOL HYDROCHLORIDE 50 MG/1
50 TABLET ORAL EVERY 4 HOURS PRN
Status: DISCONTINUED | OUTPATIENT
Start: 2023-02-07 | End: 2023-02-08 | Stop reason: HOSPADM

## 2023-02-07 RX ADMIN — INSULIN LISPRO 8 UNITS: 100 INJECTION, SOLUTION INTRAVENOUS; SUBCUTANEOUS at 21:58

## 2023-02-07 RX ADMIN — METOPROLOL SUCCINATE 50 MG: 50 TABLET, EXTENDED RELEASE ORAL at 16:40

## 2023-02-07 RX ADMIN — SODIUM CHLORIDE: 4.5 INJECTION, SOLUTION INTRAVENOUS at 09:23

## 2023-02-07 RX ADMIN — TROSPIUM CHLORIDE 20 MG: 20 TABLET, FILM COATED ORAL at 20:10

## 2023-02-07 RX ADMIN — CEFEPIME HYDROCHLORIDE 2000 MG: 2 INJECTION, POWDER, FOR SOLUTION INTRAVENOUS at 00:10

## 2023-02-07 RX ADMIN — TRAMADOL HYDROCHLORIDE 50 MG: 50 TABLET, COATED ORAL at 20:24

## 2023-02-07 RX ADMIN — INSULIN LISPRO 8 UNITS: 100 INJECTION, SOLUTION INTRAVENOUS; SUBCUTANEOUS at 13:59

## 2023-02-07 RX ADMIN — CEFEPIME HYDROCHLORIDE 2000 MG: 2 INJECTION, POWDER, FOR SOLUTION INTRAVENOUS at 20:18

## 2023-02-07 RX ADMIN — CEFEPIME HYDROCHLORIDE 2000 MG: 2 INJECTION, POWDER, FOR SOLUTION INTRAVENOUS at 13:15

## 2023-02-07 RX ADMIN — INSULIN GLARGINE 15 UNITS: 100 INJECTION, SOLUTION SUBCUTANEOUS at 20:10

## 2023-02-07 RX ADMIN — INSULIN LISPRO 8 UNITS: 100 INJECTION, SOLUTION INTRAVENOUS; SUBCUTANEOUS at 16:41

## 2023-02-07 RX ADMIN — VANCOMYCIN HYDROCHLORIDE 1500 MG: 5 INJECTION, POWDER, LYOPHILIZED, FOR SOLUTION INTRAVENOUS at 16:18

## 2023-02-07 RX ADMIN — LACTULOSE 20 G: 10 SOLUTION ORAL at 09:19

## 2023-02-07 RX ADMIN — RANOLAZINE 500 MG: 500 TABLET, FILM COATED, EXTENDED RELEASE ORAL at 09:17

## 2023-02-07 RX ADMIN — SODIUM CHLORIDE, PRESERVATIVE FREE 10 ML: 5 INJECTION INTRAVENOUS at 20:11

## 2023-02-07 RX ADMIN — PANTOPRAZOLE SODIUM 40 MG: 40 TABLET, DELAYED RELEASE ORAL at 06:10

## 2023-02-07 RX ADMIN — SODIUM CHLORIDE, PRESERVATIVE FREE 10 ML: 5 INJECTION INTRAVENOUS at 09:19

## 2023-02-07 RX ADMIN — PALIPERIDONE 6 MG: 1.5 TABLET, EXTENDED RELEASE ORAL at 09:18

## 2023-02-07 RX ADMIN — RANOLAZINE 500 MG: 500 TABLET, FILM COATED, EXTENDED RELEASE ORAL at 20:42

## 2023-02-07 RX ADMIN — INSULIN LISPRO 4 UNITS: 100 INJECTION, SOLUTION INTRAVENOUS; SUBCUTANEOUS at 20:10

## 2023-02-07 RX ADMIN — PANTOPRAZOLE SODIUM 40 MG: 40 TABLET, DELAYED RELEASE ORAL at 16:45

## 2023-02-07 RX ADMIN — ATORVASTATIN CALCIUM 40 MG: 40 TABLET, FILM COATED ORAL at 20:10

## 2023-02-07 RX ADMIN — ENOXAPARIN SODIUM 40 MG: 100 INJECTION SUBCUTANEOUS at 16:40

## 2023-02-07 RX ADMIN — QUETIAPINE FUMARATE 100 MG: 100 TABLET ORAL at 20:10

## 2023-02-07 RX ADMIN — LACTULOSE 20 G: 10 SOLUTION ORAL at 13:13

## 2023-02-07 RX ADMIN — LACTULOSE 20 G: 10 SOLUTION ORAL at 20:11

## 2023-02-07 RX ADMIN — FLUOXETINE 30 MG: 20 CAPSULE ORAL at 09:17

## 2023-02-07 RX ADMIN — TENOFOVIR DISOPROXIL FUMARATE 300 MG: 300 TABLET, COATED ORAL at 09:18

## 2023-02-07 ASSESSMENT — ENCOUNTER SYMPTOMS
STRIDOR: 0
CHOKING: 0
BACK PAIN: 0
WHEEZING: 0
BLOOD IN STOOL: 0
ABDOMINAL DISTENTION: 0
ABDOMINAL PAIN: 0
CHEST TIGHTNESS: 0
DIARRHEA: 0
CONSTIPATION: 0
SHORTNESS OF BREATH: 0
EYE PAIN: 0
COUGH: 0
VOMITING: 0
NAUSEA: 0

## 2023-02-07 ASSESSMENT — PAIN SCALES - WONG BAKER

## 2023-02-07 ASSESSMENT — PAIN SCALES - GENERAL: PAINLEVEL_OUTOF10: 7

## 2023-02-07 ASSESSMENT — PAIN DESCRIPTION - LOCATION: LOCATION: BACK;GENERALIZED

## 2023-02-07 ASSESSMENT — PAIN - FUNCTIONAL ASSESSMENT: PAIN_FUNCTIONAL_ASSESSMENT: ACTIVITIES ARE NOT PREVENTED

## 2023-02-07 NOTE — CONSULTS
Neurology Service Consult Note  [facility]   Patient Name: Lea Roman  : 1960        Subjective:   Reason for consult: Altered mental status  63 y.o. female with history of CAD, cirrhosis, hepatitis C, DM, thrombocytopenia, HLD, tobacco use, HLD, schizophrenia presenting to [place] [address] with complains of feeling off balance, slurred speech and hyperglycemia. Blood glucose was 521 on admission. Patient went to bed in her usual state of health and woke yesterday morning noticing her speech was not clear. Stroke alert was called and patient was evaluated by [facility] tele stroke team. Initial NIHSS 3. She was not deemed to be a candidate for tNK as she was outside of the window. Initial labs indicate lactic 2.8, now improved to 1.2, ammonia 90 and A1C 10.1. Chart was reviewed in detail. Patient was seen and assessed. She is sitting in chair at bedside. She is alert and oriented x 4. She tells me that she has been having a difficult time controlling her blood sugars at home and asked a family member to bring her to the ED. She did feel confused yesterday. Patient is on lactulose at home and states she has been taking it daily. She has been having bowel movements. Today she is feeling much better but not quite back to herself. She denies vision change, headache, or sensory change. She does report feeling mild weakness on her left side.        Past Medical History:   Diagnosis Date    Abnormal Pap smear of cervix     Acid reflux     Arthritis     left knee    Breast cyst     CAD (coronary artery disease)     per [address] [facility] - Dr. Hiram Carranza    COPD (chronic obstructive pulmonary disease) [facility])     follow with Dr Amish Hebert    Depression     Jacques Walls manic - depression see Dr Kim Hernández    Diabetes mellitus [facility])     dx 10+ yrs ago- follows with PCP    Drug abuse ([facility] [address]     hx use of cocaine, heroin and marijuana- states last used 2014    Glaucoma     bilateral    H/O Doppler lower venous ultrasound 04/04/2019    No DVT or SVT, Significant reflux of RGSV and LGSV. H/O echocardiogram 12/18/2020    EF 55-60%, Mod LVH. Hepatic encephalopathy 05/2019    Hepatitis C     for liver bx 12/3/2015\"Have Hepatitis B and C and saw Dr Janine Lutz for this 12/1/2015\"    Hiatal hernia     History of alcohol abuse     History of exercise stress test 01/02/2020    Treadmill, Normal exercise performance without angina and ischemic EKG changes.     HTN (hypertension)     \"for the past two yrs on medication\" follows with Dr Giselle Mar    Hx of blood clots 2019    Portal vein thrombsis - TIPS procedure 4/23/19    Hyperlipemia     Irregular heart beat     per pt    Liver hematoma     Migraine     Migraines     Last migraine:  2018    Nausea & vomiting     Neurologic disorder     [facility])     per old chart    Seizures ([address]     \"last one was 9/2015- saw Dr Kristel Ji at [facility]- she said not sure if acutal seizures- she thinks they are panic or anxiety attacks\"    SOB (shortness of breath)     with any exertion    Vulvar mass     :   Past Surgical History:   Procedure Laterality Date    BREAST SURGERY  10/2015    left breast bx    CARDIAC CATHETERIZATION      per old chart pt had cath done in 3/2011 and 9/2013    CARPAL TUNNEL RELEASE Left 01/09/2020    CARPAL TUNNEL RELEASE LEFT performed by Naomi Hamlin DO at [address] Right 05/26/2020    dr Dwight Patel, carpal tunnel trigger finger release    CARPAL TUNNEL RELEASE Right 05/26/2020    RIGHT CARPAL TUNNEL RELEASE performed by Naomi Hamlin DO at [address]  per old chart done 1985    COLONOSCOPY  03/11/2013    diverticulosis, cecal polyp    COLONOSCOPY  03/16/2017    Internal hemorrhoids- Dr. Kourtney Castro    COLONOSCOPY N/A 05/17/2022    COLONOSCOPY POLYPECTOMY SNARE/COLD BIOPSY performed by Hero Cifuetnes MD at [facility], COLON, DIAGNOSTIC  03/16/2017    EGD: Small esophageal varices, portal hypertensive gastropathy, reflux esophagitis, hiatal hernia    EYE SURGERY Bilateral ? when    cyst removal, cataracts w/lens replacement    FINGER TRIGGER RELEASE Right 05/26/2020    FINGER TRIGGER RELEASE RIGHT RING FINGER performed by Matthew Solorzano DO at [facility] OR    HYSTERECTOMY (CERVIX STATUS UNKNOWN)      per old chart pt had CHOLO/BSO 1986    SALPINGO-OOPHORECTOMY      TIPS PROCEDURE  04/23/2019    TONSILLECTOMY  as a kid    UPPER GASTROINTESTINAL ENDOSCOPY N/A 11/14/2018    EGD DIAGNOSTIC ONLY performed by Jerald Alvarenga MD at [facility] ENDOSCOPY    UPPER GASTROINTESTINAL ENDOSCOPY N/A 06/05/2019    EGD DIAGNOSTIC ONLY performed by Jerald Alvarenga MD at [facility] ENDOSCOPY    VULVECTOMY N/A 11/9/2022    PARTIAL VULVECTOMY performed by Rj Booker MD at [facility] OR     Medications:  Scheduled Meds:   insulin glargine  15 Units SubCUTAneous Nightly    FLUoxetine  30 mg Oral Daily    lactulose  20 g Oral TID    trospium  20 mg Oral Nightly    paliperidone  6 mg Oral QAM    pantoprazole  40 mg Oral BID AC    QUEtiapine  100 mg Oral Nightly    ranolazine  500 mg Oral BID    tenofovir disoproxil fumarate  300 mg Oral Daily    sodium chloride flush  5-40 mL IntraVENous 2 times per day    aspirin  81 mg Oral Daily    atorvastatin  80 mg Oral Nightly    insulin lispro  0-16 Units SubCUTAneous Q4H    cefepime  2,000 mg IntraVENous Q12H    vancomycin  1,500 mg IntraVENous Q24H     Continuous Infusions:   sodium chloride      dextrose      sodium chloride 150 mL/hr at 02/06/23 2122    dextrose 5 % and 0.45 % NaCl       PRN Meds:.albuterol sulfate HFA, sodium chloride flush, sodium chloride, glucose, dextrose bolus **OR** dextrose bolus, glucagon (rDNA), dextrose, ondansetron **OR** ondansetron, polyethylene glycol, dextrose bolus **OR** dextrose bolus, potassium chloride, magnesium sulfate, sodium phosphate IVPB **OR** sodium phosphate IVPB **OR** sodium phosphate IVPB, dextrose 5 % and 0.45 % NaCl    Allergies   Allergen Reactions     Hydrocodone-Acetaminophen Anaphylaxis    Norco [Hydrocodone-Acetaminophen] Itching     Itching, rash, nausea and vomiting. Tylenol [Acetaminophen] Itching, Nausea And Vomiting and Rash     Social History     Socioeconomic History    Marital status:      Spouse name: Not on file    Number of children: Not on file    Years of education: Not on file    Highest education level: Not on file   Occupational History    Not on file   Tobacco Use    Smoking status: Every Day     Packs/day: 0.25     Years: 45.00     Pack years: 11.25     Types: Cigarettes     Start date: 67     Passive exposure: Past    Smokeless tobacco: Never   Vaping Use    Vaping Use: Never used   Substance and Sexual Activity    Alcohol use: Not Currently     Comment: None/Caffiene - 1 cup of coffee/day    Drug use: Not Currently     Types: Marijuana Clydia Formica)    Sexual activity: Not Currently     Partners: Male   Other Topics Concern    Not on file   Social History Narrative    Not on file     Social Determinants of Health     Financial Resource Strain: Medium Risk    Difficulty of Paying Living Expenses: Somewhat hard   Food Insecurity: Food Insecurity Present    Worried About Running Out of Food in the Last Year: Sometimes true    Ran Out of Food in the Last Year: Sometimes true   Transportation Needs: Unmet Transportation Needs    Lack of Transportation (Medical): Yes    Lack of Transportation (Non-Medical): Yes   Physical Activity: Inactive    Days of Exercise per Week: 0 days    Minutes of Exercise per Session: 0 min   Stress: No Stress Concern Present    Feeling of Stress : Only a little   Social Connections: Moderately Integrated    Frequency of Communication with Friends and Family:  Three times a week    Frequency of Social Gatherings with Friends and Family: Twice a week    Attends Orthodoxy Services: More than 4 times per year    Active Member of [employer] or Organizations: Yes    Attends Club or Organization Meetings: More than 4 times per year    Marital Status:    Intimate Partner Violence: Not on file   Housing Stability: Low Risk     Unable to Pay for Housing in the Last Year: No    Number of Places Lived in the Last Year: 1    Unstable Housing in the Last Year: No      Family History   Problem Relation Age of Onset    Ovarian Cancer Mother     Cancer Mother         lung ca    Arthritis Mother     Migraines Mother     Cancer Father         colon ca    Diabetes Father     High Blood Pressure Father     Arthritis Father     High Cholesterol Father     Migraines Father     Migraines Sister     Heart Disease Brother         WPW    Breast Cancer Maternal Aunt     Breast Cancer Maternal Aunt     Breast Cancer Maternal Aunt     Breast Cancer Maternal Aunt          ROS (10 systems)  In addition to that documented in the HPI above, the additional ROS was obtained:  Constitutional: Denies fevers or chills  Eyes: Denies vision changes  ENMT: Denies sore throat  CV: Denies chest pain  Resp: Denies SOB  GI: Denies vomiting or diarrhea  : Denies painful urination  MSK: Denies recent trauma  Skin: Denies new rashes  Neuro: Denies new numbness or tingling or weakness  Endocrine: Denies unexpected weight loss  Heme: Denies bleeding disorders    Physical Exam:         Wt Readings from Last 3 Encounters:   02/07/23 178 lb 5.6 oz (80.9 kg)   02/07/23 178 lb 5.6 oz (80.9 kg)   01/27/23 172 lb (78 kg)     Temp Readings from Last 3 Encounters:   02/07/23 97.7 °F (36.5 °C) (Oral)   01/13/23 97.8 °F (36.6 °C) (Oral)   12/25/22 98 °F (36.7 °C)     BP Readings from Last 3 Encounters:   02/07/23 117/79   01/27/23 (!) 88/50   01/13/23 (!) 141/82     Pulse Readings from Last 3 Encounters:   02/07/23 95   01/27/23 66   01/13/23 85        Gen: A&O x 4, NAD, cooperative  HEENT: NC/AT, EOMI, PERRL, mmm,  neck supple, no meningeal signs  Heart: NSR/ST  Lungs: Respirations even and unlabored  Ext: no edema, no calf tenderness b/l  Psych: normal mood and affect  Skin: no rashes or lesions    NEUROLOGIC EXAM:    Mental Status: A&O to self, location, month and year, NAD, speech mildly slurred as patient is edentulous, language fluent, repetition and naming intact, follows commands appropriately    Cranial Nerve Exam:   CN II-XII:  PERRL, VFF, no nystagmus, no gaze paresis, sensation V1-V3 intact b/l, muscles of facial expression symmetric; hearing intact to conversational tone, palate elevates symmetrically, shoulder elevation symmetric and tongue protrudes midline with movement side to side. Motor Exam:       Strength 5/5 UE's/LE's b/l  Tone and bulk normal   No pronator drift    Deep Tendon Reflexes: 2/4 biceps, triceps, brachioradialis, patellar, and achilles b/l; flexor plantar responses b/l    Sensation: Intact light touch/pinprick/vibration UE's/LE's b/l    Coordination/Cerebellum:       Tremors--none      Rapidly alternating movements: no dysdiadochokinesia b/l                Heel-to-Shin: no dysmetria b/l      Finger-to-Nose: no dysmetria b/l    Gait and stance:      Gait: deferred      LABS:     Recent Labs     02/06/23  1117 02/06/23  1123 02/06/23  1541 02/06/23  2121 02/07/23  0214   WBC 6.8  --   --   --  5.8   *  --   --  136  135 138   K 4.7  --   --  3.5 3.9   CL 94*  --   --  104 104   CO2 22  --   --  26 26   BUN 26*  --   --   --  20   CREATININE 1.0  --   --   --  0.9   GLUCOSE 521* 512 421  --  86   INR 0.99  --   --   --   --          IMAGING:    CT head w/o contrast:  Impression   No acute intracranial abnormality. CTA head and neck:  Impression   No apparent arterial high grade stenosis, occlusion or aneurysm within the   head or neck. MRI brain w/o contrast:  Pending    TTE:  Completed, final read pending    All imaging was personally reviewed   ASSESSMENT/PLAN:   63year old female with hyperglycemia, hyperammoniemia and reports of altered mental status and speech changes. Today she is much improved. She is awake, alert and oriented x 4. Face is symmetric. Speech is mildly slurred, feel this is related to patient being edentulous. Language is fluent. Strength and sensation are intact in the upper and lower extremities. Altered mental status and speech changes likely secondary metabolic encephalopathy superimposed on  hyperglycemia, hyperammoniemia v lower suspicion for stroke. Neuro imaging as above - non acute  MRI brain pending  Continue aspirin and atorvastatin for secondary stroke prevention pending MRI results  Will decrease atorvastatin to 40 mg as patient has not been on statin prior  Lipid panel pending, A1C 10.1  Continue lactulose  Ammonia 90, repeat ammonia pending  PT/OT as recommended  's on admission, now improved - endocrinology following  Further recommendations pending MRI results. Patient was discussed with attending neurologist Dr. Suleman Castanon     Thank you for allowing us to participate in the care of your patient. If there are any questions regarding evaluation please feel free to contact us. ERLINDA Bansal - CNP, 2/7/2023       ------------------------------------    Attending Note:  I have rounded on this patient with Kaleb Ritter CNP. I have reviewed the chart and we have discussed this case in detail. The patient was seen and examined by myself. Pertinent labs and imaging have been personally reviewed. Our findings and impressions were discussed with the patient. I concur with the Nurse Practioner's assessment and plan. Metabolic encephalopathy in the setting of hyperglycemia and hyperammonemia. She is having improvement per her sister at bedside. MRI of the brain is pending at this time.     Yimi Monroy, DO 2/7/2023 7:35 PM

## 2023-02-07 NOTE — PROGRESS NOTES
Occupational Therapy      Per therapy triage process, the OT referral has been discharged. Per PT eval pt is ambulating and performing ADLs near baseline level and does not present w/ any skilled therapy needs. Please re-order in future if pt requires skilled therapy services.      Bud Walker OTR/L [id number]  12:08 GB,3/8/3291

## 2023-02-07 NOTE — PROGRESS NOTES
[address] [facility]  consulted by Fam COFFEY for monitoring and adjustment. Indication for treatment: Sepsis  Goal trough: Trough Goal: 15-20 mcg/mL  AUC/CECE: 400-600    Risk Factors for MRSA Identified:   Hospitalization within the past 90 days    Pertinent Laboratory Values:   Temp Readings from Last 3 Encounters:   02/07/23 98.4 °F (36.9 °C)   01/13/23 97.8 °F (36.6 °C) (Oral)   12/25/22 98 °F (36.7 °C)     Recent Labs     02/06/23  1117 02/07/23  0214   WBC 6.8 5.8       Recent Labs     02/06/23  1117 02/07/23  0214   BUN 26* 20   CREATININE 1.0 0.9       Estimated Creatinine Clearance: 70 mL/min (based on SCr of 0.9 mg/dL). Intake/Output Summary (Last 24 hours) at 2/7/2023 1540  Last data filed at 2/7/2023 1023  Gross per 24 hour   Intake 1260.51 ml   Output 650 ml   Net 610.51 ml       Pertinent Cultures:   Date    Source    Results  02/06   Blood    No growth  02/06   Sputum/Respiratory  pending      Vancomycin level:   TROUGH:  No results for input(s): VANCOTROUGH in the last 72 hours. RANDOM:  No results for input(s): VANCORANDOM in the last 72 hours. Assessment:  Patient admitted with hyperglycemia due to diabetes mellitus. Sepsis in the setting of hyperglycemia. Vancomycin was dosed by this service in 12/2022 with the last dose/frequency being Vancomycin 1500mg Q24h. SCr, BUN, and urine output: SCR close to baseline @0.9, UOP ok  Day(s) of therapy: 2  Vancomycin concentration:   2/8 - random @06:00    Plan:  Renal trends close to baseline. Continue vancomycin 1500mg ivpb q24h for a predicted AUC of 452 at steady state. Check the vanco level tomorrow morning  Pharmacy will continue to monitor patient and adjust therapy as indicated    Zena 3 02/08 @0600    Thank you for the consult. Vick Kennedy, [facility]  2/7/2023 3:40 PM

## 2023-02-07 NOTE — PROGRESS NOTES
Speech Language Pathology  Facility/Department: [facility] ICU STEPDOWN   CLINICAL BEDSIDE SWALLOW EVALUATION    NAME: Elena Silver  : 1960  MRN: [id number]    IMPRESSIONS: Elena Silver was referred for a bedside swallow evaluation after being admitted to [facility] with stroke like symptoms including slurred speech. Diagnoses of Ataxia, Hyperglycemia, Hyponatremia, and Type 2 diabetes mellitus with hyperglycemia, with long-term current use of insulin. Past medical history includes Abnormal Pap smear of cervix, Acid reflux, Arthritis, Breast cyst, CAD (coronary artery disease), COPD (chronic obstructive pulmonary disease) ([address] Depression, Diabetes mellitus ([address] Drug abuse ([address] Glaucoma, H/O Doppler lower venous ultrasound, H/O echocardiogram, Hepatic encephalopathy, Hepatitis C, Hiatal hernia, History of alcohol abuse, History of exercise stress test, HTN (hypertension), Hx of blood clots, Hyperlipemia, Irregular heart beat, Liver hematoma, Migraine, Migraines, Nausea & vomiting, Neurologic disorder, Schizophrenia ([address] Seizures ([address] SOB (shortness of breath), and Vulvar mass. Pt was seen for evaluation seated upright in bed, alert, cooperative, pleasant. Oral mechanism was [facility] with no asymmetry. Pt was edentulous. Pt was presented PO trials of thin liquid via cup/straw, puree, and regular solid. Oral stage was [facility] characterized by intact labial seal, adequate mastication, bolus manipulation/formation, and A-P transfer. Pharyngeal phase appears to be [facility] with adequate swallow initiation/laryngeal elevation. No s/s of aspiration noted across all trials. Speech/language/cognition screened and judged to be grossly [facility]. Recommend easy to chew diet and thin liquids. No further acute SLP needs identified. Recommendations/results d/w patient.         ADMISSION DATE: 2023  ADMITTING DIAGNOSIS: has Left arm pain; Type 2 diabetes mellitus with complication, with long-term current use of insulin ([address] Chronic obstructive pulmonary disease ([address] Tobacco abuse; Angina effort; Abnormal nuclear cardiac imaging test; Lung nodule; Chest pain; Dyslipidemia; Pancolitis ([address] Hematemesis without nausea; Alcoholic cirrhosis of liver without ascites ([address] Thrombocytopenia ([address] Periumbilical abdominal pain; History of colonic polyps; Abnormal findings on diagnostic imaging of abdomen; Nausea; Gastroesophageal reflux disease without esophagitis; Vitamin D deficiency; Left carpal tunnel syndrome; Right carpal tunnel syndrome; Esophageal hypertension; Candida esophagitis ([address] Colitis; Primary hypertension; GI bleed; Coronary-myocardial bridge; Type 2 diabetes mellitus with complication, with long-term current use of insulin ([address] SOB (shortness of breath); Portal vein thrombosis; Intractable nausea and vomiting; Abdominal pain; Acute GI bleeding; Chronic hepatitis C without hepatic coma ([address] Delayed gastric emptying; Restless legs; Acute colitis; Acute encephalopathy; S/P TIPS (transjugular intrahepatic portosystemic shunt); Cirrhosis of liver without ascites ([address] Acute upper GI bleed; Acute hepatic encephalopathy; 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  ONSET DATE: this admission    Recent Chest Xray/CT of Chest: see chart    Date of Eval: 2/7/2023  Evaluating Therapist: OLEG Rudd    Current Diet level:  Current Diet : NPO      Primary Complaint       Pain:  Pain Assessment  Pain Assessment: None - Denies Pain  Pain Level: 3  Lutz-Baker Pain Rating: No hurt  Patient's Stated Pain Goal: 1  Pain Location: Back, Generalized  Pain Descriptors: Aching  Functional Pain Assessment: Activities are not prevented  Pain Type: Acute pain, Chronic pain  Pain Frequency: Intermittent  Pain Onset: On-going  Non-Pharmaceutical Pain Intervention(s): Distraction, Repositioned, Rest  Response to Pain Intervention: Patient satisfied    Reason for Referral  Soraida Kennedy was referred for a bedside swallow evaluation to assess the efficiency of her swallow function, identify signs and symptoms of aspiration and make recommendations regarding safe dietary consistencies, effective compensatory strategies, and safe eating environment. Impression  Dysphagia Diagnosis: Swallow function appears WFL;No clinical indicators of dysphagia  Dysphagia Outcome Severity Scale: Level 6: Within functional limits/Modified independence     Treatment Plan  Requires SLP Intervention: No  Duration of Treatment: n/a          Recommended Diet and Intervention                   Compensatory Swallowing Strategies       Treatment/Goals  Short-term Goals  Timeframe for Short-term Goals: n/a    General  Chart Reviewed: Yes  Behavior/Cognition: Alert; Cooperative;Pleasant mood  Respiratory Status: Room air  Communication Observation: Functional  Follows Directions: Simple  Dentition: Edentulous  Patient Positioning: Upright in bed  Prior Dysphagia History: none known prior to admission  Consistencies Administered: Regular; Thin - straw; Thin - cup           Vision/Hearing       Oral Motor Deficits       Oral Phase Dysfunction  Oral Phase  Oral Phase: WFL     Indicators of Pharyngeal Phase Dysfunction   Pharyngeal Phase   Pharyngeal Phase: [facility]       Education                Therapy Time  SLP Individual Minutes  Time In: 0830  Time Out: 0845  Minutes: [facility] CCC-LP  2/7/2023 11:22 AM ED HOLDING/Telemetry

## 2023-02-07 NOTE — PROGRESS NOTES
Physical Therapy  [facility] PHYSICAL THERAPY EVALUATION  Joan Olmedo, 1960, 2012/2012-A, 2/7/2023    History  [place]:  The primary encounter diagnosis was Slurred speech. Diagnoses of Ataxia, Hyperglycemia, Hyponatremia, and Type 2 diabetes mellitus with hyperglycemia, with long-term current use of insulin ([address] were also pertinent to this visit. Patient  has a past medical history of Abnormal Pap smear of cervix, Acid reflux, Arthritis, Breast cyst, CAD (coronary artery disease), COPD (chronic obstructive pulmonary disease) ([address] Depression, Diabetes mellitus ([address] Drug abuse ([address] Glaucoma, H/O Doppler lower venous ultrasound, H/O echocardiogram, Hepatic encephalopathy, Hepatitis C, Hiatal hernia, History of alcohol abuse, History of exercise stress test, HTN (hypertension), Hx of blood clots, Hyperlipemia, Irregular heart beat, Liver hematoma, Migraine, Migraines, Nausea & vomiting, Neurologic disorder, Schizophrenia ([address] Seizures ([address] SOB (shortness of breath), and Vulvar mass. Patient  has a past surgical history that includes Cholecystectomy (per old chart done 1985); Tonsillectomy (as a kid); Breast surgery (10/2015); Endoscopy, colon, diagnostic (03/16/2017); Upper gastrointestinal endoscopy (N/A, 11/14/2018); TIPS procedure (04/23/2019); Upper gastrointestinal endoscopy (N/A, 06/05/2019); Colonoscopy (03/11/2013); Colonoscopy (03/16/2017); eye surgery (Bilateral, ? when); Hysterectomy; Carpal tunnel release (Left, 01/09/2020); Carpal tunnel release (Right, 05/26/2020); Carpal tunnel release (Right, 05/26/2020); Finger trigger release (Right, 05/26/2020); Cardiac catheterization; Colonoscopy (N/A, 05/17/2022); Salpingo-oophorectomy; and Vulvectomy (N/A, 11/9/2022). Subjective:  Patient states:  \"I want to get up and move\"   Pain:  Back pain from being in bed. Did not rate.    Communication with other providers:  RN  Restrictions: general precautions, falls Home Setup/Prior level of function  Social/Functional History  Lives With:  (sister, brother-in-law, and niece. Sister works but pts EDUIN is home with pt)  Type of Home: House  Home Layout: Laundry in basement, One level  Home Access: Stairs to enter without rails  Entrance Stairs - Number of Steps: 1  Bathroom Shower/Tub: Walk-in shower, Tub/Shower unit  Bathroom Toilet: Standard  Bathroom Equipment: Shower chair  Home Equipment: Cane, Oxygen (2L)  ADL Assistance: Independent  Homemaking Assistance: Needs assistance (shared with family. Does not carry laundry into the basement and will have family assist)  Ambulation Assistance: Independent (no AD)  Transfer Assistance: Independent  Active : No  Patient's  Info: family    Examination of body systems (includes body structures/functions, activity/participation limitations):  Observation:  Supine in bed upon arrival. Cooperative with therapy   Vision:  WFL  Hearing:  INMAN  Cardiopulmonary:  stable vitals on 2L   Orientation: WFL     Musculoskeletal  ROM R/L:  INMAN BLEs  Strength R/L: BLES grossly WFL in function and endurance. Neuro:  slight tingling/numbness to left foot     Mobility/treatment:   Rolling L/R:  NT   Supine to sit:  Linda with HOB slightly elevated  Transfers:   Sit to stand: SBA for safety from EOB and standard toilet   Stand to sit: SBA for safety to recliner and standard toilet  Step pivot: SBA for safety (2x) without AD   Sitting balance:  Linda at EOB and standard toilet without UE support while managing toileting tasks and dressing. Standing balance:  SBA for safety without UE support while managing hygiene and dressing tasks   Gait: ~10ft + 200ft without AD SBA for safety. Fair and consistent pace with reciprocal gait pattern and no LOB noted. Educated pt on POC, role of PT, discharge.      [facility] 6 Clicks Inpatient Mobility:  AM-PAC Inpatient Mobility Raw Score : 20    Safety: patient left in chair, call light within reach, gait belt used. Assessment:  Pt is a 63year old female admitted with hyperglycemia, slurred speech, and ataxia. MRI of the brain pending. CT of head showed no acute intracranial abnormality. Recommend home with family support PRN once medically stable. Pt functioned near her typical baseline this date. No further acute PT needs. Recommend frequent mobility with nursing while admitted to prevent deconditioning. Complexity: Moderate  Prognosis: Good, no significant barriers to participation at this time.    Plan: Discharge  Discharge Recommendations: Home with assist PRN  Equipment: no needs     Goals:  N/a    Recommendations for NURSING mobility: ambulate in hallway as time permits     Time:   Time in: 0853  Time out: 0913  Timed treatment minutes: 10   Total time: 20 minutes     Electronically signed by:    Shana Kemp [id number]  2/7/2023, 11:25 AM

## 2023-02-07 NOTE — PLAN OF CARE
Problem: Discharge Planning  Goal: Discharge to home or other facility with appropriate resources  2/7/2023 1031 by Tracie Coburn RN  Outcome: Progressing  Flowsheets (Taken 2/7/2023 1019)  Discharge to home or other facility with appropriate resources:   Identify barriers to discharge with patient and caregiver   Arrange for needed discharge resources and transportation as appropriate   Identify discharge learning needs (meds, wound care, etc)  2/7/2023 0134 by Gwen Lentz RN  Outcome: Progressing     Problem: Pain  Goal: Verbalizes/displays adequate comfort level or baseline comfort level  2/7/2023 1031 by Tracie Coburn RN  Outcome: Progressing  2/7/2023 0134 by Gwen Lentz RN  Outcome: Progressing     Problem: Skin/Tissue Integrity  Goal: Absence of new skin breakdown  Description: 1. Monitor for areas of redness and/or skin breakdown  2. Assess vascular access sites hourly  3. Every 4-6 hours minimum:  Change oxygen saturation probe site  4. Every 4-6 hours:  If on nasal continuous positive airway pressure, respiratory therapy assess nares and determine need for appliance change or resting period.   2/7/2023 1031 by Tracie Coburn RN  Outcome: Progressing  2/7/2023 0134 by Gwen Lentz RN  Outcome: Progressing     Problem: Safety - Adult  Goal: Free from fall injury  2/7/2023 1031 by Tracie Coburn RN  Outcome: Progressing  2/7/2023 0134 by Gwen Lentz RN  Outcome: Progressing     Problem: ABCDS Injury Assessment  Goal: Absence of physical injury  2/7/2023 1031 by Tracie Coburn RN  Outcome: Progressing  2/7/2023 0134 by Gwen Lentz RN  Outcome: Progressing

## 2023-02-07 NOTE — PROGRESS NOTES
In-Patient Progress Note    Patient:  Lisa Bender 62 y.o. female MRN: [id number]     Date of Service: 2/7/2023    Hospital Day: 2      Chief complaint: had concerns including Hyperglycemia (Pt reports taking sugar this morning 590; Pt complains of HA and Nausea ).      Subjective   Patient seen and examined in the morning. Patient states she feels a little better. States her speech has improved slightly and her weakness of LUE and LLE have also improved. States she had a little sore throat last night and in the morning but now has resolved. States she is on home O2 at 2L/min.       See ROS    I have reviewed all pertinent PMHx, PSHx, FamHx, SocialHx, medications, and allergies and updated history as appropriate. I have reviewed images as appropriate.     Assessment and Plan   Lisa Bender, a 62 y.o. female, with a history of IDDM, Alcoholic cirrhosis, COPD, CAD, HLD, Schizophrenia, Depression, GERD, HTN, HLD, Tobacco use disorder was admitted on 2/6/2023 with complaints of had concerns including Hyperglycemia (Pt reports taking sugar this morning 590; Pt complains of HA and Nausea ).    Assessment and Plan:  Hyerglycemic HypoerOsmolar NonKetosis  Pseudohyponatremia  Non anion gap metabolic acidosis   insulin-dependent type 2 diabetes mellitus              Lab work notable for blood glucose 521, VBG pH 7.39, serum bicarbonate of 22, anion gap 11, urine noted to be negative for ketones, ammonia 90, beta hydroxybutyrate 3.5  Patient evaluated by critical care and feel patient can be appropriately managed in stepdown, will admit to hospital stepdown              Endocrinology on board          Corrected sodium 134 initially and now normalized.   Initiate 0.45 at 125 cc/h              IVF hydration              Antiemetics PRN                   Pseudohyponatremia              Corrected Na 134 initially. Now normalized.                                  Strokelike symptoms  Acute Encephalopathy 2/2 likely  Metabolic 2/2 Severe hyperglycemia 2/2 uncontrolled DM, hyperammoniema, less likely stroke. Stroke Alert Called in ED - Had slurred speech, weakness LUE and LLE on presentation              Last Known Well 2100 NIH 1 on 2/6/2023              Not a TPA Cannidate due to low NIH, thought secondary to hyperglycemia              -CT head negative for acute intercranial abnormality, CTA head or neck negative for high-grade stenosis              TSH normal and FT4 normal               MRI brain pending  Echocardiogram normal  Lipid panel normal, hemoglobin A1c 10.1  Aspirin 81 mg po qd, Statin initiated  Neuro on board               Neuro checks q4 hours               Resume home toprol XL              Trend Labs        Sepsis in the setting of hyperglycemia, HHS  Patient withlactic acidosis, tachycardia and tachypnea   Sepsis fluid bolus given in ER then maintenance IVF  Lactate 2.8, trending down with fluid  Blood cultures x2 - negative so far, UA -ve  She is on vancomycin and cefepime,               Denies fevers, chills, white count 6.8 initially               Obtain procal in the AM, if -ve, d/c abx. Alcoholic cirrhosis of liver without ascites  Elevated ammonia  Hepatic encephalopathy  H/O Hepatitis               Continue lactulose to 3 times daily              Review medications for hepatotoxicity              -Consider GI consult if failure to improve              Continue home for Vired              Head CT negative for acute intercranial process, neurochecks. Tele monitoring.                -Suspect 2/2 toxins/medications/polypharmacy/medication side effects. Hold sedating and hypnotic medications. Hold narcotic medications. Consider steroids as cause. Consider benzos, alcohol, narcotics, sleep aids as causes.  Neurochecks.l     Continue home medications for chronic medical conditions unless otherwise noted above including but not limited to  COPD  Chronic Respiratory failure on 2L/min  CAD  Mixed hyperlipidemia and  Schizophrenia  Depression  GERD  Essential hypertension  Tobacco abuse,  Thrombocytopenia 2/2 Cirrhosis   Chronic pain     # Peptic ulcer prophylaxis: Pantoprazole   # DVT Prophylaxis: Enoxaparin   #CODE STATUS: Full      Expected Disposition: Home with Laisha Salvador  Estimated discharge date: Hopefully 2023. Pending MRI. Obtain procal in the AM to see if we can d/c abx. Review of System     Review of Systems   Constitutional:  Negative for chills, fatigue, fever and unexpected weight change. Eyes:  Negative for pain and visual disturbance. Respiratory:  Negative for cough, choking, chest tightness, shortness of breath, wheezing and stridor. Cardiovascular:  Negative for chest pain, palpitations and leg swelling. Gastrointestinal:  Negative for abdominal distention, abdominal pain, blood in stool, constipation, diarrhea, nausea and vomiting. Genitourinary:  Negative for decreased urine volume, dysuria, frequency, hematuria and urgency. Musculoskeletal:  Negative for arthralgias, back pain and myalgias. Skin:  Negative for pallor and rash. Neurological:  Positive for speech difficulty and weakness. Negative for dizziness, tremors, syncope, light-headedness, numbness and headaches. Psychiatric/Behavioral:  Negative for agitation. I have reviewed all pertinent PMHx, PSHx, FamHx, SocialHx, medications, and allergies and updated history as appropriate.     Physical Exam   VITAL SIGNS:  /78   Pulse (!) 109   Temp 98.4 °F (36.9 °C)   Resp 21   Ht 5' 4\" (1.626 m)   Wt 178 lb 5.6 oz (80.9 kg)   SpO2 98%   BMI 30.61 kg/m²   Tmax over 24 hours:  Temp (24hrs), Av.1 °F (36.7 °C), Min:97.7 °F (36.5 °C), Max:98.4 °F (36.9 °C)      Patient Vitals for the past 6 hrs:   BP Temp Temp src Pulse Resp SpO2   23 1202 127/78 -- Oral (!) 109 21 98 %   23 1034 108/62 98.4 °F (36.9 °C) -- 98 17 --   23 1000 102/66 -- -- (!) 108 18 -- Intake/Output Summary (Last 24 hours) at 2/7/2023 1529  Last data filed at 2/7/2023 1023  Gross per 24 hour   Intake 1260.51 ml   Output 650 ml   Net 610.51 ml     Wt Readings from Last 2 Encounters:   02/07/23 178 lb 5.6 oz (80.9 kg)   02/07/23 178 lb 5.6 oz (80.9 kg)     Body mass index is 30.61 kg/m². Physical Exam  Vitals and nursing note reviewed. Constitutional:       General: She is not in acute distress. Appearance: She is obese. She is not ill-appearing, toxic-appearing or diaphoretic. Interventions: Nasal cannula in place. Comments: 2L/min   HENT:      Head: Normocephalic and atraumatic. Right Ear: External ear normal.      Left Ear: External ear normal.      Nose: Nose normal.      Mouth/Throat:      Pharynx: Oropharynx is clear. No oropharyngeal exudate or posterior oropharyngeal erythema. Eyes:      General: No scleral icterus. Right eye: No discharge. Left eye: No discharge. Conjunctiva/sclera: Conjunctivae normal.   Cardiovascular:      Rate and Rhythm: Regular rhythm. Tachycardia present. Pulses: Normal pulses. Heart sounds: Normal heart sounds. No murmur heard. No friction rub. No gallop. Pulmonary:      Effort: Pulmonary effort is normal. No respiratory distress. Breath sounds: Normal breath sounds. No stridor. No wheezing, rhonchi or rales. Abdominal:      General: Bowel sounds are normal. There is no distension. Palpations: Abdomen is soft. There is no mass. Tenderness: There is no abdominal tenderness. There is no guarding or rebound. Hernia: No hernia is present. Musculoskeletal:      Right lower leg: No edema. Left lower leg: No edema. Comments: 4+/5 LUE and LLE   Skin:     General: Skin is warm. Capillary Refill: Capillary refill takes less than 2 seconds. Neurological:      Mental Status: She is alert and oriented to person, place, and time. Cranial Nerves: Dysarthria present. Psychiatric:         Mood and Affect: Mood normal.         Current Medications      insulin glargine  15 Units SubCUTAneous Nightly    atorvastatin  40 mg Oral Nightly    cefepime  2,000 mg IntraVENous Q8H    FLUoxetine  30 mg Oral Daily    lactulose  20 g Oral TID    trospium  20 mg Oral Nightly    paliperidone  6 mg Oral QAM    pantoprazole  40 mg Oral BID AC    QUEtiapine  100 mg Oral Nightly    ranolazine  500 mg Oral BID    tenofovir disoproxil fumarate  300 mg Oral Daily    sodium chloride flush  5-40 mL IntraVENous 2 times per day    aspirin  81 mg Oral Daily    insulin lispro  0-16 Units SubCUTAneous Q4H    vancomycin  1,500 mg IntraVENous Q24H         Labs and Imaging Studies   Laboratory findings:  CT HEAD WO CONTRAST    Result Date: 2/6/2023  EXAMINATION: CT OF THE HEAD WITHOUT CONTRAST  2/6/2023 11:27 am TECHNIQUE: CT of the head was performed without the administration of intravenous contrast. Automated exposure control, iterative reconstruction, and/or weight based adjustment of the mA/kV was utilized to reduce the radiation dose to as low as reasonably achievable. COMPARISON: None HISTORY: ORDERING SYSTEM PROVIDED HISTORY: Stroke Symptoms TECHNOLOGIST PROVIDED HISTORY: Has a \"code stroke\" or \"stroke alert\" been called? ->Yes Reason for exam:->Stroke Symptoms Decision Support Exception - unselect if not a suspected or confirmed emergency medical condition->Emergency Medical Condition (MA) Reason for Exam: Stroke Symptoms FINDINGS: BRAIN/VENTRICLES: There is no acute intracranial hemorrhage, mass effect or midline shift. No abnormal extra-axial fluid collection. The gray-white differentiation is maintained without evidence of an acute infarct. There is no evidence of hydrocephalus. ORBITS: The visualized portion of the orbits demonstrate no acute abnormality. SINUSES: The visualized paranasal sinuses and mastoid air cells demonstrate no acute abnormality.  SOFT TISSUES/SKULL:  No acute abnormality of the visualized skull or soft tissues. No acute intracranial abnormality. The results were conveyed to nurse practitioner Faith Conrad on 02/06/2023 at 11:37 a.m.     XR CHEST PORTABLE    Result Date: 2/6/2023  EXAMINATION: ONE XRAY VIEW OF THE CHEST 2/6/2023 11:48 am COMPARISON: 01/13/2023. HISTORY: ORDERING SYSTEM PROVIDED HISTORY: stroke symptoms TECHNOLOGIST PROVIDED HISTORY: Reason for exam:->stroke symptoms Reason for Exam: stroke symptoms FINDINGS: The heart size is within normal limits. The pulmonary vasculature is mildly congested. No acute infiltrates are seen. No pneumothoraces are noted. 1. Mild vascular congestion. CTA HEAD NECK W CONTRAST    Result Date: 2/6/2023  EXAMINATION: CTA OF THE HEAD AND NECK WITH CONTRAST 2/6/2023 11:31 am: TECHNIQUE: CTA of the head and neck was performed with the administration of intravenous contrast. Multiplanar reformatted images are provided for review. MIP images are provided for review. Stenosis of the internal carotid arteries measured using NASCET criteria. Automated exposure control, iterative reconstruction, and/or weight based adjustment of the mA/kV was utilized to reduce the radiation dose to as low as reasonably achievable. This scan was analyzed using [employer]. ai contact LVO. Identification of suspected findings is not for diagnostic use beyond notification. Viz LVO is limited to analysis of imaging data and should not be used in-lieu of full patient evaluation or relied upon to make or confirm diagnosis. COMPARISON: Concurrent head CT HISTORY: ORDERING SYSTEM PROVIDED HISTORY: Stroke Symptoms FINDINGS: CTA NECK: AORTIC ARCH/ARCH VESSELS: No dissection or arterial injury. No significant stenosis of the brachiocephalic or subclavian arteries. CAROTID ARTERIES: No dissection, arterial injury, or hemodynamically significant stenosis by NASCET criteria. VERTEBRAL ARTERIES: No dissection, arterial injury, or significant stenosis.  SOFT TISSUES: The lung apices are clear. Mild bronchial wall thickening suggesting bronchiolitis. No cervical or superior mediastinal lymphadenopathy. The larynx and pharynx are unremarkable. No acute abnormality of the salivary and thyroid glands. BONES: No acute osseous abnormality. CTA HEAD: ANTERIOR CIRCULATION: No significant stenosis of the intracranial internal carotid, anterior cerebral, or middle cerebral arteries. No aneurysm. POSTERIOR CIRCULATION: No significant stenosis of the vertebral, basilar, or posterior cerebral arteries. No aneurysm. OTHER: No dural venous sinus thrombosis on this non-dedicated study. BRAIN: No mass effect or midline shift. No extra-axial fluid collection. The gray-white differentiation is maintained. No apparent arterial high grade stenosis, occlusion or aneurysm within the head or neck. Recent Results (from the past 24 hour(s))   POC Blood Glucose    Collection Time: 02/06/23  3:41 PM   Result Value Ref Range    Glucose 421 mg/dL    QC OK?  yes    POCT Glucose    Collection Time: 02/06/23  3:41 PM   Result Value Ref Range    POC Glucose 421 (H) 70 - 99 MG/DL   POCT Glucose    Collection Time: 02/06/23  8:22 PM   Result Value Ref Range    POC Glucose 262 (H) 70 - 99 MG/DL   Osmolality    Collection Time: 02/06/23  9:21 PM   Result Value Ref Range    Osmolality 293 280 - 300 MOS/L   Hemoglobin A1c    Collection Time: 02/06/23  9:21 PM   Result Value Ref Range    Hemoglobin A1C 10.1 (H) 4.2 - 6.3 %    eAG 243 mg/dL   Sodium    Collection Time: 02/06/23  9:21 PM   Result Value Ref Range    Sodium 136 135 - 145 MMOL/L   Troponin    Collection Time: 02/06/23  9:21 PM   Result Value Ref Range    Troponin T <0.010 <0.01 NG/ML   Electrolyte Panel    Collection Time: 02/06/23  9:21 PM   Result Value Ref Range    Sodium 135 135 - 145 MMOL/L    Potassium 3.5 3.5 - 5.1 MMOL/L    Chloride 104 99 - 110 mMol/L    CO2 26 21 - 32 MMOL/L    Anion Gap 5 4 - 16   Lactate, Sepsis    Collection Time: 02/06/23 9:21 PM   Result Value Ref Range    Lactic Acid, Sepsis 1.5 0.5 - 1.9 mMOL/L   POCT Glucose    Collection Time: 02/07/23 12:56 AM   Result Value Ref Range    POC Glucose 102 (H) 70 - 99 MG/DL   CBC with Auto Differential    Collection Time: 02/07/23  2:14 AM   Result Value Ref Range    WBC 5.8 4.0 - 10.5 K/CU MM    RBC 4.35 4.2 - 5.4 M/CU MM    Hemoglobin 13.9 12.5 - 16.0 GM/DL    Hematocrit 39.5 37 - 47 %    MCV 90.8 78 - 100 FL    MCH 32.0 (H) 27 - 31 PG    MCHC 35.2 32.0 - 36.0 %    RDW 13.2 11.7 - 14.9 %    Platelets 74 (L) 290 - 440 K/CU MM    MPV 12.2 (H) 7.5 - 11.1 FL    Differential Type AUTOMATED DIFFERENTIAL     Segs Relative 49.0 36 - 66 %    Lymphocytes % 32.5 24 - 44 %    Monocytes % 13.0 (H) 0 - 4 %    Eosinophils % 4.3 (H) 0 - 3 %    Basophils % 1.0 0 - 1 %    Segs Absolute 2.8 K/CU MM    Lymphocytes Absolute 1.9 K/CU MM    Monocytes Absolute 0.8 K/CU MM    Eosinophils Absolute 0.3 K/CU MM    Basophils Absolute 0.1 K/CU MM    Nucleated RBC % 0.0 %    Total Nucleated RBC 0.0 K/CU MM    Total Immature Neutrophil 0.01 K/CU MM    Immature Neutrophil % 0.2 0 - 0.43 %   Comprehensive Metabolic Panel w/ Reflex to MG    Collection Time: 02/07/23  2:14 AM   Result Value Ref Range    Sodium 138 135 - 145 MMOL/L    Potassium 3.9 3.5 - 5.1 MMOL/L    Chloride 104 99 - 110 mMol/L    CO2 26 21 - 32 MMOL/L    BUN 20 6 - 23 MG/DL    Creatinine 0.9 0.6 - 1.1 MG/DL    Est, Glom Filt Rate >60 >60 mL/min/1.73m2    Glucose 86 70 - 99 MG/DL    Calcium 8.1 (L) 8.3 - 10.6 MG/DL    Albumin 2.8 (L) 3.4 - 5.0 GM/DL    Total Protein 5.2 (L) 6.4 - 8.2 GM/DL    Total Bilirubin 1.0 0.0 - 1.0 MG/DL    ALT 11 10 - 40 U/L    AST 15 15 - 37 IU/L    Alkaline Phosphatase 113 40 - 128 IU/L    Anion Gap 8 4 - 16   Troponin    Collection Time: 02/07/23  2:14 AM   Result Value Ref Range    Troponin T <0.010 <0.01 NG/ML   Lactate, Sepsis    Collection Time: 02/07/23  2:14 AM   Result Value Ref Range    Lactic Acid, Sepsis 1.2 0.5 - 1.9 mMOL/L Magnesium    Collection Time: 02/07/23  2:14 AM   Result Value Ref Range    Magnesium 1.9 1.8 - 2.4 mg/dl   Phosphorus    Collection Time: 02/07/23  2:14 AM   Result Value Ref Range    Phosphorus 2.6 2.5 - 4.9 MG/DL   TSH    Collection Time: 02/07/23  2:14 AM   Result Value Ref Range    TSH, High Sensitivity 1.150 0.270 - 4.20 uIu/ml   T4, Free    Collection Time: 02/07/23  2:14 AM   Result Value Ref Range    T4 Free 1.10 0.9 - 1.8 NG/DL   POCT Glucose    Collection Time: 02/07/23  4:54 AM   Result Value Ref Range    POC Glucose 95 70 - 99 MG/DL   POCT Glucose    Collection Time: 02/07/23  9:44 AM   Result Value Ref Range    POC Glucose 161 (H) 70 - 99 MG/DL   Ammonia    Collection Time: 02/07/23 12:01 PM   Result Value Ref Range    Ammonia 113 (H) 11 - 51 UMOL/L   Lipid Panel    Collection Time: 02/07/23 12:45 PM   Result Value Ref Range    Triglycerides 81 <150 MG/DL    Cholesterol 102 <200 MG/DL    HDL 34 (L) >40 MG/DL    LDL Calculated 52 <100 MG/DL   POCT Glucose    Collection Time: 02/07/23  1:35 PM   Result Value Ref Range    POC Glucose 256 (H) 70 - 99 MG/DL           Electronically signed by Ellen Hoang MD on 2/7/2023 at 3:29 PM

## 2023-02-07 NOTE — CONSULTS
RENAL DOSE ADJUSTMENT MADE PER P/T PROTOCOL    PREVIOUS ORDER:  Cefepime 2gm Q12h EI IVPB    Estimated Creatinine Clearance: 70 mL/min (based on SCr of 0.9 mg/dL). Recent Labs     02/06/23  1117 02/07/23  0214   BUN 26* 20   CREATININE 1.0 0.9   PLT 71* 74*   INR 0.99  --      NEW RENALLY ADJUSTED ORDER:  Cefepime 2gm Q8h EI, per [facility] P&T protocol. Pharmacy will continue to monitor. Catherine Willoughby, [facility]  2/7/2023 2:53 PM

## 2023-02-07 NOTE — PROGRESS NOTES
[address] [facility]  consulted by Yuki COFFEY for monitoring and adjustment. Indication for treatment: Sepsis  Goal trough: Trough Goal: 15-20 mcg/mL  AUC/CECE: 400-600    Risk Factors for MRSA Identified:   Hospitalization within the past 90 days    Pertinent Laboratory Values:   Temp Readings from Last 3 Encounters:   02/06/23 98 °F (36.7 °C) (Oral)   01/13/23 97.8 °F (36.6 °C) (Oral)   12/25/22 98 °F (36.7 °C)     Recent Labs     02/06/23  1117   WBC 6.8     Recent Labs     02/06/23  1117   BUN 26*   CREATININE 1.0     Estimated Creatinine Clearance: 60 mL/min (based on SCr of 1 mg/dL). No intake or output data in the 24 hours ending 02/06/23 2002    Pertinent Cultures:   Date    Source    Results  02/06   Blood    pending  02/06   Sputum/Respiratory  pending      Vancomycin level:   TROUGH:  No results for input(s): VANCOTROUGH in the last 72 hours. RANDOM:  No results for input(s): VANCORANDOM in the last 72 hours. Assessment:  Patient admitted with hyperglycemia due to diabetes mellitus. Sepsis in the setting of hyperglycemia. Vancomycin was dosed by this service in 12/2022 with the last dose/frequency being Vancomycin 1500mg Q24h. SCr, BUN, and urine output: Cr 1.0, BUN 26  Day(s) of therapy: Day 1  Vancomycin concentration: to be collected. Plan:  Vancomycin 2000mg was previously administered in the ED. Will continue with Vancomycin 1500mg IVPB Q24h and obtain a level to assess dosing. Pharmacy will continue to monitor patient and adjust therapy as indicated    Zena 3 02/08 @0600    Thank you for the consult.   Jennifer Carney, [facility]  2/6/2023 8:02 PM

## 2023-02-07 NOTE — PROGRESS NOTES
RENAL DOSE ADJUSTMENT MADE PER P/T PROTOCOL    PREVIOUS ORDER:  Cefepime 2gm Q8h EI IVPB    Estimated Creatinine Clearance: 55 mL/min (based on SCr of 1 mg/dL). Recent Labs     02/06/23  1117   BUN 26*   CREATININE 1.0   PLT 71*   INR 0.99     NEW RENALLY ADJUSTED ORDER:  Cefepime 2gm Q12h EI, per [address] P&T protocol. Pharmacy will continue to monitor.     Yoli Gerber [facility]  2/6/2023 7:43 PM

## 2023-02-07 NOTE — PROGRESS NOTES
Dr Isabel Hauser paged via [address] [place] [address] for new consult for altered mental status.

## 2023-02-08 ENCOUNTER — APPOINTMENT (OUTPATIENT)
Dept: MRI IMAGING | Age: 63
DRG: 637 | End: 2023-02-08
Payer: MEDICARE

## 2023-02-08 VITALS
WEIGHT: 183.86 LBS | RESPIRATION RATE: 17 BRPM | HEIGHT: 64 IN | OXYGEN SATURATION: 96 % | SYSTOLIC BLOOD PRESSURE: 92 MMHG | DIASTOLIC BLOOD PRESSURE: 79 MMHG | BODY MASS INDEX: 31.39 KG/M2 | TEMPERATURE: 98.7 F | HEART RATE: 83 BPM

## 2023-02-08 LAB
DOSE AMOUNT: NORMAL
DOSE TIME: NORMAL
GLUCOSE BLD-MCNC: 124 MG/DL (ref 70–99)
GLUCOSE BLD-MCNC: 213 MG/DL (ref 70–99)
GLUCOSE BLD-MCNC: 79 MG/DL (ref 70–99)
PROCALCITONIN SERPL-MCNC: 0.11 NG/ML
VANCOMYCIN RANDOM: 14.6 UG/ML

## 2023-02-08 PROCEDURE — 70551 MRI BRAIN STEM W/O DYE: CPT

## 2023-02-08 PROCEDURE — 94761 N-INVAS EAR/PLS OXIMETRY MLT: CPT

## 2023-02-08 PROCEDURE — 84145 PROCALCITONIN (PCT): CPT

## 2023-02-08 PROCEDURE — 99233 SBSQ HOSP IP/OBS HIGH 50: CPT | Performed by: NURSE PRACTITIONER

## 2023-02-08 PROCEDURE — 36415 COLL VENOUS BLD VENIPUNCTURE: CPT

## 2023-02-08 PROCEDURE — 82962 GLUCOSE BLOOD TEST: CPT

## 2023-02-08 PROCEDURE — 6370000000 HC RX 637 (ALT 250 FOR IP): Performed by: INTERNAL MEDICINE

## 2023-02-08 PROCEDURE — 6360000002 HC RX W HCPCS: Performed by: NURSE PRACTITIONER

## 2023-02-08 PROCEDURE — 2700000000 HC OXYGEN THERAPY PER DAY

## 2023-02-08 PROCEDURE — 80202 ASSAY OF VANCOMYCIN: CPT

## 2023-02-08 PROCEDURE — 6370000000 HC RX 637 (ALT 250 FOR IP): Performed by: NURSE PRACTITIONER

## 2023-02-08 PROCEDURE — 2580000003 HC RX 258: Performed by: NURSE PRACTITIONER

## 2023-02-08 RX ORDER — INSULIN GLARGINE 100 [IU]/ML
12 INJECTION, SOLUTION SUBCUTANEOUS NIGHTLY
Qty: 5 ADJUSTABLE DOSE PRE-FILLED PEN SYRINGE | Refills: 0 | Status: SHIPPED | OUTPATIENT
Start: 2023-02-08

## 2023-02-08 RX ORDER — METOPROLOL SUCCINATE 50 MG/1
50 TABLET, EXTENDED RELEASE ORAL DAILY
Qty: 30 TABLET | Refills: 0 | Status: SHIPPED | OUTPATIENT
Start: 2023-02-09

## 2023-02-08 RX ORDER — LACTULOSE 10 G/15ML
30 SOLUTION ORAL 2 TIMES DAILY
Qty: 946 ML | Refills: 0 | Status: SHIPPED | OUTPATIENT
Start: 2023-02-08

## 2023-02-08 RX ORDER — INSULIN GLARGINE 100 [IU]/ML
10 INJECTION, SOLUTION SUBCUTANEOUS NIGHTLY
Status: DISCONTINUED | OUTPATIENT
Start: 2023-02-08 | End: 2023-02-08 | Stop reason: HOSPADM

## 2023-02-08 RX ORDER — ATORVASTATIN CALCIUM 40 MG/1
40 TABLET, FILM COATED ORAL NIGHTLY
Qty: 30 TABLET | Refills: 0 | Status: SHIPPED | OUTPATIENT
Start: 2023-02-08

## 2023-02-08 RX ADMIN — TENOFOVIR DISOPROXIL FUMARATE 300 MG: 300 TABLET, COATED ORAL at 08:41

## 2023-02-08 RX ADMIN — METOPROLOL SUCCINATE 50 MG: 50 TABLET, EXTENDED RELEASE ORAL at 08:32

## 2023-02-08 RX ADMIN — INSULIN LISPRO 4 UNITS: 100 INJECTION, SOLUTION INTRAVENOUS; SUBCUTANEOUS at 11:32

## 2023-02-08 RX ADMIN — PANTOPRAZOLE SODIUM 40 MG: 40 TABLET, DELAYED RELEASE ORAL at 05:09

## 2023-02-08 RX ADMIN — PALIPERIDONE 6 MG: 1.5 TABLET, EXTENDED RELEASE ORAL at 08:41

## 2023-02-08 RX ADMIN — LACTULOSE 20 G: 10 SOLUTION ORAL at 08:33

## 2023-02-08 RX ADMIN — FLUOXETINE 30 MG: 20 CAPSULE ORAL at 08:33

## 2023-02-08 RX ADMIN — CEFEPIME HYDROCHLORIDE 2000 MG: 2 INJECTION, POWDER, FOR SOLUTION INTRAVENOUS at 05:09

## 2023-02-08 RX ADMIN — TRAMADOL HYDROCHLORIDE 50 MG: 50 TABLET, COATED ORAL at 10:01

## 2023-02-08 RX ADMIN — INSULIN LISPRO 10 UNITS: 100 INJECTION, SOLUTION INTRAVENOUS; SUBCUTANEOUS at 11:32

## 2023-02-08 RX ADMIN — RANOLAZINE 500 MG: 500 TABLET, FILM COATED, EXTENDED RELEASE ORAL at 08:32

## 2023-02-08 ASSESSMENT — PAIN SCALES - WONG BAKER
WONGBAKER_NUMERICALRESPONSE: 0

## 2023-02-08 ASSESSMENT — PAIN DESCRIPTION - DESCRIPTORS: DESCRIPTORS: ACHING

## 2023-02-08 ASSESSMENT — PAIN SCALES - GENERAL: PAINLEVEL_OUTOF10: 6

## 2023-02-08 ASSESSMENT — PAIN DESCRIPTION - LOCATION: LOCATION: NECK

## 2023-02-08 NOTE — CARE COORDINATION
Case Management Assessment  Initial Evaluation    Date/Time of Evaluation: 2/8/2023 10:50 AM  Assessment Completed by: Shukri Lentz RN    If patient is discharged prior to next notation, then this note serves as note for discharge by case management. Patient Name: Rajni Hendricks                   YOB: 1960  Diagnosis: Slurred speech [R47.81]  Ataxia [R27.0]  Hyponatremia [E87.1]  Hyperglycemia [R73.9]  Type 2 diabetes mellitus with hyperglycemia, with long-term current use of insulin ([facility] [address] [E11.65, Z79.4]  Hyperglycemia due to diabetes mellitus ([facility] [address] [E11.65]                   Date / Time: 2/6/2023 10:59 AM    Patient Admission Status: Inpatient   Readmission Risk (Low < 19, Mod (19-27), High > 27): Readmission Risk Score: 16.5    Current PCP: Star Ibarra MD  PCP verified by CM? Yes    Chart Reviewed: Yes      History Provided by: Patient  Patient Orientation: Alert and Oriented    Patient Cognition: Alert    Hospitalization in the last 30 days (Readmission):  No    If yes, Readmission Assessment in CM Navigator will be completed. Advance Directives:      Code Status: Full Code   Patient's Primary Decision Maker is: Legal Next of Kin    Primary Decision MakerJamiljordon Leigh - Brother/Sister - [phone]    Primary Decision Maker: Arnav Dutta - Brother/Sister - [phone]    Primary Decision Maker: Ramiro Tristan - Brother/Sister - [phone]    Primary Decision Maker: Dixon Rodriguez - Brother/Sister - [phone]    Discharge Planning:    Patient lives with: Family Members Type of Home: House  Primary Care Giver: Self  Patient Support Systems include: Family Members   Current Financial resources: Medicaid, Medicare  Current community resources: None  Current services prior to admission: Oxygen Therapy            Current DME:              Type of Home Care services:  OT, PT    ADLS  Prior functional level: Independent in ADLs/IADLs  Current functional level:  Independent in ADLs/IADLs    PT AM-PAC: 20 /24  OT AM-PAC:   /24    Family can provide assistance at DC: Yes  Would you like Case Management to discuss the discharge plan with any other family members/significant others, and if so, who? Yes  Plans to Return to Present Housing: Yes  Other Identified Issues/Barriers to RETURNING to current housing: None  Potential Assistance needed at discharge: Home Care            Potential DME:    Patient expects to discharge to: [address] [place] [address] for transportation at discharge:      Financial    Payor: Ashley Olivo / Plan: Oscar Fay HMO / Product Type: Medicare /     Does insurance require precert for SNF: Yes    Potential assistance Purchasing Medications: No  Meds-to-Beds request: Yes      [id number] [address] [phone] - F [phone]  [facility] [address]  Phone: [phone] Fax: [phone]      Notes:    Factors facilitating achievement of predicted outcomes: Family support and Motivated    Barriers to discharge: Anxiety    Additional Case Management Notes: Met with patient at bedside. She is awake and able to participate. She is from home with her sister, Home is 1 story. She does not drive but sister drives. She has a PCP, home O2 and can afford medications. Patient is agreeable to [place] [address] and has no preference. Plan is home. Emma Jordan RN     The Plan for Transition of Care is related to the following treatment goals of Slurred speech [R47.81]  Ataxia [R27.0]  Hyponatremia [E87.1]  Hyperglycemia [R73.9]  Type 2 diabetes mellitus with hyperglycemia, with long-term current use of insulin ([facility]) [E11.65, Z79.4]  Hyperglycemia due to diabetes mellitus ([facility] [address] [W38.97]    IF APPLICABLE: The Patient and/or patient representative Genesis Gaming and her family were provided with a choice of provider and agrees with the discharge plan.  Freedom of choice list with basic dialogue that supports the patient's individualized plan of care/goals and shares the quality data associated with the providers was provided to:     Patient Representative Name:       The Patient and/or Patient Representative Agree with the Discharge Plan?       Meryle Heymann, RN  Case Management Department  Ph: [phone] Fax: [phone]

## 2023-02-08 NOTE — PROGRESS NOTES
Neurology Service Progress Note  [facility] MICHAEL RODAS   Patient Name: Gwendolyn Snyder  : 1960        Subjective:   CC: Altered mental status  Chart was reviewed in detail. Patient was seen and assessed. She is lying in bed resting this morning. She completed MRI which was reviewed with the patient. She understands that there is no evidence for acute stroke. She states she is feeling better today and less confused. Past Medical History:   Diagnosis Date    Abnormal Pap smear of cervix     Acid reflux     Arthritis     left knee    Breast cyst     CAD (coronary artery disease)     per [facility] 13 - Dr. Gunn All    COPD (chronic obstructive pulmonary disease) ([facility] [address]     follow with Dr Vicky Wise    Depression     Andrew Encinas manic - depression see Dr Clay Reyez    Diabetes mellitus [facility])     dx 10+ yrs ago- follows with PCP    Drug abuse ([facility] [address]     hx use of cocaine, heroin and marijuana- states last used 2014    Glaucoma     bilateral    H/O Doppler lower venous ultrasound 2019    No DVT or SVT, Significant reflux of RGSV and LGSV. H/O echocardiogram 2020    EF 55-60%, Mod LVH. Hepatic encephalopathy 2019    Hepatitis C     for liver bx 12/3/2015\"Have Hepatitis B and C and saw Dr Sumanth Bowles for this 2015\"    Hiatal hernia     History of alcohol abuse     History of exercise stress test 2020    Treadmill, Normal exercise performance without angina and ischemic EKG changes.     HTN (hypertension)     \"for the past two yrs on medication\" follows with Dr Gunn All    Hx of blood clots 2019    Portal vein thrombsis - TIPS procedure 19    Hyperlipemia     Irregular heart beat     per pt    Liver hematoma     Migraine     Migraines     Last migraine:  2018    Nausea & vomiting     Neurologic disorder     Schizophrenia [facility])     per old chart    Seizures ([facility] [address]     \"last one was 2015- saw Dr Hattie Salazar at [facility]- she said not sure if acutal seizures- she thinks they are panic or anxiety attacks\"    SOB (shortness of breath)     with any exertion    Vulvar mass     :   Past Surgical History:   Procedure Laterality Date    BREAST SURGERY  10/2015    left breast bx    CARDIAC CATHETERIZATION      per old chart pt had cath done in 3/2011 and 9/2013    CARPAL TUNNEL RELEASE Left 01/09/2020    CARPAL TUNNEL RELEASE LEFT performed by Milad Pablo DO at [address] Right 05/26/2020    dr Yulia Jacques, carpal tunnel trigger finger release    CARPAL TUNNEL RELEASE Right 05/26/2020    RIGHT CARPAL TUNNEL RELEASE performed by Milad Pablo DO at [address]  per old chart done 1985    COLONOSCOPY  03/11/2013    diverticulosis, cecal polyp    COLONOSCOPY  03/16/2017    Internal hemorrhoids- Dr. Paolo Claros    COLONOSCOPY N/A 05/17/2022    COLONOSCOPY POLYPECTOMY SNARE/COLD BIOPSY performed by Isra Niño MD at [address] [place] [address], DIAGNOSTIC  03/16/2017    EGD: Small esophageal varices, portal hypertensive gastropathy, reflux esophagitis, hiatal hernia    EYE SURGERY Bilateral ? when    cyst removal, cataracts w/lens replacement    FINGER TRIGGER RELEASE Right 05/26/2020    FINGER TRIGGER RELEASE RIGHT RING FINGER performed by Milad Pablo DO at [facility] ([address] [place] [address])      per old chart pt had CHOLO/BSO 1986    SALPINGO-OOPHORECTOMY      TIPS PROCEDURE  04/23/2019    TONSILLECTOMY  as a kid    UPPER GASTROINTESTINAL ENDOSCOPY N/A 11/14/2018    EGD DIAGNOSTIC ONLY performed by Ayad Thornton MD at [facility] N/A 06/05/2019    EGD DIAGNOSTIC ONLY performed by Ayad Thornton MD at [facility] N/A 11/9/2022    PARTIAL VULVECTOMY performed by Lilia Markham MD at [facility] OR     Medications:  Scheduled Meds:   insulin glargine  10 Units SubCUTAneous Nightly    atorvastatin  40 mg Oral Nightly    metoprolol succinate  50 mg Oral Daily    enoxaparin  40 mg SubCUTAneous QPM insulin lispro  10 Units SubCUTAneous TID WC    insulin lispro  0-16 Units SubCUTAneous TID WC    insulin lispro  0-16 Units SubCUTAneous 2 times per day    FLUoxetine  30 mg Oral Daily    lactulose  20 g Oral TID    trospium  20 mg Oral Nightly    paliperidone  6 mg Oral QAM    pantoprazole  40 mg Oral BID AC    QUEtiapine  100 mg Oral Nightly    ranolazine  500 mg Oral BID    tenofovir disoproxil fumarate  300 mg Oral Daily    sodium chloride flush  5-40 mL IntraVENous 2 times per day    aspirin  81 mg Oral Daily     Continuous Infusions:   sodium chloride      dextrose       PRN Meds:.traMADol, albuterol sulfate HFA, sodium chloride flush, sodium chloride, glucose, dextrose bolus **OR** dextrose bolus, glucagon (rDNA), dextrose, ondansetron **OR** ondansetron, polyethylene glycol, dextrose bolus **OR** dextrose bolus, potassium chloride    Allergies   Allergen Reactions    Hydrocodone-Acetaminophen Anaphylaxis    Norco [Hydrocodone-Acetaminophen] Itching     Itching, rash, nausea and vomiting.      Tylenol [Acetaminophen] Itching, Nausea And Vomiting and Rash     Social History     Socioeconomic History    Marital status:      Spouse name: Not on file    Number of children: Not on file    Years of education: Not on file    Highest education level: Not on file   Occupational History    Not on file   Tobacco Use    Smoking status: Every Day     Packs/day: 0.25     Years: 45.00     Pack years: 11.25     Types: Cigarettes     Start date: 67     Passive exposure: Past    Smokeless tobacco: Never   Vaping Use    Vaping Use: Never used   Substance and Sexual Activity    Alcohol use: Not Currently     Comment: None/Caffiene - 1 cup of coffee/day    Drug use: Not Currently     Types: Marijuana Herminia Suárez    Sexual activity: Not Currently     Partners: Male   Other Topics Concern    Not on file   Social History Narrative    Not on file     Social Determinants of Health     Financial Resource Strain: Medium Risk Difficulty of Paying Living Expenses: Somewhat hard   Food Insecurity: Food Insecurity Present    Worried About Running Out of Food in the Last Year: Sometimes true    Ran Out of Food in the Last Year: Sometimes true   Transportation Needs: Unmet Transportation Needs    Lack of Transportation (Medical): Yes    Lack of Transportation (Non-Medical): Yes   Physical Activity: Inactive    Days of Exercise per Week: 0 days    Minutes of Exercise per Session: 0 min   Stress: No Stress Concern Present    Feeling of Stress : Only a little   Social Connections: Moderately Integrated    Frequency of Communication with Friends and Family:  Three times a week    Frequency of Social Gatherings with Friends and Family: Twice a week    Attends Mandaeism Services: More than 4 times per year    Active Member of [employer] or Organizations: Yes    Attends Club or Organization Meetings: More than 4 times per year    Marital Status:    Intimate Partner Violence: Not on file   Housing Stability: [address] to Pay for Housing in the Last Year: No    Number of [place] in the Last Year: 1    Unstable Housing in the Last Year: No      Family History   Problem Relation Age of Onset    Ovarian Cancer Mother     Cancer Mother         lung ca    Arthritis Mother     Migraines Mother     Cancer Father         colon ca    Diabetes Father     High Blood Pressure Father     Arthritis Father     High Cholesterol Father     Migraines Father     Migraines Sister     Heart Disease Brother         WPW    Breast Cancer Maternal Aunt     Breast Cancer Maternal Aunt     Breast Cancer Maternal Aunt     Breast Cancer Maternal Aunt          ROS (10 systems)  In addition to that documented in the HPI above, the additional ROS was obtained:  Constitutional: Denies fevers or chills  Eyes: Denies vision changes  ENMT: Denies sore throat  CV: Denies chest pain  Resp: Denies SOB  GI: Denies vomiting or diarrhea  : Denies painful urination  MSK: Denies recent trauma  Skin: Denies new rashes  Neuro: Denies new numbness or tingling or weakness  Endocrine: Denies unexpected weight loss  Heme: Denies bleeding disorders    Physical Exam:         Wt Readings from Last 3 Encounters:   02/08/23 183 lb 13.8 oz (83.4 kg)   02/07/23 178 lb 5.6 oz (80.9 kg)   01/27/23 172 lb (78 kg)     Temp Readings from Last 3 Encounters:   02/08/23 98.7 °F (37.1 °C) (Oral)   01/13/23 97.8 °F (36.6 °C) (Oral)   12/25/22 98 °F (36.7 °C)     BP Readings from Last 3 Encounters:   02/08/23 92/79   01/27/23 (!) 88/50   01/13/23 (!) 141/82     Pulse Readings from Last 3 Encounters:   02/08/23 83   01/27/23 66   01/13/23 85        Gen: A&O x 4, NAD, cooperative  HEENT: NC/AT, EOMI, PERRL, mmm,  neck supple, no meningeal signs  Heart: NSR/ST  Lungs: Respirations even and unlabored  Ext: no edema, no calf tenderness b/l  Psych: normal mood and affect  Skin: no rashes or lesions    NEUROLOGIC EXAM:    Mental Status: A&O to self, location, month and year, NAD, speech mildly slurred as patient is edentulous, language fluent, repetition and naming intact, follows commands appropriately    Cranial Nerve Exam:   CN II-XII:  PERRL, VFF, no nystagmus, no gaze paresis, sensation V1-V3 intact b/l, muscles of facial expression symmetric; hearing intact to conversational tone, palate elevates symmetrically, shoulder elevation symmetric and tongue protrudes midline with movement side to side.     Motor Exam:       Strength 5/5 UE's/LE's b/l  Tone and bulk normal   No pronator drift    Deep Tendon Reflexes: 2/4 biceps, triceps, brachioradialis, patellar, and achilles b/l; flexor plantar responses b/l    Sensation: Intact light touch/pinprick/vibration UE's/LE's b/l    Coordination/Cerebellum:       Tremors--none      Rapidly alternating movements: no dysdiadochokinesia b/l                Heel-to-Shin: no dysmetria b/l      Finger-to-Nose: no dysmetria b/l    Gait and stance:      Gait: deferred      LABS:     Recent Labs     02/06/23  1117 02/06/23  1123 02/06/23  1541 02/06/23  2121 02/07/23  0214   WBC 6.8  --   --   --  5.8   *  --   --  136  135 138   K 4.7  --   --  3.5 3.9   CL 94*  --   --  104 104   CO2 22  --   --  26 26   BUN 26*  --   --   --  20   CREATININE 1.0  --   --   --  0.9   GLUCOSE 521* 512 421  --  86   INR 0.99  --   --   --   --            IMAGING:    CT head w/o contrast:  Impression   No acute intracranial abnormality.     CTA head and neck:  Impression   No apparent arterial high grade stenosis, occlusion or aneurysm within the   head or neck.     MRI brain w/o contrast:  Impression   1. No acute intracranial abnormality.  Specifically, no evidence of acute   infarct.   2. Involutional parenchymal changes with mild microvascular ischemic disease.   3. Symmetric T1 hyperintense appearance of the bilateral ganglia.  Finding is   nonspecific but can be seen in the setting hepatic failure.       TTE:  Summary   Left ventricular systolic function is normal.   Ejection fraction is visually estimated at 55-60%.   No significant valvular disease noted.   No pericardial effusion present.   Negative bubble study.    All imaging was personally reviewed   ASSESSMENT/PLAN:   62 year old female with hyperglycemia, hyperammoniemia and reports of altered mental status and speech changes. Today patient is awake, alert and oriented x 4. Face is symmetric. Speech is mildly slurred, feel this is related to patient being edentulous. Language is fluent. Strength and sensation are intact in the upper and lower extremities. She states she is feeling better overall.   Altered mental status and speech changes likely secondary metabolic encephalopathy superimposed on  hyperglycemia, hyperammoniemia. No evidence for stroke on MRI.   Neuro imaging as above - non acute  MRI brain as above no evidence for stroke  From neurology standpoint patient does not need to continue on aspirin and statin for secondary stroke  prevention. Continue lactulose  Ammonia 90, repeat ammonia 113  PT/OT as recommended  's on admission, now improved - endocrinology following  No evidence for stroke on MRI. From neurology standpoint, patient is stable to discharge. No neurology follow up is needed at discharge. Discussed with IM. Patient was discussed with attending neurologist Dr. Nancy Baires     Thank you for allowing us to participate in the care of your patient. If there are any questions regarding evaluation please feel free to contact us.      Cipriano Lechuga, ERLINDA - CNP, 2/8/2023

## 2023-02-08 NOTE — DISCHARGE INSTRUCTIONS
Internal Medicine Discharge Instruction    Discharge to:  Home  Diet: Diabetic Diet  Activity: As tolerated       Be compliant with medications  Please take medications as prescribed   Please make sure you take insulin as prescribed - 12U lantus nightly, 10U humalog three times a day with meals and sliding scale.          Electronically signed by Adonay Overton MD on 2/8/2023 at 12:01 PM

## 2023-02-08 NOTE — PLAN OF CARE
Problem: Discharge Planning  Goal: Discharge to home or other facility with appropriate resources  2/8/2023 1302 by Meggan De Leon RN  Outcome: Adequate for Discharge  2/8/2023 0727 by Meggan De Leon RN  Outcome: Progressing  Flowsheets (Taken 2/8/2023 0715)  Discharge to home or other facility with appropriate resources: Identify barriers to discharge with patient and caregiver     Problem: Pain  Goal: Verbalizes/displays adequate comfort level or baseline comfort level  2/8/2023 1302 by Meggan De Leon RN  Outcome: Adequate for Discharge  2/8/2023 0727 by Meggan De Leon RN  Outcome: Progressing  Flowsheets (Taken 2/8/2023 0715)  Verbalizes/displays adequate comfort level or baseline comfort level:   Encourage patient to monitor pain and request assistance   Assess pain using appropriate pain scale   Administer analgesics based on type and severity of pain and evaluate response  2/8/2023 0407 by Ana Steele RN  Outcome: Progressing     Problem: Skin/Tissue Integrity  Goal: Absence of new skin breakdown  Description: 1. Monitor for areas of redness and/or skin breakdown  2. Assess vascular access sites hourly  3. Every 4-6 hours minimum:  Change oxygen saturation probe site  4. Every 4-6 hours:  If on nasal continuous positive airway pressure, respiratory therapy assess nares and determine need for appliance change or resting period.   2/8/2023 1302 by Meggan De Leon RN  Outcome: Adequate for Discharge  2/8/2023 0727 by Meggan De Leon RN  Outcome: Progressing     Problem: Safety - Adult  Goal: Free from fall injury  2/8/2023 1302 by Meggan De Leon RN  Outcome: Adequate for Discharge  2/8/2023 0727 by Meggan De Leon RN  Outcome: Progressing  2/8/2023 0407 by Ana Steele RN  Outcome: Progressing     Problem: ABCDS Injury Assessment  Goal: Absence of physical injury  2/8/2023 1302 by Meggan De Leon RN  Outcome: Adequate for Discharge  2/8/2023 0727 by Meggan De Leon RN  Outcome: Progressing

## 2023-02-08 NOTE — DISCHARGE SUMMARY
Discharge Summary    Name:  Aureliano Engel /Age/Sex: 1960  (63 y.o. female)   MRN & CSN:  [id number] & [id number] Admission Date/Time: 2023 10:59 AM   Attending:  Ellen Hoang MD Discharging Physician: Ellen Hoang MD       Admission Diagnosis:   Hyerglycemic HypoerOsmolar NonKetosis  Hyponatremia  Non anion gap metabolic acidosis   insulin-dependent type 2 diabetes mellitus  Pseudohyponatremia  Strokelike symptoms  Altered Mental Status   Sepsis in the setting of hyperglycemia, HHS  Alcoholic cirrhosis of liver without ascites  Elevated ammonia  Hepatic encephalopathy  Hepatitis  COPD  CAD  Mixed hyperlipidemia and  Schizophrenia  Depression  GERD  Essential hypertension  Tobacco abuse,  Thrombocytopenia    Discharge Diagnosis, hospital course, assessment and plan:  Aureliano Engel is a 63 y.o.  female  who presents with Hyperglycemia due to diabetes mellitus [facility])    Patient admitted on 2023    Per H+P:  Aureliano Engel is a 63 y.o. female with a history of CAD, schizophrenia, hepatitis C, thrombocytopenia, tobacco abuse, hepatitis who presented to the ED for complaints of elevated blood glucose, slurred speech and being off balance. Last 1 well was 9 PM last night. Patient states she went to bed at 9 PM last night and felt baseline health. She states she woke this morning and noticed her speech was not clear. She states she also felt off balance when walking. She took her blood glucose on a reader and measured high multiple times. Review of systems limited due to patient's somnolence,.   She does wake up and follow commands x3    Hyerglycemic HyperOsmolar NonKetosis  Pseudohyponatremia  Non anion gap metabolic acidosis   insulin-dependent type 2 diabetes mellitus              Lab work notable for blood glucose 521, VBG pH 7.39, serum bicarbonate of 22, anion gap 11, urine noted to be negative for ketones, ammonia 90, beta hydroxybutyrate 3.5  Patient evaluated by critical care and feel patient can be appropriately managed in stepdown, will admit to hospital stepdown              Endocrinology on board          Corrected sodium 134 initially and now normalized. Antiemetics PRN                   Pseudohyponatremia              Corrected Na 134 initially. Now normalized. Strokelike symptoms - Now back to baseline   Acute Encephalopathy 2/2 likely Metabolic 2/2 Severe hyperglycemia 2/2 uncontrolled DM, Hepatic encephalopathy 2/2 hyperammoniema, less likely stroke. Stroke Alert Called in ED - Had slurred speech, weakness LUE and LLE on presentation              Last Known Well 2100 NIH 1 on 2/6/2023              Not a TPA Cannidate due to low NIH, thought secondary to hyperglycemia              -CT head negative for acute intercranial abnormality, CTA head or neck negative for high-grade stenosis              TSH normal and FT4 normal               MRI brain -ve  Echocardiogram normal  Lipid panel normal, hemoglobin A1c 10.1  Aspirin 81 mg po qd, Statin initiated  Neuro on board  - ok to discharge from neuro POV. Resumed home toprol XL        Sepsis Ruled out after study  Lactic acidosis 2/2 likely Cirrhosis   Patient withlactic acidosis, tachycardia and tachypnea   Sepsis fluid bolus given in ER then maintenance IVF  Lactate 2.8, trending down with fluid  Blood cultures x2 - negative so far, UA -ve  She is on vancomycin and cefepime - discontinue it,   Procal -ve. Alcoholic cirrhosis of liver without ascites  Elevated ammonia  Hepatic encephalopathy  H/O Hepatitis               Continue lactulose to 3 times daily              Review medications for hepatotoxicity              -Consider GI consult if failure to improve              Continue home for V[place]              Head CT negative for acute intercranial process, neurochecks. Tele monitoring.                     Continue home medications for chronic medical conditions unless otherwise noted above including but not limited to  COPD  Chronic Respiratory failure on 2L/min  CAD  Mixed hyperlipidemia and  Schizophrenia  Depression  GERD  Essential hypertension  Tobacco abuse,  Thrombocytopenia 2/2 Cirrhosis   Chronic pain        Patient now back to baseline. Patient deemed stable enough to be discharged home. Declined home health care. The patient expressed appropriate understanding of and agreement with the discharge recommendations, medications, and plan. Consults this admission:  IP CONSULT TO PHARMACY  PHARMACY TO CHANGE BASE FLUIDS  PHARMACY TO DOSE VANCOMYCIN  IP CONSULT TO CRITICAL CARE  IP CONSULT TO ENDOCRINOLOGY  IP CONSULT TO NEUROLOGY  PHARMACY TO DOSE VANCOMYCIN        Discharge Exam  BP 92/79   Pulse 83   Temp 98.7 °F (37.1 °C) (Oral)   Resp 17   Ht 5' 4\" (1.626 m)   Wt 183 lb 13.8 oz (83.4 kg)   SpO2 96%   BMI 39.79 kg/m²   Physical Exam  Vitals and nursing note reviewed. Constitutional:       General: She is not in acute distress. Appearance: She is obese. She is not ill-appearing, toxic-appearing or diaphoretic. Interventions: Nasal cannula in place. Comments: 2L/min   HENT:      Head: Normocephalic and atraumatic. Right Ear: External ear normal.      Left Ear: External ear normal.      Nose: Nose normal.      Mouth/Throat:      Dentition: Abnormal dentition. Pharynx: Oropharynx is clear. No oropharyngeal exudate or posterior oropharyngeal erythema. Eyes:      General: No scleral icterus. Right eye: No discharge. Left eye: No discharge. Conjunctiva/sclera: Conjunctivae normal.   Cardiovascular:      Rate and Rhythm: Normal rate and regular rhythm. Pulses: Normal pulses. Heart sounds: Normal heart sounds. No murmur heard. No friction rub. No gallop. Pulmonary:      Effort: Pulmonary effort is normal. No respiratory distress. Breath sounds: Normal breath sounds.  No stridor. No wheezing, rhonchi or rales. Abdominal:      General: Bowel sounds are normal. There is no distension. Palpations: Abdomen is soft. There is no mass. Tenderness: There is no abdominal tenderness. There is no guarding or rebound. Hernia: No hernia is present. Musculoskeletal:      Right lower leg: No edema. Left lower leg: No edema. Comments: 4+/5 LUE and LLE - At baseline per patient   Skin:     General: Skin is warm. Capillary Refill: Capillary refill takes less than 2 seconds. Neurological:      Mental Status: She is alert and oriented to person, place, and time. Cranial Nerves: No dysarthria. Psychiatric:         Mood and Affect: Mood normal.         Discharge Instruction:   Handoff to PCP:     Follow up appointments: PCP in 1 week. With Endocrinology   Primary care physician: Discharge instructions as given to patient:   Be compliant with medications  Please take medications as prescribed   Please make sure you take insulin as prescribed - 12U lantus nightly, 10U humalog three times a day with meals and sliding scale.      Diet: ADULT DIET; Easy to Chew; 4 carb choices (60 gm/meal)  Activity: activity as tolerated  Disposition: Discharged to:   [x]Home, [][facility], []SNF, []Acute Rehab, []Hospice   Condition on discharge: Stable    Discharge Medications:        Medication List        START taking these medications      atorvastatin 40 MG tablet  Commonly known as: LIPITOR  Take 1 tablet by mouth nightly     Lantus SoloStar 100 UNIT/ML injection pen  Generic drug: insulin glargine  Inject 12 Units into the skin nightly            CHANGE how you take these medications      insulin lispro (1 Unit Dial) 100 UNIT/ML Sopn  Commonly known as: HumaLOG KwikPen  10U insulin befor meals 3x/day    Take insulin according to glucose check 2 hours after meals:   0  140-199 3  200-249 6  250-299 9  300-349 12  350-400 15  >400 18    Take insulin according to glucose check before sleep:   0  140-199 2  200-249 3  250-299 5  300-349 6  350-400 7  >400 9  What changed:   how to take this  when to take this  additional instructions     QUEtiapine 100 MG tablet  Commonly known as: SEROQUEL  What changed: Another medication with the same name was removed. Continue taking this medication, and follow the directions you see here. CONTINUE taking these medications      * [employer] Blood Glucose Meter w/Device Kit  Please check blood sugar 3 times daily. Please fill with what is covered by the insurance     * glucose monitoring kit  1 kit by Does not apply route daily     * blood glucose monitor kit and supplies  Use 3-4 times daily     albuterol sulfate  (90 Base) MCG/ACT inhaler  Commonly known as: Proventil HFA  Inhale 2 puffs into the lungs every 4 hours as needed for Wheezing or Shortness of Breath With spacer (and mask if indicated). Thanks. blood glucose test strips  Test  Bid times a day & as needed for symptoms of irregular blood glucose. calcium carbonate 1250 (500 Ca) MG tablet  Commonly known as: OYSTER SHELL CALCIUM 500 mg     Compression Stockings Misc  by Does not apply route 20 - 30 mmh wear daily and take off at night  Thigh High     estradiol 0.1 MG/GM vaginal cream  Commonly known as: ESTRACE VAGINAL  Place 1 g vaginally three times a week Use 1 g vaginally nightly for 2 weeks, then 1 g nightly 2-3 times per week.      FLUoxetine 20 MG capsule  Commonly known as: PROZAC     furosemide 20 MG tablet  Commonly known as: LASIX  Take 1 tablet by mouth daily Hold if systolic <409     * Glucosource Lancets Misc  Use one 3-4 times to check blood sugar     * Lancets Misc  Use one lancet 4 times daily     ipratropium-albuterol 0.5-2.5 (3) MG/3ML Soln nebulizer solution  Commonly known as: DUONEB  Inhale 3 mLs into the lungs every 4 hours     lactulose 10 GM/15ML solution  Commonly known as: CHRONULAC  Take 30 mLs by mouth 2 times daily     metoprolol succinate 50 MG extended release tablet  Commonly known as: TOPROL XL  Take 1 tablet by mouth daily  Start taking on: February 9, 2023     mirabegron 25 MG Tb24  Commonly known as: Myrbetriq  Take 1 tablet by mouth daily     MULTIVITAMIN PO     mupirocin 2 % ointment  Commonly known as: BACTROBAN  Apply topically 3 times daily. nitroGLYCERIN 0.4 MG SL tablet  Commonly known as: NITROSTAT  Place 1 tablet under the tongue every 5 minutes as needed for Chest pain     ondansetron 4 MG disintegrating tablet  Commonly known as: Zofran ODT  Take 1 tablet by mouth every 8 hours as needed for Nausea     paliperidone 6 MG extended release tablet  Commonly known as: INVEGA     pantoprazole 40 MG tablet  Commonly known as: PROTONIX  Take 1 tablet by mouth 2 times daily (before meals)     raloxifene 60 MG tablet  Commonly known as: Evista  Take 1 tablet by mouth daily     ranolazine 500 MG extended release tablet  Commonly known as: RANEXA  Take 1 tablet by mouth in the morning and 1 tablet before bedtime. traMADol 50 MG tablet  Commonly known as: ULTRAM     Viread 300 MG tablet  Generic drug: tenofovir disoproxil fumarate     VITAMIN D PO           * This list has 5 medication(s) that are the same as other medications prescribed for you. Read the directions carefully, and ask your doctor or other care provider to review them with you.                 STOP taking these medications      cyclobenzaprine 10 MG tablet  Commonly known as: FLEXERIL     predniSONE 10 MG tablet  Commonly known as: Tomy Cavazosaac               Where to Get Your Medications        These medications were sent to [facility] [address], [address] [facility]       Phone: [phone]   atorvastatin 40 MG tablet  lactulose 10 GM/15ML solution  Lantus SoloStar 100 UNIT/ML injection pen  metoprolol succinate 50 MG extended release tablet Objective Findings at Discharge:       BMP/CBC  Recent Labs     02/06/23  1117 02/06/23  2121 02/07/23  0214   * 136  135 138   K 4.7 3.5 3.9   CL 94* 104 104   CO2 22 26 26   BUN 26*  --  20   CREATININE 1.0  --  0.9   WBC 6.8  --  5.8   HCT 43.0  --  39.5   PLT 71*  --  74*       IMAGING:  CT HEAD WO CONTRAST    Result Date: 2/6/2023  EXAMINATION: CT OF THE HEAD WITHOUT CONTRAST  2/6/2023 11:27 am TECHNIQUE: CT of the head was performed without the administration of intravenous contrast. Automated exposure control, iterative reconstruction, and/or weight based adjustment of the mA/kV was utilized to reduce the radiation dose to as low as reasonably achievable. COMPARISON: None HISTORY: ORDERING SYSTEM PROVIDED HISTORY: Stroke Symptoms TECHNOLOGIST PROVIDED HISTORY: Has a \"code stroke\" or \"stroke alert\" been called? ->Yes Reason for exam:->Stroke Symptoms Decision Support Exception - unselect if not a suspected or confirmed emergency medical condition->Emergency Medical Condition (MA) Reason for Exam: Stroke Symptoms FINDINGS: BRAIN/VENTRICLES: There is no acute intracranial hemorrhage, mass effect or midline shift. No abnormal extra-axial fluid collection. The gray-white differentiation is maintained without evidence of an acute infarct. There is no evidence of hydrocephalus. ORBITS: The visualized portion of the orbits demonstrate no acute abnormality. SINUSES: The visualized paranasal sinuses and mastoid air cells demonstrate no acute abnormality. SOFT TISSUES/SKULL:  No acute abnormality of the visualized skull or soft tissues. No acute intracranial abnormality.  The results were conveyed to nurse practitioner Anamaria Gil on 02/06/2023 at 11:37 a.m.     CT HEAD WO CONTRAST    Result Date: 1/13/2023  EXAMINATION: CT OF THE HEAD WITHOUT CONTRAST  1/13/2023 5:46 pm TECHNIQUE: CT of the head was performed without the administration of intravenous contrast. Automated exposure control, iterative reconstruction, and/or weight based adjustment of the mA/kV was utilized to reduce the radiation dose to as low as reasonably achievable. COMPARISON: None. HISTORY: ORDERING SYSTEM PROVIDED HISTORY: intermittent with activity TECHNOLOGIST PROVIDED HISTORY: Has a \"code stroke\" or \"stroke alert\" been called? ->No Reason for exam:->intermittent with activity Decision Support Exception - unselect if not a suspected or confirmed emergency medical condition->Emergency Medical Condition (MA) Reason for Exam: dizziness FINDINGS: BRAIN/VENTRICLES: There is no acute intracranial hemorrhage, mass effect or midline shift. No abnormal extra-axial fluid collection. The gray-white differentiation is maintained without evidence of an acute infarct. There is no evidence of hydrocephalus. ORBITS: The visualized portion of the orbits demonstrate no acute abnormality. SINUSES: The visualized paranasal sinuses and mastoid air cells demonstrate no acute abnormality. SOFT TISSUES/SKULL:  No acute abnormality of the visualized skull or soft tissues. No acute intracranial abnormality. XR CHEST PORTABLE    Result Date: 2/6/2023  EXAMINATION: ONE XRAY VIEW OF THE CHEST 2/6/2023 11:48 am COMPARISON: 01/13/2023. HISTORY: ORDERING SYSTEM PROVIDED HISTORY: stroke symptoms TECHNOLOGIST PROVIDED HISTORY: Reason for exam:->stroke symptoms Reason for Exam: stroke symptoms FINDINGS: The heart size is within normal limits. The pulmonary vasculature is mildly congested. No acute infiltrates are seen. No pneumothoraces are noted. 1. Mild vascular congestion.      XR CHEST PORTABLE    Result Date: 1/13/2023  EXAMINATION: ONE XRAY VIEW OF THE CHEST 1/13/2023 11:51 am COMPARISON: 12/25/2022 HISTORY: ORDERING SYSTEM PROVIDED HISTORY: chest pain TECHNOLOGIST PROVIDED HISTORY: Reason for exam:->chest pain Reason for Exam: chest pain Additional signs and symptoms: NA Relevant Medical/Surgical History: diabetes, cad, copd FINDINGS: Pulmonary vasculature is congested. Increased interstitial opacities seen More focal patchy opacities are noted in the mid to lower lungs bilaterally, greater on the left, with obscuration of left heart border. The cardial pericardial silhouette is normal in size. No pneumothorax is seen. No free air. No acute bony abnormality. Patchy infiltrates in the mid to lower lungs bilaterally, greater on the left, pneumonia versus edema. This appears to be superimposed on a background of interstitial edema. Given the normal heart size, consider both cardiogenic and noncardiogenic etiologies. CTA HEAD NECK W CONTRAST    Result Date: 2/6/2023  EXAMINATION: CTA OF THE HEAD AND NECK WITH CONTRAST 2/6/2023 11:31 am: TECHNIQUE: CTA of the head and neck was performed with the administration of intravenous contrast. Multiplanar reformatted images are provided for review. MIP images are provided for review. Stenosis of the internal carotid arteries measured using NASCET criteria. Automated exposure control, iterative reconstruction, and/or weight based adjustment of the mA/kV was utilized to reduce the radiation dose to as low as reasonably achievable. This scan was analyzed using [employer]. ai contact LVO. Identification of suspected findings is not for diagnostic use beyond notification. Viz LVO is limited to analysis of imaging data and should not be used in-lieu of full patient evaluation or relied upon to make or confirm diagnosis. COMPARISON: Concurrent head CT HISTORY: ORDERING SYSTEM PROVIDED HISTORY: Stroke Symptoms FINDINGS: CTA NECK: AORTIC ARCH/ARCH VESSELS: No dissection or arterial injury. No significant stenosis of the brachiocephalic or subclavian arteries. CAROTID ARTERIES: No dissection, arterial injury, or hemodynamically significant stenosis by NASCET criteria. VERTEBRAL ARTERIES: No dissection, arterial injury, or significant stenosis. SOFT TISSUES: The lung apices are clear.   Mild bronchial wall thickening suggesting bronchiolitis. No cervical or superior mediastinal lymphadenopathy. The larynx and pharynx are unremarkable. No acute abnormality of the salivary and thyroid glands. BONES: No acute osseous abnormality. CTA HEAD: ANTERIOR CIRCULATION: No significant stenosis of the intracranial internal carotid, anterior cerebral, or middle cerebral arteries. No aneurysm. POSTERIOR CIRCULATION: No significant stenosis of the vertebral, basilar, or posterior cerebral arteries. No aneurysm. OTHER: No dural venous sinus thrombosis on this non-dedicated study. BRAIN: No mass effect or midline shift. No extra-axial fluid collection. The gray-white differentiation is maintained. No apparent arterial high grade stenosis, occlusion or aneurysm within the head or neck. MRI brain without contrast    Result Date: 2/8/2023  EXAMINATION: MRI OF THE BRAIN WITHOUT CONTRAST  2/8/2023 9:39 am TECHNIQUE: Multiplanar multisequence MRI of the brain was performed without the administration of intravenous contrast. COMPARISON: 07/14/2008 HISTORY: ORDERING SYSTEM PROVIDED HISTORY: stroke like symptoms , altered mental status TECHNOLOGIST PROVIDED HISTORY: Reason for exam:->stroke like symptoms , altered mental status Reason for Exam: stroke like symptoms , altered mental status FINDINGS: INTRACRANIAL STRUCTURES/VENTRICLES: There is no acute infarct. No mass effect or midline shift. No evidence of an acute intracranial hemorrhage. Involutional parenchymal changes. No evidence of hydrocephalus. Scattered periventricular, deep, and subcortical white matter T2/FLAIR hyperintensities are nonspecific and likely related to microvascular ischemic disease. Symmetric T1 hyperintense appearance of the bilateral basal ganglia. The sellar/suprasellar regions appear unremarkable. The normal signal voids within the major intracranial vessels appear maintained. ORBITS: The visualized portion of the orbits demonstrate no acute abnormality. Bilateral lens replacement. SINUSES: The visualized paranasal sinuses appear clear.  Trace bilateral mastoid effusions. BONES/SOFT TISSUES: The bone marrow signal intensity appears normal. The soft tissues demonstrate no acute abnormality.     1. No acute intracranial abnormality.  Specifically, no evidence of acute infarct. 2. Involutional parenchymal changes with mild microvascular ischemic disease. 3. Symmetric T1 hyperintense appearance of the bilateral ganglia.  Finding is nonspecific but can be seen in the setting hepatic failure.       Additional Information: Patient seen and examined day of discharge. For more information regarding patient's care please contact [facility] medical records [phone]    Discharge Time of 35 minutes    Electronically signed by Haider Elmore MD on 2/8/2023 at 12:03 PM

## 2023-02-08 NOTE — PLAN OF CARE
Problem: Pain  Goal: Verbalizes/displays adequate comfort level or baseline comfort level  Outcome: Progressing         Problem: Safety - Adult  Goal: Free from fall injury  Outcome: Progressing

## 2023-02-08 NOTE — PLAN OF CARE
Problem: Discharge Planning  Goal: Discharge to home or other facility with appropriate resources  Outcome: Progressing  Flowsheets (Taken 2/8/2023 0715)  Discharge to home or other facility with appropriate resources: Identify barriers to discharge with patient and caregiver     Problem: Pain  Goal: Verbalizes/displays adequate comfort level or baseline comfort level  2/8/2023 0727 by Ami Bermudez RN  Outcome: Progressing  Flowsheets (Taken 2/8/2023 0715)  Verbalizes/displays adequate comfort level or baseline comfort level:   Encourage patient to monitor pain and request assistance   Assess pain using appropriate pain scale   Administer analgesics based on type and severity of pain and evaluate response  2/8/2023 0407 by Calvin Peres RN  Outcome: Progressing     Problem: Skin/Tissue Integrity  Goal: Absence of new skin breakdown  Description: 1. Monitor for areas of redness and/or skin breakdown  2. Assess vascular access sites hourly  3. Every 4-6 hours minimum:  Change oxygen saturation probe site  4. Every 4-6 hours:  If on nasal continuous positive airway pressure, respiratory therapy assess nares and determine need for appliance change or resting period.   Outcome: Progressing     Problem: Safety - Adult  Goal: Free from fall injury  2/8/2023 0727 by Ami Bermudez RN  Outcome: Progressing  2/8/2023 0407 by Calvin Peres RN  Outcome: Progressing     Problem: ABCDS Injury Assessment  Goal: Absence of physical injury  Outcome: Progressing

## 2023-02-08 NOTE — PROGRESS NOTES
Outpatient Pharmacy Progress Note for Meds-to-Beds    Total number of Prescriptions Filled: 4  The following medications were dispensed to the patient during the discharge process:  Atorvastatin  Lactulose  Lantus insulin  Metoprolol succinate     Additional Documentation:  Patient picked-up the medication(s) in the OP Pharmacy      Thank you for letting us serve your patients.   [address]    [address], [address] [facility]    Phone: [phone]    Fax: [phone]

## 2023-02-08 NOTE — PROGRESS NOTES
Progress Note( Dr. Landry Sampson)  2/7/2023  Subjective:   Admit Date: 2/6/2023  PCP: Harriett Lundborg, MD    Admitted For :  Altered mental state with high blood glucose    Consulted For:  Better control of blood glucose    Interval History: Patient more awake and alert completely turnaround from yesterday for better    Denies any chest pains,   Denies SOB . Denies nausea or vomiting. No new bowel or bladder symptoms. Intake/Output Summary (Last 24 hours) at 2/7/2023 2132  Last data filed at 2/7/2023 1023  Gross per 24 hour   Intake 1260.51 ml   Output 650 ml   Net 610.51 ml       DATA    CBC:   Recent Labs     02/06/23  1117 02/07/23 0214   WBC 6.8 5.8   HGB 15.2 13.9   PLT 71* 74*    CMP:  Recent Labs     02/06/23  1117 02/06/23 2121 02/07/23 0214   * 136  135 138   K 4.7 3.5 3.9   CL 94* 104 104   CO2 22 26 26   BUN 26*  --  20   CREATININE 1.0  --  0.9   CALCIUM 9.1  --  8.1*   PROT 6.5  --  5.2*   LABALBU 3.3*  --  2.8*   BILITOT 1.1*  --  1.0   ALKPHOS 216*  --  113   AST 21  --  15   ALT 15  --  11     Lipids:   Lab Results   Component Value Date/Time    CHOL 102 02/07/2023 12:45 PM    CHOL 120 08/01/2017 04:15 PM    HDL 34 02/07/2023 12:45 PM    TRIG 81 02/07/2023 12:45 PM     Glucose:  Recent Labs     02/07/23  1335 02/07/23  1615 02/07/23  1919   POCGLU 256* 255* 244*     [id number]:  Lab Results   Component Value Date/Time    LABA1C 10.1 02/06/2023 09:21 PM     High Sensitivity TSH:   Lab Results   Component Value Date/Time    TSHHS 1.150 02/07/2023 02:14 AM     Free T3: No results found for: FT3  Free T4:  Lab Results   Component Value Date/Time    T4FREE 1.10 02/07/2023 02:14 AM       XR CHEST PORTABLE   Final Result   1. Mild vascular congestion. CTA HEAD NECK W CONTRAST   Final Result   No apparent arterial high grade stenosis, occlusion or aneurysm within the   head or neck. CT HEAD WO CONTRAST   Final Result   No acute intracranial abnormality.       The results were conveyed to nurse practitioner Anamaria Gil on 02/06/2023 at   11:37 a.m. MRI brain without contrast    (Results Pending)        Scheduled Medicines   Medications:    insulin glargine  15 Units SubCUTAneous Nightly    atorvastatin  40 mg Oral Nightly    cefepime  2,000 mg IntraVENous Q8H    metoprolol succinate  50 mg Oral Daily    enoxaparin  40 mg SubCUTAneous QPM    FLUoxetine  30 mg Oral Daily    lactulose  20 g Oral TID    trospium  20 mg Oral Nightly    paliperidone  6 mg Oral QAM    pantoprazole  40 mg Oral BID AC    QUEtiapine  100 mg Oral Nightly    ranolazine  500 mg Oral BID    tenofovir disoproxil fumarate  300 mg Oral Daily    sodium chloride flush  5-40 mL IntraVENous 2 times per day    aspirin  81 mg Oral Daily    insulin lispro  0-16 Units SubCUTAneous Q4H    vancomycin  1,500 mg IntraVENous Q24H      Infusions:    sodium chloride      dextrose      dextrose 5 % and 0.45 % NaCl           Objective:   Vitals: /75   Pulse 96   Temp 98.6 °F (37 °C) (Oral)   Resp 16   Ht 5' 4\" (1.626 m)   Wt 178 lb 5.6 oz (80.9 kg)   SpO2 94%   BMI 30.61 kg/m²   General appearance: alert and cooperative with exam  Neck: no JVD or bruit  Thyroid : Normal lobes   Lungs: Has Vesicular Breath sounds   Heart:  regular rate and rhythm  Abdomen: soft, non-tender; bowel sounds normal; no masses,  no organomegaly  Musculoskeletal: Normal  Extremities: extremities normal, , no edema  Neurologic:  Awake, alert, oriented to name, place and time. Cranial nerves II-XII are grossly intact. Motor is  intact. Sensory is intact. ,  and gait is normal.    Assessment:     Patient Active Problem List:     Left arm pain     Type 2 diabetes mellitus with complication, with long-term current use of insulin (HCC)     Chronic obstructive pulmonary disease (HCC)     Tobacco abuse     Angina effort     Abnormal nuclear cardiac imaging test     Lung nodule     Chest pain     Dyslipidemia     Pancolitis (HCC)     Hematemesis without nausea     Alcoholic cirrhosis of liver without ascites (HCC)     Thrombocytopenia (HCC)     Periumbilical abdominal pain     History of colonic polyps     Abnormal findings on diagnostic imaging of abdomen     Nausea     Gastroesophageal reflux disease without esophagitis     Vitamin D deficiency     Left carpal tunnel syndrome     Right carpal tunnel syndrome     Esophageal hypertension     Candida esophagitis (HCC)     Colitis     Primary hypertension     GI bleed     Coronary-myocardial bridge     Type 2 diabetes mellitus with complication, with long-term current use of insulin (HCC)     SOB (shortness of breath)     Portal vein thrombosis     Intractable nausea and vomiting     Abdominal pain     Acute GI bleeding     Chronic hepatitis C without hepatic coma (HCC)     Delayed gastric emptying     Restless legs     Acute colitis     Acute encephalopathy     S/P TIPS (transjugular intrahepatic portosystemic shunt)     Cirrhosis of liver without ascites (HCC)     Acute upper GI bleed     Acute hepatic encephalopathy     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 Labs and X ray results   Reviewed Current Medicines   On meal/ Correction bolus Humalog/ Basal Lantus Insulin regime    Monitor Blood glucose frequently   Modified  the dose of Insulin/ other medicines as needed   Going for his brain MRI later today  Will follow     .      Bolivar Owens MD, MD

## 2023-02-09 ENCOUNTER — CARE COORDINATION (OUTPATIENT)
Dept: CASE MANAGEMENT | Age: 63
End: 2023-02-09

## 2023-02-09 ENCOUNTER — TELEPHONE (OUTPATIENT)
Dept: FAMILY MEDICINE CLINIC | Age: 63
End: 2023-02-09

## 2023-02-09 NOTE — PROGRESS NOTES
Progress Note( Dr. Landry Sampson)  2/9/2023  Subjective:   Admit Date: 2/6/2023  PCP: Harriett Lundborg, MD    Admitted For :  Altered mental state with high blood glucose    Consulted For:  Better control of blood glucose    Interval History: Patient more awake and alert completely turnaround from yesterday for better    Denies any chest pains,   Denies SOB . Denies nausea or vomiting. No new bowel or bladder symptoms. Intake/Output Summary (Last 24 hours) at 2/9/2023 0024  Last data filed at 2/8/2023 0725  Gross per 24 hour   Intake 1469.89 ml   Output --   Net 1469.89 ml         DATA    CBC:   Recent Labs     02/06/23  1117 02/07/23 0214   WBC 6.8 5.8   HGB 15.2 13.9   PLT 71* 74*      CMP:  Recent Labs     02/06/23  1117 02/06/23  2121 02/07/23 0214   * 136  135 138   K 4.7 3.5 3.9   CL 94* 104 104   CO2 22 26 26   BUN 26*  --  20   CREATININE 1.0  --  0.9   CALCIUM 9.1  --  8.1*   PROT 6.5  --  5.2*   LABALBU 3.3*  --  2.8*   BILITOT 1.1*  --  1.0   ALKPHOS 216*  --  113   AST 21  --  15   ALT 15  --  11       Lipids:   Lab Results   Component Value Date/Time    CHOL 102 02/07/2023 12:45 PM    CHOL 120 08/01/2017 04:15 PM    HDL 34 02/07/2023 12:45 PM    TRIG 81 02/07/2023 12:45 PM     Glucose:  Recent Labs     02/08/23  0208 02/08/23  0658 02/08/23  1107   POCGLU 79 124* 213*       [id number]:  Lab Results   Component Value Date/Time    LABA1C 10.1 02/06/2023 09:21 PM     High Sensitivity TSH:   Lab Results   Component Value Date/Time    TSHHS 1.150 02/07/2023 02:14 AM     Free T3: No results found for: FT3  Free T4:  Lab Results   Component Value Date/Time    T4FREE 1.10 02/07/2023 02:14 AM       MRI brain without contrast   Final Result   1. No acute intracranial abnormality. Specifically, no evidence of acute   infarct. 2. Involutional parenchymal changes with mild microvascular ischemic disease. 3. Symmetric T1 hyperintense appearance of the bilateral ganglia.   Finding is   nonspecific but can be seen in the setting hepatic failure. XR CHEST PORTABLE   Final Result   1. Mild vascular congestion. CTA HEAD NECK W CONTRAST   Final Result   No apparent arterial high grade stenosis, occlusion or aneurysm within the   head or neck. CT HEAD WO CONTRAST   Final Result   No acute intracranial abnormality. The results were conveyed to nurse practitioner Baldemar Malik on 02/06/2023 at   11:37 a.m. Scheduled Medicines   Medications:   REM     Infusions:   REM        Objective:   Vitals: BP 92/79   Pulse 83   Temp 98.7 °F (37.1 °C) (Oral)   Resp 17   Ht 5' 4\" (1.626 m)   Wt 183 lb 13.8 oz (83.4 kg)   SpO2 96%   BMI 39.79 kg/m²   General appearance: alert and cooperative with exam  Neck: no JVD or bruit  Thyroid : Normal lobes   Lungs: Has Vesicular Breath sounds   Heart:  regular rate and rhythm  Abdomen: soft, non-tender; bowel sounds normal; no masses,  no organomegaly  Musculoskeletal: Normal  Extremities: extremities normal, , no edema  Neurologic:  Awake, alert, oriented to name, place and time. Cranial nerves II-XII are grossly intact. Motor is  intact. Sensory is intact. ,  and gait is normal.    Assessment:     Patient Active Problem List:     Left arm pain     Type 2 diabetes mellitus with complication, with long-term current use of insulin (HCC)     Chronic obstructive pulmonary disease (HCC)     Tobacco abuse     Angina effort     Abnormal nuclear cardiac imaging test     Lung nodule     Chest pain     Dyslipidemia     Pancolitis (HCC)     Hematemesis without nausea     Alcoholic cirrhosis of liver without ascites (HCC)     Thrombocytopenia (HCC)     Periumbilical abdominal pain     History of colonic polyps     Abnormal findings on diagnostic imaging of abdomen     Nausea     Gastroesophageal reflux disease without esophagitis     Vitamin D deficiency     Left carpal tunnel syndrome     Right carpal tunnel syndrome     Esophageal hypertension Candida esophagitis (HCC)     Colitis     Primary hypertension     GI bleed     Coronary-myocardial bridge     Type 2 diabetes mellitus with complication, with long-term current use of insulin (HCC)     SOB (shortness of breath)     Portal vein thrombosis     Intractable nausea and vomiting     Abdominal pain     Acute GI bleeding     Chronic hepatitis C without hepatic coma (HCC)     Delayed gastric emptying     Restless legs     Acute colitis     Acute encephalopathy     S/P TIPS (transjugular intrahepatic portosystemic shunt)     Cirrhosis of liver without ascites (HCC)     Acute upper GI bleed     Acute hepatic encephalopathy     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 Labs and X ray results   Reviewed Current Medicines   On meal/ Correction bolus Humalog/ Basal Lantus Insulin regime    Monitor Blood glucose frequently   Modified  the dose of Insulin/ other medicines as needed   Going for his brain MRI later today  Will follow     .      Silvestre Quezada MD, MD

## 2023-02-09 NOTE — CARE COORDINATION
[address] Care Transitions Initial Follow Up Call    Call within 2 business days of discharge: Yes    Patient Current Location:  Home: [address] [place] [address]    Care Transition Nurse contacted the patient by telephone to perform post hospital discharge assessment. Verified name and  with patient as identifiers. Provided introduction to self, and explanation of the Care Transition Nurse role. Patient: Sandip Otto Patient : 1960   MRN: [id number]  Reason for Admission: AMS, Hyperglycemia  Discharge Date: 23 RARS: Readmission Risk Score: 16.5      Last Discharge [address]       Date Complaint Diagnosis Description Type Department Provider    23 Hyperglycemia Slurred speech . .. ED to Hosp-Admission (Discharged) (ADMITTED) Ady De Leon MD; Conchita Bernheim... Challenges to be reviewed by the provider   Additional needs identified to be addressed with provider: No  none               Method of communication with provider: none. Spoke with Yeimy Mills, A&Ox3, says her mentation has returned to baseline, says \"the Dr @ the hospital told me it was  most likely due to my high blood sugars. \"  BS on admit -521. BS since home-201, 132. She has been following orders per RISS & giving self Insulin. Denies, N&V, no diarrhea. no sob/horne/chest pain/pressure. Says walking arounnd her home with steady gait. Denies numbness/tingling/h/a/vision changes. Reviewed AVS & DC meds: Reiterated Insulin dosages 12U Lantus @ noc, 10U Humulog 3x/day w/meals & per sliding scale. Reviewed DM managment:   Take all diabetic meds and insulins as directed  Ensure glucometer is in good working order and you have all supplies  Routinely monitor blood sugar as directed.  Notify MD if noting blood sugars are running below 70 two or more times per month or if noting constantly higher readings  Keep all scheduled MD appts, labs  Healthy eating habits; limit sugar, carbs  Increase activity level  Notify MD immediately if noting increased thirst/urination, vision changes, feeling confused or jittery, feeling faint. Call 911 if noting acute distress. Pt V/U. Emphasized importance of HFU appt needed within 7 days. Has appt 2/10/23 with PCP. Declines assistance for transportation resources. Care Transition Nurse reviewed discharge instructions, medical action plan, and red flags with patient who verbalized understanding. The patient was given an opportunity to ask questions and does not have any further questions or concerns at this time. Were discharge instructions available to patient? Yes. Reviewed appropriate site of care based on symptoms and resources available to patient including: PCP  Specialist  Urgent care clinics  When to call [address] The patient agrees to contact the PCP office for questions related to their healthcare. Advance Care Planning:   Does patient have an Advance Directive: not on file. Medication reconciliation was performed with patient, who verbalizes understanding of administration of home medications. Medications reviewed, 1111F entered: yes    Was patient discharged with a pulse oximeter? no    Non-face-to-face services provided:  Education of patient/family/caregiver/guardian to support self-management-DM teaching  Assessment and support for treatment adherence and medication management-med review    Offered patient enrollment in the Remote Patient Monitoring (RPM) program for in-home monitoring: Yes, but did not enroll at this time.     Care Transitions 24 Hour Call    Schedule Follow Up Appointment with PCP: Completed  Do you have a copy of your discharge instructions?: Yes  Do you have all of your prescriptions and are they filled?: Yes (Comment: Being delivered today)  Have you been contacted by a "[employer] Avenue?: No  Have you scheduled your follow up appointment?: Yes (Comment: PCP 2/10/23)  How are you going to get to your appointment?: Car - family or friend to transport  Post Acute Services: [address]  Patient DME: Rupa Henry you have support at home?: Roommate/Housemate  Do you feel like you have everything you need to keep you well at home?: Yes  Are you an active caregiver in your home?: No  Care Transitions Interventions   Home Care Waiver: Declined        DME Assistance: Declined   Diabetes Education: Completed        Social Work: Declined    Disease Association: Completed               Discussed follow-up appointments. If no appointment was previously scheduled, appointment scheduling offered: Yes. Is follow up appointment scheduled within 7 days of discharge? Yes. 2/10/23    Follow Up  Future Appointments   Date Time Provider Cornell Hightower   2/10/2023 12:00 PM MD Catie Bhatt [facility]   4/10/2023  9:45 AM Lynnell Alpers, MD [facility]   5/25/2023  3:00 PM Tyree Lay MD [facility] OBGYN [facility]   6/1/2023 11:00 AM MD Catie Bhatt [facility]   7/27/2023  2:00 PM Aleksander Johnson MD [facility] Heart [facility]   8/22/2023  2:30 PM MD Catie Bhatt [facility]       Care Transition Nurse provided contact information. Plan for follow-up call in 5-7 days based on severity of symptoms and risk factors. Plan for next call: symptom management-BS readings?   follow-up appointment-2/10/23 PCP  medication management-nsulin dosages, any changes?     Adelia Krishnan RN

## 2023-02-09 NOTE — TELEPHONE ENCOUNTER
Care Transitions Initial Follow Up Call    Outreach made within 2 business days of discharge: Yes    Patient: Gonzalo Joyce Patient : 1960   MRN: [id number]  Reason for Admission: There are no discharge diagnoses documented for the most recent discharge. Discharge Date: 23       Spoke with: yoni    Discharge department/facility: [facility] Interactive Patient Contact:  Was patient able to fill all prescriptions: Yes  Was patient instructed to bring all medications to the follow-up visit: Yes  Is patient taking all medications as directed in the discharge summary?  Yes  Does patient understand their discharge instructions: Yes  Does patient have questions or concerns that need addressed prior to 7-14 day follow up office visit: no    Scheduled appointment with PCP within 7-14 days    Follow Up  Future Appointments   Date Time Provider Cornell Hightower   2/10/2023 12:00 PM [facility]   4/10/2023  9:45 AM Lori Corbin MD [facility]   2023  3:00 PM Uriah Gonzáles MD [facility] OBGYN [facility]   2023 11:00 AM MD Catie Field [facility]   2023  2:00 PM Marietta Griffith MD [facility]   2023  2:30 PM Angel Genao MD [address] [place] [address]       Blane De Leon MA

## 2023-02-10 LAB
CULTURE: ABNORMAL
CULTURE: ABNORMAL
Lab: ABNORMAL
SPECIMEN: ABNORMAL

## 2023-02-10 RX ORDER — RANOLAZINE 500 MG/1
500 TABLET, EXTENDED RELEASE ORAL 2 TIMES DAILY
Qty: 180 TABLET | Refills: 3 | Status: SHIPPED | OUTPATIENT
Start: 2023-02-10

## 2023-02-14 ENCOUNTER — TELEPHONE (OUTPATIENT)
Dept: FAMILY MEDICINE CLINIC | Age: 63
End: 2023-02-14

## 2023-02-14 NOTE — TELEPHONE ENCOUNTER
Called and left a vm for pt to call to confirm that she is transferring care to a new office Dr. Izaiah Phipps. Will need to fax to [phone] if she is wishing to .

## 2023-02-15 ENCOUNTER — CARE COORDINATION (OUTPATIENT)
Dept: CARE COORDINATION | Age: 63
End: 2023-02-15

## 2023-02-15 NOTE — CARE COORDINATION
Spoke briefly with patient who is known to AC. Patient is noted to be engaged with CTN. Patient reports that she completed initial visit to establish care with new Non- [facility] PCP. Patient denies any needs at this time. Patient reports that she does not have a pen to write down ACM contact; but confirms that she does have it from previous outreach. Encouraged patient to call if needs arise. No further outreach planned.

## 2023-02-16 ENCOUNTER — CARE COORDINATION (OUTPATIENT)
Dept: CASE MANAGEMENT | Age: 63
End: 2023-02-16

## 2023-02-16 NOTE — CARE COORDINATION
Care Transitions Outreach Attempt    Attempted to reach patient for transitions of care follow up. Unable to reach patient. #1     Patient: Dirk Navarro Patient : 1960 MRN: [id number]    Last Discharge  Street       Date Complaint Diagnosis Description Type Department Provider    23 Hyperglycemia Slurred speech . .. ED to Hosp-Admission (Discharged) (ADMITTED) Brandy Pacheco MD; Jose Luis Maloney...           Noted following upcoming appointments from discharge chart review:   [facility] follow up appointment(s):   Future Appointments   Date Time Provider Cornell Hightower   2023  2:15 PM Loy Elizabeth MD [facility]   2023  3:00 PM Eze Coyne MD [address]   2023  2:00 PM Jose Cuenca MD [facility]     [id number] follow up appointment(s): CHAVEZ Vazquez RN [phone]

## 2023-02-21 ENCOUNTER — CARE COORDINATION (OUTPATIENT)
Dept: CASE MANAGEMENT | Age: 63
End: 2023-02-21

## 2023-02-26 ENCOUNTER — HOSPITAL ENCOUNTER (EMERGENCY)
Age: 63
Discharge: HOME | End: 2023-02-26
Payer: MEDICARE

## 2023-02-26 VITALS
DIASTOLIC BLOOD PRESSURE: 78 MMHG | RESPIRATION RATE: 18 BRPM | TEMPERATURE: 98 F | OXYGEN SATURATION: 98 % | HEART RATE: 80 BPM | SYSTOLIC BLOOD PRESSURE: 117 MMHG

## 2023-02-26 DIAGNOSIS — R73.9 HYPERGLYCEMIA: Primary | ICD-10-CM

## 2023-02-26 LAB
ALBUMIN SERPL-MCNC: 3.2 GM/DL (ref 3.4–5)
ALP BLD-CCNC: 215 IU/L (ref 40–129)
ALT SERPL-CCNC: 16 U/L (ref 10–40)
ANION GAP SERPL CALCULATED.3IONS-SCNC: 4 MMOL/L (ref 4–16)
AST SERPL-CCNC: 23 IU/L (ref 15–37)
BASOPHILS ABSOLUTE: 0.1 K/CU MM
BASOPHILS RELATIVE PERCENT: 1.4 % (ref 0–1)
BILIRUB SERPL-MCNC: 0.8 MG/DL (ref 0–1)
BUN SERPL-MCNC: 11 MG/DL (ref 6–23)
CALCIUM SERPL-MCNC: 8.6 MG/DL (ref 8.3–10.6)
CHLORIDE BLD-SCNC: 101 MMOL/L (ref 99–110)
CHP ED QC CHECK: NORMAL
CO2: 27 MMOL/L (ref 21–32)
CREAT SERPL-MCNC: 0.9 MG/DL (ref 0.6–1.1)
DIFFERENTIAL TYPE: ABNORMAL
EOSINOPHILS ABSOLUTE: 0.3 K/CU MM
EOSINOPHILS RELATIVE PERCENT: 5.8 % (ref 0–3)
GFR SERPL CREATININE-BSD FRML MDRD: >60 ML/MIN/1.73M2
GLUCOSE BLD-MCNC: 201 MG/DL
GLUCOSE BLD-MCNC: 201 MG/DL (ref 70–99)
GLUCOSE BLD-MCNC: 367 MG/DL
GLUCOSE BLD-MCNC: 367 MG/DL (ref 70–99)
GLUCOSE SERPL-MCNC: 373 MG/DL (ref 70–99)
HCT VFR BLD CALC: 42.4 % (ref 37–47)
HEMOGLOBIN: 14.5 GM/DL (ref 12.5–16)
IMMATURE NEUTROPHIL %: 0 % (ref 0–0.43)
LYMPHOCYTES ABSOLUTE: 1.2 K/CU MM
LYMPHOCYTES RELATIVE PERCENT: 28.6 % (ref 24–44)
MAGNESIUM: 2.1 MG/DL (ref 1.8–2.4)
MCH RBC QN AUTO: 32.4 PG (ref 27–31)
MCHC RBC AUTO-ENTMCNC: 34.2 % (ref 32–36)
MCV RBC AUTO: 94.9 FL (ref 78–100)
MONOCYTES ABSOLUTE: 0.4 K/CU MM
MONOCYTES RELATIVE PERCENT: 8.1 % (ref 0–4)
NUCLEATED RBC %: 0 %
PDW BLD-RTO: 13.1 % (ref 11.7–14.9)
PLATELET # BLD: 87 K/CU MM (ref 140–440)
PMV BLD AUTO: 11.5 FL (ref 7.5–11.1)
POTASSIUM SERPL-SCNC: 4.7 MMOL/L (ref 3.5–5.1)
RBC # BLD: 4.47 M/CU MM (ref 4.2–5.4)
SEGMENTED NEUTROPHILS ABSOLUTE COUNT: 2.4 K/CU MM
SEGMENTED NEUTROPHILS RELATIVE PERCENT: 56.1 % (ref 36–66)
SODIUM BLD-SCNC: 132 MMOL/L (ref 135–145)
TOTAL IMMATURE NEUTOROPHIL: 0 K/CU MM
TOTAL NUCLEATED RBC: 0 K/CU MM
TOTAL PROTEIN: 6.5 GM/DL (ref 6.4–8.2)
WBC # BLD: 4.3 K/CU MM (ref 4–10.5)

## 2023-02-26 PROCEDURE — 85025 COMPLETE CBC W/AUTO DIFF WBC: CPT

## 2023-02-26 PROCEDURE — 99284 EMERGENCY DEPT VISIT MOD MDM: CPT

## 2023-02-26 PROCEDURE — 82962 GLUCOSE BLOOD TEST: CPT

## 2023-02-26 PROCEDURE — 2580000003 HC RX 258: Performed by: PHYSICIAN ASSISTANT

## 2023-02-26 PROCEDURE — 80053 COMPREHEN METABOLIC PANEL: CPT

## 2023-02-26 PROCEDURE — 96374 THER/PROPH/DIAG INJ IV PUSH: CPT

## 2023-02-26 PROCEDURE — 6370000000 HC RX 637 (ALT 250 FOR IP): Performed by: PHYSICIAN ASSISTANT

## 2023-02-26 PROCEDURE — 83735 ASSAY OF MAGNESIUM: CPT

## 2023-02-26 RX ORDER — 0.9 % SODIUM CHLORIDE 0.9 %
1000 INTRAVENOUS SOLUTION INTRAVENOUS ONCE
Status: COMPLETED | OUTPATIENT
Start: 2023-02-26 | End: 2023-02-26

## 2023-02-26 RX ORDER — TRAMADOL HYDROCHLORIDE 50 MG/1
50 TABLET ORAL ONCE
Status: COMPLETED | OUTPATIENT
Start: 2023-02-26 | End: 2023-02-26

## 2023-02-26 RX ADMIN — TRAMADOL HYDROCHLORIDE 50 MG: 50 TABLET, COATED ORAL at 10:32

## 2023-02-26 RX ADMIN — INSULIN HUMAN 10 UNITS: 100 INJECTION, SOLUTION PARENTERAL at 10:33

## 2023-02-26 RX ADMIN — SODIUM CHLORIDE 1000 ML: 9 INJECTION, SOLUTION INTRAVENOUS at 10:07

## 2023-02-26 ASSESSMENT — LIFESTYLE VARIABLES
HOW MANY STANDARD DRINKS CONTAINING ALCOHOL DO YOU HAVE ON A TYPICAL DAY: PATIENT DOES NOT DRINK
HOW OFTEN DO YOU HAVE A DRINK CONTAINING ALCOHOL: NEVER

## 2023-02-26 ASSESSMENT — PAIN SCALES - GENERAL
PAINLEVEL_OUTOF10: 6
PAINLEVEL_OUTOF10: 6

## 2023-02-26 ASSESSMENT — PAIN DESCRIPTION - ORIENTATION: ORIENTATION: RIGHT

## 2023-02-26 ASSESSMENT — PAIN DESCRIPTION - LOCATION: LOCATION: RIB CAGE

## 2023-02-26 NOTE — ED PROVIDER NOTES
EMERGENCY DEPARTMENT ENCOUNTER        Pt Name: Aureliano Engel  MRN: [id number]  [place] 1960  Date of evaluation: 2/26/2023  Provider: NELY Loera  PCP: Onel Kahn MD    SYED. I have evaluated this patient. My supervising physician was available for consultation. Triage CHIEF COMPLAINT       Chief Complaint   Patient presents with    Hyperglycemia     HISTORY OF PRESENT ILLNESS      Chief Complaint: Hyperglycemia    Aureliano Engel is a 63 y.o. female who presents to the emergency department today concerned about her blood sugar. Patient states that she took her blood sugar reading this morning from her glucometer and it was reading \"over 800\". She states that she was just routinely checking her blood sugar prior to taking her insulin. She does not take any medications after this reading because she wanted to come and see and be evaluated. She states that she has no new adjustments to her glycemic regimen. Has not been feeling ill. No fevers chills cough congestion abdominal pain change in bowel habits, urinary symptoms. Denies any chest pain, shortness of breath. No nausea or vomiting. Nursing Notes were all reviewed and agreed with or any disagreements were addressed in the HPI. REVIEW OF SYSTEMS     At least 10 systems reviewed and otherwise acutely negative except as in the [address]      PAST MEDICAL HISTORY     Past Medical History:   Diagnosis Date    Abnormal Pap smear of cervix     Acid reflux     Arthritis     left knee    Breast cyst     CAD (coronary artery disease)     per [facility] 9/6/13 - Dr. Cheryl Mead    COPD (chronic obstructive pulmonary disease) ([facility] [address]     follow with Dr Francheska Akers    Depression     [facility] manic - depression see Dr Elizabeth Kong    Diabetes mellitus [facility])     dx 10+ yrs ago- follows with PCP    Drug abuse ([facility] [address]     hx use of cocaine, heroin and marijuana- states last used 12/2014    Glaucoma     bilateral    H/O Doppler lower venous ultrasound 04/04/2019    No DVT or SVT, Significant reflux of RGSV and LGSV. H/O echocardiogram 12/18/2020    EF 55-60%, Mod LVH. Hepatic encephalopathy 05/2019    Hepatitis C     for liver bx 12/3/2015\"Have Hepatitis B and C and saw Dr Alondra Lee for this 12/1/2015\"    Hiatal hernia     History of alcohol abuse     History of exercise stress test 01/02/2020    Treadmill, Normal exercise performance without angina and ischemic EKG changes.     HTN (hypertension)     \"for the past two yrs on medication\" follows with Dr Melanie Cuenca    Hx of blood clots 2019    Portal vein thrombsis - TIPS procedure 4/23/19    Hyperlipemia     Irregular heart beat     per pt    Liver hematoma     Migraine     Migraines     Last migraine:  2018    Nausea & vomiting     Neurologic disorder     Schizophrenia [facility])     per old chart    Seizures ([address]     \"last one was 9/2015- saw Dr Debra Raya at [facility]- she said not sure if acutal seizures- she thinks they are panic or anxiety attacks\"    SOB (shortness of breath)     with any exertion    Vulvar mass        SURGICAL HISTORY     Past Surgical History:   Procedure Laterality Date    BREAST SURGERY  10/2015    left breast bx    CARDIAC CATHETERIZATION      per old chart pt had cath done in 3/2011 and 9/2013    CARPAL TUNNEL RELEASE Left 01/09/2020    CARPAL TUNNEL RELEASE LEFT performed by Saundra Paul DO at [address] [facility] Right 05/26/2020    dr Zac Shankar, carpal tunnel trigger finger release    CARPAL TUNNEL RELEASE Right 05/26/2020    RIGHT CARPAL TUNNEL RELEASE performed by Saundra Paul DO at [address]  per old chart done 1985    COLONOSCOPY  03/11/2013    diverticulosis, cecal polyp    COLONOSCOPY  03/16/2017    Internal hemorrhoids- Dr. Sherron Bird    COLONOSCOPY N/A 05/17/2022    COLONOSCOPY POLYPECTOMY SNARE/COLD BIOPSY performed by Diana Merrill MD at [address] [facility], COLON, DIAGNOSTIC  03/16/2017    EGD: Small esophageal varices, portal hypertensive gastropathy, reflux esophagitis, hiatal hernia    EYE SURGERY Bilateral ? when    cyst removal, cataracts w/lens replacement    FINGER TRIGGER RELEASE Right 05/26/2020    FINGER TRIGGER RELEASE RIGHT RING FINGER performed by Ailyn Rosado DO at [facility] ([address] [place] [address])      per old chart pt had CHOLO/BSO 1986    SALPINGO-OOPHORECTOMY      TIPS PROCEDURE  04/23/2019    TONSILLECTOMY  as a kid    UPPER GASTROINTESTINAL ENDOSCOPY N/A 11/14/2018    EGD DIAGNOSTIC ONLY performed by Tamiko Aguirre MD at [facility] 13 N/A 06/05/2019    EGD DIAGNOSTIC ONLY performed by Tamiko Aguirre MD at [facility] [address] N/A 11/9/2022    PARTIAL VULVECTOMY performed by Ria Hartley MD at [facility]       Previous Medications    ALBUTEROL SULFATE HFA (PROVENTIL HFA) 108 (90 BASE) MCG/ACT INHALER    Inhale 2 puffs into the lungs every 4 hours as needed for Wheezing or Shortness of Breath With spacer (and mask if indicated). Thanks. ALBUTEROL SULFATE HFA (VENTOLIN HFA) 108 (90 BASE) MCG/ACT INHALER    Inhale 2 puffs into the lungs 4 times daily as needed for Wheezing    ATORVASTATIN (LIPITOR) 40 MG TABLET    Take 1 tablet by mouth nightly    BLOOD GLUCOSE MONITOR KIT AND SUPPLIES    Use 3-4 times daily    BLOOD GLUCOSE MONITOR STRIPS    Test  Bid times a day & as needed for symptoms of irregular blood glucose. BLOOD GLUCOSE MONITORING SUPPL (ACURA BLOOD GLUCOSE METER) W/DEVICE KIT    Please check blood sugar 3 times daily.  Please fill with what is covered by the insurance    CALCIUM CARBONATE (OYSTER SHELL CALCIUM 500 MG) 1250 (500 CA) MG TABLET    Take 1 tablet by mouth daily    COMPRESSION STOCKINGS MISC    by Does not apply route 20 - 30 mmh wear daily and take off at night  Thigh High    ESTRADIOL (ESTRACE VAGINAL) 0.1 MG/GM VAGINAL CREAM    Place 1 g vaginally three times a week Use 1 g vaginally nightly for 2 weeks, then 1 g nightly 2-3 times per week. FLUOXETINE (PROZAC) 20 MG CAPSULE    Take 30 mg by mouth daily Takes a 10 mg with a 20 mg to equal 30 mg daily    FUROSEMIDE (LASIX) 20 MG TABLET    Take 1 tablet by mouth daily Hold if systolic <719    GLUCOSE MONITORING KIT (FREESTYLE) MONITORING KIT    1 kit by Does not apply route daily    GLUCOSOURCE LANCETS MISC    Use one 3-4 times to check blood sugar    INSULIN GLARGINE (LANTUS SOLOSTAR) 100 UNIT/ML INJECTION PEN    Inject 12 Units into the skin nightly    INSULIN LISPRO, 1 UNIT DIAL, (HUMALOG KWIKPEN) 100 UNIT/ML SOPN    10U insulin befor meals 3x/day    Take insulin according to glucose check 2 hours after meals:   0  140-199 3  200-249 6  250-299 9  300-349 12  350-400 15  >400 18    Take insulin according to glucose check before sleep:   0  140-199 2  200-249 3  250-299 5  300-349 6  350-400 7  >400 9    IPRATROPIUM-ALBUTEROL (DUONEB) 0.5-2.5 (3) MG/3ML SOLN NEBULIZER SOLUTION    Inhale 3 mLs into the lungs every 4 hours    IPRATROPIUM-ALBUTEROL (DUONEB) 0.5-2.5 (3) MG/3ML SOLN NEBULIZER SOLUTION    Inhale 3 mLs into the lungs every 4 hours    LACTULOSE (CHRONULAC) 10 GM/15ML SOLUTION    Take 30 mLs by mouth 2 times daily    LANCETS MISC    Use one lancet 4 times daily    METOPROLOL SUCCINATE (TOPROL XL) 50 MG EXTENDED RELEASE TABLET    Take 1 tablet by mouth daily    MIRABEGRON (MYRBETRIQ) 25 MG TB24    Take 1 tablet by mouth daily    MULTIPLE VITAMIN (MULTIVITAMIN PO)    Take 1 tablet by mouth daily    MUPIROCIN (BACTROBAN) 2 % OINTMENT    Apply topically 3 times daily.     NITROGLYCERIN (NITROSTAT) 0.4 MG SL TABLET    Place 1 tablet under the tongue every 5 minutes as needed for Chest pain    ONDANSETRON (ZOFRAN ODT) 4 MG DISINTEGRATING TABLET    Take 1 tablet by mouth every 8 hours as needed for Nausea    PALIPERIDONE (INVEGA) 6 MG EXTENDED RELEASE TABLET    Take 6 mg by mouth every morning    PANTOPRAZOLE (PROTONIX) 40 MG TABLET    Take 1 tablet by mouth 2 times daily (before meals)    QUETIAPINE (SEROQUEL) 100 MG TABLET    Take 100 mg by mouth nightly 02/06/23 Patient states she only takes 100 mg at HS    RALOXIFENE (EVISTA) 60 MG TABLET    Take 1 tablet by mouth daily    RANOLAZINE (RANEXA) 500 MG EXTENDED RELEASE TABLET    Take 1 tablet by mouth 2 times daily    TRAMADOL (ULTRAM) 50 MG TABLET    Take 50 mg by mouth every 4 hours as needed for Pain.     VIREAD 300 MG TABLET    Take 300 mg by mouth daily     VITAMIN D PO    Take 1 capsule by mouth 2 times daily       ALLERGIES     Hydrocodone-acetaminophen, Norco [hydrocodone-acetaminophen], and Tylenol [acetaminophen]    FAMILYHISTORY       Family History   Problem Relation Age of Onset    Ovarian Cancer Mother     Cancer Mother         lung ca    Arthritis Mother     Migraines Mother     Cancer Father         colon ca    Diabetes Father     High Blood Pressure Father     Arthritis Father     High Cholesterol Father     Migraines Father     Migraines Sister     Heart Disease Brother         WPW    Breast Cancer Maternal Aunt     Breast Cancer Maternal Aunt     Breast Cancer Maternal Aunt     Breast Cancer Maternal Aunt         SOCIAL HISTORY       Social History     Socioeconomic History    Marital status:      Spouse name: None    Number of children: None    Years of education: None    Highest education level: None   Tobacco Use    Smoking status: Every Day     Packs/day: 0.25     Years: 45.00     Pack years: 11.25     Types: Cigarettes     Start date: 1974     Passive exposure: Past    Smokeless tobacco: Never   Vaping Use    Vaping Use: Never used   Substance and Sexual Activity    Alcohol use: Not Currently     Comment: None/Caffiene - 1 cup of coffee/day    Drug use: Not Currently     Types: Marijuana Jerod Cevallos    Sexual activity: Not Currently     Partners: Male     Social Determinants of Health     Financial Resource Strain: Medium Risk    Difficulty of Paying Living Expenses: Somewhat hard   Food Insecurity: Food Insecurity Present    Worried About Running Out of Food in the Last Year: Sometimes true    Ran Out of Food in the Last Year: Sometimes true   Transportation Needs: Unmet Transportation Needs    Lack of Transportation (Medical): Yes    Lack of Transportation (Non-Medical): Yes   Physical Activity: Inactive    Days of Exercise per Week: 0 days    Minutes of Exercise per Session: 0 min   Stress: No Stress Concern Present    Feeling of Stress : Only a little   Social Connections: Moderately Integrated    Frequency of Communication with Friends and Family: Three times a week    Frequency of Social Gatherings with Friends and Family: Twice a week    Attends Jainism Services: More than 4 times per year    Active Member of Clubs or Organizations: Yes    Attends Club or Organization Meetings: More than 4 times per year    Marital Status:    Housing Stability: Low Risk     Unable to Pay for Housing in the Last Year: No    Number of Places Lived in the Last Year: 1    Unstable Housing in the Last Year: No       SCREENINGS           PHYSICAL EXAM       ED Triage Vitals [02/26/23 0912]   BP Temp Temp src Heart Rate Resp SpO2 Height Weight   129/74 98 °F (36.7 °C) -- 80 18 98 % -- --      Constitutional:  Well developed, Well nourished.  No distress  HENT:  Normocephalic, Atraumatic, PERRL.  EOMI.  Sclera clear.Conjunctiva normal, No discharge.   Oropharynx is within normal limits, no tonsillar hypertrophy white exudate, tolerating secretions.  Bilateral TMs are pearly white without signs of significant erythema or effusion.  No perforation. No mastoid tenderness.  Neck/Lymphatics:  supple, no JVD, no swollen nodes  Cardiovascular:   RRR,  no murmurs/rubs/gallops.  Respiratory:   Nonlabored breathing.  Normal breath sounds, No wheezing  Abdomen:  Bowel sounds normal, Soft, No tenderness, no masses.  : No significant flank tenderness to percussion.  Musculoskeletal:    There is no edema, asymmetry, or  calf / thigh tenderness bilaterally. No cyanosis. No cool or pale-appearing limb. Distal cap refill and pulses intact bilateral upper and lower extremities  Bilateral upper and lower extremity ROM intact without pain or obvious deficit  Integument:   Warm, Dry  Neurologic:  Alert & oriented , No focal deficits noted. Cranial nerves II through XII grossly intact. Normal gross motor coordination & motor strength bilateral upper and lower extremities  Sensation intact. Psychiatric:  Affect normal, Mood normal.     DIAGNOSTIC RESULTS   LABS:    Labs Reviewed   CBC WITH AUTO DIFFERENTIAL - Abnormal; Notable for the following components:       Result Value    MCH 32.4 (*)     Platelets 87 (*)     MPV 11.5 (*)     Monocytes % 8.1 (*)     Eosinophils % 5.8 (*)     Basophils % 1.4 (*)     All other components within normal limits   COMPREHENSIVE METABOLIC PANEL - Abnormal; Notable for the following components:    Sodium 132 (*)     Glucose 373 (*)     Albumin 3.2 (*)     Alkaline Phosphatase 215 (*)     All other components within normal limits   POCT GLUCOSE - Abnormal; Notable for the following components:    POC Glucose 367 (*)     All other components within normal limits   POCT GLUCOSE - Normal   MAGNESIUM   POCT GLUCOSE       When ordered, only abnormal lab results are displayed. All other labs were within normal range or not returned as of this dictation. EKG: When ordered, EKG's are interpreted by the Emergency Department Physician in the absence of a cardiologist.  Please see their note for interpretation of EKG. RADIOLOGY:   Non-plain film images such as CT, Ultrasound and MRI are read by the radiologist. Plain radiographic images are visualized and preliminarily interpreted by the  ED Provider with the below findings:    Interpretation [facility] Radiologist below, if available at the time of this note:    No orders to display     No results found.       PROCEDURES   Unless otherwise noted below, none    CRITICAL CARE   CRITICAL CARE NOTE:   N/A    CONSULTS:  None      EMERGENCY DEPARTMENT COURSE and MDM:   Vitals:    Vitals:    02/26/23 0912 02/26/23 1003   BP: 129/74 132/77   Pulse: 80    Resp: 18    Temp: 98 °F (36.7 °C)    SpO2: 98% 98%       Patient was given thefollowing medications:  Medications   0.9 % sodium chloride bolus (1,000 mLs IntraVENous New Bag 2/26/23 1007)   insulin regular (HUMULIN R;NOVOLIN R) injection 10 Units (has no administration in time range)   traMADol (ULTRAM) tablet 50 mg (has no administration in time range)         Is this patient to be included in the SEP-1 Core Measure due to severe sepsis or septic shock? No   Exclusion criteria - the patient is NOT to be included for SEP-1 Core Measure due to: Infection is not suspected    MDM:    CC/HPI Summary, DDx, ED Course, and Reassessment:     Patient presents as above. Emergent etiologies considered. Patient seen and examined. Work-up initiated secondary to presentation, physical exam findings, vital signs and medical chart review. In brief, 73-year-old female presenting emerged from today via private vehicle with concern of elevated blood sugar readings. Patient took her blood sugar routinely today and she states that it was \"over 800\". This alarmed her and she decided come to emergency room for evaluation. Patient is on insulin, Humalog and Lantus at night. .  No new recent adjustments to her glycemic regimen. She otherwise is asymptomatic. She has normal vital signs and overall appears well. I report of care glucose was in the 300s. We obtained a CMP which demonstrated this again, her bicarb anion gap, kidney function electrolytes all within normal limits, no signs of significant metabolic abnormalities. She was given a liter of fluid and then was given 10 units of IV insulin. On recheck her sugar was back in the 200 range. At this point I feel that she can be discharged safely.   She has an upcoming appoint with her primary care doctor 2 days from now, we will advise her to continue to keep that appointment, we will also advised her to take a look at her glucometer and to make sure it is functioning appropriately. Patient will be discharged home in stable condition. History from : Patient    Limitations to history : None    Patient was given the following medications:  Medications   0.9 % sodium chloride bolus (1,000 mLs IntraVENous New Bag 2/26/23 1007)   insulin regular (HUMULIN R;NOVOLIN R) injection 10 Units (has no administration in time range)   traMADol (ULTRAM) tablet 50 mg (has no administration in time range)     Chronic conditions affecting care: Insulin-dependent diabetes    Discussion with Other Profesionals : None    Social Determinants : None    Records Reviewed : None    Appropriate for outpatient management close outpatient follow-up with PCP 2 days from now at her regular scheduled appointment    I am the Primary Clinician of Record. Close        CLINICAL IMPRESSION      1. Hyperglycemia          DISPOSITION/PLAN   DISPOSITION Decision To Discharge 02/26/2023 10:29:38 AM      PATIENT REFERREDTO:  Grey Barragan MD  [address] [place] [address],[address]  [phone]    Schedule an appointment as soon as possible for a visit   Please follow-up with your appointment on 2/28 for further conversations about your diabetic medical regimen      DISCHARGE MEDICATIONS:  New Prescriptions    No medications on file       DISCONTINUED MEDICATIONS:  Discontinued Medications    No medications on file           (Please note that portions ofthis note were completed with a voice recognition program.  Efforts were made to edit the dictations but occasionally words are mis-transcribed. )    NELY Miller (electronically signed)             NELY Pratt  02/26/23 7525

## 2023-02-28 RX ORDER — TIZANIDINE 4 MG/1
4 TABLET ORAL 2 TIMES DAILY PRN
Qty: 30 TABLET | Refills: 1 | OUTPATIENT
Start: 2023-02-28

## 2023-03-13 RX ORDER — MIRABEGRON 25 MG/1
TABLET, FILM COATED, EXTENDED RELEASE ORAL
Qty: 30 TABLET | Refills: 5 | OUTPATIENT
Start: 2023-03-13

## 2023-03-20 ENCOUNTER — APPOINTMENT (OUTPATIENT)
Dept: CT IMAGING | Age: 63
DRG: 443 | End: 2023-03-20
Payer: MEDICARE

## 2023-03-20 ENCOUNTER — HOSPITAL ENCOUNTER (INPATIENT)
Age: 63
LOS: 6 days | Discharge: HOME | DRG: 443 | End: 2023-03-26
Attending: EMERGENCY MEDICINE | Admitting: INTERNAL MEDICINE
Payer: MEDICARE

## 2023-03-20 ENCOUNTER — APPOINTMENT (OUTPATIENT)
Dept: GENERAL RADIOLOGY | Age: 63
DRG: 443 | End: 2023-03-20
Payer: MEDICARE

## 2023-03-20 ENCOUNTER — APPOINTMENT (OUTPATIENT)
Dept: MRI IMAGING | Age: 63
DRG: 443 | End: 2023-03-20
Payer: MEDICARE

## 2023-03-20 DIAGNOSIS — Z79.4 TYPE 2 DIABETES MELLITUS WITH HYPERGLYCEMIA, WITH LONG-TERM CURRENT USE OF INSULIN (HCC): ICD-10-CM

## 2023-03-20 DIAGNOSIS — E11.65 TYPE 2 DIABETES MELLITUS WITH HYPERGLYCEMIA, WITH LONG-TERM CURRENT USE OF INSULIN (HCC): ICD-10-CM

## 2023-03-20 DIAGNOSIS — R53.1 ACUTE LEFT-SIDED WEAKNESS: Primary | ICD-10-CM

## 2023-03-20 DIAGNOSIS — I10 ESSENTIAL HYPERTENSION: ICD-10-CM

## 2023-03-20 PROBLEM — G45.9 TIA (TRANSIENT ISCHEMIC ATTACK): Status: ACTIVE | Noted: 2023-03-20

## 2023-03-20 LAB
ALBUMIN SERPL-MCNC: 3.5 GM/DL (ref 3.4–5)
ALP BLD-CCNC: 232 IU/L (ref 40–129)
ALT SERPL-CCNC: 13 U/L (ref 10–40)
ANION GAP SERPL CALCULATED.3IONS-SCNC: 5 MMOL/L (ref 4–16)
AST SERPL-CCNC: 17 IU/L (ref 15–37)
BASOPHILS ABSOLUTE: 0.1 K/CU MM
BASOPHILS RELATIVE PERCENT: 1.1 % (ref 0–1)
BILIRUB SERPL-MCNC: 0.8 MG/DL (ref 0–1)
BUN SERPL-MCNC: 15 MG/DL (ref 6–23)
CALCIUM SERPL-MCNC: 8.9 MG/DL (ref 8.3–10.6)
CHLORIDE BLD-SCNC: 102 MMOL/L (ref 99–110)
CHP ED QC CHECK: YES
CO2: 27 MMOL/L (ref 21–32)
CREAT SERPL-MCNC: 0.9 MG/DL (ref 0.6–1.1)
DIFFERENTIAL TYPE: ABNORMAL
EKG ATRIAL RATE: 80 BPM
EKG DIAGNOSIS: NORMAL
EKG P AXIS: 69 DEGREES
EKG P-R INTERVAL: 156 MS
EKG Q-T INTERVAL: 412 MS
EKG QRS DURATION: 68 MS
EKG QTC CALCULATION (BAZETT): 475 MS
EKG R AXIS: 25 DEGREES
EKG T AXIS: 52 DEGREES
EKG VENTRICULAR RATE: 80 BPM
EOSINOPHILS ABSOLUTE: 0.2 K/CU MM
EOSINOPHILS RELATIVE PERCENT: 5.2 % (ref 0–3)
GFR SERPL CREATININE-BSD FRML MDRD: >60 ML/MIN/1.73M2
GLUCOSE BLD-MCNC: 229 MG/DL (ref 70–99)
GLUCOSE BLD-MCNC: 335 MG/DL (ref 70–99)
GLUCOSE BLD-MCNC: 336 MG/DL
GLUCOSE SERPL-MCNC: 381 MG/DL (ref 70–99)
HCT VFR BLD CALC: 42 % (ref 37–47)
HEMOGLOBIN: 14.6 GM/DL (ref 12.5–16)
IMMATURE NEUTROPHIL %: 0.2 % (ref 0–0.43)
INR BLD: 0.98 INDEX
LYMPHOCYTES ABSOLUTE: 1.4 K/CU MM
LYMPHOCYTES RELATIVE PERCENT: 30 % (ref 24–44)
MCH RBC QN AUTO: 32.4 PG (ref 27–31)
MCHC RBC AUTO-ENTMCNC: 34.8 % (ref 32–36)
MCV RBC AUTO: 93.3 FL (ref 78–100)
MONOCYTES ABSOLUTE: 0.5 K/CU MM
MONOCYTES RELATIVE PERCENT: 10.1 % (ref 0–4)
NUCLEATED RBC %: 0 %
PDW BLD-RTO: 12.6 % (ref 11.7–14.9)
PLATELET # BLD: 89 K/CU MM (ref 140–440)
PMV BLD AUTO: 12.2 FL (ref 7.5–11.1)
POTASSIUM SERPL-SCNC: 4.5 MMOL/L (ref 3.5–5.1)
PROTHROMBIN TIME: 12.6 SECONDS (ref 11.7–14.5)
RBC # BLD: 4.5 M/CU MM (ref 4.2–5.4)
SEGMENTED NEUTROPHILS ABSOLUTE COUNT: 2.5 K/CU MM
SEGMENTED NEUTROPHILS RELATIVE PERCENT: 53.4 % (ref 36–66)
SODIUM BLD-SCNC: 134 MMOL/L (ref 135–145)
TOTAL IMMATURE NEUTOROPHIL: 0.01 K/CU MM
TOTAL NUCLEATED RBC: 0 K/CU MM
TOTAL PROTEIN: 6.8 GM/DL (ref 6.4–8.2)
TROPONIN T: <0.01 NG/ML
WBC # BLD: 4.6 K/CU MM (ref 4–10.5)

## 2023-03-20 PROCEDURE — 80053 COMPREHEN METABOLIC PANEL: CPT

## 2023-03-20 PROCEDURE — 1200000000 HC SEMI PRIVATE

## 2023-03-20 PROCEDURE — 70551 MRI BRAIN STEM W/O DYE: CPT

## 2023-03-20 PROCEDURE — 85610 PROTHROMBIN TIME: CPT

## 2023-03-20 PROCEDURE — 93005 ELECTROCARDIOGRAM TRACING: CPT | Performed by: EMERGENCY MEDICINE

## 2023-03-20 PROCEDURE — 94761 N-INVAS EAR/PLS OXIMETRY MLT: CPT

## 2023-03-20 PROCEDURE — 70496 CT ANGIOGRAPHY HEAD: CPT

## 2023-03-20 PROCEDURE — 84484 ASSAY OF TROPONIN QUANT: CPT

## 2023-03-20 PROCEDURE — 70450 CT HEAD/BRAIN W/O DYE: CPT

## 2023-03-20 PROCEDURE — 93010 ELECTROCARDIOGRAM REPORT: CPT | Performed by: INTERNAL MEDICINE

## 2023-03-20 PROCEDURE — 6370000000 HC RX 637 (ALT 250 FOR IP): Performed by: INTERNAL MEDICINE

## 2023-03-20 PROCEDURE — 6360000002 HC RX W HCPCS: Performed by: INTERNAL MEDICINE

## 2023-03-20 PROCEDURE — 2700000000 HC OXYGEN THERAPY PER DAY

## 2023-03-20 PROCEDURE — 99285 EMERGENCY DEPT VISIT HI MDM: CPT

## 2023-03-20 PROCEDURE — 82962 GLUCOSE BLOOD TEST: CPT

## 2023-03-20 PROCEDURE — 6360000004 HC RX CONTRAST MEDICATION: Performed by: EMERGENCY MEDICINE

## 2023-03-20 PROCEDURE — 85025 COMPLETE CBC W/AUTO DIFF WBC: CPT

## 2023-03-20 PROCEDURE — 71045 X-RAY EXAM CHEST 1 VIEW: CPT

## 2023-03-20 PROCEDURE — 70544 MR ANGIOGRAPHY HEAD W/O DYE: CPT

## 2023-03-20 RX ORDER — POLYETHYLENE GLYCOL 3350 17 G/17G
17 POWDER, FOR SOLUTION ORAL DAILY PRN
Status: DISCONTINUED | OUTPATIENT
Start: 2023-03-20 | End: 2023-03-26 | Stop reason: HOSPADM

## 2023-03-20 RX ORDER — RALOXIFENE HYDROCHLORIDE 60 MG/1
60 TABLET, FILM COATED ORAL DAILY
Status: DISCONTINUED | OUTPATIENT
Start: 2023-03-21 | End: 2023-03-26 | Stop reason: HOSPADM

## 2023-03-20 RX ORDER — ONDANSETRON 2 MG/ML
4 INJECTION INTRAMUSCULAR; INTRAVENOUS EVERY 6 HOURS PRN
Status: DISCONTINUED | OUTPATIENT
Start: 2023-03-20 | End: 2023-03-26 | Stop reason: HOSPADM

## 2023-03-20 RX ORDER — FLUOXETINE 10 MG/1
30 CAPSULE ORAL DAILY
Status: DISCONTINUED | OUTPATIENT
Start: 2023-03-21 | End: 2023-03-26 | Stop reason: HOSPADM

## 2023-03-20 RX ORDER — QUETIAPINE FUMARATE 25 MG/1
100 TABLET, FILM COATED ORAL NIGHTLY
Status: DISCONTINUED | OUTPATIENT
Start: 2023-03-20 | End: 2023-03-26 | Stop reason: HOSPADM

## 2023-03-20 RX ORDER — ONDANSETRON 4 MG/1
4 TABLET, ORALLY DISINTEGRATING ORAL EVERY 8 HOURS PRN
Status: DISCONTINUED | OUTPATIENT
Start: 2023-03-20 | End: 2023-03-26 | Stop reason: HOSPADM

## 2023-03-20 RX ORDER — NITROGLYCERIN 0.4 MG/1
0.4 TABLET SUBLINGUAL EVERY 5 MIN PRN
Status: DISCONTINUED | OUTPATIENT
Start: 2023-03-20 | End: 2023-03-26 | Stop reason: HOSPADM

## 2023-03-20 RX ORDER — FUROSEMIDE 20 MG/1
20 TABLET ORAL DAILY
Status: DISCONTINUED | OUTPATIENT
Start: 2023-03-21 | End: 2023-03-24

## 2023-03-20 RX ORDER — ALBUTEROL SULFATE 90 UG/1
2 AEROSOL, METERED RESPIRATORY (INHALATION) 4 TIMES DAILY PRN
Status: DISCONTINUED | OUTPATIENT
Start: 2023-03-20 | End: 2023-03-26 | Stop reason: HOSPADM

## 2023-03-20 RX ORDER — PANTOPRAZOLE SODIUM 40 MG/1
40 TABLET, DELAYED RELEASE ORAL DAILY
Status: DISCONTINUED | OUTPATIENT
Start: 2023-03-21 | End: 2023-03-26 | Stop reason: HOSPADM

## 2023-03-20 RX ORDER — ATORVASTATIN CALCIUM 40 MG/1
40 TABLET, FILM COATED ORAL NIGHTLY
Status: DISCONTINUED | OUTPATIENT
Start: 2023-03-20 | End: 2023-03-26 | Stop reason: HOSPADM

## 2023-03-20 RX ORDER — LACTULOSE 10 G/15ML
30 SOLUTION ORAL 3 TIMES DAILY
Status: DISCONTINUED | OUTPATIENT
Start: 2023-03-20 | End: 2023-03-23

## 2023-03-20 RX ORDER — ENOXAPARIN SODIUM 100 MG/ML
40 INJECTION SUBCUTANEOUS EVERY EVENING
Status: DISCONTINUED | OUTPATIENT
Start: 2023-03-20 | End: 2023-03-26 | Stop reason: HOSPADM

## 2023-03-20 RX ORDER — ASPIRIN 81 MG/1
81 TABLET ORAL DAILY
Status: DISCONTINUED | OUTPATIENT
Start: 2023-03-20 | End: 2023-03-26 | Stop reason: HOSPADM

## 2023-03-20 RX ORDER — TIZANIDINE 4 MG/1
4 TABLET ORAL 2 TIMES DAILY PRN
Status: ON HOLD | COMMUNITY
Start: 2023-02-28 | End: 2023-03-26 | Stop reason: HOSPADM

## 2023-03-20 RX ORDER — PALIPERIDONE 1.5 MG/1
6 TABLET, EXTENDED RELEASE ORAL EVERY MORNING
Status: DISCONTINUED | OUTPATIENT
Start: 2023-03-21 | End: 2023-03-26 | Stop reason: HOSPADM

## 2023-03-20 RX ORDER — METOPROLOL SUCCINATE 50 MG/1
50 TABLET, EXTENDED RELEASE ORAL DAILY
Status: DISCONTINUED | OUTPATIENT
Start: 2023-03-21 | End: 2023-03-26 | Stop reason: HOSPADM

## 2023-03-20 RX ORDER — INSULIN GLARGINE 100 [IU]/ML
14 INJECTION, SOLUTION SUBCUTANEOUS NIGHTLY
Status: DISCONTINUED | OUTPATIENT
Start: 2023-03-20 | End: 2023-03-22

## 2023-03-20 RX ORDER — RANOLAZINE 500 MG/1
500 TABLET, EXTENDED RELEASE ORAL 2 TIMES DAILY
Status: DISCONTINUED | OUTPATIENT
Start: 2023-03-20 | End: 2023-03-26 | Stop reason: HOSPADM

## 2023-03-20 RX ORDER — TRAMADOL HYDROCHLORIDE 50 MG/1
50 TABLET ORAL EVERY 4 HOURS PRN
Status: DISCONTINUED | OUTPATIENT
Start: 2023-03-20 | End: 2023-03-26 | Stop reason: HOSPADM

## 2023-03-20 RX ORDER — IPRATROPIUM BROMIDE AND ALBUTEROL SULFATE 2.5; .5 MG/3ML; MG/3ML
1 SOLUTION RESPIRATORY (INHALATION) EVERY 4 HOURS PRN
Status: DISCONTINUED | OUTPATIENT
Start: 2023-03-20 | End: 2023-03-26 | Stop reason: HOSPADM

## 2023-03-20 RX ORDER — TROSPIUM CHLORIDE 20 MG/1
20 TABLET, FILM COATED ORAL NIGHTLY
Status: DISCONTINUED | OUTPATIENT
Start: 2023-03-21 | End: 2023-03-26 | Stop reason: HOSPADM

## 2023-03-20 RX ORDER — ASPIRIN 300 MG/1
300 SUPPOSITORY RECTAL DAILY
Status: DISCONTINUED | OUTPATIENT
Start: 2023-03-20 | End: 2023-03-26 | Stop reason: HOSPADM

## 2023-03-20 RX ORDER — TIZANIDINE 4 MG/1
4 TABLET ORAL 2 TIMES DAILY PRN
Status: DISCONTINUED | OUTPATIENT
Start: 2023-03-20 | End: 2023-03-26 | Stop reason: HOSPADM

## 2023-03-20 RX ADMIN — QUETIAPINE FUMARATE 100 MG: 25 TABLET ORAL at 22:28

## 2023-03-20 RX ADMIN — IOPAMIDOL 80 ML: 755 INJECTION, SOLUTION INTRAVENOUS at 13:13

## 2023-03-20 RX ADMIN — INSULIN GLARGINE 14 UNITS: 100 INJECTION, SOLUTION SUBCUTANEOUS at 22:32

## 2023-03-20 RX ADMIN — ENOXAPARIN SODIUM 40 MG: 100 INJECTION SUBCUTANEOUS at 22:28

## 2023-03-20 RX ADMIN — ATORVASTATIN CALCIUM 40 MG: 40 TABLET, FILM COATED ORAL at 22:28

## 2023-03-20 RX ADMIN — TRAMADOL HYDROCHLORIDE 50 MG: 50 TABLET, COATED ORAL at 22:28

## 2023-03-20 RX ADMIN — LACTULOSE 20 G: 10 SOLUTION ORAL at 22:28

## 2023-03-20 RX ADMIN — ASPIRIN 81 MG: 81 TABLET, COATED ORAL at 22:28

## 2023-03-20 RX ADMIN — RANOLAZINE 500 MG: 500 TABLET, EXTENDED RELEASE ORAL at 22:28

## 2023-03-20 ASSESSMENT — PAIN DESCRIPTION - LOCATION
LOCATION: OTHER (COMMENT)
LOCATION: HEAD

## 2023-03-20 ASSESSMENT — PAIN DESCRIPTION - DESCRIPTORS: DESCRIPTORS: ACHING

## 2023-03-20 ASSESSMENT — PAIN SCALES - GENERAL
PAINLEVEL_OUTOF10: 6
PAINLEVEL_OUTOF10: 5

## 2023-03-20 ASSESSMENT — PAIN DESCRIPTION - ORIENTATION: ORIENTATION: MID

## 2023-03-20 ASSESSMENT — PAIN - FUNCTIONAL ASSESSMENT: PAIN_FUNCTIONAL_ASSESSMENT: NONE - DENIES PAIN

## 2023-03-20 NOTE — ED NOTES
Medication History  [facility]    Patient Name: Daniella Santos 1960     Medication history has been completed by: Homa Fitzpatrick CPhT    Source(s) of information: patient and insurance claims     Primary Care Physician: Agnieszka Sahu MD     Pharmacy: [facility]    Allergies as of 03/20/2023 - Fully Reviewed 03/20/2023   Allergen Reaction Noted    Hydrocodone-acetaminophen Anaphylaxis 05/27/2022    Norco [hydrocodone-acetaminophen] Itching 02/14/2017    Tylenol [acetaminophen] Itching, Nausea And Vomiting, and Rash 11/29/2011        Prior to Admission medications    Medication Sig Start Date End Date Taking? Authorizing Provider   tiZANidine (ZANAFLEX) 4 MG tablet Take 4 mg by mouth 2 times daily as needed 2/28/23   Historical Provider, MD   albuterol sulfate HFA (VENTOLIN HFA) 108 (90 Base) MCG/ACT inhaler Inhale 2 puffs into the lungs 4 times daily as needed for Wheezing 2/22/23   Annelise Archibald MD   ipratropium-albuterol (DUONEB) 0.5-2.5 (3) MG/3ML SOLN nebulizer solution Inhale 3 mLs into the lungs every 4 hours 2/22/23   Annelise Archibald MD   ranolazine (RANEXA) 500 MG extended release tablet Take 1 tablet by mouth 2 times daily 2/10/23   Sharmila Bazan, ERLINDA - CNP   insulin glargine (LANTUS SOLOSTAR) 100 UNIT/ML injection pen Inject 14 Units into the skin nightly 2/8/23   Turner James MD   atorvastatin (LIPITOR) 40 MG tablet Take 1 tablet by mouth nightly 2/8/23   Turner James MD   metoprolol succinate (TOPROL XL) 50 MG extended release tablet Take 1 tablet by mouth daily 2/9/23   Turner James MD   lactulose (CHRONULAC) 10 GM/15ML solution Take 30 mLs by mouth 3 times daily   2/8/23   Turner James MD   traMADol (ULTRAM) 50 MG tablet Take 50 mg by mouth every 4 hours as needed for Pain.  1/26/23   Historical Provider, MD   QUEtiapine (SEROQUEL) 100 MG tablet Take 100 mg by mouth nightly 03/20/23 Patient states she only takes 100 mg at [facility] 1/10/23 Historical Provider, MD   furosemide (LASIX) 20 MG tablet Take 1 tablet by mouth daily Hold if systolic <022 1/27/83   Felix Goldberg MD   pantoprazole (PROTONIX) 40 MG tablet Take 1 tablet by mouth 1 times daily 12/23/22   Naga Mills MD   insulin lispro, 1 Unit Dial, (HUMALOG KWIKPEN) 100 UNIT/ML SOPN 10U insulin befor meals 3x/day    Take insulin according to glucose check 2 hours after meals:   0  140-199 3  200-249 6  250-299 9  300-349 12  350-400 15  >400 18 12/23/22   Naga Mills MD   Multiple Vitamin (MULTIVITAMIN PO) Take 1 tablet by mouth daily    Historical Provider, MD   VITAMIN D PO Take 1 capsule by mouth 2 times daily    Historical Provider, MD   calcium carbonate (OYSTER SHELL CALCIUM 500 MG) 1250 (500 Ca) MG tablet Take 1 tablet by mouth daily    Historical Provider, MD   mupirocin (BACTROBAN) 2 % ointment Apply topically 3 times daily. 11/9/22   Rj Romero MD   blood glucose monitor kit and supplies Use 3-4 times daily 11/8/22   Tita Albarran MD   Lancets MISC Use one lancet 4 times daily 11/8/22   Tita Albarran MD   raloxifene (EVISTA) 60 MG tablet Take 1 tablet by mouth daily 11/7/22   Martha Brooke MD   mirabegron [facility]) 25 MG TB24 Take 1 tablet by mouth daily 9/22/22   Martha Brooke MD   estradiol (ESTRACE VAGINAL) 0.1 MG/GM vaginal cream Place 1 g vaginally three times a week Use 1 g vaginally nightly for 2 weeks, then 1 g nightly 2-3 times per week. 9/21/22   Rj Romero MD   nitroGLYCERIN (NITROSTAT) 0.4 MG SL tablet Place 1 tablet under the tongue every 5 minutes as needed for Chest pain 5/18/22   Felix Goldberg MD   ondansetron (ZOFRAN ODT) 4 MG disintegrating tablet Take 1 tablet by mouth every 8 hours as needed for Nausea 1/31/22   Isadora Heimlich, MD   blood glucose monitor strips Test  Bid times a day & as needed for symptoms of irregular blood glucose.  1/13/22   Tita Albarran MD   Glucosource Lancets MISC Use one 3-4 times

## 2023-03-20 NOTE — ED PROVIDER NOTES
Trachea midline. Extremity:  No swelling. Normal ROM     Heart:  Regular rate and rhythm, normal S1 & S2, no extra heart sounds. Perfusion:  intact  Respiratory:  Lungs clear to auscultation bilaterally. Respirations nonlabored. Abdominal:  Normal bowel sounds. Soft. Nontender. Non distended. Back:  No CVA tenderness to palpation     Neurological:  Alert and oriented times 3. Psychiatric:  Appropriate      NIH Stroke Scale        Person Administering Scale: Jose Antonio Ku DO      1a  Level of consciousness: 0=alert; keenly responsive   1b. LOC questions:  0=Performs both tasks correctly   1c. LOC commands: 0=Performs both tasks correctly   2. Best Gaze: 0=normal   3. Visual: 0=No visual loss   4. Facial Palsy: 0=Normal symmetric movement   5a. Motor left arm: 1=Drift, limb holds 90 (or 45) degrees but drifts down before full 10 seconds: does not hit bed   5b. Motor right arm: 0=No drift, limb holds 90 (or 45) degrees for full 10 seconds   6a. motor left le=Drift, limb holds 90 (or 45) degrees but drifts down before full 10 seconds: does not hit bed   6b  Motor right le=No drift, limb holds 90 (or 45) degrees for full 10 seconds   7. Limb Ataxia: 0=Absent   8. Sensory: 1=Mild to moderate sensory loss; patient feels pinprick is less sharp or is dull on the affected side; there is a loss of superficial pain with pinprick but patient is aware She is being touched   9. Best Language:  1=Mild to moderate aphasia; some obvious loss of fluency or facility of comprehension without significant limitation on ideas expressed or form of expression. 10. Dysarthria: 0=Normal   11. Extinction and Inattention: 0=No abnormality   12.  Distal motor function: 0=Normal    Total:   4     I have reviewed and interpreted all of the currently available lab results from this visit (if applicable):  Results for orders placed or performed during the hospital encounter of 23   CBC with Auto within the brain. 3. No significant arterial stenosis identified within the neck. EKG (if obtained): (All EKG's are interpreted by myself in the absence of a cardiologist)      MDM:  Levy Alvarez is a 63 y.o. female with history of depression hepatitis C migraines hiatal hernia drug abuse shortness of breath hepatic cephalopathy coronary artery disease migraines arthritis seizures acid reflux glaucoma hyperlipidemia schizophrenia COPD diabetes hypertension that presents to the emergency department for slurred speech, leaning to the right. According to report unknown specific time well. For the nose patient slurred speech yesterday. But per report patient was having coffee with brother hour prior to arrival.  She states her brother noted she had slurred speech. Patient states she went to bed around 9:30 PM last evening. Patient Alivia Bathe any falls injuries trauma denies any headache. She states she does feel like she is speaking different. She states she had a stroke for TIA and the left side was weak. She states feels the same today. She states she is not on any blood thinners or aspirin. Patient states she got this morning checked her blood sugar was 289 her sugar always runs high. 82year-old patient blood sugar upon arrival 338. Patient NIH of 4. Patient was moved alert speech dysarthria decreased sensation left arm left leg is weakness in the left greater than left arm drift. Patient was made a stroke alert. Patient denies any chest pain short of breath nausea vomiting diarrhea abdominal pain fever chills cough sore throat runny nose earache. NIH of 4. Differential diagnosis does include CVA, TIA, subarachnoid hemorrhage, MI. Patient was placed on cardiac monitor. Laboratory studies were ordered as well as CT head CTA head and neck. EKG per my interpretation normal sinus rhythm, ventricular rate 80, MO interval 156, QRS duration 8068, QT/QTc 412/475, no ST elevation noted.   CT of the head

## 2023-03-20 NOTE — ED NOTES
Critical values: no     Abnormal Assessment Findings: Stroke alert Left sided weakness and slurred speech. Elevated Blood glucose     Background  History:   Past Medical History:   Diagnosis Date    Abnormal Pap smear of cervix     Acid reflux     Arthritis     left knee    Breast cyst     CAD (coronary artery disease)     per [facility] 9/6/13 - Dr. Manoj Pedraza    COPD (chronic obstructive pulmonary disease) ([facility] [address]     follow with Dr Tony Rodriguez    Depression     Vianney Finn manic - depression see Dr Brandt Paget    Diabetes mellitus [facility])     dx 10+ yrs ago- follows with PCP    Drug abuse ([facility]     hx use of cocaine, heroin and marijuana- states last used 12/2014    Glaucoma     bilateral    H/O Doppler lower venous ultrasound 04/04/2019    No DVT or SVT, Significant reflux of RGSV and LGSV. H/O echocardiogram 12/18/2020    EF 55-60%, Mod LVH. Hepatic encephalopathy 05/2019    Hepatitis C     for liver bx 12/3/2015\"Have Hepatitis B and C and saw Dr Mohit Wyatt for this 12/1/2015\"    Hiatal hernia     History of alcohol abuse     History of exercise stress test 01/02/2020    Treadmill, Normal exercise performance without angina and ischemic EKG changes.     HTN (hypertension)     \"for the past two yrs on medication\" follows with Dr Manoj Pedraza    Hx of blood clots 2019    Portal vein thrombsis - TIPS procedure 4/23/19    Hyperlipemia     Irregular heart beat     per pt    Liver hematoma     Migraine     Migraines     Last migraine:  2018    Nausea & vomiting     Neurologic disorder     Schizophrenia [facility])     per old chart    Seizures ([facility]     \"last one was 9/2015- saw Dr Malini Hernandez at [facility]- she said not sure if acutal seizures- she thinks they are panic or anxiety attacks\"    SOB (shortness of breath)     with any exertion    Vulvar mass        Assessment    Vitals/MEWS: MEWS Score: 1  Level of Consciousness: Alert (0)   Vitals:    03/20/23 1252 03/20/23 1315 03/20/23 1330 03/20/23 1345   BP: 138/78 (!) 156/83     Pulse: 78 78 78 74 Medications/Drips: no   If Yes, please provide details: N/A  Pending Blood Product Administration: no     You may also review the ED PT Care Timeline found under the Summary Nursing Index tab. Recommendation    Pending orders N/A  Plan for Discharge (if known):    Additional Comments: Patient did not receive TNK LKW was 3/19/23 @8749   If any further questions, please call Sending RN at [phone]    Electronically signed by: Electronically signed by Alanis Dueñas RN on 3/20/2023 at 1:56 PM      Alanis Dueñsa RN  03/20/23 2675

## 2023-03-20 NOTE — CARE COORDINATION
MCG criteria for Stroke S/S reviewed at this time, criteria supports Observation admission, PS to hospitalist. LENA,RN/CM

## 2023-03-21 ENCOUNTER — APPOINTMENT (OUTPATIENT)
Dept: GENERAL RADIOLOGY | Age: 63
DRG: 443 | End: 2023-03-21
Payer: MEDICARE

## 2023-03-21 LAB
AMMONIA: 133 UMOL/L (ref 11–51)
ANION GAP SERPL CALCULATED.3IONS-SCNC: 6 MMOL/L (ref 4–16)
BUN SERPL-MCNC: 12 MG/DL (ref 6–23)
CALCIUM SERPL-MCNC: 8.8 MG/DL (ref 8.3–10.6)
CHLORIDE BLD-SCNC: 106 MMOL/L (ref 99–110)
CHOLEST SERPL-MCNC: 107 MG/DL
CO2: 27 MMOL/L (ref 21–32)
CREAT SERPL-MCNC: 0.8 MG/DL (ref 0.6–1.1)
ESTIMATED AVERAGE GLUCOSE: 209 MG/DL
GFR SERPL CREATININE-BSD FRML MDRD: >60 ML/MIN/1.73M2
GLUCOSE BLD-MCNC: 199 MG/DL (ref 70–99)
GLUCOSE BLD-MCNC: 240 MG/DL (ref 70–99)
GLUCOSE BLD-MCNC: 255 MG/DL (ref 70–99)
GLUCOSE BLD-MCNC: 286 MG/DL (ref 70–99)
GLUCOSE BLD-MCNC: 289 MG/DL (ref 70–99)
GLUCOSE SERPL-MCNC: 208 MG/DL (ref 70–99)
HBA1C MFR BLD: 8.9 % (ref 4.2–6.3)
HCT VFR BLD CALC: 39.3 % (ref 37–47)
HDLC SERPL-MCNC: 48 MG/DL
HEMOGLOBIN: 13.6 GM/DL (ref 12.5–16)
LDLC SERPL CALC-MCNC: 46 MG/DL
MCH RBC QN AUTO: 32.5 PG (ref 27–31)
MCHC RBC AUTO-ENTMCNC: 34.6 % (ref 32–36)
MCV RBC AUTO: 94 FL (ref 78–100)
PDW BLD-RTO: 12.6 % (ref 11.7–14.9)
PLATELET # BLD: 91 K/CU MM (ref 140–440)
PMV BLD AUTO: 12.2 FL (ref 7.5–11.1)
POTASSIUM SERPL-SCNC: 3.7 MMOL/L (ref 3.5–5.1)
RBC # BLD: 4.18 M/CU MM (ref 4.2–5.4)
SODIUM BLD-SCNC: 139 MMOL/L (ref 135–145)
TRIGL SERPL-MCNC: 66 MG/DL
WBC # BLD: 4.4 K/CU MM (ref 4–10.5)

## 2023-03-21 PROCEDURE — 82140 ASSAY OF AMMONIA: CPT

## 2023-03-21 PROCEDURE — 1200000000 HC SEMI PRIVATE

## 2023-03-21 PROCEDURE — 6360000002 HC RX W HCPCS: Performed by: INTERNAL MEDICINE

## 2023-03-21 PROCEDURE — 71046 X-RAY EXAM CHEST 2 VIEWS: CPT

## 2023-03-21 PROCEDURE — 36415 COLL VENOUS BLD VENIPUNCTURE: CPT

## 2023-03-21 PROCEDURE — 80048 BASIC METABOLIC PNL TOTAL CA: CPT

## 2023-03-21 PROCEDURE — 92610 EVALUATE SWALLOWING FUNCTION: CPT

## 2023-03-21 PROCEDURE — 82962 GLUCOSE BLOOD TEST: CPT

## 2023-03-21 PROCEDURE — 83036 HEMOGLOBIN GLYCOSYLATED A1C: CPT

## 2023-03-21 PROCEDURE — 97161 PT EVAL LOW COMPLEX 20 MIN: CPT

## 2023-03-21 PROCEDURE — 97530 THERAPEUTIC ACTIVITIES: CPT

## 2023-03-21 PROCEDURE — 6370000000 HC RX 637 (ALT 250 FOR IP): Performed by: INTERNAL MEDICINE

## 2023-03-21 PROCEDURE — 97166 OT EVAL MOD COMPLEX 45 MIN: CPT

## 2023-03-21 PROCEDURE — 99223 1ST HOSP IP/OBS HIGH 75: CPT | Performed by: STUDENT IN AN ORGANIZED HEALTH CARE EDUCATION/TRAINING PROGRAM

## 2023-03-21 PROCEDURE — 97162 PT EVAL MOD COMPLEX 30 MIN: CPT

## 2023-03-21 PROCEDURE — 97535 SELF CARE MNGMENT TRAINING: CPT

## 2023-03-21 PROCEDURE — 85027 COMPLETE CBC AUTOMATED: CPT

## 2023-03-21 PROCEDURE — 80061 LIPID PANEL: CPT

## 2023-03-21 PROCEDURE — 94761 N-INVAS EAR/PLS OXIMETRY MLT: CPT

## 2023-03-21 RX ORDER — INSULIN LISPRO 100 [IU]/ML
0-4 INJECTION, SOLUTION INTRAVENOUS; SUBCUTANEOUS NIGHTLY
Status: DISCONTINUED | OUTPATIENT
Start: 2023-03-21 | End: 2023-03-22

## 2023-03-21 RX ORDER — INSULIN LISPRO 100 [IU]/ML
0-4 INJECTION, SOLUTION INTRAVENOUS; SUBCUTANEOUS
Status: DISCONTINUED | OUTPATIENT
Start: 2023-03-21 | End: 2023-03-22

## 2023-03-21 RX ORDER — NICOTINE 21 MG/24HR
1 PATCH, TRANSDERMAL 24 HOURS TRANSDERMAL DAILY
Status: DISCONTINUED | OUTPATIENT
Start: 2023-03-21 | End: 2023-03-26 | Stop reason: HOSPADM

## 2023-03-21 RX ORDER — DEXTROSE MONOHYDRATE 100 MG/ML
INJECTION, SOLUTION INTRAVENOUS CONTINUOUS PRN
Status: DISCONTINUED | OUTPATIENT
Start: 2023-03-21 | End: 2023-03-26 | Stop reason: HOSPADM

## 2023-03-21 RX ADMIN — RALOXIFENE HYDROCHLORIDE 60 MG: 60 TABLET, FILM COATED ORAL at 08:54

## 2023-03-21 RX ADMIN — PANTOPRAZOLE SODIUM 40 MG: 40 TABLET, DELAYED RELEASE ORAL at 08:52

## 2023-03-21 RX ADMIN — FUROSEMIDE 20 MG: 20 TABLET ORAL at 08:52

## 2023-03-21 RX ADMIN — INSULIN GLARGINE 14 UNITS: 100 INJECTION, SOLUTION SUBCUTANEOUS at 21:39

## 2023-03-21 RX ADMIN — PALIPERIDONE 6 MG: 1.5 TABLET, EXTENDED RELEASE ORAL at 08:54

## 2023-03-21 RX ADMIN — LACTULOSE 20 G: 10 SOLUTION ORAL at 21:34

## 2023-03-21 RX ADMIN — TROSPIUM CHLORIDE 20 MG: 20 TABLET, FILM COATED ORAL at 21:33

## 2023-03-21 RX ADMIN — ATORVASTATIN CALCIUM 40 MG: 40 TABLET, FILM COATED ORAL at 21:33

## 2023-03-21 RX ADMIN — LACTULOSE 20 G: 10 SOLUTION ORAL at 08:52

## 2023-03-21 RX ADMIN — QUETIAPINE FUMARATE 100 MG: 25 TABLET ORAL at 21:33

## 2023-03-21 RX ADMIN — INSULIN LISPRO 2 UNITS: 100 INJECTION, SOLUTION INTRAVENOUS; SUBCUTANEOUS at 12:06

## 2023-03-21 RX ADMIN — INSULIN LISPRO 2 UNITS: 100 INJECTION, SOLUTION INTRAVENOUS; SUBCUTANEOUS at 16:56

## 2023-03-21 RX ADMIN — METOPROLOL SUCCINATE 50 MG: 50 TABLET, EXTENDED RELEASE ORAL at 08:52

## 2023-03-21 RX ADMIN — FLUOXETINE 30 MG: 10 CAPSULE ORAL at 08:52

## 2023-03-21 RX ADMIN — LACTULOSE 20 G: 10 SOLUTION ORAL at 12:06

## 2023-03-21 RX ADMIN — ENOXAPARIN SODIUM 40 MG: 100 INJECTION SUBCUTANEOUS at 16:56

## 2023-03-21 RX ADMIN — RANOLAZINE 500 MG: 500 TABLET, EXTENDED RELEASE ORAL at 21:33

## 2023-03-21 RX ADMIN — ASPIRIN 81 MG: 81 TABLET, COATED ORAL at 08:52

## 2023-03-21 RX ADMIN — RANOLAZINE 500 MG: 500 TABLET, EXTENDED RELEASE ORAL at 08:52

## 2023-03-21 ASSESSMENT — PAIN SCALES - GENERAL: PAINLEVEL_OUTOF10: 0

## 2023-03-21 NOTE — CONSULTS
[facility] ACUTE CARE PHYSICAL THERAPY EVALUATION  Lisa Bender, 1960, 3001/3001-A, 3/21/2023    History  [place]:  The primary encounter diagnosis was Acute left-sided weakness. Diagnoses of Type 2 diabetes mellitus with hyperglycemia, with long-term current use of insulin (HCC) and Essential hypertension were also pertinent to this visit.  Patient  has a past medical history of Abnormal Pap smear of cervix, Acid reflux, Arthritis, Breast cyst, CAD (coronary artery disease), COPD (chronic obstructive pulmonary disease) (HCC), Depression, Diabetes mellitus (HCC), Drug abuse (HCC), Glaucoma, H/O Doppler lower venous ultrasound, H/O echocardiogram, Hepatic encephalopathy, Hepatitis C, Hiatal hernia, History of alcohol abuse, History of exercise stress test, HTN (hypertension), Hx of blood clots, Hyperlipemia, Irregular heart beat, Liver hematoma, Migraine, Migraines, Nausea & vomiting, Neurologic disorder, Schizophrenia (HCC), Seizures (HCC), SOB (shortness of breath), and Vulvar mass.  Patient  has a past surgical history that includes Cholecystectomy (per old chart done 1985); Tonsillectomy (as a kid); Breast surgery (10/2015); Endoscopy, colon, diagnostic (03/16/2017); Upper gastrointestinal endoscopy (N/A, 11/14/2018); TIPS procedure (04/23/2019); Upper gastrointestinal endoscopy (N/A, 06/05/2019); Colonoscopy (03/11/2013); Colonoscopy (03/16/2017); eye surgery (Bilateral, ? when); Hysterectomy; Carpal tunnel release (Left, 01/09/2020); Carpal tunnel release (Right, 05/26/2020); Carpal tunnel release (Right, 05/26/2020); Finger trigger release (Right, 05/26/2020); Cardiac catheterization; Colonoscopy (N/A, 05/17/2022); Salpingo-oophorectomy; and Vulvectomy (N/A, 11/9/2022).    Subjective:  Patient states:  \"I get SOB sometimes at home\".    Pain:  pt denies .    Communication with other providers:  Handoff to RN, co-eval with Neelam DOMÍNGUEZ  Restrictions: Fall risk, L-sided deficits, tele,  Sp02    Home Setup/Prior level of function  Social/Functional History  Lives With: Family (sister and brother in law)  Type of Home: House  Home Layout: One level  Home Access: Level entry  Bathroom Shower/Tub: Tub/Shower unit  Bathroom Toilet: Standard  Has the patient had two or more falls in the past year or any fall with injury in the past year?: Yes  ADL Assistance: Independent  Homemaking Assistance: Independent  Ambulation Assistance: Independent  Transfer Assistance: Independent  Active : No  Patient's  Info: family provides transportation  Occupation: On disability, Retired  Leisure & Hobbies: likes to go bass fishing  Additional Comments: sister works full time. on 2L O2 at baseline- doesn't own any other AD/DME    Examination of body systems (includes body structures/functions, activity/participation limitations):  Observation:  pt is awake in semi-fowlers upon arrival  Vision:  enVerid  Hearing:  enVerid  Cardiopulmonary:  Pt stable on RA upon entry Sp02 89-92%  Cognition: min increased recall time, oriented x4, see OT/SLP note for further evaluation. Musculoskeletal  ROM R/L:  WFL BLE. Integumentary: Noted BLE pitting edema 2+, noted clubbing at fingertips  Strength R/L:  LL4-/5, RLE 4/5, decreased in function and endurance. Neuro:  intact sensation, fair balance  Gait pattern: Pt demonstrates step-to progressing to step-through without AD, mild postural sway without evidence of LOB    Mobility:  Supine to sit:  SBA  Transfers: CGA  Sitting balance:  SBA-SUP  Standing balance:  CG-SBA. Gait: CGA    [facility] 6 Clicks Inpatient Mobility:  AM-PAC Inpatient Mobility Raw Score : 19    Treatment:  Bed mobility: PT cues for initiating sup>sit. Pt able to complete with SBA and use of bed rail. Sitting balance: Pt demonstrates fair sitting balance at EOB ~3 min during LE assessment and transfer preparation, SBA.  Additional seated balance at BR commode with nearby SUP provided for safety, pt

## 2023-03-21 NOTE — PROGRESS NOTES
Speech Language Pathology  Facility/Department: [address] [place] [address]   CLINICAL BEDSIDE SWALLOW EVALUATION    NAME: Suhas Martell  : 1960  MRN: [id number]    Suhas Martell was referred for a bedside swallow evaluation after being admitted to [facility] with stroke like symptoms including slurred speech. Head CT and MRI indicated no acute processes. Medical history includes  Acid reflux, COPD, Diabetes, Hepatitis C, Hiatal hernia, HTN     Pt was seen for evaluation seated upright in chair, alert, cooperative, pleasant. Pt with noted slow and occasionally slurred speech. Oral mechanism was [facility] with no asymmetry. Pt was edentulous. Pt was presented PO trials of thin liquid via cup/straw, puree, and regular solid. Oral stage was [facility] characterized by intact labial seal, adequate mastication, bolus manipulation/formation, and A-P transfer. Pharyngeal phase appears to be [facility] with adequate swallow initiation/laryngeal elevation. No s/s of aspiration noted across all trials. Speech/language/cognition screened and judged to be grossly [facility]. Recommend easy to chew diet and thin liquids. No further acute SLP needs identified. Recommendations/results d/w patient and nurse    ADMISSION DATE: 3/20/2023  ADMITTING DIAGNOSIS: has Left arm pain; Type 2 diabetes mellitus with complication, with long-term current use of insulin ([address] Chronic obstructive pulmonary disease ([address] Tobacco abuse; Angina effort; Abnormal nuclear cardiac imaging test; Lung nodule; Chest pain; Dyslipidemia; Pancolitis ([address] Hematemesis without nausea; Alcoholic cirrhosis of liver without ascites ([address] Thrombocytopenia ([address] Periumbilical abdominal pain; History of colonic polyps; Abnormal findings on diagnostic imaging of abdomen; Nausea; Gastroesophageal reflux disease without esophagitis; Vitamin D deficiency; Left carpal tunnel syndrome; Right carpal tunnel syndrome; Esophageal hypertension; Candida esophagitis ([address]  Colitis; Primary limits/Modified independence     Treatment Plan  Requires SLP Intervention: No             Recommended Diet and Intervention                   Compensatory Swallowing Strategies       Treatment/Goals       General  Chart Reviewed: Yes  Behavior/Cognition: Alert;Pleasant mood; Cooperative  Communication Observation: Functional;Dysarthria  Follows Directions: Simple  Dentition: Edentulous  Patient Positioning: Upright in chair  Baseline Vocal Quality: Normal  Prior Dysphagia History: none known prior to admission  Consistencies Administered: Regular;Pureed; Thin           Vision/Hearing       Oral Motor Deficits  Oral/Motor  Oral Hygiene: Moist    Oral Phase Dysfunction  Oral Phase  Oral Phase: WFL     Indicators of Pharyngeal Phase Dysfunction   Pharyngeal Phase   Pharyngeal Phase: [facility]       Education                Therapy Time  SLP Individual Minutes  Time In: 6000  Time Out: Binh 38  Minutes: [address] [place] [address]-SLP   3/21/2023 10:51 AM

## 2023-03-21 NOTE — PROGRESS NOTES
03/21/23 9120   Encounter Summary   Encounter Overview/Reason  Advance Care Planning   Service Provided For: Patient   Referral/Consult From: Nurse   Support System Family members   Last Encounter  03/21/23  (Advanced Directive filled out patient needs to sign to complete)   Complexity of Encounter Moderate   Begin Time 0605   End Time  0630   Total Time Calculated 25 min   Encounter    Type Initial Screen/Assessment   Spiritual/Emotional needs   Type Spiritual Support   Advance Care Planning   Type ACP conversation  (Document filled our patient needs to sign to complete)   Assessment/Intervention/Outcome   Assessment Peaceful   Intervention Active listening;Discussed belief system/Sabianist practices/la;Nurtured Hope;Sustaining Presence/Ministry of presence   Outcome Engaged in conversation;Expressed Gratitude   Plan and Referrals   Plan/Referrals Continue Support (comment)  (Advanced Directive needs to be signed)

## 2023-03-21 NOTE — ACP (ADVANCE CARE PLANNING)
Advance Care Planning     Advance Care Planning Inpatient Note  [facility] Department    Today's Date: 3/21/2023  Unit: [place]    Received request from [id number] [facility] Provider and patient. Upon review of chart and communication with care team, patient's decision making abilities are not in question. . Patient was/were present in the room during visit. Goals of ACP Conversation:  Discuss advance care planning documents  Facilitate a discussion related to patient's goals of care as they align with the patient's values and beliefs. Health Care Decision Makers:       Primary Decision Maker: Katia Plummer Brother/Sister - [phone]  Summary:  Completed An [address] 54 Decision Maker  ACP/Healthcare POA documents are present, complete, witnessed & notarized as forms require. Then verify accuracy of Healthcare Decision Maker contact information. Last, ensure Decision Maker is designated Primary. Advance Care Planning Documents (Patient Wishes):  Healthcare Power of /Advance Directive Appointment of Health Care Agent  Living Will/Advance Directive     Assessment:  In the assessment narrative, address the way holistic dimensions influence ACP decisions - medical, psychological, psychosocial, family systems, ethical, cultural, societal and spiritual.    Interventions:  Provided education on documents for clarity and greater understanding  Assisted in the completion of documents according to patient's wishes at this time    Care Preferences Communicated:   No    Outcomes/Plan:  New advance directive completed. Returned original document(s) to patient, as well as copies for distribution to appointed agents  Copy of advance directive given to staff to scan into medical record.     Electronically signed by Sheree Rosas, [address] on 3/21/2023 at 10:08 AM

## 2023-03-21 NOTE — PROGRESS NOTES
03/21/23 1004   Encounter Summary   Encounter Overview/Reason  Advance Care Planning   Service Provided For: Patient   Referral/Consult From: Nurse   Support System Family members   Last Encounter  03/21/23  (Advanced Directive completed 9:57am)   Complexity of Encounter Low   Begin Time 0940   End Time  1005   Total Time Calculated 25 min   Encounter    Type Follow up   Spiritual/Emotional needs   Type Spiritual Support   Advance Care Planning   Type Completed AD/ACP document(s)   Assessment/Intervention/Outcome   Assessment Peaceful   Intervention Active listening;Prayer (assurance of)/[place];Sustaining Presence/Ministry of presence   Outcome Expressed feelings of [place]chana, Peace and/or Love;Engaged in conversation;Encouraged;Expressed Gratitude; Concerns relieved   Plan and Referrals   Plan/Referrals No future visits requested

## 2023-03-21 NOTE — CONSULTS
Unable to Pay for Housing in the Last Year: No    Number of Places Lived in the Last Year: 1    Unstable Housing in the Last Year: No      Family History   Problem Relation Age of Onset    Ovarian Cancer Mother     Cancer Mother         lung ca    Arthritis Mother     Migraines Mother     Cancer Father         colon ca    Diabetes Father     High Blood Pressure Father     Arthritis Father     High Cholesterol Father     Migraines Father     Migraines Sister     Heart Disease Brother         WPW    Breast Cancer Maternal Aunt     Breast Cancer Maternal Aunt     Breast Cancer Maternal Aunt     Breast Cancer Maternal Aunt          ROS (10 systems)  In addition to that documented in the HPI above, the additional ROS was obtained:  Constitutional: Denies fevers or chills  Eyes: Denies vision changes  ENMT: Denies sore throat  CV: Denies chest pain  Resp: Denies SOB  GI: Denies vomiting or diarrhea  : Denies painful urination  MSK: Denies recent trauma  Skin: Denies new rashes  Neuro: Weakness, slurred speech  Endocrine: Denies unexpected weight loss  Heme: Denies bleeding disorders    Physical Exam:       Wt Readings from Last 3 Encounters:   03/20/23 176 lb (79.8 kg)   03/20/23 187 lb 12.8 oz (85.2 kg)   02/22/23 180 lb (81.6 kg)     Temp Readings from Last 3 Encounters:   03/21/23 98.1 °F (36.7 °C) (Oral)   02/26/23 98 °F (36.7 °C)   02/08/23 98.7 °F (37.1 °C) (Oral)     BP Readings from Last 3 Encounters:   03/21/23 123/68   02/26/23 117/78   02/08/23 92/79     Pulse Readings from Last 3 Encounters:   03/21/23 90   02/26/23 80   02/22/23 99        Gen: A&O x 4, NAD, cooperative  HEENT: NC/AT, EOMI, PERRL, mmm, neck supple, no meningeal signs  Heart: NSR  Lungs: CTAB  Ext: no edema, no calf tenderness b/l  Psych: normal mood and affect  Skin: no rashes or lesions    NEUROLOGIC EXAM:    Mental Status: A&O to self, location, month and year, NAD, speech slurred secondary to being edentulous, language fluent, repetition finding but can be seen in the setting of hepatic failure. Joseshirleycaleb Herrmann MRA head w/o conrast:  Pending final read    KELVIN 2/7/23:  Summary   Left ventricular systolic function is normal.   Ejection fraction is visually estimated at 55-60%. No significant valvular disease noted. No pericardial effusion present. Negative bubble study. All imaging was personally reviewed    ASSESSMENT/PLAN:   63year old female presenting with weakness, slurred speech. Patient is seen today drowsy but alert and oriented x 4. Face is symmetric. Speech is slurred secondary to being edentulous. Language is fluent. Strength and sensation are intact in the upper and lower extremities. There is evidence for asterixis in the upper extremities with arms outstretched. Slurred speech, weakness secondary to metabolic encephalopathy in the setting of hyperammonemia and hyperglycemia. No evidence for stroke or intracranial stenosis on imaging. Neuro imaging as above, no evidence for stroke  MRA  head pending. CTA is preferred study for intracranial stenosis which is non acute  KELVIN 2/7/23 as above - non contributory  No need to continue aspirin and statin from neurology standpoint as there is no evidence for stroke on imaging  LDL 46  Ammonia 133 - management per IM  Continue lactulose  A1C 8.9, improved since February (10.1)  PT/OT as recommended  Follow up with PCP at discharge  No further recommendations. We will sign off at this time. Patient was discussed with attending neurologist Dr. Durga Valdovinos    Thank you for allowing us to participate in the care of your patient. If there are any questions regarding evaluation please feel free to contact us. ERLINDA Broderick CNP, 3/21/2023     ------------------------------------    Attending Note:  I have rounded on this patient with HOANG Hebert. I have reviewed the chart and we have discussed this case in detail. The patient was seen and examined by myself.  Pertinent labs and imaging Statement Selected

## 2023-03-21 NOTE — PROGRESS NOTES
[address], 1960, 3001/3001-A, 3/21/2023    Discharge Recommendation: home with initial 24hr support, HHOT    History:  [place]:  The primary encounter diagnosis was Acute left-sided weakness. Diagnoses of Type 2 diabetes mellitus with hyperglycemia, with long-term current use of insulin ([address] and Essential hypertension were also pertinent to this visit. Patient  has a past medical history of Abnormal Pap smear of cervix, Acid reflux, Arthritis, Breast cyst, CAD (coronary artery disease), COPD (chronic obstructive pulmonary disease) ([address] Depression, Diabetes mellitus ([address] Drug abuse ([address] Glaucoma, H/O Doppler lower venous ultrasound, H/O echocardiogram, Hepatic encephalopathy, Hepatitis C, Hiatal hernia, History of alcohol abuse, History of exercise stress test, HTN (hypertension), Hx of blood clots, Hyperlipemia, Irregular heart beat, Liver hematoma, Migraine, Migraines, Nausea & vomiting, Neurologic disorder, Schizophrenia ([address] Seizures ([address] SOB (shortness of breath), and Vulvar mass. Patient  has a past surgical history that includes Cholecystectomy (per old chart done 1985); Tonsillectomy (as a kid); Breast surgery (10/2015); Endoscopy, colon, diagnostic (03/16/2017); Upper gastrointestinal endoscopy (N/A, 11/14/2018); TIPS procedure (04/23/2019); Upper gastrointestinal endoscopy (N/A, 06/05/2019); Colonoscopy (03/11/2013); Colonoscopy (03/16/2017); eye surgery (Bilateral, ? when); Hysterectomy; Carpal tunnel release (Left, 01/09/2020); Carpal tunnel release (Right, 05/26/2020); Carpal tunnel release (Right, 05/26/2020); Finger trigger release (Right, 05/26/2020); Cardiac catheterization; Colonoscopy (N/A, 05/17/2022); Salpingo-oophorectomy; and Vulvectomy (N/A, 11/9/2022).     Subjective:  Patient states: \"I'm just tired\"  Pain: denies  Communication with other providers: coeval with BRANDI Bedolla  Restrictions: general with ADLs, IADLs, and mobility using no AD/DME. Pt currently CGA using no AD for mobility and ind-CGA for ADLs. Pt also presents with generalized weakness, activity tolerance, neuromuscular symptoms, impaired speech, and impaired mobility. Pt would benefit from continued IP OT services during their stay and discharge to home with initial 24hr support and HHOT.     Complexity: mod  Prognosis: good  Barriers: neuro symptoms, deconditioning, comorbidities    Plan:  Plan: 3+/week    Treatment to include: Strengthening, ROM, Balance Training, Functional Mobility Training, Endurance Training, Gait Training, Pain Management, Safety Education and Training, Patient+Caregiver Education and Training, Equipment Evaluation Education and Procurement, Positioning, Self Care Training, Home Management Training, Coordination Training, neuromuscular re-education    Pt will complete therapeutic exercise/activity to increase independence in ADL/IADL function    Pt will practice functional transfers and mobility with AD for increased safety and independence    Pt Would Benefit from Continued Edu on none  Adaptive Equipment Recommendations: none    Goals:  Time frame for goals: 2 weeks    Pt will complete grooming tasks with ind standing at sink  Pt will complete toileting tasks with mod I using standard commode  Pt will complete UB dressing and bathing tasks with ind  Pt will complete LB dressing and bathing tasks with mod I  Pt will complete functional mobility to bathroom and back with ind using no AD  Pt will complete 10 minutes of therapeutic exercise/activity with good tolerance and 0 rest breaks    Time:   Time in: 0955  Time out: 1025  Treatment Minutes: 15  Evaluation Minutes: 10  Total time: 30    Electronically signed by:      URI Murphy  3/21/2023, 10:07 AM

## 2023-03-22 LAB
AMMONIA: 140 UMOL/L (ref 11–51)
ANION GAP SERPL CALCULATED.3IONS-SCNC: 7 MMOL/L (ref 4–16)
BUN SERPL-MCNC: 17 MG/DL (ref 6–23)
CALCIUM SERPL-MCNC: 8.8 MG/DL (ref 8.3–10.6)
CHLORIDE BLD-SCNC: 106 MMOL/L (ref 99–110)
CO2: 27 MMOL/L (ref 21–32)
CREAT SERPL-MCNC: 1.1 MG/DL (ref 0.6–1.1)
GFR SERPL CREATININE-BSD FRML MDRD: 58 ML/MIN/1.73M2
GLUCOSE BLD-MCNC: 118 MG/DL (ref 70–99)
GLUCOSE BLD-MCNC: 189 MG/DL (ref 70–99)
GLUCOSE BLD-MCNC: 200 MG/DL (ref 70–99)
GLUCOSE BLD-MCNC: 263 MG/DL (ref 70–99)
GLUCOSE SERPL-MCNC: 244 MG/DL (ref 70–99)
POTASSIUM SERPL-SCNC: 4.2 MMOL/L (ref 3.5–5.1)
SODIUM BLD-SCNC: 140 MMOL/L (ref 135–145)

## 2023-03-22 PROCEDURE — 80048 BASIC METABOLIC PNL TOTAL CA: CPT

## 2023-03-22 PROCEDURE — 6370000000 HC RX 637 (ALT 250 FOR IP): Performed by: INTERNAL MEDICINE

## 2023-03-22 PROCEDURE — 82140 ASSAY OF AMMONIA: CPT

## 2023-03-22 PROCEDURE — 36415 COLL VENOUS BLD VENIPUNCTURE: CPT

## 2023-03-22 PROCEDURE — 1200000000 HC SEMI PRIVATE

## 2023-03-22 PROCEDURE — 6370000000 HC RX 637 (ALT 250 FOR IP)

## 2023-03-22 PROCEDURE — 97116 GAIT TRAINING THERAPY: CPT

## 2023-03-22 PROCEDURE — 6360000002 HC RX W HCPCS: Performed by: INTERNAL MEDICINE

## 2023-03-22 PROCEDURE — 94761 N-INVAS EAR/PLS OXIMETRY MLT: CPT

## 2023-03-22 PROCEDURE — 97110 THERAPEUTIC EXERCISES: CPT

## 2023-03-22 PROCEDURE — 82962 GLUCOSE BLOOD TEST: CPT

## 2023-03-22 RX ORDER — POLYETHYLENE GLYCOL 3350 17 G/17G
17 POWDER, FOR SOLUTION ORAL ONCE
Status: COMPLETED | OUTPATIENT
Start: 2023-03-22 | End: 2023-03-22

## 2023-03-22 RX ORDER — INSULIN GLARGINE 100 [IU]/ML
16 INJECTION, SOLUTION SUBCUTANEOUS NIGHTLY
Status: DISCONTINUED | OUTPATIENT
Start: 2023-03-22 | End: 2023-03-26 | Stop reason: HOSPADM

## 2023-03-22 RX ORDER — INSULIN LISPRO 100 [IU]/ML
0-4 INJECTION, SOLUTION INTRAVENOUS; SUBCUTANEOUS NIGHTLY
Status: DISCONTINUED | OUTPATIENT
Start: 2023-03-22 | End: 2023-03-26 | Stop reason: HOSPADM

## 2023-03-22 RX ORDER — INSULIN LISPRO 100 [IU]/ML
0-8 INJECTION, SOLUTION INTRAVENOUS; SUBCUTANEOUS
Status: DISCONTINUED | OUTPATIENT
Start: 2023-03-23 | End: 2023-03-26 | Stop reason: HOSPADM

## 2023-03-22 RX ADMIN — QUETIAPINE FUMARATE 100 MG: 25 TABLET ORAL at 20:12

## 2023-03-22 RX ADMIN — ENOXAPARIN SODIUM 40 MG: 100 INJECTION SUBCUTANEOUS at 16:43

## 2023-03-22 RX ADMIN — ASPIRIN 81 MG: 81 TABLET, COATED ORAL at 09:40

## 2023-03-22 RX ADMIN — POLYETHYLENE GLYCOL 3350 17 G: 17 POWDER, FOR SOLUTION ORAL at 17:33

## 2023-03-22 RX ADMIN — LACTULOSE 20 G: 10 SOLUTION ORAL at 09:26

## 2023-03-22 RX ADMIN — LACTULOSE 20 G: 10 SOLUTION ORAL at 20:12

## 2023-03-22 RX ADMIN — FLUOXETINE 30 MG: 10 CAPSULE ORAL at 09:26

## 2023-03-22 RX ADMIN — ATORVASTATIN CALCIUM 40 MG: 40 TABLET, FILM COATED ORAL at 20:11

## 2023-03-22 RX ADMIN — RIFAXIMIN 200 MG: 200 TABLET ORAL at 14:34

## 2023-03-22 RX ADMIN — PANTOPRAZOLE SODIUM 40 MG: 40 TABLET, DELAYED RELEASE ORAL at 09:26

## 2023-03-22 RX ADMIN — LACTULOSE 20 G: 10 SOLUTION ORAL at 12:45

## 2023-03-22 RX ADMIN — INSULIN LISPRO 1 UNITS: 100 INJECTION, SOLUTION INTRAVENOUS; SUBCUTANEOUS at 12:43

## 2023-03-22 RX ADMIN — INSULIN GLARGINE 16 UNITS: 100 INJECTION, SOLUTION SUBCUTANEOUS at 20:13

## 2023-03-22 RX ADMIN — TROSPIUM CHLORIDE 20 MG: 20 TABLET, FILM COATED ORAL at 20:12

## 2023-03-22 RX ADMIN — RIFAXIMIN 550 MG: 550 TABLET ORAL at 20:11

## 2023-03-22 RX ADMIN — RIFAXIMIN 200 MG: 200 TABLET ORAL at 09:34

## 2023-03-22 RX ADMIN — PALIPERIDONE 6 MG: 1.5 TABLET, EXTENDED RELEASE ORAL at 09:36

## 2023-03-22 RX ADMIN — FUROSEMIDE 20 MG: 20 TABLET ORAL at 09:26

## 2023-03-22 RX ADMIN — RALOXIFENE HYDROCHLORIDE 60 MG: 60 TABLET, FILM COATED ORAL at 09:36

## 2023-03-22 RX ADMIN — RANOLAZINE 500 MG: 500 TABLET, EXTENDED RELEASE ORAL at 20:11

## 2023-03-22 RX ADMIN — RANOLAZINE 500 MG: 500 TABLET, EXTENDED RELEASE ORAL at 09:26

## 2023-03-22 RX ADMIN — METOPROLOL SUCCINATE 50 MG: 50 TABLET, EXTENDED RELEASE ORAL at 09:26

## 2023-03-22 RX ADMIN — INSULIN LISPRO 2 UNITS: 100 INJECTION, SOLUTION INTRAVENOUS; SUBCUTANEOUS at 16:40

## 2023-03-22 RX ADMIN — TRAMADOL HYDROCHLORIDE 50 MG: 50 TABLET, COATED ORAL at 21:55

## 2023-03-22 NOTE — PROGRESS NOTES
Physical Therapy  Name: Alma Cook MRN: [id number] :   1960   Date:  3/22/2023   Admission Date: 3/20/2023 Room:  [address] [place], [place] [address]A   Restrictions/Precautions:         Fall risk, L-sided deficits, tele, Sp02  Communication with other providers:  Billie Forte states pt is ok to see for therapy  Subjective:  Patient states:  '' they had me on oxygen for a minut, but then they took me off and never put me back on ''   Pain:   Location, Type, Intensity (0/10 to 10/10):  denies   Objective:    Observation:  pt seated in recliner  Treatment, including education/measures:  Pt performed gait, ambulating with no AD. She also performed seated exercises. During exercises, pt reported \" my left leg feels weak and I feel like my speech is being slurred''. Pt then reported this was normal for her when she gets tired and her O2 gets low. O2 taken and 93%. After rest pt reported that she felt 'normal'. She was left with all needs met. Transfers with line management of tele  Scooting :SBA  Sit to stand :SBA  Stand to sit :SBA    Gait:  Pt amb with no AD for 400 ft with SBA assist  Pt needed VC's for directional changes and safety  Gait Comments: with VC's' and TC's for B LE placement, walker placement and sequence throughout ambulation; with VC's and TC's to maintain upright posture in order to avoid COM shifting outside of VALERIA; with VC's for PLB throughout ambulation  Sitting Exercises: Ankle pumps x 20  Heel raises x 20  LAQ's x 20  Marching x 20   Clams x 20  Hip Abd x 20  Therapeutic Exercise:  Therapeutic exercises were instructed today. Cues were given for technique, safety, recruitment, and rationale. Cues were verbal and/or tactile. Safety  Patient left safely in the recliner, with call light/phone in reach with alarm applied. Gait belt was used for transfers and gait.   Assessment / Impression:     Patient's tolerance of treatment:  good   Adverse Reaction: c/o L leg weakness and speech impediments with

## 2023-03-22 NOTE — CONSULTS
Migraine     Migraines     Last migraine:  2018    Nausea & vomiting     Neurologic disorder     Schizophrenia [facility])     per old chart    Seizures ([facility]     \"last one was 9/2015- saw Dr Andre Cox at [facility]- she said not sure if acutal seizures- she thinks they are panic or anxiety attacks\"    SOB (shortness of breath)     with any exertion    Vulvar mass     who presented to [facility] ED with slurred speech, leaning to the right. She reports her brother had concerns for slurred speech when he called her, she was unaware. She also states that she was having a shuffling gait and was drooling on herself at home. She feels these symptoms have been ongoing  for some time. She reports taking lactulose BID as instructed, she has been having 2-3 BM daily. Had 2 bowel movements as of this morning. Neurology workup was negative. She denies abdominal pain, N/V, GERD, dyspepsia, dysphagia. TIPS @ [address] [place] [address] 4/2019     History of EGD 1/29/2022 - esophageal ulcer, grade IV esophagitis, gastritis. History of colonoscopy 5/17/2022 - polyps, diverticulosis, hemorrhoids. No NSAIDs, anti-platelet therapy    Current smoker, denies alcohol intake    Past Medical History:        Diagnosis Date    Abnormal Pap smear of cervix     Acid reflux     Arthritis     left knee    Breast cyst     CAD (coronary artery disease)     per [facility] 9/6/13 - Dr. Serena Coker    COPD (chronic obstructive pulmonary disease) ([facility]     follow with Dr Benjamin Sanchez manic - depression see Dr Kelsey Smiley    Diabetes mellitus [facility])     dx 10+ yrs ago- follows with PCP    Drug abuse ([facility]     hx use of cocaine, heroin and marijuana- states last used 12/2014    Glaucoma     bilateral    H/O Doppler lower venous ultrasound 04/04/2019    No DVT or SVT, Significant reflux of RGSV and LGSV. H/O echocardiogram 12/18/2020    EF 55-60%, Mod LVH.     Hepatic encephalopathy 05/2019    Hepatitis C     for liver bx 12/3/2015\"Have Hepatitis B and C and saw Dr Elly Laguerre listed    PHYSICAL EXAM:      Vitals:    /64   Pulse 74   Temp 98 °F (36.7 °C) (Oral)   Resp 16   Ht 4' 9.5\" (1.461 m)   Wt 176 lb (79.8 kg)   SpO2 94%   BMI 37.43 kg/m²     General Appearance:    Alert, cooperative, no distress, appears stated age   HEENT:    Normocephalic, atraumatic, Conjunctiva clear, Lips, mucosa, and tongue normal; teeth and gums normal   Neck:   Supple, symmetrical, trachea midline   Lungs:     Clear to auscultation bilaterally, respirations unlabored   Chest Wall:    No tenderness or deformity    Heart:    Regular rate and rhythm, S1 and S2 normal, no murmur, rub   or gallop   Abdomen:     Soft, non-tender, mild distension, bowel sounds active all four quadrants,     no masses, no organomegaly, no ascites    Rectal:    Deferred   Extremities:   Extremities normal, atraumatic, no cyanosis or + LE edema    Pulses:   2+ and symmetric all extremities   Skin:   Skin color, texture, turgor normal, no rashes or lesions   Lymph nodes:   No abnormality   Neurologic:   No focal deficits, moving all four extremities            DATA:    ABGs:   Lab Results   Component Value Date/Time    PO2ART 50 04/27/2012 01:30 PM     CBC:   Recent Labs     03/20/23  1300 03/21/23  0531   WBC 4.6 4.4   HGB 14.6 13.6   PLT 89* 91*     BMP:    Recent Labs     03/20/23  1300 03/20/23  1303 03/21/23  0531 03/22/23  0042   *  --  139 140   K 4.5  --  3.7 4.2     --  106 106   CO2 27  --  27 27   BUN 15  --  12 17   CREATININE 0.9  --  0.8 1.1   GLUCOSE 381* 336 208* 244*     Magnesium:   Lab Results   Component Value Date/Time    MG 2.1 02/26/2023 10:01 AM     Hepatic:   Recent Labs     03/20/23  1300   AST 17   ALT 13   BILITOT 0.8   ALKPHOS 232*     No results for input(s): LIPASE, AMYLASE in the last 72 hours. Recent Labs     03/20/23  1300   PROTIME 12.6   INR 0.98     No results for input(s): PTT in the last 72 hours.   Lipids:   Recent Labs     03/21/23  0531   CHOL 107   HDL 48     INR:

## 2023-03-22 NOTE — CARE COORDINATION
Case Management Assessment  Initial Evaluation    Date/Time of Evaluation: 3/22/2023 11:37 AM  Assessment Completed by: Keegan Yousif    If patient is discharged prior to next notation, then this note serves as note for discharge by case management. Patient Name: Cheryl Rubin                   YOB: 1960  Diagnosis: Essential hypertension [I10]  Left-sided weakness [R53.1]  Acute left-sided weakness [R53.1]  Type 2 diabetes mellitus with hyperglycemia, with long-term current use of insulin ([facility] [E11.65, Z79.4]                   Date / Time: 3/20/2023 12:48 PM    Patient Admission Status: Inpatient   Readmission Risk (Low < 19, Mod (19-27), High > 27): Readmission Risk Score: 19.9    Current PCP: Suzan Harp MD  PCP verified by CM? Yes    Chart Reviewed: Yes      History Provided by: Patient  Patient Orientation: Alert and Oriented    Patient Cognition: Alert    Hospitalization in the last 30 days (Readmission):  No    If yes, Readmission Assessment in  Navigator will be completed. Advance Directives:      Code Status: Full Code   Patient's Primary Decision Maker is: Named in [address] [place] [address]    Primary Decision Make[facility] Hospitaljanny Skyla  Brother/Sister - [phone]    Discharge Planning:    Patient lives with: Alone Type of Home: House  Primary Care Giver: Self  Patient Support Systems include: Family Members   Current Financial resources: None  Current community resources: None  Current services prior to admission: None            Current DME:              Type of Home Care services:  None    ADLS  Prior functional level: Independent in ADLs/IADLs  Current functional level: Independent in ADLs/IADLs    PT AM-PAC: 19 /24  OT AM-PAC:   /24    Family can provide assistance at DC: Yes  Would you like Case Management to discuss the discharge plan with any other family members/significant others, and if so, who?  No  Plans to Return to Present Housing: Yes  Other Identified list with basic dialogue that supports the patient's individualized plan of care/goals and shares the quality data associated with the providers was provided to:  Virginia Brushs    The Patient and/or Patient Representative Agree with the Discharge Plan?   Yes    Elroy Stevens  Case Management Department  Ph: [phone]  Fax: [phone]

## 2023-03-22 NOTE — PROGRESS NOTES
3/21/2023    Late entry--pt was seen in e early evening. She is moving all extremities. She denies any chest pain or shortness of breath. No abdominal pain. Heart regular with an occ extra beat, abd soft with active bowel sounds, lungs clear with some diminished breath sounds at the bases, occ wheeze with forced expiration. Trace ankle edema. She seem alert and cogent to me, although a little slow in some of her answers to questions about events near her admission time. Latest Reference Range & Units 3/21/23 05:31   Sodium 135 - 145 MMOL/L 139   Potassium 3.5 - 5.1 MMOL/L 3.7   Chloride 99 - 110 mMol/L 106   CO2 21 - 32 MMOL/L 27   BUN,BUNPL 6 - 23 MG/DL 12   Creatinine 0.6 - 1.1 MG/DL 0.8   Anion Gap 4 - 16  6   Est, Glom Filt Rate >60 mL/min/1.73m2 >60   Glucose, Random 70 - 99 MG/ (H)   CALCIUM, SERUM, 740909 8.3 - 10.6 MG/DL 8.8   Cholesterol <200 MG/   HDL Cholesterol >40 MG/DL 48   LDL Calculated <100 MG/DL 46   Triglycerides <150 MG/DL 66   Hemoglobin A1C 4.2 - 6.3 % 8.9 (H)   eAG (mg/dL) mg/dL 209   WBC 4.0 - 10.5 K/CU MM 4.4   RBC 4.2 - 5.4 M/CU MM 4.18 (L)   Hemoglobin Quant 12.5 - 16.0 GM/DL 13.6   Hematocrit 37 - 47 % 39.3   MCV 78 - 100 FL 94.0   MCH 27 - 31 PG 32.5 (H)   MCHC 32.0 - 36.0 % 34.6   MPV 7.5 - 11.1 FL 12.2 (H)   RDW 11.7 - 14.9 % 12.6   Platelet Count 824 - 440 K/CU MM 91 (L)   Ammonia level is 133. Will continue with lactulose for her hepatic encephalopathy, iban need GI consult for overview of her meds. Increase activity as tolerated.   Asa Bob MD

## 2023-03-23 ENCOUNTER — APPOINTMENT (OUTPATIENT)
Dept: INTERVENTIONAL RADIOLOGY/VASCULAR | Age: 63
DRG: 443 | End: 2023-03-23
Payer: MEDICARE

## 2023-03-23 LAB
ALBUMIN SERPL-MCNC: 2.8 GM/DL (ref 3.4–5)
ALP BLD-CCNC: 107 IU/L (ref 40–129)
ALT SERPL-CCNC: 12 U/L (ref 10–40)
AMMONIA: 106 UMOL/L (ref 11–51)
ANION GAP SERPL CALCULATED.3IONS-SCNC: 7 MMOL/L (ref 4–16)
ANION GAP SERPL CALCULATED.3IONS-SCNC: 7 MMOL/L (ref 4–16)
AST SERPL-CCNC: 23 IU/L (ref 15–37)
BASOPHILS ABSOLUTE: 0 K/CU MM
BASOPHILS RELATIVE PERCENT: 0.8 % (ref 0–1)
BILIRUB SERPL-MCNC: 0.9 MG/DL (ref 0–1)
BUN SERPL-MCNC: 14 MG/DL (ref 6–23)
BUN SERPL-MCNC: 17 MG/DL (ref 6–23)
CALCIUM SERPL-MCNC: 9 MG/DL (ref 8.3–10.6)
CALCIUM SERPL-MCNC: 9.1 MG/DL (ref 8.3–10.6)
CHLORIDE BLD-SCNC: 104 MMOL/L (ref 99–110)
CHLORIDE BLD-SCNC: 106 MMOL/L (ref 99–110)
CO2: 25 MMOL/L (ref 21–32)
CO2: 27 MMOL/L (ref 21–32)
CREAT SERPL-MCNC: 0.8 MG/DL (ref 0.6–1.1)
CREAT SERPL-MCNC: 1 MG/DL (ref 0.6–1.1)
DIFFERENTIAL TYPE: ABNORMAL
EOSINOPHILS ABSOLUTE: 0.3 K/CU MM
EOSINOPHILS RELATIVE PERCENT: 5.9 % (ref 0–3)
GFR SERPL CREATININE-BSD FRML MDRD: >60 ML/MIN/1.73M2
GFR SERPL CREATININE-BSD FRML MDRD: >60 ML/MIN/1.73M2
GLUCOSE BLD-MCNC: 134 MG/DL (ref 70–99)
GLUCOSE BLD-MCNC: 166 MG/DL (ref 70–99)
GLUCOSE BLD-MCNC: 199 MG/DL (ref 70–99)
GLUCOSE BLD-MCNC: 246 MG/DL (ref 70–99)
GLUCOSE SERPL-MCNC: 147 MG/DL (ref 70–99)
GLUCOSE SERPL-MCNC: 279 MG/DL (ref 70–99)
HCT VFR BLD CALC: 36.9 % (ref 37–47)
HEMOGLOBIN: 12.7 GM/DL (ref 12.5–16)
IMMATURE NEUTROPHIL %: 0 % (ref 0–0.43)
INR BLD: 1.08 INDEX
LYMPHOCYTES ABSOLUTE: 1.7 K/CU MM
LYMPHOCYTES RELATIVE PERCENT: 35.8 % (ref 24–44)
MCH RBC QN AUTO: 32.2 PG (ref 27–31)
MCHC RBC AUTO-ENTMCNC: 34.4 % (ref 32–36)
MCV RBC AUTO: 93.7 FL (ref 78–100)
MONOCYTES ABSOLUTE: 0.5 K/CU MM
MONOCYTES RELATIVE PERCENT: 11.2 % (ref 0–4)
NUCLEATED RBC %: 0 %
PDW BLD-RTO: 12.5 % (ref 11.7–14.9)
PLATELET # BLD: 90 K/CU MM (ref 140–440)
PMV BLD AUTO: 11.4 FL (ref 7.5–11.1)
POTASSIUM SERPL-SCNC: 3.9 MMOL/L (ref 3.5–5.1)
POTASSIUM SERPL-SCNC: 4.4 MMOL/L (ref 3.5–5.1)
PROTHROMBIN TIME: 13.9 SECONDS (ref 11.7–14.5)
RBC # BLD: 3.94 M/CU MM (ref 4.2–5.4)
SEGMENTED NEUTROPHILS ABSOLUTE COUNT: 2.2 K/CU MM
SEGMENTED NEUTROPHILS RELATIVE PERCENT: 46.3 % (ref 36–66)
SODIUM BLD-SCNC: 136 MMOL/L (ref 135–145)
SODIUM BLD-SCNC: 140 MMOL/L (ref 135–145)
TOTAL IMMATURE NEUTOROPHIL: 0 K/CU MM
TOTAL NUCLEATED RBC: 0 K/CU MM
TOTAL PROTEIN: 5.7 GM/DL (ref 6.4–8.2)
WBC # BLD: 4.7 K/CU MM (ref 4–10.5)

## 2023-03-23 PROCEDURE — 80048 BASIC METABOLIC PNL TOTAL CA: CPT

## 2023-03-23 PROCEDURE — 6370000000 HC RX 637 (ALT 250 FOR IP): Performed by: INTERNAL MEDICINE

## 2023-03-23 PROCEDURE — 36415 COLL VENOUS BLD VENIPUNCTURE: CPT

## 2023-03-23 PROCEDURE — 49083 ABD PARACENTESIS W/IMAGING: CPT

## 2023-03-23 PROCEDURE — 82042 OTHER SOURCE ALBUMIN QUAN EA: CPT

## 2023-03-23 PROCEDURE — 6370000000 HC RX 637 (ALT 250 FOR IP)

## 2023-03-23 PROCEDURE — 1200000000 HC SEMI PRIVATE

## 2023-03-23 PROCEDURE — 85610 PROTHROMBIN TIME: CPT

## 2023-03-23 PROCEDURE — 6360000002 HC RX W HCPCS

## 2023-03-23 PROCEDURE — 82962 GLUCOSE BLOOD TEST: CPT

## 2023-03-23 PROCEDURE — 6360000002 HC RX W HCPCS: Performed by: INTERNAL MEDICINE

## 2023-03-23 PROCEDURE — 87040 BLOOD CULTURE FOR BACTERIA: CPT

## 2023-03-23 PROCEDURE — 94761 N-INVAS EAR/PLS OXIMETRY MLT: CPT

## 2023-03-23 PROCEDURE — 97530 THERAPEUTIC ACTIVITIES: CPT

## 2023-03-23 PROCEDURE — 97535 SELF CARE MNGMENT TRAINING: CPT

## 2023-03-23 PROCEDURE — 84157 ASSAY OF PROTEIN OTHER: CPT

## 2023-03-23 PROCEDURE — 80053 COMPREHEN METABOLIC PANEL: CPT

## 2023-03-23 PROCEDURE — 87086 URINE CULTURE/COLONY COUNT: CPT

## 2023-03-23 PROCEDURE — 85025 COMPLETE CBC W/AUTO DIFF WBC: CPT

## 2023-03-23 PROCEDURE — 89051 BODY FLUID CELL COUNT: CPT

## 2023-03-23 PROCEDURE — 82140 ASSAY OF AMMONIA: CPT

## 2023-03-23 RX ORDER — LACTULOSE 10 G/15ML
30 SOLUTION ORAL ONCE
Status: COMPLETED | OUTPATIENT
Start: 2023-03-23 | End: 2023-03-23

## 2023-03-23 RX ORDER — CIPROFLOXACIN 2 MG/ML
400 INJECTION, SOLUTION INTRAVENOUS EVERY 12 HOURS
Status: DISCONTINUED | OUTPATIENT
Start: 2023-03-23 | End: 2023-03-26 | Stop reason: HOSPADM

## 2023-03-23 RX ORDER — LACTULOSE 10 G/15ML
30 SOLUTION ORAL 4 TIMES DAILY
Status: DISCONTINUED | OUTPATIENT
Start: 2023-03-23 | End: 2023-03-26 | Stop reason: HOSPADM

## 2023-03-23 RX ADMIN — ASPIRIN 81 MG: 81 TABLET, COATED ORAL at 09:46

## 2023-03-23 RX ADMIN — PALIPERIDONE 6 MG: 1.5 TABLET, EXTENDED RELEASE ORAL at 09:48

## 2023-03-23 RX ADMIN — CIPROFLOXACIN 400 MG: 2 INJECTION, SOLUTION INTRAVENOUS at 11:36

## 2023-03-23 RX ADMIN — ATORVASTATIN CALCIUM 40 MG: 40 TABLET, FILM COATED ORAL at 21:09

## 2023-03-23 RX ADMIN — LACTULOSE 30 G: 10 SOLUTION ORAL at 17:55

## 2023-03-23 RX ADMIN — TROSPIUM CHLORIDE 20 MG: 20 TABLET, FILM COATED ORAL at 21:09

## 2023-03-23 RX ADMIN — LACTULOSE 20 G: 10 SOLUTION ORAL at 09:46

## 2023-03-23 RX ADMIN — TRAMADOL HYDROCHLORIDE 50 MG: 50 TABLET, COATED ORAL at 12:32

## 2023-03-23 RX ADMIN — QUETIAPINE FUMARATE 100 MG: 25 TABLET ORAL at 21:09

## 2023-03-23 RX ADMIN — ENOXAPARIN SODIUM 40 MG: 100 INJECTION SUBCUTANEOUS at 17:56

## 2023-03-23 RX ADMIN — LACTULOSE 30 G: 10 SOLUTION ORAL at 21:09

## 2023-03-23 RX ADMIN — RANOLAZINE 500 MG: 500 TABLET, EXTENDED RELEASE ORAL at 09:46

## 2023-03-23 RX ADMIN — FUROSEMIDE 20 MG: 20 TABLET ORAL at 09:46

## 2023-03-23 RX ADMIN — PANTOPRAZOLE SODIUM 40 MG: 40 TABLET, DELAYED RELEASE ORAL at 09:46

## 2023-03-23 RX ADMIN — RALOXIFENE HYDROCHLORIDE 60 MG: 60 TABLET, FILM COATED ORAL at 09:48

## 2023-03-23 RX ADMIN — RIFAXIMIN 550 MG: 550 TABLET ORAL at 21:09

## 2023-03-23 RX ADMIN — INSULIN LISPRO 2 UNITS: 100 INJECTION, SOLUTION INTRAVENOUS; SUBCUTANEOUS at 12:26

## 2023-03-23 RX ADMIN — FLUOXETINE 30 MG: 10 CAPSULE ORAL at 09:46

## 2023-03-23 RX ADMIN — CIPROFLOXACIN 400 MG: 2 INJECTION, SOLUTION INTRAVENOUS at 21:10

## 2023-03-23 RX ADMIN — RANOLAZINE 500 MG: 500 TABLET, EXTENDED RELEASE ORAL at 21:10

## 2023-03-23 RX ADMIN — INSULIN GLARGINE 16 UNITS: 100 INJECTION, SOLUTION SUBCUTANEOUS at 21:12

## 2023-03-23 RX ADMIN — RIFAXIMIN 550 MG: 550 TABLET ORAL at 09:46

## 2023-03-23 RX ADMIN — METOPROLOL SUCCINATE 50 MG: 50 TABLET, EXTENDED RELEASE ORAL at 09:46

## 2023-03-23 RX ADMIN — LACTULOSE 30 G: 20 SOLUTION ORAL at 16:36

## 2023-03-23 ASSESSMENT — PAIN DESCRIPTION - LOCATION: LOCATION: HEAD

## 2023-03-23 ASSESSMENT — PAIN SCALES - GENERAL
PAINLEVEL_OUTOF10: 0
PAINLEVEL_OUTOF10: 0
PAINLEVEL_OUTOF10: 6

## 2023-03-23 ASSESSMENT — PAIN DESCRIPTION - DESCRIPTORS: DESCRIPTORS: ACHING

## 2023-03-23 ASSESSMENT — PAIN DESCRIPTION - ORIENTATION: ORIENTATION: RIGHT;LEFT

## 2023-03-23 NOTE — PROGRESS NOTES
[facility]        Progress Note       3/23/2023  12:58 PM    Patient:    Annamaria Hammond  : 1960   63 y.o. MRN: [id number]  Admitted: 3/20/2023 12:48 PM ATT: Matt Martinez MD   3001/3001-A  AdmitDx: Essential hypertension [I10]  Left-sided weakness [R53.1]  Acute left-sided weakness [R53.1]  Type 2 diabetes mellitus with hyperglycemia, with long-term current use of insulin (HCC) [E11.65, Z79.4]  PCP: Matt Martinez MD    SUBJECTIVE:  Chart reviewed, events noted  Patient feeling okay. More lethargic this morning. No BM since last night. Tolerating regular diet. Denies N/V, abdominal pain.     ROS:  The positive ROS will be identified in bold     CONSTITUTIONAL:  Neg  Weight loss, fatigue, fever  MOUTH/THROAT:  Neg  Bleeding gums, hoarseness or sore throat  RESPIRATORY:   Neg SOB, wheeze, cough, hemoptysis or bronchitis  CARDIOVASCULAR:  Neg Chest pain, palpitations, dyspnea on exertion, edema  GASTROINTESTINAL:  SEE HPI  HEMATOLOGIC/LYMPHATIC:  Neg  Anemia, bleeding tendency  MUSCULOSKELETAL: Neg  New myalgias, joint pain, swelling or stiffness  NEUROLOGICAL:  Neg  Loss of Consciousness, memory loss, forgetfulness, periods of confusion, difficulty concentrating, seizures, insomnia, aphasia    SKIN:  Neg No itching, rashes, or sores  PSYCHIATRIC:  Neg Depression, personality changes, anxiety    OBJECTIVE:      /74   Pulse 84   Temp 97.8 °F (36.6 °C) (Oral)   Resp 16   Ht 4' 9.5\" (1.461 m)   Wt 176 lb (79.8 kg)   SpO2 90%   BMI 37.43 kg/m²     NAD, appears lethargic, lying in bed   Lips and mucous membranes pink and moist  RRR, Nl s1s2, no murmurs  Decreased breath sounds bilaterally, respirations even and unlabored   Abdomen soft, mildly distended, NT, no HSM, bowel sounds normal  Skin pink, warm, dry and CR brisk   Edema to bilateral lower extremities       CBC:   Recent Labs     23  1300 23  0531 23  0237   WBC 4.6 4.4 4.7   HGB Tobacco abuse Z72.0    Angina effort I20.8    Abnormal nuclear cardiac imaging test R93.1    Lung nodule R91.1    Chest pain R07.9    Dyslipidemia E78.5    Pancolitis (HCC) K51.00    Hematemesis without nausea C22.5    Alcoholic cirrhosis of liver without ascites (HCC) K70.30    Thrombocytopenia (HCC) I62.2    Periumbilical abdominal pain R10.33    History of colonic polyps Z86.010    Abnormal findings on diagnostic imaging of abdomen R93.5    Nausea R11.0    Gastroesophageal reflux disease without esophagitis K21.9    Vitamin D deficiency E55.9    Left carpal tunnel syndrome G56.02    Right carpal tunnel syndrome G56.01    Esophageal hypertension K22.4    Candida esophagitis (HCC) B37.81    Colitis K52.9    Primary hypertension I10    GI bleed K92.2    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    Type 2 diabetes mellitus with complication, with long-term current use of insulin (HCC) E11.8, Z79.4    SOB (shortness of breath) R06.02    Portal vein thrombosis I81    Intractable nausea and vomiting R11.2    Abdominal pain R10.9    Acute GI bleeding K92.2    Chronic hepatitis C without hepatic coma (HCC) B18.2    Delayed gastric emptying K30    Restless legs G25.81    Acute colitis K52.9    Acute encephalopathy G93.40    S/P TIPS (transjugular intrahepatic portosystemic shunt) Z95.828    Cirrhosis of liver without ascites (HCC) K74.60    Acute upper GI bleed K92.2    Acute hepatic encephalopathy K76.82    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

## 2023-03-23 NOTE — PROGRESS NOTES
3/22/2023    Late entry--pt was seen in the early evening. She is feeling a bit better. Her bowels moved once yesterday. No vomiting. Afebrile. ,VSS. Heart regular, lugns with decreased breath sounds at the bases. Abd soft, nontender with active but reduced bowel sounds. 1+ ankle edema. Latest Reference Range & Units 3/22/23 00:42   Sodium 135 - 145 MMOL/L 140   Potassium 3.5 - 5.1 MMOL/L 4.2   Chloride 99 - 110 mMol/L 106   CO2 21 - 32 MMOL/L 27   BUN,BUNPL 6 - 23 MG/DL 17   Creatinine 0.6 - 1.1 MG/DL 1.1   Anion Gap 4 - 16  7   Est, Glom Filt Rate >60 mL/min/1.73m2 58 (L)   Glucose, Random 70 - 99 MG/ (H)   CALCIUM, SERUM, 225614 8.3 - 10.6 MG/DL 8.8   AMMONIA, PLASMA, [id number] 11 - 51 UMOL/L 140 (H)   AMMONIA  Rpt ! Notes from GI reviewed, the dose of rifaximin was adjusted, and I have ordered some miralx to help with bowel action. May need suppository to stimulate bowel action. Lactulose is scheduled at three times daily. Her hepatic encephalopathy is clearing. I advised her to ambulate more.    Dakotah Gupta MD

## 2023-03-23 NOTE — PROGRESS NOTES
Pt brought to IR small room to perform scheduled paracentesis. 7 US images taken by Dr. Tona Almonte, and there is no fluid present to perform procedure. Consult completed. Pts bedside nurse Rodney notified.

## 2023-03-23 NOTE — PLAN OF CARE
Problem: Discharge Planning  Goal: Discharge to home or other facility with appropriate resources  3/22/2023 2140 by Amee Chang RN  Outcome: Progressing  3/22/2023 1019 by Manav Horner RN  Outcome: Progressing     Problem: Pain  Goal: Verbalizes/displays adequate comfort level or baseline comfort level  3/22/2023 2140 by Amee Chang RN  Outcome: Progressing  3/22/2023 1019 by Manav Horner RN  Outcome: Progressing     Problem: ABCDS Injury Assessment  Goal: Absence of physical injury  3/22/2023 2140 by Amee Chang RN  Outcome: Progressing  3/22/2023 1019 by Manav Horner RN  Outcome: Progressing     Problem: Safety - Adult  Goal: Free from fall injury  3/22/2023 2140 by Amee Chang RN  Outcome: Progressing  3/22/2023 1019 by Manav Horner RN  Outcome: Progressing

## 2023-03-23 NOTE — PROGRESS NOTES
Occupational Therapy    Occupational Therapy Treatment Note    Name: Jessica Bryant MRN: [id number] :   1960   Date:  3/23/2023   Admission Date: 3/20/2023 Room:  3001/3001-A     Primary Problem:   The primary encounter diagnosis was Acute left-sided weakness. Diagnoses of Type 2 diabetes mellitus with hyperglycemia, with long-term current use of insulin (HCC) and Essential hypertension were also pertinent to this visit    Restrictions/Precautions:          general precautions, fall risk    Communication with other providers: RN approved session    Subjective:  Patient states:  \"I'll get washed up, but I'm not sitting in the chair. \"   Pain:   Location, Type, Intensity (0/10 to 10/10):  denies     Objective:    Observation: Pt presented supine in bed, agreeable to session. Objective Measures:  Tele     Treatment, including education:  Therapeutic Activity Training:   Therapeutic activity training was instructed today. Cues were given for safety, sequence, UE/LE placement, awareness, and balance. Activities performed today included bed mobility training, sup-sit, sit-stand, SPT. Supine to Sit with SUP. Sitting EOB with SUP for ~7 min to finish breakfast.     Sit to Stand transfer from EOB with SBA. Functional mobility for short HH distances within room for X2 trials with SBA w/o AD. Self Care Training:   Cues were given for safety, sequence, UE/LE placement, visual cues, and balance. Activities performed today included UB/LB dressing tasks, toileting, hand hygiene at sink. Toileting:  SBA for transfer, hygiene, clothing mgmt with increased time to complete. Pt stood at sink for ~9 min with SBA for hygiene tasks including utilizing ReadyCap to wash hair, UB sponge bath, and combing hair with intermittent standing RB's. Dressing task while seated EOB including doffing/donning gown with SUP .   LB dressing task including doffing socks with SBA and Mod A to don after increased time/effort. Pt set up on EOB to finish breakfast with pt kaleb G sitting balance. Pt educated on role of OT and continuing to progress towards goals. Pt left with call light within reach and all needs met. Assessment / Impression:    Patient's tolerance of treatment: Well  Adverse Reaction: None  Significant change in status and impact: Improved from initial evaluation  Barriers to improvement: None noted      Plan for Next Session:    Cont OT POC    Time in:  0852  Time out:  0924  Timed treatment minutes:  32  Total treatment time:  32      Electronically signed by:     SPENCER Toro,   3/23/2023, 9:52 AM

## 2023-03-24 ENCOUNTER — APPOINTMENT (OUTPATIENT)
Dept: GENERAL RADIOLOGY | Age: 63
DRG: 443 | End: 2023-03-24
Payer: MEDICARE

## 2023-03-24 LAB
AMMONIA: 90 UMOL/L (ref 11–51)
BASOPHILS ABSOLUTE: 0.1 K/CU MM
BASOPHILS RELATIVE PERCENT: 1 % (ref 0–1)
CULTURE: NORMAL
DIFFERENTIAL TYPE: ABNORMAL
EOSINOPHILS ABSOLUTE: 0.3 K/CU MM
EOSINOPHILS RELATIVE PERCENT: 5.9 % (ref 0–3)
GLUCOSE BLD-MCNC: 151 MG/DL (ref 70–99)
GLUCOSE BLD-MCNC: 198 MG/DL (ref 70–99)
GLUCOSE BLD-MCNC: 260 MG/DL (ref 70–99)
HCT VFR BLD CALC: 37.5 % (ref 37–47)
HEMOGLOBIN: 12.7 GM/DL (ref 12.5–16)
IMMATURE NEUTROPHIL %: 0.2 % (ref 0–0.43)
INR BLD: 1.09 INDEX
LYMPHOCYTES ABSOLUTE: 1.5 K/CU MM
LYMPHOCYTES RELATIVE PERCENT: 30.1 % (ref 24–44)
Lab: NORMAL
MCH RBC QN AUTO: 32.2 PG (ref 27–31)
MCHC RBC AUTO-ENTMCNC: 33.9 % (ref 32–36)
MCV RBC AUTO: 94.9 FL (ref 78–100)
MONOCYTES ABSOLUTE: 0.5 K/CU MM
MONOCYTES RELATIVE PERCENT: 10.6 % (ref 0–4)
NUCLEATED RBC %: 0 %
PDW BLD-RTO: 12.6 % (ref 11.7–14.9)
PLATELET # BLD: 90 K/CU MM (ref 140–440)
PMV BLD AUTO: 11.6 FL (ref 7.5–11.1)
PROTHROMBIN TIME: 14.1 SECONDS (ref 11.7–14.5)
RBC # BLD: 3.95 M/CU MM (ref 4.2–5.4)
SEGMENTED NEUTROPHILS ABSOLUTE COUNT: 2.6 K/CU MM
SEGMENTED NEUTROPHILS RELATIVE PERCENT: 52.2 % (ref 36–66)
SPECIMEN: NORMAL
TOTAL IMMATURE NEUTOROPHIL: 0.01 K/CU MM
TOTAL NUCLEATED RBC: 0 K/CU MM
WBC # BLD: 4.9 K/CU MM (ref 4–10.5)

## 2023-03-24 PROCEDURE — 85610 PROTHROMBIN TIME: CPT

## 2023-03-24 PROCEDURE — 85025 COMPLETE CBC W/AUTO DIFF WBC: CPT

## 2023-03-24 PROCEDURE — 6360000002 HC RX W HCPCS

## 2023-03-24 PROCEDURE — 94761 N-INVAS EAR/PLS OXIMETRY MLT: CPT

## 2023-03-24 PROCEDURE — 6370000000 HC RX 637 (ALT 250 FOR IP): Performed by: INTERNAL MEDICINE

## 2023-03-24 PROCEDURE — 6370000000 HC RX 637 (ALT 250 FOR IP)

## 2023-03-24 PROCEDURE — 82140 ASSAY OF AMMONIA: CPT

## 2023-03-24 PROCEDURE — 36415 COLL VENOUS BLD VENIPUNCTURE: CPT

## 2023-03-24 PROCEDURE — 82962 GLUCOSE BLOOD TEST: CPT

## 2023-03-24 PROCEDURE — 97116 GAIT TRAINING THERAPY: CPT

## 2023-03-24 PROCEDURE — 6360000002 HC RX W HCPCS: Performed by: INTERNAL MEDICINE

## 2023-03-24 PROCEDURE — 1200000000 HC SEMI PRIVATE

## 2023-03-24 PROCEDURE — 97110 THERAPEUTIC EXERCISES: CPT

## 2023-03-24 PROCEDURE — 71046 X-RAY EXAM CHEST 2 VIEWS: CPT

## 2023-03-24 RX ORDER — POLYETHYLENE GLYCOL 3350 17 G/17G
17 POWDER, FOR SOLUTION ORAL ONCE
Status: DISCONTINUED | OUTPATIENT
Start: 2023-03-24 | End: 2023-03-26 | Stop reason: HOSPADM

## 2023-03-24 RX ORDER — POLYETHYLENE GLYCOL 3350 17 G/17G
119 POWDER, FOR SOLUTION ORAL DAILY
Status: COMPLETED | OUTPATIENT
Start: 2023-03-24 | End: 2023-03-24

## 2023-03-24 RX ORDER — SPIRONOLACTONE 50 MG/1
50 TABLET, FILM COATED ORAL DAILY
Status: DISCONTINUED | OUTPATIENT
Start: 2023-03-24 | End: 2023-03-26 | Stop reason: HOSPADM

## 2023-03-24 RX ORDER — SODIUM PHOSPHATE, DIBASIC AND SODIUM PHOSPHATE, MONOBASIC 7; 19 G/133ML; G/133ML
1 ENEMA RECTAL
Status: DISPENSED | OUTPATIENT
Start: 2023-03-24 | End: 2023-03-25

## 2023-03-24 RX ADMIN — ENOXAPARIN SODIUM 40 MG: 100 INJECTION SUBCUTANEOUS at 18:07

## 2023-03-24 RX ADMIN — LACTULOSE 30 G: 10 SOLUTION ORAL at 20:20

## 2023-03-24 RX ADMIN — METOPROLOL SUCCINATE 50 MG: 50 TABLET, EXTENDED RELEASE ORAL at 09:13

## 2023-03-24 RX ADMIN — TRAMADOL HYDROCHLORIDE 50 MG: 50 TABLET, COATED ORAL at 22:43

## 2023-03-24 RX ADMIN — PANTOPRAZOLE SODIUM 40 MG: 40 TABLET, DELAYED RELEASE ORAL at 09:13

## 2023-03-24 RX ADMIN — RANOLAZINE 500 MG: 500 TABLET, EXTENDED RELEASE ORAL at 20:22

## 2023-03-24 RX ADMIN — INSULIN GLARGINE 16 UNITS: 100 INJECTION, SOLUTION SUBCUTANEOUS at 20:21

## 2023-03-24 RX ADMIN — INSULIN LISPRO 4 UNITS: 100 INJECTION, SOLUTION INTRAVENOUS; SUBCUTANEOUS at 13:21

## 2023-03-24 RX ADMIN — LACTULOSE 30 G: 10 SOLUTION ORAL at 09:14

## 2023-03-24 RX ADMIN — QUETIAPINE FUMARATE 100 MG: 25 TABLET ORAL at 20:22

## 2023-03-24 RX ADMIN — RANOLAZINE 500 MG: 500 TABLET, EXTENDED RELEASE ORAL at 09:13

## 2023-03-24 RX ADMIN — LACTULOSE 30 G: 10 SOLUTION ORAL at 13:22

## 2023-03-24 RX ADMIN — RIFAXIMIN 550 MG: 550 TABLET ORAL at 09:13

## 2023-03-24 RX ADMIN — RALOXIFENE HYDROCHLORIDE 60 MG: 60 TABLET, FILM COATED ORAL at 09:13

## 2023-03-24 RX ADMIN — CIPROFLOXACIN 400 MG: 2 INJECTION, SOLUTION INTRAVENOUS at 20:26

## 2023-03-24 RX ADMIN — LACTULOSE 30 G: 10 SOLUTION ORAL at 18:07

## 2023-03-24 RX ADMIN — POLYETHYLENE GLYCOL 3350 119 G: 17 POWDER, FOR SOLUTION ORAL at 09:20

## 2023-03-24 RX ADMIN — ATORVASTATIN CALCIUM 40 MG: 40 TABLET, FILM COATED ORAL at 20:22

## 2023-03-24 RX ADMIN — FLUOXETINE 30 MG: 10 CAPSULE ORAL at 09:13

## 2023-03-24 RX ADMIN — PALIPERIDONE 6 MG: 1.5 TABLET, EXTENDED RELEASE ORAL at 09:13

## 2023-03-24 RX ADMIN — ASPIRIN 81 MG: 81 TABLET, COATED ORAL at 09:13

## 2023-03-24 RX ADMIN — SPIRONOLACTONE 50 MG: 50 TABLET ORAL at 09:13

## 2023-03-24 RX ADMIN — TROSPIUM CHLORIDE 20 MG: 20 TABLET, FILM COATED ORAL at 20:22

## 2023-03-24 RX ADMIN — RIFAXIMIN 550 MG: 550 TABLET ORAL at 20:22

## 2023-03-24 RX ADMIN — CIPROFLOXACIN 400 MG: 2 INJECTION, SOLUTION INTRAVENOUS at 09:27

## 2023-03-24 ASSESSMENT — PAIN SCALES - GENERAL
PAINLEVEL_OUTOF10: 0
PAINLEVEL_OUTOF10: 7

## 2023-03-24 ASSESSMENT — PAIN DESCRIPTION - DESCRIPTORS: DESCRIPTORS: ACHING

## 2023-03-24 ASSESSMENT — PAIN DESCRIPTION - LOCATION: LOCATION: ABDOMEN

## 2023-03-24 NOTE — PROGRESS NOTES
3/23/2023    Late entry--pt was seen in the early evening. She is feeling a little better, but has an occ cough, nonproductive. She reviews that she was sleepy this morning becauuse she took her sleeping pill too late at night. She has been ambulating in the room occ. Afebrile,VSS. Heart regular, lungs with decreased breath sonds at the bases with scattered wheezes and some fibrous crackles. Abd soft nontender. Legs with trace edema. Latest Reference Range & Units 3/23/23 02:37   Sodium 135 - 145 MMOL/L 140   Potassium 3.5 - 5.1 MMOL/L 3.9   Chloride 99 - 110 mMol/L 106   CO2 21 - 32 MMOL/L 27   BUN,BUNPL 6 - 23 MG/DL 14   Creatinine 0.6 - 1.1 MG/DL 0.8   Anion Gap 4 - 16  7   Est, Glom Filt Rate >60 mL/min/1.73m2 >60   Glucose, Random 70 - 99 MG/ (H)   CALCIUM, SERUM, [id number] 8.3 - 10.6 MG/DL 9.0   AMMONIA, PLASMA, [id number] 11 - 51 UMOL/L 106 (H)   AMMONIA  Rpt !    WBC 4.0 - 10.5 K/CU MM 4.7   RBC 4.2 - 5.4 M/CU MM 3.94 (L)   Hemoglobin Quant 12.5 - 16.0 GM/DL 12.7   Hematocrit 37 - 47 % 36.9 (L)   MCV 78 - 100 FL 93.7   MCH 27 - 31 PG 32.2 (H)   MCHC 32.0 - 36.0 % 34.4   MPV 7.5 - 11.1 FL 11.4 (H)   RDW 11.7 - 14.9 % 12.5   Platelet Count 025 - 440 K/CU MM 90 (L)   Lymphocyte % 24 - 44 % 35.8   Monocytes % 0 - 4 % 11.2 (H)   Eosinophils % 0 - 3 % 5.9 (H)   Basophils % 0 - 1 % 0.8   Lymphocytes Absolute K/CU MM 1.7   Monocytes Absolute K/CU MM 0.5   Eosinophils Absolute K/CU MM 0.3   Basophils Absolute K/CU MM 0.0   Differential Type  AUTOMATED DIFFERENTIAL   Segs Relative 36 - 66 % 46.3   Segs Absolute K/CU MM 2.2   Nucleated RBC % % 0.0   Immature Neutrophil % 0 - 0.43 % 0.0   Total Immature Neutrophil K/CU MM 0.00   Total Nucleated RBC K/CU MM 0.0   Prothrombin Time 11.7 - 14.5 SECONDS 13.9   INR INDEX 1.08      Latest Reference Range & Units 3/23/23 13:28   Sodium 135 - 145 MMOL/L 136   Potassium 3.5 - 5.1 MMOL/L 4.4   Chloride 99 - 110 mMol/L 104   CO2 21 - 32 MMOL/L 25   BUN,BUNPL 6 - 23 MG/DL 17   Creatinine 0.6 - 1.1 MG/DL 1.0   Anion Gap 4 - 16  7   Est, Glom Filt Rate >60 mL/min/1.73m2 >60   Glucose, Random 70 - 99 MG/ (H)   CALCIUM, SERUM, [id number] 8.3 - 10.6 MG/DL 9.1   Total Protein 6.4 - 8.2 GM/DL 5.7 (L)   Albumin 3.4 - 5.0 GM/DL 2.8 (L)   Alk Phos 40 - 129 IU/L 107   ALT 10 - 40 U/L 12   AST 15 - 37 IU/L 23   BILIRUBIN TOTAL 0.0 - 1.0 MG/DL 0.9     Note from GI reviewed, she has been started on cipro for possible early SBP, and prophylaxis. No fluid noted on US for paracentesis. She continues on xifaxan and lactulose. No bowel movement yet. Ammonia level is slightly improved.     Jefferson Hazel MD

## 2023-03-24 NOTE — PROGRESS NOTES
Physical Therapy  Name: Ryder Samuel MRN: [id number] :   1960   Date:  3/24/2023   Admission Date: 3/20/2023 Room:  [address] [place] [address]   Restrictions/Precautions:         Fall risk, L-sided deficits, tele, Sp02  Communication with other providers:  Cassidy RN states pt is ok to see for therapy  Subjective:  Patient states:  '' I don't want to sit in the chair ''   Pain:   Location, Type, Intensity (0/10 to 10/10):  denies  Objective:    Observation:  pt supine in bed    Treatment, including education/measures:  Agreeable to therapy with encouragement. Ambulated in halls with no AD. Performed all AROM seated EOB. Required rest breaks. Required TC to correct form and ensure maximum intervention benefit. Sitting Exercises: Ankle pumps x 20  Heel raises x 20  LAQ's x 20  Marching x 20   Clams x 20  Hip Abd x 20    Therapeutic Exercise:  Therapeutic exercises were instructed today. Cues were given for technique, safety, recruitment, and rationale. Cues were verbal and/or tactile. Transfers with line management of tele  Rolling: SBA  Supine to sit :SBA  Sit to supine :SBA  Scooting :SBA  Sit to stand :SBA  Stand to sit :SBA  SPT:SBA    Gait:  Pt amb with no AD for 400 ft with SBA assist  Pt needed VC's for directional changes  Gait Comments: with VC's' and TC's for B LE placement, walker placement and sequence throughout ambulation; with VC's and TC's to maintain upright posture in order to avoid COM shifting outside of VALERIA; with VC's for PLB throughout ambulation    Safety  Patient left safely in the bed, with call light/phone in reach with alarm applied. Gait belt was used for transfers and gait.     Assessment / Impression:     Patient's tolerance of treatment:  good   Adverse Reaction: no  Significant change in status and impact:  no  Barriers to improvement:  strength    Plan for Next Session:    Will cont to work towards pt's goals per patient tolerance  Time in:  11:47  Time out:  12:12  Timed

## 2023-03-24 NOTE — PROGRESS NOTES
3/24/2023    She feels better today, has done well with therapy. Appetite is good. No tremors. Her strength is good in her extremities. Afebrile,VSS. Heart regular, lugns clear, occ wheeze with forced expiration, abd soft with active bowel sounds, nontender. 1+ leg edema. Latest Reference Range & Units 3/24/23 05:54   AMMONIA, PLASMA, [id number] 11 - 51 UMOL/L 90 (H)   AMMONIA  Rpt ! WBC 4.0 - 10.5 K/CU MM 4.9   RBC 4.2 - 5.4 M/CU MM 3.95 (L)   Hemoglobin Quant 12.5 - 16.0 GM/DL 12.7   Hematocrit 37 - 47 % 37.5   MCV 78 - 100 FL 94.9   MCH 27 - 31 PG 32.2 (H)   MCHC 32.0 - 36.0 % 33.9   MPV 7.5 - 11.1 FL 11.6 (H)   RDW 11.7 - 14.9 % 12.6   Platelet Count 866 - 440 K/CU MM 90 (L)   Lymphocyte % 24 - 44 % 30.1   Monocytes % 0 - 4 % 10.6 (H)   Eosinophils % 0 - 3 % 5.9 (H)   Basophils % 0 - 1 % 1.0   Lymphocytes Absolute K/CU MM 1.5   Monocytes Absolute K/CU MM 0.5   Eosinophils Absolute K/CU MM 0.3   Basophils Absolute K/CU MM 0.1   Differential Type  AUTOMATED DIFFERENTIAL   Segs Relative 36 - 66 % 52.2   Segs Absolute K/CU MM 2.6   Nucleated RBC % % 0.0   Immature Neutrophil % 0 - 0.43 % 0.2   Total Immature Neutrophil K/CU MM 0.01   Total Nucleated RBC K/CU MM 0.0   Prothrombin Time 11.7 - 14.5 SECONDS 14.1   INR INDEX 1.09     Ammonia improved, but still elevated. Glucose control is variable. She will need to work on coverage schedule at home. She remains on cipro from GI for prophylaxis against SBP. Continue current therapy. GI has ordered la large dose of miralx to prompt some evacuation, and I have ordered an enema if needed. She should be able to go home on hte weekend if bowels move and ammonia level decreases.   Agnieszka Sahu MD

## 2023-03-24 NOTE — PROGRESS NOTES
to diabetes mellitus ([facility] [address] E11.65    TIA (transient ischemic attack) G45.9    Acute left-sided weakness R53.1    Left-sided weakness R53.1        ASSESSMENT AND RECOMMENDATIONS    Elevated ammonia:   ESLD s/t to former alcohol abuse and Hep B s/p TIPS   Ammonia 90 today - improving, MELD Na score 11  No BM yesterday or this morning   Mentation has improved, patient is alert and oriented   Still with significant LE edema   Paracentesis attempted yesterday but no fluid present     Recommend:   Increase lactulose to every 4 hours   Continue Xifaxan 550mg BID  MiraLax 119g in 36oz of fluid x 1 dose   Start Aldactone 50mg daily, stop Lasix   Continue Cipro x 10 days for SBP prophylaxis   Follow up as OP      Discussed plan of care with patient and RN. Patient clinical, biochemical, and radiological information discussed with Dr. Shruthi Marroquin. He agrees with the assessment and plan. Amor Espinal PA-C  3/24/2023  8:40 AM     ESLD, S?P TIPS  Now encephalopathic  Having constipation matty what  Will give aggressive laxatives  Advised to aim for 2-3 soft BM a day    Can DC once stable    I have seen and examined this patient personally, and independently of Adali Maravilla    The plan was developed mutually at the time of the visit with the patient. Emili and myself have spoken with patient, nursing staff and provided written and verbal instructions . The above note has been reviewed and I agree with the Assessment,  Diagnosis, and Treatment plan as suggested by Adali Maravilla. I have also suggested more changes to the therapy plan , which is being implemented.       [address] [address]

## 2023-03-24 NOTE — H&P
Vitamin D one a day. Oyster  shell calcium 100 mg daily. Albuterol sulfate two puffs every four  hours as needed. Bactroban to skin as needed for sores. Accu-Chek  machine. Evista 60 mg daily. Myrbetriq 25 mg daily. Estradiol vaginal  cream three times a week. Nitroglycerin sublingual p.r.n. Zofran  p.r.n. nausea and vomiting. Prozac 30 mg daily. Invega 6 mg daily. She is actually taking Viread 300 mg daily but this might be an old  medication that will be reviewed with her. REVIEW OF SYSTEMS:  Noncontributory across 10 systems except as in the  history of present illness. PHYSICAL EXAMINATION:  VITAL SIGNS:  In the emergency room, blood pressure 138/70. Heart rate  of 78. Respirations of 16. Temperature is 97.8 orally. Weight 176  pounds. Height is 4 feet 9 inches tall. GENERAL:  Overall, she appeared in no acute distress on my examination. She is alert and cooperative with the examination. She denied any arm  weakness at this time. SKIN:  Warm and dry. NECK:  Neck is supple. No TMJ or LA. Pharynx is clear. No carotid  bruits are appreciated. LUNGS:  Show reduced breath sounds in the base with fair air movement  overall. Rare wheezing. Forced expiration. CARDIOVASCULAR:  Heart shows regular rate and rhythm. Normal S1 and S2. No extra heart sounds. Perfusion appears appropriate. ABDOMEN:  Soft abdomen. Active bowel sounds. No organomegaly. No  ascites appreciated. BACK:  No CVAT. NEUROLOGICAL:  Extraocular movements are intact. Cranial II through XII  are grossly intact. She appears slightly confused at times with  questions about her presentation to the emergency room. She has  somewhat reduced strength in bilateral upper extremities but no pronator  drift is noted. Hand grasps are equal.  She is appropriate to my  examination and again has slightly reduced strength on both sides. She  relates that her arm and leg are weak intermittently for months.     LABORATORY VALUES:  Include the following:  White blood cell count is  4600. H and H of 14 and 42. Platelets are 29,081. Differential shows  53% segs, 30% lymphocytes, 10% monocytes, 5% eosinophils. Bio-chem profile includes the following:  Sodium                 134.  Potassium              4.5. Chloride               102.  CO2                    27.  BUN                    15.  Creatinine             0.9. Glucose                381. Calcium                8.9. Albumin                3.5. Total protein          6.8. Total bilirubin        0.8. ALT                    13.  AST                    17.  Alk phos               232.  Troponin T             Less than 0.010. ProTime                12.6. Anion gap              14 with an INR of 0.98. DIAGNOSTIC DATA:  EKG shows sinus rhythm. No acute ST-T wave changes. CT scan of the brain without contrast showed no acute intracranial  process identified. No large vessel occlusions, significant stenosis,  or cerebral aneurysm. No significant arterial stenosis identified in  her neck. The CT was ordered by the emergency room. DISCUSSION:  In the emergency room, the patient was felt to have  left-sided weakness; although she was leaning to the right. She had  slurred speech, which had happened on the day prior to the admission as  well as the day of her admission. Her brother felt that this was an  acute change for her. The patient stated that she did feel like she was  talking differently. She said she had a previous TIA or stroke and her  left side was weak. She felt like this was happening again. She is not on any chronic blood thinners because of her low platelet  count. She is currently poorly controlled with her diabetes,  noncompliant with her diet, etc.  The emergency room said her NIH score  was 4. DIFFERENTIAL DIAGNOSES:  Included:  1. CVA. 2.  TIA. 3.  Subarachnoid hemorrhage. 4.  MI. She was placed on cardiac monitor.     She had

## 2023-03-25 LAB
AMMONIA: 96 UMOL/L (ref 11–51)
GLUCOSE BLD-MCNC: 154 MG/DL (ref 70–99)
GLUCOSE BLD-MCNC: 198 MG/DL (ref 70–99)
GLUCOSE BLD-MCNC: 229 MG/DL (ref 70–99)
GLUCOSE BLD-MCNC: 233 MG/DL (ref 70–99)

## 2023-03-25 PROCEDURE — 82962 GLUCOSE BLOOD TEST: CPT

## 2023-03-25 PROCEDURE — 1200000000 HC SEMI PRIVATE

## 2023-03-25 PROCEDURE — 6360000002 HC RX W HCPCS

## 2023-03-25 PROCEDURE — 6370000000 HC RX 637 (ALT 250 FOR IP): Performed by: INTERNAL MEDICINE

## 2023-03-25 PROCEDURE — 6360000002 HC RX W HCPCS: Performed by: INTERNAL MEDICINE

## 2023-03-25 PROCEDURE — 6370000000 HC RX 637 (ALT 250 FOR IP)

## 2023-03-25 PROCEDURE — 94761 N-INVAS EAR/PLS OXIMETRY MLT: CPT

## 2023-03-25 PROCEDURE — 36415 COLL VENOUS BLD VENIPUNCTURE: CPT

## 2023-03-25 PROCEDURE — 82140 ASSAY OF AMMONIA: CPT

## 2023-03-25 RX ADMIN — LACTULOSE 30 G: 10 SOLUTION ORAL at 12:08

## 2023-03-25 RX ADMIN — LACTULOSE 30 G: 10 SOLUTION ORAL at 09:07

## 2023-03-25 RX ADMIN — TRAMADOL HYDROCHLORIDE 50 MG: 50 TABLET, COATED ORAL at 11:16

## 2023-03-25 RX ADMIN — RIFAXIMIN 550 MG: 550 TABLET ORAL at 20:45

## 2023-03-25 RX ADMIN — ATORVASTATIN CALCIUM 40 MG: 40 TABLET, FILM COATED ORAL at 20:45

## 2023-03-25 RX ADMIN — INSULIN GLARGINE 16 UNITS: 100 INJECTION, SOLUTION SUBCUTANEOUS at 20:55

## 2023-03-25 RX ADMIN — INSULIN LISPRO 2 UNITS: 100 INJECTION, SOLUTION INTRAVENOUS; SUBCUTANEOUS at 12:08

## 2023-03-25 RX ADMIN — LACTULOSE 30 G: 10 SOLUTION ORAL at 17:41

## 2023-03-25 RX ADMIN — ASPIRIN 81 MG: 81 TABLET, COATED ORAL at 09:06

## 2023-03-25 RX ADMIN — TROSPIUM CHLORIDE 20 MG: 20 TABLET, FILM COATED ORAL at 20:46

## 2023-03-25 RX ADMIN — RIFAXIMIN 550 MG: 550 TABLET ORAL at 09:07

## 2023-03-25 RX ADMIN — PALIPERIDONE 6 MG: 1.5 TABLET, EXTENDED RELEASE ORAL at 09:16

## 2023-03-25 RX ADMIN — RALOXIFENE HYDROCHLORIDE 60 MG: 60 TABLET, FILM COATED ORAL at 09:17

## 2023-03-25 RX ADMIN — ENOXAPARIN SODIUM 40 MG: 100 INJECTION SUBCUTANEOUS at 17:41

## 2023-03-25 RX ADMIN — RANOLAZINE 500 MG: 500 TABLET, EXTENDED RELEASE ORAL at 20:45

## 2023-03-25 RX ADMIN — RANOLAZINE 500 MG: 500 TABLET, EXTENDED RELEASE ORAL at 09:07

## 2023-03-25 RX ADMIN — FLUOXETINE 30 MG: 10 CAPSULE ORAL at 09:06

## 2023-03-25 RX ADMIN — METOPROLOL SUCCINATE 50 MG: 50 TABLET, EXTENDED RELEASE ORAL at 09:06

## 2023-03-25 RX ADMIN — CIPROFLOXACIN 400 MG: 2 INJECTION, SOLUTION INTRAVENOUS at 20:50

## 2023-03-25 RX ADMIN — QUETIAPINE FUMARATE 100 MG: 25 TABLET ORAL at 20:45

## 2023-03-25 RX ADMIN — TRAMADOL HYDROCHLORIDE 50 MG: 50 TABLET, COATED ORAL at 20:45

## 2023-03-25 RX ADMIN — LACTULOSE 30 G: 10 SOLUTION ORAL at 20:46

## 2023-03-25 RX ADMIN — SPIRONOLACTONE 50 MG: 50 TABLET ORAL at 09:06

## 2023-03-25 RX ADMIN — PANTOPRAZOLE SODIUM 40 MG: 40 TABLET, DELAYED RELEASE ORAL at 09:06

## 2023-03-25 RX ADMIN — CIPROFLOXACIN 400 MG: 2 INJECTION, SOLUTION INTRAVENOUS at 11:22

## 2023-03-25 ASSESSMENT — PAIN DESCRIPTION - DESCRIPTORS
DESCRIPTORS: ACHING
DESCRIPTORS: ACHING

## 2023-03-25 ASSESSMENT — PAIN SCALES - GENERAL
PAINLEVEL_OUTOF10: 6
PAINLEVEL_OUTOF10: 0
PAINLEVEL_OUTOF10: 6

## 2023-03-25 ASSESSMENT — PAIN - FUNCTIONAL ASSESSMENT
PAIN_FUNCTIONAL_ASSESSMENT: ACTIVITIES ARE NOT PREVENTED
PAIN_FUNCTIONAL_ASSESSMENT: ACTIVITIES ARE NOT PREVENTED

## 2023-03-25 ASSESSMENT — PAIN DESCRIPTION - ORIENTATION: ORIENTATION: INNER

## 2023-03-25 ASSESSMENT — PAIN DESCRIPTION - LOCATION
LOCATION: ABDOMEN
LOCATION: ABDOMEN

## 2023-03-25 NOTE — PROGRESS NOTES
INTERNAL MEDICINE PROGRESS NOTE        Mitchel Landaverde   1960   Primary Care Physician:  Tae Alfonso MD  Admit Date: 3/20/2023     Subjective:   Patient had several loose bm last night, she is more awake and alert. She does not have any fever, chills. No nausea, vomiting      Objective:   /68   Pulse 95   Temp 98.9 °F (37.2 °C) (Oral)   Resp 16   Ht 4' 9.5\" (1.461 m)   Wt 176 lb (79.8 kg)   SpO2 95%   BMI 37.43 kg/m²    General appearance: alert, appears stated age, and cooperative  Head: Normocephalic, without obvious abnormality, atraumatic  Neck: no adenopathy and supple, symmetrical, trachea midline  Lungs: bilateral exp wheezes, fair air entry.    Heart: regular rate and rhythm and S1, S2 normal  Abdomen: soft, non-tender; bowel sounds normal; no masses,  no organomegaly  Extremities: no clubbing, cyanosis or edema  Neurologic: Grossly normal    Data Review  Lab Results   Component Value Date     03/23/2023    K 4.4 03/23/2023     03/23/2023    CO2 25 03/23/2023    CREATININE 1.0 03/23/2023    BUN 17 03/23/2023    CALCIUM 9.1 03/23/2023     Lab Results   Component Value Date    WBC 4.9 03/24/2023    HGB 12.7 03/24/2023    HCT 37.5 03/24/2023    MCV 94.9 03/24/2023    PLT 90 (L) 03/24/2023     INR/Prothrombin Time      Meds:    spironolactone  50 mg Oral Daily    polyethylene glycol  17 g Oral Once    ciprofloxacin  400 mg IntraVENous Q12H    lactulose  30 g Oral 4x Daily    insulin glargine  16 Units SubCUTAneous Nightly    rifAXIMin  550 mg Oral BID    insulin lispro  0-8 Units SubCUTAneous TID WC    insulin lispro  0-4 Units SubCUTAneous Nightly    nicotine  1 patch TransDERmal Daily    atorvastatin  40 mg Oral Nightly    FLUoxetine  30 mg Oral Daily    metoprolol succinate  50 mg Oral Daily    trospium  20 mg Oral Nightly    paliperidone  6 mg Oral QAM    pantoprazole  40 mg Oral Daily    QUEtiapine  100 mg Oral Nightly    raloxifene  60 mg Oral Daily    ranolazine vomiting with nausea    Hyperammonemia (HCC)    Varicose veins of both legs with edema    Bronchitis    Anxiety    Coronary artery disease involving native heart without angina pectoris    Vulvar intraepithelial neoplasia (ZORAN) grade 2    CAP (community acquired pneumonia)    Hyperglycemia due to diabetes mellitus ([facility] [address]    TIA (transient ischemic attack)    Acute left-sided weakness    Left-sided weakness   Hepatic encephalopathy: Ammonia level is high  COPD: wheezing present, continue breathing treatment  Weakness: improved  Diabetes: uncontrolled. Monitor  Hypertension: Monitor  Cirrhosis of liver  S/p TIPS procedure. Plan:  --continue medications at this time. -- check blood work again tomorrow. -- activity as tolerated.

## 2023-03-25 NOTE — PLAN OF CARE
Problem: Discharge Planning  Goal: Discharge to home or other facility with appropriate resources  3/25/2023 0757 by Raoul Palomares RN  Outcome: Progressing  3/24/2023 1806 by Caitlyn Barba RN  Outcome: Progressing     Problem: Pain  Goal: Verbalizes/displays adequate comfort level or baseline comfort level  3/25/2023 0757 by Raoul Palomares RN  Outcome: Progressing  3/24/2023 1806 by Caitlyn Barba RN  Outcome: Progressing     Problem: ABCDS Injury Assessment  Goal: Absence of physical injury  3/25/2023 0757 by Raoul Palomares RN  Outcome: Progressing  3/24/2023 1806 by Caitlyn Barba RN  Outcome: Progressing     Problem: Safety - Adult  Goal: Free from fall injury  3/25/2023 0757 by Raoul Palomares RN  Outcome: Progressing  3/24/2023 1806 by Caitlyn Barba RN  Outcome: Progressing

## 2023-03-26 VITALS
DIASTOLIC BLOOD PRESSURE: 69 MMHG | RESPIRATION RATE: 16 BRPM | HEART RATE: 70 BPM | OXYGEN SATURATION: 95 % | WEIGHT: 194 LBS | SYSTOLIC BLOOD PRESSURE: 122 MMHG | TEMPERATURE: 98.8 F | BODY MASS INDEX: 40.72 KG/M2 | HEIGHT: 58 IN

## 2023-03-26 LAB
ALBUMIN SERPL-MCNC: 2.9 GM/DL (ref 3.4–5)
ALP BLD-CCNC: 128 IU/L (ref 40–128)
ALT SERPL-CCNC: 16 U/L (ref 10–40)
AMMONIA: 52 UMOL/L (ref 11–51)
ANION GAP SERPL CALCULATED.3IONS-SCNC: 7 MMOL/L (ref 4–16)
AST SERPL-CCNC: 28 IU/L (ref 15–37)
BASOPHILS ABSOLUTE: 0 K/CU MM
BASOPHILS RELATIVE PERCENT: 0.5 % (ref 0–1)
BILIRUB SERPL-MCNC: 0.8 MG/DL (ref 0–1)
BUN SERPL-MCNC: 14 MG/DL (ref 6–23)
CALCIUM SERPL-MCNC: 9.2 MG/DL (ref 8.3–10.6)
CHLORIDE BLD-SCNC: 102 MMOL/L (ref 99–110)
CO2: 26 MMOL/L (ref 21–32)
CREAT SERPL-MCNC: 1 MG/DL (ref 0.6–1.1)
DIFFERENTIAL TYPE: ABNORMAL
EOSINOPHILS ABSOLUTE: 0.3 K/CU MM
EOSINOPHILS RELATIVE PERCENT: 4.9 % (ref 0–3)
GFR SERPL CREATININE-BSD FRML MDRD: >60 ML/MIN/1.73M2
GLUCOSE BLD-MCNC: 213 MG/DL (ref 70–99)
GLUCOSE SERPL-MCNC: 195 MG/DL (ref 70–99)
HCT VFR BLD CALC: 34.8 % (ref 37–47)
HEMOGLOBIN: 12.1 GM/DL (ref 12.5–16)
IMMATURE NEUTROPHIL %: 0.2 % (ref 0–0.43)
LYMPHOCYTES ABSOLUTE: 1.8 K/CU MM
LYMPHOCYTES RELATIVE PERCENT: 31.3 % (ref 24–44)
MCH RBC QN AUTO: 32.4 PG (ref 27–31)
MCHC RBC AUTO-ENTMCNC: 34.8 % (ref 32–36)
MCV RBC AUTO: 93 FL (ref 78–100)
MONOCYTES ABSOLUTE: 0.8 K/CU MM
MONOCYTES RELATIVE PERCENT: 13.2 % (ref 0–4)
NUCLEATED RBC %: 0 %
PDW BLD-RTO: 12.6 % (ref 11.7–14.9)
PLATELET # BLD: 97 K/CU MM (ref 140–440)
PMV BLD AUTO: 11.7 FL (ref 7.5–11.1)
POTASSIUM SERPL-SCNC: 3.8 MMOL/L (ref 3.5–5.1)
PROCALCITONIN SERPL-MCNC: 0.06 NG/ML
RBC # BLD: 3.74 M/CU MM (ref 4.2–5.4)
SEGMENTED NEUTROPHILS ABSOLUTE COUNT: 2.8 K/CU MM
SEGMENTED NEUTROPHILS RELATIVE PERCENT: 49.9 % (ref 36–66)
SODIUM BLD-SCNC: 135 MMOL/L (ref 135–145)
TOTAL IMMATURE NEUTOROPHIL: 0.01 K/CU MM
TOTAL NUCLEATED RBC: 0 K/CU MM
TOTAL PROTEIN: 5.5 GM/DL (ref 6.4–8.2)
WBC # BLD: 5.7 K/CU MM (ref 4–10.5)

## 2023-03-26 PROCEDURE — 6370000000 HC RX 637 (ALT 250 FOR IP)

## 2023-03-26 PROCEDURE — 80053 COMPREHEN METABOLIC PANEL: CPT

## 2023-03-26 PROCEDURE — 85025 COMPLETE CBC W/AUTO DIFF WBC: CPT

## 2023-03-26 PROCEDURE — 82962 GLUCOSE BLOOD TEST: CPT

## 2023-03-26 PROCEDURE — 6360000002 HC RX W HCPCS

## 2023-03-26 PROCEDURE — 36415 COLL VENOUS BLD VENIPUNCTURE: CPT

## 2023-03-26 PROCEDURE — 84145 PROCALCITONIN (PCT): CPT

## 2023-03-26 PROCEDURE — 82140 ASSAY OF AMMONIA: CPT

## 2023-03-26 PROCEDURE — 6370000000 HC RX 637 (ALT 250 FOR IP): Performed by: INTERNAL MEDICINE

## 2023-03-26 RX ORDER — SPIRONOLACTONE 50 MG/1
50 TABLET, FILM COATED ORAL DAILY
Qty: 30 TABLET | Refills: 3 | Status: SHIPPED | OUTPATIENT
Start: 2023-03-27

## 2023-03-26 RX ADMIN — FLUOXETINE 30 MG: 10 CAPSULE ORAL at 08:47

## 2023-03-26 RX ADMIN — RALOXIFENE HYDROCHLORIDE 60 MG: 60 TABLET, FILM COATED ORAL at 09:19

## 2023-03-26 RX ADMIN — CIPROFLOXACIN 400 MG: 2 INJECTION, SOLUTION INTRAVENOUS at 11:12

## 2023-03-26 RX ADMIN — TRAMADOL HYDROCHLORIDE 50 MG: 50 TABLET, COATED ORAL at 01:01

## 2023-03-26 RX ADMIN — PALIPERIDONE 6 MG: 1.5 TABLET, EXTENDED RELEASE ORAL at 09:19

## 2023-03-26 RX ADMIN — INSULIN LISPRO 2 UNITS: 100 INJECTION, SOLUTION INTRAVENOUS; SUBCUTANEOUS at 08:44

## 2023-03-26 RX ADMIN — RIFAXIMIN 550 MG: 550 TABLET ORAL at 08:48

## 2023-03-26 RX ADMIN — ASPIRIN 81 MG: 81 TABLET, COATED ORAL at 08:48

## 2023-03-26 RX ADMIN — SPIRONOLACTONE 50 MG: 50 TABLET ORAL at 08:48

## 2023-03-26 RX ADMIN — RANOLAZINE 500 MG: 500 TABLET, EXTENDED RELEASE ORAL at 08:48

## 2023-03-26 RX ADMIN — METOPROLOL SUCCINATE 50 MG: 50 TABLET, EXTENDED RELEASE ORAL at 08:48

## 2023-03-26 RX ADMIN — PANTOPRAZOLE SODIUM 40 MG: 40 TABLET, DELAYED RELEASE ORAL at 08:48

## 2023-03-26 ASSESSMENT — PAIN SCALES - GENERAL
PAINLEVEL_OUTOF10: 0
PAINLEVEL_OUTOF10: 0
PAINLEVEL_OUTOF10: 6

## 2023-03-26 ASSESSMENT — PAIN DESCRIPTION - ORIENTATION: ORIENTATION: RIGHT;LEFT

## 2023-03-26 ASSESSMENT — PAIN - FUNCTIONAL ASSESSMENT: PAIN_FUNCTIONAL_ASSESSMENT: PREVENTS OR INTERFERES SOME ACTIVE ACTIVITIES AND ADLS

## 2023-03-26 ASSESSMENT — PAIN DESCRIPTION - DESCRIPTORS: DESCRIPTORS: ACHING

## 2023-03-26 ASSESSMENT — PAIN DESCRIPTION - LOCATION: LOCATION: ABDOMEN

## 2023-03-26 NOTE — PLAN OF CARE
Problem: Discharge Planning  Goal: Discharge to home or other facility with appropriate resources  3/26/2023 0802 by Nona Brasher RN  Outcome: Progressing  3/25/2023 2255 by Taya Rubalcava RN  Outcome: Progressing     Problem: Pain  Goal: Verbalizes/displays adequate comfort level or baseline comfort level  3/26/2023 0802 by Nona Brasher RN  Outcome: Progressing  3/25/2023 2255 by Taya Rubalcava RN  Outcome: Progressing     Problem: ABCDS Injury Assessment  Goal: Absence of physical injury  3/26/2023 0802 by Nona Brasher RN  Outcome: Progressing  3/25/2023 2255 by Taya Rubalcava RN  Outcome: Progressing     Problem: Safety - Adult  Goal: Free from fall injury  3/26/2023 0802 by Nona Brasher RN  Outcome: Progressing  3/25/2023 2255 by Taya Rubalcava RN  Outcome: Progressing

## 2023-03-27 ENCOUNTER — CARE COORDINATION (OUTPATIENT)
Dept: CASE MANAGEMENT | Age: 63
End: 2023-03-27

## 2023-03-27 DIAGNOSIS — R53.1 ACUTE LEFT-SIDED WEAKNESS: Primary | ICD-10-CM

## 2023-03-27 NOTE — CARE COORDINATION
have a copy of your discharge instructions?: Yes  Do you have all of your prescriptions and are they filled?: No (Comment: Being delivered today)  Have you been contacted by a [address] [place] [address] Avenue?: No  Have you scheduled your follow up appointment?: Yes  How are you going to get to your appointment?: Car - family or friend to transport  [address]  Patient DME: [place] you have support at home?: Roommate/Housemate  Do you feel like you have everything you need to keep you well at home?: Yes  Are you an active caregiver in your home?: No  Care Transitions Interventions   Home Care Waiver: Declined        DME Assistance: Declined   Diabetes Education: Completed        Social Work: Declined    Disease Association: Completed               Discussed follow-up appointments. If no appointment was previously scheduled, appointment scheduling offered: Yes. Is follow up appointment scheduled within 7 days of discharge? Yes. Follow Up  Future Appointments   Date Time Provider Cornell Hightower   5/25/2023  3:00 PM Rj Orozco MD [facility] [address] [place] [address]   6/14/2023  2:00 PM Michelle Zhou MD [facility]   7/27/2023  2:00 PM Janie Galeas MD [facility]       Care Transition Nurse provided contact information. No further follow-up call indicated d/t patient having Non [facility] PCP.     Manish Licea RN

## 2023-03-27 NOTE — DISCHARGE SUMMARY
Anxiety    Coronary artery disease involving native heart without angina pectoris    Vulvar intraepithelial neoplasia (ZORAN) grade 2    CAP (community acquired pneumonia)    Hyperglycemia due to diabetes mellitus ([facility] [address]    TIA (transient ischemic attack)    Acute left-sided weakness    Left-sided weakness   1. Acute left-sided weakness. 2.  Type 2 diabetes. 3.  Hyperglycemia. 4.  Long-term current use of insulin. 5.  Essential hypertension. 6.  Previous liver disease with TIPS for portal hypertension. 7.  Hyperammonemia. Discharged Condition: good    Hospital Course: pt is 63year old female who was admitted in hospital for weakness and slurred speech. She was found to have metabolic encephalopathy secondary to high ammonia. She also has copd. Her initial work up was negative for brain bleed and other acute pathology. She improved slowly as her ammonia level improved.  Once she had bowel movement and mental status came to her baseline she was discharged home in stable condition today     Relevant Investigations    Disposition: home    Patient Instructions:      Medication List        START taking these medications      rifAXIMin 550 MG tablet  Commonly known as: XIFAXAN  Take 1 tablet by mouth 2 times daily     spironolactone 50 MG tablet  Commonly known as: ALDACTONE  Take 1 tablet by mouth daily  Start taking on: March 27, 2023            CHANGE how you take these medications      insulin lispro (1 Unit Dial) 100 UNIT/ML Sopn  Commonly known as: HumaLOG KwikPen  10U insulin befor meals 3x/day    Take insulin according to glucose check 2 hours after meals:   0  140-199 3  200-249 6  250-299 9  300-349 12  350-400 15  >400 18    Take insulin according to glucose check before sleep:   0  140-199 2  200-249 3  250-299 5  300-349 6  350-400 7  >400 9  What changed:   how to take this  when to take this  additional instructions     lactulose 10 GM/15ML solution  Commonly known as: CHRONULAC  Take 30

## 2023-03-28 LAB
CULTURE: NORMAL
CULTURE: NORMAL
Lab: NORMAL
Lab: NORMAL
SPECIMEN: NORMAL
SPECIMEN: NORMAL

## 2023-03-31 NOTE — PROGRESS NOTES
Physician Progress Note      PATIENT:               Hi Salamanca  CSN #:                  [id number]  :                       1960  ADMIT DATE:       3/20/2023 12:48 PM  [address] DATE:        3/26/2023 1:26 PM  RESPONDING  PROVIDER #:        Karina Ch MD          QUERY TEXT:    Patient admitted with AMS. Noted documentation of Hepatic encephalopathy by   Dr. Link Bhatt in PN dated , and metabolic encephalopathy in discharge   summary by Dr. Parish Keith. If possible, please document in progress notes and discharge summary if you   are evaluating and /or treating any of the following: The medical record reflects the following:  Risk Factors: elevated ammonia  Clinical Indicators: Pt admitted with AMS, ammonia level was 133 on 3/21. Day   of discharge, 3/26, ammonia level was 52. Both metabolic and hepatic   encephalopathy has been listed in diagnoses, please clarify. Upon d/c pt   returned to baseline mental status. Pt has documented PMH of hepatic   encephalopathy. Treatment: lactulose, monitor ammonia levels, monitor mental status    Thank you,  Irina Alejandre, RN  [phone]  Options provided:  -- Hepatic encephalopathy confirmed and Metabolic encephalopathy ruled out  -- Metabolic encephalopathy confirmed and Hepatic encephalopathy ruled out  -- Hepatic encephalopathy and Metabolic encephalopathy confirmed  -- Other - I will add my own diagnosis  -- Disagree - Not applicable / Not valid  -- Disagree - Clinically unable to determine / Unknown  -- Refer to Clinical Documentation Reviewer    PROVIDER RESPONSE TEXT:    After study, Hepatic encephalopathy confirmed and Metabolic encephalopathy   ruled out.     Query created by: Randee Duverney on 3/30/2023 7:42 AM      Electronically signed by:  Karina Ch MD 3/31/2023 2:40 PM

## 2023-04-03 RX ORDER — MIRABEGRON 25 MG/1
TABLET, FILM COATED, EXTENDED RELEASE ORAL
Qty: 30 TABLET | Refills: 5 | OUTPATIENT
Start: 2023-04-03

## 2023-04-09 RX ORDER — TIZANIDINE 4 MG/1
4 TABLET ORAL 2 TIMES DAILY PRN
Qty: 90 TABLET | Refills: 1 | OUTPATIENT
Start: 2023-04-09

## 2023-04-18 ENCOUNTER — HOSPITAL ENCOUNTER (INPATIENT)
Age: 63
LOS: 7 days | Discharge: HOME | DRG: 433 | End: 2023-04-25
Attending: EMERGENCY MEDICINE | Admitting: INTERNAL MEDICINE
Payer: MEDICARE

## 2023-04-18 ENCOUNTER — APPOINTMENT (OUTPATIENT)
Dept: CT IMAGING | Age: 63
DRG: 433 | End: 2023-04-18
Payer: MEDICARE

## 2023-04-18 ENCOUNTER — APPOINTMENT (OUTPATIENT)
Dept: GENERAL RADIOLOGY | Age: 63
DRG: 433 | End: 2023-04-18
Payer: MEDICARE

## 2023-04-18 DIAGNOSIS — K76.82 HEPATIC ENCEPHALOPATHY (HCC): ICD-10-CM

## 2023-04-18 DIAGNOSIS — Z87.19 HISTORY OF CIRRHOSIS: ICD-10-CM

## 2023-04-18 DIAGNOSIS — R79.89 INCREASED AMMONIA LEVEL: ICD-10-CM

## 2023-04-18 DIAGNOSIS — R41.82 ALTERED MENTAL STATUS, UNSPECIFIED ALTERED MENTAL STATUS TYPE: Primary | ICD-10-CM

## 2023-04-18 LAB
ALBUMIN SERPL-MCNC: 3.3 GM/DL (ref 3.4–5)
ALP BLD-CCNC: 142 IU/L (ref 40–129)
ALT SERPL-CCNC: 12 U/L (ref 10–40)
AMMONIA: 159 UMOL/L (ref 11–51)
ANION GAP SERPL CALCULATED.3IONS-SCNC: 7 MMOL/L (ref 4–16)
AST SERPL-CCNC: 21 IU/L (ref 15–37)
BACTERIA: NEGATIVE /HPF
BASE EXCESS MIXED: 2.5 (ref 0–2.3)
BASOPHILS ABSOLUTE: 0.1 K/CU MM
BASOPHILS RELATIVE PERCENT: 1 % (ref 0–1)
BILIRUB SERPL-MCNC: 1.4 MG/DL (ref 0–1)
BILIRUBIN URINE: NEGATIVE MG/DL
BLOOD, URINE: ABNORMAL
BUN SERPL-MCNC: 18 MG/DL (ref 6–23)
CALCIUM SERPL-MCNC: 8.9 MG/DL (ref 8.3–10.6)
CHLORIDE BLD-SCNC: 107 MMOL/L (ref 99–110)
CLARITY: CLEAR
CO2: 26 MMOL/L (ref 21–32)
COLOR: YELLOW
COMMENT: ABNORMAL
CREAT SERPL-MCNC: 0.8 MG/DL (ref 0.6–1.1)
DIFFERENTIAL TYPE: ABNORMAL
EOSINOPHILS ABSOLUTE: 0.1 K/CU MM
EOSINOPHILS RELATIVE PERCENT: 2 % (ref 0–3)
GFR SERPL CREATININE-BSD FRML MDRD: >60 ML/MIN/1.73M2
GLUCOSE BLD-MCNC: 246 MG/DL (ref 70–99)
GLUCOSE SERPL-MCNC: 297 MG/DL (ref 70–99)
GLUCOSE, URINE: 100 MG/DL
HCO3 VENOUS: 26.3 MMOL/L (ref 19–25)
HCT VFR BLD CALC: 36.4 % (ref 37–47)
HEMOGLOBIN: 12.8 GM/DL (ref 12.5–16)
IMMATURE NEUTROPHIL %: 0.2 % (ref 0–0.43)
KETONES, URINE: NEGATIVE MG/DL
LACTATE: 1.4 MMOL/L (ref 0.5–1.9)
LEUKOCYTE ESTERASE, URINE: NEGATIVE
LYMPHOCYTES ABSOLUTE: 1.3 K/CU MM
LYMPHOCYTES RELATIVE PERCENT: 27.2 % (ref 24–44)
MCH RBC QN AUTO: 32.7 PG (ref 27–31)
MCHC RBC AUTO-ENTMCNC: 35.2 % (ref 32–36)
MCV RBC AUTO: 93.1 FL (ref 78–100)
MONOCYTES ABSOLUTE: 0.4 K/CU MM
MONOCYTES RELATIVE PERCENT: 9 % (ref 0–4)
MUCUS: ABNORMAL HPF
NITRITE URINE, QUANTITATIVE: NEGATIVE
NUCLEATED RBC %: 0 %
O2 SAT, VEN: 94.4 % (ref 50–70)
PCO2, VEN: 37 MMHG (ref 38–52)
PDW BLD-RTO: 13.8 % (ref 11.7–14.9)
PH VENOUS: 7.46 (ref 7.32–7.42)
PH, URINE: 7 (ref 5–8)
PLATELET # BLD: 81 K/CU MM (ref 140–440)
PMV BLD AUTO: 12.1 FL (ref 7.5–11.1)
PO2, VEN: 158 MMHG (ref 28–48)
POTASSIUM SERPL-SCNC: 4.4 MMOL/L (ref 3.5–5.1)
PRO-BNP: 321.2 PG/ML
PROTEIN UA: 100 MG/DL
RBC # BLD: 3.91 M/CU MM (ref 4.2–5.4)
RBC URINE: 5 /HPF (ref 0–6)
REASON FOR REJECTION: NORMAL
REJECTED TEST: NORMAL
SEGMENTED NEUTROPHILS ABSOLUTE COUNT: 3 K/CU MM
SEGMENTED NEUTROPHILS RELATIVE PERCENT: 60.6 % (ref 36–66)
SODIUM BLD-SCNC: 140 MMOL/L (ref 135–145)
SPECIFIC GRAVITY UA: 1.02 (ref 1–1.03)
SQUAMOUS EPITHELIAL: 1 /HPF
TOTAL IMMATURE NEUTOROPHIL: 0.01 K/CU MM
TOTAL NUCLEATED RBC: 0 K/CU MM
TOTAL PROTEIN: 6.4 GM/DL (ref 6.4–8.2)
TRICHOMONAS: ABNORMAL /HPF
TROPONIN T: <0.01 NG/ML
TSH SERPL DL<=0.005 MIU/L-ACNC: 1.35 UIU/ML (ref 0.27–4.2)
UROBILINOGEN, URINE: 1 MG/DL (ref 0.2–1)
WBC # BLD: 4.9 K/CU MM (ref 4–10.5)
WBC UA: 1 /HPF (ref 0–5)

## 2023-04-18 PROCEDURE — 82962 GLUCOSE BLOOD TEST: CPT

## 2023-04-18 PROCEDURE — 83605 ASSAY OF LACTIC ACID: CPT

## 2023-04-18 PROCEDURE — 85025 COMPLETE CBC W/AUTO DIFF WBC: CPT

## 2023-04-18 PROCEDURE — 87086 URINE CULTURE/COLONY COUNT: CPT

## 2023-04-18 PROCEDURE — 82805 BLOOD GASES W/O2 SATURATION: CPT

## 2023-04-18 PROCEDURE — 1200000000 HC SEMI PRIVATE

## 2023-04-18 PROCEDURE — 93005 ELECTROCARDIOGRAM TRACING: CPT | Performed by: EMERGENCY MEDICINE

## 2023-04-18 PROCEDURE — 99285 EMERGENCY DEPT VISIT HI MDM: CPT

## 2023-04-18 PROCEDURE — 84443 ASSAY THYROID STIM HORMONE: CPT

## 2023-04-18 PROCEDURE — 71045 X-RAY EXAM CHEST 1 VIEW: CPT

## 2023-04-18 PROCEDURE — 80053 COMPREHEN METABOLIC PANEL: CPT

## 2023-04-18 PROCEDURE — 84484 ASSAY OF TROPONIN QUANT: CPT

## 2023-04-18 PROCEDURE — 83880 ASSAY OF NATRIURETIC PEPTIDE: CPT

## 2023-04-18 PROCEDURE — 70450 CT HEAD/BRAIN W/O DYE: CPT

## 2023-04-18 PROCEDURE — 81001 URINALYSIS AUTO W/SCOPE: CPT

## 2023-04-18 PROCEDURE — 6370000000 HC RX 637 (ALT 250 FOR IP): Performed by: EMERGENCY MEDICINE

## 2023-04-18 PROCEDURE — 82140 ASSAY OF AMMONIA: CPT

## 2023-04-18 RX ORDER — LACTULOSE 10 G/15ML
20 SOLUTION ORAL ONCE
Status: COMPLETED | OUTPATIENT
Start: 2023-04-18 | End: 2023-04-18

## 2023-04-18 RX ADMIN — LACTULOSE 20 G: 20 SOLUTION ORAL at 20:37

## 2023-04-18 ASSESSMENT — PAIN SCALES - GENERAL: PAINLEVEL_OUTOF10: 0

## 2023-04-18 ASSESSMENT — PAIN - FUNCTIONAL ASSESSMENT: PAIN_FUNCTIONAL_ASSESSMENT: NONE - DENIES PAIN

## 2023-04-18 ASSESSMENT — ENCOUNTER SYMPTOMS
EYES NEGATIVE: 1
ALLERGIC/IMMUNOLOGIC NEGATIVE: 1
RESPIRATORY NEGATIVE: 1
GASTROINTESTINAL NEGATIVE: 1

## 2023-04-19 ENCOUNTER — APPOINTMENT (OUTPATIENT)
Dept: CT IMAGING | Age: 63
DRG: 433 | End: 2023-04-19
Payer: MEDICARE

## 2023-04-19 LAB
ALBUMIN SERPL-MCNC: 2.8 GM/DL (ref 3.4–5)
ALBUMIN SERPL-MCNC: 3 GM/DL (ref 3.4–5)
ALP BLD-CCNC: 86 IU/L (ref 40–128)
ALP BLD-CCNC: 95 IU/L (ref 40–128)
ALT SERPL-CCNC: 10 U/L (ref 10–40)
ALT SERPL-CCNC: 11 U/L (ref 10–40)
AMMONIA: 117 UMOL/L (ref 11–51)
ANION GAP SERPL CALCULATED.3IONS-SCNC: 7 MMOL/L (ref 4–16)
ANION GAP SERPL CALCULATED.3IONS-SCNC: 8 MMOL/L (ref 4–16)
AST SERPL-CCNC: 17 IU/L (ref 15–37)
AST SERPL-CCNC: 19 IU/L (ref 15–37)
BASOPHILS ABSOLUTE: 0 K/CU MM
BASOPHILS ABSOLUTE: 0.1 K/CU MM
BASOPHILS RELATIVE PERCENT: 0.9 % (ref 0–1)
BASOPHILS RELATIVE PERCENT: 1.4 % (ref 0–1)
BILIRUB SERPL-MCNC: 1.8 MG/DL (ref 0–1)
BILIRUB SERPL-MCNC: 1.9 MG/DL (ref 0–1)
BUN SERPL-MCNC: 15 MG/DL (ref 6–23)
BUN SERPL-MCNC: 15 MG/DL (ref 6–23)
CALCIUM SERPL-MCNC: 8.7 MG/DL (ref 8.3–10.6)
CALCIUM SERPL-MCNC: 9.1 MG/DL (ref 8.3–10.6)
CHLORIDE BLD-SCNC: 109 MMOL/L (ref 99–110)
CHLORIDE BLD-SCNC: 109 MMOL/L (ref 99–110)
CO2: 25 MMOL/L (ref 21–32)
CO2: 26 MMOL/L (ref 21–32)
CREAT SERPL-MCNC: 0.7 MG/DL (ref 0.6–1.1)
CREAT SERPL-MCNC: 0.8 MG/DL (ref 0.6–1.1)
CULTURE: NORMAL
DIFFERENTIAL TYPE: ABNORMAL
DIFFERENTIAL TYPE: ABNORMAL
EKG ATRIAL RATE: 112 BPM
EKG ATRIAL RATE: 81 BPM
EKG DIAGNOSIS: NORMAL
EKG DIAGNOSIS: NORMAL
EKG P AXIS: 52 DEGREES
EKG P AXIS: 52 DEGREES
EKG P-R INTERVAL: 132 MS
EKG P-R INTERVAL: 140 MS
EKG Q-T INTERVAL: 386 MS
EKG Q-T INTERVAL: 424 MS
EKG QRS DURATION: 76 MS
EKG QRS DURATION: 78 MS
EKG QTC CALCULATION (BAZETT): 492 MS
EKG QTC CALCULATION (BAZETT): 526 MS
EKG R AXIS: 22 DEGREES
EKG R AXIS: 32 DEGREES
EKG T AXIS: 53 DEGREES
EKG T AXIS: 55 DEGREES
EKG VENTRICULAR RATE: 112 BPM
EKG VENTRICULAR RATE: 81 BPM
EOSINOPHILS ABSOLUTE: 0.2 K/CU MM
EOSINOPHILS ABSOLUTE: 0.2 K/CU MM
EOSINOPHILS RELATIVE PERCENT: 3.8 % (ref 0–3)
EOSINOPHILS RELATIVE PERCENT: 4.2 % (ref 0–3)
GFR SERPL CREATININE-BSD FRML MDRD: >60 ML/MIN/1.73M2
GFR SERPL CREATININE-BSD FRML MDRD: >60 ML/MIN/1.73M2
GLUCOSE BLD-MCNC: 187 MG/DL (ref 70–99)
GLUCOSE BLD-MCNC: 202 MG/DL (ref 70–99)
GLUCOSE BLD-MCNC: 203 MG/DL (ref 70–99)
GLUCOSE BLD-MCNC: 208 MG/DL (ref 70–99)
GLUCOSE BLD-MCNC: 210 MG/DL (ref 70–99)
GLUCOSE BLD-MCNC: 221 MG/DL (ref 70–99)
GLUCOSE BLD-MCNC: 239 MG/DL (ref 70–99)
GLUCOSE SERPL-MCNC: 190 MG/DL (ref 70–99)
GLUCOSE SERPL-MCNC: 211 MG/DL (ref 70–99)
HCT VFR BLD CALC: 34.2 % (ref 37–47)
HCT VFR BLD CALC: 35.8 % (ref 37–47)
HEMOGLOBIN: 12 GM/DL (ref 12.5–16)
HEMOGLOBIN: 12.3 GM/DL (ref 12.5–16)
IMMATURE NEUTROPHIL %: 0.2 % (ref 0–0.43)
IMMATURE NEUTROPHIL %: 0.2 % (ref 0–0.43)
INR BLD: 1.2 INDEX
LYMPHOCYTES ABSOLUTE: 1.5 K/CU MM
LYMPHOCYTES ABSOLUTE: 1.5 K/CU MM
LYMPHOCYTES RELATIVE PERCENT: 32.5 % (ref 24–44)
LYMPHOCYTES RELATIVE PERCENT: 36.1 % (ref 24–44)
Lab: NORMAL
MAGNESIUM: 1.5 MG/DL (ref 1.8–2.4)
MCH RBC QN AUTO: 32 PG (ref 27–31)
MCH RBC QN AUTO: 32.7 PG (ref 27–31)
MCHC RBC AUTO-ENTMCNC: 34.4 % (ref 32–36)
MCHC RBC AUTO-ENTMCNC: 35.1 % (ref 32–36)
MCV RBC AUTO: 93.2 FL (ref 78–100)
MCV RBC AUTO: 93.2 FL (ref 78–100)
MONOCYTES ABSOLUTE: 0.4 K/CU MM
MONOCYTES ABSOLUTE: 0.4 K/CU MM
MONOCYTES RELATIVE PERCENT: 9.2 % (ref 0–4)
MONOCYTES RELATIVE PERCENT: 9.5 % (ref 0–4)
NUCLEATED RBC %: 0 %
NUCLEATED RBC %: 0 %
PDW BLD-RTO: 13.5 % (ref 11.7–14.9)
PDW BLD-RTO: 13.7 % (ref 11.7–14.9)
PLATELET # BLD: 75 K/CU MM (ref 140–440)
PLATELET # BLD: 76 K/CU MM (ref 140–440)
PMV BLD AUTO: 11.7 FL (ref 7.5–11.1)
PMV BLD AUTO: 11.7 FL (ref 7.5–11.1)
POTASSIUM SERPL-SCNC: 3.3 MMOL/L (ref 3.5–5.1)
POTASSIUM SERPL-SCNC: 3.6 MMOL/L (ref 3.5–5.1)
PROTHROMBIN TIME: 15.2 SECONDS (ref 11.7–14.5)
RBC # BLD: 3.67 M/CU MM (ref 4.2–5.4)
RBC # BLD: 3.84 M/CU MM (ref 4.2–5.4)
SEGMENTED NEUTROPHILS ABSOLUTE COUNT: 2.1 K/CU MM
SEGMENTED NEUTROPHILS ABSOLUTE COUNT: 2.4 K/CU MM
SEGMENTED NEUTROPHILS RELATIVE PERCENT: 48.9 % (ref 36–66)
SEGMENTED NEUTROPHILS RELATIVE PERCENT: 53.1 % (ref 36–66)
SODIUM BLD-SCNC: 142 MMOL/L (ref 135–145)
SODIUM BLD-SCNC: 142 MMOL/L (ref 135–145)
SPECIMEN: NORMAL
TOTAL IMMATURE NEUTOROPHIL: 0.01 K/CU MM
TOTAL IMMATURE NEUTOROPHIL: 0.01 K/CU MM
TOTAL NUCLEATED RBC: 0 K/CU MM
TOTAL NUCLEATED RBC: 0 K/CU MM
TOTAL PROTEIN: 5.6 GM/DL (ref 6.4–8.2)
TOTAL PROTEIN: 6 GM/DL (ref 6.4–8.2)
WBC # BLD: 4.2 K/CU MM (ref 4–10.5)
WBC # BLD: 4.5 K/CU MM (ref 4–10.5)

## 2023-04-19 PROCEDURE — 1200000000 HC SEMI PRIVATE

## 2023-04-19 PROCEDURE — 2580000003 HC RX 258: Performed by: INTERNAL MEDICINE

## 2023-04-19 PROCEDURE — 80053 COMPREHEN METABOLIC PANEL: CPT

## 2023-04-19 PROCEDURE — 6370000000 HC RX 637 (ALT 250 FOR IP): Performed by: INTERNAL MEDICINE

## 2023-04-19 PROCEDURE — 94761 N-INVAS EAR/PLS OXIMETRY MLT: CPT

## 2023-04-19 PROCEDURE — 93010 ELECTROCARDIOGRAM REPORT: CPT | Performed by: INTERNAL MEDICINE

## 2023-04-19 PROCEDURE — 85025 COMPLETE CBC W/AUTO DIFF WBC: CPT

## 2023-04-19 PROCEDURE — 36415 COLL VENOUS BLD VENIPUNCTURE: CPT

## 2023-04-19 PROCEDURE — 82962 GLUCOSE BLOOD TEST: CPT

## 2023-04-19 PROCEDURE — 87040 BLOOD CULTURE FOR BACTERIA: CPT

## 2023-04-19 PROCEDURE — 82140 ASSAY OF AMMONIA: CPT

## 2023-04-19 PROCEDURE — 93005 ELECTROCARDIOGRAM TRACING: CPT | Performed by: INTERNAL MEDICINE

## 2023-04-19 PROCEDURE — 85610 PROTHROMBIN TIME: CPT

## 2023-04-19 PROCEDURE — 6360000002 HC RX W HCPCS: Performed by: INTERNAL MEDICINE

## 2023-04-19 PROCEDURE — 70450 CT HEAD/BRAIN W/O DYE: CPT

## 2023-04-19 PROCEDURE — 97162 PT EVAL MOD COMPLEX 30 MIN: CPT

## 2023-04-19 PROCEDURE — 83735 ASSAY OF MAGNESIUM: CPT

## 2023-04-19 RX ORDER — RANOLAZINE 500 MG/1
500 TABLET, EXTENDED RELEASE ORAL 2 TIMES DAILY
Status: DISCONTINUED | OUTPATIENT
Start: 2023-04-19 | End: 2023-04-25 | Stop reason: HOSPADM

## 2023-04-19 RX ORDER — ACETAMINOPHEN 650 MG/1
650 SUPPOSITORY RECTAL EVERY 6 HOURS PRN
Status: DISCONTINUED | OUTPATIENT
Start: 2023-04-19 | End: 2023-04-25 | Stop reason: HOSPADM

## 2023-04-19 RX ORDER — QUETIAPINE FUMARATE 100 MG/1
100 TABLET, FILM COATED ORAL NIGHTLY
Status: DISCONTINUED | OUTPATIENT
Start: 2023-04-19 | End: 2023-04-25 | Stop reason: HOSPADM

## 2023-04-19 RX ORDER — LACTULOSE 10 G/15ML
30 SOLUTION ORAL EVERY 6 HOURS SCHEDULED
Status: DISCONTINUED | OUTPATIENT
Start: 2023-04-19 | End: 2023-04-25 | Stop reason: HOSPADM

## 2023-04-19 RX ORDER — ONDANSETRON 4 MG/1
4 TABLET, ORALLY DISINTEGRATING ORAL EVERY 8 HOURS PRN
Status: DISCONTINUED | OUTPATIENT
Start: 2023-04-19 | End: 2023-04-25 | Stop reason: HOSPADM

## 2023-04-19 RX ORDER — INSULIN LISPRO 100 [IU]/ML
0-4 INJECTION, SOLUTION INTRAVENOUS; SUBCUTANEOUS NIGHTLY
Status: DISCONTINUED | OUTPATIENT
Start: 2023-04-19 | End: 2023-04-23

## 2023-04-19 RX ORDER — DEXTROSE MONOHYDRATE 100 MG/ML
INJECTION, SOLUTION INTRAVENOUS CONTINUOUS PRN
Status: DISCONTINUED | OUTPATIENT
Start: 2023-04-19 | End: 2023-04-25 | Stop reason: HOSPADM

## 2023-04-19 RX ORDER — SODIUM CHLORIDE 0.9 % (FLUSH) 0.9 %
5-40 SYRINGE (ML) INJECTION EVERY 12 HOURS SCHEDULED
Status: DISCONTINUED | OUTPATIENT
Start: 2023-04-19 | End: 2023-04-25 | Stop reason: HOSPADM

## 2023-04-19 RX ORDER — SODIUM CHLORIDE 9 MG/ML
INJECTION, SOLUTION INTRAVENOUS PRN
Status: DISCONTINUED | OUTPATIENT
Start: 2023-04-19 | End: 2023-04-25 | Stop reason: HOSPADM

## 2023-04-19 RX ORDER — NICOTINE 21 MG/24HR
1 PATCH, TRANSDERMAL 24 HOURS TRANSDERMAL DAILY
Status: DISCONTINUED | OUTPATIENT
Start: 2023-04-19 | End: 2023-04-25 | Stop reason: HOSPADM

## 2023-04-19 RX ORDER — DEXTROSE AND SODIUM CHLORIDE 5; .45 G/100ML; G/100ML
INJECTION, SOLUTION INTRAVENOUS CONTINUOUS
Status: DISCONTINUED | OUTPATIENT
Start: 2023-04-19 | End: 2023-04-20

## 2023-04-19 RX ORDER — SPIRONOLACTONE 50 MG/1
50 TABLET, FILM COATED ORAL DAILY
Status: DISCONTINUED | OUTPATIENT
Start: 2023-04-19 | End: 2023-04-25 | Stop reason: HOSPADM

## 2023-04-19 RX ORDER — FUROSEMIDE 20 MG/1
20 TABLET ORAL DAILY
Status: DISCONTINUED | OUTPATIENT
Start: 2023-04-19 | End: 2023-04-25 | Stop reason: HOSPADM

## 2023-04-19 RX ORDER — NITROGLYCERIN 0.4 MG/1
0.4 TABLET SUBLINGUAL EVERY 5 MIN PRN
Status: DISCONTINUED | OUTPATIENT
Start: 2023-04-19 | End: 2023-04-25 | Stop reason: HOSPADM

## 2023-04-19 RX ORDER — PANTOPRAZOLE SODIUM 40 MG/1
40 TABLET, DELAYED RELEASE ORAL
Status: DISCONTINUED | OUTPATIENT
Start: 2023-04-19 | End: 2023-04-25 | Stop reason: HOSPADM

## 2023-04-19 RX ORDER — METOPROLOL SUCCINATE 50 MG/1
50 TABLET, EXTENDED RELEASE ORAL DAILY
Status: DISCONTINUED | OUTPATIENT
Start: 2023-04-19 | End: 2023-04-25 | Stop reason: HOSPADM

## 2023-04-19 RX ORDER — ATORVASTATIN CALCIUM 40 MG/1
40 TABLET, FILM COATED ORAL NIGHTLY
Status: DISCONTINUED | OUTPATIENT
Start: 2023-04-19 | End: 2023-04-25 | Stop reason: HOSPADM

## 2023-04-19 RX ORDER — PALIPERIDONE 1.5 MG/1
6 TABLET, EXTENDED RELEASE ORAL EVERY MORNING
Status: DISCONTINUED | OUTPATIENT
Start: 2023-04-19 | End: 2023-04-25 | Stop reason: HOSPADM

## 2023-04-19 RX ORDER — INSULIN GLARGINE 100 [IU]/ML
12 INJECTION, SOLUTION SUBCUTANEOUS NIGHTLY
Status: DISCONTINUED | OUTPATIENT
Start: 2023-04-19 | End: 2023-04-23

## 2023-04-19 RX ORDER — ONDANSETRON 2 MG/ML
4 INJECTION INTRAMUSCULAR; INTRAVENOUS EVERY 6 HOURS PRN
Status: DISCONTINUED | OUTPATIENT
Start: 2023-04-19 | End: 2023-04-25 | Stop reason: HOSPADM

## 2023-04-19 RX ORDER — INSULIN LISPRO 100 [IU]/ML
6 INJECTION, SOLUTION INTRAVENOUS; SUBCUTANEOUS SEE ADMIN INSTRUCTIONS
Status: DISCONTINUED | OUTPATIENT
Start: 2023-04-19 | End: 2023-04-19

## 2023-04-19 RX ORDER — ALBUTEROL SULFATE 90 UG/1
2 AEROSOL, METERED RESPIRATORY (INHALATION) 4 TIMES DAILY PRN
Status: DISCONTINUED | OUTPATIENT
Start: 2023-04-19 | End: 2023-04-25 | Stop reason: HOSPADM

## 2023-04-19 RX ORDER — CALCIUM CARBONATE 500(1250)
500 TABLET ORAL DAILY
Status: DISCONTINUED | OUTPATIENT
Start: 2023-04-19 | End: 2023-04-25 | Stop reason: HOSPADM

## 2023-04-19 RX ORDER — IBUPROFEN 200 MG
1250 CAPSULE ORAL DAILY
Status: DISCONTINUED | OUTPATIENT
Start: 2023-04-19 | End: 2023-04-19 | Stop reason: CLARIF

## 2023-04-19 RX ORDER — TROSPIUM CHLORIDE 20 MG/1
20 TABLET, FILM COATED ORAL NIGHTLY
Status: DISCONTINUED | OUTPATIENT
Start: 2023-04-19 | End: 2023-04-25 | Stop reason: HOSPADM

## 2023-04-19 RX ORDER — GLUCAGON 1 MG/ML
1 KIT INJECTION PRN
Status: DISCONTINUED | OUTPATIENT
Start: 2023-04-19 | End: 2023-04-25 | Stop reason: HOSPADM

## 2023-04-19 RX ORDER — RALOXIFENE HYDROCHLORIDE 60 MG/1
60 TABLET, FILM COATED ORAL DAILY
Status: DISCONTINUED | OUTPATIENT
Start: 2023-04-19 | End: 2023-04-25 | Stop reason: HOSPADM

## 2023-04-19 RX ORDER — ACETAMINOPHEN 325 MG/1
650 TABLET ORAL EVERY 6 HOURS PRN
Status: DISCONTINUED | OUTPATIENT
Start: 2023-04-19 | End: 2023-04-25 | Stop reason: HOSPADM

## 2023-04-19 RX ORDER — ENOXAPARIN SODIUM 100 MG/ML
40 INJECTION SUBCUTANEOUS DAILY
Status: DISCONTINUED | OUTPATIENT
Start: 2023-04-19 | End: 2023-04-25 | Stop reason: HOSPADM

## 2023-04-19 RX ORDER — TRAMADOL HYDROCHLORIDE 50 MG/1
50 TABLET ORAL EVERY 6 HOURS PRN
Status: DISCONTINUED | OUTPATIENT
Start: 2023-04-19 | End: 2023-04-25 | Stop reason: HOSPADM

## 2023-04-19 RX ORDER — POLYETHYLENE GLYCOL 3350 17 G/17G
17 POWDER, FOR SOLUTION ORAL DAILY PRN
Status: DISCONTINUED | OUTPATIENT
Start: 2023-04-19 | End: 2023-04-23

## 2023-04-19 RX ORDER — SODIUM CHLORIDE 0.9 % (FLUSH) 0.9 %
5-40 SYRINGE (ML) INJECTION PRN
Status: DISCONTINUED | OUTPATIENT
Start: 2023-04-19 | End: 2023-04-25 | Stop reason: HOSPADM

## 2023-04-19 RX ORDER — M-VIT,TX,IRON,MINS/CALC/FOLIC 27MG-0.4MG
1 TABLET ORAL DAILY
Status: DISCONTINUED | OUTPATIENT
Start: 2023-04-19 | End: 2023-04-25 | Stop reason: HOSPADM

## 2023-04-19 RX ORDER — INSULIN LISPRO 100 [IU]/ML
0-4 INJECTION, SOLUTION INTRAVENOUS; SUBCUTANEOUS
Status: DISCONTINUED | OUTPATIENT
Start: 2023-04-19 | End: 2023-04-23

## 2023-04-19 RX ADMIN — LACTULOSE 20 G: 10 SOLUTION ORAL at 17:23

## 2023-04-19 RX ADMIN — RIFAXIMIN 550 MG: 550 TABLET ORAL at 22:16

## 2023-04-19 RX ADMIN — PALIPERIDONE 6 MG: 1.5 TABLET, EXTENDED RELEASE ORAL at 08:56

## 2023-04-19 RX ADMIN — CEFTRIAXONE SODIUM 1000 MG: 1 INJECTION, POWDER, FOR SOLUTION INTRAMUSCULAR; INTRAVENOUS at 08:52

## 2023-04-19 RX ADMIN — INSULIN LISPRO 1 UNITS: 100 INJECTION, SOLUTION INTRAVENOUS; SUBCUTANEOUS at 12:26

## 2023-04-19 RX ADMIN — LACTULOSE 20 G: 10 SOLUTION ORAL at 12:26

## 2023-04-19 RX ADMIN — LACTULOSE 20 G: 10 SOLUTION ORAL at 01:15

## 2023-04-19 RX ADMIN — DEXTROSE AND SODIUM CHLORIDE: 5; 450 INJECTION, SOLUTION INTRAVENOUS at 01:35

## 2023-04-19 RX ADMIN — PANTOPRAZOLE SODIUM 40 MG: 40 TABLET, DELAYED RELEASE ORAL at 07:02

## 2023-04-19 RX ADMIN — RIFAXIMIN 550 MG: 550 TABLET ORAL at 08:55

## 2023-04-19 RX ADMIN — TROSPIUM CHLORIDE 20 MG: 20 TABLET, FILM COATED ORAL at 22:16

## 2023-04-19 RX ADMIN — CALCIUM 500 MG: 500 TABLET ORAL at 08:55

## 2023-04-19 RX ADMIN — ATORVASTATIN CALCIUM 40 MG: 40 TABLET, FILM COATED ORAL at 01:15

## 2023-04-19 RX ADMIN — SPIRONOLACTONE 50 MG: 50 TABLET ORAL at 08:55

## 2023-04-19 RX ADMIN — RIFAXIMIN 550 MG: 550 TABLET ORAL at 01:14

## 2023-04-19 RX ADMIN — TRAMADOL HYDROCHLORIDE 50 MG: 50 TABLET, COATED ORAL at 14:42

## 2023-04-19 RX ADMIN — FUROSEMIDE 20 MG: 20 TABLET ORAL at 08:54

## 2023-04-19 RX ADMIN — INSULIN LISPRO 1 UNITS: 100 INJECTION, SOLUTION INTRAVENOUS; SUBCUTANEOUS at 08:53

## 2023-04-19 RX ADMIN — QUETIAPINE FUMARATE 100 MG: 100 TABLET ORAL at 01:14

## 2023-04-19 RX ADMIN — SODIUM CHLORIDE, PRESERVATIVE FREE 5 ML: 5 INJECTION INTRAVENOUS at 22:24

## 2023-04-19 RX ADMIN — INSULIN GLARGINE 12 UNITS: 100 INJECTION, SOLUTION SUBCUTANEOUS at 03:22

## 2023-04-19 RX ADMIN — LACTULOSE 20 G: 10 SOLUTION ORAL at 07:02

## 2023-04-19 RX ADMIN — TROSPIUM CHLORIDE 20 MG: 20 TABLET, FILM COATED ORAL at 01:15

## 2023-04-19 RX ADMIN — DEXTROSE AND SODIUM CHLORIDE: 5; 450 INJECTION, SOLUTION INTRAVENOUS at 13:57

## 2023-04-19 RX ADMIN — INSULIN GLARGINE 12 UNITS: 100 INJECTION, SOLUTION SUBCUTANEOUS at 22:16

## 2023-04-19 RX ADMIN — RANOLAZINE 500 MG: 500 TABLET, EXTENDED RELEASE ORAL at 22:16

## 2023-04-19 RX ADMIN — QUETIAPINE FUMARATE 100 MG: 100 TABLET ORAL at 22:16

## 2023-04-19 RX ADMIN — RANOLAZINE 500 MG: 500 TABLET, EXTENDED RELEASE ORAL at 08:54

## 2023-04-19 RX ADMIN — PANTOPRAZOLE SODIUM 40 MG: 40 TABLET, DELAYED RELEASE ORAL at 17:23

## 2023-04-19 RX ADMIN — METOPROLOL SUCCINATE 50 MG: 50 TABLET, EXTENDED RELEASE ORAL at 08:55

## 2023-04-19 RX ADMIN — FLUOXETINE 30 MG: 20 CAPSULE ORAL at 08:54

## 2023-04-19 RX ADMIN — LACTULOSE 20 G: 10 SOLUTION ORAL at 23:45

## 2023-04-19 RX ADMIN — RALOXIFENE 60 MG: 60 TABLET ORAL at 08:56

## 2023-04-19 RX ADMIN — RANOLAZINE 500 MG: 500 TABLET, EXTENDED RELEASE ORAL at 01:14

## 2023-04-19 RX ADMIN — MULTIPLE VITAMINS W/ MINERALS TAB 1 TABLET: TAB at 22:16

## 2023-04-19 RX ADMIN — ATORVASTATIN CALCIUM 40 MG: 40 TABLET, FILM COATED ORAL at 22:16

## 2023-04-19 ASSESSMENT — PAIN SCALES - GENERAL
PAINLEVEL_OUTOF10: 0
PAINLEVEL_OUTOF10: 8
PAINLEVEL_OUTOF10: 4

## 2023-04-19 ASSESSMENT — PAIN DESCRIPTION - LOCATION: LOCATION: HEAD

## 2023-04-19 ASSESSMENT — PAIN DESCRIPTION - ORIENTATION: ORIENTATION: MID

## 2023-04-19 ASSESSMENT — PAIN - FUNCTIONAL ASSESSMENT: PAIN_FUNCTIONAL_ASSESSMENT: ACTIVITIES ARE NOT PREVENTED

## 2023-04-19 ASSESSMENT — PAIN DESCRIPTION - DESCRIPTORS: DESCRIPTORS: ACHING

## 2023-04-20 ENCOUNTER — APPOINTMENT (OUTPATIENT)
Dept: ULTRASOUND IMAGING | Age: 63
DRG: 433 | End: 2023-04-20
Payer: MEDICARE

## 2023-04-20 ENCOUNTER — APPOINTMENT (OUTPATIENT)
Dept: GENERAL RADIOLOGY | Age: 63
DRG: 433 | End: 2023-04-20
Payer: MEDICARE

## 2023-04-20 LAB
ALBUMIN SERPL-MCNC: 3 GM/DL (ref 3.4–5)
ALP BLD-CCNC: 85 IU/L (ref 40–128)
ALT SERPL-CCNC: 13 U/L (ref 10–40)
AMMONIA: 95 UMOL/L (ref 11–51)
ANION GAP SERPL CALCULATED.3IONS-SCNC: 9 MMOL/L (ref 4–16)
AST SERPL-CCNC: 23 IU/L (ref 15–37)
BASOPHILS ABSOLUTE: 0.1 K/CU MM
BASOPHILS RELATIVE PERCENT: 1 % (ref 0–1)
BILIRUB SERPL-MCNC: 1.8 MG/DL (ref 0–1)
BUN SERPL-MCNC: 15 MG/DL (ref 6–23)
CALCIUM SERPL-MCNC: 8.6 MG/DL (ref 8.3–10.6)
CHLORIDE BLD-SCNC: 107 MMOL/L (ref 99–110)
CO2: 25 MMOL/L (ref 21–32)
CREAT SERPL-MCNC: 1 MG/DL (ref 0.6–1.1)
DIFFERENTIAL TYPE: ABNORMAL
EOSINOPHILS ABSOLUTE: 0.3 K/CU MM
EOSINOPHILS RELATIVE PERCENT: 5.2 % (ref 0–3)
GFR SERPL CREATININE-BSD FRML MDRD: >60 ML/MIN/1.73M2
GLUCOSE BLD-MCNC: 141 MG/DL (ref 70–99)
GLUCOSE BLD-MCNC: 192 MG/DL (ref 70–99)
GLUCOSE BLD-MCNC: 219 MG/DL (ref 70–99)
GLUCOSE BLD-MCNC: 225 MG/DL (ref 70–99)
GLUCOSE SERPL-MCNC: 177 MG/DL (ref 70–99)
HCT VFR BLD CALC: 36.5 % (ref 37–47)
HEMOGLOBIN: 12.8 GM/DL (ref 12.5–16)
IMMATURE NEUTROPHIL %: 0.2 % (ref 0–0.43)
INR BLD: 1.2 INDEX
LYMPHOCYTES ABSOLUTE: 1.5 K/CU MM
LYMPHOCYTES RELATIVE PERCENT: 29.3 % (ref 24–44)
MAGNESIUM: 1.6 MG/DL (ref 1.8–2.4)
MCH RBC QN AUTO: 32.7 PG (ref 27–31)
MCHC RBC AUTO-ENTMCNC: 35.1 % (ref 32–36)
MCV RBC AUTO: 93.1 FL (ref 78–100)
MONOCYTES ABSOLUTE: 0.5 K/CU MM
MONOCYTES RELATIVE PERCENT: 10.6 % (ref 0–4)
NUCLEATED RBC %: 0 %
PDW BLD-RTO: 14 % (ref 11.7–14.9)
PLATELET # BLD: 87 K/CU MM (ref 140–440)
PMV BLD AUTO: 11.7 FL (ref 7.5–11.1)
POTASSIUM SERPL-SCNC: 3.5 MMOL/L (ref 3.5–5.1)
PROTHROMBIN TIME: 15.2 SECONDS (ref 11.7–14.5)
RBC # BLD: 3.92 M/CU MM (ref 4.2–5.4)
SEGMENTED NEUTROPHILS ABSOLUTE COUNT: 2.7 K/CU MM
SEGMENTED NEUTROPHILS RELATIVE PERCENT: 53.7 % (ref 36–66)
SODIUM BLD-SCNC: 141 MMOL/L (ref 135–145)
TOTAL IMMATURE NEUTOROPHIL: 0.01 K/CU MM
TOTAL NUCLEATED RBC: 0 K/CU MM
TOTAL PROTEIN: 5.9 GM/DL (ref 6.4–8.2)
WBC # BLD: 5 K/CU MM (ref 4–10.5)

## 2023-04-20 PROCEDURE — 6360000002 HC RX W HCPCS: Performed by: INTERNAL MEDICINE

## 2023-04-20 PROCEDURE — 83735 ASSAY OF MAGNESIUM: CPT

## 2023-04-20 PROCEDURE — 82140 ASSAY OF AMMONIA: CPT

## 2023-04-20 PROCEDURE — 6370000000 HC RX 637 (ALT 250 FOR IP): Performed by: INTERNAL MEDICINE

## 2023-04-20 PROCEDURE — 85025 COMPLETE CBC W/AUTO DIFF WBC: CPT

## 2023-04-20 PROCEDURE — 36415 COLL VENOUS BLD VENIPUNCTURE: CPT

## 2023-04-20 PROCEDURE — 82962 GLUCOSE BLOOD TEST: CPT

## 2023-04-20 PROCEDURE — 1200000000 HC SEMI PRIVATE

## 2023-04-20 PROCEDURE — 80053 COMPREHEN METABOLIC PANEL: CPT

## 2023-04-20 PROCEDURE — 76705 ECHO EXAM OF ABDOMEN: CPT

## 2023-04-20 PROCEDURE — 85610 PROTHROMBIN TIME: CPT

## 2023-04-20 PROCEDURE — 94761 N-INVAS EAR/PLS OXIMETRY MLT: CPT

## 2023-04-20 PROCEDURE — 2580000003 HC RX 258: Performed by: INTERNAL MEDICINE

## 2023-04-20 PROCEDURE — 71045 X-RAY EXAM CHEST 1 VIEW: CPT

## 2023-04-20 RX ORDER — MAGNESIUM SULFATE IN WATER 40 MG/ML
2000 INJECTION, SOLUTION INTRAVENOUS ONCE
Status: COMPLETED | OUTPATIENT
Start: 2023-04-20 | End: 2023-04-20

## 2023-04-20 RX ORDER — POTASSIUM CHLORIDE 20 MEQ/1
40 TABLET, EXTENDED RELEASE ORAL ONCE
Status: COMPLETED | OUTPATIENT
Start: 2023-04-20 | End: 2023-04-20

## 2023-04-20 RX ADMIN — INSULIN GLARGINE 12 UNITS: 100 INJECTION, SOLUTION SUBCUTANEOUS at 20:12

## 2023-04-20 RX ADMIN — RIFAXIMIN 550 MG: 550 TABLET ORAL at 20:12

## 2023-04-20 RX ADMIN — CALCIUM 500 MG: 500 TABLET ORAL at 10:51

## 2023-04-20 RX ADMIN — SPIRONOLACTONE 50 MG: 50 TABLET ORAL at 10:52

## 2023-04-20 RX ADMIN — METOPROLOL SUCCINATE 50 MG: 50 TABLET, EXTENDED RELEASE ORAL at 10:50

## 2023-04-20 RX ADMIN — MAGNESIUM SULFATE HEPTAHYDRATE 2000 MG: 40 INJECTION, SOLUTION INTRAVENOUS at 11:01

## 2023-04-20 RX ADMIN — LACTULOSE 20 G: 10 SOLUTION ORAL at 23:38

## 2023-04-20 RX ADMIN — TRAMADOL HYDROCHLORIDE 50 MG: 50 TABLET, COATED ORAL at 16:53

## 2023-04-20 RX ADMIN — TROSPIUM CHLORIDE 20 MG: 20 TABLET, FILM COATED ORAL at 20:12

## 2023-04-20 RX ADMIN — FUROSEMIDE 20 MG: 20 TABLET ORAL at 10:51

## 2023-04-20 RX ADMIN — LACTULOSE 20 G: 10 SOLUTION ORAL at 12:00

## 2023-04-20 RX ADMIN — TRAMADOL HYDROCHLORIDE 50 MG: 50 TABLET, COATED ORAL at 10:51

## 2023-04-20 RX ADMIN — PALIPERIDONE 6 MG: 1.5 TABLET, EXTENDED RELEASE ORAL at 10:52

## 2023-04-20 RX ADMIN — FLUOXETINE 30 MG: 20 CAPSULE ORAL at 10:51

## 2023-04-20 RX ADMIN — PANTOPRAZOLE SODIUM 40 MG: 40 TABLET, DELAYED RELEASE ORAL at 16:48

## 2023-04-20 RX ADMIN — ATORVASTATIN CALCIUM 40 MG: 40 TABLET, FILM COATED ORAL at 20:12

## 2023-04-20 RX ADMIN — LACTULOSE 20 G: 10 SOLUTION ORAL at 06:18

## 2023-04-20 RX ADMIN — MULTIPLE VITAMINS W/ MINERALS TAB 1 TABLET: TAB at 20:12

## 2023-04-20 RX ADMIN — QUETIAPINE FUMARATE 100 MG: 100 TABLET ORAL at 20:12

## 2023-04-20 RX ADMIN — PANTOPRAZOLE SODIUM 40 MG: 40 TABLET, DELAYED RELEASE ORAL at 06:18

## 2023-04-20 RX ADMIN — RIFAXIMIN 550 MG: 550 TABLET ORAL at 10:51

## 2023-04-20 RX ADMIN — RALOXIFENE 60 MG: 60 TABLET ORAL at 10:52

## 2023-04-20 RX ADMIN — SODIUM CHLORIDE, PRESERVATIVE FREE 10 ML: 5 INJECTION INTRAVENOUS at 20:12

## 2023-04-20 RX ADMIN — RANOLAZINE 500 MG: 500 TABLET, EXTENDED RELEASE ORAL at 20:12

## 2023-04-20 RX ADMIN — RANOLAZINE 500 MG: 500 TABLET, EXTENDED RELEASE ORAL at 10:51

## 2023-04-20 RX ADMIN — POTASSIUM CHLORIDE 40 MEQ: 1500 TABLET, EXTENDED RELEASE ORAL at 10:51

## 2023-04-20 ASSESSMENT — PAIN SCALES - GENERAL
PAINLEVEL_OUTOF10: 7
PAINLEVEL_OUTOF10: 6
PAINLEVEL_OUTOF10: 0
PAINLEVEL_OUTOF10: 0

## 2023-04-20 ASSESSMENT — PAIN DESCRIPTION - LOCATION: LOCATION: HEAD

## 2023-04-20 ASSESSMENT — PAIN DESCRIPTION - DESCRIPTORS: DESCRIPTORS: ACHING

## 2023-04-20 ASSESSMENT — PAIN DESCRIPTION - ORIENTATION: ORIENTATION: MID

## 2023-04-21 LAB
ALBUMIN SERPL-MCNC: 2.7 GM/DL (ref 3.4–5)
ALP BLD-CCNC: 80 IU/L (ref 40–128)
ALT SERPL-CCNC: 42 U/L (ref 10–40)
AMMONIA: 113 UMOL/L (ref 11–51)
AMPHETAMINES: NEGATIVE
ANION GAP SERPL CALCULATED.3IONS-SCNC: 7 MMOL/L (ref 4–16)
AST SERPL-CCNC: 81 IU/L (ref 15–37)
BARBITURATE SCREEN URINE: NEGATIVE
BASOPHILS ABSOLUTE: 0.1 K/CU MM
BASOPHILS RELATIVE PERCENT: 0.9 % (ref 0–1)
BENZODIAZEPINE SCREEN, URINE: NEGATIVE
BILIRUB SERPL-MCNC: 1.8 MG/DL (ref 0–1)
BUN SERPL-MCNC: 15 MG/DL (ref 6–23)
CALCIUM SERPL-MCNC: 8.9 MG/DL (ref 8.3–10.6)
CANNABINOID SCREEN URINE: ABNORMAL
CHLORIDE BLD-SCNC: 106 MMOL/L (ref 99–110)
CO2: 25 MMOL/L (ref 21–32)
COCAINE METABOLITE: NEGATIVE
CREAT SERPL-MCNC: 0.9 MG/DL (ref 0.6–1.1)
DIFFERENTIAL TYPE: ABNORMAL
EOSINOPHILS ABSOLUTE: 0.3 K/CU MM
EOSINOPHILS RELATIVE PERCENT: 4.8 % (ref 0–3)
GFR SERPL CREATININE-BSD FRML MDRD: >60 ML/MIN/1.73M2
GLUCOSE BLD-MCNC: 127 MG/DL (ref 70–99)
GLUCOSE BLD-MCNC: 200 MG/DL (ref 70–99)
GLUCOSE BLD-MCNC: 213 MG/DL (ref 70–99)
GLUCOSE BLD-MCNC: 238 MG/DL (ref 70–99)
GLUCOSE SERPL-MCNC: 90 MG/DL (ref 70–99)
HCT VFR BLD CALC: 35.6 % (ref 37–47)
HEMOGLOBIN: 12.2 GM/DL (ref 12.5–16)
IMMATURE NEUTROPHIL %: 0.2 % (ref 0–0.43)
INR BLD: 1.2 INDEX
LYMPHOCYTES ABSOLUTE: 1.6 K/CU MM
LYMPHOCYTES RELATIVE PERCENT: 28 % (ref 24–44)
MCH RBC QN AUTO: 32.4 PG (ref 27–31)
MCHC RBC AUTO-ENTMCNC: 34.3 % (ref 32–36)
MCV RBC AUTO: 94.7 FL (ref 78–100)
MONOCYTES ABSOLUTE: 0.6 K/CU MM
MONOCYTES RELATIVE PERCENT: 10 % (ref 0–4)
NUCLEATED RBC %: 0 %
OPIATES, URINE: NEGATIVE
OXYCODONE: NEGATIVE
PDW BLD-RTO: 13.8 % (ref 11.7–14.9)
PHENCYCLIDINE, URINE: NEGATIVE
PLATELET # BLD: 99 K/CU MM (ref 140–440)
PMV BLD AUTO: 11.7 FL (ref 7.5–11.1)
POTASSIUM SERPL-SCNC: 4 MMOL/L (ref 3.5–5.1)
PROTHROMBIN TIME: 15.2 SECONDS (ref 11.7–14.5)
RBC # BLD: 3.76 M/CU MM (ref 4.2–5.4)
SEGMENTED NEUTROPHILS ABSOLUTE COUNT: 3.3 K/CU MM
SEGMENTED NEUTROPHILS RELATIVE PERCENT: 56.1 % (ref 36–66)
SODIUM BLD-SCNC: 138 MMOL/L (ref 135–145)
TOTAL IMMATURE NEUTOROPHIL: 0.01 K/CU MM
TOTAL NUCLEATED RBC: 0 K/CU MM
TOTAL PROTEIN: 5.4 GM/DL (ref 6.4–8.2)
WBC # BLD: 5.8 K/CU MM (ref 4–10.5)

## 2023-04-21 PROCEDURE — 85610 PROTHROMBIN TIME: CPT

## 2023-04-21 PROCEDURE — 36415 COLL VENOUS BLD VENIPUNCTURE: CPT

## 2023-04-21 PROCEDURE — 80053 COMPREHEN METABOLIC PANEL: CPT

## 2023-04-21 PROCEDURE — 82962 GLUCOSE BLOOD TEST: CPT

## 2023-04-21 PROCEDURE — 94761 N-INVAS EAR/PLS OXIMETRY MLT: CPT

## 2023-04-21 PROCEDURE — 2580000003 HC RX 258: Performed by: INTERNAL MEDICINE

## 2023-04-21 PROCEDURE — 85025 COMPLETE CBC W/AUTO DIFF WBC: CPT

## 2023-04-21 PROCEDURE — 1200000000 HC SEMI PRIVATE

## 2023-04-21 PROCEDURE — 82140 ASSAY OF AMMONIA: CPT

## 2023-04-21 PROCEDURE — 80307 DRUG TEST PRSMV CHEM ANLYZR: CPT

## 2023-04-21 PROCEDURE — 6370000000 HC RX 637 (ALT 250 FOR IP): Performed by: INTERNAL MEDICINE

## 2023-04-21 RX ADMIN — RANOLAZINE 500 MG: 500 TABLET, EXTENDED RELEASE ORAL at 21:05

## 2023-04-21 RX ADMIN — RIFAXIMIN 550 MG: 550 TABLET ORAL at 09:09

## 2023-04-21 RX ADMIN — INSULIN LISPRO 1 UNITS: 100 INJECTION, SOLUTION INTRAVENOUS; SUBCUTANEOUS at 18:22

## 2023-04-21 RX ADMIN — CALCIUM 500 MG: 500 TABLET ORAL at 09:09

## 2023-04-21 RX ADMIN — LACTULOSE 20 G: 10 SOLUTION ORAL at 17:27

## 2023-04-21 RX ADMIN — RIFAXIMIN 550 MG: 550 TABLET ORAL at 21:05

## 2023-04-21 RX ADMIN — PALIPERIDONE 6 MG: 1.5 TABLET, EXTENDED RELEASE ORAL at 09:09

## 2023-04-21 RX ADMIN — SODIUM CHLORIDE, PRESERVATIVE FREE 10 ML: 5 INJECTION INTRAVENOUS at 21:06

## 2023-04-21 RX ADMIN — PANTOPRAZOLE SODIUM 40 MG: 40 TABLET, DELAYED RELEASE ORAL at 06:04

## 2023-04-21 RX ADMIN — FLUOXETINE 30 MG: 20 CAPSULE ORAL at 09:09

## 2023-04-21 RX ADMIN — PANTOPRAZOLE SODIUM 40 MG: 40 TABLET, DELAYED RELEASE ORAL at 17:27

## 2023-04-21 RX ADMIN — METOPROLOL SUCCINATE 50 MG: 50 TABLET, EXTENDED RELEASE ORAL at 09:09

## 2023-04-21 RX ADMIN — INSULIN GLARGINE 12 UNITS: 100 INJECTION, SOLUTION SUBCUTANEOUS at 21:10

## 2023-04-21 RX ADMIN — RANOLAZINE 500 MG: 500 TABLET, EXTENDED RELEASE ORAL at 09:09

## 2023-04-21 RX ADMIN — ATORVASTATIN CALCIUM 40 MG: 40 TABLET, FILM COATED ORAL at 21:05

## 2023-04-21 RX ADMIN — RALOXIFENE 60 MG: 60 TABLET ORAL at 09:09

## 2023-04-21 RX ADMIN — SPIRONOLACTONE 50 MG: 50 TABLET ORAL at 09:09

## 2023-04-21 RX ADMIN — FUROSEMIDE 20 MG: 20 TABLET ORAL at 09:09

## 2023-04-21 RX ADMIN — TROSPIUM CHLORIDE 20 MG: 20 TABLET, FILM COATED ORAL at 21:05

## 2023-04-21 RX ADMIN — LACTULOSE 20 G: 10 SOLUTION ORAL at 13:33

## 2023-04-21 RX ADMIN — MULTIPLE VITAMINS W/ MINERALS TAB 1 TABLET: TAB at 21:05

## 2023-04-21 RX ADMIN — LACTULOSE 20 G: 10 SOLUTION ORAL at 06:04

## 2023-04-21 RX ADMIN — QUETIAPINE FUMARATE 100 MG: 100 TABLET ORAL at 21:05

## 2023-04-21 RX ADMIN — SODIUM CHLORIDE, PRESERVATIVE FREE 10 ML: 5 INJECTION INTRAVENOUS at 09:12

## 2023-04-21 ASSESSMENT — PAIN SCALES - GENERAL: PAINLEVEL_OUTOF10: 0

## 2023-04-22 LAB
AMMONIA: 83 UMOL/L (ref 11–51)
BASOPHILS ABSOLUTE: 0.1 K/CU MM
BASOPHILS RELATIVE PERCENT: 1 % (ref 0–1)
DIFFERENTIAL TYPE: ABNORMAL
EOSINOPHILS ABSOLUTE: 0.3 K/CU MM
EOSINOPHILS RELATIVE PERCENT: 5.2 % (ref 0–3)
GLUCOSE BLD-MCNC: 247 MG/DL (ref 70–99)
GLUCOSE BLD-MCNC: 275 MG/DL (ref 70–99)
GLUCOSE BLD-MCNC: 281 MG/DL (ref 70–99)
GLUCOSE BLD-MCNC: 327 MG/DL (ref 70–99)
HCT VFR BLD CALC: 33.1 % (ref 37–47)
HEMOGLOBIN: 11.6 GM/DL (ref 12.5–16)
IMMATURE NEUTROPHIL %: 0.2 % (ref 0–0.43)
LYMPHOCYTES ABSOLUTE: 1.6 K/CU MM
LYMPHOCYTES RELATIVE PERCENT: 31.5 % (ref 24–44)
MCH RBC QN AUTO: 33.3 PG (ref 27–31)
MCHC RBC AUTO-ENTMCNC: 35 % (ref 32–36)
MCV RBC AUTO: 95.1 FL (ref 78–100)
MONOCYTES ABSOLUTE: 0.6 K/CU MM
MONOCYTES RELATIVE PERCENT: 11.3 % (ref 0–4)
NUCLEATED RBC %: 0 %
PDW BLD-RTO: 13.9 % (ref 11.7–14.9)
PLATELET # BLD: 82 K/CU MM (ref 140–440)
PMV BLD AUTO: 12 FL (ref 7.5–11.1)
RBC # BLD: 3.48 M/CU MM (ref 4.2–5.4)
SEGMENTED NEUTROPHILS ABSOLUTE COUNT: 2.7 K/CU MM
SEGMENTED NEUTROPHILS RELATIVE PERCENT: 50.8 % (ref 36–66)
TOTAL IMMATURE NEUTOROPHIL: 0.01 K/CU MM
TOTAL NUCLEATED RBC: 0 K/CU MM
WBC # BLD: 5.2 K/CU MM (ref 4–10.5)

## 2023-04-22 PROCEDURE — 1200000000 HC SEMI PRIVATE

## 2023-04-22 PROCEDURE — 36415 COLL VENOUS BLD VENIPUNCTURE: CPT

## 2023-04-22 PROCEDURE — 2580000003 HC RX 258: Performed by: INTERNAL MEDICINE

## 2023-04-22 PROCEDURE — 82962 GLUCOSE BLOOD TEST: CPT

## 2023-04-22 PROCEDURE — 94761 N-INVAS EAR/PLS OXIMETRY MLT: CPT

## 2023-04-22 PROCEDURE — 85025 COMPLETE CBC W/AUTO DIFF WBC: CPT

## 2023-04-22 PROCEDURE — 82140 ASSAY OF AMMONIA: CPT

## 2023-04-22 PROCEDURE — 6370000000 HC RX 637 (ALT 250 FOR IP): Performed by: INTERNAL MEDICINE

## 2023-04-22 RX ORDER — SODIUM PHOSPHATE, DIBASIC AND SODIUM PHOSPHATE, MONOBASIC 7; 19 G/133ML; G/133ML
1 ENEMA RECTAL
Status: COMPLETED | OUTPATIENT
Start: 2023-04-22 | End: 2023-04-22

## 2023-04-22 RX ORDER — MENTHOL 1.4 %
ADHESIVE PATCH, MEDICATED TOPICAL 4 TIMES DAILY PRN
Status: DISCONTINUED | OUTPATIENT
Start: 2023-04-22 | End: 2023-04-25 | Stop reason: HOSPADM

## 2023-04-22 RX ADMIN — PANTOPRAZOLE SODIUM 40 MG: 40 TABLET, DELAYED RELEASE ORAL at 17:41

## 2023-04-22 RX ADMIN — CALCIUM 500 MG: 500 TABLET ORAL at 09:17

## 2023-04-22 RX ADMIN — PALIPERIDONE 6 MG: 1.5 TABLET, EXTENDED RELEASE ORAL at 09:17

## 2023-04-22 RX ADMIN — ATORVASTATIN CALCIUM 40 MG: 40 TABLET, FILM COATED ORAL at 20:11

## 2023-04-22 RX ADMIN — SODIUM CHLORIDE, PRESERVATIVE FREE 10 ML: 5 INJECTION INTRAVENOUS at 20:11

## 2023-04-22 RX ADMIN — LACTULOSE 20 G: 10 SOLUTION ORAL at 06:53

## 2023-04-22 RX ADMIN — SPIRONOLACTONE 50 MG: 50 TABLET ORAL at 09:17

## 2023-04-22 RX ADMIN — SODIUM CHLORIDE, PRESERVATIVE FREE 10 ML: 5 INJECTION INTRAVENOUS at 11:47

## 2023-04-22 RX ADMIN — INSULIN GLARGINE 12 UNITS: 100 INJECTION, SOLUTION SUBCUTANEOUS at 20:25

## 2023-04-22 RX ADMIN — TRAMADOL HYDROCHLORIDE 50 MG: 50 TABLET, COATED ORAL at 20:25

## 2023-04-22 RX ADMIN — RIFAXIMIN 550 MG: 550 TABLET ORAL at 09:18

## 2023-04-22 RX ADMIN — RANOLAZINE 500 MG: 500 TABLET, EXTENDED RELEASE ORAL at 09:17

## 2023-04-22 RX ADMIN — LACTULOSE 20 G: 10 SOLUTION ORAL at 17:41

## 2023-04-22 RX ADMIN — PANTOPRAZOLE SODIUM 40 MG: 40 TABLET, DELAYED RELEASE ORAL at 06:53

## 2023-04-22 RX ADMIN — TROSPIUM CHLORIDE 20 MG: 20 TABLET, FILM COATED ORAL at 20:11

## 2023-04-22 RX ADMIN — FUROSEMIDE 20 MG: 20 TABLET ORAL at 09:18

## 2023-04-22 RX ADMIN — TRAMADOL HYDROCHLORIDE 50 MG: 50 TABLET, COATED ORAL at 00:39

## 2023-04-22 RX ADMIN — MULTIPLE VITAMINS W/ MINERALS TAB 1 TABLET: TAB at 20:11

## 2023-04-22 RX ADMIN — INSULIN LISPRO 1 UNITS: 100 INJECTION, SOLUTION INTRAVENOUS; SUBCUTANEOUS at 09:18

## 2023-04-22 RX ADMIN — INSULIN LISPRO 3 UNITS: 100 INJECTION, SOLUTION INTRAVENOUS; SUBCUTANEOUS at 13:21

## 2023-04-22 RX ADMIN — RIFAXIMIN 550 MG: 550 TABLET ORAL at 20:11

## 2023-04-22 RX ADMIN — LACTULOSE 20 G: 10 SOLUTION ORAL at 00:38

## 2023-04-22 RX ADMIN — RANOLAZINE 500 MG: 500 TABLET, EXTENDED RELEASE ORAL at 20:11

## 2023-04-22 RX ADMIN — SODIUM PHOSPHATE, DIBASIC AND SODIUM PHOSPHATE, MONOBASIC 1 ENEMA: 7; 19 ENEMA RECTAL at 17:42

## 2023-04-22 RX ADMIN — POLYETHYLENE GLYCOL 3350 17 G: 17 POWDER, FOR SOLUTION ORAL at 13:29

## 2023-04-22 RX ADMIN — FLUOXETINE 30 MG: 20 CAPSULE ORAL at 09:18

## 2023-04-22 RX ADMIN — RALOXIFENE 60 MG: 60 TABLET ORAL at 09:18

## 2023-04-22 RX ADMIN — INSULIN LISPRO 2 UNITS: 100 INJECTION, SOLUTION INTRAVENOUS; SUBCUTANEOUS at 17:42

## 2023-04-22 RX ADMIN — MENTHOL, METHYL SALICYLATE: 10; 15 CREAM TOPICAL at 20:48

## 2023-04-22 RX ADMIN — QUETIAPINE FUMARATE 100 MG: 100 TABLET ORAL at 20:11

## 2023-04-22 RX ADMIN — METOPROLOL SUCCINATE 50 MG: 50 TABLET, EXTENDED RELEASE ORAL at 09:18

## 2023-04-22 RX ADMIN — LACTULOSE 20 G: 10 SOLUTION ORAL at 13:20

## 2023-04-22 ASSESSMENT — PAIN DESCRIPTION - DESCRIPTORS: DESCRIPTORS: ACHING;CRAMPING

## 2023-04-22 ASSESSMENT — PAIN SCALES - GENERAL
PAINLEVEL_OUTOF10: 7
PAINLEVEL_OUTOF10: 4

## 2023-04-22 ASSESSMENT — PAIN DESCRIPTION - LOCATION: LOCATION: LEG

## 2023-04-22 ASSESSMENT — PAIN - FUNCTIONAL ASSESSMENT: PAIN_FUNCTIONAL_ASSESSMENT: ACTIVITIES ARE NOT PREVENTED

## 2023-04-22 ASSESSMENT — PAIN DESCRIPTION - ORIENTATION: ORIENTATION: LEFT

## 2023-04-23 LAB
AMMONIA: 117 UMOL/L (ref 11–51)
GLUCOSE BLD-MCNC: 225 MG/DL (ref 70–99)
GLUCOSE BLD-MCNC: 295 MG/DL (ref 70–99)
GLUCOSE BLD-MCNC: 361 MG/DL (ref 70–99)
GLUCOSE BLD-MCNC: 401 MG/DL (ref 70–99)

## 2023-04-23 PROCEDURE — 82140 ASSAY OF AMMONIA: CPT

## 2023-04-23 PROCEDURE — 36415 COLL VENOUS BLD VENIPUNCTURE: CPT

## 2023-04-23 PROCEDURE — 94640 AIRWAY INHALATION TREATMENT: CPT

## 2023-04-23 PROCEDURE — 94761 N-INVAS EAR/PLS OXIMETRY MLT: CPT

## 2023-04-23 PROCEDURE — 82962 GLUCOSE BLOOD TEST: CPT

## 2023-04-23 PROCEDURE — 6370000000 HC RX 637 (ALT 250 FOR IP): Performed by: INTERNAL MEDICINE

## 2023-04-23 PROCEDURE — 2580000003 HC RX 258: Performed by: INTERNAL MEDICINE

## 2023-04-23 PROCEDURE — 1200000000 HC SEMI PRIVATE

## 2023-04-23 RX ORDER — INSULIN LISPRO 100 [IU]/ML
0-8 INJECTION, SOLUTION INTRAVENOUS; SUBCUTANEOUS
Status: DISCONTINUED | OUTPATIENT
Start: 2023-04-23 | End: 2023-04-25 | Stop reason: HOSPADM

## 2023-04-23 RX ORDER — INSULIN LISPRO 100 [IU]/ML
0-4 INJECTION, SOLUTION INTRAVENOUS; SUBCUTANEOUS NIGHTLY
Status: DISCONTINUED | OUTPATIENT
Start: 2023-04-23 | End: 2023-04-25 | Stop reason: HOSPADM

## 2023-04-23 RX ORDER — POLYETHYLENE GLYCOL 3350 17 G/17G
17 POWDER, FOR SOLUTION ORAL 2 TIMES DAILY
Status: DISCONTINUED | OUTPATIENT
Start: 2023-04-23 | End: 2023-04-25 | Stop reason: HOSPADM

## 2023-04-23 RX ORDER — INSULIN GLARGINE 100 [IU]/ML
16 INJECTION, SOLUTION SUBCUTANEOUS NIGHTLY
Status: DISCONTINUED | OUTPATIENT
Start: 2023-04-23 | End: 2023-04-25 | Stop reason: HOSPADM

## 2023-04-23 RX ADMIN — CALCIUM 500 MG: 500 TABLET ORAL at 09:52

## 2023-04-23 RX ADMIN — POLYETHYLENE GLYCOL 3350 17 G: 17 POWDER, FOR SOLUTION ORAL at 20:55

## 2023-04-23 RX ADMIN — SPIRONOLACTONE 50 MG: 50 TABLET ORAL at 09:55

## 2023-04-23 RX ADMIN — METOPROLOL SUCCINATE 50 MG: 50 TABLET, EXTENDED RELEASE ORAL at 10:00

## 2023-04-23 RX ADMIN — LACTULOSE 20 G: 10 SOLUTION ORAL at 12:33

## 2023-04-23 RX ADMIN — QUETIAPINE FUMARATE 100 MG: 100 TABLET ORAL at 20:54

## 2023-04-23 RX ADMIN — INSULIN LISPRO 2 UNITS: 100 INJECTION, SOLUTION INTRAVENOUS; SUBCUTANEOUS at 16:46

## 2023-04-23 RX ADMIN — MAGNESIUM HYDROXIDE 30 ML: 400 SUSPENSION ORAL at 18:58

## 2023-04-23 RX ADMIN — SODIUM CHLORIDE, PRESERVATIVE FREE 10 ML: 5 INJECTION INTRAVENOUS at 09:52

## 2023-04-23 RX ADMIN — INSULIN LISPRO 4 UNITS: 100 INJECTION, SOLUTION INTRAVENOUS; SUBCUTANEOUS at 21:09

## 2023-04-23 RX ADMIN — LACTULOSE 20 G: 10 SOLUTION ORAL at 16:40

## 2023-04-23 RX ADMIN — TROSPIUM CHLORIDE 20 MG: 20 TABLET, FILM COATED ORAL at 20:54

## 2023-04-23 RX ADMIN — FUROSEMIDE 20 MG: 20 TABLET ORAL at 09:55

## 2023-04-23 RX ADMIN — LACTULOSE 20 G: 10 SOLUTION ORAL at 06:10

## 2023-04-23 RX ADMIN — FLUOXETINE 30 MG: 20 CAPSULE ORAL at 09:51

## 2023-04-23 RX ADMIN — TRAMADOL HYDROCHLORIDE 50 MG: 50 TABLET, COATED ORAL at 20:54

## 2023-04-23 RX ADMIN — PANTOPRAZOLE SODIUM 40 MG: 40 TABLET, DELAYED RELEASE ORAL at 06:10

## 2023-04-23 RX ADMIN — RIFAXIMIN 550 MG: 550 TABLET ORAL at 20:55

## 2023-04-23 RX ADMIN — INSULIN LISPRO 4 UNITS: 100 INJECTION, SOLUTION INTRAVENOUS; SUBCUTANEOUS at 12:32

## 2023-04-23 RX ADMIN — RIFAXIMIN 550 MG: 550 TABLET ORAL at 10:00

## 2023-04-23 RX ADMIN — ALBUTEROL SULFATE 2 PUFF: 90 AEROSOL, METERED RESPIRATORY (INHALATION) at 21:29

## 2023-04-23 RX ADMIN — RANOLAZINE 500 MG: 500 TABLET, EXTENDED RELEASE ORAL at 09:54

## 2023-04-23 RX ADMIN — RALOXIFENE 60 MG: 60 TABLET ORAL at 10:39

## 2023-04-23 RX ADMIN — INSULIN GLARGINE 16 UNITS: 100 INJECTION, SOLUTION SUBCUTANEOUS at 21:09

## 2023-04-23 RX ADMIN — SODIUM CHLORIDE, PRESERVATIVE FREE 10 ML: 5 INJECTION INTRAVENOUS at 20:55

## 2023-04-23 RX ADMIN — MULTIPLE VITAMINS W/ MINERALS TAB 1 TABLET: TAB at 20:55

## 2023-04-23 RX ADMIN — RANOLAZINE 500 MG: 500 TABLET, EXTENDED RELEASE ORAL at 20:54

## 2023-04-23 RX ADMIN — PALIPERIDONE 6 MG: 1.5 TABLET, EXTENDED RELEASE ORAL at 10:38

## 2023-04-23 RX ADMIN — PANTOPRAZOLE SODIUM 40 MG: 40 TABLET, DELAYED RELEASE ORAL at 16:40

## 2023-04-23 RX ADMIN — INSULIN LISPRO 1 UNITS: 100 INJECTION, SOLUTION INTRAVENOUS; SUBCUTANEOUS at 09:42

## 2023-04-23 RX ADMIN — ATORVASTATIN CALCIUM 40 MG: 40 TABLET, FILM COATED ORAL at 20:55

## 2023-04-23 ASSESSMENT — PAIN DESCRIPTION - ORIENTATION: ORIENTATION: RIGHT;LEFT

## 2023-04-23 ASSESSMENT — PAIN SCALES - GENERAL
PAINLEVEL_OUTOF10: 0
PAINLEVEL_OUTOF10: 5
PAINLEVEL_OUTOF10: 7

## 2023-04-23 ASSESSMENT — PAIN DESCRIPTION - DESCRIPTORS: DESCRIPTORS: ACHING;CRAMPING

## 2023-04-23 ASSESSMENT — PAIN DESCRIPTION - LOCATION: LOCATION: LEG

## 2023-04-23 ASSESSMENT — PAIN - FUNCTIONAL ASSESSMENT: PAIN_FUNCTIONAL_ASSESSMENT: ACTIVITIES ARE NOT PREVENTED

## 2023-04-24 LAB
AMMONIA: 134 UMOL/L (ref 11–51)
BASOPHILS ABSOLUTE: 0.1 K/CU MM
BASOPHILS RELATIVE PERCENT: 0.9 % (ref 0–1)
CULTURE: NORMAL
CULTURE: NORMAL
DIFFERENTIAL TYPE: ABNORMAL
EOSINOPHILS ABSOLUTE: 0.3 K/CU MM
EOSINOPHILS RELATIVE PERCENT: 5.8 % (ref 0–3)
GLUCOSE BLD-MCNC: 235 MG/DL (ref 70–99)
GLUCOSE BLD-MCNC: 243 MG/DL (ref 70–99)
GLUCOSE BLD-MCNC: 250 MG/DL (ref 70–99)
GLUCOSE BLD-MCNC: 250 MG/DL (ref 70–99)
GLUCOSE BLD-MCNC: 308 MG/DL (ref 70–99)
HCT VFR BLD CALC: 33.9 % (ref 37–47)
HEMOGLOBIN: 11.9 GM/DL (ref 12.5–16)
IMMATURE NEUTROPHIL %: 0.2 % (ref 0–0.43)
LYMPHOCYTES ABSOLUTE: 1.7 K/CU MM
LYMPHOCYTES RELATIVE PERCENT: 29.9 % (ref 24–44)
Lab: NORMAL
Lab: NORMAL
MCH RBC QN AUTO: 32.8 PG (ref 27–31)
MCHC RBC AUTO-ENTMCNC: 35.1 % (ref 32–36)
MCV RBC AUTO: 93.4 FL (ref 78–100)
MONOCYTES ABSOLUTE: 0.7 K/CU MM
MONOCYTES RELATIVE PERCENT: 12.4 % (ref 0–4)
NUCLEATED RBC %: 0 %
PDW BLD-RTO: 13.9 % (ref 11.7–14.9)
PLATELET # BLD: 93 K/CU MM (ref 140–440)
PMV BLD AUTO: 12 FL (ref 7.5–11.1)
RBC # BLD: 3.63 M/CU MM (ref 4.2–5.4)
SEGMENTED NEUTROPHILS ABSOLUTE COUNT: 2.9 K/CU MM
SEGMENTED NEUTROPHILS RELATIVE PERCENT: 50.8 % (ref 36–66)
SPECIMEN: NORMAL
SPECIMEN: NORMAL
TOTAL IMMATURE NEUTOROPHIL: 0.01 K/CU MM
TOTAL NUCLEATED RBC: 0 K/CU MM
WBC # BLD: 5.7 K/CU MM (ref 4–10.5)

## 2023-04-24 PROCEDURE — 6370000000 HC RX 637 (ALT 250 FOR IP): Performed by: INTERNAL MEDICINE

## 2023-04-24 PROCEDURE — 94761 N-INVAS EAR/PLS OXIMETRY MLT: CPT

## 2023-04-24 PROCEDURE — 2580000003 HC RX 258: Performed by: INTERNAL MEDICINE

## 2023-04-24 PROCEDURE — 1200000000 HC SEMI PRIVATE

## 2023-04-24 PROCEDURE — 85025 COMPLETE CBC W/AUTO DIFF WBC: CPT

## 2023-04-24 PROCEDURE — 82140 ASSAY OF AMMONIA: CPT

## 2023-04-24 PROCEDURE — 36415 COLL VENOUS BLD VENIPUNCTURE: CPT

## 2023-04-24 PROCEDURE — 82962 GLUCOSE BLOOD TEST: CPT

## 2023-04-24 RX ORDER — POLYETHYLENE GLYCOL 3350 17 G/17G
119 POWDER, FOR SOLUTION ORAL ONCE
Status: COMPLETED | OUTPATIENT
Start: 2023-04-24 | End: 2023-04-24

## 2023-04-24 RX ORDER — INSULIN GLARGINE 100 [IU]/ML
6 INJECTION, SOLUTION SUBCUTANEOUS EVERY MORNING
Status: DISCONTINUED | OUTPATIENT
Start: 2023-04-25 | End: 2023-04-25 | Stop reason: HOSPADM

## 2023-04-24 RX ADMIN — FLUOXETINE 30 MG: 20 CAPSULE ORAL at 09:24

## 2023-04-24 RX ADMIN — PANTOPRAZOLE SODIUM 40 MG: 40 TABLET, DELAYED RELEASE ORAL at 17:26

## 2023-04-24 RX ADMIN — FUROSEMIDE 20 MG: 20 TABLET ORAL at 09:24

## 2023-04-24 RX ADMIN — SODIUM CHLORIDE, PRESERVATIVE FREE 10 ML: 5 INJECTION INTRAVENOUS at 21:50

## 2023-04-24 RX ADMIN — RALOXIFENE 60 MG: 60 TABLET ORAL at 09:26

## 2023-04-24 RX ADMIN — LACTULOSE 20 G: 10 SOLUTION ORAL at 12:52

## 2023-04-24 RX ADMIN — PANTOPRAZOLE SODIUM 40 MG: 40 TABLET, DELAYED RELEASE ORAL at 06:19

## 2023-04-24 RX ADMIN — QUETIAPINE FUMARATE 100 MG: 100 TABLET ORAL at 21:48

## 2023-04-24 RX ADMIN — ATORVASTATIN CALCIUM 40 MG: 40 TABLET, FILM COATED ORAL at 21:48

## 2023-04-24 RX ADMIN — RIFAXIMIN 550 MG: 550 TABLET ORAL at 09:24

## 2023-04-24 RX ADMIN — INSULIN LISPRO 6 UNITS: 100 INJECTION, SOLUTION INTRAVENOUS; SUBCUTANEOUS at 17:22

## 2023-04-24 RX ADMIN — PALIPERIDONE 6 MG: 1.5 TABLET, EXTENDED RELEASE ORAL at 09:26

## 2023-04-24 RX ADMIN — LACTULOSE 20 G: 10 SOLUTION ORAL at 17:19

## 2023-04-24 RX ADMIN — METOPROLOL SUCCINATE 50 MG: 50 TABLET, EXTENDED RELEASE ORAL at 09:29

## 2023-04-24 RX ADMIN — INSULIN LISPRO 4 UNITS: 100 INJECTION, SOLUTION INTRAVENOUS; SUBCUTANEOUS at 12:51

## 2023-04-24 RX ADMIN — LACTULOSE 20 G: 10 SOLUTION ORAL at 06:19

## 2023-04-24 RX ADMIN — TROSPIUM CHLORIDE 20 MG: 20 TABLET, FILM COATED ORAL at 21:48

## 2023-04-24 RX ADMIN — CALCIUM 500 MG: 500 TABLET ORAL at 09:24

## 2023-04-24 RX ADMIN — POLYETHYLENE GLYCOL 3350 119 G: 17 POWDER, FOR SOLUTION ORAL at 12:52

## 2023-04-24 RX ADMIN — RANOLAZINE 500 MG: 500 TABLET, EXTENDED RELEASE ORAL at 21:48

## 2023-04-24 RX ADMIN — INSULIN LISPRO 4 UNITS: 100 INJECTION, SOLUTION INTRAVENOUS; SUBCUTANEOUS at 09:34

## 2023-04-24 RX ADMIN — RIFAXIMIN 550 MG: 550 TABLET ORAL at 21:48

## 2023-04-24 RX ADMIN — MULTIPLE VITAMINS W/ MINERALS TAB 1 TABLET: TAB at 21:48

## 2023-04-24 RX ADMIN — TRAMADOL HYDROCHLORIDE 50 MG: 50 TABLET, COATED ORAL at 21:55

## 2023-04-24 RX ADMIN — INSULIN GLARGINE 16 UNITS: 100 INJECTION, SOLUTION SUBCUTANEOUS at 21:51

## 2023-04-24 RX ADMIN — RANOLAZINE 500 MG: 500 TABLET, EXTENDED RELEASE ORAL at 09:24

## 2023-04-24 RX ADMIN — LACTULOSE 20 G: 10 SOLUTION ORAL at 00:36

## 2023-04-24 RX ADMIN — POLYETHYLENE GLYCOL 3350 17 G: 17 POWDER, FOR SOLUTION ORAL at 09:25

## 2023-04-24 RX ADMIN — SODIUM CHLORIDE, PRESERVATIVE FREE 10 ML: 5 INJECTION INTRAVENOUS at 09:23

## 2023-04-24 RX ADMIN — SPIRONOLACTONE 50 MG: 50 TABLET ORAL at 09:25

## 2023-04-24 ASSESSMENT — PAIN DESCRIPTION - LOCATION: LOCATION: BACK;HIP

## 2023-04-24 ASSESSMENT — PAIN SCALES - GENERAL: PAINLEVEL_OUTOF10: 7

## 2023-04-24 ASSESSMENT — PAIN DESCRIPTION - ORIENTATION: ORIENTATION: LEFT

## 2023-04-24 ASSESSMENT — PAIN DESCRIPTION - DESCRIPTORS: DESCRIPTORS: ACHING;CRAMPING

## 2023-04-24 NOTE — PROGRESS NOTES
4/23/2023    Pt was seen in the afternoon. She feels ok, a little recovered from yesterday. She would lke her diet advanced, it was still iquid. Advanced to solids. She has not moved much stool In spite of the lactulose. Will try enema, or milk of Mag, to improve bowel action and improve her ammonia level. Afebrile, VSS. Heart regular, lungs clear abd soft with active bowel sounds. Nontender. Trace leg edema. Latest Reference Range & Units 04/23/23 01:31   AMMONIA, PLASMA, [id number] 11 - 51 UMOL/L 117 (H)   AMMONIA  Rpt ! Continue current meds, will try MOM to help her bowels move. Slow improvement. Watch for elevated potassium.   Rsosy Garcia MD

## 2023-04-24 NOTE — PROGRESS NOTES
[facility]        Progress Note       2023  11:11 AM    Patient:    Crow Beard  : 1960   63 y.o. MRN: [id number]  Admitted: 2023  2:21 PM ATT: Margot Mensah MD   [id number]  AdmitDx: Hepatic encephalopathy [facility]) [K76.82]  History of cirrhosis [Z87.19]  Increased ammonia level [R79.89]  Acute hepatic encephalopathy ([address] [K76.82]  Altered mental status, unspecified altered mental status type [R41.82]  PCP: Margot Mensah MD    SUBJECTIVE:  Chart reviewed, events noted  Patient feeling well. Not having many BM's despite Lactulose. Tolerating diet. No N/V. No  abdominal pain. Answers questions appropriately.      ROS:  The positive ROS will be identified in bold     CONSTITUTIONAL:  Neg  Weight loss, fatigue, fever  MOUTH/THROAT:  Neg  Bleeding gums, hoarseness or sore throat  RESPIRATORY:   Neg SOB, wheeze, cough, hemoptysis or bronchitis  CARDIOVASCULAR:  Neg Chest pain, palpitations, dyspnea on exertion, edema  GASTROINTESTINAL:  SEE HPI  HEMATOLOGIC/LYMPHATIC:  Neg  Anemia, bleeding tendency  MUSCULOSKELETAL: Neg  New myalgias, joint pain, swelling or stiffness  NEUROLOGICAL:  Neg  Loss of Consciousness, memory loss, forgetfulness, periods of confusion, difficulty concentrating, seizures, insomnia, aphasia    SKIN:  Neg No itching, rashes, or sores  PSYCHIATRIC:  Neg Depression, personality changes, anxiety    OBJECTIVE:      /76   Pulse 88   Temp 98.2 °F (36.8 °C) (Oral)   Resp 17   Ht 4' 9.5\" (1.461 m)   Wt 195 lb 4 oz (88.6 kg)   SpO2 94%   BMI 41.52 kg/m²     NAD, appears comfortable, sitting up in bed  Lips and mucous membranes pink and moist  RRR, Nl s1s2, no murmurs, no JVD  Lungs CTA bilaterally, respirations even and unlabored   Abdomen soft, ND, NT, no HSM, bowel sounds normal  Skin pink, warm, dry and CR brisk   No edema bilateral lower extremities   AAOx3, No asterixis      CBC:   Recent Labs     23  0612

## 2023-04-24 NOTE — PLAN OF CARE
Problem: ABCDS Injury Assessment  Goal: Absence of physical injury  4/24/2023 0013 by Honey Hernandez RN  Outcome: Progressing  4/24/2023 0013 by Honey Hernandez RN  Outcome: Progressing     Problem: Safety - Adult  Goal: Free from fall injury  4/24/2023 0013 by Honey Hernandez RN  Outcome: Progressing  4/24/2023 0013 by Honey Hernandez RN  Outcome: Progressing     Problem: Discharge Planning  Goal: Discharge to home or other facility with appropriate resources  4/24/2023 0013 by Honey Hernandez RN  Outcome: Progressing  4/24/2023 0013 by Honey Hernandez RN  Outcome: Progressing     Problem: Pain  Goal: Verbalizes/displays adequate comfort level or baseline comfort level  4/24/2023 0013 by Honey Hernandez RN  Outcome: Progressing  4/24/2023 0013 by Honey Hernandez RN  Outcome: Progressing     Problem: Skin/Tissue Integrity  Goal: Absence of new skin breakdown  Description: 1. Monitor for areas of redness and/or skin breakdown  2. Assess vascular access sites hourly  3. Every 4-6 hours minimum:  Change oxygen saturation probe site  4. Every 4-6 hours:  If on nasal continuous positive airway pressure, respiratory therapy assess nares and determine need for appliance change or resting period.   Outcome: Progressing

## 2023-04-24 NOTE — PLAN OF CARE
Problem: Confusion  Goal: Confusion, delirium, dementia, or psychosis is improved or at baseline  Description: INTERVENTIONS:  1. Assess for possible contributors to thought disturbance, including medications, impaired vision or hearing, underlying metabolic abnormalities, dehydration, psychiatric diagnoses, and notify attending LIP  2. [place] high risk fall precautions, as indicated  3. Provide frequent short contacts to provide reality reorientation, refocusing and direction  4. Decrease environmental stimuli, including noise as appropriate  5. Monitor and intervene to maintain adequate nutrition, hydration, elimination, sleep and activity  6. If unable to ensure safety without constant attention obtain sitter and review sitter guidelines with assigned personnel  7.  Initiate Psychosocial CNS and Spiritual Care consult, as indicated  Outcome: Progressing     Problem: ABCDS Injury Assessment  Goal: Absence of physical injury  4/24/2023 1106 by Alonso Larson LPN  Outcome: Progressing  4/24/2023 0013 by Edwige Turpin RN  Outcome: Progressing  4/24/2023 0013 by Edwige Turpin RN  Outcome: Progressing     Problem: Safety - Adult  Goal: Free from fall injury  4/24/2023 1106 by Alonso Larson LPN  Outcome: Progressing  4/24/2023 0013 by Edwige Turpin RN  Outcome: Progressing  4/24/2023 0013 by Edwige Turpin RN  Outcome: Progressing     Problem: Discharge Planning  Goal: Discharge to home or other facility with appropriate resources  4/24/2023 1106 by Alonso Larson LPN  Outcome: Progressing  4/24/2023 0013 by Edwige Turpin RN  Outcome: Progressing  4/24/2023 0013 by Edwige Turpin RN  Outcome: Progressing     Problem: Pain  Goal: Verbalizes/displays adequate comfort level or baseline comfort level  4/24/2023 1106 by Alonso Larson LPN  Outcome: Progressing  4/24/2023 0013 by Edwige Turpin RN  Outcome: Progressing  4/24/2023 0013 by Edwige Turpin RN  Outcome: Progressing     Problem: Skin/Tissue Integrity  Goal: Absence of new skin

## 2023-04-25 VITALS
HEIGHT: 58 IN | RESPIRATION RATE: 20 BRPM | BODY MASS INDEX: 40.82 KG/M2 | OXYGEN SATURATION: 93 % | DIASTOLIC BLOOD PRESSURE: 76 MMHG | WEIGHT: 194.45 LBS | SYSTOLIC BLOOD PRESSURE: 122 MMHG | HEART RATE: 80 BPM | TEMPERATURE: 98.2 F

## 2023-04-25 LAB
ALBUMIN SERPL-MCNC: 3 GM/DL (ref 3.4–5)
ALP BLD-CCNC: 143 IU/L (ref 40–128)
ALT SERPL-CCNC: 25 U/L (ref 10–40)
AMMONIA: 88 UMOL/L (ref 11–51)
ANION GAP SERPL CALCULATED.3IONS-SCNC: 6 MMOL/L (ref 4–16)
AST SERPL-CCNC: 25 IU/L (ref 15–37)
BASOPHILS ABSOLUTE: 0.1 K/CU MM
BASOPHILS RELATIVE PERCENT: 0.8 % (ref 0–1)
BILIRUB SERPL-MCNC: 1 MG/DL (ref 0–1)
BUN SERPL-MCNC: 17 MG/DL (ref 6–23)
CALCIUM SERPL-MCNC: 9.4 MG/DL (ref 8.3–10.6)
CHLORIDE BLD-SCNC: 100 MMOL/L (ref 99–110)
CO2: 27 MMOL/L (ref 21–32)
CREAT SERPL-MCNC: 1.1 MG/DL (ref 0.6–1.1)
DIFFERENTIAL TYPE: ABNORMAL
EOSINOPHILS ABSOLUTE: 0.3 K/CU MM
EOSINOPHILS RELATIVE PERCENT: 4.9 % (ref 0–3)
GFR SERPL CREATININE-BSD FRML MDRD: 58 ML/MIN/1.73M2
GLUCOSE BLD-MCNC: 206 MG/DL (ref 70–99)
GLUCOSE BLD-MCNC: 207 MG/DL (ref 70–99)
GLUCOSE SERPL-MCNC: 281 MG/DL (ref 70–99)
HCT VFR BLD CALC: 33.8 % (ref 37–47)
HEMOGLOBIN: 11.9 GM/DL (ref 12.5–16)
IMMATURE NEUTROPHIL %: 0.2 % (ref 0–0.43)
LYMPHOCYTES ABSOLUTE: 1.6 K/CU MM
LYMPHOCYTES RELATIVE PERCENT: 23.9 % (ref 24–44)
MCH RBC QN AUTO: 32.3 PG (ref 27–31)
MCHC RBC AUTO-ENTMCNC: 35.2 % (ref 32–36)
MCV RBC AUTO: 91.8 FL (ref 78–100)
MONOCYTES ABSOLUTE: 0.7 K/CU MM
MONOCYTES RELATIVE PERCENT: 11.1 % (ref 0–4)
NUCLEATED RBC %: 0 %
PDW BLD-RTO: 13.9 % (ref 11.7–14.9)
PLATELET # BLD: 91 K/CU MM (ref 140–440)
PMV BLD AUTO: 11.3 FL (ref 7.5–11.1)
POTASSIUM SERPL-SCNC: 4.9 MMOL/L (ref 3.5–5.1)
RBC # BLD: 3.68 M/CU MM (ref 4.2–5.4)
SEGMENTED NEUTROPHILS ABSOLUTE COUNT: 3.9 K/CU MM
SEGMENTED NEUTROPHILS RELATIVE PERCENT: 59.1 % (ref 36–66)
SODIUM BLD-SCNC: 133 MMOL/L (ref 135–145)
TOTAL IMMATURE NEUTOROPHIL: 0.01 K/CU MM
TOTAL NUCLEATED RBC: 0 K/CU MM
TOTAL PROTEIN: 5.8 GM/DL (ref 6.4–8.2)
WBC # BLD: 6.6 K/CU MM (ref 4–10.5)

## 2023-04-25 PROCEDURE — 82140 ASSAY OF AMMONIA: CPT

## 2023-04-25 PROCEDURE — 85025 COMPLETE CBC W/AUTO DIFF WBC: CPT

## 2023-04-25 PROCEDURE — 94761 N-INVAS EAR/PLS OXIMETRY MLT: CPT

## 2023-04-25 PROCEDURE — 80053 COMPREHEN METABOLIC PANEL: CPT

## 2023-04-25 PROCEDURE — 6370000000 HC RX 637 (ALT 250 FOR IP): Performed by: INTERNAL MEDICINE

## 2023-04-25 PROCEDURE — 82962 GLUCOSE BLOOD TEST: CPT

## 2023-04-25 PROCEDURE — 36415 COLL VENOUS BLD VENIPUNCTURE: CPT

## 2023-04-25 RX ORDER — POLYETHYLENE GLYCOL 3350 17 G/17G
17 POWDER, FOR SOLUTION ORAL 2 TIMES DAILY
Qty: 527 G | Refills: 1 | Status: SHIPPED | OUTPATIENT
Start: 2023-04-25 | End: 2023-05-25

## 2023-04-25 RX ADMIN — RANOLAZINE 500 MG: 500 TABLET, EXTENDED RELEASE ORAL at 09:47

## 2023-04-25 RX ADMIN — INSULIN GLARGINE 6 UNITS: 100 INJECTION, SOLUTION SUBCUTANEOUS at 09:43

## 2023-04-25 RX ADMIN — METOPROLOL SUCCINATE 50 MG: 50 TABLET, EXTENDED RELEASE ORAL at 09:47

## 2023-04-25 RX ADMIN — RALOXIFENE 60 MG: 60 TABLET ORAL at 09:46

## 2023-04-25 RX ADMIN — INSULIN LISPRO 2 UNITS: 100 INJECTION, SOLUTION INTRAVENOUS; SUBCUTANEOUS at 13:27

## 2023-04-25 RX ADMIN — LACTULOSE 20 G: 10 SOLUTION ORAL at 00:47

## 2023-04-25 RX ADMIN — INSULIN LISPRO 2 UNITS: 100 INJECTION, SOLUTION INTRAVENOUS; SUBCUTANEOUS at 09:44

## 2023-04-25 RX ADMIN — LACTULOSE 20 G: 10 SOLUTION ORAL at 13:25

## 2023-04-25 RX ADMIN — FUROSEMIDE 20 MG: 20 TABLET ORAL at 09:47

## 2023-04-25 RX ADMIN — FLUOXETINE 30 MG: 20 CAPSULE ORAL at 09:46

## 2023-04-25 RX ADMIN — CALCIUM 500 MG: 500 TABLET ORAL at 09:47

## 2023-04-25 RX ADMIN — PANTOPRAZOLE SODIUM 40 MG: 40 TABLET, DELAYED RELEASE ORAL at 05:38

## 2023-04-25 RX ADMIN — LACTULOSE 20 G: 10 SOLUTION ORAL at 05:38

## 2023-04-25 RX ADMIN — POLYETHYLENE GLYCOL 3350 17 G: 17 POWDER, FOR SOLUTION ORAL at 09:51

## 2023-04-25 RX ADMIN — SPIRONOLACTONE 50 MG: 50 TABLET ORAL at 09:50

## 2023-04-25 RX ADMIN — RIFAXIMIN 550 MG: 550 TABLET ORAL at 09:47

## 2023-04-25 RX ADMIN — PALIPERIDONE 6 MG: 1.5 TABLET, EXTENDED RELEASE ORAL at 09:45

## 2023-04-25 NOTE — PROGRESS NOTES
INTERNAL MEDICINE PROGRESS NOTE        Ernesto Laboy   1960   Primary Care Physician:  Frieda Harvey MD  Admit Date: 4/18/2023     Subjective:   Pt is doing better today. Denies any confusion today and she is alert and oriented to self, location, and the date. Since patient is doing better, patient request to go home. Denies chest pain, SOB, nausea, vomiting, abdominal pain. Remainder of ROS is unremarkable. Meds, labs and other notes reviewed. Objective:   BP (!) 102/51   Pulse 86   Temp 97.8 °F (36.6 °C) (Oral)   Resp 18   Ht 4' 9.5\" (1.461 m)   Wt 194 lb 7.1 oz (88.2 kg)   SpO2 94%   BMI 41.35 kg/m²    Recent Labs     04/24/23  1621 04/24/23 2027 04/24/23  2339 04/25/23  0756   POCGLU 308* 235* 243* 206*       I/O last 3 completed shifts: In: 300 [P.O.:300]  Out: 400 [Urine:400]  No intake/output data recorded. Neck: no adenopathy and supple, symmetrical, trachea midline  Lungs: clear to auscultation bilaterally  Heart: regular rate and rhythm and S1, S2 normal  Abdomen: soft, non-tender; bowel sounds normal; no masses,  no organomegaly  Extremities: extremities normal, atraumatic, no cyanosis or edema  Neurologic: Alert and oriented to self, date, and location. Remains engaged throughout entire interaction and answers questions appropriately.     Data Review  CBC with Differential:    Recent Labs     04/24/23  0558 04/25/23  0101   WBC 5.7 6.6   RBC 3.63* 3.68*   HGB 11.9* 11.9*   HCT 33.9* 33.8*   PLT 93* 91*   MCV 93.4 91.8   MCH 32.8* 32.3*   MCHC 35.1 35.2   RDW 13.9 13.9   SEGSPCT 50.8 59.1   LYMPHOPCT 29.9 23.9*   MONOPCT 12.4* 11.1*   BASOPCT 0.9 0.8   MONOSABS 0.7 0.7   LYMPHSABS 1.7 1.6   EOSABS 0.3 0.3   BASOSABS 0.1 0.1   DIFFTYPE AUTOMATED DIFFERENTIAL AUTOMATED DIFFERENTIAL     CMP:    Recent Labs     04/25/23  0101   *   K 4.9      CO2 27   BUN 17   CREATININE 1.1   LABGLOM 58*   GLUCOSE 281*   PROT 5.8*   LABALBU 3.0*   CALCIUM 9.4   BILITOT 1.0

## 2023-04-25 NOTE — DISCHARGE SUMMARY
times daily     ipratropium-albuterol 0.5-2.5 (3) MG/3ML Soln nebulizer solution  Commonly known as: DUONEB  Inhale 3 mLs into the lungs every 4 hours     lactulose 10 GM/15ML solution  Commonly known as: CHRONULAC  Take 30 mLs by mouth 2 times daily     Lantus SoloStar 100 UNIT/ML injection pen  Generic drug: insulin glargine  Inject 12 Units into the skin nightly     metoprolol succinate 50 MG extended release tablet  Commonly known as: TOPROL XL  Take 1 tablet by mouth daily     mirabegron 25 MG Tb24  Commonly known as: Myrbetriq  Take 1 tablet by mouth daily     MULTIVITAMIN PO     mupirocin 2 % ointment  Commonly known as: BACTROBAN  Apply topically 3 times daily. nitroGLYCERIN 0.4 MG SL tablet  Commonly known as: NITROSTAT  Place 1 tablet under the tongue every 5 minutes as needed for Chest pain     ondansetron 4 MG disintegrating tablet  Commonly known as: Zofran ODT  Take 1 tablet by mouth every 8 hours as needed for Nausea     paliperidone 6 MG extended release tablet  Commonly known as: INVEGA     pantoprazole 40 MG tablet  Commonly known as: PROTONIX  Take 1 tablet by mouth 2 times daily (before meals)     QUEtiapine 100 MG tablet  Commonly known as: SEROQUEL     raloxifene 60 MG tablet  Commonly known as: Evista  Take 1 tablet by mouth daily     ranolazine 500 MG extended release tablet  Commonly known as: RANEXA  Take 1 tablet by mouth 2 times daily     rifAXIMin 550 MG tablet  Commonly known as: XIFAXAN  Take 1 tablet by mouth 2 times daily     spironolactone 50 MG tablet  Commonly known as: ALDACTONE  Take 1 tablet by mouth daily     traMADol 50 MG tablet  Commonly known as: ULTRAM     VITAMIN D PO           * This list has 5 medication(s) that are the same as other medications prescribed for you. Read the directions carefully, and ask your doctor or other care provider to review them with you.                    Where to Get Your Medications        These medications were sent to [facility]/pharmacy #[id number]

## 2023-04-25 NOTE — PLAN OF CARE
Problem: Confusion  Goal: Confusion, delirium, dementia, or psychosis is improved or at baseline  Description: INTERVENTIONS:  1. Assess for possible contributors to thought disturbance, including medications, impaired vision or hearing, underlying metabolic abnormalities, dehydration, psychiatric diagnoses, and notify attending LIP  2. [place] high risk fall precautions, as indicated  3. Provide frequent short contacts to provide reality reorientation, refocusing and direction  4. Decrease environmental stimuli, including noise as appropriate  5. Monitor and intervene to maintain adequate nutrition, hydration, elimination, sleep and activity  6. If unable to ensure safety without constant attention obtain sitter and review sitter guidelines with assigned personnel  7.  Initiate Psychosocial CNS and Spiritual Care consult, as indicated  4/25/2023 0053 by Taya Brandon LPN  Outcome: Progressing  4/25/2023 0029 by Taya Brandon LPN  Outcome: Progressing  4/24/2023 1734 by Nory Concepcion RN  Outcome: Progressing  4/24/2023 1106 by Carlos Wilkins LPN  Outcome: Progressing     Problem: ABCDS Injury Assessment  Goal: Absence of physical injury  4/25/2023 0053 by Taya Brandon LPN  Outcome: Progressing  4/25/2023 0029 by Taya Brandon LPN  Outcome: Progressing  4/24/2023 1734 by Nory Concepcion RN  Outcome: Progressing  4/24/2023 1106 by Carlos Wilkins LPN  Outcome: Progressing     Problem: Safety - Adult  Goal: Free from fall injury  4/25/2023 0053 by Taya Brandon LPN  Outcome: Progressing  4/25/2023 0029 by Taya Brandon LPN  Outcome: Progressing  4/24/2023 1734 by Nory Concepcion RN  Outcome: Progressing  4/24/2023 1106 by Carlos Wilkins LPN  Outcome: Progressing     Problem: Discharge Planning  Goal: Discharge to home or other facility with appropriate resources  4/25/2023 0053 by Taya Brandon LPN  Outcome: Progressing  4/25/2023 0029 by Taya Brandon LPN  Outcome: Progressing  4/24/2023 1734 by

## 2023-04-25 NOTE — PROGRESS NOTES
04/25/23 1037   Encounter Summary   Encounter Overview/Reason  Volunteer Encounter   Service Provided For: Patient   Referral/Consult From: [address] Family members   Last Encounter  04/25/23  (Patient received communion from volunteer)   Complexity of Encounter Low   Begin Time 1033   End Time  1038   Total Time Calculated 5 min   Encounter    Type Follow up   Spiritual/Emotional needs   Type Spiritual Support   Rituals, Rites and Sacraments   Type Synagogue Communion   Assessment/Intervention/Outcome   Assessment Hopeful;Peaceful   Intervention Nurtured [place] (assurance of)/[place];Read/Provided Scripture;Sustaining Presence/Ministry of presence   Outcome Expressed Gratitude   Plan and Referrals   Plan/Referrals Continue to visit, (comment)  (Communion)

## 2023-04-25 NOTE — PROGRESS NOTES
[facility]        Progress Note       2023  8:43 AM    Patient:    Lexi Hernandez  : 1960   63 y.o. MRN: [id number]  Admitted: 2023  2:21 PM ATT: Gabriela Carolina MD   [id number]  AdmitDx: Hepatic encephalopathy [facility]) [K76.82]  History of cirrhosis [Z87.19]  Increased ammonia level [R79.89]  Acute hepatic encephalopathy ([address] [K76.82]  Altered mental status, unspecified altered mental status type [R41.82]  PCP: Gabriela Carolina MD    SUBJECTIVE:  Chart reviewed, events noted  Patient feeling well. No complaints. Large BM yesterday. Tolerating diet. No N/V. No  abdominal pain.     ROS:  The positive ROS will be identified in bold     CONSTITUTIONAL:  Neg  Weight loss, fatigue, fever  MOUTH/THROAT:  Neg  Bleeding gums, hoarseness or sore throat  RESPIRATORY:   Neg SOB, wheeze, cough, hemoptysis or bronchitis  CARDIOVASCULAR:  Neg Chest pain, palpitations, dyspnea on exertion, edema  GASTROINTESTINAL:  SEE HPI  HEMATOLOGIC/LYMPHATIC:  Neg  Anemia, bleeding tendency  MUSCULOSKELETAL: Neg  New myalgias, joint pain, swelling or stiffness  NEUROLOGICAL:  Neg  Loss of Consciousness, memory loss, forgetfulness, periods of confusion, difficulty concentrating, seizures, insomnia, aphasia    SKIN:  Neg No itching, rashes, or sores  PSYCHIATRIC:  Neg Depression, personality changes, anxiety    OBJECTIVE:      BP (!) 102/51   Pulse 86   Temp 97.8 °F (36.6 °C) (Oral)   Resp 18   Ht 4' 9.5\" (1.461 m)   Wt 194 lb 7.1 oz (88.2 kg)   SpO2 94%   BMI 41.35 kg/m²     NAD, appears comfortable, sitting up in bed  Lips and mucous membranes pink and moist  RRR, Nl s1s2, no murmurs, no JVD  Lungs CTA bilaterally, respirations even and unlabored   Abdomen soft, ND, NT, no HSM, bowel sounds normal  Skin pink, warm, dry and CR brisk   No edema bilateral lower extremities   AAOx3    CBC:   Recent Labs     23  0558 23  0101   WBC 5.7 6.6   HGB 11.9* 11.9*   PLT 93*

## 2023-04-25 NOTE — PROGRESS NOTES
Nutrition Assessment     Type and Reason for Visit: Initial, RD Nutrition Re-Screen/LOS    Nutrition Recommendations/Plan:   Continue current diet   Recommend referral to [facility] Management as appropriate on discharge, as any wt mgt concerns will need long term intervention for realistic wt loss results, but has potential to significantly improve medical condition. Monitor as per protocol      Nutrition Assessment:  Admitted with acute hepatic encephalopathy. PMH: T2DM, Tobacco Abuse,COPD, GERD, GIB. Pt on carb control 3 with 60-80 gm protein restriction. Consumed % of meal at visit. Loved one at bedside. Pt relieved over having BM after having constipation x 1 week. Denies any weight loss, or poor appetite. No wounds. Denies questions for nutrition at this time. Endorse adequate nutrition and hydration for healing. Follow as low nutrition risk at this time. Nutrition Related Findings:     Wound Type: None    Current Nutrition Therapies:    ADULT DIET; Regular; 3 carb choices (45 gm/meal); 60 to 80 gm    Anthropometric Measures:  Height: 4' 9.5\" (146.1 cm)  Current Body Wt: 194 lb 7.1 oz (88.2 kg)   BMI: 41.3    Nutrition Diagnosis:   No nutrition diagnosis at this time     Nutrition Interventions:   Food and/or Nutrient Delivery: Continue Current Diet  Nutrition Education/Counseling: No recommendation at this time  Coordination of Nutrition Care: Continue to monitor while inpatient       Goals:     Goals: Meet at least 75% of estimated needs       Nutrition Monitoring and Evaluation:   Behavioral-Environmental Outcomes: None Identified  Food/Nutrient Intake Outcomes: Food and Nutrient Intake  Physical Signs/Symptoms Outcomes: Biochemical Data, Weight, Meal Time Behavior, GI Status    Discharge Planning:     Too soon to determine     Celeste Barba RD, LD  Contact: [phone]

## 2023-04-25 NOTE — PROGRESS NOTES
4/24/2023    Pt was seen in the early evening. She has spent a good bit of time today in the restroom as her bowels have finally started working after days of miralax, lactulose, etc.      Afebrile,VSS. She is tolerating her diet well. Lungs clear, heart regular, abd soft wth active bowel sounds,no ascites. Trace ankle edema. She is alert and conversant. Latest Reference Range & Units 04/24/23 05:58   AMMONIA, PLASMA, [id number] 11 - 51 UMOL/L 134 (H)   AMMONIA  Rpt ! WBC 4.0 - 10.5 K/CU MM 5.7   RBC 4.2 - 5.4 M/CU MM 3.63 (L)   Hemoglobin Quant 12.5 - 16.0 GM/DL 11.9 (L)   Hematocrit 37 - 47 % 33.9 (L)   MCV 78 - 100 FL 93.4   MCH 27 - 31 PG 32.8 (H)   MCHC 32.0 - 36.0 % 35.1   MPV 7.5 - 11.1 FL 12.0 (H)   RDW 11.7 - 14.9 % 13.9   Platelet Count 753 - 440 K/CU MM 93 (L)   Lymphocyte % 24 - 44 % 29.9   Monocytes % 0 - 4 % 12.4 (H)   Eosinophils % 0 - 3 % 5.8 (H)   Basophils % 0 - 1 % 0.9   Lymphocytes Absolute K/CU MM 1.7   Monocytes Absolute K/CU MM 0.7   Eosinophils Absolute K/CU MM 0.3   Basophils Absolute K/CU MM 0.1   Differential Type  AUTOMATED DIFFERENTIAL   Segs Relative 36 - 66 % 50.8   Segs Absolute K/CU MM 2.9   Nucleated RBC % % 0.0   Immature Neutrophil % 0 - 0.43 % 0.2   Total Immature Neutrophil K/CU MM 0.01   Total Nucleated RBC K/CU MM 0.0     Continue therapy and follow labs for ammonia.   She is stable for discharge once levels improve close to normal.  Gabriela Carolina MD

## 2023-04-26 ENCOUNTER — CARE COORDINATION (OUTPATIENT)
Dept: CASE MANAGEMENT | Age: 63
End: 2023-04-26

## 2023-04-26 NOTE — CARE COORDINATION
[facility] Care Transitions Initial Follow Up Call    Call within 2 business days of discharge: Yes    Patient Current Location:  Home: [address] [place] [address]    Care Transition Nurse contacted the patient by telephone to perform post hospital discharge assessment. Verified name and  with patient as identifiers. Provided introduction to self, and explanation of the Care Transition Nurse role. Patient: Alvin Isabel Patient : 1960   MRN: [id number]  Reason for Admission: AMS, Hepatic Encephalopathy  Discharge Date: 23 RARS: Readmission Risk Score: 26.6      Last Discharge [address]       Date Complaint Diagnosis Description Type Department Provider    23 Altered Mental Status Altered mental status, unspecified altered mental status type . .. ED to Hosp-Admission (Discharged) (ADMITTED) Steffany Alvarez MD; [facility]... Challenges to be reviewed by the provider   Additional needs identified to be addressed with provider: No  none               Method of communication with provider: none. Spoke with Jackie Hernández, says she is feeling great. Says her mentation is back to normal. Denies sob/horne/chest pain/palpitations/wheezing. No abd pain. No N/V/D. Says she has had 2 BM's today. She is home O2 dependent, on 2L/NC @ all times. Says she checks her BS approx 3-4x/day. Today 273 & 252. Reviewed Insulin dosing/RISS with Jackie Hernández, able to repeat back & is following the orders/instructions. Stressed importance of taking her Lactulose to prevent Encephalopathy hence preventing AMS. Ammonia level yesterday (23) 88, down from 136. Says she is taking the Lactulose as prescribed. Her brother will be picking up the Glycolax for her today. Taking all meds as prescribed. Reviewed DM management:  -Take all diabetic meds and insulins as directed  -Ensure glucometer is in good working order and you have all supplies  -Routinely monitor blood sugar as directed.  Notify MD if

## 2023-06-26 ENCOUNTER — HOSPITAL ENCOUNTER (EMERGENCY)
Age: 63
Discharge: LEFT BEFORE DISCHARGE | End: 2023-06-26

## 2023-06-26 VITALS
TEMPERATURE: 97.7 F | RESPIRATION RATE: 16 BRPM | DIASTOLIC BLOOD PRESSURE: 79 MMHG | HEART RATE: 78 BPM | OXYGEN SATURATION: 97 % | SYSTOLIC BLOOD PRESSURE: 143 MMHG

## 2023-07-27 ENCOUNTER — OFFICE VISIT (OUTPATIENT)
Dept: CARDIOLOGY CLINIC | Age: 63
End: 2023-07-27
Payer: COMMERCIAL

## 2023-07-27 VITALS
BODY MASS INDEX: 39.47 KG/M2 | WEIGHT: 188 LBS | DIASTOLIC BLOOD PRESSURE: 78 MMHG | OXYGEN SATURATION: 93 % | HEIGHT: 58 IN | SYSTOLIC BLOOD PRESSURE: 118 MMHG | HEART RATE: 72 BPM

## 2023-07-27 DIAGNOSIS — I83.893 VARICOSE VEINS OF BOTH LEGS WITH EDEMA: ICD-10-CM

## 2023-07-27 DIAGNOSIS — Z79.4 TYPE 2 DIABETES MELLITUS WITH COMPLICATION, WITH LONG-TERM CURRENT USE OF INSULIN (HCC): ICD-10-CM

## 2023-07-27 DIAGNOSIS — E78.5 DYSLIPIDEMIA: ICD-10-CM

## 2023-07-27 DIAGNOSIS — E11.8 TYPE 2 DIABETES MELLITUS WITH COMPLICATION, WITH LONG-TERM CURRENT USE OF INSULIN (HCC): ICD-10-CM

## 2023-07-27 DIAGNOSIS — G25.81 RESTLESS LEGS: ICD-10-CM

## 2023-07-27 DIAGNOSIS — I10 PRIMARY HYPERTENSION: ICD-10-CM

## 2023-07-27 DIAGNOSIS — I25.10 CORONARY ARTERY DISEASE INVOLVING NATIVE HEART WITHOUT ANGINA PECTORIS, UNSPECIFIED VESSEL OR LESION TYPE: Primary | ICD-10-CM

## 2023-07-27 DIAGNOSIS — Z72.0 TOBACCO ABUSE: ICD-10-CM

## 2023-07-27 DIAGNOSIS — E66.01 MORBIDLY OBESE (HCC): ICD-10-CM

## 2023-07-27 PROCEDURE — 2022F DILAT RTA XM EVC RTNOPTHY: CPT | Performed by: INTERNAL MEDICINE

## 2023-07-27 PROCEDURE — G8417 CALC BMI ABV UP PARAM F/U: HCPCS | Performed by: INTERNAL MEDICINE

## 2023-07-27 PROCEDURE — 3017F COLORECTAL CA SCREEN DOC REV: CPT | Performed by: INTERNAL MEDICINE

## 2023-07-27 PROCEDURE — 3078F DIAST BP <80 MM HG: CPT | Performed by: INTERNAL MEDICINE

## 2023-07-27 PROCEDURE — 99213 OFFICE O/P EST LOW 20 MIN: CPT | Performed by: INTERNAL MEDICINE

## 2023-07-27 PROCEDURE — 3074F SYST BP LT 130 MM HG: CPT | Performed by: INTERNAL MEDICINE

## 2023-07-27 PROCEDURE — 4004F PT TOBACCO SCREEN RCVD TLK: CPT | Performed by: INTERNAL MEDICINE

## 2023-07-27 PROCEDURE — 3052F HG A1C>EQUAL 8.0%<EQUAL 9.0%: CPT | Performed by: INTERNAL MEDICINE

## 2023-07-27 PROCEDURE — G8427 DOCREV CUR MEDS BY ELIG CLIN: HCPCS | Performed by: INTERNAL MEDICINE

## 2023-07-27 NOTE — PROGRESS NOTES
Raoul Dorman is a 63 y.o. female who has    CHIEF COMPLAINT AS FOLLOWS:  CHEST PAIN: Patient denies any C/O chest pains at this time. SOB: No C/O SOB at this time. LEG EDEMA:   B/L Lower extremity edema is present but better than before. PALPITATIONS: Denies any C/O Palpitations   DIZZINESS: No C/O Dizziness   SYNCOPE: None   OTHER/ ADDITIONAL COMPLAINTS:                                     HPI: Patient is here for F/U on her CVI, HTN & Dyslipidemia problems. CVI: S/P Ablation. HTN: Patient has known essential HTN. Has been treated with guideline recommended medical / physical/ diet therapy as stated below. Dyslipidemia: Patient has known mixed dyslipidemia. Has been treated with guideline recommended medical / physical/ diet therapy as stated below. Current Outpatient Medications   Medication Sig Dispense Refill    spironolactone (ALDACTONE) 50 MG tablet Take 1 tablet by mouth daily 30 tablet 3    rifAXIMin (XIFAXAN) 550 MG tablet Take 1 tablet by mouth 2 times daily 60 tablet 2    albuterol sulfate HFA (VENTOLIN HFA) 108 (90 Base) MCG/ACT inhaler Inhale 2 puffs into the lungs 4 times daily as needed for Wheezing 54 g 5    ipratropium-albuterol (DUONEB) 0.5-2.5 (3) MG/3ML SOLN nebulizer solution Inhale 3 mLs into the lungs every 4 hours 120 each 5    ranolazine (RANEXA) 500 MG extended release tablet Take 1 tablet by mouth 2 times daily 180 tablet 3    insulin glargine (LANTUS SOLOSTAR) 100 UNIT/ML injection pen Inject 12 Units into the skin nightly 5 Adjustable Dose Pre-filled Pen Syringe 0    atorvastatin (LIPITOR) 40 MG tablet Take 1 tablet by mouth nightly 30 tablet 0    metoprolol succinate (TOPROL XL) 50 MG extended release tablet Take 1 tablet by mouth daily 30 tablet 0    lactulose (CHRONULAC) 10 GM/15ML solution Take 30 mLs by mouth 2 times daily 946 mL 0    traMADol (ULTRAM) 50 MG tablet Take 1 tablet by mouth every 4 hours as needed for Pain.

## 2023-07-27 NOTE — PATIENT INSTRUCTIONS
We are committed to providing you the best care possible. If you receive a survey after visiting one of our offices, please take time to share your experience concerning your physician office visit. These surveys are confidential and no health information about you is shared. We are eager to improve for you and we are counting on your feedback to help make that happen. **It is YOUR responsibilty to bring medication bottles and/or updated medication list to [facility]. This will allow us to better serve you and all your healthcare needs**    Thank you for allowing us to care for you today! We want to ensure we can follow your treatment plan and we strive to give you the best outcomes and experience possible. If you ever have a life threatening emergency and call 911 - for an ambulance (EMS)   Our providers can only care for you at:   [facility] or [facility]. Even if you have someone take you or you drive yourself we can only care for you in a [facility]. Our providers are not setup at the other healthcare locations! CORONARY ARTERY DISEASE:No, has myocardial bridge. ETT 1/2020   Normal exercise performance without angina and ischemic EKG changes. Functional Capacity: Fair Exercise Tolerance. No chest pain noted during   testing. EKG: NSR 66/min. Peaked T-waves ? Hyperkalemia. HTN: Borderline low on current medical regimen, see list above. - changes in  treatment:  No, on Toprol XL  Reduce Midodrine dose. ECHO 2/2023   Left ventricular systolic function is normal.   Ejection fraction is visually estimated at 55-60%. No significant valvular disease noted. No pericardial effusion present. Negative bubble study.      CARDIOMYOPATHY: None known   CONGESTIVE HEART FAILURE: NO KNOWN HISTORY.   VHD: No significant VHD noted    DYSLIPIDEMIA: Patient's profile is at / near Goal.no                                HDL is low

## 2023-08-02 ENCOUNTER — HOSPITAL ENCOUNTER (OUTPATIENT)
Age: 63
Discharge: HOME | End: 2023-08-02
Payer: COMMERCIAL

## 2023-08-02 LAB
ALBUMIN SERPL-MCNC: 3.2 GM/DL (ref 3.4–5)
ALP BLD-CCNC: 147 IU/L (ref 40–128)
ALT SERPL-CCNC: 15 U/L (ref 10–40)
AMMONIA: 45 UMOL/L (ref 11–51)
ANION GAP SERPL CALCULATED.3IONS-SCNC: 8 MMOL/L (ref 4–16)
AST SERPL-CCNC: 22 IU/L (ref 15–37)
BASOPHILS ABSOLUTE: 0.1 K/CU MM
BASOPHILS RELATIVE PERCENT: 1.1 % (ref 0–1)
BILIRUB SERPL-MCNC: 0.9 MG/DL (ref 0–1)
BUN SERPL-MCNC: 19 MG/DL (ref 6–23)
CALCIUM SERPL-MCNC: 9 MG/DL (ref 8.3–10.6)
CHLORIDE BLD-SCNC: 108 MMOL/L (ref 99–110)
CO2: 27 MMOL/L (ref 21–32)
CREAT SERPL-MCNC: 1 MG/DL (ref 0.6–1.1)
DIFFERENTIAL TYPE: ABNORMAL
EOSINOPHILS ABSOLUTE: 0.2 K/CU MM
EOSINOPHILS RELATIVE PERCENT: 4.1 % (ref 0–3)
ESTIMATED AVERAGE GLUCOSE: 177 MG/DL
GFR SERPL CREATININE-BSD FRML MDRD: >60 ML/MIN/1.73M2
GLUCOSE SERPL-MCNC: 239 MG/DL (ref 70–99)
HBA1C MFR BLD: 7.8 % (ref 4.2–6.3)
HCT VFR BLD CALC: 42.4 % (ref 37–47)
HEMOGLOBIN: 14.4 GM/DL (ref 12.5–16)
IMMATURE NEUTROPHIL %: 0.2 % (ref 0–0.43)
LYMPHOCYTES ABSOLUTE: 1.4 K/CU MM
LYMPHOCYTES RELATIVE PERCENT: 32.6 % (ref 24–44)
MCH RBC QN AUTO: 32.3 PG (ref 27–31)
MCHC RBC AUTO-ENTMCNC: 34 % (ref 32–36)
MCV RBC AUTO: 95.1 FL (ref 78–100)
MONOCYTES ABSOLUTE: 0.4 K/CU MM
MONOCYTES RELATIVE PERCENT: 9.6 % (ref 0–4)
NUCLEATED RBC %: 0 %
PDW BLD-RTO: 13.1 % (ref 11.7–14.9)
PLATELET # BLD: 89 K/CU MM (ref 140–440)
PMV BLD AUTO: 12.4 FL (ref 7.5–11.1)
POTASSIUM SERPL-SCNC: 4.3 MMOL/L (ref 3.5–5.1)
RBC # BLD: 4.46 M/CU MM (ref 4.2–5.4)
SEGMENTED NEUTROPHILS ABSOLUTE COUNT: 2.3 K/CU MM
SEGMENTED NEUTROPHILS RELATIVE PERCENT: 52.4 % (ref 36–66)
SODIUM BLD-SCNC: 143 MMOL/L (ref 135–145)
TOTAL IMMATURE NEUTOROPHIL: 0.01 K/CU MM
TOTAL NUCLEATED RBC: 0 K/CU MM
TOTAL PROTEIN: 6.2 GM/DL (ref 6.4–8.2)
WBC # BLD: 4.4 K/CU MM (ref 4–10.5)

## 2023-08-02 PROCEDURE — 82140 ASSAY OF AMMONIA: CPT

## 2023-08-02 PROCEDURE — 80053 COMPREHEN METABOLIC PANEL: CPT

## 2023-08-02 PROCEDURE — 36415 COLL VENOUS BLD VENIPUNCTURE: CPT

## 2023-08-02 PROCEDURE — 83036 HEMOGLOBIN GLYCOSYLATED A1C: CPT

## 2023-08-02 PROCEDURE — 85025 COMPLETE CBC W/AUTO DIFF WBC: CPT

## 2023-08-07 ENCOUNTER — HOSPITAL ENCOUNTER (OUTPATIENT)
Dept: PULMONOLOGY | Age: 63
Discharge: HOME | End: 2023-08-07

## 2023-10-26 RX ORDER — FUROSEMIDE 20 MG/1
20 TABLET ORAL DAILY
Qty: 90 TABLET | Refills: 1 | Status: SHIPPED | OUTPATIENT
Start: 2023-10-26

## 2023-11-29 ENCOUNTER — OFFICE VISIT (OUTPATIENT)
Dept: FAMILY MEDICINE CLINIC | Age: 63
End: 2023-11-29
Payer: COMMERCIAL

## 2023-11-29 VITALS
DIASTOLIC BLOOD PRESSURE: 80 MMHG | OXYGEN SATURATION: 97 % | WEIGHT: 177 LBS | HEIGHT: 58 IN | SYSTOLIC BLOOD PRESSURE: 134 MMHG | HEART RATE: 72 BPM | BODY MASS INDEX: 37.16 KG/M2

## 2023-11-29 DIAGNOSIS — I10 PRIMARY HYPERTENSION: ICD-10-CM

## 2023-11-29 DIAGNOSIS — S20.211A CONTUSION OF RIBS, RIGHT, INITIAL ENCOUNTER: ICD-10-CM

## 2023-11-29 DIAGNOSIS — Z79.4 TYPE 2 DIABETES MELLITUS WITH COMPLICATION, WITH LONG-TERM CURRENT USE OF INSULIN (HCC): Primary | ICD-10-CM

## 2023-11-29 DIAGNOSIS — K70.30 ALCOHOLIC CIRRHOSIS OF LIVER WITHOUT ASCITES (HCC): ICD-10-CM

## 2023-11-29 DIAGNOSIS — E11.8 TYPE 2 DIABETES MELLITUS WITH COMPLICATION, WITH LONG-TERM CURRENT USE OF INSULIN (HCC): Primary | ICD-10-CM

## 2023-11-29 DIAGNOSIS — J44.9 CHRONIC OBSTRUCTIVE PULMONARY DISEASE, UNSPECIFIED COPD TYPE (HCC): ICD-10-CM

## 2023-11-29 PROCEDURE — 3075F SYST BP GE 130 - 139MM HG: CPT | Performed by: INTERNAL MEDICINE

## 2023-11-29 PROCEDURE — 99214 OFFICE O/P EST MOD 30 MIN: CPT | Performed by: INTERNAL MEDICINE

## 2023-11-29 PROCEDURE — 3051F HG A1C>EQUAL 7.0%<8.0%: CPT | Performed by: INTERNAL MEDICINE

## 2023-11-29 PROCEDURE — 3079F DIAST BP 80-89 MM HG: CPT | Performed by: INTERNAL MEDICINE

## 2023-11-29 RX ORDER — TRAMADOL HYDROCHLORIDE 50 MG/1
50 TABLET ORAL EVERY 6 HOURS PRN
Qty: 12 TABLET | Refills: 0 | Status: SHIPPED | OUTPATIENT
Start: 2023-11-29 | End: 2023-12-02

## 2023-11-29 RX ORDER — QUETIAPINE FUMARATE 25 MG/1
25 TABLET, FILM COATED ORAL
Qty: 30 TABLET | Refills: 5 | Status: SHIPPED | OUTPATIENT
Start: 2023-11-29 | End: 2024-05-27

## 2023-11-29 RX ORDER — QUETIAPINE FUMARATE 25 MG/1
25 TABLET, FILM COATED ORAL
COMMUNITY
End: 2023-11-29 | Stop reason: SDUPTHER

## 2023-11-29 NOTE — PROGRESS NOTES
Outpatient Clinic Visit Note    Patient: Shiv Kuhn  : 1960 (63 y.o.)  Date: 2023    CC:  Chief Complaint   Patient presents with    Follow-up     Patient states she feels like she is going to have a nervous breakdown. She fell in the bathtub last week. She states she has been in pain and hasn't been able to eat. HPI: Here for general check. She fell last week onto her right side. Past Medical History:    Past Medical History:   Diagnosis Date    Abnormal Pap smear of cervix     Acid reflux     Arthritis     left knee    Breast cyst     CAD (coronary artery disease)     per [facility] 13 - Dr. Shai Sepulveda    COPD (chronic obstructive pulmonary disease) ([address])     follow with Dr Isaak Saunders    Depression     Adonay Pratt manic - depression see Dr Chano Johnston    Diabetes mellitus [facility])     dx 10+ yrs ago- follows with PCP    Drug abuse ([address])     hx use of cocaine, heroin and marijuana- states last used 2014    Glaucoma     bilateral    H/O Doppler lower venous ultrasound 2019    No DVT or SVT, Significant reflux of RGSV and LGSV. H/O echocardiogram 2020    EF 55-60%, Mod LVH. Hepatic encephalopathy ([address]) 2019    Hepatitis C     for liver bx 12/3/2015\"Have Hepatitis B and C and saw Dr Hannah Walters for this 2015\"    Hiatal hernia     History of alcohol abuse     History of exercise stress test 2020    Treadmill, Normal exercise performance without angina and ischemic EKG changes.     HTN (hypertension)     \"for the past two yrs on medication\" follows with Dr Shai Sepulveda    Hx of blood clots 2019    Portal vein thrombsis - TIPS procedure 19    Hyperlipemia     Irregular heart beat     per pt    Liver hematoma     Migraine     Migraines     Last migraine:  2018    Nausea & vomiting     Neurologic disorder     Schizophrenia [facility])     per old chart    Seizures ([address])     \"last one was 2015- saw Dr Maria A Sotelo at [facility]- she said not sure if acutal seizures- she thinks they are panic

## 2023-12-06 ENCOUNTER — APPOINTMENT (OUTPATIENT)
Dept: GENERAL RADIOLOGY | Age: 63
End: 2023-12-06
Payer: COMMERCIAL

## 2023-12-06 VITALS
DIASTOLIC BLOOD PRESSURE: 73 MMHG | HEIGHT: 57 IN | RESPIRATION RATE: 19 BRPM | SYSTOLIC BLOOD PRESSURE: 142 MMHG | OXYGEN SATURATION: 93 % | TEMPERATURE: 98 F | HEART RATE: 89 BPM | BODY MASS INDEX: 38.19 KG/M2 | WEIGHT: 177 LBS

## 2023-12-06 PROCEDURE — 99284 EMERGENCY DEPT VISIT MOD MDM: CPT

## 2023-12-06 PROCEDURE — 71101 X-RAY EXAM UNILAT RIBS/CHEST: CPT

## 2023-12-07 ENCOUNTER — HOSPITAL ENCOUNTER (EMERGENCY)
Age: 63
Discharge: HOME | End: 2023-12-07
Attending: EMERGENCY MEDICINE
Payer: COMMERCIAL

## 2023-12-07 DIAGNOSIS — S20.212A CONTUSION OF LEFT CHEST WALL, INITIAL ENCOUNTER: Primary | ICD-10-CM

## 2023-12-07 PROCEDURE — 6360000002 HC RX W HCPCS: Performed by: EMERGENCY MEDICINE

## 2023-12-07 PROCEDURE — 96372 THER/PROPH/DIAG INJ SC/IM: CPT

## 2023-12-07 PROCEDURE — 6370000000 HC RX 637 (ALT 250 FOR IP): Performed by: EMERGENCY MEDICINE

## 2023-12-07 RX ORDER — OXYCODONE HYDROCHLORIDE 5 MG/1
5 TABLET ORAL EVERY 6 HOURS PRN
Qty: 12 TABLET | Refills: 0 | Status: SHIPPED | OUTPATIENT
Start: 2023-12-07 | End: 2023-12-10

## 2023-12-07 RX ORDER — OXYCODONE HYDROCHLORIDE 5 MG/1
5 TABLET ORAL ONCE
Status: COMPLETED | OUTPATIENT
Start: 2023-12-07 | End: 2023-12-07

## 2023-12-07 RX ORDER — KETOROLAC TROMETHAMINE 15 MG/ML
30 INJECTION, SOLUTION INTRAMUSCULAR; INTRAVENOUS ONCE
Status: COMPLETED | OUTPATIENT
Start: 2023-12-07 | End: 2023-12-07

## 2023-12-07 RX ORDER — NAPROXEN 500 MG/1
500 TABLET ORAL 2 TIMES DAILY
Qty: 30 TABLET | Refills: 0 | Status: SHIPPED | OUTPATIENT
Start: 2023-12-07

## 2023-12-07 RX ADMIN — KETOROLAC TROMETHAMINE 30 MG: 15 INJECTION, SOLUTION INTRAMUSCULAR; INTRAVENOUS at 00:44

## 2023-12-07 RX ADMIN — OXYCODONE HYDROCHLORIDE 5 MG: 5 TABLET ORAL at 00:41

## 2023-12-07 ASSESSMENT — PAIN SCALES - GENERAL
PAINLEVEL_OUTOF10: 10
PAINLEVEL_OUTOF10: 10

## 2023-12-07 ASSESSMENT — PAIN DESCRIPTION - DESCRIPTORS: DESCRIPTORS: SHARP

## 2023-12-07 ASSESSMENT — PAIN DESCRIPTION - LOCATION: LOCATION: RIB CAGE

## 2023-12-07 ASSESSMENT — PAIN DESCRIPTION - ORIENTATION: ORIENTATION: LEFT

## 2023-12-07 NOTE — ED NOTES
Pt presents to Ed with complaints of left sided rib pain, reports she was walking into her apartment and tripped on the dog leash.  Reports that she fell on left ribs causing immediate shortness of breath,.pt c/o left sided rib pain      Keyanna Jaimes RN  12/06/23 3737

## 2023-12-07 NOTE — ED NOTES
Discharge instructions reviewed with patient and all questions addressed. Patient alert and oriented x4 at time of discharge. Patient ambulatory and steady gait.        Jac Boudreaux RN  12/07/23 0045

## 2023-12-07 NOTE — ED PROVIDER NOTES
Expectation of Test or Tx.):     Appropriate for outpatient management      I am the Primary Clinician of Record. Clinical Impression:  1.  Contusion of left chest wall, initial encounter      (Please note that portions of this note may have been completed with a voice recognition program. Efforts were made to edit the dictations but occasionally words are mis-transcribed.)    MD Teo Cardoso MD  12/07/23 [id number]

## 2023-12-09 ENCOUNTER — TELEPHONE (OUTPATIENT)
Dept: FAMILY MEDICINE CLINIC | Age: 63
End: 2023-12-09

## 2023-12-09 DIAGNOSIS — S20.219D CONTUSION OF CHEST WALL, UNSPECIFIED LATERALITY, SUBSEQUENT ENCOUNTER: Primary | ICD-10-CM

## 2023-12-09 RX ORDER — CYCLOBENZAPRINE HCL 10 MG
10 TABLET ORAL 3 TIMES DAILY PRN
Qty: 30 TABLET | Refills: 0 | Status: SHIPPED | OUTPATIENT
Start: 2023-12-09 | End: 2023-12-19

## 2023-12-09 NOTE — TELEPHONE ENCOUNTER
Fall walking dog 2 days ago. Eval [facility] no fracture. Calls stating she is still in pain requesting medication. We will start m. Relaxer prn.  Received oxycodone at [facility]

## 2023-12-20 NOTE — TELEPHONE ENCOUNTER
Patient needs pen needles for her Lantus Solostar needs box of 100 with refills  [facility]      PATIENT IS ALSO ASKING FOR PAIN MEDICATION DO TO RIB PAIN AND UNDERNEATH HER BREAST

## 2024-01-09 ENCOUNTER — OFFICE VISIT (OUTPATIENT)
Dept: FAMILY MEDICINE CLINIC | Age: 64
End: 2024-01-09
Payer: MEDICARE

## 2024-01-09 VITALS
DIASTOLIC BLOOD PRESSURE: 78 MMHG | HEART RATE: 87 BPM | BODY MASS INDEX: 37.11 KG/M2 | WEIGHT: 172 LBS | OXYGEN SATURATION: 97 % | TEMPERATURE: 97.9 F | SYSTOLIC BLOOD PRESSURE: 122 MMHG | HEIGHT: 57 IN

## 2024-01-09 DIAGNOSIS — R05.2 SUBACUTE COUGH: Primary | ICD-10-CM

## 2024-01-09 DIAGNOSIS — R11.0 NAUSEA: ICD-10-CM

## 2024-01-09 DIAGNOSIS — I10 PRIMARY HYPERTENSION: ICD-10-CM

## 2024-01-09 DIAGNOSIS — E11.8 TYPE 2 DIABETES MELLITUS WITH COMPLICATION, WITH LONG-TERM CURRENT USE OF INSULIN (HCC): ICD-10-CM

## 2024-01-09 DIAGNOSIS — Z79.4 TYPE 2 DIABETES MELLITUS WITH COMPLICATION, WITH LONG-TERM CURRENT USE OF INSULIN (HCC): ICD-10-CM

## 2024-01-09 PROCEDURE — 3046F HEMOGLOBIN A1C LEVEL >9.0%: CPT | Performed by: INTERNAL MEDICINE

## 2024-01-09 PROCEDURE — 2022F DILAT RTA XM EVC RTNOPTHY: CPT | Performed by: INTERNAL MEDICINE

## 2024-01-09 PROCEDURE — 3074F SYST BP LT 130 MM HG: CPT | Performed by: INTERNAL MEDICINE

## 2024-01-09 PROCEDURE — 3078F DIAST BP <80 MM HG: CPT | Performed by: INTERNAL MEDICINE

## 2024-01-09 PROCEDURE — 99214 OFFICE O/P EST MOD 30 MIN: CPT | Performed by: INTERNAL MEDICINE

## 2024-01-09 PROCEDURE — G8417 CALC BMI ABV UP PARAM F/U: HCPCS | Performed by: INTERNAL MEDICINE

## 2024-01-09 PROCEDURE — 4004F PT TOBACCO SCREEN RCVD TLK: CPT | Performed by: INTERNAL MEDICINE

## 2024-01-09 PROCEDURE — G8427 DOCREV CUR MEDS BY ELIG CLIN: HCPCS | Performed by: INTERNAL MEDICINE

## 2024-01-09 PROCEDURE — G8484 FLU IMMUNIZE NO ADMIN: HCPCS | Performed by: INTERNAL MEDICINE

## 2024-01-09 PROCEDURE — 3017F COLORECTAL CA SCREEN DOC REV: CPT | Performed by: INTERNAL MEDICINE

## 2024-01-09 RX ORDER — AMOXICILLIN AND CLAVULANATE POTASSIUM 500; 125 MG/1; MG/1
TABLET, FILM COATED ORAL
Qty: 21 TABLET | Refills: 0 | Status: SHIPPED | OUTPATIENT
Start: 2024-01-09

## 2024-01-09 SDOH — ECONOMIC STABILITY: FOOD INSECURITY: WITHIN THE PAST 12 MONTHS, THE FOOD YOU BOUGHT JUST DIDN'T LAST AND YOU DIDN'T HAVE MONEY TO GET MORE.: OFTEN TRUE

## 2024-01-09 SDOH — ECONOMIC STABILITY: FOOD INSECURITY: WITHIN THE PAST 12 MONTHS, YOU WORRIED THAT YOUR FOOD WOULD RUN OUT BEFORE YOU GOT MONEY TO BUY MORE.: OFTEN TRUE

## 2024-01-09 SDOH — ECONOMIC STABILITY: INCOME INSECURITY: HOW HARD IS IT FOR YOU TO PAY FOR THE VERY BASICS LIKE FOOD, HOUSING, MEDICAL CARE, AND HEATING?: NOT HARD AT ALL

## 2024-01-09 ASSESSMENT — PATIENT HEALTH QUESTIONNAIRE - PHQ9
SUM OF ALL RESPONSES TO PHQ9 QUESTIONS 1 & 2: 1
1. LITTLE INTEREST OR PLEASURE IN DOING THINGS: 0
SUM OF ALL RESPONSES TO PHQ QUESTIONS 1-9: 1
SUM OF ALL RESPONSES TO PHQ QUESTIONS 1-9: 1
2. FEELING DOWN, DEPRESSED OR HOPELESS: 1
SUM OF ALL RESPONSES TO PHQ QUESTIONS 1-9: 1
SUM OF ALL RESPONSES TO PHQ QUESTIONS 1-9: 1

## 2024-01-09 NOTE — PROGRESS NOTES
Outpatient Clinic Visit Note    Patient: Lisa Bender  : 1960 (63 y.o.)  Date: 2024    CC:  Chief Complaint   Patient presents with    Follow-up     4 week follow up    Cough     Productive cough x 2 weeks.  Patient states she has pain in her chest when she coughs        HPI: chest discomfort is right sided when she coughs.  She feels very tired, but different than when her ammonia is elevated.  She did not get her labs as previously ordered.  Dr Valencia is her liver doctor.  Her sputum is  a little green.  ----Her home sugars have been fluctuating a bit.      Past Medical History:    Past Medical History:   Diagnosis Date    Abnormal Pap smear of cervix     Acid reflux     Arthritis     left knee    Breast cyst     CAD (coronary artery disease)     per [facility] 13 - Dr. Karimi    COPD (chronic obstructive pulmonary disease) (HCC)     follow with Dr Huber    Depression     \"have manic - depression see Dr MAGY Murphy    Diabetes mellitus (HCC)     dx 10+ yrs ago- follows with PCP    Drug abuse ([facility])     hx use of cocaine, heroin and marijuana- states last used 2014    Glaucoma     bilateral    H/O Doppler lower venous ultrasound 2019    No DVT or SVT, Significant reflux of RGSV and LGSV.    H/O echocardiogram 2020    EF 55-60%, Mod LVH.    Hepatic encephalopathy (HCC) 2019    Hepatitis C     for liver bx 12/3/2015\"Have Hepatitis B and C and saw Dr Thomason for this 2015\"    Hiatal hernia     History of alcohol abuse     History of exercise stress test 2020    Treadmill, Normal exercise performance without angina and ischemic EKG changes.    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

## 2024-01-16 ENCOUNTER — HOSPITAL ENCOUNTER (OUTPATIENT)
Age: 64
End: 2024-01-16
Payer: MEDICARE

## 2024-01-16 ENCOUNTER — HOSPITAL ENCOUNTER (OUTPATIENT)
Dept: GENERAL RADIOLOGY | Age: 64
Discharge: HOME | End: 2024-01-16
Payer: MEDICARE

## 2024-01-16 ENCOUNTER — HOSPITAL ENCOUNTER (OUTPATIENT)
Age: 64
Discharge: HOME | End: 2024-01-16
Payer: MEDICARE

## 2024-01-16 DIAGNOSIS — R05.2 SUBACUTE COUGH: ICD-10-CM

## 2024-01-16 PROCEDURE — 71046 X-RAY EXAM CHEST 2 VIEWS: CPT

## 2024-01-18 ENCOUNTER — TELEPHONE (OUTPATIENT)
Dept: FAMILY MEDICINE CLINIC | Age: 64
End: 2024-01-18

## 2024-01-22 ENCOUNTER — APPOINTMENT (OUTPATIENT)
Dept: GENERAL RADIOLOGY | Age: 64
End: 2024-01-22
Payer: MEDICARE

## 2024-01-22 ENCOUNTER — APPOINTMENT (OUTPATIENT)
Dept: ULTRASOUND IMAGING | Age: 64
End: 2024-01-22
Payer: MEDICARE

## 2024-01-22 ENCOUNTER — HOSPITAL ENCOUNTER (INPATIENT)
Age: 64
LOS: 3 days | Discharge: HOME | End: 2024-01-25
Attending: STUDENT IN AN ORGANIZED HEALTH CARE EDUCATION/TRAINING PROGRAM | Admitting: INTERNAL MEDICINE
Payer: MEDICARE

## 2024-01-22 DIAGNOSIS — E72.20 HYPERAMMONEMIA (HCC): Primary | ICD-10-CM

## 2024-01-22 DIAGNOSIS — R94.31 PROLONGED Q-T INTERVAL ON ECG: ICD-10-CM

## 2024-01-22 LAB
ALBUMIN SERPL-MCNC: 3.8 GM/DL (ref 3.4–5)
ALCOHOL SCREEN SERUM: <0.01 %WT/VOL
ALP BLD-CCNC: 118 IU/L (ref 40–129)
ALT SERPL-CCNC: 11 U/L (ref 10–40)
AMMONIA: 69 UMOL/L (ref 11–51)
ANION GAP SERPL CALCULATED.3IONS-SCNC: 12 MMOL/L (ref 7–16)
APTT: 30.8 SECONDS (ref 25.1–37.1)
AST SERPL-CCNC: 19 IU/L (ref 15–37)
BACTERIA: NEGATIVE /HPF
BASOPHILS ABSOLUTE: 0.1 K/CU MM
BASOPHILS RELATIVE PERCENT: 0.7 % (ref 0–1)
BILIRUB SERPL-MCNC: 2 MG/DL (ref 0–1)
BILIRUBIN DIRECT: 0.6 MG/DL (ref 0–0.3)
BILIRUBIN URINE: NEGATIVE MG/DL
BILIRUBIN, INDIRECT: 1.4 MG/DL (ref 0–0.7)
BLOOD, URINE: ABNORMAL
BUN SERPL-MCNC: 19 MG/DL (ref 6–23)
CALCIUM SERPL-MCNC: 9 MG/DL (ref 8.3–10.6)
CHLORIDE BLD-SCNC: 103 MMOL/L (ref 99–110)
CLARITY: CLEAR
CO2: 25 MMOL/L (ref 21–32)
COLOR: YELLOW
CREAT SERPL-MCNC: 0.8 MG/DL (ref 0.6–1.1)
DIFFERENTIAL TYPE: ABNORMAL
EOSINOPHILS ABSOLUTE: 0.1 K/CU MM
EOSINOPHILS RELATIVE PERCENT: 0.9 % (ref 0–3)
GFR SERPL CREATININE-BSD FRML MDRD: >60 ML/MIN/1.73M2
GLUCOSE SERPL-MCNC: 190 MG/DL (ref 70–99)
GLUCOSE, URINE: NEGATIVE MG/DL
HCT VFR BLD CALC: 47 % (ref 37–47)
HEMOGLOBIN: 16.2 GM/DL (ref 12.5–16)
HYALINE CASTS: 2 /LPF
IMMATURE NEUTROPHIL %: 0.5 % (ref 0–0.43)
INFLUENZA A ANTIGEN: NOT DETECTED
INFLUENZA B ANTIGEN: NOT DETECTED
INR BLD: 1.2 INDEX
KETONES, URINE: 15 MG/DL
LACTIC ACID, SEPSIS: 1.8 MMOL/L (ref 0.4–2)
LEUKOCYTE ESTERASE, URINE: NEGATIVE
LIPASE: 85 IU/L (ref 13–60)
LYMPHOCYTES ABSOLUTE: 1.7 K/CU MM
LYMPHOCYTES RELATIVE PERCENT: 12.9 % (ref 24–44)
MAGNESIUM: 1.9 MG/DL (ref 1.8–2.4)
MCH RBC QN AUTO: 32 PG (ref 27–31)
MCHC RBC AUTO-ENTMCNC: 34.5 % (ref 32–36)
MCV RBC AUTO: 92.9 FL (ref 78–100)
MONOCYTES ABSOLUTE: 0.9 K/CU MM
MONOCYTES RELATIVE PERCENT: 6.6 % (ref 0–4)
MUCUS: ABNORMAL HPF
NITRITE URINE, QUANTITATIVE: NEGATIVE
NUCLEATED RBC %: 0 %
PDW BLD-RTO: 13.1 % (ref 11.7–14.9)
PH, URINE: 5.5 (ref 5–8)
PLATELET # BLD: 103 K/CU MM (ref 140–440)
PMV BLD AUTO: 12.1 FL (ref 7.5–11.1)
POTASSIUM SERPL-SCNC: 3.5 MMOL/L (ref 3.5–5.1)
PRO-BNP: 362.4 PG/ML
PROCALCITONIN SERPL-MCNC: 0.07 NG/ML
PROTEIN UA: 100 MG/DL
PROTHROMBIN TIME: 15.1 SECONDS (ref 11.7–14.5)
RBC # BLD: 5.06 M/CU MM (ref 4.2–5.4)
RBC URINE: 1 /HPF (ref 0–6)
SARS-COV-2 RDRP RESP QL NAA+PROBE: NOT DETECTED
SEGMENTED NEUTROPHILS ABSOLUTE COUNT: 10.3 K/CU MM
SEGMENTED NEUTROPHILS RELATIVE PERCENT: 78.4 % (ref 36–66)
SODIUM BLD-SCNC: 140 MMOL/L (ref 135–145)
SOURCE: NORMAL
SPECIFIC GRAVITY UA: >1.03 (ref 1–1.03)
SQUAMOUS EPITHELIAL: 4 /HPF
TOTAL IMMATURE NEUTOROPHIL: 0.06 K/CU MM
TOTAL NUCLEATED RBC: 0 K/CU MM
TOTAL PROTEIN: 7.4 GM/DL (ref 6.4–8.2)
TRICHOMONAS: ABNORMAL /HPF
TROPONIN, HIGH SENSITIVITY: 10 NG/L (ref 0–14)
TROPONIN, HIGH SENSITIVITY: 10 NG/L (ref 0–14)
UROBILINOGEN, URINE: 0.2 MG/DL (ref 0.2–1)
WBC # BLD: 13.1 K/CU MM (ref 4–10.5)
WBC UA: 1 /HPF (ref 0–5)

## 2024-01-22 PROCEDURE — 71045 X-RAY EXAM CHEST 1 VIEW: CPT

## 2024-01-22 PROCEDURE — G0480 DRUG TEST DEF 1-7 CLASSES: HCPCS

## 2024-01-22 PROCEDURE — 6370000000 HC RX 637 (ALT 250 FOR IP): Performed by: INTERNAL MEDICINE

## 2024-01-22 PROCEDURE — 84484 ASSAY OF TROPONIN QUANT: CPT

## 2024-01-22 PROCEDURE — 99285 EMERGENCY DEPT VISIT HI MDM: CPT

## 2024-01-22 PROCEDURE — 85610 PROTHROMBIN TIME: CPT

## 2024-01-22 PROCEDURE — 85025 COMPLETE CBC W/AUTO DIFF WBC: CPT

## 2024-01-22 PROCEDURE — 87635 SARS-COV-2 COVID-19 AMP PRB: CPT

## 2024-01-22 PROCEDURE — 83690 ASSAY OF LIPASE: CPT

## 2024-01-22 PROCEDURE — 81001 URINALYSIS AUTO W/SCOPE: CPT

## 2024-01-22 PROCEDURE — 85730 THROMBOPLASTIN TIME PARTIAL: CPT

## 2024-01-22 PROCEDURE — 6360000002 HC RX W HCPCS: Performed by: INTERNAL MEDICINE

## 2024-01-22 PROCEDURE — 2580000003 HC RX 258: Performed by: STUDENT IN AN ORGANIZED HEALTH CARE EDUCATION/TRAINING PROGRAM

## 2024-01-22 PROCEDURE — 1200000000 HC SEMI PRIVATE

## 2024-01-22 PROCEDURE — 80053 COMPREHEN METABOLIC PANEL: CPT

## 2024-01-22 PROCEDURE — 2500000003 HC RX 250 WO HCPCS: Performed by: STUDENT IN AN ORGANIZED HEALTH CARE EDUCATION/TRAINING PROGRAM

## 2024-01-22 PROCEDURE — 84145 PROCALCITONIN (PCT): CPT

## 2024-01-22 PROCEDURE — 82248 BILIRUBIN DIRECT: CPT

## 2024-01-22 PROCEDURE — 76705 ECHO EXAM OF ABDOMEN: CPT

## 2024-01-22 PROCEDURE — 6370000000 HC RX 637 (ALT 250 FOR IP): Performed by: STUDENT IN AN ORGANIZED HEALTH CARE EDUCATION/TRAINING PROGRAM

## 2024-01-22 PROCEDURE — 87040 BLOOD CULTURE FOR BACTERIA: CPT

## 2024-01-22 PROCEDURE — 2580000003 HC RX 258: Performed by: INTERNAL MEDICINE

## 2024-01-22 PROCEDURE — 96375 TX/PRO/DX INJ NEW DRUG ADDON: CPT

## 2024-01-22 PROCEDURE — 83605 ASSAY OF LACTIC ACID: CPT

## 2024-01-22 PROCEDURE — 6360000002 HC RX W HCPCS: Performed by: STUDENT IN AN ORGANIZED HEALTH CARE EDUCATION/TRAINING PROGRAM

## 2024-01-22 PROCEDURE — 83880 ASSAY OF NATRIURETIC PEPTIDE: CPT

## 2024-01-22 PROCEDURE — 96374 THER/PROPH/DIAG INJ IV PUSH: CPT

## 2024-01-22 PROCEDURE — 83735 ASSAY OF MAGNESIUM: CPT

## 2024-01-22 PROCEDURE — 82140 ASSAY OF AMMONIA: CPT

## 2024-01-22 PROCEDURE — 87502 INFLUENZA DNA AMP PROBE: CPT

## 2024-01-22 PROCEDURE — 93005 ELECTROCARDIOGRAM TRACING: CPT | Performed by: STUDENT IN AN ORGANIZED HEALTH CARE EDUCATION/TRAINING PROGRAM

## 2024-01-22 RX ORDER — ENOXAPARIN SODIUM 100 MG/ML
40 INJECTION SUBCUTANEOUS DAILY
Status: DISCONTINUED | OUTPATIENT
Start: 2024-01-22 | End: 2024-01-25 | Stop reason: HOSPADM

## 2024-01-22 RX ORDER — INSULIN GLARGINE 100 [IU]/ML
12 INJECTION, SOLUTION SUBCUTANEOUS NIGHTLY
Status: DISCONTINUED | OUTPATIENT
Start: 2024-01-22 | End: 2024-01-25 | Stop reason: HOSPADM

## 2024-01-22 RX ORDER — 0.9 % SODIUM CHLORIDE 0.9 %
1000 INTRAVENOUS SOLUTION INTRAVENOUS ONCE
Status: COMPLETED | OUTPATIENT
Start: 2024-01-22 | End: 2024-01-22

## 2024-01-22 RX ORDER — ATORVASTATIN CALCIUM 40 MG/1
40 TABLET, FILM COATED ORAL NIGHTLY
Status: DISCONTINUED | OUTPATIENT
Start: 2024-01-22 | End: 2024-01-25 | Stop reason: HOSPADM

## 2024-01-22 RX ORDER — MAGNESIUM HYDROXIDE/ALUMINUM HYDROXICE/SIMETHICONE 120; 1200; 1200 MG/30ML; MG/30ML; MG/30ML
30 SUSPENSION ORAL ONCE
Status: COMPLETED | OUTPATIENT
Start: 2024-01-22 | End: 2024-01-22

## 2024-01-22 RX ORDER — SODIUM CHLORIDE 0.9 % (FLUSH) 0.9 %
5-40 SYRINGE (ML) INJECTION EVERY 12 HOURS SCHEDULED
Status: DISCONTINUED | OUTPATIENT
Start: 2024-01-22 | End: 2024-01-25 | Stop reason: HOSPADM

## 2024-01-22 RX ORDER — GLUCAGON 1 MG/ML
1 KIT INJECTION PRN
Status: DISCONTINUED | OUTPATIENT
Start: 2024-01-22 | End: 2024-01-25 | Stop reason: HOSPADM

## 2024-01-22 RX ORDER — LACTULOSE 10 G/15ML
20 SOLUTION ORAL 3 TIMES DAILY
Status: DISCONTINUED | OUTPATIENT
Start: 2024-01-22 | End: 2024-01-25 | Stop reason: HOSPADM

## 2024-01-22 RX ORDER — METOPROLOL SUCCINATE 50 MG/1
50 TABLET, EXTENDED RELEASE ORAL DAILY
Status: DISCONTINUED | OUTPATIENT
Start: 2024-01-23 | End: 2024-01-25 | Stop reason: HOSPADM

## 2024-01-22 RX ORDER — FUROSEMIDE 20 MG/1
20 TABLET ORAL DAILY
Status: DISCONTINUED | OUTPATIENT
Start: 2024-01-23 | End: 2024-01-25 | Stop reason: HOSPADM

## 2024-01-22 RX ORDER — SODIUM CHLORIDE 0.9 % (FLUSH) 0.9 %
5-40 SYRINGE (ML) INJECTION PRN
Status: DISCONTINUED | OUTPATIENT
Start: 2024-01-22 | End: 2024-01-25 | Stop reason: HOSPADM

## 2024-01-22 RX ORDER — CALCIUM CARBONATE 500 MG/1
500 TABLET, CHEWABLE ORAL 3 TIMES DAILY PRN
Status: DISCONTINUED | OUTPATIENT
Start: 2024-01-22 | End: 2024-01-25 | Stop reason: HOSPADM

## 2024-01-22 RX ORDER — SODIUM CHLORIDE, SODIUM LACTATE, POTASSIUM CHLORIDE, CALCIUM CHLORIDE 600; 310; 30; 20 MG/100ML; MG/100ML; MG/100ML; MG/100ML
INJECTION, SOLUTION INTRAVENOUS CONTINUOUS
Status: DISPENSED | OUTPATIENT
Start: 2024-01-22 | End: 2024-01-24

## 2024-01-22 RX ORDER — LACTULOSE 10 G/15ML
20 SOLUTION ORAL ONCE
Status: COMPLETED | OUTPATIENT
Start: 2024-01-22 | End: 2024-01-22

## 2024-01-22 RX ORDER — SODIUM CHLORIDE 9 MG/ML
INJECTION, SOLUTION INTRAVENOUS PRN
Status: DISCONTINUED | OUTPATIENT
Start: 2024-01-22 | End: 2024-01-25 | Stop reason: HOSPADM

## 2024-01-22 RX ORDER — RIFAMPIN 150 MG/1
150 CAPSULE ORAL ONCE
Status: DISCONTINUED | OUTPATIENT
Start: 2024-01-22 | End: 2024-01-22

## 2024-01-22 RX ORDER — PANTOPRAZOLE SODIUM 40 MG/1
40 TABLET, DELAYED RELEASE ORAL
Status: DISCONTINUED | OUTPATIENT
Start: 2024-01-23 | End: 2024-01-25 | Stop reason: HOSPADM

## 2024-01-22 RX ORDER — RANOLAZINE 500 MG/1
500 TABLET, EXTENDED RELEASE ORAL 2 TIMES DAILY
Status: DISCONTINUED | OUTPATIENT
Start: 2024-01-22 | End: 2024-01-25 | Stop reason: HOSPADM

## 2024-01-22 RX ORDER — MAGNESIUM SULFATE IN WATER 40 MG/ML
2000 INJECTION, SOLUTION INTRAVENOUS ONCE
Status: COMPLETED | OUTPATIENT
Start: 2024-01-22 | End: 2024-01-22

## 2024-01-22 RX ORDER — QUETIAPINE FUMARATE 25 MG/1
25 TABLET, FILM COATED ORAL
Status: DISCONTINUED | OUTPATIENT
Start: 2024-01-22 | End: 2024-01-25 | Stop reason: HOSPADM

## 2024-01-22 RX ORDER — FAMOTIDINE 10 MG/ML
20 INJECTION, SOLUTION INTRAVENOUS ONCE
Status: COMPLETED | OUTPATIENT
Start: 2024-01-22 | End: 2024-01-22

## 2024-01-22 RX ORDER — ONDANSETRON 2 MG/ML
4 INJECTION INTRAMUSCULAR; INTRAVENOUS ONCE
Status: COMPLETED | OUTPATIENT
Start: 2024-01-22 | End: 2024-01-22

## 2024-01-22 RX ORDER — SPIRONOLACTONE 50 MG/1
50 TABLET, FILM COATED ORAL DAILY
Status: DISCONTINUED | OUTPATIENT
Start: 2024-01-23 | End: 2024-01-25 | Stop reason: HOSPADM

## 2024-01-22 RX ORDER — DEXTROSE MONOHYDRATE 100 MG/ML
INJECTION, SOLUTION INTRAVENOUS CONTINUOUS PRN
Status: DISCONTINUED | OUTPATIENT
Start: 2024-01-22 | End: 2024-01-25 | Stop reason: HOSPADM

## 2024-01-22 RX ORDER — ALBUTEROL SULFATE 90 UG/1
2 AEROSOL, METERED RESPIRATORY (INHALATION) 4 TIMES DAILY PRN
Status: DISCONTINUED | OUTPATIENT
Start: 2024-01-22 | End: 2024-01-25 | Stop reason: HOSPADM

## 2024-01-22 RX ORDER — INSULIN LISPRO 100 [IU]/ML
0-4 INJECTION, SOLUTION INTRAVENOUS; SUBCUTANEOUS
Status: DISCONTINUED | OUTPATIENT
Start: 2024-01-23 | End: 2024-01-25 | Stop reason: HOSPADM

## 2024-01-22 RX ORDER — INSULIN LISPRO 100 [IU]/ML
0-4 INJECTION, SOLUTION INTRAVENOUS; SUBCUTANEOUS NIGHTLY
Status: DISCONTINUED | OUTPATIENT
Start: 2024-01-22 | End: 2024-01-25 | Stop reason: HOSPADM

## 2024-01-22 RX ADMIN — ONDANSETRON 4 MG: 2 INJECTION INTRAMUSCULAR; INTRAVENOUS at 16:41

## 2024-01-22 RX ADMIN — ALUMINUM HYDROXIDE, MAGNESIUM HYDROXIDE, AND SIMETHICONE 30 ML: 1200; 120; 1200 SUSPENSION ORAL at 16:41

## 2024-01-22 RX ADMIN — LACTULOSE 20 G: 20 SOLUTION ORAL at 16:41

## 2024-01-22 RX ADMIN — CEFEPIME 2000 MG: 2 INJECTION, POWDER, FOR SOLUTION INTRAVENOUS at 18:34

## 2024-01-22 RX ADMIN — CALCIUM CARBONATE 500 MG: 500 TABLET, CHEWABLE ORAL at 20:04

## 2024-01-22 RX ADMIN — FAMOTIDINE 20 MG: 10 INJECTION, SOLUTION INTRAVENOUS at 16:41

## 2024-01-22 RX ADMIN — SODIUM CHLORIDE 1000 ML: 9 INJECTION, SOLUTION INTRAVENOUS at 16:41

## 2024-01-22 RX ADMIN — MAGNESIUM SULFATE HEPTAHYDRATE 2000 MG: 40 INJECTION, SOLUTION INTRAVENOUS at 16:40

## 2024-01-22 RX ADMIN — RIFAXIMIN 550 MG: 550 TABLET ORAL at 18:33

## 2024-01-22 ASSESSMENT — PAIN SCALES - GENERAL: PAINLEVEL_OUTOF10: 3

## 2024-01-22 NOTE — H&P
V2.0  History and Physical      Name:  Lisa Bender /Age/Sex: 1960  (63 y.o. female)   MRN & CSN:  [id number] & [id number] Encounter Date/Time: 2024 5:16 PM EST   Location:  [place] [address] PCP: Chris Garza MD       Hospital Day: 1    Assessment and Plan:   Lisa Bender is a 63 y.o. female who presents with Hepatic encephalopathy (HCC)    Hospital Problems             Last Modified POA    * (Principal) Hepatic encephalopathy 2024 Yes     # Hepatic encephalopathy  # Liver cirrhosis 2/2 hepatitis C s/p TIPS due to portal hypertension  -Confusion, nausea vomiting and abdominal pain since yesterday evening  -No prior paracentesis  -Follows with Dr. Valencia  -Elevated ammonia level  -UA without signs of infection  -No ascites on exam  -T. bili of 2, indirect bili of 1.4 and D bili of 0.6  Follow-up on blood cultures, urine culture  -Low procalcitonin  -Chest x-ray with unchanged nonspecific patchy bibasilar airspace opacities  -Liver ultrasound showing nodular hepatic surface contour suggestive of cirrhosis, main portal vein is patent with normal directional flow  Lactulose, rifaximin, Lasix, metoprolol, spironolactone  GI consulted, appreciate recs    # Mild hydronephrosis of right kidney  -Incidental finding on abdominal ultrasound  -No current JASVIR  Continue to monitor, consider urology consult    # Leukocytosis  -Patient with nausea and vomiting, subjective fevers and chills, may have underlying infection  Started on broad-spectrum antibiotics  Blood cultures taken  Follow-up on cultures  Consider CT of abdomen pelvis    # Thrombocytopenia likely in the setting of cirrhosis  Stop DVT prophylaxis if platelets less than 50 or if bleeding    # Type 2 diabetes  Started on Lantus 12 units nightly, low-dose sliding scale and hypoglycemia protocol    Disposition:   Current Living situation: Home  Expected Disposition: Home  Estimated D/C: 3 days    Diet No diet orders on file   DVT Prophylaxis  exercise performance without angina and ischemic EKG changes.    HTN (hypertension)     \"for the past two yrs on medication\" follows with Dr Sachi Meek of blood clots 2019    Portal vein thrombsis - TIPS procedure 4/23/19    Hyperlipemia     Irregular heart beat     per pt    Liver hematoma     Migraine     Migraines     Last migraine:  2018    Nausea & vomiting     Neurologic disorder     Schizophrenia (HCC)     per old chart    Seizures (HCC)     \"last one was 9/2015- saw Dr Castillo at [facility]- she said not sure if acutal seizures- she thinks they are panic or anxiety attacks\"    SOB (shortness of breath)     with any exertion    Vulvar mass      PSHX:  has a past surgical history that includes Cholecystectomy (per old chart done 1985); Tonsillectomy (as a kid); Breast surgery (10/2015); Endoscopy, colon, diagnostic (03/16/2017); Upper gastrointestinal endoscopy (N/A, 11/14/2018); TIPS procedure (04/23/2019); Upper gastrointestinal endoscopy (N/A, 06/05/2019); Colonoscopy (03/11/2013); Colonoscopy (03/16/2017); eye surgery (Bilateral, ? when); Hysterectomy; Carpal tunnel release (Left, 01/09/2020); Carpal tunnel release (Right, 05/26/2020); Carpal tunnel release (Right, 05/26/2020); Finger trigger release (Right, 05/26/2020); Cardiac catheterization; Colonoscopy (N/A, 05/17/2022); Salpingo-oophorectomy; and Vulvectomy (N/A, 11/9/2022).  Allergies:   Allergies   Allergen Reactions    Hydrocodone-Acetaminophen Anaphylaxis    Norco [Hydrocodone-Acetaminophen] Itching     Itching, rash, nausea and vomiting.     Tylenol [Acetaminophen] Itching, Nausea And Vomiting and Rash     Fam HX:  family history includes Arthritis in her father and mother; Breast Cancer in her maternal aunt, maternal aunt, maternal aunt, and maternal aunt; Cancer in her father and mother; Diabetes in her father; Heart Disease in her brother; High Blood Pressure in her father; High Cholesterol in her father; Migraines in her father, mother, and  sister; Ovarian Cancer in her mother.  Soc HX:   Social History     Socioeconomic History    Marital status:      Spouse name: None    Number of children: None    Years of education: None    Highest education level: None   Tobacco Use    Smoking status: Every Day     Current packs/day: 0.50     Average packs/day: 0.5 packs/day for 50.1 years (25.0 ttl pk-yrs)     Types: Cigarettes     Start date: 1974     Passive exposure: Past    Smokeless tobacco: Never   Vaping Use    Vaping Use: Never used   Substance and Sexual Activity    Alcohol use: Not Currently     Comment: None/Caffiene - 1 cup of coffee/day    Drug use: Not Currently     Types: Marijuana (Weed)    Sexual activity: Not Currently     Partners: Male     Social Determinants of Health     Financial Resource Strain: Low Risk  (1/9/2024)    Overall Financial Resource Strain (CARDIA)     Difficulty of Paying Living Expenses: Not hard at all   Food Insecurity: Food Insecurity Present (1/9/2024)    Hunger Vital Sign     Worried About Running Out of Food in the Last Year: Often true     Ran Out of Food in the Last Year: Often true   Transportation Needs: Unknown (1/9/2024)    PRAPARE - Transportation     Lack of Transportation (Non-Medical): No   Physical Activity: Inactive (12/8/2022)    Exercise Vital Sign     Days of Exercise per Week: 0 days     Minutes of Exercise per Session: 0 min   Stress: No Stress Concern Present (12/8/2022)    [place] [employer] - Occupational Stress Questionnaire     Feeling of Stress : Only a little   Social Connections: Moderately Integrated (12/8/2022)    Social Connection and Isolation Panel [NHANES]     Frequency of Communication with Friends and Family: Three times a week     Frequency of Social Gatherings with Friends and Family: Twice a week     Attends [employer] Services: More than 4 times per year     Active Member of Clubs or Organizations: Yes     Attends Club or Organization Meetings: More than

## 2024-01-22 NOTE — ED PROVIDER NOTES
Emergency Department Encounter        Pt Name: [place]  MRN: [id number]  Birthdate 1960  Date of evaluation: 1/22/2024  ED Physician: Talha Puentes MD    CHIEF COMPLAINT     Triage Chief Complaint:   Altered Mental Status (Patient reports that she \"feels confused\"- states that she has also been vomiting since yesterday )      HISTORY OF PRESENT ILLNESS & REVIEW OF SYSTEMS     History obtained from the patient and staff.    [place] is a 63 y.o. female who presents to the emergency department for evaluation of confusion.  Says that yesterday afternoon/evening she began having some nausea and vomiting.  Says it was nonbilious and not bloody but says it was slightly dark.  Says that she has had a little bit of confusion this morning.  Says that she is a little bit of epigastric abdominal pain but denies other areas of abdominal pain.  Denies any diarrhea.  Denies any dysuria.  Denies any fevers.  Says she lives in an apartment complex.  Denies any cough chest pain shortness of breath.  Denies any falls or trauma.  Says that she has had this happen in the past when her ammonia has been high.  Says she has been taking her medications.        Patient denies any new Headache, Fever, Chills, Cough, Chest pain, Shortness of breath, Diarrhea, Constipation, and Leg swelling.    The patient has no other acute complaints at this time.  Review of systems as above.          PAST MED/SURG/SOCIAL/FAM HISTORY & ALLERGY & MEDICATIONS     Past Medical History:   Diagnosis Date    Abnormal Pap smear of cervix     Acid reflux     Arthritis     left knee    Breast cyst     CAD (coronary artery disease)     per [facility] 9/6[facility] - Dr. Karimi    COPD (chronic obstructive pulmonary disease) ([facility])     follow with Dr Huber    Depression     \"have manic - depression see Dr MAGY Murphy    Diabetes mellitus ([facility])     dx 10+ yrs ago- follows with PCP    Drug abuse ([facility])     hx use of cocaine, heroin and marijuana- states last  Nausea And Vomiting and Rash     Past Surgical History:   Procedure Laterality Date    BREAST SURGERY  10/2015    left breast bx    CARDIAC CATHETERIZATION      per old chart pt had cath done in 3/2011 and 9/2013    CARPAL TUNNEL RELEASE Left 01/09/2020    CARPAL TUNNEL RELEASE LEFT performed by Matthew Solorzano DO at [facility] OR    CARPAL TUNNEL RELEASE Right 05/26/2020    dr solorzano, carpal tunnel trigger finger release    CARPAL TUNNEL RELEASE Right 05/26/2020    RIGHT CARPAL TUNNEL RELEASE performed by Matthew Solorzano DO at [facility] OR    CHOLECYSTECTOMY  per old chart done 1985    COLONOSCOPY  03/11/2013    diverticulosis, cecal polyp    COLONOSCOPY  03/16/2017    Internal hemorrhoids- Dr. Min    COLONOSCOPY N/A 05/17/2022    COLONOSCOPY POLYPECTOMY SNARE/COLD BIOPSY performed by Erik Vail MD at [facility] ENDOSCOPY    ENDOSCOPY, COLON, DIAGNOSTIC  03/16/2017    EGD: Small esophageal varices, portal hypertensive gastropathy, reflux esophagitis, hiatal hernia    EYE SURGERY Bilateral ? when    cyst removal, cataracts w/lens replacement    FINGER TRIGGER RELEASE Right 05/26/2020    FINGER TRIGGER RELEASE RIGHT RING FINGER performed by Matthew Solorzano DO at [facility] OR    HYSTERECTOMY (CERVIX STATUS UNKNOWN)      per old chart pt had CHOLO/BSO 1986    SALPINGO-OOPHORECTOMY      TIPS PROCEDURE  04/23/2019    TONSILLECTOMY  as a kid    UPPER GASTROINTESTINAL ENDOSCOPY N/A 11/14/2018    EGD DIAGNOSTIC ONLY performed by Jerald Alvarenga MD at [facility] ENDOSCOPY    UPPER GASTROINTESTINAL ENDOSCOPY N/A 06/05/2019    EGD DIAGNOSTIC ONLY performed by Jerald Alvarenga MD at [facility] ENDOSCOPY    VULVECTOMY N/A 11/9/2022    PARTIAL VULVECTOMY performed by Rj Booker MD at [facility] OR     Social History     Socioeconomic History    Marital status:      Spouse name: Not on file    Number of children: Not on file    Years of education: Not on file    Highest education level: Not on file   Occupational History    Not on file  degrees    R Axis 27 degrees    T Axis 52 degrees    Diagnosis       Sinus tachycardia  Possible Left atrial enlargement  Anterior infarct , age undetermined  Abnormal ECG  When compared with ECG of 19-APR-2023 03:54,  Anterior infarct is now present        Radiographs (if obtained):  Radiologist's Report Reviewed:  No results found.    LABS: (none if blank)  Labs Reviewed   CBC WITH AUTO DIFFERENTIAL - Abnormal; Notable for the following components:       Result Value    WBC 13.1 (*)     Hemoglobin 16.2 (*)     MCH 32.0 (*)     Platelets 103 (*)     MPV 12.1 (*)     Segs Relative 78.4 (*)     Lymphocytes % 12.9 (*)     Monocytes % 6.6 (*)     Immature Neutrophil % 0.5 (*)     All other components within normal limits   BASIC METABOLIC PANEL - Abnormal; Notable for the following components:    Glucose 190 (*)     All other components within normal limits   HEPATIC FUNCTION PANEL - Abnormal; Notable for the following components:    Total Bilirubin 2.0 (*)     Bilirubin, Direct 0.6 (*)     Bilirubin, Indirect 1.4 (*)     All other components within normal limits   LIPASE - Abnormal; Notable for the following components:    Lipase 85 (*)     All other components within normal limits   BRAIN NATRIURETIC PEPTIDE - Abnormal; Notable for the following components:    Pro-.4 (*)     All other components within normal limits   AMMONIA - Abnormal; Notable for the following components:    Ammonia 69 (*)     All other components within normal limits   PROTIME/INR & PTT - Abnormal; Notable for the following components:    Protime 15.1 (*)     All other components within normal limits   COVID-19, RAPID   INFLUENZA A/B, MOLECULAR   ETHANOL   MAGNESIUM   TROPONIN   TROPONIN   URINALYSIS WITH REFLEX TO CULTURE   PROCALCITONIN   POCT GLUCOSE       MEDICATION CHANGES  New Prescriptions    No medications on file       FINAL IMPRESSION      Final diagnoses:   Hyperammonemia (HCC)   Prolonged Q-T interval on ECG     Condition:

## 2024-01-23 LAB
ALBUMIN SERPL-MCNC: 3.2 GM/DL (ref 3.4–5)
ALP BLD-CCNC: 102 IU/L (ref 40–128)
ALT SERPL-CCNC: 10 U/L (ref 10–40)
ANION GAP SERPL CALCULATED.3IONS-SCNC: 11 MMOL/L (ref 7–16)
AST SERPL-CCNC: 15 IU/L (ref 15–37)
BASOPHILS ABSOLUTE: 0.1 K/CU MM
BASOPHILS RELATIVE PERCENT: 0.8 % (ref 0–1)
BILIRUB SERPL-MCNC: 1.7 MG/DL (ref 0–1)
BUN SERPL-MCNC: 16 MG/DL (ref 6–23)
CALCIUM SERPL-MCNC: 7.7 MG/DL (ref 8.3–10.6)
CHLORIDE BLD-SCNC: 104 MMOL/L (ref 99–110)
CO2: 23 MMOL/L (ref 21–32)
CREAT SERPL-MCNC: 0.6 MG/DL (ref 0.6–1.1)
DIFFERENTIAL TYPE: ABNORMAL
EOSINOPHILS ABSOLUTE: 0.2 K/CU MM
EOSINOPHILS RELATIVE PERCENT: 1.7 % (ref 0–3)
GFR SERPL CREATININE-BSD FRML MDRD: >60 ML/MIN/1.73M2
GLUCOSE BLD-MCNC: 128 MG/DL (ref 70–99)
GLUCOSE BLD-MCNC: 200 MG/DL (ref 70–99)
GLUCOSE BLD-MCNC: 222 MG/DL (ref 70–99)
GLUCOSE BLD-MCNC: 234 MG/DL (ref 70–99)
GLUCOSE SERPL-MCNC: 133 MG/DL (ref 70–99)
HCT VFR BLD CALC: 39.2 % (ref 37–47)
HEMOGLOBIN: 13.8 GM/DL (ref 12.5–16)
IMMATURE NEUTROPHIL %: 0.3 % (ref 0–0.43)
LYMPHOCYTES ABSOLUTE: 2 K/CU MM
LYMPHOCYTES RELATIVE PERCENT: 17.8 % (ref 24–44)
MAGNESIUM: 2.2 MG/DL (ref 1.8–2.4)
MCH RBC QN AUTO: 32.6 PG (ref 27–31)
MCHC RBC AUTO-ENTMCNC: 35.2 % (ref 32–36)
MCV RBC AUTO: 92.7 FL (ref 78–100)
MONOCYTES ABSOLUTE: 0.9 K/CU MM
MONOCYTES RELATIVE PERCENT: 8.1 % (ref 0–4)
NUCLEATED RBC %: 0 %
PDW BLD-RTO: 13.2 % (ref 11.7–14.9)
PLATELET # BLD: 101 K/CU MM (ref 140–440)
PMV BLD AUTO: 12.3 FL (ref 7.5–11.1)
POTASSIUM SERPL-SCNC: 3.2 MMOL/L (ref 3.5–5.1)
PROCALCITONIN SERPL-MCNC: 0.07 NG/ML
RBC # BLD: 4.23 M/CU MM (ref 4.2–5.4)
SEGMENTED NEUTROPHILS ABSOLUTE COUNT: 7.8 K/CU MM
SEGMENTED NEUTROPHILS RELATIVE PERCENT: 71.3 % (ref 36–66)
SODIUM BLD-SCNC: 138 MMOL/L (ref 135–145)
TOTAL IMMATURE NEUTOROPHIL: 0.03 K/CU MM
TOTAL NUCLEATED RBC: 0 K/CU MM
TOTAL PROTEIN: 5.8 GM/DL (ref 6.4–8.2)
WBC # BLD: 11 K/CU MM (ref 4–10.5)

## 2024-01-23 PROCEDURE — 6370000000 HC RX 637 (ALT 250 FOR IP): Performed by: FAMILY MEDICINE

## 2024-01-23 PROCEDURE — 84145 PROCALCITONIN (PCT): CPT

## 2024-01-23 PROCEDURE — 6360000002 HC RX W HCPCS: Performed by: INTERNAL MEDICINE

## 2024-01-23 PROCEDURE — 6370000000 HC RX 637 (ALT 250 FOR IP): Performed by: INTERNAL MEDICINE

## 2024-01-23 PROCEDURE — 2580000003 HC RX 258: Performed by: INTERNAL MEDICINE

## 2024-01-23 PROCEDURE — 1200000000 HC SEMI PRIVATE

## 2024-01-23 PROCEDURE — 80053 COMPREHEN METABOLIC PANEL: CPT

## 2024-01-23 PROCEDURE — 94761 N-INVAS EAR/PLS OXIMETRY MLT: CPT

## 2024-01-23 PROCEDURE — 6360000002 HC RX W HCPCS: Performed by: STUDENT IN AN ORGANIZED HEALTH CARE EDUCATION/TRAINING PROGRAM

## 2024-01-23 PROCEDURE — 85025 COMPLETE CBC W/AUTO DIFF WBC: CPT

## 2024-01-23 PROCEDURE — 82105 ALPHA-FETOPROTEIN SERUM: CPT

## 2024-01-23 PROCEDURE — 94010 BREATHING CAPACITY TEST: CPT

## 2024-01-23 PROCEDURE — 82962 GLUCOSE BLOOD TEST: CPT

## 2024-01-23 PROCEDURE — 6370000000 HC RX 637 (ALT 250 FOR IP): Performed by: STUDENT IN AN ORGANIZED HEALTH CARE EDUCATION/TRAINING PROGRAM

## 2024-01-23 PROCEDURE — 83735 ASSAY OF MAGNESIUM: CPT

## 2024-01-23 PROCEDURE — 36415 COLL VENOUS BLD VENIPUNCTURE: CPT

## 2024-01-23 RX ORDER — TRAMADOL HYDROCHLORIDE 50 MG/1
50 TABLET ORAL EVERY 6 HOURS PRN
Status: COMPLETED | OUTPATIENT
Start: 2024-01-23 | End: 2024-01-24

## 2024-01-23 RX ORDER — TIZANIDINE HYDROCHLORIDE 4 MG/1
4 CAPSULE, GELATIN COATED ORAL 2 TIMES DAILY
COMMUNITY

## 2024-01-23 RX ORDER — POTASSIUM CHLORIDE 20 MEQ/1
40 TABLET, EXTENDED RELEASE ORAL ONCE
Status: COMPLETED | OUTPATIENT
Start: 2024-01-23 | End: 2024-01-23

## 2024-01-23 RX ORDER — ONDANSETRON 2 MG/ML
4 INJECTION INTRAMUSCULAR; INTRAVENOUS EVERY 6 HOURS PRN
Status: DISCONTINUED | OUTPATIENT
Start: 2024-01-23 | End: 2024-01-25 | Stop reason: HOSPADM

## 2024-01-23 RX ORDER — FAMOTIDINE 20 MG/1
20 TABLET, FILM COATED ORAL NIGHTLY
COMMUNITY
Start: 2023-11-22

## 2024-01-23 RX ADMIN — INSULIN GLARGINE 12 UNITS: 100 INJECTION, SOLUTION SUBCUTANEOUS at 20:07

## 2024-01-23 RX ADMIN — RANOLAZINE 500 MG: 500 TABLET, EXTENDED RELEASE ORAL at 20:06

## 2024-01-23 RX ADMIN — TRAMADOL HYDROCHLORIDE 50 MG: 50 TABLET, COATED ORAL at 03:23

## 2024-01-23 RX ADMIN — VANCOMYCIN HYDROCHLORIDE 1500 MG: 1.5 INJECTION, POWDER, LYOPHILIZED, FOR SOLUTION INTRAVENOUS at 04:00

## 2024-01-23 RX ADMIN — SODIUM CHLORIDE, POTASSIUM CHLORIDE, SODIUM LACTATE AND CALCIUM CHLORIDE: 600; 310; 30; 20 INJECTION, SOLUTION INTRAVENOUS at 03:30

## 2024-01-23 RX ADMIN — ONDANSETRON 4 MG: 2 INJECTION INTRAMUSCULAR; INTRAVENOUS at 15:50

## 2024-01-23 RX ADMIN — TRAMADOL HYDROCHLORIDE 50 MG: 50 TABLET, COATED ORAL at 20:06

## 2024-01-23 RX ADMIN — SPIRONOLACTONE 50 MG: 50 TABLET ORAL at 07:22

## 2024-01-23 RX ADMIN — INSULIN LISPRO 1 UNITS: 100 INJECTION, SOLUTION INTRAVENOUS; SUBCUTANEOUS at 13:04

## 2024-01-23 RX ADMIN — SODIUM CHLORIDE, PRESERVATIVE FREE 10 ML: 5 INJECTION INTRAVENOUS at 07:22

## 2024-01-23 RX ADMIN — FUROSEMIDE 20 MG: 20 TABLET ORAL at 07:21

## 2024-01-23 RX ADMIN — SODIUM CHLORIDE, POTASSIUM CHLORIDE, SODIUM LACTATE AND CALCIUM CHLORIDE: 600; 310; 30; 20 INJECTION, SOLUTION INTRAVENOUS at 22:42

## 2024-01-23 RX ADMIN — ATORVASTATIN CALCIUM 40 MG: 40 TABLET, FILM COATED ORAL at 20:05

## 2024-01-23 RX ADMIN — SODIUM CHLORIDE, POTASSIUM CHLORIDE, SODIUM LACTATE AND CALCIUM CHLORIDE: 600; 310; 30; 20 INJECTION, SOLUTION INTRAVENOUS at 13:05

## 2024-01-23 RX ADMIN — CEFEPIME 2000 MG: 2 INJECTION, POWDER, FOR SOLUTION INTRAVENOUS at 06:15

## 2024-01-23 RX ADMIN — LACTULOSE 20 G: 20 SOLUTION ORAL at 20:06

## 2024-01-23 RX ADMIN — POTASSIUM CHLORIDE 40 MEQ: 1500 TABLET, EXTENDED RELEASE ORAL at 10:16

## 2024-01-23 RX ADMIN — RIFAXIMIN 550 MG: 550 TABLET ORAL at 07:21

## 2024-01-23 RX ADMIN — RIFAXIMIN 550 MG: 550 TABLET ORAL at 20:06

## 2024-01-23 RX ADMIN — RANOLAZINE 500 MG: 500 TABLET, EXTENDED RELEASE ORAL at 07:21

## 2024-01-23 RX ADMIN — ENOXAPARIN SODIUM 40 MG: 100 INJECTION SUBCUTANEOUS at 07:22

## 2024-01-23 RX ADMIN — INSULIN LISPRO 1 UNITS: 100 INJECTION, SOLUTION INTRAVENOUS; SUBCUTANEOUS at 18:18

## 2024-01-23 RX ADMIN — LACTULOSE 20 G: 20 SOLUTION ORAL at 07:22

## 2024-01-23 RX ADMIN — CEFEPIME 2000 MG: 2 INJECTION, POWDER, FOR SOLUTION INTRAVENOUS at 18:25

## 2024-01-23 RX ADMIN — QUETIAPINE FUMARATE 25 MG: 25 TABLET ORAL at 20:06

## 2024-01-23 RX ADMIN — PANTOPRAZOLE SODIUM 40 MG: 40 TABLET, DELAYED RELEASE ORAL at 06:19

## 2024-01-23 RX ADMIN — LACTULOSE 20 G: 20 SOLUTION ORAL at 15:09

## 2024-01-23 RX ADMIN — SODIUM CHLORIDE, PRESERVATIVE FREE 10 ML: 5 INJECTION INTRAVENOUS at 03:32

## 2024-01-23 RX ADMIN — PANTOPRAZOLE SODIUM 40 MG: 40 TABLET, DELAYED RELEASE ORAL at 15:09

## 2024-01-23 RX ADMIN — METOPROLOL SUCCINATE 50 MG: 50 TABLET, EXTENDED RELEASE ORAL at 07:22

## 2024-01-23 ASSESSMENT — PAIN SCALES - GENERAL
PAINLEVEL_OUTOF10: 5
PAINLEVEL_OUTOF10: 7
PAINLEVEL_OUTOF10: 5
PAINLEVEL_OUTOF10: 7

## 2024-01-23 ASSESSMENT — PAIN DESCRIPTION - DESCRIPTORS
DESCRIPTORS: ACHING
DESCRIPTORS: BURNING
DESCRIPTORS: BURNING
DESCRIPTORS: ACHING

## 2024-01-23 ASSESSMENT — COPD QUESTIONNAIRES
CAT_TOTALSCORE: 32
QUESTION3_CHESTTIGHTNESS: 3
QUESTION1_COUGHFREQUENCY: 5
GOLD_GROUP: GROUP B
QUESTION7_SLEEPQUALITY: 3
QUESTION5_HOMEACTIVITIES: 5
QUESTION4_WALKINCLINE: 3
QUESTION2_CHESTPHLEGM: 5
TOTAL_EXACERBATIONS_PASTYEAR: 0
QUESTION6_LEAVINGHOUSE: 5
QUESTION8_ENERGYLEVEL: 3

## 2024-01-23 ASSESSMENT — PULMONARY FUNCTION TESTS
POST BRONCHODILATOR FEV1/FVC: 84
PIF_VALUE: 100
FEV1 (%PREDICTED): 83

## 2024-01-23 ASSESSMENT — PAIN - FUNCTIONAL ASSESSMENT: PAIN_FUNCTIONAL_ASSESSMENT: ACTIVITIES ARE NOT PREVENTED

## 2024-01-23 ASSESSMENT — PAIN DESCRIPTION - PAIN TYPE: TYPE: ACUTE PAIN

## 2024-01-23 ASSESSMENT — PAIN DESCRIPTION - LOCATION
LOCATION: CHEST;ABDOMEN;NECK
LOCATION: ABDOMEN
LOCATION: ABDOMEN

## 2024-01-23 ASSESSMENT — PAIN DESCRIPTION - ORIENTATION
ORIENTATION: MID
ORIENTATION: MID

## 2024-01-23 ASSESSMENT — PAIN DESCRIPTION - ONSET: ONSET: ON-GOING

## 2024-01-23 ASSESSMENT — PAIN DESCRIPTION - FREQUENCY: FREQUENCY: INTERMITTENT

## 2024-01-23 NOTE — PLAN OF CARE
Patient understands her role in her care plan, and how she can help us reach her care plan goals.

## 2024-01-23 NOTE — PROGRESS NOTES
Current GOLD classification for [place]      GOLD Stage:    Group: Group B  Recorded domestic exacerbations past 12 months: 0  Current recorded COPD Assessment Tool (CAT) score of 32  Current eosinophil count: 0.2     Inhaler Device   Acceptable for Use   Respimat  Not Breath Actuated Yes   MDI  Not Breath Actuated Yes           DPI  Observed PIF   using  In-Check Meter   Optimal PIF   Acceptable for Use   HANDIHALER 100 >30    Pressair 100 >45    NEOHALER 100 >50    Diskus 100 >60    ELLIPTA 100 >60      Records show [place] was not using maintenance therapy prior to admission.          LONG-ACTING (LABA)   Arformoterol (Brovana) NEBULIZER   Indacaterol (Arcapta) NEOHALER   Olodaterol (Striverdi) Respimat   Salmeterol (Serevent) MDI, DISKUS   LONG-ACTING (LAMA)   Aclidinium bromide (Tudorza) PRESSAIR   Glycopyrronium bromide (Seebri) NEOHALER   Tiotropium (Spiriva) Respimat, HANDIHALER   Umeclidinium (Incruse) ELLIPTA   (LABA/LAMA)   Formoterol/glycopyrronium (Bevespi) MDI   Indacaterol/glycopyrronium (Utibron) NEOHALER   Vilanterol/umeclidinium (Anoro) ELLIPTA   Olodaterol/tiotropium (Stiolto) Respimat   (LABA/ICS)   Formoterol/budesomide (Symbicort) MDI   Formoterol/mometasone (Dulera) MDI   Salmeterol/fluticasone (Advair) MDI, DISKUS   Vilanterol/fluticasone (Breo) ELLIPTA   (LABA/LAMA/ICS)   Fluticasone/umeclidinium/vilanterol (Trelegy) ELLIPTA   Budesonide/glycopyrrolate/formoterol fumarate (Beztri) aerosphere      01/23/24 1635   Spirometry Assessment   FEV1 (%PRED) 83   Post Bronchodilator FEV/FVC 84   COPD Exacerbations in last year 0    L/min   COPD Assessment (CAT Score)   Cough Assessment 5   Phlegm Assessment 5   Chest tightness 3   Walking on an incline 3   Home Activities 5   Confident Leaving The Home 5   Sleeping Soundly 3   Have Energy 3   Assessment Score 32   $RT COPD Assessment Yes   GOLD Staging   Group Group B

## 2024-01-23 NOTE — PLAN OF CARE
Problem: Discharge Planning  Goal: Discharge to home or other facility with appropriate resources  1/23/2024 1427 by Dominique Mcallister, RN  Outcome: Progressing  1/23/2024 0621 by Tiffanie Levine RN  Outcome: Progressing     Problem: Pain  Goal: Verbalizes/displays adequate comfort level or baseline comfort level  1/23/2024 1427 by Dominique Mcallister, RN  Outcome: Progressing  1/23/2024 0621 by Tiffanie Levine RN  Outcome: Progressing

## 2024-01-23 NOTE — PROGRESS NOTES
PHARMACY VANCOMYCIN MONITORING SERVICE  Pharmacy consulted by Dr. Gaytan for monitoring and adjustment.    Indication for treatment: Vancomycin indication: Other: Intra-abdominal infection  Goal trough: Trough Goal: 10-15 mcg/mL  AUC/CECE: <500    Risk Factors for MRSA Identified:   None    Pertinent Laboratory Values:   Temp Readings from Last 3 Encounters:   01/23/24 98 °F (36.7 °C) (Oral)   01/09/24 97.9 °F (36.6 °C)   12/06/23 98 °F (36.7 °C) (Oral)     Recent Labs     01/22/24  1531   WBC 13.1*     Recent Labs     01/22/24  1531   BUN 19   CREATININE 0.8     Estimated Creatinine Clearance: 67 mL/min (based on SCr of 0.8 mg/dL).    Intake/Output Summary (Last 24 hours) at 1/23/2024 0327  Last data filed at 1/22/2024 1815  Gross per 24 hour   Intake 1050 ml   Output --   Net 1050 ml       Pertinent Cultures:   Date    Source    Results  1/22   Blood    In process      Vancomycin level:   TROUGH:  No results for input(s): \"VANCOTROUGH\" in the last 72 hours.  RANDOM:  No results for input(s): \"VANCORANDOM\" in the last 72 hours.    Assessment:  HPI: 63 year old female with a history of diabetes mellitus type 2, hepatitis c related cirrhosis presented to the ED with nausea and vomiting, epigastric abdominal pain and confusion. Patient reported that the nausea and vomiting began yesterday afternoon and this morning she was slightly confused.  SCr, BUN, and urine output: Scr = 0.8, at baseline, Incomplete I/O data  Day(s) of therapy: 1  Vancomycin concentration: To be collected    Plan:  Current Vancomycin Dose: 1500 mg Q24H. Level will be scheduled after the 2nd dose.   Predicted trough of 11.7 and AUC: 491 at [facility]  Pharmacy will continue to monitor patient and adjust therapy as indicated    VANCOMYCIN CONCENTRATION SCHEDULED FOR 1/25 @0100    Thank you for the consult.  Issa Brito RPH  1/23/2024 3:27 AM

## 2024-01-23 NOTE — CONSULTS
I saw and evaluated the patient myself.  I personally reviewed the chart, labs, and imaging studies and provided a substantive portion of the care for this patient.  I personally performed all aspects of medical decision making for this encounter. I have spoken with the patient, nursing staff and provided them with appropriate verbal and written instructions.      I have reviewed and verified this documentation done by FEROZ ESCALERA and it accurately reflects our care and I have added a separate note to reflect my clinical impression and suggestions.    The patient is a 63 y.o. female with hx per HPI. GI consulted for Hepatic encephalopathy.     GEN:     NAD, conversant, resting comfortably.  GI:     Soft, NT. ND. No rebound/guarding. 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     Rell Bass ([facility])    [facility]    [facility]  Gastroenterology Consultation    2024  8:36 AM  _________________________________________________________________________  Patient:    Lisa Bender  : 1960   63 y.o.             MRN: [id number]  Admitted: 2024  3:10 PM ATT: Rashida Baldwin*   3003/3003-A  AdmitDx: Hepatic encephalopathy (HCC) [K76.82]  Hyperammonemia (HCC) [E72.20]  Prolonged Q-T interval on ECG [R94.31] PCP: Chris Garza MD  _________________________________________________________________________    Reason for Consult:  hepatic encephalopathy  Requesting Physician:  Rashida Baldwin*    _________________________________________________________________________  IMPRESSION and RECOMMENDATIONS:    The patient is a 63 y.o. female with hx per HPI. GI consulted for hepatic encephalopathy  .   Hepatic encephalopathy   Decompensated  [acetaminophen]  _________________________________________________________________________  Social History:   TOBACCO:   reports that she has been smoking cigarettes. She started smoking about 50 years ago. She has a 25.0 pack-year smoking history. She has been exposed to tobacco smoke. She has never used smokeless tobacco.  ETOH:   reports that she does not currently use alcohol.    Family History:       Problem Relation Age of Onset    Ovarian Cancer Mother     Cancer Mother         lung ca    Arthritis Mother     Migraines Mother     Cancer Father         colon ca    Diabetes Father     High Blood Pressure Father     Arthritis Father     High Cholesterol Father     Migraines Father     Migraines Sister     Heart Disease Brother         WPW    Breast Cancer Maternal Aunt     Breast Cancer Maternal Aunt     Breast Cancer Maternal Aunt     Breast Cancer Maternal Aunt        No family history of colon cancer, Crohn's disease, or ulcerative colitis.    _________________________________________________________________________  DATA // Labs    ABGs:   Lab Results   Component Value Date/Time    PO2ART 50 04/27/2012 01:30 PM     CBC:   Recent Labs     01/22/24  1531 01/23/24  0426   WBC 13.1* 11.0*   HGB 16.2* 13.8   * 101*     BMP:    Recent Labs     01/22/24  1531 01/23/24  0426    138   K 3.5 3.2*    104   CO2 25 23   BUN 19 16   CREATININE 0.8 0.6   GLUCOSE 190* 133*     Magnesium:   Lab Results   Component Value Date/Time    MG 2.2 01/23/2024 04:26 AM     Hepatic:   Recent Labs     01/22/24  1531 01/23/24  0426   AST 19 15   ALT 11 10   BILITOT 2.0* 1.7*   ALKPHOS 118 102     Recent Labs     01/22/24  1531   LIPASE 85*     Recent Labs     01/22/24  1531   PROTIME 15.1*   INR 1.2     No results for input(s): \"PTT\" in the last 72 hours.  Lipids: No results for input(s): \"CHOL\", \"HDL\" in the last 72 hours.    Invalid input(s): \"LDLCALCU\"  INR:   Recent Labs     01/22/24  1531   INR 1.2     TSH:   Lab  Cholesterol Father     Migraines Father     Migraines Sister     Heart Disease Brother         WPW    Breast Cancer Maternal Aunt     Breast Cancer Maternal Aunt     Breast Cancer Maternal Aunt     Breast Cancer Maternal Aunt        No family history of colon cancer, Crohn's disease, or ulcerative colitis.    _________________________________________________________________________  DATA // Labs    ABGs:   Lab Results   Component Value Date/Time    PO2ART 50 04/27/2012 01:30 PM     CBC:   Recent Labs     01/22/24  1531 01/23/24  0426   WBC 13.1* 11.0*   HGB 16.2* 13.8   * 101*     BMP:    Recent Labs     01/22/24  1531 01/23/24  0426    138   K 3.5 3.2*    104   CO2 25 23   BUN 19 16   CREATININE 0.8 0.6   GLUCOSE 190* 133*     Magnesium:   Lab Results   Component Value Date/Time    MG 2.2 01/23/2024 04:26 AM     Hepatic:   Recent Labs     01/22/24  1531 01/23/24  0426   AST 19 15   ALT 11 10   BILITOT 2.0* 1.7*   ALKPHOS 118 102     Recent Labs     01/22/24  1531   LIPASE 85*     Recent Labs     01/22/24  1531   PROTIME 15.1*   INR 1.2     No results for input(s): \"PTT\" in the last 72 hours.  Lipids: No results for input(s): \"CHOL\", \"HDL\" in the last 72 hours.    Invalid input(s): \"LDLCALCU\"  INR:   Recent Labs     01/22/24  1531   INR 1.2     TSH:   Lab Results   Component Value Date/Time    TSH 1.73 08/01/2017 04:15 PM       Intake/Output Summary (Last 24 hours) at 1/23/2024 0836  Last data filed at 1/22/2024 1815  Gross per 24 hour   Intake 1050 ml   Output --   Net 1050 ml      sodium chloride      lactated ringers IV soln 125 mL/hr at 01/23/24 0330    dextrose         _________________________________________________________________________    Physical Exam:    Vitals:  BP (!) 156/95   Pulse 98   Temp 98.1 °F (36.7 °C) (Oral)   Resp 20   Ht 1.448 m (4' 9.01\")   Wt 75.8 kg (167 lb 1.7 oz)   SpO2 97%   BMI 36.15 kg/m²     Gen: NAD, conversant, resting comfortably.    Eyes: EOMI.

## 2024-01-23 NOTE — CARE COORDINATION
Chart screened; no dc needs identified at this time.  LSW available PRN.    Electronically signed by CHACHO Bennett on 1/23/2024 at 8:02 AM

## 2024-01-23 NOTE — PROGRESS NOTES
4 Eyes Skin Assessment     NAME:  Lisa Bender  YOB: 1960  MEDICAL RECORD NUMBER:  [id number]    The patient is being assessed for  Admission    I agree that at least one RN has performed a thorough Head to Toe Skin Assessment on the patient. ALL assessment sites listed below have been assessed.      Areas assessed by both nurses:    Head, Face, Ears, Shoulders, Back, Chest, Arms, Elbows, Hands, Sacrum. Buttock, Coccyx, Ischium, Legs. Feet and Heels, and Under Medical Devices         Does the Patient have a Wound? No noted wound(s)       Tomy Prevention initiated by RN: Yes  Wound Care Orders initiated by RN: No    Pressure Injury (Stage 3,4, Unstageable, DTI, NWPT, and Complex wounds) if present, place Wound referral order by RN under : No    New Ostomies, if present place, Ostomy referral order under : No     Nurse 1 eSignature: Electronically signed by Tiffanie Levine RN on 1/23/24 at 6:38 AM EST    **SHARE this note so that the co-signing nurse can place an eSignature**    Nurse 2 eSignature: Electronically signed by Rima Carballo RN on 1/23/24 at 6:49 AM EST

## 2024-01-23 NOTE — PROGRESS NOTES
V2.0    [facility] Progress Note      Name:  Lisa Bender /Age/Sex: 1960  (63 y.o. female)   MRN & CSN:  [id number] & [id number] Encounter Date/Time: 2024 7:43 AM EST   Location:  [address] [place] [address] PCP: Chris Garza MD     Attending:Rashida Baldwin*       Hospital Day: 2    Assessment and Recommendations   Lisa Bender is a 63 y.o. female with pmh of hepatitis C related cirrhosis, and diabetes who presents with Hepatic encephalopathy (HCC)      Plan:   Hepatic encephalopathy  Liver cirrhosis 2/2 hepatitis C s/p TIPS due to portal   Patient presented with confusion, nausea, vomiting and abdominal pain since yesterday evening. No prior h/o paracentesis  -- Hyperammonemic is a 69, T. bili downtrending 1.7 from 2  -- Liver ultrasound showed nodular hepatic surface contour suggestive of cirrhosis, main portal vein is patent with normal directional flow  -- Patient follows with Dr. Conway, GI consulted, appreciate recs  -- Continue lactulose, rifaximin, Lasix, spironolactone and metoprolol  -- Follow-up blood culture, urine culture, inflammatory markers are low, WBC is downtrending    Mild hydronephrosis of right kidney  Incidental finding on abdominal ultrasound  -- No current JASVIR, creatinine stable at 0.6  -- Continue to monitor I's and O, and consider urology consult    Leukocytosis  Patient did have N/V with subjective fevers and chills, low suspicion for underlying infection   -- Patient was started on broad-spectrum antibiotics  -- Plan to de-escalate following blood cultures resulted  -- Hold on CT of abdomen and pelvis as symptoms are resolving     Thrombocytopenia likely in the setting of cirrhosis  -- Stop DVT prophylaxis if platelets less than 50 or if bleeding    Hypertension  -- Continue Toprol 50 mg daily and Lasix 20 mg daily, will monitor trends and adjust regimen as appropriate     # Type 2 diabetes  -- Continue Accu-Cheks, follow-up A1c  -- Cover with Lantus, sliding scale  04/18/2023 02:45 PM    TROPONINT <0.010 03/20/2023 01:00 PM    TROPONINT <0.010 02/07/2023 02:14 AM     Lactic Acid: No results for input(s): \"LACTA\" in the last 72 hours.  BNP:   Recent Labs     01/22/24  1531   PROBNP 362.4*     UA:  Lab Results   Component Value Date/Time    NITRU NEGATIVE 01/22/2024 08:55 PM    NITRU Negative 09/20/2022 11:09 AM    COLORU YELLOW 01/22/2024 08:55 PM    PHUR 5.5 09/20/2022 11:09 AM    WBCUA 1 01/22/2024 08:55 PM    RBCUA 1 01/22/2024 08:55 PM    MUCUS RARE 01/22/2024 08:55 PM    TRICHOMONAS NONE SEEN 01/22/2024 08:55 PM    YEAST OCCASIONAL 02/06/2023 12:57 PM    BACTERIA NEGATIVE 01/22/2024 08:55 PM    CLARITYU CLEAR 01/22/2024 08:55 PM    SPECGRAV >1.030 01/22/2024 08:55 PM    LEUKOCYTESUR NEGATIVE 01/22/2024 08:55 PM    UROBILINOGEN 0.2 01/22/2024 08:55 PM    BILIRUBINUR NEGATIVE 01/22/2024 08:55 PM    BILIRUBINUR neg 07/15/2017 10:58 AM    BLOODU MODERATE NUMBER OR AMOUNT OBSERVED 01/22/2024 08:55 PM    GLUCOSEU 500 09/20/2022 11:09 AM    KETUA 15 01/22/2024 08:55 PM    AMORPHOUS RARE 05/01/2019 12:00 AM     Urine Cultures:   Lab Results   Component Value Date/Time    LABURIN >100,000 CFU/ml 09/20/2022 11:09 AM    LABURIN 50 mg/dL 01/30/2020 09:31 AM     Blood Cultures: No results found for: \"BC\"  No results found for: \"BLOODCULT2\"  Organism:   Lab Results   Component Value Date/Time    ORG Escherichia coli 09/20/2022 11:09 AM         Electronically signed by Rashida Baldwin MD on 1/23/2024 at 7:43 AM

## 2024-01-24 ENCOUNTER — APPOINTMENT (OUTPATIENT)
Dept: GENERAL RADIOLOGY | Age: 64
End: 2024-01-24
Attending: STUDENT IN AN ORGANIZED HEALTH CARE EDUCATION/TRAINING PROGRAM
Payer: MEDICARE

## 2024-01-24 LAB
ALBUMIN SERPL-MCNC: 2.8 GM/DL (ref 3.4–5)
ALP BLD-CCNC: 102 IU/L (ref 40–128)
ALT SERPL-CCNC: 9 U/L (ref 10–40)
AMMONIA: 61 UMOL/L (ref 11–51)
ANION GAP SERPL CALCULATED.3IONS-SCNC: 8 MMOL/L (ref 7–16)
AST SERPL-CCNC: 12 IU/L (ref 15–37)
BASOPHILS ABSOLUTE: 0.1 K/CU MM
BASOPHILS RELATIVE PERCENT: 0.8 % (ref 0–1)
BILIRUB SERPL-MCNC: 1 MG/DL (ref 0–1)
BUN SERPL-MCNC: 13 MG/DL (ref 6–23)
CALCIUM SERPL-MCNC: 7.7 MG/DL (ref 8.3–10.6)
CHLORIDE BLD-SCNC: 108 MMOL/L (ref 99–110)
CO2: 25 MMOL/L (ref 21–32)
CREAT SERPL-MCNC: 0.7 MG/DL (ref 0.6–1.1)
DIFFERENTIAL TYPE: ABNORMAL
EOSINOPHILS ABSOLUTE: 0.3 K/CU MM
EOSINOPHILS RELATIVE PERCENT: 3.8 % (ref 0–3)
ESTIMATED AVERAGE GLUCOSE: 157 MG/DL
GFR SERPL CREATININE-BSD FRML MDRD: >60 ML/MIN/1.73M2
GLUCOSE BLD-MCNC: 130 MG/DL (ref 70–99)
GLUCOSE BLD-MCNC: 218 MG/DL (ref 70–99)
GLUCOSE BLD-MCNC: 266 MG/DL (ref 70–99)
GLUCOSE SERPL-MCNC: 143 MG/DL (ref 70–99)
HBA1C MFR BLD: 7.1 % (ref 4.2–6.3)
HCT VFR BLD CALC: 37.3 % (ref 37–47)
HEMOGLOBIN: 12.7 GM/DL (ref 12.5–16)
IMMATURE NEUTROPHIL %: 0.3 % (ref 0–0.43)
LIPASE: 34 IU/L (ref 13–60)
LYMPHOCYTES ABSOLUTE: 1.8 K/CU MM
LYMPHOCYTES RELATIVE PERCENT: 26.9 % (ref 24–44)
MCH RBC QN AUTO: 32.3 PG (ref 27–31)
MCHC RBC AUTO-ENTMCNC: 34 % (ref 32–36)
MCV RBC AUTO: 94.9 FL (ref 78–100)
MONOCYTES ABSOLUTE: 0.7 K/CU MM
MONOCYTES RELATIVE PERCENT: 10.8 % (ref 0–4)
NUCLEATED RBC %: 0 %
PDW BLD-RTO: 13.2 % (ref 11.7–14.9)
PLATELET # BLD: 100 K/CU MM (ref 140–440)
PMV BLD AUTO: 12 FL (ref 7.5–11.1)
POTASSIUM SERPL-SCNC: 3.6 MMOL/L (ref 3.5–5.1)
RBC # BLD: 3.93 M/CU MM (ref 4.2–5.4)
SEGMENTED NEUTROPHILS ABSOLUTE COUNT: 3.7 K/CU MM
SEGMENTED NEUTROPHILS RELATIVE PERCENT: 57.4 % (ref 36–66)
SODIUM BLD-SCNC: 141 MMOL/L (ref 135–145)
TOTAL IMMATURE NEUTOROPHIL: 0.02 K/CU MM
TOTAL NUCLEATED RBC: 0 K/CU MM
TOTAL PROTEIN: 5.1 GM/DL (ref 6.4–8.2)
WBC # BLD: 6.5 K/CU MM (ref 4–10.5)

## 2024-01-24 PROCEDURE — 2580000003 HC RX 258: Performed by: INTERNAL MEDICINE

## 2024-01-24 PROCEDURE — 6370000000 HC RX 637 (ALT 250 FOR IP): Performed by: FAMILY MEDICINE

## 2024-01-24 PROCEDURE — 85025 COMPLETE CBC W/AUTO DIFF WBC: CPT

## 2024-01-24 PROCEDURE — 85610 PROTHROMBIN TIME: CPT

## 2024-01-24 PROCEDURE — 80053 COMPREHEN METABOLIC PANEL: CPT

## 2024-01-24 PROCEDURE — 6370000000 HC RX 637 (ALT 250 FOR IP): Performed by: INTERNAL MEDICINE

## 2024-01-24 PROCEDURE — 6370000000 HC RX 637 (ALT 250 FOR IP)

## 2024-01-24 PROCEDURE — 94640 AIRWAY INHALATION TREATMENT: CPT

## 2024-01-24 PROCEDURE — 82962 GLUCOSE BLOOD TEST: CPT

## 2024-01-24 PROCEDURE — 83036 HEMOGLOBIN GLYCOSYLATED A1C: CPT

## 2024-01-24 PROCEDURE — 6360000002 HC RX W HCPCS: Performed by: INTERNAL MEDICINE

## 2024-01-24 PROCEDURE — 82140 ASSAY OF AMMONIA: CPT

## 2024-01-24 PROCEDURE — 83690 ASSAY OF LIPASE: CPT

## 2024-01-24 PROCEDURE — 2700000000 HC OXYGEN THERAPY PER DAY

## 2024-01-24 PROCEDURE — 1200000000 HC SEMI PRIVATE

## 2024-01-24 PROCEDURE — 94761 N-INVAS EAR/PLS OXIMETRY MLT: CPT

## 2024-01-24 PROCEDURE — 36415 COLL VENOUS BLD VENIPUNCTURE: CPT

## 2024-01-24 PROCEDURE — 71046 X-RAY EXAM CHEST 2 VIEWS: CPT

## 2024-01-24 RX ORDER — SUCRALFATE 1 G/1
1 TABLET ORAL 2 TIMES DAILY
Status: DISCONTINUED | OUTPATIENT
Start: 2024-01-24 | End: 2024-01-25 | Stop reason: HOSPADM

## 2024-01-24 RX ADMIN — SPIRONOLACTONE 50 MG: 50 TABLET ORAL at 08:44

## 2024-01-24 RX ADMIN — Medication: at 11:05

## 2024-01-24 RX ADMIN — RIFAXIMIN 550 MG: 550 TABLET ORAL at 20:59

## 2024-01-24 RX ADMIN — FUROSEMIDE 20 MG: 20 TABLET ORAL at 08:44

## 2024-01-24 RX ADMIN — LACTULOSE 20 G: 20 SOLUTION ORAL at 20:59

## 2024-01-24 RX ADMIN — SODIUM CHLORIDE, PRESERVATIVE FREE 10 ML: 5 INJECTION INTRAVENOUS at 21:01

## 2024-01-24 RX ADMIN — RANOLAZINE 500 MG: 500 TABLET, EXTENDED RELEASE ORAL at 08:44

## 2024-01-24 RX ADMIN — ATORVASTATIN CALCIUM 40 MG: 40 TABLET, FILM COATED ORAL at 21:00

## 2024-01-24 RX ADMIN — TRAMADOL HYDROCHLORIDE 50 MG: 50 TABLET, COATED ORAL at 17:20

## 2024-01-24 RX ADMIN — SODIUM CHLORIDE, PRESERVATIVE FREE 10 ML: 5 INJECTION INTRAVENOUS at 08:44

## 2024-01-24 RX ADMIN — INSULIN GLARGINE 12 UNITS: 100 INJECTION, SOLUTION SUBCUTANEOUS at 20:58

## 2024-01-24 RX ADMIN — CEFEPIME 2000 MG: 2 INJECTION, POWDER, FOR SOLUTION INTRAVENOUS at 05:55

## 2024-01-24 RX ADMIN — VANCOMYCIN HYDROCHLORIDE 1500 MG: 1.5 INJECTION, POWDER, LYOPHILIZED, FOR SOLUTION INTRAVENOUS at 02:58

## 2024-01-24 RX ADMIN — METOPROLOL SUCCINATE 50 MG: 50 TABLET, EXTENDED RELEASE ORAL at 08:44

## 2024-01-24 RX ADMIN — TRAMADOL HYDROCHLORIDE 50 MG: 50 TABLET, COATED ORAL at 08:49

## 2024-01-24 RX ADMIN — INSULIN LISPRO 2 UNITS: 100 INJECTION, SOLUTION INTRAVENOUS; SUBCUTANEOUS at 13:48

## 2024-01-24 RX ADMIN — SUCRALFATE 1 G: 1 TABLET ORAL at 11:00

## 2024-01-24 RX ADMIN — CALCIUM CARBONATE 500 MG: 500 TABLET, CHEWABLE ORAL at 08:49

## 2024-01-24 RX ADMIN — PANTOPRAZOLE SODIUM 40 MG: 40 TABLET, DELAYED RELEASE ORAL at 05:52

## 2024-01-24 RX ADMIN — SUCRALFATE 1 G: 1 TABLET ORAL at 21:00

## 2024-01-24 RX ADMIN — RANOLAZINE 500 MG: 500 TABLET, EXTENDED RELEASE ORAL at 21:00

## 2024-01-24 RX ADMIN — INSULIN LISPRO 1 UNITS: 100 INJECTION, SOLUTION INTRAVENOUS; SUBCUTANEOUS at 17:56

## 2024-01-24 RX ADMIN — ALBUTEROL SULFATE 2 PUFF: 90 AEROSOL, METERED RESPIRATORY (INHALATION) at 02:14

## 2024-01-24 RX ADMIN — QUETIAPINE FUMARATE 25 MG: 25 TABLET ORAL at 21:00

## 2024-01-24 RX ADMIN — RIFAXIMIN 550 MG: 550 TABLET ORAL at 08:44

## 2024-01-24 RX ADMIN — PANTOPRAZOLE SODIUM 40 MG: 40 TABLET, DELAYED RELEASE ORAL at 17:20

## 2024-01-24 ASSESSMENT — PAIN DESCRIPTION - FREQUENCY: FREQUENCY: CONTINUOUS

## 2024-01-24 ASSESSMENT — PAIN DESCRIPTION - LOCATION
LOCATION: BACK;CHEST
LOCATION: CHEST
LOCATION: BACK;CHEST

## 2024-01-24 ASSESSMENT — PAIN DESCRIPTION - PAIN TYPE
TYPE: ACUTE PAIN
TYPE: ACUTE PAIN

## 2024-01-24 ASSESSMENT — PAIN SCALES - GENERAL
PAINLEVEL_OUTOF10: 3
PAINLEVEL_OUTOF10: 7
PAINLEVEL_OUTOF10: 0
PAINLEVEL_OUTOF10: 7

## 2024-01-24 ASSESSMENT — PAIN DESCRIPTION - ORIENTATION
ORIENTATION: MID
ORIENTATION: MID

## 2024-01-24 ASSESSMENT — PAIN DESCRIPTION - DESCRIPTORS
DESCRIPTORS: ACHING
DESCRIPTORS: ACHING

## 2024-01-24 ASSESSMENT — PAIN - FUNCTIONAL ASSESSMENT
PAIN_FUNCTIONAL_ASSESSMENT: PREVENTS OR INTERFERES SOME ACTIVE ACTIVITIES AND ADLS
PAIN_FUNCTIONAL_ASSESSMENT: ACTIVITIES ARE NOT PREVENTED

## 2024-01-24 NOTE — CARE COORDINATION
Pt lives home alone. Pt does not have HC or DME in the home. Pt has a PCP and has insurance to help with with healthcare cost.  Pt is independent of her ADLs.  No needs identified at his time. Plan is for home.

## 2024-01-24 NOTE — PROGRESS NOTES
Cirrhosis relatedly stable  Encephalopathy secondary to TIPS in the past  Patient much better  Knows my name  Oriented to time and place  Impressed upon her to take lactulose regularly at least 2-3 times a day  Abdomen soft bowel sounds present  Continue present management will sign off call if needed      I have seen and examined this patient personally, and independently of Emili Schroeder    The plan was developed mutually at the time of the visit with the patient.  Emili and myself have spoken with patient, nursing staff and provided written and verbal instructions .    The above note has been reviewed and I agree with the Assessment,  Diagnosis, and Treatment plan as suggested by Emili Schroeder.  I have also suggested more changes to the therapy plan , which is being implemented.    In addition I have put my note indicating my recommendations suggestions and clinical findings      Erik Vail MD  [facility]    Please note that time of note may not reflect time of encounter     [facility]    [facility]  Progress Note  2024  8:33 AM  ___________________________________________________________________________  Patient:    Lisa Bender  : 1960   63 y.o.             MRN: [id number]  Admitted: 2024  3:10 PM ATT: Rashida Baldwin*   3003/3003-A  AdmitDx: Hepatic encephalopathy (HCC) [K76.82]  Hyperammonemia (HCC) [E72.20]  Prolonged Q-T interval on ECG [R94.31]  PCP: Chris Garza MD  ___________________________________________________________________________  ASSESSMENT AND PLAN :    Decompensated Cirrhosis with hepatic encephalopathy 2/2 HBV and past alcohol use disorder   > HE: Positive  > Ascites: N/A  > Varices: Last EGD:  2022 - esophageal ulcer, grade IV esophagitis, gastritis.   > HCC screening: Last imaging - 2024    MELD-Na: MELD 3.0: 10 at 2024  3:17 AM  MELD-Na: 8 at 2024  3:17 AM  Calculated from:  Serum Creatinine: 0.7    ondansetron (ZOFRAN ODT) 4 MG disintegrating tablet Take 1 tablet by mouth every 8 hours as needed for Nausea 1/31/22   Nadia Briones MD   Glucosource Lancets MISC Use one 3-4 times to check blood sugar 2/10/21   Katt Martinez MD   FLUoxetine (PROZAC) 20 MG capsule Take 30 mg by mouth daily Takes a 10 mg with a 20 mg to equal 30 mg daily 9/11/20   Farnaz Temple MD   paliperidone (INVEGA) 6 MG extended release tablet Take 1 tablet by mouth every morning 8/13/20   Farnaz Temple MD   Compression Stockings MISC by Does not apply route 20 - 30 mmh wear daily and take off at night  Thigh High 5/21/20   Catie Cervantes, APRN - CNP        Scheduled Medications:    vancomycin  1,500 mg IntraVENous Q24H    sodium chloride flush  5-40 mL IntraVENous 2 times per day    enoxaparin  40 mg SubCUTAneous Daily    cefepime  2,000 mg IntraVENous Q12H    lactulose  20 g Oral TID    atorvastatin  40 mg Oral Nightly    furosemide  20 mg Oral Daily    insulin glargine  12 Units SubCUTAneous Nightly    insulin lispro  0-4 Units SubCUTAneous TID WC    insulin lispro  0-4 Units SubCUTAneous Nightly    metoprolol succinate  50 mg Oral Daily    pantoprazole  40 mg Oral BID AC    QUEtiapine  25 mg Oral QHS    ranolazine  500 mg Oral BID    rifAXIMin  550 mg Oral BID    spironolactone  50 mg Oral Daily     Infusions:    sodium chloride      [Held by provider] lactated ringers IV soln Stopped (01/24/24 0815)    dextrose       PRN Medications: traMADol, ondansetron, sodium chloride flush, sodium chloride, calcium carbonate, albuterol sulfate HFA, glucose, dextrose bolus **OR** dextrose bolus, glucagon (rDNA), dextrose    Allergies:   Allergies   Allergen Reactions    Hydrocodone-Acetaminophen Anaphylaxis    Norco [Hydrocodone-Acetaminophen] Itching     Itching, rash, nausea and vomiting.     Tylenol [Acetaminophen] Itching, Nausea And Vomiting and Rash     Emili Yang PA-C,  [facility]   1/24/2024  8:33 AM

## 2024-01-24 NOTE — PROGRESS NOTES
V2.0    [facility] Progress Note      Name:  Lisa Bender /Age/Sex: 1960  (63 y.o. female)   MRN & CSN:  [id number] & [id number] Encounter Date/Time: 2024 7:43 AM EST   Location:  [address] [place] [address] PCP: Chris Garza MD     Attending:Rashida Baldwin*       Hospital Day: 3    Assessment and Recommendations   Lisa Bender is a 63 y.o. female with pmh of hepatitis C related cirrhosis, and diabetes who presents with Hepatic encephalopathy (HCC)      Plan:   Hypoxia  Patient hypoxic overnight to 86, remained at about 85 this morning on room air with improvement was put on 2 L via NC  -- Follow-up on chest x-ray  -- No wheezing or crackles or rhonchi on physical exam  -- Hold on antibiotics and steroids at this time  -- Wean off supplemental oxygen as appropriate    Hepatic encephalopathy - resolving  Liver cirrhosis 2/2 hepatitis C s/p TIPS due to portal   Patient presented with confusion, nausea, vomiting and abdominal pain since yesterday evening. No prior h/o paracentesis  -- Ammonia slowly downtrending, T. bili downtrending 1.7 from 2  -- Liver ultrasound showed nodular hepatic surface contour suggestive of cirrhosis, main portal vein is patent with normal directional flow  -- Patient follows with Dr. Valencia, GI consulted, appreciate recs  -- Continue lactulose, rifaximin, Lasix, spironolactone and metoprolol  -- BCX neg x48, UA not positive for UTI, inflammatory markers are low, WBC is now wnl    Mild hydronephrosis of right kidney  Incidental finding on abdominal ultrasound  -- No current JASVIR, creatinine stable at 0.7  -- Continue to monitor I's and O, and consider urology consult    Leukocytosis  Patient did have N/V with subjective fevers and chills, low suspicion for underlying infection   -- Patient was started on broad-spectrum antibiotics  -- Discontinue at this time and monitor off abx  -- Hold on CT of abdomen and pelvis as symptoms are resolving     Thrombocytopenia likely in

## 2024-01-25 VITALS
WEIGHT: 167.8 LBS | HEIGHT: 57 IN | DIASTOLIC BLOOD PRESSURE: 77 MMHG | OXYGEN SATURATION: 97 % | RESPIRATION RATE: 18 BRPM | HEART RATE: 74 BPM | BODY MASS INDEX: 36.2 KG/M2 | SYSTOLIC BLOOD PRESSURE: 140 MMHG | TEMPERATURE: 97.8 F

## 2024-01-25 PROBLEM — K76.82 HEPATIC ENCEPHALOPATHY (HCC): Status: RESOLVED | Noted: 2020-05-27 | Resolved: 2024-01-25

## 2024-01-25 LAB
ALBUMIN SERPL-MCNC: 2.9 GM/DL (ref 3.4–5)
ALP BLD-CCNC: 121 IU/L (ref 40–128)
ALT SERPL-CCNC: 9 U/L (ref 10–40)
ANION GAP SERPL CALCULATED.3IONS-SCNC: 9 MMOL/L (ref 7–16)
AST SERPL-CCNC: 13 IU/L (ref 15–37)
BASOPHILS ABSOLUTE: 0.1 K/CU MM
BASOPHILS RELATIVE PERCENT: 0.6 % (ref 0–1)
BILIRUB SERPL-MCNC: 0.8 MG/DL (ref 0–1)
BUN SERPL-MCNC: 13 MG/DL (ref 6–23)
CALCIUM SERPL-MCNC: 7.9 MG/DL (ref 8.3–10.6)
CHLORIDE BLD-SCNC: 101 MMOL/L (ref 99–110)
CO2: 25 MMOL/L (ref 21–32)
CREAT SERPL-MCNC: 0.7 MG/DL (ref 0.6–1.1)
DIFFERENTIAL TYPE: ABNORMAL
DOSE AMOUNT: ABNORMAL
DOSE TIME: ABNORMAL
EKG ATRIAL RATE: 111 BPM
EKG DIAGNOSIS: NORMAL
EKG P AXIS: 61 DEGREES
EKG P-R INTERVAL: 128 MS
EKG Q-T INTERVAL: 376 MS
EKG QRS DURATION: 78 MS
EKG QTC CALCULATION (BAZETT): 511 MS
EKG R AXIS: 27 DEGREES
EKG T AXIS: 52 DEGREES
EKG VENTRICULAR RATE: 111 BPM
EOSINOPHILS ABSOLUTE: 0.3 K/CU MM
EOSINOPHILS RELATIVE PERCENT: 3 % (ref 0–3)
GFR SERPL CREATININE-BSD FRML MDRD: >60 ML/MIN/1.73M2
GLUCOSE BLD-MCNC: 130 MG/DL (ref 70–99)
GLUCOSE BLD-MCNC: 207 MG/DL (ref 70–99)
GLUCOSE SERPL-MCNC: 265 MG/DL (ref 70–99)
HCT VFR BLD CALC: 36.4 % (ref 37–47)
HEMOGLOBIN: 12.8 GM/DL (ref 12.5–16)
IMMATURE NEUTROPHIL %: 0.4 % (ref 0–0.43)
LYMPHOCYTES ABSOLUTE: 1.3 K/CU MM
LYMPHOCYTES RELATIVE PERCENT: 15.3 % (ref 24–44)
MCH RBC QN AUTO: 32.3 PG (ref 27–31)
MCHC RBC AUTO-ENTMCNC: 35.2 % (ref 32–36)
MCV RBC AUTO: 91.9 FL (ref 78–100)
MONOCYTES ABSOLUTE: 0.7 K/CU MM
MONOCYTES RELATIVE PERCENT: 8.8 % (ref 0–4)
NUCLEATED RBC %: 0 %
PDW BLD-RTO: 13.2 % (ref 11.7–14.9)
PLATELET # BLD: 78 K/CU MM (ref 140–440)
PMV BLD AUTO: 12.8 FL (ref 7.5–11.1)
POTASSIUM SERPL-SCNC: 3.7 MMOL/L (ref 3.5–5.1)
PROCALCITONIN SERPL-MCNC: 0.06 NG/ML
RBC # BLD: 3.96 M/CU MM (ref 4.2–5.4)
SEGMENTED NEUTROPHILS ABSOLUTE COUNT: 6 K/CU MM
SEGMENTED NEUTROPHILS RELATIVE PERCENT: 71.9 % (ref 36–66)
SODIUM BLD-SCNC: 135 MMOL/L (ref 135–145)
TOTAL IMMATURE NEUTOROPHIL: 0.03 K/CU MM
TOTAL NUCLEATED RBC: 0 K/CU MM
TOTAL PROTEIN: 5.3 GM/DL (ref 6.4–8.2)
VANCOMYCIN TROUGH: 6.5 UG/ML (ref 10–20)
WBC # BLD: 8.4 K/CU MM (ref 4–10.5)

## 2024-01-25 PROCEDURE — 6370000000 HC RX 637 (ALT 250 FOR IP): Performed by: INTERNAL MEDICINE

## 2024-01-25 PROCEDURE — 85025 COMPLETE CBC W/AUTO DIFF WBC: CPT

## 2024-01-25 PROCEDURE — 82962 GLUCOSE BLOOD TEST: CPT

## 2024-01-25 PROCEDURE — 80202 ASSAY OF VANCOMYCIN: CPT

## 2024-01-25 PROCEDURE — 6370000000 HC RX 637 (ALT 250 FOR IP)

## 2024-01-25 PROCEDURE — 80053 COMPREHEN METABOLIC PANEL: CPT

## 2024-01-25 PROCEDURE — 6360000002 HC RX W HCPCS: Performed by: INTERNAL MEDICINE

## 2024-01-25 PROCEDURE — 36415 COLL VENOUS BLD VENIPUNCTURE: CPT

## 2024-01-25 PROCEDURE — 2580000003 HC RX 258: Performed by: INTERNAL MEDICINE

## 2024-01-25 PROCEDURE — 84145 PROCALCITONIN (PCT): CPT

## 2024-01-25 PROCEDURE — 93010 ELECTROCARDIOGRAM REPORT: CPT | Performed by: INTERNAL MEDICINE

## 2024-01-25 PROCEDURE — 94761 N-INVAS EAR/PLS OXIMETRY MLT: CPT

## 2024-01-25 RX ORDER — LACTULOSE 10 G/15ML
30 SOLUTION ORAL 3 TIMES DAILY
Qty: 2700 ML | Refills: 0 | Status: SHIPPED | OUTPATIENT
Start: 2024-01-25 | End: 2024-02-24

## 2024-01-25 RX ORDER — SUCRALFATE 1 G/1
1 TABLET ORAL 2 TIMES DAILY
Qty: 120 TABLET | Refills: 3 | Status: SHIPPED | OUTPATIENT
Start: 2024-01-25

## 2024-01-25 RX ADMIN — FUROSEMIDE 20 MG: 20 TABLET ORAL at 08:39

## 2024-01-25 RX ADMIN — ENOXAPARIN SODIUM 40 MG: 100 INJECTION SUBCUTANEOUS at 08:39

## 2024-01-25 RX ADMIN — SPIRONOLACTONE 50 MG: 50 TABLET ORAL at 08:39

## 2024-01-25 RX ADMIN — RANOLAZINE 500 MG: 500 TABLET, EXTENDED RELEASE ORAL at 08:39

## 2024-01-25 RX ADMIN — SUCRALFATE 1 G: 1 TABLET ORAL at 08:39

## 2024-01-25 RX ADMIN — METOPROLOL SUCCINATE 50 MG: 50 TABLET, EXTENDED RELEASE ORAL at 08:39

## 2024-01-25 RX ADMIN — SODIUM CHLORIDE, PRESERVATIVE FREE 10 ML: 5 INJECTION INTRAVENOUS at 08:40

## 2024-01-25 RX ADMIN — RIFAXIMIN 550 MG: 550 TABLET ORAL at 08:39

## 2024-01-25 RX ADMIN — PANTOPRAZOLE SODIUM 40 MG: 40 TABLET, DELAYED RELEASE ORAL at 05:31

## 2024-01-25 RX ADMIN — INSULIN LISPRO 1 UNITS: 100 INJECTION, SOLUTION INTRAVENOUS; SUBCUTANEOUS at 12:31

## 2024-01-25 RX ADMIN — LACTULOSE 20 G: 20 SOLUTION ORAL at 08:39

## 2024-01-25 NOTE — DISCHARGE INSTRUCTIONS
Please continue to take your medications as prescribed  Follow-up with gastroenterology within 1 week  If symptoms recur or worsen, please call your GI doctor or return to the nearest ED

## 2024-01-25 NOTE — DISCHARGE SUMMARY
V2.0  Discharge Summary    Name:  Lisa Bender /Age/Sex: 1960 (63 y.o. female)   Admit Date: 2024  Discharge Date: 24    MRN & CSN:  [id number] & [id number] Encounter Date and Time 24 12:59 PM EST    Attending:  Rashida Baldwin* Discharging Provider: Rashida Baldwin MD       Hospital Course:     Brief HPI: Lisa Bender is a 63 y.o. female with pmh of hepatitis C related cirrhosis, and diabetes who presents with Hepatic encephalopathy (HCC).  She presented with nausea and vomiting that came on the night prior to presentation.  Per HPI she follows with Dr. Valencia and has had cirrhosis for over 10 years but has never had a paracentesis.  Noted to be hypertensive and tachycardic on presentation otherwise stable. Labs were notable for hyperammonemia of 69, WBC 13.1, thrombocytopenia 103, elevated T. bili of 2, INR 1.2 and BNP of 362.  Chest x-ray was nonspecific with patchy bibasilar airspace opacities.  She was evaluated by GI and restarted on lactulose.  She is at this time cleared for DC with outpatient GI follow-up.  Below is a brief POC from hospital course:    Brief Problem Based Course:   Hypoxia -resolved  Patient hypoxic 2 nights ago to 86, with improvement to greater than 96% once put on 2 L via NC  -- Chest x-ray shows persistent right basilar opacity, patient remains afebrile without a white count  -- No wheezing or crackles or rhonchi on physical exam  -- Monitor off antibiotics and steroids at this time  --Ambulatory pulse ox completed, patient maintained her saturations greater than 90% while ambulating and ambulated about 200 feet     Hepatic encephalopathy - resolved  Liver cirrhosis 2/2 hepatitis C s/p TIPS due to portal   Patient presented with confusion, nausea, vomiting and abdominal pain since yesterday evening. No prior h/o paracentesis  -- Ammonia slowly downtrending, T. bili downtrending   --Clinically patient is improved, confusion appears to  TRIG 66 03/21/2023 05:31 AM     Hemoglobin A1C:   Lab Results   Component Value Date/Time    LABA1C 7.1 01/24/2024 03:17 AM     TSH:   Lab Results   Component Value Date/Time    TSH 1.73 08/01/2017 04:15 PM     Troponin:   Lab Results   Component Value Date/Time    TROPONINT <0.010 04/18/2023 02:45 PM    TROPONINT <0.010 03/20/2023 01:00 PM    TROPONINT <0.010 02/07/2023 02:14 AM     Lactic Acid: No results for input(s): \"LACTA\" in the last 72 hours.  BNP:   Recent Labs     01/22/24  1531   PROBNP 362.4*     UA:  Lab Results   Component Value Date/Time    NITRU NEGATIVE 01/22/2024 08:55 PM    NITRU Negative 09/20/2022 11:09 AM    COLORU YELLOW 01/22/2024 08:55 PM    PHUR 5.5 09/20/2022 11:09 AM    WBCUA 1 01/22/2024 08:55 PM    RBCUA 1 01/22/2024 08:55 PM    MUCUS RARE 01/22/2024 08:55 PM    TRICHOMONAS NONE SEEN 01/22/2024 08:55 PM    YEAST OCCASIONAL 02/06/2023 12:57 PM    BACTERIA NEGATIVE 01/22/2024 08:55 PM    CLARITYU CLEAR 01/22/2024 08:55 PM    SPECGRAV >1.030 01/22/2024 08:55 PM    LEUKOCYTESUR NEGATIVE 01/22/2024 08:55 PM    UROBILINOGEN 0.2 01/22/2024 08:55 PM    BILIRUBINUR NEGATIVE 01/22/2024 08:55 PM    BILIRUBINUR neg 07/15/2017 10:58 AM    BLOODU MODERATE NUMBER OR AMOUNT OBSERVED 01/22/2024 08:55 PM    GLUCOSEU 500 09/20/2022 11:09 AM    KETUA 15 01/22/2024 08:55 PM    AMORPHOUS RARE 05/01/2019 12:00 AM     Urine Cultures:   Lab Results   Component Value Date/Time    LABURIN >100,000 CFU/ml 09/20/2022 11:09 AM    LABURIN 50 mg/dL 01/30/2020 09:31 AM     Blood Cultures: No results found for: \"BC\"  No results found for: \"BLOODCULT2\"  Organism:   Lab Results   Component Value Date/Time    ORG Escherichia coli 09/20/2022 11:09 AM       Time Spent Discharging patient 45 minutes    Electronically signed by Rashida Baldwin MD on 1/25/2024 at 1:08 PM

## 2024-01-25 NOTE — PROGRESS NOTES
Outpatient Pharmacy Progress Note for Meds-to-Beds    Total number of Prescriptions Filled: 2  The following medications were dispensed to the patient during the discharge process:  lactulose  sucralfate    Additional Documentation:  Patient picked-up the medication(s) in the OP Pharmacy      Thank you for letting us serve your patients.  [place] [id number]    Phone: [phone]    Fax: [phone]

## 2024-01-26 ENCOUNTER — TELEPHONE (OUTPATIENT)
Dept: FAMILY MEDICINE CLINIC | Age: 64
End: 2024-01-26

## 2024-01-26 DIAGNOSIS — E11.8 TYPE 2 DIABETES MELLITUS WITH COMPLICATION, WITH LONG-TERM CURRENT USE OF INSULIN (HCC): Primary | ICD-10-CM

## 2024-01-26 DIAGNOSIS — Z79.4 TYPE 2 DIABETES MELLITUS WITH COMPLICATION, WITH LONG-TERM CURRENT USE OF INSULIN (HCC): Primary | ICD-10-CM

## 2024-01-26 LAB — AFP-TM SERPL-MCNC: 2 NG/ML (ref 0–9)

## 2024-01-26 RX ORDER — PROCHLORPERAZINE 25 MG/1
1 SUPPOSITORY RECTAL
Qty: 4 EACH | Refills: 5 | Status: SHIPPED | OUTPATIENT
Start: 2024-01-26

## 2024-01-26 RX ORDER — ACYCLOVIR 400 MG/1
1 TABLET ORAL CONTINUOUS
Qty: 1 EACH | Refills: 0 | Status: SHIPPED | OUTPATIENT
Start: 2024-01-26

## 2024-01-26 RX ORDER — ACYCLOVIR 400 MG/1
1 TABLET ORAL
Qty: 4 EACH | Refills: 5 | Status: SHIPPED | OUTPATIENT
Start: 2024-01-26

## 2024-01-26 NOTE — TELEPHONE ENCOUNTER
Re:    Continuous Blood Gluc Sensor (FREESTYLE SAVANNAH 2 SENSOR)     This is not covered at all with patient's new insurance. Please send in a prescription for the Dexcom G7 and it will need a Prior Authorization.

## 2024-01-26 NOTE — TELEPHONE ENCOUNTER
Requested Prescriptions     Signed Prescriptions Disp Refills    Continuous Blood Gluc Sensor (DEXCOM G7 SENSOR) MISC 4 each 5     Si Units by Does not apply route every 10 days     Authorizing Provider: JENNIFER HORNE     Ordering User: BRYON ORR    Continuous Blood Gluc  (DEXCOM G7 ) NEGRITA 1 each 0     Si Units by Does not apply route continuous     Authorizing Provider: JENNIFER HORNE     Ordering User: BRYON ORR    Continuous Blood Gluc Transmit (DEXCOM G6 TRANSMITTER) MISC 4 each 5     Si Units by Does not apply route every 10 days     Authorizing Provider: JENNIFER HORNE     Ordering User: BRYON ORR

## 2024-01-27 LAB
CULTURE: NORMAL
CULTURE: NORMAL
Lab: NORMAL
Lab: NORMAL
SPECIMEN: NORMAL
SPECIMEN: NORMAL

## 2024-02-01 RX ORDER — INSULIN GLARGINE 100 [IU]/ML
22 INJECTION, SOLUTION SUBCUTANEOUS NIGHTLY
Qty: 5 ADJUSTABLE DOSE PRE-FILLED PEN SYRINGE | Refills: 1 | Status: SHIPPED | OUTPATIENT
Start: 2024-02-01

## 2024-02-05 ENCOUNTER — TELEPHONE (OUTPATIENT)
Dept: FAMILY MEDICINE CLINIC | Age: 64
End: 2024-02-05

## 2024-02-05 NOTE — TELEPHONE ENCOUNTER
To Dr. Garza-      Re:   Continuous Blood Gluc Sensor (DEXCOM G7 SENSOR) MISC       Continuous Blood Gluc  (DEXCOM G7 ) NEGRITA       Please do a Prior Auth so patient can get her Dexcom G7.

## 2024-02-14 ENCOUNTER — OFFICE VISIT (OUTPATIENT)
Dept: CARDIOLOGY CLINIC | Age: 64
End: 2024-02-14
Payer: MEDICARE

## 2024-02-14 VITALS
BODY MASS INDEX: 36.78 KG/M2 | HEART RATE: 80 BPM | WEIGHT: 170 LBS | SYSTOLIC BLOOD PRESSURE: 130 MMHG | DIASTOLIC BLOOD PRESSURE: 74 MMHG

## 2024-02-14 DIAGNOSIS — I25.10 CORONARY ARTERY DISEASE INVOLVING NATIVE HEART WITHOUT ANGINA PECTORIS, UNSPECIFIED VESSEL OR LESION TYPE: ICD-10-CM

## 2024-02-14 DIAGNOSIS — E11.8 TYPE 2 DIABETES MELLITUS WITH COMPLICATION, WITH LONG-TERM CURRENT USE OF INSULIN (HCC): ICD-10-CM

## 2024-02-14 DIAGNOSIS — Q24.5 CORONARY-MYOCARDIAL BRIDGE: ICD-10-CM

## 2024-02-14 DIAGNOSIS — I10 PRIMARY HYPERTENSION: ICD-10-CM

## 2024-02-14 DIAGNOSIS — G25.81 RESTLESS LEGS: ICD-10-CM

## 2024-02-14 DIAGNOSIS — E78.5 DYSLIPIDEMIA: ICD-10-CM

## 2024-02-14 DIAGNOSIS — J44.9 CHRONIC OBSTRUCTIVE PULMONARY DISEASE, UNSPECIFIED COPD TYPE (HCC): ICD-10-CM

## 2024-02-14 DIAGNOSIS — I83.893 VARICOSE VEINS OF BOTH LEGS WITH EDEMA: Primary | ICD-10-CM

## 2024-02-14 DIAGNOSIS — Z72.0 TOBACCO ABUSE: ICD-10-CM

## 2024-02-14 DIAGNOSIS — Z79.4 TYPE 2 DIABETES MELLITUS WITH COMPLICATION, WITH LONG-TERM CURRENT USE OF INSULIN (HCC): ICD-10-CM

## 2024-02-14 PROCEDURE — 99214 OFFICE O/P EST MOD 30 MIN: CPT | Performed by: INTERNAL MEDICINE

## 2024-02-14 PROCEDURE — 3017F COLORECTAL CA SCREEN DOC REV: CPT | Performed by: INTERNAL MEDICINE

## 2024-02-14 PROCEDURE — 3023F SPIROM DOC REV: CPT | Performed by: INTERNAL MEDICINE

## 2024-02-14 PROCEDURE — G8484 FLU IMMUNIZE NO ADMIN: HCPCS | Performed by: INTERNAL MEDICINE

## 2024-02-14 PROCEDURE — 2022F DILAT RTA XM EVC RTNOPTHY: CPT | Performed by: INTERNAL MEDICINE

## 2024-02-14 PROCEDURE — G8417 CALC BMI ABV UP PARAM F/U: HCPCS | Performed by: INTERNAL MEDICINE

## 2024-02-14 PROCEDURE — 1111F DSCHRG MED/CURRENT MED MERGE: CPT | Performed by: INTERNAL MEDICINE

## 2024-02-14 PROCEDURE — G8427 DOCREV CUR MEDS BY ELIG CLIN: HCPCS | Performed by: INTERNAL MEDICINE

## 2024-02-14 PROCEDURE — 4004F PT TOBACCO SCREEN RCVD TLK: CPT | Performed by: INTERNAL MEDICINE

## 2024-02-14 PROCEDURE — 3051F HG A1C>EQUAL 7.0%<8.0%: CPT | Performed by: INTERNAL MEDICINE

## 2024-02-14 PROCEDURE — 3078F DIAST BP <80 MM HG: CPT | Performed by: INTERNAL MEDICINE

## 2024-02-14 PROCEDURE — 3075F SYST BP GE 130 - 139MM HG: CPT | Performed by: INTERNAL MEDICINE

## 2024-02-14 NOTE — PROGRESS NOTES
tiZANidine (ZANAFLEX) 4 MG capsule Take 1 capsule by mouth in the morning and at bedtime      Insulin Pen Needle 32G X 4 MM MISC 1 each by Does not apply route daily 100 each 3    Lancets MISC Use one lancet 4 times daily 100 each 5    QUEtiapine (SEROQUEL) 25 MG tablet Take 1 tablet by mouth nightly 30 tablet 5    furosemide (LASIX) 20 MG tablet TAKE 1 TABLET BY MOUTH DAILY HOLD IF SYSTOLIC <100 90 tablet 1    spironolactone (ALDACTONE) 50 MG tablet Take 1 tablet by mouth daily 30 tablet 3    rifAXIMin (XIFAXAN) 550 MG tablet Take 1 tablet by mouth 2 times daily 60 tablet 2    albuterol sulfate HFA (VENTOLIN HFA) 108 (90 Base) MCG/ACT inhaler Inhale 2 puffs into the lungs 4 times daily as needed for Wheezing 54 g 5    ipratropium-albuterol (DUONEB) 0.5-2.5 (3) MG/3ML SOLN nebulizer solution Inhale 3 mLs into the lungs every 4 hours 120 each 5    ranolazine (RANEXA) 500 MG extended release tablet Take 1 tablet by mouth 2 times daily 180 tablet 3    insulin glargine (LANTUS SOLOSTAR) 100 UNIT/ML injection pen Inject 12 Units into the skin nightly (Patient taking differently: Inject 22 Units into the skin nightly) 5 Adjustable Dose Pre-filled Pen Syringe 0    atorvastatin (LIPITOR) 40 MG tablet Take 1 tablet by mouth nightly 30 tablet 0    metoprolol succinate (TOPROL XL) 50 MG extended release tablet Take 1 tablet by mouth daily 30 tablet 0    pantoprazole (PROTONIX) 40 MG tablet Take 1 tablet by mouth 2 times daily (before meals) 60 tablet 1    insulin lispro, 1 Unit Dial, (HUMALOG KWIKPEN) 100 UNIT/ML SOPN 10U insulin befor meals 3x/day    Take insulin according to glucose check 2 hours after meals:   0  140-199 3  200-249 6  250-299 9  300-349 12  350-400 15  >400 18    Take insulin according to glucose check before sleep:   0  140-199 2  200-249 3  250-299 5  300-349 6  350-400 7  >400 9 18 mL 0    Multiple Vitamin (MULTIVITAMIN PO) Take 1 tablet by mouth daily      VITAMIN D PO Take 1 capsule by

## 2024-02-14 NOTE — PATIENT INSTRUCTIONS
CORONARY ARTERY DISEASE:No, has myocardial bridge.  ETT 1/2020   Normal exercise performance without angina and ischemic EKG changes.    Functional Capacity: Fair Exercise Tolerance. No chest pain noted during   testing.     EKG: NSR 66/min.Peaked T-waves ? Hyperkalemia.     HTN: Borderline low on current medical regimen, see list above.             - changes in  treatment:  No, on Toprol XL  Reduce Midodrine dose.     ECHO 2/2023   Left ventricular systolic function is normal.   Ejection fraction is visually estimated at 55-60%.   No significant valvular disease noted.   No pericardial effusion present.   Negative bubble study.      CARDIOMYOPATHY: None known   CONGESTIVE HEART FAILURE: NO KNOWN HISTORY.   VHD: No significant VHD noted     DYSLIPIDEMIA: Patient's profile is at / near Goal.no                                HDL is low                                See most recent Lab values in Labs section above. Takes Lipitor.     OTHER RELEVANT DIAGNOSIS:as noted in patient's active problem list:  Morbid obesity: Diet & Exercise.  ARRHYTHMIAS:no     CHRONIC VENOUS INSUFFICIENCY:yes, Patient has been using compression stockings. Has C4 venous disease with abnormal vein study.              S/P right GSV ablation with good results. Explained to the patient.              Need to continue to wear stockings due to deep vein Reflux.  4/7/2022    Right CFV is patent with good compressibility and respirophasic signal with    good augmentation.    The Right GSV is non-compressible with no evidence of flow at the    saphenofemoral junction, distal thigh, and knee.    The right Mid Thigh GSV is partially compressible with evidence of flow,    without evidence of reflux. Right Mid Thigh tributary noted coming off the    right GSV Mid thigh. This is a new finding as compared to the last s/p    ablation exam 11/11/2021.     ?Recurrence of disease.    TESTS ORDERED:Venous US     PREVIOUSLY ORDERED TESTS REVIEWED & DISCUSSED WITH

## 2024-02-15 NOTE — TELEPHONE ENCOUNTER
Blood Glucose Monitoring Suppl (ACURA BLOOD GLUCOSE   METER) w/Device KIT   Patient-reported dosage and instructions? Daily   What question or problem do you have with the medication? Patient has been   advised that her insurance company will no longer pay for this particular   device. Patient was told to have her PCP order a new type for future use. Preferred Pharmacy? [id number] [facility], [address] [place] [address] [phone] Radha Zavaleta [phone]   Pharmacy phone number (if available)? [phone]   Additional Information for Provider room air

## 2024-03-04 RX ORDER — RANOLAZINE 500 MG/1
500 TABLET, EXTENDED RELEASE ORAL 2 TIMES DAILY
Qty: 180 TABLET | Refills: 3 | Status: SHIPPED | OUTPATIENT
Start: 2024-03-04

## 2024-03-06 RX ORDER — INSULIN GLARGINE 100 [IU]/ML
22 INJECTION, SOLUTION SUBCUTANEOUS NIGHTLY
Qty: 15 ADJUSTABLE DOSE PRE-FILLED PEN SYRINGE | Refills: 1 | Status: SHIPPED | OUTPATIENT
Start: 2024-03-06

## 2024-03-14 ENCOUNTER — TELEPHONE (OUTPATIENT)
Dept: CARDIOLOGY CLINIC | Age: 64
End: 2024-03-14

## 2024-03-14 NOTE — TELEPHONE ENCOUNTER
Left message for pt to call back for test results.        Vascular US Duplex Lower Extremity Venous Bilateral      No evidence of thrombophlebitis in bilateral lower extremity venous systems.    Right GSV s/p venous ablation is partially compressible with low flow and no evidence of reflux at the SFJ to the mid thigh. Right GSV of mid thigh to knee is non-compressible without evidence of flow. Significant reflux of the Right GSV at the distal calf (1.9s).    Significant reflux of the left CFV (1.9s) and GSV at the thigh (0.6s).     Advise thigh high pressure stockings, 20 to 30 mm of Hg.  Keep feet elevated.  Increase walking.     Arrange OV to review the results with the patient & make recommendations.

## 2024-03-19 NOTE — TELEPHONE ENCOUNTER
Left message for pt to call back for test results. Sent letter to pt telling them to call back for test results.

## 2024-04-16 ENCOUNTER — OFFICE VISIT (OUTPATIENT)
Dept: FAMILY MEDICINE CLINIC | Age: 64
End: 2024-04-16
Payer: MEDICARE

## 2024-04-16 VITALS
OXYGEN SATURATION: 96 % | SYSTOLIC BLOOD PRESSURE: 116 MMHG | BODY MASS INDEX: 34.09 KG/M2 | DIASTOLIC BLOOD PRESSURE: 62 MMHG | HEART RATE: 82 BPM | HEIGHT: 57 IN | WEIGHT: 158 LBS

## 2024-04-16 DIAGNOSIS — R07.81 RIB PAIN ON LEFT SIDE: ICD-10-CM

## 2024-04-16 DIAGNOSIS — R21 SKIN RASH: ICD-10-CM

## 2024-04-16 DIAGNOSIS — F42.4 SKIN PICKING HABIT: Primary | ICD-10-CM

## 2024-04-16 DIAGNOSIS — F51.01 PRIMARY INSOMNIA: ICD-10-CM

## 2024-04-16 DIAGNOSIS — F41.9 ANXIETY: ICD-10-CM

## 2024-04-16 PROCEDURE — 4004F PT TOBACCO SCREEN RCVD TLK: CPT | Performed by: PHYSICIAN ASSISTANT

## 2024-04-16 PROCEDURE — G8417 CALC BMI ABV UP PARAM F/U: HCPCS | Performed by: PHYSICIAN ASSISTANT

## 2024-04-16 PROCEDURE — 3017F COLORECTAL CA SCREEN DOC REV: CPT | Performed by: PHYSICIAN ASSISTANT

## 2024-04-16 PROCEDURE — 3078F DIAST BP <80 MM HG: CPT | Performed by: PHYSICIAN ASSISTANT

## 2024-04-16 PROCEDURE — G8427 DOCREV CUR MEDS BY ELIG CLIN: HCPCS | Performed by: PHYSICIAN ASSISTANT

## 2024-04-16 PROCEDURE — 3074F SYST BP LT 130 MM HG: CPT | Performed by: PHYSICIAN ASSISTANT

## 2024-04-16 PROCEDURE — 99213 OFFICE O/P EST LOW 20 MIN: CPT | Performed by: PHYSICIAN ASSISTANT

## 2024-04-16 RX ORDER — HYDROXYZINE HYDROCHLORIDE 25 MG/1
25-50 TABLET, FILM COATED ORAL EVERY 8 HOURS PRN
Qty: 30 TABLET | Refills: 0 | Status: SHIPPED | OUTPATIENT
Start: 2024-04-16 | End: 2024-04-26

## 2024-04-16 RX ORDER — CLOBETASOL PROPIONATE 0.5 MG/G
OINTMENT TOPICAL
Qty: 60 G | Refills: 2 | Status: SHIPPED | OUTPATIENT
Start: 2024-04-16

## 2024-04-16 RX ORDER — ONDANSETRON 4 MG/1
4 TABLET, ORALLY DISINTEGRATING ORAL EVERY 8 HOURS PRN
Qty: 15 TABLET | Refills: 0 | Status: CANCELLED | OUTPATIENT
Start: 2024-04-16

## 2024-04-16 NOTE — PROGRESS NOTES
insulin lispro, 1 Unit Dial, (HUMALOG KWIKPEN) 100 UNIT/ML SOPN 10U insulin befor meals 3x/day    Take insulin according to glucose check 2 hours after meals:   0  140-199 3  200-249 6  250-299 9  300-349 12  350-400 15  >400 18    Take insulin according to glucose check before sleep:   0  140-199 2  200-249 3  250-299 5  300-349 6  350-400 7  >400 9 18 mL 0    Multiple Vitamin (MULTIVITAMIN PO) Take 1 tablet by mouth daily      VITAMIN D PO Take 1 capsule by mouth 2 times daily      calcium carbonate (OYSTER SHELL CALCIUM 500 MG) 1250 (500 Ca) MG tablet Take 1 tablet by mouth daily      mupirocin (BACTROBAN) 2 % ointment Apply topically 3 times daily. 30 g 0    raloxifene (EVISTA) 60 MG tablet Take 1 tablet by mouth daily 90 tablet 3    mirabegron (MYRBETRIQ) 25 MG TB24 Take 1 tablet by mouth daily 30 tablet 5    estradiol (ESTRACE VAGINAL) 0.1 MG/GM vaginal cream Place 1 g vaginally three times a week Use 1 g vaginally nightly for 2 weeks, then 1 g nightly 2-3 times per week. 3 each 3    nitroGLYCERIN (NITROSTAT) 0.4 MG SL tablet Place 1 tablet under the tongue every 5 minutes as needed for Chest pain 25 tablet 3    ondansetron (ZOFRAN ODT) 4 MG disintegrating tablet Take 1 tablet by mouth every 8 hours as needed for Nausea 15 tablet 0    Glucosource Lancets MISC Use one 3-4 times to check blood sugar 100 each 5    FLUoxetine (PROZAC) 20 MG capsule Take 30 mg by mouth daily Takes a 10 mg with a 20 mg to equal 30 mg daily      paliperidone (INVEGA) 6 MG extended release tablet Take 1 tablet by mouth every morning      Compression Stockings MISC by Does not apply route 20 - 30 mmh wear daily and take off at night  Thigh High 2 each 2     No current facility-administered medications for this visit.       ALLERGIES  Allergies   Allergen Reactions    Hydrocodone-Acetaminophen Anaphylaxis    Norco [Hydrocodone-Acetaminophen] Itching     Itching, rash, nausea and vomiting.     Tylenol [Acetaminophen] Itching,

## 2024-05-06 ENCOUNTER — TELEPHONE (OUTPATIENT)
Dept: CARDIOLOGY CLINIC | Age: 64
End: 2024-05-06

## 2024-05-13 ENCOUNTER — OFFICE VISIT (OUTPATIENT)
Dept: OBGYN | Age: 64
End: 2024-05-13
Payer: MEDICARE

## 2024-05-13 VITALS
HEIGHT: 57 IN | SYSTOLIC BLOOD PRESSURE: 134 MMHG | DIASTOLIC BLOOD PRESSURE: 86 MMHG | BODY MASS INDEX: 35.17 KG/M2 | WEIGHT: 163 LBS

## 2024-05-13 DIAGNOSIS — N32.81 OVERACTIVE BLADDER: ICD-10-CM

## 2024-05-13 DIAGNOSIS — N74 FEMALE PELVIC INFLAMMATORY DISORDERS IN DISEASES CLASSIFIED ELSEWHERE: ICD-10-CM

## 2024-05-13 DIAGNOSIS — Z78.0 ENCOUNTER FOR OSTEOPOROSIS SCREENING IN ASYMPTOMATIC POSTMENOPAUSAL PATIENT: ICD-10-CM

## 2024-05-13 DIAGNOSIS — Z13.820 ENCOUNTER FOR OSTEOPOROSIS SCREENING IN ASYMPTOMATIC POSTMENOPAUSAL PATIENT: ICD-10-CM

## 2024-05-13 DIAGNOSIS — N89.8 VAGINAL DISCHARGE: ICD-10-CM

## 2024-05-13 DIAGNOSIS — Z01.419 ENCOUNTER FOR ANNUAL ROUTINE GYNECOLOGICAL EXAMINATION: Primary | ICD-10-CM

## 2024-05-13 DIAGNOSIS — Z12.31 SCREENING MAMMOGRAM FOR BREAST CANCER: ICD-10-CM

## 2024-05-13 PROCEDURE — 99214 OFFICE O/P EST MOD 30 MIN: CPT | Performed by: OBSTETRICS & GYNECOLOGY

## 2024-05-13 PROCEDURE — 3017F COLORECTAL CA SCREEN DOC REV: CPT | Performed by: OBSTETRICS & GYNECOLOGY

## 2024-05-13 PROCEDURE — G0101 CA SCREEN;PELVIC/BREAST EXAM: HCPCS | Performed by: OBSTETRICS & GYNECOLOGY

## 2024-05-13 PROCEDURE — 4004F PT TOBACCO SCREEN RCVD TLK: CPT | Performed by: OBSTETRICS & GYNECOLOGY

## 2024-05-13 PROCEDURE — G8417 CALC BMI ABV UP PARAM F/U: HCPCS | Performed by: OBSTETRICS & GYNECOLOGY

## 2024-05-13 PROCEDURE — G8427 DOCREV CUR MEDS BY ELIG CLIN: HCPCS | Performed by: OBSTETRICS & GYNECOLOGY

## 2024-05-13 RX ORDER — FUROSEMIDE 20 MG/1
20 TABLET ORAL DAILY
Qty: 90 TABLET | Refills: 1 | Status: SHIPPED | OUTPATIENT
Start: 2024-05-13

## 2024-05-13 RX ORDER — SOLIFENACIN SUCCINATE 5 MG/1
5 TABLET, FILM COATED ORAL DAILY
Qty: 30 TABLET | Refills: 3 | Status: SHIPPED | OUTPATIENT
Start: 2024-05-13 | End: 2025-05-13

## 2024-05-13 NOTE — PROGRESS NOTES
Every Day     Current packs/day: 0.50     Average packs/day: 0.5 packs/day for 50.4 years (25.2 ttl pk-yrs)     Types: Cigarettes     Start date: 1974     Passive exposure: Past    Smokeless tobacco: Never   Vaping Use    Vaping Use: Never used   Substance Use Topics    Alcohol use: Not Currently     Comment: None/Caffiene - 1 cup of coffee/day    Drug use: Not Currently     Types: Marijuana (Weed)       Family History   Problem Relation Age of Onset    Ovarian Cancer Mother     Cancer Mother         lung ca    Arthritis Mother     Migraines Mother     Cancer Father         colon ca    Diabetes Father     High Blood Pressure Father     Arthritis Father     High Cholesterol Father     Migraines Father     Migraines Sister     Heart Disease Brother         WPW    Breast Cancer Maternal Aunt     Breast Cancer Maternal Aunt     Breast Cancer Maternal Aunt     Breast Cancer Maternal Aunt        Current Outpatient Medications   Medication Sig Dispense Refill    solifenacin (VESICARE) 5 MG tablet Take 1 tablet by mouth daily 30 tablet 3    terconazole (TERAZOL 7) 0.4 % vaginal cream Place vaginally nightly. 45 g 0    furosemide (LASIX) 20 MG tablet Take 1 tablet by mouth daily Hold if systolic <100 90 tablet 1    clobetasol (TEMOVATE) 0.05 % ointment Apply to skin lesions 3 times daily for 7 days and then only as needed 60 g 2    insulin glargine (LANTUS SOLOSTAR) 100 UNIT/ML injection pen Inject 22 Units into the skin nightly 15 Adjustable Dose Pre-filled Pen Syringe 1    ranolazine (RANEXA) 500 MG extended release tablet Take 1 tablet by mouth 2 times daily 180 tablet 3    insulin glargine (LANTUS SOLOSTAR) 100 UNIT/ML injection pen Inject 22 Units into the skin nightly 5 Adjustable Dose Pre-filled Pen Syringe 1    Continuous Blood Gluc Sensor (DEXCOM G7 SENSOR) MISC 1 Units by Does not apply route every 10 days 4 each 5    Continuous Blood Gluc  (DEXCOM G7 ) NEGRITA 1 Units by Does not apply route

## 2024-05-14 LAB
CANDIDA DNA VAG QL NAA+PROBE: ABNORMAL
G VAGINALIS DNA SPEC QL NAA+PROBE: ABNORMAL
T VAGINALIS DNA VAG QL NAA+PROBE: ABNORMAL

## 2024-05-14 RX ORDER — METRONIDAZOLE 500 MG/1
500 TABLET ORAL 2 TIMES DAILY
Qty: 14 TABLET | Refills: 0 | Status: SHIPPED | OUTPATIENT
Start: 2024-05-14 | End: 2024-05-21

## 2024-06-18 ENCOUNTER — HOSPITAL ENCOUNTER (OUTPATIENT)
Dept: ULTRASOUND IMAGING | Age: 64
Discharge: HOME | End: 2024-06-18
Attending: INTERNAL MEDICINE
Payer: MEDICARE

## 2024-06-18 DIAGNOSIS — B18.1 CHRONIC VIRAL HEPATITIS B WITHOUT DELTA AGENT AND WITHOUT COMA (HCC): ICD-10-CM

## 2024-06-18 PROCEDURE — 76700 US EXAM ABDOM COMPLETE: CPT

## 2024-07-05 NOTE — ED NOTES
ED TO INPATIENT SBAR HANDOFF    Patient Name: Lisa Bender   :  1960  63 y.o.   Preferred Name    Family/Caregiver Present no   Restraints no   C-SSRS: Risk of Suicide: No Risk  Sitter no   Sepsis Risk Score Sepsis Risk Score: 7.9      Situation  Chief Complaint   Patient presents with    Altered Mental Status     Patient reports that she \"feels confused\"- states that she has also been vomiting since yesterday      Brief Description of Patient's Condition:  presents to the emergency department for evaluation of confusion.  Says that yesterday afternoon/evening she began having some nausea and vomiting.  Says it was nonbilious and not bloody but says it was slightly dark.  Says that she has had a little bit of confusion this morning.  Says that she is a little bit of epigastric abdominal pain but denies other areas of abdominal pain.  Denies any diarrhea.  Denies any dysuria.  Denies any fevers.  Says she lives in an apartment complex.  Denies any cough chest pain shortness of breath.  Denies any falls or trauma.  Says that she has had this happen in the past when her ammonia has been high.  Says she has been taking her medications.   Mental Status: oriented  Arrived from: home    Imaging:   US ABDOMEN LIMITED Specify organ? LIVER, GALLBLADDER   Final Result   Nodular hepatic surface contour suggestive of cirrhosis.      Mild hydronephrosis of the right kidney.         XR CHEST PORTABLE   Final Result   Unchanged nonspecific patchy bibasilar airspace opacities.           Abnormal labs:   Abnormal Labs Reviewed   CBC WITH AUTO DIFFERENTIAL - Abnormal; Notable for the following components:       Result Value    WBC 13.1 (*)     Hemoglobin 16.2 (*)     MCH 32.0 (*)     Platelets 103 (*)     MPV 12.1 (*)     Segs Relative 78.4 (*)     Lymphocytes % 12.9 (*)     Monocytes % 6.6 (*)     Immature Neutrophil % 0.5 (*)     All other components within normal limits   BASIC METABOLIC PANEL - Abnormal; Notable for  PROCEDURE NOTE  Date: 7/5/2024   Name: Noe Jimenez  YOB: 1964    Procedures      Cardiac catheterization preliminary note:    RA 12 mmHg  RV 45/12 mmHg  PA 45/15 mmHg with diastolic that corresponds with PCWP and LVEDP consistent with moderate pulmonary hypertension.  Shunt fraction 1.28 with step up of 8 percent.  Single-vessel disease with proximal LAD long diffuse 60-70% stenosis with IFR 0.86 consistent with obstructive lesion.  LV ejection fraction 50% global mild LV dysfunction.    Impression: Pulmonary pressures as well as shunt fraction not consistent with PFO/ASD as the etiology of symptoms.  Successful PCI to proximal LAD with NAVARRO x 1.    Plan: Continue to follow symptoms and renal function with continued diuresis.  Will have EJ/echo and cath films given to patient and make appointment with [facility] as an outpatient for further evaluation going forward and follow-up.  Plavix uninterrupted for 6 months.  Restart Eliquis tonight.         the following components:    Glucose 190 (*)     All other components within normal limits   HEPATIC FUNCTION PANEL - Abnormal; Notable for the following components:    Total Bilirubin 2.0 (*)     Bilirubin, Direct 0.6 (*)     Bilirubin, Indirect 1.4 (*)     All other components within normal limits   LIPASE - Abnormal; Notable for the following components:    Lipase 85 (*)     All other components within normal limits   BRAIN NATRIURETIC PEPTIDE - Abnormal; Notable for the following components:    Pro-.4 (*)     All other components within normal limits   AMMONIA - Abnormal; Notable for the following components:    Ammonia 69 (*)     All other components within normal limits   URINALYSIS WITH REFLEX TO CULTURE - Abnormal; Notable for the following components:    Ketones, Urine 15 (*)     Blood, Urine MODERATE NUMBER OR AMOUNT OBSERVED (*)     Protein,  (*)     All other components within normal limits   PROTIME/INR & PTT - Abnormal; Notable for the following components:    Protime 15.1 (*)     All other components within normal limits   URINE MICROSCOPIC WITH REFLEX TO CULTURE - Abnormal; Notable for the following components:    Mucus, UA RARE (*)     All other components within normal limits       Background  History:   Past Medical History:   Diagnosis Date    Abnormal Pap smear of cervix     Acid reflux     Arthritis     left knee    Breast cyst     CAD (coronary artery disease)     per [facility]/6/[facility] - Dr. Karimi    COPD (chronic obstructive pulmonary disease) (HCC)     follow with Dr Huber    Depression     \"have manic - depression see Dr MAGY Murphy    Diabetes mellitus (HCC)     dx 10+ yrs ago- follows with PCP    Drug abuse (HCC)     hx use of cocaine, heroin and marijuana- states last used 12/2014    Glaucoma     bilateral    H/O Doppler lower venous ultrasound 04/04/2019    No DVT or SVT, Significant reflux of RGSV and LGSV.    H/O echocardiogram 12/18/2020    EF 55-60%, Mod LVH.    Hepatic

## 2024-07-25 RX ORDER — NITROGLYCERIN 0.4 MG/1
0.4 TABLET SUBLINGUAL EVERY 5 MIN PRN
Qty: 25 TABLET | Refills: 3 | Status: SHIPPED | OUTPATIENT
Start: 2024-07-25

## 2024-07-25 RX ORDER — FUROSEMIDE 20 MG/1
20 TABLET ORAL DAILY
Qty: 90 TABLET | Refills: 1 | Status: SHIPPED | OUTPATIENT
Start: 2024-07-25

## 2024-08-14 RX ORDER — INSULIN GLARGINE 100 [IU]/ML
22 INJECTION, SOLUTION SUBCUTANEOUS NIGHTLY
Qty: 15 ADJUSTABLE DOSE PRE-FILLED PEN SYRINGE | Refills: 0 | Status: SHIPPED | OUTPATIENT
Start: 2024-08-14

## 2024-09-08 DIAGNOSIS — N32.81 OVERACTIVE BLADDER: ICD-10-CM

## 2024-09-09 RX ORDER — SOLIFENACIN SUCCINATE 5 MG/1
5 TABLET, FILM COATED ORAL DAILY
Qty: 90 TABLET | Refills: 1 | OUTPATIENT
Start: 2024-09-09

## 2024-09-09 RX ORDER — FUROSEMIDE 20 MG
20 TABLET ORAL DAILY
Qty: 90 TABLET | Refills: 1 | OUTPATIENT
Start: 2024-09-09

## 2024-09-11 DIAGNOSIS — Z79.4 TYPE 2 DIABETES MELLITUS WITH COMPLICATION, WITH LONG-TERM CURRENT USE OF INSULIN (HCC): ICD-10-CM

## 2024-09-11 DIAGNOSIS — M85.80 OSTEOPENIA AFTER MENOPAUSE: ICD-10-CM

## 2024-09-11 DIAGNOSIS — Z78.0 OSTEOPENIA AFTER MENOPAUSE: ICD-10-CM

## 2024-09-11 DIAGNOSIS — N32.81 OVERACTIVE BLADDER: ICD-10-CM

## 2024-09-11 DIAGNOSIS — E11.8 TYPE 2 DIABETES MELLITUS WITH COMPLICATION, WITH LONG-TERM CURRENT USE OF INSULIN (HCC): ICD-10-CM

## 2024-09-11 RX ORDER — NITROGLYCERIN 0.4 MG/1
0.4 TABLET SUBLINGUAL EVERY 5 MIN PRN
Qty: 25 TABLET | Refills: 3 | OUTPATIENT
Start: 2024-09-11

## 2024-09-11 RX ORDER — NITROGLYCERIN 0.4 MG/1
0.4 TABLET SUBLINGUAL EVERY 5 MIN PRN
Qty: 25 TABLET | Refills: 3 | Status: SHIPPED | OUTPATIENT
Start: 2024-09-11

## 2024-09-11 RX ORDER — FUROSEMIDE 20 MG
20 TABLET ORAL DAILY
Qty: 90 TABLET | Refills: 1 | OUTPATIENT
Start: 2024-09-11

## 2024-09-12 RX ORDER — SOLIFENACIN SUCCINATE 5 MG/1
5 TABLET, FILM COATED ORAL DAILY
Qty: 90 TABLET | Refills: 0 | Status: SHIPPED | OUTPATIENT
Start: 2024-09-12 | End: 2025-09-12

## 2024-09-12 RX ORDER — RALOXIFENE HYDROCHLORIDE 60 MG/1
60 TABLET, FILM COATED ORAL DAILY
Qty: 90 TABLET | Refills: 0 | Status: SHIPPED | OUTPATIENT
Start: 2024-09-12

## 2024-09-12 RX ORDER — ACYCLOVIR 400 MG/1
1 TABLET ORAL
Qty: 4 EACH | Refills: 5 | Status: SHIPPED | OUTPATIENT
Start: 2024-09-12

## 2024-09-12 RX ORDER — QUETIAPINE FUMARATE 25 MG/1
25 TABLET, FILM COATED ORAL
Qty: 30 TABLET | Refills: 0 | Status: SHIPPED | OUTPATIENT
Start: 2024-09-12 | End: 2024-10-12

## 2024-09-12 RX ORDER — PROCHLORPERAZINE 25 MG/1
1 SUPPOSITORY RECTAL
Qty: 4 EACH | Refills: 5 | Status: SHIPPED | OUTPATIENT
Start: 2024-09-12

## 2024-09-12 RX ORDER — INSULIN GLARGINE 100 [IU]/ML
22 INJECTION, SOLUTION SUBCUTANEOUS NIGHTLY
Qty: 15 ADJUSTABLE DOSE PRE-FILLED PEN SYRINGE | Refills: 0 | Status: SHIPPED | OUTPATIENT
Start: 2024-09-12

## 2024-10-14 NOTE — TELEPHONE ENCOUNTER
Patient ripped her compression so needs new ones.  Please call to let her know how and where she gets new ones.

## 2024-10-18 ENCOUNTER — TELEPHONE (OUTPATIENT)
Dept: FAMILY MEDICINE CLINIC | Age: 64
End: 2024-10-18

## 2024-10-18 NOTE — TELEPHONE ENCOUNTER
Attempted to reach the patient and her phone is not accepting calls at this time.  Sending a my chart message

## 2024-10-18 NOTE — TELEPHONE ENCOUNTER
Patient would like Compression Stockings/Socks ordered for her.  She would also like to have a call as to where to pick these up at.

## 2024-10-18 NOTE — TELEPHONE ENCOUNTER
Patient has no showed her last few appointments.  She has an appt 11-5-24 does she need them before?

## 2024-10-28 ENCOUNTER — OFFICE VISIT (OUTPATIENT)
Dept: FAMILY MEDICINE CLINIC | Age: 64
End: 2024-10-28
Payer: MEDICARE

## 2024-10-28 VITALS
OXYGEN SATURATION: 97 % | WEIGHT: 149 LBS | HEIGHT: 57 IN | BODY MASS INDEX: 32.15 KG/M2 | TEMPERATURE: 101.7 F | DIASTOLIC BLOOD PRESSURE: 66 MMHG | HEART RATE: 94 BPM | SYSTOLIC BLOOD PRESSURE: 122 MMHG

## 2024-10-28 DIAGNOSIS — R60.0 BILATERAL LOWER EXTREMITY EDEMA: ICD-10-CM

## 2024-10-28 DIAGNOSIS — I87.2 VENOUS INSUFFICIENCY (CHRONIC) (PERIPHERAL): Primary | ICD-10-CM

## 2024-10-28 PROCEDURE — 3078F DIAST BP <80 MM HG: CPT | Performed by: PHYSICIAN ASSISTANT

## 2024-10-28 PROCEDURE — 4004F PT TOBACCO SCREEN RCVD TLK: CPT | Performed by: PHYSICIAN ASSISTANT

## 2024-10-28 PROCEDURE — G8417 CALC BMI ABV UP PARAM F/U: HCPCS | Performed by: PHYSICIAN ASSISTANT

## 2024-10-28 PROCEDURE — G8484 FLU IMMUNIZE NO ADMIN: HCPCS | Performed by: PHYSICIAN ASSISTANT

## 2024-10-28 PROCEDURE — 3017F COLORECTAL CA SCREEN DOC REV: CPT | Performed by: PHYSICIAN ASSISTANT

## 2024-10-28 PROCEDURE — 99214 OFFICE O/P EST MOD 30 MIN: CPT | Performed by: PHYSICIAN ASSISTANT

## 2024-10-28 PROCEDURE — G8427 DOCREV CUR MEDS BY ELIG CLIN: HCPCS | Performed by: PHYSICIAN ASSISTANT

## 2024-10-28 PROCEDURE — 3074F SYST BP LT 130 MM HG: CPT | Performed by: PHYSICIAN ASSISTANT

## 2024-10-28 NOTE — PATIENT INSTRUCTIONS
Compression hose- [facility]  Elevate the lower legs  Lasix 20 mg twice daily for the next 5 days and then back to once daily dosing  F/U with PCP next week as planned  ER for any worsening

## 2024-10-28 NOTE — PROGRESS NOTES
atorvastatin (LIPITOR) 40 MG tablet Take 1 tablet by mouth nightly 30 tablet 0    metoprolol succinate (TOPROL XL) 50 MG extended release tablet Take 1 tablet by mouth daily 30 tablet 0    pantoprazole (PROTONIX) 40 MG tablet Take 1 tablet by mouth 2 times daily (before meals) 60 tablet 1    insulin lispro, 1 Unit Dial, (HUMALOG KWIKPEN) 100 UNIT/ML SOPN 10U insulin befor meals 3x/day    Take insulin according to glucose check 2 hours after meals:   0  140-199 3  200-249 6  250-299 9  300-349 12  350-400 15  >400 18    Take insulin according to glucose check before sleep:   0  140-199 2  200-249 3  250-299 5  300-349 6  350-400 7  >400 9 18 mL 0    Multiple Vitamin (MULTIVITAMIN PO) Take 1 tablet by mouth daily      VITAMIN D PO Take 1 capsule by mouth 2 times daily      calcium carbonate (OYSTER SHELL CALCIUM 500 MG) 1250 (500 Ca) MG tablet Take 1 tablet by mouth daily      mirabegron (MYRBETRIQ) 25 MG TB24 Take 1 tablet by mouth daily 30 tablet 5    ondansetron (ZOFRAN ODT) 4 MG disintegrating tablet Take 1 tablet by mouth every 8 hours as needed for Nausea 15 tablet 0    Glucosource Lancets MISC Use one 3-4 times to check blood sugar 100 each 5    FLUoxetine (PROZAC) 20 MG capsule Take 30 mg by mouth daily Takes a 10 mg with a 20 mg to equal 30 mg daily      paliperidone (INVEGA) 6 MG extended release tablet Take 1 tablet by mouth every morning      Compression Stockings MISC by Does not apply route 20 - 30 mmh wear daily and take off at night  Thigh High (Patient not taking: Reported on 10/28/2024) 2 each 2     No current facility-administered medications for this visit.       ALLERGIES  Allergies   Allergen Reactions    Hydrocodone-Acetaminophen Anaphylaxis    Norco [Hydrocodone-Acetaminophen] Itching     Itching, rash, nausea and vomiting.     Tylenol [Acetaminophen] Itching, Nausea And Vomiting and Rash       PHYSICAL EXAM    /66 (Site: Right Upper Arm, Position: Sitting, Cuff Size: Medium

## 2024-11-05 ENCOUNTER — OFFICE VISIT (OUTPATIENT)
Dept: FAMILY MEDICINE CLINIC | Age: 64
End: 2024-11-05

## 2024-11-05 VITALS
BODY MASS INDEX: 30.42 KG/M2 | SYSTOLIC BLOOD PRESSURE: 132 MMHG | OXYGEN SATURATION: 94 % | WEIGHT: 141 LBS | DIASTOLIC BLOOD PRESSURE: 78 MMHG | HEART RATE: 43 BPM | HEIGHT: 57 IN

## 2024-11-05 DIAGNOSIS — N64.4 PAIN OF BOTH BREASTS: Primary | ICD-10-CM

## 2024-11-05 DIAGNOSIS — I10 PRIMARY HYPERTENSION: ICD-10-CM

## 2024-11-05 DIAGNOSIS — I87.2 VENOUS INSUFFICIENCY (CHRONIC) (PERIPHERAL): ICD-10-CM

## 2024-11-05 DIAGNOSIS — R60.0 BILATERAL LOWER EXTREMITY EDEMA: ICD-10-CM

## 2024-11-05 DIAGNOSIS — E11.8 TYPE 2 DIABETES MELLITUS WITH COMPLICATION, WITH LONG-TERM CURRENT USE OF INSULIN (HCC): ICD-10-CM

## 2024-11-05 DIAGNOSIS — R26.9 GAIT ABNORMALITY: ICD-10-CM

## 2024-11-05 DIAGNOSIS — Z79.4 TYPE 2 DIABETES MELLITUS WITH COMPLICATION, WITH LONG-TERM CURRENT USE OF INSULIN (HCC): ICD-10-CM

## 2024-11-05 DIAGNOSIS — K70.30 ALCOHOLIC CIRRHOSIS OF LIVER WITHOUT ASCITES (HCC): ICD-10-CM

## 2024-11-05 RX ORDER — INSULIN LISPRO 100 [IU]/ML
INJECTION, SOLUTION INTRAVENOUS; SUBCUTANEOUS
Qty: 18 ML | Refills: 0 | Status: SHIPPED | OUTPATIENT
Start: 2024-11-05

## 2024-11-05 NOTE — PROGRESS NOTES
Outpatient Clinic Visit Note    Patient: Lisa Bender  : 1960 (63 y.o.)  Date: 2024    CC:  Chief Complaint   Patient presents with    Other     Patient states she feels unsteady and has had frequent falls.  Patient states she needs an order for a mammogram ultrasound due to pain.  Patient would like to have a referral to Dr. Harrell for her liver and spleen. Patient lost her prescription for compression stockings.      Back Pain     Back pain and spasms x 1 month        HPI: problems with ambulation as noted.  She also has chronic bilat breast pain and hx of previously inserted markers.      Past Medical History:    Past Medical History:   Diagnosis Date    Abnormal Pap smear of cervix     Acid reflux     Arthritis     left knee    Breast cyst     CAD (coronary artery disease)     per [facility] 13 - Dr. Karimi    COPD (chronic obstructive pulmonary disease) (HCC)     follow with Dr Huber    Depression     \"have manic - depression see Dr MAGY Murphy    Diabetes mellitus (HCC)     dx 10+ yrs ago- follows with PCP    Drug abuse (HCC)     hx use of cocaine, heroin and marijuana- states last used 2014    Glaucoma     bilateral    H/O Doppler lower venous ultrasound 2019    No DVT or SVT, Significant reflux of RGSV and LGSV.    H/O echocardiogram 2020    EF 55-60%, Mod LVH.    Hepatic encephalopathy (HCC) 2019    Hepatitis C     for liver bx 12/3/2015\"Have Hepatitis B and C and saw Dr Thomason for this 2015\"    Hiatal hernia     History of alcohol abuse     History of exercise stress test 2020    Treadmill, Normal exercise performance without angina and ischemic EKG changes.    HTN (hypertension)     \"for the past two yrs on medication\" follows with Dr Karimi    Hx of blood clots 2019    Portal vein thrombsis - TIPS procedure 19    Hyperlipemia     Irregular heart beat     per pt    Liver hematoma     Migraine     Migraines     Last migraine:  2018    Nausea & vomiting

## 2024-11-05 NOTE — PROGRESS NOTES
Patient requires the assistance of a wheeled walker with seat to successfully complete daily living tasks of toileting, feeding, bathing, dressing and grooming  A wheeled walker with seat is necessary due to the patient's unsteady gait, upper body weakness.  Inability to  an ambulation device, ambulating only short distances by pushing a walker and the need to sit for a short time before resuming ambulation, instead of a lesser assistive device such as a cane, crutch or standard walker

## 2024-11-14 ENCOUNTER — TELEPHONE (OUTPATIENT)
Dept: GASTROENTEROLOGY | Age: 64
End: 2024-11-14

## 2024-11-14 NOTE — TELEPHONE ENCOUNTER
Number on file for pt was not a working number. I attempted to call pt alt contact Sailaja but was unable to make contact, LM for someone to call back with updated number for pt.

## 2024-11-15 ENCOUNTER — HOSPITAL ENCOUNTER (OUTPATIENT)
Dept: PULMONOLOGY | Age: 64
Discharge: HOME | End: 2024-11-15
Attending: INTERNAL MEDICINE

## 2024-12-03 DIAGNOSIS — R60.0 BILATERAL LOWER EXTREMITY EDEMA: ICD-10-CM

## 2024-12-03 DIAGNOSIS — I87.2 VENOUS INSUFFICIENCY (CHRONIC) (PERIPHERAL): ICD-10-CM

## 2024-12-03 RX ORDER — INSULIN LISPRO 100 [IU]/ML
INJECTION, SOLUTION INTRAVENOUS; SUBCUTANEOUS
Qty: 18 ML | Refills: 0 | Status: SHIPPED | OUTPATIENT
Start: 2024-12-03

## 2024-12-04 ENCOUNTER — TELEPHONE (OUTPATIENT)
Dept: FAMILY MEDICINE CLINIC | Age: 64
End: 2024-12-04

## 2024-12-04 DIAGNOSIS — I83.893 VARICOSE VEINS OF BOTH LEGS WITH EDEMA: ICD-10-CM

## 2024-12-04 DIAGNOSIS — J44.9 CHRONIC OBSTRUCTIVE PULMONARY DISEASE, UNSPECIFIED COPD TYPE (HCC): ICD-10-CM

## 2024-12-04 DIAGNOSIS — R26.9 GAIT ABNORMALITY: Primary | ICD-10-CM

## 2024-12-04 NOTE — TELEPHONE ENCOUNTER
To Dr. Garza-    Patient would like  an order for a wheeled walker to go to [facility] ---she was seen for this on 11/5/24.

## 2024-12-14 DIAGNOSIS — Z79.4 TYPE 2 DIABETES MELLITUS WITH COMPLICATION, WITH LONG-TERM CURRENT USE OF INSULIN (HCC): ICD-10-CM

## 2024-12-14 DIAGNOSIS — E11.8 TYPE 2 DIABETES MELLITUS WITH COMPLICATION, WITH LONG-TERM CURRENT USE OF INSULIN (HCC): ICD-10-CM

## 2024-12-14 DIAGNOSIS — M85.80 OSTEOPENIA AFTER MENOPAUSE: ICD-10-CM

## 2024-12-14 DIAGNOSIS — Z78.0 OSTEOPENIA AFTER MENOPAUSE: ICD-10-CM

## 2024-12-16 RX ORDER — ACYCLOVIR 400 MG/1
TABLET ORAL
Qty: 1 EACH | Refills: 0 | Status: SHIPPED | OUTPATIENT
Start: 2024-12-16

## 2024-12-16 RX ORDER — RALOXIFENE HYDROCHLORIDE 60 MG/1
60 TABLET, FILM COATED ORAL DAILY
Qty: 90 TABLET | Refills: 0 | OUTPATIENT
Start: 2024-12-16

## 2025-01-05 DIAGNOSIS — Z78.0 OSTEOPENIA AFTER MENOPAUSE: ICD-10-CM

## 2025-01-05 DIAGNOSIS — M85.80 OSTEOPENIA AFTER MENOPAUSE: ICD-10-CM

## 2025-01-05 DIAGNOSIS — N32.81 OVERACTIVE BLADDER: ICD-10-CM

## 2025-01-06 ENCOUNTER — HOSPITAL ENCOUNTER (OUTPATIENT)
Dept: PULMONOLOGY | Age: 65
Discharge: HOME | End: 2025-01-06
Attending: INTERNAL MEDICINE

## 2025-01-06 RX ORDER — SOLIFENACIN SUCCINATE 5 MG/1
5 TABLET, FILM COATED ORAL DAILY
Qty: 90 TABLET | Refills: 0 | OUTPATIENT
Start: 2025-01-06

## 2025-01-06 RX ORDER — RALOXIFENE HYDROCHLORIDE 60 MG/1
60 TABLET, FILM COATED ORAL DAILY
Qty: 90 TABLET | Refills: 0 | OUTPATIENT
Start: 2025-01-06

## 2025-01-09 ENCOUNTER — TELEPHONE (OUTPATIENT)
Dept: CARDIOLOGY CLINIC | Age: 65
End: 2025-01-09

## 2025-01-09 RX ORDER — PEN NEEDLE, DIABETIC 32GX 5/32"
NEEDLE, DISPOSABLE MISCELLANEOUS
Refills: 3 | OUTPATIENT
Start: 2025-01-09

## 2025-01-14 ENCOUNTER — TELEPHONE (OUTPATIENT)
Dept: CARDIOLOGY CLINIC | Age: 65
End: 2025-01-14

## 2025-01-14 RX ORDER — FUROSEMIDE 20 MG/1
20 TABLET ORAL DAILY
Qty: 60 TABLET | Refills: 0 | Status: SHIPPED | OUTPATIENT
Start: 2025-01-14

## 2025-01-14 RX ORDER — RANOLAZINE 500 MG/1
500 TABLET, EXTENDED RELEASE ORAL 2 TIMES DAILY
Qty: 60 TABLET | Refills: 0 | Status: SHIPPED | OUTPATIENT
Start: 2025-01-14

## 2025-01-14 RX ORDER — NITROGLYCERIN 0.4 MG/1
0.4 TABLET SUBLINGUAL EVERY 5 MIN PRN
Qty: 25 TABLET | Refills: 0 | Status: SHIPPED | OUTPATIENT
Start: 2025-01-14

## 2025-01-14 NOTE — TELEPHONE ENCOUNTER
Pt called in complaining of both legs seeping for the past month. Legs will swell and are painful. She is having trouble finding someone to measure her legs for compression socks. Pt has an appt with Dr. Karimi 2/7.

## 2025-01-27 RX ORDER — QUETIAPINE FUMARATE 25 MG/1
25 TABLET, FILM COATED ORAL NIGHTLY
Qty: 30 TABLET | Refills: 5 | OUTPATIENT
Start: 2025-01-27

## 2025-02-05 DIAGNOSIS — E11.8 TYPE 2 DIABETES MELLITUS WITH COMPLICATION, WITH LONG-TERM CURRENT USE OF INSULIN (HCC): ICD-10-CM

## 2025-02-05 DIAGNOSIS — Z79.4 TYPE 2 DIABETES MELLITUS WITH COMPLICATION, WITH LONG-TERM CURRENT USE OF INSULIN (HCC): ICD-10-CM

## 2025-02-05 RX ORDER — ACYCLOVIR 400 MG/1
1 TABLET ORAL
Qty: 4 EACH | Refills: 5 | Status: CANCELLED | OUTPATIENT
Start: 2025-02-05

## 2025-02-05 NOTE — PROGRESS NOTES
Patient Name: Lisa Bender  : 1960  MRN# [id number]    REASON FOR VISIT: 1 year follow up  Patient Active Problem List    Diagnosis Date Noted    TIA (transient ischemic attack) 2023    Acute left-sided weakness 2023    Left-sided weakness 2023    Hyperglycemia due to diabetes mellitus (HCC) 2023    CAP (community acquired pneumonia) 2022    Vulvar intraepithelial neoplasia (ZORAN) grade 2 2022    Pain of both breasts 2024    Gait abnormality 2024    Subacute cough 2024    Contusion of ribs, right, initial encounter 2023    Anxiety 2022    Coronary artery disease involving native heart without angina pectoris 2022    Bronchitis 2022    Varicose veins of both legs with edema 2021    Non-intractable vomiting with nausea 2020    Hyperammonemia (HCC) 2020    Morbidly obese 2020    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    thrombocytopenia 2020    Acute hepatic encephalopathy (HCC) 01/15/2020    Acute upper GI bleed 10/25/2019    S/P TIPS (transjugular intrahepatic portosystemic shunt) 2019    Cirrhosis of liver without ascites (HCC) 2019    Acute encephalopathy 2019    Acute colitis 2019    Restless legs 2019    Delayed gastric emptying 2018    Chronic hepatitis C without hepatic coma (HCC) 2018    Acute GI bleeding 2018    Abdominal pain 10/27/2018    Portal vein thrombosis 10/25/2018    Intractable nausea and vomiting 10/25/2018    SOB (shortness of breath)     Type 2 diabetes mellitus with complication, with long-term current use of insulin (HCC) 2018    Coronary-myocardial bridge 2018    GI bleed 2017    Primary hypertension 12/10/2017    Colitis 2017    Esophageal hypertension 2017    Candida esophagitis (HCC) 2017    Left carpal tunnel syndrome 2017    Right carpal tunnel syndrome

## 2025-02-07 ENCOUNTER — OFFICE VISIT (OUTPATIENT)
Dept: CARDIOLOGY CLINIC | Age: 65
End: 2025-02-07
Payer: MEDICARE

## 2025-02-07 VITALS
DIASTOLIC BLOOD PRESSURE: 72 MMHG | WEIGHT: 140 LBS | HEART RATE: 68 BPM | OXYGEN SATURATION: 97 % | HEIGHT: 57 IN | BODY MASS INDEX: 30.2 KG/M2 | SYSTOLIC BLOOD PRESSURE: 118 MMHG

## 2025-02-07 DIAGNOSIS — Z79.4 TYPE 2 DIABETES MELLITUS WITH COMPLICATION, WITH LONG-TERM CURRENT USE OF INSULIN (HCC): ICD-10-CM

## 2025-02-07 DIAGNOSIS — Z72.0 TOBACCO ABUSE: ICD-10-CM

## 2025-02-07 DIAGNOSIS — E78.5 DYSLIPIDEMIA: ICD-10-CM

## 2025-02-07 DIAGNOSIS — E11.8 TYPE 2 DIABETES MELLITUS WITH COMPLICATION, WITH LONG-TERM CURRENT USE OF INSULIN (HCC): ICD-10-CM

## 2025-02-07 DIAGNOSIS — I83.893 VARICOSE VEINS OF BOTH LEGS WITH EDEMA: ICD-10-CM

## 2025-02-07 DIAGNOSIS — Q24.5 CORONARY-MYOCARDIAL BRIDGE: ICD-10-CM

## 2025-02-07 DIAGNOSIS — I10 PRIMARY HYPERTENSION: Primary | ICD-10-CM

## 2025-02-07 PROBLEM — E66.01 MORBIDLY OBESE: Status: RESOLVED | Noted: 2020-09-01 | Resolved: 2025-02-07

## 2025-02-07 PROCEDURE — 3046F HEMOGLOBIN A1C LEVEL >9.0%: CPT | Performed by: INTERNAL MEDICINE

## 2025-02-07 PROCEDURE — 4004F PT TOBACCO SCREEN RCVD TLK: CPT | Performed by: INTERNAL MEDICINE

## 2025-02-07 PROCEDURE — 2022F DILAT RTA XM EVC RTNOPTHY: CPT | Performed by: INTERNAL MEDICINE

## 2025-02-07 PROCEDURE — G8427 DOCREV CUR MEDS BY ELIG CLIN: HCPCS | Performed by: INTERNAL MEDICINE

## 2025-02-07 PROCEDURE — 3017F COLORECTAL CA SCREEN DOC REV: CPT | Performed by: INTERNAL MEDICINE

## 2025-02-07 PROCEDURE — 3078F DIAST BP <80 MM HG: CPT | Performed by: INTERNAL MEDICINE

## 2025-02-07 PROCEDURE — 93000 ELECTROCARDIOGRAM COMPLETE: CPT | Performed by: INTERNAL MEDICINE

## 2025-02-07 PROCEDURE — 3074F SYST BP LT 130 MM HG: CPT | Performed by: INTERNAL MEDICINE

## 2025-02-07 PROCEDURE — 99214 OFFICE O/P EST MOD 30 MIN: CPT | Performed by: INTERNAL MEDICINE

## 2025-02-07 PROCEDURE — G8417 CALC BMI ABV UP PARAM F/U: HCPCS | Performed by: INTERNAL MEDICINE

## 2025-02-07 NOTE — PATIENT INSTRUCTIONS
Thank you for allowing us to care for you today!   We want to ensure we can follow your treatment plan and we strive to give you the best outcomes and experience possible.   If you ever have a life threatening emergency and call 911 - for an ambulance (EMS)  REMEMBER  Our providers can only care for you at:   [facility] or [facility]   Even if you have someone take you or you drive yourself we can only care for you in a [facility] facility. Our providers are not setup at the other healthcare locations!    PLEASE CALL OUR OFFICE DURING NORMAL BUSINESS HOURS  Monday through Friday 8 am to 5 pm  AFTER HOURS the physician on-call cannot help with scheduling, rescheduling, procedure instruction questions or any type of medication need or issue.  [facility] P:[phone] - [facility] P:[phone] - [facility] P:[phone]      If you receive a survey:  We would appreciate you taking the time to share your experience concerning your provider visit in the office.    These surveys are confidential!  We are eager to improve and are counting on you to share your feedback so we can ensure you get the best care possible.  CORONARY ARTERY DISEASE:No, has myocardial bridge.  ETT 1/2020   Normal exercise performance without angina and ischemic EKG changes.    Functional Capacity: Fair Exercise Tolerance. No chest pain noted during   testing.     EKG: NSR 73 / min. LAE.     HTN: Borderline low on current medical regimen, see list above.             - changes in  treatment:  No, on Toprol XL  Reduce Midodrine dose.     ECHO 2/2023   Left ventricular systolic function is normal.   Ejection fraction is visually estimated at 55-60%.   No significant valvular disease noted.   No pericardial effusion present.   Negative bubble study.      CARDIOMYOPATHY: None known   CONGESTIVE HEART FAILURE: NO KNOWN HISTORY.   VHD: No significant VHD noted     DYSLIPIDEMIA:  appears normal and intact

## 2025-02-07 NOTE — PROGRESS NOTES
The Right GSV is non-compressible with no evidence of flow at the    saphenofemoral junction, distal thigh, and knee.    The right Mid Thigh GSV is partially compressible with evidence of flow,    without evidence of reflux. Right Mid Thigh tributary noted coming off the    right GSV Mid thigh. This is a new finding as compared to the last s/p    ablation exam 11/11/2021.      ?Recurrence of disease.    3/13/2024    No evidence of thrombophlebitis in bilateral lower extremity venous systems.    Right GSV s/p venous ablation is partially compressible with low flow and no evidence of reflux at the SFJ to the mid thigh. Right GSV of mid thigh to knee is non-compressible without evidence of flow. Significant reflux of the Right GSV at the distal calf (1.9s).    Significant reflux of the left CFV (1.9s) and GSV at the thigh (0.6s).     Patient did not come back for F/U till now.    TESTS ORDERED: Needs serial ablations of both GSV's.  Right GSV from mid to the ankle and left GSV in the thigh area.     PREVIOUSLY ORDERED TESTS REVIEWED & DISCUSSED WITH THE PATIENT:     I personally reviewed & interpreted, all previously ordered tests as copied above. Latest Labs are pulled in to the note with dates.   Labs, specially in Reference to Lipid profile, Cardiac testing in the form of Echo ( dated: ), stress tests ( dated: ) & other relevant cardiac testing reviewed with patient & recommendations made based on assessment of the results.    Discussed role of Cardiac risk factors & effects + treatment of co morbidities with patient & advised accordingly.     MEDICATIONS: List of medications patient is currently taking is reviewed in detail with the patient. Discussed any side effects or problems taking the medication.     Recommend Continue present management & medications as listed.     AFFIRMATION: I reviewed patient's history, previous & current medical problems & all Labs + testing. This includes chart prep even prior to

## 2025-02-13 ENCOUNTER — TELEPHONE (OUTPATIENT)
Dept: CARDIOLOGY CLINIC | Age: 65
End: 2025-02-13

## 2025-02-13 NOTE — TELEPHONE ENCOUNTER
Test Ordered:  ablation    /  Insurance:[employer]  /  [place]  /  Authorization Status:Pending

## 2025-02-28 ENCOUNTER — TELEPHONE (OUTPATIENT)
Dept: CARDIOLOGY CLINIC | Age: 65
End: 2025-02-28

## 2025-02-28 NOTE — TELEPHONE ENCOUNTER
Pt wants to know if they can call her in leg wraps at [facility] on [address] .  If you call her today use the number [phone] this is her neighbor's phone, her phone will be turned back on Monday   This neighbor is not on her HIPAA form

## 2025-02-28 NOTE — TELEPHONE ENCOUNTER
Pt said she is to have appt for ablation on both legs and hasn't heard anything yet. If you call today this is the number to call [phone] this is her neighbors phone but they are not on her HIPAA form. Otherwise her phone will be back on this coming Monday.

## 2025-02-28 NOTE — TELEPHONE ENCOUNTER
Tried to call patient, her number does not work.  Number for her neighbor when I called they stated I had the wrong number she does not live her call her phone..

## 2025-03-07 ENCOUNTER — TELEPHONE (OUTPATIENT)
Dept: CARDIOLOGY CLINIC | Age: 65
End: 2025-03-07

## 2025-03-07 NOTE — TELEPHONE ENCOUNTER
Patient called she has developed a blister   On the top of er right foot smaller than   Dime , she noticed it yesterday , she doesn't  Have a PCP.

## 2025-03-07 NOTE — TELEPHONE ENCOUNTER
Returned patient's call and scheduled her for venous ablations for 3/17 and 3/24. Advised patient that we will be calling her for instructions next week. Patient voiced understanding

## 2025-03-11 ENCOUNTER — TELEPHONE (OUTPATIENT)
Dept: CARDIOLOGY CLINIC | Age: 65
End: 2025-03-11

## 2025-03-11 NOTE — TELEPHONE ENCOUNTER
Pre- Instructions Venous ablation     Date of Procedure: 3/17/25 Time: 1 pm Arrival Time: 12:45 pm      Please keep yourself hydrated for 24 hours before the procedure ( drink lots of water)  Please  the Valium that was sent to (ask pt)  and bring it with you to the procedure.   Please wear loose comfortable clothing.   Patient does not need to wear compression stockings to the procedure.  You will need someone with you to drive you home.   Please eat a light breakfast in the morning  Please continue to take medications as needed.    LM on  for patient to return call to confirm.  Need to know if patient is requesting valium and what pharmacy to call it in to.

## 2025-03-12 DIAGNOSIS — I87.2 CHRONIC VENOUS INSUFFICIENCY: Primary | ICD-10-CM

## 2025-03-12 DIAGNOSIS — I10 PRIMARY HYPERTENSION: ICD-10-CM

## 2025-03-12 NOTE — TELEPHONE ENCOUNTER
Spoke with patient, sore on foot is now healed. Also confirms that she will be at ablation appt 3/17 and does want Valium ordered. Raj campo

## 2025-03-12 NOTE — TELEPHONE ENCOUNTER
Pre- Instructions Venous ablation     Date of Procedure: 3/17/25 Time: 1 pm Arrival Time: 12:45 pm      Please keep yourself hydrated for 24 hours before the procedure ( drink lots of water)  Please  the Valium that was sent to "[facility] [employer] and bring it with you to the procedure.   Please wear loose comfortable clothing.   Patient does not need to wear compression stockings to the procedure.  You will need someone with you to drive you home.   Please eat a light breakfast in the morning  Please continue to take medications as needed.    Raj confirmed    Valium being sent to "[facility]

## 2025-03-13 DIAGNOSIS — I87.2 PERIPHERAL VENOUS INSUFFICIENCY: Primary | ICD-10-CM

## 2025-03-13 RX ORDER — DIAZEPAM 5 MG/1
5 TABLET ORAL 2 TIMES DAILY PRN
Qty: 2 TABLET | Refills: 0 | Status: CANCELLED | OUTPATIENT
Start: 2025-03-13 | End: 2025-03-15

## 2025-03-13 NOTE — TELEPHONE ENCOUNTER
Spoke to pharmacy and called in valium 5 mg, 2 tablets, 0 refills for venous ablation procedure that is on 3/17/25

## 2025-03-14 RX ORDER — DIAZEPAM 5 MG/1
5 TABLET ORAL PRN
Qty: 2 TABLET | Refills: 0 | OUTPATIENT
Start: 2025-03-14 | End: 2025-03-19

## 2025-03-17 ENCOUNTER — OFFICE VISIT (OUTPATIENT)
Dept: CARDIOLOGY CLINIC | Age: 65
End: 2025-03-17
Payer: MEDICARE

## 2025-03-17 DIAGNOSIS — I25.10 CORONARY ARTERY DISEASE INVOLVING NATIVE HEART WITHOUT ANGINA PECTORIS, UNSPECIFIED VESSEL OR LESION TYPE: ICD-10-CM

## 2025-03-17 DIAGNOSIS — I10 PRIMARY HYPERTENSION: ICD-10-CM

## 2025-03-17 DIAGNOSIS — Z72.0 TOBACCO ABUSE: ICD-10-CM

## 2025-03-17 DIAGNOSIS — I83.893 VARICOSE VEINS OF BOTH LEGS WITH EDEMA: Primary | ICD-10-CM

## 2025-03-17 DIAGNOSIS — Z79.4 TYPE 2 DIABETES MELLITUS WITH COMPLICATION, WITH LONG-TERM CURRENT USE OF INSULIN (HCC): ICD-10-CM

## 2025-03-17 DIAGNOSIS — E11.8 TYPE 2 DIABETES MELLITUS WITH COMPLICATION, WITH LONG-TERM CURRENT USE OF INSULIN (HCC): ICD-10-CM

## 2025-03-17 DIAGNOSIS — E78.5 DYSLIPIDEMIA: ICD-10-CM

## 2025-03-17 PROCEDURE — 36482 ENDOVEN THER CHEM ADHES 1ST: CPT | Performed by: INTERNAL MEDICINE

## 2025-03-17 NOTE — PROGRESS NOTES
Endovenous Ablation with VenaSeal Operative Report    3/17/2025    Subjective:  Lisa is a 64 y.o. female    Procedure Performed:    Endovenous Ablation of the Great Saphenous Vein with VenaSeal™ Closure System of the  Left side.    Indication  Duplex ultrasound was used to map out the insufficient saphenous vein, and access was determined and marked on the overlying skin. The depth and diameter of the vein(s) to be treated was documented. The patient was placed supine on the procedure table and the leg was prepped and draped using sterile technique.     Ultrasound guidance was again used to localize the access site. 1% lidocaine was injected as a local anesthetic in the subcutaneous tissues at the target location in the GSV in the lower leg. Using ultrasound guidance, access was gained at this location with the 19 gauge thin walled access needle and followed by introduction of a short guidewire, location confirmed with ultrasound. A small, 3 mm incision was made at the access site to allow for introduction and placement of the 7 Fr x7cm introducer/dilator. The dilator and guidewire were removed. The 0.035 guidewire from the Venaseal kit was then introduced and positioned at the saphenofemoral junction using ultrasound guidance. The 80 cm 7 Fr introducer sheath/dilator was positioned 5cm from the saphenofemoral junction. The guidewire and dilator were removed, and the remaining sheath was flushed with sterile saline, with the syringe remaining in place prior to the next steps.     The cyanoacrylate adhesive was precisely primed into the 5 F delivery catheter and this catheter/syringe combination was attached within the dispenser gun. This \"assembly\" was introduced through the 7 F sheath and positioned 5 cm caudal of the saphenofemoral junction under ultrasound guidance. The steps from the IFU were followed for dispensing amounts, locations and compression times, e.g 2 aliquots proximally with 3 minutes of

## 2025-03-17 NOTE — PROGRESS NOTES
VENOUS PRE-PROCEDURE H & P  3/17/2025    Subjective:  Lisa is a 64 y.o. female    GENERAL - A-Ox3- In no respiratory distress  Heart - Regular rhythm, normal S1, S2, no gallops, no murmurs, no friction rubs  Chest - CTA & percussion    Vein Exam:     Right ext - varicosities, spider and reticular veins Yes, skin changes Yes, ulcers No, edema Yes    Left ext  - varicosities, spider and reticular veins Yes, skin changes Yes, ulcers No, edema Yes    Reflex Study:   3/13/2024    No evidence of thrombophlebitis in bilateral lower extremity venous systems.    Right GSV s/p venous ablation is partially compressible with low flow and no evidence of reflux at the SFJ to the mid thigh. Right GSV of mid thigh to knee is non-compressible without evidence of flow. Significant reflux of the Right GSV at the distal calf (1.9s).    Significant reflux of the left CFV (1.9s) and GSV at the thigh (0.6s).    Assessment:   Patient has symptomatic C4 venous disease.    Plan:   Ablation of LEFT GSV  Informed consent obtained.    Sebastián Karimi MD, FACC

## 2025-03-19 ENCOUNTER — TELEPHONE (OUTPATIENT)
Dept: CARDIOLOGY CLINIC | Age: 65
End: 2025-03-19

## 2025-03-19 NOTE — TELEPHONE ENCOUNTER
Tried calling pt back to tell her that we are not able to do her next venous ablation that is scheduled for 3/24/25 unless her follow up Venous US for her LGSV is completed. Told pt in my message to please call back so that we could discuss rescheduling her Venous Ultrasound for sometime tomorrow.

## 2025-03-19 NOTE — TELEPHONE ENCOUNTER
Pre- Instructions Venous ablation     Date of Procedure: 3/24/25 Time: 2 pm Arrival Time: 1:45 pm      Please keep yourself hydrated for 24 hours before the procedure ( drink lots of water)  Please  the Valium that was sent to [facility]  and bring it with you to the procedure.   Please wear loose comfortable clothing.   Patient does not need to wear compression stockings to the procedure.  You will need someone with you to drive you home.   Please eat a light breakfast in the morning  Please continue to take medications as needed.    Aki Confirmed with patient.    Reviewed instructions and patient voiced understanding.

## 2025-03-20 ENCOUNTER — TELEPHONE (OUTPATIENT)
Dept: CARDIOLOGY CLINIC | Age: 65
End: 2025-03-20

## 2025-03-20 ENCOUNTER — HOSPITAL ENCOUNTER (OUTPATIENT)
Dept: PULMONOLOGY | Age: 65
Discharge: HOME | End: 2025-03-20

## 2025-03-20 DIAGNOSIS — I87.2 PERIPHERAL VENOUS INSUFFICIENCY: Primary | ICD-10-CM

## 2025-03-20 NOTE — TELEPHONE ENCOUNTER
Pt called in to go over venous US results. Pt did not have venous US done, so I worked on r/sing it with her prior to her next venous ablation 3/24/25. Pt confirmed that she can come in tomorrow @ 4pm for her s/p LGSV 3/17. I advised pt that if the US is not done we cannot proceed with the next venous ablation. Pt voiced understanding. Pt asked about valium being called in for procedure, and I restated instructions and let her know that we will call valium in to [facility] [address] and to have a  to take her home Monday after procedure. Pt voiced understanding.

## 2025-03-20 NOTE — TELEPHONE ENCOUNTER
Left message for pt to call back and get ultrasound rescheduled x2, and to discuss upcoming ablation

## 2025-03-20 NOTE — TELEPHONE ENCOUNTER
Spoke to pharmacy and called in valium 5mg, 2 tablets, 0 refills for venous ablation procedure that will be on 3/24/2025.

## 2025-03-21 RX ORDER — DIAZEPAM 5 MG/1
5 TABLET ORAL PRN
Qty: 2 TABLET | Refills: 0 | OUTPATIENT
Start: 2025-03-21 | End: 2025-03-26

## 2025-03-23 RX ORDER — PEN NEEDLE, DIABETIC 32GX 5/32"
NEEDLE, DISPOSABLE MISCELLANEOUS
Refills: 3 | OUTPATIENT
Start: 2025-03-23

## 2025-03-24 ENCOUNTER — OFFICE VISIT (OUTPATIENT)
Dept: CARDIOLOGY CLINIC | Age: 65
End: 2025-03-24
Payer: MEDICARE

## 2025-03-24 DIAGNOSIS — Z79.4 TYPE 2 DIABETES MELLITUS WITH COMPLICATION, WITH LONG-TERM CURRENT USE OF INSULIN (HCC): ICD-10-CM

## 2025-03-24 DIAGNOSIS — Z72.0 TOBACCO ABUSE: ICD-10-CM

## 2025-03-24 DIAGNOSIS — E78.5 DYSLIPIDEMIA: ICD-10-CM

## 2025-03-24 DIAGNOSIS — I83.893 VARICOSE VEINS OF BOTH LEGS WITH EDEMA: Primary | ICD-10-CM

## 2025-03-24 DIAGNOSIS — E11.8 TYPE 2 DIABETES MELLITUS WITH COMPLICATION, WITH LONG-TERM CURRENT USE OF INSULIN (HCC): ICD-10-CM

## 2025-03-24 DIAGNOSIS — I25.10 CORONARY ARTERY DISEASE INVOLVING NATIVE HEART WITHOUT ANGINA PECTORIS, UNSPECIFIED VESSEL OR LESION TYPE: ICD-10-CM

## 2025-03-24 DIAGNOSIS — I10 PRIMARY HYPERTENSION: ICD-10-CM

## 2025-03-24 PROCEDURE — 36482 ENDOVEN THER CHEM ADHES 1ST: CPT | Performed by: INTERNAL MEDICINE

## 2025-03-24 NOTE — PROGRESS NOTES
Endovenous Ablation with VenaSeal Operative Report    3/24/2025    Subjective:  Lisa is a 64 y.o. female    Procedure Performed:    Endovenous Ablation of the Great Saphenous Vein with VenaSeal™ Closure System of the  Right side.    Indication  Duplex ultrasound was used to map out the insufficient saphenous vein, and access was determined and marked on the overlying skin. The depth and diameter of the vein(s) to be treated was documented. The patient was placed supine on the procedure table and the leg was prepped and draped using sterile technique.     Ultrasound guidance was again used to localize the access site. 1% lidocaine was injected as a local anesthetic in the subcutaneous tissues at the target location in the GSV in the lower leg. Using ultrasound guidance, access was gained at this location with the 19 gauge thin walled access needle and followed by introduction of a short guidewire, location confirmed with ultrasound. A small, 3 mm incision was made at the access site to allow for introduction and placement of the 7 Fr x7cm introducer/dilator. The dilator and guidewire were removed. The 0.035 guidewire from the Venaseal kit was then introduced and positioned at the saphenofemoral junction using ultrasound guidance. The 80 cm 7 Fr introducer sheath/dilator was positioned 5cm from the saphenofemoral junction. The guidewire and dilator were removed, and the remaining sheath was flushed with sterile saline, with the syringe remaining in place prior to the next steps.     The cyanoacrylate adhesive was precisely primed into the 5 F delivery catheter and this catheter/syringe combination was attached within the dispenser gun. This \"assembly\" was introduced through the 7 F sheath and positioned 5 cm caudal of the saphenofemoral junction under ultrasound guidance. The steps from the IFU were followed for dispensing amounts, locations and compression times, e.g 2 aliquots proximally with 3 minutes of

## 2025-03-24 NOTE — PROGRESS NOTES
VENOUS PRE-PROCEDURE H & P  3/24/2025    Subjective:  Lisa is a 64 y.o. female    GENERAL - A-Ox3- In no respiratory distress  Heart - Regular rhythm, normal S1, S2, no gallops, no murmurs, no friction rubs  Chest - CTA & percussion    Vein Exam:     Right ext - varicosities, spider and reticular veins Yes, skin changes Yes, ulcers No, edema No    Left ext  - varicosities, spider and reticular veins Yes, skin changes Yes, ulcers No, edema No    Reflex Study:   3/13/2024    No evidence of thrombophlebitis in bilateral lower extremity venous systems.    Right GSV s/p venous ablation is partially compressible with low flow and no evidence of reflux at the SFJ to the mid thigh. Right GSV of mid thigh to knee is non-compressible without evidence of flow. Significant reflux of the Right GSV at the distal calf (1.9s).    Significant reflux of the left CFV (1.9s) and GSV at the thigh (0.6s).    Assessment:   Patient has symptomatic C4 venous disease.    Plan:   Ablation of right GSV.  Informed consent obtained.    Sebastián Karimi MD, FACC

## 2025-04-03 ENCOUNTER — TELEPHONE (OUTPATIENT)
Dept: CARDIOLOGY CLINIC | Age: 65
End: 2025-04-03

## 2025-04-03 NOTE — TELEPHONE ENCOUNTER
VAS LOWER EXT VENOUS LTD    Left Common Femoral Vein: Patent, compressible and normal augmentation.    Left GSV is noncompressible with no evidence of flow from just after the SFJ to the knee.     Lm on  requesting pt return my call.

## 2025-04-24 NOTE — PROGRESS NOTES
Patient Name: Lisa Bender  : 1960  MRN# [id number]    REASON FOR VISIT: Ablation F/U  Patient Active Problem List    Diagnosis Date Noted    TIA (transient ischemic attack) 2023    Acute left-sided weakness 2023    Left-sided weakness 2023    Hyperglycemia due to diabetes mellitus (HCC) 2023    CAP (community acquired pneumonia) 2022    Vulvar intraepithelial neoplasia (ZORAN) grade 2 2022    Pain of both breasts 2024    Gait abnormality 2024    Subacute cough 2024    Contusion of ribs, right, initial encounter 2023    Anxiety 2022    Coronary artery disease involving native heart without angina pectoris 2022    Bronchitis 2022    Varicose veins of both legs with edema 2021    Non-intractable vomiting with nausea 2020    Hyperammonemia 2020    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    thrombocytopenia 2020    Acute hepatic encephalopathy (HCC) 01/15/2020    Acute upper GI bleed 10/25/2019    S/P TIPS (transjugular intrahepatic portosystemic shunt) 2019    Cirrhosis of liver without ascites (HCC) 2019    Acute encephalopathy 2019    Acute colitis 2019    Restless legs 2019    Delayed gastric emptying 2018    Chronic hepatitis C without hepatic coma (HCC) 2018    Acute GI bleeding 2018    Abdominal pain 10/27/2018    Portal vein thrombosis 10/25/2018    Intractable nausea and vomiting 10/25/2018    SOB (shortness of breath)     Type 2 diabetes mellitus with complication, with long-term current use of insulin (HCC) 2018    Coronary-myocardial bridge 2018    GI bleed 2017    Primary hypertension 12/10/2017    Colitis 2017    Esophageal hypertension 2017    Candida esophagitis (HCC) 2017    Left carpal tunnel syndrome 2017    Right carpal tunnel syndrome 2017    Vitamin D deficiency 2017

## 2025-05-05 RX ORDER — RANOLAZINE 500 MG/1
500 TABLET, EXTENDED RELEASE ORAL 2 TIMES DAILY
Qty: 180 TABLET | Refills: 3 | Status: SHIPPED | OUTPATIENT
Start: 2025-05-05

## 2025-05-19 DIAGNOSIS — I25.10 CORONARY ARTERY DISEASE INVOLVING NATIVE HEART WITHOUT ANGINA PECTORIS, UNSPECIFIED VESSEL OR LESION TYPE: Primary | ICD-10-CM

## 2025-05-19 RX ORDER — FUROSEMIDE 20 MG/1
20 TABLET ORAL DAILY
Qty: 90 TABLET | Refills: 1 | OUTPATIENT
Start: 2025-05-19

## 2025-06-02 DIAGNOSIS — Z78.0 OSTEOPENIA AFTER MENOPAUSE: ICD-10-CM

## 2025-06-02 DIAGNOSIS — M85.80 OSTEOPENIA AFTER MENOPAUSE: ICD-10-CM

## 2025-06-02 RX ORDER — RALOXIFENE HYDROCHLORIDE 60 MG/1
60 TABLET, FILM COATED ORAL DAILY
Qty: 90 TABLET | Refills: 0 | OUTPATIENT
Start: 2025-06-02

## 2025-06-04 DIAGNOSIS — Z79.4 TYPE 2 DIABETES MELLITUS WITH COMPLICATION, WITH LONG-TERM CURRENT USE OF INSULIN (HCC): ICD-10-CM

## 2025-06-04 DIAGNOSIS — E11.8 TYPE 2 DIABETES MELLITUS WITH COMPLICATION, WITH LONG-TERM CURRENT USE OF INSULIN (HCC): ICD-10-CM

## 2025-06-04 RX ORDER — ACYCLOVIR 400 MG/1
1 TABLET ORAL
Qty: 4 EACH | Refills: 5 | OUTPATIENT
Start: 2025-06-04

## 2025-06-04 NOTE — TELEPHONE ENCOUNTER
Pt called asking for refills on Dexcom G7 sensors. She is out and does not see her new primary care for a couple of weeks. She is not able to test her blood sugar and has not taken her insulin because of that.

## (undated) DEVICE — TRAY PREP DRY W/ PREM GLV 2 APPL 6 SPNG 2 UNDPD 1 OVERWRAP

## (undated) DEVICE — GLOVE SURG SZ 65 L12IN FNGR THK87MIL WHT LTX FREE

## (undated) DEVICE — SHEET,DRAPE,UNDERBUTTOCK,GRAD POUCH,PORT: Brand: MEDLINE

## (undated) DEVICE — GOWN,ECLIPSE,POLYRNF,BRTHSLV,L,30/CS: Brand: MEDLINE

## (undated) DEVICE — ELECTRODE ES AD CRDLSS PT RET REM POLYHESIVE

## (undated) DEVICE — DRAPE,U/ SHT,SPLIT,PLAS,STERIL: Brand: MEDLINE

## (undated) DEVICE — ENDOSCOPY KIT: Brand: MEDLINE INDUSTRIES, INC.

## (undated) DEVICE — INTENDED FOR TISSUE SEPARATION, AND OTHER PROCEDURES THAT REQUIRE A SHARP SURGICAL BLADE TO PUNCTURE OR CUT.: Brand: BARD-PARKER ® STAINLESS STEEL BLADES

## (undated) DEVICE — PADDING,UNDERCAST,COTTON, 3X4YD STERILE: Brand: MEDLINE

## (undated) DEVICE — DRAPE SURGICAL HAND PROX AURORA

## (undated) DEVICE — ZIMMER® STERILE DISPOSABLE TOURNIQUET CUFF WITH PLC, DUAL PORT, SINGLE BLADDER, 24 IN. (61 CM)

## (undated) DEVICE — SYRINGE MED 10ML LUERLOCK TIP W/O SFTY DISP

## (undated) DEVICE — Z DISCONTINUED NO SUB IDED TUBING ETCO2 AD L6.5FT NSL ORAL CVD PRNG NONFLARED TIP OVR

## (undated) DEVICE — Z DISCONTINUED (USE MFG CAT MVABO)  TUBING GAS SAMPLING STD 6.5 FT FEMALE CONN SMRT CAPNOLINE

## (undated) DEVICE — NEEDLE HYPO 25GA L1.5IN BLU POLYPR HUB S STL REG BVL STR

## (undated) DEVICE — SOLUTION SURG PREP PVP IOD 10% 8 OZ BTL SCRB CARE

## (undated) DEVICE — BANDAGE COMPR W3INXL5YD WHT BGE POLY COT M E WRP WV HK AND

## (undated) DEVICE — GAUZE,SPONGE,4"X4",16PLY,XRAY,STRL,LF: Brand: MEDLINE

## (undated) DEVICE — BANDAGE,ELASTIC,ESMARK,STERILE,4"X9',LF: Brand: MEDLINE

## (undated) DEVICE — DRESSING PETRO W3XL3IN OIL EMUL N ADH GZ KNIT IMPREG CELOS

## (undated) DEVICE — GLOVE ORANGE PI 7 1/2   MSG9075

## (undated) DEVICE — GLOVE SURG SZ 6 CRM LTX FREE POLYISOPRENE POLYMER BEAD ANTI

## (undated) DEVICE — SOLUTION IV IRRIG WATER 1000ML POUR BRL 2F7114

## (undated) DEVICE — DRAPE SHEET ULTRAGARD: Brand: MEDLINE

## (undated) DEVICE — SUTURE MCRYL SZ 4-0 L18IN ABSRB UD L19MM PS-2 3/8 CIR PRIM Y496G

## (undated) DEVICE — GLOVE SURG SZ 7 CRM LTX FREE POLYISOPRENE POLYMER BEAD ANTI

## (undated) DEVICE — SNARE VASC L240CM LOOP W10MM SHTH DIA2.4MM RND STIFF CLD

## (undated) DEVICE — COTTON UNDERCAST PADDING,CRIMPED FINISH: Brand: WEBRIL

## (undated) DEVICE — SUTURE VCRL SZ 3-0 L27IN ABSRB UD L26MM SH 1/2 CIR J416H

## (undated) DEVICE — PACK,BASIC,IX: Brand: MEDLINE

## (undated) DEVICE — ZIMMER® STERILE DISPOSABLE TOURNIQUET CUFF WITH PLC, DUAL PORT, SINGLE BLADDER, 18 IN. (46 CM)

## (undated) DEVICE — SOLUTION PREP POVIDONE IOD FOR SKIN MUCOUS MEM PRIOR TO

## (undated) DEVICE — MARKER SURG SKIN UTIL REGULAR/FINE 2 TIP W/ RUL AND 9 LBL

## (undated) DEVICE — GLOVE ORANGE PI 7   MSG9070

## (undated) DEVICE — GLOVE SURG SZ 65 L12IN FNGR THK79MIL GRN LTX FREE

## (undated) DEVICE — TOWEL,OR,DSP,ST,BLUE,STD,6/PK,12PK/CS: Brand: MEDLINE

## (undated) DEVICE — SUTURE ETHLN SZ 3-0 L30IN NONABSORBABLE BLK FS-1 L24MM 3/8 669H

## (undated) DEVICE — FORCEPS BX L240CM JAW DIA2.8MM L CAP W/ NDL MIC MESH TOOTH

## (undated) DEVICE — COUNTER NDL 30 COUNT FOAM STRP SGL MAG

## (undated) DEVICE — CHLORAPREP 26ML ORANGE

## (undated) DEVICE — SPONGE GZ W4XL8IN COT WVN 12 PLY

## (undated) DEVICE — PACK,BASIC,SIRUS,V: Brand: MEDLINE

## (undated) DEVICE — GLOVE SURG SZ 8 L12IN THK75MIL DK GRN LTX FREE

## (undated) DEVICE — ANESTHESIA CIRCUIT ADULT-LF: Brand: MEDLINE INDUSTRIES, INC.

## (undated) DEVICE — MARKER SURG SKIN UTIL BLK REG TIP NONSMEARING W/ 6IN RUL

## (undated) DEVICE — PAD MATERNITY CURITY ADH STRIP DISP

## (undated) DEVICE — LEGGINGS, PAIR, CLEAR, STERILE: Brand: MEDLINE

## (undated) DEVICE — GOWN,ECLIPSE,POLYRNF,BRTHSLV,XL,30/CS: Brand: MEDLINE

## (undated) DEVICE — GOWN,SIRUS,POLYRNF,BRTHSLV,XLN/XL,20/CS: Brand: MEDLINE

## (undated) DEVICE — PREMIUM WET SKIN PREP TRAY: Brand: MEDLINE INDUSTRIES, INC.

## (undated) DEVICE — GLOVE SURG SZ 8 CRM LTX FREE POLYISOPRENE POLYMER BEAD ANTI